# Patient Record
Sex: MALE | Race: ASIAN | NOT HISPANIC OR LATINO | URBAN - METROPOLITAN AREA
[De-identification: names, ages, dates, MRNs, and addresses within clinical notes are randomized per-mention and may not be internally consistent; named-entity substitution may affect disease eponyms.]

---

## 2020-02-09 ENCOUNTER — INPATIENT (INPATIENT)
Facility: HOSPITAL | Age: 60
LOS: 26 days | Discharge: HOME | DRG: 40 | End: 2020-03-07
Attending: GENERAL PRACTICE | Admitting: GENERAL PRACTICE
Payer: MEDICARE

## 2020-02-09 VITALS
SYSTOLIC BLOOD PRESSURE: 116 MMHG | OXYGEN SATURATION: 100 % | RESPIRATION RATE: 14 BRPM | DIASTOLIC BLOOD PRESSURE: 76 MMHG | HEART RATE: 67 BPM | TEMPERATURE: 98 F

## 2020-02-09 DIAGNOSIS — R47.01 APHASIA: ICD-10-CM

## 2020-02-09 LAB
ALBUMIN SERPL ELPH-MCNC: 3.9 G/DL — SIGNIFICANT CHANGE UP (ref 3.3–5)
ALP SERPL-CCNC: 132 U/L — HIGH (ref 40–120)
ALT FLD-CCNC: 33 U/L — SIGNIFICANT CHANGE UP (ref 10–45)
ANION GAP SERPL CALC-SCNC: 10 MMOL/L — SIGNIFICANT CHANGE UP (ref 5–17)
APTT BLD: 32.4 SEC — SIGNIFICANT CHANGE UP (ref 27.5–36.3)
AST SERPL-CCNC: 31 U/L — SIGNIFICANT CHANGE UP (ref 10–40)
BASE EXCESS BLDV CALC-SCNC: 3.4 MMOL/L — HIGH (ref -2–2)
BILIRUB SERPL-MCNC: 0.6 MG/DL — SIGNIFICANT CHANGE UP (ref 0.2–1.2)
BUN SERPL-MCNC: 9 MG/DL — SIGNIFICANT CHANGE UP (ref 7–23)
CA-I SERPL-SCNC: 1.22 MMOL/L — SIGNIFICANT CHANGE UP (ref 1.12–1.3)
CALCIUM SERPL-MCNC: 9.1 MG/DL — SIGNIFICANT CHANGE UP (ref 8.4–10.5)
CHLORIDE BLDV-SCNC: 108 MMOL/L — SIGNIFICANT CHANGE UP (ref 96–108)
CHLORIDE SERPL-SCNC: 106 MMOL/L — SIGNIFICANT CHANGE UP (ref 96–108)
CO2 BLDV-SCNC: 30 MMOL/L — SIGNIFICANT CHANGE UP (ref 22–30)
CO2 SERPL-SCNC: 24 MMOL/L — SIGNIFICANT CHANGE UP (ref 22–31)
CREAT SERPL-MCNC: 0.89 MG/DL — SIGNIFICANT CHANGE UP (ref 0.5–1.3)
GAS PNL BLDV: 138 MMOL/L — SIGNIFICANT CHANGE UP (ref 135–145)
GAS PNL BLDV: SIGNIFICANT CHANGE UP
GAS PNL BLDV: SIGNIFICANT CHANGE UP
GLUCOSE BLDV-MCNC: 127 MG/DL — HIGH (ref 70–99)
GLUCOSE SERPL-MCNC: 138 MG/DL — HIGH (ref 70–99)
HCO3 BLDV-SCNC: 29 MMOL/L — SIGNIFICANT CHANGE UP (ref 21–29)
HCT VFR BLD CALC: 40.7 % — SIGNIFICANT CHANGE UP (ref 39–50)
HCT VFR BLDA CALC: 42 % — SIGNIFICANT CHANGE UP (ref 39–50)
HGB BLD CALC-MCNC: 13.7 G/DL — SIGNIFICANT CHANGE UP (ref 13–17)
HGB BLD-MCNC: 13.1 G/DL — SIGNIFICANT CHANGE UP (ref 13–17)
INR BLD: 0.99 RATIO — SIGNIFICANT CHANGE UP (ref 0.88–1.16)
LACTATE BLDV-MCNC: 1 MMOL/L — SIGNIFICANT CHANGE UP (ref 0.7–2)
MCHC RBC-ENTMCNC: 32.2 GM/DL — SIGNIFICANT CHANGE UP (ref 32–36)
MCHC RBC-ENTMCNC: 33.4 PG — SIGNIFICANT CHANGE UP (ref 27–34)
MCV RBC AUTO: 103.8 FL — HIGH (ref 80–100)
NRBC # BLD: 0 /100 WBCS — SIGNIFICANT CHANGE UP (ref 0–0)
PCO2 BLDV: 48 MMHG — SIGNIFICANT CHANGE UP (ref 35–50)
PH BLDV: 7.4 — SIGNIFICANT CHANGE UP (ref 7.35–7.45)
PLATELET # BLD AUTO: 334 K/UL — SIGNIFICANT CHANGE UP (ref 150–400)
PO2 BLDV: 41 MMHG — SIGNIFICANT CHANGE UP (ref 25–45)
POTASSIUM BLDV-SCNC: 3.7 MMOL/L — SIGNIFICANT CHANGE UP (ref 3.5–5.3)
POTASSIUM SERPL-MCNC: 4.1 MMOL/L — SIGNIFICANT CHANGE UP (ref 3.5–5.3)
POTASSIUM SERPL-SCNC: 4.1 MMOL/L — SIGNIFICANT CHANGE UP (ref 3.5–5.3)
PROT SERPL-MCNC: 6.6 G/DL — SIGNIFICANT CHANGE UP (ref 6–8.3)
PROTHROM AB SERPL-ACNC: 11.3 SEC — SIGNIFICANT CHANGE UP (ref 10–12.9)
RBC # BLD: 3.92 M/UL — LOW (ref 4.2–5.8)
RBC # FLD: 14.2 % — SIGNIFICANT CHANGE UP (ref 10.3–14.5)
SAO2 % BLDV: 72 % — SIGNIFICANT CHANGE UP (ref 67–88)
SODIUM SERPL-SCNC: 140 MMOL/L — SIGNIFICANT CHANGE UP (ref 135–145)
TROPONIN T, HIGH SENSITIVITY RESULT: 10 NG/L — SIGNIFICANT CHANGE UP (ref 0–51)
WBC # BLD: 6.32 K/UL — SIGNIFICANT CHANGE UP (ref 3.8–10.5)
WBC # FLD AUTO: 6.32 K/UL — SIGNIFICANT CHANGE UP (ref 3.8–10.5)

## 2020-02-09 PROCEDURE — 70498 CT ANGIOGRAPHY NECK: CPT | Mod: 26

## 2020-02-09 PROCEDURE — 70496 CT ANGIOGRAPHY HEAD: CPT | Mod: 26

## 2020-02-09 PROCEDURE — 99285 EMERGENCY DEPT VISIT HI MDM: CPT | Mod: GC

## 2020-02-09 PROCEDURE — 93010 ELECTROCARDIOGRAM REPORT: CPT | Mod: GC

## 2020-02-09 RX ORDER — ENOXAPARIN SODIUM 100 MG/ML
40 INJECTION SUBCUTANEOUS DAILY
Refills: 0 | Status: DISCONTINUED | OUTPATIENT
Start: 2020-02-09 | End: 2020-02-26

## 2020-02-09 RX ORDER — ATORVASTATIN CALCIUM 80 MG/1
80 TABLET, FILM COATED ORAL AT BEDTIME
Refills: 0 | Status: DISCONTINUED | OUTPATIENT
Start: 2020-02-09 | End: 2020-03-07

## 2020-02-09 RX ORDER — ASPIRIN/CALCIUM CARB/MAGNESIUM 324 MG
81 TABLET ORAL DAILY
Refills: 0 | Status: DISCONTINUED | OUTPATIENT
Start: 2020-02-09 | End: 2020-02-10

## 2020-02-09 RX ORDER — ACYCLOVIR SODIUM 500 MG
650 VIAL (EA) INTRAVENOUS EVERY 8 HOURS
Refills: 0 | Status: DISCONTINUED | OUTPATIENT
Start: 2020-02-09 | End: 2020-02-11

## 2020-02-09 RX ORDER — ACYCLOVIR SODIUM 500 MG
650 VIAL (EA) INTRAVENOUS ONCE
Refills: 0 | Status: COMPLETED | OUTPATIENT
Start: 2020-02-09 | End: 2020-02-09

## 2020-02-09 RX ORDER — ACYCLOVIR SODIUM 500 MG
VIAL (EA) INTRAVENOUS
Refills: 0 | Status: DISCONTINUED | OUTPATIENT
Start: 2020-02-09 | End: 2020-02-11

## 2020-02-09 RX ORDER — ACYCLOVIR SODIUM 500 MG
VIAL (EA) INTRAVENOUS
Refills: 0 | Status: DISCONTINUED | OUTPATIENT
Start: 2020-02-09 | End: 2020-02-09

## 2020-02-09 RX ORDER — ACYCLOVIR SODIUM 500 MG
400 VIAL (EA) INTRAVENOUS ONCE
Refills: 0 | Status: DISCONTINUED | OUTPATIENT
Start: 2020-02-09 | End: 2020-02-09

## 2020-02-09 RX ORDER — CLOPIDOGREL BISULFATE 75 MG/1
75 TABLET, FILM COATED ORAL DAILY
Refills: 0 | Status: DISCONTINUED | OUTPATIENT
Start: 2020-02-09 | End: 2020-02-18

## 2020-02-09 RX ADMIN — Medication 263 MILLIGRAM(S): at 22:13

## 2020-02-09 RX ADMIN — Medication 263 MILLIGRAM(S): at 15:11

## 2020-02-09 NOTE — ED PROVIDER NOTE - ATTENDING CONTRIBUTION TO CARE
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 All history provided by brother. Patient can understand some English per brother but brother is translating and patient is able to follow commands and nod or shake his head. Accordingly, patient has been in [place] since Sept 2019 until yesterday. He developed difficulty speaking 3 weeks ago, was evaluated in [place] about 1 week ago, given a shot for 'power' 1 week ago. According to brother, patient was living alone, not eating or drinking. He is here for evaluation. No fever. No speech problem after CVA years ago. Patient noted to have rash on lips. Brother unable to provide history regarding this. Patient shakes his head when asked if he has pain.     VS noted  Gen. no acute distress, Non toxic   HEENT: EOMI, mmm, vesicular rash to right corner of mouth and lower lip  Lungs: CTAB/L no C/ W /R   CVS: RRR   Abd; Soft non tender, non distended   Ext: no edema  Skin: no rash  Neuro: awake, follows commands,  strength equal in both UE/ LE, ? pronator drift (uneven), able to stand but not walk 2/2 imbalance  a/p: aphasia - ? subacute CVA. No fever. Vesicular rash appears to be crusted over and present for some time but time frame unclear - concern for herpes. Plan for CT Head, CTA Head and Neck, labs, ekg, trop, treat for acyclovir and neurology consult.   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 All history provided by brother. Patient can understand some English per brother but brother is translating and patient is able to follow commands and nod or shake his head. Accordingly, patient has been in [place] since Sept 2019 until yesterday. He developed difficulty speaking 3 weeks ago, was evaluated in [place] about 1 week ago, given a shot for 'power' 1 week ago. According to brother, patient was living alone, not eating or drinking. He is here for evaluation. No fever. No speech problem after CVA years ago. Patient noted to have rash on lips. Brother unable to provide history regarding this. Patient shakes his head when asked if he has pain. No pmd per brother.     VS noted  Gen. no acute distress, Non toxic   HEENT: EOMI, mmm, vesicular rash to right corner of mouth and lower lip  Lungs: CTAB/L no C/ W /R   CVS: RRR   Abd; Soft non tender, non distended   Ext: no edema  Skin: no rash  Neuro: awake, follows commands,  strength equal in both UE/ LE, ? pronator drift (uneven), able to stand but not walk 2/2 imbalance  a/p: aphasia - ? subacute CVA. No fever. Vesicular rash appears to be crusted over and present for some time but time frame unclear - concern for herpes. Plan for CT Head, CTA Head and Neck, labs, ekg, trop, treat for acyclovir and neurology consult.   update: per patient's brother who spoke to his sister, rash on face started 3 days ago.   - Fay RENTERIA

## 2020-02-09 NOTE — ED ADULT NURSE NOTE - CHIEF COMPLAINT QUOTE
pt English speaking; brother translating; pt not speaking; brother states pt has been non verbal and weak for over 3 weeks; just returned from [place] yesterday

## 2020-02-09 NOTE — H&P ADULT - ASSESSMENT
59Y Lao speaking M with prior CVA 8y ago (no reported deficits) brought in by brother for concerns of generalized weakness and inability to speak for the past 3 weeks. On exam found to have no verbal output, appears withdrawn and weak on neurological evaluation  He follows commands intermittently and is easily agitated.  Exam limited due to patient aggression.   CTH demonstrates chronic L MCA infarct with severely stenotic vs occluded distal L M1.   There is also subacute/chronic R corona radiata hypodensity  ptn seen by Neurology, assessment and recommendations are appreciated  its not clear whether his Sx are 2/2 CVA induced aphasia which is worse 2/2 herpetic infection on his lip, now crusted and has resolved, OR this is psychiatric in nature. since ptn has no attempt to speak its hard to determine this being expressive aphasia. its possible ptn is depressed 2/2 poor functioning and worsening aphasia over several months.  PLAN:   - MRI Brain w/ and w/o   - MRA Head w/ and MRA neck w/o to better evaluate intracranial vasculature  - start ASA 81mg QD if no contraindications  - start Plavix 75mg QD x3 months for symptomatic intracranial atherosclerosis as per NEURO  - TTE with bubble study  - pending MRI results, may be candidate for ILR   - send HgA1C, LDL  - start Atorvastatin 80mg QD and titrate according to LDL  - obtain collateral info from family regarding onset of symptoms and patient's baseline  - treatment of vesicular rash w acyclovir, ID called, no sign of any other location of herpetic infection   - psych consult  -DVT ppx w sc Lovenox  -social service consult  -PT eval

## 2020-02-09 NOTE — H&P ADULT - HISTORY OF PRESENT ILLNESS
58 yo M with h/o CVA 8 years ago presenting with aphasia. Pt is accompanied by brother who interprets. Brother endorses that pt went to visit family in [place] 9/2019 and about 3 weeks ago, family stated that he was starting to get weak and stopped being able to speak. He was also endorsing difficulty walking from weakness. He returned from [place] last night and came to the ED for evaluation. Pt also developed lesions on his mouth 3 days ago. He denies any other medical history or medication use. 60 yo M with h/o CVA 8 years ago presenting with aphasia. Pt is accompanied by brother who interpreted in the ED. Brother endorsed that pt went to visit family in [place] 9/2019 and about 3 weeks ago, family stated that he was starting to get weak and stopped being able to speak. He was also endorsing difficulty walking from weakness. He returned from [place] last night and came to the ED for evaluation. Pt also developed lesions on his mouth 3 days ago. He denies any other medical history or medication use.

## 2020-02-09 NOTE — H&P ADULT - NSHPPHYSICALEXAM_GEN_ALL_CORE
T(F): 97.5 (02-09-20 @ 19:43), Max: 98.6 (02-09-20 @ 12:30)  HR: 55 (02-09-20 @ 19:43) (55 - 70)  BP: 145/84 (02-09-20 @ 19:43) (116/76 - 182/97)  RR: 16 (02-09-20 @ 19:43) (14 - 16)  SpO2: 97% (02-09-20 @ 19:43) (95% - 100%)    GENERAL: NAD, well-developed  HEAD:  Atraumatic, Normocephalic  EYES: EOMI, PERRLA, conjunctiva and sclera clear  NECK: Supple, No JVD  CHEST/LUNG: Clear to auscultation bilaterally; No wheeze  HEART: Regular rate and rhythm; No murmurs, rubs, or gallops  ABDOMEN: Soft, Nontender, Nondistended; Bowel sounds present  EXTREMITIES:  2+ Peripheral Pulses, No clubbing, cyanosis, or edema  PSYCH: AAOx3  NEUROLOGY: non-focal  SKIN: No rashes or lesions

## 2020-02-09 NOTE — ED ADULT NURSE NOTE - OBJECTIVE STATEMENT
59 year old male reports ambulatory to ED c/o changes in baseline status. Pt is non verbal and brother is at bedside for communication and translation. Brother reports that in Sept 2019 pt went to [place] and returned yesterday. Brother reports that prior to leaving for [place] pt had some generalized weakness and has since lost his ability to speak and is presenting with increased body weakness. Per pt brother while in [place] pt received a "power injection". Family reports that pt was in [place] living in a motel by himself but was being checked on periodically by other family members that noticed his change about 3 weeks ago. Pt also presents with multiple small lesions on his right lower lip and in the corner of his right lip brother reports that sister in [place] noticed the sores 3 days ago. Pt is able to follow commands by his brother when he is speaking in Croatian. Brother reports that pt does understand English but he is not currently responding to our commands

## 2020-02-09 NOTE — ED PROVIDER NOTE - OBJECTIVE STATEMENT
60 yo M with h/o CVA 8 years ago presenting with aphasia. Pt is accompanied by brother who interprets. Brother endorses that pt went to visit family in [place] 9/2019 and about 3 weeks ago, family stated that he was starting to get weak and stopped being able to speak. He was also endorsing difficulty walking from weakness. He returned from [place] last night and came to the ED for evaluation. Pt also developed lesions on his mouth 3 days ago. He denies any other medical history or medication use.

## 2020-02-09 NOTE — CONSULT NOTE ADULT - SUBJECTIVE AND OBJECTIVE BOX
HPI:  Patient is a 59Y Greenlandic speaking M with prior CVA 8y ago (no reported deficits) brought in by brother for concerns of generalized weakness and inability to speak. He was recently visiting [place] (since September) and returned last night. History obtained via chart review as patient was not able to communicate with me and there was no family at bedside. Greenlandic  #[id number] was used. While in [place], patient was living on his own and not eating or drinking, He saw a doctor in [place] 1 week ago and was given a "power shot."  Patient also has vesicular lesions around his mouth of unclear duration.       MEDICATIONS  (STANDING):  acyclovir IVPB 650 milliGRAM(s) IV Intermittent once  acyclovir IVPB 650 milliGRAM(s) IV Intermittent every 8 hours  acyclovir IVPB        MEDICATIONS  (PRN):    PAST MEDICAL & SURGICAL HISTORY:  CVA (cerebral vascular accident)  No significant past surgical history    FAMILY HISTORY:  No pertinent family history in first degree relatives    Allergies    No Known Allergies    Intolerances        SHx - No smoking, No ETOH, No drug abuse    Review of Systems:  A 10 system ROS was performed and is negative except for those mentioned in HPI      Vital Signs Last 24 Hrs  T(C): 36.8 (09 Feb 2020 12:55), Max: 37 (09 Feb 2020 12:30)  T(F): 98.3 (09 Feb 2020 12:55), Max: 98.6 (09 Feb 2020 12:30)  HR: 70 (09 Feb 2020 12:30) (67 - 70)  BP: 182/97 (09 Feb 2020 12:30) (116/76 - 182/97)  BP(mean): --  RR: 16 (09 Feb 2020 12:30) (14 - 16)  SpO2: 98% (09 Feb 2020 12:30) (98% - 100%)    Neurological (>12):  MS: Awake, alert. Follows commands intermittently when asked in Greenlandic. Unclear if there is psychomotor slowing or language barrier, even with interpretation services. Easily frustrated and slapped me once during examination.     Language: No verbal output. When asked in Greenlandic what his name is, he points to his mouth and shakes his hand no. There is no attempt to enunciate words or mimic my movements.     CNs: PERRLA (R = 2mm, L = 2mm). no BTT on the L, ?L CN6 palsy (would not keep eyes open for me) No facial asymmetry b/l, full eye closure strength b/l.     Motor: Normal muscle bulk & tone. No noticeable tremor or seizure. No pronator drift.  Difficult to assess to due cooperation however moves all extremities spontaneously antigravity, Subtle RLE drift noted (patient minimally cooperated with this), no focal weakness.      Sensation: withdraws to noxious stimuli throughout    Reflexes:              Biceps(C5)       BR(C6)     Triceps(C7)               Patellar(L4)    Achilles(S1)    Plantar Resp  R	2	          2	             2		        3		    2		Down   L	2	          2	             2		        3		    2		Mute     Coordination: unable to cooperate    Gait: deferred    Neg Rich's, Ken signs     02-09    140  |  106  |  9   ----------------------------<  138<H>  4.1   |  24  |  0.89    Ca    9.1      09 Feb 2020 12:48    TPro  6.6  /  Alb  3.9  /  TBili  0.6  /  DBili  x   /  AST  31  /  ALT  33  /  AlkPhos  132<H>  02-09 02-09    140  |  106  |  9   ----------------------------<  138<H>  4.1   |  24  |  0.89    Ca    9.1      09 Feb 2020 12:48    TPro  6.6  /  Alb  3.9  /  TBili  0.6  /  DBili  x   /  AST  31  /  ALT  33  /  AlkPhos  132<H>  02-09                          13.1   6.32  )-----------( 334      ( 09 Feb 2020 12:48 )             40.7       Radiology HPI:  Patient is a 59Y Kiswahili speaking M with prior CVA 8y ago (no reported deficits) brought in by brother for concerns of generalized weakness and inability to speak. He was recently visiting [place] (since September) and returned last night. History obtained via chart review as patient was not able to communicate with me and there was no family at bedside. Kiswahili  #[id number] was used. While in [place], patient was living on his own and not eating or drinking, He saw a doctor in [place] 1 week ago and was given a "power shot."  Patient also has vesicular lesions around his mouth of unclear duration.       MEDICATIONS  (STANDING):  acyclovir IVPB 650 milliGRAM(s) IV Intermittent once  acyclovir IVPB 650 milliGRAM(s) IV Intermittent every 8 hours  acyclovir IVPB        MEDICATIONS  (PRN):    PAST MEDICAL & SURGICAL HISTORY:  CVA (cerebral vascular accident)  No significant past surgical history    FAMILY HISTORY:  No pertinent family history in first degree relatives    Allergies    No Known Allergies    Intolerances        SHx - No smoking, No ETOH, No drug abuse    Review of Systems:  A 10 system ROS was performed and is negative except for those mentioned in HPI      Vital Signs Last 24 Hrs  T(C): 36.8 (09 Feb 2020 12:55), Max: 37 (09 Feb 2020 12:30)  T(F): 98.3 (09 Feb 2020 12:55), Max: 98.6 (09 Feb 2020 12:30)  HR: 70 (09 Feb 2020 12:30) (67 - 70)  BP: 182/97 (09 Feb 2020 12:30) (116/76 - 182/97)  BP(mean): --  RR: 16 (09 Feb 2020 12:30) (14 - 16)  SpO2: 98% (09 Feb 2020 12:30) (98% - 100%)    Neurological (>12):  MS: Awake, alert. Follows commands intermittently when asked in Kiswahili. Unclear if there is psychomotor slowing or language barrier, even with interpretation services. Easily frustrated and slapped me once during examination.     Language: No verbal output. When asked in Kiswahili what his name is, he points to his mouth and shakes his hand no. There is no attempt to enunciate words or mimic my movements.     CNs: PERRLA (R = 2mm, L = 2mm). no BTT on the L, ?L CN6 palsy (would not keep eyes open for me) No facial asymmetry b/l, full eye closure strength b/l.     Motor: Normal muscle bulk & tone. No noticeable tremor or seizure. No pronator drift.  Difficult to assess to due cooperation however moves all extremities spontaneously antigravity, Subtle RLE drift noted (patient minimally cooperated with this), no focal weakness.      Sensation: withdraws to noxious stimuli throughout    Reflexes:              Biceps(C5)       BR(C6)     Triceps(C7)               Patellar(L4)    Achilles(S1)    Plantar Resp  R	2	          2	             2		        3		    2		Down   L	2	          2	             2		        3		    2		Mute     Coordination: unable to cooperate    Gait: deferred    Neg Hannahg's, Tannerliz signs     02-09    140  |  106  |  9   ----------------------------<  138<H>  4.1   |  24  |  0.89    Ca    9.1      09 Feb 2020 12:48    TPro  6.6  /  Alb  3.9  /  TBili  0.6  /  DBili  x   /  AST  31  /  ALT  33  /  AlkPhos  132<H>  02-09 02-09    140  |  106  |  9   ----------------------------<  138<H>  4.1   |  24  |  0.89    Ca    9.1      09 Feb 2020 12:48    TPro  6.6  /  Alb  3.9  /  TBili  0.6  /  DBili  x   /  AST  31  /  ALT  33  /  AlkPhos  132<H>  02-09                          13.1   6.32  )-----------( 334      ( 09 Feb 2020 12:48 )             40.7       Radiology    IMPRESSION:     CT brain: Multiple areas of prior infarct as described above. No acute hemorrhage or evidence of mass effect. Age-indeterminate lacunar infarcts as described. If the patient has a new and persistent neurologic deficit, consider short follow-up head CT or brain MRI follow-up.    CT angiography neck: No hemodynamically significant stenosis by NASCET criteria. No vascular dissection.    CT angiography brain: The left middle cerebral artery is severely stenosed versus occluded in this patient with a chronic left MCA territory infarct. Marked decrease branching of the distal left MCA branches are noted in the left sylvian fissure compared to the contralateral side.    Mild to moderate lobulated stenoses involve the M1 segment of the right middle cerebral artery.    < end of copied text >

## 2020-02-09 NOTE — ED PROVIDER NOTE - PHYSICAL EXAMINATION
Constitutional: Thin man seen lying in bed in NAD  Eyes: PERRL, sclera clear  ENMT: ulcerations of the R side of the mouth with one small lesion on the L lower lip, MMM  Respiratory: CTAB, no rales, rhonchi or wheezes  Cardiovascular: RRR, no m/g/r  Gastrointestinal: +BS, soft, NT/ND  Neurological: b/l extremity strength grossly intact, aphasic, able to stand but unsteady  Extremities: No LE edema

## 2020-02-09 NOTE — ED ADULT TRIAGE NOTE - CHIEF COMPLAINT QUOTE
pt Khmer speaking; brother translating; pt not speaking; brother states pt has been non verbal and weak for over 3 weeks; just returned from [place] yesterday

## 2020-02-09 NOTE — ED PROVIDER NOTE - CLINICAL SUMMARY MEDICAL DECISION MAKING FREE TEXT BOX
58 yo h/o CVA p/w aphasia and weakness x3 weeks. Stroke workup, treat herpes, labs. 58 yo h/o CVA p/w aphasia and weakness x3 weeks. Stroke workup, treat herpes, labs. CT showing old CVA, neuro consulted, admit for further workup and treatment.

## 2020-02-09 NOTE — ED ADULT NURSE NOTE - NSFALLRSKASSESASSIST_ED_ALL_ED
--------------------------------------------------------------------------------------------------------------  [facility]    Monday - Thursday 8 a.m. - 8 p.m.  Friday   9 a.m. - 5 p.m.  Saturday  9 a.m. - 1 p.m.    Thank you for choosing [facility]. We hope you had a pleasant experience and we look forward to serving your future needs.    If you have any questions about your visit, please call [phone].    If you have any questions about your bill, please call [phone] and ask for an .    If you need a copy of your medical record, please call [phone].    If you receive a survey in the mail about today's services, we hope that you will take a few minutes to let us know about your experience.  --------------------------------------------------------------------------------------------------------------    UNLESS OTHERWISE INSTRUCTED BY YOUR URGENT CARE PROVIDER TODAY, all follow-up for your medical issues should be managed by your primary care provider. The Urgent Care does not manage chronic medical issues or refill medications. You are responsible for scheduling and keeping any necessary follow-up visits with your primary care provider after this visit today.   --------------------------------------------------------------------------------------------------------------    IF YOU WERE PRESCRIBED AN ANTIBIOTIC TODAY: We recommend taking an over-the-counter probiotic (Such as Florajen 3 for adults, or [id number] for children -- AVAILABLE IN THE [facility] and other local pharmacies too) once a day for the entire duration of your antibiotics, and continuing it for 2 weeks after the antibiotics are finished. This will help reduce your chance of developing antibiotic-related diarrhea and / or yeast infections.  --------------------------------------------------------------------------------------------------------------      When Your Child  Has “Swimmer’s Ear”   If your child spends a lot of time in the water and is having ear pain, he or she may have developed otitis externa. This is also known as “swimmer’s ear.” It is a skin infection that happens in the ear canal, between the opening of the ear and the eardrum. When the ear canal becomes too moist, bacteria can grow. This causes pain, swelling, and redness in the ear canal.  What causes swimmer’s ear?  The most common causes of swimmer’s ear in children are:  · Swimming or lying down in a bathtub or hot tub.  · Roughly cleaning the ear canal. This causes tiny cuts or scratches that easily get infected.  · Ear canals that are naturally narrow.  · Excess earwax that traps fluid in the ear canal.  What are the symptoms of swimmer’s ear?     The most common symptoms of swimmer’s ear are:  · Ear pain, especially when pulling on the earlobe or when chewing  · Redness or swelling in the ear canal or near the ear  · Itching in the ear  · Drainage from the ear  · Feeling like water is in the ear  · Fever  · Problems hearing  How is swimmer’s ear diagnosed?  The health care provider will examine your child. He or she will also ask questions to help rule out other causes of ear pain. The health care provider will look for:  · Redness and swelling in the ear canal  · Drainage from the ear canal  How is swimmer’s ear treated?  To treat your child’s ear, the health care provider may recommend:  · Medications, such as antibiotic eardrops or a topical ear anesthetic (to relieve pain). Oral (taken by mouth) antibiotics is not recommended.  · Over-the-counter pain relievers such as acetaminophen and ibuprofen. Do not give ibuprofen to infants less than 6 months of age or to children who are dehydrated or constantly vomiting. Don’t give your child aspirin to relieve a fever. Using aspirin to treat a fever in children could cause a serious condition called Reye’s syndrome.  How is swimmer’s ear prevented?  Ask your  child's health care provider about using the following to help prevent swimmer’s ear:  · After your child has been in the water, have your child tilt his or her head to each side to help any water drain out. You can also dry his or her ear canal using a blow dryer. Using a LOW AIR  and COOL setting, hold the dryer at least 12 inches from your child’s head. Wave the dryer slowly back and forth -- don’t hold it still. You may also gently pull the earlobe down and slightly backward to allow the air to reach the ear canal.  · Use a tissue to gently draw water out of the ear. Your child’s health care provider can show you how.  · Use over-the-counter eardrops if the healthcare provider suggests. These help dry out the inside of your child’s ear. Smaller children may need to lie down on a couch or bed for a short time to keep the drops inside the ear canal.  · Gently clean your child’s ear canal. Do not use cotton swabs.  Call your child’s health care provider if your child has any of the following:  · Increased pain redness, or swelling of the outer ear  · Ear pain, redness, or swelling that does not go away with treatment  · Fever:  ¨ A rectal temperature of 100.4°F (38.0°C) or higher in an infant younger than 3 months  ¨ A repeated temperature of 104°F (40°C) or higher in a child of any age  ¨ A fever that lasts more than 24-hours in a child younger than 2 years, or for 3 days in a child 2 years or older  ¨ A seizure caused by the fever. Call 911 or your local emergency number.   © 9237-7902 [employer]. [address] [place] [address], [place] [address]. All rights reserved. This information is not intended as a substitute for professional medical care. Always follow your healthcare professional's instructions.         yes

## 2020-02-10 LAB
24R-OH-CALCIDIOL SERPL-MCNC: 11.6 NG/ML — LOW (ref 30–80)
CHOLEST SERPL-MCNC: 199 MG/DL — SIGNIFICANT CHANGE UP (ref 10–199)
FOLATE SERPL-MCNC: 2.1 NG/ML — LOW
HBA1C BLD-MCNC: 5.5 % — SIGNIFICANT CHANGE UP (ref 4–5.6)
HBV CORE AB SER-ACNC: SIGNIFICANT CHANGE UP
HBV SURFACE AB SER-ACNC: SIGNIFICANT CHANGE UP
HBV SURFACE AG SER-ACNC: SIGNIFICANT CHANGE UP
HCV AB S/CO SERPL IA: 0.1 S/CO — SIGNIFICANT CHANGE UP (ref 0–0.99)
HCV AB SERPL-IMP: SIGNIFICANT CHANGE UP
HDLC SERPL-MCNC: 67 MG/DL — SIGNIFICANT CHANGE UP
HIV 1+2 AB+HIV1 P24 AG SERPL QL IA: SIGNIFICANT CHANGE UP
LIPID PNL WITH DIRECT LDL SERPL: 99 MG/DL — SIGNIFICANT CHANGE UP
T3 SERPL-MCNC: 72 NG/DL — LOW (ref 80–200)
T4 AB SER-ACNC: 5.4 UG/DL — SIGNIFICANT CHANGE UP (ref 4.6–12)
TOTAL CHOLESTEROL/HDL RATIO MEASUREMENT: 3 RATIO — LOW (ref 3.4–9.6)
TRIGL SERPL-MCNC: 164 MG/DL — HIGH (ref 10–149)
TSH SERPL-MCNC: 2.3 UIU/ML — SIGNIFICANT CHANGE UP (ref 0.27–4.2)
TSH SERPL-MCNC: 3.04 UIU/ML — SIGNIFICANT CHANGE UP (ref 0.27–4.2)
VIT B12 SERPL-MCNC: 604 PG/ML — SIGNIFICANT CHANGE UP (ref 232–1245)

## 2020-02-10 PROCEDURE — 93306 TTE W/DOPPLER COMPLETE: CPT | Mod: 26

## 2020-02-10 PROCEDURE — 93010 ELECTROCARDIOGRAM REPORT: CPT

## 2020-02-10 RX ORDER — FOLIC ACID 0.8 MG
1 TABLET ORAL ONCE
Refills: 0 | Status: COMPLETED | OUTPATIENT
Start: 2020-02-10 | End: 2020-02-10

## 2020-02-10 RX ORDER — PREGABALIN 225 MG/1
1000 CAPSULE ORAL ONCE
Refills: 0 | Status: COMPLETED | OUTPATIENT
Start: 2020-02-10 | End: 2020-02-10

## 2020-02-10 RX ORDER — MULTIVIT-MIN/FERROUS GLUCONATE 9 MG/15 ML
1 LIQUID (ML) ORAL DAILY
Refills: 0 | Status: DISCONTINUED | OUTPATIENT
Start: 2020-02-10 | End: 2020-02-19

## 2020-02-10 RX ORDER — ASPIRIN/CALCIUM CARB/MAGNESIUM 324 MG
81 TABLET ORAL DAILY
Refills: 0 | Status: DISCONTINUED | OUTPATIENT
Start: 2020-02-10 | End: 2020-02-11

## 2020-02-10 RX ORDER — PREGABALIN 225 MG/1
1000 CAPSULE ORAL DAILY
Refills: 0 | Status: DISCONTINUED | OUTPATIENT
Start: 2020-02-10 | End: 2020-03-07

## 2020-02-10 RX ADMIN — ENOXAPARIN SODIUM 40 MILLIGRAM(S): 100 INJECTION SUBCUTANEOUS at 12:10

## 2020-02-10 RX ADMIN — ATORVASTATIN CALCIUM 80 MILLIGRAM(S): 80 TABLET, FILM COATED ORAL at 21:59

## 2020-02-10 RX ADMIN — PREGABALIN 1000 MICROGRAM(S): 225 CAPSULE ORAL at 17:15

## 2020-02-10 RX ADMIN — Medication 263 MILLIGRAM(S): at 21:59

## 2020-02-10 RX ADMIN — Medication 1 MILLIGRAM(S): at 17:16

## 2020-02-10 RX ADMIN — Medication 81 MILLIGRAM(S): at 12:10

## 2020-02-10 RX ADMIN — Medication 263 MILLIGRAM(S): at 05:29

## 2020-02-10 RX ADMIN — CLOPIDOGREL BISULFATE 75 MILLIGRAM(S): 75 TABLET, FILM COATED ORAL at 12:09

## 2020-02-10 RX ADMIN — Medication 263 MILLIGRAM(S): at 14:32

## 2020-02-10 NOTE — PHYSICAL THERAPY INITIAL EVALUATION ADULT - IMPAIRMENTS CONTRIBUTING IMPAIRED BED MOBILITY, REHAB EVAL
decreased flexibility/impaired motor control/impaired sensory feedback/decreased strength/impaired balance/impaired coordination

## 2020-02-10 NOTE — CONSULT NOTE ADULT - ASSESSMENT
Sinus bradycardia  suspect related to his neurological event , cva  check TSH  monitor on tele   obtain echo     CVA  plan for echo   MRI  fu with neurology     lip lesion   ? zoster  on acyclovir  plan as per primary team

## 2020-02-10 NOTE — SWALLOW BEDSIDE ASSESSMENT ADULT - COMMENTS
Per neuro, pt found to have decreased BTT on the L and no verbal output. He follows commands intermittently and is easily agitated. Exam limited due to patient aggression. CTH demonstrates chronic L MCA infarct with severely stenotic vs occluded distal L M1. There is also subacute/chronic R corona radiata hypodensity. Expressive aphasia possibly secondary to chronic L MCA ischemic stroke 2/2 to severely stenotic vs occlusion of L M1, with possible recrudescence in setting of infection. He has L MCA territory encephalomalacia and gliosis, which if this is his dominant hemisphere can explain his expressive aphasia but there is no attempt on patients part to speak. ?L CN 6 palsy, however no PCA territory involvement seen on CTH. Per H&P: its not clear whether his Sx are 2/2 CVA induced aphasia which is worse 2/2 herpetic infection on his lip, now crusted and has resolved, OR this is psychiatric in nature. since ptn has no attempt to speak its hard to determine this being expressive aphasia. Possible ptn is depressed 2/2 poor functioning and worsening aphasia over several months.   2/10: provider contact note for EKG with marked sinus bradycardia with HR of 48 and prolonged QT: 554; no acute changes compared to previous one. Pt transferred to [facility] for HR monitoring. Cardio consulted; suspected bradycardia due to neurologic event. Per neuro, pt found to have decreased BTT on the L and no verbal output. He follows commands intermittently and is easily agitated. Exam limited due to patient aggression. CTH demonstrates chronic L MCA infarct with severely stenotic vs occluded distal L M1. There is also subacute/chronic R corona radiata hypodensity. Expressive aphasia possibly secondary to chronic L MCA ischemic stroke 2/2 to severely stenotic vs occlusion of L M1, with possible recrudescence in setting of infection. He has L MCA territory encephalomalacia and gliosis, which if this is his dominant hemisphere can explain his expressive aphasia but there is no attempt on patients part to speak. ?L CN 6 palsy, however no PCA territory involvement seen on CTH. Per H&P: its not clear whether his Sx are 2/2 CVA induced aphasia which is worse 2/2 herpetic infection on his lip, now crusted and has resolved, OR this is psychiatric in nature. since ptn has no attempt to speak its hard to determine this being expressive aphasia. Possible ptn is depressed 2/2 poor functioning and worsening aphasia over several months.   2/10: provider contact note for EKG with marked sinus bradycardia with HR of 48 and prolonged QT: 554; no acute changes compared to previous one. Pt transferred to [facility] for HR monitoring. Cardio consulted; suspected bradycardia due to neurologic event.  2/9: CT brain: Multiple areas of prior infarct as described above. No acute hemorrhage or evidence of mass effect. Age-indeterminate lacunar infarcts as described. CT angiography neck: No hemodynamically significant stenosis by NASCET criteria. No vascular dissection. CT angiography brain: The left middle cerebral artery is severely stenosed versus occluded in this patient with a chronic left MCA territory infarct. Marked decrease branching of the distal left MCA branches are noted in the left sylvian fissure compared to the contralateral side.  Mild to moderate lobulated stenoses involve the M1 segment of the right middle cerebral artery. Per neuro, pt found to have decreased BTT on the L and no verbal output. He follows commands intermittently and is easily agitated. Exam limited due to patient aggression. CTH demonstrates chronic L MCA infarct with severely stenotic vs occluded distal L M1. There is also subacute/chronic R corona radiata hypodensity. Expressive aphasia possibly secondary to chronic L MCA ischemic stroke 2/2 to severely stenotic vs occlusion of L M1, with possible recrudescence in setting of infection. He has L MCA territory encephalomalacia and gliosis, which if this is his dominant hemisphere can explain his expressive aphasia but there is no attempt on patients part to speak. ?L CN 6 palsy, however no PCA territory involvement seen on CTH. Per H&P: its not clear whether his Sx are 2/2 CVA induced aphasia which is worse 2/2 herpetic infection on his lip, now crusted and has resolved, OR this is psychiatric in nature. since ptn has no attempt to speak its hard to determine this being expressive aphasia. Possible pt is depressed 2/2 poor functioning and worsening aphasia over several months.   2/10: provider contact note for EKG with marked sinus bradycardia with HR of 48 and prolonged QT: 554; no acute changes compared to previous one. Pt transferred to [facility] for HR monitoring. Cardio consulted; suspected bradycardia due to neurologic event.  2/9: CT brain: Multiple areas of prior infarct as described above. No acute hemorrhage or evidence of mass effect. Age-indeterminate lacunar infarcts as described. CT angiography neck: No hemodynamically significant stenosis by NASCET criteria. No vascular dissection. CT angiography brain: The left middle cerebral artery is severely stenosed versus occluded in this patient with a chronic left MCA territory infarct. Marked decrease branching of the distal left MCA branches are noted in the left sylvian fissure compared to the contralateral side.  Mild to moderate lobulated stenoses involve the M1 segment of the right middle cerebral artery. Pt unable to utilize straw to draw thin liquid Per neuro, pt found to have decreased BTT on the L and no verbal output. He follows commands intermittently and is easily agitated. Exam limited due to patient aggression. CTH demonstrates chronic L MCA infarct with severely stenotic vs occluded distal L M1. There is also subacute/chronic R corona radiata hypodensity. Expressive aphasia possibly secondary to chronic L MCA ischemic stroke 2/2 to severely stenotic vs occlusion of L M1, with possible recrudescence in setting of infection. He has L MCA territory encephalomalacia and gliosis, which if this is his dominant hemisphere can explain his expressive aphasia but there is no attempt on patients part to speak. ?L CN 6 palsy, however no PCA territory involvement seen on CTH. Per H&P: its not clear whether his Sx are 2/2 CVA induced aphasia which is worse 2/2 herpetic infection on his lip, now crusted and has resolved, OR this is psychiatric in nature. since ptn has no attempt to speak its hard to determine this being expressive aphasia. Possible pt is depressed 2/2 poor functioning and worsening aphasia over several months.   2/10: provider contact note for EKG with marked sinus bradycardia with HR of 48 and prolonged QT: 554; no acute changes compared to previous one. Pt transferred to [facility] for HR monitoring. Cardio consulted; suspected bradycardia due to neurologic event.  2/9: CT angiography neck: No hemodynamically significant stenosis by NASCET criteria. No vascular dissection. CT angiography brain: The left middle cerebral artery is severely stenosed versus occluded in this patient with a chronic left MCA territory infarct. Marked decrease branching of the distal left MCA branches are noted in the left sylvian fissure compared to the contralateral side. Mild to moderate lobulated stenoses involve the M1 segment of the right middle cerebral artery.  SEE ADDENDUM FOR CT HEAD..

## 2020-02-10 NOTE — CONSULT NOTE ADULT - ASSESSMENT
60 yo M with h/o CVA 8 years ago presenting with aphasia. Pt is accompanied by brother who interpreted in the ED. Brother endorsed that pt went to visit family in [place] 9/2019 and about 3 weeks ago, family stated that he was starting to get weak and stopped being able to speak. He was also endorsing difficulty walking from weakness. He returned from [place] last night and came to the ED for evaluation. Pt also developed lesions on his mouth 3 days ago. Reviewed notes in the chart he is being followed by neurology, they write in their note that patient was found to have a chronic L MCA stroke he has expressive aphasia. Flow sheets reviewed and no fevers recorded here, his white count is normal range   He was also noted to have crusted dried up lesions of his lips likely herpes simplex.  HIV test is nonreactive     Plan   ·	The patient was started on IV Acyclovir for HSV of lips. The lesions are already crusted and dried up. Tomorrow can consider changing to po Valtrex 1 g po bid  ·	continue supportive care as per medicine and neurology

## 2020-02-10 NOTE — PHYSICAL THERAPY INITIAL EVALUATION ADULT - IMPAIRMENTS CONTRIBUTING TO GAIT DEVIATIONS, PT EVAL
impaired sensory feedback/impaired coordination/impaired balance/impaired motor control/decreased strength/decreased flexibility

## 2020-02-10 NOTE — PROGRESS NOTE ADULT - SUBJECTIVE AND OBJECTIVE BOX
Patient is a 59y old  Male who presents with a chief complaint of mouth lesions, possible stroke (10 Feb 2020 07:57)      SUBJECTIVE / OVERNIGHT EVENTS:    MEDICATIONS  (STANDING):  acyclovir IVPB 650 milliGRAM(s) IV Intermittent every 8 hours  acyclovir IVPB      aspirin  chewable 81 milliGRAM(s) Oral daily  atorvastatin 80 milliGRAM(s) Oral at bedtime  clopidogrel Tablet 75 milliGRAM(s) Oral daily  cyanocobalamin 1000 MICROGram(s) Oral daily  cyanocobalamin Injectable 1000 MICROGram(s) SubCutaneous once  enoxaparin Injectable 40 milliGRAM(s) SubCutaneous daily  folic acid Injectable 1 milliGRAM(s) IV Push once  multivitamin/minerals 1 Tablet(s) Oral daily  Nephro-deysi 1 Tablet(s) Oral daily    MEDICATIONS  (PRN):      Vital Signs Last 24 Hrs  T(F): 97.8 (02-10-20 @ 12:14), Max: 98 (02-09-20 @ 21:10)  HR: 57 (02-10-20 @ 12:14) (45 - 60)  BP: 156/91 (02-10-20 @ 12:14) (119/77 - 156/91)  RR: 18 (02-10-20 @ 12:14) (16 - 18)  SpO2: 96% (02-10-20 @ 12:14) (95% - 98%)  Telemetry:   CAPILLARY BLOOD GLUCOSE        I&O's Summary    10 Feb 2020 07:01  -  10 Feb 2020 14:17  --------------------------------------------------------  IN: 420 mL / OUT: 0 mL / NET: 420 mL        PHYSICAL EXAM:  GENERAL: NAD, well-developed  HEAD:  Atraumatic, Normocephalic  EYES: EOMI, PERRLA, conjunctiva and sclera clear  NECK: Supple, No JVD  CHEST/LUNG: Clear to auscultation bilaterally; No wheeze  HEART: Regular rate and rhythm; No murmurs, rubs, or gallops  ABDOMEN: Soft, Nontender, Nondistended; Bowel sounds present  EXTREMITIES:  2+ Peripheral Pulses, No clubbing, cyanosis, or edema  PSYCH: AAOx3  NEUROLOGY: non-focal  SKIN: No rashes or lesions    LABS:                        13.1   6.32  )-----------( 334      ( 09 Feb 2020 12:48 )             40.7     02-09    140  |  106  |  9   ----------------------------<  138<H>  4.1   |  24  |  0.89    Ca    9.1      09 Feb 2020 12:48    TPro  6.6  /  Alb  3.9  /  TBili  0.6  /  DBili  x   /  AST  31  /  ALT  33  /  AlkPhos  132<H>  02-09    PT/INR - ( 09 Feb 2020 12:48 )   PT: 11.3 sec;   INR: 0.99 ratio         PTT - ( 09 Feb 2020 12:48 )  PTT:32.4 sec          RADIOLOGY & ADDITIONAL TESTS:    Imaging Personally Reviewed:    Consultant(s) Notes Reviewed:      Care Discussed with Consultants/Other Providers: Patient is a 59y old  Male who presents with a chief complaint of mouth lesions, possible stroke (10 Feb 2020 07:57)      SUBJECTIVE / OVERNIGHT EVENTS: pain free, comfortable, ATE EVERYTHING ON FOOD TRAYS. communicated via notes w nurse, follows direction, aphasic. looks withdrawn, has multiple vitamin deficiencies prob 2/2 needs care at home    MEDICATIONS  (STANDING):  acyclovir IVPB 650 milliGRAM(s) IV Intermittent every 8 hours  acyclovir IVPB      aspirin  chewable 81 milliGRAM(s) Oral daily  atorvastatin 80 milliGRAM(s) Oral at bedtime  clopidogrel Tablet 75 milliGRAM(s) Oral daily  cyanocobalamin 1000 MICROGram(s) Oral daily  cyanocobalamin Injectable 1000 MICROGram(s) SubCutaneous once  enoxaparin Injectable 40 milliGRAM(s) SubCutaneous daily  folic acid Injectable 1 milliGRAM(s) IV Push once  multivitamin/minerals 1 Tablet(s) Oral daily  Nephro-deysi 1 Tablet(s) Oral daily    MEDICATIONS  (PRN):      Vital Signs Last 24 Hrs  T(F): 97.8 (02-10-20 @ 12:14), Max: 98 (02-09-20 @ 21:10)  HR: 57 (02-10-20 @ 12:14) (45 - 60)  BP: 156/91 (02-10-20 @ 12:14) (119/77 - 156/91)  RR: 18 (02-10-20 @ 12:14) (16 - 18)  SpO2: 96% (02-10-20 @ 12:14) (95% - 98%)  Telemetry:   CAPILLARY BLOOD GLUCOSE        I&O's Summary    10 Feb 2020 07:01  -  10 Feb 2020 14:17  --------------------------------------------------------  IN: 420 mL / OUT: 0 mL / NET: 420 mL        PHYSICAL EXAM:  GENERAL: NAD, well-developed  HEAD:  Atraumatic, Normocephalic  EYES: EOMI, PERRLA, conjunctiva and sclera clear  NECK: Supple, No JVD  CHEST/LUNG: Clear to auscultation bilaterally; No wheeze  HEART: Regular rate and rhythm; No murmurs, rubs, or gallops  ABDOMEN: Soft, Nontender, Nondistended; Bowel sounds present  EXTREMITIES:  2+ Peripheral Pulses, No clubbing, cyanosis, or edema  PSYCH: AAOx3  NEUROLOGY: non-focal  SKIN: No rashes or lesions    LABS:                        13.1   6.32  )-----------( 334      ( 09 Feb 2020 12:48 )             40.7     02-09    140  |  106  |  9   ----------------------------<  138<H>  4.1   |  24  |  0.89    Ca    9.1      09 Feb 2020 12:48    TPro  6.6  /  Alb  3.9  /  TBili  0.6  /  DBili  x   /  AST  31  /  ALT  33  /  AlkPhos  132<H>  02-09    PT/INR - ( 09 Feb 2020 12:48 )   PT: 11.3 sec;   INR: 0.99 ratio         PTT - ( 09 Feb 2020 12:48 )  PTT:32.4 sec          RADIOLOGY & ADDITIONAL TESTS:    Imaging Personally Reviewed:    Consultant(s) Notes Reviewed:      Care Discussed with Consultants/Other Providers:

## 2020-02-10 NOTE — PROVIDER CONTACT NOTE (OTHER) - SITUATION
Pt. is nonverbal and Croatian speaking. Pt. is admitted for Dysphagia. VSS except HR 44. no s/s of chest pain distress or discomfort.

## 2020-02-10 NOTE — CONSULT NOTE ADULT - SUBJECTIVE AND OBJECTIVE BOX
CHIEF COMPLAINT:Patient is a 59y old  Male who presents with a chief complaint of mouth lesions, possible stroke (09 Feb 2020 20:23)      HISTORY OF PRESENT ILLNESS:   59Y Occitan speaking M with prior CVA 8y ago (no reported deficits) brought in by brother for concerns of generalized weakness and inability to speak. He was recently visiting [place]   CTH demonstrates chronic L MCA infarct with severely stenotic vs occluded distal L M1. There is also subacute/chronic R corona radiata hypodensity  cardiology is called for eval bradycardia  pt is aphasic   Due to altered mental status, subjective information were not able to be obtained from the patient. History was obtained, to the extent possible, from review of the chart and collateral sources of information.     PAST MEDICAL & SURGICAL HISTORY:  CVA (cerebral vascular accident)  No significant past surgical history          MEDICATIONS:  aspirin enteric coated 81 milliGRAM(s) Oral daily  clopidogrel Tablet 75 milliGRAM(s) Oral daily  enoxaparin Injectable 40 milliGRAM(s) SubCutaneous daily    acyclovir IVPB 650 milliGRAM(s) IV Intermittent every 8 hours  acyclovir IVPB              atorvastatin 80 milliGRAM(s) Oral at bedtime        FAMILY HISTORY:  No pertinent family history in first degree relatives      Non-contributory    SOCIAL HISTORY:    No tobacco, drugs or etoh    Allergies    No Known Allergies    Intolerances    	    REVIEW OF SYSTEMS:  as above  The rest of the 14 points ROS reviewed and except above they are unremarkable.        PHYSICAL EXAM:  T(C): 36.6 (02-10-20 @ 06:45), Max: 37 (02-09-20 @ 12:30)  HR: 49 (02-10-20 @ 06:45) (45 - 70)  BP: 129/81 (02-10-20 @ 06:45) (116/76 - 182/97)  RR: 18 (02-10-20 @ 06:45) (14 - 18)  SpO2: 96% (02-10-20 @ 06:45) (95% - 100%)  Wt(kg): --  I&O's Summary    JVP: Normal  Neck: supple  Lung: clear   CV: S1 S2 , Murmur:  Abd: soft  Ext: No edema  neuro: Awake   Psych: flat affect  Skin: lip lesion     LABS/DATA:    TELEMETRY: 	  sinus, rhythm , sinus bradycardia   ECG:  	   	  CARDIAC MARKERS:                        10 <<== 02-09-20 @ 12:48                              13.1   6.32  )-----------( 334      ( 09 Feb 2020 12:48 )             40.7     02-09    140  |  106  |  9   ----------------------------<  138<H>  4.1   |  24  |  0.89    Ca    9.1      09 Feb 2020 12:48    TPro  6.6  /  Alb  3.9  /  TBili  0.6  /  DBili  x   /  AST  31  /  ALT  33  /  AlkPhos  132<H>  02-09    proBNP:   Lipid Profile:   HgA1c:   TSH:     < from: CT Head No Cont (02.09.20 @ 15:11) >    EXAM:  CT ANGIO NECK (W)AW IC                          EXAM:  CT ANGIO BRAIN (W)AW IC                          EXAM:  CT BRAIN                            PROCEDURE DATE:  02/09/2020            INTERPRETATION:  CLINICAL INFORMATION: Speech changes, unable to speak for 3 weeks. Generalized weakness. Hemiplegia. Prior CVA 8 years ago.     TECHNIQUE: A noncontrast head CT and a CT angiogram study of the neck and head were performed. Noncontrast axial CT images were acquired through the head.   Contrast enhanced axial CT images were acquired from the aortic arch to the vertex of the calvarium, during the angiographic phase. Two- and three-dimensional maximum intensity projection MIP reformats were generated from the angiographic images.    90 ccof Omnipaque-350 mg/ml were administered intravenously, without immediate complication.    COMPARISON: None.    FINDINGS:     CT BRAIN:    Areas of chronic infarction with hypodensity and volume loss are noted in the left MCA distribution involving the left frontal parietal temporal, left subinsular, and left basal ganglia regions with associated ex vacuo dilatation of the left lateral ventricle. Associated wallerian degeneration and volume loss involves the ipsilateral cerebral peduncle of the left midbrain.    An area of chronic infarction with hypodensity and volume loss is present in the right posterior frontal-parietal region.    Age-indeterminate lacunar infarcts involve the right corona radiata.    No acute intracranial hemorrhage, mass effect, or midline shift. No abnormal extra-axial fluid collections. The basal cisterns are patent without evidence of central herniation. No hydrocephalus. No areas of abnormal enhancement are identified.    Cerebral volume loss with proportional prominence of the sulci and ventricles.     The calvarium is intact. The soft tissues of the scalp are unremarkable.  Polyp/mucus retention cyst in the right maxillary sinus and polypoid mucosal thickening of the bilateral maxillary sinuses. Mucosal thickening of the ethmoid sinuses. The frontal and sphenoid sinuses are clear. Mild opacification of the mastoid air cells. The middle ear cavities are clear. A chronic right medial orbital wall fracture is noted.      CT ANGIOGRAPHY NECK:    Three-vessel aortic arch. The origins of the great vessels demonstrate atherosclerotic calcifications without evidence of significant stenosis. The common carotid arteries are normal in caliber without significant stenosis.     The carotid bifurcations are unremarkable. The internal carotid arteries are normal in caliber without significant stenosis. 0% stenosis noted via NASCET criteria. The right internal carotid artery is dominant, likely secondary to a hypoplastic left A1 segment and an occluded left MCA intracranially.    Co-dominant vertebral arteries. The vertebral arteries are normal in caliber without significant stenosis.     The visualized neck and upper chest are unremarkable. Visualized osseous structures are unremarkable.      CT ANGIOGRAPHY BRAIN:    Anterior circulation:     The left middle cerebral artery is severely stenosed versus occluded in this patient with a chronic left MCA territory infarct. Marked decrease branching of the distal left MCA branches are noted in the left sylvian fissure compared to the contralateral side.    Mild to moderate lobulated stenoses involve the M1 segment of the right middle cerebral artery.    Calcifications of the carotid siphons bilaterally with mild carotid stenosis.     The left A1 segmentis mildly hypoplastic. The anterior arteries are otherwise unremarkable without significant stenosis.    Posterior circulation:     The distal vertebral arteries, basilar artery, and bilateral posterior cerebral arteries are patent without evidence of significant stenosis, major vessel occlusion, or aneurysm.    IMPRESSION:     CT brain: Multiple areas of prior infarct as described above. No acute hemorrhage or evidence of mass effect. Age-indeterminate lacunar infarcts as described. If the patient has a new and persistent neurologic deficit, consider short follow-up head CT or brain MRI follow-up.    CT angiography neck: No hemodynamically significant stenosis by NASCET criteria. No vascular dissection.    CT angiography brain: The left middle cerebral artery is severely stenosed versus occluded in this patient with a chronic left MCA territory infarct. Marked decrease branching of the distal left MCA branches are noted in the left sylvian fissure compared to the contralateral side.    Mild to moderate lobulated stenoses involve the M1 segment of the right middle cerebral artery.                MI VILLA M.D., RADIOLOGY RESIDENT  This document has been electronically signed.  JEANETTE PAREDES M.D., ATTENDING RADIOLOGIST  This document has been electronically signed. Feb 9 2020  4:27PM              < end of copied text >

## 2020-02-10 NOTE — PROGRESS NOTE ADULT - ASSESSMENT
59Y Slovak speaking M with prior CVA 8y ago (no reported deficits) brought in by brother for concerns of generalized weakness and inability to speak for the past 3 weeks. On exam found to have no verbal output, appears withdrawn and weak on neurological evaluation  He follows commands intermittently and is easily agitated.  Exam limited due to patient aggression.   CTH demonstrates chronic L MCA infarct with severely stenotic vs occluded distal L M1.   There is also subacute/chronic R corona radiata hypodensity  ptn seen by Neurology, assessment and recommendations are appreciated  its not clear whether his Sx are 2/2 CVA induced aphasia which is worse 2/2 herpetic infection on his lip, now crusted and has resolved, OR this is psychiatric in nature. since ptn has no attempt to speak its hard to determine this being expressive aphasia. its possible ptn is depressed 2/2 poor functioning and worsening aphasia over several months.  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 Cont LIPITOR, LDL is below 70  - pending: TTE with bubble study  - pending MRI results, may be candidate for ILR   - obtain collateral info from family regarding onset of symptoms and patient's baseline  - treatment of vesicular rash w acyclovir, ID called, no sign of any other location of herpetic infection   - psych consult  -DVT ppx w sc Lovenox  -social service consult  -PT eval

## 2020-02-10 NOTE — CHART NOTE - NSCHARTNOTEFT_GEN_A_CORE
[facility]ICINE [facility]     EVENT SUMMARY   Notified by RN for bradycardia seen on AM vitals of 48. Pt seen and examined at the bedside. Pt is Setswana speaking; unable to assess ROS as pt minimally communicative. Pt admitted for aphasia; CTH negative acute process and pending MRI/MRA head and neck.     MEDICATIONS  (STANDING):  acyclovir IVPB 650 milliGRAM(s) IV Intermittent every 8 hours  acyclovir IVPB      aspirin enteric coated 81 milliGRAM(s) Oral daily  atorvastatin 80 milliGRAM(s) Oral at bedtime  clopidogrel Tablet 75 milliGRAM(s) Oral daily  enoxaparin Injectable 40 milliGRAM(s) SubCutaneous daily    MEDICATIONS  (PRN):    Vital Signs Last 24 Hrs  T(C): 36.6 (10 Feb 2020 05:02), Max: 37 (09 Feb 2020 12:30)  T(F): 97.9 (10 Feb 2020 05:02), Max: 98.6 (09 Feb 2020 12:30)  HR: 60 (09 Feb 2020 21:10) (55 - 70)  BP: 138/83 (10 Feb 2020 05:02) (116/76 - 182/97)  BP(mean): --  RR: 18 (10 Feb 2020 05:02) (14 - 18)  SpO2: 96% (10 Feb 2020 05:02) (95% - 100%)    PHYSICAL EXAM  GENERAL: NAD, resting comfortably   CV: S1, S2, bradycardic; 48 on palpation   RESPIRATORY: CTA b/l  NEURO: unable to assess given pt un cooperative     < from: CT Head No Cont (02.09.20 @ 15:11) >    FINDINGS:     CT BRAIN:    Areas of chronic infarction with hypodensity and volume loss are noted in the left MCA distribution involving the left frontal parietal temporal, left subinsular, and left basal ganglia regions with associated ex vacuo dilatation of the left lateral ventricle. Associated wallerian degeneration and volume loss involves the ipsilateral cerebral peduncle of the left midbrain.    An area of chronic infarction with hypodensity and volume loss is present in the right posterior frontal-parietal region.    Age-indeterminate lacunar infarcts involve the right corona radiata.    No acute intracranial hemorrhage, mass effect, or midline shift. No abnormal extra-axial fluid collections. The basal cisterns are patent without evidence of central herniation. No hydrocephalus. No areas of abnormal enhancement are identified.    Cerebral volume loss with proportional prominence of the sulci and ventricles.     The calvarium is intact. The soft tissues of the scalp are unremarkable.  Polyp/mucus retention cyst in the right maxillary sinus and polypoid mucosal thickening of the bilateral maxillary sinuses. Mucosal thickening of the ethmoid sinuses. The frontal and sphenoid sinuses are clear. Mild opacification of the mastoid air cells. The middle ear cavities are clear. A chronic right medial orbital wall fracture is noted.      CT ANGIOGRAPHY NECK:    Three-vessel aortic arch. The origins of the great vessels demonstrate atherosclerotic calcifications without evidence of significant stenosis. The common carotid arteries are normal in caliber without significant stenosis.     The carotid bifurcations are unremarkable. The internal carotid arteries are normal in caliber without significant stenosis. 0% stenosis noted via NASCET criteria. The right internal carotid artery is dominant, likely secondary to a hypoplastic left A1 segment and an occluded left MCA intracranially.    Co-dominant vertebral arteries. The vertebral arteries are normal in caliber without significant stenosis.     The visualized neck and upper chest are unremarkable. Visualized osseous structures are unremarkable.      CT ANGIOGRAPHY BRAIN:    Anterior circulation:     The left middle cerebral artery is severely stenosed versus occluded in this patient with a chronic left MCA territory infarct. Marked decrease branching of the distal left MCA branches are noted in the left sylvian fissure compared to the contralateral side.    Mild to moderate lobulated stenoses involve the M1 segment of the right middle cerebral artery.    Calcifications of the carotid siphons bilaterally with mild carotid stenosis.     The left A1 segmentis mildly hypoplastic. The anterior arteries are otherwise unremarkable without significant stenosis.    Posterior circulation:     The distal vertebral arteries, basilar artery, and bilateral posterior cerebral arteries are patent without evidence of significant stenosis, major vessel occlusion, or aneurysm.    IMPRESSION:     CT brain: Multiple areas of prior infarct as described above. No acute hemorrhage or evidence of mass effect. Age-indeterminate lacunar infarcts as described. If the patient has a new and persistent neurologic deficit, consider short follow-up head CT or brain MRI follow-up.    CT angiography neck: No hemodynamically significant stenosis by NASCET criteria. No vascular dissection.    CT angiography brain: The left middle cerebral artery is severely stenosed versus occluded in this patient with a chronic left MCA territory infarct. Marked decrease branching of the distal left MCA branches are noted in the left sylvian fissure compared to the contralateral side.    Mild to moderate lobulated stenoses involve the M1 segment of the right middle cerebral artery.    < end of copied text >    A&P  59Y Setswana speaking M with prior CVA 8y ago (no reported deficits) brought in by brother for concerns of generalized weakness and inability to speak for the past 3 weeks. On exam found to have decreased BTT on the L, and no verbal output. He follows commands intermittently and is easily agitated.  Exam limited due to patient aggression. CTH demonstrates chronic L MCA infarct with severely stenotic vs occluded distal L M1. There is also subacute/chronic R corona radiata hypodensity    Sinus bradycardia  - EKG with marked sinus bradycardia with HR of 48 and prolonged QT: 554; no acute changes compared to the previous one  - vitals q 4   - R2 pads at the bedside  - transfer pt to tele for HR monitoring  D/w Dr. Simmons; agrees with above plan; recommended EP consult in AM.  Will continue to monitor. Will endorse to the primary team.    Karen MCFARLAND  #[id number] MEDICINE [facility]     EVENT SUMMARY   Notified by RN for bradycardia seen on AM vitals of 48. Pt seen and examined at the bedside. Pt is Sinhala speaking; unable to assess ROS as pt minimally communicative. Pt admitted for aphasia; CTH negative acute process and pending MRI/MRA head and neck.     MEDICATIONS  (STANDING):  acyclovir IVPB 650 milliGRAM(s) IV Intermittent every 8 hours  acyclovir IVPB      aspirin enteric coated 81 milliGRAM(s) Oral daily  atorvastatin 80 milliGRAM(s) Oral at bedtime  clopidogrel Tablet 75 milliGRAM(s) Oral daily  enoxaparin Injectable 40 milliGRAM(s) SubCutaneous daily    MEDICATIONS  (PRN):    Vital Signs Last 24 Hrs  T(C): 36.6 (10 Feb 2020 05:02), Max: 37 (09 Feb 2020 12:30)  T(F): 97.9 (10 Feb 2020 05:02), Max: 98.6 (09 Feb 2020 12:30)  HR: 60 (09 Feb 2020 21:10) (55 - 70)  BP: 138/83 (10 Feb 2020 05:02) (116/76 - 182/97)  BP(mean): --  RR: 18 (10 Feb 2020 05:02) (14 - 18)  SpO2: 96% (10 Feb 2020 05:02) (95% - 100%)    PHYSICAL EXAM  GENERAL: NAD, resting comfortably   CV: S1, S2, bradycardic; 48 on palpation   RESPIRATORY: CTA b/l  NEURO: unable to assess given pt un cooperative     < from: CT Head No Cont (02.09.20 @ 15:11) >    FINDINGS:     CT BRAIN:    Areas of chronic infarction with hypodensity and volume loss are noted in the left MCA distribution involving the left frontal parietal temporal, left subinsular, and left basal ganglia regions with associated ex vacuo dilatation of the left lateral ventricle. Associated wallerian degeneration and volume loss involves the ipsilateral cerebral peduncle of the left midbrain.    An area of chronic infarction with hypodensity and volume loss is present in the right posterior frontal-parietal region.    Age-indeterminate lacunar infarcts involve the right corona radiata.    No acute intracranial hemorrhage, mass effect, or midline shift. No abnormal extra-axial fluid collections. The basal cisterns are patent without evidence of central herniation. No hydrocephalus. No areas of abnormal enhancement are identified.    Cerebral volume loss with proportional prominence of the sulci and ventricles.     The calvarium is intact. The soft tissues of the scalp are unremarkable.  Polyp/mucus retention cyst in the right maxillary sinus and polypoid mucosal thickening of the bilateral maxillary sinuses. Mucosal thickening of the ethmoid sinuses. The frontal and sphenoid sinuses are clear. Mild opacification of the mastoid air cells. The middle ear cavities are clear. A chronic right medial orbital wall fracture is noted.      CT ANGIOGRAPHY NECK:    Three-vessel aortic arch. The origins of the great vessels demonstrate atherosclerotic calcifications without evidence of significant stenosis. The common carotid arteries are normal in caliber without significant stenosis.     The carotid bifurcations are unremarkable. The internal carotid arteries are normal in caliber without significant stenosis. 0% stenosis noted via NASCET criteria. The right internal carotid artery is dominant, likely secondary to a hypoplastic left A1 segment and an occluded left MCA intracranially.    Co-dominant vertebral arteries. The vertebral arteries are normal in caliber without significant stenosis.     The visualized neck and upper chest are unremarkable. Visualized osseous structures are unremarkable.      CT ANGIOGRAPHY BRAIN:    Anterior circulation:     The left middle cerebral artery is severely stenosed versus occluded in this patient with a chronic left MCA territory infarct. Marked decrease branching of the distal left MCA branches are noted in the left sylvian fissure compared to the contralateral side.    Mild to moderate lobulated stenoses involve the M1 segment of the right middle cerebral artery.    Calcifications of the carotid siphons bilaterally with mild carotid stenosis.     The left A1 segmentis mildly hypoplastic. The anterior arteries are otherwise unremarkable without significant stenosis.    Posterior circulation:     The distal vertebral arteries, basilar artery, and bilateral posterior cerebral arteries are patent without evidence of significant stenosis, major vessel occlusion, or aneurysm.    IMPRESSION:     CT brain: Multiple areas of prior infarct as described above. No acute hemorrhage or evidence of mass effect. Age-indeterminate lacunar infarcts as described. If the patient has a new and persistent neurologic deficit, consider short follow-up head CT or brain MRI follow-up.    CT angiography neck: No hemodynamically significant stenosis by NASCET criteria. No vascular dissection.    CT angiography brain: The left middle cerebral artery is severely stenosed versus occluded in this patient with a chronic left MCA territory infarct. Marked decrease branching of the distal left MCA branches are noted in the left sylvian fissure compared to the contralateral side.    Mild to moderate lobulated stenoses involve the M1 segment of the right middle cerebral artery.    < end of copied text >    A&P  59Y Sinhala speaking M with prior CVA 8y ago (no reported deficits) brought in by brother for concerns of generalized weakness and inability to speak for the past 3 weeks. On exam found to have decreased BTT on the L, and no verbal output. He follows commands intermittently and is easily agitated.  Exam limited due to patient aggression. CTH demonstrates chronic L MCA infarct with severely stenotic vs occluded distal L M1. There is also subacute/chronic R corona radiata hypodensity    Sinus bradycardia  - EKG with marked sinus bradycardia with HR of 48 and prolonged QT: 554; no acute changes compared to the previous one  - vitals q 4   - R2 pads at the bedside  - transfer pt to tele for HR monitoring  D/w Dr. Simmons; agrees with above plan; recommended EP consult in AM.  Will continue to monitor. Will endorse to the primary team.    Karen MCFARLAND  #[id number]    ADDENDUM @ 6:47   EP consult called; spoke with fellow on call; recommended cardiology consult. Will continue to monitor.    Karen MCFARLAND  #[id number]

## 2020-02-10 NOTE — PHYSICAL THERAPY INITIAL EVALUATION ADULT - TRANSFER SAFETY CONCERNS NOTED: SIT/STAND, REHAB EVAL
decreased safety awareness/losing balance/decreased sequencing ability/decreased balance during turns/decreased weight-shifting ability/decreased proprioception

## 2020-02-10 NOTE — SWALLOW BEDSIDE ASSESSMENT ADULT - SLP GENERAL OBSERVATIONS
Pt encountered awake and alert, reclined in bed on room air. Bulgarian speaking ( Freeman #[id number]). Pt Aphonic, no attempt at verbal communication despite cues. Therefore, unable to assess intellgibility or expressive language skills. Pt initially declined writing, gesturing "no" with head nod and hand wave. Then, wrote "No" on paper when asked if he was in pain. When prompted to answer additional questions, ?able if patient was writing in Bulgarian. Followed 1-step commands to complete oral motor exam. Will need comprehensive evaluation to further assess speech-language skills. D/W NP April. Pt also noted with baseline cough. Unable to volitionally swallow.

## 2020-02-10 NOTE — PHYSICAL THERAPY INITIAL EVALUATION ADULT - PERTINENT HX OF CURRENT PROBLEM, REHAB EVAL
60 yo M with h/o CVA 8 years ago p/w aphasia. Pt's brother who interpreted in the ED. Per brother, pt went to visit family in [place] 9/2019 and about 3 wks ago, family noted he was starting to get weak with difficulty walking & stopped being able to speak. Pt returned from [place] last night and came to the ED for evaluation. Pt also developed lesions on his mouth 3 days ago; dx'd with herpatic infection.

## 2020-02-10 NOTE — CONSULT NOTE ADULT - SUBJECTIVE AND OBJECTIVE BOX
HPI:   Patient is a 59y male with 1 yo M with h/o CVA 8 years ago presenting with aphasia. Pt is accompanied by brother who interpreted in the ED. Brother endorsed that pt went to visit family in [place] 9/2019 and about 3 weeks ago, family stated that he was starting to get weak and stopped being able to speak. He was also endorsing difficulty walking from weakness. He returned from [place] last night and came to the ED for evaluation. Pt also developed lesions on his mouth 3 days ago. He denies any other medical history or medication use. HPI obtain from the chart     REVIEW OF SYSTEMS:  All other review of systems negative (Comprehensive ROS)    PAST MEDICAL & SURGICAL HISTORY:  CVA (cerebral vascular accident)  No significant past surgical history      Allergies    No Known Allergies    Intolerances            FAMILY HISTORY:  No pertinent family history in first degree relatives      SOCIAL HISTORY:  information not available     T(F): 97.8 (02-10-20 @ 12:14), Max: 98 (02-09-20 @ 21:10)  HR: 61 (02-10-20 @ 15:26)  BP: 144/86 (02-10-20 @ 15:26)  RR: 18 (02-10-20 @ 12:14)  SpO2: 95% (02-10-20 @ 15:26)  Wt(kg): --    PHYSICAL EXAM:  General: alert, no acute distress  Eyes:  anicteric, no conjunctival injection, no discharge  Oropharynx: herpetic crusted lesions on his lips   Neck: supple, without adenopathy  Lungs: clear to auscultation  Heart: regular rate and rhythm; no murmur, rubs or gallops  Abdomen: soft, nondistended, nontender, without mass or organomegaly  Skin: no lesions  Extremities: no clubbing, cyanosis, or edema  Neurologic: alert, oriented, moves all extremities    LAB RESULTS:                        13.1   6.32  )-----------( 334      ( 09 Feb 2020 12:48 )             40.7     02-09    140  |  106  |  9   ----------------------------<  138<H>  4.1   |  24  |  0.89    Ca    9.1      09 Feb 2020 12:48    TPro  6.6  /  Alb  3.9  /  TBili  0.6  /  DBili  x   /  AST  31  /  ALT  33  /  AlkPhos  132<H>  02-09    LIVER FUNCTIONS - ( 09 Feb 2020 12:48 )  Alb: 3.9 g/dL / Pro: 6.6 g/dL / ALK PHOS: 132 U/L / ALT: 33 U/L / AST: 31 U/L / GGT: x               MICROBIOLOGY:  RECENT CULTURES:        RADIOLOGY REVIEWED:      Antimicrobials Day #    acyclovir IVPB 650 milliGRAM(s) IV Intermittent every 8 hours  acyclovir IVPB        Other Medications:  aspirin  chewable 81 milliGRAM(s) Oral daily  atorvastatin 80 milliGRAM(s) Oral at bedtime  clopidogrel Tablet 75 milliGRAM(s) Oral daily  cyanocobalamin 1000 MICROGram(s) Oral daily  cyanocobalamin Injectable 1000 MICROGram(s) SubCutaneous once  enoxaparin Injectable 40 milliGRAM(s) SubCutaneous daily  folic acid Injectable 1 milliGRAM(s) IV Push once  multivitamin/minerals 1 Tablet(s) Oral daily  Nephro-deysi 1 Tablet(s) Oral daily

## 2020-02-10 NOTE — SWALLOW BEDSIDE ASSESSMENT ADULT - SWALLOW EVAL: DIAGNOSIS
SLP evaluation ordered secondary to pt failing dysphagia screen. Admitted s/p history of CVA, r/o new CVA induced aphasia which is ?ably worse 2/2 herpetic infection on his lip or if this is psychiatric in nature. Today, pt presents with 1) oral and suspected pharyngeal dysphagia characterized by anterior spillage of thin liquids, disorganized rotary chew with puree bolus, delayed oral transit time, suspected delayed pharyngeal swallow and intermittent s/s suggestive of aspiration/penetration. 2) Aphonia 3) ?able expressive aphasia. Will need comprehensive speech-language and cognitive evaluation to further assess.

## 2020-02-10 NOTE — SWALLOW BEDSIDE ASSESSMENT ADULT - SLP PERTINENT HISTORY OF CURRENT PROBLEM
59Y Estonian speaking M with prior CVA 8y ago (no reported deficits) brought in by brother for concerns of generalized weakness and inability to speak. He was recently visiting [place] (since September) and returned last night. History obtained via chart review as patient was not able to communicate and there was no family at bedside. While in [place], patient was living on his own and not eating or drinking, He saw a doctor in [place] 1 week ago and was given a "power shot."  Patient also has vesicular lesions around his mouth of unclear duration.

## 2020-02-10 NOTE — SWALLOW BEDSIDE ASSESSMENT ADULT - ASR SWALLOW RECOMMEND DIAG
MD, PLEASE ENTER ORDER FOR MODIFIED BARIUM SWALLOW STUDY (ENTER UNDER RADIOLOGY EXAMS THE FOLLOWING WAY: GO TO THE BROWSER AND TYPE IN XRAY, THEN HIT THE SPACEBAR, AND TYPE IN THE LETTER M.)  WILL SCHEDULE EXAM UPON RECEIPT IN RADIOLOGY.  D/W NP April/VFLARRY/DAREK

## 2020-02-10 NOTE — PHYSICAL THERAPY INITIAL EVALUATION ADULT - ADDITIONAL COMMENTS
CTH demonstrates chronic L MCA infarct with severely stenotic vs occluded distal L M1. There is also subacute/chronic R corona radiata hypodensity  ptn seen by Neurology. CTH demonstrates chronic L MCA infarct with severely stenotic vs occluded distal L M1. There is also subacute/chronic R corona radiata hypodensity. pt followed by Neurology.  Per chart, pt lives in an apt with + elevator.

## 2020-02-11 LAB
ANION GAP SERPL CALC-SCNC: 12 MMOL/L — SIGNIFICANT CHANGE UP (ref 5–17)
BUN SERPL-MCNC: 8 MG/DL — SIGNIFICANT CHANGE UP (ref 7–23)
CALCIUM SERPL-MCNC: 9.2 MG/DL — SIGNIFICANT CHANGE UP (ref 8.4–10.5)
CHLORIDE SERPL-SCNC: 103 MMOL/L — SIGNIFICANT CHANGE UP (ref 96–108)
CO2 SERPL-SCNC: 23 MMOL/L — SIGNIFICANT CHANGE UP (ref 22–31)
CREAT SERPL-MCNC: 0.75 MG/DL — SIGNIFICANT CHANGE UP (ref 0.5–1.3)
GLUCOSE SERPL-MCNC: 103 MG/DL — HIGH (ref 70–99)
HCT VFR BLD CALC: 41.7 % — SIGNIFICANT CHANGE UP (ref 39–50)
HGB BLD-MCNC: 14.1 G/DL — SIGNIFICANT CHANGE UP (ref 13–17)
MCHC RBC-ENTMCNC: 33.8 GM/DL — SIGNIFICANT CHANGE UP (ref 32–36)
MCHC RBC-ENTMCNC: 34.2 PG — HIGH (ref 27–34)
MCV RBC AUTO: 101.2 FL — HIGH (ref 80–100)
NRBC # BLD: 0 /100 WBCS — SIGNIFICANT CHANGE UP (ref 0–0)
PLATELET # BLD AUTO: 337 K/UL — SIGNIFICANT CHANGE UP (ref 150–400)
POTASSIUM SERPL-MCNC: 3.9 MMOL/L — SIGNIFICANT CHANGE UP (ref 3.5–5.3)
POTASSIUM SERPL-SCNC: 3.9 MMOL/L — SIGNIFICANT CHANGE UP (ref 3.5–5.3)
RAPID RVP RESULT: SIGNIFICANT CHANGE UP
RBC # BLD: 4.12 M/UL — LOW (ref 4.2–5.8)
RBC # FLD: 13.5 % — SIGNIFICANT CHANGE UP (ref 10.3–14.5)
SODIUM SERPL-SCNC: 138 MMOL/L — SIGNIFICANT CHANGE UP (ref 135–145)
WBC # BLD: 7.54 K/UL — SIGNIFICANT CHANGE UP (ref 3.8–10.5)
WBC # FLD AUTO: 7.54 K/UL — SIGNIFICANT CHANGE UP (ref 3.8–10.5)

## 2020-02-11 PROCEDURE — 70544 MR ANGIOGRAPHY HEAD W/O DYE: CPT | Mod: 26,59

## 2020-02-11 PROCEDURE — 74230 X-RAY XM SWLNG FUNCJ C+: CPT | Mod: 26

## 2020-02-11 PROCEDURE — 99222 1ST HOSP IP/OBS MODERATE 55: CPT | Mod: GC

## 2020-02-11 PROCEDURE — 70553 MRI BRAIN STEM W/O & W/DYE: CPT | Mod: 26

## 2020-02-11 PROCEDURE — 70547 MR ANGIOGRAPHY NECK W/O DYE: CPT | Mod: 26

## 2020-02-11 RX ORDER — ERGOCALCIFEROL 1.25 MG/1
50000 CAPSULE ORAL
Refills: 0 | Status: DISCONTINUED | OUTPATIENT
Start: 2020-02-12 | End: 2020-03-04

## 2020-02-11 RX ORDER — ASPIRIN/CALCIUM CARB/MAGNESIUM 324 MG
300 TABLET ORAL DAILY
Refills: 0 | Status: DISCONTINUED | OUTPATIENT
Start: 2020-02-11 | End: 2020-02-14

## 2020-02-11 RX ORDER — SODIUM CHLORIDE 9 MG/ML
1000 INJECTION, SOLUTION INTRAVENOUS
Refills: 0 | Status: DISCONTINUED | OUTPATIENT
Start: 2020-02-11 | End: 2020-02-17

## 2020-02-11 RX ADMIN — Medication 263 MILLIGRAM(S): at 05:39

## 2020-02-11 RX ADMIN — ENOXAPARIN SODIUM 40 MILLIGRAM(S): 100 INJECTION SUBCUTANEOUS at 19:00

## 2020-02-11 RX ADMIN — Medication 300 MILLIGRAM(S): at 21:45

## 2020-02-11 RX ADMIN — SODIUM CHLORIDE 70 MILLILITER(S): 9 INJECTION, SOLUTION INTRAVENOUS at 19:00

## 2020-02-11 NOTE — PROGRESS NOTE ADULT - SUBJECTIVE AND OBJECTIVE BOX
Subjective: Patient seen and examined. No new events except as noted.     SUBJECTIVE/ROS:  ROS limited       MEDICATIONS:  MEDICATIONS  (STANDING):  acyclovir IVPB 650 milliGRAM(s) IV Intermittent every 8 hours  acyclovir IVPB      aspirin  chewable 81 milliGRAM(s) Oral daily  atorvastatin 80 milliGRAM(s) Oral at bedtime  clopidogrel Tablet 75 milliGRAM(s) Oral daily  cyanocobalamin 1000 MICROGram(s) Oral daily  enoxaparin Injectable 40 milliGRAM(s) SubCutaneous daily  multivitamin/minerals 1 Tablet(s) Oral daily  Nephro-deysi 1 Tablet(s) Oral daily      PHYSICAL EXAM:  T(C): 36.3 (02-11-20 @ 04:27), Max: 36.8 (02-10-20 @ 20:57)  HR: 47 (02-11-20 @ 04:27) (47 - 71)  BP: 150/87 (02-11-20 @ 04:27) (144/86 - 156/92)  RR: 18 (02-11-20 @ 04:27) (18 - 18)  SpO2: 97% (02-11-20 @ 04:27) (95% - 98%)  Wt(kg): --  I&O's Summary    10 Feb 2020 07:01  -  11 Feb 2020 07:00  --------------------------------------------------------  IN: 420 mL / OUT: 300 mL / NET: 120 mL            JVP: Normal  Neck: supple  Lung: clear   CV: S1 S2 , Murmur:  Abd: soft  Ext: No edema  neuro: Awake  Psych: flat affect  Skin: normal``    LABS/DATA:    CARDIAC MARKERS:                                14.1   7.54  )-----------( 337      ( 11 Feb 2020 06:10 )             41.7     02-11    138  |  103  |  8   ----------------------------<  103<H>  3.9   |  23  |  0.75    Ca    9.2      11 Feb 2020 06:10    TPro  6.6  /  Alb  3.9  /  TBili  0.6  /  DBili  x   /  AST  31  /  ALT  33  /  AlkPhos  132<H>  02-09    proBNP:   Lipid Profile:   HgA1c:   TSH: Thyroid Stimulating Hormone, Serum: 2.30 uIU/mL (02-10 @ 14:45)  Thyroid Stimulating Hormone, Serum: 3.04 uIU/mL (02-10 @ 09:21)      TELE:  EKG:    < from: Transthoracic Echocardiogram (02.10.20 @ 12:38) >  Conclusions:  Normal left ventricular systolic function. No segmental  wall motion abnormalities.  Agitated saline injection and color flow Doppler  demonstrates no evidence of a patent foramen ovale.  ------------------------------------------------------------------------  Confirmed on  2/10/2020 - 17:24:02 by Anthony Townsend MD, FASE  ------------------------------------------------------------------------    < end of copied text >

## 2020-02-11 NOTE — SPEECH LANGUAGE PATHOLOGY EVALUATION - SLP ALPHABET BOARD USE
Pt utilized alphabet board to communicate yes/no, identify letters, and spell last name. Unable to spell first name.

## 2020-02-11 NOTE — SWALLOW VFSS/MBS ASSESSMENT ADULT - RECOMMENDED CONSISTENCY
NPO, with non-oral nutrition/hydration/medications. Consider repeat MBS prior to assessing for alternate means of nutrition/hydration.

## 2020-02-11 NOTE — PROGRESS NOTE ADULT - ASSESSMENT
Sinus bradycardia  suspect related to his neurological event , cva  normal TSH  monitor on tele   unremarkable echo     CVA  MRI  fu with neurology

## 2020-02-11 NOTE — OCCUPATIONAL THERAPY INITIAL EVALUATION ADULT - NS ASR FOLLOW COMMAND OT EVAL
able to follow single-step instructions/75% of the time able to follow single-step instructions/75% of the time/Pt presents with aphasia and communicates with gestures.

## 2020-02-11 NOTE — SWALLOW VFSS/MBS ASSESSMENT ADULT - ORAL PHASE
Delayed oral transit time/Residue in oral cavity/Incomplete tongue to palate contact/Reduced anterior - posterior transport/Uncontrolled bolus / spillover in osvaldo-pharynx/Uncontrolled bolus / spillover in hypopharynx Residue in oral cavity/Delayed oral transit time/Reduced anterior - posterior transport/Incomplete tongue to palate contact/Uncontrolled bolus / spillover in osvaldo-pharynx/Uncontrolled bolus / spillover in hypopharynx

## 2020-02-11 NOTE — SPEECH LANGUAGE PATHOLOGY EVALUATION - SLP ORIENTATION
Pt awake and alert, responding appropriately to name when called and able to write last name. Pt answered yes/no questions to assess orientation to place, month, and year.

## 2020-02-11 NOTE — SWALLOW VFSS/MBS ASSESSMENT ADULT - SLP PERTINENT HISTORY OF CURRENT PROBLEM
59Y Icelandic speaking M with prior CVA 8y ago (no reported deficits) brought in by brother for concerns of generalized weakness and inability to speak. He was recently visiting [place] (since September) and returned last night. History obtained via chart review as patient was not able to communicate and there was no family at bedside. While in [place], patient was living on his own and not eating or drinking, He saw a doctor in [place] 1 week ago and was given a "power shot."  Patient also has vesicular lesions around his mouth of unclear duration.

## 2020-02-11 NOTE — PROGRESS NOTE ADULT - SUBJECTIVE AND OBJECTIVE BOX
Patient is a 59y old  Male who presents with a chief complaint of mouth lesions, possible stroke (11 Feb 2020 17:53)      SUBJECTIVE / OVERNIGHT EVENTS: ptn remains aphasic, follows commands, unable to swallow during bedside eval, placed on NPO and awaiting MBS. Acute right post frontal CVA on MRI    MEDICATIONS  (STANDING):  acyclovir IVPB 650 milliGRAM(s) IV Intermittent every 8 hours  acyclovir IVPB      aspirin  chewable 81 milliGRAM(s) Oral daily  atorvastatin 80 milliGRAM(s) Oral at bedtime  clopidogrel Tablet 75 milliGRAM(s) Oral daily  cyanocobalamin 1000 MICROGram(s) Oral daily  enoxaparin Injectable 40 milliGRAM(s) SubCutaneous daily  multivitamin/minerals 1 Tablet(s) Oral daily  Nephro-deysi 1 Tablet(s) Oral daily  sodium chloride 0.45%. 1000 milliLiter(s) (70 mL/Hr) IV Continuous <Continuous>    MEDICATIONS  (PRN):      Vital Signs Last 24 Hrs  T(F): 97.8 (02-11-20 @ 12:00), Max: 98.3 (02-10-20 @ 20:57)  HR: 62 (02-11-20 @ 12:00) (47 - 71)  BP: 163/94 (02-11-20 @ 12:00) (147/88 - 163/94)  RR: 18 (02-11-20 @ 12:00) (18 - 18)  SpO2: 96% (02-11-20 @ 12:00) (96% - 98%)  Telemetry:   CAPILLARY BLOOD GLUCOSE        I&O's Summary    10 Feb 2020 07:01  -  11 Feb 2020 07:00  --------------------------------------------------------  IN: 420 mL / OUT: 300 mL / NET: 120 mL    11 Feb 2020 07:01  -  11 Feb 2020 18:25  --------------------------------------------------------  IN: 0 mL / OUT: 600 mL / NET: -600 mL        PHYSICAL EXAM:  GENERAL: NAD, well-developed  HEAD:  Atraumatic, Normocephalic  EYES: EOMI, PERRLA, conjunctiva and sclera clear  NECK: Supple, No JVD  CHEST/LUNG: Clear to auscultation bilaterally; No wheeze  HEART: Regular rate and rhythm; No murmurs, rubs, or gallops  ABDOMEN: Soft, Nontender, Nondistended; Bowel sounds present  EXTREMITIES:  2+ Peripheral Pulses, No clubbing, cyanosis, or edema  PSYCH: AAOx3  NEUROLOGY: non-focal  SKIN: No rashes or lesions    LABS:                        14.1   7.54  )-----------( 337      ( 11 Feb 2020 06:10 )             41.7     02-11    138  |  103  |  8   ----------------------------<  103<H>  3.9   |  23  |  0.75    Ca    9.2      11 Feb 2020 06:10                RADIOLOGY & ADDITIONAL TESTS:    Imaging Personally Reviewed:    Consultant(s) Notes Reviewed:      Care Discussed with Consultants/Other Providers:

## 2020-02-11 NOTE — SPEECH LANGUAGE PATHOLOGY EVALUATION - SLP DIAGNOSIS
Pt admitted with chronic L MCA ischemic stroke 2/2 to severely stenotic vs occlusion of L M1, with possible recrudescence in setting of infection, MRI still pending. SLP completed bedside swallow evaluation 2/10, requested speech-language evaluation to further assess. Pt presenting with expressive aphasia and ?able aphonia. Pt successfully utilizing letter board to identify letters and communicate yes/no responses. Pt unable to spell name using letter board (pointed to "S" and then unable to find additional letters). Pt attempted to write at the word level with limited accuracy (able to write last name successfully, first name was not intelligible). Patient able to use gestures to communicate basic needs ("yes/no, hello, wait). Pt presented with an aphonic cry when  came to do a blessing and when he saw his family members. Therefore, recommend ENT. Per family, at baseline pt is able to communicate in English and Wolof.

## 2020-02-11 NOTE — CHART NOTE - NSCHARTNOTEFT_GEN_A_CORE
Discussed with Neurology Dr oliver   high suspicion for embolic cva  recommend LUIS   please call EP for ILR if LUIS neg

## 2020-02-11 NOTE — SWALLOW VFSS/MBS ASSESSMENT ADULT - DIAGNOSTIC IMPRESSIONS
NPO, with non-oral nutrition/hydration/medications. Consider repeat MBS prior to assessing for alternate means of nutrition/hydration. Patient presents with oropharyngeal and esophageal dysphagia characterized by decreased ap transport with residue in oral cavity, inconsistent delay in swallow reflex, impaired pharyngeal clearance with post swallow residue, impaired airway protection with puree textures, resulting in deep penetration without clearance. Pt also with note retention in proximal esophagus. Pt with baseline congested cough and intermittent coughing noted t/o exam; however, not consistent with deep penetration to the cords. A chin tuck and head turn were not successful in maintaining airway protection. Etiology of esophageal dysphagia is unclear - suggest GI consult for assessment. Patient presents with oropharyngeal and esophageal dysphagia characterized by decreased bolus formation, decreased ap transport with residue in oral cavity, delay in swallow reflex, impaired pharyngeal clearance with post swallow residue, impaired airway protection with puree textures, resulting in silent deep penetration to the vocal cords without clearance. Pt also with retention in proximal esophagus. Pt with baseline congested cough and intermittent delayed coughing noted t/o exam; however, not consistent with deep penetration to the cords. A chin tuck and head turn and combined chin tuck-head turn to right were not successful in maintaining airway protection. Etiology of esophageal dysphagia is unclear - suggest GI consult for assessment.    Disorders: reduced oral competence, reduced lingual strength/ROM/Rate of motion, reduced BOT to posterior pharyngeal wall contact with a narrow column of contrast between structures, delay in trigger of the swallow reflex with low trigger points, reduced anterior hyoid excursion, reduced laryngeal elevation, incomplete laryngeal vestibule closure, reduced supraglottic sensation, reduced subglottic sensation.

## 2020-02-11 NOTE — CONSULT NOTE ADULT - ASSESSMENT
PAM JIMENEZ is a 60yo Male admitted for recrudescence of chronic L MCA infarct, now with gait instability, ADL impairments and functional impairments.     Precautions: Aspiration, fall    Rehab:  ***INCOMPLETE*** PAM JIMENEZ is a 58yo Male admitted for recrudescence of chronic L MCA infarct, now with gait instability, ADL impairments and functional impairments.     Precautions: Aspiration, fall    Rehab:  - Continue bedside therapy as well as OOB throughout the day with mobilization with staff to maintain cardiopulmonary function and prevention of secondary complications related to debility.   - Patient likely a candidate for rehabilitation service, level of acuity to be determined pending establishment of diagnosis (MRI pending).  - Will continue to follow for ongoing rehab needs and recommendations

## 2020-02-11 NOTE — CONSULT NOTE ADULT - SUBJECTIVE AND OBJECTIVE BOX
Patient is a 59y old  Male who presents with a chief complaint of mouth lesions, possible stroke (11 Feb 2020 09:09)    HPI:  PAM JIMENEZ is a 58yo Japanese-speaking Male with h/o CVA 8 years ago (no residual deficits) presenting with aphasia. Pt is accompanied by brother who interpreted in the ED. Brother endorsed that pt went to visit family in [place] 9/2019 and about 3 weeks ago, family stated that he was starting to get weak and stopped being able to speak. He was also endorsing difficulty walking from weakness. He returned from [place] last night and came to the ED for evaluation. Pt also developed lesions on his mouth 3 days ago. He denies any other medical history or medication use. (09 Feb 2020 20:23)    Hospital Course:  Seen by neuro, expressive aphasia possibly secondary to L MCA ischemic stroke 2/2 to severely stenotic vs occlusion of L M1, with possible recrudescence in setting of infection. Neuro rec aspirin, plavix, statin, and MRI.   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    SLP bedside swallow eval found dysphagia, aphonia, expressive aphasia, pending SLP speech eval. Recommend NPO and MBS.    REVIEW OF SYSTEMS  Constitutional - Denies fevers, chills  HEENT - Denies changes in vision or hearing  Respiratory - Denies cough, dyspnea  Cardiovascular - Denies chest pain, palpitations  Gastrointestinal - Denies n/v, constipation, bowel incontinence  Genitourinary - Denies dysuria, urinary incontinence  Neurological - Denies weakness, numbness, headaches  Skin - Denies rashes  Musculoskeletal - Denies arthralgia, myalgias, back pain  Psychiatric - Denies depressed mood, anxiety    PAST MEDICAL & SURGICAL HISTORY  CVA (cerebral vascular accident)  No significant past surgical history      SOCIAL HISTORY  Lives _____  PTA Independent in ambulation and ADLs ____    CURRENT FUNCTIONAL STATUS  Bed mobility - Bernie  Transfers - Bernie  Gait - Bernie, 25' x2 RW  ADL's -    FAMILY HISTORY:  No pertinent family history in first degree relatives    ALLERGIES  No Known Allergies    MEDICATIONS   acyclovir IVPB 650 milliGRAM(s) IV Intermittent every 8 hours  acyclovir IVPB      aspirin  chewable 81 milliGRAM(s) Oral daily  atorvastatin 80 milliGRAM(s) Oral at bedtime  clopidogrel Tablet 75 milliGRAM(s) Oral daily  cyanocobalamin 1000 MICROGram(s) Oral daily  enoxaparin Injectable 40 milliGRAM(s) SubCutaneous daily  multivitamin/minerals 1 Tablet(s) Oral daily  Nephro-deysi 1 Tablet(s) Oral daily    ----------------------------------------------------------------------------------------  PHYSICAL EXAM  VITALS  T(C): 36.3 (02-11 @ 04:27)  HR: 47 (02-11 @ 04:27)  BP: 150/87 (02-11 @ 04:27)  RR: 18 (02-11 @ 04:27)  SpO2: 97% (02-11 @ 04:27)    Constitutional - NAD, Comfortable  HEENT - NCAT  Chest - No respiratory distress. No use of accessory muscles of respiration.  Cardiovascular - Warm, well perfused  Abdomen - nondistended  Extremities - no C/C/E  Neurologic Exam -                    Cognitive - Awake, Alert, AAO to self, place, date, year, situation     Communication - Fluent, No dysarthria     Cranial Nerves - CN 2-12 intact     Motor -                     LEFT    UE - ShAB 5/5, EF 5/5, EE 5/5, WE 5/5,  5/5                    RIGHT UE - ShAB 5/5, EF 5/5, EE 5/5, WE 5/5,  5/5                    LEFT    LE - HF 5/5, KE 5/5, DF 5/5, PF 5/5                    RIGHT LE - HF 5/5, KE 5/5, DF 5/5, PF 5/5        Sensory - Intact to LT     Reflexes - DTR Intact, No primitive reflexive     Coordination - FTN intact  Psychiatric - Mood stable, Affect WNL    RECENT LABS/IMAGING                        14.1   7.54  )-----------( 337      ( 11 Feb 2020 06:10 )             41.7     02-11    138  |  103  |  8   ----------------------------<  103<H>  3.9   |  23  |  0.75    Ca    9.2      11 Feb 2020 06:10    TPro  6.6  /  Alb  3.9  /  TBili  0.6  /  DBili  x   /  AST  31  /  ALT  33  /  AlkPhos  132<H>  02-09    < from: CT Angio Neck w/ IV Cont (02.09.20 @ 15:13) >  IMPRESSION:   CT brain: Multiple areas of prior infarct as described above. No acute hemorrhage or evidence of mass effect. Age-indeterminate lacunar infarcts as described. If the patient has a new and persistent neurologic deficit, consider short follow-up head CT or brain MRI follow-up.  CT angiography neck: No hemodynamically significant stenosis by NASCET criteria. No vascular dissection.  CT angiography brain: The left middle cerebral artery is severely stenosed versus occluded in this patient with a chronic left MCA territory infarct. Marked decrease branching of the distal left MCA branches are noted in the left sylvian fissure compared to the contralateral side.  Mild to moderate lobulated stenoses involve the M1 segment of the right middle cerebral artery. Patient is a 59y old  Male who presents with a chief complaint of mouth lesions, possible stroke (11 Feb 2020 09:09)    : #[id number]    HPI:  PAM JIMENEZ is a 58yo Romansh-speaking Male with h/o CVA 8 years ago (no residual deficits) presenting with aphasia. Pt is accompanied by brother who interpreted in the ED. Brother endorsed that pt went to visit family in [place] 9/2019 and about 3 weeks ago, family stated that he was starting to get weak and stopped being able to speak. He was also endorsing difficulty walking from weakness. He returned from [place] last night and came to the ED for evaluation. Pt also developed lesions on his mouth 3 days ago. He denies any other medical history or medication use. (09 Feb 2020 20:23)    Hospital Course:  Seen by neuro, expressive aphasia possibly secondary to L MCA ischemic stroke 2/2 to severely stenotic vs occlusion of L M1, with possible recrudescence in setting of infection. Neuro rec aspirin, plavix, statin, and MRI. 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    SLP bedside swallow eval found dysphagia, aphonia, expressive aphasia, pending SLP speech eval. Recommend NPO and MBS.    REVIEW OF SYSTEMS  +aphonic    PAST MEDICAL & SURGICAL HISTORY  CVA (cerebral vascular accident)  No significant past surgical history      SOCIAL HISTORY  Per  note, lives in elevator apt, unknown if stairs present. Emergency contact is brother Morgan Jimenez.  PTA Independent in ambulation and ADLs    CURRENT FUNCTIONAL STATUS  Bed mobility - Bernie  Transfers - Bernie  Gait - Bernie, 25' x2 RW  ADL's -    FAMILY HISTORY:  No pertinent family history in first degree relatives    ALLERGIES  No Known Allergies    MEDICATIONS   acyclovir IVPB 650 milliGRAM(s) IV Intermittent every 8 hours  acyclovir IVPB      aspirin  chewable 81 milliGRAM(s) Oral daily  atorvastatin 80 milliGRAM(s) Oral at bedtime  clopidogrel Tablet 75 milliGRAM(s) Oral daily  cyanocobalamin 1000 MICROGram(s) Oral daily  enoxaparin Injectable 40 milliGRAM(s) SubCutaneous daily  multivitamin/minerals 1 Tablet(s) Oral daily  Nephro-deysi 1 Tablet(s) Oral daily    ----------------------------------------------------------------------------------------  PHYSICAL EXAM  VITALS  T(C): 36.3 (02-11 @ 04:27)  HR: 47 (02-11 @ 04:27)  BP: 150/87 (02-11 @ 04:27)  RR: 18 (02-11 @ 04:27)  SpO2: 97% (02-11 @ 04:27)    Constitutional - NAD, Comfortable  HEENT - NCAT. Crusted lesions at the lip  Chest - No respiratory distress. No use of accessory muscles of respiration.  Cardiovascular - Warm, well perfused  Abdomen - nondistended  Extremities - no C/C/E  Neurologic Exam -                    Cognitive - Awake, Alert, AAO to self, place, date, year, situation     Communication - Fluent, No dysarthria     Cranial Nerves - L facial droop     Motor - 5/5 throughout      Sensory - Intact to LT     Reflexes - DTR Intact, No primitive reflexive     Coordination - FTN impaired on left  Psychiatric - Mood stable, Affect WNL    RECENT LABS/IMAGING                        14.1   7.54  )-----------( 337      ( 11 Feb 2020 06:10 )             41.7     02-11    138  |  103  |  8   ----------------------------<  103<H>  3.9   |  23  |  0.75    Ca    9.2      11 Feb 2020 06:10    TPro  6.6  /  Alb  3.9  /  TBili  0.6  /  DBili  x   /  AST  31  /  ALT  33  /  AlkPhos  132<H>  02-09    < from: CT Angio Neck w/ IV Cont (02.09.20 @ 15:13) >  IMPRESSION:   CT brain: Multiple areas of prior infarct as described above. No acute hemorrhage or evidence of mass effect. Age-indeterminate lacunar infarcts as described. If the patient has a new and persistent neurologic deficit, consider short follow-up head CT or brain MRI follow-up.  CT angiography neck: No hemodynamically significant stenosis by NASCET criteria. No vascular dissection.  CT angiography brain: The left middle cerebral artery is severely stenosed versus occluded in this patient with a chronic left MCA territory infarct. Marked decrease branching of the distal left MCA branches are noted in the left sylvian fissure compared to the contralateral side.  Mild to moderate lobulated stenoses involve the M1 segment of the right middle cerebral artery.

## 2020-02-11 NOTE — SWALLOW VFSS/MBS ASSESSMENT ADULT - SLP GENERAL OBSERVATIONS
Pt encountered awake and alert on stretcher from MRI. Transport and accompanied RN assisted in repositioning to KAMALJIT chair. Pt on HR monitoring and RN present t/o evaluation. Faroese  services used t/o evaluation. Pt encountered awake and alert on stretcher from MRI. Transport and accompanied RN assisted in repositioning to KAMALJIT chair. Pt on HR monitoring and RN present t/o evaluation. Azeri  services used t/o evaluation (#[id number]).

## 2020-02-11 NOTE — PROGRESS NOTE ADULT - SUBJECTIVE AND OBJECTIVE BOX
SUBJECTIVE/INTERVAL EVENTS: PT aphasic and cannot contribute to current history.    PT was found to have new R sided embolic appearing infarcts on MRI brain.  No new deficits reported by nursing staff.  PT continues to have expressive aphasia, but follows simple commands and able to show thumbs up, nod when he wants something.  Failed dysphagia screen, team plan for NGT.     MEDICATIONS  (STANDING):  acyclovir IVPB 650 milliGRAM(s) IV Intermittent every 8 hours  acyclovir IVPB      aspirin  chewable 81 milliGRAM(s) Oral daily  atorvastatin 80 milliGRAM(s) Oral at bedtime  clopidogrel Tablet 75 milliGRAM(s) Oral daily  cyanocobalamin 1000 MICROGram(s) Oral daily  enoxaparin Injectable 40 milliGRAM(s) SubCutaneous daily  multivitamin/minerals 1 Tablet(s) Oral daily  Nephro-deysi 1 Tablet(s) Oral daily  sodium chloride 0.45%. 1000 milliLiter(s) (70 mL/Hr) IV Continuous <Continuous>    OBJECTIVE:  VITAL SIGNS:   Vital Signs Last 24 Hrs  T(C): 36.6 (11 Feb 2020 12:00), Max: 36.8 (10 Feb 2020 20:57)  T(F): 97.8 (11 Feb 2020 12:00), Max: 98.3 (10 Feb 2020 20:57)  HR: 62 (11 Feb 2020 12:00) (47 - 71)  BP: 163/94 (11 Feb 2020 12:00) (147/88 - 163/94)  BP(mean): --  RR: 18 (11 Feb 2020 12:00) (18 - 18)  SpO2: 96% (11 Feb 2020 12:00) (96% - 98%)    General: Overweight male, sitting up in bed in NAD but mildly agitated.  Skin: R. perioral crusted lesion  Cardiovascular (>2): RRR no murmurs.   Neurological:  MS/Language: Awake, alert. Follows simple one-step and midline commands.  Mild to moderate agitation and psychomotor slowing.  Continues to be easily frustrated, waves arms violently motioning to get out of room. No verbal output.   CNs: PERRLA (R = 2mm, L = 2mm). no BTT on the L, spontaneously tracks around room but possible L. CN6 palsy.  No facial asymmetry b/l, full eye closure strength b/l.   Motor: Normal muscle bulk & tone. No noticeable tremor or seizure. No pronator drift.  Difficult to assess to due cooperation however moves all extremities spontaneously antigravity, Subtle RLE drift noted, otherwise no focal weakness.  PT would not cooperate with full exam, thus limiting assessment.   Sensation: withdraws to noxious stimuli throughout  Reflexes: 2+ in B/L biceps and patellar, downgoing toes.  Pt refused to allow more testing  Coordination: Refused to participate    LABS AND STUDIES:  •	CBC                       14.1   7.54  )-----------( 337      ( 11 Feb 2020 06:10 )             41.7     •	Chem 02-11    138  |  103  |  8   ----------------------------<  103<H>  3.9   |  23  |  0.75    Ca    9.2      11 Feb 2020 06:10    Lipid Panel 02-10 Chol 199 LDL 99 HDL 67 Trig 164<H>  A1c 02-10 [id number] 5.5    MR Head w/wo IV Cont (02.11.20 @ 15:07)  IMPRESSION:  BRAIN MRI:  Acute right posterior frontal infarct in the region of the right precentral gyrus. Subacute infarct in the right frontoparietal and posterior temporal region. Minimal petechial hemorrhagic transformation and gyral enhancement is noted in both regions.  Multifocal chronic bilateral MCA territory infarcts. Chronic hemorrhagic products in the regionof the left basal ganglia.    BRAIN MRA:  Persistent mild narrowing of the cavernous and supraclinoid left ICA. Persistent lack of flow related signal within the left M1 segment, with poor visualization of the more distal left MCA branches.  Redemonstration of multifocal mild to moderate stenoses of the right M1 segment. Normal flow-related enhancement of the more distal right MCA.    NECK MRA:  No flow-limiting stenosis or evidence for arterial dissection.

## 2020-02-11 NOTE — OCCUPATIONAL THERAPY INITIAL EVALUATION ADULT - DIAGNOSIS, OT EVAL
Pt presents with decrease cognition, coordination, balance, strength, and endurance affecting pt's ability to perform ADL's and all functional activities.

## 2020-02-11 NOTE — SWALLOW VFSS/MBS ASSESSMENT ADULT - DELAYED PHARYNGEAL TRANSIT TIME
Mild/Deep laryngeal penetration during the swallow due to severity of premature spillage to hypopharynx Mild/Deep laryngeal penetration to the vocal cords during the swallow due to severity of premature spillage which is not completely cleared

## 2020-02-11 NOTE — OCCUPATIONAL THERAPY INITIAL EVALUATION ADULT - ADDITIONAL COMMENTS
CTH: Areas of chronic infarction with hypodensity and volume loss are noted in the left MCA distribution involving the left frontal parietal temporal, left subinsular, and left basal ganglia regions with associated ex vacuo dilatation of the left lateral ventricle. Associated wallerian degeneration and volume loss involves the ipsilateral cerebral peduncle of the left midbrain. Age-indeterminate lacunar infarcts as described. CT angiography brain: The left middle cerebral artery is severely stenosed versus occluded in this patient with a chronic left MCA territory infarct. Marked decrease branching of the distal left MCA branches are noted in the left sylvian fissure compared to the contralateral side.

## 2020-02-11 NOTE — OCCUPATIONAL THERAPY INITIAL EVALUATION ADULT - PLANNED THERAPY INTERVENTIONS, OT EVAL
fine motor coordination training/transfer training/ROM/strengthening/balance training/cognitive, visual perceptual/ADL retraining

## 2020-02-11 NOTE — SPEECH LANGUAGE PATHOLOGY EVALUATION - COMMENTS
Per neuro, pt found to have decreased BTT on the L and no verbal output. He follows commands intermittently and is easily agitated. Exam limited due to patient aggression. CTH demonstrates chronic L MCA infarct with severely stenotic vs occluded distal L M1. There is also subacute/chronic R corona radiata hypodensity. Expressive aphasia possibly secondary to chronic L MCA ischemic stroke 2/2 to severely stenotic vs occlusion of L M1, with possible recrudescence in setting of infection. He has L MCA territory encephalomalacia and gliosis, which if this is his dominant hemisphere can explain his expressive aphasia but there is no attempt on patients part to speak. ?L CN 6 palsy, however no PCA territory involvement seen on CTH. Per H&P: its not clear whether his Sx are 2/2 CVA induced aphasia which is worse 2/2 herpetic infection on his lip, now crusted and has resolved, OR this is psychiatric in nature. since ptn has no attempt to speak its hard to determine this being expressive aphasia. Possible pt is depressed 2/2 poor functioning and worsening aphasia over several months.   2/10: provider contact note for EKG with marked sinus bradycardia with HR of 48 and prolonged QT: 554; no acute changes compared to previous one. Pt transferred to [facility] for HR monitoring. Cardio consulted; suspected bradycardia due to neurologic event.  2/9: CT angiography neck: No hemodynamically significant stenosis by NASCET criteria. No vascular dissection. CT angiography brain: The left middle cerebral artery is severely stenosed versus occluded in this patient with a chronic left MCA territory infarct. Marked decrease branching of the distal left MCA branches are noted in the left sylvian fissure compared to the contralateral side. Mild to moderate lobulated stenoses involve the M1 segment of the right middle cerebral artery.  SEE ADDENDUM FOR CT HEAD.. Patient would benefit from speech therapy services at the next level of care. Function ID: 4/[id number] Needs further assessment to assess patient ability to complete naming tasks using letter board. However, patient unable to utilize letter board to spell his first name. Pt requested to end eval upon family arrival. Needs further assessment as pt requested to end eval upon arrival of his family. Formal assessment of cognitive-linguistic function deferred at this time given severity of expressive language deficits. Pt unable to legibly write his first name. Unable to assess as pt non-verbal with no attempt to phonate during exam. Pt presented with aphonic cry upon arrival of  and his family.

## 2020-02-11 NOTE — PROGRESS NOTE ADULT - ASSESSMENT
Assessment:  59Y Persian speaking M with prior CVA w/o residual deficits p/w 3 weeks of generalized weakness and expressive aphasia.  On exam, PT is agitated, has decreased L blinks to threat and no verbal output.  PT MRI demonstrated new R. posterior frontal and subacute R. fronto-parietal/post temporal infarcts, embolic appearing in addition to L. MCA infarcts.  Minor petechial hemorrhage, c/w natural history of strokes.        Impression: Expressive aphasia could be due to R. temporal infarcts if this is his dominant hemisphere vs. apraxia of speech.  PT's strokes appear embolic in origin, but no known embolic source thus mechanism could be ESUS (Embolic stroke of undetermined source) vs. symptomatic large artery atherosclerosis.     Recommend:   - normotension given subacute nature of symptoms   - For now, will continue DAPT with ASA 81mg + Plavix 75 QD for 90 days followed [facility].  If embolic source determined, will discuss further if need for switching to AC.    - Continue atorvastatin 80 daily with titration of LDL <90  - LUIS and Implantable Loop recorder for evaluation of underlying embolic source    Dallas Campos, DO  PGY-2 Neurology Service

## 2020-02-11 NOTE — OCCUPATIONAL THERAPY INITIAL EVALUATION ADULT - FINE MOTOR COORDINATION TRAINING, OT EVAL
GOAL: Pt will perform L/R finger opposition with accuracy, in order to improve UB dressing, by week 4.

## 2020-02-11 NOTE — OCCUPATIONAL THERAPY INITIAL EVALUATION ADULT - ORIENTATION, REHAB EVAL
time/place time/place/Pt presents with aphasia and communicated to yes/no questions with gestures and with .

## 2020-02-11 NOTE — SPEECH LANGUAGE PATHOLOGY EVALUATION - SLP PRAGMATICS
Pt maintains good eye contact throughout evaluation with SLP and . Appeared to pay attention to instructions and explanations during the exam. Pt was cooperative and throughout the exam until the arrival of his family.

## 2020-02-11 NOTE — SWALLOW VFSS/MBS ASSESSMENT ADULT - NS SWALLOW VFSS REC ASPIR MON
pneumonia/cough/gurgly voice/throat clearing/fever/change of breathing pattern/upper respiratory infection

## 2020-02-11 NOTE — SPEECH LANGUAGE PATHOLOGY EVALUATION - SLP BEHAVIORAL OBSERVATIONS
within functional limits/At times, patient emotional as demonstrated by aphonic cry when seeing  and his family, though this is appropriate given degree of impairment and difficulty communicating

## 2020-02-11 NOTE — OCCUPATIONAL THERAPY INITIAL EVALUATION ADULT - IMPAIRED TRANSFERS: SIT/STAND, REHAB EVAL
decreased strength/impaired balance/impaired motor control/cognition/impaired coordination/impaired postural control

## 2020-02-11 NOTE — CONSULT NOTE ADULT - ATTENDING COMMENTS
VASCULAR NEUROLOGY ATTENDING  The patient is seen and examined the history and imaging are reviewed. I agree with the resident note unless otherwise noted. Patient with LMCA territory stroke etiology presumed symptomatic intracranial athero. Agree with ASA Plavix for 90 days per [facility] then ASA alone. Statin. Aggressive vascular risk factor modification. PT/OT/SS stroke labs.
Seen and examined with resident. Agree with note.   WIll f/u imaging- assuming stroke diagnosis confirmed will need acute rehab

## 2020-02-11 NOTE — SWALLOW VFSS/MBS ASSESSMENT ADULT - COMMENTS
Per neuro, pt found to have decreased BTT on the L and no verbal output. He follows commands intermittently and is easily agitated. Exam limited due to patient aggression. CTH demonstrates chronic L MCA infarct with severely stenotic vs occluded distal L M1. There is also subacute/chronic R corona radiata hypodensity. Expressive aphasia possibly secondary to chronic L MCA ischemic stroke 2/2 to severely stenotic vs occlusion of L M1, with possible recrudescence in setting of infection. He has L MCA territory encephalomalacia and gliosis, which if this is his dominant hemisphere can explain his expressive aphasia but there is no attempt on patients part to speak. ?L CN 6 palsy, however no PCA territory involvement seen on CTH. Per H&P: its not clear whether his Sx are 2/2 CVA induced aphasia which is worse 2/2 herpetic infection on his lip, now crusted and has resolved, OR this is psychiatric in nature. since ptn has no attempt to speak its hard to determine this being expressive aphasia. Possible pt is depressed 2/2 poor functioning and worsening aphasia over several months.   2/10: provider contact note for EKG with marked sinus bradycardia with HR of 48 and prolonged QT: 554; no acute changes compared to previous one. Pt transferred to [facility] for HR monitoring. Cardio consulted; suspected bradycardia due to neurologic event.  2/9: CT angiography neck: No hemodynamically significant stenosis by NASCET criteria. No vascular dissection. CT angiography brain: The left middle cerebral artery is severely stenosed versus occluded in this patient with a chronic left MCA territory infarct. Marked decrease branching of the distal left MCA branches are noted in the left sylvian fissure compared to the contralateral side. Mild to moderate lobulated stenoses involve the M1 segment of the right middle cerebral artery.  SEE ADDENDUM FOR IMAGING..

## 2020-02-11 NOTE — SWALLOW VFSS/MBS ASSESSMENT ADULT - THE ABOVE FINDINGS WERE DISCUSSED WITH
Nursing Nursing/Patient/RN, NP Beronica Berrios Patient/Nursing/RN, NP Beronica Berrios, results given using [employer]  (Jose Francisco Robles #[id number])

## 2020-02-11 NOTE — CHART NOTE - NSCHARTNOTEFT_GEN_A_CORE
Attempted to place NGT for feeds/hydration/medication as Speech and Swallow recommends NPO.    Attempted to place NGT.  Patient refused NGT.      Family contacted and made aware.  Informed to come to hospital to encourage patient to allow NGT placement if possible.    Consider ENT consult.      Consider GI consult for possible PEG.    Patient placed on 1/2 NS @ 75cc/ hr      Daniel Horton PA-C  Medicine  [id number]

## 2020-02-11 NOTE — OCCUPATIONAL THERAPY INITIAL EVALUATION ADULT - PERTINENT HX OF CURRENT PROBLEM, REHAB EVAL
60 yo M with h/o CVA 8 years ago presenting with aphasia. Pt is accompanied by brother who interpreted in the ED. Brother endorsed that pt went to visit family in [place] 9/2019 and about 3 weeks ago, family stated that he was starting to get weak and stopped being able to speak. He was also endorsing difficulty walking from weakness. He returned from [place] last night and came to the ED for evaluation. Pt also developed lesions on his mouth 3 days ago.

## 2020-02-11 NOTE — PROGRESS NOTE ADULT - ASSESSMENT
59Y Lithuanian speaking M with prior CVA 8y ago (no reported deficits) brought in by brother for concerns of generalized weakness and inability to speak for the past 3 weeks. On exam found to have no verbal output, appears withdrawn and weak on neurological evaluation  He follows commands intermittently and is easily agitated.  Exam limited due to patient aggression on admission  CTH demonstrates chronic L MCA infarct with severely stenotic vs occluded distal L M1. this is confirmed on MRI but also an acute right post frontal infarct is seen. 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

## 2020-02-11 NOTE — SPEECH LANGUAGE PATHOLOGY EVALUATION - SLP PERTINENT HISTORY OF CURRENT PROBLEM
59Y Khmer speaking M with prior CVA 8y ago (no reported deficits) brought in by brother for concerns of generalized weakness and inability to speak. He was recently visiting [place] (since September) and returned last night. History obtained via chart review as patient was not able to communicate and there was no family at bedside. While in [place], patient was living on his own and not eating or drinking, He saw a doctor in [place] 1 week ago and was given a "power shot."  Patient also has vesicular lesions around his mouth of unclear duration.

## 2020-02-11 NOTE — SPEECH LANGUAGE PATHOLOGY EVALUATION - SLP VERBAL EXPRESSION
Pt with no attempt to verbalize during this exam, did not attempt to approximate oral postures. Pt waved his name "no" at each attempt./unable to assess

## 2020-02-11 NOTE — SPEECH LANGUAGE PATHOLOGY EVALUATION - SLP GESTURES
head nod/head shake/Pt gestured he would like to eat, hello, no, yes, and wait throughout eval/spontaneously gestures wants/needs

## 2020-02-11 NOTE — OCCUPATIONAL THERAPY INITIAL EVALUATION ADULT - IMPAIRMENTS CONTRIBUTING IMPAIRED BED MOBILITY, REHAB EVAL
impaired postural control/cognition/impaired balance/decreased strength/impaired coordination/impaired motor control

## 2020-02-11 NOTE — SWALLOW VFSS/MBS ASSESSMENT ADULT - ORAL PHASE COMMENTS
Oral transit time for initial bolus noted ot be 5.3s. Patient with max premature spillage to valleculae with mod passive spillage to pyriform. Min-mod oral residue post initial swallow. Patient Oral transit time for initial bolus noted ot be 5.3s. Patient with max premature spillage to valleculae with mod passive spillage to pyriform. Min-mod lingual-palatal residue post initial swallow. Patient Oral transit time for initial bolus noted to be 7.1s. Pt with moderate premature spillage to valleculae with trace passive spillage over posterior portion of epiglottis. Pt with mod lingual-palatal residue post swallow. Pt with min spillage of oral residue over base of tongue to pyriform prior to secondary swallow. Post secondary swallow, pt continued to present with min lingual-palatal residue.

## 2020-02-11 NOTE — SPEECH LANGUAGE PATHOLOGY EVALUATION - SLP GENERAL OBSERVATIONS
Pt encountered awake and alert, reclined in bed on room air. +Baseline cough. +anterior loss of saliva on L, which pt cleared independently. Swedish video  utilized (Davina Avila).

## 2020-02-11 NOTE — SWALLOW VFSS/MBS ASSESSMENT ADULT - UNSUCCESSFUL STRATEGIES TRIALED DURING PROCEDURE
nonproductive volitional cough following clinician cue chin tuck/and combined chin tuck/head turn to right/head turn to the left

## 2020-02-12 PROCEDURE — 99222 1ST HOSP IP/OBS MODERATE 55: CPT

## 2020-02-12 RX ADMIN — ENOXAPARIN SODIUM 40 MILLIGRAM(S): 100 INJECTION SUBCUTANEOUS at 15:54

## 2020-02-12 RX ADMIN — Medication 300 MILLIGRAM(S): at 17:58

## 2020-02-12 NOTE — PROGRESS NOTE ADULT - SUBJECTIVE AND OBJECTIVE BOX
Subjective: Patient seen and examined. No new events except as noted.     SUBJECTIVE/ROS:  aphasic       MEDICATIONS:  MEDICATIONS  (STANDING):  aspirin Suppository 300 milliGRAM(s) Rectal daily  atorvastatin 80 milliGRAM(s) Oral at bedtime  clopidogrel Tablet 75 milliGRAM(s) Oral daily  cyanocobalamin 1000 MICROGram(s) Oral daily  enoxaparin Injectable 40 milliGRAM(s) SubCutaneous daily  ergocalciferol 12127 Unit(s) Oral every week  multivitamin/minerals 1 Tablet(s) Oral daily  Nephro-deysi 1 Tablet(s) Oral daily  sodium chloride 0.45%. 1000 milliLiter(s) (70 mL/Hr) IV Continuous <Continuous>      PHYSICAL EXAM:  T(C): 36.9 (02-12-20 @ 12:00), Max: 37.1 (02-12-20 @ 04:13)  HR: 53 (02-12-20 @ 12:00) (50 - 57)  BP: 138/77 (02-12-20 @ 12:00) (138/77 - 162/91)  RR: 18 (02-12-20 @ 12:00) (18 - 18)  SpO2: 97% (02-12-20 @ 12:00) (95% - 99%)  Wt(kg): --  I&O's Summary    11 Feb 2020 07:01  -  12 Feb 2020 07:00  --------------------------------------------------------  IN: 840 mL / OUT: 900 mL / NET: -60 mL    12 Feb 2020 07:01  -  12 Feb 2020 15:47  --------------------------------------------------------  IN: 600 mL / OUT: 1150 mL / NET: -550 mL            JVP: Normal  Neck: supple  Lung: clear   CV: S1 S2 , Murmur:  Abd: soft  Ext: No edema  neuro: Awake / alert  Psych: flat affect  Skin: normal``    LABS/DATA:    CARDIAC MARKERS:                                14.1   7.54  )-----------( 337      ( 11 Feb 2020 06:10 )             41.7     02-11    138  |  103  |  8   ----------------------------<  103<H>  3.9   |  23  |  0.75    Ca    9.2      11 Feb 2020 06:10      proBNP:   Lipid Profile:   HgA1c:   TSH:     TELE:  EKG:

## 2020-02-12 NOTE — PROGRESS NOTE ADULT - ASSESSMENT
59Y Armenian speaking M with prior CVA 8y ago (no reported deficits) brought in by brother for concerns of generalized weakness and inability to speak for the past 3 weeks. On exam found to have no verbal output, appears withdrawn and weak on neurological evaluation  He follows commands intermittently and is easily agitated.  Exam limited due to patient aggression on admission  CTH demonstrates chronic L MCA infarct with severely stenotic vs occluded distal L M1. this is confirmed on MRI but also an acute right post frontal infarct is seen. 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 will need a PEG, left a message w ptn's family for a call back  Con't ASA 81mg QD, Plavix 75mg, and LIPITOR via NGT, LDL is below 70  - TTE with bubble study: benign  - psych consult  -DVT ppx w sc Lovenox  -social service consult  -PT eval, will prob need acute rehab

## 2020-02-12 NOTE — CONSULT NOTE ADULT - SUBJECTIVE AND OBJECTIVE BOX
CHIEF COMPLAINT:    HISTORY OF PRESENT ILLNESS:      Allergies    No Known Allergies    Intolerances    	    MEDICATIONS:  clopidogrel Tablet 75 milliGRAM(s) Oral daily  enoxaparin Injectable 40 milliGRAM(s) SubCutaneous daily        aspirin Suppository 300 milliGRAM(s) Rectal daily      atorvastatin 80 milliGRAM(s) Oral at bedtime    cyanocobalamin 1000 MICROGram(s) Oral daily  ergocalciferol 24332 Unit(s) Oral every week  multivitamin/minerals 1 Tablet(s) Oral daily  Nephro-deysi 1 Tablet(s) Oral daily  sodium chloride 0.45%. 1000 milliLiter(s) IV Continuous <Continuous>      PAST MEDICAL & SURGICAL HISTORY:  CVA (cerebral vascular accident)  No significant past surgical history      FAMILY HISTORY:  No pertinent family history in first degree relatives      SOCIAL HISTORY:    [ ] Non-smoker  [ ] Smoker  [ ] Alcohol      REVIEW OF SYSTEMS:  See HPI. Otherwise, 10 point ROS done and otherwise negative.    PHYSICAL EXAM:  T(C): 37.1 (02-12-20 @ 04:13), Max: 37.1 (02-12-20 @ 04:13)  HR: 57 (02-12-20 @ 09:34) (50 - 57)  BP: 155/85 (02-12-20 @ 09:34) (144/81 - 162/91)  RR: 18 (02-12-20 @ 04:13) (18 - 18)  SpO2: 96% (02-12-20 @ 09:34) (95% - 99%)  Wt(kg): --  I&O's Summary    11 Feb 2020 07:01  -  12 Feb 2020 07:00  --------------------------------------------------------  IN: 840 mL / OUT: 900 mL / NET: -60 mL        Appearance: Normal	  HEENT:   Normal oral mucosa, PERRL, EOMI	  Lymphatic: No lymphadenopathy  Cardiovascular: Normal S1 S2, No JVD, No murmurs, No edema  Respiratory: Lungs clear to auscultation	  Psychiatry: A & O x 3, Mood & affect appropriate  Gastrointestinal:  Soft, Non-tender, + BS	  Skin: No rashes, No ecchymoses, No cyanosis	  Neurologic: Non-focal  Extremities: Normal range of motion, No clubbing, cyanosis or edema  Vascular: Peripheral pulses palpable 2+ bilaterally        LABS:	 	    CBC Full  -  ( 11 Feb 2020 06:10 )  WBC Count : 7.54 K/uL  Hemoglobin : 14.1 g/dL  Hematocrit : 41.7 %  Platelet Count - Automated : 337 K/uL  Mean Cell Volume : 101.2 fl  Mean Cell Hemoglobin : 34.2 pg  Mean Cell Hemoglobin Concentration : 33.8 gm/dL  Auto Neutrophil # : x  Auto Lymphocyte # : x  Auto Monocyte # : x  Auto Eosinophil # : x  Auto Basophil # : x  Auto Neutrophil % : x  Auto Lymphocyte % : x  Auto Monocyte % : x  Auto Eosinophil % : x  Auto Basophil % : x    02-11    138  |  103  |  8   ----------------------------<  103<H>  3.9   |  23  |  0.75    Ca    9.2      11 Feb 2020 06:10        proBNP:   Lipid Profile:   HgA1c:   TSH:       CARDIAC MARKERS:                TELEMETRY: 	    ECG:  	  RADIOLOGY:  OTHER: 	    PREVIOUS DIAGNOSTIC TESTING:    [ ] Echocardiogram:  [ ]  Catheterization:  [ ] Stress Test:  	  	  ASSESSMENT/PLAN: CHIEF COMPLAINT: mouth lesions, possible stroke    HISTORY OF PRESENT ILLNESS:    58 yo M with h/o CVA 8 years ago presenting with aphasia. Pt is accompanied by brother who interpreted in the ED. Brother endorsed that pt went to visit family in [place] 9/2019 and about 3 weeks ago, family stated that he was starting to get weak and stopped being able to speak. He was also endorsing difficulty walking from weakness. He returned from [place] last night and came to the ED for evaluation. Pt also developed lesions on his mouth 3 days ago. He denies any other medical history or medication use    Allergies  No Known Allergies    MEDICATIONS:  clopidogrel Tablet 75 milliGRAM(s) Oral daily  enoxaparin Injectable 40 milliGRAM(s) SubCutaneous daily  aspirin Suppository 300 milliGRAM(s) Rectal daily  atorvastatin 80 milliGRAM(s) Oral at bedtime  cyanocobalamin 1000 MICROGram(s) Oral daily  ergocalciferol 94719 Unit(s) Oral every week  multivitamin/minerals 1 Tablet(s) Oral daily  Nephro-deysi 1 Tablet(s) Oral daily  sodium chloride 0.45%. 1000 milliLiter(s) IV Continuous <Continuous>      PAST MEDICAL & SURGICAL HISTORY:  CVA (cerebral vascular accident)  No significant past surgical history      FAMILY HISTORY:  No pertinent family history in first degree relatives      SOCIAL HISTORY:    [ ] Non-smoker  [ ] Smoker  [ ] Alcohol      REVIEW OF SYSTEMS:  See HPI.  Patient  non-verbal. Unable to obtain ROS    PHYSICAL EXAM:  T(C): 37.1 (02-12-20 @ 04:13), Max: 37.1 (02-12-20 @ 04:13)  HR: 57 (02-12-20 @ 09:34) (50 - 57)  BP: 155/85 (02-12-20 @ 09:34) (144/81 - 162/91)  RR: 18 (02-12-20 @ 04:13) (18 - 18)  SpO2: 96% (02-12-20 @ 09:34) (95% - 99%)  Wt(kg): --    I&O's Summary    11 Feb 2020 07:01  -  12 Feb 2020 07:00  --------------------------------------------------------  IN: 840 mL / OUT: 900 mL / NET: -60 mL        Appearance:  opens eyes spontaneously 	  HEENT: atraumatic, normocephalic  Cardiovascular: Normal S1 S2,  No edema  Respiratory: Lungs clear to auscultation bilaterally, no wheezing	  Psychiatry: non verbal   Gastrointestinal:  Soft, + BS   : voids spontaneously 	  Skin: No rashes, No ecchymoses, No cyanosis or lesions	  Neurologic: withdraws from pain   Psych: flat affect  Extremities: 2+ peripheral pulses, No clubbing, cyanosis or edema  Vascular: Peripheral pulses palpable 2+ bilaterally      LABS:	 	    CBC Full  -  ( 11 Feb 2020 06:10 )  WBC Count : 7.54 K/uL  Hemoglobin : 14.1 g/dL  Hematocrit : 41.7 %  Platelet Count - Automated : 337 K/uL  Mean Cell Volume : 101.2 fl  Mean Cell Hemoglobin : 34.2 pg  Mean Cell Hemoglobin Concentration : 33.8 gm/dL  Auto Neutrophil # : x  Auto Lymphocyte # : x  Auto Monocyte # : x  Auto Eosinophil # : x  Auto Basophil # : x  Auto Neutrophil % : x  Auto Lymphocyte % : x  Auto Monocyte % : x  Auto Eosinophil % : x  Auto Basophil % : x    02-11    138  |  103  |  8   ----------------------------<  103<H>  3.9   |  23  |  0.75    Ca    9.2      11 Feb 2020 06:10        proBNP:   Lipid Profile:   HgA1c:   TSH:       CARDIAC MARKERS:                TELEMETRY: Sinus Clem       ECG:    Ventricular Rate 52 BPM  Atrial Rate 52 BPM  P-R Interval 128 ms  QRS Duration 102 ms  Q-T Interval 504 ms  QTC Calculation(Bezet) 468 ms  P Axis 63 degrees  R Axis 57 degrees  T Axis 118 degrees  Diagnosis Line SINUS BRADYCARDIA  INCOMPLETE RIGHT BUNDLE BRANCH BLOCK  ST & T WAVE ABNORMALITY, CONSIDER ANTEROLATERAL ISCHEMIA  Confirmed by JOE TELLEZ MD ([id number]) on 2/10/2020 2:59:59 PM    	  RADIOLOGY:    cine esophagogram:   CLINICAL INFORMATION: Dysphagia.  TECHNIQUE: A cine esophagogram was performed under fluoroscopy in conjunction with speech pathology.  FLUORO TIME: 103 seconds.   FINDINGS AND IMPRESSION:  There was deep laryngeal penetration without clearance noted on swallows for puree foods. Chin tuck and head turn were not successful maintaining her projection.  For further information and recommendations, please refer to the speech pathologist final report which is available for review in the electronic medical record.  	  Echocardiogram:  Study Date: 2/10/2020  [facility]  Study quality: Technically difficult  Referring Physician: Topher Clark MD  Blood Pressure: 154/90 mmHg  Height: 168 cm  Weight: 63 kg  BSA: 1.7 m2  Heart Rhythm: Normal sinus  ------------------------------------------------------------------------  PROCEDURE: Transthoracic echocardiogram with 2-D, M-Mode  and complete spectral and color flow Doppler. Patient was  injected with 10 cc's of aerosolized saline. Patient was  injected with 10 cc's of aerosolized saline.  INDICATION: Cerebral infarction, unspecified (I63.9)  ------------------------------------------------------------------------  Dimensions:    Normal Values:  LA:     3.7    2.0 - 4.0 cm  Ao:     3.5    2.0 - 3.8 cm  SEPTUM: 0.9    0.6 - 1.2 cm  PWT:    1.0    0.6 - 1.1 cm  LVIDd:  5.4    3.0 - 5.6 cm  LVIDs:  3.6    1.8 - 4.0 cm  Derived variables:  LVMI: 113 g/m2  RWT: 0.37  EF (Visual Estimate): 65 %  Doppler Peak Velocity (m/sec): AoV=1.1  ------------------------------------------------------------------------  Observations:  Mitral Valve: Mitral annular calcification, otherwise  normal mitral valve.  Aortic Valve/Aorta: Calcified aortic valve with normal  opening.  Normal aortic root.  Left Atrium: Normal left atrium.  Left Ventricle: Normal left ventricular internal dimensions  and wall thicknesses.  Normal left ventricular systolic function. No segmental  wall motion abnormalities.  Impaired LV-relaxation with normal filling pressure.  Right Heart: Normal right atrium. Normal right ventricular  size and function. Normal tricuspid valve. Normal pulmonic  valve.  Pericardium/Pleura: Normal pericardium with no pericardial  effusion.  Hemodynamic: Estimated right atrial pressue is normal.  No evidence of pulmonary hypertension.  Agitated saline injection and color flow Doppler  demonstrates no evidence of a patent foramen ovale.  ------------------------------------------------------------------------  Conclusions:  Normal left ventricular systolic function. No segmental  wall motion abnormalities.  Agitated saline injection and color flow Doppler  demonstrates no evidence of a patent foramen ovale.  ------------------------------------------------------------------------  Confirmed on  2/10/2020 - 17:24:02 by Anthony Townsend MD, F[facility]          BRAIN MRI:    Acute right posterior frontal infarct in the region of the right precentral gyrus. Subacute infarct in the right frontoparietal and posterior temporal region. Minimal petechial hemorrhagic transformation and gyral enhancement is noted in both regions.    Multifocal chronic bilateral MCA territory infarcts. Chronic hemorrhagic products in the region of the left basal ganglia.    BRAIN MRA:    Persistent mild narrowing of the cavernous and supraclinoid left ICA. Persistent lack of flow related signal within the left M1 segment, with poor visualization of the more distal left MCA branches.    Redemonstration of multifocal mild to moderate stenoses of the right M1 segment. Normal flow-related enhancement of the more distal right MCA.    NECK MRA:    No flow-limiting stenosis or evidence for arterial dissection.    Dr. Viveros discussed these findings with nurse practitioner Bakari on 2/11/2020 4:16 PM with read back. CHIEF COMPLAINT:   mouth lesions, possible stroke    HISTORY OF PRESENT ILLNESS:    58 yo M with h/o CVA 8 years ago presenting with aphasia. Pt is accompanied by brother who interpreted in the ED. Brother endorsed that pt went to visit family in [place] 9/2019 and about 3 weeks ago, family stated that he was starting to get weak and stopped being able to speak. He was also endorsing difficulty walking from weakness. He returned from [place] last night and came to the ED for evaluation. Pt also developed lesions on his mouth 3 days ago. He denies any other medical history or medication use    Allergies  No Known Allergies    MEDICATIONS:  clopidogrel Tablet 75 milliGRAM(s) Oral daily  enoxaparin Injectable 40 milliGRAM(s) SubCutaneous daily  aspirin Suppository 300 milliGRAM(s) Rectal daily  atorvastatin 80 milliGRAM(s) Oral at bedtime  cyanocobalamin 1000 MICROGram(s) Oral daily  ergocalciferol 15625 Unit(s) Oral every week  multivitamin/minerals 1 Tablet(s) Oral daily  Nephro-deysi 1 Tablet(s) Oral daily  sodium chloride 0.45%. 1000 milliLiter(s) IV Continuous <Continuous>      PAST MEDICAL & SURGICAL HISTORY:  CVA (cerebral vascular accident)  No significant past surgical history      FAMILY HISTORY:  No pertinent family history in first degree relatives      SOCIAL HISTORY:    [ ] Non-smoker  [ ] Smoker  [ ] Alcohol      REVIEW OF SYSTEMS:  See HPI.  Patient  non-verbal. Unable to obtain ROS    PHYSICAL EXAM:  T(C): 37.1 (02-12-20 @ 04:13), Max: 37.1 (02-12-20 @ 04:13)  HR: 57 (02-12-20 @ 09:34) (50 - 57)  BP: 155/85 (02-12-20 @ 09:34) (144/81 - 162/91)  RR: 18 (02-12-20 @ 04:13) (18 - 18)  SpO2: 96% (02-12-20 @ 09:34) (95% - 99%)  Wt(kg): --    I&O's Summary    11 Feb 2020 07:01  -  12 Feb 2020 07:00  --------------------------------------------------------  IN: 840 mL / OUT: 900 mL / NET: -60 mL        Appearance:  opens eyes spontaneously 	  HEENT: atraumatic, normocephalic  Cardiovascular: Normal S1 S2,  No edema  Respiratory: Lungs clear to auscultation bilaterally, no wheezing	  Psychiatry: non verbal   Gastrointestinal:  Soft, + BS   : voids spontaneously 	  Skin: No rashes, No ecchymoses, No cyanosis or lesions	  Neurologic: withdraws from pain   Psych: flat affect  Extremities: 2+ peripheral pulses, No clubbing, cyanosis or edema  Vascular: Peripheral pulses palpable 2+ bilaterally      LABS:	 	    CBC Full  -  ( 11 Feb 2020 06:10 )  WBC Count : 7.54 K/uL  Hemoglobin : 14.1 g/dL  Hematocrit : 41.7 %  Platelet Count - Automated : 337 K/uL  Mean Cell Volume : 101.2 fl  Mean Cell Hemoglobin : 34.2 pg  Mean Cell Hemoglobin Concentration : 33.8 gm/dL  Auto Neutrophil # : x  Auto Lymphocyte # : x  Auto Monocyte # : x  Auto Eosinophil # : x  Auto Basophil # : x  Auto Neutrophil % : x  Auto Lymphocyte % : x  Auto Monocyte % : x  Auto Eosinophil % : x  Auto Basophil % : x    02-11    138  |  103  |  8   ----------------------------<  103<H>  3.9   |  23  |  0.75    Ca    9.2      11 Feb 2020 06:10      TELEMETRY: Sinus Clem       ECG:    Sinus bradycardia with QRS 102ms, NJ 128ms  	  RADIOLOGY:    Echocardiogram:  Study Date: 2/10/2020    PROCEDURE: Transthoracic echocardiogram with 2-D, M-Mode  and complete spectral and color flow Doppler. Patient was  injected with 10 cc's of aerosolized saline. Patient was  injected with 10 cc's of aerosolized saline.  INDICATION: Cerebral infarction, unspecified (I63.9)  ------------------------------------------------------------------------  Dimensions:    Normal Values:  LA:     3.7    2.0 - 4.0 cm  Ao:     3.5    2.0 - 3.8 cm  SEPTUM: 0.9    0.6 - 1.2 cm  PWT:    1.0    0.6 - 1.1 cm  LVIDd:  5.4    3.0 - 5.6 cm  LVIDs:  3.6    1.8 - 4.0 cm  Derived variables:  LVMI: 113 g/m2  RWT: 0.37  EF (Visual Estimate): 65 %  Doppler Peak Velocity (m/sec): AoV=1.1  ------------------------------------------------------------------------  Observations:  Mitral Valve: Mitral annular calcification, otherwise  normal mitral valve.  Aortic Valve/Aorta: Calcified aortic valve with normal  opening.  Normal aortic root.  Left Atrium: Normal left atrium.  Left Ventricle: Normal left ventricular internal dimensions  and wall thicknesses.  Normal left ventricular systolic function. No segmental  wall motion abnormalities.  Impaired LV-relaxation with normal filling pressure.  Right Heart: Normal right atrium. Normal right ventricular  size and function. Normal tricuspid valve. Normal pulmonic  valve.  Pericardium/Pleura: Normal pericardium with no pericardial  effusion.  Hemodynamic: Estimated right atrial pressure is normal.  No evidence of pulmonary hypertension.  Agitated saline injection and color flow Doppler  demonstrates no evidence of a patent foramen ovale.  ------------------------------------------------------------------------  Conclusions:  Normal left ventricular systolic function. No segmental  wall motion abnormalities.  Agitated saline injection and color flow Doppler  demonstrates no evidence of a patent foramen ovale.  ------------------------------------------------------------------------  Confirmed on  2/10/2020 - 17:24:02 by Anthony Townsend MD,  [facility]      MR Head w/wo IV Cont (02.11.20 @ 15:07)  IMPRESSION:  BRAIN MRI:  Acute right posterior frontal infarct in the region of the right precentral gyrus. Subacute infarct in the right frontoparietal and posterior temporal region. Minimal petechial hemorrhagic transformation and gyral enhancement is noted in both regions.  Multifocal chronic bilateral MCA territory infarcts. Chronic hemorrhagic products in the regionof the left basal ganglia    BRAIN MRA:  Persistent mild narrowing of the cavernous and supraclinoid left ICA. Persistent lack of flow related signal within the left M1 segment, with poor visualization of the more distal left MCA branches.  Redemonstration of multifocal mild to moderate stenoses of the right M1 segment. Normal flow-related enhancement of the more distal right MCA. CHIEF COMPLAINT:   mouth lesions, possible stroke    HISTORY OF PRESENT ILLNESS:    58 yo M with h/o CVA 8 years ago presenting with aphasia. Pt is accompanied by brother who interpreted in the ED. Brother endorsed that pt went to visit family in [place] 9/2019 and about 3 weeks ago, family stated that he was starting to get weak and stopped being able to speak. He was also endorsing difficulty walking from weakness. He returned from [place] last night and came to the ED for evaluation. Pt also developed lesions on his mouth 3 days ago. He denies any other medical history or medication use    Allergies  No Known Allergies    MEDICATIONS:  clopidogrel Tablet 75 milliGRAM(s) Oral daily  enoxaparin Injectable 40 milliGRAM(s) SubCutaneous daily  aspirin Suppository 300 milliGRAM(s) Rectal daily  atorvastatin 80 milliGRAM(s) Oral at bedtime  cyanocobalamin 1000 MICROGram(s) Oral daily  ergocalciferol 42294 Unit(s) Oral every week  multivitamin/minerals 1 Tablet(s) Oral daily  Nephro-deysi 1 Tablet(s) Oral daily  sodium chloride 0.45%. 1000 milliLiter(s) IV Continuous <Continuous>      PAST MEDICAL & SURGICAL HISTORY:  CVA (cerebral vascular accident)  No significant past surgical history      FAMILY HISTORY:  No pertinent family history in first degree relatives      REVIEW OF SYSTEMS:  See HPI.  Patient  non-verbal. Unable to obtain ROS    PHYSICAL EXAM:  T(C): 37.1 (02-12-20 @ 04:13), Max: 37.1 (02-12-20 @ 04:13)  HR: 57 (02-12-20 @ 09:34) (50 - 57)  BP: 155/85 (02-12-20 @ 09:34) (144/81 - 162/91)  RR: 18 (02-12-20 @ 04:13) (18 - 18)  SpO2: 96% (02-12-20 @ 09:34) (95% - 99%)  Wt(kg): --    I&O's Summary    11 Feb 2020 07:01  -  12 Feb 2020 07:00  --------------------------------------------------------  IN: 840 mL / OUT: 900 mL / NET: -60 mL        Appearance:  opens eyes spontaneously 	  HEENT: atraumatic, normocephalic  Cardiovascular: Normal S1 S2,  No edema  Respiratory: Lungs clear to auscultation bilaterally, no wheezing	  Psychiatry: non verbal   Gastrointestinal:  Soft, + BS   : voids spontaneously 	  Skin: No rashes, No ecchymoses, No cyanosis or lesions	  Neurologic: withdraws from pain   Psych: flat affect  Extremities: 2+ peripheral pulses, No clubbing, cyanosis or edema  Vascular: Peripheral pulses palpable 2+ bilaterally      LABS:	 	    CBC Full  -  ( 11 Feb 2020 06:10 )  WBC Count : 7.54 K/uL  Hemoglobin : 14.1 g/dL  Hematocrit : 41.7 %  Platelet Count - Automated : 337 K/uL  Mean Cell Volume : 101.2 fl  Mean Cell Hemoglobin : 34.2 pg  Mean Cell Hemoglobin Concentration : 33.8 gm/dL  Auto Neutrophil # : x  Auto Lymphocyte # : x  Auto Monocyte # : x  Auto Eosinophil # : x  Auto Basophil # : x  Auto Neutrophil % : x  Auto Lymphocyte % : x  Auto Monocyte % : x  Auto Eosinophil % : x  Auto Basophil % : x    02-11    138  |  103  |  8   ----------------------------<  103<H>  3.9   |  23  |  0.75    Ca    9.2      11 Feb 2020 06:10      TELEMETRY: Sinus Clem       ECG:    Sinus bradycardia with QRS 102ms, ND 128ms  	  RADIOLOGY:    Echocardiogram:  Study Date: 2/10/2020    PROCEDURE: Transthoracic echocardiogram with 2-D, M-Mode  and complete spectral and color flow Doppler. Patient was  injected with 10 cc's of aerosolized saline. Patient was  injected with 10 cc's of aerosolized saline.  INDICATION: Cerebral infarction, unspecified (I63.9)  ------------------------------------------------------------------------  Dimensions:    Normal Values:  LA:     3.7    2.0 - 4.0 cm  Ao:     3.5    2.0 - 3.8 cm  SEPTUM: 0.9    0.6 - 1.2 cm  PWT:    1.0    0.6 - 1.1 cm  LVIDd:  5.4    3.0 - 5.6 cm  LVIDs:  3.6    1.8 - 4.0 cm  Derived variables:  LVMI: 113 g/m2  RWT: 0.37  EF (Visual Estimate): 65 %  Doppler Peak Velocity (m/sec): AoV=1.1  ------------------------------------------------------------------------  Observations:  Mitral Valve: Mitral annular calcification, otherwise  normal mitral valve.  Aortic Valve/Aorta: Calcified aortic valve with normal  opening.  Normal aortic root.  Left Atrium: Normal left atrium.  Left Ventricle: Normal left ventricular internal dimensions  and wall thicknesses.  Normal left ventricular systolic function. No segmental  wall motion abnormalities.  Impaired LV-relaxation with normal filling pressure.  Right Heart: Normal right atrium. Normal right ventricular  size and function. Normal tricuspid valve. Normal pulmonic  valve.  Pericardium/Pleura: Normal pericardium with no pericardial  effusion.  Hemodynamic: Estimated right atrial pressure is normal.  No evidence of pulmonary hypertension.  Agitated saline injection and color flow Doppler  demonstrates no evidence of a patent foramen ovale.  ------------------------------------------------------------------------  Conclusions:  Normal left ventricular systolic function. No segmental  wall motion abnormalities.  Agitated saline injection and color flow Doppler  demonstrates no evidence of a patent foramen ovale.  ------------------------------------------------------------------------  Confirmed on  2/10/2020 - 17:24:02 by Anthony Townsend MD,  [facility]      MR Head w/wo IV Cont (02.11.20 @ 15:07)  IMPRESSION:  BRAIN MRI:  Acute right posterior frontal infarct in the region of the right precentral gyrus. Subacute infarct in the right frontoparietal and posterior temporal region. Minimal petechial hemorrhagic transformation and gyral enhancement is noted in both regions.  Multifocal chronic bilateral MCA territory infarcts. Chronic hemorrhagic products in the regionof the left basal ganglia    BRAIN MRA:  Persistent mild narrowing of the cavernous and supraclinoid left ICA. Persistent lack of flow related signal within the left M1 segment, with poor visualization of the more distal left MCA branches.  Redemonstration of multifocal mild to moderate stenoses of the right M1 segment. Normal flow-related enhancement of the more distal right MCA.

## 2020-02-12 NOTE — CONSULT NOTE ADULT - ASSESSMENT
58 yo M with h/o CVA 8 years ago presenting with aphasia. Pt is accompanied by brother who interpreted in the ED. Brother endorsed that pt went to visit family in [place] 9/2019 and about 3 weeks ago, family stated that he was starting to get weak and stopped being able to speak. He was also endorsing difficulty walking from weakness. He returned from [place] last night and came to the ED for evaluation. Pt also developed lesions on his mouth 3 days ago. He denies any other medical history or medication use    # CVA   - ILR tomorrow  - NPO after midnight 60 yo M with h/o CVA 8 years ago presenting with aphasia. Pt is accompanied by brother who interpreted in the ED. Brother endorsed that pt went to visit family in [place] 9/2019 and about 3 weeks ago, family stated that he was starting to get weak and stopped being able to speak. He was also endorsing difficulty walking from weakness. He returned from [facility] last night and came to the ED for evaluation. Pt also developed lesions on his mouth 3 days ago. He denies any other medical history or medication use.     # CVA   - Brain MRI noted  - Plan ILR in am   - Procedure discussed with siblings, all are  agreeable to ILR implant   - No implantable device per family   - Continue with telemetry monitoring.     # [phone] 58 yo M with h/o CVA 8 years ago presenting with aphasia. Pt is accompanied by brother who interpreted in the ED. Brother endorsed that pt went to visit family in [place] 9/2019 and about 3 weeks ago, family stated that he was starting to get weak and stopped being able to speak. He was also endorsing difficulty walking from weakness. He returned from [place] last night and came to the ED for evaluation. Pt also developed lesions on his mouth 3 days ago. He denies any other medical history or medication use.     # CVA   - Brain MRI noted with multiple chronic bilateral MCA infarct and new acute right posterior frontal infarct, subacute right frontoparietal infarct  - Plan for ILR in am   - Procedure discussed with three siblings, all are agreeable to ILR implant   - No implantable device per family   - Continue with telemetry monitoring.   - On ASA/Plavix/Atorvastatin    # [phone]

## 2020-02-12 NOTE — PROGRESS NOTE ADULT - SUBJECTIVE AND OBJECTIVE BOX
Patient is a 59y old  Male who presents with a chief complaint of mouth lesions, possible stroke (12 Feb 2020 15:46)      SUBJECTIVE / OVERNIGHT EVENTS: failed MBS, awaiting LUIS, if neg will need ILR, tried contacting family re PEG, left a message for a call back    MEDICATIONS  (STANDING):  aspirin Suppository 300 milliGRAM(s) Rectal daily  atorvastatin 80 milliGRAM(s) Oral at bedtime  clopidogrel Tablet 75 milliGRAM(s) Oral daily  cyanocobalamin 1000 MICROGram(s) Oral daily  enoxaparin Injectable 40 milliGRAM(s) SubCutaneous daily  ergocalciferol 49258 Unit(s) Oral every week  multivitamin/minerals 1 Tablet(s) Oral daily  Nephro-deysi 1 Tablet(s) Oral daily  sodium chloride 0.45%. 1000 milliLiter(s) (70 mL/Hr) IV Continuous <Continuous>    MEDICATIONS  (PRN):      Vital Signs Last 24 Hrs  T(F): 98.5 (02-12-20 @ 12:00), Max: 98.7 (02-12-20 @ 04:13)  HR: 53 (02-12-20 @ 12:00) (50 - 57)  BP: 138/77 (02-12-20 @ 12:00) (138/77 - 162/91)  RR: 18 (02-12-20 @ 12:00) (18 - 18)  SpO2: 97% (02-12-20 @ 12:00) (95% - 99%)  Telemetry:   CAPILLARY BLOOD GLUCOSE        I&O's Summary    11 Feb 2020 07:01  -  12 Feb 2020 07:00  --------------------------------------------------------  IN: 840 mL / OUT: 900 mL / NET: -60 mL    12 Feb 2020 07:01  -  12 Feb 2020 21:02  --------------------------------------------------------  IN: 600 mL / OUT: 1350 mL / NET: -750 mL        PHYSICAL EXAM:  GENERAL: NAD, well-developed  HEAD:  Atraumatic, Normocephalic  EYES: EOMI, PERRLA, conjunctiva and sclera clear  NECK: Supple, No JVD  CHEST/LUNG: Clear to auscultation bilaterally; No wheeze  HEART: Regular rate and rhythm; No murmurs, rubs, or gallops  ABDOMEN: Soft, Nontender, Nondistended; Bowel sounds present  EXTREMITIES:  2+ Peripheral Pulses, No clubbing, cyanosis, or edema  PSYCH: AAOx3  NEUROLOGY: non-focal  SKIN: No rashes or lesions    LABS:                        14.1   7.54  )-----------( 337      ( 11 Feb 2020 06:10 )             41.7     02-11    138  |  103  |  8   ----------------------------<  103<H>  3.9   |  23  |  0.75    Ca    9.2      11 Feb 2020 06:10                RADIOLOGY & ADDITIONAL TESTS:    Imaging Personally Reviewed:    Consultant(s) Notes Reviewed:      Care Discussed with Consultants/Other Providers:

## 2020-02-12 NOTE — PROGRESS NOTE ADULT - ASSESSMENT
60 yo M with h/o CVA 8 years ago presenting with aphasia. Pt is accompanied by brother who interpreted in the ED. Brother endorsed that pt went to visit family in [place] 9/2019 and about 3 weeks ago, family stated that he was starting to get weak and stopped being able to speak. He was also endorsing difficulty walking from weakness. He returned from [place] last night and came to the ED for evaluation.   HSV lesions are dry, agree with stopping antivirals  MRI confirms multifocal infarcts  LUIS being considered  No signs of active infection, HIV negative  Plan   ·	monitor off antimicrobials  ·	low threshold for 2 sets of blood cultures given multi-infarct state  ·	? LUIS to r/o cardiac lesion

## 2020-02-12 NOTE — CHART NOTE - NSCHARTNOTEFT_GEN_A_CORE
Had a long time discussion with patient's family at bedside re: LUIS / ILR / NGT / poss. PEG.  They will consider and defer to make a solid decision at this time.    UPDATE CONTACT INFORMATION   - HCP, brother, Morgan Lemus - [phone]   - HCP's son, who speaks better English per Mr. Flako Mora, [phone]    Renee Valero PA-C Had a long time discussion with patient's family at bedside re: LUIS / ILR / NGT / poss. PEG.  They will consider and defer to make a solid decision at this time.    UPDATE CONTACT INFORMATION   - HCP, brother, Morgan Lemus - [phone]   - HCP's son (Not patient's son), Dallas can be reached at [phone].     Please contact, Dallas (HCP's son), if any complicated issue should be discussed to minimize language barrier as per HCPMorgan.    Renee Valero PA-C Had a long time discussion with patient's family at bedside re: LUIS / ILR / NGT / poss. PEG.  They will consider and defer to make a solid decision at this time.    UPDATE CONTACT INFORMATION   - HCP, brother, Morgan Lemus - [phone]   - HCP's son (Not patient's son), Dallas can be reached at [phone].     Please contact, Dallas (HCP's son), to minimize language barrier if any complicated issue should be discussed - as per HCP, Morgan Arriola.    Renee Valero PA-C

## 2020-02-12 NOTE — CHART NOTE - NSCHARTNOTEFT_GEN_A_CORE
Pt is NPO, requires NGT for medication administration  Informed by day staff that he has refused insertion  Attempted to obtain pt's permission via  # [id number], pt is adamant and continues to refuse insertion  Will endorse to day team, and re-attempt insertion of NGT when pt's brother is with him at bedside

## 2020-02-12 NOTE — PROGRESS NOTE ADULT - SUBJECTIVE AND OBJECTIVE BOX
CC: f/u for oral lesions and stroke    Patient reports: he appears withdrawn, not very cooperative, lips lesions dry/crusted    REVIEW OF SYSTEMS:  All other review of systems negative (Comprehensive ROS)    Antimicrobials Day #  :off ACV    Other Medications Reviewed    T(F): 98.5 (02-12-20 @ 12:00), Max: 98.7 (02-12-20 @ 04:13)  HR: 53 (02-12-20 @ 12:00)  BP: 138/77 (02-12-20 @ 12:00)  RR: 18 (02-12-20 @ 12:00)  SpO2: 97% (02-12-20 @ 12:00)  Wt(kg): --    PHYSICAL EXAM:  General: alert, no acute distress  Eyes:  anicteric, no conjunctival injection, no discharge  Oropharynx: dried lip blisters	  Neck: supple, without adenopathy  Lungs: clear to auscultation  Heart: regular rate and rhythm; no murmur, rubs or gallops  Abdomen: soft, nondistended, nontender, without mass or organomegaly  Skin: no lesions  Extremities: no clubbing, cyanosis, or edema  Neurologic: limited neuro exam, patient is not cooperative    LAB RESULTS:                        14.1   7.54  )-----------( 337      ( 11 Feb 2020 06:10 )             41.7     02-11    138  |  103  |  8   ----------------------------<  103<H>  3.9   |  23  |  0.75    Ca    9.2      11 Feb 2020 06:10          MICROBIOLOGY:  RECENT CULTURES:      RADIOLOGY REVIEWED:    < from: MR Head w/wo IV Cont (02.11.20 @ 15:07) >  IMPRESSION:    BRAIN MRI:    Acute right posterior frontal infarct in the region of the right precentral gyrus. Subacute infarctin the right frontoparietal and posterior temporal region. Minimal petechial hemorrhagic transformation and gyral enhancement is noted in both regions.    Multifocal chronic bilateral MCA territory infarcts. Chronic hemorrhagic products in the regionof the left basal ganglia.    BRAIN MRA:    Persistent mild narrowing of the cavernous and supraclinoid left ICA. Persistent lack of flow related signal within the left M1 segment, with poor visualization of the more distal left MCA branches.    Redemonstration of multifocal mild to moderate stenoses of the right M1 segment. Normal flow-related enhancement of the more distal right MCA.    NECK MRA:    No flow-limiting stenosis or evidence for arterial dissection.    Dr. Viveros discussed these findings with nurse practitioner Bakari on 2/11/2020 4:16 PM with read back.    < end of copied text >

## 2020-02-12 NOTE — OCCUPATIONAL THERAPY INITIAL EVALUATION ADULT - ASR WT BEARING STATUS EVAL
He denies any other medical history or medication use. CT Angio Neck (2/9) CT brain: Multiple areas of prior infarct as described above. No acute hemorrhage or evidence of mass effect. Age-indeterminate lacunar infarcts as described. CT angiography neck (2/9): (-) CT angiography brain: (2/9) The left middle cerebral artery is severely stenosed versus occluded in this patient with a chronic left MCA territory infarct. Marked decrease branching of the distal left MCA branches are noted in the left sylvian fissure compared to the contralateral side. Mild to moderate lobulated stenoses involve the M1 segment of the right middle cerebral artery.
no weight-bearing restrictions

## 2020-02-12 NOTE — PROGRESS NOTE ADULT - ASSESSMENT
Sinus bradycardia  suspect related to his neurological event , cva  normal TSH      CVA  embolic as per neurology   recommend LUIS, ILR if LUIS neg

## 2020-02-12 NOTE — OCCUPATIONAL THERAPY INITIAL EVALUATION ADULT - PERTINENT HX OF CURRENT PROBLEM, REHAB EVAL
58 yo M with h/o CVA 8 years ago presenting with aphasia. Pt is accompanied by brother who interpreted in the ED. Brother endorsed that pt went to visit family in [place] 9/2019 and about 3 weeks ago, family stated that he was starting to get weak and stopped being able to speak. He was also endorsing difficulty walking from weakness. He returned from [place] last night and came to the ED for evaluation. Pt also developed lesions on his mouth 3 days ago.

## 2020-02-13 ENCOUNTER — TRANSCRIPTION ENCOUNTER (OUTPATIENT)
Age: 60
End: 2020-02-13

## 2020-02-13 PROBLEM — I63.9 CEREBRAL INFARCTION, UNSPECIFIED: Chronic | Status: ACTIVE | Noted: 2020-02-09

## 2020-02-13 PROCEDURE — 33285 INSJ SUBQ CAR RHYTHM MNTR: CPT

## 2020-02-13 PROCEDURE — 93320 DOPPLER ECHO COMPLETE: CPT | Mod: 26

## 2020-02-13 PROCEDURE — 99232 SBSQ HOSP IP/OBS MODERATE 35: CPT

## 2020-02-13 PROCEDURE — 93325 DOPPLER ECHO COLOR FLOW MAPG: CPT | Mod: 26

## 2020-02-13 PROCEDURE — 93312 ECHO TRANSESOPHAGEAL: CPT | Mod: 26

## 2020-02-13 RX ORDER — CEFAZOLIN SODIUM 1 G
1000 VIAL (EA) INJECTION ONCE
Refills: 0 | Status: COMPLETED | OUTPATIENT
Start: 2020-02-13 | End: 2020-02-13

## 2020-02-13 RX ADMIN — SODIUM CHLORIDE 70 MILLILITER(S): 9 INJECTION, SOLUTION INTRAVENOUS at 05:31

## 2020-02-13 RX ADMIN — Medication 100 MILLIGRAM(S): at 18:02

## 2020-02-13 NOTE — DISCHARGE NOTE NURSING/CASE MANAGEMENT/SOCIAL WORK - NSDCPEPTSTRK_GEN_ALL_CORE
Prescribed medications/Need for follow up after discharge/Call 911 for stroke/Stroke support groups for patients, families, and friends/Risk factors for stroke/Stroke education booklet/Stroke warning signs and symptoms/Signs and symptoms of stroke

## 2020-02-13 NOTE — PROGRESS NOTE ADULT - SUBJECTIVE AND OBJECTIVE BOX
Subjective: Patient seen and examined. No new events except as noted.     SUBJECTIVE/ROS:  ROS limited     MEDICATIONS:  MEDICATIONS  (STANDING):  aspirin Suppository 300 milliGRAM(s) Rectal daily  atorvastatin 80 milliGRAM(s) Oral at bedtime  clopidogrel Tablet 75 milliGRAM(s) Oral daily  cyanocobalamin 1000 MICROGram(s) Oral daily  enoxaparin Injectable 40 milliGRAM(s) SubCutaneous daily  ergocalciferol 09368 Unit(s) Oral every week  multivitamin/minerals 1 Tablet(s) Oral daily  Nephro-deysi 1 Tablet(s) Oral daily  sodium chloride 0.45%. 1000 milliLiter(s) (70 mL/Hr) IV Continuous <Continuous>      PHYSICAL EXAM:  T(C): 37.1 (02-13-20 @ 04:31), Max: 37.1 (02-12-20 @ 23:50)  HR: 50 (02-13-20 @ 04:31) (50 - 57)  BP: 134/79 (02-13-20 @ 04:31) (109/70 - 155/85)  RR: 18 (02-13-20 @ 04:31) (18 - 18)  SpO2: 97% (02-13-20 @ 04:31) (96% - 97%)  Wt(kg): --  I&O's Summary    12 Feb 2020 07:01  -  13 Feb 2020 07:00  --------------------------------------------------------  IN: 1440 mL / OUT: 1600 mL / NET: -160 mL            JVP: Normal  Neck: supple  Lung: clear   CV: S1 S2 , Murmur:  Abd: soft  Ext: No edema  neuro: Awake / alert  Psych: flat affect  Skin: normal``    LABS/DATA:    CARDIAC MARKERS:                  proBNP:   Lipid Profile:   HgA1c:   TSH:     TELE:  EKG:

## 2020-02-13 NOTE — PROGRESS NOTE ADULT - SUBJECTIVE AND OBJECTIVE BOX
remians aphasic, but alert and nods trying to respond, still uncooperative w NGT placement, tried to contact family re PEG placement 2/2 failed MBS      MEDICATIONS:  clopidogrel Tablet 75 milliGRAM(s) Oral daily  enoxaparin Injectable 40 milliGRAM(s) SubCutaneous daily  aspirin Suppository 300 milliGRAM(s) Rectal daily  atorvastatin 80 milliGRAM(s) Oral at bedtime  cyanocobalamin 1000 MICROGram(s) Oral daily  ergocalciferol 97710 Unit(s) Oral every week  multivitamin/minerals 1 Tablet(s) Oral daily  Nephro-deysi 1 Tablet(s) Oral daily  sodium chloride 0.45%. 1000 milliLiter(s) IV Continuous <Continuous>      PHYSICAL EXAM:  T(C): 37.1 (02-13-20 @ 04:31), Max: 37.1 (02-12-20 @ 23:50)  HR: 50 (02-13-20 @ 04:31) (50 - 57)  BP: 134/79 (02-13-20 @ 04:31) (109/70 - 155/85)  RR: 18 (02-13-20 @ 04:31) (18 - 18)  SpO2: 97% (02-13-20 @ 04:31) (96% - 97%)  Wt(kg): --  I&O's Summary    12 Feb 2020 07:01  -  13 Feb 2020 07:00  --------------------------------------------------------  IN: 1440 mL / OUT: 1600 mL / NET: -160 mL        Appearance: Normal, in NAD  HEENT: Normocephalic, normal oral mucosa  Cardiovascular: Normal S1 S2, No JVD, No murmurs, No edema  Respiratory: Lungs clear to auscultation	  Psychiatry: awake, alert, expressive aphasia, non verbal  Gastrointestinal:  Soft, Non-tender, + BS	  Skin: No rashes, No ecchymoses, No cyanosis	  Extremities: Normal range of motion, No clubbing, cyanosis or edema  Vascular: Peripheral pulses palpable 2+ bilaterally      TELEMETRY: Sinus Bradycardy/NSR mostly 50-60's, briefly 44 at sleep  	    Study Date: 2/10/2020    Transthoracic echocardiogram with 2-D, M-Mode  and complete spectral andcolor flow Doppler.    Dimensions:    Normal Values:  LA:     3.7    2.0 - 4.0 cm  Ao:     3.5    2.0 - 3.8 cm  SEPTUM: 0.9    0.6 - 1.2 cm  PWT:    1.0    0.6 - 1.1 cm  LVIDd:  5.4    3.0 - 5.6 cm  LVIDs:  3.6    1.8 - 4.0 cm  Derived variables:  LVMI: 113 g/m2  RWT: 0.37  EF (Visual Estimate): 65 %  Doppler Peak Velocity (m/sec): AoV=1.1  ------------------------------------------------------------------------  Conclusions:  Normal left ventricular systolic function. No segmental  wall motion abnormalities.  Agitated saline injection and color flow Doppler  demonstrates no evidence of a patent foramen ovale.  ------------------------------------------------------------------------  Confirmed on  2/10/2020 - 17:24:02 by Anthony Townsend MD, FASE  ------------------------------------------------------------------------

## 2020-02-13 NOTE — PROGRESS NOTE ADULT - ASSESSMENT
59Y English speaking M with prior CVA 8y ago (no reported deficits) brought in by brother for concerns of generalized weakness and inability to speak for the past 3 weeks. On exam found to have no verbal output, appeared withdrawn and weak on neurological evaluation  He follows commands intermittently and is easily agitated.  Exam limited due to patient aggression on admission  CTH demonstrates chronic L MCA infarct with severely stenotic vs occluded distal L M1. this is confirmed on MRI but also an acute right post frontal infarct is seen. 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 will need a PEG, left a message w ptn's family for a call back. meanwhile refusing NGT  Con't ASA 81mg QD, Plavix 75mg, and LIPITOR via NGT when NGT placed, LDL is below 70  - TTE with bubble study: benign  -DVT ppx w sc Lovenox  -social service consult  -PT eval, will prob need acute rehab

## 2020-02-13 NOTE — DISCHARGE NOTE NURSING/CASE MANAGEMENT/SOCIAL WORK - PATIENT PORTAL LINK FT
You can access the FollowMyHealth Patient Portal offered by [facility] by registering at the following website: [email or web address]. By joining [employer]’s FollowMyHealth portal, you will also be able to view your health information using other applications (apps) compatible with our system.

## 2020-02-13 NOTE — PROGRESS NOTE ADULT - ASSESSMENT
Sinus bradycardia  suspect related to his neurological event , cva  normal TSH      CVA  embolic as per neurology   recommend LUIS, ILR if LUIS neg     discussed with nephew , family want to think about it

## 2020-02-13 NOTE — PROGRESS NOTE ADULT - ASSESSMENT
59Y Italian speaking M with prior CVA w/o residual deficits p/w 3 weeks of generalized weakness and expressive aphasia.  On exam, PT is agitated, has decreased L blinks to threat and no verbal output.  PT MRI demonstrated new R. posterior frontal and subacute R. fronto-parietal/post temporal infarcts, embolic appearing in addition to L. MCA infarcts.  Minor petechial hemorrhage, c/w natural history of strokes.        Impression: Expressive aphasia could be due to R. temporal infarcts if this is his dominant hemisphere vs. apraxia of speech.  PT's strokes appear embolic in origin, but no known embolic source thus mechanism could be ESUS (Embolic stroke of undetermined source) vs. symptomatic large artery atherosclerosis.     Recommend:   - normotension given subacute nature of symptoms   - For now, will continue DAPT with ASA 81mg + Plavix 75 QD for 90 days followed [facility].  If embolic source determined, will discuss further if need for switching to AC.    - Continue atorvastatin 80 daily with titration of LDL <90  - LUIS and Implantable Loop recorder for evaluation of underlying embolic source 59Y Belarusian speaking M with prior CVA w/o residual deficits p/w 3 weeks of generalized weakness and expressive aphasia.  On exam, PT is agitated, has decreased L blinks to threat and no verbal output.  PT MRI demonstrated new R. posterior frontal and subacute R. fronto-parietal/post temporal infarcts, embolic appearing in addition to L. MCA infarcts.  Minor petechial hemorrhage, c/w natural history of strokes.        Impression: Expressive aphasia could be due to R. temporal infarcts if this is his dominant hemisphere vs. apraxia of speech.  PT's strokes appear embolic in origin, but no known embolic source thus mechanism could be ESUS (Embolic stroke of undetermined source) vs. symptomatic large artery atherosclerosis.     Recommend:   - normotension given subacute nature of symptoms   - For now, will continue DAPT with ASA 81mg + Plavix 75 QD for 90 days followed [facility].  If embolic source determined, will discuss further if need for switching to AC.    - Continue atorvastatin 80 daily with titration of LDL <90  - Today, LUIS and Implantable Loop recorder for evaluation of underlying embolic source  - Cont DVT ppx w/ SubQ Lovenox  - PT eval 59Y Greenlandic speaking M with prior CVA w/o residual deficits p/w 3 weeks of generalized weakness and expressive aphasia.  On exam, PT is agitated, has decreased L blinks to threat and no verbal output.  PT MRI demonstrated new R. posterior frontal and subacute R. fronto-parietal/post temporal infarcts, embolic appearing in addition to L. MCA infarcts.  Minor petechial hemorrhage, c/w natural history of strokes.        Impression: Expressive aphasia could be due to R. temporal infarcts if this is his dominant hemisphere vs. apraxia of speech.  PT's strokes appear embolic in origin, but no known embolic source thus mechanism could be ESUS (Embolic stroke of undetermined source) vs. symptomatic large artery atherosclerosis.     Recommend:   - normotension given subacute nature of symptoms   - For now, will continue DAPT with ASA 81mg + Plavix 75 QD for 90 days [facility].  If embolic source determined, will discuss further if need for switching to AC.    - Continue atorvastatin 80 daily with titration of LDL <90  - LUIS and Implantable Loop recorder for evaluation of underlying embolic source planned for 2/13  - Cont DVT ppx w/ SubQ Lovenox  - PT eval

## 2020-02-13 NOTE — PROGRESS NOTE ADULT - SUBJECTIVE AND OBJECTIVE BOX
SUBJECTIVE:     INTERVAL HISTORY:    PAST MEDICAL & SURGICAL HISTORY:  CVA (cerebral vascular accident)  No significant past surgical history    FAMILY HISTORY:  No pertinent family history in first degree relatives    SOCIAL HISTORY:   T/E/D:   Occupation:   Lives with:     MEDICATIONS (HOME):  Home Medications:    MEDICATIONS  (STANDING):  aspirin Suppository 300 milliGRAM(s) Rectal daily  atorvastatin 80 milliGRAM(s) Oral at bedtime  clopidogrel Tablet 75 milliGRAM(s) Oral daily  cyanocobalamin 1000 MICROGram(s) Oral daily  enoxaparin Injectable 40 milliGRAM(s) SubCutaneous daily  ergocalciferol 74355 Unit(s) Oral every week  multivitamin/minerals 1 Tablet(s) Oral daily  Nephro-deysi 1 Tablet(s) Oral daily  sodium chloride 0.45%. 1000 milliLiter(s) (70 mL/Hr) IV Continuous <Continuous>    MEDICATIONS  (PRN):    ALLERGIES/INTOLERANCES:  Allergies  No Known Allergies    Intolerances    VITALS & EXAMINATION:  Vital Signs Last 24 Hrs  T(C): 37.1 (13 Feb 2020 04:31), Max: 37.1 (12 Feb 2020 23:50)  T(F): 98.7 (13 Feb 2020 04:31), Max: 98.7 (12 Feb 2020 23:50)  HR: 50 (13 Feb 2020 04:31) (50 - 57)  BP: 134/79 (13 Feb 2020 04:31) (109/70 - 138/77)  BP(mean): --  RR: 18 (13 Feb 2020 04:31) (18 - 18)  SpO2: 97% (13 Feb 2020 04:31) (97% - 97%)    General:  Constitutional: Obese Male, appears stated age, in no apparent distress including pain  Head: Normocephalic & atraumatic.  ENT: Patent ear canals, intact TM, mucus membranes moist & pink, neck supple, no lymphadenopathy.   Respiratory: Patent airway. All lung fields are clear to auscultation bilaterally.  Extremities: No cyanosis, clubbing, or edema.  Skin: No rashes, bruising, or discoloration.    Cardiovascular (>2): RRR no murmurs. Carotid pulsations symmetric, no bruits. Normal capillary beds refill, 1-2 seconds or less.     Neurological (>12):  MS: Awake, alert, oriented to person, place, situation, time. Normal affect. Follows all commands.    Language: Speech is clear, fluent with good repetition & comprehension (able to name objects___)    CNs: PERRLA (R = 3mm, L = 3mm). VFF. EOMI no nystagmus, no diplopia. V1-3 intact to LT/pinprick, well developed masseter muscles b/l. No facial asymmetry b/l, full eye closure strength b/l. Hearing grossly normal (rubbing fingers) b/l. Symmetric palate elevation in midline. Gag reflex deferred. Head turning & shoulder shrug intact b/l. Tongue midline, normal movements, no atrophy.    Fundoscopic: pale w/ sharp discs margins No vascular changes.      Motor: Normal muscle bulk & tone. No noticeable tremor or seizure. No pronator drift.              Deltoid	Biceps	Triceps	Wrist	Finger ABd	   R	5	5	5	5	5		5 	  L	5	5	5	5	5		5    	H-Flex	H-Ext	H-ABd	H-ADd	K-Flex	K-Ext	D-Flex	P-Flex  R	5	5	5	5	5	5	5	5 	   L	5	5	5	5	5	5	5	5	     Sensation: Intact to LT/PP/Temp/Vibration/Position b/l throughout.     Cortical: Extinction on DSS (neglect): none    Reflexes:              Biceps(C5)       BR(C6)     Triceps(C7)               Patellar(L4)    Achilles(S1)    Plantar Resp  R	2	          2	             2		        2		    2		Down   L	2	          2	             2		        2		    2		Down     Coordination: intact rapid-alt movements. No dysmetria to FTN/HTS    Gait: Normal Romberg. No postural instability. Normal stance and tandem gait.     LABORATORY:  CBC   Chem       LFTs   Coagulopathy   Lipid Panel 02-10 Chol 199 LDL 99 HDL 67 Trig 164<H>  A1c 02-10 [id number] 5.5    Cardiac enzymes     U/A   CSF  Immunological  Other    STUDIES & IMAGING:  Studies (EKG, EEG, EMG, etc):     Radiology (XR, CT, MR, U/S, TTE/LUIS): SUBJECTIVE: Pt is aphasic and cannot contribute to current history    Attempted to use , but patient pushed phone away.   The patient was able to follow some commands, but was unable to reliably get a history.       PAST MEDICAL & SURGICAL HISTORY:  CVA (cerebral vascular accident)  No significant past surgical history    FAMILY HISTORY:  No pertinent family history in first degree relatives    SOCIAL HISTORY:   T/E/D:   Occupation:   Lives with:     MEDICATIONS (HOME):  Home Medications:    MEDICATIONS  (STANDING):  aspirin Suppository 300 milliGRAM(s) Rectal daily  atorvastatin 80 milliGRAM(s) Oral at bedtime  clopidogrel Tablet 75 milliGRAM(s) Oral daily  cyanocobalamin 1000 MICROGram(s) Oral daily  enoxaparin Injectable 40 milliGRAM(s) SubCutaneous daily  ergocalciferol 04057 Unit(s) Oral every week  multivitamin/minerals 1 Tablet(s) Oral daily  Nephro-deysi 1 Tablet(s) Oral daily  sodium chloride 0.45%. 1000 milliLiter(s) (70 mL/Hr) IV Continuous <Continuous>    MEDICATIONS  (PRN):    ALLERGIES/INTOLERANCES:  Allergies  No Known Allergies    Intolerances    VITALS & EXAMINATION:  Vital Signs Last 24 Hrs  T(C): 37.1 (13 Feb 2020 04:31), Max: 37.1 (12 Feb 2020 23:50)  T(F): 98.7 (13 Feb 2020 04:31), Max: 98.7 (12 Feb 2020 23:50)  HR: 50 (13 Feb 2020 04:31) (50 - 57)  BP: 134/79 (13 Feb 2020 04:31) (109/70 - 138/77)  BP(mean): --  RR: 18 (13 Feb 2020 04:31) (18 - 18)  SpO2: 97% (13 Feb 2020 04:31) (97% - 97%)    General:  Constitutional: Male, appears stated age, in no apparent distress including pain  Head: Normocephalic & atraumatic.   ENT: Mucous membranes dry. Some dried blood in corner of mouth.   Respiratory: Patent airway. All lung fields are clear to auscultation bilaterally.  Extremities: No cyanosis, clubbing, or edema. Full strength in all extremities.   Skin: No rashes, bruising, or discoloration.    Cardiovascular (>2): RRR no murmurs.     Neurological (>12):   MS: Awake, alert. Unable to assess orientation. Follows most commands (did not close eyes).   Language: no verbal output  CNs: PERRLA (R = 2mm, L = 2mm). VFF. EOMI, spontaneously tracks around the room. no nystagmus, no diplopia. No response to painful stimulation, does not withdraw but unable to assess sensation. well developed masseter muscles b/l. Left CN VII palsy - asymmetric smile, asymmetric eyebrow raise. Unable to assess hearing. Symmetric palate elevation in midline. Gag reflex deferred. Head turning & shoulder shrug intact b/l. Tongue midline, normal movements, no atrophy.  Motor: Normal muscle bulk & tone. No noticeable tremor or seizure. No pronator drift. No focal deficits. 5/5 strength in all extremities.   Sensation: Difficult to assess, did not withdraw to painful stimulation in toes.   Reflexes: Patient deferred  Coordination: intact rapid-alt movements. No dysmetria to FTN/HTS  Gait: Unable to assess.     Imaging:    MR Head w/wo IV Cont (02.11.20 @ 15:07)  IMPRESSION:  BRAIN MRI:  Acute right posterior frontal infarct in the region of the right precentral gyrus. Subacute infarct in the right frontoparietal and posterior temporal region. Minimal petechial hemorrhagic transformation and gyral enhancement is noted in both regions.  Multifocal chronic bilateral MCA territory infarcts. Chronic hemorrhagic products in the regionof the left basal ganglia.    BRAIN MRA:  Persistent mild narrowing of the cavernous and supraclinoid left ICA. Persistent lack of flow related signal within the left M1 segment, with poor visualization of the more distal left MCA branches.  Redemonstration of multifocal mild to moderate stenoses of the right M1 segment. Normal flow-related enhancement of the more distal right MCA.    NECK MRA:  No flow-limiting stenosis or evidence for arterial dissection. SUBJECTIVE: Patient is aphasic and cannot contribute to current history    Attempted to use  (Lithuanian  ID [id number]), but patient pushed phone away.   The patient was able to follow some commands, but was unable to reliably get a history.       PAST MEDICAL & SURGICAL HISTORY:  CVA (cerebral vascular accident)  No significant past surgical history    FAMILY HISTORY:  No pertinent family history in first degree relatives    SOCIAL HISTORY:   T/E/D:   Occupation:   Lives with:     MEDICATIONS (HOME):  Home Medications:    MEDICATIONS  (STANDING):  aspirin Suppository 300 milliGRAM(s) Rectal daily  atorvastatin 80 milliGRAM(s) Oral at bedtime  clopidogrel Tablet 75 milliGRAM(s) Oral daily  cyanocobalamin 1000 MICROGram(s) Oral daily  enoxaparin Injectable 40 milliGRAM(s) SubCutaneous daily  ergocalciferol 59986 Unit(s) Oral every week  multivitamin/minerals 1 Tablet(s) Oral daily  Nephro-deysi 1 Tablet(s) Oral daily  sodium chloride 0.45%. 1000 milliLiter(s) (70 mL/Hr) IV Continuous <Continuous>    MEDICATIONS  (PRN):    ALLERGIES/INTOLERANCES:  Allergies  No Known Allergies    Intolerances    VITALS & EXAMINATION:  Vital Signs Last 24 Hrs  T(C): 37.1 (13 Feb 2020 04:31), Max: 37.1 (12 Feb 2020 23:50)  T(F): 98.7 (13 Feb 2020 04:31), Max: 98.7 (12 Feb 2020 23:50)  HR: 50 (13 Feb 2020 04:31) (50 - 57)  BP: 134/79 (13 Feb 2020 04:31) (109/70 - 138/77)  BP(mean): --  RR: 18 (13 Feb 2020 04:31) (18 - 18)  SpO2: 97% (13 Feb 2020 04:31) (97% - 97%)    General: Male, appears stated age, in no apparent distress including pain    Neurological (>12):   MS: Awake, alert. Unable to assess orientation. Follows most commands (did not close eyes).   Language: no verbal output  CNs: EOMI, spontaneously tracks around the room. no nystagmus. Left CN VII palsy - asymmetric smile. Unable to assess hearing. Symmetric palate elevation in midline. Gag reflex deferred. Head turning intact b/l. Tongue midline, normal movements, no atrophy.  Motor: Normal muscle bulk & tone. No noticeable tremor or seizure. No pronator drift. No focal deficits. 5/5 strength in all extremities.   Sensation: Exam limited  Reflexes: Patient deferred  Coordination: intact rapid-alt movements. No dysmetria to FTN  Gait: Unable to assess.     Imaging:    MR Head w/wo IV Cont (02.11.20 @ 15:07)  IMPRESSION:  BRAIN MRI:  Acute right posterior frontal infarct in the region of the right precentral gyrus. Subacute infarct in the right frontoparietal and posterior temporal region. Minimal petechial hemorrhagic transformation and gyral enhancement is noted in both regions.  Multifocal chronic bilateral MCA territory infarcts. Chronic hemorrhagic products in the regionof the left basal ganglia.    BRAIN MRA:  Persistent mild narrowing of the cavernous and supraclinoid left ICA. Persistent lack of flow related signal within the left M1 segment, with poor visualization of the more distal left MCA branches.  Redemonstration of multifocal mild to moderate stenoses of the right M1 segment. Normal flow-related enhancement of the more distal right MCA.    NECK MRA:  No flow-limiting stenosis or evidence for arterial dissection.

## 2020-02-13 NOTE — PROGRESS NOTE ADULT - ASSESSMENT
58 yo M with h/o CVA 8 years ago presenting with aphasia. Pt is accompanied by brother who interpreted in the ED. Brother endorsed that pt went to visit family in [place] 9/2019 and about 3 weeks ago, family stated that he was starting to get weak and stopped being able to speak. He was also endorsing difficulty walking from weakness. He returned from [place] last night and came to the ED for evaluation. Pt also developed lesions on his mouth 3 days ago. He denies any other medical history or medication use.     # CVA   - Brain MRI noted with multiple chronic bilateral MCA infarct and new acute right posterior frontal infarct, subacute right frontoparietal infarct  - ILR Procedure d/w three siblings, all are agreeable to ILR implant   - No implantable device per family   - For ILR today pending LUIS today, f/u appt in 7-10 days  - Continue with telemetry monitoring.   - On ASA/Plavix/Atorvastatin    #[id number] 60 yo M with h/o CVA 8 years ago presenting with aphasia. Pt is accompanied by brother who interpreted in the ED. Brother endorsed that pt went to visit family in [place] 9/2019 and about 3 weeks ago, family stated that he was starting to get weak and stopped being able to speak. He was also endorsing difficulty walking from weakness. He returned from [place] last night and came to the ED for evaluation. Pt also developed lesions on his mouth 3 days ago. He denies any other medical history or medication use.     # CVA   - Brain MRI noted with multiple chronic bilateral MCA infarct and new acute right posterior frontal infarct, subacute right frontoparietal infarct  - ILR Procedure d/w three siblings, all are agreeable to ILR implant   - No implantable device per family   - For ILR today pending LUIS today, f/u appt in 7-10 days  - Continue with telemetry monitoring.   - On ASA/Plavix/Atorvastatin    #[id number]    Addendum:  s/p [employer] ILR implant today  ILR instructions d/w patient's brother Morgan  Follow up with [facility] on 2/21/20 @ 8:45am  Will sign off please reconsult as needed    #[id number]

## 2020-02-14 LAB
ALBUMIN SERPL ELPH-MCNC: 3.8 G/DL — SIGNIFICANT CHANGE UP (ref 3.3–5)
ALP SERPL-CCNC: 126 U/L — HIGH (ref 40–120)
ALT FLD-CCNC: 28 U/L — SIGNIFICANT CHANGE UP (ref 10–45)
ANION GAP SERPL CALC-SCNC: 17 MMOL/L — SIGNIFICANT CHANGE UP (ref 5–17)
AST SERPL-CCNC: 20 U/L — SIGNIFICANT CHANGE UP (ref 10–40)
BILIRUB SERPL-MCNC: 1.3 MG/DL — HIGH (ref 0.2–1.2)
BUN SERPL-MCNC: 10 MG/DL — SIGNIFICANT CHANGE UP (ref 7–23)
CALCIUM SERPL-MCNC: 9.1 MG/DL — SIGNIFICANT CHANGE UP (ref 8.4–10.5)
CHLORIDE SERPL-SCNC: 100 MMOL/L — SIGNIFICANT CHANGE UP (ref 96–108)
CO2 SERPL-SCNC: 20 MMOL/L — LOW (ref 22–31)
CREAT SERPL-MCNC: 0.7 MG/DL — SIGNIFICANT CHANGE UP (ref 0.5–1.3)
GLUCOSE SERPL-MCNC: 79 MG/DL — SIGNIFICANT CHANGE UP (ref 70–99)
HCT VFR BLD CALC: 41.9 % — SIGNIFICANT CHANGE UP (ref 39–50)
HGB BLD-MCNC: 14.1 G/DL — SIGNIFICANT CHANGE UP (ref 13–17)
MCHC RBC-ENTMCNC: 33.7 GM/DL — SIGNIFICANT CHANGE UP (ref 32–36)
MCHC RBC-ENTMCNC: 33.7 PG — SIGNIFICANT CHANGE UP (ref 27–34)
MCV RBC AUTO: 100.2 FL — HIGH (ref 80–100)
NRBC # BLD: 0 /100 WBCS — SIGNIFICANT CHANGE UP (ref 0–0)
PLATELET # BLD AUTO: 351 K/UL — SIGNIFICANT CHANGE UP (ref 150–400)
POTASSIUM SERPL-MCNC: 3.7 MMOL/L — SIGNIFICANT CHANGE UP (ref 3.5–5.3)
POTASSIUM SERPL-SCNC: 3.7 MMOL/L — SIGNIFICANT CHANGE UP (ref 3.5–5.3)
PROT SERPL-MCNC: 6.6 G/DL — SIGNIFICANT CHANGE UP (ref 6–8.3)
RBC # BLD: 4.18 M/UL — LOW (ref 4.2–5.8)
RBC # FLD: 13.3 % — SIGNIFICANT CHANGE UP (ref 10.3–14.5)
SODIUM SERPL-SCNC: 137 MMOL/L — SIGNIFICANT CHANGE UP (ref 135–145)
WBC # BLD: 11.87 K/UL — HIGH (ref 3.8–10.5)
WBC # FLD AUTO: 11.87 K/UL — HIGH (ref 3.8–10.5)

## 2020-02-14 PROCEDURE — 99231 SBSQ HOSP IP/OBS SF/LOW 25: CPT | Mod: 25

## 2020-02-14 PROCEDURE — 71045 X-RAY EXAM CHEST 1 VIEW: CPT | Mod: 26

## 2020-02-14 RX ORDER — SODIUM CHLORIDE 9 MG/ML
1000 INJECTION, SOLUTION INTRAVENOUS
Refills: 0 | Status: DISCONTINUED | OUTPATIENT
Start: 2020-02-14 | End: 2020-03-07

## 2020-02-14 RX ORDER — INSULIN LISPRO 100/ML
VIAL (ML) SUBCUTANEOUS EVERY 6 HOURS
Refills: 0 | Status: DISCONTINUED | OUTPATIENT
Start: 2020-02-14 | End: 2020-02-22

## 2020-02-14 RX ORDER — DEXTROSE 50 % IN WATER 50 %
25 SYRINGE (ML) INTRAVENOUS ONCE
Refills: 0 | Status: DISCONTINUED | OUTPATIENT
Start: 2020-02-14 | End: 2020-03-07

## 2020-02-14 RX ORDER — ASPIRIN/CALCIUM CARB/MAGNESIUM 324 MG
325 TABLET ORAL DAILY
Refills: 0 | Status: DISCONTINUED | OUTPATIENT
Start: 2020-02-14 | End: 2020-02-24

## 2020-02-14 RX ORDER — DEXTROSE 50 % IN WATER 50 %
15 SYRINGE (ML) INTRAVENOUS ONCE
Refills: 0 | Status: DISCONTINUED | OUTPATIENT
Start: 2020-02-14 | End: 2020-03-07

## 2020-02-14 RX ORDER — DEXTROSE 50 % IN WATER 50 %
12.5 SYRINGE (ML) INTRAVENOUS ONCE
Refills: 0 | Status: DISCONTINUED | OUTPATIENT
Start: 2020-02-14 | End: 2020-03-07

## 2020-02-14 RX ORDER — GLUCAGON INJECTION, SOLUTION 0.5 MG/.1ML
1 INJECTION, SOLUTION SUBCUTANEOUS ONCE
Refills: 0 | Status: DISCONTINUED | OUTPATIENT
Start: 2020-02-14 | End: 2020-03-07

## 2020-02-14 RX ADMIN — ENOXAPARIN SODIUM 40 MILLIGRAM(S): 100 INJECTION SUBCUTANEOUS at 18:15

## 2020-02-14 RX ADMIN — SODIUM CHLORIDE 70 MILLILITER(S): 9 INJECTION, SOLUTION INTRAVENOUS at 07:30

## 2020-02-14 NOTE — DIETITIAN INITIAL EVALUATION ADULT. - OTHER INFO
limited information-pt does not speak    Reason for admission : · Reason for Admission	mouth lesions, possible stroke    Last BM : 2/10 fecal incontinence    NKFA     edema: none    skin: no pressure injury

## 2020-02-14 NOTE — PROGRESS NOTE ADULT - ASSESSMENT
59Y Romanian speaking M with prior CVA w/o residual deficits p/w 3 weeks of generalized weakness and expressive aphasia.  On exam, PT is agitated, has decreased L blinks to threat and no verbal output.  PT MRI demonstrated new R. posterior frontal and subacute R. fronto-parietal/post temporal infarcts, embolic appearing in addition to L. MCA infarcts.  Minor petechial hemorrhage, c/w natural history of strokes.        Impression: Expressive aphasia could be due to R. temporal infarcts if this is his dominant hemisphere vs. apraxia of speech.  PT's strokes appear embolic in origin, but no known embolic source thus mechanism could be ESUS (Embolic stroke of undetermined source) vs. symptomatic large artery atherosclerosis.     Recommend:   - normotension given subacute nature of symptoms   - For now, will continue DAPT with ASA 81mg + Plavix 75 QD for 90 days [facility].  If embolic source determined, will discuss further if need for switching to AC.    - Continue atorvastatin 80 daily with titration of LDL <90  - S/p ILR, continue to monitor.   - Cont DVT ppx w/ SubQ Lovenox  - PT eval 59Y Albanian speaking M with prior CVA w/o residual deficits p/w 3 weeks of generalized weakness and expressive aphasia. MRI demonstrated new R. posterior frontal and subacute R. fronto-parietal/post temporal infarcts, embolic appearing in addition to L. MCA infarcts.  Minor petechial hemorrhage, c/w natural history of strokes.        Impression: Expressive aphasia could be due to R. temporal infarcts if this is his dominant hemisphere vs. apraxia of speech.  Strokes appear embolic in origin, but no known embolic source thus mechanism could be ESUS (Embolic stroke of undetermined source) vs. symptomatic large artery atherosclerosis.     Recommend:   - normotension given subacute nature of symptoms   - For now, will continue DAPT with ASA 81mg + Plavix 75 QD for 90 days [facility]     - Continue atorvastatin 80 mg daily  - S/p ILR, continue to monitor.   - S/p LUIS EF 65%, no LA thrombus or PFO.  - Cont DVT ppx w/ SubQ Lovenox  - PT: acute rehab

## 2020-02-14 NOTE — CHART NOTE - NSCHARTNOTEFT_GEN_A_CORE
PAM JIMENEZ    Had discussion with Patient and family, Nephew Dallas (unable/refused to use  phone, Nephew Dallas translated to patient) both agreeable for NGT placement for nutrition and medication. Patient nodded in agreement for placement of NGT and was cooperative. NGT placement verified by xray. Jevity 1.2 started as per nurtion rec.  Discussed the above with Dr Simmons and in agreement.        Traci FELDMANP-c PAM JIMENEZ  MRN: [id number]    SUBJECTIVE: Patient with Had discussion with Patient and family, Nephew Dallas (unable/refused to use  phone, Nephew Dallas translated to patient) both agreeable for NGT placement for nutrition and medication. Patient nodded in agreement for placement of NGT and was cooperative. NGT placement verified by xray. Jevity 1.2 started as per nurtion rec.  Discussed the above with Dr Simmons and in agreement.      History of Present Illness:   PAM JIMENEZ is a [id number] Male with a past history of Aphasia  No pertinent family history in first degree relatives  CVA (cerebral vascular accident)  Aphasia  No significant past surgical history  DIFF WALKING DEC PO INTAKE  RAD CDS   presenting with Patient is a 59y old  Male who presents with a chief complaint of mouth lesions, possible stroke (14 Feb 2020 19:17)          Medical History   HEALTH ISSUES - PROBLEM Dx:                OBJECTIVE:    Vitals:   Vital Signs Last 24 Hrs  T(C): 36.6 (02-14-20 @ 14:23), Max: 36.7 (02-14-20 @ 04:02)  T(F): 97.8 (02-14-20 @ 14:23), Max: 98 (02-14-20 @ 04:02)  HR: 62 (02-14-20 @ 14:23) (58 - 62)  BP: 131/78 (02-14-20 @ 14:23) (131/78 - 138/86)  BP(mean): --  RR: 18 (02-14-20 @ 14:23) (18 - 18)  SpO2: 98% (02-14-20 @ 14:23) (98% - 98%)             I&O's Summary    13 Feb 2020 07:01  -  14 Feb 2020 07:00  --------------------------------------------------------  IN: 840 mL / OUT: 1025 mL / NET: -185 mL          Physical Examination:  GENERAL: No acute distress noted during examination. Patient is well-groomed and developed.  NERVOUS SYSTEM:  Alert & oriented X3 with appropriate concentration. Motor strength is 5/5 in both bilateral upper and lower extremities. No focal or lateralizing neurologic deficits noted.   HEAD:  Atraumatic and normocephalic.  EYES: EOMI/PERRLA. Conjunctiva and sclera clear  ENMT: No tonsillar erythema, exudates, lesions, or enlargement. Moist mucous membranes with good dentition.   NECK: Supple with no JVD.   CHEST: Clear to ascultation bilaterally. No rales, rhonchi, wheezing, or rubs heard. Symmetrical/bilateral chest wall rise.   HEART: Regular rate and rhythm with no murmurs, rubs, or gallops.  ABDOMEN: Soft, nontender, and nondistended. Bowel sounds present.   EXTREMITIES:  2+ Peripheral Pulses without clubbing, cyanosis, or edema.  LYMPH: No lymphadenopathy noted.  SKIN: No rashes or lesions seen.                                              Labs:  02-14    137  |  100  |  10  ----------------------------<  79  3.7   |  20<L>  |  0.70    Ca    9.1      14 Feb 2020 06:23    TPro  6.6  /  Alb  3.8  /  TBili  1.3<H>  /  DBili  x   /  AST  20  /  ALT  28  /  AlkPhos  126<H>  02-14      LIVER FUNCTIONS - ( 14 Feb 2020 06:23 )  Alb: 3.8 g/dL / Pro: 6.6 g/dL / ALK PHOS: 126 U/L / ALT: 28 U/L / AST: 20 U/L / GGT: x                                     April Castellano AGACNP-c PAM JIMENEZ  MRN: [id number]    HPI:  58 yo Welsh speaking M with h/o CVA 8 years ago presenting with progressive weakness, dysphagia, and aphasia.       T(C): 36.6 (02-14-20 @ 14:23), Max: 36.7 (02-14-20 @ 04:02)  HR: 62 (02-14-20 @ 14:23) (58 - 62)  BP: 131/78 (02-14-20 @ 14:23) (131/78 - 138/86)  RR: 18 (02-14-20 @ 14:23) (18 - 18)  SpO2: 98% (02-14-20 @ 14:23) (98% - 98%)                        14.1   11.87 )-----------( 351      ( 14 Feb 2020 06:23 )             41.9      02-14    137  |  100  |  10  ----------------------------<  79  3.7   |  20<L>  |  0.70    Ca    9.1      14 Feb 2020 06:23    TPro  6.6  /  Alb  3.8  /  TBili  1.3<H>  /  DBili  x   /  AST  20  /  ALT  28  /  AlkPhos  126<H>  02-14        Physical Examination:  GENERAL: No acute distress noted during examination. Patient is well-groomed and developed.  NERVOUS SYSTEM:  Alert, unable to assess orientation 2/2 aphasia, with appropriate concentration.   ENMT: Lesion lower lip.  No tonsillar erythema, exudates, or enlargement. Moist mucous membranes with fair dentition.   CHEST: Clear to ascultation bilaterally. No rales, rhonchi, wheezing, or rubs heard. Symmetrical/bilateral chest wall rise.   HEART: Regular rate and rhythm with no murmurs, rubs, or gallops.  ABDOMEN: Soft, nontender, and nondistended, positive bowel sounds, no CVAT.        Impression:  Acute and chronic CVA  Dysphasia  Aphasia    Plan: Patient currently NPO 2/2 failed MBS. Patient in need of PEG, awaiting patient/family decision. Nutritional support needed, discussed NGT placement with patient/family, both agreeable. Patient refused  phone, nephew Dallas translated.    -NGT placed  -Chest xray - NGT in place  -TF started, Jevity 1.2 as per nurtution recs    Plan of care discussed with medical attending, Dr Simmons. Patient endorsed to night APC.                      Traci GUTIERRES-donna

## 2020-02-14 NOTE — PROGRESS NOTE ADULT - ASSESSMENT
59Y Thai speaking M with prior CVA 8y ago (no reported deficits) brought in by brother for concerns of generalized weakness and inability to speak for the past 3 weeks. On exam found to have no verbal output, appeared withdrawn and weak on neurological evaluation  He follows commands intermittently and is easily agitated.  Exam limited due to patient aggression on admission  CTH demonstrates chronic L MCA infarct with severely stenotic vs occluded distal L M1. this is confirmed on MRI but also an acute right post frontal infarct is seen. 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 will need a PEG, will start NGT nutrition/feeds while awaiting decision on PEG left a message w ptn's family for a call back.   Con't ASA 81mg QD, Plavix 75mg, and LIPITOR via NGT, LDL is below 70  - TTE with bubble study: benign  -DVT ppx w sc Lovenox  -social service consult  -PT eval, will prob need acute rehab

## 2020-02-14 NOTE — PROGRESS NOTE ADULT - SUBJECTIVE AND OBJECTIVE BOX
Patient is a 59y old  Male who presents with a chief complaint of mouth lesions, possible stroke (14 Feb 2020 10:44)      SUBJECTIVE / OVERNIGHT EVENTS: awaiting to speak w family re PEG placement, left several messages, left message w staff for family to call me if they are visiting the ptn. meantime finally agreed to NGT, placement confirmed on CXR    MEDICATIONS  (STANDING):  aspirin 325 milliGRAM(s) Enteral Tube daily  atorvastatin 80 milliGRAM(s) Oral at bedtime  clopidogrel Tablet 75 milliGRAM(s) Oral daily  cyanocobalamin 1000 MICROGram(s) Oral daily  enoxaparin Injectable 40 milliGRAM(s) SubCutaneous daily  ergocalciferol 15610 Unit(s) Oral every week  multivitamin/minerals 1 Tablet(s) Oral daily  Nephro-deysi 1 Tablet(s) Oral daily  sodium chloride 0.45%. 1000 milliLiter(s) (70 mL/Hr) IV Continuous <Continuous>    MEDICATIONS  (PRN):      Vital Signs Last 24 Hrs  T(F): 97.8 (02-14-20 @ 14:23), Max: 98 (02-14-20 @ 04:02)  HR: 62 (02-14-20 @ 14:23) (58 - 62)  BP: 131/78 (02-14-20 @ 14:23) (131/78 - 138/86)  RR: 18 (02-14-20 @ 14:23) (18 - 18)  SpO2: 98% (02-14-20 @ 14:23) (98% - 98%)  Telemetry:   CAPILLARY BLOOD GLUCOSE        I&O's Summary    13 Feb 2020 07:01  -  14 Feb 2020 07:00  --------------------------------------------------------  IN: 840 mL / OUT: 1025 mL / NET: -185 mL        PHYSICAL EXAM:  GENERAL: NAD, well-developed  HEAD:  Atraumatic, Normocephalic  EYES: EOMI, PERRLA, conjunctiva and sclera clear  NECK: Supple, No JVD  CHEST/LUNG: Clear to auscultation bilaterally; No wheeze  HEART: Regular rate and rhythm; No murmurs, rubs, or gallops  ABDOMEN: Soft, Nontender, Nondistended; Bowel sounds present  EXTREMITIES:  2+ Peripheral Pulses, No clubbing, cyanosis, or edema  PSYCH: AAOx3  NEUROLOGY: non-focal  SKIN: No rashes or lesions    LABS:                        14.1   11.87 )-----------( 351      ( 14 Feb 2020 06:23 )             41.9     02-14    137  |  100  |  10  ----------------------------<  79  3.7   |  20<L>  |  0.70    Ca    9.1      14 Feb 2020 06:23    TPro  6.6  /  Alb  3.8  /  TBili  1.3<H>  /  DBili  x   /  AST  20  /  ALT  28  /  AlkPhos  126<H>  02-14              RADIOLOGY & ADDITIONAL TESTS:    Imaging Personally Reviewed:    Consultant(s) Notes Reviewed:      Care Discussed with Consultants/Other Providers:

## 2020-02-14 NOTE — PROGRESS NOTE ADULT - ASSESSMENT
58 yo M with h/o CVA 8 years ago presenting with aphasia, he was found to have CVA and being managed by neurology service    HSV lesions are dried up and crusted  He completed course of antivirals   MRI confirms multifocal infarcts  flow sheets reviewed he is afebrile   No signs of active infection, HIV negative, RVP not detected   Continue care as per neurology

## 2020-02-14 NOTE — DIETITIAN INITIAL EVALUATION ADULT. - PERTINENT MEDS FT
aspirin Suppository 300  atorvastatin 80  clopidogrel Tablet 75  cyanocobalamin 1000  enoxaparin Injectable 40  ergocalciferol 77046  multivitamin/minerals 1  Nephro-deysi 1  sodium chloride 0.45%. 1000

## 2020-02-14 NOTE — DIETITIAN INITIAL EVALUATION ADULT. - PERTINENT LABORATORY DATA
Na 137, K+ 3.7, BUN 10, Cr 0.70, BG 79, AST 20, ALT 28, Mg --, Ca 9.1,     Hemoglobin A1C, Whole Blood: 5.5 % (02-10 @ 09:08)

## 2020-02-14 NOTE — PROGRESS NOTE ADULT - SUBJECTIVE AND OBJECTIVE BOX
Subjective: Patient seen and examined. No new events except as noted.     SUBJECTIVE/ROS:        MEDICATIONS:  MEDICATIONS  (STANDING):  aspirin Suppository 300 milliGRAM(s) Rectal daily  atorvastatin 80 milliGRAM(s) Oral at bedtime  clopidogrel Tablet 75 milliGRAM(s) Oral daily  cyanocobalamin 1000 MICROGram(s) Oral daily  enoxaparin Injectable 40 milliGRAM(s) SubCutaneous daily  ergocalciferol 43226 Unit(s) Oral every week  multivitamin/minerals 1 Tablet(s) Oral daily  Nephro-deysi 1 Tablet(s) Oral daily  sodium chloride 0.45%. 1000 milliLiter(s) (70 mL/Hr) IV Continuous <Continuous>      PHYSICAL EXAM:  T(C): 36.7 (02-14-20 @ 04:02), Max: 36.9 (02-13-20 @ 13:02)  HR: 58 (02-14-20 @ 04:02) (55 - 59)  BP: 138/86 (02-14-20 @ 04:02) (121/70 - 138/86)  RR: 18 (02-14-20 @ 04:02) (16 - 18)  SpO2: 98% (02-14-20 @ 04:02) (96% - 98%)  Wt(kg): --  I&O's Summary    13 Feb 2020 07:01  -  14 Feb 2020 07:00  --------------------------------------------------------  IN: 840 mL / OUT: 1025 mL / NET: -185 mL            JVP: Normal  Neck: supple  Lung: clear   CV: S1 S2 , Murmur:  Abd: soft  Ext: No edema  neuro: Awake   Psych: flat affect  Skin: normal``    LABS/DATA:    CARDIAC MARKERS:                                14.1   11.87 )-----------( 351      ( 14 Feb 2020 06:23 )             41.9     02-14    137  |  100  |  10  ----------------------------<  79  3.7   |  20<L>  |  0.70    Ca    9.1      14 Feb 2020 06:23    TPro  6.6  /  Alb  3.8  /  TBili  1.3<H>  /  DBili  x   /  AST  20  /  ALT  28  /  AlkPhos  126<H>  02-14    proBNP:   Lipid Profile:   HgA1c:   TSH:     TELE:  EKG:  < from: Transesophageal Echocardiogram w/o TTE (02.13.20 @ 15:15) >  Conclusions:  Normal left ventricular systolic function. No segmental  wall motion abnormalities.  Agitated saline contrast injection demonstrates no evidence  of a patent foramen ovale.  ------------------------------------------------------------------------  Confirmed on  2/13/2020 - 15:53:00 by Anthony Townsend MD, FASE  ------------------------------------------------------------------------    < end of copied text >

## 2020-02-14 NOTE — DIETITIAN INITIAL EVALUATION ADULT. - ADD RECOMMEND
monitor for PEG placement, recommend Vit D3 supplement, monitor for malnutrition monitor for PEG placement, continue Vit D3 supplement, monitor for malnutrition. if nutrition support is initiated recommend Jevity 1.2 at a goal rate of 75ml/hr x 18hr. NP prefers pt to be on 18 hr feeds on and  hr off at this time. This provides 1620kcal/74grams protein. Provides 26kcal/kg and 1.1grams protein/kg. based on dosing weight of 62kg.

## 2020-02-14 NOTE — PROGRESS NOTE ADULT - SUBJECTIVE AND OBJECTIVE BOX
CC: f/u for oral lesions    Patient is not verbal     REVIEW OF SYSTEMS:  All other review of systems negative (Comprehensive ROS)  Other Medications Reviewed    Vital Signs Last 24 Hrs  T(C): 36.7 (14 Feb 2020 04:02), Max: 36.9 (13 Feb 2020 13:02)  T(F): 98 (14 Feb 2020 04:02), Max: 98.4 (13 Feb 2020 13:02)  HR: 58 (14 Feb 2020 04:02) (55 - 59)  BP: 138/86 (14 Feb 2020 04:02) (121/70 - 138/86)  BP(mean): --  RR: 18 (14 Feb 2020 04:02) (16 - 18)  SpO2: 98% (14 Feb 2020 04:02) (96% - 98%)    PHYSICAL EXAM:  General: nad  Eyes:  anicteric, no conjunctival injection, no discharge  Oropharynx: dried lip blisters	  Lungs: clear to auscultation  Heart: regular rate and rhythm; no murmur, rubs or gallops  Abdomen: soft, nondistended, nontender,  Skin: no lesions  Extremities: no clubbing, cyanosis, or edema  Neurologic: no verbal output    LAB RESULTS:                        14.1   11.87 )-----------( 351      ( 14 Feb 2020 06:23 )             41.9                           14.1   7.54  )-----------( 337      ( 11 Feb 2020 06:10 )             41.7     02-11    138  |  103  |  8   ----------------------------<  103<H>  3.9   |  23  |  0.75    Ca    9.2      11 Feb 2020 06:10          MICROBIOLOGY:  RECENT CULTURES:      RADIOLOGY REVIEWED:    < from: MR Head w/wo IV Cont (02.11.20 @ 15:07) >  IMPRESSION:    BRAIN MRI:    Acute right posterior frontal infarct in the region of the right precentral gyrus. Subacute infarctin the right frontoparietal and posterior temporal region. Minimal petechial hemorrhagic transformation and gyral enhancement is noted in both regions.    Multifocal chronic bilateral MCA territory infarcts. Chronic hemorrhagic products in the regionof the left basal ganglia.    BRAIN MRA:    Persistent mild narrowing of the cavernous and supraclinoid left ICA. Persistent lack of flow related signal within the left M1 segment, with poor visualization of the more distal left MCA branches.    Redemonstration of multifocal mild to moderate stenoses of the right M1 segment. Normal flow-related enhancement of the more distal right MCA.    NECK MRA:    No flow-limiting stenosis or evidence for arterial dissection.    Dr. Viveros discussed these findings with nurse practitioner Bakari on 2/11/2020 4:16 PM with read back.    < end of copied text >

## 2020-02-15 LAB
ANION GAP SERPL CALC-SCNC: 12 MMOL/L — SIGNIFICANT CHANGE UP (ref 5–17)
ANION GAP SERPL CALC-SCNC: 14 MMOL/L — SIGNIFICANT CHANGE UP (ref 5–17)
BUN SERPL-MCNC: 8 MG/DL — SIGNIFICANT CHANGE UP (ref 7–23)
BUN SERPL-MCNC: 9 MG/DL — SIGNIFICANT CHANGE UP (ref 7–23)
CALCIUM SERPL-MCNC: 9.5 MG/DL — SIGNIFICANT CHANGE UP (ref 8.4–10.5)
CALCIUM SERPL-MCNC: 9.8 MG/DL — SIGNIFICANT CHANGE UP (ref 8.4–10.5)
CHLORIDE SERPL-SCNC: 103 MMOL/L — SIGNIFICANT CHANGE UP (ref 96–108)
CHLORIDE SERPL-SCNC: 103 MMOL/L — SIGNIFICANT CHANGE UP (ref 96–108)
CO2 SERPL-SCNC: 21 MMOL/L — LOW (ref 22–31)
CO2 SERPL-SCNC: 24 MMOL/L — SIGNIFICANT CHANGE UP (ref 22–31)
CREAT SERPL-MCNC: 0.63 MG/DL — SIGNIFICANT CHANGE UP (ref 0.5–1.3)
CREAT SERPL-MCNC: 0.68 MG/DL — SIGNIFICANT CHANGE UP (ref 0.5–1.3)
GLUCOSE BLDC GLUCOMTR-MCNC: 105 MG/DL — HIGH (ref 70–99)
GLUCOSE BLDC GLUCOMTR-MCNC: 111 MG/DL — HIGH (ref 70–99)
GLUCOSE BLDC GLUCOMTR-MCNC: 116 MG/DL — HIGH (ref 70–99)
GLUCOSE BLDC GLUCOMTR-MCNC: 130 MG/DL — HIGH (ref 70–99)
GLUCOSE BLDC GLUCOMTR-MCNC: 136 MG/DL — HIGH (ref 70–99)
GLUCOSE SERPL-MCNC: 102 MG/DL — HIGH (ref 70–99)
GLUCOSE SERPL-MCNC: 123 MG/DL — HIGH (ref 70–99)
HCT VFR BLD CALC: 41.7 % — SIGNIFICANT CHANGE UP (ref 39–50)
HGB BLD-MCNC: 13.6 G/DL — SIGNIFICANT CHANGE UP (ref 13–17)
MAGNESIUM SERPL-MCNC: 2 MG/DL — SIGNIFICANT CHANGE UP (ref 1.6–2.6)
MCHC RBC-ENTMCNC: 32.6 GM/DL — SIGNIFICANT CHANGE UP (ref 32–36)
MCHC RBC-ENTMCNC: 32.9 PG — SIGNIFICANT CHANGE UP (ref 27–34)
MCV RBC AUTO: 100.7 FL — HIGH (ref 80–100)
NRBC # BLD: 0 /100 WBCS — SIGNIFICANT CHANGE UP (ref 0–0)
PHOSPHATE SERPL-MCNC: 3.2 MG/DL — SIGNIFICANT CHANGE UP (ref 2.5–4.5)
PLATELET # BLD AUTO: 383 K/UL — SIGNIFICANT CHANGE UP (ref 150–400)
POTASSIUM SERPL-MCNC: 3.8 MMOL/L — SIGNIFICANT CHANGE UP (ref 3.5–5.3)
POTASSIUM SERPL-MCNC: 4 MMOL/L — SIGNIFICANT CHANGE UP (ref 3.5–5.3)
POTASSIUM SERPL-SCNC: 3.8 MMOL/L — SIGNIFICANT CHANGE UP (ref 3.5–5.3)
POTASSIUM SERPL-SCNC: 4 MMOL/L — SIGNIFICANT CHANGE UP (ref 3.5–5.3)
RBC # BLD: 4.14 M/UL — LOW (ref 4.2–5.8)
RBC # FLD: 13.3 % — SIGNIFICANT CHANGE UP (ref 10.3–14.5)
SODIUM SERPL-SCNC: 138 MMOL/L — SIGNIFICANT CHANGE UP (ref 135–145)
SODIUM SERPL-SCNC: 139 MMOL/L — SIGNIFICANT CHANGE UP (ref 135–145)
WBC # BLD: 6.32 K/UL — SIGNIFICANT CHANGE UP (ref 3.8–10.5)
WBC # FLD AUTO: 6.32 K/UL — SIGNIFICANT CHANGE UP (ref 3.8–10.5)

## 2020-02-15 PROCEDURE — 99232 SBSQ HOSP IP/OBS MODERATE 35: CPT

## 2020-02-15 PROCEDURE — 93010 ELECTROCARDIOGRAM REPORT: CPT

## 2020-02-15 RX ADMIN — CLOPIDOGREL BISULFATE 75 MILLIGRAM(S): 75 TABLET, FILM COATED ORAL at 13:46

## 2020-02-15 RX ADMIN — SODIUM CHLORIDE 70 MILLILITER(S): 9 INJECTION, SOLUTION INTRAVENOUS at 00:15

## 2020-02-15 RX ADMIN — Medication 325 MILLIGRAM(S): at 13:45

## 2020-02-15 RX ADMIN — Medication 1 TABLET(S): at 13:49

## 2020-02-15 RX ADMIN — PREGABALIN 1000 MICROGRAM(S): 225 CAPSULE ORAL at 13:46

## 2020-02-15 RX ADMIN — ATORVASTATIN CALCIUM 80 MILLIGRAM(S): 80 TABLET, FILM COATED ORAL at 21:06

## 2020-02-15 RX ADMIN — Medication 1 TABLET(S): at 13:47

## 2020-02-15 RX ADMIN — ENOXAPARIN SODIUM 40 MILLIGRAM(S): 100 INJECTION SUBCUTANEOUS at 13:44

## 2020-02-15 RX ADMIN — SODIUM CHLORIDE 70 MILLILITER(S): 9 INJECTION, SOLUTION INTRAVENOUS at 21:02

## 2020-02-15 NOTE — PROGRESS NOTE ADULT - SUBJECTIVE AND OBJECTIVE BOX
Patient seen and examined at bedside.    Overnight Events:  no events, patient is having ongoing bradycardia since his admission HR ranging from 45-75  Patient is unable to participate with interview    Review Of Systems: unable to obtain    Current Meds:  aspirin 325 milliGRAM(s) Enteral Tube daily  atorvastatin 80 milliGRAM(s) Oral at bedtime  clopidogrel Tablet 75 milliGRAM(s) Oral daily  cyanocobalamin 1000 MICROGram(s) Oral daily  dextrose 40% Gel 15 Gram(s) Oral once PRN  dextrose 5%. 1000 milliLiter(s) IV Continuous <Continuous>  dextrose 50% Injectable 12.5 Gram(s) IV Push once  dextrose 50% Injectable 25 Gram(s) IV Push once  dextrose 50% Injectable 25 Gram(s) IV Push once  enoxaparin Injectable 40 milliGRAM(s) SubCutaneous daily  ergocalciferol 16008 Unit(s) Oral every week  glucagon  Injectable 1 milliGRAM(s) IntraMuscular once PRN  insulin lispro (HumaLOG) corrective regimen sliding scale   SubCutaneous every 6 hours  multivitamin/minerals 1 Tablet(s) Oral daily  Nephro-deysi 1 Tablet(s) Oral daily  sodium chloride 0.45%. 1000 milliLiter(s) IV Continuous <Continuous>      Vitals:  T(F): 97.9 (02-15), Max: 98.3 (02-14)  HR: 56 (02-15) (47 - 62)  BP: 121/72 (02-15) (109/63 - 143/87)  RR: 18 (02-15)  SpO2: 96% (02-15)  I&O's Summary    14 Feb 2020 07:01  -  15 Feb 2020 07:00  --------------------------------------------------------  IN: 1140 mL / OUT: 650 mL / NET: 490 mL    15 Feb 2020 07:01  -  15 Feb 2020 13:19  --------------------------------------------------------  IN: 0 mL / OUT: 925 mL / NET: -925 mL        Physical Exam:  Appearance: No acute distress  Eyes:  EOMI, pink conjunctiva  HENT: Normal oral mucosa  Cardiovascular: RRR, S1, S2,  Respiratory: Clear to auscultation bilaterally  Gastrointestinal: soft, non-tender, non-distended with normal bowel sounds  Musculoskeletal: No clubbing; no joint deformity   Neurologic: apahasia +  Lymphatic: No lymphadenopathy  Psychiatry: unable to assess further, sitting calmly  Skin: No rashes                          13.6   6.32  )-----------( 383      ( 15 Feb 2020 06:51 )             41.7     02-15    139  |  103  |  8   ----------------------------<  123<H>  4.0   |  24  |  0.63    Ca    9.8      15 Feb 2020 06:51  Phos  3.2     02-14  Mg     2.0     02-14    TPro  6.6  /  Alb  3.8  /  TBili  1.3<H>  /  DBili  x   /  AST  20  /  ALT  28  /  AlkPhos  126<H>  02-14    CARDIAC MARKERS ( 09 Feb 2020 12:48 )  10 ng/L / x     / x     / x     / x     / x        New ECG(s):     HR 46, sinus rhythm with blocked APC leading to atrial bigeminy    Echo:      Cath:    Imaging:    Interpretation of Telemetry:    Assessment and Plan: Patient seen and examined at bedside.    Overnight Events:  no events, patient is having ongoing bradycardia since his admission HR ranging from 45-75  Patient is unable to participate with interview    Review Of Systems: unable to obtain    Current Meds:  aspirin 325 milliGRAM(s) Enteral Tube daily  atorvastatin 80 milliGRAM(s) Oral at bedtime  clopidogrel Tablet 75 milliGRAM(s) Oral daily  cyanocobalamin 1000 MICROGram(s) Oral daily  dextrose 40% Gel 15 Gram(s) Oral once PRN  dextrose 5%. 1000 milliLiter(s) IV Continuous <Continuous>  dextrose 50% Injectable 12.5 Gram(s) IV Push once  dextrose 50% Injectable 25 Gram(s) IV Push once  dextrose 50% Injectable 25 Gram(s) IV Push once  enoxaparin Injectable 40 milliGRAM(s) SubCutaneous daily  ergocalciferol 89154 Unit(s) Oral every week  glucagon  Injectable 1 milliGRAM(s) IntraMuscular once PRN  insulin lispro (HumaLOG) corrective regimen sliding scale   SubCutaneous every 6 hours  multivitamin/minerals 1 Tablet(s) Oral daily  Nephro-deysi 1 Tablet(s) Oral daily  sodium chloride 0.45%. 1000 milliLiter(s) IV Continuous <Continuous>      Vitals:  T(F): 97.9 (02-15), Max: 98.3 (02-14)  HR: 56 (02-15) (47 - 62)  BP: 121/72 (02-15) (109/63 - 143/87)  RR: 18 (02-15)  SpO2: 96% (02-15)  I&O's Summary    14 Feb 2020 07:01  -  15 Feb 2020 07:00  --------------------------------------------------------  IN: 1140 mL / OUT: 650 mL / NET: 490 mL    15 Feb 2020 07:01  -  15 Feb 2020 13:19  --------------------------------------------------------  IN: 0 mL / OUT: 925 mL / NET: -925 mL        Physical Exam:  Appearance: No acute distress  Eyes:  EOMI, pink conjunctiva  HENT: Normal oral mucosa  Cardiovascular: RRR, S1, S2,  Respiratory: Clear to auscultation bilaterally  Gastrointestinal: soft, non-tender, non-distended with normal bowel sounds  Musculoskeletal: No clubbing; no joint deformity   Neurologic: apahasia +  Lymphatic: No lymphadenopathy  Psychiatry: unable to assess further, sitting calmly  Skin: No rashes                          13.6   6.32  )-----------( 383      ( 15 Feb 2020 06:51 )             41.7     02-15    139  |  103  |  8   ----------------------------<  123<H>  4.0   |  24  |  0.63    Ca    9.8      15 Feb 2020 06:51  Phos  3.2     02-14  Mg     2.0     02-14    TPro  6.6  /  Alb  3.8  /  TBili  1.3<H>  /  DBili  x   /  AST  20  /  ALT  28  /  AlkPhos  126<H>  02-14    CARDIAC MARKERS ( 09 Feb 2020 12:48 )  10 ng/L / x     / x     / x     / x     / x        New ECG(s):     HR 46, sinus rhythm with blocked APC leading to atrial bigeminy    Echo:  Transthoracic echocardiogram with 2-D, M-Mode  and complete spectral andcolor flow Doppler.    Dimensions:    Normal Values:  LA:     3.7    2.0 - 4.0 cm  Ao:     3.5    2.0 - 3.8 cm  SEPTUM: 0.9    0.6 - 1.2 cm  PWT:    1.0    0.6 - 1.1 cm  LVIDd:  5.4    3.0 - 5.6 cm  LVIDs:  3.6    1.8 - 4.0 cm  Derived variables:  LVMI: 113 g/m2  RWT: 0.37  EF (Visual Estimate): 65 %  Doppler Peak Velocity (m/sec): AoV=1.1  ------------------------------------------------------------------------  Conclusions:  Normal left ventricular systolic function. No segmental  wall motion abnormalities.  Agitated saline injection and color flow Doppler  demonstrates no evidence of a patent foramen ovale.    Interpretation of Telemetry:     sinus rhythm with blocked apcs: atrial bigemeny    Assessment and Plan:    Mr Arriola is a 58 yo M with h/o CVA 8 years ago presenting with aphasia. He was found to have acute on chronic strokes.  I was called to evaluate for patient's bradycardia and a question of 2:1 heart block.    I evaluated telemetry, patient is having blocked APCs after each sinus beat.  This is atrial bigeminy  Patient's blood pressure is stable and he does not appear to be in any acute distress over this.  In reviewing telemetry and old notes it appears this HR has been persistent through his hospitalization, not acute.  No further medications or treatments needed at this time.  Case discussed with EP Attending, Dr Lawrence.    Further cardiac care per his primary cardiologist.

## 2020-02-15 NOTE — PROVIDER CONTACT NOTE (OTHER) - ASSESSMENT
Pt. unable to communicate verbally but can answer yes or no questions. Pt. denies CP, SOB, lightheadedness, dizziness, numbness, tingling. Pt. asymptomatic, VSS.

## 2020-02-15 NOTE — PROGRESS NOTE ADULT - SUBJECTIVE AND OBJECTIVE BOX
Subjective: Patient seen and examined. No new events except as noted.     SUBJECTIVE/ROS:  Due to altered mental status, subjective information were not able to be obtained from the patient. History was obtained, to the extent possible, from review of the chart and collateral sources of information.       MEDICATIONS:  MEDICATIONS  (STANDING):  aspirin 325 milliGRAM(s) Enteral Tube daily  atorvastatin 80 milliGRAM(s) Oral at bedtime  clopidogrel Tablet 75 milliGRAM(s) Oral daily  cyanocobalamin 1000 MICROGram(s) Oral daily  dextrose 5%. 1000 milliLiter(s) (50 mL/Hr) IV Continuous <Continuous>  dextrose 50% Injectable 12.5 Gram(s) IV Push once  dextrose 50% Injectable 25 Gram(s) IV Push once  dextrose 50% Injectable 25 Gram(s) IV Push once  enoxaparin Injectable 40 milliGRAM(s) SubCutaneous daily  ergocalciferol 43361 Unit(s) Oral every week  insulin lispro (HumaLOG) corrective regimen sliding scale   SubCutaneous every 6 hours  multivitamin/minerals 1 Tablet(s) Oral daily  Nephro-deysi 1 Tablet(s) Oral daily  sodium chloride 0.45%. 1000 milliLiter(s) (70 mL/Hr) IV Continuous <Continuous>      PHYSICAL EXAM:  T(C): 36.6 (02-15-20 @ 12:08), Max: 36.8 (02-14-20 @ 20:41)  HR: 56 (02-15-20 @ 12:08) (47 - 62)  BP: 121/72 (02-15-20 @ 12:08) (109/63 - 143/87)  RR: 18 (02-15-20 @ 12:08) (18 - 18)  SpO2: 96% (02-15-20 @ 12:08) (96% - 98%)  Wt(kg): --  I&O's Summary    14 Feb 2020 07:01  -  15 Feb 2020 07:00  --------------------------------------------------------  IN: 1140 mL / OUT: 650 mL / NET: 490 mL    15 Feb 2020 07:01  -  15 Feb 2020 14:13  --------------------------------------------------------  IN: 0 mL / OUT: 925 mL / NET: -925 mL            JVP: Normal  Neck: supple  Lung: clear   CV: S1 S2 , Murmur:  Abd: soft  Ext: No edema  neuro: Awake / alert  Psych: flat affect  Skin: normal``    LABS/DATA:    CARDIAC MARKERS:                                13.6   6.32  )-----------( 383      ( 15 Feb 2020 06:51 )             41.7     02-15    139  |  103  |  8   ----------------------------<  123<H>  4.0   |  24  |  0.63    Ca    9.8      15 Feb 2020 06:51  Phos  3.2     02-14  Mg     2.0     02-14    TPro  6.6  /  Alb  3.8  /  TBili  1.3<H>  /  DBili  x   /  AST  20  /  ALT  28  /  AlkPhos  126<H>  02-14    proBNP:   Lipid Profile:   HgA1c:   TSH:     TELE:  EKG:

## 2020-02-15 NOTE — PROGRESS NOTE ADULT - SUBJECTIVE AND OBJECTIVE BOX
Patient is a 59y old  Male who presents with a chief complaint of mouth lesions, possible stroke (15 Feb 2020 14:13)      SUBJECTIVE / OVERNIGHT EVENTS: tolerating NGT feeds, spoke w son Drew, consented to to PEG, wants his father to be full code, all questions addressed. on tle atrial bigeminies w sinus bryan, card and EPS following    MEDICATIONS  (STANDING):  aspirin 325 milliGRAM(s) Enteral Tube daily  atorvastatin 80 milliGRAM(s) Oral at bedtime  clopidogrel Tablet 75 milliGRAM(s) Oral daily  cyanocobalamin 1000 MICROGram(s) Oral daily  dextrose 5%. 1000 milliLiter(s) (50 mL/Hr) IV Continuous <Continuous>  dextrose 50% Injectable 12.5 Gram(s) IV Push once  dextrose 50% Injectable 25 Gram(s) IV Push once  dextrose 50% Injectable 25 Gram(s) IV Push once  enoxaparin Injectable 40 milliGRAM(s) SubCutaneous daily  ergocalciferol 14212 Unit(s) Oral every week  insulin lispro (HumaLOG) corrective regimen sliding scale   SubCutaneous every 6 hours  multivitamin/minerals 1 Tablet(s) Oral daily  Nephro-deysi 1 Tablet(s) Oral daily  sodium chloride 0.45%. 1000 milliLiter(s) (70 mL/Hr) IV Continuous <Continuous>    MEDICATIONS  (PRN):  dextrose 40% Gel 15 Gram(s) Oral once PRN Blood Glucose LESS THAN 70 milliGRAM(s)/deciliter  glucagon  Injectable 1 milliGRAM(s) IntraMuscular once PRN Glucose LESS THAN 70 milligrams/deciliter      Vital Signs Last 24 Hrs  T(F): 97.9 (02-15-20 @ 12:08), Max: 98.3 (02-14-20 @ 20:41)  HR: 56 (02-15-20 @ 12:08) (47 - 57)  BP: 121/72 (02-15-20 @ 12:08) (109/63 - 143/87)  RR: 18 (02-15-20 @ 12:08) (18 - 18)  SpO2: 96% (02-15-20 @ 12:08) (96% - 98%)  Telemetry:   CAPILLARY BLOOD GLUCOSE      POCT Blood Glucose.: 111 mg/dL (15 Feb 2020 17:25)  POCT Blood Glucose.: 136 mg/dL (15 Feb 2020 14:43)  POCT Blood Glucose.: 116 mg/dL (15 Feb 2020 08:52)  POCT Blood Glucose.: 105 mg/dL (15 Feb 2020 06:07)  POCT Blood Glucose.: 130 mg/dL (15 Feb 2020 00:12)    I&O's Summary    14 Feb 2020 07:01  -  15 Feb 2020 07:00  --------------------------------------------------------  IN: 1140 mL / OUT: 650 mL / NET: 490 mL    15 Feb 2020 07:01  -  15 Feb 2020 18:27  --------------------------------------------------------  IN: 0 mL / OUT: 1425 mL / NET: -1425 mL        PHYSICAL EXAM:  GENERAL: NAD, well-developed  HEAD:  Atraumatic, Normocephalic  EYES: EOMI, PERRLA, conjunctiva and sclera clear  NECK: Supple, No JVD  CHEST/LUNG: Clear to auscultation bilaterally; No wheeze  HEART: Regular rate and rhythm; No murmurs, rubs, or gallops  ABDOMEN: Soft, Nontender, Nondistended; Bowel sounds present  EXTREMITIES:  2+ Peripheral Pulses, No clubbing, cyanosis, or edema  PSYCH: AAOx3  NEUROLOGY: non-focal  SKIN: No rashes or lesions    LABS:                        13.6   6.32  )-----------( 383      ( 15 Feb 2020 06:51 )             41.7     02-15    139  |  103  |  8   ----------------------------<  123<H>  4.0   |  24  |  0.63    Ca    9.8      15 Feb 2020 06:51  Phos  3.2     02-14  Mg     2.0     02-14    TPro  6.6  /  Alb  3.8  /  TBili  1.3<H>  /  DBili  x   /  AST  20  /  ALT  28  /  AlkPhos  126<H>  02-14              RADIOLOGY & ADDITIONAL TESTS:    Imaging Personally Reviewed:    Consultant(s) Notes Reviewed:      Care Discussed with Consultants/Other Providers:

## 2020-02-15 NOTE — PROGRESS NOTE ADULT - ASSESSMENT
CVA  LUIS unremarkable  s/p ILR   further care as per neurology     Bradycardia   possible atrial bigeminy however after review of EKG it appears to be sinus bradycardia  likely neurologically induced   fu with EP for further recommendation

## 2020-02-15 NOTE — PROGRESS NOTE ADULT - ASSESSMENT
59Y Ukrainian speaking M with prior CVA 8y ago (no reported deficits) brought in by brother for concerns of generalized weakness and inability to speak for the past 3 weeks. On exam found to have no verbal output, appeared withdrawn and weak on neurological evaluation  He follows commands intermittently and is easily agitated.  Exam limited due to patient aggression on admission  CTH demonstrates chronic L MCA infarct with severely stenotic vs occluded distal L M1. this is confirmed on MRI but also an acute right post frontal infarct is seen. 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

## 2020-02-15 NOTE — PROGRESS NOTE ADULT - SUBJECTIVE AND OBJECTIVE BOX
PAM [id number]  59yMale  T(C): 36.6 (02-15-20 @ 20:57), Max: 36.8 (02-15-20 @ 00:08)  HR: 49 (02-15-20 @ 20:57) (47 - 56)  BP: 116/73 (02-15-20 @ 20:57) (109/63 - 128/78)  RR: 18 (02-15-20 @ 20:57) (18 - 18)  SpO2: 97% (02-15-20 @ 20:57) (96% - 98%)  Wt(kg): --  02-14 @ 07:01  -  02-15 @ 07:00  --------------------------------------------------------  IN: 1140 mL / OUT: 650 mL / NET: 490 mL    02-15 @ 07:01  -  02-15 @ 21:50  --------------------------------------------------------  IN: 1080 mL / OUT: 1425 mL / NET: -345 mL      normal cephalic atraumatic  s1s2   clear to ascultation bilaterally  soft, non tender, non distended no guarding or rebound  no clubbing cyanosis or edema

## 2020-02-16 LAB
ANION GAP SERPL CALC-SCNC: 14 MMOL/L — SIGNIFICANT CHANGE UP (ref 5–17)
BUN SERPL-MCNC: 7 MG/DL — SIGNIFICANT CHANGE UP (ref 7–23)
CALCIUM SERPL-MCNC: 9.7 MG/DL — SIGNIFICANT CHANGE UP (ref 8.4–10.5)
CHLORIDE SERPL-SCNC: 103 MMOL/L — SIGNIFICANT CHANGE UP (ref 96–108)
CO2 SERPL-SCNC: 23 MMOL/L — SIGNIFICANT CHANGE UP (ref 22–31)
CREAT SERPL-MCNC: 0.67 MG/DL — SIGNIFICANT CHANGE UP (ref 0.5–1.3)
GLUCOSE BLDC GLUCOMTR-MCNC: 105 MG/DL — HIGH (ref 70–99)
GLUCOSE BLDC GLUCOMTR-MCNC: 115 MG/DL — HIGH (ref 70–99)
GLUCOSE BLDC GLUCOMTR-MCNC: 175 MG/DL — HIGH (ref 70–99)
GLUCOSE BLDC GLUCOMTR-MCNC: 209 MG/DL — HIGH (ref 70–99)
GLUCOSE BLDC GLUCOMTR-MCNC: 60 MG/DL — LOW (ref 70–99)
GLUCOSE BLDC GLUCOMTR-MCNC: 69 MG/DL — LOW (ref 70–99)
GLUCOSE BLDC GLUCOMTR-MCNC: 95 MG/DL — SIGNIFICANT CHANGE UP (ref 70–99)
GLUCOSE SERPL-MCNC: 113 MG/DL — HIGH (ref 70–99)
MAGNESIUM SERPL-MCNC: 2.1 MG/DL — SIGNIFICANT CHANGE UP (ref 1.6–2.6)
PHOSPHATE SERPL-MCNC: 3.1 MG/DL — SIGNIFICANT CHANGE UP (ref 2.5–4.5)
POTASSIUM SERPL-MCNC: 3.7 MMOL/L — SIGNIFICANT CHANGE UP (ref 3.5–5.3)
POTASSIUM SERPL-SCNC: 3.7 MMOL/L — SIGNIFICANT CHANGE UP (ref 3.5–5.3)
SODIUM SERPL-SCNC: 140 MMOL/L — SIGNIFICANT CHANGE UP (ref 135–145)

## 2020-02-16 RX ADMIN — Medication 2: at 14:33

## 2020-02-16 RX ADMIN — CLOPIDOGREL BISULFATE 75 MILLIGRAM(S): 75 TABLET, FILM COATED ORAL at 13:55

## 2020-02-16 RX ADMIN — PREGABALIN 1000 MICROGRAM(S): 225 CAPSULE ORAL at 13:56

## 2020-02-16 RX ADMIN — SODIUM CHLORIDE 70 MILLILITER(S): 9 INJECTION, SOLUTION INTRAVENOUS at 22:23

## 2020-02-16 RX ADMIN — Medication 1 TABLET(S): at 13:56

## 2020-02-16 RX ADMIN — Medication 325 MILLIGRAM(S): at 13:54

## 2020-02-16 RX ADMIN — ATORVASTATIN CALCIUM 80 MILLIGRAM(S): 80 TABLET, FILM COATED ORAL at 22:23

## 2020-02-16 RX ADMIN — Medication 1 TABLET(S): at 13:57

## 2020-02-16 RX ADMIN — ENOXAPARIN SODIUM 40 MILLIGRAM(S): 100 INJECTION SUBCUTANEOUS at 13:56

## 2020-02-16 NOTE — CHART NOTE - NSCHARTNOTEFT_GEN_A_CORE
PAM JIMENEZ    Notified by RN patient with critical lab result blood sugar 60 amd asymptomatic. Patient seen and examined at bedside. Patient alert, aphasic at baseline but able to follow commands. Hypoglycemia protocol followed        Traci Castellano [facility]

## 2020-02-16 NOTE — PROGRESS NOTE ADULT - SUBJECTIVE AND OBJECTIVE BOX
Patient is a 59y old  Male who presents with a chief complaint of mouth lesions, possible stroke (16 Feb 2020 09:42)      SUBJECTIVE / OVERNIGHT EVENTS:no new c/o, tolerating NGT feeds, noted to be bryan in 40s, maintaining BP, not clear if symptomatic, ptn is nonverbal    MEDICATIONS  (STANDING):  aspirin 325 milliGRAM(s) Enteral Tube daily  atorvastatin 80 milliGRAM(s) Oral at bedtime  clopidogrel Tablet 75 milliGRAM(s) Oral daily  cyanocobalamin 1000 MICROGram(s) Oral daily  dextrose 5%. 1000 milliLiter(s) (50 mL/Hr) IV Continuous <Continuous>  dextrose 50% Injectable 12.5 Gram(s) IV Push once  dextrose 50% Injectable 25 Gram(s) IV Push once  dextrose 50% Injectable 25 Gram(s) IV Push once  enoxaparin Injectable 40 milliGRAM(s) SubCutaneous daily  ergocalciferol 47666 Unit(s) Oral every week  insulin lispro (HumaLOG) corrective regimen sliding scale   SubCutaneous every 6 hours  multivitamin/minerals 1 Tablet(s) Oral daily  Nephro-deysi 1 Tablet(s) Oral daily  sodium chloride 0.45%. 1000 milliLiter(s) (70 mL/Hr) IV Continuous <Continuous>    MEDICATIONS  (PRN):  dextrose 40% Gel 15 Gram(s) Oral once PRN Blood Glucose LESS THAN 70 milliGRAM(s)/deciliter  glucagon  Injectable 1 milliGRAM(s) IntraMuscular once PRN Glucose LESS THAN 70 milligrams/deciliter      Vital Signs Last 24 Hrs  T(F): 97.8 (02-16-20 @ 21:13), Max: 97.9 (02-16-20 @ 11:56)  HR: 50 (02-16-20 @ 21:13) (48 - 60)  BP: 111/78 (02-16-20 @ 21:13) (111/78 - 148/85)  RR: 18 (02-16-20 @ 21:13) (18 - 18)  SpO2: 97% (02-16-20 @ 21:13) (96% - 97%)  Telemetry:   CAPILLARY BLOOD GLUCOSE      POCT Blood Glucose.: 105 mg/dL (16 Feb 2020 19:03)  POCT Blood Glucose.: 69 mg/dL (16 Feb 2020 17:18)  POCT Blood Glucose.: 60 mg/dL (16 Feb 2020 17:17)  POCT Blood Glucose.: 209 mg/dL (16 Feb 2020 14:05)  POCT Blood Glucose.: 175 mg/dL (16 Feb 2020 12:45)  POCT Blood Glucose.: 95 mg/dL (16 Feb 2020 06:19)  POCT Blood Glucose.: 115 mg/dL (16 Feb 2020 00:28)    I&O's Summary    15 Feb 2020 07:01  -  16 Feb 2020 07:00  --------------------------------------------------------  IN: 2460 mL / OUT: 1725 mL / NET: 735 mL    16 Feb 2020 07:01  -  16 Feb 2020 22:52  --------------------------------------------------------  IN: 1344 mL / OUT: 1150 mL / NET: 194 mL        PHYSICAL EXAM:  GENERAL: NAD, well-developed  HEAD:  Atraumatic, Normocephalic  EYES: EOMI, PERRLA, conjunctiva and sclera clear  NECK: Supple, No JVD  CHEST/LUNG: Clear to auscultation bilaterally; No wheeze  HEART: Regular rate and rhythm; No murmurs, rubs, or gallops  ABDOMEN: Soft, Nontender, Nondistended; Bowel sounds present  EXTREMITIES:  2+ Peripheral Pulses, No clubbing, cyanosis, or edema  PSYCH: AAOx3  NEUROLOGY: non-focal  SKIN: No rashes or lesions    LABS:                        13.6   6.32  )-----------( 383      ( 15 Feb 2020 06:51 )             41.7     02-16    140  |  103  |  7   ----------------------------<  113<H>  3.7   |  23  |  0.67    Ca    9.7      16 Feb 2020 06:27  Phos  3.1     02-16  Mg     2.1     02-16                RADIOLOGY & ADDITIONAL TESTS:    Imaging Personally Reviewed:    Consultant(s) Notes Reviewed:      Care Discussed with Consultants/Other Providers:

## 2020-02-16 NOTE — PROGRESS NOTE ADULT - ASSESSMENT
CVA  LUIS unremarkable  s/p ILR   further care as per neurology     profound sinus bradycardia   ? CNS related   fu with EP to consider PPM placement

## 2020-02-16 NOTE — PROGRESS NOTE ADULT - ASSESSMENT
59Y Khmer speaking M with prior CVA 8y ago (no reported deficits) brought in by brother for concerns of generalized weakness and inability to speak for the past 3 weeks. On exam found to have no verbal output, appeared withdrawn and weak on neurological evaluation  He follows commands intermittently and is easily agitated.  Exam limited due to patient aggression on admission  CTH demonstrates chronic L MCA infarct with severely stenotic vs occluded distal L M1. this is confirmed on MRI but also an acute right post frontal infarct is seen. neuro aware, Cardiology aware, benign LUIS and s/p Medtronic ILR placement 2/13, will need outptn F/U in 7-10 days. however 2/2 ongoing bradycardia EPS reconsulted. 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

## 2020-02-16 NOTE — PROGRESS NOTE ADULT - SUBJECTIVE AND OBJECTIVE BOX
PAM JIMENEZ:[id number],   59yMale followed for:  No Known Allergies    PAST MEDICAL & SURGICAL HISTORY:  CVA (cerebral vascular accident)  No significant past surgical history    FAMILY HISTORY:  No pertinent family history in first degree relatives    MEDICATIONS  (STANDING):  aspirin 325 milliGRAM(s) Enteral Tube daily  atorvastatin 80 milliGRAM(s) Oral at bedtime  clopidogrel Tablet 75 milliGRAM(s) Oral daily  cyanocobalamin 1000 MICROGram(s) Oral daily  dextrose 5%. 1000 milliLiter(s) (50 mL/Hr) IV Continuous <Continuous>  dextrose 50% Injectable 12.5 Gram(s) IV Push once  dextrose 50% Injectable 25 Gram(s) IV Push once  dextrose 50% Injectable 25 Gram(s) IV Push once  enoxaparin Injectable 40 milliGRAM(s) SubCutaneous daily  ergocalciferol 18209 Unit(s) Oral every week  insulin lispro (HumaLOG) corrective regimen sliding scale   SubCutaneous every 6 hours  multivitamin/minerals 1 Tablet(s) Oral daily  Nephro-deysi 1 Tablet(s) Oral daily  sodium chloride 0.45%. 1000 milliLiter(s) (70 mL/Hr) IV Continuous <Continuous>    MEDICATIONS  (PRN):  dextrose 40% Gel 15 Gram(s) Oral once PRN Blood Glucose LESS THAN 70 milliGRAM(s)/deciliter  glucagon  Injectable 1 milliGRAM(s) IntraMuscular once PRN Glucose LESS THAN 70 milligrams/deciliter      Vital Signs Last 24 Hrs  T(C): 36.4 (16 Feb 2020 03:50), Max: 36.6 (15 Feb 2020 12:08)  T(F): 97.6 (16 Feb 2020 03:50), Max: 97.9 (15 Feb 2020 12:08)  HR: 60 (16 Feb 2020 03:50) (49 - 60)  BP: 148/85 (16 Feb 2020 03:50) (116/73 - 148/85)  BP(mean): --  RR: 18 (16 Feb 2020 03:50) (18 - 18)  SpO2: 96% (16 Feb 2020 03:50) (96% - 97%)  nc/at  s1s2  cta  soft, nt, nd no guarding or rebound  no c/c/e    CBC Full  -  ( 15 Feb 2020 06:51 )  WBC Count : 6.32 K/uL  RBC Count : 4.14 M/uL  Hemoglobin : 13.6 g/dL  Hematocrit : 41.7 %  Platelet Count - Automated : 383 K/uL  Mean Cell Volume : 100.7 fl  Mean Cell Hemoglobin : 32.9 pg  Mean Cell Hemoglobin Concentration : 32.6 gm/dL  Auto Neutrophil # : x  Auto Lymphocyte # : x  Auto Monocyte # : x  Auto Eosinophil # : x  Auto Basophil # : x  Auto Neutrophil % : x  Auto Lymphocyte % : x  Auto Monocyte % : x  Auto Eosinophil % : x  Auto Basophil % : x    02-16    140  |  103  |  7   ----------------------------<  113<H>  3.7   |  23  |  0.67    Ca    9.7      16 Feb 2020 06:27  Phos  3.1     02-16  Mg     2.1     02-16

## 2020-02-16 NOTE — PROGRESS NOTE ADULT - SUBJECTIVE AND OBJECTIVE BOX
Subjective: Patient seen and examined. No new events except as noted.     SUBJECTIVE/ROS:        MEDICATIONS:  MEDICATIONS  (STANDING):  aspirin 325 milliGRAM(s) Enteral Tube daily  atorvastatin 80 milliGRAM(s) Oral at bedtime  clopidogrel Tablet 75 milliGRAM(s) Oral daily  cyanocobalamin 1000 MICROGram(s) Oral daily  dextrose 5%. 1000 milliLiter(s) (50 mL/Hr) IV Continuous <Continuous>  dextrose 50% Injectable 12.5 Gram(s) IV Push once  dextrose 50% Injectable 25 Gram(s) IV Push once  dextrose 50% Injectable 25 Gram(s) IV Push once  enoxaparin Injectable 40 milliGRAM(s) SubCutaneous daily  ergocalciferol 17910 Unit(s) Oral every week  insulin lispro (HumaLOG) corrective regimen sliding scale   SubCutaneous every 6 hours  multivitamin/minerals 1 Tablet(s) Oral daily  Nephro-deysi 1 Tablet(s) Oral daily  sodium chloride 0.45%. 1000 milliLiter(s) (70 mL/Hr) IV Continuous <Continuous>      PHYSICAL EXAM:  T(C): 36.4 (02-16-20 @ 03:50), Max: 36.6 (02-15-20 @ 12:08)  HR: 60 (02-16-20 @ 03:50) (49 - 60)  BP: 148/85 (02-16-20 @ 03:50) (116/73 - 148/85)  RR: 18 (02-16-20 @ 03:50) (18 - 18)  SpO2: 96% (02-16-20 @ 03:50) (96% - 97%)  Wt(kg): --  I&O's Summary    15 Feb 2020 07:01  -  16 Feb 2020 07:00  --------------------------------------------------------  IN: 1810 mL / OUT: 1725 mL / NET: 85 mL            JVP: Normal  Neck: supple  Lung: clear   CV: S1 S2 , Murmur:  Abd: soft  Ext: No edema  neuro: Awake   Psych: flat affect  Skin: normal``    LABS/DATA:    CARDIAC MARKERS:                                13.6   6.32  )-----------( 383      ( 15 Feb 2020 06:51 )             41.7     02-16    140  |  103  |  7   ----------------------------<  113<H>  3.7   |  23  |  0.67    Ca    9.7      16 Feb 2020 06:27  Phos  3.1     02-16  Mg     2.1     02-16      proBNP:   Lipid Profile:   HgA1c:   TSH:     TELE:  EKG:

## 2020-02-17 LAB
GLUCOSE BLDC GLUCOMTR-MCNC: 127 MG/DL — HIGH (ref 70–99)
GLUCOSE BLDC GLUCOMTR-MCNC: 137 MG/DL — HIGH (ref 70–99)
GLUCOSE BLDC GLUCOMTR-MCNC: 163 MG/DL — HIGH (ref 70–99)
GLUCOSE BLDC GLUCOMTR-MCNC: 92 MG/DL — SIGNIFICANT CHANGE UP (ref 70–99)
GLUCOSE BLDC GLUCOMTR-MCNC: 99 MG/DL — SIGNIFICANT CHANGE UP (ref 70–99)

## 2020-02-17 PROCEDURE — 99232 SBSQ HOSP IP/OBS MODERATE 35: CPT

## 2020-02-17 RX ORDER — SODIUM CHLORIDE 9 MG/ML
1000 INJECTION, SOLUTION INTRAVENOUS
Refills: 0 | Status: DISCONTINUED | OUTPATIENT
Start: 2020-02-18 | End: 2020-02-18

## 2020-02-17 RX ADMIN — CLOPIDOGREL BISULFATE 75 MILLIGRAM(S): 75 TABLET, FILM COATED ORAL at 14:41

## 2020-02-17 RX ADMIN — Medication 1: at 06:24

## 2020-02-17 RX ADMIN — Medication 1 TABLET(S): at 14:42

## 2020-02-17 RX ADMIN — ATORVASTATIN CALCIUM 80 MILLIGRAM(S): 80 TABLET, FILM COATED ORAL at 21:12

## 2020-02-17 RX ADMIN — Medication 325 MILLIGRAM(S): at 14:41

## 2020-02-17 RX ADMIN — ENOXAPARIN SODIUM 40 MILLIGRAM(S): 100 INJECTION SUBCUTANEOUS at 14:41

## 2020-02-17 RX ADMIN — PREGABALIN 1000 MICROGRAM(S): 225 CAPSULE ORAL at 14:42

## 2020-02-17 RX ADMIN — Medication 1 TABLET(S): at 14:41

## 2020-02-17 NOTE — PROGRESS NOTE ADULT - ASSESSMENT
59Y Hebrew speaking M with prior CVA 8y ago (no reported deficits) brought in by brother for concerns of generalized weakness and inability to speak for the past 3 weeks. On exam found to have no verbal output, appeared withdrawn and weak on neurological evaluation  He follows commands intermittently and is easily agitated.  Exam limited due to patient aggression on admission  CTH demonstrates chronic L MCA infarct with severely stenotic vs occluded distal L M1. this is confirmed on MRI but also an acute right post frontal infarct is seen. neuro aware, Cardiology aware, benign LUIS and s/p Medtr[employer] ILR placement 2/13, will need outptn F/U in 7-10 days. however 2/2 ongoing bradycardia EPS reconsulted. recommended to be on prn atropine or isoproternol/dobutamine infusion during the Procedure. PPM not recommended.   on cbc initial work up revealed megaloblastic rbc indices and  a Folate deficiency and a mild vB12 deficiency,  both supplemented parenterally and started daily po  GOC d/w crystal, ptn's son, he wants his father to be full code  Con't  mg QD, Plavix 75mg, and LIPITOR via NGT, LDL is below 70  - TTE with bubble study: benign  -DVT ppx w sc Lovenox  -social service consult  -PT eval, will prob need acute rehab

## 2020-02-17 NOTE — PROGRESS NOTE ADULT - SUBJECTIVE AND OBJECTIVE BOX
PAM JIMENEZ:[id number],   59yMale followed for:  No Known Allergies    PAST MEDICAL & SURGICAL HISTORY:  CVA (cerebral vascular accident)  No significant past surgical history    FAMILY HISTORY:  No pertinent family history in first degree relatives    MEDICATIONS  (STANDING):  aspirin 325 milliGRAM(s) Enteral Tube daily  atorvastatin 80 milliGRAM(s) Oral at bedtime  clopidogrel Tablet 75 milliGRAM(s) Oral daily  cyanocobalamin 1000 MICROGram(s) Oral daily  dextrose 5%. 1000 milliLiter(s) (50 mL/Hr) IV Continuous <Continuous>  dextrose 50% Injectable 12.5 Gram(s) IV Push once  dextrose 50% Injectable 25 Gram(s) IV Push once  dextrose 50% Injectable 25 Gram(s) IV Push once  enoxaparin Injectable 40 milliGRAM(s) SubCutaneous daily  ergocalciferol 62551 Unit(s) Oral every week  insulin lispro (HumaLOG) corrective regimen sliding scale   SubCutaneous every 6 hours  multivitamin/minerals 1 Tablet(s) Oral daily  Nephro-deysi 1 Tablet(s) Oral daily  sodium chloride 0.45%. 1000 milliLiter(s) (70 mL/Hr) IV Continuous <Continuous>    MEDICATIONS  (PRN):  dextrose 40% Gel 15 Gram(s) Oral once PRN Blood Glucose LESS THAN 70 milliGRAM(s)/deciliter  glucagon  Injectable 1 milliGRAM(s) IntraMuscular once PRN Glucose LESS THAN 70 milligrams/deciliter      Vital Signs Last 24 Hrs  T(C): 36.4 (17 Feb 2020 04:29), Max: 37.3 (17 Feb 2020 00:04)  T(F): 97.6 (17 Feb 2020 04:29), Max: 99.1 (17 Feb 2020 00:04)  HR: 54 (17 Feb 2020 04:29) (48 - 54)  BP: 136/82 (17 Feb 2020 04:29) (111/78 - 136/82)  BP(mean): --  RR: 18 (17 Feb 2020 04:29) (18 - 18)  SpO2: 99% (17 Feb 2020 04:29) (96% - 99%)  nc/at  s1s2  cta  soft, nt, nd no guarding or rebound  no c/c/e      02-16    140  |  103  |  7   ----------------------------<  113<H>  3.7   |  23  |  0.67    Ca    9.7      16 Feb 2020 06:27  Phos  3.1     02-16  Mg     2.1     02-16

## 2020-02-17 NOTE — PROGRESS NOTE ADULT - SUBJECTIVE AND OBJECTIVE BOX
Patient is a 59y old  Male who presents with a chief complaint of mouth lesions, possible stroke (17 Feb 2020 11:03)      SUBJECTIVE / OVERNIGHT EVENTS:    MEDICATIONS  (STANDING):  aspirin 325 milliGRAM(s) Enteral Tube daily  atorvastatin 80 milliGRAM(s) Oral at bedtime  clopidogrel Tablet 75 milliGRAM(s) Oral daily  cyanocobalamin 1000 MICROGram(s) Oral daily  dextrose 5%. 1000 milliLiter(s) (50 mL/Hr) IV Continuous <Continuous>  dextrose 50% Injectable 12.5 Gram(s) IV Push once  dextrose 50% Injectable 25 Gram(s) IV Push once  dextrose 50% Injectable 25 Gram(s) IV Push once  enoxaparin Injectable 40 milliGRAM(s) SubCutaneous daily  ergocalciferol 18902 Unit(s) Oral every week  insulin lispro (HumaLOG) corrective regimen sliding scale   SubCutaneous every 6 hours  multivitamin/minerals 1 Tablet(s) Oral daily  Nephro-deysi 1 Tablet(s) Oral daily    MEDICATIONS  (PRN):  dextrose 40% Gel 15 Gram(s) Oral once PRN Blood Glucose LESS THAN 70 milliGRAM(s)/deciliter  glucagon  Injectable 1 milliGRAM(s) IntraMuscular once PRN Glucose LESS THAN 70 milligrams/deciliter      Vital Signs Last 24 Hrs  T(F): 97.1 (02-17-20 @ 20:58), Max: 99.1 (02-17-20 @ 00:04)  HR: 50 (02-17-20 @ 20:58) (48 - 56)  BP: 116/77 (02-17-20 @ 20:58) (111/75 - 136/82)  RR: 18 (02-17-20 @ 20:58) (18 - 18)  SpO2: 97% (02-17-20 @ 20:58) (97% - 99%)  Telemetry:   CAPILLARY BLOOD GLUCOSE      POCT Blood Glucose.: 92 mg/dL (17 Feb 2020 17:33)  POCT Blood Glucose.: 137 mg/dL (17 Feb 2020 13:11)  POCT Blood Glucose.: 163 mg/dL (17 Feb 2020 06:17)  POCT Blood Glucose.: 99 mg/dL (16 Feb 2020 23:52)    I&O's Summary    16 Feb 2020 07:01  -  17 Feb 2020 07:00  --------------------------------------------------------  IN: 3009 mL / OUT: 1470 mL / NET: 1539 mL    17 Feb 2020 07:01  -  17 Feb 2020 22:27  --------------------------------------------------------  IN: 525 mL / OUT: 850 mL / NET: -325 mL        PHYSICAL EXAM:  GENERAL: NAD, well-developed  HEAD:  Atraumatic, Normocephalic  EYES: EOMI, PERRLA, conjunctiva and sclera clear  NECK: Supple, No JVD  CHEST/LUNG: Clear to auscultation bilaterally; No wheeze  HEART: Regular rate and rhythm; No murmurs, rubs, or gallops  ABDOMEN: Soft, Nontender, Nondistended; Bowel sounds present  EXTREMITIES:  2+ Peripheral Pulses, No clubbing, cyanosis, or edema  PSYCH: AAOx3  NEUROLOGY: non-focal  SKIN: No rashes or lesions    LABS:    02-16    140  |  103  |  7   ----------------------------<  113<H>  3.7   |  23  |  0.67    Ca    9.7      16 Feb 2020 06:27  Phos  3.1     02-16  Mg     2.1     02-16                RADIOLOGY & ADDITIONAL TESTS:    Imaging Personally Reviewed:    Consultant(s) Notes Reviewed:      Care Discussed with Consultants/Other Providers: Patient is a 59y old  Male who presents with a chief complaint of mouth lesions, possible stroke (17 Feb 2020 11:03)      SUBJECTIVE / OVERNIGHT EVENTS: no new developments, tolerating NGT feeds, awaiting PEG feeds    MEDICATIONS  (STANDING):  aspirin 325 milliGRAM(s) Enteral Tube daily  atorvastatin 80 milliGRAM(s) Oral at bedtime  clopidogrel Tablet 75 milliGRAM(s) Oral daily  cyanocobalamin 1000 MICROGram(s) Oral daily  dextrose 5%. 1000 milliLiter(s) (50 mL/Hr) IV Continuous <Continuous>  dextrose 50% Injectable 12.5 Gram(s) IV Push once  dextrose 50% Injectable 25 Gram(s) IV Push once  dextrose 50% Injectable 25 Gram(s) IV Push once  enoxaparin Injectable 40 milliGRAM(s) SubCutaneous daily  ergocalciferol 53629 Unit(s) Oral every week  insulin lispro (HumaLOG) corrective regimen sliding scale   SubCutaneous every 6 hours  multivitamin/minerals 1 Tablet(s) Oral daily  Nephro-deysi 1 Tablet(s) Oral daily    MEDICATIONS  (PRN):  dextrose 40% Gel 15 Gram(s) Oral once PRN Blood Glucose LESS THAN 70 milliGRAM(s)/deciliter  glucagon  Injectable 1 milliGRAM(s) IntraMuscular once PRN Glucose LESS THAN 70 milligrams/deciliter      Vital Signs Last 24 Hrs  T(F): 97.1 (02-17-20 @ 20:58), Max: 99.1 (02-17-20 @ 00:04)  HR: 50 (02-17-20 @ 20:58) (48 - 56)  BP: 116/77 (02-17-20 @ 20:58) (111/75 - 136/82)  RR: 18 (02-17-20 @ 20:58) (18 - 18)  SpO2: 97% (02-17-20 @ 20:58) (97% - 99%)  Telemetry:   CAPILLARY BLOOD GLUCOSE      POCT Blood Glucose.: 92 mg/dL (17 Feb 2020 17:33)  POCT Blood Glucose.: 137 mg/dL (17 Feb 2020 13:11)  POCT Blood Glucose.: 163 mg/dL (17 Feb 2020 06:17)  POCT Blood Glucose.: 99 mg/dL (16 Feb 2020 23:52)    I&O's Summary    16 Feb 2020 07:01 - 17 Feb 2020 07:00  --------------------------------------------------------  IN: 3009 mL / OUT: 1470 mL / NET: 1539 mL    17 Feb 2020 07:01  -  17 Feb 2020 22:27  --------------------------------------------------------  IN: 525 mL / OUT: 850 mL / NET: -325 mL        PHYSICAL EXAM:  GENERAL: NAD, well-developed  HEAD:  Atraumatic, Normocephalic  EYES: EOMI, PERRLA, conjunctiva and sclera clear  NECK: Supple, No JVD  CHEST/LUNG: Clear to auscultation bilaterally; No wheeze  HEART: Regular rate and rhythm; No murmurs, rubs, or gallops  ABDOMEN: Soft, Nontender, Nondistended; Bowel sounds present  EXTREMITIES:  2+ Peripheral Pulses, No clubbing, cyanosis, or edema  PSYCH: AAOx3  NEUROLOGY: non-focal  SKIN: No rashes or lesions    LABS:    02-16    140  |  103  |  7   ----------------------------<  113<H>  3.7   |  23  |  0.67    Ca    9.7      16 Feb 2020 06:27  Phos  3.1     02-16  Mg     2.1     02-16                RADIOLOGY & ADDITIONAL TESTS:    Imaging Personally Reviewed:    Consultant(s) Notes Reviewed:      Care Discussed with Consultants/Other Providers:

## 2020-02-17 NOTE — PROGRESS NOTE ADULT - ASSESSMENT
CVA  LUIS unremarkable  s/p ILR   on dapt    profound sinus bradycardia   ? CNS related   fu with EP to consider PPM placement

## 2020-02-17 NOTE — PROGRESS NOTE ADULT - SUBJECTIVE AND OBJECTIVE BOX
Subjective: Patient seen and examined. No new events except as noted.     SUBJECTIVE/ROS:    MEDICATIONS:  MEDICATIONS  (STANDING):  aspirin 325 milliGRAM(s) Enteral Tube daily  atorvastatin 80 milliGRAM(s) Oral at bedtime  clopidogrel Tablet 75 milliGRAM(s) Oral daily  cyanocobalamin 1000 MICROGram(s) Oral daily  dextrose 5%. 1000 milliLiter(s) (50 mL/Hr) IV Continuous <Continuous>  dextrose 50% Injectable 12.5 Gram(s) IV Push once  dextrose 50% Injectable 25 Gram(s) IV Push once  dextrose 50% Injectable 25 Gram(s) IV Push once  enoxaparin Injectable 40 milliGRAM(s) SubCutaneous daily  ergocalciferol 55174 Unit(s) Oral every week  insulin lispro (HumaLOG) corrective regimen sliding scale   SubCutaneous every 6 hours  multivitamin/minerals 1 Tablet(s) Oral daily  Nephro-deysi 1 Tablet(s) Oral daily  sodium chloride 0.45%. 1000 milliLiter(s) (70 mL/Hr) IV Continuous <Continuous>      PHYSICAL EXAM:  T(C): 36.4 (02-17-20 @ 04:29), Max: 37.3 (02-17-20 @ 00:04)  HR: 54 (02-17-20 @ 04:29) (48 - 54)  BP: 136/82 (02-17-20 @ 04:29) (111/78 - 136/82)  RR: 18 (02-17-20 @ 04:29) (18 - 18)  SpO2: 99% (02-17-20 @ 04:29) (96% - 99%)  Wt(kg): --  I&O's Summary    16 Feb 2020 07:01  -  17 Feb 2020 07:00  --------------------------------------------------------  IN: 3009 mL / OUT: 1470 mL / NET: 1539 mL            JVP: Normal  Neck: supple  Lung: clear   CV: S1 S2 , Murmur:  Abd: soft  Ext: No edema  neuro: Awake / alert  Psych: flat affect  Skin: normal``    LABS/DATA:    CARDIAC MARKERS:            02-16    140  |  103  |  7   ----------------------------<  113<H>  3.7   |  23  |  0.67    Ca    9.7      16 Feb 2020 06:27  Phos  3.1     02-16  Mg     2.1     02-16      proBNP:   Lipid Profile:   HgA1c:   TSH:     TELE:  EKG:

## 2020-02-17 NOTE — PROGRESS NOTE ADULT - SUBJECTIVE AND OBJECTIVE BOX
24H hour events: stable, asymptomatic (and normal BP) sinus bradycardia    MEDICATIONS:  clopidogrel Tablet 75 milliGRAM(s) Oral daily  enoxaparin Injectable 40 milliGRAM(s) SubCutaneous daily  aspirin 325 milliGRAM(s) Enteral Tube daily  atorvastatin 80 milliGRAM(s) Oral at bedtime  glucagon  Injectable 1 milliGRAM(s) IntraMuscular once PRN  insulin lispro (HumaLOG) corrective regimen sliding scale   SubCutaneous every 6 hours  cyanocobalamin 1000 MICROGram(s) Oral daily  dextrose 5%. 1000 milliLiter(s) IV Continuous <Continuous>  ergocalciferol 15456 Unit(s) Oral every week  multivitamin/minerals 1 Tablet(s) Oral daily  Nephro-deysi 1 Tablet(s) Oral daily    PHYSICAL EXAM:  T(C): 36.4 (02-17-20 @ 04:29), Max: 37.3 (02-17-20 @ 00:04)  HR: 54 (02-17-20 @ 04:29) (48 - 54)  BP: 136/82 (02-17-20 @ 04:29) (111/78 - 136/82)  RR: 18 (02-17-20 @ 04:29) (18 - 18)  SpO2: 99% (02-17-20 @ 04:29) (96% - 99%)  Wt(kg): --  I&O's Summary    16 Feb 2020 07:01  -  17 Feb 2020 07:00  --------------------------------------------------------  IN: 3009 mL / OUT: 1470 mL / NET: 1539 mL    LABS:	 	    02-16    140  |  103  |  7   ----------------------------<  113<H>  3.7   |  23  |  0.67    Ca    9.7      16 Feb 2020 06:27  Phos  3.1     02-16  Mg     2.1     02-16    TELEMETRY: sinus rhythm 40-50s (lowest 39 while sleeping)      ASSESSMENT/PLAN: 	  patient with sinus bradycardia post CVA  No indication for Pacemaker  for PEG can give atropine or isoproternol/dobutamine infusion to maintain rate under anesthesia  No indication for temp wire

## 2020-02-18 PROBLEM — Z00.00 ENCOUNTER FOR PREVENTIVE HEALTH EXAMINATION: Status: ACTIVE | Noted: 2020-02-18

## 2020-02-18 LAB
ANION GAP SERPL CALC-SCNC: 12 MMOL/L — SIGNIFICANT CHANGE UP (ref 5–17)
APTT BLD: 34.8 SEC — SIGNIFICANT CHANGE UP (ref 27.5–36.3)
BLD GP AB SCN SERPL QL: NEGATIVE — SIGNIFICANT CHANGE UP
BUN SERPL-MCNC: 8 MG/DL — SIGNIFICANT CHANGE UP (ref 7–23)
CALCIUM SERPL-MCNC: 9.6 MG/DL — SIGNIFICANT CHANGE UP (ref 8.4–10.5)
CHLORIDE SERPL-SCNC: 102 MMOL/L — SIGNIFICANT CHANGE UP (ref 96–108)
CO2 SERPL-SCNC: 25 MMOL/L — SIGNIFICANT CHANGE UP (ref 22–31)
CREAT SERPL-MCNC: 0.73 MG/DL — SIGNIFICANT CHANGE UP (ref 0.5–1.3)
GLUCOSE BLDC GLUCOMTR-MCNC: 103 MG/DL — HIGH (ref 70–99)
GLUCOSE BLDC GLUCOMTR-MCNC: 110 MG/DL — HIGH (ref 70–99)
GLUCOSE BLDC GLUCOMTR-MCNC: 110 MG/DL — HIGH (ref 70–99)
GLUCOSE BLDC GLUCOMTR-MCNC: 111 MG/DL — HIGH (ref 70–99)
GLUCOSE BLDC GLUCOMTR-MCNC: 123 MG/DL — HIGH (ref 70–99)
GLUCOSE SERPL-MCNC: 112 MG/DL — HIGH (ref 70–99)
HCT VFR BLD CALC: 42.7 % — SIGNIFICANT CHANGE UP (ref 39–50)
HGB BLD-MCNC: 14.6 G/DL — SIGNIFICANT CHANGE UP (ref 13–17)
INR BLD: 1 RATIO — SIGNIFICANT CHANGE UP (ref 0.88–1.16)
MCHC RBC-ENTMCNC: 34.2 GM/DL — SIGNIFICANT CHANGE UP (ref 32–36)
MCHC RBC-ENTMCNC: 34.6 PG — HIGH (ref 27–34)
MCV RBC AUTO: 101.2 FL — HIGH (ref 80–100)
NRBC # BLD: 0 /100 WBCS — SIGNIFICANT CHANGE UP (ref 0–0)
PLATELET # BLD AUTO: 356 K/UL — SIGNIFICANT CHANGE UP (ref 150–400)
POTASSIUM SERPL-MCNC: 4 MMOL/L — SIGNIFICANT CHANGE UP (ref 3.5–5.3)
POTASSIUM SERPL-SCNC: 4 MMOL/L — SIGNIFICANT CHANGE UP (ref 3.5–5.3)
PROTHROM AB SERPL-ACNC: 11.4 SEC — SIGNIFICANT CHANGE UP (ref 10–12.9)
RBC # BLD: 4.22 M/UL — SIGNIFICANT CHANGE UP (ref 4.2–5.8)
RBC # FLD: 13.4 % — SIGNIFICANT CHANGE UP (ref 10.3–14.5)
RH IG SCN BLD-IMP: POSITIVE — SIGNIFICANT CHANGE UP
SODIUM SERPL-SCNC: 139 MMOL/L — SIGNIFICANT CHANGE UP (ref 135–145)
WBC # BLD: 6.78 K/UL — SIGNIFICANT CHANGE UP (ref 3.8–10.5)
WBC # FLD AUTO: 6.78 K/UL — SIGNIFICANT CHANGE UP (ref 3.8–10.5)

## 2020-02-18 RX ORDER — ASPIRIN/CALCIUM CARB/MAGNESIUM 324 MG
300 TABLET ORAL ONCE
Refills: 0 | Status: COMPLETED | OUTPATIENT
Start: 2020-02-18 | End: 2020-02-18

## 2020-02-18 RX ADMIN — SODIUM CHLORIDE 40 MILLILITER(S): 9 INJECTION, SOLUTION INTRAVENOUS at 00:02

## 2020-02-18 RX ADMIN — Medication 300 MILLIGRAM(S): at 16:38

## 2020-02-18 NOTE — CHART NOTE - NSCHARTNOTEFT_GEN_A_CORE
As per Dr. Peterson, patient low risk for temporary cessation of ASA/Plavix although would prefer stopping plavix and keeping ASA if possible. No neurovascular contraindications to procedure.

## 2020-02-18 NOTE — PROGRESS NOTE ADULT - SUBJECTIVE AND OBJECTIVE BOX
Subjective: Patient seen and examined. No new events except as noted.     SUBJECTIVE/ROS:        MEDICATIONS:  MEDICATIONS  (STANDING):  aspirin 325 milliGRAM(s) Enteral Tube daily  atorvastatin 80 milliGRAM(s) Oral at bedtime  clopidogrel Tablet 75 milliGRAM(s) Oral daily  cyanocobalamin 1000 MICROGram(s) Oral daily  dextrose 5%. 1000 milliLiter(s) (50 mL/Hr) IV Continuous <Continuous>  dextrose 5%. 1000 milliLiter(s) (40 mL/Hr) IV Continuous <Continuous>  dextrose 50% Injectable 12.5 Gram(s) IV Push once  dextrose 50% Injectable 25 Gram(s) IV Push once  dextrose 50% Injectable 25 Gram(s) IV Push once  enoxaparin Injectable 40 milliGRAM(s) SubCutaneous daily  ergocalciferol 15773 Unit(s) Oral every week  insulin lispro (HumaLOG) corrective regimen sliding scale   SubCutaneous every 6 hours  multivitamin/minerals 1 Tablet(s) Oral daily  Nephro-deysi 1 Tablet(s) Oral daily      PHYSICAL EXAM:  T(C): 36.4 (02-18-20 @ 04:45), Max: 36.4 (02-18-20 @ 00:16)  HR: 51 (02-18-20 @ 04:45) (50 - 56)  BP: 107/74 (02-18-20 @ 04:45) (107/74 - 129/80)  RR: 18 (02-18-20 @ 04:45) (18 - 18)  SpO2: 95% (02-18-20 @ 04:45) (95% - 97%)  Wt(kg): --  I&O's Summary    17 Feb 2020 07:01  -  18 Feb 2020 07:00  --------------------------------------------------------  IN: 1145 mL / OUT: 1075 mL / NET: 70 mL            JVP: Normal  Neck: supple  Lung: clear   CV: S1 S2 , Murmur:  Abd: soft  Ext: No edema  neuro: Awake   Psych: flat affect  Skin: normal``    LABS/DATA:    CARDIAC MARKERS:                                14.6   6.78  )-----------( 356      ( 18 Feb 2020 06:49 )             42.7     02-18    139  |  102  |  8   ----------------------------<  112<H>  4.0   |  25  |  0.73    Ca    9.6      18 Feb 2020 06:43      proBNP:   Lipid Profile:   HgA1c:   TSH:     TELE:  EKG:

## 2020-02-18 NOTE — PROGRESS NOTE ADULT - ASSESSMENT
59Y Albanian speaking M with prior CVA 8y ago (no reported deficits) brought in by brother for concerns of generalized weakness and inability to speak for the past 3 weeks. On exam found to have no verbal output, appeared withdrawn and weak on neurological evaluation  He follows commands intermittently and is easily agitated.  Exam limited due to patient aggression on admission  CTH demonstrates chronic L MCA infarct with severely stenotic vs occluded distal L M1. this is confirmed on MRI but also an acute right post frontal infarct is seen. neuro aware, Cardiology aware, benign LUIS and s/p Medtronic ILR placement 2/13, will need outptn F/U in 7-10 days. however 2/2 ongoing bradycardia EPS reconsulted. recommended to be on prn atropine or isoproternol/dobutamine infusion during the Procedure. PPM not recommended.   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

## 2020-02-18 NOTE — PROGRESS NOTE ADULT - SUBJECTIVE AND OBJECTIVE BOX
PAM JIMENEZ:[id number],   59yMale followed for:  No Known Allergies    PAST MEDICAL & SURGICAL HISTORY:  CVA (cerebral vascular accident)  No significant past surgical history    FAMILY HISTORY:  No pertinent family history in first degree relatives    MEDICATIONS  (STANDING):  aspirin 325 milliGRAM(s) Enteral Tube daily  atorvastatin 80 milliGRAM(s) Oral at bedtime  clopidogrel Tablet 75 milliGRAM(s) Oral daily  cyanocobalamin 1000 MICROGram(s) Oral daily  dextrose 5%. 1000 milliLiter(s) (50 mL/Hr) IV Continuous <Continuous>  dextrose 5%. 1000 milliLiter(s) (40 mL/Hr) IV Continuous <Continuous>  dextrose 50% Injectable 12.5 Gram(s) IV Push once  dextrose 50% Injectable 25 Gram(s) IV Push once  dextrose 50% Injectable 25 Gram(s) IV Push once  enoxaparin Injectable 40 milliGRAM(s) SubCutaneous daily  ergocalciferol 78115 Unit(s) Oral every week  insulin lispro (HumaLOG) corrective regimen sliding scale   SubCutaneous every 6 hours  multivitamin/minerals 1 Tablet(s) Oral daily  Nephro-deysi 1 Tablet(s) Oral daily    MEDICATIONS  (PRN):  dextrose 40% Gel 15 Gram(s) Oral once PRN Blood Glucose LESS THAN 70 milliGRAM(s)/deciliter  glucagon  Injectable 1 milliGRAM(s) IntraMuscular once PRN Glucose LESS THAN 70 milligrams/deciliter      Vital Signs Last 24 Hrs  T(C): 36.4 (18 Feb 2020 04:45), Max: 36.4 (18 Feb 2020 00:16)  T(F): 97.5 (18 Feb 2020 04:45), Max: 97.5 (18 Feb 2020 00:16)  HR: 51 (18 Feb 2020 04:45) (50 - 56)  BP: 107/74 (18 Feb 2020 04:45) (107/74 - 129/80)  BP(mean): --  RR: 18 (18 Feb 2020 04:45) (18 - 18)  SpO2: 95% (18 Feb 2020 04:45) (95% - 97%)  nc/at  s1s2  cta  soft, nt, nd no guarding or rebound  no c/c/e    CBC Full  -  ( 18 Feb 2020 06:49 )  WBC Count : 6.78 K/uL  RBC Count : 4.22 M/uL  Hemoglobin : 14.6 g/dL  Hematocrit : 42.7 %  Platelet Count - Automated : 356 K/uL  Mean Cell Volume : 101.2 fl  Mean Cell Hemoglobin : 34.6 pg  Mean Cell Hemoglobin Concentration : 34.2 gm/dL  Auto Neutrophil # : x  Auto Lymphocyte # : x  Auto Monocyte # : x  Auto Eosinophil # : x  Auto Basophil # : x  Auto Neutrophil % : x  Auto Lymphocyte % : x  Auto Monocyte % : x  Auto Eosinophil % : x  Auto Basophil % : x    02-18    139  |  102  |  8   ----------------------------<  112<H>  4.0   |  25  |  0.73    Ca    9.6      18 Feb 2020 06:43      PT/INR - ( 18 Feb 2020 06:49 )   PT: 11.4 sec;   INR: 1.00 ratio         PTT - ( 18 Feb 2020 06:49 )  PTT:34.8 sec

## 2020-02-18 NOTE — PROGRESS NOTE ADULT - SUBJECTIVE AND OBJECTIVE BOX
Patient is a 59y old  Male who presents with a chief complaint of mouth lesions, possible stroke (18 Feb 2020 08:59)      SUBJECTIVE / OVERNIGHT EVENTS: no new developments, plavix hold 2/2 PEG placement prep. cleared by neurology, cont  mg    MEDICATIONS  (STANDING):  aspirin 325 milliGRAM(s) Enteral Tube daily  atorvastatin 80 milliGRAM(s) Oral at bedtime  cyanocobalamin 1000 MICROGram(s) Oral daily  dextrose 5%. 1000 milliLiter(s) (50 mL/Hr) IV Continuous <Continuous>  dextrose 50% Injectable 12.5 Gram(s) IV Push once  dextrose 50% Injectable 25 Gram(s) IV Push once  dextrose 50% Injectable 25 Gram(s) IV Push once  enoxaparin Injectable 40 milliGRAM(s) SubCutaneous daily  ergocalciferol 79559 Unit(s) Oral every week  insulin lispro (HumaLOG) corrective regimen sliding scale   SubCutaneous every 6 hours  multivitamin/minerals 1 Tablet(s) Oral daily  Nephro-deysi 1 Tablet(s) Oral daily    MEDICATIONS  (PRN):  dextrose 40% Gel 15 Gram(s) Oral once PRN Blood Glucose LESS THAN 70 milliGRAM(s)/deciliter  glucagon  Injectable 1 milliGRAM(s) IntraMuscular once PRN Glucose LESS THAN 70 milligrams/deciliter      Vital Signs Last 24 Hrs  T(F): 97.9 (02-18-20 @ 20:36), Max: 97.9 (02-18-20 @ 12:00)  HR: 47 (02-18-20 @ 20:36) (47 - 51)  BP: 136/54 (02-18-20 @ 20:36) (107/74 - 136/54)  RR: 18 (02-18-20 @ 20:36) (18 - 18)  SpO2: 98% (02-18-20 @ 20:36) (95% - 98%)  Telemetry:   CAPILLARY BLOOD GLUCOSE      POCT Blood Glucose.: 123 mg/dL (18 Feb 2020 22:02)  POCT Blood Glucose.: 110 mg/dL (18 Feb 2020 17:31)  POCT Blood Glucose.: 110 mg/dL (18 Feb 2020 12:46)  POCT Blood Glucose.: 111 mg/dL (18 Feb 2020 08:41)  POCT Blood Glucose.: 103 mg/dL (18 Feb 2020 06:12)  POCT Blood Glucose.: 127 mg/dL (17 Feb 2020 23:49)    I&O's Summary    17 Feb 2020 07:01  -  18 Feb 2020 07:00  --------------------------------------------------------  IN: 1145 mL / OUT: 1075 mL / NET: 70 mL    18 Feb 2020 07:01  -  18 Feb 2020 22:46  --------------------------------------------------------  IN: 480 mL / OUT: 800 mL / NET: -320 mL        PHYSICAL EXAM:  GENERAL: NAD, well-developed  HEAD:  Atraumatic, Normocephalic  EYES: EOMI, PERRLA, conjunctiva and sclera clear  NECK: Supple, No JVD  CHEST/LUNG: Clear to auscultation bilaterally; No wheeze  HEART: Regular rate and rhythm; No murmurs, rubs, or gallops  ABDOMEN: Soft, Nontender, Nondistended; Bowel sounds present  EXTREMITIES:  2+ Peripheral Pulses, No clubbing, cyanosis, or edema  PSYCH: AAOx3  NEUROLOGY: non-focal  SKIN: No rashes or lesions    LABS:                        14.6   6.78  )-----------( 356      ( 18 Feb 2020 06:49 )             42.7     02-18    139  |  102  |  8   ----------------------------<  112<H>  4.0   |  25  |  0.73    Ca    9.6      18 Feb 2020 06:43      PT/INR - ( 18 Feb 2020 06:49 )   PT: 11.4 sec;   INR: 1.00 ratio         PTT - ( 18 Feb 2020 06:49 )  PTT:34.8 sec          RADIOLOGY & ADDITIONAL TESTS:    Imaging Personally Reviewed:    Consultant(s) Notes Reviewed:      Care Discussed with Consultants/Other Providers:

## 2020-02-18 NOTE — PROGRESS NOTE ADULT - ASSESSMENT
CVA  LUIS unremarkable  s/p ILR   on dapt    sinus bradycardia   likely CNS related   appreciate EP input  atropine PRN for bradycardia if needed for PEG placement

## 2020-02-19 LAB
ANION GAP SERPL CALC-SCNC: 12 MMOL/L — SIGNIFICANT CHANGE UP (ref 5–17)
BUN SERPL-MCNC: 10 MG/DL — SIGNIFICANT CHANGE UP (ref 7–23)
CALCIUM SERPL-MCNC: 9.6 MG/DL — SIGNIFICANT CHANGE UP (ref 8.4–10.5)
CHLORIDE SERPL-SCNC: 104 MMOL/L — SIGNIFICANT CHANGE UP (ref 96–108)
CO2 SERPL-SCNC: 22 MMOL/L — SIGNIFICANT CHANGE UP (ref 22–31)
CREAT SERPL-MCNC: 0.69 MG/DL — SIGNIFICANT CHANGE UP (ref 0.5–1.3)
GLUCOSE BLDC GLUCOMTR-MCNC: 122 MG/DL — HIGH (ref 70–99)
GLUCOSE BLDC GLUCOMTR-MCNC: 136 MG/DL — HIGH (ref 70–99)
GLUCOSE BLDC GLUCOMTR-MCNC: 142 MG/DL — HIGH (ref 70–99)
GLUCOSE BLDC GLUCOMTR-MCNC: 144 MG/DL — HIGH (ref 70–99)
GLUCOSE BLDC GLUCOMTR-MCNC: 88 MG/DL — SIGNIFICANT CHANGE UP (ref 70–99)
GLUCOSE BLDC GLUCOMTR-MCNC: 88 MG/DL — SIGNIFICANT CHANGE UP (ref 70–99)
GLUCOSE SERPL-MCNC: 142 MG/DL — HIGH (ref 70–99)
HCT VFR BLD CALC: 44.9 % — SIGNIFICANT CHANGE UP (ref 39–50)
HGB BLD-MCNC: 14.6 G/DL — SIGNIFICANT CHANGE UP (ref 13–17)
MCHC RBC-ENTMCNC: 32.5 GM/DL — SIGNIFICANT CHANGE UP (ref 32–36)
MCHC RBC-ENTMCNC: 34.3 PG — HIGH (ref 27–34)
MCV RBC AUTO: 105.4 FL — HIGH (ref 80–100)
NRBC # BLD: 0 /100 WBCS — SIGNIFICANT CHANGE UP (ref 0–0)
PLATELET # BLD AUTO: 362 K/UL — SIGNIFICANT CHANGE UP (ref 150–400)
POTASSIUM SERPL-MCNC: 4.6 MMOL/L — SIGNIFICANT CHANGE UP (ref 3.5–5.3)
POTASSIUM SERPL-SCNC: 4.6 MMOL/L — SIGNIFICANT CHANGE UP (ref 3.5–5.3)
RBC # BLD: 4.26 M/UL — SIGNIFICANT CHANGE UP (ref 4.2–5.8)
RBC # FLD: 13.4 % — SIGNIFICANT CHANGE UP (ref 10.3–14.5)
SODIUM SERPL-SCNC: 138 MMOL/L — SIGNIFICANT CHANGE UP (ref 135–145)
WBC # BLD: 8.21 K/UL — SIGNIFICANT CHANGE UP (ref 3.8–10.5)
WBC # FLD AUTO: 8.21 K/UL — SIGNIFICANT CHANGE UP (ref 3.8–10.5)

## 2020-02-19 PROCEDURE — 99232 SBSQ HOSP IP/OBS MODERATE 35: CPT | Mod: GC

## 2020-02-19 RX ORDER — MULTIVIT-MIN/FERROUS GLUCONATE 9 MG/15 ML
15 LIQUID (ML) ORAL DAILY
Refills: 0 | Status: DISCONTINUED | OUTPATIENT
Start: 2020-02-19 | End: 2020-03-07

## 2020-02-19 RX ADMIN — PREGABALIN 1000 MICROGRAM(S): 225 CAPSULE ORAL at 00:17

## 2020-02-19 RX ADMIN — ENOXAPARIN SODIUM 40 MILLIGRAM(S): 100 INJECTION SUBCUTANEOUS at 13:25

## 2020-02-19 RX ADMIN — Medication 1 TABLET(S): at 00:17

## 2020-02-19 RX ADMIN — ATORVASTATIN CALCIUM 80 MILLIGRAM(S): 80 TABLET, FILM COATED ORAL at 00:17

## 2020-02-19 RX ADMIN — ATORVASTATIN CALCIUM 80 MILLIGRAM(S): 80 TABLET, FILM COATED ORAL at 21:40

## 2020-02-19 RX ADMIN — Medication 325 MILLIGRAM(S): at 13:25

## 2020-02-19 NOTE — PROGRESS NOTE ADULT - SUBJECTIVE AND OBJECTIVE BOX
Subjective: Patient seen and examined. No new events except as noted.     SUBJECTIVE/ROS:        MEDICATIONS:  MEDICATIONS  (STANDING):  aspirin 325 milliGRAM(s) Enteral Tube daily  atorvastatin 80 milliGRAM(s) Oral at bedtime  cyanocobalamin 1000 MICROGram(s) Oral daily  dextrose 5%. 1000 milliLiter(s) (50 mL/Hr) IV Continuous <Continuous>  dextrose 50% Injectable 12.5 Gram(s) IV Push once  dextrose 50% Injectable 25 Gram(s) IV Push once  dextrose 50% Injectable 25 Gram(s) IV Push once  enoxaparin Injectable 40 milliGRAM(s) SubCutaneous daily  ergocalciferol 60478 Unit(s) Oral every week  insulin lispro (HumaLOG) corrective regimen sliding scale   SubCutaneous every 6 hours  multivitamin/minerals 1 Tablet(s) Oral daily  Nephro-deysi 1 Tablet(s) Oral daily      PHYSICAL EXAM:  T(C): 36.9 (02-19-20 @ 05:32), Max: 36.9 (02-19-20 @ 05:32)  HR: 44 (02-19-20 @ 05:32) (44 - 51)  BP: 121/72 (02-19-20 @ 05:32) (100/61 - 136/54)  RR: 18 (02-19-20 @ 05:32) (18 - 18)  SpO2: 98% (02-19-20 @ 05:32) (95% - 99%)  Wt(kg): --  I&O's Summary    18 Feb 2020 07:01  -  19 Feb 2020 07:00  --------------------------------------------------------  IN: 1305 mL / OUT: 1050 mL / NET: 255 mL            JVP: Normal  Neck: supple  Lung: clear   CV: S1 S2 , Murmur:  Abd: soft  Ext: No edema  neuro: Awake   Psych: flat affect  Skin: normal``    LABS/DATA:    CARDIAC MARKERS:                                14.6   8.21  )-----------( 362      ( 19 Feb 2020 07:08 )             44.9     02-19    138  |  104  |  10  ----------------------------<  142<H>  4.6   |  22  |  0.69    Ca    9.6      19 Feb 2020 07:08      proBNP:   Lipid Profile:   HgA1c:   TSH:     TELE:  EKG:

## 2020-02-19 NOTE — PROGRESS NOTE ADULT - SUBJECTIVE AND OBJECTIVE BOX
Patient is a 59y old  Male who presents with a chief complaint of mouth lesions, possible stroke (19 Feb 2020 13:35)      SUBJECTIVE / OVERNIGHT EVENTS: awaiting PEG on 2/21    MEDICATIONS  (STANDING):  aspirin 325 milliGRAM(s) Enteral Tube daily  atorvastatin 80 milliGRAM(s) Oral at bedtime  cyanocobalamin 1000 MICROGram(s) Oral daily  dextrose 5%. 1000 milliLiter(s) (50 mL/Hr) IV Continuous <Continuous>  dextrose 50% Injectable 12.5 Gram(s) IV Push once  dextrose 50% Injectable 25 Gram(s) IV Push once  dextrose 50% Injectable 25 Gram(s) IV Push once  enoxaparin Injectable 40 milliGRAM(s) SubCutaneous daily  ergocalciferol 18111 Unit(s) Oral every week  insulin lispro (HumaLOG) corrective regimen sliding scale   SubCutaneous every 6 hours  multivitamin/minerals/iron Oral Solution (CENTRUM) 15 milliLiter(s) Enteral Tube daily    MEDICATIONS  (PRN):  dextrose 40% Gel 15 Gram(s) Oral once PRN Blood Glucose LESS THAN 70 milliGRAM(s)/deciliter  glucagon  Injectable 1 milliGRAM(s) IntraMuscular once PRN Glucose LESS THAN 70 milligrams/deciliter      Vital Signs Last 24 Hrs  T(F): 97.5 (02-19-20 @ 21:30), Max: 98.4 (02-19-20 @ 05:32)  HR: 49 (02-19-20 @ 21:30) (44 - 51)  BP: 114/73 (02-19-20 @ 21:30) (100/61 - 121/72)  RR: 18 (02-19-20 @ 21:30) (18 - 18)  SpO2: 97% (02-19-20 @ 21:30) (97% - 99%)  Telemetry:   CAPILLARY BLOOD GLUCOSE      POCT Blood Glucose.: 88 mg/dL (19 Feb 2020 15:55)  POCT Blood Glucose.: 136 mg/dL (19 Feb 2020 13:04)  POCT Blood Glucose.: 142 mg/dL (19 Feb 2020 08:53)  POCT Blood Glucose.: 144 mg/dL (19 Feb 2020 06:31)  POCT Blood Glucose.: 122 mg/dL (19 Feb 2020 00:25)  POCT Blood Glucose.: 123 mg/dL (18 Feb 2020 22:02)    I&O's Summary    18 Feb 2020 07:01  -  19 Feb 2020 07:00  --------------------------------------------------------  IN: 1305 mL / OUT: 1050 mL / NET: 255 mL    19 Feb 2020 07:01  -  19 Feb 2020 21:57  --------------------------------------------------------  IN: 0 mL / OUT: 1150 mL / NET: -1150 mL        PHYSICAL EXAM:  GENERAL: NAD, well-developed  HEAD:  Atraumatic, Normocephalic  EYES: EOMI, PERRLA, conjunctiva and sclera clear  NECK: Supple, No JVD  CHEST/LUNG: Clear to auscultation bilaterally; No wheeze  HEART: Regular rate and rhythm; No murmurs, rubs, or gallops  ABDOMEN: Soft, Nontender, Nondistended; Bowel sounds present  EXTREMITIES:  2+ Peripheral Pulses, No clubbing, cyanosis, or edema  PSYCH: AAOx3  NEUROLOGY: non-focal  SKIN: No rashes or lesions    LABS:                        14.6   8.21  )-----------( 362      ( 19 Feb 2020 07:08 )             44.9     02-19    138  |  104  |  10  ----------------------------<  142<H>  4.6   |  22  |  0.69    Ca    9.6      19 Feb 2020 07:08      PT/INR - ( 18 Feb 2020 06:49 )   PT: 11.4 sec;   INR: 1.00 ratio         PTT - ( 18 Feb 2020 06:49 )  PTT:34.8 sec          RADIOLOGY & ADDITIONAL TESTS:    Imaging Personally Reviewed:    Consultant(s) Notes Reviewed:      Care Discussed with Consultants/Other Providers:

## 2020-02-19 NOTE — PROGRESS NOTE ADULT - SUBJECTIVE AND OBJECTIVE BOX
Patient is a 59y old  Male who presents with a chief complaint of mouth lesions, possible stroke (19 Feb 2020 09:28)      JOHN JIMENEZ is a 58yo Estonian-speaking Male with h/o CVA 8 years ago (no residual deficits) presenting with aphasia. Pt is accompanied by brother who interpreted in the ED. Brother endorsed that pt went to visit family in [place] 9/2019 and about 3 weeks ago, family stated that he was starting to get weak and stopped being able to speak. He was also endorsing difficulty walking from weakness. He returned from [place] last night and came to the ED for evaluation. Pt also developed lesions on his mouth 3 days ago. He denies any other medical history or medication use.    Seen by neuro, expressive aphasia possibly secondary to L MCA ischemic stroke 2/2 to severely stenotic vs occlusion of L M1, with possible recrudescence in setting of infection. Neuro rec aspirin, plavix, statin, and MRI. 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    SLP bedside swallow eval found dysphagia, aphonia, expressive aphasia, pending SLP speech eval. Recommend NPO and MBS.    INTERIM EVENTS & TODAY'S SUBJECTIVE   MRI found acute R posterior frontal infarct along with multiple scattered infarcts, neurology deemed embolic. On NG tube feeds, PEG scheduled for Friday. S/p ILR.     FUNCTIONAL STATUS  Bed Mobility- Bernie  Transfers - Bernie  Ambulation - refused today    SOCIAL HISTORY  Per  note, lives in elevator apt, unknown if stairs present. Emergency contact is brother Morgan Jimenez.  PTA Independent in ambulation and ADLs    REVIEW OF SYMPTOMS    [X] Constiutional WNL            [X] Cardio WNL               [X] Resp WNL  [X] GI WNL                            [X]  WNL                    [X] Heme WNL  [X] Endo WNL                       [X] Skin WNL                   [X] MSK WNL  [X] Neuro WNL                     [X] Cognitive WNL            [X] Psych WNL    MEDICATIONS  aspirin 325 milliGRAM(s) Enteral Tube daily  atorvastatin 80 milliGRAM(s) Oral at bedtime  cyanocobalamin 1000 MICROGram(s) Oral daily  dextrose 40% Gel 15 Gram(s) Oral once PRN  dextrose 5%. 1000 milliLiter(s) IV Continuous <Continuous>  dextrose 50% Injectable 12.5 Gram(s) IV Push once  dextrose 50% Injectable 25 Gram(s) IV Push once  dextrose 50% Injectable 25 Gram(s) IV Push once  enoxaparin Injectable 40 milliGRAM(s) SubCutaneous daily  ergocalciferol 39418 Unit(s) Oral every week  glucagon  Injectable 1 milliGRAM(s) IntraMuscular once PRN  insulin lispro (HumaLOG) corrective regimen sliding scale   SubCutaneous every 6 hours  multivitamin/minerals/iron Oral Solution (CENTRUM) 15 milliLiter(s) Enteral Tube daily      PHYSICAL EXAM  VS  T(C): 36.9 (02-19 @ 05:32)  HR: 44 (02-19 @ 05:32)  BP: 121/72 (02-19 @ 05:32)  RR: 18 (02-19 @ 05:32)  SpO2: 98% (02-19 @ 05:32)    Constitutional - NAD, Comfortable  HEENT - NCAT. Crusted lesions at the lip  Chest - No respiratory distress. No use of accessory muscles of respiration.  Cardiovascular - Warm, well perfused  Abdomen - nondistended  Extremities - no C/C/E  Neurologic Exam -                    Cognitive - Awake, Alert, AAO to self, place, date, year, situation     Communication - Fluent, No dysarthria     Cranial Nerves - L facial droop     Motor - 5/5 throughout      Sensory - Intact to LT     Reflexes - DTR Intact, No primitive reflexive     Coordination - FTN impaired on left  Psychiatric - Mood stable, Affect WNL    RECENT LABS/IMAGING                        14.6   8.21  )-----------( 362      ( 19 Feb 2020 07:08 )             44.9     02-19    138  |  104  |  10  ----------------------------<  142<H>  4.6   |  22  |  0.69    Ca    9.6      19 Feb 2020 07:08    < from: MR Head w/wo IV Cont (02.11.20 @ 15:07) >  IMPRESSION:  BRAIN MRI: Acute right posterior frontal infarct in the region of the right precentral gyrus. Subacute infarct in the right frontoparietal and posterior temporal region. Minimal petechial hemorrhagic transformation and gyral enhancement is noted in both regions.  Multifocal chronic bilateral MCA territory infarcts. Chronic hemorrhagic products in the region of the left basal ganglia.  BRAIN MRA: Persistent mild narrowing of the cavernous and supraclinoid left ICA. Persistent lack of flow related signal within the left M1 segment, with poor visualization of the more distal left MCA branches. Redemonstration of multifocal mild to moderate stenoses of the right M1 segment. Normal flow-related enhancement of the more distal right MCA.  NECK MRA: No flow-limiting stenosis or evidence for arterial dissection. Patient is a 59y old  Male who presents with a chief complaint of mouth lesions, possible stroke (19 Feb 2020 09:28)      JOHN JIMENEZ is a 60yo Yi-speaking Male with h/o CVA 8 years ago (no residual deficits) presenting with aphasia. Pt is accompanied by brother who interpreted in the ED. Brother endorsed that pt went to visit family in [place] 9/2019 and about 3 weeks ago, family stated that he was starting to get weak and stopped being able to speak. He was also endorsing difficulty walking from weakness. He returned from [place] last night and came to the ED for evaluation. Pt also developed lesions on his mouth 3 days ago. He denies any other medical history or medication use.    Seen by neuro, expressive aphasia possibly secondary to L MCA ischemic stroke 2/2 to severely stenotic vs occlusion of L M1, with possible recrudescence in setting of infection. Neuro rec aspirin, plavix, statin, and MRI. 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    SLP bedside swallow eval found dysphagia, aphonia, expressive aphasia, pending SLP speech eval. Recommend NPO and MBS.    INTERIM EVENTS & TODAY'S SUBJECTIVE   MRI found acute R posterior frontal infarct along with multiple scattered infarcts, neurology deemed embolic. On NG tube feeds, PEG scheduled for Friday. S/p ILR.     FUNCTIONAL STATUS  Bed Mobility- Bernie  Transfers - Bernie  Ambulation - refused today    SOCIAL HISTORY  Per  note, lives in elevator apt, unknown if stairs present. Emergency contact is brother Morgan Jimenez.  PTA Independent in ambulation and ADLs    REVIEW OF SYMPTOMS  Limited by aphasia    MEDICATIONS  aspirin 325 milliGRAM(s) Enteral Tube daily  atorvastatin 80 milliGRAM(s) Oral at bedtime  cyanocobalamin 1000 MICROGram(s) Oral daily  dextrose 40% Gel 15 Gram(s) Oral once PRN  dextrose 5%. 1000 milliLiter(s) IV Continuous <Continuous>  dextrose 50% Injectable 12.5 Gram(s) IV Push once  dextrose 50% Injectable 25 Gram(s) IV Push once  dextrose 50% Injectable 25 Gram(s) IV Push once  enoxaparin Injectable 40 milliGRAM(s) SubCutaneous daily  ergocalciferol 59494 Unit(s) Oral every week  glucagon  Injectable 1 milliGRAM(s) IntraMuscular once PRN  insulin lispro (HumaLOG) corrective regimen sliding scale   SubCutaneous every 6 hours  multivitamin/minerals/iron Oral Solution (CENTRUM) 15 milliLiter(s) Enteral Tube daily      PHYSICAL EXAM  VS  T(C): 36.9 (02-19 @ 05:32)  HR: 44 (02-19 @ 05:32)  BP: 121/72 (02-19 @ 05:32)  RR: 18 (02-19 @ 05:32)  SpO2: 98% (02-19 @ 05:32)    Constitutional - NAD, Comfortable  HEENT - NCAT. Crusted lesions at the lip  Chest - No respiratory distress. No use of accessory muscles of respiration.  Cardiovascular - Warm, well perfused  Abdomen - nondistended  Extremities - no C/C/E  Neurologic Exam -                    Cognitive - Awake, Alert     Communication - aphasic, mute     Cranial Nerves - L facial droop     Motor - 5/5 throughout      Sensory - Intact to LT     Reflexes - DTR Intact, No primitive reflexive     Coordination - FTN impaired on left  Psychiatric - Mood stable, Affect WNL    RECENT LABS/IMAGING                        14.6   8.21  )-----------( 362      ( 19 Feb 2020 07:08 )             44.9     02-19    138  |  104  |  10  ----------------------------<  142<H>  4.6   |  22  |  0.69    Ca    9.6      19 Feb 2020 07:08    < from: MR Head w/wo IV Cont (02.11.20 @ 15:07) >  IMPRESSION:  BRAIN MRI: Acute right posterior frontal infarct in the region of the right precentral gyrus. Subacute infarct in the right frontoparietal and posterior temporal region. Minimal petechial hemorrhagic transformation and gyral enhancement is noted in both regions.  Multifocal chronic bilateral MCA territory infarcts. Chronic hemorrhagic products in the region of the left basal ganglia.  BRAIN MRA: Persistent mild narrowing of the cavernous and supraclinoid left ICA. Persistent lack of flow related signal within the left M1 segment, with poor visualization of the more distal left MCA branches. Redemonstration of multifocal mild to moderate stenoses of the right M1 segment. Normal flow-related enhancement of the more distal right MCA.  NECK MRA: No flow-limiting stenosis or evidence for arterial dissection.

## 2020-02-19 NOTE — PROGRESS NOTE ADULT - ASSESSMENT
59Y Greek speaking M with prior CVA 8y ago (no reported deficits) brought in by brother for concerns of generalized weakness and inability to speak for the past 3 weeks. On exam found to have no verbal output, appeared withdrawn and weak on neurological evaluation  He follows commands intermittently and is easily agitated.  Exam limited due to patient aggression on admission  CTH demonstrates chronic L MCA infarct with severely stenotic vs occluded distal L M1. this is confirmed on MRI but also an acute right post frontal infarct is seen. neuro aware, Cardiology aware, benign LUIS and s/p Medtronic ILR placement 2/13, will need outptn F/U in 7-10 days. however 2/2 ongoing bradycardia EPS reconsulted. recommended to be on prn atropine or isoproternol/dobutamine infusion during the Procedure. PPM not recommended.   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

## 2020-02-19 NOTE — PROGRESS NOTE ADULT - SUBJECTIVE AND OBJECTIVE BOX
PAM JIMENEZ:[id number],   59yMale followed for:  No Known Allergies    PAST MEDICAL & SURGICAL HISTORY:  CVA (cerebral vascular accident)  No significant past surgical history    FAMILY HISTORY:  No pertinent family history in first degree relatives    MEDICATIONS  (STANDING):  aspirin 325 milliGRAM(s) Enteral Tube daily  atorvastatin 80 milliGRAM(s) Oral at bedtime  cyanocobalamin 1000 MICROGram(s) Oral daily  dextrose 5%. 1000 milliLiter(s) (50 mL/Hr) IV Continuous <Continuous>  dextrose 50% Injectable 12.5 Gram(s) IV Push once  dextrose 50% Injectable 25 Gram(s) IV Push once  dextrose 50% Injectable 25 Gram(s) IV Push once  enoxaparin Injectable 40 milliGRAM(s) SubCutaneous daily  ergocalciferol 50553 Unit(s) Oral every week  insulin lispro (HumaLOG) corrective regimen sliding scale   SubCutaneous every 6 hours  multivitamin/minerals 1 Tablet(s) Oral daily  Nephro-deysi 1 Tablet(s) Oral daily    MEDICATIONS  (PRN):  dextrose 40% Gel 15 Gram(s) Oral once PRN Blood Glucose LESS THAN 70 milliGRAM(s)/deciliter  glucagon  Injectable 1 milliGRAM(s) IntraMuscular once PRN Glucose LESS THAN 70 milligrams/deciliter      Vital Signs Last 24 Hrs  T(C): 36.9 (19 Feb 2020 05:32), Max: 36.9 (19 Feb 2020 05:32)  T(F): 98.4 (19 Feb 2020 05:32), Max: 98.4 (19 Feb 2020 05:32)  HR: 44 (19 Feb 2020 05:32) (44 - 51)  BP: 121/72 (19 Feb 2020 05:32) (100/61 - 136/54)  BP(mean): --  RR: 18 (19 Feb 2020 05:32) (18 - 18)  SpO2: 98% (19 Feb 2020 05:32) (95% - 99%)  nc/at  s1s2  cta  soft, nt, nd no guarding or rebound  no c/c/e    CBC Full  -  ( 19 Feb 2020 07:08 )  WBC Count : 8.21 K/uL  RBC Count : 4.26 M/uL  Hemoglobin : 14.6 g/dL  Hematocrit : 44.9 %  Platelet Count - Automated : 362 K/uL  Mean Cell Volume : 105.4 fl  Mean Cell Hemoglobin : 34.3 pg  Mean Cell Hemoglobin Concentration : 32.5 gm/dL  Auto Neutrophil # : x  Auto Lymphocyte # : x  Auto Monocyte # : x  Auto Eosinophil # : x  Auto Basophil # : x  Auto Neutrophil % : x  Auto Lymphocyte % : x  Auto Monocyte % : x  Auto Eosinophil % : x  Auto Basophil % : x    02-19    138  |  104  |  10  ----------------------------<  142<H>  4.6   |  22  |  0.69    Ca    9.6      19 Feb 2020 07:08      PT/INR - ( 18 Feb 2020 06:49 )   PT: 11.4 sec;   INR: 1.00 ratio         PTT - ( 18 Feb 2020 06:49 )  PTT:34.8 sec

## 2020-02-19 NOTE — PROGRESS NOTE ADULT - ASSESSMENT
PAM JIMENEZ is a 58yo Male admitted for acute right posterior frontal infarct, now with gait instability, ADL impairments and functional impairments.     Precautions: Aspiration, fall    Rehab: PAM JIMENEZ is a 58yo Male admitted for acute right posterior frontal infarct, now with gait instability, ADL impairments and functional impairments.     Precautions: Aspiration, fall    Rehab:  - Continue bedside therapy as well as OOB throughout the day with mobilization with staff to maintain cardiopulmonary function and prevention of secondary complications related to debility.   - When medically stable, recommend ACUTE inpatient rehabilitation consisting of 3 hours of therapy/day & 24 hour RN/daily PM&R physician for comorbid medical management. Patient will be able to tolerate 3 hours a day.

## 2020-02-20 LAB
GLUCOSE BLDC GLUCOMTR-MCNC: 102 MG/DL — HIGH (ref 70–99)
GLUCOSE BLDC GLUCOMTR-MCNC: 124 MG/DL — HIGH (ref 70–99)
GLUCOSE BLDC GLUCOMTR-MCNC: 130 MG/DL — HIGH (ref 70–99)
GLUCOSE BLDC GLUCOMTR-MCNC: 131 MG/DL — HIGH (ref 70–99)
GLUCOSE BLDC GLUCOMTR-MCNC: 159 MG/DL — HIGH (ref 70–99)

## 2020-02-20 RX ADMIN — Medication 325 MILLIGRAM(S): at 12:30

## 2020-02-20 RX ADMIN — ENOXAPARIN SODIUM 40 MILLIGRAM(S): 100 INJECTION SUBCUTANEOUS at 12:31

## 2020-02-20 RX ADMIN — ATORVASTATIN CALCIUM 80 MILLIGRAM(S): 80 TABLET, FILM COATED ORAL at 22:10

## 2020-02-20 RX ADMIN — Medication 15 MILLILITER(S): at 12:31

## 2020-02-20 NOTE — PROGRESS NOTE ADULT - SUBJECTIVE AND OBJECTIVE BOX
PAM JIMENEZ:[id number],   59yMale followed for:  No Known Allergies    PAST MEDICAL & SURGICAL HISTORY:  CVA (cerebral vascular accident)  No significant past surgical history    FAMILY HISTORY:  No pertinent family history in first degree relatives    MEDICATIONS  (STANDING):  aspirin 325 milliGRAM(s) Enteral Tube daily  atorvastatin 80 milliGRAM(s) Oral at bedtime  cyanocobalamin 1000 MICROGram(s) Oral daily  dextrose 5%. 1000 milliLiter(s) (50 mL/Hr) IV Continuous <Continuous>  dextrose 50% Injectable 12.5 Gram(s) IV Push once  dextrose 50% Injectable 25 Gram(s) IV Push once  dextrose 50% Injectable 25 Gram(s) IV Push once  enoxaparin Injectable 40 milliGRAM(s) SubCutaneous daily  ergocalciferol 68563 Unit(s) Oral every week  insulin lispro (HumaLOG) corrective regimen sliding scale   SubCutaneous every 6 hours  multivitamin/minerals/iron Oral Solution (CENTRUM) 15 milliLiter(s) Enteral Tube daily    MEDICATIONS  (PRN):  dextrose 40% Gel 15 Gram(s) Oral once PRN Blood Glucose LESS THAN 70 milliGRAM(s)/deciliter  glucagon  Injectable 1 milliGRAM(s) IntraMuscular once PRN Glucose LESS THAN 70 milligrams/deciliter      Vital Signs Last 24 Hrs  T(C): 36.4 (20 Feb 2020 04:53), Max: 36.4 (19 Feb 2020 21:30)  T(F): 97.6 (20 Feb 2020 04:53), Max: 97.6 (20 Feb 2020 04:53)  HR: 50 (20 Feb 2020 04:53) (47 - 51)  BP: 102/69 (20 Feb 2020 04:53) (102/69 - 116/72)  BP(mean): --  RR: 18 (20 Feb 2020 04:53) (18 - 18)  SpO2: 98% (20 Feb 2020 04:53) (97% - 98%)  nc/at  s1s2  cta  soft, nt, nd no guarding or rebound  no c/c/e    CBC Full  -  ( 19 Feb 2020 07:08 )  WBC Count : 8.21 K/uL  RBC Count : 4.26 M/uL  Hemoglobin : 14.6 g/dL  Hematocrit : 44.9 %  Platelet Count - Automated : 362 K/uL  Mean Cell Volume : 105.4 fl  Mean Cell Hemoglobin : 34.3 pg  Mean Cell Hemoglobin Concentration : 32.5 gm/dL  Auto Neutrophil # : x  Auto Lymphocyte # : x  Auto Monocyte # : x  Auto Eosinophil # : x  Auto Basophil # : x  Auto Neutrophil % : x  Auto Lymphocyte % : x  Auto Monocyte % : x  Auto Eosinophil % : x  Auto Basophil % : x    02-19    138  |  104  |  10  ----------------------------<  142<H>  4.6   |  22  |  0.69    Ca    9.6      19 Feb 2020 07:08

## 2020-02-20 NOTE — CHART NOTE - NSCHARTNOTEFT_GEN_A_CORE
Nutrition Follow Up Note  Patient seen for: follow up    · Reason for Admission	mouth lesions, possible stroke        Source: nurse, chart    Diet : ENTERAL, NPO/ENTERAL  Danactive x 2 daily    Patient reports: pt is unable to speak         Enteral /Parenteral Nutrition:  via NGT, PEG planned for tomorrow.   Jevity 1.2 at goal rate of 75ml/hr x 18hr on and 6 hrs off.  This provides 1620kcal/74grams protein. Provides 26kcal/kg and 1.1grams protein/kg. based on dosing weight of 62kg.    Daily Weight in k.5 (-), Weight in k.2 (-), Weight in k.5 (-18), Weight in k.3 (-17), Weight in k.9 (-16), Weight in k.7 (-15), Weight in k.9 (-)  % Weight Change: weight stable since 2/15    Pertinent Medications: MEDICATIONS  (STANDING):  aspirin 325 milliGRAM(s) Enteral Tube daily  atorvastatin 80 milliGRAM(s) Oral at bedtime  cyanocobalamin 1000 MICROGram(s) Oral daily  dextrose 5%. 1000 milliLiter(s) (50 mL/Hr) IV Continuous <Continuous>  dextrose 50% Injectable 12.5 Gram(s) IV Push once  dextrose 50% Injectable 25 Gram(s) IV Push once  dextrose 50% Injectable 25 Gram(s) IV Push once  enoxaparin Injectable 40 milliGRAM(s) SubCutaneous daily  ergocalciferol 43740 Unit(s) Oral every week  insulin lispro (HumaLOG) corrective regimen sliding scale   SubCutaneous every 6 hours  multivitamin/minerals/iron Oral Solution (CENTRUM) 15 milliLiter(s) Enteral Tube daily    MEDICATIONS  (PRN):  dextrose 40% Gel 15 Gram(s) Oral once PRN Blood Glucose LESS THAN 70 milliGRAM(s)/deciliter  glucagon  Injectable 1 milliGRAM(s) IntraMuscular once PRN Glucose LESS THAN 70 milligrams/deciliter    Pertinent Labs:   Finger Sticks:  POCT Blood Glucose.: 159 mg/dL ( @ 06:52)  POCT Blood Glucose.: 88 mg/dL ( @ 21:56)  POCT Blood Glucose.: 88 mg/dL ( @ 15:55)  POCT Blood Glucose.: 136 mg/dL ( @ 13:04)      Skin per nursing documentation: no pressure injury  Edema: none    Estimated Needs:   [ x] no change since previous assessment  [ ] recalculated:     Previous Nutrition Diagnosis: difficulty swallowing  Nutrition Diagnosis is: being addressed with tube feeds      Recommend  continue Vit D supplementation  continue Jevity 1.2 at goal rate of 75ml/hr x 18hr. This provides 1620kcal/74grams protein. Provides 26kcal/kg and 1.1grams protein/kg. based on dosing weight of 62kg. today tube feeds have been running since 9pm last night and will be turned off at 3pm today.   continue multivitamin with minerals  continue Danactive x 2 daily      Monitoring and Evaluation:     Continue to monitor Nutritional intake, Tolerance to diet prescription, weights, labs, skin integrity    RD remains available upon request and will follow up per protocol  Margaret Michaud MA, MAYANK, CDN #[phone]

## 2020-02-20 NOTE — PROGRESS NOTE ADULT - ASSESSMENT
59Y Urdu speaking M with prior CVA 8y ago (no reported deficits) brought in by brother for concerns of generalized weakness and inability to speak for the past 3 weeks. On exam found to have no verbal output, appeared withdrawn and weak on neurological evaluation  He follows commands intermittently and is easily agitated.  Exam limited due to patient aggression on admission  CTH demonstrates chronic L MCA infarct with severely stenotic vs occluded distal L M1. this is confirmed on MRI but also an acute right post frontal infarct is seen. neuro aware, Cardiology aware, benign LUIS and s/p [employer] ILR placement 2/13, will need outptn F/U in 7-10 days. however 2/2 ongoing bradycardia EPS reconsulted. recommended to be on prn atropine or isoproternol/dobutamine infusion during the Procedure. PPM not recommended.   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

## 2020-02-20 NOTE — PROGRESS NOTE ADULT - ASSESSMENT
CVA  LUIS unremarkable  s/p ILR   on dapt    sinus bradycardia   likely CNS related   fu with EP   fu with GI for PEG

## 2020-02-20 NOTE — PROGRESS NOTE ADULT - SUBJECTIVE AND OBJECTIVE BOX
Patient is a 59y old  Male who presents with a chief complaint of mouth lesions, possible stroke (20 Feb 2020 08:08)      SUBJECTIVE / OVERNIGHT EVENTS: no new developments, awaiting PEG placement [place], stop feeding at MN    MEDICATIONS  (STANDING):  aspirin 325 milliGRAM(s) Enteral Tube daily  atorvastatin 80 milliGRAM(s) Oral at bedtime  cyanocobalamin 1000 MICROGram(s) Oral daily  dextrose 5%. 1000 milliLiter(s) (50 mL/Hr) IV Continuous <Continuous>  dextrose 50% Injectable 12.5 Gram(s) IV Push once  dextrose 50% Injectable 25 Gram(s) IV Push once  dextrose 50% Injectable 25 Gram(s) IV Push once  enoxaparin Injectable 40 milliGRAM(s) SubCutaneous daily  ergocalciferol 81616 Unit(s) Oral every week  insulin lispro (HumaLOG) corrective regimen sliding scale   SubCutaneous every 6 hours  multivitamin/minerals/iron Oral Solution (CENTRUM) 15 milliLiter(s) Enteral Tube daily    MEDICATIONS  (PRN):  dextrose 40% Gel 15 Gram(s) Oral once PRN Blood Glucose LESS THAN 70 milliGRAM(s)/deciliter  glucagon  Injectable 1 milliGRAM(s) IntraMuscular once PRN Glucose LESS THAN 70 milligrams/deciliter      Vital Signs Last 24 Hrs  T(F): 97.9 (02-20-20 @ 12:15), Max: 98.3 (02-20-20 @ 10:04)  HR: 58 (02-20-20 @ 13:16) (47 - 58)  BP: 103/65 (02-20-20 @ 13:16) (102/69 - 114/73)  RR: 18 (02-20-20 @ 12:15) (16 - 18)  SpO2: 96% (02-20-20 @ 13:16) (95% - 98%)  Telemetry:   CAPILLARY BLOOD GLUCOSE      POCT Blood Glucose.: 131 mg/dL (20 Feb 2020 12:27)  POCT Blood Glucose.: 130 mg/dL (20 Feb 2020 12:08)  POCT Blood Glucose.: 124 mg/dL (20 Feb 2020 09:22)  POCT Blood Glucose.: 159 mg/dL (20 Feb 2020 06:52)  POCT Blood Glucose.: 88 mg/dL (19 Feb 2020 21:56)  POCT Blood Glucose.: 88 mg/dL (19 Feb 2020 15:55)    I&O's Summary    19 Feb 2020 07:01  -  20 Feb 2020 07:00  --------------------------------------------------------  IN: 1000 mL / OUT: 1150 mL / NET: -150 mL    20 Feb 2020 07:01  -  20 Feb 2020 15:54  --------------------------------------------------------  IN: 0 mL / OUT: 300 mL / NET: -300 mL        PHYSICAL EXAM:  GENERAL: NAD, well-developed  HEAD:  Atraumatic, Normocephalic  EYES: EOMI, PERRLA, conjunctiva and sclera clear  NECK: Supple, No JVD  CHEST/LUNG: Clear to auscultation bilaterally; No wheeze  HEART: Regular rate and rhythm; No murmurs, rubs, or gallops  ABDOMEN: Soft, Nontender, Nondistended; Bowel sounds present  EXTREMITIES:  2+ Peripheral Pulses, No clubbing, cyanosis, or edema  PSYCH: AAOx3  NEUROLOGY: non-focal  SKIN: No rashes or lesions    LABS:                        14.6   8.21  )-----------( 362      ( 19 Feb 2020 07:08 )             44.9     02-19    138  |  104  |  10  ----------------------------<  142<H>  4.6   |  22  |  0.69    Ca    9.6      19 Feb 2020 07:08                RADIOLOGY & ADDITIONAL TESTS:    Imaging Personally Reviewed:    Consultant(s) Notes Reviewed:      Care Discussed with Consultants/Other Providers:

## 2020-02-20 NOTE — PROGRESS NOTE ADULT - SUBJECTIVE AND OBJECTIVE BOX
Subjective: Patient seen and examined. No new events except as noted.     SUBJECTIVE/ROS:  ROS limited       MEDICATIONS:  MEDICATIONS  (STANDING):  aspirin 325 milliGRAM(s) Enteral Tube daily  atorvastatin 80 milliGRAM(s) Oral at bedtime  cyanocobalamin 1000 MICROGram(s) Oral daily  dextrose 5%. 1000 milliLiter(s) (50 mL/Hr) IV Continuous <Continuous>  dextrose 50% Injectable 12.5 Gram(s) IV Push once  dextrose 50% Injectable 25 Gram(s) IV Push once  dextrose 50% Injectable 25 Gram(s) IV Push once  enoxaparin Injectable 40 milliGRAM(s) SubCutaneous daily  ergocalciferol 32123 Unit(s) Oral every week  insulin lispro (HumaLOG) corrective regimen sliding scale   SubCutaneous every 6 hours  multivitamin/minerals/iron Oral Solution (CENTRUM) 15 milliLiter(s) Enteral Tube daily      PHYSICAL EXAM:  T(C): 36.4 (02-20-20 @ 04:53), Max: 36.4 (02-19-20 @ 21:30)  HR: 50 (02-20-20 @ 04:53) (47 - 51)  BP: 102/69 (02-20-20 @ 04:53) (102/69 - 116/72)  RR: 18 (02-20-20 @ 04:53) (18 - 18)  SpO2: 98% (02-20-20 @ 04:53) (97% - 98%)  Wt(kg): --  I&O's Summary    19 Feb 2020 07:01  -  20 Feb 2020 07:00  --------------------------------------------------------  IN: 100 mL / OUT: 1150 mL / NET: -1050 mL            JVP: Normal  Neck: supple  Lung: clear   CV: S1 S2 , Murmur:  Abd: soft  Ext: No edema  neuro: Awake   Psych: flat affect  Skin: normal``    LABS/DATA:    CARDIAC MARKERS:                                14.6   8.21  )-----------( 362      ( 19 Feb 2020 07:08 )             44.9     02-19    138  |  104  |  10  ----------------------------<  142<H>  4.6   |  22  |  0.69    Ca    9.6      19 Feb 2020 07:08      proBNP:   Lipid Profile:   HgA1c:   TSH:     TELE:  EKG:

## 2020-02-21 ENCOUNTER — APPOINTMENT (OUTPATIENT)
Dept: ELECTROPHYSIOLOGY | Facility: CLINIC | Age: 60
End: 2020-02-21

## 2020-02-21 LAB
ANION GAP SERPL CALC-SCNC: 12 MMOL/L — SIGNIFICANT CHANGE UP (ref 5–17)
BLD GP AB SCN SERPL QL: NEGATIVE — SIGNIFICANT CHANGE UP
BUN SERPL-MCNC: 15 MG/DL — SIGNIFICANT CHANGE UP (ref 7–23)
CALCIUM SERPL-MCNC: 9.6 MG/DL — SIGNIFICANT CHANGE UP (ref 8.4–10.5)
CHLORIDE SERPL-SCNC: 100 MMOL/L — SIGNIFICANT CHANGE UP (ref 96–108)
CO2 SERPL-SCNC: 26 MMOL/L — SIGNIFICANT CHANGE UP (ref 22–31)
CREAT SERPL-MCNC: 0.82 MG/DL — SIGNIFICANT CHANGE UP (ref 0.5–1.3)
GLUCOSE BLDC GLUCOMTR-MCNC: 100 MG/DL — HIGH (ref 70–99)
GLUCOSE BLDC GLUCOMTR-MCNC: 104 MG/DL — HIGH (ref 70–99)
GLUCOSE BLDC GLUCOMTR-MCNC: 111 MG/DL — HIGH (ref 70–99)
GLUCOSE BLDC GLUCOMTR-MCNC: 114 MG/DL — HIGH (ref 70–99)
GLUCOSE BLDC GLUCOMTR-MCNC: 96 MG/DL — SIGNIFICANT CHANGE UP (ref 70–99)
GLUCOSE SERPL-MCNC: 106 MG/DL — HIGH (ref 70–99)
HCT VFR BLD CALC: 43.9 % — SIGNIFICANT CHANGE UP (ref 39–50)
HGB BLD-MCNC: 14.7 G/DL — SIGNIFICANT CHANGE UP (ref 13–17)
INR BLD: 1 RATIO — SIGNIFICANT CHANGE UP (ref 0.88–1.16)
MCHC RBC-ENTMCNC: 33.5 GM/DL — SIGNIFICANT CHANGE UP (ref 32–36)
MCHC RBC-ENTMCNC: 33.9 PG — SIGNIFICANT CHANGE UP (ref 27–34)
MCV RBC AUTO: 101.4 FL — HIGH (ref 80–100)
NRBC # BLD: 0 /100 WBCS — SIGNIFICANT CHANGE UP (ref 0–0)
PLATELET # BLD AUTO: 361 K/UL — SIGNIFICANT CHANGE UP (ref 150–400)
POTASSIUM SERPL-MCNC: 4.1 MMOL/L — SIGNIFICANT CHANGE UP (ref 3.5–5.3)
POTASSIUM SERPL-SCNC: 4.1 MMOL/L — SIGNIFICANT CHANGE UP (ref 3.5–5.3)
PROTHROM AB SERPL-ACNC: 11.5 SEC — SIGNIFICANT CHANGE UP (ref 10–12.9)
RBC # BLD: 4.33 M/UL — SIGNIFICANT CHANGE UP (ref 4.2–5.8)
RBC # FLD: 13.3 % — SIGNIFICANT CHANGE UP (ref 10.3–14.5)
RH IG SCN BLD-IMP: POSITIVE — SIGNIFICANT CHANGE UP
SODIUM SERPL-SCNC: 138 MMOL/L — SIGNIFICANT CHANGE UP (ref 135–145)
WBC # BLD: 8.58 K/UL — SIGNIFICANT CHANGE UP (ref 3.8–10.5)
WBC # FLD AUTO: 8.58 K/UL — SIGNIFICANT CHANGE UP (ref 3.8–10.5)

## 2020-02-21 RX ORDER — PIPERACILLIN AND TAZOBACTAM 4; .5 G/20ML; G/20ML
3.38 INJECTION, POWDER, LYOPHILIZED, FOR SOLUTION INTRAVENOUS ONCE
Refills: 0 | Status: COMPLETED | OUTPATIENT
Start: 2020-02-21 | End: 2020-02-21

## 2020-02-21 RX ORDER — PIPERACILLIN AND TAZOBACTAM 4; .5 G/20ML; G/20ML
3.38 INJECTION, POWDER, LYOPHILIZED, FOR SOLUTION INTRAVENOUS EVERY 8 HOURS
Refills: 0 | Status: COMPLETED | OUTPATIENT
Start: 2020-02-21 | End: 2020-02-26

## 2020-02-21 RX ADMIN — PIPERACILLIN AND TAZOBACTAM 200 GRAM(S): 4; .5 INJECTION, POWDER, LYOPHILIZED, FOR SOLUTION INTRAVENOUS at 22:03

## 2020-02-21 NOTE — PROGRESS NOTE ADULT - SUBJECTIVE AND OBJECTIVE BOX
Subjective: Patient seen and examined. No new events except as noted.     SUBJECTIVE/ROS:  NAD      MEDICATIONS:  MEDICATIONS  (STANDING):  aspirin 325 milliGRAM(s) Enteral Tube daily  atorvastatin 80 milliGRAM(s) Oral at bedtime  cyanocobalamin 1000 MICROGram(s) Oral daily  dextrose 5%. 1000 milliLiter(s) (50 mL/Hr) IV Continuous <Continuous>  dextrose 50% Injectable 12.5 Gram(s) IV Push once  dextrose 50% Injectable 25 Gram(s) IV Push once  dextrose 50% Injectable 25 Gram(s) IV Push once  enoxaparin Injectable 40 milliGRAM(s) SubCutaneous daily  ergocalciferol 65493 Unit(s) Oral every week  insulin lispro (HumaLOG) corrective regimen sliding scale   SubCutaneous every 6 hours  multivitamin/minerals/iron Oral Solution (CENTRUM) 15 milliLiter(s) Enteral Tube daily      PHYSICAL EXAM:  T(C): 36.5 (02-21-20 @ 05:04), Max: 36.9 (02-20-20 @ 17:19)  HR: 52 (02-21-20 @ 05:04) (50 - 58)  BP: 95/57 (02-21-20 @ 05:04) (95/57 - 107/69)  RR: 18 (02-21-20 @ 05:04) (16 - 18)  SpO2: 97% (02-21-20 @ 05:04) (96% - 97%)  Wt(kg): --  I&O's Summary    20 Feb 2020 07:01  -  21 Feb 2020 07:00  --------------------------------------------------------  IN: 1000 mL / OUT: 850 mL / NET: 150 mL            JVP: Normal  Neck: supple  Lung: clear   CV: S1 S2 , Murmur:  Abd: soft  Ext: No edema  neuro: Awake / alert  Psych: flat affect  Skin: normal``    LABS/DATA:    CARDIAC MARKERS:                                14.7   8.58  )-----------( 361      ( 21 Feb 2020 07:16 )             43.9     02-21    138  |  100  |  15  ----------------------------<  106<H>  4.1   |  26  |  0.82    Ca    9.6      21 Feb 2020 07:16      proBNP:   Lipid Profile:   HgA1c:   TSH:     TELE:  EKG:

## 2020-02-21 NOTE — PROGRESS NOTE ADULT - ASSESSMENT
CVA  LUIS unremarkable  s/p ILR   on dapt    sinus bradycardia   likely CNS related   fu with EP   fu with GI for PEG today

## 2020-02-21 NOTE — PROGRESS NOTE ADULT - SUBJECTIVE AND OBJECTIVE BOX
PAM JIMENEZ:[id number],   59yMale followed for:  No Known Allergies    PAST MEDICAL & SURGICAL HISTORY:  CVA (cerebral vascular accident)  No significant past surgical history    FAMILY HISTORY:  No pertinent family history in first degree relatives    MEDICATIONS  (STANDING):  aspirin 325 milliGRAM(s) Enteral Tube daily  atorvastatin 80 milliGRAM(s) Oral at bedtime  cyanocobalamin 1000 MICROGram(s) Oral daily  dextrose 5%. 1000 milliLiter(s) (50 mL/Hr) IV Continuous <Continuous>  dextrose 50% Injectable 12.5 Gram(s) IV Push once  dextrose 50% Injectable 25 Gram(s) IV Push once  dextrose 50% Injectable 25 Gram(s) IV Push once  enoxaparin Injectable 40 milliGRAM(s) SubCutaneous daily  ergocalciferol 19196 Unit(s) Oral every week  insulin lispro (HumaLOG) corrective regimen sliding scale   SubCutaneous every 6 hours  multivitamin/minerals/iron Oral Solution (CENTRUM) 15 milliLiter(s) Enteral Tube daily    MEDICATIONS  (PRN):  dextrose 40% Gel 15 Gram(s) Oral once PRN Blood Glucose LESS THAN 70 milliGRAM(s)/deciliter  glucagon  Injectable 1 milliGRAM(s) IntraMuscular once PRN Glucose LESS THAN 70 milligrams/deciliter      Vital Signs Last 24 Hrs  T(C): 36.5 (21 Feb 2020 05:04), Max: 36.9 (20 Feb 2020 17:19)  T(F): 97.7 (21 Feb 2020 05:04), Max: 98.4 (20 Feb 2020 17:19)  HR: 52 (21 Feb 2020 05:04) (50 - 58)  BP: 95/57 (21 Feb 2020 05:04) (95/57 - 107/72)  BP(mean): --  RR: 18 (21 Feb 2020 05:04) (16 - 18)  SpO2: 97% (21 Feb 2020 05:04) (95% - 97%)  nc/at  s1s2  cta  soft, nt, nd no guarding or rebound  no c/c/e    CBC Full  -  ( 21 Feb 2020 07:16 )  WBC Count : 8.58 K/uL  RBC Count : 4.33 M/uL  Hemoglobin : 14.7 g/dL  Hematocrit : 43.9 %  Platelet Count - Automated : 361 K/uL  Mean Cell Volume : 101.4 fl  Mean Cell Hemoglobin : 33.9 pg  Mean Cell Hemoglobin Concentration : 33.5 gm/dL  Auto Neutrophil # : x  Auto Lymphocyte # : x  Auto Monocyte # : x  Auto Eosinophil # : x  Auto Basophil # : x  Auto Neutrophil % : x  Auto Lymphocyte % : x  Auto Monocyte % : x  Auto Eosinophil % : x  Auto Basophil % : x          PT/INR - ( 21 Feb 2020 07:16 )   PT: 11.5 sec;   INR: 1.00 ratio

## 2020-02-21 NOTE — CHART NOTE - NSCHARTNOTEFT_GEN_A_CORE
Medicine PA Note    Name: PAM JIMENEZ  MRN: [id number]  Age: 59y Gender: Male    CC: Pulled out PEG    Called by RN to evaluate patient after he pulled out his PEG . Patient was seen and examined by me at the bedside. Patient was in no acute distress and non-toxic in appearance. Patient reports the presence of. Patient denies the presence of chest pain, palpitations, cough, dyspnea, diaphoresis, dizziness/lightheadedness, N/V, HA, and/or visual changes.     Vital Signs Last 24 Hrs  T(C): 36.7 (21 Feb 2020 19:06), Max: 36.7 (21 Feb 2020 12:34)  T(F): 98.1 (21 Feb 2020 19:06), Max: 98.1 (21 Feb 2020 19:06)  HR: 59 (21 Feb 2020 19:06) (50 - 59)  BP: 101/61 (21 Feb 2020 19:06) (95/57 - 107/69)  BP(mean): --  RR: 18 (21 Feb 2020 19:06) (17 - 18)  SpO2: 97% (21 Feb 2020 19:06) (96% - 97%)                        14.7   8.58  )-----------( 361      ( 21 Feb 2020 07:16 )             43.9     02-21    138  |  100  |  15  ----------------------------<  106<H>  4.1   |  26  |  0.82    Ca    9.6      21 Feb 2020 07:16        Radiology:    PHYSICAL EXAM:  GENERAL: A well-developed obese/thin/  fe/male in no acute distress. Non-toxic appearing. Speaking in full sentences.  HEAD:  Atraumatic and normocephalic  EYES: EOMI, PERRLA, conjunctiva and sclera clear  CHEST/LUNG: Clear to auscultation bilaterally. No wheezing, rhonchi, or rales. Chest expansion symmetrical. No accessory muscle use.  HEART: Regular rate and rhythm. No murmurs, rubs, or gallops. No palpable thrill.  ABDOMEN: Soft, nontender, and nondistended. Bowel sounds present in all 4 quadrants.  EXTREMITIES:  2+ peripheral pulses. No clubbing, cyanosis, or edema.  PSYCH: AAOx3. Appropriate/Flat affect.  NEUROLOGY: No focal deficits    ASSESSMENT/PLAN:   HPI:  60 yo M with h/o CVA 8 years ago presenting with aphasia. Pt is accompanied by brother who interpreted in the ED. Brother endorsed that pt went to visit family in [place] 9/2019 and about 3 weeks ago, family stated that he was starting to get weak and stopped being able to speak. He was also endorsing difficulty walking from weakness. He returned from [place] last night and came to the ED for evaluation. Pt also developed lesions on his mouth 3 days ago. He denies any other medical history or medication use. (09 Feb 2020 20:23)      #P1  - Empiric Zosyn  - Enhanced to prevent manipulation of site        Will continue to monitor closely and endorse to primary team in AM. Dr. Simmons, Dr. Squires, and Dr. Clark made aware.    Chloe Huizar PA-C  Department of Medicine   [facility] Medicine PA Note    Name: PAM JIMENEZ  MRN: [id number]  Age: 59y Gender: Male    CC: Pulled out PEG    Called by RN to evaluate patient after he pulled out his PEG . Patient was seen and examined by me at the bedside. Patient was in no acute distress and non-toxic in appearance. Patient reports the presence of. Patient denies the presence of chest pain, palpitations, cough, dyspnea, diaphoresis, dizziness/lightheadedness, N/V, HA, and/or visual changes.     Vital Signs Last 24 Hrs  T(C): 36.7 (21 Feb 2020 19:06), Max: 36.7 (21 Feb 2020 12:34)  T(F): 98.1 (21 Feb 2020 19:06), Max: 98.1 (21 Feb 2020 19:06)  HR: 59 (21 Feb 2020 19:06) (50 - 59)  BP: 101/61 (21 Feb 2020 19:06) (95/57 - 107/69)  BP(mean): --  RR: 18 (21 Feb 2020 19:06) (17 - 18)  SpO2: 97% (21 Feb 2020 19:06) (96% - 97%)                        14.7   8.58  )-----------( 361      ( 21 Feb 2020 07:16 )             43.9     02-21    138  |  100  |  15  ----------------------------<  106<H>  4.1   |  26  |  0.82    Ca    9.6      21 Feb 2020 07:16        Radiology:    PHYSICAL EXAM:  GENERAL: A well-developed obese/thin/  fe/male in no acute distress. Non-toxic appearing. Speaking in full sentences.  HEAD:  Atraumatic and normocephalic  EYES: EOMI, PERRLA, conjunctiva and sclera clear  CHEST/LUNG: Clear to auscultation bilaterally. No wheezing, rhonchi, or rales. Chest expansion symmetrical. No accessory muscle use.  HEART: Regular rate and rhythm. No murmurs, rubs, or gallops. No palpable thrill.  ABDOMEN: Soft, nontender, and nondistended. Bowel sounds present in all 4 quadrants.  EXTREMITIES:  2+ peripheral pulses. No clubbing, cyanosis, or edema.  PSYCH: AAOx3. Appropriate/Flat affect.  NEUROLOGY: No focal deficits    ASSESSMENT/PLAN:   HPI:  58 yo M with h/o CVA 8 years ago presenting with aphasia. Pt is accompanied by brother who interpreted in the ED. Brother endorsed that pt went to visit family in [place] 9/2019 and about 3 weeks ago, family stated that he was starting to get weak and stopped being able to speak. He was also endorsing difficulty walking from weakness. He returned from [place] last night and came to the ED for evaluation. Pt also developed lesions on his mouth 3 days ago. He denies any other medical history or medication use. (09 Feb 2020 20:23)      #P1  - Empiric Zosyn  - Enhanced to prevent manipulation of site      Will continue to monitor closely and endorse to primary team in AM. Dr. Simmons, Dr. Squires, and Dr. Clark made aware. Various attempts made to reach emergency contact on file but was unsuccessful. A voicemail was left with call back number [phone].    Chloe Huizar PA-C  Department of Medicine   [facility] Medicine PA Note    Name: PAM JIMENEZ  MRN: [id number]  Age: 59y Gender: Male    CC: Pulled out PEG    Called by RN to evaluate patient after he pulled out his PEG . Patient was seen and examined by me at the bedside. Patient was in no acute distress and non-toxic in appearance. Patient reports the presence of. Patient denies the presence of chest pain, palpitations, cough, dyspnea, diaphoresis, dizziness/lightheadedness, N/V, HA, and/or visual changes.     Vital Signs Last 24 Hrs  T(C): 36.7 (21 Feb 2020 19:06), Max: 36.7 (21 Feb 2020 12:34)  T(F): 98.1 (21 Feb 2020 19:06), Max: 98.1 (21 Feb 2020 19:06)  HR: 59 (21 Feb 2020 19:06) (50 - 59)  BP: 101/61 (21 Feb 2020 19:06) (95/57 - 107/69)  BP(mean): --  RR: 18 (21 Feb 2020 19:06) (17 - 18)  SpO2: 97% (21 Feb 2020 19:06) (96% - 97%)                        14.7   8.58  )-----------( 361      ( 21 Feb 2020 07:16 )             43.9     02-21    138  |  100  |  15  ----------------------------<  106<H>  4.1   |  26  |  0.82    Ca    9.6      21 Feb 2020 07:16        Radiology:    PHYSICAL EXAM:  GENERAL: A well-developed obese/thin/  fe/male in no acute distress. Non-toxic appearing. Speaking in full sentences.  HEAD:  Atraumatic and normocephalic  EYES: EOMI, PERRLA, conjunctiva and sclera clear  CHEST/LUNG: Clear to auscultation bilaterally. No wheezing, rhonchi, or rales. Chest expansion symmetrical. No accessory muscle use.  HEART: Regular rate and rhythm. No murmurs, rubs, or gallops. No palpable thrill.  ABDOMEN: Soft, nontender, and nondistended. Bowel sounds present in all 4 quadrants.  EXTREMITIES:  2+ peripheral pulses. No clubbing, cyanosis, or edema.  PSYCH: AAOx3. Appropriate/Flat affect.  NEUROLOGY: No focal deficits    ASSESSMENT/PLAN:   HPI:  58 yo M with h/o CVA 8 years ago presenting with aphasia. Pt is accompanied by brother who interpreted in the ED. Brother endorsed that pt went to visit family in [place] 9/2019 and about 3 weeks ago, family stated that he was starting to get weak and stopped being able to speak. He was also endorsing difficulty walking from weakness. He returned from [place] last night and came to the ED for evaluation. Pt also developed lesions on his mouth 3 days ago. He denies any other medical history or medication use. (09 Feb 2020 20:23)      #P1  - Empiric Zosyn q8H ordered  - Nurse to round on patient frequently to prevent manipulation of site      Will continue to monitor closely. Dr. Simmons, Dr. Squires, and Dr. Clark made aware. Various attempts made to reach emergency contact on file but was unsuccessful. A voicemail was left with call back number o [phone].    Chloe Huizar PA-C  Department of Medicine   [facility] Medicine PA Note    Name: PAM JIMENEZ  MRN: [id number]  Age: 59y Gender: Male    CC: Pulled out PEG    Called by RN to evaluate patient after he pulled out his PEG . Patient was seen and examined by me at the bedside. Patient was in no acute distress and non-toxic in appearance. Unable to assess ROS 2/2 patients mental status.     PHYSICAL EXAM:  GENERAL: A well-developed thin male in no acute distress. Non-toxic appearing.   HEAD: Atraumatic and normocephalic.  CHEST/LUNG: Clear to auscultation bilaterally. No wheezing, rhonchi, or rales. Chest expansion symmetrical. No accessory muscle use.  HEART: Regular rate and rhythm. No murmurs, rubs, or gallops. No palpable thrill.  ABDOMEN: Soft, nontender, and nondistended. Bowel sounds present in all 4 quadrants. (+) non-bleeding PEG site. Dressing C/D/I.  EXTREMITIES: 2+ peripheral pulses. No clubbing, cyanosis, or edema.  PSYCH: AAOx1. Flat affect.  NEUROLOGY: No focal deficits.    ASSESSMENT/PLAN:   60 yo M with h/o CVA 8 years ago presenting with aphasia. Pt is accompanied by brother who interpreted in the ED. Brother endorsed that pt went to visit family in [place] 9/2019 and about 3 weeks ago, family stated that he was starting to get weak and stopped being able to speak. He was also endorsing difficulty walking from weakness. He returned from [place] last night and came to the ED for evaluation. Pt also developed lesions on his mouth 3 days ago. He denies any other medical history or medication use. PEG was placed on 2/21. Patient now presents after pulling PEG.    #Dislodged PEG  - Dressing applied to site - Keep clean and dry  - Empiric Zosyn q8H x 5 days ordered  - Nurse to round on patient frequently to prevent manipulation of site    Will continue to monitor closely. Dr. Simmons, Dr. Squires, and Dr. Clark made aware. Various attempts made to reach emergency contact on file but was unsuccessful. A voicemail was left with call back number of [phone]. Signed out day team.    Chloe Huizar PA-C  Department of Medicine   [facility]

## 2020-02-21 NOTE — PROGRESS NOTE ADULT - SUBJECTIVE AND OBJECTIVE BOX
Pre-Endoscopy Evaluation      Referring Physician: jennifer ortega                                Procedure:  upper gastrointestinal endoscopy/peg placement    Indication for Procedure: dysphagia    PAST MEDICAL & SURGICAL HISTORY:  CVA (cerebral vascular accident)  ILR  No significant past surgical history      PMH of Gastroparesis [ ]  Gastric Surgery [ ]  Gastric Outlet Obstruction [ ]    Allergies    No Known Allergies    Intolerances    Latex allergy: [ ] yes [X] no    Medications:MEDICATIONS  (STANDING):  aspirin 325 milliGRAM(s) Enteral Tube daily  atorvastatin 80 milliGRAM(s) Oral at bedtime  cyanocobalamin 1000 MICROGram(s) Oral daily  dextrose 5%. 1000 milliLiter(s) (50 mL/Hr) IV Continuous <Continuous>  dextrose 50% Injectable 12.5 Gram(s) IV Push once  dextrose 50% Injectable 25 Gram(s) IV Push once  dextrose 50% Injectable 25 Gram(s) IV Push once  enoxaparin Injectable 40 milliGRAM(s) SubCutaneous daily  ergocalciferol 61870 Unit(s) Oral every week  insulin lispro (HumaLOG) corrective regimen sliding scale   SubCutaneous every 6 hours  multivitamin/minerals/iron Oral Solution (CENTRUM) 15 milliLiter(s) Enteral Tube daily    MEDICATIONS  (PRN):  dextrose 40% Gel 15 Gram(s) Oral once PRN Blood Glucose LESS THAN 70 milliGRAM(s)/deciliter  glucagon  Injectable 1 milliGRAM(s) IntraMuscular once PRN Glucose LESS THAN 70 milligrams/deciliter      Smoking: [ ] yes  [x] no    AICD/PPM: [ ] yes   [X] no    Pertinent lab data:                        14.7   8.58  )-----------( 361      ( 2020 07:16 )             43.9     -    138  |  100  |  15  ----------------------------<  106<H>  4.1   |  26  |  0.82    Ca    9.6      2020 07:16      PT/INR - ( 2020 07:16 )   PT: 11.5 sec;   INR: 1.00 ratio      < from: Transesophageal Echocardiogram w/o TTE (20 @ 15:15) >  LVIDs:         1.8 - 4.0 cm  EF (Visual Estimate): 65 %  ------------------------------------------------------------------------  Observations:  Mitral Valve: Normal mitral valve.  Aortic Valve/Aorta: Normal aortic valve.  Normal aortic root, aortic arch and descending thoracic  aorta.  No aortic atherosclerosis seen.  Left Atrium: Normal left atrium. No thrombus seen in the LA  appendage.  Left Ventricle: Normal left ventricular internal dimensions  and wall thicknesses.  Normal left ventricular systolic function. No segmental  wall motion abnormalities.  Impaired LV-relaxation with normal filling pressure.  Right Heart: Normal right atrium. Normal right ventricular  size and function. Normal tricuspid valve. Minimal  tricuspid regurgitation. Normal pulmonic valve.  Pericardium/Pleura: Normal pericardium with no pericardial  effusion.  Hemodynamic: No evidence of pulmonary hypertension.  Agitated saline contrast injection demonstrates no evidence  of a patent foramen ovale.  ------------------------------------------------------------------------  Conclusions:  Normal left ventricular systolic function. No segmental  wall motion abnormalities.  Agitated saline contrast injection demonstrates no evidence  of a patent foramen ovale.    < end of copied text >      Physical Examination:  Daily     Daily Weight in k (2020 05:04)  Vital Signs Last 24 Hrs  T(C): 36.5 (2020 05:04), Max: 36.9 (2020 17:19)  T(F): 97.7 (2020 05:04), Max: 98.4 (2020 17:19)  HR: 52 (2020 05:04) (50 - 58)  BP: 95/57 (2020 05:04) (95/57 - 107/69)  BP(mean): --  RR: 18 (2020 05:04) (16 - 18)  SpO2: 97% (2020 05:04) (96% - 97%)      Drug Dosing Weight  Weight (kg): 62 (2020 21:10)      Constitutional: NAD     Neck:  No JVD    Respiratory: CTAB/L    Cardiovascular: S1 and S2    Gastrointestinal: BS+, soft, NT/ND    Extremities: No peripheral edema    Neurological:     : No Marsh    Skin: No rashes    Comments:      The patient is a suitable candidate for the planned procedure unless box checked [ ]  No, explain:

## 2020-02-21 NOTE — PROGRESS NOTE ADULT - SUBJECTIVE AND OBJECTIVE BOX
Patient is a 59y old  Male who presents with a chief complaint of mouth lesions, possible stroke (21 Feb 2020 12:20)      SUBJECTIVE / OVERNIGHT EVENTS: s/p PEG placement today, procedure went well, start feeds when cleared by GI, hold Plavix until 2/24 as per GI    MEDICATIONS  (STANDING):  aspirin 325 milliGRAM(s) Enteral Tube daily  atorvastatin 80 milliGRAM(s) Oral at bedtime  cyanocobalamin 1000 MICROGram(s) Oral daily  dextrose 5%. 1000 milliLiter(s) (50 mL/Hr) IV Continuous <Continuous>  dextrose 50% Injectable 12.5 Gram(s) IV Push once  dextrose 50% Injectable 25 Gram(s) IV Push once  dextrose 50% Injectable 25 Gram(s) IV Push once  enoxaparin Injectable 40 milliGRAM(s) SubCutaneous daily  ergocalciferol 49505 Unit(s) Oral every week  insulin lispro (HumaLOG) corrective regimen sliding scale   SubCutaneous every 6 hours  multivitamin/minerals/iron Oral Solution (CENTRUM) 15 milliLiter(s) Enteral Tube daily    MEDICATIONS  (PRN):  dextrose 40% Gel 15 Gram(s) Oral once PRN Blood Glucose LESS THAN 70 milliGRAM(s)/deciliter  glucagon  Injectable 1 milliGRAM(s) IntraMuscular once PRN Glucose LESS THAN 70 milligrams/deciliter      Vital Signs Last 24 Hrs  T(F): 98.1 (02-21-20 @ 19:06), Max: 98.1 (02-21-20 @ 19:06)  HR: 59 (02-21-20 @ 19:06) (50 - 59)  BP: 101/61 (02-21-20 @ 19:06) (95/57 - 107/69)  RR: 18 (02-21-20 @ 19:06) (17 - 18)  SpO2: 97% (02-21-20 @ 19:06) (96% - 97%)  Telemetry:   CAPILLARY BLOOD GLUCOSE      POCT Blood Glucose.: 111 mg/dL (21 Feb 2020 12:47)  POCT Blood Glucose.: 104 mg/dL (21 Feb 2020 06:33)  POCT Blood Glucose.: 96 mg/dL (21 Feb 2020 03:46)    I&O's Summary    20 Feb 2020 07:01  -  21 Feb 2020 07:00  --------------------------------------------------------  IN: 1000 mL / OUT: 850 mL / NET: 150 mL    21 Feb 2020 07:01  -  21 Feb 2020 21:03  --------------------------------------------------------  IN: 0 mL / OUT: 300 mL / NET: -300 mL        PHYSICAL EXAM:  GENERAL: NAD, well-developed  HEAD:  Atraumatic, Normocephalic  EYES: EOMI, PERRLA, conjunctiva and sclera clear  NECK: Supple, No JVD  CHEST/LUNG: Clear to auscultation bilaterally; No wheeze  HEART: Regular rate and rhythm; No murmurs, rubs, or gallops  ABDOMEN: Soft, Nontender, Nondistended; Bowel sounds present  EXTREMITIES:  2+ Peripheral Pulses, No clubbing, cyanosis, or edema  PSYCH: AAOx3  NEUROLOGY: non-focal  SKIN: No rashes or lesions    LABS:                        14.7   8.58  )-----------( 361      ( 21 Feb 2020 07:16 )             43.9     02-21    138  |  100  |  15  ----------------------------<  106<H>  4.1   |  26  |  0.82    Ca    9.6      21 Feb 2020 07:16      PT/INR - ( 21 Feb 2020 07:16 )   PT: 11.5 sec;   INR: 1.00 ratio                   RADIOLOGY & ADDITIONAL TESTS:    Imaging Personally Reviewed:    Consultant(s) Notes Reviewed:      Care Discussed with Consultants/Other Providers:

## 2020-02-21 NOTE — PROGRESS NOTE ADULT - ASSESSMENT
59Y Lao speaking M with prior CVA 8y ago (no reported deficits) brought in by brother for concerns of generalized weakness and inability to speak for the past 3 weeks. On exam found to have no verbal output, appeared withdrawn and weak on neurological evaluation  He follows commands intermittently and is easily agitated.  Exam limited due to patient aggression on admission  CTH demonstrates chronic L MCA infarct with severely stenotic vs occluded distal L M1. this is confirmed on MRI but also an acute right post frontal infarct is seen. neuro aware, Cardiology aware, benign LUIS and s/p [employer] ILR placement 2/13, will need outptn F/U in 7-10 days. however 2/2 ongoing bradycardia EPS reconsulted. recommended to be on prn atropine or isoproternol/dobutamine infusion during the Procedure. PPM not recommended.   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 LUIS benign  -DVT ppx w sc Lovenox  -social service consult  -Dispo to acute rehab

## 2020-02-22 LAB
ANION GAP SERPL CALC-SCNC: 14 MMOL/L — SIGNIFICANT CHANGE UP (ref 5–17)
BUN SERPL-MCNC: 12 MG/DL — SIGNIFICANT CHANGE UP (ref 7–23)
CALCIUM SERPL-MCNC: 9.3 MG/DL — SIGNIFICANT CHANGE UP (ref 8.4–10.5)
CHLORIDE SERPL-SCNC: 103 MMOL/L — SIGNIFICANT CHANGE UP (ref 96–108)
CO2 SERPL-SCNC: 22 MMOL/L — SIGNIFICANT CHANGE UP (ref 22–31)
CREAT SERPL-MCNC: 0.86 MG/DL — SIGNIFICANT CHANGE UP (ref 0.5–1.3)
GLUCOSE BLDC GLUCOMTR-MCNC: 105 MG/DL — HIGH (ref 70–99)
GLUCOSE BLDC GLUCOMTR-MCNC: 111 MG/DL — HIGH (ref 70–99)
GLUCOSE SERPL-MCNC: 116 MG/DL — HIGH (ref 70–99)
HCT VFR BLD CALC: 42.6 % — SIGNIFICANT CHANGE UP (ref 39–50)
HGB BLD-MCNC: 14 G/DL — SIGNIFICANT CHANGE UP (ref 13–17)
MCHC RBC-ENTMCNC: 32.9 GM/DL — SIGNIFICANT CHANGE UP (ref 32–36)
MCHC RBC-ENTMCNC: 33.3 PG — SIGNIFICANT CHANGE UP (ref 27–34)
MCV RBC AUTO: 101.2 FL — HIGH (ref 80–100)
NRBC # BLD: 0 /100 WBCS — SIGNIFICANT CHANGE UP (ref 0–0)
PLATELET # BLD AUTO: 321 K/UL — SIGNIFICANT CHANGE UP (ref 150–400)
POTASSIUM SERPL-MCNC: 4.2 MMOL/L — SIGNIFICANT CHANGE UP (ref 3.5–5.3)
POTASSIUM SERPL-SCNC: 4.2 MMOL/L — SIGNIFICANT CHANGE UP (ref 3.5–5.3)
RBC # BLD: 4.21 M/UL — SIGNIFICANT CHANGE UP (ref 4.2–5.8)
RBC # FLD: 13.2 % — SIGNIFICANT CHANGE UP (ref 10.3–14.5)
SODIUM SERPL-SCNC: 139 MMOL/L — SIGNIFICANT CHANGE UP (ref 135–145)
WBC # BLD: 12.06 K/UL — HIGH (ref 3.8–10.5)
WBC # FLD AUTO: 12.06 K/UL — HIGH (ref 3.8–10.5)

## 2020-02-22 RX ORDER — SODIUM CHLORIDE 9 MG/ML
1000 INJECTION INTRAMUSCULAR; INTRAVENOUS; SUBCUTANEOUS
Refills: 0 | Status: DISCONTINUED | OUTPATIENT
Start: 2020-02-22 | End: 2020-02-23

## 2020-02-22 RX ADMIN — PIPERACILLIN AND TAZOBACTAM 25 GRAM(S): 4; .5 INJECTION, POWDER, LYOPHILIZED, FOR SOLUTION INTRAVENOUS at 18:28

## 2020-02-22 RX ADMIN — ENOXAPARIN SODIUM 40 MILLIGRAM(S): 100 INJECTION SUBCUTANEOUS at 18:28

## 2020-02-22 RX ADMIN — SODIUM CHLORIDE 60 MILLILITER(S): 9 INJECTION INTRAMUSCULAR; INTRAVENOUS; SUBCUTANEOUS at 18:27

## 2020-02-22 RX ADMIN — PIPERACILLIN AND TAZOBACTAM 25 GRAM(S): 4; .5 INJECTION, POWDER, LYOPHILIZED, FOR SOLUTION INTRAVENOUS at 05:13

## 2020-02-22 NOTE — PROGRESS NOTE ADULT - SUBJECTIVE AND OBJECTIVE BOX
Patient is a 59y old  Male who presents with a chief complaint of mouth lesions, possible stroke (22 Feb 2020 10:42)      SUBJECTIVE / OVERNIGHT EVENTS: no new c/o, remains NPO, pulled his PEG out, GI to discuss options, cannot have another PEG as per GI    MEDICATIONS  (STANDING):  aspirin 325 milliGRAM(s) Enteral Tube daily  atorvastatin 80 milliGRAM(s) Oral at bedtime  cyanocobalamin 1000 MICROGram(s) Oral daily  dextrose 5%. 1000 milliLiter(s) (50 mL/Hr) IV Continuous <Continuous>  dextrose 50% Injectable 12.5 Gram(s) IV Push once  dextrose 50% Injectable 25 Gram(s) IV Push once  dextrose 50% Injectable 25 Gram(s) IV Push once  enoxaparin Injectable 40 milliGRAM(s) SubCutaneous daily  ergocalciferol 33362 Unit(s) Oral every week  multivitamin/minerals/iron Oral Solution (CENTRUM) 15 milliLiter(s) Enteral Tube daily  piperacillin/tazobactam IVPB.. 3.375 Gram(s) IV Intermittent every 8 hours  sodium chloride 0.9%. 1000 milliLiter(s) (60 mL/Hr) IV Continuous <Continuous>    MEDICATIONS  (PRN):  dextrose 40% Gel 15 Gram(s) Oral once PRN Blood Glucose LESS THAN 70 milliGRAM(s)/deciliter  glucagon  Injectable 1 milliGRAM(s) IntraMuscular once PRN Glucose LESS THAN 70 milligrams/deciliter      Vital Signs Last 24 Hrs  T(F): 98.3 (02-22-20 @ 12:27), Max: 98.3 (02-21-20 @ 21:33)  HR: 54 (02-22-20 @ 12:27) (53 - 65)  BP: 110/67 (02-22-20 @ 12:27) (107/70 - 131/79)  RR: 187 (02-22-20 @ 12:27) (18 - 187)  SpO2: 96% (02-22-20 @ 12:27) (94% - 97%)  Telemetry:   CAPILLARY BLOOD GLUCOSE      POCT Blood Glucose.: 105 mg/dL (22 Feb 2020 13:30)  POCT Blood Glucose.: 111 mg/dL (22 Feb 2020 06:13)  POCT Blood Glucose.: 114 mg/dL (21 Feb 2020 23:58)  POCT Blood Glucose.: 100 mg/dL (21 Feb 2020 21:21)    I&O's Summary    21 Feb 2020 07:01  -  22 Feb 2020 07:00  --------------------------------------------------------  IN: 560 mL / OUT: 750 mL / NET: -190 mL    22 Feb 2020 07:01  -  22 Feb 2020 19:52  --------------------------------------------------------  IN: 420 mL / OUT: 780 mL / NET: -360 mL        PHYSICAL EXAM:  GENERAL: NAD, well-developed  HEAD:  Atraumatic, Normocephalic  EYES: EOMI, PERRLA, conjunctiva and sclera clear  NECK: Supple, No JVD  CHEST/LUNG: Clear to auscultation bilaterally; No wheeze  HEART: Regular rate and rhythm; No murmurs, rubs, or gallops  ABDOMEN: Soft, Nontender, Nondistended; Bowel sounds present  EXTREMITIES:  2+ Peripheral Pulses, No clubbing, cyanosis, or edema  PSYCH: AAOx3  NEUROLOGY: non-focal  SKIN: No rashes or lesions    LABS:                        14.0   12.06 )-----------( 321      ( 22 Feb 2020 07:17 )             42.6     02-22    139  |  103  |  12  ----------------------------<  116<H>  4.2   |  22  |  0.86    Ca    9.3      22 Feb 2020 07:17      PT/INR - ( 21 Feb 2020 07:16 )   PT: 11.5 sec;   INR: 1.00 ratio                   RADIOLOGY & ADDITIONAL TESTS:    Imaging Personally Reviewed:    Consultant(s) Notes Reviewed:      Care Discussed with Consultants/Other Providers:

## 2020-02-22 NOTE — PROGRESS NOTE ADULT - ASSESSMENT
pt pulled peg last night.  continue abx. abdomen soft.  options include ngt/ egd with clip hole , replace peg.  pt stable, conservative mangment recommended, npo  pt is not a candidte for peg at this point.  options include pleasure feeding and palliatve (recommended) versus surgical gastroatomy (not permanet, but stomach can be stitched to anterior wall to prevent leak.  Will call family to discuss

## 2020-02-22 NOTE — PROGRESS NOTE ADULT - ASSESSMENT
CVA  LUIS unremarkable  s/p ILR   on dapt    sinus bradycardia   likely CNS related   fu with EP   pt pulled out PEG  fu with GI

## 2020-02-22 NOTE — PROGRESS NOTE ADULT - SUBJECTIVE AND OBJECTIVE BOX
PAM JIMENEZ:[id number],   59yMale followed for:  No Known Allergies    PAST MEDICAL & SURGICAL HISTORY:  CVA (cerebral vascular accident)  No significant past surgical history    FAMILY HISTORY:  No pertinent family history in first degree relatives    MEDICATIONS  (STANDING):  aspirin 325 milliGRAM(s) Enteral Tube daily  atorvastatin 80 milliGRAM(s) Oral at bedtime  cyanocobalamin 1000 MICROGram(s) Oral daily  dextrose 5%. 1000 milliLiter(s) (50 mL/Hr) IV Continuous <Continuous>  dextrose 50% Injectable 12.5 Gram(s) IV Push once  dextrose 50% Injectable 25 Gram(s) IV Push once  dextrose 50% Injectable 25 Gram(s) IV Push once  enoxaparin Injectable 40 milliGRAM(s) SubCutaneous daily  ergocalciferol 41571 Unit(s) Oral every week  insulin lispro (HumaLOG) corrective regimen sliding scale   SubCutaneous every 6 hours  multivitamin/minerals/iron Oral Solution (CENTRUM) 15 milliLiter(s) Enteral Tube daily  piperacillin/tazobactam IVPB.. 3.375 Gram(s) IV Intermittent every 8 hours    MEDICATIONS  (PRN):  dextrose 40% Gel 15 Gram(s) Oral once PRN Blood Glucose LESS THAN 70 milliGRAM(s)/deciliter  glucagon  Injectable 1 milliGRAM(s) IntraMuscular once PRN Glucose LESS THAN 70 milligrams/deciliter      Vital Signs Last 24 Hrs  T(C): 36.6 (22 Feb 2020 04:46), Max: 36.8 (21 Feb 2020 21:33)  T(F): 97.9 (22 Feb 2020 04:46), Max: 98.3 (21 Feb 2020 21:33)  HR: 53 (22 Feb 2020 04:46) (53 - 65)  BP: 107/70 (22 Feb 2020 04:46) (97/62 - 131/79)  BP(mean): --  RR: 18 (22 Feb 2020 04:46) (18 - 18)  SpO2: 94% (22 Feb 2020 04:46) (94% - 97%)  nc/at  s1s2  cta  soft, nt, nd no guarding or rebound  no c/c/e    CBC Full  -  ( 22 Feb 2020 07:17 )  WBC Count : 12.06 K/uL  RBC Count : 4.21 M/uL  Hemoglobin : 14.0 g/dL  Hematocrit : 42.6 %  Platelet Count - Automated : 321 K/uL  Mean Cell Volume : 101.2 fl  Mean Cell Hemoglobin : 33.3 pg  Mean Cell Hemoglobin Concentration : 32.9 gm/dL  Auto Neutrophil # : x  Auto Lymphocyte # : x  Auto Monocyte # : x  Auto Eosinophil # : x  Auto Basophil # : x  Auto Neutrophil % : x  Auto Lymphocyte % : x  Auto Monocyte % : x  Auto Eosinophil % : x  Auto Basophil % : x    02-22    139  |  103  |  12  ----------------------------<  116<H>  4.2   |  22  |  0.86    Ca    9.3      22 Feb 2020 07:17      PT/INR - ( 21 Feb 2020 07:16 )   PT: 11.5 sec;   INR: 1.00 ratio

## 2020-02-22 NOTE — PROGRESS NOTE ADULT - ASSESSMENT
59Y Arabic speaking M with prior CVA 8y ago (no reported deficits) brought in by brother for concerns of generalized weakness and inability to speak for the past 3 weeks. On exam found to have no verbal output, appeared withdrawn and weak on neurological evaluation  He follows commands intermittently and is easily agitated.  Exam limited due to patient aggression on admission  CTH demonstrates chronic L MCA infarct with severely stenotic vs occluded distal L M1. this is confirmed on MRI but also an acute right post frontal infarct is seen. neuro aware, Cardiology aware, benign LUIS and s/p Medtronic ILR placement 2/13, will need outptn F/U in 7-10 days. however 2/2 ongoing bradycardia EPS reconsulted. recommended to be on prn atropine or isoproternol/dobutamine infusion during the Procedure. PPM not recommended.   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 LUIS benign  -DVT ppx w sc Lovenox  -social service consult  -Dispo to acute rehab

## 2020-02-23 LAB
ANION GAP SERPL CALC-SCNC: 11 MMOL/L — SIGNIFICANT CHANGE UP (ref 5–17)
BUN SERPL-MCNC: 11 MG/DL — SIGNIFICANT CHANGE UP (ref 7–23)
CALCIUM SERPL-MCNC: 9.2 MG/DL — SIGNIFICANT CHANGE UP (ref 8.4–10.5)
CHLORIDE SERPL-SCNC: 104 MMOL/L — SIGNIFICANT CHANGE UP (ref 96–108)
CO2 SERPL-SCNC: 23 MMOL/L — SIGNIFICANT CHANGE UP (ref 22–31)
CREAT SERPL-MCNC: 0.82 MG/DL — SIGNIFICANT CHANGE UP (ref 0.5–1.3)
GLUCOSE SERPL-MCNC: 106 MG/DL — HIGH (ref 70–99)
HCT VFR BLD CALC: 42.9 % — SIGNIFICANT CHANGE UP (ref 39–50)
HGB BLD-MCNC: 13.9 G/DL — SIGNIFICANT CHANGE UP (ref 13–17)
MCHC RBC-ENTMCNC: 32.4 GM/DL — SIGNIFICANT CHANGE UP (ref 32–36)
MCHC RBC-ENTMCNC: 32.9 PG — SIGNIFICANT CHANGE UP (ref 27–34)
MCV RBC AUTO: 101.4 FL — HIGH (ref 80–100)
NRBC # BLD: 0 /100 WBCS — SIGNIFICANT CHANGE UP (ref 0–0)
PLATELET # BLD AUTO: 313 K/UL — SIGNIFICANT CHANGE UP (ref 150–400)
POTASSIUM SERPL-MCNC: 4 MMOL/L — SIGNIFICANT CHANGE UP (ref 3.5–5.3)
POTASSIUM SERPL-SCNC: 4 MMOL/L — SIGNIFICANT CHANGE UP (ref 3.5–5.3)
RBC # BLD: 4.23 M/UL — SIGNIFICANT CHANGE UP (ref 4.2–5.8)
RBC # FLD: 13 % — SIGNIFICANT CHANGE UP (ref 10.3–14.5)
SODIUM SERPL-SCNC: 138 MMOL/L — SIGNIFICANT CHANGE UP (ref 135–145)
WBC # BLD: 6.68 K/UL — SIGNIFICANT CHANGE UP (ref 3.8–10.5)
WBC # FLD AUTO: 6.68 K/UL — SIGNIFICANT CHANGE UP (ref 3.8–10.5)

## 2020-02-23 RX ORDER — SODIUM CHLORIDE 9 MG/ML
1000 INJECTION INTRAMUSCULAR; INTRAVENOUS; SUBCUTANEOUS
Refills: 0 | Status: DISCONTINUED | OUTPATIENT
Start: 2020-02-23 | End: 2020-02-25

## 2020-02-23 RX ADMIN — PIPERACILLIN AND TAZOBACTAM 25 GRAM(S): 4; .5 INJECTION, POWDER, LYOPHILIZED, FOR SOLUTION INTRAVENOUS at 12:09

## 2020-02-23 RX ADMIN — PIPERACILLIN AND TAZOBACTAM 25 GRAM(S): 4; .5 INJECTION, POWDER, LYOPHILIZED, FOR SOLUTION INTRAVENOUS at 03:04

## 2020-02-23 RX ADMIN — ENOXAPARIN SODIUM 40 MILLIGRAM(S): 100 INJECTION SUBCUTANEOUS at 17:36

## 2020-02-23 RX ADMIN — PIPERACILLIN AND TAZOBACTAM 25 GRAM(S): 4; .5 INJECTION, POWDER, LYOPHILIZED, FOR SOLUTION INTRAVENOUS at 18:58

## 2020-02-23 RX ADMIN — SODIUM CHLORIDE 60 MILLILITER(S): 9 INJECTION INTRAMUSCULAR; INTRAVENOUS; SUBCUTANEOUS at 12:10

## 2020-02-23 RX ADMIN — SODIUM CHLORIDE 60 MILLILITER(S): 9 INJECTION INTRAMUSCULAR; INTRAVENOUS; SUBCUTANEOUS at 18:58

## 2020-02-23 NOTE — PROGRESS NOTE ADULT - SUBJECTIVE AND OBJECTIVE BOX
Subjective: Patient seen and examined. No new events except as noted.     SUBJECTIVE/ROS:        MEDICATIONS:  MEDICATIONS  (STANDING):  aspirin 325 milliGRAM(s) Enteral Tube daily  atorvastatin 80 milliGRAM(s) Oral at bedtime  cyanocobalamin 1000 MICROGram(s) Oral daily  dextrose 5%. 1000 milliLiter(s) (50 mL/Hr) IV Continuous <Continuous>  dextrose 50% Injectable 12.5 Gram(s) IV Push once  dextrose 50% Injectable 25 Gram(s) IV Push once  dextrose 50% Injectable 25 Gram(s) IV Push once  enoxaparin Injectable 40 milliGRAM(s) SubCutaneous daily  ergocalciferol 37796 Unit(s) Oral every week  multivitamin/minerals/iron Oral Solution (CENTRUM) 15 milliLiter(s) Enteral Tube daily  piperacillin/tazobactam IVPB.. 3.375 Gram(s) IV Intermittent every 8 hours  sodium chloride 0.9%. 1000 milliLiter(s) (60 mL/Hr) IV Continuous <Continuous>      PHYSICAL EXAM:  T(C): 36.6 (02-23-20 @ 05:06), Max: 36.8 (02-22-20 @ 12:27)  HR: 46 (02-23-20 @ 05:06) (46 - 54)  BP: 111/73 (02-23-20 @ 05:06) (103/63 - 116/73)  RR: 18 (02-23-20 @ 05:06) (18 - 187)  SpO2: 97% (02-23-20 @ 05:06) (96% - 97%)  Wt(kg): --  I&O's Summary    22 Feb 2020 07:01  -  23 Feb 2020 07:00  --------------------------------------------------------  IN: 520 mL / OUT: 1130 mL / NET: -610 mL            JVP: Normal  Neck: supple  Lung: clear   CV: S1 S2 , Murmur:  Abd: soft  Ext: No edema  neuro: Awake   Psych: flat affect  Skin: normal``    LABS/DATA:    CARDIAC MARKERS:                                13.9   6.68  )-----------( 313      ( 23 Feb 2020 06:49 )             42.9     02-23    138  |  104  |  11  ----------------------------<  106<H>  4.0   |  23  |  0.82    Ca    9.2      23 Feb 2020 06:49      proBNP:   Lipid Profile:   HgA1c:   TSH:     TELE:  EKG:

## 2020-02-23 NOTE — PROGRESS NOTE ADULT - ASSESSMENT
59Y Tajik speaking M with prior CVA 8y ago (no reported deficits) brought in by brother for concerns of generalized weakness and inability to speak for the past 3 weeks. On exam found to have no verbal output, appeared withdrawn and weak on neurological evaluation  He follows commands intermittently and is easily agitated.  Exam limited due to patient aggression on admission  CTH demonstrates chronic L MCA infarct with severely stenotic vs occluded distal L M1. this is confirmed on MRI but also an acute right post frontal infarct is seen. neuro aware, Cardiology aware, benign LUIS and s/p Medtronic ILR placement 2/13, will need outptn F/U in 7-10 days. however 2/2 ongoing bradycardia EPS reconsulted. recommended to be on prn atropine or isoproternol/dobutamine infusion during the Procedure. PPM not recommended.   was on NGT feeds prior to PEG, plavix was held  prior to PEG( cleared by Neuro, cont ASA), s/p PEG placement 2/21, procedure went well, in the turner post procedure ptn pulled PEG out, remains NPO, GI to discuss options w family  the best option would be surgical gastrostomy, GI to make arrangemtns w surgery re scheduling and obtaining consent.   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 LUIS benign  -DVT ppx w sc Lovenox  -social service consult  -Dispo to acute rehab

## 2020-02-23 NOTE — PROGRESS NOTE ADULT - ASSESSMENT
left another message for patient today.  option includes surgical gastrostomy vs palliatve care.  later preferred.

## 2020-02-23 NOTE — PROGRESS NOTE ADULT - SUBJECTIVE AND OBJECTIVE BOX
Patient is a 59y old  Male who presents with a chief complaint of mouth lesions, possible stroke (23 Feb 2020 09:36)      SUBJECTIVE / OVERNIGHT EVENTS: pulled PEG out on 2/21, will need surgical gastrostomy placed w post op 1:1 observation. GI to obtain consent and consult surgery    MEDICATIONS  (STANDING):  aspirin 325 milliGRAM(s) Enteral Tube daily  atorvastatin 80 milliGRAM(s) Oral at bedtime  cyanocobalamin 1000 MICROGram(s) Oral daily  dextrose 5%. 1000 milliLiter(s) (50 mL/Hr) IV Continuous <Continuous>  dextrose 50% Injectable 12.5 Gram(s) IV Push once  dextrose 50% Injectable 25 Gram(s) IV Push once  dextrose 50% Injectable 25 Gram(s) IV Push once  enoxaparin Injectable 40 milliGRAM(s) SubCutaneous daily  ergocalciferol 49062 Unit(s) Oral every week  multivitamin/minerals/iron Oral Solution (CENTRUM) 15 milliLiter(s) Enteral Tube daily  piperacillin/tazobactam IVPB.. 3.375 Gram(s) IV Intermittent every 8 hours  sodium chloride 0.9%. 1000 milliLiter(s) (60 mL/Hr) IV Continuous <Continuous>    MEDICATIONS  (PRN):  dextrose 40% Gel 15 Gram(s) Oral once PRN Blood Glucose LESS THAN 70 milliGRAM(s)/deciliter  glucagon  Injectable 1 milliGRAM(s) IntraMuscular once PRN Glucose LESS THAN 70 milligrams/deciliter      Vital Signs Last 24 Hrs  T(F): 97.5 (02-23-20 @ 12:00), Max: 98.2 (02-22-20 @ 20:58)  HR: 56 (02-23-20 @ 12:00) (46 - 56)  BP: 131/80 (02-23-20 @ 12:00) (103/63 - 131/80)  RR: 18 (02-23-20 @ 12:00) (18 - 18)  SpO2: 97% (02-23-20 @ 12:00) (96% - 97%)  Telemetry:   CAPILLARY BLOOD GLUCOSE        I&O's Summary    22 Feb 2020 07:01  -  23 Feb 2020 07:00  --------------------------------------------------------  IN: 520 mL / OUT: 1130 mL / NET: -610 mL    23 Feb 2020 07:01  -  23 Feb 2020 20:52  --------------------------------------------------------  IN: 820 mL / OUT: 700 mL / NET: 120 mL        PHYSICAL EXAM:  GENERAL: NAD, well-developed  HEAD:  Atraumatic, Normocephalic  EYES: EOMI, PERRLA, conjunctiva and sclera clear  NECK: Supple, No JVD  CHEST/LUNG: Clear to auscultation bilaterally; No wheeze  HEART: Regular rate and rhythm; No murmurs, rubs, or gallops  ABDOMEN: Soft, Nontender, Nondistended; Bowel sounds present  EXTREMITIES:  2+ Peripheral Pulses, No clubbing, cyanosis, or edema  PSYCH: AAOx3  NEUROLOGY: non-focal  SKIN: No rashes or lesions    LABS:                        13.9   6.68  )-----------( 313      ( 23 Feb 2020 06:49 )             42.9     02-23    138  |  104  |  11  ----------------------------<  106<H>  4.0   |  23  |  0.82    Ca    9.2      23 Feb 2020 06:49                RADIOLOGY & ADDITIONAL TESTS:    Imaging Personally Reviewed:    Consultant(s) Notes Reviewed:      Care Discussed with Consultants/Other Providers:

## 2020-02-23 NOTE — PROGRESS NOTE ADULT - SUBJECTIVE AND OBJECTIVE BOX
PAM JIMENEZ:[id number],   59yMale followed for:  No Known Allergies    PAST MEDICAL & SURGICAL HISTORY:  CVA (cerebral vascular accident)  No significant past surgical history    FAMILY HISTORY:  No pertinent family history in first degree relatives    MEDICATIONS  (STANDING):  aspirin 325 milliGRAM(s) Enteral Tube daily  atorvastatin 80 milliGRAM(s) Oral at bedtime  cyanocobalamin 1000 MICROGram(s) Oral daily  dextrose 5%. 1000 milliLiter(s) (50 mL/Hr) IV Continuous <Continuous>  dextrose 50% Injectable 12.5 Gram(s) IV Push once  dextrose 50% Injectable 25 Gram(s) IV Push once  dextrose 50% Injectable 25 Gram(s) IV Push once  enoxaparin Injectable 40 milliGRAM(s) SubCutaneous daily  ergocalciferol 67820 Unit(s) Oral every week  multivitamin/minerals/iron Oral Solution (CENTRUM) 15 milliLiter(s) Enteral Tube daily  piperacillin/tazobactam IVPB.. 3.375 Gram(s) IV Intermittent every 8 hours  sodium chloride 0.9%. 1000 milliLiter(s) (60 mL/Hr) IV Continuous <Continuous>    MEDICATIONS  (PRN):  dextrose 40% Gel 15 Gram(s) Oral once PRN Blood Glucose LESS THAN 70 milliGRAM(s)/deciliter  glucagon  Injectable 1 milliGRAM(s) IntraMuscular once PRN Glucose LESS THAN 70 milligrams/deciliter      Vital Signs Last 24 Hrs  T(C): 36.6 (23 Feb 2020 05:06), Max: 36.8 (22 Feb 2020 12:27)  T(F): 97.9 (23 Feb 2020 05:06), Max: 98.3 (22 Feb 2020 12:27)  HR: 46 (23 Feb 2020 05:06) (46 - 54)  BP: 111/73 (23 Feb 2020 05:06) (103/63 - 116/73)  BP(mean): --  RR: 18 (23 Feb 2020 05:06) (18 - 187)  SpO2: 97% (23 Feb 2020 05:06) (96% - 97%)  nc/at  s1s2  cta  soft, nt, nd no guarding or rebound  no c/c/e    CBC Full  -  ( 23 Feb 2020 06:49 )  WBC Count : 6.68 K/uL  RBC Count : 4.23 M/uL  Hemoglobin : 13.9 g/dL  Hematocrit : 42.9 %  Platelet Count - Automated : 313 K/uL  Mean Cell Volume : 101.4 fl  Mean Cell Hemoglobin : 32.9 pg  Mean Cell Hemoglobin Concentration : 32.4 gm/dL  Auto Neutrophil # : x  Auto Lymphocyte # : x  Auto Monocyte # : x  Auto Eosinophil # : x  Auto Basophil # : x  Auto Neutrophil % : x  Auto Lymphocyte % : x  Auto Monocyte % : x  Auto Eosinophil % : x  Auto Basophil % : x    02-23    138  |  104  |  11  ----------------------------<  106<H>  4.0   |  23  |  0.82    Ca    9.2      23 Feb 2020 06:49

## 2020-02-23 NOTE — PROGRESS NOTE ADULT - ASSESSMENT
CVA  LUIS unremarkable  s/p ILR   on dapt    sinus bradycardia   likely CNS related   fu with EP   atropine PRN for HR in 30s     pt pulled out PEG  fu with GI

## 2020-02-24 DIAGNOSIS — E46 UNSPECIFIED PROTEIN-CALORIE MALNUTRITION: ICD-10-CM

## 2020-02-24 LAB
GLUCOSE BLDC GLUCOMTR-MCNC: 87 MG/DL — SIGNIFICANT CHANGE UP (ref 70–99)
GLUCOSE BLDC GLUCOMTR-MCNC: 91 MG/DL — SIGNIFICANT CHANGE UP (ref 70–99)

## 2020-02-24 RX ORDER — ASPIRIN/CALCIUM CARB/MAGNESIUM 324 MG
300 TABLET ORAL ONCE
Refills: 0 | Status: COMPLETED | OUTPATIENT
Start: 2020-02-24 | End: 2020-02-24

## 2020-02-24 RX ADMIN — PIPERACILLIN AND TAZOBACTAM 25 GRAM(S): 4; .5 INJECTION, POWDER, LYOPHILIZED, FOR SOLUTION INTRAVENOUS at 21:53

## 2020-02-24 RX ADMIN — Medication 300 MILLIGRAM(S): at 21:53

## 2020-02-24 RX ADMIN — ENOXAPARIN SODIUM 40 MILLIGRAM(S): 100 INJECTION SUBCUTANEOUS at 17:38

## 2020-02-24 RX ADMIN — PIPERACILLIN AND TAZOBACTAM 25 GRAM(S): 4; .5 INJECTION, POWDER, LYOPHILIZED, FOR SOLUTION INTRAVENOUS at 11:51

## 2020-02-24 RX ADMIN — PIPERACILLIN AND TAZOBACTAM 25 GRAM(S): 4; .5 INJECTION, POWDER, LYOPHILIZED, FOR SOLUTION INTRAVENOUS at 03:05

## 2020-02-24 NOTE — CONSULT NOTE ADULT - SUBJECTIVE AND OBJECTIVE BOX
General Surgery Consult  Consulting surgical team: Sudheer  Consulting attending: Dr. Fletcher    HPI:  60 yo M with h/o CVA 8 years ago presenting with aphasia. Pt is accompanied by brother who interpreted in the ED. Brother endorsed that pt went to visit family in [place] 9/2019 and about 3 weeks ago, family stated that he was starting to get weak and stopped being able to speak. He was also endorsing difficulty walking from weakness. He returned from [place] last night and came to the ED for evaluation. Pt also developed lesions on his mouth 3 days ago. He denies any other medical history or medication use. (09 Feb 2020 20:23)    Patient pulled out PEG 2/21  [phone] called multiple times, has not picked up the phone.  Per Dr. Em (primary team physician), says family wants everything done for the patient.    aphasic    PAST MEDICAL HISTORY:  CVA (cerebral vascular accident)      PAST SURGICAL HISTORY:  No significant past surgical history      MEDICATIONS:  aspirin 325 milliGRAM(s) Enteral Tube daily  atorvastatin 80 milliGRAM(s) Oral at bedtime  cyanocobalamin 1000 MICROGram(s) Oral daily  dextrose 40% Gel 15 Gram(s) Oral once PRN  dextrose 5%. 1000 milliLiter(s) IV Continuous <Continuous>  dextrose 50% Injectable 12.5 Gram(s) IV Push once  dextrose 50% Injectable 25 Gram(s) IV Push once  dextrose 50% Injectable 25 Gram(s) IV Push once  enoxaparin Injectable 40 milliGRAM(s) SubCutaneous daily  ergocalciferol 39981 Unit(s) Oral every week  glucagon  Injectable 1 milliGRAM(s) IntraMuscular once PRN  multivitamin/minerals/iron Oral Solution (CENTRUM) 15 milliLiter(s) Enteral Tube daily  piperacillin/tazobactam IVPB.. 3.375 Gram(s) IV Intermittent every 8 hours  sodium chloride 0.9%. 1000 milliLiter(s) IV Continuous <Continuous>      ALLERGIES:  No Known Allergies      VITALS & I/Os:  Vital Signs Last 24 Hrs  T(C): 36.8 (24 Feb 2020 12:35), Max: 36.8 (24 Feb 2020 12:35)  T(F): 98.3 (24 Feb 2020 12:35), Max: 98.3 (24 Feb 2020 12:35)  HR: 55 (24 Feb 2020 12:35) (55 - 66)  BP: 105/65 (24 Feb 2020 12:35) (105/65 - 122/77)  BP(mean): --  RR: 18 (24 Feb 2020 12:35) (18 - 18)  SpO2: 100% (24 Feb 2020 12:35) (97% - 100%)    I&O's Summary    23 Feb 2020 07:01  -  24 Feb 2020 07:00  --------------------------------------------------------  IN: 1740 mL / OUT: 1075 mL / NET: 665 mL        PHYSICAL EXAM:  General: No acute distress  Respiratory: Nonlabored  Cardiovascular: RRR  Abdominal: Soft, nondistended, nontender  dressing c/d/i  Extremities: Warm    LABS:                        13.9   6.68  )-----------( 313      ( 23 Feb 2020 06:49 )             42.9     02-23    138  |  104  |  11  ----------------------------<  106<H>  4.0   |  23  |  0.82    Ca    9.2      23 Feb 2020 06:49      Lactate:                  IMAGING:

## 2020-02-24 NOTE — CONSULT NOTE ADULT - ASSESSMENT
59M hx of CVA, here for stroke, new aphasia, PEG 2/21 c/b patient pulling it out same day, family wants everything done, preop for open gastrostomy 2/27.    Recommendations:  -current plan for OR open g tube placement 2/27  -will continue to attempt to call HCP for consent for surgery    will need the following prior to OR:  -need S&S documenation that patient needs long-term nutrition  -need neurology to document little chance of improvement of swallowing  -hold plavix until OR thursday if okay with primary team, neurology   -recommendations have been discussed with NP Beronica    Discussed with Dr. Justine [facility]  [id number]

## 2020-02-24 NOTE — PROGRESS NOTE ADULT - ASSESSMENT
ASSESSMENT: This is a 58yo Khmer speaking Man with h/o prior CVA w/o residual deficits p/w 3 weeks of generalized weakness and expressive aphasia. MRI demonstrated new Acute right posterior frontal infarct in the region of the right precentral gyrus. Subacute infarction the right frontoparietal and posterior temporal region. Minimal petechial hemorrhagic transformation and gyral enhancement in both regions. Multifocal chronic bilateral MCA territory infarcts. Chronic hemorrhagic products in the region of the left basal ganglia. Persistent mild narrowing of the cavernous and supraclinoid left ICA. Persistent lack of flow related signal within the left M1 segment, with poor visualization of the more distal left MCA branches. Re-demonstration of multifocal mild to moderate stenoses of the right M1, with Normal flow-related enhancement of the more distal right MCA. No flow-limiting stenosis or evidence for arterial dissection.  LUIS (2/13/20)  demonstrates normal LV function, no wall abnormality, no evidence of PFO.      Impression: Expressive aphasia may be related to Right temporal infarcts, if this is his dominant hemisphere vs. apraxia of speech.  Strokes appear embolic in origin, likely cardioembolic, exact mechanism remains unknown/ ESUS (Embolic stroke of undetermined source) vs. symptomatic large artery atherosclerosis. Possible PAF, s/p ILR. Recommend patient to continue DAPT with ASA 81mg + Plavix 75 QD for 90 days followed [facility]. Gradual return to normotension, avoid hypotension. While patient remains NPO recommend that ASA continue as Suppository , unless otherwise contraindicated by GI or primary medical team.      Recommend consideration of  malignancy screening  to round out  workup, rule out potential for occult malignancy as contributing factor for recurrent stroke. Patient's smoking history is unknown, to be clarified by family, and uncertain if patient has undergone proactive malignancy screening: colonoscopy, prostate exam. etc.         Patient recently underwent PEG insertion due to failed MBS but that evening pulled out PEG. Awaiting reinsertion; possible procedure today, consent is pending from family. It is anticipated that patient's dysphasia will be present for 6 months, and perhaps longer.  Once mechanism of feeding and medication administration is established (PEG) no neurological objection to proceed with discharge to rehab per determination  of primary team once work up is concluded. ASSESSMENT: This is a 58yo Armenian speaking Man with h/o prior CVA w/o residual deficits p/w 3 weeks of generalized weakness and expressive aphasia. MRI demonstrated new Acute right posterior frontal infarct in the region of the right precentral gyrus. Subacute infarction the right frontoparietal and posterior temporal region. Minimal petechial hemorrhagic transformation and gyral enhancement in both regions. Multifocal chronic bilateral MCA territory infarcts. Chronic hemorrhagic products in the region of the left basal ganglia. Persistent mild narrowing of the cavernous and supraclinoid left ICA. Persistent lack of flow related signal within the left M1 segment, with poor visualization of the more distal left MCA branches. Re-demonstration of multifocal mild to moderate stenoses of the right M1, with Normal flow-related enhancement of the more distal right MCA. No flow-limiting stenosis or evidence for arterial dissection.  LUIS (2/13/20)  demonstrates normal LV function, no wall abnormality, no evidence of PFO.      Impression: Expressive aphasia may be related to Right temporal infarcts, if this is his dominant hemisphere vs. apraxia of speech.  Strokes appear embolic in origin, likely cardioembolic, exact mechanism remains unknown/ ESUS (Embolic stroke of undetermined source) vs. symptomatic large artery atherosclerosis. Possible PAF, s/p ILR. Recommend patient to continue DAPT with ASA 81mg + Plavix 75 QD for 90 days followed by ASA monotherapy per [facility] protocol once cleared to resume by GI.     Gradual return to normotension, avoid hypotension.     While patient remains NPO in anticipation of open G tube placement,  recommend that ASA continue to be administered as Suppository , unless otherwise contraindicated by GI or primary medical team, there is no neurological objection to holding plavix.      Recommend consideration of  malignancy screening (CT c/a/p)  to round out  workup, rule out potential for occult malignancy as contributing factor for recurrent stroke. Patient's smoking history is unknown, to be clarified by family, and uncertain if patient has undergone proactive malignancy screening: colonoscopy, prostate exam. etc.         Patient recently underwent PEG insertion due to failed MBS but that evening pulled out PEG. Awaiting Open G tube insertion; possible procedure today, consent is pending from family. It is anticipated that patient's dysphagia will be present for at least 6 months, perhaps longer.  Once mechanism of feeding and medication administration is established (open G tube) no neurological objection to proceed with discharge to rehab per determination  of primary team once work up is concluded. Plan discussed with medicine NP, who will discuss recommendations above with Dr. Simmons.

## 2020-02-24 NOTE — PROGRESS NOTE ADULT - SUBJECTIVE AND OBJECTIVE BOX
Patient is a 59y old  Male who presents with a chief complaint of mouth lesions, possible stroke (24 Feb 2020 13:50)      SUBJECTIVE / OVERNIGHT EVENTS: still awaiting a return call from HCP re surgical gastostomy scheduled for 2/27, also neuro recommending CT C/A/P as a malignancy work up which will also require consent for contrast. oncology consult called. Ptn is npo, start ASA as a rectal suppository. ptn will need artificial nutrition via gastrostomy for at least 6 months    MEDICATIONS  (STANDING):  aspirin Suppository 300 milliGRAM(s) Rectal once  atorvastatin 80 milliGRAM(s) Oral at bedtime  cyanocobalamin 1000 MICROGram(s) Oral daily  dextrose 5%. 1000 milliLiter(s) (50 mL/Hr) IV Continuous <Continuous>  dextrose 50% Injectable 12.5 Gram(s) IV Push once  dextrose 50% Injectable 25 Gram(s) IV Push once  dextrose 50% Injectable 25 Gram(s) IV Push once  enoxaparin Injectable 40 milliGRAM(s) SubCutaneous daily  ergocalciferol 40219 Unit(s) Oral every week  multivitamin/minerals/iron Oral Solution (CENTRUM) 15 milliLiter(s) Enteral Tube daily  piperacillin/tazobactam IVPB.. 3.375 Gram(s) IV Intermittent every 8 hours  sodium chloride 0.9%. 1000 milliLiter(s) (60 mL/Hr) IV Continuous <Continuous>    MEDICATIONS  (PRN):  dextrose 40% Gel 15 Gram(s) Oral once PRN Blood Glucose LESS THAN 70 milliGRAM(s)/deciliter  glucagon  Injectable 1 milliGRAM(s) IntraMuscular once PRN Glucose LESS THAN 70 milligrams/deciliter      Vital Signs Last 24 Hrs  T(F): 98.3 (02-24-20 @ 12:35), Max: 98.3 (02-24-20 @ 12:35)  HR: 55 (02-24-20 @ 12:35) (55 - 66)  BP: 105/65 (02-24-20 @ 12:35) (105/65 - 122/77)  RR: 18 (02-24-20 @ 12:35) (18 - 18)  SpO2: 100% (02-24-20 @ 12:35) (97% - 100%)  Telemetry:   CAPILLARY BLOOD GLUCOSE      POCT Blood Glucose.: 87 mg/dL (24 Feb 2020 13:19)  POCT Blood Glucose.: 91 mg/dL (24 Feb 2020 08:59)    I&O's Summary    23 Feb 2020 07:01  -  24 Feb 2020 07:00  --------------------------------------------------------  IN: 1740 mL / OUT: 1075 mL / NET: 665 mL        PHYSICAL EXAM:  GENERAL: NAD, well-developed  HEAD:  Atraumatic, Normocephalic  EYES: EOMI, PERRLA, conjunctiva and sclera clear  NECK: Supple, No JVD  CHEST/LUNG: Clear to auscultation bilaterally; No wheeze  HEART: Regular rate and rhythm; No murmurs, rubs, or gallops  ABDOMEN: Soft, Nontender, Nondistended; Bowel sounds present  EXTREMITIES:  2+ Peripheral Pulses, No clubbing, cyanosis, or edema  PSYCH: AAOx3  NEUROLOGY: non-focal  SKIN: No rashes or lesions    LABS:                        13.9   6.68  )-----------( 313      ( 23 Feb 2020 06:49 )             42.9     02-23    138  |  104  |  11  ----------------------------<  106<H>  4.0   |  23  |  0.82    Ca    9.2      23 Feb 2020 06:49                RADIOLOGY & ADDITIONAL TESTS:    Imaging Personally Reviewed:    Consultant(s) Notes Reviewed:      Care Discussed with Consultants/Other Providers:

## 2020-02-24 NOTE — PROGRESS NOTE ADULT - ASSESSMENT
CVA  LUIS unremarkable  s/p ILR   on dapt    sinus bradycardia   likely CNS related   fu with EP     pt pulled out PEG  fu with GI regarding plan

## 2020-02-24 NOTE — PROGRESS NOTE ADULT - ASSESSMENT
59Y Turkish speaking M with prior CVA 8y ago (no reported deficits) brought in by brother for concerns of generalized weakness and inability to speak for the past 3 weeks. On exam found to have no verbal output, appeared withdrawn and weak on neurological evaluation  He follows commands intermittently and is easily agitated.  Exam limited due to patient aggression on admission  CTH demonstrates chronic L MCA infarct with severely stenotic vs occluded distal L M1.   this is confirmed on MRI but also an acute right post frontal infarct is seen. neuro aware, Cardiology aware, benign LUIS and s/p [employer] ILR placement 2/13, will need outptn F/U in 7-10 days. however 2/2 ongoing bradycardia EPS reconsulted. recommended to be on prn atropine or isoproternol/dobutamine infusion during the Procedure. PPM not recommended.   was on NGT feeds prior to PEG, plavix was held  prior to PEG( cleared by Neuro, cont ASA), s/p PEG placement 2/21, procedure went well, in the turner post procedure ptn pulled PEG out, remains NPO, GI to discuss options w family  the best option would be surgical gastrostomy, ptn on the schedule w Dr. Fletcher on thu 2/27 but its been challening to get a call back from family for consent  will need close supervision post op  d/w neurology Plavix on hold 2/2 ptn unable to swallow, awaiting gastrostomy placement. meanwhile start rectal ASA suppository.  Neuro recommends malignancy work up 2/2 unexplained multiple embolic strokes. Oncology consult w Dr. Sanchez called. ptn probably will need CT C/A/P w contrast and again will need consent from family.   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 LUIS benign  -DVT ppx w sc Lovenox  -social service consult  -Dispo to acute rehab

## 2020-02-24 NOTE — PROGRESS NOTE ADULT - SUBJECTIVE AND OBJECTIVE BOX
THE PATIENT WAS SEEN AND EXAMINED BY ME WITH THE HOUSESTAFF AND STROKE TEAM DURING MORNING ROUNDS.   HPI:  60 yo M with h/o CVA 8 years ago presenting with aphasia. Pt is accompanied by brother who interpreted in the ED. Brother endorsed that pt went to visit family in [place] 9/2019 and about 3 weeks ago, family stated that he was starting to get weak and stopped being able to speak. He was also endorsing difficulty walking from weakness. He returned from [place] last night and came to the ED for evaluation. Pt also developed lesions on his mouth 3 days ago. He denies any other medical history or medication use. (09 Feb 2020 20:23)      SUBJECTIVE: No events overnight.  No new neurologic complaints.      aspirin 325 milliGRAM(s) Enteral Tube daily  atorvastatin 80 milliGRAM(s) Oral at bedtime  cyanocobalamin 1000 MICROGram(s) Oral daily  dextrose 40% Gel 15 Gram(s) Oral once PRN  dextrose 5%. 1000 milliLiter(s) IV Continuous <Continuous>  dextrose 50% Injectable 12.5 Gram(s) IV Push once  dextrose 50% Injectable 25 Gram(s) IV Push once  dextrose 50% Injectable 25 Gram(s) IV Push once  enoxaparin Injectable 40 milliGRAM(s) SubCutaneous daily  ergocalciferol 42428 Unit(s) Oral every week  glucagon  Injectable 1 milliGRAM(s) IntraMuscular once PRN  multivitamin/minerals/iron Oral Solution (CENTRUM) 15 milliLiter(s) Enteral Tube daily  piperacillin/tazobactam IVPB.. 3.375 Gram(s) IV Intermittent every 8 hours  sodium chloride 0.9%. 1000 milliLiter(s) IV Continuous <Continuous>      PHYSICAL EXAM:   Vital Signs Last 24 Hrs  T(C): 36.8 (24 Feb 2020 12:35), Max: 36.8 (24 Feb 2020 12:35)  T(F): 98.3 (24 Feb 2020 12:35), Max: 98.3 (24 Feb 2020 12:35)  HR: 55 (24 Feb 2020 12:35) (55 - 66)  BP: 105/65 (24 Feb 2020 12:35) (105/65 - 122/77)  BP(mean): --  RR: 18 (24 Feb 2020 12:35) (18 - 18)  SpO2: 100% (24 Feb 2020 12:35) (97% - 100%)    General: Awake and alert, attends examiner. Globally aphasic, will mimic successfully but does not follow simple commands. Resting in bed.  No acute distress.  HEENT: Normocephalic, atraumatic.  EOM intact.  Abdomen: Dressing to left upper quadrant, intact, abdomen soft, non tender, not distended.  Extremities: No edema    NEUROLOGICAL EXAM:  Mental status: Awake, alert. Globally aphasic, does not follow commands, but will mimic: stick out tongue, smile, raise arms, etc. No verbal output. When asked a question will point to mouth and shakes head "no". Unable to assess comprehension or memory.    Cranial Nerves: Right sided facial droop with mild tongue deviation to the left. No appreciable nystagmus. Able to turn head side to side, fluid motion.  Exam is limited as patient  not following commands.   Motor exam: Normal tone, no drift, 5/5 RUE, 5/5 RLE, 5/5 LUE, 5/5 LLE.  Sensation: Appears intact to light touch   Coordination/ Gait: FTN intact, Gait not assessed on bedrest.     LABS:                        13.9   6.68  )-----------( 313      ( 23 Feb 2020 06:49 )             42.9    02-23    138  |  104  |  11  ----------------------------<  106<H>  4.0   |  23  |  0.82    Ca    9.2      23 Feb 2020 06:49      Hemoglobin A1C, Whole Blood: 5.5 % (02-10 @ 09:08)      IMAGING:     Transesophageal Echocardiogram w/o TTE (02.13.20 @ 15:15) >  Conclusions:  Normal left ventricular systolic function. No segmental  wall motion abnormalities.  Agitated saline contrast injection demonstrates no evidence  of a patent foramen ovale.      MR Head w/wo IV Cont (02.11.20 @ 15:07)     IMPRESSION:    BRAIN MRI:  Acute right posterior frontal infarct in the region of the right precentral gyrus. Subacute infarction the right frontoparietal and posterior temporal region. Minimal petechial hemorrhagic transformation and gyral enhancement is noted in both regions.  Multifocal chronic bilateral MCA territory infarcts. Chronic hemorrhagic products in the region of the left basal ganglia.    BRAIN MRA:  Persistent mild narrowing of the cavernous and supraclinoid left ICA. Persistent lack of flow related signal within the left M1 segment, with poor visualization of the more distal left MCA branches.    Re-demonstration of multifocal mild to moderate stenoses of the right M1 segment. Normal flow-related enhancement of the more distal right MCA.    NECK MRA:  No flow-limiting stenosis or evidence for arterial dissection.       CT Angio Neck w/ IV Cont (02.09.20 @ 15:13)     IMPRESSION:     CT brain: Multiple areas of prior infarct as described above. No acute hemorrhage or evidence of mass effect. Age-indeterminate lacunar infarcts as described. If the patient has a new and persistent neurologic deficit, consider short follow-up head CT or brain MRI follow-up.    CT angiography neck: No hemodynamically significant stenosis by NASCET criteria. No vascular dissection.    CT angiography brain: The left middle cerebral artery is severely stenosed versus occluded in this patient with a chronic left MCA territory infarct. Marked decrease branching of the distal left MCA branches are noted in the left sylvian fissure compared to the contralateral side.    Mild to moderate lobulated stenoses involve the M1 segment of the right middle cerebral artery.

## 2020-02-24 NOTE — PROGRESS NOTE ADULT - SUBJECTIVE AND OBJECTIVE BOX
PAM JIMENEZ:[id number],   59yMale followed for:  No Known Allergies    PAST MEDICAL & SURGICAL HISTORY:  CVA (cerebral vascular accident)  No significant past surgical history    FAMILY HISTORY:  No pertinent family history in first degree relatives    MEDICATIONS  (STANDING):  aspirin 325 milliGRAM(s) Enteral Tube daily  atorvastatin 80 milliGRAM(s) Oral at bedtime  cyanocobalamin 1000 MICROGram(s) Oral daily  dextrose 5%. 1000 milliLiter(s) (50 mL/Hr) IV Continuous <Continuous>  dextrose 50% Injectable 12.5 Gram(s) IV Push once  dextrose 50% Injectable 25 Gram(s) IV Push once  dextrose 50% Injectable 25 Gram(s) IV Push once  enoxaparin Injectable 40 milliGRAM(s) SubCutaneous daily  ergocalciferol 97786 Unit(s) Oral every week  multivitamin/minerals/iron Oral Solution (CENTRUM) 15 milliLiter(s) Enteral Tube daily  piperacillin/tazobactam IVPB.. 3.375 Gram(s) IV Intermittent every 8 hours  sodium chloride 0.9%. 1000 milliLiter(s) (60 mL/Hr) IV Continuous <Continuous>    MEDICATIONS  (PRN):  dextrose 40% Gel 15 Gram(s) Oral once PRN Blood Glucose LESS THAN 70 milliGRAM(s)/deciliter  glucagon  Injectable 1 milliGRAM(s) IntraMuscular once PRN Glucose LESS THAN 70 milligrams/deciliter      Vital Signs Last 24 Hrs  T(C): 36.3 (24 Feb 2020 04:32), Max: 36.6 (23 Feb 2020 22:31)  T(F): 97.4 (24 Feb 2020 04:32), Max: 97.9 (23 Feb 2020 22:31)  HR: 55 (24 Feb 2020 04:32) (55 - 66)  BP: 109/67 (24 Feb 2020 04:32) (109/67 - 131/80)  BP(mean): --  RR: 18 (24 Feb 2020 04:32) (18 - 18)  SpO2: 97% (24 Feb 2020 04:32) (97% - 97%)  nc/at  s1s2  cta  soft, nt, nd no guarding or rebound  no c/c/e    CBC Full  -  ( 23 Feb 2020 06:49 )  WBC Count : 6.68 K/uL  RBC Count : 4.23 M/uL  Hemoglobin : 13.9 g/dL  Hematocrit : 42.9 %  Platelet Count - Automated : 313 K/uL  Mean Cell Volume : 101.4 fl  Mean Cell Hemoglobin : 32.9 pg  Mean Cell Hemoglobin Concentration : 32.4 gm/dL  Auto Neutrophil # : x  Auto Lymphocyte # : x  Auto Monocyte # : x  Auto Eosinophil # : x  Auto Basophil # : x  Auto Neutrophil % : x  Auto Lymphocyte % : x  Auto Monocyte % : x  Auto Eosinophil % : x  Auto Basophil % : x    02-23    138  |  104  |  11  ----------------------------<  106<H>  4.0   |  23  |  0.82    Ca    9.2      23 Feb 2020 06:49

## 2020-02-24 NOTE — CHART NOTE - NSCHARTNOTEFT_GEN_A_CORE
pt seen and examined- full consult to follow  failed s+s   had peg placed last week which pt subsequently pulled out  pt awake, follows commands, denies abd pain  abd nontender, flat  a/p dysphagia s/p recent ?stroke  -asked to eval for surgical gastrostomy tube which has the potential for less risk of peritonitis if pulled out prematurely  -I would consider surgery if:      -family understands risks (bleeding, infection, peritonitis)      -there is a clearly documented plan in place to prevent pt from pulling out a surgical G-tube      -speech and swallow documents need for permanent enteral access      -neurology documents little chance of functional swallowing improvement in near future and ok to hold plavix until surgery (tentatively scheduled for Thurs)

## 2020-02-24 NOTE — PROGRESS NOTE ADULT - ASSESSMENT
left messages for family will call agian.  will not repeg patient secondary to risk of peritonitis with dislodge. spoke to surgery about surgical g.

## 2020-02-24 NOTE — PROGRESS NOTE ADULT - SUBJECTIVE AND OBJECTIVE BOX
Subjective: Patient seen and examined. No new events except as noted.     SUBJECTIVE/ROS:        MEDICATIONS:  MEDICATIONS  (STANDING):  aspirin 325 milliGRAM(s) Enteral Tube daily  atorvastatin 80 milliGRAM(s) Oral at bedtime  cyanocobalamin 1000 MICROGram(s) Oral daily  dextrose 5%. 1000 milliLiter(s) (50 mL/Hr) IV Continuous <Continuous>  dextrose 50% Injectable 12.5 Gram(s) IV Push once  dextrose 50% Injectable 25 Gram(s) IV Push once  dextrose 50% Injectable 25 Gram(s) IV Push once  enoxaparin Injectable 40 milliGRAM(s) SubCutaneous daily  ergocalciferol 84759 Unit(s) Oral every week  multivitamin/minerals/iron Oral Solution (CENTRUM) 15 milliLiter(s) Enteral Tube daily  piperacillin/tazobactam IVPB.. 3.375 Gram(s) IV Intermittent every 8 hours  sodium chloride 0.9%. 1000 milliLiter(s) (60 mL/Hr) IV Continuous <Continuous>      PHYSICAL EXAM:  T(C): 36.3 (02-24-20 @ 04:32), Max: 36.6 (02-23-20 @ 22:31)  HR: 55 (02-24-20 @ 04:32) (55 - 66)  BP: 109/67 (02-24-20 @ 04:32) (109/67 - 131/80)  RR: 18 (02-24-20 @ 04:32) (18 - 18)  SpO2: 97% (02-24-20 @ 04:32) (97% - 97%)  Wt(kg): --  I&O's Summary    23 Feb 2020 07:01  -  24 Feb 2020 07:00  --------------------------------------------------------  IN: 1740 mL / OUT: 1075 mL / NET: 665 mL            JVP: Normal  Neck: supple  Lung: clear   CV: S1 S2 , Murmur:  Abd: soft  Ext: No edema  neuro: Awake  Psych: flat affect  Skin: normal``    LABS/DATA:    CARDIAC MARKERS:                                13.9   6.68  )-----------( 313      ( 23 Feb 2020 06:49 )             42.9     02-23    138  |  104  |  11  ----------------------------<  106<H>  4.0   |  23  |  0.82    Ca    9.2      23 Feb 2020 06:49      proBNP:   Lipid Profile:   HgA1c:   TSH:     TELE:  EKG:

## 2020-02-25 LAB
ANION GAP SERPL CALC-SCNC: 12 MMOL/L — SIGNIFICANT CHANGE UP (ref 5–17)
BUN SERPL-MCNC: 17 MG/DL — SIGNIFICANT CHANGE UP (ref 7–23)
CALCIUM SERPL-MCNC: 9.1 MG/DL — SIGNIFICANT CHANGE UP (ref 8.4–10.5)
CHLORIDE SERPL-SCNC: 104 MMOL/L — SIGNIFICANT CHANGE UP (ref 96–108)
CO2 SERPL-SCNC: 21 MMOL/L — LOW (ref 22–31)
CREAT SERPL-MCNC: 0.74 MG/DL — SIGNIFICANT CHANGE UP (ref 0.5–1.3)
GLUCOSE BLDC GLUCOMTR-MCNC: 95 MG/DL — SIGNIFICANT CHANGE UP (ref 70–99)
GLUCOSE SERPL-MCNC: 87 MG/DL — SIGNIFICANT CHANGE UP (ref 70–99)
HCT VFR BLD CALC: 39.8 % — SIGNIFICANT CHANGE UP (ref 39–50)
HGB BLD-MCNC: 12.9 G/DL — LOW (ref 13–17)
MCHC RBC-ENTMCNC: 32.4 GM/DL — SIGNIFICANT CHANGE UP (ref 32–36)
MCHC RBC-ENTMCNC: 32.7 PG — SIGNIFICANT CHANGE UP (ref 27–34)
MCV RBC AUTO: 100.8 FL — HIGH (ref 80–100)
NRBC # BLD: 0 /100 WBCS — SIGNIFICANT CHANGE UP (ref 0–0)
PLATELET # BLD AUTO: 298 K/UL — SIGNIFICANT CHANGE UP (ref 150–400)
POTASSIUM SERPL-MCNC: 3.9 MMOL/L — SIGNIFICANT CHANGE UP (ref 3.5–5.3)
POTASSIUM SERPL-SCNC: 3.9 MMOL/L — SIGNIFICANT CHANGE UP (ref 3.5–5.3)
RBC # BLD: 3.95 M/UL — LOW (ref 4.2–5.8)
RBC # FLD: 12.8 % — SIGNIFICANT CHANGE UP (ref 10.3–14.5)
SODIUM SERPL-SCNC: 137 MMOL/L — SIGNIFICANT CHANGE UP (ref 135–145)
WBC # BLD: 7.44 K/UL — SIGNIFICANT CHANGE UP (ref 3.8–10.5)
WBC # FLD AUTO: 7.44 K/UL — SIGNIFICANT CHANGE UP (ref 3.8–10.5)

## 2020-02-25 PROCEDURE — 74177 CT ABD & PELVIS W/CONTRAST: CPT | Mod: 26

## 2020-02-25 PROCEDURE — 71260 CT THORAX DX C+: CPT | Mod: 26

## 2020-02-25 RX ORDER — ASPIRIN/CALCIUM CARB/MAGNESIUM 324 MG
300 TABLET ORAL DAILY
Refills: 0 | Status: DISCONTINUED | OUTPATIENT
Start: 2020-02-25 | End: 2020-03-04

## 2020-02-25 RX ORDER — SODIUM CHLORIDE 9 MG/ML
1000 INJECTION, SOLUTION INTRAVENOUS
Refills: 0 | Status: DISCONTINUED | OUTPATIENT
Start: 2020-02-25 | End: 2020-03-06

## 2020-02-25 RX ADMIN — PIPERACILLIN AND TAZOBACTAM 25 GRAM(S): 4; .5 INJECTION, POWDER, LYOPHILIZED, FOR SOLUTION INTRAVENOUS at 20:14

## 2020-02-25 RX ADMIN — PIPERACILLIN AND TAZOBACTAM 25 GRAM(S): 4; .5 INJECTION, POWDER, LYOPHILIZED, FOR SOLUTION INTRAVENOUS at 12:00

## 2020-02-25 RX ADMIN — ENOXAPARIN SODIUM 40 MILLIGRAM(S): 100 INJECTION SUBCUTANEOUS at 17:06

## 2020-02-25 RX ADMIN — Medication 300 MILLIGRAM(S): at 22:08

## 2020-02-25 RX ADMIN — PIPERACILLIN AND TAZOBACTAM 25 GRAM(S): 4; .5 INJECTION, POWDER, LYOPHILIZED, FOR SOLUTION INTRAVENOUS at 05:18

## 2020-02-25 NOTE — PROGRESS NOTE ADULT - ASSESSMENT
59M hx of CVA, here for stroke, new aphasia, PEG 2/21 c/b patient pulling it out same day, family wants everything done, preop for open gastrostomy 2/27.    -current plan for OR open g tube placement 2/27  - HCP for consent   -need S&S documenation that patient needs long-term nutrition  -need neurology to document little chance of improvement of swallowing  -hold plavix until OR thursday if okay with primary team, neurology     [facility]  [id number]

## 2020-02-25 NOTE — PROGRESS NOTE ADULT - ASSESSMENT
peg site with dressing in place, abd exam benign  planning for surgical G-tube placement, no signs of peritonitis  malignancy w/u planned as well

## 2020-02-25 NOTE — PROGRESS NOTE ADULT - SUBJECTIVE AND OBJECTIVE BOX
SURGERY DAILY PROGRESS NOTE:     SUBJECTIVE/24hr Events:     Patient seen and examined on am rounds. No acute events overnight. This am no complaints.     OBJECTIVE:    MEDICATIONS  (STANDING):  atorvastatin 80 milliGRAM(s) Oral at bedtime  cyanocobalamin 1000 MICROGram(s) Oral daily  dextrose 5%. 1000 milliLiter(s) (50 mL/Hr) IV Continuous <Continuous>  dextrose 50% Injectable 12.5 Gram(s) IV Push once  dextrose 50% Injectable 25 Gram(s) IV Push once  dextrose 50% Injectable 25 Gram(s) IV Push once  enoxaparin Injectable 40 milliGRAM(s) SubCutaneous daily  ergocalciferol 95231 Unit(s) Oral every week  multivitamin/minerals/iron Oral Solution (CENTRUM) 15 milliLiter(s) Enteral Tube daily  piperacillin/tazobactam IVPB.. 3.375 Gram(s) IV Intermittent every 8 hours    MEDICATIONS  (PRN):  dextrose 40% Gel 15 Gram(s) Oral once PRN Blood Glucose LESS THAN 70 milliGRAM(s)/deciliter  glucagon  Injectable 1 milliGRAM(s) IntraMuscular once PRN Glucose LESS THAN 70 milligrams/deciliter      Vital Signs Last 24 Hrs  T(C): 36.6 (2020 04:42), Max: 36.8 (2020 12:35)  T(F): 97.8 (2020 04:42), Max: 98.3 (2020 12:35)  HR: 50 (2020 04:42) (47 - 55)  BP: 103/68 (2020 04:42) (103/68 - 122/74)  BP(mean): --  RR: 18 (2020 04:42) (18 - 19)  SpO2: 97% (2020 04:42) (97% - 100%)      I&O's Detail    2020 07:01  -  2020 07:00  --------------------------------------------------------  IN:    sodium chloride 0.9%: 1440 mL    Solution: 300 mL  Total IN: 1740 mL    OUT:    Voided: 1075 mL  Total OUT: 1075 mL    Total NET: 665 mL            Daily     Daily Weight in k (2020 04:42)          LABS:                        13.9   6.68  )-----------( 313      ( 2020 06:49 )             42.9     02-    138  |  104  |  11  ----------------------------<  106<H>  4.0   |  23  |  0.82    Ca    9.2      2020 06:49            PHYSICAL EXAM:  General: No acute distress  Respiratory: Nonlabored  Cardiovascular: RRR  Abdominal: Soft, nondistended, nontender  dressing c/d/i  Extremities: Warm

## 2020-02-25 NOTE — CHART NOTE - NSCHARTNOTEFT_GEN_A_CORE
Upon Nutritional Assessment by the Registered Dietitian your patient was determined to meet criteria / has evidence of the following diagnosis/diagnoses:          [ ]  Mild Protein Calorie Malnutrition        [ ]  Moderate Protein Calorie Malnutrition        [x ] Severe Protein Calorie Malnutrition        [ ] Unspecified Protein Calorie Malnutrition        [ ] Underweight / BMI <19        [ ] Morbid Obesity / BMI > 40      Findings as based on:  [ ] Comprehensive nutrition assessment   [x ] Nutrition Focused Physical Exam:  Patient consented to nutrition focused physical exam. Findings as follows: mild muscle wasting from temples moderate muscle wasting from clavicles, , moderate  muscle wasting from the interosseous muscles, mild muscle wasting from calf noted. severe fat loss from ribs noted.   [ x] Other: Follow Up nutrition assessment: <50% of nutrition needs >/= 5 days      Nutrition Plan/Recommendations:    continue Vit D supplementation  when medically feasible reinitiate Jevity 1.2 at goal rate of 75ml/hr x 18hr. This provides 1620kcal/74grams protein. Provides 26kcal/kg and 1.1grams protein/kg. based on dosing weight of 62kg.  continue multivitamin with minerals  continue Danactive x 2 daily when tube feeds are reinitiated  placed sticker for malnutrition      PROVIDER Section:     By signing this assessment you are acknowledging and agree with the diagnosis/diagnoses assigned by the Registered Dietitian    Comments:

## 2020-02-25 NOTE — CONSULT NOTE ADULT - REASON FOR ADMISSION
mouth lesions, possible stroke

## 2020-02-25 NOTE — PROGRESS NOTE ADULT - ASSESSMENT
ASSESSMENT: This is a 58yo Armenian speaking Man with h/o prior CVA w/o residual deficits who presented with 3 weeks of generalized weakness and expressive aphasia. MRI demonstrated new acute right posterior frontal infarct in the region of the right precentral gyrus. Subacute infarction the right frontoparietal and posterior temporal region. with  minimal petechial hemorrhagic transformation and gyral enhancement in both regions. Multifocal chronic bilateral MCA territory infarcts. Chronic hemorrhagic products in the region of the left basal ganglia. Persistent mild narrowing of the cavernous and supraclinoid left ICA. Persistent lack of flow related signal within the left M1 segment, with poor visualization of the more distal left MCA branches. Re-demonstration of multifocal mild to moderate stenoses of the right M1, with Normal flow-related enhancement of the more distal right MCA. No flow-limiting stenosis or evidence for arterial dissection. LUIS (2/13/20)  demonstrated normal LV function, no wall abnormality or evidence of PFO.      Impression: Expressive aphasia may be related to Right temporal infarcts, if this is his dominant hemisphere vs. apraxia of speech.  Strokes appear embolic in origin, likely cardioembolic, although exact mechanism remains unknown/ ESUS (Embolic stroke of undetermined source) vs. symptomatic large artery atherosclerosis. Other etiologies include Possible PAF, s/p ILR. No current evidence of afib reported on tele.    Recommend patient to continue DAPT with ASA 81mg + Plavix 75 QD for 90 days followed [facility] once cleared to resume by GI.     Gradual return to normotension, avoid hypotension.     While patient remains NPO in anticipation of open G tube placement on 2/27/20,  recommend that ASA continue to be administered as Suppository , unless otherwise contraindicated by GI or primary medical team, there is no neurological objection to holding plavix.      Recommend consideration of  malignancy screening (CT c/a/p)  to round out  workup, rule out potential for occult malignancy as contributing factor for recurrent stroke. Patient's smoking history is unknown, to be clarified by family, and uncertain if patient has undergone proactive age related malignancy screening: colonoscopy, prostate exam. etc. Discussed with Dr. Simmons last evening, agrees with plan.         Patient recently underwent PEG insertion due to failed MBS but that evening pulled out PEG. Awaiting Open G tube insertion planned for 2/27/20 consent is pending from family. It is anticipated that patient's dysphagia will be present for at least 6 months, and perhaps longer.  Once mechanism of feeding and medication administration is established (open G tube) no neurological objection to proceed with discharge to rehab per determination  of primary team once work up is concluded. Plan discussed with medicine NP and primary nurse. Continue to follow.

## 2020-02-25 NOTE — CONSULT NOTE ADULT - ASSESSMENT
58 yo male with multiple recurrent CVA's.  On dual antiplatelet therapy.  Unclear whether age appropriate cancer screening has been performed.    Recurrent CVA  -Agree to proceed with CT cap to r/o malignancy  -colonoscopy as outpatient if pt not up to date with colonoscopy for colon ca screen  -PSA    Macrocytosis w/o anemia  -check B12 and folate levels.    Wes De Jesus MD  Hematology/Oncology  Cell:  [phone]  Office Phone: [phone]  Office Fax:  [phone] [address] [id number] 60 yo male with multiple recurrent CVA's.  On dual antiplatelet therapy.  Unclear whether age appropriate cancer screening has been performed.    Recurrent CVA  -Agree to proceed with CT cap to r/o malignancy;  discussed with emergency contact on chart Dallas Arriola (nephew) who also has discussed with his father (patient's brother, next of kin available).  Went over risks/benefits of CT with contrast.  They agree to proceed.    -Dallas Arriola reporting that pt has had significant smoking and ETOH use in his past.   -colonoscopy as outpatient if pt not up to date with colonoscopy for colon ca screen  -PSA    Macrocytosis w/o anemia  -check B12 and folate levels.    Wes De Jesus MD  Hematology/Oncology  Cell:  [phone]  Office Phone: [phone]  Office Fax:  [phone] [address] [id number]

## 2020-02-25 NOTE — CHART NOTE - NSCHARTNOTEFT_GEN_A_CORE
Nutrition Follow Up Note  Patient seen for: follow up    Source: nurse, NP, chart    Diet : NPO AFTER MIDNIGHT    Patient reports: pt is Aphasic           Daily Weight in k (), Weight in k (), Weight in k.5 (), Weight in k.4 (), Weight in k (), Weight in k.5 (), Weight in k.2 ()  % Weight Change: weight has been stable as per flow sheet since previous assessment on     Pertinent Medications: MEDICATIONS  (STANDING):  aspirin Suppository 300 milliGRAM(s) Rectal daily  atorvastatin 80 milliGRAM(s) Oral at bedtime  cyanocobalamin 1000 MICROGram(s) Oral daily  dextrose 5% + sodium chloride 0.45%. 1000 milliLiter(s) (70 mL/Hr) IV Continuous <Continuous>  dextrose 5%. 1000 milliLiter(s) (50 mL/Hr) IV Continuous <Continuous>  dextrose 50% Injectable 12.5 Gram(s) IV Push once  dextrose 50% Injectable 25 Gram(s) IV Push once  dextrose 50% Injectable 25 Gram(s) IV Push once  enoxaparin Injectable 40 milliGRAM(s) SubCutaneous daily  ergocalciferol 29376 Unit(s) Oral every week  multivitamin/minerals/iron Oral Solution (CENTRUM) 15 milliLiter(s) Enteral Tube daily  piperacillin/tazobactam IVPB.. 3.375 Gram(s) IV Intermittent every 8 hours    MEDICATIONS  (PRN):  dextrose 40% Gel 15 Gram(s) Oral once PRN Blood Glucose LESS THAN 70 milliGRAM(s)/deciliter  glucagon  Injectable 1 milliGRAM(s) IntraMuscular once PRN Glucose LESS THAN 70 milligrams/deciliter    Pertinent Labs:  @ 06:04: Na 137, BUN 17, Cr 0.74, BG 87, K+ 3.9    Finger Sticks:  POCT Blood Glucose.: 95 mg/dL ( @ 09:01)      Skin per nursing documentation: no pressure injury  Edema: none    Estimated Needs:   [x ] no change since previous assessment  [ ] recalculated:     Previous Nutrition Diagnosis: difficulty swallowing  Nutrition Diagnosis is: continues     New Nutrition Diagnosis:   Related to:    As evidenced by:      Interventions:     Recommend  1)    Monitoring and Evaluation:     Continue to monitor Nutritional intake, Tolerance to diet prescription, weights, labs, skin integrity    RD remains available upon request and will follow up per protocol Nutrition Follow Up Note  Patient seen for: follow up    Reason for Admission	mouth lesions, possible stroke    Source: nurse, NP, chart    Diet : NPO AFTER MIDNIGHT  plan for PEG to be replaced on . pt receiving fluids at this time as per nurse.     Patient reports: pt is Aphasic           Daily Weight in k (), Weight in k (), Weight in k.5 (), Weight in k.4 (), Weight in k (), Weight in k.5 (), Weight in k.2 ()  % Weight Change: weight has been stable as per flow sheet since previous assessment on     Pertinent Medications: MEDICATIONS  (STANDING):  aspirin Suppository 300 milliGRAM(s) Rectal daily  atorvastatin 80 milliGRAM(s) Oral at bedtime  cyanocobalamin 1000 MICROGram(s) Oral daily  dextrose 5% + sodium chloride 0.45%. 1000 milliLiter(s) (70 mL/Hr) IV Continuous <Continuous>  dextrose 5%. 1000 milliLiter(s) (50 mL/Hr) IV Continuous <Continuous>  dextrose 50% Injectable 12.5 Gram(s) IV Push once  dextrose 50% Injectable 25 Gram(s) IV Push once  dextrose 50% Injectable 25 Gram(s) IV Push once  enoxaparin Injectable 40 milliGRAM(s) SubCutaneous daily  ergocalciferol 26904 Unit(s) Oral every week  multivitamin/minerals/iron Oral Solution (CENTRUM) 15 milliLiter(s) Enteral Tube daily  piperacillin/tazobactam IVPB.. 3.375 Gram(s) IV Intermittent every 8 hours    MEDICATIONS  (PRN):  dextrose 40% Gel 15 Gram(s) Oral once PRN Blood Glucose LESS THAN 70 milliGRAM(s)/deciliter  glucagon  Injectable 1 milliGRAM(s) IntraMuscular once PRN Glucose LESS THAN 70 milligrams/deciliter    Pertinent Labs:  @ 06:04: Na 137, BUN 17, Cr 0.74, BG 87, K+ 3.9    Finger Sticks:  POCT Blood Glucose.: 95 mg/dL ( @ 09:01)      Skin per nursing documentation: no pressure injury  Edema: none    Estimated Needs:   [x ] no change since previous assessment  [ ] recalculated:     Previous Nutrition Diagnosis: difficulty swallowing  Nutrition Diagnosis is: continues     New Nutrition Diagnosis: severe malnutrition  Related to: inadequate protein -calorie nutrition   As evidenced by: <50% of nutrition needs >/= 5 days, Patient consented to nutrition focused physical exam. Findings as follows: mild muscle wasting from temples moderate muscle wasting from clavicles, , moderate  muscle wasting from the interosseous muscles, mild muscle wasting from calf noted. severe fat loss from ribs noted.           Recommend  continue Vit D supplementation  when medically feasible reinitiate Jevity 1.2 at goal rate of 75ml/hr x 18hr. This provides 1620kcal/74grams protein. Provides 26kcal/kg and 1.1grams protein/kg. based on dosing weight of 62kg.  continue multivitamin with minerals  continue Danactive x 2 daily  placed sticker for malnutrition      Monitoring and Evaluation:     Continue to monitor Nutritional intake, Tolerance to diet prescription, weights, labs, skin integrity    RD remains available upon request and will follow up per protocol  Margaret Michaud MA, RD, CDN #[id number] Nutrition Follow Up Note  Patient seen for: follow up    Reason for Admission	mouth lesions, possible stroke    Source: nurse, NP, chart    Diet : NPO AFTER MIDNIGHT  plan for PEG to be replaced on . pt receiving fluids at this time as per nurse.     Patient reports: pt is Aphasic           Daily Weight in k (), Weight in k (), Weight in k.5 (), Weight in k.4 (), Weight in k (), Weight in k.5 (), Weight in k.2 ()  % Weight Change: weight has been stable as per flow sheet since previous assessment on     Pertinent Medications: MEDICATIONS  (STANDING):  aspirin Suppository 300 milliGRAM(s) Rectal daily  atorvastatin 80 milliGRAM(s) Oral at bedtime  cyanocobalamin 1000 MICROGram(s) Oral daily  dextrose 5% + sodium chloride 0.45%. 1000 milliLiter(s) (70 mL/Hr) IV Continuous <Continuous>  dextrose 5%. 1000 milliLiter(s) (50 mL/Hr) IV Continuous <Continuous>  dextrose 50% Injectable 12.5 Gram(s) IV Push once  dextrose 50% Injectable 25 Gram(s) IV Push once  dextrose 50% Injectable 25 Gram(s) IV Push once  enoxaparin Injectable 40 milliGRAM(s) SubCutaneous daily  ergocalciferol 62169 Unit(s) Oral every week  multivitamin/minerals/iron Oral Solution (CENTRUM) 15 milliLiter(s) Enteral Tube daily  piperacillin/tazobactam IVPB.. 3.375 Gram(s) IV Intermittent every 8 hours    MEDICATIONS  (PRN):  dextrose 40% Gel 15 Gram(s) Oral once PRN Blood Glucose LESS THAN 70 milliGRAM(s)/deciliter  glucagon  Injectable 1 milliGRAM(s) IntraMuscular once PRN Glucose LESS THAN 70 milligrams/deciliter    Pertinent Labs:  @ 06:04: Na 137, BUN 17, Cr 0.74, BG 87, K+ 3.9    Finger Sticks:  POCT Blood Glucose.: 95 mg/dL ( @ 09:01)      Skin per nursing documentation: no pressure injury  Edema: none    Estimated Needs:   [x ] no change since previous assessment  [ ] recalculated:     Previous Nutrition Diagnosis: difficulty swallowing  Nutrition Diagnosis is: continues     New Nutrition Diagnosis: severe malnutrition  Related to: inadequate protein -calorie nutrition   As evidenced by: <50% of nutrition needs >/= 5 days, Patient consented to nutrition focused physical exam. Findings as follows: mild muscle wasting from temples moderate muscle wasting from clavicles, , moderate  muscle wasting from the interosseous muscles, mild muscle wasting from calf noted. severe fat loss from ribs noted.           Recommend  continue Vit D supplementation  when medically feasible reinitiate Jevity 1.2 at goal rate of 75ml/hr x 18hr. This provides 1620kcal/74grams protein. Provides 26kcal/kg and 1.1grams protein/kg. based on dosing weight of 62kg.  continue multivitamin with minerals  continue Danactive x 2 daily when tube feeds are reinitiated  placed sticker for malnutrition      Monitoring and Evaluation:     Continue to monitor Nutritional intake, Tolerance to diet prescription, weights, labs, skin integrity    RD remains available upon request and will follow up per protocol  Margaret Michaud MA, RD, CDN #[phone]

## 2020-02-25 NOTE — PROGRESS NOTE ADULT - ASSESSMENT
CVA  LUSI unremarkable  s/p ILR   on dapt    sinus bradycardia   likely CNS related   fu with EP   atropine PRN for HR in 30s     Dysphagia  plan for surgical feeding tube  no CV objection to surgery

## 2020-02-25 NOTE — CONSULT NOTE ADULT - SUBJECTIVE AND OBJECTIVE BOX
History of Present Illness: 	  58 yo M with h/o CVA 8 years ago presenting with aphasia admitted with generalized weakness.  Found to have aphasia due to two subacute infarcts, noted also to have acute right posterior frontal infarct.  Pt went to visit family in [place] 9/2019 and about 3 weeks ago, family stated that he was starting to get weak and stopped being able to speak. He was also endorsing difficulty walking from weakness.     Had PEG placement on 2/21 which was dislodged the same day..      LUIS was negative for PFO on 2/13.    Given recurrent CVA's we are consulted to assess for potential underlying malignancy to explain hypercoaguablity.      PAST MEDICAL & SURGICAL HISTORY:  CVA (cerebral vascular accident)  No significant past surgical history    FAMILY HISTORY:  No pertinent family history in first degree relatives      SOCIAL HISTORY:  information not available     aspirin Suppository 300 milliGRAM(s) Rectal daily  atorvastatin 80 milliGRAM(s) Oral at bedtime  cyanocobalamin 1000 MICROGram(s) Oral daily  dextrose 40% Gel 15 Gram(s) Oral once PRN  dextrose 5% + sodium chloride 0.45%. 1000 milliLiter(s) IV Continuous <Continuous>  dextrose 5%. 1000 milliLiter(s) IV Continuous <Continuous>  dextrose 50% Injectable 12.5 Gram(s) IV Push once  dextrose 50% Injectable 25 Gram(s) IV Push once  dextrose 50% Injectable 25 Gram(s) IV Push once  enoxaparin Injectable 40 milliGRAM(s) SubCutaneous daily  ergocalciferol 61377 Unit(s) Oral every week  glucagon  Injectable 1 milliGRAM(s) IntraMuscular once PRN  multivitamin/minerals/iron Oral Solution (CENTRUM) 15 milliLiter(s) Enteral Tube daily  piperacillin/tazobactam IVPB.. 3.375 Gram(s) IV Intermittent every 8 hours      No Known Allergies      ROS otherwise unobtainable as pt is aphasic    T(C): 36.6 (02-25-20 @ 12:46), Max: 36.6 (02-25-20 @ 04:42)  HR: 67 (02-25-20 @ 12:46) (47 - 67)  BP: 103/68 (02-25-20 @ 04:42) (103/68 - 122/74)  RR: 18 (02-25-20 @ 12:46) (18 - 19)  SpO2: 98% (02-25-20 @ 12:46) (97% - 99%)  PHYSICAL EXAM  Gen:  sitting in chair, nad  H:  anicteric, left eye ptosis, L facial droop  CV:  RRR, S1, S2  Lungs:  CTA b/l  Ab soft/nt/nd  Ext:  no edema                            12.9   7.44  )-----------( 298      ( 25 Feb 2020 06:04 )             39.8                         13.9   6.68  )-----------( 313      ( 23 Feb 2020 06:49 )             42.9                         14.0   12.06 )-----------( 321      ( 22 Feb 2020 07:17 )             42.6   02-25    137  |  104  |  17  ----------------------------<  87  3.9   |  21<L>  |  0.74    Ca    9.1      25 Feb 2020 06:04

## 2020-02-25 NOTE — PROGRESS NOTE ADULT - SUBJECTIVE AND OBJECTIVE BOX
Subjective: Patient seen and examined. No new events except as noted.     SUBJECTIVE/ROS:        MEDICATIONS:  MEDICATIONS  (STANDING):  atorvastatin 80 milliGRAM(s) Oral at bedtime  cyanocobalamin 1000 MICROGram(s) Oral daily  dextrose 5%. 1000 milliLiter(s) (50 mL/Hr) IV Continuous <Continuous>  dextrose 50% Injectable 12.5 Gram(s) IV Push once  dextrose 50% Injectable 25 Gram(s) IV Push once  dextrose 50% Injectable 25 Gram(s) IV Push once  enoxaparin Injectable 40 milliGRAM(s) SubCutaneous daily  ergocalciferol 71439 Unit(s) Oral every week  multivitamin/minerals/iron Oral Solution (CENTRUM) 15 milliLiter(s) Enteral Tube daily  piperacillin/tazobactam IVPB.. 3.375 Gram(s) IV Intermittent every 8 hours      PHYSICAL EXAM:  T(C): 36.6 (02-25-20 @ 04:42), Max: 36.8 (02-24-20 @ 12:35)  HR: 50 (02-25-20 @ 04:42) (47 - 55)  BP: 103/68 (02-25-20 @ 04:42) (103/68 - 122/74)  RR: 18 (02-25-20 @ 04:42) (18 - 19)  SpO2: 97% (02-25-20 @ 04:42) (97% - 100%)  Wt(kg): --  I&O's Summary    24 Feb 2020 07:01  -  25 Feb 2020 07:00  --------------------------------------------------------  IN: 0 mL / OUT: 300 mL / NET: -300 mL            JVP: Normal  Neck: supple  Lung: clear   CV: S1 S2 , Murmur:  Abd: soft  Ext: No edema  neuro: Awake   Psych: flat affect  Skin: normal``    LABS/DATA:    CARDIAC MARKERS:                                12.9   7.44  )-----------( 298      ( 25 Feb 2020 06:04 )             39.8     02-25    137  |  104  |  17  ----------------------------<  87  3.9   |  21<L>  |  0.74    Ca    9.1      25 Feb 2020 06:04      proBNP:   Lipid Profile:   HgA1c:   TSH:     TELE:  EKG:

## 2020-02-25 NOTE — PROGRESS NOTE ADULT - SUBJECTIVE AND OBJECTIVE BOX
INTERVAL HPI/OVERNIGHT EVENTS:    MEDICATIONS  (STANDING):  atorvastatin 80 milliGRAM(s) Oral at bedtime  cyanocobalamin 1000 MICROGram(s) Oral daily  dextrose 5%. 1000 milliLiter(s) (50 mL/Hr) IV Continuous <Continuous>  dextrose 50% Injectable 12.5 Gram(s) IV Push once  dextrose 50% Injectable 25 Gram(s) IV Push once  dextrose 50% Injectable 25 Gram(s) IV Push once  enoxaparin Injectable 40 milliGRAM(s) SubCutaneous daily  ergocalciferol 24791 Unit(s) Oral every week  multivitamin/minerals/iron Oral Solution (CENTRUM) 15 milliLiter(s) Enteral Tube daily  piperacillin/tazobactam IVPB.. 3.375 Gram(s) IV Intermittent every 8 hours    MEDICATIONS  (PRN):  dextrose 40% Gel 15 Gram(s) Oral once PRN Blood Glucose LESS THAN 70 milliGRAM(s)/deciliter  glucagon  Injectable 1 milliGRAM(s) IntraMuscular once PRN Glucose LESS THAN 70 milligrams/deciliter      Allergies    No Known Allergies    Intolerances            PHYSICAL EXAM:   Vital Signs:  Vital Signs Last 24 Hrs  T(C): 36.6 (2020 04:42), Max: 36.8 (2020 12:35)  T(F): 97.8 (2020 04:42), Max: 98.3 (2020 12:35)  HR: 50 (2020 04:42) (47 - 55)  BP: 103/68 (2020 04:42) (103/68 - 122/74)  BP(mean): --  RR: 18 (2020 04:42) (18 - 19)  SpO2: 97% (2020 04:42) (97% - 100%)  Daily     Daily Weight in k (2020 04:42)    GENERAL:  no distress  HEENT:  NC/AT,  anicteric  CHEST:   no increased effort, breath sounds clear  HEART:  Regular rhythm  ABDOMEN:  Soft, non-tender, non-distended, normoactive bowel sounds,  no masses ,no hepato-splenomegaly, no signs of chronic liver disease  EXTEREMITIES:  no cyanosis      LABS:                        12.9   7.44  )-----------( 298      ( 2020 06:04 )             39.8     02-    137  |  104  |  17  ----------------------------<  87  3.9   |  21<L>  |  0.74    Ca    9.1      2020 06:04            RADIOLOGY & ADDITIONAL TESTS:

## 2020-02-25 NOTE — PROGRESS NOTE ADULT - ASSESSMENT
59Y Tajik speaking M with prior CVA 8y ago (no reported deficits) brought in by brother for concerns of generalized weakness and inability to speak for the past 3 weeks. On exam found to have no verbal output, appeared withdrawn and weak on neurological evaluation  He follows commands intermittently and is easily agitated.  Exam limited due to patient aggression on admission  CTH demonstrates chronic L MCA infarct with severely stenotic vs occluded distal L M1.   this is confirmed on MRI but also an acute right post frontal infarct is seen. neuro aware, Cardiology aware, benign LUIS and s/p [employer] ILR placement 2/13, will need outptn F/U in 7-10 days. however 2/2 ongoing bradycardia EPS reconsulted. recommended to be on prn atropine or isoproternol/dobutamine infusion during the Procedure. PPM not recommended.   was on NGT feeds prior to PEG, plavix was held  prior to PEG( cleared by Neuro, cont ASA), s/p PEG placement 2/21, procedure went well, in the turner post procedure ptn pulled PEG out, remains NPO, awaiting open gastrostomy on 2/27, consent obtained by john from family   ptn on the schedule w Dr. Fletcher on thu 2/27   will need close supervision post op  d/w neurology Plavix on hold 2/2 ptn unable to swallow, awaiting gastrostomy placement. meanwhile start rectal ASA suppository.  Neuro recommends malignancy work up 2/2 unexplained multiple embolic strokes. Oncology consult w Dr. Sanchez appreciated. will need CT C/A/P w oral and IV contrast. 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 LUIS benign  -DVT ppx w sc Lovenox  -social service consult  -Dispo to acute rehab

## 2020-02-25 NOTE — PROGRESS NOTE ADULT - SUBJECTIVE AND OBJECTIVE BOX
Patient is a 59y old  Male who presents with a chief complaint of mouth lesions, possible stroke (25 Feb 2020 13:48)      SUBJECTIVE / OVERNIGHT EVENTS: ptn 's brother and son gave consent for open gastrostomy and ct w IV and oral contrast needed for malignancy work up    MEDICATIONS  (STANDING):  aspirin Suppository 300 milliGRAM(s) Rectal daily  atorvastatin 80 milliGRAM(s) Oral at bedtime  cyanocobalamin 1000 MICROGram(s) Oral daily  dextrose 5% + sodium chloride 0.45%. 1000 milliLiter(s) (70 mL/Hr) IV Continuous <Continuous>  dextrose 5%. 1000 milliLiter(s) (50 mL/Hr) IV Continuous <Continuous>  dextrose 50% Injectable 12.5 Gram(s) IV Push once  dextrose 50% Injectable 25 Gram(s) IV Push once  dextrose 50% Injectable 25 Gram(s) IV Push once  enoxaparin Injectable 40 milliGRAM(s) SubCutaneous daily  ergocalciferol 21569 Unit(s) Oral every week  multivitamin/minerals/iron Oral Solution (CENTRUM) 15 milliLiter(s) Enteral Tube daily  piperacillin/tazobactam IVPB.. 3.375 Gram(s) IV Intermittent every 8 hours    MEDICATIONS  (PRN):  dextrose 40% Gel 15 Gram(s) Oral once PRN Blood Glucose LESS THAN 70 milliGRAM(s)/deciliter  glucagon  Injectable 1 milliGRAM(s) IntraMuscular once PRN Glucose LESS THAN 70 milligrams/deciliter      Vital Signs Last 24 Hrs  T(F): 97.9 (02-25-20 @ 12:46), Max: 97.9 (02-25-20 @ 12:46)  HR: 67 (02-25-20 @ 12:46) (47 - 67)  BP: 90/68 (02-25-20 @ 12:46) (90/68 - 122/74)  RR: 18 (02-25-20 @ 12:46) (18 - 19)  SpO2: 98% (02-25-20 @ 12:46) (97% - 99%)  Telemetry:   CAPILLARY BLOOD GLUCOSE      POCT Blood Glucose.: 95 mg/dL (25 Feb 2020 09:01)    I&O's Summary    24 Feb 2020 07:01  -  25 Feb 2020 07:00  --------------------------------------------------------  IN: 0 mL / OUT: 300 mL / NET: -300 mL    25 Feb 2020 07:01  -  25 Feb 2020 19:35  --------------------------------------------------------  IN: 0 mL / OUT: 200 mL / NET: -200 mL        PHYSICAL EXAM:  GENERAL: NAD, well-developed  HEAD:  Atraumatic, Normocephalic  EYES: EOMI, PERRLA, conjunctiva and sclera clear  NECK: Supple, No JVD  CHEST/LUNG: Clear to auscultation bilaterally; No wheeze  HEART: Regular rate and rhythm; No murmurs, rubs, or gallops  ABDOMEN: Soft, Nontender, Nondistended; Bowel sounds present  EXTREMITIES:  2+ Peripheral Pulses, No clubbing, cyanosis, or edema  PSYCH: AAOx3  NEUROLOGY: non-focal  SKIN: No rashes or lesions    LABS:                        12.9   7.44  )-----------( 298      ( 25 Feb 2020 06:04 )             39.8     02-25    137  |  104  |  17  ----------------------------<  87  3.9   |  21<L>  |  0.74    Ca    9.1      25 Feb 2020 06:04                RADIOLOGY & ADDITIONAL TESTS:    Imaging Personally Reviewed:    Consultant(s) Notes Reviewed:      Care Discussed with Consultants/Other Providers:

## 2020-02-25 NOTE — PROGRESS NOTE ADULT - SUBJECTIVE AND OBJECTIVE BOX
THE PATIENT WAS SEEN AND EXAMINED BY ME WITH THE HOUSESTAFF AND STROKE TEAM DURING MORNING ROUNDS.   HPI:  60 yo M with h/o CVA 8 years ago presenting with aphasia. Pt is accompanied by brother who interpreted in the ED. Brother endorsed that pt went to visit family in [place] 9/2019 and about 3 weeks ago, family stated that he was starting to get weak and stopped being able to speak. He was also endorsing difficulty walking from weakness. He returned from [place] last night and came to the ED for evaluation. Pt also developed lesions on his mouth 3 days ago. He denies any other medical history or medication use. (09 Feb 2020 20:23)      SUBJECTIVE: No events overnight.  No new neurologic complaints.      aspirin Suppository 300 milliGRAM(s) Rectal daily  atorvastatin 80 milliGRAM(s) Oral at bedtime  cyanocobalamin 1000 MICROGram(s) Oral daily  dextrose 40% Gel 15 Gram(s) Oral once PRN  dextrose 5% + sodium chloride 0.45%. 1000 milliLiter(s) IV Continuous <Continuous>  dextrose 5%. 1000 milliLiter(s) IV Continuous <Continuous>  dextrose 50% Injectable 12.5 Gram(s) IV Push once  dextrose 50% Injectable 25 Gram(s) IV Push once  dextrose 50% Injectable 25 Gram(s) IV Push once  enoxaparin Injectable 40 milliGRAM(s) SubCutaneous daily  ergocalciferol 80781 Unit(s) Oral every week  glucagon  Injectable 1 milliGRAM(s) IntraMuscular once PRN  multivitamin/minerals/iron Oral Solution (CENTRUM) 15 milliLiter(s) Enteral Tube daily  piperacillin/tazobactam IVPB.. 3.375 Gram(s) IV Intermittent every 8 hours      PHYSICAL EXAM:   Vital Signs Last 24 Hrs  T(C): 36.6 (25 Feb 2020 04:42), Max: 36.8 (24 Feb 2020 12:35)  T(F): 97.8 (25 Feb 2020 04:42), Max: 98.3 (24 Feb 2020 12:35)  HR: 50 (25 Feb 2020 04:42) (47 - 55)  BP: 103/68 (25 Feb 2020 04:42) (103/68 - 122/74)  BP(mean): --  RR: 18 (25 Feb 2020 04:42) (18 - 19)  SpO2: 97% (25 Feb 2020 04:42) (97% - 100%)      General: Awake and alert, calm appears comfortable Attends examiner. Globally aphasic, will mimic successfully but does not follow simple commands. Resting in bed.  No acute distress.  HEENT: Normocephalic, atraumatic.  EOM intact.  Abdomen: Dressing to left upper quadrant, intact, abdomen soft, non tender, not distended.  Extremities: No edema    NEUROLOGICAL EXAM:  Mental status: Awake, alert. Globally aphasic, does not follow commands, but will mimic: stick out tongue, smile, raise arms, etc. No verbal output. When asked a question will point to mouth and shakes head "no". Unable to assess comprehension or memory.    Cranial Nerves: Right sided facial droop with tongue deviation noted on this exam. No appreciable nystagmus. Able to turn head side to side, fluid motion.  Exam is limited as patient  not following commands.   Motor exam: Normal tone, no drift, 5/5 RUE, 5/5 RLE, 5/5 LUE, 5/5 LLE.  Sensation: Appears intact to light touch   Coordination/ Gait: FTN intact, Gait not assessed on bedrest.       LABS:                        12.9   7.44  )-----------( 298      ( 25 Feb 2020 06:04 )             39.8    02-25    137  |  104  |  17  ----------------------------<  87  3.9   |  21<L>  |  0.74    Ca    9.1      25 Feb 2020 06:04      Hemoglobin A1C, Whole Blood: 5.5 % (02-10 @ 09:08)      IMAGING:      Transesophageal Echocardiogram w/o TTE (02.13.20 @ 15:15) >  Conclusions:  Normal left ventricular systolic function. No segmental  wall motion abnormalities.  Agitated saline contrast injection demonstrates no evidence  of a patent foramen ovale.      MR Head w/wo IV Cont (02.11.20 @ 15:07)     IMPRESSION:    BRAIN MRI:  Acute right posterior frontal infarct in the region of the right precentral gyrus. Subacute infarction the right frontoparietal and posterior temporal region. Minimal petechial hemorrhagic transformation and gyral enhancement is noted in both regions.  Multifocal chronic bilateral MCA territory infarcts. Chronic hemorrhagic products in the region of the left basal ganglia.    BRAIN MRA:  Persistent mild narrowing of the cavernous and supraclinoid left ICA. Persistent lack of flow related signal within the left M1 segment, with poor visualization of the more distal left MCA branches.    Re-demonstration of multifocal mild to moderate stenoses of the right M1 segment. Normal flow-related enhancement of the more distal right MCA.    NECK MRA:  No flow-limiting stenosis or evidence for arterial dissection.       CT Angio Neck w/ IV Cont (02.09.20 @ 15:13)     IMPRESSION:     CT brain: Multiple areas of prior infarct as described above. No acute hemorrhage or evidence of mass effect. Age-indeterminate lacunar infarcts as described. If the patient has a new and persistent neurologic deficit, consider short follow-up head CT or brain MRI follow-up.    CT angiography neck: No hemodynamically significant stenosis by NASCET criteria. No vascular dissection.    CT angiography brain: The left middle cerebral artery is severely stenosed versus occluded in this patient with a chronic left MCA territory infarct. Marked decrease branching of the distal left MCA branches are noted in the left sylvian fissure compared to the contralateral side.    Mild to moderate lobulated stenoses involve the M1 segment of the right middle cerebral artery.

## 2020-02-26 LAB
FOLATE SERPL-MCNC: 3.9 NG/ML — LOW
PSA FLD-MCNC: 0.94 NG/ML — SIGNIFICANT CHANGE UP (ref 0–4)
VIT B12 SERPL-MCNC: 652 PG/ML — SIGNIFICANT CHANGE UP (ref 232–1245)

## 2020-02-26 RX ADMIN — PIPERACILLIN AND TAZOBACTAM 25 GRAM(S): 4; .5 INJECTION, POWDER, LYOPHILIZED, FOR SOLUTION INTRAVENOUS at 05:27

## 2020-02-26 RX ADMIN — PIPERACILLIN AND TAZOBACTAM 25 GRAM(S): 4; .5 INJECTION, POWDER, LYOPHILIZED, FOR SOLUTION INTRAVENOUS at 21:13

## 2020-02-26 RX ADMIN — Medication 10 MILLIGRAM(S): at 17:35

## 2020-02-26 RX ADMIN — SODIUM CHLORIDE 70 MILLILITER(S): 9 INJECTION, SOLUTION INTRAVENOUS at 13:09

## 2020-02-26 RX ADMIN — PIPERACILLIN AND TAZOBACTAM 25 GRAM(S): 4; .5 INJECTION, POWDER, LYOPHILIZED, FOR SOLUTION INTRAVENOUS at 13:09

## 2020-02-26 NOTE — PROGRESS NOTE ADULT - SUBJECTIVE AND OBJECTIVE BOX
Pt seen    MEDICATIONS  (STANDING):  aspirin Suppository 300 milliGRAM(s) Rectal daily  atorvastatin 80 milliGRAM(s) Oral at bedtime  cyanocobalamin 1000 MICROGram(s) Oral daily  dextrose 5% + sodium chloride 0.45%. 1000 milliLiter(s) (70 mL/Hr) IV Continuous <Continuous>  dextrose 5%. 1000 milliLiter(s) (50 mL/Hr) IV Continuous <Continuous>  dextrose 50% Injectable 12.5 Gram(s) IV Push once  dextrose 50% Injectable 25 Gram(s) IV Push once  dextrose 50% Injectable 25 Gram(s) IV Push once  enoxaparin Injectable 40 milliGRAM(s) SubCutaneous daily  ergocalciferol 26602 Unit(s) Oral every week  multivitamin/minerals/iron Oral Solution (CENTRUM) 15 milliLiter(s) Enteral Tube daily  piperacillin/tazobactam IVPB.. 3.375 Gram(s) IV Intermittent every 8 hours    MEDICATIONS  (PRN):  bisacodyl Suppository 10 milliGRAM(s) Rectal daily PRN Constipation  dextrose 40% Gel 15 Gram(s) Oral once PRN Blood Glucose LESS THAN 70 milliGRAM(s)/deciliter  glucagon  Injectable 1 milliGRAM(s) IntraMuscular once PRN Glucose LESS THAN 70 milligrams/deciliter      ROS  UTO 2/2 participation    Vital Signs Last 24 Hrs  T(C): 36.4 (26 Feb 2020 14:33), Max: 36.7 (25 Feb 2020 21:17)  T(F): 97.6 (26 Feb 2020 14:33), Max: 98.1 (25 Feb 2020 21:17)  HR: 73 (26 Feb 2020 14:33) (48 - 73)  BP: 115/75 (26 Feb 2020 14:33) (97/64 - 115/75)  BP(mean): --  RR: 19 (26 Feb 2020 14:33) (17 - 19)  SpO2: 99% (26 Feb 2020 14:33) (97% - 99%)    PE  NAD  Awake  Anicteric  RRR  CTAB limited effort  Abd soft, NT, ND  No edema  No rash grossly  FROM                          12.9   7.44  )-----------( 298      ( 25 Feb 2020 06:04 )             39.8       02-25    137  |  104  |  17  ----------------------------<  87  3.9   |  21<L>  |  0.74    Ca    9.1      25 Feb 2020 06:04

## 2020-02-26 NOTE — PROGRESS NOTE ADULT - ASSESSMENT
ASSESSMENT: This is a 60yo Maltese speaking Man with h/o prior CVA w/o residual deficits who presented with 3 weeks of generalized weakness and expressive aphasia. MRI: new acute right posterior frontal infarct, right precentral gyrus. Subacute infarction the right frontoparietal and posterior temporal region, w/ minimal petechial hemorrhagic transformation. Multifocal chronic bilateral MCA territory infarcts. Chronic hemorrhagic products in the region of the left basal ganglia. Persistent mild narrowing of the cavernous and supraclinoid left ICA. Persistent lack of flow related signal within the left M1 segment, with poor visualization of the more distal left MCA branches. Re-demonstration of multifocal mild to moderate stenoses of the right M1, with Normal flow-related enhancement of the more distal right MCA. No flow-limiting stenosis or evidence for arterial dissection. LUIS (2/13/20): normal LV function, no wall abnormality or evidence of PFO.      Impression: Expressive aphasia may be related to Right temporal infarcts, if this is his dominant hemisphere vs. apraxia of speech.  Strokes appear embolic in origin, possibly cardioembolic, although exact mechanism remains unknown/ ESUS (Embolic stroke of undetermined source) vs. symptomatic large artery atherosclerosis. Possible PAF, patient is s/p ILR. No current evidence of afib reported on tele.   Results of recent CT C/A/P as part of occult malignancy workup, completed  on 2/25/20 demonstrtated: Ill-defined low-attenuation along the cortex of the left kidney, of uncertain etiology. Infectious and neoplastic etiologies are considered. Possible etiology of strokes may be hypercoagulable state secondary to potential malignancy, Heme ONC following.    Patient recently underwent PEG insertion due to failed MBS but that evening pulled out PEG. Awaiting Open G tube insertion planned for 2/27/20. It is anticipated that patient's dysphagia will be present for at least 6 months, and perhaps longer.    While patient remains NPO in anticipation of open G tube placement on 2/27/20,  recommend that ASA continue to be administered as suppository, unless otherwise contraindicated by GI or primary medical team. There is no neurological objection to holding plavix. Plan of care has been previously discussed with Dr. Simmons.     Recommend Gradual return to normotension, avoid hypotension.   Recommend patient to eventually resume DAPT with ASA 81mg + Plavix 75 QD for 90 days followed [facility] once cleared to resume by GI and per primary medical team..      Once mechanism of feeding and medication administration is established (open G tube) no neurological objection to proceed with discharge to rehab per determination  of primary team once work up is concluded. Plan discussed with medicine NP and primary nurse. Continue to follow.

## 2020-02-26 NOTE — PROGRESS NOTE ADULT - SUBJECTIVE AND OBJECTIVE BOX
PAM JIMENEZ:[id number],   59yMale followed for:  No Known Allergies    PAST MEDICAL & SURGICAL HISTORY:  CVA (cerebral vascular accident)  No significant past surgical history    FAMILY HISTORY:  No pertinent family history in first degree relatives    MEDICATIONS  (STANDING):  aspirin Suppository 300 milliGRAM(s) Rectal daily  atorvastatin 80 milliGRAM(s) Oral at bedtime  cyanocobalamin 1000 MICROGram(s) Oral daily  dextrose 5% + sodium chloride 0.45%. 1000 milliLiter(s) (70 mL/Hr) IV Continuous <Continuous>  dextrose 5%. 1000 milliLiter(s) (50 mL/Hr) IV Continuous <Continuous>  dextrose 50% Injectable 12.5 Gram(s) IV Push once  dextrose 50% Injectable 25 Gram(s) IV Push once  dextrose 50% Injectable 25 Gram(s) IV Push once  enoxaparin Injectable 40 milliGRAM(s) SubCutaneous daily  ergocalciferol 60917 Unit(s) Oral every week  multivitamin/minerals/iron Oral Solution (CENTRUM) 15 milliLiter(s) Enteral Tube daily  piperacillin/tazobactam IVPB.. 3.375 Gram(s) IV Intermittent every 8 hours    MEDICATIONS  (PRN):  dextrose 40% Gel 15 Gram(s) Oral once PRN Blood Glucose LESS THAN 70 milliGRAM(s)/deciliter  glucagon  Injectable 1 milliGRAM(s) IntraMuscular once PRN Glucose LESS THAN 70 milligrams/deciliter      Vital Signs Last 24 Hrs  T(C): 36.7 (26 Feb 2020 04:47), Max: 36.7 (25 Feb 2020 21:17)  T(F): 98 (26 Feb 2020 04:47), Max: 98.1 (25 Feb 2020 21:17)  HR: 59 (26 Feb 2020 04:47) (48 - 67)  BP: 102/63 (25 Feb 2020 21:17) (90/68 - 102/63)  BP(mean): --  RR: 17 (26 Feb 2020 04:47) (17 - 18)  SpO2: 98% (26 Feb 2020 04:47) (97% - 99%)  nc/at  s1s2  cta  soft, nt, nd no guarding or rebound  no c/c/e    CBC Full  -  ( 25 Feb 2020 06:04 )  WBC Count : 7.44 K/uL  RBC Count : 3.95 M/uL  Hemoglobin : 12.9 g/dL  Hematocrit : 39.8 %  Platelet Count - Automated : 298 K/uL  Mean Cell Volume : 100.8 fl  Mean Cell Hemoglobin : 32.7 pg  Mean Cell Hemoglobin Concentration : 32.4 gm/dL  Auto Neutrophil # : x  Auto Lymphocyte # : x  Auto Monocyte # : x  Auto Eosinophil # : x  Auto Basophil # : x  Auto Neutrophil % : x  Auto Lymphocyte % : x  Auto Monocyte % : x  Auto Eosinophil % : x  Auto Basophil % : x    02-25    137  |  104  |  17  ----------------------------<  87  3.9   |  21<L>  |  0.74    Ca    9.1      25 Feb 2020 06:04

## 2020-02-26 NOTE — PROGRESS NOTE ADULT - ASSESSMENT
58 yo male with multiple recurrent CVA's.  On dual antiplatelet therapy.  Unclear whether age appropriate cancer screening has been performed.    Recurrent CVA  -CT c/a/p showed ill defined L renal lesion. Check u/s to eval lesion  -[place] Arriola reporting that pt has had significant smoking and ETOH use in his past.   -colonoscopy as outpatient if pt not up to date with colonoscopy for colon ca screen  -PSA neg    Macrocytosis w/o anemia  -B12 adequate  -folate decreased, start folic acid daily    CVA -- per neuro    Will follow, [phone]

## 2020-02-26 NOTE — PROGRESS NOTE ADULT - ASSESSMENT
continue current rx.  pt for surgical gastrostomy.  CT scan reviwed with dr. kraus.  can not have repeat endosocpic gastorstoy secondary to risk of pulling tube and peritonitis.  surgical gastrostomy can have stomach stitched to anterior abdominal wall to "minmize" risk of peritonitis if pulled.  ct with significant colon in fornt of stomach

## 2020-02-26 NOTE — PROGRESS NOTE ADULT - ASSESSMENT
59M hx of CVA, here for stroke, new aphasia, PEG 2/21 c/b patient pulling it out same day, family wants everything done, preop for open gastrostomy 2/27.    -current plan for OR open g tube placement 2/27  - HCP for consent   -need S&S documenation that patient needs long-term nutrition  -need neurology to document little chance of improvement of swallowing  -hold plavix until OR thursday if okay with primary team, neurology     [facility]  [id number] ASSESSMENT  59M hx of CVA, here for stroke, new aphasia, PEG 2/21 c/b patient pulling it out same day, family wants everything done, preop for open gastrostomy 2/27.    PLAN  -current plan for OR open g tube placement 2/27  - Will need to clarify HCP for consent   - Neurology documented little chance of recovery of swallowing in the next 6 months, in need of long-term nutrition  - Will need to be NPO at Midnight  - Will need preop labs including CBC BMP Type and Screen and Coags.  - hold plavix until OR thursday if okay with primary team, neurology   - to be discussed with . Please follow-up with Dr. Fletcher in the office in 10-14 days after surgery. Please call [phone] to make an appointment.    [facility]  [id number] ASSESSMENT  59M hx of CVA, here for stroke, new aphasia, PEG 2/21 c/b patient pulling it out same day, family wants everything done, preop for open gastrostomy 2/27.    PLAN  -current plan for OR open g tube placement 2/27  - Will need to clarify HCP for consent   - Neurology documented little chance of recovery of swallowing in the next 6 months, in need of long-term nutrition  - Will need to be NPO at Midnight  - Will need preop labs including CBC BMP Type and Screen and Coags.  - hold plavix until OR thursday if okay with primary team, neurology   - to be discussed with Dr. Fletcher .    [facility]  [id number]

## 2020-02-26 NOTE — PROGRESS NOTE ADULT - SUBJECTIVE AND OBJECTIVE BOX
Patient seen and examined. No overnight events. Patient feels well, tolerating diet, oob/ambi, pain controlled, denies f/c/n/v/d/sob, +flatus/BM      SUBJECTIVE    OVERNIGHT EVENTS:    10-point review of systems completed and negative except as noted above.      OBJECTIVE    MEDICATIONS  aspirin Suppository 300 milliGRAM(s) Rectal daily  atorvastatin 80 milliGRAM(s) Oral at bedtime  cyanocobalamin 1000 MICROGram(s) Oral daily  dextrose 40% Gel 15 Gram(s) Oral once PRN  dextrose 5% + sodium chloride 0.45%. 1000 milliLiter(s) IV Continuous <Continuous>  dextrose 5%. 1000 milliLiter(s) IV Continuous <Continuous>  dextrose 50% Injectable 12.5 Gram(s) IV Push once  dextrose 50% Injectable 25 Gram(s) IV Push once  dextrose 50% Injectable 25 Gram(s) IV Push once  enoxaparin Injectable 40 milliGRAM(s) SubCutaneous daily  ergocalciferol 83583 Unit(s) Oral every week  glucagon  Injectable 1 milliGRAM(s) IntraMuscular once PRN  multivitamin/minerals/iron Oral Solution (CENTRUM) 15 milliLiter(s) Enteral Tube daily  piperacillin/tazobactam IVPB.. 3.375 Gram(s) IV Intermittent every 8 hours      PHYSICAL EXAM  T(C): 36.7 (02-25-20 @ 21:17), Max: 36.7 (02-25-20 @ 21:17)  HR: 55 (02-25-20 @ 21:17) (47 - 67)  BP: 102/63 (02-25-20 @ 21:17) (90/68 - 111/71)  RR: 18 (02-25-20 @ 21:17) (18 - 19)  SpO2: 99% (02-25-20 @ 21:17) (97% - 99%)    02-24-20 @ 07:01  -  02-25-20 @ 07:00  --------------------------------------------------------  IN: 0 mL / OUT: 300 mL / NET: -300 mL    02-25-20 @ 07:01  -  02-26-20 @ 00:20  --------------------------------------------------------  IN: 0 mL / OUT: 200 mL / NET: -200 mL        General: Appears well, NAD  Neuro: AAOx3  CHEST: Clear to auscultation bilaterally  CV: Regular rate and rhythm  Abdomen: soft, nontender, nondistended, no rebound or guarding, dressing c/d/i  Extremities: Grossly symmetric    LABS                        12.9   7.44  )-----------( 298      ( 25 Feb 2020 06:04 )             39.8     02-25    137  |  104  |  17  ----------------------------<  87  3.9   |  21<L>  |  0.74    Ca    9.1      25 Feb 2020 06:04            RADIOLOGY & ADDITIONAL STUDIES Patient seen and examined. No overnight events. Patient feels well,, pain controlled, denies f/c/n/v/d/sob      SUBJECTIVE    OVERNIGHT EVENTS:    10-point review of systems completed and negative except as noted above.      OBJECTIVE    MEDICATIONS  aspirin Suppository 300 milliGRAM(s) Rectal daily  atorvastatin 80 milliGRAM(s) Oral at bedtime  cyanocobalamin 1000 MICROGram(s) Oral daily  dextrose 40% Gel 15 Gram(s) Oral once PRN  dextrose 5% + sodium chloride 0.45%. 1000 milliLiter(s) IV Continuous <Continuous>  dextrose 5%. 1000 milliLiter(s) IV Continuous <Continuous>  dextrose 50% Injectable 12.5 Gram(s) IV Push once  dextrose 50% Injectable 25 Gram(s) IV Push once  dextrose 50% Injectable 25 Gram(s) IV Push once  enoxaparin Injectable 40 milliGRAM(s) SubCutaneous daily  ergocalciferol 13226 Unit(s) Oral every week  glucagon  Injectable 1 milliGRAM(s) IntraMuscular once PRN  multivitamin/minerals/iron Oral Solution (CENTRUM) 15 milliLiter(s) Enteral Tube daily  piperacillin/tazobactam IVPB.. 3.375 Gram(s) IV Intermittent every 8 hours      PHYSICAL EXAM  T(C): 36.7 (02-25-20 @ 21:17), Max: 36.7 (02-25-20 @ 21:17)  HR: 55 (02-25-20 @ 21:17) (47 - 67)  BP: 102/63 (02-25-20 @ 21:17) (90/68 - 111/71)  RR: 18 (02-25-20 @ 21:17) (18 - 19)  SpO2: 99% (02-25-20 @ 21:17) (97% - 99%)    02-24-20 @ 07:01  -  02-25-20 @ 07:00  --------------------------------------------------------  IN: 0 mL / OUT: 300 mL / NET: -300 mL    02-25-20 @ 07:01  -  02-26-20 @ 00:20  --------------------------------------------------------  IN: 0 mL / OUT: 200 mL / NET: -200 mL        General: Appears well, NAD  Neuro: AAOx3  CHEST: Clear to auscultation bilaterally  CV: Regular rate and rhythm  Abdomen: soft, nontender, nondistended, no rebound or guarding, dressing c/d/i  Extremities: Grossly symmetric    LABS                        12.9   7.44  )-----------( 298      ( 25 Feb 2020 06:04 )             39.8     02-25    137  |  104  |  17  ----------------------------<  87  3.9   |  21<L>  |  0.74    Ca    9.1      25 Feb 2020 06:04            RADIOLOGY & ADDITIONAL STUDIES

## 2020-02-26 NOTE — PROGRESS NOTE ADULT - ASSESSMENT
CVA  LUIS unremarkable  s/p ILR   on dapt    sinus bradycardia   likely CNS related   fu with EP   atropine PRN for HR in 30s     Dysphagia  plan for surgical feeding tube tomorrow   no CV objection to surgery

## 2020-02-26 NOTE — PROGRESS NOTE ADULT - SUBJECTIVE AND OBJECTIVE BOX
THE PATIENT WAS SEEN AND EXAMINED BY ME WITH THE HOUSESTAFF AND STROKE TEAM DURING MORNING ROUNDS.   HPI:  60 yo M with h/o CVA 8 years ago presenting with aphasia. Pt is accompanied by brother who interpreted in the ED. Brother endorsed that pt went to visit family in [place] 9/2019 and about 3 weeks ago, family stated that he was starting to get weak and stopped being able to speak. He was also endorsing difficulty walking from weakness. He returned from [place] last night and came to the ED for evaluation. Pt also developed lesions on his mouth 3 days ago. He denies any other medical history or medication use. (09 Feb 2020 20:23)      SUBJECTIVE: No events overnight.  No new neurologic complaints.      aspirin Suppository 300 milliGRAM(s) Rectal daily  atorvastatin 80 milliGRAM(s) Oral at bedtime  bisacodyl Suppository 10 milliGRAM(s) Rectal once  bisacodyl Suppository 10 milliGRAM(s) Rectal daily PRN  cyanocobalamin 1000 MICROGram(s) Oral daily  dextrose 40% Gel 15 Gram(s) Oral once PRN  dextrose 5% + sodium chloride 0.45%. 1000 milliLiter(s) IV Continuous <Continuous>  dextrose 5%. 1000 milliLiter(s) IV Continuous <Continuous>  dextrose 50% Injectable 12.5 Gram(s) IV Push once  dextrose 50% Injectable 25 Gram(s) IV Push once  dextrose 50% Injectable 25 Gram(s) IV Push once  enoxaparin Injectable 40 milliGRAM(s) SubCutaneous daily  ergocalciferol 47258 Unit(s) Oral every week  glucagon  Injectable 1 milliGRAM(s) IntraMuscular once PRN  multivitamin/minerals/iron Oral Solution (CENTRUM) 15 milliLiter(s) Enteral Tube daily  piperacillin/tazobactam IVPB.. 3.375 Gram(s) IV Intermittent every 8 hours      PHYSICAL EXAM:   Vital Signs Last 24 Hrs  T(C): 36.7 (26 Feb 2020 04:47), Max: 36.7 (25 Feb 2020 21:17)  T(F): 98 (26 Feb 2020 04:47), Max: 98.1 (25 Feb 2020 21:17)  HR: 59 (26 Feb 2020 04:47) (48 - 67)  BP: 102/63 (25 Feb 2020 21:17) (90/68 - 102/63)  BP(mean): --  RR: 17 (26 Feb 2020 04:47) (17 - 18)  SpO2: 98% (26 Feb 2020 04:47) (97% - 99%)      General: Awake and alert, slightly agitated at times but calms quickly. Attempted to write using paper and pen, but writing is indiscernible.  Patient Pointing to feeding pump. Patient is npo in anticipation of surgical procedure, IV hydration in progress per primary medical team. Patient is scheduled for Open G tube insertion tomorrow.  Attends examiner. Globally aphasic, will mimic successfully but does not follow simple commands. Resting in bed.  No acute distress.  HEENT: Normocephalic, atraumatic.  EOM intact.  Abdomen: Dressing to left upper quadrant, intact, abdomen soft, non tender, not distended.  Extremities: No edema    NEUROLOGICAL EXAM:  Mental status: Awake, alert. Globally aphasic, does not follow commands, but will mimic: stick out tongue, smile, raise arms, etc. No verbal output. When asked a question will point to mouth and shakes head "no". Unable to assess comprehension or memory.    Cranial Nerves: Right sided facial droop with no tongue deviation noted on this exam. No appreciable nystagmus. Able to turn head side to side, fluid motion, will not hold position to assess strenght.  Exam is limited as patient  not following commands.   Motor exam: Normal tone, no drift, 5/5 RUE, 4/5 RLE, 5/5 LUE, 5/5 LLE.  Sensation: Appears intact to light touch   Coordination/ Gait: FTN intact, Gait not assessed on bedrest.       LABS:                        12.9   7.44  )-----------( 298      ( 25 Feb 2020 06:04 )             39.8    02-25    137  |  104  |  17  ----------------------------<  87  3.9   |  21<L>  |  0.74    Ca    9.1      25 Feb 2020 06:04      Hemoglobin A1C, Whole Blood: 5.5 % (02-10 @ 09:08)      IMAGING:     CT Chest w/ IV Cont (02.25.20 @ 23:27)                      CT ABDOMEN AND PELVIS IC                         IMPRESSION:   Ill-defined low-attenuation along the cortex of the left kidney, of uncertain etiology. Infectious and neoplastic etiologies are considered.  Moderate fecal material in the rectum with mild associated wall thickening and infiltration of the adjacent fat, suggestive of stercoral colitis.     Transesophageal Echocardiogram w/o TTE (02.13.20 @ 15:15) >  Conclusions:  Normal left ventricular systolic function. No segmental  wall motion abnormalities.  Agitated saline contrast injection demonstrates no evidence  of a patent foramen ovale.    MR Head w/wo IV Cont (02.11.20 @ 15:07)   IMPRESSION:  BRAIN MRI:  Acute right posterior frontal infarct in the region of the right precentral gyrus. Subacute infarction the right frontoparietal and posterior temporal region. Minimal petechial hemorrhagic transformation and gyral enhancement is noted in both regions.  Multifocal chronic bilateral MCA territory infarcts. Chronic hemorrhagic products in the region of the left basal ganglia.    BRAIN MRA:  Persistent mild narrowing of the cavernous and supraclinoid left ICA. Persistent lack of flow related signal within the left M1 segment, with poor visualization of the more distal left MCA branches.    Re-demonstration of multifocal mild to moderate stenoses of the right M1 segment. Normal flow-related enhancement of the more distal right MCA.    NECK MRA:  No flow-limiting stenosis or evidence for arterial dissection.       CT Angio Neck w/ IV Cont (02.09.20 @ 15:13)   IMPRESSION:     CT brain: Multiple areas of prior infarct as described above. No acute hemorrhage or evidence of mass effect. Age-indeterminate lacunar infarcts as described. If the patient has a new and persistent neurologic deficit, consider short follow-up head CT or brain MRI follow-up.    CT angiography neck: No hemodynamically significant stenosis by NASCET criteria. No vascular dissection.    CT angiography brain: The left middle cerebral artery is severely stenosed versus occluded in this patient with a chronic left MCA territory infarct. Marked decrease branching of the distal left MCA branches are noted in the left sylvian fissure compared to the contralateral side.    Mild to moderate lobulated stenoses involve the M1 segment of the right middle cerebral artery.

## 2020-02-26 NOTE — PROGRESS NOTE ADULT - SUBJECTIVE AND OBJECTIVE BOX
Subjective: Patient seen and examined. No new events except as noted.     SUBJECTIVE/ROS:      MEDICATIONS:  MEDICATIONS  (STANDING):  aspirin Suppository 300 milliGRAM(s) Rectal daily  atorvastatin 80 milliGRAM(s) Oral at bedtime  cyanocobalamin 1000 MICROGram(s) Oral daily  dextrose 5% + sodium chloride 0.45%. 1000 milliLiter(s) (70 mL/Hr) IV Continuous <Continuous>  dextrose 5%. 1000 milliLiter(s) (50 mL/Hr) IV Continuous <Continuous>  dextrose 50% Injectable 12.5 Gram(s) IV Push once  dextrose 50% Injectable 25 Gram(s) IV Push once  dextrose 50% Injectable 25 Gram(s) IV Push once  enoxaparin Injectable 40 milliGRAM(s) SubCutaneous daily  ergocalciferol 33739 Unit(s) Oral every week  multivitamin/minerals/iron Oral Solution (CENTRUM) 15 milliLiter(s) Enteral Tube daily  piperacillin/tazobactam IVPB.. 3.375 Gram(s) IV Intermittent every 8 hours      PHYSICAL EXAM:  T(C): 36.7 (02-26-20 @ 04:47), Max: 36.7 (02-25-20 @ 21:17)  HR: 59 (02-26-20 @ 04:47) (48 - 67)  BP: 102/63 (02-25-20 @ 21:17) (90/68 - 102/63)  RR: 17 (02-26-20 @ 04:47) (17 - 18)  SpO2: 98% (02-26-20 @ 04:47) (97% - 99%)  Wt(kg): --  I&O's Summary    25 Feb 2020 07:01  -  26 Feb 2020 07:00  --------------------------------------------------------  IN: 0 mL / OUT: 200 mL / NET: -200 mL            JVP: Normal  Neck: supple  Lung: clear   CV: S1 S2 , Murmur:  Abd: soft  Ext: No edema  neuro: Awake / alert  Psych: flat affect  Skin: normal``    LABS/DATA:    CARDIAC MARKERS:                                12.9   7.44  )-----------( 298      ( 25 Feb 2020 06:04 )             39.8     02-25    137  |  104  |  17  ----------------------------<  87  3.9   |  21<L>  |  0.74    Ca    9.1      25 Feb 2020 06:04      proBNP:   Lipid Profile:   HgA1c:   TSH:     TELE:  EKG:

## 2020-02-26 NOTE — PROGRESS NOTE ADULT - ASSESSMENT
59Y Frisian speaking M with prior CVA 8y ago (no reported deficits) brought in by brother for concerns of generalized weakness and inability to speak for the past 3 weeks. On exam found to have no verbal output, appeared withdrawn and weak on neurological evaluation  He follows commands intermittently and is easily agitated.  Exam limited due to patient aggression on admission  CTH demonstrates chronic L MCA infarct with severely stenotic vs occluded distal L M1.   this is confirmed on MRI but also an acute right post frontal infarct is seen. neuro aware, Cardiology aware, benign LUIS and s/p [employer] ILR placement 2/13, will need outptn F/U in 7-10 days. however 2/2 ongoing bradycardia EPS reconsulted. recommended to be on prn atropine or isoproternol/dobutamine infusion during the Procedure. PPM not recommended.   was on NGT feeds prior to PEG, plavix was held  prior to PEG( cleared by Neuro, cont ASA), s/p PEG placement 2/21, procedure went well, in the turner post procedure ptn pulled PEG out, remains NPO, awaiting open gastrostomy on 2/27, consent obtained by john from family   ptn on the schedule w Dr. Fletcher on thu 2/27   will need close supervision post op  d/w neurology Plavix on hold 2/2 ptn unable to swallow, awaiting gastrostomy placement. meanwhile start rectal ASA suppository.  Neuro recommends malignancy work up 2/2 unexplained multiple embolic strokes. Oncology consult w Dr. Sanchez appreciated. s/p CT C/A/P w  IV contrast only. 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 LUIS benign  -DVT ppx w sc Lovenox  -social service consult  -Dispo to acute rehab

## 2020-02-26 NOTE — PROGRESS NOTE ADULT - SUBJECTIVE AND OBJECTIVE BOX
Anesthesia Preop for PEG in OR on 02/27/2020    Patient seen, examined, chart reviewed. Full note on DOS.  NPO after midnight.

## 2020-02-26 NOTE — PROGRESS NOTE ADULT - SUBJECTIVE AND OBJECTIVE BOX
Patient is a 59y old  Male who presents with a chief complaint of mouth lesions, possible stroke (26 Feb 2020 17:57)      SUBJECTIVE / OVERNIGHT EVENTS: NPO after MN, open gastrostomy surgery in am. Hold am lovenox    MEDICATIONS  (STANDING):  aspirin Suppository 300 milliGRAM(s) Rectal daily  atorvastatin 80 milliGRAM(s) Oral at bedtime  cyanocobalamin 1000 MICROGram(s) Oral daily  dextrose 5% + sodium chloride 0.45%. 1000 milliLiter(s) (70 mL/Hr) IV Continuous <Continuous>  dextrose 5%. 1000 milliLiter(s) (50 mL/Hr) IV Continuous <Continuous>  dextrose 50% Injectable 12.5 Gram(s) IV Push once  dextrose 50% Injectable 25 Gram(s) IV Push once  dextrose 50% Injectable 25 Gram(s) IV Push once  ergocalciferol 88566 Unit(s) Oral every week  multivitamin/minerals/iron Oral Solution (CENTRUM) 15 milliLiter(s) Enteral Tube daily  piperacillin/tazobactam IVPB.. 3.375 Gram(s) IV Intermittent every 8 hours    MEDICATIONS  (PRN):  bisacodyl Suppository 10 milliGRAM(s) Rectal daily PRN Constipation  dextrose 40% Gel 15 Gram(s) Oral once PRN Blood Glucose LESS THAN 70 milliGRAM(s)/deciliter  glucagon  Injectable 1 milliGRAM(s) IntraMuscular once PRN Glucose LESS THAN 70 milligrams/deciliter      Vital Signs Last 24 Hrs  T(F): 97.4 (02-26-20 @ 20:56), Max: 98.1 (02-25-20 @ 21:17)  HR: 50 (02-26-20 @ 20:56) (48 - 73)  BP: 121/78 (02-26-20 @ 20:56) (97/64 - 121/78)  RR: 19 (02-26-20 @ 20:56) (17 - 19)  SpO2: 99% (02-26-20 @ 20:56) (97% - 99%)  Telemetry:   CAPILLARY BLOOD GLUCOSE        I&O's Summary    25 Feb 2020 07:01  -  26 Feb 2020 07:00  --------------------------------------------------------  IN: 0 mL / OUT: 200 mL / NET: -200 mL    26 Feb 2020 07:01  -  26 Feb 2020 20:58  --------------------------------------------------------  IN: 480 mL / OUT: 0 mL / NET: 480 mL        PHYSICAL EXAM:  GENERAL: NAD, well-developed  HEAD:  Atraumatic, Normocephalic  EYES: EOMI, PERRLA, conjunctiva and sclera clear  NECK: Supple, No JVD  CHEST/LUNG: Clear to auscultation bilaterally; No wheeze  HEART: Regular rate and rhythm; No murmurs, rubs, or gallops  ABDOMEN: Soft, Nontender, Nondistended; Bowel sounds present  EXTREMITIES:  2+ Peripheral Pulses, No clubbing, cyanosis, or edema  PSYCH: AAOx3  NEUROLOGY: non-focal  SKIN: No rashes or lesions    LABS:                        12.9   7.44  )-----------( 298      ( 25 Feb 2020 06:04 )             39.8     02-25    137  |  104  |  17  ----------------------------<  87  3.9   |  21<L>  |  0.74    Ca    9.1      25 Feb 2020 06:04                RADIOLOGY & ADDITIONAL TESTS:    Imaging Personally Reviewed:    Consultant(s) Notes Reviewed:      Care Discussed with Consultants/Other Providers:

## 2020-02-27 ENCOUNTER — APPOINTMENT (OUTPATIENT)
Dept: SURGERY | Facility: HOSPITAL | Age: 60
End: 2020-02-27
Payer: MEDICARE

## 2020-02-27 LAB
ANION GAP SERPL CALC-SCNC: 12 MMOL/L — SIGNIFICANT CHANGE UP (ref 5–17)
APTT BLD: 31 SEC — SIGNIFICANT CHANGE UP (ref 27.5–36.3)
BLD GP AB SCN SERPL QL: NEGATIVE — SIGNIFICANT CHANGE UP
BUN SERPL-MCNC: 11 MG/DL — SIGNIFICANT CHANGE UP (ref 7–23)
CALCIUM SERPL-MCNC: 8.9 MG/DL — SIGNIFICANT CHANGE UP (ref 8.4–10.5)
CHLORIDE SERPL-SCNC: 107 MMOL/L — SIGNIFICANT CHANGE UP (ref 96–108)
CO2 SERPL-SCNC: 22 MMOL/L — SIGNIFICANT CHANGE UP (ref 22–31)
CREAT SERPL-MCNC: 0.64 MG/DL — SIGNIFICANT CHANGE UP (ref 0.5–1.3)
GLUCOSE BLDC GLUCOMTR-MCNC: 114 MG/DL — HIGH (ref 70–99)
GLUCOSE BLDC GLUCOMTR-MCNC: 125 MG/DL — HIGH (ref 70–99)
GLUCOSE BLDC GLUCOMTR-MCNC: 173 MG/DL — HIGH (ref 70–99)
GLUCOSE SERPL-MCNC: 120 MG/DL — HIGH (ref 70–99)
HCT VFR BLD CALC: 36.1 % — LOW (ref 39–50)
HGB BLD-MCNC: 12.1 G/DL — LOW (ref 13–17)
INR BLD: 0.99 RATIO — SIGNIFICANT CHANGE UP (ref 0.88–1.16)
MCHC RBC-ENTMCNC: 33.5 GM/DL — SIGNIFICANT CHANGE UP (ref 32–36)
MCHC RBC-ENTMCNC: 33.6 PG — SIGNIFICANT CHANGE UP (ref 27–34)
MCV RBC AUTO: 100.3 FL — HIGH (ref 80–100)
NRBC # BLD: 0 /100 WBCS — SIGNIFICANT CHANGE UP (ref 0–0)
PLATELET # BLD AUTO: 284 K/UL — SIGNIFICANT CHANGE UP (ref 150–400)
POTASSIUM SERPL-MCNC: 3.5 MMOL/L — SIGNIFICANT CHANGE UP (ref 3.5–5.3)
POTASSIUM SERPL-SCNC: 3.5 MMOL/L — SIGNIFICANT CHANGE UP (ref 3.5–5.3)
PROTHROM AB SERPL-ACNC: 11.4 SEC — SIGNIFICANT CHANGE UP (ref 10–12.9)
RBC # BLD: 3.6 M/UL — LOW (ref 4.2–5.8)
RBC # FLD: 12.9 % — SIGNIFICANT CHANGE UP (ref 10.3–14.5)
RH IG SCN BLD-IMP: POSITIVE — SIGNIFICANT CHANGE UP
SODIUM SERPL-SCNC: 141 MMOL/L — SIGNIFICANT CHANGE UP (ref 135–145)
WBC # BLD: 7.41 K/UL — SIGNIFICANT CHANGE UP (ref 3.8–10.5)
WBC # FLD AUTO: 7.41 K/UL — SIGNIFICANT CHANGE UP (ref 3.8–10.5)

## 2020-02-27 PROCEDURE — 43653 LAPAROSCOPY GASTROSTOMY: CPT | Mod: 82

## 2020-02-27 PROCEDURE — 43653 LAPAROSCOPY GASTROSTOMY: CPT

## 2020-02-27 PROCEDURE — 76770 US EXAM ABDO BACK WALL COMP: CPT | Mod: 26

## 2020-02-27 PROCEDURE — 76775 US EXAM ABDO BACK WALL LIM: CPT | Mod: 26

## 2020-02-27 RX ORDER — ACETAMINOPHEN 500 MG
1000 TABLET ORAL ONCE
Refills: 0 | Status: COMPLETED | OUTPATIENT
Start: 2020-02-28 | End: 2020-02-28

## 2020-02-27 RX ORDER — ONDANSETRON 8 MG/1
4 TABLET, FILM COATED ORAL EVERY 6 HOURS
Refills: 0 | Status: DISCONTINUED | OUTPATIENT
Start: 2020-02-27 | End: 2020-03-07

## 2020-02-27 RX ORDER — MORPHINE SULFATE 50 MG/1
2 CAPSULE, EXTENDED RELEASE ORAL EVERY 4 HOURS
Refills: 0 | Status: DISCONTINUED | OUTPATIENT
Start: 2020-02-27 | End: 2020-02-29

## 2020-02-27 RX ORDER — ACETAMINOPHEN 500 MG
1000 TABLET ORAL ONCE
Refills: 0 | Status: COMPLETED | OUTPATIENT
Start: 2020-02-27 | End: 2020-02-27

## 2020-02-27 RX ORDER — ENOXAPARIN SODIUM 100 MG/ML
40 INJECTION SUBCUTANEOUS DAILY
Refills: 0 | Status: DISCONTINUED | OUTPATIENT
Start: 2020-02-28 | End: 2020-03-07

## 2020-02-27 RX ORDER — FAMOTIDINE 10 MG/ML
20 INJECTION INTRAVENOUS EVERY 12 HOURS
Refills: 0 | Status: DISCONTINUED | OUTPATIENT
Start: 2020-02-27 | End: 2020-03-07

## 2020-02-27 RX ADMIN — Medication 1000 MILLIGRAM(S): at 17:19

## 2020-02-27 RX ADMIN — Medication 400 MILLIGRAM(S): at 16:13

## 2020-02-27 NOTE — CHART NOTE - NSCHARTNOTEFT_GEN_A_CORE
Pt s/p Peg  which  was surgically placed today  , will  place the Pt on constant observation , since Pt is immediate post  op and  had recently pulled out a peg .   c/w Mittens  and abdominal Binder   leslie berman NP-C

## 2020-02-27 NOTE — BRIEF OPERATIVE NOTE - OPERATION/FINDINGS
[place] gastrostomy tube placed laparoscopically.  There was no hole in the stomach identified from the previous PEG tube that was dislodged last week on POD 0 of it being placed.  In the OR, the greater curvature was mobilized and two purple endo REJI loads were fired to tubularize part of the stomach.  This was then delivered through the skin in the LUQ and brooked to the skin.  A feeding tube was put through the gastrostomy and the bumper was set to 9cm and sutured to the skin.  The balloon was originally filled with 10cc, but then out of the OR it was deflated.

## 2020-02-27 NOTE — PROGRESS NOTE ADULT - SUBJECTIVE AND OBJECTIVE BOX
SURGERY DAILY PROGRESS NOTE:       SUBJECTIVE/ROS: Patient examined at bedside. no acute events overnight   Denies nausea, vomiting, chest pain, shortness of breath         MEDICATIONS  (STANDING):  aspirin Suppository 300 milliGRAM(s) Rectal daily  atorvastatin 80 milliGRAM(s) Oral at bedtime  cyanocobalamin 1000 MICROGram(s) Oral daily  dextrose 5% + sodium chloride 0.45%. 1000 milliLiter(s) (70 mL/Hr) IV Continuous <Continuous>  dextrose 5%. 1000 milliLiter(s) (50 mL/Hr) IV Continuous <Continuous>  dextrose 50% Injectable 12.5 Gram(s) IV Push once  dextrose 50% Injectable 25 Gram(s) IV Push once  dextrose 50% Injectable 25 Gram(s) IV Push once  ergocalciferol 65594 Unit(s) Oral every week  multivitamin/minerals/iron Oral Solution (CENTRUM) 15 milliLiter(s) Enteral Tube daily    MEDICATIONS  (PRN):  bisacodyl Suppository 10 milliGRAM(s) Rectal daily PRN Constipation  dextrose 40% Gel 15 Gram(s) Oral once PRN Blood Glucose LESS THAN 70 milliGRAM(s)/deciliter  glucagon  Injectable 1 milliGRAM(s) IntraMuscular once PRN Glucose LESS THAN 70 milligrams/deciliter      OBJECTIVE:    Vital Signs Last 24 Hrs  T(C): 36.3 (26 Feb 2020 20:56), Max: 36.4 (26 Feb 2020 14:33)  T(F): 97.4 (26 Feb 2020 20:56), Max: 97.6 (26 Feb 2020 14:33)  HR: 50 (26 Feb 2020 20:56) (50 - 73)  BP: 121/78 (26 Feb 2020 20:56) (115/75 - 121/78)  BP(mean): --  RR: 19 (26 Feb 2020 20:56) (19 - 19)  SpO2: 99% (26 Feb 2020 20:56) (99% - 99%)        I&O's Detail    25 Feb 2020 07:01  -  26 Feb 2020 07:00  --------------------------------------------------------  IN:  Total IN: 0 mL    OUT:    Voided: 200 mL  Total OUT: 200 mL    Total NET: -200 mL      26 Feb 2020 07:01  -  27 Feb 2020 04:51  --------------------------------------------------------  IN:    dextrose 5% + sodium chloride 0.45%.: 630 mL    Oral Fluid: 480 mL    Solution: 100 mL  Total IN: 1210 mL    OUT:  Total OUT: 0 mL    Total NET: 1210 mL          Daily     Daily     LABS:                        12.9   7.44  )-----------( 298      ( 25 Feb 2020 06:04 )             39.8     02-25    137  |  104  |  17  ----------------------------<  87  3.9   |  21<L>  |  0.74    Ca    9.1      25 Feb 2020 06:04                        PHYSICAL EXAM:  General: Appears well, NAD  Neuro: AAOx3  CHEST: Clear to auscultation bilaterally  CV: Regular rate and rhythm  Abdomen: soft, nontender, nondistended, no rebound or guarding, dressing c/d/i  Extremities: Grossly symmetric SURGERY DAILY PROGRESS NOTE:       SUBJECTIVE/ROS: Patient examined at bedside. no acute events overnight. Denies nausea, vomiting, or abdominal pain.         MEDICATIONS  (STANDING):  aspirin Suppository 300 milliGRAM(s) Rectal daily  atorvastatin 80 milliGRAM(s) Oral at bedtime  cyanocobalamin 1000 MICROGram(s) Oral daily  dextrose 5% + sodium chloride 0.45%. 1000 milliLiter(s) (70 mL/Hr) IV Continuous <Continuous>  dextrose 5%. 1000 milliLiter(s) (50 mL/Hr) IV Continuous <Continuous>  dextrose 50% Injectable 12.5 Gram(s) IV Push once  dextrose 50% Injectable 25 Gram(s) IV Push once  dextrose 50% Injectable 25 Gram(s) IV Push once  ergocalciferol 52840 Unit(s) Oral every week  multivitamin/minerals/iron Oral Solution (CENTRUM) 15 milliLiter(s) Enteral Tube daily    MEDICATIONS  (PRN):  bisacodyl Suppository 10 milliGRAM(s) Rectal daily PRN Constipation  dextrose 40% Gel 15 Gram(s) Oral once PRN Blood Glucose LESS THAN 70 milliGRAM(s)/deciliter  glucagon  Injectable 1 milliGRAM(s) IntraMuscular once PRN Glucose LESS THAN 70 milligrams/deciliter      OBJECTIVE:    Vital Signs Last 24 Hrs  T(C): 36.3 (26 Feb 2020 20:56), Max: 36.4 (26 Feb 2020 14:33)  T(F): 97.4 (26 Feb 2020 20:56), Max: 97.6 (26 Feb 2020 14:33)  HR: 50 (26 Feb 2020 20:56) (50 - 73)  BP: 121/78 (26 Feb 2020 20:56) (115/75 - 121/78)  BP(mean): --  RR: 19 (26 Feb 2020 20:56) (19 - 19)  SpO2: 99% (26 Feb 2020 20:56) (99% - 99%)        I&O's Detail    25 Feb 2020 07:01  -  26 Feb 2020 07:00  --------------------------------------------------------  IN:  Total IN: 0 mL    OUT:    Voided: 200 mL  Total OUT: 200 mL    Total NET: -200 mL      26 Feb 2020 07:01  -  27 Feb 2020 04:51  --------------------------------------------------------  IN:    dextrose 5% + sodium chloride 0.45%.: 630 mL    Oral Fluid: 480 mL    Solution: 100 mL  Total IN: 1210 mL    OUT:  Total OUT: 0 mL    Total NET: 1210 mL          Daily     Daily     LABS:                        12.9   7.44  )-----------( 298      ( 25 Feb 2020 06:04 )             39.8     02-25    137  |  104  |  17  ----------------------------<  87  3.9   |  21<L>  |  0.74    Ca    9.1      25 Feb 2020 06:04        PHYSICAL EXAM:  General: Appears well, NAD  Neuro: AAOx3  CHEST: Clear to auscultation bilaterally  CV: Regular rate and rhythm  Abdomen: soft, nontender, nondistended, no rebound or guarding, dressing c/d/i   Extremities: Grossly symmetric

## 2020-02-27 NOTE — PROGRESS NOTE ADULT - ASSESSMENT
59M hx of CVA, here for stroke, new aphasia, PEG 2/21 c/b patient pulling it out same day, family wants everything done, preop for open gastrostomy 2/27.    PLAN  - OR today for open gastrostomy   - Will need to clarify HCP for consent   - Neurology documented little chance of recovery of swallowing in the next 6 months, in need of long-term nutrition  - hold plavix until OR thursday if okay with primary team, neurology   - to be discussed with Dr. Please follow-up with Dr. Fletcher in the office in 10-14 days after surgery. Please call [phone] to make an appointment.    [facility]  [id number] 59M hx of CVA, here for stroke, new aphasia, PEG 2/21 c/b patient pulling it out same day, family wants everything done, preop for open gastrostomy 2/27.    PLAN  - OR today for open gastrostomy   - Spoke with brother Morgan who is to come to the hospital today at 8am for consent  - Neurology documented little chance of recovery of swallowing in the next 6 months, in need of long-term nutrition  - hold plavix until OR thursday if okay with primary team, neurology   - Preop labs drawn    [facility]  [id number]

## 2020-02-27 NOTE — PROGRESS NOTE ADULT - SUBJECTIVE AND OBJECTIVE BOX
PAM JIMENEZ:[id number],   59yMale followed for:  No Known Allergies    PAST MEDICAL & SURGICAL HISTORY:  CVA (cerebral vascular accident)  No significant past surgical history    FAMILY HISTORY:  No pertinent family history in first degree relatives    MEDICATIONS  (STANDING):  aspirin Suppository 300 milliGRAM(s) Rectal daily  atorvastatin 80 milliGRAM(s) Oral at bedtime  cyanocobalamin 1000 MICROGram(s) Oral daily  dextrose 5% + sodium chloride 0.45%. 1000 milliLiter(s) (70 mL/Hr) IV Continuous <Continuous>  dextrose 5%. 1000 milliLiter(s) (50 mL/Hr) IV Continuous <Continuous>  dextrose 50% Injectable 12.5 Gram(s) IV Push once  dextrose 50% Injectable 25 Gram(s) IV Push once  dextrose 50% Injectable 25 Gram(s) IV Push once  ergocalciferol 98775 Unit(s) Oral every week  multivitamin/minerals/iron Oral Solution (CENTRUM) 15 milliLiter(s) Enteral Tube daily    MEDICATIONS  (PRN):  bisacodyl Suppository 10 milliGRAM(s) Rectal daily PRN Constipation  dextrose 40% Gel 15 Gram(s) Oral once PRN Blood Glucose LESS THAN 70 milliGRAM(s)/deciliter  glucagon  Injectable 1 milliGRAM(s) IntraMuscular once PRN Glucose LESS THAN 70 milligrams/deciliter      Vital Signs Last 24 Hrs  T(C): 36.3 (27 Feb 2020 05:34), Max: 36.4 (26 Feb 2020 14:33)  T(F): 97.3 (27 Feb 2020 05:34), Max: 97.6 (26 Feb 2020 14:33)  HR: 48 (27 Feb 2020 05:34) (48 - 73)  BP: 110/73 (27 Feb 2020 05:34) (110/73 - 121/78)  BP(mean): --  RR: 18 (27 Feb 2020 05:34) (18 - 19)  SpO2: 98% (27 Feb 2020 05:34) (98% - 99%)  nc/at  s1s2  cta  soft, nt, nd no guarding or rebound  no c/c/e    CBC Full  -  ( 27 Feb 2020 06:55 )  WBC Count : 7.41 K/uL  RBC Count : 3.60 M/uL  Hemoglobin : 12.1 g/dL  Hematocrit : 36.1 %  Platelet Count - Automated : 284 K/uL  Mean Cell Volume : 100.3 fl  Mean Cell Hemoglobin : 33.6 pg  Mean Cell Hemoglobin Concentration : 33.5 gm/dL  Auto Neutrophil # : x  Auto Lymphocyte # : x  Auto Monocyte # : x  Auto Eosinophil # : x  Auto Basophil # : x  Auto Neutrophil % : x  Auto Lymphocyte % : x  Auto Monocyte % : x  Auto Eosinophil % : x  Auto Basophil % : x    02-27    141  |  107  |  11  ----------------------------<  120<H>  3.5   |  22  |  0.64    Ca    8.9      27 Feb 2020 06:55      PT/INR - ( 27 Feb 2020 06:55 )   PT: 11.4 sec;   INR: 0.99 ratio         PTT - ( 27 Feb 2020 06:55 )  PTT:31.0 sec

## 2020-02-27 NOTE — PROGRESS NOTE ADULT - SUBJECTIVE AND OBJECTIVE BOX
Subjective: Patient seen and examined. No new events except as noted.     SUBJECTIVE/ROS:  NAD      MEDICATIONS:  MEDICATIONS  (STANDING):  aspirin Suppository 300 milliGRAM(s) Rectal daily  atorvastatin 80 milliGRAM(s) Oral at bedtime  cyanocobalamin 1000 MICROGram(s) Oral daily  dextrose 5% + sodium chloride 0.45%. 1000 milliLiter(s) (70 mL/Hr) IV Continuous <Continuous>  dextrose 5%. 1000 milliLiter(s) (50 mL/Hr) IV Continuous <Continuous>  dextrose 50% Injectable 12.5 Gram(s) IV Push once  dextrose 50% Injectable 25 Gram(s) IV Push once  dextrose 50% Injectable 25 Gram(s) IV Push once  ergocalciferol 52619 Unit(s) Oral every week  multivitamin/minerals/iron Oral Solution (CENTRUM) 15 milliLiter(s) Enteral Tube daily      PHYSICAL EXAM:  T(C): 36.3 (02-27-20 @ 05:34), Max: 36.4 (02-26-20 @ 14:33)  HR: 48 (02-27-20 @ 05:34) (48 - 73)  BP: 110/73 (02-27-20 @ 05:34) (110/73 - 121/78)  RR: 18 (02-27-20 @ 05:34) (18 - 19)  SpO2: 98% (02-27-20 @ 05:34) (98% - 99%)  Wt(kg): --  I&O's Summary    26 Feb 2020 07:01  -  27 Feb 2020 07:00  --------------------------------------------------------  IN: 1350 mL / OUT: 0 mL / NET: 1350 mL            JVP: Normal  Neck: supple  Lung: clear   CV: S1 S2 , Murmur:  Abd: soft  Ext: No edema  neuro: Awake / alert  Psych: flat affect  Skin: normal``    LABS/DATA:    CARDIAC MARKERS:                                12.1   7.41  )-----------( 284      ( 27 Feb 2020 06:55 )             36.1     02-27    141  |  107  |  11  ----------------------------<  120<H>  3.5   |  22  |  0.64    Ca    8.9      27 Feb 2020 06:55      proBNP:   Lipid Profile:   HgA1c:   TSH:     TELE:  EKG:

## 2020-02-27 NOTE — PROGRESS NOTE ADULT - ASSESSMENT
CVA  LUIS unremarkable  s/p ILR   on dapt    sinus bradycardia   likely CNS related   fu with EP   atropine PRN for HR in 30s     Dysphagia  plan for surgical feeding tube  no CV objection to surgery

## 2020-02-28 LAB
ANION GAP SERPL CALC-SCNC: 12 MMOL/L — SIGNIFICANT CHANGE UP (ref 5–17)
BASOPHILS # BLD AUTO: 0.02 K/UL — SIGNIFICANT CHANGE UP (ref 0–0.2)
BASOPHILS NFR BLD AUTO: 0.2 % — SIGNIFICANT CHANGE UP (ref 0–2)
BUN SERPL-MCNC: 6 MG/DL — LOW (ref 7–23)
CALCIUM SERPL-MCNC: 8.9 MG/DL — SIGNIFICANT CHANGE UP (ref 8.4–10.5)
CHLORIDE SERPL-SCNC: 105 MMOL/L — SIGNIFICANT CHANGE UP (ref 96–108)
CO2 SERPL-SCNC: 22 MMOL/L — SIGNIFICANT CHANGE UP (ref 22–31)
CREAT SERPL-MCNC: 0.64 MG/DL — SIGNIFICANT CHANGE UP (ref 0.5–1.3)
EOSINOPHIL # BLD AUTO: 0.07 K/UL — SIGNIFICANT CHANGE UP (ref 0–0.5)
EOSINOPHIL NFR BLD AUTO: 0.6 % — SIGNIFICANT CHANGE UP (ref 0–6)
GLUCOSE BLDC GLUCOMTR-MCNC: 111 MG/DL — HIGH (ref 70–99)
GLUCOSE BLDC GLUCOMTR-MCNC: 119 MG/DL — HIGH (ref 70–99)
GLUCOSE BLDC GLUCOMTR-MCNC: 127 MG/DL — HIGH (ref 70–99)
GLUCOSE BLDC GLUCOMTR-MCNC: 135 MG/DL — HIGH (ref 70–99)
GLUCOSE SERPL-MCNC: 124 MG/DL — HIGH (ref 70–99)
HCT VFR BLD CALC: 33.8 % — LOW (ref 39–50)
HGB BLD-MCNC: 11.1 G/DL — LOW (ref 13–17)
IMM GRANULOCYTES NFR BLD AUTO: 0.4 % — SIGNIFICANT CHANGE UP (ref 0–1.5)
LYMPHOCYTES # BLD AUTO: 1.45 K/UL — SIGNIFICANT CHANGE UP (ref 1–3.3)
LYMPHOCYTES # BLD AUTO: 12.2 % — LOW (ref 13–44)
MCHC RBC-ENTMCNC: 32.8 GM/DL — SIGNIFICANT CHANGE UP (ref 32–36)
MCHC RBC-ENTMCNC: 33.1 PG — SIGNIFICANT CHANGE UP (ref 27–34)
MCV RBC AUTO: 100.9 FL — HIGH (ref 80–100)
MONOCYTES # BLD AUTO: 0.52 K/UL — SIGNIFICANT CHANGE UP (ref 0–0.9)
MONOCYTES NFR BLD AUTO: 4.4 % — SIGNIFICANT CHANGE UP (ref 2–14)
NEUTROPHILS # BLD AUTO: 9.77 K/UL — HIGH (ref 1.8–7.4)
NEUTROPHILS NFR BLD AUTO: 82.2 % — HIGH (ref 43–77)
NRBC # BLD: 0 /100 WBCS — SIGNIFICANT CHANGE UP (ref 0–0)
PLATELET # BLD AUTO: 307 K/UL — SIGNIFICANT CHANGE UP (ref 150–400)
POTASSIUM SERPL-MCNC: 3.1 MMOL/L — LOW (ref 3.5–5.3)
POTASSIUM SERPL-SCNC: 3.1 MMOL/L — LOW (ref 3.5–5.3)
RBC # BLD: 3.35 M/UL — LOW (ref 4.2–5.8)
RBC # FLD: 13 % — SIGNIFICANT CHANGE UP (ref 10.3–14.5)
SODIUM SERPL-SCNC: 139 MMOL/L — SIGNIFICANT CHANGE UP (ref 135–145)
WBC # BLD: 11.88 K/UL — HIGH (ref 3.8–10.5)
WBC # FLD AUTO: 11.88 K/UL — HIGH (ref 3.8–10.5)

## 2020-02-28 PROCEDURE — 74183 MRI ABD W/O CNTR FLWD CNTR: CPT | Mod: 26

## 2020-02-28 RX ORDER — POTASSIUM CHLORIDE 20 MEQ
10 PACKET (EA) ORAL
Refills: 0 | Status: COMPLETED | OUTPATIENT
Start: 2020-02-28 | End: 2020-02-28

## 2020-02-28 RX ADMIN — MORPHINE SULFATE 2 MILLIGRAM(S): 50 CAPSULE, EXTENDED RELEASE ORAL at 22:14

## 2020-02-28 RX ADMIN — Medication 300 MILLIGRAM(S): at 01:14

## 2020-02-28 RX ADMIN — Medication 400 MILLIGRAM(S): at 12:15

## 2020-02-28 RX ADMIN — ENOXAPARIN SODIUM 40 MILLIGRAM(S): 100 INJECTION SUBCUTANEOUS at 12:15

## 2020-02-28 RX ADMIN — Medication 1000 MILLIGRAM(S): at 23:46

## 2020-02-28 RX ADMIN — Medication 100 MILLIEQUIVALENT(S): at 14:25

## 2020-02-28 RX ADMIN — MORPHINE SULFATE 2 MILLIGRAM(S): 50 CAPSULE, EXTENDED RELEASE ORAL at 22:30

## 2020-02-28 RX ADMIN — Medication 1000 MILLIGRAM(S): at 13:36

## 2020-02-28 RX ADMIN — Medication 400 MILLIGRAM(S): at 05:23

## 2020-02-28 RX ADMIN — Medication 100 MILLIEQUIVALENT(S): at 12:14

## 2020-02-28 RX ADMIN — SODIUM CHLORIDE 70 MILLILITER(S): 9 INJECTION, SOLUTION INTRAVENOUS at 17:20

## 2020-02-28 RX ADMIN — Medication 400 MILLIGRAM(S): at 23:16

## 2020-02-28 RX ADMIN — Medication 100 MILLIEQUIVALENT(S): at 10:17

## 2020-02-28 RX ADMIN — SODIUM CHLORIDE 70 MILLILITER(S): 9 INJECTION, SOLUTION INTRAVENOUS at 05:24

## 2020-02-28 NOTE — PROGRESS NOTE ADULT - SUBJECTIVE AND OBJECTIVE BOX
Pt seen. no overnight events     MEDICATIONS  (STANDING):  aspirin Suppository 300 milliGRAM(s) Rectal daily  atorvastatin 80 milliGRAM(s) Oral at bedtime  cyanocobalamin 1000 MICROGram(s) Oral daily  dextrose 5% + sodium chloride 0.45%. 1000 milliLiter(s) (70 mL/Hr) IV Continuous <Continuous>  dextrose 5%. 1000 milliLiter(s) (50 mL/Hr) IV Continuous <Continuous>  dextrose 50% Injectable 12.5 Gram(s) IV Push once  dextrose 50% Injectable 25 Gram(s) IV Push once  dextrose 50% Injectable 25 Gram(s) IV Push once  enoxaparin Injectable 40 milliGRAM(s) SubCutaneous daily  ergocalciferol 30748 Unit(s) Oral every week  multivitamin/minerals/iron Oral Solution (CENTRUM) 15 milliLiter(s) Enteral Tube daily  piperacillin/tazobactam IVPB.. 3.375 Gram(s) IV Intermittent every 8 hours    MEDICATIONS  (PRN):  bisacodyl Suppository 10 milliGRAM(s) Rectal daily PRN Constipation  dextrose 40% Gel 15 Gram(s) Oral once PRN Blood Glucose LESS THAN 70 milliGRAM(s)/deciliter  glucagon  Injectable 1 milliGRAM(s) IntraMuscular once PRN Glucose LESS THAN 70 milligrams/deciliter      ROS  UTO 2/2 participation    Vital Signs Last 24 Hrs  T(C): 36.3 (28 Feb 2020 11:27), Max: 36.4 (28 Feb 2020 01:28)  T(F): 97.4 (28 Feb 2020 11:27), Max: 97.6 (28 Feb 2020 01:28)  HR: 52 (28 Feb 2020 11:27) (51 - 56)  BP: 124/78 (28 Feb 2020 11:27) (100/64 - 137/86)  BP(mean): --  RR: 16 (28 Feb 2020 11:27) (16 - 18)  SpO2: 98% (28 Feb 2020 11:27) (98% - 99%)    PE  NAD  Awake  Anicteric  RRR  CTAB limited effort  Abd soft, NT, ND  No edema  No rash grossly  FROM                          11.1   11.88 )-----------( 307      ( 28 Feb 2020 05:26 )             33.8                           12.9   7.44  )-----------( 298      ( 25 Feb 2020 06:04 )             39.8       02-25    137  |  104  |  17  ----------------------------<  87  3.9   |  21<L>  |  0.74    Ca    9.1      25 Feb 2020 06:04

## 2020-02-28 NOTE — PROGRESS NOTE ADULT - SUBJECTIVE AND OBJECTIVE BOX
Subjective: Patient seen and examined. No new events except as noted.     SUBJECTIVE/ROS:        MEDICATIONS:  MEDICATIONS  (STANDING):  acetaminophen  IVPB .. 1000 milliGRAM(s) IV Intermittent once  acetaminophen  IVPB .. 1000 milliGRAM(s) IV Intermittent once  aspirin Suppository 300 milliGRAM(s) Rectal daily  atorvastatin 80 milliGRAM(s) Oral at bedtime  cyanocobalamin 1000 MICROGram(s) Oral daily  dextrose 5% + sodium chloride 0.45%. 1000 milliLiter(s) (70 mL/Hr) IV Continuous <Continuous>  dextrose 5%. 1000 milliLiter(s) (50 mL/Hr) IV Continuous <Continuous>  dextrose 50% Injectable 12.5 Gram(s) IV Push once  dextrose 50% Injectable 25 Gram(s) IV Push once  dextrose 50% Injectable 25 Gram(s) IV Push once  enoxaparin Injectable 40 milliGRAM(s) SubCutaneous daily  ergocalciferol 88032 Unit(s) Oral every week  multivitamin/minerals/iron Oral Solution (CENTRUM) 15 milliLiter(s) Enteral Tube daily  potassium chloride  10 mEq/100 mL IVPB 10 milliEquivalent(s) IV Intermittent every 1 hour      PHYSICAL EXAM:  T(C): 36.4 (02-28-20 @ 07:53), Max: 36.4 (02-28-20 @ 01:28)  HR: 51 (02-28-20 @ 07:53) (51 - 63)  BP: 136/81 (02-28-20 @ 07:53) (100/64 - 137/86)  RR: 16 (02-28-20 @ 07:53) (14 - 18)  SpO2: 99% (02-28-20 @ 07:53) (98% - 100%)  Wt(kg): --  I&O's Summary    27 Feb 2020 07:01  -  28 Feb 2020 07:00  --------------------------------------------------------  IN: 840 mL / OUT: 750 mL / NET: 90 mL    28 Feb 2020 07:01 - 28 Feb 2020 10:44  --------------------------------------------------------  IN: 0 mL / OUT: 0 mL / NET: 0 mL            JVP: Normal  Neck: supple  Lung: clear   CV: S1 S2 , Murmur:  Abd: soft  Ext: No edema  neuro: Awake / alert  Psych: flat affect  Skin: normal``    LABS/DATA:    CARDIAC MARKERS:                                11.1   11.88 )-----------( 307      ( 28 Feb 2020 05:26 )             33.8     02-28    139  |  105  |  6<L>  ----------------------------<  124<H>  3.1<L>   |  22  |  0.64    Ca    8.9      28 Feb 2020 05:26      proBNP:   Lipid Profile:   HgA1c:   TSH:     TELE:  EKG:

## 2020-02-28 NOTE — PROGRESS NOTE ADULT - SUBJECTIVE AND OBJECTIVE BOX
SURGERY DAILY PROGRESS NOTE:     SUBJECTIVE/24hr Events:     Patient seen and examined on am rounds. Yesterday open g-tube placed. Pt placed on 1:1, mitts and capped. C/t be bradycardic to 50's, asymptomatic.  No acute events overnight. VSS.      OBJECTIVE:    MEDICATIONS  (STANDING):  acetaminophen  IVPB .. 1000 milliGRAM(s) IV Intermittent once  acetaminophen  IVPB .. 1000 milliGRAM(s) IV Intermittent once  aspirin Suppository 300 milliGRAM(s) Rectal daily  atorvastatin 80 milliGRAM(s) Oral at bedtime  cyanocobalamin 1000 MICROGram(s) Oral daily  dextrose 5% + sodium chloride 0.45%. 1000 milliLiter(s) (70 mL/Hr) IV Continuous <Continuous>  dextrose 5%. 1000 milliLiter(s) (50 mL/Hr) IV Continuous <Continuous>  dextrose 50% Injectable 12.5 Gram(s) IV Push once  dextrose 50% Injectable 25 Gram(s) IV Push once  dextrose 50% Injectable 25 Gram(s) IV Push once  enoxaparin Injectable 40 milliGRAM(s) SubCutaneous daily  ergocalciferol 23672 Unit(s) Oral every week  multivitamin/minerals/iron Oral Solution (CENTRUM) 15 milliLiter(s) Enteral Tube daily    MEDICATIONS  (PRN):  bisacodyl Suppository 10 milliGRAM(s) Rectal daily PRN Constipation  dextrose 40% Gel 15 Gram(s) Oral once PRN Blood Glucose LESS THAN 70 milliGRAM(s)/deciliter  famotidine Injectable 20 milliGRAM(s) IV Push every 12 hours PRN Dyspepsia  glucagon  Injectable 1 milliGRAM(s) IntraMuscular once PRN Glucose LESS THAN 70 milligrams/deciliter  morphine  - Injectable 2 milliGRAM(s) IV Push every 4 hours PRN Severe Pain (7 - 10)  ondansetron Injectable 4 milliGRAM(s) IV Push every 6 hours PRN Nausea      Vital Signs Last 24 Hrs  T(C): 36.4 (2020 04:23), Max: 36.4 (2020 01:28)  T(F): 97.6 (2020 04:23), Max: 97.6 (2020 01:28)  HR: 51 (2020 04:23) (48 - 63)  BP: 120/75 (2020 04:23) (100/64 - 137/86)  BP(mean): 92 (2020 13:00) (85 - 93)  RR: 18 (2020 04:23) (14 - 18)  SpO2: 98% (2020 04:23) (98% - 100%)      I&O's Detail    2020 07:01  -  2020 07:00  --------------------------------------------------------  IN:    dextrose 5% + sodium chloride 0.45%.: 770 mL    Oral Fluid: 480 mL    Solution: 100 mL  Total IN: 1350 mL    OUT:  Total OUT: 0 mL    Total NET: 1350 mL      2020 07:01  -  2020 05:26  --------------------------------------------------------  IN:  Total IN: 0 mL    OUT:    Voided: 750 mL  Total OUT: 750 mL    Total NET: -750 mL            Daily     Daily Weight in k (2020 05:34)          LABS:                        12.1   7.41  )-----------( 284      ( 2020 06:55 )             36.1     02-27    141  |  107  |  11  ----------------------------<  120<H>  3.5   |  22  |  0.64    Ca    8.9      2020 06:55      PT/INR - ( 2020 06:55 )   PT: 11.4 sec;   INR: 0.99 ratio         PTT - ( 2020 06:55 )  PTT:31.0 sec              PHYSICAL EXAM:  Constitutional: well developed, well nourished, NAD  ENMT: normal facies, symmetric  Respiratory: Normal respiratory effort   Abdomen: soft, mildly tender, ND. G-tube in place, capped, w/ binder. C/d/i. SURGERY DAILY PROGRESS NOTE:     SUBJECTIVE/24hr Events:     Patient seen and examined on am rounds. Yesterday open g-tube placed. Pt placed on 1:1, mitts and capped. No acute complaints this morning. Dressing changed at the bedside and tube in place and in good position. Sutures in place and undisrupted. C/t be bradycardic to 50's, asymptomatic.  No acute events overnight. VSS.      OBJECTIVE:    MEDICATIONS  (STANDING):  acetaminophen  IVPB .. 1000 milliGRAM(s) IV Intermittent once  acetaminophen  IVPB .. 1000 milliGRAM(s) IV Intermittent once  aspirin Suppository 300 milliGRAM(s) Rectal daily  atorvastatin 80 milliGRAM(s) Oral at bedtime  cyanocobalamin 1000 MICROGram(s) Oral daily  dextrose 5% + sodium chloride 0.45%. 1000 milliLiter(s) (70 mL/Hr) IV Continuous <Continuous>  dextrose 5%. 1000 milliLiter(s) (50 mL/Hr) IV Continuous <Continuous>  dextrose 50% Injectable 12.5 Gram(s) IV Push once  dextrose 50% Injectable 25 Gram(s) IV Push once  dextrose 50% Injectable 25 Gram(s) IV Push once  enoxaparin Injectable 40 milliGRAM(s) SubCutaneous daily  ergocalciferol 53871 Unit(s) Oral every week  multivitamin/minerals/iron Oral Solution (CENTRUM) 15 milliLiter(s) Enteral Tube daily    MEDICATIONS  (PRN):  bisacodyl Suppository 10 milliGRAM(s) Rectal daily PRN Constipation  dextrose 40% Gel 15 Gram(s) Oral once PRN Blood Glucose LESS THAN 70 milliGRAM(s)/deciliter  famotidine Injectable 20 milliGRAM(s) IV Push every 12 hours PRN Dyspepsia  glucagon  Injectable 1 milliGRAM(s) IntraMuscular once PRN Glucose LESS THAN 70 milligrams/deciliter  morphine  - Injectable 2 milliGRAM(s) IV Push every 4 hours PRN Severe Pain (7 - 10)  ondansetron Injectable 4 milliGRAM(s) IV Push every 6 hours PRN Nausea      Vital Signs Last 24 Hrs  T(C): 36.4 (2020 04:23), Max: 36.4 (2020 01:28)  T(F): 97.6 (2020 04:23), Max: 97.6 (2020 01:28)  HR: 51 (2020 04:23) (48 - 63)  BP: 120/75 (2020 04:23) (100/64 - 137/86)  BP(mean): 92 (2020 13:00) (85 - 93)  RR: 18 (2020 04:23) (14 - 18)  SpO2: 98% (2020 04:23) (98% - 100%)      I&O's Detail    2020 07:01  -  2020 07:00  --------------------------------------------------------  IN:    dextrose 5% + sodium chloride 0.45%.: 770 mL    Oral Fluid: 480 mL    Solution: 100 mL  Total IN: 1350 mL    OUT:  Total OUT: 0 mL    Total NET: 1350 mL      2020 07:01  -  2020 05:26  --------------------------------------------------------  IN:  Total IN: 0 mL    OUT:    Voided: 750 mL  Total OUT: 750 mL    Total NET: -750 mL            Daily     Daily Weight in k (2020 05:34)          LABS:                        12.1   7.41  )-----------( 284      ( 2020 06:55 )             36.1     -    141  |  107  |  11  ----------------------------<  120<H>  3.5   |  22  |  0.64    Ca    8.9      2020 06:55      PT/INR - ( 2020 06:55 )   PT: 11.4 sec;   INR: 0.99 ratio         PTT - ( 2020 06:55 )  PTT:31.0 sec              PHYSICAL EXAM:  Constitutional: well developed, well nourished, NAD  ENMT: normal facies, symmetric  Respiratory: Normal respiratory effort   Abdomen: soft, mildly tender, ND. G-tube in place, capped, w/ abdominal binder, dressing with scant amount of sanguinous staining, changed at the bedside

## 2020-02-28 NOTE — PROGRESS NOTE ADULT - SUBJECTIVE AND OBJECTIVE BOX
INTERVAL HPI/OVERNIGHT EVENTS:    MEDICATIONS  (STANDING):  acetaminophen  IVPB .. 1000 milliGRAM(s) IV Intermittent once  acetaminophen  IVPB .. 1000 milliGRAM(s) IV Intermittent once  aspirin Suppository 300 milliGRAM(s) Rectal daily  atorvastatin 80 milliGRAM(s) Oral at bedtime  cyanocobalamin 1000 MICROGram(s) Oral daily  dextrose 5% + sodium chloride 0.45%. 1000 milliLiter(s) (70 mL/Hr) IV Continuous <Continuous>  dextrose 5%. 1000 milliLiter(s) (50 mL/Hr) IV Continuous <Continuous>  dextrose 50% Injectable 12.5 Gram(s) IV Push once  dextrose 50% Injectable 25 Gram(s) IV Push once  dextrose 50% Injectable 25 Gram(s) IV Push once  enoxaparin Injectable 40 milliGRAM(s) SubCutaneous daily  ergocalciferol 27352 Unit(s) Oral every week  multivitamin/minerals/iron Oral Solution (CENTRUM) 15 milliLiter(s) Enteral Tube daily    MEDICATIONS  (PRN):  bisacodyl Suppository 10 milliGRAM(s) Rectal daily PRN Constipation  dextrose 40% Gel 15 Gram(s) Oral once PRN Blood Glucose LESS THAN 70 milliGRAM(s)/deciliter  famotidine Injectable 20 milliGRAM(s) IV Push every 12 hours PRN Dyspepsia  glucagon  Injectable 1 milliGRAM(s) IntraMuscular once PRN Glucose LESS THAN 70 milligrams/deciliter  morphine  - Injectable 2 milliGRAM(s) IV Push every 4 hours PRN Severe Pain (7 - 10)  ondansetron Injectable 4 milliGRAM(s) IV Push every 6 hours PRN Nausea      Allergies    No Known Allergies    Intolerances            PHYSICAL EXAM:   Vital Signs:  Vital Signs Last 24 Hrs  T(C): 36.4 (28 Feb 2020 07:53), Max: 36.4 (28 Feb 2020 01:28)  T(F): 97.5 (28 Feb 2020 07:53), Max: 97.6 (28 Feb 2020 01:28)  HR: 51 (28 Feb 2020 07:53) (51 - 63)  BP: 136/81 (28 Feb 2020 07:53) (100/64 - 137/86)  BP(mean): 92 (27 Feb 2020 13:00) (85 - 93)  RR: 16 (28 Feb 2020 07:53) (14 - 18)  SpO2: 99% (28 Feb 2020 07:53) (98% - 100%)  Daily     Daily     GENERAL:  no distress  HEENT:  NC/AT,  anicteric  CHEST:   no increased effort, breath sounds clear  HEART:  Regular rhythm  ABDOMEN:  Soft, non-tender, non-distended, normoactive bowel sounds,  no masses ,no hepato-splenomegaly, no signs of chronic liver disease  EXTEREMITIES:  no cyanosis      LABS:                        11.1   11.88 )-----------( 307      ( 28 Feb 2020 05:26 )             33.8     02-28    139  |  105  |  6<L>  ----------------------------<  124<H>  3.1<L>   |  22  |  0.64    Ca    8.9      28 Feb 2020 05:26      PT/INR - ( 27 Feb 2020 06:55 )   PT: 11.4 sec;   INR: 0.99 ratio         PTT - ( 27 Feb 2020 06:55 )  PTT:31.0 sec      RADIOLOGY & ADDITIONAL TESTS:

## 2020-02-28 NOTE — PROGRESS NOTE ADULT - ASSESSMENT
60 yo male with multiple recurrent CVA's.  On dual antiplatelet therapy.  Unclear whether age appropriate cancer screening has been performed.    Recurrent CVA  -CT c/a/p showed ill defined L renal lesion. Sonogram negative   -significant smoking and ETOH use in his past.   -colonoscopy as outpatient if pt not up to date with colonoscopy for colon ca screen  -PSA neg    Macrocytosis w/o anemia  -B12 adequate  -folate decreased, now on folic acid daily    CVA -- per neuro

## 2020-02-28 NOTE — PROGRESS NOTE ADULT - SUBJECTIVE AND OBJECTIVE BOX
SUBJECTIVE: No events overnight.      MEDICATIONS  (STANDING):  acetaminophen  IVPB .. 1000 milliGRAM(s) IV Intermittent once  aspirin Suppository 300 milliGRAM(s) Rectal daily  atorvastatin 80 milliGRAM(s) Oral at bedtime  cyanocobalamin 1000 MICROGram(s) Oral daily  dextrose 5% + sodium chloride 0.45%. 1000 milliLiter(s) (70 mL/Hr) IV Continuous <Continuous>  dextrose 5%. 1000 milliLiter(s) (50 mL/Hr) IV Continuous <Continuous>  dextrose 50% Injectable 12.5 Gram(s) IV Push once  dextrose 50% Injectable 25 Gram(s) IV Push once  dextrose 50% Injectable 25 Gram(s) IV Push once  enoxaparin Injectable 40 milliGRAM(s) SubCutaneous daily  ergocalciferol 50236 Unit(s) Oral every week  multivitamin/minerals/iron Oral Solution (CENTRUM) 15 milliLiter(s) Enteral Tube daily    MEDICATIONS  (PRN):  bisacodyl Suppository 10 milliGRAM(s) Rectal daily PRN Constipation  dextrose 40% Gel 15 Gram(s) Oral once PRN Blood Glucose LESS THAN 70 milliGRAM(s)/deciliter  famotidine Injectable 20 milliGRAM(s) IV Push every 12 hours PRN Dyspepsia  glucagon  Injectable 1 milliGRAM(s) IntraMuscular once PRN Glucose LESS THAN 70 milligrams/deciliter  morphine  - Injectable 2 milliGRAM(s) IV Push every 4 hours PRN Severe Pain (7 - 10)  ondansetron Injectable 4 milliGRAM(s) IV Push every 6 hours PRN Nausea      Vital Signs (24 Hrs):  T(C): 36.3 (02-28-20 @ 11:27), Max: 36.4 (02-28-20 @ 01:28)  HR: 52 (02-28-20 @ 11:27) (51 - 56)  BP: 124/78 (02-28-20 @ 11:27) (100/64 - 137/86)  RR: 16 (02-28-20 @ 11:27) (16 - 18)  SpO2: 98% (02-28-20 @ 11:27) (98% - 99%)  Wt(kg): --  Daily     Daily     I&O's Summary    27 Feb 2020 07:01  -  28 Feb 2020 07:00  --------------------------------------------------------  IN: 840 mL / OUT: 750 mL / NET: 90 mL    28 Feb 2020 07:01  -  28 Feb 2020 16:07  --------------------------------------------------------  IN: 400 mL / OUT: 350 mL / NET: 50 mL          General: Awake and alert, slightly agitated at times but calms quickly. Attempted to write using paper and pen, but writing is indiscernible.  Patient Pointing to feeding pump. Patient is npo in anticipation of surgical procedure, IV hydration in progress per primary medical team. Patient is scheduled for Open G tube insertion tomorrow.  Attends examiner. Globally aphasic, will mimic successfully but does not follow simple commands. Resting in bed.  No acute distress.  HEENT: Normocephalic, atraumatic.  EOM intact.  Abdomen: Dressing to left upper quadrant, intact, abdomen soft, non tender, not distended.  Extremities: No edema    NEUROLOGICAL EXAM:  Mental status: Awake, alert. Globally aphasic, does not follow commands, but will mimic: stick out tongue, smile, raise arms, etc. No verbal output. When asked a question will point to mouth and shakes head "no". Unable to assess comprehension or memory.    Cranial Nerves: Right sided facial droop with no tongue deviation noted on this exam. No appreciable nystagmus. Able to turn head side to side, fluid motion, will not hold position to assess strenght.  Exam is limited as patient  not following commands.   Motor exam: Normal tone, no drift, 5/5 RUE, 4/5 RLE, 5/5 LUE, 5/5 LLE.  Sensation: Appears intact to light touch   Coordination/ Gait: FTN intact, Gait not assessed on bedrest.       LABS:                        12.9   7.44  )-----------( 298      ( 25 Feb 2020 06:04 )             39.8    02-25    137  |  104  |  17  ----------------------------<  87  3.9   |  21<L>  |  0.74    Ca    9.1      25 Feb 2020 06:04      Hemoglobin A1C, Whole Blood: 5.5 % (02-10 @ 09:08)      IMAGING:     CT Chest w/ IV Cont (02.25.20 @ 23:27)                      CT ABDOMEN AND PELVIS IC                         IMPRESSION:   Ill-defined low-attenuation along the cortex of the left kidney, of uncertain etiology. Infectious and neoplastic etiologies are considered.  Moderate fecal material in the rectum with mild associated wall thickening and infiltration of the adjacent fat, suggestive of stercoral colitis.     Transesophageal Echocardiogram w/o TTE (02.13.20 @ 15:15) >  Conclusions:  Normal left ventricular systolic function. No segmental  wall motion abnormalities.  Agitated saline contrast injection demonstrates no evidence  of a patent foramen ovale.    MR Head w/wo IV Cont (02.11.20 @ 15:07)   IMPRESSION:  BRAIN MRI:  Acute right posterior frontal infarct in the region of the right precentral gyrus. Subacute infarction the right frontoparietal and posterior temporal region. Minimal petechial hemorrhagic transformation and gyral enhancement is noted in both regions.  Multifocal chronic bilateral MCA territory infarcts. Chronic hemorrhagic products in the region of the left basal ganglia.    BRAIN MRA:  Persistent mild narrowing of the cavernous and supraclinoid left ICA. Persistent lack of flow related signal within the left M1 segment, with poor visualization of the more distal left MCA branches.    Re-demonstration of multifocal mild to moderate stenoses of the right M1 segment. Normal flow-related enhancement of the more distal right MCA.    NECK MRA:  No flow-limiting stenosis or evidence for arterial dissection.       CT Angio Neck w/ IV Cont (02.09.20 @ 15:13)   IMPRESSION:     CT brain: Multiple areas of prior infarct as described above. No acute hemorrhage or evidence of mass effect. Age-indeterminate lacunar infarcts as described. If the patient has a new and persistent neurologic deficit, consider short follow-up head CT or brain MRI follow-up.    CT angiography neck: No hemodynamically significant stenosis by NASCET criteria. No vascular dissection.    CT angiography brain: The left middle cerebral artery is severely stenosed versus occluded in this patient with a chronic left MCA territory infarct. Marked decrease branching of the distal left MCA branches are noted in the left sylvian fissure compared to the contralateral side.    Mild to moderate lobulated stenoses involve the M1 segment of the right middle cerebral artery.

## 2020-02-28 NOTE — PROGRESS NOTE ADULT - ASSESSMENT
59Y Persian speaking M with prior CVA 8y ago (no reported deficits) brought in by brother for concerns of generalized weakness and inability to speak for the past 3 weeks. On exam found to have no verbal output, appeared withdrawn and weak on neurological evaluation  He follows commands intermittently and is easily agitated.  Exam limited due to patient aggression on admission  CTH demonstrates chronic L MCA infarct with severely stenotic vs occluded distal L M1.   this is confirmed on MRI but also an acute right post frontal infarct is seen. neuro aware, Cardiology aware, benign LUIS and s/p [employer] ILR placement 2/13, will need outptn F/U in 7-10 days. however 2/2 ongoing bradycardia EPS reconsulted. recommended to be on prn atropine or isoproternol/dobutamine infusion during the Procedure. PPM not recommended.   was on NGT feeds prior to PEG, plavix was held  prior to PEG( cleared by Neuro, cont ASA), s/p PEG placement 2/21, procedure went well, in the turner post procedure ptn pulled PEG out, remained NPO while awaiting open gastrostomy , today POD 1 , doing well, cleared to start feeds tomorrow , resume Plavix tomorrow  once able to use gastrostomy switch all meds via gastrostomy  Neuro recommended malignancy work up 2/2 unexplained multiple embolic strokes. Oncology consult w Dr. Sanchez appreciated.   s/p CT C/A/P w  IV contrast only. no significant findings exc questionable renal mass, but sono neg. 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 LUIS benign  -DVT ppx w sc Lovenox  -social service consult  -Dispo to acute rehab

## 2020-02-28 NOTE — CHART NOTE - NSCHARTNOTEFT_GEN_A_CORE
Nutrition Follow Up Note  Patient seen for malnutrition follow-up and consult for TF initiation.     Chart reviewed, events noted. 60 y/o male admitted for stroke, new aphasia, PEG  c/b patient pulling it out same day. Pt is now s/p PEG () with intentions for feeds to be started tomorrow.   PMH: CVA     Source: EMR, RN, Pt. Pt is aphasic and able to communicate with communication board. [place]  ID [id number]    Diet : Pt currently NPO after procedure. Pt was receiving fluids since he pulled out the PEG tube on . Pt NPO since . Noted pt was previously tolerating Jevity 1.2 @ 75 mL/hr x 18 hours/d.     Patient gestures he is feeling no pain. When asked if he was hungry, pt performed eating gestures with his hands. Explained to pt the responsibility of the RD to ensure pt receives adequate nutrition via g-tube, pt provided the "okay" sign with his hands. Additionally asked if he feels he has lost weight, however pt moved his hands in an up and down motion. When attempted to clarify if motion insinuated his weight fluctuates, pt continued up and down movement.     PO intake : poor    Daily Weight in k (-), Weight in k (-), Weight in k (-), Weight in k.5 (-), Weight in k.4 ()- pt with possible wt loss, also potentially due to change in bed scale weights when transferred units.   % Weight Change    Pertinent Medications: MEDICATIONS  (STANDING):  acetaminophen  IVPB .. 1000 milliGRAM(s) IV Intermittent once  acetaminophen  IVPB .. 1000 milliGRAM(s) IV Intermittent once  aspirin Suppository 300 milliGRAM(s) Rectal daily  atorvastatin 80 milliGRAM(s) Oral at bedtime  cyanocobalamin 1000 MICROGram(s) Oral daily  dextrose 5% + sodium chloride 0.45%. 1000 milliLiter(s) (70 mL/Hr) IV Continuous <Continuous>  dextrose 5%. 1000 milliLiter(s) (50 mL/Hr) IV Continuous <Continuous>  dextrose 50% Injectable 12.5 Gram(s) IV Push once  dextrose 50% Injectable 25 Gram(s) IV Push once  dextrose 50% Injectable 25 Gram(s) IV Push once  enoxaparin Injectable 40 milliGRAM(s) SubCutaneous daily  ergocalciferol 27232 Unit(s) Oral every week  multivitamin/minerals/iron Oral Solution (CENTRUM) 15 milliLiter(s) Enteral Tube daily  potassium chloride  10 mEq/100 mL IVPB 10 milliEquivalent(s) IV Intermittent every 1 hour    MEDICATIONS  (PRN):  bisacodyl Suppository 10 milliGRAM(s) Rectal daily PRN Constipation  dextrose 40% Gel 15 Gram(s) Oral once PRN Blood Glucose LESS THAN 70 milliGRAM(s)/deciliter  famotidine Injectable 20 milliGRAM(s) IV Push every 12 hours PRN Dyspepsia  glucagon  Injectable 1 milliGRAM(s) IntraMuscular once PRN Glucose LESS THAN 70 milligrams/deciliter  morphine  - Injectable 2 milliGRAM(s) IV Push every 4 hours PRN Severe Pain (7 - 10)  ondansetron Injectable 4 milliGRAM(s) IV Push every 6 hours PRN Nausea    Pertinent Labs:  @ 05:26: Na 139, BUN 6<L>, Cr 0.64, <H>, K+ 3.1<L>, Phos --, Mg --, Alk Phos --, ALT/SGPT --, AST/SGOT --, HbA1c --    Finger Sticks:  POCT Blood Glucose.: 127 mg/dL ( @ 05:22)  POCT Blood Glucose.: 135 mg/dL ( @ 01:23)  POCT Blood Glucose.: 173 mg/dL ( @ 18:21)  POCT Blood Glucose.: 125 mg/dL ( @ 13:46)      Skin per nursing documentation: no pressure injuries  Edema: none    Estimated Needs:   [ ] no change since previous assessment  [x] recalculated: based on dosing wt 62kg.  Estimated Energy Needs (25-30 kcal/kg): 7998-9523 kcal/d  Estimated Protein Needs (1.0-1.2 g/kg): 62- 75 g/d  Estimated Fluid Needs (25-30 kcal/kg): 5414-5457 mL/d    Previous Nutrition Diagnosis: severe malnutrition  Nutrition Diagnosis is: ongoing, to be addressed by EN. Currently treated with micronutrient supplementation     New Nutrition Diagnosis: none    Recommend  1) Jevity 1.2 starting at 20mL/hr, increasing 10mL q4 hours until goal rate of 80mL/hr x 18 hours     - Regimen at goal provides: 1440 mL formula, 1162 mL water, 1728 kcal, 80g protein. (28kcal/kg, 1.3g protein/kg based on dosing wt 62kg)  2) Consider folic acid supplementation, pt with serum folate below normal limits. Please continue micronutrient supplementation.   3) Monitor tolerance to TF regimen, RD remains available.     Monitoring and Evaluation:   Continue to monitor Nutritional intake, Tolerance to diet prescription, weights, labs, skin integrity    Amparo Noel RD, CDN  Pager [phone]  RD remains available upon request and will follow up per protocol

## 2020-02-28 NOTE — PROGRESS NOTE ADULT - SUBJECTIVE AND OBJECTIVE BOX
Patient is a 59y old  Male who presents with a chief complaint of mouth lesions, possible stroke (28 Feb 2020 16:07)      SUBJECTIVE / OVERNIGHT EVENTS: ptn w no new c/o, POD1 post open gastrostomy placement. cleared by surgery to start plavix and feeds tomorrow. will also resume DVT ppx w sc Lovenox    MEDICATIONS  (STANDING):  acetaminophen  IVPB .. 1000 milliGRAM(s) IV Intermittent once  aspirin Suppository 300 milliGRAM(s) Rectal daily  atorvastatin 80 milliGRAM(s) Oral at bedtime  cyanocobalamin 1000 MICROGram(s) Oral daily  dextrose 5% + sodium chloride 0.45%. 1000 milliLiter(s) (70 mL/Hr) IV Continuous <Continuous>  dextrose 5%. 1000 milliLiter(s) (50 mL/Hr) IV Continuous <Continuous>  dextrose 50% Injectable 12.5 Gram(s) IV Push once  dextrose 50% Injectable 25 Gram(s) IV Push once  dextrose 50% Injectable 25 Gram(s) IV Push once  enoxaparin Injectable 40 milliGRAM(s) SubCutaneous daily  ergocalciferol 22536 Unit(s) Oral every week  multivitamin/minerals/iron Oral Solution (CENTRUM) 15 milliLiter(s) Enteral Tube daily    MEDICATIONS  (PRN):  bisacodyl Suppository 10 milliGRAM(s) Rectal daily PRN Constipation  dextrose 40% Gel 15 Gram(s) Oral once PRN Blood Glucose LESS THAN 70 milliGRAM(s)/deciliter  famotidine Injectable 20 milliGRAM(s) IV Push every 12 hours PRN Dyspepsia  glucagon  Injectable 1 milliGRAM(s) IntraMuscular once PRN Glucose LESS THAN 70 milligrams/deciliter  morphine  - Injectable 2 milliGRAM(s) IV Push every 4 hours PRN Severe Pain (7 - 10)  ondansetron Injectable 4 milliGRAM(s) IV Push every 6 hours PRN Nausea      Vital Signs Last 24 Hrs  T(F): 97.4 (02-28-20 @ 11:27), Max: 97.6 (02-28-20 @ 01:28)  HR: 52 (02-28-20 @ 11:27) (51 - 56)  BP: 124/78 (02-28-20 @ 11:27) (100/64 - 137/86)  RR: 16 (02-28-20 @ 11:27) (16 - 18)  SpO2: 98% (02-28-20 @ 11:27) (98% - 99%)  Telemetry:   CAPILLARY BLOOD GLUCOSE      POCT Blood Glucose.: 119 mg/dL (28 Feb 2020 18:39)  POCT Blood Glucose.: 111 mg/dL (28 Feb 2020 12:02)  POCT Blood Glucose.: 127 mg/dL (28 Feb 2020 05:22)  POCT Blood Glucose.: 135 mg/dL (28 Feb 2020 01:23)    I&O's Summary    27 Feb 2020 07:01  -  28 Feb 2020 07:00  --------------------------------------------------------  IN: 840 mL / OUT: 750 mL / NET: 90 mL    28 Feb 2020 07:01  -  28 Feb 2020 18:47  --------------------------------------------------------  IN: 400 mL / OUT: 550 mL / NET: -150 mL        PHYSICAL EXAM:  GENERAL: NAD, well-developed  HEAD:  Atraumatic, Normocephalic  EYES: EOMI, PERRLA, conjunctiva and sclera clear  NECK: Supple, No JVD  CHEST/LUNG: Clear to auscultation bilaterally; No wheeze  HEART: Regular rate and rhythm; No murmurs, rubs, or gallops  ABDOMEN: Soft, Nontender, Nondistended; Bowel sounds present  EXTREMITIES:  2+ Peripheral Pulses, No clubbing, cyanosis, or edema  PSYCH: AAOx3  NEUROLOGY: non-focal  SKIN: No rashes or lesions    LABS:                        11.1   11.88 )-----------( 307      ( 28 Feb 2020 05:26 )             33.8     02-28    139  |  105  |  6<L>  ----------------------------<  124<H>  3.1<L>   |  22  |  0.64    Ca    8.9      28 Feb 2020 05:26      PT/INR - ( 27 Feb 2020 06:55 )   PT: 11.4 sec;   INR: 0.99 ratio         PTT - ( 27 Feb 2020 06:55 )  PTT:31.0 sec          RADIOLOGY & ADDITIONAL TESTS:    Imaging Personally Reviewed:    Consultant(s) Notes Reviewed:      Care Discussed with Consultants/Other Providers:

## 2020-02-28 NOTE — PROGRESS NOTE ADULT - ASSESSMENT
ASSESSMENT: This is a 60yo Sinhala speaking Man with h/o prior CVA w/o residual deficits who presented with 3 weeks of generalized weakness and expressive aphasia. MRI: new acute right posterior frontal infarct, right precentral gyrus. Subacute infarction the right frontoparietal and posterior temporal region, w/ minimal petechial hemorrhagic transformation. Multifocal chronic bilateral MCA territory infarcts. Chronic hemorrhagic products in the region of the left basal ganglia. Persistent mild narrowing of the cavernous and supraclinoid left ICA. Persistent lack of flow related signal within the left M1 segment, with poor visualization of the more distal left MCA branches. Re-demonstration of multifocal mild to moderate stenoses of the right M1, with Normal flow-related enhancement of the more distal right MCA. No flow-limiting stenosis or evidence for arterial dissection. LUIS (2/13/20): normal LV function, no wall abnormality or evidence of PFO.      Impression: Expressive aphasia may be related to Right temporal infarcts, if this is his dominant hemisphere vs. apraxia of speech.  Strokes appear embolic in origin, possibly cardioembolic, although exact mechanism remains unknown/ ESUS (Embolic stroke of undetermined source) vs. symptomatic large artery atherosclerosis. Possible PAF, patient is s/p ILR. No current evidence of afib reported on tele.   Results of recent CT C/A/P as part of occult malignancy workup, completed  on 2/25/20 demonstrtated: Ill-defined low-attenuation along the cortex of the left kidney, of uncertain etiology. Infectious and neoplastic etiologies are considered. Possible etiology of strokes may be hypercoagulable state secondary to potential malignancy, Heme ONC following.    Recs:  Continue current treatment as per primary team ASSESSMENT: This is a 60yo Arabic speaking Man with h/o prior CVA w/o residual deficits who presented with 3 weeks of generalized weakness and expressive aphasia. MRI: new acute right posterior frontal infarct, right precentral gyrus. Subacute infarction the right frontoparietal and posterior temporal region, w/ minimal petechial hemorrhagic transformation. Multifocal chronic bilateral MCA territory infarcts. Chronic hemorrhagic products in the region of the left basal ganglia. Persistent mild narrowing of the cavernous and supraclinoid left ICA. Persistent lack of flow related signal within the left M1 segment, with poor visualization of the more distal left MCA branches. Re-demonstration of multifocal mild to moderate stenoses of the right M1, with Normal flow-related enhancement of the more distal right MCA. No flow-limiting stenosis or evidence for arterial dissection. LUIS (2/13/20): normal LV function, no wall abnormality or evidence of PFO.      Impression: Expressive aphasia may be related to Right temporal infarcts, if this is his dominant hemisphere vs. apraxia of speech.  Strokes appear embolic in origin, possibly cardioembolic, although exact mechanism remains unknown/ ESUS (Embolic stroke of undetermined source) vs. symptomatic large artery atherosclerosis. Possible PAF, patient is s/p ILR. No current evidence of afib reported on tele.   Results of recent CT C/A/P as part of occult malignancy workup, completed  on 2/25/20 demonstrtated: Ill-defined low-attenuation along the cortex of the left kidney, of uncertain etiology. Infectious and neoplastic etiologies are considered. Possible etiology of strokes may be hypercoagulable state secondary to potential malignancy, Heme ONC following.    Recs:  Continue current treatment plan

## 2020-02-28 NOTE — PROGRESS NOTE ADULT - ASSESSMENT
59M hx of CVA, here for stroke, new aphasia, PEG 2/21 c/b patient pulling it out same day, family wants everything done. Now s/p open gastrostomy 2/27.    - Neurology documented little chance of recovery of swallowing in the next 6 months, in need of long-term nutrition  - f/u am labs   - g-tube capped, f/u plan to start feeds.     [facility]  [id number] 59M hx of CVA, here for stroke, new aphasia, PEG 2/21 c/b patient pulling it out same day, family wants everything done. Now s/p open gastrostomy 2/27.    - Recovering appropriately  - Please keep G tube capped  - Please keep abdominal binding in place  - Hold off on feeding via G tube until 3/1/20  - Agree with 1:1 precautions and mittens   - f/u am labs   - Discussed with Dr. Please follow-up with Dr. Fletcher in the office in 10-14 days after surgery. Please call [phone] to make an appointment.    [facility]  [id number] 59M hx of CVA, here for stroke, new aphasia, PEG 2/21 c/b patient pulling it out same day, family wants everything done. Now s/p open gastrostomy 2/27.    - Recovering appropriately  - Please keep G tube capped  - Please keep abdominal binding in place  - Hold off on feeding via G tube until 3/1/20  - Agree with 1:1 precautions and mittens   - f/u am labs   - Discussed with Dr. Please follow-up with Dr. Fletcher in the office in 10-14 days after surgery. Please call [phone] to make an appointment.    [facility]  [id number]      Addendum  Patient was seen/examined by MIS/bariatric fellow. Agree with resident note.  Keep G tube capped until tube feeds started by general surgery team. Keep abdominal binder in place.  Please call general surgery team with any concerns about the gastrostomy tube.  Bolster should keep tube at 9cm at level of the skin at all times.  Jeanine Angeles MD 59M hx of CVA, here for stroke, new aphasia, PEG 2/21 c/b patient pulling it out same day, family wants everything done. Now s/p open gastrostomy 2/27.    - Recovering appropriately  - Please keep G tube capped  - Please keep abdominal binding in place  - Hold off on feeding via G tube until tomorrow  - can resume plavix tomorrow  - Agree with 1:1 precautions and mittens   - f/u am labs   - Discussed with Dr. Please follow-up with Dr. Fletcher in the office in 10-14 days after surgery. Please call [phone] to make an appointment.    [facility]  [id number]      Addendum  Patient was seen/examined by MIS/bariatric fellow. Agree with resident note.  Keep G tube capped until tube feeds started by general surgery team. Keep abdominal binder in place.  Please call general surgery team with any concerns about the gastrostomy tube.  Bolster should keep tube at 9cm at level of the skin at all times.  Jeanine Angeles MD

## 2020-02-28 NOTE — PROGRESS NOTE ADULT - ASSESSMENT
CVA  LUIS unremarkable  s/p ILR   on dapt    sinus bradycardia   likely CNS related   fu with EP   stable     Dysphagia  s/p feeding tube  fu with surgery     will sign off  please call back with any questions

## 2020-02-29 LAB
ANION GAP SERPL CALC-SCNC: 10 MMOL/L — SIGNIFICANT CHANGE UP (ref 5–17)
BUN SERPL-MCNC: 6 MG/DL — LOW (ref 7–23)
CALCIUM SERPL-MCNC: 8.9 MG/DL — SIGNIFICANT CHANGE UP (ref 8.4–10.5)
CHLORIDE SERPL-SCNC: 110 MMOL/L — HIGH (ref 96–108)
CO2 SERPL-SCNC: 21 MMOL/L — LOW (ref 22–31)
CREAT SERPL-MCNC: 0.61 MG/DL — SIGNIFICANT CHANGE UP (ref 0.5–1.3)
GLUCOSE BLDC GLUCOMTR-MCNC: 101 MG/DL — HIGH (ref 70–99)
GLUCOSE BLDC GLUCOMTR-MCNC: 112 MG/DL — HIGH (ref 70–99)
GLUCOSE BLDC GLUCOMTR-MCNC: 114 MG/DL — HIGH (ref 70–99)
GLUCOSE BLDC GLUCOMTR-MCNC: 122 MG/DL — HIGH (ref 70–99)
GLUCOSE BLDC GLUCOMTR-MCNC: 130 MG/DL — HIGH (ref 70–99)
GLUCOSE SERPL-MCNC: 121 MG/DL — HIGH (ref 70–99)
HCT VFR BLD CALC: 32.5 % — LOW (ref 39–50)
HGB BLD-MCNC: 10.5 G/DL — LOW (ref 13–17)
MAGNESIUM SERPL-MCNC: 2 MG/DL — SIGNIFICANT CHANGE UP (ref 1.6–2.6)
MCHC RBC-ENTMCNC: 32.3 GM/DL — SIGNIFICANT CHANGE UP (ref 32–36)
MCHC RBC-ENTMCNC: 32.7 PG — SIGNIFICANT CHANGE UP (ref 27–34)
MCV RBC AUTO: 101.2 FL — HIGH (ref 80–100)
NRBC # BLD: 0 /100 WBCS — SIGNIFICANT CHANGE UP (ref 0–0)
PLATELET # BLD AUTO: 291 K/UL — SIGNIFICANT CHANGE UP (ref 150–400)
POTASSIUM SERPL-MCNC: 3.7 MMOL/L — SIGNIFICANT CHANGE UP (ref 3.5–5.3)
POTASSIUM SERPL-SCNC: 3.7 MMOL/L — SIGNIFICANT CHANGE UP (ref 3.5–5.3)
RBC # BLD: 3.21 M/UL — LOW (ref 4.2–5.8)
RBC # FLD: 13.2 % — SIGNIFICANT CHANGE UP (ref 10.3–14.5)
SODIUM SERPL-SCNC: 141 MMOL/L — SIGNIFICANT CHANGE UP (ref 135–145)
WBC # BLD: 8.59 K/UL — SIGNIFICANT CHANGE UP (ref 3.8–10.5)
WBC # FLD AUTO: 8.59 K/UL — SIGNIFICANT CHANGE UP (ref 3.8–10.5)

## 2020-02-29 RX ORDER — CLOPIDOGREL BISULFATE 75 MG/1
75 TABLET, FILM COATED ORAL DAILY
Refills: 0 | Status: DISCONTINUED | OUTPATIENT
Start: 2020-02-29 | End: 2020-03-07

## 2020-02-29 RX ADMIN — CLOPIDOGREL BISULFATE 75 MILLIGRAM(S): 75 TABLET, FILM COATED ORAL at 14:05

## 2020-02-29 RX ADMIN — Medication 300 MILLIGRAM(S): at 11:19

## 2020-02-29 RX ADMIN — PREGABALIN 1000 MICROGRAM(S): 225 CAPSULE ORAL at 14:05

## 2020-02-29 RX ADMIN — MORPHINE SULFATE 2 MILLIGRAM(S): 50 CAPSULE, EXTENDED RELEASE ORAL at 05:08

## 2020-02-29 RX ADMIN — ENOXAPARIN SODIUM 40 MILLIGRAM(S): 100 INJECTION SUBCUTANEOUS at 11:19

## 2020-02-29 RX ADMIN — MORPHINE SULFATE 2 MILLIGRAM(S): 50 CAPSULE, EXTENDED RELEASE ORAL at 05:38

## 2020-02-29 RX ADMIN — Medication 15 MILLILITER(S): at 14:05

## 2020-02-29 RX ADMIN — ATORVASTATIN CALCIUM 80 MILLIGRAM(S): 80 TABLET, FILM COATED ORAL at 21:34

## 2020-02-29 NOTE — PROGRESS NOTE ADULT - ASSESSMENT
59Y French speaking M with prior CVA 8y ago (no reported deficits) brought in by brother for concerns of generalized weakness and inability to speak for the past 3 weeks. On exam found to have no verbal output, appeared withdrawn and weak on neurological evaluation  He follows commands intermittently and is easily agitated.  Exam limited due to patient aggression on admission  CTH demonstrates chronic L MCA infarct with severely stenotic vs occluded distal L M1.   this is confirmed on MRI but also an acute right post frontal infarct is seen. neuro aware, Cardiology aware, benign LUIS and s/p [employer] ILR placement 2/13, will need outptn F/U in 7-10 days. however 2/2 ongoing bradycardia EPS reconsulted. recommended to be on prn atropine or isoproternol/dobutamine infusion during the Procedure. PPM not recommended.   was on NGT feeds prior to PEG, plavix was held  prior to PEG( cleared by Neuro, cont ASA), s/p PEG placement 2/21, procedure went well, in the turner post procedure ptn pulled PEG out, remained NPO while awaiting open gastrostomy , today POD 2 , doing well, cleared to start feeds today , resume Plavix today  cleared by surgery to use gastrostomy, switch all meds via gastrostomy, start feeds  Neuro recommended malignancy work up 2/2 unexplained multiple embolic strokes. Oncology consult w Dr. Sanchez appreciated.   s/p CT C/A/P w  IV contrast only. no significant findings exc questionable renal mass, but sono neg and MRI confirms it being a subcapsular hematoma.  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 LUIS benign  -DVT ppx w sc Lovenox  -social service consult  -Dispo to acute rehab

## 2020-02-29 NOTE — PROGRESS NOTE ADULT - ASSESSMENT
59M hx of CVA, here for stroke, new aphasia, PEG 2/21 c/b patient pulling it out same day, family wants everything done. Now s/p open gastrostomy 2/27.    - Recovering appropriately  - Plan to start g-tube feeds today   - Ok to restart home plavix   - Please keep abdominal binding in place  - Agree with 1:1 precautions and mittens   - f/u am labs   - care per primary team     [facility]  [id number] 59M hx of CVA, here for stroke, new aphasia, PEG 2/21 c/b patient pulling it out same day, family wants everything done. Now s/p open gastrostomy 2/27.    - Recovering appropriately  - Plan to start g-tube feeds today: start at 10cc/hr and increase by 10cc/hr q6hrs to goal   - Ok to restart home plavix   - Please keep abdominal binding in place  - Agree with 1:1 precautions and mittens   - f/u am labs   - care per primary team     [facility]  [id number]

## 2020-02-29 NOTE — PROGRESS NOTE ADULT - SUBJECTIVE AND OBJECTIVE BOX
Patient is a 59y old  Male who presents with a chief complaint of mouth lesions, possible stroke (29 Feb 2020 10:58)      SUBJECTIVE / OVERNIGHT EVENTS: smiling, comfortable, awaiting gastrostomy Feeds    MEDICATIONS  (STANDING):  aspirin Suppository 300 milliGRAM(s) Rectal daily  atorvastatin 80 milliGRAM(s) Oral at bedtime  clopidogrel Tablet 75 milliGRAM(s) Oral daily  cyanocobalamin 1000 MICROGram(s) Oral daily  dextrose 5% + sodium chloride 0.45%. 1000 milliLiter(s) (70 mL/Hr) IV Continuous <Continuous>  dextrose 5%. 1000 milliLiter(s) (50 mL/Hr) IV Continuous <Continuous>  dextrose 50% Injectable 12.5 Gram(s) IV Push once  dextrose 50% Injectable 25 Gram(s) IV Push once  dextrose 50% Injectable 25 Gram(s) IV Push once  enoxaparin Injectable 40 milliGRAM(s) SubCutaneous daily  ergocalciferol 39886 Unit(s) Oral every week  multivitamin/minerals/iron Oral Solution (CENTRUM) 15 milliLiter(s) Enteral Tube daily    MEDICATIONS  (PRN):  bisacodyl Suppository 10 milliGRAM(s) Rectal daily PRN Constipation  dextrose 40% Gel 15 Gram(s) Oral once PRN Blood Glucose LESS THAN 70 milliGRAM(s)/deciliter  famotidine Injectable 20 milliGRAM(s) IV Push every 12 hours PRN Dyspepsia  glucagon  Injectable 1 milliGRAM(s) IntraMuscular once PRN Glucose LESS THAN 70 milligrams/deciliter  morphine  - Injectable 2 milliGRAM(s) IV Push every 4 hours PRN Severe Pain (7 - 10)  ondansetron Injectable 4 milliGRAM(s) IV Push every 6 hours PRN Nausea      Vital Signs Last 24 Hrs  T(F): 97.3 (02-29-20 @ 14:37), Max: 98.3 (02-29-20 @ 05:03)  HR: 52 (02-29-20 @ 14:37) (52 - 71)  BP: 119/74 (02-29-20 @ 14:37) (119/74 - 133/86)  RR: 18 (02-29-20 @ 14:37) (16 - 18)  SpO2: 97% (02-29-20 @ 14:37) (97% - 100%)  Telemetry:   CAPILLARY BLOOD GLUCOSE      POCT Blood Glucose.: 122 mg/dL (29 Feb 2020 11:48)  POCT Blood Glucose.: 112 mg/dL (29 Feb 2020 06:05)  POCT Blood Glucose.: 130 mg/dL (29 Feb 2020 00:13)  POCT Blood Glucose.: 119 mg/dL (28 Feb 2020 18:39)    I&O's Summary    28 Feb 2020 07:01  -  29 Feb 2020 07:00  --------------------------------------------------------  IN: 1900 mL / OUT: 1050 mL / NET: 850 mL        PHYSICAL EXAM:  GENERAL: NAD, well-developed  HEAD:  Atraumatic, Normocephalic  EYES: EOMI, PERRLA, conjunctiva and sclera clear  NECK: Supple, No JVD  CHEST/LUNG: Clear to auscultation bilaterally; No wheeze  HEART: Regular rate and rhythm; No murmurs, rubs, or gallops  ABDOMEN: Soft, Nontender, Nondistended; Bowel sounds present  EXTREMITIES:  2+ Peripheral Pulses, No clubbing, cyanosis, or edema  PSYCH: AAOx3  NEUROLOGY: non-focal  SKIN: No rashes or lesions    LABS:                        10.5   8.59  )-----------( 291      ( 29 Feb 2020 05:55 )             32.5     02-29    141  |  110<H>  |  6<L>  ----------------------------<  121<H>  3.7   |  21<L>  |  0.61    Ca    8.9      29 Feb 2020 05:55  Mg     2.0     02-29                RADIOLOGY & ADDITIONAL TESTS:    Imaging Personally Reviewed:    Consultant(s) Notes Reviewed:      Care Discussed with Consultants/Other Providers:

## 2020-02-29 NOTE — PROGRESS NOTE ADULT - SUBJECTIVE AND OBJECTIVE BOX
INTERVAL HPI/OVERNIGHT EVENTS:    MEDICATIONS  (STANDING):  aspirin Suppository 300 milliGRAM(s) Rectal daily  atorvastatin 80 milliGRAM(s) Oral at bedtime  cyanocobalamin 1000 MICROGram(s) Oral daily  dextrose 5% + sodium chloride 0.45%. 1000 milliLiter(s) (70 mL/Hr) IV Continuous <Continuous>  dextrose 5%. 1000 milliLiter(s) (50 mL/Hr) IV Continuous <Continuous>  dextrose 50% Injectable 12.5 Gram(s) IV Push once  dextrose 50% Injectable 25 Gram(s) IV Push once  dextrose 50% Injectable 25 Gram(s) IV Push once  enoxaparin Injectable 40 milliGRAM(s) SubCutaneous daily  ergocalciferol 18481 Unit(s) Oral every week  multivitamin/minerals/iron Oral Solution (CENTRUM) 15 milliLiter(s) Enteral Tube daily    MEDICATIONS  (PRN):  bisacodyl Suppository 10 milliGRAM(s) Rectal daily PRN Constipation  dextrose 40% Gel 15 Gram(s) Oral once PRN Blood Glucose LESS THAN 70 milliGRAM(s)/deciliter  famotidine Injectable 20 milliGRAM(s) IV Push every 12 hours PRN Dyspepsia  glucagon  Injectable 1 milliGRAM(s) IntraMuscular once PRN Glucose LESS THAN 70 milligrams/deciliter  morphine  - Injectable 2 milliGRAM(s) IV Push every 4 hours PRN Severe Pain (7 - 10)  ondansetron Injectable 4 milliGRAM(s) IV Push every 6 hours PRN Nausea      Allergies    No Known Allergies    Intolerances            PHYSICAL EXAM:   Vital Signs:  Vital Signs Last 24 Hrs  T(C): 36.8 (29 Feb 2020 05:03), Max: 36.8 (29 Feb 2020 05:03)  T(F): 98.3 (29 Feb 2020 05:03), Max: 98.3 (29 Feb 2020 05:03)  HR: 59 (29 Feb 2020 08:05) (52 - 71)  BP: 125/76 (29 Feb 2020 08:05) (124/78 - 133/86)  BP(mean): --  RR: 17 (29 Feb 2020 05:03) (16 - 17)  SpO2: 99% (29 Feb 2020 05:03) (98% - 99%)  Daily     Daily     GENERAL:  no distress  HEENT:  NC/AT,  anicteric  CHEST:   no increased effort, breath sounds clear  HEART:  Regular rhythm  ABDOMEN:  Soft, non-tender, non-distended, normoactive bowel sounds,  no masses ,no hepato-splenomegaly, no signs of chronic liver disease  EXTEREMITIES:  no cyanosis      LABS:                        10.5   8.59  )-----------( 291      ( 29 Feb 2020 05:55 )             32.5     02-29    141  |  110<H>  |  6<L>  ----------------------------<  121<H>  3.7   |  21<L>  |  0.61    Ca    8.9      29 Feb 2020 05:55  Mg     2.0     02-29            RADIOLOGY & ADDITIONAL TESTS:

## 2020-02-29 NOTE — PROGRESS NOTE ADULT - SUBJECTIVE AND OBJECTIVE BOX
pt seen, resting      MEDICATIONS  (STANDING):  aspirin Suppository 300 milliGRAM(s) Rectal daily  atorvastatin 80 milliGRAM(s) Oral at bedtime  clopidogrel Tablet 75 milliGRAM(s) Oral daily  cyanocobalamin 1000 MICROGram(s) Oral daily  dextrose 5% + sodium chloride 0.45%. 1000 milliLiter(s) (70 mL/Hr) IV Continuous <Continuous>  dextrose 5%. 1000 milliLiter(s) (50 mL/Hr) IV Continuous <Continuous>  dextrose 50% Injectable 12.5 Gram(s) IV Push once  dextrose 50% Injectable 25 Gram(s) IV Push once  dextrose 50% Injectable 25 Gram(s) IV Push once  enoxaparin Injectable 40 milliGRAM(s) SubCutaneous daily  ergocalciferol 35960 Unit(s) Oral every week  multivitamin/minerals/iron Oral Solution (CENTRUM) 15 milliLiter(s) Enteral Tube daily    MEDICATIONS  (PRN):  bisacodyl Suppository 10 milliGRAM(s) Rectal daily PRN Constipation  dextrose 40% Gel 15 Gram(s) Oral once PRN Blood Glucose LESS THAN 70 milliGRAM(s)/deciliter  famotidine Injectable 20 milliGRAM(s) IV Push every 12 hours PRN Dyspepsia  glucagon  Injectable 1 milliGRAM(s) IntraMuscular once PRN Glucose LESS THAN 70 milligrams/deciliter  morphine  - Injectable 2 milliGRAM(s) IV Push every 4 hours PRN Severe Pain (7 - 10)  ondansetron Injectable 4 milliGRAM(s) IV Push every 6 hours PRN Nausea      ROS  No fever, sweats, chills  No epistaxis, HA, sore throat  No CP, SOB, cough, sputum  No n/v/d, abd pain, melena, hematochezia  No edema  No rash  No anxiety  No back pain, joint pain  No bleeding, bruising  No dysuria, hematuria    Vital Signs Last 24 Hrs  T(C): 36.3 (29 Feb 2020 14:37), Max: 36.8 (29 Feb 2020 05:03)  T(F): 97.3 (29 Feb 2020 14:37), Max: 98.3 (29 Feb 2020 05:03)  HR: 52 (29 Feb 2020 14:37) (52 - 71)  BP: 119/74 (29 Feb 2020 14:37) (119/74 - 133/86)  BP(mean): --  RR: 18 (29 Feb 2020 14:37) (16 - 18)  SpO2: 97% (29 Feb 2020 14:37) (97% - 100%)    PE  NAD  Awake, alert  Anicteric, MMM  RRR  CTAB  Abd soft, NT, ND  No c/c/e  No rash grossly  FROM                          10.5   8.59  )-----------( 291      ( 29 Feb 2020 05:55 )             32.5       02-29    141  |  110<H>  |  6<L>  ----------------------------<  121<H>  3.7   |  21<L>  |  0.61    Ca    8.9      29 Feb 2020 05:55  Mg     2.0     02-29

## 2020-02-29 NOTE — PROGRESS NOTE ADULT - SUBJECTIVE AND OBJECTIVE BOX
SURGERY DAILY PROGRESS NOTE:     SUBJECTIVE/24hr Events:     Patient seen and examined on am rounds. Yesterday balloon deflated on g-tube. No acute events overnight. This am no complaints, limited by pt ams. VSS -persistently bryan.       OBJECTIVE:    MEDICATIONS  (STANDING):  aspirin Suppository 300 milliGRAM(s) Rectal daily  atorvastatin 80 milliGRAM(s) Oral at bedtime  cyanocobalamin 1000 MICROGram(s) Oral daily  dextrose 5% + sodium chloride 0.45%. 1000 milliLiter(s) (70 mL/Hr) IV Continuous <Continuous>  dextrose 5%. 1000 milliLiter(s) (50 mL/Hr) IV Continuous <Continuous>  dextrose 50% Injectable 12.5 Gram(s) IV Push once  dextrose 50% Injectable 25 Gram(s) IV Push once  dextrose 50% Injectable 25 Gram(s) IV Push once  enoxaparin Injectable 40 milliGRAM(s) SubCutaneous daily  ergocalciferol 66593 Unit(s) Oral every week  multivitamin/minerals/iron Oral Solution (CENTRUM) 15 milliLiter(s) Enteral Tube daily    MEDICATIONS  (PRN):  bisacodyl Suppository 10 milliGRAM(s) Rectal daily PRN Constipation  dextrose 40% Gel 15 Gram(s) Oral once PRN Blood Glucose LESS THAN 70 milliGRAM(s)/deciliter  famotidine Injectable 20 milliGRAM(s) IV Push every 12 hours PRN Dyspepsia  glucagon  Injectable 1 milliGRAM(s) IntraMuscular once PRN Glucose LESS THAN 70 milligrams/deciliter  morphine  - Injectable 2 milliGRAM(s) IV Push every 4 hours PRN Severe Pain (7 - 10)  ondansetron Injectable 4 milliGRAM(s) IV Push every 6 hours PRN Nausea      Vital Signs Last 24 Hrs  T(C): 36.8 (29 Feb 2020 05:03), Max: 36.8 (29 Feb 2020 05:03)  T(F): 98.3 (29 Feb 2020 05:03), Max: 98.3 (29 Feb 2020 05:03)  HR: 53 (29 Feb 2020 05:03) (51 - 71)  BP: 128/81 (29 Feb 2020 05:03) (124/78 - 136/81)  BP(mean): --  RR: 17 (29 Feb 2020 05:03) (16 - 17)  SpO2: 99% (29 Feb 2020 05:03) (98% - 99%)      I&O's Detail    27 Feb 2020 07:01  -  28 Feb 2020 07:00  --------------------------------------------------------  IN:    dextrose 5% + sodium chloride 0.45%.: 840 mL  Total IN: 840 mL    OUT:    Voided: 750 mL  Total OUT: 750 mL    Total NET: 90 mL      28 Feb 2020 07:01  -  29 Feb 2020 06:38  --------------------------------------------------------  IN:    dextrose 5% + sodium chloride 0.45%.: 560 mL    Solution: 500 mL  Total IN: 1060 mL    OUT:    Voided: 950 mL  Total OUT: 950 mL    Total NET: 110 mL            LABS:                        10.5   8.59  )-----------( 291      ( 29 Feb 2020 05:55 )             32.5     02-29    141  |  110<H>  |  6<L>  ----------------------------<  121<H>  3.7   |  21<L>  |  0.61    Ca    8.9      29 Feb 2020 05:55  Mg     2.0     02-29      PT/INR - ( 27 Feb 2020 06:55 )   PT: 11.4 sec;   INR: 0.99 ratio         PTT - ( 27 Feb 2020 06:55 )  PTT:31.0 sec              PHYSICAL EXAM:  Constitutional: well developed, well nourished, NAD  ENMT: normal facies, symmetric  Respiratory: Normal respiratory effort   Abdomen: soft, NTND. No rebound or guarding. G tube in place with no surrounding swelling, erythema or discharge.

## 2020-02-29 NOTE — PROGRESS NOTE ADULT - ASSESSMENT
· Assessment		  58 yo male with multiple recurrent CVA's.  On dual antiplatelet therapy.  Unclear whether age appropriate cancer screening has been performed.    Recurrent CVA  -CT c/a/p showed ill defined L renal lesion. Sonogram negative   -significant smoking and ETOH use in his past.   -colonoscopy as outpatient if pt not up to date with colonoscopy for colon ca screen  -PSA neg    Macrocytosis w/o anemia  -B12 adequate  -folate decreased, now on folic acid daily    CVA -- per neuro      Ernesto Luu MD  [facility]  Cell: [phone]

## 2020-03-01 ENCOUNTER — OUTPATIENT (OUTPATIENT)
Dept: OUTPATIENT SERVICES | Facility: HOSPITAL | Age: 60
LOS: 1 days | End: 2020-03-01

## 2020-03-01 LAB
ANION GAP SERPL CALC-SCNC: 11 MMOL/L — SIGNIFICANT CHANGE UP (ref 5–17)
BUN SERPL-MCNC: 5 MG/DL — LOW (ref 7–23)
CALCIUM SERPL-MCNC: 9 MG/DL — SIGNIFICANT CHANGE UP (ref 8.4–10.5)
CHLORIDE SERPL-SCNC: 110 MMOL/L — HIGH (ref 96–108)
CO2 SERPL-SCNC: 23 MMOL/L — SIGNIFICANT CHANGE UP (ref 22–31)
CREAT SERPL-MCNC: 0.6 MG/DL — SIGNIFICANT CHANGE UP (ref 0.5–1.3)
GLUCOSE BLDC GLUCOMTR-MCNC: 106 MG/DL — HIGH (ref 70–99)
GLUCOSE BLDC GLUCOMTR-MCNC: 129 MG/DL — HIGH (ref 70–99)
GLUCOSE BLDC GLUCOMTR-MCNC: 135 MG/DL — HIGH (ref 70–99)
GLUCOSE SERPL-MCNC: 169 MG/DL — HIGH (ref 70–99)
HCT VFR BLD CALC: 33.6 % — LOW (ref 39–50)
HGB BLD-MCNC: 11.1 G/DL — LOW (ref 13–17)
MCHC RBC-ENTMCNC: 32.8 PG — SIGNIFICANT CHANGE UP (ref 27–34)
MCHC RBC-ENTMCNC: 33 GM/DL — SIGNIFICANT CHANGE UP (ref 32–36)
MCV RBC AUTO: 99.4 FL — SIGNIFICANT CHANGE UP (ref 80–100)
NRBC # BLD: 0 /100 WBCS — SIGNIFICANT CHANGE UP (ref 0–0)
PLATELET # BLD AUTO: 314 K/UL — SIGNIFICANT CHANGE UP (ref 150–400)
POTASSIUM SERPL-MCNC: 3.5 MMOL/L — SIGNIFICANT CHANGE UP (ref 3.5–5.3)
POTASSIUM SERPL-SCNC: 3.5 MMOL/L — SIGNIFICANT CHANGE UP (ref 3.5–5.3)
RBC # BLD: 3.38 M/UL — LOW (ref 4.2–5.8)
RBC # FLD: 13.4 % — SIGNIFICANT CHANGE UP (ref 10.3–14.5)
SODIUM SERPL-SCNC: 144 MMOL/L — SIGNIFICANT CHANGE UP (ref 135–145)
WBC # BLD: 8.8 K/UL — SIGNIFICANT CHANGE UP (ref 3.8–10.5)
WBC # FLD AUTO: 8.8 K/UL — SIGNIFICANT CHANGE UP (ref 3.8–10.5)

## 2020-03-01 PROCEDURE — 93010 ELECTROCARDIOGRAM REPORT: CPT

## 2020-03-01 RX ADMIN — Medication 15 MILLILITER(S): at 12:54

## 2020-03-01 RX ADMIN — CLOPIDOGREL BISULFATE 75 MILLIGRAM(S): 75 TABLET, FILM COATED ORAL at 12:54

## 2020-03-01 RX ADMIN — PREGABALIN 1000 MICROGRAM(S): 225 CAPSULE ORAL at 12:55

## 2020-03-01 RX ADMIN — ENOXAPARIN SODIUM 40 MILLIGRAM(S): 100 INJECTION SUBCUTANEOUS at 12:55

## 2020-03-01 RX ADMIN — Medication 300 MILLIGRAM(S): at 12:55

## 2020-03-01 NOTE — PROGRESS NOTE ADULT - SUBJECTIVE AND OBJECTIVE BOX
SURGERY DAILY PROGRESS NOTE:     SUBJECTIVE/24hr Events:     Patient seen and examined on am rounds. Yesterday resumed plavix and started feeds via g-tube. Feeds well tolerated, no acute events overnight. This am pt with feeds at 30cc/hr, increasing by 10cc q6hrs. Pt with no complaints, limited by ams.      OBJECTIVE:    MEDICATIONS  (STANDING):  aspirin Suppository 300 milliGRAM(s) Rectal daily  atorvastatin 80 milliGRAM(s) Oral at bedtime  clopidogrel Tablet 75 milliGRAM(s) Oral daily  cyanocobalamin 1000 MICROGram(s) Oral daily  dextrose 5% + sodium chloride 0.45%. 1000 milliLiter(s) (70 mL/Hr) IV Continuous <Continuous>  dextrose 5%. 1000 milliLiter(s) (50 mL/Hr) IV Continuous <Continuous>  dextrose 50% Injectable 12.5 Gram(s) IV Push once  dextrose 50% Injectable 25 Gram(s) IV Push once  dextrose 50% Injectable 25 Gram(s) IV Push once  enoxaparin Injectable 40 milliGRAM(s) SubCutaneous daily  ergocalciferol 13455 Unit(s) Oral every week  multivitamin/minerals/iron Oral Solution (CENTRUM) 15 milliLiter(s) Enteral Tube daily    MEDICATIONS  (PRN):  bisacodyl Suppository 10 milliGRAM(s) Rectal daily PRN Constipation  dextrose 40% Gel 15 Gram(s) Oral once PRN Blood Glucose LESS THAN 70 milliGRAM(s)/deciliter  famotidine Injectable 20 milliGRAM(s) IV Push every 12 hours PRN Dyspepsia  glucagon  Injectable 1 milliGRAM(s) IntraMuscular once PRN Glucose LESS THAN 70 milligrams/deciliter  morphine  - Injectable 2 milliGRAM(s) IV Push every 4 hours PRN Severe Pain (7 - 10)  ondansetron Injectable 4 milliGRAM(s) IV Push every 6 hours PRN Nausea      Vital Signs Last 24 Hrs  T(C): 36.6 (29 Feb 2020 19:45), Max: 36.8 (29 Feb 2020 05:03)  T(F): 97.8 (29 Feb 2020 19:45), Max: 98.3 (29 Feb 2020 05:03)  HR: 46 (29 Feb 2020 19:45) (43 - 59)  BP: 122/72 (29 Feb 2020 19:45) (119/74 - 129/75)  BP(mean): --  RR: 18 (29 Feb 2020 19:45) (17 - 18)  SpO2: 109% (29 Feb 2020 19:45) (97% - 109%)      I&O's Detail    28 Feb 2020 07:01  -  29 Feb 2020 07:00  --------------------------------------------------------  IN:    dextrose 5% + sodium chloride 0.45%.: 1400 mL    Solution: 500 mL  Total IN: 1900 mL    OUT:    Voided: 1050 mL  Total OUT: 1050 mL    Total NET: 850 mL      29 Feb 2020 07:01  -  01 Mar 2020 03:38  --------------------------------------------------------  IN:  Total IN: 0 mL    OUT:  Total OUT: 0 mL    Total NET: 0 mL            Daily     Daily           LABS:                        10.5   8.59  )-----------( 291      ( 29 Feb 2020 05:55 )             32.5     02-29    141  |  110<H>  |  6<L>  ----------------------------<  121<H>  3.7   |  21<L>  |  0.61    Ca    8.9      29 Feb 2020 05:55  Mg     2.0     02-29          PHYSICAL EXAM:  Constitutional: well developed, well nourished, NAD  ENMT: normal facies, symmetric  Respiratory: Normal respiratory effort   Abdomen: soft, NTND. No rebound or guarding. G tube in place with no surrounding swelling, erythema or discharge.

## 2020-03-01 NOTE — PROGRESS NOTE ADULT - ASSESSMENT
59Y French speaking M with prior CVA 8y ago (no reported deficits) brought in by brother for concerns of generalized weakness and inability to speak for the past 3 weeks. On exam found to have no verbal output, appeared withdrawn and weak on neurological evaluation  He follows commands intermittently and is easily agitated.  Exam limited due to patient aggression on admission  CTH demonstrates chronic L MCA infarct with severely stenotic vs occluded distal L M1.   this is confirmed on MRI but also an acute right post frontal infarct is seen. neuro aware, Cardiology aware, benign LUIS and s/p Medtronic ILR placement 2/13, will need outptn F/U in 7-10 days. however 2/2 ongoing bradycardia EPS reconsulted. recommended to be on prn atropine or isoproternol/dobutamine infusion during the Procedure. PPM not recommended.   was on NGT feeds prior to PEG, plavix was held  prior to PEG( cleared by Neuro, cont ASA), s/p PEG placement 2/21, procedure went well, in the turner post procedure ptn pulled PEG out, remained NPO while awaiting open gastrostomy , today POD 3   tolerating feeds and all meds including PLAVIX via gastrostomy  Neuro recommended malignancy work up 2/2 unexplained multiple embolic strokes. Oncology consult w Dr. Sanchez appreciated.   s/p CT C/A/P w  IV contrast only. no significant findings exc questionable renal mass, but sono neg and MRI confirms it being a subcapsular hematoma.  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 LUIS benign  -DVT ppx w sc Lovenox  -social service consult  -Dispo to acute rehab

## 2020-03-01 NOTE — PROGRESS NOTE ADULT - ASSESSMENT
59M hx of CVA, here for stroke, new aphasia, PEG 2/21 c/b patient pulling it out same day, family wants everything done. Now s/p open gastrostomy 2/27.    - Recovering appropriately  - C/t  increase feeds by 10cc/hr q6hrs to goal, as tolerated   - Please keep abdominal binding in place   - f/u am labs   - care per primary team     [facility]  [id number]

## 2020-03-01 NOTE — CHART NOTE - NSCHARTNOTEFT_GEN_A_CORE
Called by RN to report Pt had a run of PAT with HR up to 130s for 3.3 seconds on tele. Seen and examined at the bedside. Patient is unable to communicate. No acute distress at this time. EKG showed Sinus bradycardia with HR 43bpm. Sinus bradycardia on tele now. will continue to monitor. RN reported peg tube is leaking. Feeding on hold for now surgery paged. Awaiting for return call.    Vital Signs Last 24 Hrs  T(C): 36.4 (01 Mar 2020 20:37), Max: 36.8 (01 Mar 2020 11:23)  T(F): 97.6 (01 Mar 2020 20:37), Max: 98.2 (01 Mar 2020 11:23)  HR: 49 (01 Mar 2020 21:06) (48 - 54)  BP: 120/66 (01 Mar 2020 21:06) (104/66 - 149/80)  BP(mean): --  RR: 18 (01 Mar 2020 21:06) (16 - 18)  SpO2: 97% (01 Mar 2020 21:06) (97% - 99%)                          11.1   8.80  )-----------( 314      ( 01 Mar 2020 07:28 )             33.6       03-01    144  |  110<H>  |  5<L>  ----------------------------<  169<H>  3.5   |  23  |  0.60    Ca    9.0      01 Mar 2020 07:28  Mg     2.0     02-29

## 2020-03-01 NOTE — PROGRESS NOTE ADULT - SUBJECTIVE AND OBJECTIVE BOX
Patient is a 59y old  Male who presents with a chief complaint of mouth lesions, possible stroke (01 Mar 2020 13:21)      SUBJECTIVE / OVERNIGHT EVENTS: tolerating feeds via gastrostomy, no diarrhea, no vomiting, no abd distention, no abd pain    MEDICATIONS  (STANDING):  aspirin Suppository 300 milliGRAM(s) Rectal daily  atorvastatin 80 milliGRAM(s) Oral at bedtime  clopidogrel Tablet 75 milliGRAM(s) Oral daily  cyanocobalamin 1000 MICROGram(s) Oral daily  dextrose 5% + sodium chloride 0.45%. 1000 milliLiter(s) (70 mL/Hr) IV Continuous <Continuous>  dextrose 5%. 1000 milliLiter(s) (50 mL/Hr) IV Continuous <Continuous>  dextrose 50% Injectable 12.5 Gram(s) IV Push once  dextrose 50% Injectable 25 Gram(s) IV Push once  dextrose 50% Injectable 25 Gram(s) IV Push once  enoxaparin Injectable 40 milliGRAM(s) SubCutaneous daily  ergocalciferol 49902 Unit(s) Oral every week  multivitamin/minerals/iron Oral Solution (CENTRUM) 15 milliLiter(s) Enteral Tube daily    MEDICATIONS  (PRN):  bisacodyl Suppository 10 milliGRAM(s) Rectal daily PRN Constipation  dextrose 40% Gel 15 Gram(s) Oral once PRN Blood Glucose LESS THAN 70 milliGRAM(s)/deciliter  famotidine Injectable 20 milliGRAM(s) IV Push every 12 hours PRN Dyspepsia  glucagon  Injectable 1 milliGRAM(s) IntraMuscular once PRN Glucose LESS THAN 70 milligrams/deciliter  morphine  - Injectable 2 milliGRAM(s) IV Push every 4 hours PRN Severe Pain (7 - 10)  ondansetron Injectable 4 milliGRAM(s) IV Push every 6 hours PRN Nausea      Vital Signs Last 24 Hrs  T(F): 98.2 (03-01-20 @ 11:23), Max: 98.2 (03-01-20 @ 11:23)  HR: 48 (03-01-20 @ 11:23) (43 - 54)  BP: 104/66 (03-01-20 @ 11:23) (104/66 - 149/80)  RR: 18 (03-01-20 @ 11:23) (18 - 18)  SpO2: 99% (03-01-20 @ 11:23) (97% - 109%)  Telemetry:   CAPILLARY BLOOD GLUCOSE      POCT Blood Glucose.: 106 mg/dL (01 Mar 2020 13:05)  POCT Blood Glucose.: 135 mg/dL (01 Mar 2020 06:24)  POCT Blood Glucose.: 114 mg/dL (29 Feb 2020 23:51)  POCT Blood Glucose.: 101 mg/dL (29 Feb 2020 18:00)    I&O's Summary    29 Feb 2020 07:01  -  01 Mar 2020 07:00  --------------------------------------------------------  IN: 1120 mL / OUT: 0 mL / NET: 1120 mL        PHYSICAL EXAM:  GENERAL: NAD, well-developed  HEAD:  Atraumatic, Normocephalic  EYES: EOMI, PERRLA, conjunctiva and sclera clear  NECK: Supple, No JVD  CHEST/LUNG: Clear to auscultation bilaterally; No wheeze  HEART: Regular rate and rhythm; No murmurs, rubs, or gallops  ABDOMEN: Soft, Nontender, Nondistended; Bowel sounds present  EXTREMITIES:  2+ Peripheral Pulses, No clubbing, cyanosis, or edema  PSYCH: AAOx3  NEUROLOGY: non-focal  SKIN: No rashes or lesions    LABS:                        11.1   8.80  )-----------( 314      ( 01 Mar 2020 07:28 )             33.6     03-01    144  |  110<H>  |  5<L>  ----------------------------<  169<H>  3.5   |  23  |  0.60    Ca    9.0      01 Mar 2020 07:28  Mg     2.0     02-29                RADIOLOGY & ADDITIONAL TESTS:    Imaging Personally Reviewed:    Consultant(s) Notes Reviewed:      Care Discussed with Consultants/Other Providers:

## 2020-03-01 NOTE — PROGRESS NOTE ADULT - SUBJECTIVE AND OBJECTIVE BOX
Patient seen, sleeping      MEDICATIONS  (STANDING):  aspirin Suppository 300 milliGRAM(s) Rectal daily  atorvastatin 80 milliGRAM(s) Oral at bedtime  clopidogrel Tablet 75 milliGRAM(s) Oral daily  cyanocobalamin 1000 MICROGram(s) Oral daily  dextrose 5% + sodium chloride 0.45%. 1000 milliLiter(s) (70 mL/Hr) IV Continuous <Continuous>  dextrose 5%. 1000 milliLiter(s) (50 mL/Hr) IV Continuous <Continuous>  dextrose 50% Injectable 12.5 Gram(s) IV Push once  dextrose 50% Injectable 25 Gram(s) IV Push once  dextrose 50% Injectable 25 Gram(s) IV Push once  enoxaparin Injectable 40 milliGRAM(s) SubCutaneous daily  ergocalciferol 61070 Unit(s) Oral every week  multivitamin/minerals/iron Oral Solution (CENTRUM) 15 milliLiter(s) Enteral Tube daily    MEDICATIONS  (PRN):  bisacodyl Suppository 10 milliGRAM(s) Rectal daily PRN Constipation  dextrose 40% Gel 15 Gram(s) Oral once PRN Blood Glucose LESS THAN 70 milliGRAM(s)/deciliter  famotidine Injectable 20 milliGRAM(s) IV Push every 12 hours PRN Dyspepsia  glucagon  Injectable 1 milliGRAM(s) IntraMuscular once PRN Glucose LESS THAN 70 milligrams/deciliter  morphine  - Injectable 2 milliGRAM(s) IV Push every 4 hours PRN Severe Pain (7 - 10)  ondansetron Injectable 4 milliGRAM(s) IV Push every 6 hours PRN Nausea      ROS  No fever, sweats, chills  No epistaxis, HA, sore throat  No CP, SOB, cough, sputum  No n/v/d, abd pain, melena, hematochezia  No edema  No rash  No anxiety  No back pain, joint pain  No bleeding, bruising  No dysuria, hematuria    Vital Signs Last 24 Hrs  T(C): 36.8 (01 Mar 2020 11:23), Max: 36.8 (01 Mar 2020 11:23)  T(F): 98.2 (01 Mar 2020 11:23), Max: 98.2 (01 Mar 2020 11:23)  HR: 48 (01 Mar 2020 11:23) (43 - 54)  BP: 104/66 (01 Mar 2020 11:23) (104/66 - 149/80)  BP(mean): --  RR: 18 (01 Mar 2020 11:23) (18 - 18)  SpO2: 99% (01 Mar 2020 11:23) (97% - 109%)    PE  NAD  Awake, alert  Anicteric, MMM  RRR  CTAB  Abd soft, NT, ND  No c/c/e  No rash grossly  FROM                          11.1   8.80  )-----------( 314      ( 01 Mar 2020 07:28 )             33.6       03-01    144  |  110<H>  |  5<L>  ----------------------------<  169<H>  3.5   |  23  |  0.60    Ca    9.0      01 Mar 2020 07:28  Mg     2.0     02-29

## 2020-03-01 NOTE — PROGRESS NOTE ADULT - ASSESSMENT
· Assessment		  58 yo male with multiple recurrent CVA's.  On dual antiplatelet therapy.  Unclear whether age appropriate cancer screening has been performed.    Recurrent CVA  -CT c/a/p showed ill defined L renal lesion. Sonogram negative   -significant smoking and ETOH use in his past.   -colonoscopy as outpatient if pt not up to date with colonoscopy for colon ca screen  -PSA neg    Macrocytosis w/o anemia  -B12 adequate  -folate decreased, now on folic acid daily    CVA -- per neuro    Decreased PO intake  -s/p gastrostomy 2/27, mgmt per GI, surgery      Ernesto Luu MD  [facility]  Cell: [phone]

## 2020-03-01 NOTE — PROGRESS NOTE ADULT - SUBJECTIVE AND OBJECTIVE BOX
INTERVAL HPI/OVERNIGHT EVENTS:    MEDICATIONS  (STANDING):  aspirin Suppository 300 milliGRAM(s) Rectal daily  atorvastatin 80 milliGRAM(s) Oral at bedtime  clopidogrel Tablet 75 milliGRAM(s) Oral daily  cyanocobalamin 1000 MICROGram(s) Oral daily  dextrose 5% + sodium chloride 0.45%. 1000 milliLiter(s) (70 mL/Hr) IV Continuous <Continuous>  dextrose 5%. 1000 milliLiter(s) (50 mL/Hr) IV Continuous <Continuous>  dextrose 50% Injectable 12.5 Gram(s) IV Push once  dextrose 50% Injectable 25 Gram(s) IV Push once  dextrose 50% Injectable 25 Gram(s) IV Push once  enoxaparin Injectable 40 milliGRAM(s) SubCutaneous daily  ergocalciferol 57650 Unit(s) Oral every week  multivitamin/minerals/iron Oral Solution (CENTRUM) 15 milliLiter(s) Enteral Tube daily    MEDICATIONS  (PRN):  bisacodyl Suppository 10 milliGRAM(s) Rectal daily PRN Constipation  dextrose 40% Gel 15 Gram(s) Oral once PRN Blood Glucose LESS THAN 70 milliGRAM(s)/deciliter  famotidine Injectable 20 milliGRAM(s) IV Push every 12 hours PRN Dyspepsia  glucagon  Injectable 1 milliGRAM(s) IntraMuscular once PRN Glucose LESS THAN 70 milligrams/deciliter  morphine  - Injectable 2 milliGRAM(s) IV Push every 4 hours PRN Severe Pain (7 - 10)  ondansetron Injectable 4 milliGRAM(s) IV Push every 6 hours PRN Nausea      Allergies    No Known Allergies    Intolerances            PHYSICAL EXAM:   Vital Signs:  Vital Signs Last 24 Hrs  T(C): 36.8 (01 Mar 2020 11:23), Max: 36.8 (01 Mar 2020 11:23)  T(F): 98.2 (01 Mar 2020 11:23), Max: 98.2 (01 Mar 2020 11:23)  HR: 48 (01 Mar 2020 11:23) (43 - 54)  BP: 104/66 (01 Mar 2020 11:23) (104/66 - 149/80)  BP(mean): --  RR: 18 (01 Mar 2020 11:23) (18 - 18)  SpO2: 99% (01 Mar 2020 11:23) (97% - 109%)  Daily     Daily     GENERAL:  no distress  HEENT:  NC/AT,  anicteric  CHEST:   no increased effort, breath sounds clear  HEART:  Regular rhythm  ABDOMEN:  Soft, non-tender, non-distended, normoactive bowel sounds,  no masses ,no hepato-splenomegaly, no signs of chronic liver disease  EXTEREMITIES:  no cyanosis      LABS:                        11.1   8.80  )-----------( 314      ( 01 Mar 2020 07:28 )             33.6     03-01    144  |  110<H>  |  5<L>  ----------------------------<  169<H>  3.5   |  23  |  0.60    Ca    9.0      01 Mar 2020 07:28  Mg     2.0     02-29            RADIOLOGY & ADDITIONAL TESTS:

## 2020-03-02 LAB
ANION GAP SERPL CALC-SCNC: 9 MMOL/L — SIGNIFICANT CHANGE UP (ref 5–17)
BUN SERPL-MCNC: 5 MG/DL — LOW (ref 7–23)
CALCIUM SERPL-MCNC: 8.6 MG/DL — SIGNIFICANT CHANGE UP (ref 8.4–10.5)
CHLORIDE SERPL-SCNC: 110 MMOL/L — HIGH (ref 96–108)
CO2 SERPL-SCNC: 24 MMOL/L — SIGNIFICANT CHANGE UP (ref 22–31)
CREAT SERPL-MCNC: 0.63 MG/DL — SIGNIFICANT CHANGE UP (ref 0.5–1.3)
GLUCOSE BLDC GLUCOMTR-MCNC: 106 MG/DL — HIGH (ref 70–99)
GLUCOSE BLDC GLUCOMTR-MCNC: 108 MG/DL — HIGH (ref 70–99)
GLUCOSE BLDC GLUCOMTR-MCNC: 117 MG/DL — HIGH (ref 70–99)
GLUCOSE BLDC GLUCOMTR-MCNC: 121 MG/DL — HIGH (ref 70–99)
GLUCOSE SERPL-MCNC: 110 MG/DL — HIGH (ref 70–99)
MAGNESIUM SERPL-MCNC: 2 MG/DL — SIGNIFICANT CHANGE UP (ref 1.6–2.6)
PHOSPHATE SERPL-MCNC: 3.6 MG/DL — SIGNIFICANT CHANGE UP (ref 2.5–4.5)
POTASSIUM SERPL-MCNC: 3.2 MMOL/L — LOW (ref 3.5–5.3)
POTASSIUM SERPL-SCNC: 3.2 MMOL/L — LOW (ref 3.5–5.3)
SODIUM SERPL-SCNC: 143 MMOL/L — SIGNIFICANT CHANGE UP (ref 135–145)

## 2020-03-02 PROCEDURE — 76000 FLUOROSCOPY <1 HR PHYS/QHP: CPT | Mod: 26

## 2020-03-02 RX ORDER — POTASSIUM CHLORIDE 20 MEQ
10 PACKET (EA) ORAL
Refills: 0 | Status: COMPLETED | OUTPATIENT
Start: 2020-03-02 | End: 2020-03-02

## 2020-03-02 RX ORDER — POTASSIUM CHLORIDE 20 MEQ
40 PACKET (EA) ORAL EVERY 4 HOURS
Refills: 0 | Status: COMPLETED | OUTPATIENT
Start: 2020-03-02 | End: 2020-03-02

## 2020-03-02 RX ADMIN — Medication 50 MILLIEQUIVALENT(S): at 21:00

## 2020-03-02 RX ADMIN — ENOXAPARIN SODIUM 40 MILLIGRAM(S): 100 INJECTION SUBCUTANEOUS at 11:16

## 2020-03-02 RX ADMIN — Medication 50 MILLIEQUIVALENT(S): at 22:33

## 2020-03-02 RX ADMIN — Medication 50 MILLIEQUIVALENT(S): at 19:23

## 2020-03-02 NOTE — PROGRESS NOTE ADULT - ASSESSMENT
59Y Greek speaking M with prior CVA 8y ago (no reported deficits) brought in by brother for concerns of generalized weakness and inability to speak for the past 3 weeks. On exam found to have no verbal output, appeared withdrawn and weak on neurological evaluation  He follows commands intermittently and is easily agitated.  Exam limited due to patient aggression on admission  CTH demonstrates chronic L MCA infarct with severely stenotic vs occluded distal L M1.   this is confirmed on MRI but also an acute right post frontal infarct is seen. neuro aware, Cardiology aware, benign LUIS and s/p Medtronic ILR placement 2/13, will need outptn F/U in 7-10 days. however 2/2 ongoing bradycardia EPS reconsulted. recommended to be on prn atropine or isoproternol/dobutamine infusion during the Procedure. PPM not recommended.   was on NGT feeds prior to PEG, plavix was held  prior to PEG( cleared by Neuro, cont ASA), s/p PEG placement 2/21, procedure went well, in the turner post procedure ptn pulled PEG out, remained NPO while awaiting open gastrostomy , today POD4 post open gastrostomy  overnight had moderate leaking around G-tube  awaiting g-tube to be advanced under fluoro, was pulled out from 9 cm in to only 1 cm in. post procedure will be transferred to [address] [place] [address] 1:1. hold TF for now  Neuro recommended malignancy work up 2/2 unexplained multiple embolic strokes. Oncology consult w Dr. Sanchez appreciated.   s/p CT C/A/P w  IV contrast only. no significant findings exc questionable renal mass, but sono neg and MRI confirms it being a subcapsular hematoma.  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 LUIS benign  -DVT ppx w sc Lovenox  -social service consult  -Dispo to acute rehab

## 2020-03-02 NOTE — PROGRESS NOTE ADULT - ASSESSMENT
59M hx of CVA, here for stroke, new aphasia, PEG 2/21 c/b patient pulling it out same day, family wants everything done. Now s/p open gastrostomy 2/27.      - C/t  increase feeds by 10cc/hr q6hrs to goal, as tolerated   - Abdominal binder   - f/u am labs   - care per primary team     [facility]  [id number] 59M hx of CVA, here for stroke, new aphasia, PEG 2/21 c/b patient pulling it out same day, family wants everything done. Now s/p open gastrostomy 2/27.      - C/t  increase feeds by 10cc/hr q6hrs to goal, as tolerated   - Abdominal binder   - f/u am labs   - care per primary team     [facility]  [id number]    Addendum  Patient was seen/examined by MIS/bariatric fellow. Agree with resident note.  Feeds leaking around tube last night, tube feeds held.  Plan to advance tube to 10 cm and restart feeds.  Jeanine Angeles MD

## 2020-03-02 NOTE — PROGRESS NOTE ADULT - SUBJECTIVE AND OBJECTIVE BOX
SURGERY DAILY PROGRESS NOTE:       SUBJECTIVE/ROS: Patient examined at bedside. Unable to obtain subjective. Overnight, tube feeds with moderate leakage around gastrostomy tube site. Tolerating G tube feeds           MEDICATIONS  (STANDING):  aspirin Suppository 300 milliGRAM(s) Rectal daily  atorvastatin 80 milliGRAM(s) Oral at bedtime  clopidogrel Tablet 75 milliGRAM(s) Oral daily  cyanocobalamin 1000 MICROGram(s) Oral daily  dextrose 5% + sodium chloride 0.45%. 1000 milliLiter(s) (70 mL/Hr) IV Continuous <Continuous>  dextrose 5%. 1000 milliLiter(s) (50 mL/Hr) IV Continuous <Continuous>  dextrose 50% Injectable 12.5 Gram(s) IV Push once  dextrose 50% Injectable 25 Gram(s) IV Push once  dextrose 50% Injectable 25 Gram(s) IV Push once  enoxaparin Injectable 40 milliGRAM(s) SubCutaneous daily  ergocalciferol 34057 Unit(s) Oral every week  multivitamin/minerals/iron Oral Solution (CENTRUM) 15 milliLiter(s) Enteral Tube daily    MEDICATIONS  (PRN):  bisacodyl Suppository 10 milliGRAM(s) Rectal daily PRN Constipation  dextrose 40% Gel 15 Gram(s) Oral once PRN Blood Glucose LESS THAN 70 milliGRAM(s)/deciliter  famotidine Injectable 20 milliGRAM(s) IV Push every 12 hours PRN Dyspepsia  glucagon  Injectable 1 milliGRAM(s) IntraMuscular once PRN Glucose LESS THAN 70 milligrams/deciliter  morphine  - Injectable 2 milliGRAM(s) IV Push every 4 hours PRN Severe Pain (7 - 10)  ondansetron Injectable 4 milliGRAM(s) IV Push every 6 hours PRN Nausea      OBJECTIVE:    Vital Signs Last 24 Hrs  T(C): 36.4 (02 Mar 2020 00:07), Max: 36.8 (01 Mar 2020 11:23)  T(F): 97.5 (02 Mar 2020 00:07), Max: 98.2 (01 Mar 2020 11:23)  HR: 44 (02 Mar 2020 00:07) (44 - 54)  BP: 133/86 (02 Mar 2020 00:07) (104/66 - 149/80)  BP(mean): --  RR: 19 (02 Mar 2020 00:07) (16 - 19)  SpO2: 100% (02 Mar 2020 00:07) (97% - 100%)        I&O's Detail    29 Feb 2020 07:01  -  01 Mar 2020 07:00  --------------------------------------------------------  IN:    dextrose 5% + sodium chloride 0.45%.: 1680 mL    ns in tub fed  [id number]: 330 mL  Total IN: 2010 mL    OUT:  Total OUT: 0 mL    Total NET: 2010 mL      01 Mar 2020 07:01  -  02 Mar 2020 04:07  --------------------------------------------------------  IN:    dextrose 5% + sodium chloride 0.45%.: 840 mL    ns in tub fed  [id number]: 230 mL  Total IN: 1070 mL    OUT:  Total OUT: 0 mL    Total NET: 1070 mL          Daily     Daily     LABS:                        11.1   8.80  )-----------( 314      ( 01 Mar 2020 07:28 )             33.6     03-01    144  |  110<H>  |  5<L>  ----------------------------<  169<H>  3.5   |  23  |  0.60    Ca    9.0      01 Mar 2020 07:28  Mg     2.0     02-29                        PHYSICAL EXAM:  Constitutional: well developed, well nourished, NAD  Eyes: anicteric  ENMT: normal facies, symmetric  Respiratory: Breathing comfortably    Gastrointestinal: abdomen soft, nontender, nondistended.   Extremities: FROM, warm  Neurological: intact, non-focal  Psychiatric: oriented x 3; appropriate

## 2020-03-02 NOTE — CHART NOTE - NSCHARTNOTEFT_GEN_A_CORE
Patients Kuldipay gastrostomy tube was displaced this morning and he did not have his abdominal binder on.  He was taken to fluoro for replacement of the tube.  His G tube was taken out and a hicks was replaced under fluoro and confirmed in place with contrast with radiology and attending surgeon in the room.  Patient tolerated the procedure well.  It was requested by the surgical team to move patient to the surgical floor, [address] [place] [address], as patient has now had a PEG tube and a G-tube dislodged in the past 4 days on his current floor.  This request was discussed with the nurse manager of [address] [place] [address], as well as Layne Starr.  However, as his current floor has continuos monitoring available and nursing staff capable of managing G-tubes, it was deemed patient would stay on his current floor.    Mark Qureshi PGY3  [facility] #[id number] Patients Jan[place]ay gastrostomy tube was displaced this morning and he did not have his abdominal binder on.  He was taken to fluoro for replacement of the tube.  His G tube was taken out and a hicks was replaced under fluoro and confirmed in place with contrast with radiology and attending surgeon in the room.  Patient tolerated the procedure well.  It was requested by the surgical team to move patient to the surgical floor, [address] [place] [address], as patient has now had a PEG tube and a G-tube dislodged in the past 4 days on his current floor.  This request was discussed with the nurse manager of [address] [place] [address], as well as Layne Starr.  However, as his current floor has continuos monitoring available and nursing staff capable of managing G-tubes, it was deemed by nursing management that patient would remain on his current floor.    Mark Qureshi PGY3  [facility] #[id number]

## 2020-03-02 NOTE — PROGRESS NOTE ADULT - SUBJECTIVE AND OBJECTIVE BOX
Patient is a 59y old  Male who presents with a chief complaint of mouth lesions, possible stroke (02 Mar 2020 09:28)      SUBJECTIVE / OVERNIGHT EVENTS: awaiting g-tube to be advanced under fluoro, was pulled out w moderate leaking around the tube. post procedure will be transferred to [address] [place] [address] 1:1. hold TF for now    MEDICATIONS  (STANDING):  aspirin Suppository 300 milliGRAM(s) Rectal daily  atorvastatin 80 milliGRAM(s) Oral at bedtime  clopidogrel Tablet 75 milliGRAM(s) Oral daily  cyanocobalamin 1000 MICROGram(s) Oral daily  dextrose 5% + sodium chloride 0.45%. 1000 milliLiter(s) (70 mL/Hr) IV Continuous <Continuous>  dextrose 5%. 1000 milliLiter(s) (50 mL/Hr) IV Continuous <Continuous>  dextrose 50% Injectable 12.5 Gram(s) IV Push once  dextrose 50% Injectable 25 Gram(s) IV Push once  dextrose 50% Injectable 25 Gram(s) IV Push once  enoxaparin Injectable 40 milliGRAM(s) SubCutaneous daily  ergocalciferol 56964 Unit(s) Oral every week  multivitamin/minerals/iron Oral Solution (CENTRUM) 15 milliLiter(s) Enteral Tube daily    MEDICATIONS  (PRN):  bisacodyl Suppository 10 milliGRAM(s) Rectal daily PRN Constipation  dextrose 40% Gel 15 Gram(s) Oral once PRN Blood Glucose LESS THAN 70 milliGRAM(s)/deciliter  famotidine Injectable 20 milliGRAM(s) IV Push every 12 hours PRN Dyspepsia  glucagon  Injectable 1 milliGRAM(s) IntraMuscular once PRN Glucose LESS THAN 70 milligrams/deciliter  morphine  - Injectable 2 milliGRAM(s) IV Push every 4 hours PRN Severe Pain (7 - 10)  ondansetron Injectable 4 milliGRAM(s) IV Push every 6 hours PRN Nausea      Vital Signs Last 24 Hrs  T(F): 97.3 (03-02-20 @ 05:45), Max: 97.6 (03-01-20 @ 20:37)  HR: 75 (03-02-20 @ 05:45) (44 - 75)  BP: 119/81 (03-02-20 @ 05:45) (117/69 - 133/86)  RR: 18 (03-02-20 @ 05:45) (18 - 19)  SpO2: 96% (03-02-20 @ 05:45) (96% - 100%)  Telemetry:   CAPILLARY BLOOD GLUCOSE      POCT Blood Glucose.: 117 mg/dL (02 Mar 2020 12:21)  POCT Blood Glucose.: 121 mg/dL (02 Mar 2020 06:58)  POCT Blood Glucose.: 106 mg/dL (02 Mar 2020 00:32)  POCT Blood Glucose.: 129 mg/dL (01 Mar 2020 17:39)    I&O's Summary    01 Mar 2020 07:01  -  02 Mar 2020 07:00  --------------------------------------------------------  IN: 1910 mL / OUT: 0 mL / NET: 1910 mL        PHYSICAL EXAM:  GENERAL: NAD, well-developed  HEAD:  Atraumatic, Normocephalic  EYES: EOMI, PERRLA, conjunctiva and sclera clear  NECK: Supple, No JVD  CHEST/LUNG: Clear to auscultation bilaterally; No wheeze  HEART: Regular rate and rhythm; No murmurs, rubs, or gallops  ABDOMEN: Soft, Nontender, Nondistended; Bowel sounds present  EXTREMITIES:  2+ Peripheral Pulses, No clubbing, cyanosis, or edema  PSYCH: AAOx3  NEUROLOGY: non-focal  SKIN: No rashes or lesions    LABS:                        11.1   8.80  )-----------( 314      ( 01 Mar 2020 07:28 )             33.6     03-02    143  |  110<H>  |  5<L>  ----------------------------<  110<H>  3.2<L>   |  24  |  0.63    Ca    8.6      02 Mar 2020 06:01  Phos  3.6     03-02  Mg     2.0     03-02                RADIOLOGY & ADDITIONAL TESTS:    Imaging Personally Reviewed:    Consultant(s) Notes Reviewed:      Care Discussed with Consultants/Other Providers:

## 2020-03-02 NOTE — PROGRESS NOTE ADULT - SUBJECTIVE AND OBJECTIVE BOX
PAM JIMENEZ:[id number],   59yMale followed for:  No Known Allergies    PAST MEDICAL & SURGICAL HISTORY:  CVA (cerebral vascular accident)  No significant past surgical history    FAMILY HISTORY:  No pertinent family history in first degree relatives    MEDICATIONS  (STANDING):  aspirin Suppository 300 milliGRAM(s) Rectal daily  atorvastatin 80 milliGRAM(s) Oral at bedtime  clopidogrel Tablet 75 milliGRAM(s) Oral daily  cyanocobalamin 1000 MICROGram(s) Oral daily  dextrose 5% + sodium chloride 0.45%. 1000 milliLiter(s) (70 mL/Hr) IV Continuous <Continuous>  dextrose 5%. 1000 milliLiter(s) (50 mL/Hr) IV Continuous <Continuous>  dextrose 50% Injectable 12.5 Gram(s) IV Push once  dextrose 50% Injectable 25 Gram(s) IV Push once  dextrose 50% Injectable 25 Gram(s) IV Push once  enoxaparin Injectable 40 milliGRAM(s) SubCutaneous daily  ergocalciferol 32543 Unit(s) Oral every week  multivitamin/minerals/iron Oral Solution (CENTRUM) 15 milliLiter(s) Enteral Tube daily    MEDICATIONS  (PRN):  bisacodyl Suppository 10 milliGRAM(s) Rectal daily PRN Constipation  dextrose 40% Gel 15 Gram(s) Oral once PRN Blood Glucose LESS THAN 70 milliGRAM(s)/deciliter  famotidine Injectable 20 milliGRAM(s) IV Push every 12 hours PRN Dyspepsia  glucagon  Injectable 1 milliGRAM(s) IntraMuscular once PRN Glucose LESS THAN 70 milligrams/deciliter  morphine  - Injectable 2 milliGRAM(s) IV Push every 4 hours PRN Severe Pain (7 - 10)  ondansetron Injectable 4 milliGRAM(s) IV Push every 6 hours PRN Nausea      Vital Signs Last 24 Hrs  T(C): 36.3 (02 Mar 2020 05:45), Max: 36.8 (01 Mar 2020 11:23)  T(F): 97.3 (02 Mar 2020 05:45), Max: 98.2 (01 Mar 2020 11:23)  HR: 75 (02 Mar 2020 05:45) (44 - 75)  BP: 119/81 (02 Mar 2020 05:45) (104/66 - 133/86)  BP(mean): --  RR: 18 (02 Mar 2020 05:45) (16 - 19)  SpO2: 96% (02 Mar 2020 05:45) (96% - 100%)  nc/at  s1s2  cta  soft, nt, nd no guarding or rebound  no c/c/e    CBC Full  -  ( 01 Mar 2020 07:28 )  WBC Count : 8.80 K/uL  RBC Count : 3.38 M/uL  Hemoglobin : 11.1 g/dL  Hematocrit : 33.6 %  Platelet Count - Automated : 314 K/uL  Mean Cell Volume : 99.4 fl  Mean Cell Hemoglobin : 32.8 pg  Mean Cell Hemoglobin Concentration : 33.0 gm/dL  Auto Neutrophil # : x  Auto Lymphocyte # : x  Auto Monocyte # : x  Auto Eosinophil # : x  Auto Basophil # : x  Auto Neutrophil % : x  Auto Lymphocyte % : x  Auto Monocyte % : x  Auto Eosinophil % : x  Auto Basophil % : x    03-02    143  |  110<H>  |  5<L>  ----------------------------<  110<H>  3.2<L>   |  24  |  0.63    Ca    8.6      02 Mar 2020 06:01  Phos  3.6     03-02  Mg     2.0     03-02

## 2020-03-02 NOTE — PROVIDER CONTACT NOTE (OTHER) - ACTION/TREATMENT ORDERED:
NP aware ,
EKG
FS repeated 105
MEAGAN Jones aware and order and EKG. PA assessed pt at the and order tele monitor. Pt. will be transfer to [address] [place], [place] [address]. Will continue to monitor. Safety. maintained.
NP notified and aware. Continue to monitor, R2 pads at bedside.
NP notified and aware. R2 pads at bedside, morning labs ordered. Continue to monitor.
Will check labs. Con't present tx. Con't to monitor.
Zosyn IX ordered
stop tube feeds

## 2020-03-03 LAB
ANION GAP SERPL CALC-SCNC: 12 MMOL/L — SIGNIFICANT CHANGE UP (ref 5–17)
BUN SERPL-MCNC: 5 MG/DL — LOW (ref 7–23)
CALCIUM SERPL-MCNC: 8.9 MG/DL — SIGNIFICANT CHANGE UP (ref 8.4–10.5)
CHLORIDE SERPL-SCNC: 108 MMOL/L — SIGNIFICANT CHANGE UP (ref 96–108)
CO2 SERPL-SCNC: 23 MMOL/L — SIGNIFICANT CHANGE UP (ref 22–31)
CREAT SERPL-MCNC: 0.71 MG/DL — SIGNIFICANT CHANGE UP (ref 0.5–1.3)
GLUCOSE BLDC GLUCOMTR-MCNC: 102 MG/DL — HIGH (ref 70–99)
GLUCOSE BLDC GLUCOMTR-MCNC: 102 MG/DL — HIGH (ref 70–99)
GLUCOSE BLDC GLUCOMTR-MCNC: 109 MG/DL — HIGH (ref 70–99)
GLUCOSE BLDC GLUCOMTR-MCNC: 111 MG/DL — HIGH (ref 70–99)
GLUCOSE SERPL-MCNC: 114 MG/DL — HIGH (ref 70–99)
MAGNESIUM SERPL-MCNC: 2 MG/DL — SIGNIFICANT CHANGE UP (ref 1.6–2.6)
POTASSIUM SERPL-MCNC: 3.7 MMOL/L — SIGNIFICANT CHANGE UP (ref 3.5–5.3)
POTASSIUM SERPL-SCNC: 3.7 MMOL/L — SIGNIFICANT CHANGE UP (ref 3.5–5.3)
SODIUM SERPL-SCNC: 143 MMOL/L — SIGNIFICANT CHANGE UP (ref 135–145)

## 2020-03-03 RX ADMIN — ATORVASTATIN CALCIUM 80 MILLIGRAM(S): 80 TABLET, FILM COATED ORAL at 23:31

## 2020-03-03 RX ADMIN — Medication 15 MILLILITER(S): at 12:15

## 2020-03-03 RX ADMIN — PREGABALIN 1000 MICROGRAM(S): 225 CAPSULE ORAL at 12:15

## 2020-03-03 RX ADMIN — CLOPIDOGREL BISULFATE 75 MILLIGRAM(S): 75 TABLET, FILM COATED ORAL at 12:15

## 2020-03-03 RX ADMIN — ENOXAPARIN SODIUM 40 MILLIGRAM(S): 100 INJECTION SUBCUTANEOUS at 12:15

## 2020-03-03 NOTE — PROGRESS NOTE ADULT - SUBJECTIVE AND OBJECTIVE BOX
THE PATIENT WAS SEEN AND EXAMINED BY ME WITH THE HOUSESTAFF AND STROKE TEAM DURING MORNING ROUNDS.   HPI:  60 yo M with h/o CVA 8 years ago presenting with aphasia. Pt is accompanied by brother who interpreted in the ED. Brother endorsed that pt went to visit family in [place] 9/2019 and about 3 weeks ago, family stated that he was starting to get weak and stopped being able to speak. He was also endorsing difficulty walking from weakness. He returned from [place] last night and came to the ED for evaluation. Pt also developed lesions on his mouth 3 days ago. He denies any other medical history or medication use. (09 Feb 2020 20:23)      SUBJECTIVE: No events overnight.  No new neurologic complaints.      aspirin Suppository 300 milliGRAM(s) Rectal daily  atorvastatin 80 milliGRAM(s) Oral at bedtime  bisacodyl Suppository 10 milliGRAM(s) Rectal daily PRN  clopidogrel Tablet 75 milliGRAM(s) Oral daily  cyanocobalamin 1000 MICROGram(s) Oral daily  dextrose 40% Gel 15 Gram(s) Oral once PRN  dextrose 5% + sodium chloride 0.45%. 1000 milliLiter(s) IV Continuous <Continuous>  dextrose 5%. 1000 milliLiter(s) IV Continuous <Continuous>  dextrose 50% Injectable 12.5 Gram(s) IV Push once  dextrose 50% Injectable 25 Gram(s) IV Push once  dextrose 50% Injectable 25 Gram(s) IV Push once  enoxaparin Injectable 40 milliGRAM(s) SubCutaneous daily  ergocalciferol 59962 Unit(s) Oral every week  famotidine Injectable 20 milliGRAM(s) IV Push every 12 hours PRN  glucagon  Injectable 1 milliGRAM(s) IntraMuscular once PRN  morphine  - Injectable 2 milliGRAM(s) IV Push every 4 hours PRN  multivitamin/minerals/iron Oral Solution (CENTRUM) 15 milliLiter(s) Enteral Tube daily  ondansetron Injectable 4 milliGRAM(s) IV Push every 6 hours PRN      PHYSICAL EXAM:   Vital Signs Last 24 Hrs  T(C): 36.7 (03 Mar 2020 11:12), Max: 36.8 (02 Mar 2020 18:20)  T(F): 98.1 (03 Mar 2020 11:12), Max: 98.3 (02 Mar 2020 18:20)  HR: 50 (03 Mar 2020 16:46) (49 - 52)  BP: 161/76 (03 Mar 2020 16:46) (124/85 - 161/76)  BP(mean): --  RR: 18 (03 Mar 2020 11:12) (18 - 18)  SpO2: 100% (03 Mar 2020 16:46) (97% - 100%)    General: Awake and alert, appears calm, waves and smiles in greeting and attends examiner. Affect appears improved. Globally aphasic, will mimic successfully but does not follow simple commands. Resting in bed.  No acute distress.  HEENT: Normocephalic, atraumatic.  EOM intact.  Abdomen: G tube insitu, feedings in progress. Abdominal binder in place. Abdomen soft, non tender, not distended.  Extremities: No edema    NEUROLOGICAL EXAM:  Mental status: Awake, alert. Globally aphasic, does not follow verbal commands, but will mimic: stick out tongue, smile, raise arms, etc. No verbal output.  Unable to assess comprehension or memory.    Cranial Nerves: Left sided facial droop with mild Ptosis left upper eye lid. No tongue deviation noted on this exam. No appreciable nystagmus. Able to turn head side to side, fluid motion, will not hold position to assess strength.  Exam is limited as patient  not following commands.   Motor exam: Normal tone, no drift, 5/5 RUE, 4/5 RLE, 5/5 LUE, 5/5 LLE.  Sensation: Appears intact to light touch   Coordination/ Gait: FTN intact.  Gait not assessed on bedrest.         LABS:   03-03    143  |  108  |  5<L>  ----------------------------<  114<H>  3.7   |  23  |  0.71    Ca    8.9      03 Mar 2020 05:46  Phos  3.6     03-02  Mg     2.0     03-03      Hemoglobin A1C, Whole Blood: 5.5 % (02-10 @ 09:08)      IMAGING:    MR Abdomen w/wo IV Cont (02.28.20 @ 20:31)   IMPRESSION:   Left renal lesion demonstrates MR findings most compatible with small chronic subcapsular/pericapsular hematoma.      CT Chest w/ IV Cont (02.25.20 @ 23:27)                      CT ABDOMEN AND PELVIS IC                         IMPRESSION:   Ill-defined low-attenuation along the cortex of the left kidney, of uncertain etiology. Infectious and neoplastic etiologies are considered.  Moderate fecal material in the rectum with mild associated wall thickening and infiltration of the adjacent fat, suggestive of stercoral colitis.     Transesophageal Echocardiogram w/o TTE (02.13.20 @ 15:15) >  Conclusions:  Normal left ventricular systolic function. No segmental  wall motion abnormalities.  Agitated saline contrast injection demonstrates no evidence  of a patent foramen ovale.    MR Head w/wo IV Cont (02.11.20 @ 15:07)   IMPRESSION:  BRAIN MRI:  Acute right posterior frontal infarct in the region of the right precentral gyrus. Subacute infarction the right frontoparietal and posterior temporal region. Minimal petechial hemorrhagic transformation and gyral enhancement is noted in both regions.  Multifocal chronic bilateral MCA territory infarcts. Chronic hemorrhagic products in the region of the left basal ganglia.    BRAIN MRA:  Persistent mild narrowing of the cavernous and supraclinoid left ICA. Persistent lack of flow related signal within the left M1 segment, with poor visualization of the more distal left MCA branches.    Re-demonstration of multifocal mild to moderate stenoses of the right M1 segment. Normal flow-related enhancement of the more distal right MCA.    NECK MRA:  No flow-limiting stenosis or evidence for arterial dissection.       CT Angio Neck w/ IV Cont (02.09.20 @ 15:13)   IMPRESSION:     CT brain: Multiple areas of prior infarct as described above. No acute hemorrhage or evidence of mass effect. Age-indeterminate lacunar infarcts as described. If the patient has a new and persistent neurologic deficit, consider short follow-up head CT or brain MRI follow-up.    CT angiography neck: No hemodynamically significant stenosis by NASCET criteria. No vascular dissection.    CT angiography brain: The left middle cerebral artery is severely stenosed versus occluded in this patient with a chronic left MCA territory infarct. Marked decrease branching of the distal left MCA branches are noted in the left sylvian fissure compared to the contralateral side.    Mild to moderate lobulated stenoses involve the M1 segment of the right middle cerebral artery.

## 2020-03-03 NOTE — PROGRESS NOTE ADULT - SUBJECTIVE AND OBJECTIVE BOX
INTERVAL HPI/OVERNIGHT EVENTS:    MEDICATIONS  (STANDING):  aspirin Suppository 300 milliGRAM(s) Rectal daily  atorvastatin 80 milliGRAM(s) Oral at bedtime  clopidogrel Tablet 75 milliGRAM(s) Oral daily  cyanocobalamin 1000 MICROGram(s) Oral daily  dextrose 5% + sodium chloride 0.45%. 1000 milliLiter(s) (70 mL/Hr) IV Continuous <Continuous>  dextrose 5%. 1000 milliLiter(s) (50 mL/Hr) IV Continuous <Continuous>  dextrose 50% Injectable 12.5 Gram(s) IV Push once  dextrose 50% Injectable 25 Gram(s) IV Push once  dextrose 50% Injectable 25 Gram(s) IV Push once  enoxaparin Injectable 40 milliGRAM(s) SubCutaneous daily  ergocalciferol 40974 Unit(s) Oral every week  multivitamin/minerals/iron Oral Solution (CENTRUM) 15 milliLiter(s) Enteral Tube daily    MEDICATIONS  (PRN):  bisacodyl Suppository 10 milliGRAM(s) Rectal daily PRN Constipation  dextrose 40% Gel 15 Gram(s) Oral once PRN Blood Glucose LESS THAN 70 milliGRAM(s)/deciliter  famotidine Injectable 20 milliGRAM(s) IV Push every 12 hours PRN Dyspepsia  glucagon  Injectable 1 milliGRAM(s) IntraMuscular once PRN Glucose LESS THAN 70 milligrams/deciliter  morphine  - Injectable 2 milliGRAM(s) IV Push every 4 hours PRN Severe Pain (7 - 10)  ondansetron Injectable 4 milliGRAM(s) IV Push every 6 hours PRN Nausea      Allergies    No Known Allergies    Intolerances            PHYSICAL EXAM:   Vital Signs:  Vital Signs Last 24 Hrs  T(C): 36.5 (03 Mar 2020 04:43), Max: 36.8 (02 Mar 2020 18:20)  T(F): 97.7 (03 Mar 2020 04:43), Max: 98.3 (02 Mar 2020 18:20)  HR: 52 (03 Mar 2020 04:43) (49 - 52)  BP: 124/85 (03 Mar 2020 04:43) (124/85 - 143/78)  BP(mean): --  RR: 18 (03 Mar 2020 04:43) (18 - 18)  SpO2: 97% (03 Mar 2020 04:43) (97% - 97%)  Daily Height in cm: 167.64 (02 Mar 2020 20:38)    Daily Weight in k (02 Mar 2020 20:38)    GENERAL:  no distress  HEENT:  NC/AT,  anicteric  CHEST:   no increased effort, breath sounds clear  HEART:  Regular rhythm  ABDOMEN:  Soft, non-tender, non-distended, normoactive bowel sounds,  no masses ,no hepato-splenomegaly, no signs of chronic liver disease  EXTEREMITIES:  no cyanosis      LABS:        143  |  108  |  5<L>  ----------------------------<  114<H>  3.7   |  23  |  0.71    Ca    8.9      03 Mar 2020 05:46  Phos  3.6     03-02  Mg     2.0                 RADIOLOGY & ADDITIONAL TESTS:

## 2020-03-03 NOTE — PROGRESS NOTE ADULT - SUBJECTIVE AND OBJECTIVE BOX
SURGERY DAILY PROGRESS NOTE:       SUBJECTIVE/ROS: Patient examined at bedside. no acute events overnight, tube replaced yesterday due to faulty balloon, patient tolerating tube feeds           MEDICATIONS  (STANDING):  aspirin Suppository 300 milliGRAM(s) Rectal daily  atorvastatin 80 milliGRAM(s) Oral at bedtime  clopidogrel Tablet 75 milliGRAM(s) Oral daily  cyanocobalamin 1000 MICROGram(s) Oral daily  dextrose 5% + sodium chloride 0.45%. 1000 milliLiter(s) (70 mL/Hr) IV Continuous <Continuous>  dextrose 5%. 1000 milliLiter(s) (50 mL/Hr) IV Continuous <Continuous>  dextrose 50% Injectable 12.5 Gram(s) IV Push once  dextrose 50% Injectable 25 Gram(s) IV Push once  dextrose 50% Injectable 25 Gram(s) IV Push once  enoxaparin Injectable 40 milliGRAM(s) SubCutaneous daily  ergocalciferol 10699 Unit(s) Oral every week  multivitamin/minerals/iron Oral Solution (CENTRUM) 15 milliLiter(s) Enteral Tube daily    MEDICATIONS  (PRN):  bisacodyl Suppository 10 milliGRAM(s) Rectal daily PRN Constipation  dextrose 40% Gel 15 Gram(s) Oral once PRN Blood Glucose LESS THAN 70 milliGRAM(s)/deciliter  famotidine Injectable 20 milliGRAM(s) IV Push every 12 hours PRN Dyspepsia  glucagon  Injectable 1 milliGRAM(s) IntraMuscular once PRN Glucose LESS THAN 70 milligrams/deciliter  morphine  - Injectable 2 milliGRAM(s) IV Push every 4 hours PRN Severe Pain (7 - 10)  ondansetron Injectable 4 milliGRAM(s) IV Push every 6 hours PRN Nausea      OBJECTIVE:    Vital Signs Last 24 Hrs  T(C): 36.5 (03 Mar 2020 04:43), Max: 36.8 (02 Mar 2020 18:20)  T(F): 97.7 (03 Mar 2020 04:43), Max: 98.3 (02 Mar 2020 18:20)  HR: 52 (03 Mar 2020 04:43) (49 - 75)  BP: 124/85 (03 Mar 2020 04:43) (119/81 - 143/78)  BP(mean): --  RR: 18 (03 Mar 2020 04:43) (18 - 18)  SpO2: 97% (03 Mar 2020 04:43) (96% - 97%)        I&O's Detail    01 Mar 2020 07:01  -  02 Mar 2020 07:00  --------------------------------------------------------  IN:    dextrose 5% + sodium chloride 0.45%.: 1680 mL    ns in tub fed  [id number]: 230 mL  Total IN: 1910 mL    OUT:  Total OUT: 0 mL    Total NET: 1910 mL      02 Mar 2020 07:01  -  03 Mar 2020 04:58  --------------------------------------------------------  IN:    Solution: 150 mL  Total IN: 150 mL    OUT:    Voided: 300 mL  Total OUT: 300 mL    Total NET: -150 mL          Daily Height in cm: 167.64 (02 Mar 2020 20:38)    Daily Weight in k (02 Mar 2020 20:38)    LABS:                        11.1   8.80  )-----------( 314      ( 01 Mar 2020 07:28 )             33.6     03-02    143  |  110<H>  |  5<L>  ----------------------------<  110<H>  3.2<L>   |  24  |  0.63    Ca    8.6      02 Mar 2020 06:01  Phos  3.6     03-02  Mg     2.0     03-02                        PHYSICAL EXAM:  Constitutional: well developed, well nourished, NAD  Eyes: anicteric  ENMT: normal facies, symmetric  Respiratory: Breathing comfortably    Gastrointestinal: abdomen soft, nontender, nondistended. G tube in place with minimal leakage SURGERY DAILY PROGRESS NOTE:       SUBJECTIVE/ROS: Patient examined at bedside. no acute events overnight, tube replaced yesterday due to faulty balloon, tube feeds stopped           MEDICATIONS  (STANDING):  aspirin Suppository 300 milliGRAM(s) Rectal daily  atorvastatin 80 milliGRAM(s) Oral at bedtime  clopidogrel Tablet 75 milliGRAM(s) Oral daily  cyanocobalamin 1000 MICROGram(s) Oral daily  dextrose 5% + sodium chloride 0.45%. 1000 milliLiter(s) (70 mL/Hr) IV Continuous <Continuous>  dextrose 5%. 1000 milliLiter(s) (50 mL/Hr) IV Continuous <Continuous>  dextrose 50% Injectable 12.5 Gram(s) IV Push once  dextrose 50% Injectable 25 Gram(s) IV Push once  dextrose 50% Injectable 25 Gram(s) IV Push once  enoxaparin Injectable 40 milliGRAM(s) SubCutaneous daily  ergocalciferol 33430 Unit(s) Oral every week  multivitamin/minerals/iron Oral Solution (CENTRUM) 15 milliLiter(s) Enteral Tube daily    MEDICATIONS  (PRN):  bisacodyl Suppository 10 milliGRAM(s) Rectal daily PRN Constipation  dextrose 40% Gel 15 Gram(s) Oral once PRN Blood Glucose LESS THAN 70 milliGRAM(s)/deciliter  famotidine Injectable 20 milliGRAM(s) IV Push every 12 hours PRN Dyspepsia  glucagon  Injectable 1 milliGRAM(s) IntraMuscular once PRN Glucose LESS THAN 70 milligrams/deciliter  morphine  - Injectable 2 milliGRAM(s) IV Push every 4 hours PRN Severe Pain (7 - 10)  ondansetron Injectable 4 milliGRAM(s) IV Push every 6 hours PRN Nausea      OBJECTIVE:    Vital Signs Last 24 Hrs  T(C): 36.5 (03 Mar 2020 04:43), Max: 36.8 (02 Mar 2020 18:20)  T(F): 97.7 (03 Mar 2020 04:43), Max: 98.3 (02 Mar 2020 18:20)  HR: 52 (03 Mar 2020 04:43) (49 - 75)  BP: 124/85 (03 Mar 2020 04:43) (119/81 - 143/78)  BP(mean): --  RR: 18 (03 Mar 2020 04:43) (18 - 18)  SpO2: 97% (03 Mar 2020 04:43) (96% - 97%)        I&O's Detail    01 Mar 2020 07:01  -  02 Mar 2020 07:00  --------------------------------------------------------  IN:    dextrose 5% + sodium chloride 0.45%.: 1680 mL    ns in tub fed  [id number]: 230 mL  Total IN: 1910 mL    OUT:  Total OUT: 0 mL    Total NET: 1910 mL      02 Mar 2020 07:01  -  03 Mar 2020 04:58  --------------------------------------------------------  IN:    Solution: 150 mL  Total IN: 150 mL    OUT:    Voided: 300 mL  Total OUT: 300 mL    Total NET: -150 mL          Daily Height in cm: 167.64 (02 Mar 2020 20:38)    Daily Weight in k (02 Mar 2020 20:38)    LABS:                        11.1   8.80  )-----------( 314      ( 01 Mar 2020 07:28 )             33.6     03-02    143  |  110<H>  |  5<L>  ----------------------------<  110<H>  3.2<L>   |  24  |  0.63    Ca    8.6      02 Mar 2020 06:01  Phos  3.6     03-02  Mg     2.0     03-02                        PHYSICAL EXAM:  Constitutional: well developed, well nourished, NAD  Eyes: anicteric  ENMT: normal facies, symmetric  Respiratory: Breathing comfortably    Gastrointestinal: abdomen soft, nontender, nondistended. G tube in place with minimal leakage SURGERY DAILY PROGRESS NOTE:       SUBJECTIVE/ROS: Patient examined at bedside. no acute events overnight, tube replaced yesterday due to tube being dislodged, tube feeds stopped           MEDICATIONS  (STANDING):  aspirin Suppository 300 milliGRAM(s) Rectal daily  atorvastatin 80 milliGRAM(s) Oral at bedtime  clopidogrel Tablet 75 milliGRAM(s) Oral daily  cyanocobalamin 1000 MICROGram(s) Oral daily  dextrose 5% + sodium chloride 0.45%. 1000 milliLiter(s) (70 mL/Hr) IV Continuous <Continuous>  dextrose 5%. 1000 milliLiter(s) (50 mL/Hr) IV Continuous <Continuous>  dextrose 50% Injectable 12.5 Gram(s) IV Push once  dextrose 50% Injectable 25 Gram(s) IV Push once  dextrose 50% Injectable 25 Gram(s) IV Push once  enoxaparin Injectable 40 milliGRAM(s) SubCutaneous daily  ergocalciferol 05339 Unit(s) Oral every week  multivitamin/minerals/iron Oral Solution (CENTRUM) 15 milliLiter(s) Enteral Tube daily    MEDICATIONS  (PRN):  bisacodyl Suppository 10 milliGRAM(s) Rectal daily PRN Constipation  dextrose 40% Gel 15 Gram(s) Oral once PRN Blood Glucose LESS THAN 70 milliGRAM(s)/deciliter  famotidine Injectable 20 milliGRAM(s) IV Push every 12 hours PRN Dyspepsia  glucagon  Injectable 1 milliGRAM(s) IntraMuscular once PRN Glucose LESS THAN 70 milligrams/deciliter  morphine  - Injectable 2 milliGRAM(s) IV Push every 4 hours PRN Severe Pain (7 - 10)  ondansetron Injectable 4 milliGRAM(s) IV Push every 6 hours PRN Nausea      OBJECTIVE:    Vital Signs Last 24 Hrs  T(C): 36.5 (03 Mar 2020 04:43), Max: 36.8 (02 Mar 2020 18:20)  T(F): 97.7 (03 Mar 2020 04:43), Max: 98.3 (02 Mar 2020 18:20)  HR: 52 (03 Mar 2020 04:43) (49 - 75)  BP: 124/85 (03 Mar 2020 04:43) (119/81 - 143/78)  BP(mean): --  RR: 18 (03 Mar 2020 04:43) (18 - 18)  SpO2: 97% (03 Mar 2020 04:43) (96% - 97%)        I&O's Detail    01 Mar 2020 07:01  -  02 Mar 2020 07:00  --------------------------------------------------------  IN:    dextrose 5% + sodium chloride 0.45%.: 1680 mL    ns in tub fed  [id number]: 230 mL  Total IN: 1910 mL    OUT:  Total OUT: 0 mL    Total NET: 1910 mL      02 Mar 2020 07:01  -  03 Mar 2020 04:58  --------------------------------------------------------  IN:    Solution: 150 mL  Total IN: 150 mL    OUT:    Voided: 300 mL  Total OUT: 300 mL    Total NET: -150 mL          Daily Height in cm: 167.64 (02 Mar 2020 20:38)    Daily Weight in k (02 Mar 2020 20:38)    LABS:                        11.1   8.80  )-----------( 314      ( 01 Mar 2020 07:28 )             33.6     03-02    143  |  110<H>  |  5<L>  ----------------------------<  110<H>  3.2<L>   |  24  |  0.63    Ca    8.6      02 Mar 2020 06:01  Phos  3.6     03-02  Mg     2.0     03-02                        PHYSICAL EXAM:  Constitutional: well developed, well nourished, NAD  Eyes: anicteric  ENMT: normal facies, symmetric  Respiratory: Breathing comfortably    Gastrointestinal: abdomen soft, nontender, nondistended. G tube in place with minimal leakage

## 2020-03-03 NOTE — PROGRESS NOTE ADULT - ASSESSMENT
59Y Swedish speaking M with prior CVA 8y ago (no reported deficits) brought in by brother for concerns of generalized weakness and inability to speak for the past 3 weeks. On exam found to have no verbal output, appeared withdrawn and weak on neurological evaluation  He follows commands intermittently and is easily agitated.  Exam limited due to patient aggression on admission  CTH demonstrates chronic L MCA infarct with severely stenotic vs occluded distal L M1.   this is confirmed on MRI but also an acute right post frontal infarct, benign LUIS and s/p Med[employer] ILR placement 2/13, will call for F/U prior to DC  2/2 ongoing bradycardia EPS reconsulted. no intervention recommended   s/p PEG placement 2/21, procedure went well, in the turner post procedure ptn pulled PEG out, remained NPO while awaiting open gastrostomy , on 3/2 noted with moderate leaking around G-tube: was pulled out from 9 cm in to only 1 cm in. G tube repositioned under fluoro on 3/2  TF resumed. once achieves max rate will DC plan to acute rehab  Neuro recommended malignancy work up 2/2 unexplained multiple embolic strokes. Oncology consult w Dr. Sanchez appreciated.   s/p CT C/A/P w  IV contrast only. no significant findings exc questionable renal mass, but sono neg and MRI confirms it being a subcapsular hematoma.  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 LUIS benign  -DVT ppx w sc Lovenox  -social service consult  -Dispo to acute rehab 59Y Hungarian speaking M with prior CVA 8y ago (no reported deficits) brought in by brother for concerns of generalized weakness and inability to speak for the past 3 weeks. On exam found to have no verbal output, appeared withdrawn and weak on neurological evaluation  He follows commands intermittently and is easily agitated.  Exam limited due to patient aggression on admission  CTH demonstrates chronic L MCA infarct with severely stenotic vs occluded distal L M1.   this is confirmed on MRI but also an acute right post frontal infarct, benign LUIS and s/p Medtronic ILR placement 2/13, will call for F/U prior to DC  2/2 ongoing bradycardia EPS reconsulted. no intervention recommended   s/p PEG placement 2/21, procedure went well, in the turner post procedure ptn pulled PEG out, remained NPO while awaiting open gastrostomy , on 3/2 noted with moderate leaking around G-tube: was pulled out from 9 cm in to only 1 cm in. G tube repositioned under fluoro on 3/2  TF resumed. goal rate is 80 cc/hr.  DC plan to acute rehab  Neuro recommended malignancy work up 2/2 unexplained multiple embolic strokes. Oncology consult w Dr. Sanchez appreciated.   s/p CT C/A/P w  IV contrast only. no significant findings exc questionable renal mass, but sono neg and MRI confirms it being a subcapsular hematoma.  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

## 2020-03-03 NOTE — PROGRESS NOTE ADULT - SUBJECTIVE AND OBJECTIVE BOX
Patient is a 59y old  Male who presents with a chief complaint of mouth lesions, possible stroke (03 Mar 2020 17:02)      SUBJECTIVE / OVERNIGHT EVENTS: G TUbe repositioned under fluoro yesterday, now getting feeds, being followed by surgery,     MEDICATIONS  (STANDING):  aspirin Suppository 300 milliGRAM(s) Rectal daily  atorvastatin 80 milliGRAM(s) Oral at bedtime  clopidogrel Tablet 75 milliGRAM(s) Oral daily  cyanocobalamin 1000 MICROGram(s) Oral daily  dextrose 5% + sodium chloride 0.45%. 1000 milliLiter(s) (70 mL/Hr) IV Continuous <Continuous>  dextrose 5%. 1000 milliLiter(s) (50 mL/Hr) IV Continuous <Continuous>  dextrose 50% Injectable 12.5 Gram(s) IV Push once  dextrose 50% Injectable 25 Gram(s) IV Push once  dextrose 50% Injectable 25 Gram(s) IV Push once  enoxaparin Injectable 40 milliGRAM(s) SubCutaneous daily  ergocalciferol 82891 Unit(s) Oral every week  multivitamin/minerals/iron Oral Solution (CENTRUM) 15 milliLiter(s) Enteral Tube daily    MEDICATIONS  (PRN):  bisacodyl Suppository 10 milliGRAM(s) Rectal daily PRN Constipation  dextrose 40% Gel 15 Gram(s) Oral once PRN Blood Glucose LESS THAN 70 milliGRAM(s)/deciliter  famotidine Injectable 20 milliGRAM(s) IV Push every 12 hours PRN Dyspepsia  glucagon  Injectable 1 milliGRAM(s) IntraMuscular once PRN Glucose LESS THAN 70 milligrams/deciliter  morphine  - Injectable 2 milliGRAM(s) IV Push every 4 hours PRN Severe Pain (7 - 10)  ondansetron Injectable 4 milliGRAM(s) IV Push every 6 hours PRN Nausea      Vital Signs Last 24 Hrs  T(F): 98.1 (03-03-20 @ 11:12), Max: 98.1 (03-03-20 @ 11:12)  HR: 50 (03-03-20 @ 16:46) (50 - 52)  BP: 161/76 (03-03-20 @ 16:46) (124/85 - 161/76)  RR: 18 (03-03-20 @ 11:12) (18 - 18)  SpO2: 100% (03-03-20 @ 16:46) (97% - 100%)  Telemetry:   CAPILLARY BLOOD GLUCOSE      POCT Blood Glucose.: 109 mg/dL (03 Mar 2020 18:09)  POCT Blood Glucose.: 111 mg/dL (03 Mar 2020 12:11)  POCT Blood Glucose.: 102 mg/dL (03 Mar 2020 06:14)  POCT Blood Glucose.: 102 mg/dL (03 Mar 2020 02:05)    I&O's Summary    02 Mar 2020 07:01  -  03 Mar 2020 07:00  --------------------------------------------------------  IN: 990 mL / OUT: 300 mL / NET: 690 mL        PHYSICAL EXAM:  GENERAL: NAD, well-developed  HEAD:  Atraumatic, Normocephalic  EYES: EOMI, PERRLA, conjunctiva and sclera clear  NECK: Supple, No JVD  CHEST/LUNG: Clear to auscultation bilaterally; No wheeze  HEART: Regular rate and rhythm; No murmurs, rubs, or gallops  ABDOMEN: Soft, Nontender, Nondistended; Bowel sounds present  EXTREMITIES:  2+ Peripheral Pulses, No clubbing, cyanosis, or edema  PSYCH: AAOx3  NEUROLOGY: non-focal  SKIN: No rashes or lesions    LABS:    03-03    143  |  108  |  5<L>  ----------------------------<  114<H>  3.7   |  23  |  0.71    Ca    8.9      03 Mar 2020 05:46  Phos  3.6     03-02  Mg     2.0     03-03                RADIOLOGY & ADDITIONAL TESTS:    Imaging Personally Reviewed:    Consultant(s) Notes Reviewed:      Care Discussed with Consultants/Other Providers:

## 2020-03-03 NOTE — PROGRESS NOTE ADULT - ASSESSMENT
doing well, tolerating feeds, plan per meidcal and surgical team G-tube again dislodged by pt, replaced by IR, to keep binder in place and mittens in place

## 2020-03-03 NOTE — PROGRESS NOTE ADULT - ASSESSMENT
59M hx of CVA, here for stroke, new aphasia, PEG 2/21 c/b patient pulling it out same day, family wants everything done. Now s/p open gastrostomy 2/27.      - C/t  increase feeds by 10cc/hr q6hrs to goal, as tolerated   - Abdominal binder   - f/u am labs   - care per primary team 59M hx of CVA, here for stroke, new aphasia, PEG 2/21 c/b patient pulling it out same day, family wants everything done. Now s/p open gastrostomy 2/27.      - C/t  increase feeds by 10cc/hr q6hrs to goal, as tolerated   - Abdominal binder   - f/u am labs   - care per primary team     Addendum  Agree with resident note.  Tube feeds - increase to goal 10cc/6h as tolerated.  Please call the general surgery team with any feeding tube related issues.  Jeanine Angeles MD

## 2020-03-03 NOTE — PROGRESS NOTE ADULT - ASSESSMENT
ASSESSMENT: This is a 58yo German speaking Man with h/o prior CVA w/o residual deficits who presented with 3 weeks of generalized weakness and expressive aphasia. MRI: new acute right posterior frontal infarct, right precentral gyrus. Subacute infarction the right frontoparietal and posterior temporal region, w/ minimal petechial hemorrhagic transformation. Multifocal chronic bilateral MCA territory infarcts. Chronic hemorrhagic products in the region of the left basal ganglia. Persistent mild narrowing of the cavernous and supraclinoid left ICA. Persistent lack of flow related signal within the left M1 segment, with poor visualization of the more distal left MCA branches. Re-demonstration of multifocal mild to moderate stenoses of the right M1, with Normal flow-related enhancement of the more distal right MCA. No flow-limiting stenosis or evidence for arterial dissection. LUIS (2/13/20): normal LV function, no wall abnormality or evidence of PFO.      Impression: Expressive aphasia may be related to Right temporal infarcts, if this is his dominant hemisphere vs. apraxia of speech.  Strokes appear embolic in origin, possibly cardioembolic, exact mechanism remains unknown/ ESUS (Embolic stroke of undetermined source) vs. symptomatic large artery atherosclerosis. Possible PAF, patient is s/p ILR. No current evidence of afib reported on tele. Malignancy work up: CT c/a/p showed ill defined L renal lesion. Sonogram negative. Subsequent MRI (2/28/20) Left renal lesion demonstrates MR findings most compatible with small chronic subcapsular/pericapsular hematoma. PSA negative, Heme onc following, Recs appreciated  as Patient has significant smoking and ETOH use in his past recommend colonoscopy as outpatient not up to date with colon ca screen.    Patient is now s/p insertion of G tube reinsertion and is on feedings. From Neurological perspective work up is now complete, no neurological objection for discharge to rehab pending determination of primary team. Recommendations are to continue current treatment plan. Continue to follow.

## 2020-03-04 LAB
ANION GAP SERPL CALC-SCNC: 10 MMOL/L — SIGNIFICANT CHANGE UP (ref 5–17)
BUN SERPL-MCNC: <4 MG/DL — LOW (ref 7–23)
CALCIUM SERPL-MCNC: 9 MG/DL — SIGNIFICANT CHANGE UP (ref 8.4–10.5)
CHLORIDE SERPL-SCNC: 108 MMOL/L — SIGNIFICANT CHANGE UP (ref 96–108)
CO2 SERPL-SCNC: 23 MMOL/L — SIGNIFICANT CHANGE UP (ref 22–31)
CREAT SERPL-MCNC: 0.68 MG/DL — SIGNIFICANT CHANGE UP (ref 0.5–1.3)
GLUCOSE BLDC GLUCOMTR-MCNC: 111 MG/DL — HIGH (ref 70–99)
GLUCOSE BLDC GLUCOMTR-MCNC: 126 MG/DL — HIGH (ref 70–99)
GLUCOSE BLDC GLUCOMTR-MCNC: 133 MG/DL — HIGH (ref 70–99)
GLUCOSE BLDC GLUCOMTR-MCNC: 134 MG/DL — HIGH (ref 70–99)
GLUCOSE BLDC GLUCOMTR-MCNC: 99 MG/DL — SIGNIFICANT CHANGE UP (ref 70–99)
GLUCOSE SERPL-MCNC: 124 MG/DL — HIGH (ref 70–99)
HCT VFR BLD CALC: 34.9 % — LOW (ref 39–50)
HGB BLD-MCNC: 11.4 G/DL — LOW (ref 13–17)
MCHC RBC-ENTMCNC: 32.4 PG — SIGNIFICANT CHANGE UP (ref 27–34)
MCHC RBC-ENTMCNC: 32.7 GM/DL — SIGNIFICANT CHANGE UP (ref 32–36)
MCV RBC AUTO: 99.1 FL — SIGNIFICANT CHANGE UP (ref 80–100)
NRBC # BLD: 0 /100 WBCS — SIGNIFICANT CHANGE UP (ref 0–0)
PLATELET # BLD AUTO: 333 K/UL — SIGNIFICANT CHANGE UP (ref 150–400)
POTASSIUM SERPL-MCNC: 3.5 MMOL/L — SIGNIFICANT CHANGE UP (ref 3.5–5.3)
POTASSIUM SERPL-SCNC: 3.5 MMOL/L — SIGNIFICANT CHANGE UP (ref 3.5–5.3)
RBC # BLD: 3.52 M/UL — LOW (ref 4.2–5.8)
RBC # FLD: 13.2 % — SIGNIFICANT CHANGE UP (ref 10.3–14.5)
SODIUM SERPL-SCNC: 141 MMOL/L — SIGNIFICANT CHANGE UP (ref 135–145)
WBC # BLD: 7.57 K/UL — SIGNIFICANT CHANGE UP (ref 3.8–10.5)
WBC # FLD AUTO: 7.57 K/UL — SIGNIFICANT CHANGE UP (ref 3.8–10.5)

## 2020-03-04 RX ORDER — ERGOCALCIFEROL 1.25 MG/1
2000 CAPSULE ORAL DAILY
Refills: 0 | Status: DISCONTINUED | OUTPATIENT
Start: 2020-03-04 | End: 2020-03-07

## 2020-03-04 RX ORDER — ASPIRIN/CALCIUM CARB/MAGNESIUM 324 MG
81 TABLET ORAL DAILY
Refills: 0 | Status: DISCONTINUED | OUTPATIENT
Start: 2020-03-05 | End: 2020-03-07

## 2020-03-04 RX ADMIN — CLOPIDOGREL BISULFATE 75 MILLIGRAM(S): 75 TABLET, FILM COATED ORAL at 11:31

## 2020-03-04 RX ADMIN — ENOXAPARIN SODIUM 40 MILLIGRAM(S): 100 INJECTION SUBCUTANEOUS at 11:31

## 2020-03-04 RX ADMIN — Medication 300 MILLIGRAM(S): at 12:59

## 2020-03-04 RX ADMIN — ERGOCALCIFEROL 2000 UNIT(S): 1.25 CAPSULE ORAL at 17:03

## 2020-03-04 RX ADMIN — Medication 15 MILLILITER(S): at 11:32

## 2020-03-04 RX ADMIN — PREGABALIN 1000 MICROGRAM(S): 225 CAPSULE ORAL at 11:31

## 2020-03-04 RX ADMIN — ATORVASTATIN CALCIUM 80 MILLIGRAM(S): 80 TABLET, FILM COATED ORAL at 21:49

## 2020-03-04 RX ADMIN — SODIUM CHLORIDE 70 MILLILITER(S): 9 INJECTION, SOLUTION INTRAVENOUS at 08:13

## 2020-03-04 NOTE — PROGRESS NOTE ADULT - ASSESSMENT
59Y Urdu speaking M with prior CVA 8y ago (no reported deficits) brought in by brother for concerns of generalized weakness and inability to speak for the past 3 weeks. On exam found to have no verbal output, appeared withdrawn and weak on neurological evaluation  He follows commands intermittently and is easily agitated.  Exam limited due to patient aggression on admission  CTH demonstrates chronic L MCA infarct with severely stenotic vs occluded distal L M1.   this is confirmed on MRI but also an acute right post frontal infarct, benign LUIS and s/p Medtronic ILR placement 2/13, will call for F/U prior to DC  2/2 ongoing bradycardia EPS reconsulted. no intervention recommended   s/p PEG placement 2/21, procedure went well, in the turner post procedure ptn pulled PEG out, remained NPO while awaiting open gastrostomy , on 3/2 noted with moderate leaking around G-tube: was pulled out from 9 cm in to only 1 cm in. G tube repositioned under fluoro on 3/2  TF resumed. goal rate is 80 cc/hr. cont trickle feeds and advance 10 cc/hr q6H  DC plan to acute rehab  Neuro recommended malignancy work up 2/2 unexplained multiple embolic strokes. Oncology consult w Dr. Sanchez appreciated.   s/p CT C/A/P w  IV contrast only. no significant findings exc questionable renal mass, but sono neg and MRI confirms it being a subcapsular hematoma.  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

## 2020-03-04 NOTE — PROGRESS NOTE ADULT - SUBJECTIVE AND OBJECTIVE BOX
SURGERY DAILY PROGRESS NOTE:       SUBJECTIVE/ROS: Patient examined at bedside. No acute events overnight, patient tolerating trickle feeds           MEDICATIONS  (STANDING):  aspirin Suppository 300 milliGRAM(s) Rectal daily  atorvastatin 80 milliGRAM(s) Oral at bedtime  clopidogrel Tablet 75 milliGRAM(s) Oral daily  cyanocobalamin 1000 MICROGram(s) Oral daily  dextrose 5% + sodium chloride 0.45%. 1000 milliLiter(s) (70 mL/Hr) IV Continuous <Continuous>  dextrose 5%. 1000 milliLiter(s) (50 mL/Hr) IV Continuous <Continuous>  dextrose 50% Injectable 12.5 Gram(s) IV Push once  dextrose 50% Injectable 25 Gram(s) IV Push once  dextrose 50% Injectable 25 Gram(s) IV Push once  enoxaparin Injectable 40 milliGRAM(s) SubCutaneous daily  ergocalciferol 97565 Unit(s) Oral every week  multivitamin/minerals/iron Oral Solution (CENTRUM) 15 milliLiter(s) Enteral Tube daily    MEDICATIONS  (PRN):  bisacodyl Suppository 10 milliGRAM(s) Rectal daily PRN Constipation  dextrose 40% Gel 15 Gram(s) Oral once PRN Blood Glucose LESS THAN 70 milliGRAM(s)/deciliter  famotidine Injectable 20 milliGRAM(s) IV Push every 12 hours PRN Dyspepsia  glucagon  Injectable 1 milliGRAM(s) IntraMuscular once PRN Glucose LESS THAN 70 milligrams/deciliter  morphine  - Injectable 2 milliGRAM(s) IV Push every 4 hours PRN Severe Pain (7 - 10)  ondansetron Injectable 4 milliGRAM(s) IV Push every 6 hours PRN Nausea      OBJECTIVE:    Vital Signs Last 24 Hrs  T(C): 36.7 (04 Mar 2020 03:42), Max: 36.7 (03 Mar 2020 11:12)  T(F): 98.1 (04 Mar 2020 03:42), Max: 98.1 (03 Mar 2020 11:12)  HR: 49 (04 Mar 2020 03:42) (49 - 52)  BP: 127/81 (04 Mar 2020 03:42) (124/85 - 161/76)  BP(mean): --  RR: 18 (04 Mar 2020 03:42) (18 - 18)  SpO2: 98% (04 Mar 2020 03:42) (97% - 100%)        I&O's Detail    02 Mar 2020 07:01  -  03 Mar 2020 07:00  --------------------------------------------------------  IN:    dextrose 5% + sodium chloride 0.45%.: 840 mL    Solution: 150 mL  Total IN: 990 mL    OUT:    Voided: 300 mL  Total OUT: 300 mL    Total NET: 690 mL      03 Mar 2020 07:01  -  04 Mar 2020 04:31  --------------------------------------------------------  IN:  Total IN: 0 mL    OUT:    Voided: 200 mL  Total OUT: 200 mL    Total NET: -200 mL          Daily     Daily Weight in k (03 Mar 2020 11:12)    LABS:        143  |  108  |  5<L>  ----------------------------<  114<H>  3.7   |  23  |  0.71    Ca    8.9      03 Mar 2020 05:46  Phos  3.6     -02  Mg     2.0                             PHYSICAL EXAM:  Constitutional: well developed, well nourished, NAD  Eyes: anicteric  ENMT: normal facies, symmetric  Respiratory: Breathing comfortably    Gastrointestinal: abdomen soft, nontender, nondistended. G tube in place with minimal leakage

## 2020-03-04 NOTE — PROGRESS NOTE ADULT - ATTENDING COMMENTS
I have seen and examined the patient.  I agree with the surgical resident's note.  G-Tube is ok - tolerating feeds    Drew Peña contact information [phone]
I have seen and examined the patient. I agree with the above surgery resident's note.
Patient seen and examined this AM with  phone. Agree with note as above.
VASCULAR NEUROLOGY ATTENDING  The patient is seen and examined the history and imaging are reviewed. I agree with the resident note unless otherwise noted. Agree with OR for open G-tube placement. Patient unlikely to regain swallow to sustain nutrition in near future. Would continue ASA TN for now.
I have seen and examined the patient. I agree with the above surgery resident's note.
I have seen and examined the patient. I agree with the above surgery resident's note.  g-tube pulled out (was at 9cm now at 1cm)  for g tube advancement under flouro  hold TF's
Surgery Attending    Pt seen and examined; agree with above assessment and plan
I have seen and examined the patient. I agree with the above surgery resident's note.  keep GT plugged today  start tfs tomorrow  cont restraints and 1:1
I have seen and examined the patient. I agree with the above surgery resident's note.  resume tf's

## 2020-03-04 NOTE — PROGRESS NOTE ADULT - SUBJECTIVE AND OBJECTIVE BOX
Pt seen    MEDICATIONS  (STANDING):  aspirin Suppository 300 milliGRAM(s) Rectal daily  atorvastatin 80 milliGRAM(s) Oral at bedtime  clopidogrel Tablet 75 milliGRAM(s) Oral daily  cyanocobalamin 1000 MICROGram(s) Oral daily  dextrose 5% + sodium chloride 0.45%. 1000 milliLiter(s) (70 mL/Hr) IV Continuous <Continuous>  dextrose 5%. 1000 milliLiter(s) (50 mL/Hr) IV Continuous <Continuous>  dextrose 50% Injectable 12.5 Gram(s) IV Push once  dextrose 50% Injectable 25 Gram(s) IV Push once  dextrose 50% Injectable 25 Gram(s) IV Push once  enoxaparin Injectable 40 milliGRAM(s) SubCutaneous daily  ergocalciferol 79362 Unit(s) Oral every week  multivitamin/minerals/iron Oral Solution (CENTRUM) 15 milliLiter(s) Enteral Tube daily    MEDICATIONS  (PRN):  bisacodyl Suppository 10 milliGRAM(s) Rectal daily PRN Constipation  dextrose 40% Gel 15 Gram(s) Oral once PRN Blood Glucose LESS THAN 70 milliGRAM(s)/deciliter  famotidine Injectable 20 milliGRAM(s) IV Push every 12 hours PRN Dyspepsia  glucagon  Injectable 1 milliGRAM(s) IntraMuscular once PRN Glucose LESS THAN 70 milligrams/deciliter  morphine  - Injectable 2 milliGRAM(s) IV Push every 4 hours PRN Severe Pain (7 - 10)  ondansetron Injectable 4 milliGRAM(s) IV Push every 6 hours PRN Nausea      ROS  UTO 2/2 participation    Vital Signs Last 24 Hrs  T(C): 36.7 (04 Mar 2020 11:11), Max: 36.7 (03 Mar 2020 22:01)  T(F): 98 (04 Mar 2020 11:11), Max: 98.1 (04 Mar 2020 03:42)  HR: 49 (04 Mar 2020 11:11) (49 - 50)  BP: 113/72 (04 Mar 2020 11:11) (113/72 - 161/76)  BP(mean): --  RR: 18 (04 Mar 2020 11:11) (18 - 18)  SpO2: 97% (04 Mar 2020 11:11) (97% - 100%)    PE  NAD  Awake, alert  Anicteric  RRR  CTAB ant chest, limited effort  Abd soft, NT, ND  No edema  moving all ext spontaneously                          11.4   7.57  )-----------( 333      ( 04 Mar 2020 05:33 )             34.9       03-04    141  |  108  |  <4<L>  ----------------------------<  124<H>  3.5   |  23  |  0.68    Ca    9.0      04 Mar 2020 05:32  Mg     2.0     03-03

## 2020-03-04 NOTE — PROGRESS NOTE ADULT - SUBJECTIVE AND OBJECTIVE BOX
PAM JIMENEZ:[id number],   59yMale followed for:  No Known Allergies    PAST MEDICAL & SURGICAL HISTORY:  CVA (cerebral vascular accident)  No significant past surgical history    FAMILY HISTORY:  No pertinent family history in first degree relatives    MEDICATIONS  (STANDING):  aspirin Suppository 300 milliGRAM(s) Rectal daily  atorvastatin 80 milliGRAM(s) Oral at bedtime  clopidogrel Tablet 75 milliGRAM(s) Oral daily  cyanocobalamin 1000 MICROGram(s) Oral daily  dextrose 5% + sodium chloride 0.45%. 1000 milliLiter(s) (70 mL/Hr) IV Continuous <Continuous>  dextrose 5%. 1000 milliLiter(s) (50 mL/Hr) IV Continuous <Continuous>  dextrose 50% Injectable 12.5 Gram(s) IV Push once  dextrose 50% Injectable 25 Gram(s) IV Push once  dextrose 50% Injectable 25 Gram(s) IV Push once  enoxaparin Injectable 40 milliGRAM(s) SubCutaneous daily  ergocalciferol 74908 Unit(s) Oral every week  multivitamin/minerals/iron Oral Solution (CENTRUM) 15 milliLiter(s) Enteral Tube daily    MEDICATIONS  (PRN):  bisacodyl Suppository 10 milliGRAM(s) Rectal daily PRN Constipation  dextrose 40% Gel 15 Gram(s) Oral once PRN Blood Glucose LESS THAN 70 milliGRAM(s)/deciliter  famotidine Injectable 20 milliGRAM(s) IV Push every 12 hours PRN Dyspepsia  glucagon  Injectable 1 milliGRAM(s) IntraMuscular once PRN Glucose LESS THAN 70 milligrams/deciliter  morphine  - Injectable 2 milliGRAM(s) IV Push every 4 hours PRN Severe Pain (7 - 10)  ondansetron Injectable 4 milliGRAM(s) IV Push every 6 hours PRN Nausea      Vital Signs Last 24 Hrs  T(C): 36.7 (04 Mar 2020 03:42), Max: 36.7 (03 Mar 2020 11:12)  T(F): 98.1 (04 Mar 2020 03:42), Max: 98.1 (03 Mar 2020 11:12)  HR: 49 (04 Mar 2020 03:42) (49 - 51)  BP: 127/81 (04 Mar 2020 03:42) (127/81 - 161/76)  BP(mean): --  RR: 18 (04 Mar 2020 03:42) (18 - 18)  SpO2: 98% (04 Mar 2020 03:42) (98% - 100%)  nc/at  s1s2  cta  soft, nt, nd no guarding or rebound  no c/c/e    CBC Full  -  ( 04 Mar 2020 05:33 )  WBC Count : 7.57 K/uL  RBC Count : 3.52 M/uL  Hemoglobin : 11.4 g/dL  Hematocrit : 34.9 %  Platelet Count - Automated : 333 K/uL  Mean Cell Volume : 99.1 fl  Mean Cell Hemoglobin : 32.4 pg  Mean Cell Hemoglobin Concentration : 32.7 gm/dL  Auto Neutrophil # : x  Auto Lymphocyte # : x  Auto Monocyte # : x  Auto Eosinophil # : x  Auto Basophil # : x  Auto Neutrophil % : x  Auto Lymphocyte % : x  Auto Monocyte % : x  Auto Eosinophil % : x  Auto Basophil % : x    03-04    141  |  108  |  <4<L>  ----------------------------<  x   3.5   |  23  |  x     Ca    8.9      03 Mar 2020 05:46  Mg     2.0     03-03

## 2020-03-04 NOTE — PROGRESS NOTE ADULT - ASSESSMENT
ASSESSMENT: This is a 60yo Welsh speaking Man with h/o prior CVA w/o residual deficits who presented with 3 weeks of generalized weakness and expressive aphasia. MRI: new acute right posterior frontal infarct, right precentral gyrus. Subacute infarction the right frontoparietal and posterior temporal region, w/ minimal petechial hemorrhagic transformation. Multifocal chronic bilateral MCA territory infarcts. Chronic hemorrhagic products in the region of the left basal ganglia. Persistent mild narrowing of the cavernous and supraclinoid left ICA. Persistent lack of flow related signal within the left M1 segment, with poor visualization of the more distal left MCA branches. Re-demonstration of multifocal mild to moderate stenoses of the right M1, with Normal flow-related enhancement of the more distal right MCA. No flow-limiting stenosis or evidence for arterial dissection. LUIS (2/13/20): normal LV function, no wall abnormality or evidence of PFO.      Impression: Expressive aphasia may be related to Right temporal infarcts, if this is his dominant hemisphere vs. apraxia of speech.  Strokes appear embolic in origin, possibly cardioembolic, exact mechanism remains unknown/ ESUS (Embolic stroke of undetermined source) vs. symptomatic large artery atherosclerosis. Possible PAF, patient is s/p ILR. No current evidence of afib reported on tele.   Malignancy work up: CT c/a/p showed ill defined L renal lesion. Sonogram negative. Subsequent MRI (2/28/20) Left renal lesion demonstrates MR findings most compatible with small chronic subcapsular/pericapsular hematoma. PSA negative, Heme onc following, Recs appreciated  as Patient has significant smoking and ETOH use in his past recommend colonoscopy as outpatient not up to date with colon ca screen.    Patient is now s/p insertion of G tube reinsertion and is on feedings, slowly progressing to goal, management per primary team. It is anticipated that patient's dysphagia will continue for at least six months, perhaps longer.    Recommend Gradual return to normotension, avoid hypotension.   Recommend patient to  resume DAPT with ASA 81mg + Plavix 75 QD for 90 days followed by ASA monotherapy per [facility] protocol once cleared to resume by GI and per primary medical team..     From Neurological perspective, his exam remains relatively unchanged, very mild dysmetria noted to LUE on FTN portion of exam this morning, no other significant findings:, no changes in strength and no drift appreciable and patient was awakened from sleep, may be contributory. Discussed with medicine NP, John, will continue to monitor. Stroke work up now complete, no neurological objection for discharge to rehab pending determination of primary team. Recommendations are to continue current treatment plan. Continue to follow.

## 2020-03-04 NOTE — PROGRESS NOTE ADULT - ASSESSMENT
59M hx of CVA, here for stroke, new aphasia, PEG 2/21 c/b patient pulling it out same day, family wants everything done. Now s/p open gastrostomy 2/27.      - C/t  increase feeds by 10cc/hr q6hrs to goal, as tolerated   - Abdominal binder   - f/u am labs   - care per primary team 59M hx of CVA, here for stroke, new aphasia, PEG 2/21 c/b patient pulling it out same day, family wants everything done. Now s/p open gastrostomy 2/27.      - C/t  increase feeds by 10cc/hr q6hrs to goal, as tolerated   - Abdominal binder   - f/u am labs   - care per primary team   - please page green surgery with any additional questions or concerns    Green Team Surgery   [id number]

## 2020-03-04 NOTE — PROGRESS NOTE ADULT - ASSESSMENT
· Assessment		  58 yo male with multiple recurrent CVA's.  On dual antiplatelet therapy.  Unclear whether age appropriate cancer screening has been performed.    Recurrent CVA  -CT c/a/p showed ill defined L renal lesion. Sonogram negative   -significant smoking and ETOH use in his past.   -colonoscopy as outpatient if pt not up to date with colonoscopy for colon ca screen  -PSA neg    Macrocytosis w/o anemia  -B12 adequate  -folate decreased, now on folic acid daily  -MCV improving  -hgb improving as well    CVA -- per neuro    Decreased PO intake  -s/p gastrostomy 2/27, mgmt per GI, surgery    Will follow, [phone]

## 2020-03-04 NOTE — PROGRESS NOTE ADULT - SUBJECTIVE AND OBJECTIVE BOX
THE PATIENT WAS SEEN AND EXAMINED BY ME WITH THE HOUSESTAFF AND STROKE TEAM DURING MORNING ROUNDS.   HPI:  60 yo M with h/o CVA 8 years ago presenting with aphasia. Pt is accompanied by brother who interpreted in the ED. Brother endorsed that pt went to visit family in [place] 9/2019 and about 3 weeks ago, family stated that he was starting to get weak and stopped being able to speak. He was also endorsing difficulty walking from weakness. He returned from [place] last night and came to the ED for evaluation. Pt also developed lesions on his mouth 3 days ago. He denies any other medical history or medication use. (09 Feb 2020 20:23)      SUBJECTIVE: No events overnight.  No new neurologic complaints.      aspirin Suppository 300 milliGRAM(s) Rectal daily  atorvastatin 80 milliGRAM(s) Oral at bedtime  bisacodyl Suppository 10 milliGRAM(s) Rectal daily PRN  clopidogrel Tablet 75 milliGRAM(s) Oral daily  cyanocobalamin 1000 MICROGram(s) Oral daily  dextrose 40% Gel 15 Gram(s) Oral once PRN  dextrose 5% + sodium chloride 0.45%. 1000 milliLiter(s) IV Continuous <Continuous>  dextrose 5%. 1000 milliLiter(s) IV Continuous <Continuous>  dextrose 50% Injectable 12.5 Gram(s) IV Push once  dextrose 50% Injectable 25 Gram(s) IV Push once  dextrose 50% Injectable 25 Gram(s) IV Push once  enoxaparin Injectable 40 milliGRAM(s) SubCutaneous daily  ergocalciferol 75002 Unit(s) Oral every week  famotidine Injectable 20 milliGRAM(s) IV Push every 12 hours PRN  glucagon  Injectable 1 milliGRAM(s) IntraMuscular once PRN  morphine  - Injectable 2 milliGRAM(s) IV Push every 4 hours PRN  multivitamin/minerals/iron Oral Solution (CENTRUM) 15 milliLiter(s) Enteral Tube daily  ondansetron Injectable 4 milliGRAM(s) IV Push every 6 hours PRN      PHYSICAL EXAM:   Vital Signs Last 24 Hrs  T(C): 36.7 (04 Mar 2020 11:11), Max: 36.7 (03 Mar 2020 22:01)  T(F): 98 (04 Mar 2020 11:11), Max: 98.1 (04 Mar 2020 03:42)  HR: 49 (04 Mar 2020 11:11) (49 - 50)  BP: 113/72 (04 Mar 2020 11:11) (113/72 - 161/76)  BP(mean): --  RR: 18 (04 Mar 2020 11:11) (18 - 18)  SpO2: 97% (04 Mar 2020 11:11) (97% - 100%)    General: Awakened from sleep, arouses easily.  Waves in greeting and attends examiner. Globally aphasic, will mimic successfully but does not follow simple commands. Resting in bed.  No acute distress.  HEENT: Normocephalic, atraumatic.  EOM intact.  Abdomen: G tube insitu, feedings in progress. Abdominal binder in place. Abdomen soft, non tender, not distended.  Extremities: No edema. Removed mitten restraints for exam.     NEUROLOGICAL EXAM:  Mental status: Awake, alert. Globally aphasic, does not follow verbal commands, but will mimic: stick out tongue, smile, raise arms, etc. No verbal output.  Unable to assess comprehension or memory.    Cranial Nerves: Left sided facial droop with mild Ptosis left upper eye lid. No tongue deviation noted on this exam. No appreciable nystagmus. Able to turn head side to side, fluid motion, will not hold position to assess strength.  Exam is limited as patient  not following commands.   Motor exam: Normal tone, no drift, 5/5 RUE, 4/5 RLE, 5/5 LUE, 5/5 LLE.  Sensation: Appears intact to light touch   Coordination/ Gait: FTN Slight dysmetria to LUE on this exam, despite repeated demonstration, no drift or change in strength. FTN is intact on RUE.  Gait not assessed, on bedrest.       LABS:                        11.4   7.57  )-----------( 333      ( 04 Mar 2020 05:33 )             34.9    03-04    141  |  108  |  <4<L>  ----------------------------<  124<H>  3.5   |  23  |  0.68    Ca    9.0      04 Mar 2020 05:32  Mg     2.0     03-03      Hemoglobin A1C, Whole Blood: 5.5 % (02-10 @ 09:08)      IMAGING:    MR Abdomen w/wo IV Cont (02.28.20 @ 20:31)   IMPRESSION:   Left renal lesion demonstrates MR findings most compatible with small chronic subcapsular/pericapsular hematoma.      CT Chest w/ IV Cont (02.25.20 @ 23:27)                      CT ABDOMEN AND PELVIS IC                         IMPRESSION:   Ill-defined low-attenuation along the cortex of the left kidney, of uncertain etiology. Infectious and neoplastic etiologies are considered.  Moderate fecal material in the rectum with mild associated wall thickening and infiltration of the adjacent fat, suggestive of stercoral colitis.     Transesophageal Echocardiogram w/o TTE (02.13.20 @ 15:15) >  Conclusions:  Normal left ventricular systolic function. No segmental  wall motion abnormalities.  Agitated saline contrast injection demonstrates no evidence  of a patent foramen ovale.    MR Head w/wo IV Cont (02.11.20 @ 15:07)   IMPRESSION:  BRAIN MRI:  Acute right posterior frontal infarct in the region of the right precentral gyrus. Subacute infarction the right frontoparietal and posterior temporal region. Minimal petechial hemorrhagic transformation and gyral enhancement is noted in both regions.  Multifocal chronic bilateral MCA territory infarcts. Chronic hemorrhagic products in the region of the left basal ganglia.    BRAIN MRA:  Persistent mild narrowing of the cavernous and supraclinoid left ICA. Persistent lack of flow related signal within the left M1 segment, with poor visualization of the more distal left MCA branches.    Re-demonstration of multifocal mild to moderate stenoses of the right M1 segment. Normal flow-related enhancement of the more distal right MCA.    NECK MRA:  No flow-limiting stenosis or evidence for arterial dissection.       CT Angio Neck w/ IV Cont (02.09.20 @ 15:13)   IMPRESSION:     CT brain: Multiple areas of prior infarct as described above. No acute hemorrhage or evidence of mass effect. Age-indeterminate lacunar infarcts as described. If the patient has a new and persistent neurologic deficit, consider short follow-up head CT or brain MRI follow-up.    CT angiography neck: No hemodynamically significant stenosis by NASCET criteria. No vascular dissection.    CT angiography brain: The left middle cerebral artery is severely stenosed versus occluded in this patient with a chronic left MCA territory infarct. Marked decrease branching of the distal left MCA branches are noted in the left sylvian fissure compared to the contralateral side.    Mild to moderate lobulated stenoses involve the M1 segment of the right middle cerebral artery.

## 2020-03-04 NOTE — PROGRESS NOTE ADULT - SUBJECTIVE AND OBJECTIVE BOX
Patient is a 59y old  Male who presents with a chief complaint of mouth lesions, possible stroke (04 Mar 2020 11:39)      SUBJECTIVE / OVERNIGHT EVENTS: advancing tube feeds slowly.     MEDICATIONS  (STANDING):  aspirin Suppository 300 milliGRAM(s) Rectal daily  atorvastatin 80 milliGRAM(s) Oral at bedtime  clopidogrel Tablet 75 milliGRAM(s) Oral daily  cyanocobalamin 1000 MICROGram(s) Oral daily  dextrose 5% + sodium chloride 0.45%. 1000 milliLiter(s) (70 mL/Hr) IV Continuous <Continuous>  dextrose 5%. 1000 milliLiter(s) (50 mL/Hr) IV Continuous <Continuous>  dextrose 50% Injectable 12.5 Gram(s) IV Push once  dextrose 50% Injectable 25 Gram(s) IV Push once  dextrose 50% Injectable 25 Gram(s) IV Push once  enoxaparin Injectable 40 milliGRAM(s) SubCutaneous daily  ergocalciferol Drops 2000 Unit(s) Enteral Tube daily  multivitamin/minerals/iron Oral Solution (CENTRUM) 15 milliLiter(s) Enteral Tube daily    MEDICATIONS  (PRN):  bisacodyl Suppository 10 milliGRAM(s) Rectal daily PRN Constipation  dextrose 40% Gel 15 Gram(s) Oral once PRN Blood Glucose LESS THAN 70 milliGRAM(s)/deciliter  famotidine Injectable 20 milliGRAM(s) IV Push every 12 hours PRN Dyspepsia  glucagon  Injectable 1 milliGRAM(s) IntraMuscular once PRN Glucose LESS THAN 70 milligrams/deciliter  morphine  - Injectable 2 milliGRAM(s) IV Push every 4 hours PRN Severe Pain (7 - 10)  ondansetron Injectable 4 milliGRAM(s) IV Push every 6 hours PRN Nausea      Vital Signs Last 24 Hrs  T(F): 98 (03-04-20 @ 11:11), Max: 98.1 (03-04-20 @ 03:42)  HR: 49 (03-04-20 @ 11:11) (49 - 50)  BP: 113/72 (03-04-20 @ 11:11) (113/72 - 135/79)  RR: 18 (03-04-20 @ 11:11) (18 - 18)  SpO2: 97% (03-04-20 @ 11:11) (97% - 99%)  Telemetry:   CAPILLARY BLOOD GLUCOSE      POCT Blood Glucose.: 126 mg/dL (04 Mar 2020 13:15)  POCT Blood Glucose.: 111 mg/dL (04 Mar 2020 07:52)  POCT Blood Glucose.: 99 mg/dL (04 Mar 2020 01:00)  POCT Blood Glucose.: 109 mg/dL (03 Mar 2020 18:09)    I&O's Summary    03 Mar 2020 07:01  -  04 Mar 2020 07:00  --------------------------------------------------------  IN: 1060 mL / OUT: 200 mL / NET: 860 mL    04 Mar 2020 07:01  -  04 Mar 2020 17:04  --------------------------------------------------------  IN: 0 mL / OUT: 400 mL / NET: -400 mL        PHYSICAL EXAM:  GENERAL: NAD, well-developed  HEAD:  Atraumatic, Normocephalic  EYES: EOMI, PERRLA, conjunctiva and sclera clear  NECK: Supple, No JVD  CHEST/LUNG: Clear to auscultation bilaterally; No wheeze  HEART: Regular rate and rhythm; No murmurs, rubs, or gallops  ABDOMEN: Soft, Nontender, Nondistended; Bowel sounds present  EXTREMITIES:  2+ Peripheral Pulses, No clubbing, cyanosis, or edema  PSYCH: AAOx3  NEUROLOGY: non-focal  SKIN: No rashes or lesions    LABS:                        11.4   7.57  )-----------( 333      ( 04 Mar 2020 05:33 )             34.9     03-04    141  |  108  |  <4<L>  ----------------------------<  124<H>  3.5   |  23  |  0.68    Ca    9.0      04 Mar 2020 05:32  Mg     2.0     03-03                RADIOLOGY & ADDITIONAL TESTS:    Imaging Personally Reviewed:    Consultant(s) Notes Reviewed:      Care Discussed with Consultants/Other Providers:

## 2020-03-05 LAB
GLUCOSE BLDC GLUCOMTR-MCNC: 103 MG/DL — HIGH (ref 70–99)
GLUCOSE BLDC GLUCOMTR-MCNC: 107 MG/DL — HIGH (ref 70–99)
GLUCOSE BLDC GLUCOMTR-MCNC: 116 MG/DL — HIGH (ref 70–99)
GLUCOSE BLDC GLUCOMTR-MCNC: 141 MG/DL — HIGH (ref 70–99)

## 2020-03-05 RX ADMIN — Medication 15 MILLILITER(S): at 11:21

## 2020-03-05 RX ADMIN — ERGOCALCIFEROL 2000 UNIT(S): 1.25 CAPSULE ORAL at 12:43

## 2020-03-05 RX ADMIN — ATORVASTATIN CALCIUM 80 MILLIGRAM(S): 80 TABLET, FILM COATED ORAL at 23:31

## 2020-03-05 RX ADMIN — Medication 81 MILLIGRAM(S): at 11:21

## 2020-03-05 RX ADMIN — Medication 10 MILLIGRAM(S): at 11:20

## 2020-03-05 RX ADMIN — ENOXAPARIN SODIUM 40 MILLIGRAM(S): 100 INJECTION SUBCUTANEOUS at 11:21

## 2020-03-05 RX ADMIN — PREGABALIN 1000 MICROGRAM(S): 225 CAPSULE ORAL at 11:21

## 2020-03-05 RX ADMIN — CLOPIDOGREL BISULFATE 75 MILLIGRAM(S): 75 TABLET, FILM COATED ORAL at 11:20

## 2020-03-05 NOTE — CHART NOTE - NSCHARTNOTEFT_GEN_A_CORE
Nutrition Follow Up Note    Patient seen for malnutrition follow up.    Source: EMR    Chart reviewed, events noted. Pt s/p PEG placement on . On 3/1- noted PEG tube with moderate leaking, was dislodged, PEG replaced on 3/2.   TF resumed on 3/3.    Diet: NPO with Enteral Nutrition     Patient with no reported N+V, no documented BM since  - ?accuracy, pt on bowel regimen.    Enteral/Parenteral Nutrition: after PEG tube replacement on 3/2, pt started on trickle feeds with advancement of 10mL/hr.  Pt now tolerating TF at goal of 80mL/hr x 25.  Current order is for Jevity 1.2 x 24hrs (1920mL) with goal rate of 80mL/hr.  At goal, regimen provides 2304kcal, 106.6gm protein, and 1549mL free water.  Provides 37kcal/kg and 1.7gm protein/kg based on dosing wt of 62kg.     Daily Weight in k (), Weight in k () Weight in k (), Weight in k (), Weight in k (), Weight in k.5 (), Weight in k.4 ()  weight trending up, lower weight on  potentially due to changes in bed scale when switching units - will continue to monitor trends    Pertinent Medications: MEDICATIONS  (STANDING):  aspirin  chewable 81 milliGRAM(s) Oral daily  atorvastatin 80 milliGRAM(s) Oral at bedtime  clopidogrel Tablet 75 milliGRAM(s) Oral daily  cyanocobalamin 1000 MICROGram(s) Oral daily  dextrose 5% + sodium chloride 0.45%. 1000 milliLiter(s) (70 mL/Hr) IV Continuous <Continuous>  dextrose 5%. 1000 milliLiter(s) (50 mL/Hr) IV Continuous <Continuous>  dextrose 50% Injectable 12.5 Gram(s) IV Push once  dextrose 50% Injectable 25 Gram(s) IV Push once  dextrose 50% Injectable 25 Gram(s) IV Push once  enoxaparin Injectable 40 milliGRAM(s) SubCutaneous daily  ergocalciferol Drops 2000 Unit(s) Enteral Tube daily  multivitamin/minerals/iron Oral Solution (CENTRUM) 15 milliLiter(s) Enteral Tube daily    MEDICATIONS  (PRN):  bisacodyl Suppository 10 milliGRAM(s) Rectal daily PRN Constipation  dextrose 40% Gel 15 Gram(s) Oral once PRN Blood Glucose LESS THAN 70 milliGRAM(s)/deciliter  famotidine Injectable 20 milliGRAM(s) IV Push every 12 hours PRN Dyspepsia  glucagon  Injectable 1 milliGRAM(s) IntraMuscular once PRN Glucose LESS THAN 70 milligrams/deciliter  morphine  - Injectable 2 milliGRAM(s) IV Push every 4 hours PRN Severe Pain (7 - 10)  ondansetron Injectable 4 milliGRAM(s) IV Push every 6 hours PRN Nausea    Pertinent Labs:   Finger Sticks:  POCT Blood Glucose.: 103 mg/dL ( @ 06:02)  POCT Blood Glucose.: 116 mg/dL ( @ 00:18)  POCT Blood Glucose.: 134 mg/dL ( @ 21:50)  POCT Blood Glucose.: 133 mg/dL ( @ 17:12)  POCT Blood Glucose.: 126 mg/dL ( @ 13:15)    Skin per nursing documentation: no pressure injuries  Edema per nursing documentation: none noted    Estimated Needs: based on dosing wt of 62kg  [x] no change since previous assessment  Estimated Energy Needs (25-30 kcal/kg): 4652-0130 kcal/day  Estimated Protein Needs (1.0-1.2 g/kg): 62-75 g/day  Estimated Fluid Needs (25-30 kcal/kg): 8471-2965 mL/day     Previous Nutrition Diagnosis: severe malnutrition  Nutrition Diagnosis is ongoing due to interruptions in feed infusion, addressed with initiation of enteral feeds    New Nutrition Diagnosis: none    Recommend  1. Modify regimen to Jevity 1.2 x 24hrs with goal rate of 60mL/hr (1440mL). At goal provides 1728kcal, 80gm protein, 1162mL free water. Provides 28kcal/kg, 1.3g protein/kg based on dosing wt 62kg. Defer free water flushes to team.  2. Continue with micronutrient supplementation as ordered  3. RD remains available to modify enteral feeding regimen as needed    Monitoring and Evaluation:     Continue to monitor nutritional intake, tolerance to diet prescription, weights, labs, skin integrity    RD remains available upon request and will follow up per protocol    Jenn Guillen RD, Pager # [phone] Nutrition Follow Up Note    Patient seen for malnutrition follow up.    Source: EMR    Chart reviewed, events noted. Pt s/p PEG placement on . On 3/1- noted PEG tube with moderate leaking, was dislodged, PEG replaced on 3/2.   TF resumed on 3/3.    Diet: NPO with Enteral Nutrition     Patient with no reported N+V, no documented BM since  - ?accuracy, pt on bowel regimen.  Pt sound asleep at time of visit. Per RN pt is tolerating feeds, no reported leakage present around tube.     Enteral/Parenteral Nutrition: after PEG tube replacement on 3/2, pt started on trickle feeds with advancement of 10mL/hr.  Pt now tolerating TF at goal of 50mL/hr x 25.  Current order is for Jevity 1.2 x 24hrs (1920mL) with goal rate of 80mL/hr.  At goal, regimen provides 2304kcal, 106.6gm protein, and 1549mL free water.  Provides 37kcal/kg and 1.7gm protein/kg based on dosing wt of 62kg.     Daily Weight in k (), Weight in k () Weight in k (), Weight in k (), Weight in k (), Weight in k.5 (), Weight in k.4 ()  weight trending up, lower weight on  potentially due to changes in bed scale when switching units - will continue to monitor trends    Pertinent Medications: MEDICATIONS  (STANDING):  aspirin  chewable 81 milliGRAM(s) Oral daily  atorvastatin 80 milliGRAM(s) Oral at bedtime  clopidogrel Tablet 75 milliGRAM(s) Oral daily  cyanocobalamin 1000 MICROGram(s) Oral daily  dextrose 5% + sodium chloride 0.45%. 1000 milliLiter(s) (70 mL/Hr) IV Continuous <Continuous>  dextrose 5%. 1000 milliLiter(s) (50 mL/Hr) IV Continuous <Continuous>  dextrose 50% Injectable 12.5 Gram(s) IV Push once  dextrose 50% Injectable 25 Gram(s) IV Push once  dextrose 50% Injectable 25 Gram(s) IV Push once  enoxaparin Injectable 40 milliGRAM(s) SubCutaneous daily  ergocalciferol Drops 2000 Unit(s) Enteral Tube daily  multivitamin/minerals/iron Oral Solution (CENTRUM) 15 milliLiter(s) Enteral Tube daily    MEDICATIONS  (PRN):  bisacodyl Suppository 10 milliGRAM(s) Rectal daily PRN Constipation  dextrose 40% Gel 15 Gram(s) Oral once PRN Blood Glucose LESS THAN 70 milliGRAM(s)/deciliter  famotidine Injectable 20 milliGRAM(s) IV Push every 12 hours PRN Dyspepsia  glucagon  Injectable 1 milliGRAM(s) IntraMuscular once PRN Glucose LESS THAN 70 milligrams/deciliter  morphine  - Injectable 2 milliGRAM(s) IV Push every 4 hours PRN Severe Pain (7 - 10)  ondansetron Injectable 4 milliGRAM(s) IV Push every 6 hours PRN Nausea    Pertinent Labs:   Finger Sticks:  POCT Blood Glucose.: 103 mg/dL ( @ 06:02)  POCT Blood Glucose.: 116 mg/dL ( @ 00:18)  POCT Blood Glucose.: 134 mg/dL ( @ 21:50)  POCT Blood Glucose.: 133 mg/dL ( @ 17:12)  POCT Blood Glucose.: 126 mg/dL ( @ 13:15)    Skin per nursing documentation: no pressure injuries  Edema per nursing documentation: none noted    Estimated Needs: based on dosing wt of 62kg  [x] no change since previous assessment  Estimated Energy Needs (25-30 kcal/kg): 6514-3595 kcal/day  Estimated Protein Needs (1.0-1.2 g/kg): 62-75 g/day  Estimated Fluid Needs (25-30 kcal/kg): 9022-1066 mL/day     Previous Nutrition Diagnosis: severe malnutrition  Nutrition Diagnosis is ongoing due to interruptions in feed infusion, addressed with initiation of enteral feeds    New Nutrition Diagnosis: none    Recommend  1. Modify regimen to Jevity 1.2 x 24hrs with goal rate of 60mL/hr (1440mL). At goal provides 1728kcal, 80gm protein, 1162mL free water. Provides 28kcal/kg, 1.3g protein/kg based on dosing wt 62kg. Defer free water flushes to team.  2. Continue with micronutrient supplementation as ordered, continue bowel regimen in setting of constipation  3. RD remains available to modify enteral feeding regimen as needed    Monitoring and Evaluation:     Continue to monitor nutritional intake, tolerance to diet prescription, weights, labs, skin integrity    RD remains available upon request and will follow up per protocol    Jenn Guillen RD, Pager # [phone]

## 2020-03-05 NOTE — PROGRESS NOTE ADULT - ASSESSMENT
59Y Japanese speaking M with prior CVA 8y ago (no reported deficits) brought in by brother for concerns of generalized weakness and inability to speak for the past 3 weeks. On exam found to have no verbal output, appeared withdrawn and weak on neurological evaluation  He follows commands intermittently and is easily agitated.  Exam limited due to patient aggression on admission  CTH demonstrates chronic L MCA infarct with severely stenotic vs occluded distal L M1.   this is confirmed on MRI but also an acute right post frontal infarct, benign LUIS and s/p Medtronic ILR placement 2/13, will call for F/U prior to DC  2/2 ongoing bradycardia EPS reconsulted. no intervention recommended   s/p PEG placement 2/21, procedure went well, in the turner post procedure ptn pulled PEG out, remained NPO while awaiting open gastrostomy , on 3/2 noted with moderate leaking around G-tube: was pulled out from 9 cm in to only 1 cm in. G tube repositioned under fluoro on 3/2  on TF . goal rate is 80 cc/hr. cont trickle feeds and advance 10 cc/hr q6H  DC plan to acute rehab  Neuro recommended malignancy work up 2/2 unexplained multiple embolic strokes. Oncology consult w Dr. Sanchez appreciated.   s/p CT C/A/P w  IV contrast only. no significant findings exc questionable renal mass, but sono neg and MRI confirms it being a subcapsular hematoma.  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

## 2020-03-05 NOTE — PROGRESS NOTE ADULT - SUBJECTIVE AND OBJECTIVE BOX
PAM JIMENEZ:[id number],   59yMale followed for:  No Known Allergies    PAST MEDICAL & SURGICAL HISTORY:  CVA (cerebral vascular accident)  No significant past surgical history    FAMILY HISTORY:  No pertinent family history in first degree relatives    MEDICATIONS  (STANDING):  aspirin  chewable 81 milliGRAM(s) Oral daily  atorvastatin 80 milliGRAM(s) Oral at bedtime  clopidogrel Tablet 75 milliGRAM(s) Oral daily  cyanocobalamin 1000 MICROGram(s) Oral daily  dextrose 5% + sodium chloride 0.45%. 1000 milliLiter(s) (70 mL/Hr) IV Continuous <Continuous>  dextrose 5%. 1000 milliLiter(s) (50 mL/Hr) IV Continuous <Continuous>  dextrose 50% Injectable 12.5 Gram(s) IV Push once  dextrose 50% Injectable 25 Gram(s) IV Push once  dextrose 50% Injectable 25 Gram(s) IV Push once  enoxaparin Injectable 40 milliGRAM(s) SubCutaneous daily  ergocalciferol Drops 2000 Unit(s) Enteral Tube daily  multivitamin/minerals/iron Oral Solution (CENTRUM) 15 milliLiter(s) Enteral Tube daily    MEDICATIONS  (PRN):  bisacodyl Suppository 10 milliGRAM(s) Rectal daily PRN Constipation  dextrose 40% Gel 15 Gram(s) Oral once PRN Blood Glucose LESS THAN 70 milliGRAM(s)/deciliter  famotidine Injectable 20 milliGRAM(s) IV Push every 12 hours PRN Dyspepsia  glucagon  Injectable 1 milliGRAM(s) IntraMuscular once PRN Glucose LESS THAN 70 milligrams/deciliter  morphine  - Injectable 2 milliGRAM(s) IV Push every 4 hours PRN Severe Pain (7 - 10)  ondansetron Injectable 4 milliGRAM(s) IV Push every 6 hours PRN Nausea      Vital Signs Last 24 Hrs  T(C): 36.8 (05 Mar 2020 05:18), Max: 36.8 (05 Mar 2020 05:18)  T(F): 98.2 (05 Mar 2020 05:18), Max: 98.2 (05 Mar 2020 05:18)  HR: 47 (05 Mar 2020 05:18) (45 - 49)  BP: 105/641 (05 Mar 2020 05:18) (105/641 - 118/73)  BP(mean): --  RR: 18 (05 Mar 2020 05:18) (18 - 18)  SpO2: 97% (05 Mar 2020 05:18) (96% - 97%)  nc/at  s1s2  cta  soft, nt, nd no guarding or rebound  no c/c/e    CBC Full  -  ( 04 Mar 2020 05:33 )  WBC Count : 7.57 K/uL  RBC Count : 3.52 M/uL  Hemoglobin : 11.4 g/dL  Hematocrit : 34.9 %  Platelet Count - Automated : 333 K/uL  Mean Cell Volume : 99.1 fl  Mean Cell Hemoglobin : 32.4 pg  Mean Cell Hemoglobin Concentration : 32.7 gm/dL  Auto Neutrophil # : x  Auto Lymphocyte # : x  Auto Monocyte # : x  Auto Eosinophil # : x  Auto Basophil # : x  Auto Neutrophil % : x  Auto Lymphocyte % : x  Auto Monocyte % : x  Auto Eosinophil % : x  Auto Basophil % : x    03-04    141  |  108  |  <4<L>  ----------------------------<  124<H>  3.5   |  23  |  0.68    Ca    9.0      04 Mar 2020 05:32

## 2020-03-05 NOTE — PROGRESS NOTE ADULT - ASSESSMENT
ASSESSMENT: This is a 58yo Khmer speaking Man with h/o prior CVA w/o residual deficits who presented with 3 weeks of generalized weakness and expressive aphasia. MRI: new acute right posterior frontal infarct, right precentral gyrus. Subacute infarction the right frontoparietal and posterior temporal region, w/ minimal petechial hemorrhagic transformation. Multifocal chronic bilateral MCA territory infarcts. Chronic hemorrhagic products in the region of the left basal ganglia. Persistent mild narrowing of the cavernous and supraclinoid left ICA. Persistent lack of flow related signal within the left M1 segment, with poor visualization of the more distal left MCA branches. Re-demonstration of multifocal mild to moderate stenoses of the right M1, with Normal flow-related enhancement of the more distal right MCA. No flow-limiting stenosis or evidence for arterial dissection. LUIS (2/13/20): normal LV function, no wall abnormality or evidence of PFO.      Impression: Expressive aphasia may be related to Right temporal infarcts, if this is his dominant hemisphere vs. apraxia of speech.  Strokes appear embolic in origin, possibly cardioembolic, exact mechanism remains unknown/ ESUS (Embolic stroke of undetermined source) vs. symptomatic large artery atherosclerosis. Possible PAF, patient is s/p ILR. No current evidence of afib reported on tele.   Malignancy work up: CT c/a/p showed ill defined L renal lesion. Sonogram negative. Subsequent MRI (2/28/20) Left renal lesion demonstrates MR findings most compatible with small chronic subcapsular/pericapsular hematoma. PSA negative, Heme onc following, Recs appreciated  as Patient has significant smoking and ETOH use in his past recommend colonoscopy as outpatient not up to date with colon ca screen.    Patient is now s/p insertion of G tube reinsertion and is on feedings, slowly progressing to goal, reported to be tolerating feeding well; management per primary team. It is anticipated that the patient will require the G tube for a while as it is expected that the  patient's dysphagia will continue for at least six months, perhaps longer.    Recommend Gradual return to normotension, avoid hypotension.   Recommend patient to  resume DAPT with ASA 81mg + Plavix 75 QD for 90 days followed [facility] once cleared to resume by GI and per primary medical team..     From Neurological perspective, his exam remains relatively unchanged. There is no discernible dysmetria on FTN noted to  LUE on this exam.  Stroke work up is now complete, no neurological objection for discharge to rehab pending determination of primary team. Recommendations are to continue current treatment plan.  Patient will require follow up with Dr. Peterson following rehab stay. Plan and exam findings from today discussed with medicine NP, John,  Will sign off, please reconsult as needed.

## 2020-03-05 NOTE — PROGRESS NOTE ADULT - SUBJECTIVE AND OBJECTIVE BOX
THE PATIENT WAS SEEN AND EXAMINED BY ME WITH THE HOUSESTAFF AND STROKE TEAM DURING MORNING ROUNDS.   HPI:  60 yo M with h/o CVA 8 years ago presenting with aphasia. Pt is accompanied by brother who interpreted in the ED. Brother endorsed that pt went to visit family in [place] 9/2019 and about 3 weeks ago, family stated that he was starting to get weak and stopped being able to speak. He was also endorsing difficulty walking from weakness. He returned from [place] last night and came to the ED for evaluation. Pt also developed lesions on his mouth 3 days ago. He denies any other medical history or medication use. (09 Feb 2020 20:23)      SUBJECTIVE: No events overnight.  No new neurologic complaints.      aspirin  chewable 81 milliGRAM(s) Oral daily  atorvastatin 80 milliGRAM(s) Oral at bedtime  bisacodyl Suppository 10 milliGRAM(s) Rectal daily PRN  clopidogrel Tablet 75 milliGRAM(s) Oral daily  cyanocobalamin 1000 MICROGram(s) Oral daily  dextrose 40% Gel 15 Gram(s) Oral once PRN  dextrose 5% + sodium chloride 0.45%. 1000 milliLiter(s) IV Continuous <Continuous>  dextrose 5%. 1000 milliLiter(s) IV Continuous <Continuous>  dextrose 50% Injectable 12.5 Gram(s) IV Push once  dextrose 50% Injectable 25 Gram(s) IV Push once  dextrose 50% Injectable 25 Gram(s) IV Push once  enoxaparin Injectable 40 milliGRAM(s) SubCutaneous daily  ergocalciferol Drops 2000 Unit(s) Enteral Tube daily  famotidine Injectable 20 milliGRAM(s) IV Push every 12 hours PRN  glucagon  Injectable 1 milliGRAM(s) IntraMuscular once PRN  morphine  - Injectable 2 milliGRAM(s) IV Push every 4 hours PRN  multivitamin/minerals/iron Oral Solution (CENTRUM) 15 milliLiter(s) Enteral Tube daily  ondansetron Injectable 4 milliGRAM(s) IV Push every 6 hours PRN      PHYSICAL EXAM:   Vital Signs Last 24 Hrs  T(C): 36.7 (05 Mar 2020 11:33), Max: 36.8 (05 Mar 2020 05:18)  T(F): 98 (05 Mar 2020 11:33), Max: 98.2 (05 Mar 2020 05:18)  HR: 51 (05 Mar 2020 11:33) (45 - 51)  BP: 104/69 (05 Mar 2020 11:33) (104/69 - 118/73)  BP(mean): --  RR: 18 (05 Mar 2020 11:33) (18 - 18)  SpO2: 99% (05 Mar 2020 11:33) (96% - 99%)    General: Awake and alert, appears calm, waves and smiles in greeting and attends examiner. Affect improved. Globally aphasic, will mimic successfully but does not follow simple commands. Resting in bed.  No acute distress.  HEENT: Normocephalic, atraumatic.  EOM intact.  Abdomen: G tube insitu, feedings in progress. Abdominal binder in place, dressing intact. Abdomen soft, non tender, not distended.  Extremities: No edema. Mitten restraints were removed for exam.    NEUROLOGICAL EXAM:  Mental status: Awake, alert. Globally aphasic, does not follow verbal commands, but will mimic: stick out tongue, smile, raise arms, etc. No verbal output.  Unable to assess comprehension or memory.    Cranial Nerves: Left sided facial droop with mild Ptosis left upper eye lid. No tongue deviation noted on this exam. No appreciable nystagmus. Able to turn head side to side, fluid motion, will not hold position to assess strength.  Exam is limited as patient  not following commands.   Motor exam: Normal tone, no drift, 5/5 RUE, 4/5 RLE, 5/5 LUE, 5/5 LLE.  Sensation: Appears intact to light touch   Coordination/ Gait: FTN intact.  Gait not assessed on bedrest.         LABS:                        11.4   7.57  )-----------( 333      ( 04 Mar 2020 05:33 )             34.9    03-04    141  |  108  |  <4<L>  ----------------------------<  124<H>  3.5   |  23  |  0.68    Ca    9.0      04 Mar 2020 05:32      Hemoglobin A1C, Whole Blood: 5.5 % (02-10 @ 09:08)      IMAGING:      MR Abdomen w/wo IV Cont (02.28.20 @ 20:31)   IMPRESSION:   Left renal lesion demonstrates MR findings most compatible with small chronic subcapsular/pericapsular hematoma.      CT Chest w/ IV Cont (02.25.20 @ 23:27)                      CT ABDOMEN AND PELVIS IC                         IMPRESSION:   Ill-defined low-attenuation along the cortex of the left kidney, of uncertain etiology. Infectious and neoplastic etiologies are considered.  Moderate fecal material in the rectum with mild associated wall thickening and infiltration of the adjacent fat, suggestive of stercoral colitis.     Transesophageal Echocardiogram w/o TTE (02.13.20 @ 15:15) >  Conclusions:  Normal left ventricular systolic function. No segmental  wall motion abnormalities.  Agitated saline contrast injection demonstrates no evidence  of a patent foramen ovale.    MR Head w/wo IV Cont (02.11.20 @ 15:07)   IMPRESSION:  BRAIN MRI:  Acute right posterior frontal infarct in the region of the right precentral gyrus. Subacute infarction the right frontoparietal and posterior temporal region. Minimal petechial hemorrhagic transformation and gyral enhancement is noted in both regions.  Multifocal chronic bilateral MCA territory infarcts. Chronic hemorrhagic products in the region of the left basal ganglia.    BRAIN MRA:  Persistent mild narrowing of the cavernous and supraclinoid left ICA. Persistent lack of flow related signal within the left M1 segment, with poor visualization of the more distal left MCA branches.    Re-demonstration of multifocal mild to moderate stenoses of the right M1 segment. Normal flow-related enhancement of the more distal right MCA.    NECK MRA:  No flow-limiting stenosis or evidence for arterial dissection.       CT Angio Neck w/ IV Cont (02.09.20 @ 15:13)   IMPRESSION:     CT brain: Multiple areas of prior infarct as described above. No acute hemorrhage or evidence of mass effect. Age-indeterminate lacunar infarcts as described. If the patient has a new and persistent neurologic deficit, consider short follow-up head CT or brain MRI follow-up.    CT angiography neck: No hemodynamically significant stenosis by NASCET criteria. No vascular dissection.    CT angiography brain: The left middle cerebral artery is severely stenosed versus occluded in this patient with a chronic left MCA territory infarct. Marked decrease branching of the distal left MCA branches are noted in the left sylvian fissure compared to the contralateral side.    Mild to moderate lobulated stenoses involve the M1 segment of the right middle cerebral artery.

## 2020-03-06 ENCOUNTER — TRANSCRIPTION ENCOUNTER (OUTPATIENT)
Age: 60
End: 2020-03-06

## 2020-03-06 LAB
ANION GAP SERPL CALC-SCNC: 9 MMOL/L — SIGNIFICANT CHANGE UP (ref 5–17)
BUN SERPL-MCNC: 8 MG/DL — SIGNIFICANT CHANGE UP (ref 7–23)
CALCIUM SERPL-MCNC: 8.4 MG/DL — SIGNIFICANT CHANGE UP (ref 8.4–10.5)
CHLORIDE SERPL-SCNC: 108 MMOL/L — SIGNIFICANT CHANGE UP (ref 96–108)
CO2 SERPL-SCNC: 25 MMOL/L — SIGNIFICANT CHANGE UP (ref 22–31)
CREAT SERPL-MCNC: 0.7 MG/DL — SIGNIFICANT CHANGE UP (ref 0.5–1.3)
GLUCOSE BLDC GLUCOMTR-MCNC: 122 MG/DL — HIGH (ref 70–99)
GLUCOSE BLDC GLUCOMTR-MCNC: 129 MG/DL — HIGH (ref 70–99)
GLUCOSE BLDC GLUCOMTR-MCNC: 150 MG/DL — HIGH (ref 70–99)
GLUCOSE BLDC GLUCOMTR-MCNC: 155 MG/DL — HIGH (ref 70–99)
GLUCOSE SERPL-MCNC: 153 MG/DL — HIGH (ref 70–99)
MAGNESIUM SERPL-MCNC: 2.2 MG/DL — SIGNIFICANT CHANGE UP (ref 1.6–2.6)
PHOSPHATE SERPL-MCNC: 4 MG/DL — SIGNIFICANT CHANGE UP (ref 2.5–4.5)
POTASSIUM SERPL-MCNC: 3.7 MMOL/L — SIGNIFICANT CHANGE UP (ref 3.5–5.3)
POTASSIUM SERPL-SCNC: 3.7 MMOL/L — SIGNIFICANT CHANGE UP (ref 3.5–5.3)
SODIUM SERPL-SCNC: 142 MMOL/L — SIGNIFICANT CHANGE UP (ref 135–145)

## 2020-03-06 PROCEDURE — 99233 SBSQ HOSP IP/OBS HIGH 50: CPT

## 2020-03-06 RX ORDER — ATORVASTATIN CALCIUM 80 MG/1
1 TABLET, FILM COATED ORAL
Qty: 0 | Refills: 0 | DISCHARGE
Start: 2020-03-06

## 2020-03-06 RX ORDER — ERGOCALCIFEROL 1.25 MG/1
2000 CAPSULE ORAL DAILY
Refills: 0 | Status: DISCONTINUED | OUTPATIENT
Start: 2020-03-07 | End: 2020-03-07

## 2020-03-06 RX ORDER — CLOPIDOGREL BISULFATE 75 MG/1
1 TABLET, FILM COATED ORAL
Qty: 0 | Refills: 0 | DISCHARGE
Start: 2020-03-06

## 2020-03-06 RX ORDER — MULTIVIT-MIN/FERROUS GLUCONATE 9 MG/15 ML
15 LIQUID (ML) ORAL
Qty: 0 | Refills: 0 | DISCHARGE
Start: 2020-03-06

## 2020-03-06 RX ORDER — ERGOCALCIFEROL 1.25 MG/1
2000 CAPSULE ORAL
Qty: 0 | Refills: 0 | DISCHARGE
Start: 2020-03-06

## 2020-03-06 RX ORDER — MULTIVIT-MIN/FERROUS GLUCONATE 9 MG/15 ML
15 LIQUID (ML) ORAL DAILY
Refills: 0 | Status: DISCONTINUED | OUTPATIENT
Start: 2020-03-07 | End: 2020-03-07

## 2020-03-06 RX ORDER — PREGABALIN 225 MG/1
1 CAPSULE ORAL
Qty: 0 | Refills: 0 | DISCHARGE
Start: 2020-03-06

## 2020-03-06 RX ORDER — MULTIVIT-MIN/FERROUS GLUCONATE 9 MG/15 ML
1 LIQUID (ML) ORAL DAILY
Refills: 0 | Status: DISCONTINUED | OUTPATIENT
Start: 2020-03-07 | End: 2020-03-07

## 2020-03-06 RX ORDER — ASPIRIN/CALCIUM CARB/MAGNESIUM 324 MG
1 TABLET ORAL
Qty: 0 | Refills: 0 | DISCHARGE
Start: 2020-03-06

## 2020-03-06 RX ADMIN — CLOPIDOGREL BISULFATE 75 MILLIGRAM(S): 75 TABLET, FILM COATED ORAL at 11:31

## 2020-03-06 RX ADMIN — ENOXAPARIN SODIUM 40 MILLIGRAM(S): 100 INJECTION SUBCUTANEOUS at 11:31

## 2020-03-06 RX ADMIN — ERGOCALCIFEROL 2000 UNIT(S): 1.25 CAPSULE ORAL at 15:13

## 2020-03-06 RX ADMIN — Medication 81 MILLIGRAM(S): at 11:31

## 2020-03-06 RX ADMIN — PREGABALIN 1000 MICROGRAM(S): 225 CAPSULE ORAL at 11:31

## 2020-03-06 RX ADMIN — Medication 15 MILLILITER(S): at 11:31

## 2020-03-06 RX ADMIN — ATORVASTATIN CALCIUM 80 MILLIGRAM(S): 80 TABLET, FILM COATED ORAL at 21:55

## 2020-03-06 NOTE — PROGRESS NOTE ADULT - ASSESSMENT
59Y Azeri speaking M with prior CVA 8y ago (no reported deficits) brought in by brother for concerns of generalized weakness and inability to speak for the past 3 weeks. On exam found to have no verbal output, appeared withdrawn and weak on neurological evaluation  He follows commands intermittently and is easily agitated.  Exam limited due to patient aggression on admission  CTH demonstrates chronic L MCA infarct with severely stenotic vs occluded distal L M1.   this is confirmed on MRI but also an acute right post frontal infarct, benign LUIS and s/p Medtronic ILR placement 2/13, will call for F/U prior to DC  2/2 ongoing bradycardia EPS reconsulted. no intervention recommended   s/p PEG placement 2/21, procedure went well, in the turner post procedure ptn pulled PEG out, remained NPO while awaiting open gastrostomy , on 3/2 noted with moderate leaking around G-tube: was pulled out from 9 cm in to only 1 cm in. G tube repositioned under fluoro on 3/2  on TF  80 cc/hr.   DC plan to acute rehab in am  Neuro recommended malignancy work up 2/2 unexplained multiple embolic strokes. Oncology consult w Dr. Sanchez appreciated.   s/p CT C/A/P w  IV contrast only. no significant findings exc questionable renal mass, but sono neg and MRI confirms it being a subcapsular hematoma.  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

## 2020-03-06 NOTE — PROGRESS NOTE ADULT - SUBJECTIVE AND OBJECTIVE BOX
INTERVAL HPI/OVERNIGHT EVENTS:    MEDICATIONS  (STANDING):  aspirin  chewable 81 milliGRAM(s) Oral daily  atorvastatin 80 milliGRAM(s) Oral at bedtime  clopidogrel Tablet 75 milliGRAM(s) Oral daily  cyanocobalamin 1000 MICROGram(s) Oral daily  dextrose 5% + sodium chloride 0.45%. 1000 milliLiter(s) (70 mL/Hr) IV Continuous <Continuous>  dextrose 5%. 1000 milliLiter(s) (50 mL/Hr) IV Continuous <Continuous>  dextrose 50% Injectable 12.5 Gram(s) IV Push once  dextrose 50% Injectable 25 Gram(s) IV Push once  dextrose 50% Injectable 25 Gram(s) IV Push once  enoxaparin Injectable 40 milliGRAM(s) SubCutaneous daily  ergocalciferol Drops 2000 Unit(s) Enteral Tube daily  multivitamin/minerals/iron Oral Solution (CENTRUM) 15 milliLiter(s) Enteral Tube daily    MEDICATIONS  (PRN):  bisacodyl Suppository 10 milliGRAM(s) Rectal daily PRN Constipation  dextrose 40% Gel 15 Gram(s) Oral once PRN Blood Glucose LESS THAN 70 milliGRAM(s)/deciliter  famotidine Injectable 20 milliGRAM(s) IV Push every 12 hours PRN Dyspepsia  glucagon  Injectable 1 milliGRAM(s) IntraMuscular once PRN Glucose LESS THAN 70 milligrams/deciliter  ondansetron Injectable 4 milliGRAM(s) IV Push every 6 hours PRN Nausea      Allergies    No Known Allergies    Intolerances            PHYSICAL EXAM:   Vital Signs:  Vital Signs Last 24 Hrs  T(C): 36.7 (06 Mar 2020 04:50), Max: 36.7 (05 Mar 2020 11:33)  T(F): 98.1 (06 Mar 2020 04:50), Max: 98.1 (05 Mar 2020 20:17)  HR: 55 (06 Mar 2020 04:50) (48 - 55)  BP: 123/73 (06 Mar 2020 04:50) (104/69 - 123/73)  BP(mean): --  RR: 18 (06 Mar 2020 04:50) (18 - 18)  SpO2: 99% (06 Mar 2020 04:50) (97% - 99%)  Daily     Daily     GENERAL:  no distress  HEENT:  NC/AT,  anicteric  CHEST:   no increased effort, breath sounds clear  HEART:  Regular rhythm  ABDOMEN:  Soft, non-tender, non-distended, normoactive bowel sounds,  no masses ,no hepato-splenomegaly, no signs of chronic liver disease  EXTEREMITIES:  no cyanosis      LABS:    03-06    142  |  108  |  8   ----------------------------<  153<H>  3.7   |  25  |  0.70    Ca    8.4      06 Mar 2020 06:07  Phos  4.0     03-06  Mg     2.2     03-06            RADIOLOGY & ADDITIONAL TESTS:

## 2020-03-06 NOTE — DISCHARGE NOTE PROVIDER - INSTRUCTIONS
Continue tube feeds with Jevity 1.2 at 60cc/hr x 24 hours Jevity 1.2 x 24hrs with goal rate of 60mL/hr (1440mL). At goal provides 1728kcal, 80gm protein, 1162mL free water. Provides 28kcal/kg, 1.3g protein/kg based on dosing wt 62kg. Defer free water flushes to team.

## 2020-03-06 NOTE — PROGRESS NOTE ADULT - SUBJECTIVE AND OBJECTIVE BOX
patient comfortable     REVIEW OF SYSTEMS  unable to obtain    FUNCTIONAL PROGRESS  3/5 PT    Bed Mobility  Bed Mobility Training Sit-to-Supine: supervsion  Bed Mobility Training Supine-to-Sit: contact guard  Bed Mobility Training Limitations: cognitive, decreased safety awareness;  decreased ability to use arms for pushing/pulling;  decreased ability to use legs for bridging/pushing;  impaired ability to control trunk for mobility    Sit-Stand Transfer Training  Transfer Training Sit-to-Stand Transfer: contact guard;  1 person assist;  full weight-bearing   rolling walker  Transfer Training Stand-to-Sit Transfer: contact guard;  1 person assist;  full weight-bearing   rolling walker  Sit-to-Stand Transfer Training Transfer Safety Analysis: decreased balance;  decreased cognition;  decreased weight-shifting ability;  cognitive, decreased safety awareness;  rolling walker    Gait Training  Gait Training: contact guard;  1 person assist;  full weight-bearing   rolling walker;  50 feet  Gait Analysis: 3-point gait   Narrow Base of Support;  decreased herminia;  decreased weight-shifting ability;  cognitive, decreased safety awareness;  impaired balance;  decreased strength;  50 feet;  rolling walker  Gait Number of Times:: x 2  Type of Rest Type of Rest: sitting  Duration of Rest Duration of Rest: 4 minutes     3/6 OT    Bed Mobility  Bed Mobility Training Sit-to-Supine: contact guard;  1 person assist;  verbal cues;  bed rails  Bed Mobility Training Supine-to-Sit: 1 person assist;  nonverbal cues (demo/gestures);  verbal cues;  bed rails;  minimum assist (75% patient effort)  Bed Mobility Training Limitations: decreased ability to use arms for pushing/pulling;  decreased ability to use legs for bridging/pushing;  impaired ability to control trunk for mobility;  decreased ROM;  decreased strength;  impaired balance;  impaired motor control;  impaired postural control;  cognitive, decreased safety awareness    Sit-Stand Transfer Training  Transfer Training Sit-to-Stand Transfer: contact guard;  1 person assist;  nonverbal cues (demo/gestures);  verbal cues;  nonmechanical sit to stand device   Transfer Training Stand-to-Sit Transfer: contact guard;  1 person assist;  nonverbal cues (demo/gestures);  verbal cues;  nonmechanical sit to stand device   Sit-to-Stand Transfer Training Transfer Safety Analysis: decreased balance;  decreased cognition;  decreased ROM;  decreased strength;  impaired balance;  impaired motor control;  impaired postural control;  cognitive, decreased safety awareness    Lower Body Dressing Training  Lower Body Dressing Training Assistance: minimum assist (75% patient effort);  1 person assist;  nonverbal cues (demo/gestures);  verbal cues;  decreased ROM;  decreased strength;  impaired balance;  impaired coordination;  impaired postural control;  cognitive, decreased safety awareness      VITALS  T(C): 36.8 (03-06-20 @ 13:59), Max: 36.8 (03-06-20 @ 13:59)  HR: 50 (03-06-20 @ 13:59) (48 - 55)  BP: 120/73 (03-06-20 @ 13:59) (110/64 - 123/73)  RR: 18 (03-06-20 @ 13:59) (18 - 18)  SpO2: 99% (03-06-20 @ 13:59) (97% - 99%)  Wt(kg): --    MEDICATIONS   aspirin  chewable 81 milliGRAM(s) daily  atorvastatin 80 milliGRAM(s) at bedtime  bisacodyl Suppository 10 milliGRAM(s) daily PRN  clopidogrel Tablet 75 milliGRAM(s) daily  cyanocobalamin 1000 MICROGram(s) daily  dextrose 40% Gel 15 Gram(s) once PRN  dextrose 5%. 1000 milliLiter(s) <Continuous>  dextrose 50% Injectable 12.5 Gram(s) once  dextrose 50% Injectable 25 Gram(s) once  dextrose 50% Injectable 25 Gram(s) once  enoxaparin Injectable 40 milliGRAM(s) daily  ergocalciferol Drops 2000 Unit(s) daily  famotidine Injectable 20 milliGRAM(s) every 12 hours PRN  glucagon  Injectable 1 milliGRAM(s) once PRN  multivitamin/minerals/iron Oral Solution (CENTRUM) 15 milliLiter(s) daily  ondansetron Injectable 4 milliGRAM(s) every 6 hours PRN      RECENT LABS - Reviewed    03-06    142  |  108  |  8   ----------------------------<  153<H>  3.7   |  25  |  0.70    Ca    8.4      06 Mar 2020 06:07  Phos  4.0     03-06  Mg     2.2     03-06    Physical Exam  Constitutional - NAD, Comfortable  HEENT - NCAT.   Chest - No respiratory distress. No use of accessory muscles of respiration.  Cardiovascular - Warm, well perfused  Abdomen - nondistended +PEG  Extremities - no C/C/E  Neurologic Exam -                    Cognitive - Awake, Alert     Communication - aphasic, mute     Cranial Nerves - L facial droop     Motor - 5/5 throughout      Sensory - Intact to LT     Reflexes - DTR Intact, No primitive reflexive     Coordination - FTN impaired on left  Psychiatric - Mood stable, Affect WNL    PAM JIMENEZ is a 58yo Male admitted for acute right posterior frontal infarct, now with gait instability, ADL impairments and functional impairments.     Precautions: Aspiration, fall    Rehab:  - Continue bedside therapy as well as OOB throughout the day with mobilization with staff to maintain cardiopulmonary function and prevention of secondary complications related to debility.   - When medically stable, recommend ACUTE inpatient rehabilitation consisting of 3 hours of therapy/day & 24 hour RN/daily PM&R physician for comorbid medical management. Patient will be able to tolerate 3 hours a day.

## 2020-03-06 NOTE — DISCHARGE NOTE PROVIDER - NSDCCPCAREPLAN_GEN_ALL_CORE_FT
PRINCIPAL DISCHARGE DIAGNOSIS  Diagnosis: Aphasia  Assessment and Plan of Treatment: There are two types of strokes. One is known as hemorrhagic stroke where there is bleeding on the brain. The other is an ischemic stroke which is a clot in the vessels of the brain which decreases the oxygenation to the brain causing brain death. It can cause major deficits. Early warning signs of stroke include slurred speech or trouble speaking, one sided weakness, paralysis or numbness of the face, arm, or leg,  facial droop, instability, sudden or temporary vision loss, mental confusion, headache, and/or inability to understand. Detection of a stroke and timing is very important as there is a short amount of time allotted for certain interventions to minimize deficits. Follow up with your neurologist within 1-2 weeks of discharge. Continue to take all your medications as prescribed by your doctor. If you experience any of the above mentioned symptoms call 911 and/or return to the emergency room.

## 2020-03-06 NOTE — DISCHARGE NOTE PROVIDER - PROVIDER TOKENS
PROVIDER:[TOKEN:[[id number]],FOLLOWUP:[1 week]] PROVIDER:[TOKEN:[[id number]],FOLLOWUP:[1 week]],PROVIDER:[TOKEN:[[id number]]]

## 2020-03-06 NOTE — DISCHARGE NOTE PROVIDER - NSDCMRMEDTOKEN_GEN_ALL_CORE_FT
aspirin 81 mg oral tablet, chewable: 1 tab(s) orally once a day  atorvastatin 80 mg oral tablet: 1 tab(s) orally once a day (at bedtime)  bisacodyl 10 mg rectal suppository: 1 suppository(ies) rectal once a day, As needed, Constipation  clopidogrel 75 mg oral tablet: 1 tab(s) orally once a day  cyanocobalamin 1000 mcg oral tablet: 1 tab(s) orally once a day  ergocalciferol 8000 intl units/mL oral solution: 2000 unit(s) enteral once a day  Multiple Vitamins with Minerals oral liquid: 15 milliliter(s) orally once a day

## 2020-03-06 NOTE — DISCHARGE NOTE PROVIDER - CARE PROVIDERS DIRECT ADDRESSES
,DirectAddress_Unknown ,DirectAddress_Unknown,[email or web address]

## 2020-03-06 NOTE — PROGRESS NOTE ADULT - SUBJECTIVE AND OBJECTIVE BOX
Patient is a 59y old  Male who presents with a chief complaint of mouth lesions, possible stroke (06 Mar 2020 07:22)      SUBJECTIVE / OVERNIGHT EVENTS: tolerating TF and achieved 80 cc/hr, plan to dc [place] after observing 24 hrs on max rate    MEDICATIONS  (STANDING):  aspirin  chewable 81 milliGRAM(s) Oral daily  atorvastatin 80 milliGRAM(s) Oral at bedtime  clopidogrel Tablet 75 milliGRAM(s) Oral daily  cyanocobalamin 1000 MICROGram(s) Oral daily  dextrose 5%. 1000 milliLiter(s) (50 mL/Hr) IV Continuous <Continuous>  dextrose 50% Injectable 12.5 Gram(s) IV Push once  dextrose 50% Injectable 25 Gram(s) IV Push once  dextrose 50% Injectable 25 Gram(s) IV Push once  enoxaparin Injectable 40 milliGRAM(s) SubCutaneous daily  ergocalciferol Drops 2000 Unit(s) Enteral Tube daily  multivitamin/minerals/iron Oral Solution (CENTRUM) 15 milliLiter(s) Enteral Tube daily    MEDICATIONS  (PRN):  bisacodyl Suppository 10 milliGRAM(s) Rectal daily PRN Constipation  dextrose 40% Gel 15 Gram(s) Oral once PRN Blood Glucose LESS THAN 70 milliGRAM(s)/deciliter  famotidine Injectable 20 milliGRAM(s) IV Push every 12 hours PRN Dyspepsia  glucagon  Injectable 1 milliGRAM(s) IntraMuscular once PRN Glucose LESS THAN 70 milligrams/deciliter  ondansetron Injectable 4 milliGRAM(s) IV Push every 6 hours PRN Nausea      Vital Signs Last 24 Hrs  T(F): 98 (03-06-20 @ 08:03), Max: 98.1 (03-05-20 @ 20:17)  HR: 54 (03-06-20 @ 08:03) (48 - 55)  BP: 110/65 (03-06-20 @ 08:03) (110/64 - 123/73)  RR: 18 (03-06-20 @ 08:03) (18 - 18)  SpO2: 99% (03-06-20 @ 08:03) (97% - 99%)  Telemetry:   CAPILLARY BLOOD GLUCOSE      POCT Blood Glucose.: 150 mg/dL (06 Mar 2020 05:54)  POCT Blood Glucose.: 155 mg/dL (06 Mar 2020 00:13)  POCT Blood Glucose.: 107 mg/dL (05 Mar 2020 17:47)    I&O's Summary    05 Mar 2020 07:01  -  06 Mar 2020 07:00  --------------------------------------------------------  IN: 2765 mL / OUT: 0 mL / NET: 2765 mL    06 Mar 2020 07:01  -  06 Mar 2020 13:33  --------------------------------------------------------  IN: 0 mL / OUT: 0 mL / NET: 0 mL        PHYSICAL EXAM:  GENERAL: NAD, well-developed  HEAD:  Atraumatic, Normocephalic  EYES: EOMI, PERRLA, conjunctiva and sclera clear  NECK: Supple, No JVD  CHEST/LUNG: Clear to auscultation bilaterally; No wheeze  HEART: Regular rate and rhythm; No murmurs, rubs, or gallops  ABDOMEN: Soft, Nontender, Nondistended; Bowel sounds present  EXTREMITIES:  2+ Peripheral Pulses, No clubbing, cyanosis, or edema  PSYCH: AAOx3  NEUROLOGY: non-focal  SKIN: No rashes or lesions    LABS:    03-06    142  |  108  |  8   ----------------------------<  153<H>  3.7   |  25  |  0.70    Ca    8.4      06 Mar 2020 06:07  Phos  4.0     03-06  Mg     2.2     03-06                RADIOLOGY & ADDITIONAL TESTS:    Imaging Personally Reviewed:    Consultant(s) Notes Reviewed:      Care Discussed with Consultants/Other Providers:

## 2020-03-06 NOTE — DISCHARGE NOTE PROVIDER - NSDCCAREPROVSEEN_GEN_ALL_CORE_FT
Topher Clark  [facility] Medicine, Advance PracticeTeam  [facility] Cardiac Electrophysiology, Team

## 2020-03-06 NOTE — DISCHARGE NOTE PROVIDER - CARE PROVIDER_API CALL
Craig Peterson (DO)  Neurology; Vascular Neurology  [address] [facility] [address]  [place] [address]  Phone: [phone]  Fax: [phone]  Follow Up Time: 1 week Craig Peterson (DO)  Neurology; Vascular Neurology  [address]  [place] [address]  Phone: [phone]  Fax: [phone]  Follow Up Time: 1 week    John Squires (DO)  Gastroenterology; Internal Medicine  [address] [place], [place] [address]  [place] [address]  Phone: [phone]  Fax: [phone]  Follow Up Time:

## 2020-03-06 NOTE — DISCHARGE NOTE PROVIDER - HOSPITAL COURSE
58 Y/O Syriac speaking M with prior CVA 8y ago (no reported deficits) brought in by brother for concerns of generalized weakness and inability to speak for the past 3 weeks. On exam found to have no verbal output, appeared withdrawn and weak on neurological evaluation    He follows commands intermittently and is easily agitated.  Exam limited due to patient aggression on admission    CTH demonstrates chronic L MCA infarct with severely stenotic vs occluded distal L M1.     this is confirmed on MRI but also an acute right post frontal infarct, benign LUIS and s/p Medtronic ILR placement 2/13, will call for F/U prior to DC  2/2 ongoing bradycardia EPS reconsulted. no intervention recommended     s/p PEG placement 2/21, procedure went well, in the turner post procedure ptn pulled PEG out, remained NPO while awaiting open gastrostomy , on 3/2 noted with moderate leaking around G-tube: was pulled out from 9 cm in to only 1 cm in. G tube repositioned under fluoro on 3/2    on TF  80 cc/hr.     DC plan to acute rehab in am    Neuro recommended malignancy work up 2/2 unexplained multiple embolic strokes. Oncology consult w Dr. Sanchez appreciated.     s/p CT C/A/P w  IV contrast only. no significant findings exc questionable renal mass, but sono neg and MRI confirms it being a subcapsular hematoma.  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 On exam found to have no verbal output, appeared withdrawn and weak on neurological evaluation    He follows commands intermittently and is easily agitated.  Exam limited due to patient aggression on admission    CTH demonstrates chronic L MCA infarct with severely stenotic vs occluded distal L M1.     this is confirmed on MRI but also an acute right post frontal infarct, benign LUIS and s/p Medtronic ILR placement 2/13, will call for F/U prior to DC  2/2 ongoing bradycardia EPS reconsulted. no intervention recommended     s/p PEG placement 2/21, procedure went well, in the turner post procedure ptn pulled PEG out, remained NPO while awaiting open gastrostomy , on 3/2 noted with moderate leaking around G-tube: was pulled out from 9 cm in to only 1 cm in. G tube repositioned under fluoro on 3/2    on TF  60 cc/hr.     DC plan to acute rehab in am    Neuro recommended malignancy work up 2/2 unexplained multiple embolic strokes. Oncology consult w Dr. Sanchez appreciated.     s/p CT C/A/P w  IV contrast only. no significant findings exc questionable renal mass, but sono neg and MRI confirms it being a subcapsular hematoma.  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     On exam found to have no verbal output, appeared withdrawn and weak on neurological evaluation.    He follows commands intermittently and is easily agitated.  Exam limited due to patient aggression on admission.    CTH demonstrates chronic L MCA infarct with severely stenotic vs occluded distal L M1.     this is confirmed on MRI but also an acute right post frontal infarct, benign LUIS and s/p Medtronic ILR placement 2/13, will call for F/U prior to DC      2/2 ongoing bradycardia EPS reconsulted. no intervention recommended.     s/p PEG placement 2/21, procedure went well, in the turner post procedure ptn pulled PEG out, remained NPO while awaiting open gastrostomy ,     on 3/2 noted with moderate leaking around G-tube: was pulled out from 9 cm in to only 1 cm in. G tube repositioned under fluoro on 3/2.    on TF  60 cc/hr.     Neuro recommended malignancy work up 2/2 unexplained multiple embolic strokes. Oncology consult w Dr. Sanchez appreciated.     s/p CT C/A/P w  IV contrast only. no significant findings exc questionable renal mass, but sono neg and MRI confirms it being a subcapsular hematoma.  will need outptn colonoscopy.    on cbc initial work up revealed megaloblastic rbc indices and a Folate deficiency and a mild vB12 deficiency, both supplemented parenterally and started daily po            DCP with medication reconciliation discussed with Dr. Simmons.     Patient cleared for discharge to acute rehabilitation.

## 2020-03-07 ENCOUNTER — INPATIENT (INPATIENT)
Facility: HOSPITAL | Age: 60
LOS: 9 days | Discharge: NURSING, REHAB OR LONG-TERM CARE | DRG: 871 | End: 2020-03-17
Attending: INTERNAL MEDICINE | Admitting: GENERAL PRACTICE
Payer: MEDICARE

## 2020-03-07 ENCOUNTER — INPATIENT (INPATIENT)
Facility: HOSPITAL | Age: 60
LOS: 0 days | Discharge: ANOTHER HOSPITAL | End: 2020-03-07
Attending: STUDENT IN AN ORGANIZED HEALTH CARE EDUCATION/TRAINING PROGRAM | Admitting: STUDENT IN AN ORGANIZED HEALTH CARE EDUCATION/TRAINING PROGRAM

## 2020-03-07 ENCOUNTER — TRANSCRIPTION ENCOUNTER (OUTPATIENT)
Age: 60
End: 2020-03-07

## 2020-03-07 VITALS
HEART RATE: 84 BPM | DIASTOLIC BLOOD PRESSURE: 74 MMHG | OXYGEN SATURATION: 95 % | TEMPERATURE: 101 F | SYSTOLIC BLOOD PRESSURE: 118 MMHG | RESPIRATION RATE: 16 BRPM

## 2020-03-07 VITALS
TEMPERATURE: 99 F | OXYGEN SATURATION: 95 % | RESPIRATION RATE: 18 BRPM | HEART RATE: 92 BPM | DIASTOLIC BLOOD PRESSURE: 94 MMHG | SYSTOLIC BLOOD PRESSURE: 169 MMHG

## 2020-03-07 VITALS
OXYGEN SATURATION: 97 % | RESPIRATION RATE: 18 BRPM | TEMPERATURE: 99 F | DIASTOLIC BLOOD PRESSURE: 80 MMHG | HEIGHT: 66 IN | HEART RATE: 81 BPM | SYSTOLIC BLOOD PRESSURE: 122 MMHG | WEIGHT: 164.91 LBS

## 2020-03-07 DIAGNOSIS — I63.9 CEREBRAL INFARCTION, UNSPECIFIED: ICD-10-CM

## 2020-03-07 LAB
ALBUMIN SERPL ELPH-MCNC: 3.9 G/DL — SIGNIFICANT CHANGE UP (ref 3.3–5)
ALP SERPL-CCNC: 77 U/L — SIGNIFICANT CHANGE UP (ref 40–120)
ALT FLD-CCNC: 16 U/L — SIGNIFICANT CHANGE UP (ref 10–45)
ANION GAP SERPL CALC-SCNC: 12 MMOL/L — SIGNIFICANT CHANGE UP (ref 5–17)
ANION GAP SERPL CALC-SCNC: 13 MMOL/L — SIGNIFICANT CHANGE UP (ref 5–17)
APPEARANCE UR: ABNORMAL
APTT BLD: 37.2 SEC — HIGH (ref 27.5–36.3)
AST SERPL-CCNC: 8 U/L — LOW (ref 10–40)
BACTERIA # UR AUTO: NEGATIVE — SIGNIFICANT CHANGE UP
BASE EXCESS BLDV CALC-SCNC: 4.3 MMOL/L — HIGH (ref -2–2)
BASOPHILS # BLD AUTO: 0.04 K/UL — SIGNIFICANT CHANGE UP (ref 0–0.2)
BASOPHILS NFR BLD AUTO: 0.3 % — SIGNIFICANT CHANGE UP (ref 0–2)
BILIRUB SERPL-MCNC: 0.9 MG/DL — SIGNIFICANT CHANGE UP (ref 0.2–1.2)
BILIRUB UR-MCNC: NEGATIVE — SIGNIFICANT CHANGE UP
BLD GP AB SCN SERPL QL: NEGATIVE — SIGNIFICANT CHANGE UP
BUN SERPL-MCNC: 8 MG/DL — SIGNIFICANT CHANGE UP (ref 7–23)
BUN SERPL-MCNC: 8 MG/DL — SIGNIFICANT CHANGE UP (ref 7–23)
CA-I SERPL-SCNC: 1.24 MMOL/L — SIGNIFICANT CHANGE UP (ref 1.12–1.3)
CALCIUM SERPL-MCNC: 9.6 MG/DL — SIGNIFICANT CHANGE UP (ref 8.4–10.5)
CALCIUM SERPL-MCNC: 9.7 MG/DL — SIGNIFICANT CHANGE UP (ref 8.4–10.5)
CHLORIDE BLDV-SCNC: 103 MMOL/L — SIGNIFICANT CHANGE UP (ref 96–108)
CHLORIDE SERPL-SCNC: 101 MMOL/L — SIGNIFICANT CHANGE UP (ref 96–108)
CHLORIDE SERPL-SCNC: 103 MMOL/L — SIGNIFICANT CHANGE UP (ref 96–108)
CO2 BLDV-SCNC: 31 MMOL/L — HIGH (ref 22–30)
CO2 SERPL-SCNC: 26 MMOL/L — SIGNIFICANT CHANGE UP (ref 22–31)
CO2 SERPL-SCNC: 27 MMOL/L — SIGNIFICANT CHANGE UP (ref 22–31)
COLOR SPEC: YELLOW — SIGNIFICANT CHANGE UP
CREAT SERPL-MCNC: 0.74 MG/DL — SIGNIFICANT CHANGE UP (ref 0.5–1.3)
CREAT SERPL-MCNC: 0.78 MG/DL — SIGNIFICANT CHANGE UP (ref 0.5–1.3)
DIFF PNL FLD: NEGATIVE — SIGNIFICANT CHANGE UP
EOSINOPHIL # BLD AUTO: 0.04 K/UL — SIGNIFICANT CHANGE UP (ref 0–0.5)
EOSINOPHIL NFR BLD AUTO: 0.3 % — SIGNIFICANT CHANGE UP (ref 0–6)
EPI CELLS # UR: 0 /HPF — SIGNIFICANT CHANGE UP
GAS PNL BLDV: 140 MMOL/L — SIGNIFICANT CHANGE UP (ref 135–145)
GAS PNL BLDV: SIGNIFICANT CHANGE UP
GAS PNL BLDV: SIGNIFICANT CHANGE UP
GLUCOSE BLDC GLUCOMTR-MCNC: 117 MG/DL — HIGH (ref 70–99)
GLUCOSE BLDC GLUCOMTR-MCNC: 119 MG/DL — HIGH (ref 70–99)
GLUCOSE BLDC GLUCOMTR-MCNC: 147 MG/DL — HIGH (ref 70–99)
GLUCOSE BLDV-MCNC: 97 MG/DL — SIGNIFICANT CHANGE UP (ref 70–99)
GLUCOSE SERPL-MCNC: 104 MG/DL — HIGH (ref 70–99)
GLUCOSE SERPL-MCNC: 129 MG/DL — HIGH (ref 70–99)
GLUCOSE UR QL: NEGATIVE — SIGNIFICANT CHANGE UP
HCO3 BLDV-SCNC: 29 MMOL/L — SIGNIFICANT CHANGE UP (ref 21–29)
HCT VFR BLD CALC: 34.5 % — LOW (ref 39–50)
HCT VFR BLDA CALC: 38 % — LOW (ref 39–50)
HGB BLD CALC-MCNC: 12.3 G/DL — LOW (ref 13–17)
HGB BLD-MCNC: 11.2 G/DL — LOW (ref 13–17)
HYALINE CASTS # UR AUTO: 1 /LPF — SIGNIFICANT CHANGE UP (ref 0–2)
IMM GRANULOCYTES NFR BLD AUTO: 0.5 % — SIGNIFICANT CHANGE UP (ref 0–1.5)
INR BLD: 1.1 RATIO — SIGNIFICANT CHANGE UP (ref 0.88–1.16)
KETONES UR-MCNC: NEGATIVE — SIGNIFICANT CHANGE UP
LACTATE BLDV-MCNC: 1.7 MMOL/L — SIGNIFICANT CHANGE UP (ref 0.7–2)
LEUKOCYTE ESTERASE UR-ACNC: NEGATIVE — SIGNIFICANT CHANGE UP
LYMPHOCYTES # BLD AUTO: 1.43 K/UL — SIGNIFICANT CHANGE UP (ref 1–3.3)
LYMPHOCYTES # BLD AUTO: 9.2 % — LOW (ref 13–44)
MAGNESIUM SERPL-MCNC: 2.2 MG/DL — SIGNIFICANT CHANGE UP (ref 1.6–2.6)
MCHC RBC-ENTMCNC: 32.5 GM/DL — SIGNIFICANT CHANGE UP (ref 32–36)
MCHC RBC-ENTMCNC: 32.7 PG — SIGNIFICANT CHANGE UP (ref 27–34)
MCV RBC AUTO: 100.6 FL — HIGH (ref 80–100)
MONOCYTES # BLD AUTO: 0.42 K/UL — SIGNIFICANT CHANGE UP (ref 0–0.9)
MONOCYTES NFR BLD AUTO: 2.7 % — SIGNIFICANT CHANGE UP (ref 2–14)
NEUTROPHILS # BLD AUTO: 13.5 K/UL — HIGH (ref 1.8–7.4)
NEUTROPHILS NFR BLD AUTO: 87 % — HIGH (ref 43–77)
NITRITE UR-MCNC: NEGATIVE — SIGNIFICANT CHANGE UP
NRBC # BLD: 0 /100 WBCS — SIGNIFICANT CHANGE UP (ref 0–0)
PCO2 BLDV: 47 MMHG — SIGNIFICANT CHANGE UP (ref 35–50)
PH BLDV: 7.41 — SIGNIFICANT CHANGE UP (ref 7.35–7.45)
PH UR: 8.5 — HIGH (ref 5–8)
PHOSPHATE SERPL-MCNC: 3.7 MG/DL — SIGNIFICANT CHANGE UP (ref 2.5–4.5)
PLATELET # BLD AUTO: 353 K/UL — SIGNIFICANT CHANGE UP (ref 150–400)
PO2 BLDV: 25 MMHG — SIGNIFICANT CHANGE UP (ref 25–45)
POTASSIUM BLDV-SCNC: 3.7 MMOL/L — SIGNIFICANT CHANGE UP (ref 3.5–5.3)
POTASSIUM SERPL-MCNC: 3.7 MMOL/L — SIGNIFICANT CHANGE UP (ref 3.5–5.3)
POTASSIUM SERPL-MCNC: 4.1 MMOL/L — SIGNIFICANT CHANGE UP (ref 3.5–5.3)
POTASSIUM SERPL-SCNC: 3.7 MMOL/L — SIGNIFICANT CHANGE UP (ref 3.5–5.3)
POTASSIUM SERPL-SCNC: 4.1 MMOL/L — SIGNIFICANT CHANGE UP (ref 3.5–5.3)
PROT SERPL-MCNC: 6.8 G/DL — SIGNIFICANT CHANGE UP (ref 6–8.3)
PROT UR-MCNC: SIGNIFICANT CHANGE UP
PROTHROM AB SERPL-ACNC: 12.6 SEC — SIGNIFICANT CHANGE UP (ref 10–12.9)
RAPID RVP RESULT: SIGNIFICANT CHANGE UP
RBC # BLD: 3.43 M/UL — LOW (ref 4.2–5.8)
RBC # FLD: 13.5 % — SIGNIFICANT CHANGE UP (ref 10.3–14.5)
RBC CASTS # UR COMP ASSIST: 2 /HPF — SIGNIFICANT CHANGE UP (ref 0–4)
RH IG SCN BLD-IMP: POSITIVE — SIGNIFICANT CHANGE UP
SAO2 % BLDV: 36 % — LOW (ref 67–88)
SODIUM SERPL-SCNC: 140 MMOL/L — SIGNIFICANT CHANGE UP (ref 135–145)
SODIUM SERPL-SCNC: 142 MMOL/L — SIGNIFICANT CHANGE UP (ref 135–145)
SP GR SPEC: 1.01 — SIGNIFICANT CHANGE UP (ref 1.01–1.02)
UROBILINOGEN FLD QL: NEGATIVE — SIGNIFICANT CHANGE UP
WBC # BLD: 15.51 K/UL — HIGH (ref 3.8–10.5)
WBC # FLD AUTO: 15.51 K/UL — HIGH (ref 3.8–10.5)
WBC UR QL: 1 /HPF — SIGNIFICANT CHANGE UP (ref 0–5)

## 2020-03-07 PROCEDURE — 83036 HEMOGLOBIN GLYCOSYLATED A1C: CPT

## 2020-03-07 PROCEDURE — 82607 VITAMIN B-12: CPT

## 2020-03-07 PROCEDURE — 71045 X-RAY EXAM CHEST 1 VIEW: CPT

## 2020-03-07 PROCEDURE — 71045 X-RAY EXAM CHEST 1 VIEW: CPT | Mod: 26

## 2020-03-07 PROCEDURE — 99285 EMERGENCY DEPT VISIT HI MDM: CPT | Mod: 25

## 2020-03-07 PROCEDURE — 86900 BLOOD TYPING SEROLOGIC ABO: CPT

## 2020-03-07 PROCEDURE — A9585: CPT

## 2020-03-07 PROCEDURE — 93320 DOPPLER ECHO COMPLETE: CPT

## 2020-03-07 PROCEDURE — 84100 ASSAY OF PHOSPHORUS: CPT

## 2020-03-07 PROCEDURE — 84443 ASSAY THYROID STIM HORMONE: CPT

## 2020-03-07 PROCEDURE — 87581 M.PNEUMON DNA AMP PROBE: CPT

## 2020-03-07 PROCEDURE — 93312 ECHO TRANSESOPHAGEAL: CPT

## 2020-03-07 PROCEDURE — 82947 ASSAY GLUCOSE BLOOD QUANT: CPT

## 2020-03-07 PROCEDURE — 85014 HEMATOCRIT: CPT

## 2020-03-07 PROCEDURE — 84295 ASSAY OF SERUM SODIUM: CPT

## 2020-03-07 PROCEDURE — 71260 CT THORAX DX C+: CPT

## 2020-03-07 PROCEDURE — 86901 BLOOD TYPING SEROLOGIC RH(D): CPT

## 2020-03-07 PROCEDURE — G0103: CPT

## 2020-03-07 PROCEDURE — 70496 CT ANGIOGRAPHY HEAD: CPT

## 2020-03-07 PROCEDURE — C1889: CPT

## 2020-03-07 PROCEDURE — 70553 MRI BRAIN STEM W/O & W/DYE: CPT

## 2020-03-07 PROCEDURE — 86850 RBC ANTIBODY SCREEN: CPT

## 2020-03-07 PROCEDURE — 84132 ASSAY OF SERUM POTASSIUM: CPT

## 2020-03-07 PROCEDURE — 82306 VITAMIN D 25 HYDROXY: CPT

## 2020-03-07 PROCEDURE — 87633 RESP VIRUS 12-25 TARGETS: CPT

## 2020-03-07 PROCEDURE — 97112 NEUROMUSCULAR REEDUCATION: CPT

## 2020-03-07 PROCEDURE — 86803 HEPATITIS C AB TEST: CPT

## 2020-03-07 PROCEDURE — 82435 ASSAY OF BLOOD CHLORIDE: CPT

## 2020-03-07 PROCEDURE — 97110 THERAPEUTIC EXERCISES: CPT

## 2020-03-07 PROCEDURE — 87798 DETECT AGENT NOS DNA AMP: CPT

## 2020-03-07 PROCEDURE — 97530 THERAPEUTIC ACTIVITIES: CPT

## 2020-03-07 PROCEDURE — 96374 THER/PROPH/DIAG INJ IV PUSH: CPT | Mod: XU

## 2020-03-07 PROCEDURE — 82962 GLUCOSE BLOOD TEST: CPT

## 2020-03-07 PROCEDURE — 80061 LIPID PANEL: CPT

## 2020-03-07 PROCEDURE — 70544 MR ANGIOGRAPHY HEAD W/O DYE: CPT

## 2020-03-07 PROCEDURE — 82330 ASSAY OF CALCIUM: CPT

## 2020-03-07 PROCEDURE — C1764: CPT

## 2020-03-07 PROCEDURE — 74177 CT ABD & PELVIS W/CONTRAST: CPT | Mod: 26

## 2020-03-07 PROCEDURE — 82803 BLOOD GASES ANY COMBINATION: CPT

## 2020-03-07 PROCEDURE — 82746 ASSAY OF FOLIC ACID SERUM: CPT

## 2020-03-07 PROCEDURE — 74177 CT ABD & PELVIS W/CONTRAST: CPT

## 2020-03-07 PROCEDURE — 97162 PT EVAL MOD COMPLEX 30 MIN: CPT

## 2020-03-07 PROCEDURE — 83605 ASSAY OF LACTIC ACID: CPT

## 2020-03-07 PROCEDURE — 70547 MR ANGIOGRAPHY NECK W/O DYE: CPT

## 2020-03-07 PROCEDURE — 85027 COMPLETE CBC AUTOMATED: CPT

## 2020-03-07 PROCEDURE — 80048 BASIC METABOLIC PNL TOTAL CA: CPT

## 2020-03-07 PROCEDURE — 87389 HIV-1 AG W/HIV-1&-2 AB AG IA: CPT

## 2020-03-07 PROCEDURE — 86704 HEP B CORE ANTIBODY TOTAL: CPT

## 2020-03-07 PROCEDURE — 74183 MRI ABD W/O CNTR FLWD CNTR: CPT

## 2020-03-07 PROCEDURE — 99238 HOSP IP/OBS DSCHRG MGMT 30/<: CPT

## 2020-03-07 PROCEDURE — L8699: CPT

## 2020-03-07 PROCEDURE — 87340 HEPATITIS B SURFACE AG IA: CPT

## 2020-03-07 PROCEDURE — 80053 COMPREHEN METABOLIC PANEL: CPT

## 2020-03-07 PROCEDURE — 84484 ASSAY OF TROPONIN QUANT: CPT

## 2020-03-07 PROCEDURE — 92611 MOTION FLUOROSCOPY/SWALLOW: CPT

## 2020-03-07 PROCEDURE — 97166 OT EVAL MOD COMPLEX 45 MIN: CPT

## 2020-03-07 PROCEDURE — 76770 US EXAM ABDO BACK WALL COMP: CPT

## 2020-03-07 PROCEDURE — 76000 FLUOROSCOPY <1 HR PHYS/QHP: CPT

## 2020-03-07 PROCEDURE — 99285 EMERGENCY DEPT VISIT HI MDM: CPT | Mod: GC

## 2020-03-07 PROCEDURE — 97116 GAIT TRAINING THERAPY: CPT

## 2020-03-07 PROCEDURE — 85610 PROTHROMBIN TIME: CPT

## 2020-03-07 PROCEDURE — 93306 TTE W/DOPPLER COMPLETE: CPT

## 2020-03-07 PROCEDURE — 85730 THROMBOPLASTIN TIME PARTIAL: CPT

## 2020-03-07 PROCEDURE — 93005 ELECTROCARDIOGRAM TRACING: CPT

## 2020-03-07 PROCEDURE — 76775 US EXAM ABDO BACK WALL LIM: CPT

## 2020-03-07 PROCEDURE — 87486 CHLMYD PNEUM DNA AMP PROBE: CPT

## 2020-03-07 PROCEDURE — 93325 DOPPLER ECHO COLOR FLOW MAPG: CPT

## 2020-03-07 PROCEDURE — 84480 ASSAY TRIIODOTHYRONINE (T3): CPT

## 2020-03-07 PROCEDURE — 71260 CT THORAX DX C+: CPT | Mod: 26

## 2020-03-07 PROCEDURE — 92523 SPEECH SOUND LANG COMPREHEN: CPT

## 2020-03-07 PROCEDURE — 70450 CT HEAD/BRAIN W/O DYE: CPT

## 2020-03-07 PROCEDURE — 86706 HEP B SURFACE ANTIBODY: CPT

## 2020-03-07 PROCEDURE — 97129 THER IVNTJ 1ST 15 MIN: CPT

## 2020-03-07 PROCEDURE — 74230 X-RAY XM SWLNG FUNCJ C+: CPT

## 2020-03-07 PROCEDURE — 92610 EVALUATE SWALLOWING FUNCTION: CPT

## 2020-03-07 PROCEDURE — 97535 SELF CARE MNGMENT TRAINING: CPT

## 2020-03-07 PROCEDURE — 49465 FLUORO EXAM OF G/COLON TUBE: CPT

## 2020-03-07 PROCEDURE — 83735 ASSAY OF MAGNESIUM: CPT

## 2020-03-07 PROCEDURE — 33285 INSJ SUBQ CAR RHYTHM MNTR: CPT

## 2020-03-07 PROCEDURE — 70498 CT ANGIOGRAPHY NECK: CPT

## 2020-03-07 PROCEDURE — 84436 ASSAY OF TOTAL THYROXINE: CPT

## 2020-03-07 RX ORDER — ENOXAPARIN SODIUM 100 MG/ML
40 INJECTION SUBCUTANEOUS DAILY
Refills: 0 | Status: DISCONTINUED | OUTPATIENT
Start: 2020-03-08 | End: 2020-03-07

## 2020-03-07 RX ORDER — PIPERACILLIN AND TAZOBACTAM 4; .5 G/20ML; G/20ML
3.38 INJECTION, POWDER, LYOPHILIZED, FOR SOLUTION INTRAVENOUS ONCE
Refills: 0 | Status: COMPLETED | OUTPATIENT
Start: 2020-03-07 | End: 2020-03-07

## 2020-03-07 RX ORDER — ACETAMINOPHEN 500 MG
2 TABLET ORAL
Qty: 0 | Refills: 0 | DISCHARGE
Start: 2020-03-07

## 2020-03-07 RX ORDER — SODIUM CHLORIDE 9 MG/ML
1000 INJECTION INTRAMUSCULAR; INTRAVENOUS; SUBCUTANEOUS ONCE
Refills: 0 | Status: COMPLETED | OUTPATIENT
Start: 2020-03-07 | End: 2020-03-07

## 2020-03-07 RX ORDER — CLOPIDOGREL BISULFATE 75 MG/1
75 TABLET, FILM COATED ORAL DAILY
Refills: 0 | Status: DISCONTINUED | OUTPATIENT
Start: 2020-03-08 | End: 2020-03-07

## 2020-03-07 RX ORDER — FAMOTIDINE 10 MG/ML
20 INJECTION INTRAVENOUS
Refills: 0 | Status: DISCONTINUED | OUTPATIENT
Start: 2020-03-07 | End: 2020-03-07

## 2020-03-07 RX ORDER — SODIUM CHLORIDE 9 MG/ML
1000 INJECTION INTRAMUSCULAR; INTRAVENOUS; SUBCUTANEOUS
Refills: 0 | Status: DISCONTINUED | OUTPATIENT
Start: 2020-03-07 | End: 2020-03-08

## 2020-03-07 RX ORDER — ACETAMINOPHEN 500 MG
650 TABLET ORAL EVERY 6 HOURS
Refills: 0 | Status: DISCONTINUED | OUTPATIENT
Start: 2020-03-07 | End: 2020-03-07

## 2020-03-07 RX ORDER — ASPIRIN/CALCIUM CARB/MAGNESIUM 324 MG
81 TABLET ORAL DAILY
Refills: 0 | Status: DISCONTINUED | OUTPATIENT
Start: 2020-03-08 | End: 2020-03-07

## 2020-03-07 RX ORDER — ACETAMINOPHEN 500 MG
650 TABLET ORAL ONCE
Refills: 0 | Status: COMPLETED | OUTPATIENT
Start: 2020-03-07 | End: 2020-03-07

## 2020-03-07 RX ORDER — FAMOTIDINE 10 MG/ML
1 INJECTION INTRAVENOUS
Qty: 0 | Refills: 0 | DISCHARGE
Start: 2020-03-07

## 2020-03-07 RX ORDER — ONDANSETRON 8 MG/1
4 TABLET, FILM COATED ORAL EVERY 8 HOURS
Refills: 0 | Status: DISCONTINUED | OUTPATIENT
Start: 2020-03-07 | End: 2020-03-07

## 2020-03-07 RX ORDER — ATORVASTATIN CALCIUM 80 MG/1
80 TABLET, FILM COATED ORAL AT BEDTIME
Refills: 0 | Status: DISCONTINUED | OUTPATIENT
Start: 2020-03-07 | End: 2020-03-07

## 2020-03-07 RX ORDER — PREGABALIN 225 MG/1
1000 CAPSULE ORAL DAILY
Refills: 0 | Status: DISCONTINUED | OUTPATIENT
Start: 2020-03-08 | End: 2020-03-07

## 2020-03-07 RX ORDER — ENOXAPARIN SODIUM 100 MG/ML
0 INJECTION SUBCUTANEOUS
Qty: 0 | Refills: 0 | DISCHARGE
Start: 2020-03-07

## 2020-03-07 RX ORDER — ONDANSETRON 8 MG/1
1 TABLET, FILM COATED ORAL
Qty: 0 | Refills: 0 | DISCHARGE
Start: 2020-03-07

## 2020-03-07 RX ADMIN — PREGABALIN 1000 MICROGRAM(S): 225 CAPSULE ORAL at 12:33

## 2020-03-07 RX ADMIN — SODIUM CHLORIDE 1000 MILLILITER(S): 9 INJECTION INTRAMUSCULAR; INTRAVENOUS; SUBCUTANEOUS at 22:41

## 2020-03-07 RX ADMIN — Medication 15 MILLILITER(S): at 12:33

## 2020-03-07 RX ADMIN — ENOXAPARIN SODIUM 40 MILLIGRAM(S): 100 INJECTION SUBCUTANEOUS at 12:33

## 2020-03-07 RX ADMIN — Medication 81 MILLIGRAM(S): at 12:33

## 2020-03-07 RX ADMIN — SODIUM CHLORIDE 1000 MILLILITER(S): 9 INJECTION INTRAMUSCULAR; INTRAVENOUS; SUBCUTANEOUS at 20:30

## 2020-03-07 RX ADMIN — Medication 650 MILLIGRAM(S): at 22:09

## 2020-03-07 RX ADMIN — ERGOCALCIFEROL 2000 UNIT(S): 1.25 CAPSULE ORAL at 12:33

## 2020-03-07 RX ADMIN — Medication 650 MILLIGRAM(S): at 21:00

## 2020-03-07 RX ADMIN — PIPERACILLIN AND TAZOBACTAM 200 GRAM(S): 4; .5 INJECTION, POWDER, LYOPHILIZED, FOR SOLUTION INTRAVENOUS at 20:30

## 2020-03-07 RX ADMIN — SODIUM CHLORIDE 125 MILLILITER(S): 9 INJECTION INTRAMUSCULAR; INTRAVENOUS; SUBCUTANEOUS at 22:13

## 2020-03-07 RX ADMIN — PIPERACILLIN AND TAZOBACTAM 3.38 GRAM(S): 4; .5 INJECTION, POWDER, LYOPHILIZED, FOR SOLUTION INTRAVENOUS at 21:34

## 2020-03-07 RX ADMIN — CLOPIDOGREL BISULFATE 75 MILLIGRAM(S): 75 TABLET, FILM COATED ORAL at 12:33

## 2020-03-07 NOTE — PROGRESS NOTE ADULT - ASSESSMENT
59Y Lithuanian speaking M with prior CVA 8y ago (no reported deficits) brought in by brother for concerns of generalized weakness and inability to speak for the past 3 weeks. On exam found to have no verbal output, appeared withdrawn and weak on neurological evaluation  He follows commands intermittently and is easily agitated.  Exam limited due to patient aggression on admission  CTH demonstrates chronic L MCA infarct with severely stenotic vs occluded distal L M1.   this is confirmed on MRI but also an acute right post frontal infarct, benign LUIS and s/p Medtronic ILR placement 2/13, will call for F/U prior to DC  2/2 ongoing bradycardia EPS reconsulted. no intervention recommended   s/p PEG placement 2/21, procedure went well, in the turner post procedure ptn pulled PEG out, remained NPO while awaiting open gastrostomy , on 3/2 noted with moderate leaking around G-tube: was pulled out from 9 cm in to only 1 cm in. G tube repositioned under fluoro on 3/2  on TF  80 cc/hr.   DC plan to acute rehab today  Neuro recommended malignancy work up 2/2 unexplained multiple embolic strokes. Oncology consult w Dr. Sanchez appreciated.   s/p CT C/A/P w  IV contrast only. no significant findings exc questionable renal mass, but sono neg and MRI confirms it being a subcapsular hematoma.  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

## 2020-03-07 NOTE — PROVIDER CONTACT NOTE (OTHER) - SITUATION
pt received from [facility] with a productive cough and rectal temperature of 101.1. pt with G tube noted to be loose without sutures with some serosanguinous drainage

## 2020-03-07 NOTE — H&P ADULT - NSHPREVIEWOFSYSTEMS_GEN_ALL_CORE
REVIEW OF SYSTEMS  Constitutional - Denies fevers, chills  HEENT - Denies changes in vision or hearing  Respiratory - Denies cough, dyspnea  Cardiovascular - Denies chest pain, palpitations  Gastrointestinal - Denies n/v, constipation, bowel incontinence  Genitourinary - Denies dysuria, urinary incontinence  Neurological - Denies weakness, numbness, headaches  Skin - Denies rashes  Musculoskeletal - Denies arthralgia, myalgias, back pain  Psychiatric - Denies depressed mood, anxiety REVIEW OF SYSTEMS  Unable to elicit due to aphasia.

## 2020-03-07 NOTE — H&P ADULT - ASSESSMENT
ASSESSMENT/PLAN  PAM JIMENEZ is a 58yo Male with prior CVA 8 years ago with no deficits noted initially admitted with egneralized weakness and difficulty speaking. CTH with chronic L MCA infarct with MRI showing acute right post frontal infarct. S/p endoscopic PEG placement due to dysphagia with hospital course complicated by PEG dislodgement. Now with decreased functional mobility, gait instability and ADL impairments, dysphagia, aphasia, hemiplegia    COMORBIDITES/ACTIVE MEDICAL ISSUES     Gait Instability, ADL impairments and Functional impairments: start Comprehensive Rehab Program of PT/OT     #CVA   -ASA 81mg and Plavix 75mg for 90 days followed by ASA as monotherapy   -Statin     #Left renal lesion   -CT abdomen and pelvis done for malignancy workup due to multiple CVAs in the past with CT showing ill defined left renal lesion   -Kidney sonogram negative   -Left renal lesion demonstrated MR findings (2/28) most compatible with small chronic subcapsular/pericapsular hematoma.  -PSA negative   -Heme was consulted and recommended outpatient colonoscopy as he is not up to date on screening.     #Dysphagia   -As per neuro, not likely to recover swallowing function for at least 6 months   -S/p PEG placement done endoscopically by GI on 2/2 and was pulled out by pt overnight   -Endoscopic PEG placement was contraindicated afterwards due to risk of pulling PEG out again and causing peritonitis   -S/p PEG placement done via laparoscopically by surgery on 2/27 to minimize risk of peritonitis  -Noted to be leaking on 3/2 and was found to be dislodge, PEG was then replace under flouro   -Currently on TF with goal rate of 80cc/hr   -Abdominal binders to be worn     #Megaloblastic anemia   -On admission MCV noted to be >100   -Found to have folate deficiency and mild B12 deficiency   -Continue multivitamins and B12   -Improving   -Trend H/H       Pain Mgmt - Tylenol PRN  GI/Bowel Mgmt - Dulcolax PRN   /Bladder Mgmt - Voiding independently, Bladder scan q8hr, PVR      Precautions / PROPHYLAXIS:   - Falls,  Seizure   - Ortho: Weight bearing status: WBAT   - Lungs: Aspiration, Incentive Spirometer   - Pressure injury/Skin: Turn Q2hrs while in bed, OOB to Chair, PT/OT    - DVT: Lovenox, SCDs, TEDs     MEDICAL PROGNOSIS: GOOD            REHAB POTENTIAL: GOOD             ESTIMATED DISPOSITION: HOME WITH HOME CARE            ELOS: 10-14 Days   EXPECTED THERAPY:     P.T. 1hr/day       O.T. 1hr/day      S.L.P. 1hr/day     P&O Unnecessary     EXP FREQUENCY: 5 days per 7 day period     PRESCREEN COMPARISION:   I have reviewed the prescreen information and I have found no relevant changes between the preadmission screening and my post admission evaluation     RATIONALE FOR INPATIENT ADMISSION - Patient demonstrates the following: (check all that apply)  [X] Medically appropriate for rehabilitation admission  [X] Has attainable rehab goals with an appropriate initial discharge plan  [X] Has rehabilitation potential (expected to make a significant improvement within a reasonable period of time)   [X] Requires close medical management by a rehab physician, rehab nursing care, Hospitalist and comprehensive interdisciplinary team (including PT, OT, & or SLP, Prosthetics and Orthotics) ASSESSMENT/PLAN  PAM JIMENEZ is a 60yo Male with prior CVA 8 years ago with no deficits noted initially admitted with egneralized weakness and difficulty speaking. CTH with chronic L MCA infarct with MRI showing acute right post frontal infarct. S/p endoscopic PEG placement due to dysphagia with hospital course complicated by PEG dislodgement. Now with decreased functional mobility, gait instability and ADL impairments, dysphagia, aphasia, hemiplegia. Rehab course complicated by fever and PEG dislodgement.     #CVA   -ASA 81mg and Plavix 75mg for 90 days followed by ASA as monotherapy   -Statin     #Left renal lesion   -CT abdomen and pelvis done for malignancy workup due to multiple CVAs in the past with CT showing ill defined left renal lesion   -Kidney sonogram negative   -Left renal lesion demonstrated MR findings (2/28) most compatible with small chronic subcapsular/pericapsular hematoma.  -PSA negative   -Heme was consulted and recommended outpatient colonoscopy as he is not up to date on screening.     #Fever   -101 F rectal temp on 3/7   -Fever to be worked up while in acute care, dx includes possible peritonitis due to multiple PEG dislodgements.     #Dysphagia   -As per neuro, not likely to recover swallowing function for at least 6 months   -S/p PEG placement done endoscopically by GI on 2/2 and was pulled out by pt overnight   -Endoscopic PEG placement was contraindicated afterwards due to risk of pulling PEG out again and causing peritonitis   -S/p PEG placement done via laparoscopically by surgery on 2/27 to minimize risk of peritonitis  -Noted to be leaking on 3/2 and was found to be dislodge, PEG was then replace under flouro   -PEG tube dislodged in rehab with minimal resistance, Marsh was placed to secure incisional hole, patient to be transferred back to [facility] for replacement of PEG tube due to no IR services available here. Spoke to Ed attending Dr. Walker at [facility] and discussed with Rehab attending on call Dr. Horn and hospitalist Dr. Nation.       #Megaloblastic anemia   -On admission MCV noted to be >100   -Found to have folate deficiency and mild B12 deficiency   -Continue multivitamins and B12   -Improving   -Trend H/H       Pain Mgmt - Tylenol PRN  GI/Bowel Mgmt - Dulcolax PRN   /Bladder Mgmt - Voiding independently, Bladder scan q8hr, PVR      Precautions / PROPHYLAXIS:   - Falls,  Seizure   - Ortho: Weight bearing status: WBAT   - Lungs: Aspiration, Incentive Spirometer   - Pressure injury/Skin: Turn Q2hrs while in bed, OOB to Chair, PT/OT    - DVT: Lovenox, SCDs, TEDs       PATIENT TO BE DISCHARGED BACK TO ACUTE CARE. SPOKE TO AND AGREED WITH REHAB ATTENDING ON CALL DR. HORN.

## 2020-03-07 NOTE — ED PROVIDER NOTE - OBJECTIVE STATEMENT
60 Y/O Spanish speaking Male with pmh CVA 8y ago (no reported deficits) brought in by EMS from acute rehab unit on today when patient was found to have rectal temp 101F, also with PEG tube that came out without resistance upon examination. Transferred for possible IR procedure to replace PEG tube. Rehab attending Dr. Horn and Hospitalist Dr. Nation aware and agrees with plan. 60 Y/O Wolof speaking Male with pmh CVA 8y ago (no reported deficits) brought in by EMS from acute rehab unit on today when patient was found to have rectal temp 101F, also with PEG tube that came out without resistance upon examination. Transferred for possible IR procedure to replace PEG tube. Rehab attending Dr. Horn and Hospitalist Dr. Nation aware and agrees with plan. Patient recent admission for generalized weakness and inability to speak for the past 3 weeks. Patient had CTH with chronic L MCA infarct with MRI showing acute right post frontal infarct. S/p endoscopic PEG placement due to dysphagia with hospital course complicated by PEG dislodgement. Now with decreased functional mobility, gait instability and ADL impairments, dysphagia, aphasia, hemiplegia. Rehab course complicated by fever and PEG dislodgement.

## 2020-03-07 NOTE — PROGRESS NOTE ADULT - NSHPATTENDINGPLANDISCUSS_GEN_ALL_CORE
ptn's family, neuro, card, NP

## 2020-03-07 NOTE — DISCHARGE NOTE PROVIDER - NSDCMRMEDTOKEN_GEN_ALL_CORE_FT
acetaminophen 325 mg oral tablet: 2 tab(s) orally every 6 hours, As needed, Temp greater or equal to 38C (100.4F), Mild Pain (1 - 3)  aspirin 81 mg oral tablet, chewable: 1 tab(s) orally once a day  atorvastatin 80 mg oral tablet: 1 tab(s) orally once a day (at bedtime)  bisacodyl 10 mg rectal suppository: 1 suppository(ies) rectal once a day, As needed, Constipation  clopidogrel 75 mg oral tablet: 1 tab(s) orally once a day  cyanocobalamin 1000 mcg oral tablet: 1 tab(s) orally once a day  enoxaparin:   ergocalciferol 8000 intl units/mL oral solution: 2000 unit(s) enteral once a day  famotidine 20 mg oral tablet: 1 tab(s) orally 2 times a day, As needed, Dyspepsia  Multiple Vitamins with Minerals oral liquid: 15 milliliter(s) orally once a day  ondansetron 4 mg oral tablet: 1 tab(s) orally every 8 hours, As needed, Nausea and/or Vomiting

## 2020-03-07 NOTE — H&P ADULT - NSHPPHYSICALEXAM_GEN_ALL_CORE
PHYSICAL EXAM  VITALS  T(C): 36.6 (03-07-20 @ 04:52), Max: 37 (03-06-20 @ 17:09)  HR: 74 (03-07-20 @ 04:52) (46 - 74)  BP: 137/76 (03-07-20 @ 04:52) (120/73 - 137/76)  RR: 18 (03-07-20 @ 04:52) (18 - 18)  SpO2: 99% (03-07-20 @ 04:52) (97% - 99%)    Gen - NAD, Comfortable  HEENT - NCAT, EOMI, MMM  Neck - Supple, No limited ROM  Pulm - CTAB, No wheeze, No rhonchi, No crackles  Cardiovascular - RRR, S1S2, No murmurs  Abdomen - Soft, NT/ND, +BS  Extremities - No C/C/E, No calf tenderness  Neuro-     Cognitive - AAOx3     Communication - Fluent, No dysarthria     Attention: Intact      Judgement: Good evidence of judgement     Memory: Recall 3 objects immediate and 3 min later         Cranial Nerves - CN 2-12 intact     Motor -                     LEFT    UE - ShAB 5/5, EF 5/5, EE 5/5, WE 5/5,  5/5                    RIGHT UE - ShAB 5/5, EF 5/5, EE 5/5, WE 5/5,  5/5                    LEFT    LE - HF 5/5, KE 5/5, DF 5/5, PF 5/5                    RIGHT LE - HF 5/5, KE 5/5, DF 5/5, PF 5/5        Sensory - Intact to LT     Reflexes - DTR Intact, No primitive reflexive     Coordination - FTN intact     Tone - normal  Psychiatric - Mood stable, Affect WNL  Skin:  all skin intact PHYSICAL EXAM  VITALS  T(C): 36.6 (03-07-20 @ 04:52), Max: 37 (03-06-20 @ 17:09)  HR: 74 (03-07-20 @ 04:52) (46 - 74)  BP: 137/76 (03-07-20 @ 04:52) (120/73 - 137/76)  RR: 18 (03-07-20 @ 04:52) (18 - 18)  SpO2: 99% (03-07-20 @ 04:52) (97% - 99%)    Gen - NAD   HEENT - NCAT   Neck - Supple   Pulm - CTAB, wet cough noted   Cardiovascular - RRR   Abdomen - Soft, +Peg site with PEG tube dislodged, s/p hicks placement   Extremities - No C/C/E   Neuro-  Limited exam due to aphasia, not following commands or able to make words   Spontaneously moving b/l UE

## 2020-03-07 NOTE — DISCHARGE NOTE PROVIDER - NSDCCPCAREPLAN_GEN_ALL_CORE_FT
PRINCIPAL DISCHARGE DIAGNOSIS  Diagnosis: Cerebrovascular accident (CVA)  Assessment and Plan of Treatment:       SECONDARY DISCHARGE DIAGNOSES  Diagnosis: Fever  Assessment and Plan of Treatment:     Diagnosis: Dysphagia  Assessment and Plan of Treatment:

## 2020-03-07 NOTE — PROGRESS NOTE ADULT - SUBJECTIVE AND OBJECTIVE BOX
PAM JIMENEZ:[id number],   59yMale followed for:  No Known Allergies    PAST MEDICAL & SURGICAL HISTORY:  CVA (cerebral vascular accident)  No significant past surgical history    FAMILY HISTORY:  No pertinent family history in first degree relatives    MEDICATIONS  (STANDING):  aspirin  chewable 81 milliGRAM(s) Oral daily  atorvastatin 80 milliGRAM(s) Oral at bedtime  clopidogrel Tablet 75 milliGRAM(s) Oral daily  cyanocobalamin 1000 MICROGram(s) Oral daily  dextrose 5%. 1000 milliLiter(s) (50 mL/Hr) IV Continuous <Continuous>  dextrose 50% Injectable 12.5 Gram(s) IV Push once  dextrose 50% Injectable 25 Gram(s) IV Push once  dextrose 50% Injectable 25 Gram(s) IV Push once  enoxaparin Injectable 40 milliGRAM(s) SubCutaneous daily  ergocalciferol Drops 2000 Unit(s) Enteral Tube daily  multivitamin/minerals/iron Oral Solution (CENTRUM) 15 milliLiter(s) Enteral Tube daily    MEDICATIONS  (PRN):  bisacodyl Suppository 10 milliGRAM(s) Rectal daily PRN Constipation  dextrose 40% Gel 15 Gram(s) Oral once PRN Blood Glucose LESS THAN 70 milliGRAM(s)/deciliter  famotidine Injectable 20 milliGRAM(s) IV Push every 12 hours PRN Dyspepsia  glucagon  Injectable 1 milliGRAM(s) IntraMuscular once PRN Glucose LESS THAN 70 milligrams/deciliter  ondansetron Injectable 4 milliGRAM(s) IV Push every 6 hours PRN Nausea      Vital Signs Last 24 Hrs  T(C): 37.3 (07 Mar 2020 11:54), Max: 37.3 (07 Mar 2020 11:54)  T(F): 99.2 (07 Mar 2020 11:54), Max: 99.2 (07 Mar 2020 11:54)  HR: 92 (07 Mar 2020 11:54) (46 - 92)  BP: 169/94 (07 Mar 2020 11:54) (120/73 - 169/94)  BP(mean): --  RR: 18 (07 Mar 2020 11:54) (18 - 18)  SpO2: 95% (07 Mar 2020 11:54) (95% - 99%)  nc/at  s1s2  cta  soft, nt, nd no guarding or rebound  no c/c/e      03-07    142  |  103  |  8   ----------------------------<  129<H>  4.1   |  26  |  0.74    Ca    9.7      07 Mar 2020 07:10  Phos  3.7     03-07  Mg     2.2     03-07

## 2020-03-07 NOTE — ED PROVIDER NOTE - PHYSICAL EXAMINATION
Physical Exam:    I have reviewed the triage vital signs.    Gen: NAD, verbally responsive, aphasic, non-toxic appearing, able to ambulate with assistance  Head and Neck: NCAT, Neck supple without meningismus   HEENT: EOMI, PEERLA, normal conjunctiva, tongue midline, oral mucosa moist  Lung: CTAB, no respiratory distress, no wheezes/rhonchi/rales B/L,   CV: RRR, no murmurs, rubs or gallops  Abd: soft, NT, ND, no guarding, no rigidity, no rebound tenderness, no CVA tenderness, no masses. Peg tube in place, mild erythema around site.   MSK: no gross deformities, ROM normal in UE/LE, no back tenderness  Neuro: Limited exam due to aphasia, not following commands or able to make words. Spontaneously moving b/l UE  Skin: Warm, well perfused, no rash, no leg swelling

## 2020-03-07 NOTE — DISCHARGE NOTE PROVIDER - HOSPITAL COURSE
60 Y/O Lao speaking Male with pmh CVA 8y ago (no reported deficits) brought in by brother for concerns of generalized weakness and inability to speak for the past 3 weeks. On exam found to have no verbal output, appeared withdrawn and weak on neurological evaluation    He followed commands intermittently and was easily agitated.  CTH demonstrated chronic L MCA infarct with severely stenotic vs occluded distal L M1. Confirmed on MRI but also found to have an acute right post frontal infarct, benign LUIS and s/p [employer] ILR placement 2/13. Patient also with sinus bradycardia, with no interventions recommended from cardiology.         Patient with severe dysphagia unable to intake food, as per neuro little chance of recovering swallowing in the next 6 months and thus in need of long term nutrition.  Patient s/p PEG placement on 2/2 by GI with endoscopic placement. Procedure went well, however, in the turner post procedure ptn pulled PEG out. As per GI patient at risk of 2nd endoscopic placement contraindicated due to risk of pulling out tube and peritonitis . Patient thus had laproscopic placement of G tube done by surgery on 2/27 to minimize risk of peritonitis if pulled out. On 3/2, noted with moderate leaking around G-tube: was pulled out from 9 cm in to only 1 cm in. G tube repositioned under fluoro on 3/2. Currently on TF with goal of 80 cc/hr.         Neuro recommended malignancy work up 2/2 unexplained multiple embolic strokes. Oncology consult w Dr. Sanchez. Patient had CT C/A/P w  IV contrast only. no significant findings exc questionable renal mass, but sono neg and MRI confirmed it being a subcapsular hematoma.  Patient will need outptn colonoscopy after discharge. 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 Patient to be admitted to acute rehab today.         While in the rehab unit on 3/7, patient with rectal temp 101F, also with PEG tube that came out without resistance upon examination. Spoke to ED attending at [facility] Dr. Walker, who stated that patient needs to be transferred back to [facility] due to no IR servies available at [place]. Rehab attending Dr. Horn and Hospitalist Dr. Nation aware and agrees with plan.

## 2020-03-07 NOTE — H&P ADULT - NSHPLABSRESULTS_GEN_ALL_CORE
RECENT LABS/IMAGING    03-07    142  |  103  |  8   ----------------------------<  129<H>  4.1   |  26  |  0.74    Ca    9.7      07 Mar 2020 07:10  Phos  3.7     03-07  Mg     2.2     03-07    < from: CT Abdomen and Pelvis w/ IV Cont (02.25.20 @ 23:27) >      IMPRESSION:     Ill-defined low-attenuation along the cortex of the left kidney, of uncertain etiology. Infectious and neoplastic etiologies are considered.    Moderate fecal material in the rectum with mild associated wall thickening and infiltration of the adjacent fat, suggestive of stercoral colitis.    < end of copied text >    < from: CT Head No Cont (02.09.20 @ 15:11) >    MPRESSION:     CT brain: Multiple areas of prior infarct as described above. No acute hemorrhage or evidence of mass effect. Age-indeterminate lacunar infarcts as described. If the patient has a new and persistent neurologic deficit, consider short follow-up head CT or brain MRI follow-up.    CT angiography neck: No hemodynamically significant stenosis by NASCET criteria. No vascular dissection.    CT angiography brain: The left middle cerebral artery is severely stenosed versus occluded in this patient with a chronic left MCA territory infarct. Marked decrease branching of the distal left MCA branches are noted in the left sylvian fissure compared to the contralateral side.    Mild to moderate lobulated stenoses involve the M1 segment of the right middle cerebral artery.    < end of copied text >      < from: MR Head w/wo IV Cont (02.11.20 @ 15:07) >    IMPRESSION:    BRAIN MRI:    Acute right posterior frontal infarct in the region of the right precentral gyrus. Subacute infarctin the right frontoparietal and posterior temporal region. Minimal petechial hemorrhagic transformation and gyral enhancement is noted in both regions.    Multifocal chronic bilateral MCA territory infarcts. Chronic hemorrhagic products in the regionof the left basal ganglia.    BRAIN MRA:    Persistent mild narrowing of the cavernous and supraclinoid left ICA. Persistent lack of flow related signal within the left M1 segment, with poor visualization of the more distal left MCA branches.    Redemonstration of multifocal mild to moderate stenoses of the right M1 segment. Normal flow-related enhancement of the more distal right MCA.    NECK MRA:    No flow-limiting stenosis or evidence for arterial dissection.    < end of copied text >      < from: US Kidney and Bladder (02.27.20 @ 15:16) >    IMPRESSION:     No discrete left renal lesion is identified. No discrete left perinephric abnormality is identified.   Recommend dedicated renal MRI examination with and without intravenous contrast for further evaluation.    < end of copied text >

## 2020-03-07 NOTE — H&P ADULT - HISTORY OF PRESENT ILLNESS
60 Y/O Persian speaking Male with pmh CVA 8y ago (no reported deficits) brought in by brother for concerns of generalized weakness and inability to speak for the past 3 weeks. On exam found to have no verbal output, appeared withdrawn and weak on neurological evaluation  He followed commands intermittently and was easily agitated.  CTH demonstrated chronic L MCA infarct with severely stenotic vs occluded distal L M1. Confirmed on MRI but also found to have  an acute right post frontal infarct, benign LUIS and s/p Medtronic ILR placement 2/13. Patient with severe dysphagia unable to intake food, as per neuro little chance of recovering swallowing in the next 6 months and thus in need of long term nutrition.     s/p PEG placement 2/21, procedure went well, in the turner post procedure ptn pulled PEG out, remained NPO while awaiting open gastrostomy. Patient had laproscopic placement of G tube done by surgery on 2/27on 3/2 noted with moderate leaking around G-tube: was pulled out from 9 cm in to only 1 cm in. G tube repositioned under fluoro on 3/2  on TF  60 cc/hr.   DC plan to acute rehab in am  Neuro recommended malignancy work up 2/2 unexplained multiple embolic strokes. Oncology consult w Dr. Sanchez appreciated.   s/p CT C/A/P w  IV contrast only. no significant findings exc questionable renal mass, but sono neg and MRI confirms it being a subcapsular hematoma.  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 On exam found to have no verbal output, appeared withdrawn and weak on neurological evaluation  He followed commands intermittently and was easily agitated.  CTH demonstrated chronic L MCA infarct with severely stenotic vs occluded distal L M1. Confirmed on MRI but also found to have  an acute right post frontal infarct, benign LUIS and s/p Medtronic ILR placement 2/13. Patient also with sinus bradycardia, with no interventions recommended from cardiology.     Patient with severe dysphagia unable to intake food, as per neuro little chance of recovering swallowing in the next 6 months and thus in need of long term nutrition.  Patient s/p PEG placement on 2/2 by GI with endoscopic placement. Procedure went well, however, in the turner post procedure ptn pulled PEG out. As per GI patient at risk of 2nd endoscopic placement contraindicated due to risk of pulling out tube and peritonitis . Patient thus had laproscopic placement of G tube done by surgery on 2/27 to minimize risk of peritonitis if pulled out. On 3/2, noted with moderate leaking around G-tube: was pulled out from 9 cm in to only 1 cm in. G tube repositioned under fluoro on 3/2. Currently on TF with goal of 80 cc/hr.     Neuro recommended malignancy work up 2/2 unexplained multiple embolic strokes. Oncology consult w Dr. Sanchez. Patient had CT C/A/P w  IV contrast only. no significant findings exc questionable renal mass, but sono neg and MRI confirmed it being a subcapsular hematoma.  Patient will need outptn colonoscopy after discharge. 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 Patient to be admitted to acute rehab today. 58 Y/O Icelandic speaking Male with pmh CVA 8y ago (no reported deficits) brought in by brother for concerns of generalized weakness and inability to speak for the past 3 weeks. On exam found to have no verbal output, appeared withdrawn and weak on neurological evaluation  He followed commands intermittently and was easily agitated.  CTH demonstrated chronic L MCA infarct with severely stenotic vs occluded distal L M1. Confirmed on MRI but also found to have an acute right post frontal infarct, benign LUIS and s/p Medtronic ILR placement 2/13. Patient also with sinus bradycardia, with no interventions recommended from cardiology.     Patient with severe dysphagia unable to intake food, as per neuro little chance of recovering swallowing in the next 6 months and thus in need of long term nutrition.  Patient s/p PEG placement on 2/2 by GI with endoscopic placement. Procedure went well, however, in the turner post procedure ptn pulled PEG out. As per GI patient at risk of 2nd endoscopic placement contraindicated due to risk of pulling out tube and peritonitis . Patient thus had laproscopic placement of G tube done by surgery on 2/27 to minimize risk of peritonitis if pulled out. On 3/2, noted with moderate leaking around G-tube: was pulled out from 9 cm in to only 1 cm in. G tube repositioned under fluoro on 3/2. Currently on TF with goal of 80 cc/hr.     Neuro recommended malignancy work up 2/2 unexplained multiple embolic strokes. Oncology consult w Dr. Sanchez. Patient had CT C/A/P w  IV contrast only. no significant findings exc questionable renal mass, but sono neg and MRI confirmed it being a subcapsular hematoma.  Patient will need outptn colonoscopy after discharge. 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 Patient to be admitted to acute rehab today. 60 Y/O Armenian speaking Male with pmh CVA 8y ago (no reported deficits) brought in by brother for concerns of generalized weakness and inability to speak for the past 3 weeks. On exam found to have no verbal output, appeared withdrawn and weak on neurological evaluation  He followed commands intermittently and was easily agitated.  CTH demonstrated chronic L MCA infarct with severely stenotic vs occluded distal L M1. Confirmed on MRI but also found to have an acute right post frontal infarct, benign LUIS and s/p Medtronic ILR placement 2/13. Patient also with sinus bradycardia, with no interventions recommended from cardiology.     Patient with severe dysphagia unable to intake food, as per neuro little chance of recovering swallowing in the next 6 months and thus in need of long term nutrition.  Patient s/p PEG placement on 2/2 by GI with endoscopic placement. Procedure went well, however, in the turner post procedure ptn pulled PEG out. As per GI patient at risk of 2nd endoscopic placement contraindicated due to risk of pulling out tube and peritonitis . Patient thus had laproscopic placement of G tube done by surgery on 2/27 to minimize risk of peritonitis if pulled out. On 3/2, noted with moderate leaking around G-tube: was pulled out from 9 cm in to only 1 cm in. G tube repositioned under fluoro on 3/2. Currently on TF with goal of 80 cc/hr.     Neuro recommended malignancy work up 2/2 unexplained multiple embolic strokes. Oncology consult w Dr. Sanchez. Patient had CT C/A/P w  IV contrast only. no significant findings exc questionable renal mass, but sono neg and MRI confirmed it being a subcapsular hematoma.  Patient will need outptn colonoscopy after discharge. 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 Patient to be admitted to acute rehab today.     While in the rehab unit on 3/7, patient with rectal temp 101F, also with PEG tube that came out without resistance upon examination. Spoke to ED attending at [facility] Dr. Walker, who stated that patient needs to be transferred back to [facility]. Rehab attending Dr. Horn and Hospitalist Dr. Nation aware and agrees with plan.

## 2020-03-07 NOTE — H&P ADULT - NSHPSOCIALHISTORY_GEN_ALL_CORE
SOCIAL HISTORY  Smoking - Hx of smoking   EtOH - Hx of drinking   Drugs - Denied    FUNCTIONAL HISTORY  Lives with spouse in an apartment with elevator.   Prior Level of Function: Independent in ADLs and ambulation    CURRENT FUNCTIONAL STATUS  - Bed Mobility: Supervision (from PT notes 3/5)  - Transfers: Contact guard  (From PT notes 3/5)   - Gait: Contact guard 50 feet RW (From PT notes 3/5)   - ADLs: LBD and grooming-  Min A x 1 (from OT notes 3/6)

## 2020-03-07 NOTE — ED PROVIDER NOTE - CLINICAL SUMMARY MEDICAL DECISION MAKING FREE TEXT BOX
Dr. Domínguez Note: rehab patient with fever, displaced PEG tube that was re-inserted PTA, check for intra-abdominal infection, pneumonia, viral, uti.  R/o sepsis.  Fluids, tylenol, screening tests, cxr, rvp, ct abdomen, surgical consult for PEG tube check.

## 2020-03-07 NOTE — PROGRESS NOTE ADULT - REASON FOR ADMISSION
Anesthesia Preop for PEG in OR on 02/27/2020
mouth lesions, possible stroke

## 2020-03-07 NOTE — PROGRESS NOTE ADULT - MINUTES
125

## 2020-03-07 NOTE — ED ADULT NURSE REASSESSMENT NOTE - NS ED NURSE REASSESS COMMENT FT1
Patient straight cath inserted for urine using sterile technique. Second RN present to confirm sterility. Explained procedure as it was being done - Patient tolerated procedure well. Sterile specimens collected and sent to lab as ordered. Drained approximately 300ml of urine. Comfort and safety provided.

## 2020-03-07 NOTE — PROVIDER CONTACT NOTE (OTHER) - ASSESSMENT
Temp (rectal): 101.1 BP: 118/74 HR: 84 O2: 95 RR: 16. G tube pulled out by pt on initial admission skin assessment. Pt aphasic and confused. no s/s of pain or distress noted

## 2020-03-07 NOTE — ED PROVIDER NOTE - ATTENDING CONTRIBUTION TO CARE
Pt with previous stroke, aphasic, PEG tube placed for dysphagia, presents with PEG tube displacement s/p replacement today sent for fever.  Pt has congestion and cough.  Hx limited due to aphasia.  Exam: nontender abdomen, clear lungs, slight tachycardia.

## 2020-03-07 NOTE — CHART NOTE - NSCHARTNOTEFT_GEN_A_CORE
GENERAL SURGERY CHART NOTE  PAM JIMENEZ | MRN-[id number]    Notified by ED that patient presented as transfer with fevers and dislodged G-tube.    Patient had Janeway gastrostomy placement on 2/27/20 with replacement by IR 3/2/20.     Patient was seen and examined at bedside in ED. Patient in NAD at time of exam.  A hicks catheter was in the gastrostomy site draining gastric contents.  The hicks catheter was removed and a 16Fr gastrostomy tube was replaced. The balloon was inflated to 5cc and the bumper was secured at 6-8cm at the skin. The gastrostomy tube was then replaced to gravity drainage bag and drained gastric contents.     A follow up CT scan is pending.     59M s/p janeway gastrostomy tube (2/27) with replacement on 3/2 by IR, discharged to rehab on 3/6/20 now presenting to [facility] ED w/ fevers and dislodged gastrostomy tube.     - gastrostomy tube replaced by surgery team in ED. 16Fr gastrostomy tube placed and balloon inflated to 5cc. Gastric contents draining to gravity at completion  - follow up ct scan ordered, will f/u to assess for additional abdominal / surgical pathology  - please repage surgery for new or ongoing concerns for the patient.     Patient seen and examined with senior resident on call EDILBERTO Blank MD.     Please contact [facility] (P: [phone]) with any questions.    CARLO Devries MD, PGY-II  Surgery, Green Team  [facility] GENERAL SURGERY CHART NOTE  PAM JIMENEZ | MRN-[id number]    Notified by ED that patient presented as transfer with fevers and dislodged G-tube.    Patient had Janeway gastrostomy placement on 2/27/20 with replacement by IR 3/2/20.     Patient was seen and examined at bedside in ED. Patient in NAD at time of exam.  A hicks catheter was in the gastrostomy site draining gastric contents.  The hicks catheter was removed and a 16Fr gastrostomy tube was replaced. The balloon was inflated to 5cc and the bumper was secured at 6-8cm at the skin. The gastrostomy tube was then replaced to gravity drainage bag and drained gastric contents.     A follow up CT scan is pending.     59M s/p janeway gastrostomy tube (2/27) with replacement on 3/2 by IR, discharged to rehab on 3/6/20 now presenting to [facility] ED w/ fevers and dislodged gastrostomy tube.     - gastrostomy tube replaced by surgery team in ED. 16Fr gastrostomy tube placed and balloon inflated to 5cc. Gastric contents draining to gravity at completion  - ct a/p reviewed. asymmetric enlargement of the l. rectus abdominus muscle likely secondary to creation of janeway gastrostomy.   - please repage surgery for new or ongoing concerns for the patient.     Patient seen and examined with senior resident on call EDILBERTO Blank MD.     Please contact [facility] ([phone]: [phone]) with any questions.    CARLO Devries MD, PGY-II  Surgery, Green Team  [facility]

## 2020-03-07 NOTE — ED ADULT NURSE REASSESSMENT NOTE - NS ED NURSE REASSESS COMMENT FT1
Patient resting in bed, receiving fluids at this time. IV patent, no redness, drainage or swelling at site.

## 2020-03-07 NOTE — DISCHARGE NOTE PROVIDER - CARE PROVIDER_API CALL
Topher Clark (DO)  Internal Medicine  [address] [place] [id number]  Phone: [phone]  Fax: [phone]  Follow Up Time:

## 2020-03-07 NOTE — PROGRESS NOTE ADULT - SUBJECTIVE AND OBJECTIVE BOX
Patient is a 59y old  Male who presents with a chief complaint of mouth lesions, possible stroke (07 Mar 2020 12:06)      SUBJECTIVE / OVERNIGHT EVENTS: ptn is stable, tolerating TF, awaiting DC to acute rehab today    MEDICATIONS  (STANDING):  aspirin  chewable 81 milliGRAM(s) Oral daily  atorvastatin 80 milliGRAM(s) Oral at bedtime  clopidogrel Tablet 75 milliGRAM(s) Oral daily  cyanocobalamin 1000 MICROGram(s) Oral daily  dextrose 5%. 1000 milliLiter(s) (50 mL/Hr) IV Continuous <Continuous>  dextrose 50% Injectable 12.5 Gram(s) IV Push once  dextrose 50% Injectable 25 Gram(s) IV Push once  dextrose 50% Injectable 25 Gram(s) IV Push once  enoxaparin Injectable 40 milliGRAM(s) SubCutaneous daily  ergocalciferol Drops 2000 Unit(s) Enteral Tube daily  multivitamin/minerals/iron Oral Solution (CENTRUM) 15 milliLiter(s) Enteral Tube daily    MEDICATIONS  (PRN):  bisacodyl Suppository 10 milliGRAM(s) Rectal daily PRN Constipation  dextrose 40% Gel 15 Gram(s) Oral once PRN Blood Glucose LESS THAN 70 milliGRAM(s)/deciliter  famotidine Injectable 20 milliGRAM(s) IV Push every 12 hours PRN Dyspepsia  glucagon  Injectable 1 milliGRAM(s) IntraMuscular once PRN Glucose LESS THAN 70 milligrams/deciliter  ondansetron Injectable 4 milliGRAM(s) IV Push every 6 hours PRN Nausea      Vital Signs Last 24 Hrs  T(F): 99.2 (03-07-20 @ 11:54), Max: 99.2 (03-07-20 @ 11:54)  HR: 92 (03-07-20 @ 11:54) (46 - 92)  BP: 169/94 (03-07-20 @ 11:54) (120/73 - 169/94)  RR: 18 (03-07-20 @ 11:54) (18 - 18)  SpO2: 95% (03-07-20 @ 11:54) (95% - 99%)  Telemetry:   CAPILLARY BLOOD GLUCOSE      POCT Blood Glucose.: 147 mg/dL (07 Mar 2020 12:12)  POCT Blood Glucose.: 117 mg/dL (07 Mar 2020 06:25)  POCT Blood Glucose.: 119 mg/dL (07 Mar 2020 00:07)  POCT Blood Glucose.: 122 mg/dL (06 Mar 2020 18:49)  POCT Blood Glucose.: 129 mg/dL (06 Mar 2020 13:47)    I&O's Summary    06 Mar 2020 07:01  -  07 Mar 2020 07:00  --------------------------------------------------------  IN: 920 mL / OUT: 250 mL / NET: 670 mL    07 Mar 2020 06:01  -  07 Mar 2020 13:13  --------------------------------------------------------  IN: 0 mL / OUT: 225 mL / NET: -225 mL        PHYSICAL EXAM:  GENERAL: NAD, well-developed  HEAD:  Atraumatic, Normocephalic  EYES: EOMI, PERRLA, conjunctiva and sclera clear  NECK: Supple, No JVD  CHEST/LUNG: Clear to auscultation bilaterally; No wheeze  HEART: Regular rate and rhythm; No murmurs, rubs, or gallops  ABDOMEN: Soft, Nontender, Nondistended; Bowel sounds present  EXTREMITIES:  2+ Peripheral Pulses, No clubbing, cyanosis, or edema  PSYCH: AAOx3  NEUROLOGY: non-focal  SKIN: No rashes or lesions    LABS:    03-07    142  |  103  |  8   ----------------------------<  129<H>  4.1   |  26  |  0.74    Ca    9.7      07 Mar 2020 07:10  Phos  3.7     03-07  Mg     2.2     03-07                RADIOLOGY & ADDITIONAL TESTS:    Imaging Personally Reviewed:    Consultant(s) Notes Reviewed:      Care Discussed with Consultants/Other Providers:

## 2020-03-08 DIAGNOSIS — I63.513 CEREBRAL INFARCTION DUE TO UNSPECIFIED OCCLUSION OR STENOSIS OF BILATERAL MIDDLE CEREBRAL ARTERIES: ICD-10-CM

## 2020-03-08 DIAGNOSIS — K94.23 GASTROSTOMY MALFUNCTION: ICD-10-CM

## 2020-03-08 DIAGNOSIS — Z93.1 GASTROSTOMY STATUS: Chronic | ICD-10-CM

## 2020-03-08 DIAGNOSIS — J69.0 PNEUMONITIS DUE TO INHALATION OF FOOD AND VOMIT: ICD-10-CM

## 2020-03-08 DIAGNOSIS — A41.9 SEPSIS, UNSPECIFIED ORGANISM: ICD-10-CM

## 2020-03-08 DIAGNOSIS — S30.1XXA CONTUSION OF ABDOMINAL WALL, INITIAL ENCOUNTER: ICD-10-CM

## 2020-03-08 DIAGNOSIS — R13.10 DYSPHAGIA, UNSPECIFIED: ICD-10-CM

## 2020-03-08 LAB
ALBUMIN SERPL ELPH-MCNC: 3.6 G/DL — SIGNIFICANT CHANGE UP (ref 3.3–5)
ALP SERPL-CCNC: 75 U/L — SIGNIFICANT CHANGE UP (ref 40–120)
ALT FLD-CCNC: 15 U/L — SIGNIFICANT CHANGE UP (ref 10–45)
ANION GAP SERPL CALC-SCNC: 10 MMOL/L — SIGNIFICANT CHANGE UP (ref 5–17)
AST SERPL-CCNC: 14 U/L — SIGNIFICANT CHANGE UP (ref 10–40)
BILIRUB SERPL-MCNC: 1 MG/DL — SIGNIFICANT CHANGE UP (ref 0.2–1.2)
BUN SERPL-MCNC: 8 MG/DL — SIGNIFICANT CHANGE UP (ref 7–23)
CALCIUM SERPL-MCNC: 8.9 MG/DL — SIGNIFICANT CHANGE UP (ref 8.4–10.5)
CHLORIDE SERPL-SCNC: 109 MMOL/L — HIGH (ref 96–108)
CO2 SERPL-SCNC: 25 MMOL/L — SIGNIFICANT CHANGE UP (ref 22–31)
CREAT SERPL-MCNC: 0.71 MG/DL — SIGNIFICANT CHANGE UP (ref 0.5–1.3)
CULTURE RESULTS: NO GROWTH — SIGNIFICANT CHANGE UP
GAS PNL BLDV: SIGNIFICANT CHANGE UP
GLUCOSE SERPL-MCNC: 103 MG/DL — HIGH (ref 70–99)
HCT VFR BLD CALC: 34.8 % — LOW (ref 39–50)
HGB BLD-MCNC: 11.4 G/DL — LOW (ref 13–17)
MAGNESIUM SERPL-MCNC: 2.3 MG/DL — SIGNIFICANT CHANGE UP (ref 1.6–2.6)
MCHC RBC-ENTMCNC: 32.8 GM/DL — SIGNIFICANT CHANGE UP (ref 32–36)
MCHC RBC-ENTMCNC: 33.2 PG — SIGNIFICANT CHANGE UP (ref 27–34)
MCV RBC AUTO: 101.5 FL — HIGH (ref 80–100)
NRBC # BLD: 0 /100 WBCS — SIGNIFICANT CHANGE UP (ref 0–0)
PHOSPHATE SERPL-MCNC: 3 MG/DL — SIGNIFICANT CHANGE UP (ref 2.5–4.5)
PLATELET # BLD AUTO: 315 K/UL — SIGNIFICANT CHANGE UP (ref 150–400)
POTASSIUM SERPL-MCNC: 4.1 MMOL/L — SIGNIFICANT CHANGE UP (ref 3.5–5.3)
POTASSIUM SERPL-SCNC: 4.1 MMOL/L — SIGNIFICANT CHANGE UP (ref 3.5–5.3)
PROT SERPL-MCNC: 6.5 G/DL — SIGNIFICANT CHANGE UP (ref 6–8.3)
RBC # BLD: 3.43 M/UL — LOW (ref 4.2–5.8)
RBC # FLD: 13.5 % — SIGNIFICANT CHANGE UP (ref 10.3–14.5)
SODIUM SERPL-SCNC: 144 MMOL/L — SIGNIFICANT CHANGE UP (ref 135–145)
SPECIMEN SOURCE: SIGNIFICANT CHANGE UP
WBC # BLD: 9.3 K/UL — SIGNIFICANT CHANGE UP (ref 3.8–10.5)
WBC # FLD AUTO: 9.3 K/UL — SIGNIFICANT CHANGE UP (ref 3.8–10.5)

## 2020-03-08 PROCEDURE — 74018 RADEX ABDOMEN 1 VIEW: CPT | Mod: 26

## 2020-03-08 PROCEDURE — 99223 1ST HOSP IP/OBS HIGH 75: CPT

## 2020-03-08 RX ORDER — PIPERACILLIN AND TAZOBACTAM 4; .5 G/20ML; G/20ML
3.38 INJECTION, POWDER, LYOPHILIZED, FOR SOLUTION INTRAVENOUS EVERY 8 HOURS
Refills: 0 | Status: DISCONTINUED | OUTPATIENT
Start: 2020-03-08 | End: 2020-03-12

## 2020-03-08 RX ORDER — CLOPIDOGREL BISULFATE 75 MG/1
75 TABLET, FILM COATED ORAL DAILY
Refills: 0 | Status: DISCONTINUED | OUTPATIENT
Start: 2020-03-08 | End: 2020-03-17

## 2020-03-08 RX ORDER — ACETYLCYSTEINE 200 MG/ML
5 VIAL (ML) MISCELLANEOUS
Refills: 0 | Status: COMPLETED | OUTPATIENT
Start: 2020-03-08 | End: 2020-03-09

## 2020-03-08 RX ORDER — ATORVASTATIN CALCIUM 80 MG/1
80 TABLET, FILM COATED ORAL AT BEDTIME
Refills: 0 | Status: DISCONTINUED | OUTPATIENT
Start: 2020-03-08 | End: 2020-03-17

## 2020-03-08 RX ORDER — PIPERACILLIN AND TAZOBACTAM 4; .5 G/20ML; G/20ML
3.38 INJECTION, POWDER, LYOPHILIZED, FOR SOLUTION INTRAVENOUS ONCE
Refills: 0 | Status: DISCONTINUED | OUTPATIENT
Start: 2020-03-08 | End: 2020-03-08

## 2020-03-08 RX ORDER — SODIUM CHLORIDE 9 MG/ML
1000 INJECTION, SOLUTION INTRAVENOUS
Refills: 0 | Status: DISCONTINUED | OUTPATIENT
Start: 2020-03-08 | End: 2020-03-17

## 2020-03-08 RX ORDER — ASPIRIN/CALCIUM CARB/MAGNESIUM 324 MG
81 TABLET ORAL DAILY
Refills: 0 | Status: DISCONTINUED | OUTPATIENT
Start: 2020-03-08 | End: 2020-03-17

## 2020-03-08 RX ADMIN — PIPERACILLIN AND TAZOBACTAM 25 GRAM(S): 4; .5 INJECTION, POWDER, LYOPHILIZED, FOR SOLUTION INTRAVENOUS at 14:01

## 2020-03-08 RX ADMIN — CLOPIDOGREL BISULFATE 75 MILLIGRAM(S): 75 TABLET, FILM COATED ORAL at 14:00

## 2020-03-08 RX ADMIN — SODIUM CHLORIDE 100 MILLILITER(S): 9 INJECTION, SOLUTION INTRAVENOUS at 09:10

## 2020-03-08 RX ADMIN — Medication 5 MILLILITER(S): at 19:19

## 2020-03-08 RX ADMIN — PIPERACILLIN AND TAZOBACTAM 25 GRAM(S): 4; .5 INJECTION, POWDER, LYOPHILIZED, FOR SOLUTION INTRAVENOUS at 22:28

## 2020-03-08 RX ADMIN — ATORVASTATIN CALCIUM 80 MILLIGRAM(S): 80 TABLET, FILM COATED ORAL at 22:28

## 2020-03-08 RX ADMIN — Medication 81 MILLIGRAM(S): at 14:01

## 2020-03-08 NOTE — PHYSICAL THERAPY INITIAL EVALUATION ADULT - ADDITIONAL COMMENTS
As per last PT note 3/5, pt was independent with bed mobility with verbal cues, transferred with contact guard, ambulated with rolling walker 50 feet x2 with contact guard and chair follow. As per PT eval from 2/10, pt lives in an apartment with an elevator. Prior to recent hospitalization with independent with all functional mobility.  As per last PT note on 3/5, pt transferred with contact guard, and ambulated 50 feet with rolling walker and contact guard, verbal cues, and chair follow.

## 2020-03-08 NOTE — H&P ADULT - HISTORY OF PRESENT ILLNESS
59M, Bengali only speaking, c hx remote CVA without deficits, recent prolonged hospitalization (2/9-3/6/20) for R frontal/temporal CVA (Jan '20) c/b nonverbal/broca's aphasia, LLE weakness, dysphagia, s/p PEG (initially placed 2/2, replaced by surg 2/27, replaced by IR 3/2, now replaced again by surg 3/8), occluded L MCA, presents from [place] rehab with fever 101 and G tube dislodged.    History obtained from chart. Pt able to understand Macedonian and follows simple commands. Pt also nods or shakes head to simple questions in Macedonian. Per chart, pt found to be coughing and with fever to 101. During exam, G tube was pulled without resistance. Pt sent in for above reasons. Pt denies pain anywhere. ROS limited 2/2 aphasia.    VS: Tm 100, P 81, /67, R 18, 95% RA  In the ED, received zosyn, NS 1l, tylenol 59M, Kiswahili only speaking, c hx remote CVA without deficits, recent prolonged hospitalization (2/9-3/6/20) for R frontal/temporal CVA (Jan '20) c/b nonverbal/broca's aphasia, LLE weakness, dysphagia, s/p PEG (initially placed 2/2, replaced by surg 2/27, replaced by IR 3/2, now replaced again by surg 3/8), occluded L MCA, presents from [place] [facility] with fever 101 and G tube dislodged.    History obtained from chart. Attempted to call pt's family member Dallas Arriola, and left voicemail. Pt able to understand Maltese and follows simple commands. Pt also nods or shakes head to simple questions in Maltese. Per chart, pt found to be coughing and with fever to 101. During exam, G tube was pulled without resistance. Pt sent in for above reasons. Pt denies pain anywhere. ROS limited 2/2 aphasia.    VS: Tm 100, P 81, /67, R 18, 95% RA  In the ED, received zosyn, NS 1l, tylenol 59M, Mongolian only speaking, c hx remote CVA without deficits, recent prolonged hospitalization (2/9-3/6/20) for R frontal/temporal CVA (Jan '20) c/b nonverbal/broca's aphasia, LE weakness, dysphagia, s/p PEG (initially placed 2/2, replaced by surg 2/27, replaced by IR 3/2, now replaced again by surg 3/8), occluded L MCA, presents from [place] rehab with fever 101 and G tube dislodged.    History obtained from chart. Attempted to call pt's family member Dallas Arriola, and left voicemail. Pt able to understand Spanish and follows simple commands. Pt also nods or shakes head to simple questions in Spanish. Per chart, pt found to be coughing and with fever to 101. During exam, G tube was pulled without resistance. Pt sent in for above reasons. Pt denies pain anywhere. ROS limited 2/2 aphasia.    VS: Tm 100, P 81, /67, R 18, 95% RA  In the ED, received zosyn, NS 1l, tylenol

## 2020-03-08 NOTE — H&P ADULT - ASSESSMENT
59M, Setswana only speaking, c hx remote CVA without deficits, recent prolonged hospitalization (2/9-3/6/20) for R frontal/temporal CVA (Jan '20) c/b nonverbal/broca's aphasia, LLE weakness, dysphagia, s/p PEG (initially placed 2/2, replaced by surg 2/27, replaced by IR 3/2, now replaced again by surg 3/8), occluded L MCA, presents from [place] re[facility] with fever 101 and G tube dislodged. 59M, Sinhala only speaking, c hx remote CVA without deficits, recent prolonged hospitalization (2/9-3/6/20) for R frontal/temporal CVA (Jan '20) c/b nonverbal/broca's aphasia, LLE weakness, dysphagia, s/p PEG (initially placed 2/2, replaced by surg 2/27, replaced by IR 3/2, now replaced again by surg 3/8), occluded L MCA, pw sepsis likely 2/2 aspiration PNA 59M, Wolof only speaking, c hx remote CVA without deficits, recent prolonged hospitalization (2/9-3/6/20) for R frontal/temporal CVA (Jan '20) c/b nonverbal/broca's aphasia, LE weakness, dysphagia, s/p PEG (initially placed 2/2, replaced by surg 2/27, replaced by IR 3/2, now replaced again by surg 3/8), occluded L MCA, pw sepsis likely 2/2 aspiration PNA

## 2020-03-08 NOTE — H&P ADULT - PROBLEM SELECTOR PLAN 5
- NPO, and also holding TF for now  - restart once fevers resolve - NPO, and also holding TF for now  - restart once fevers resolve and hemodynamically stable - NPO, low rate TF  - increase TF rate once fevers resolve and hemodynamically stable

## 2020-03-08 NOTE — PHYSICAL THERAPY INITIAL EVALUATION ADULT - PERTINENT HX OF CURRENT PROBLEM, REHAB EVAL
Pt admitted 3/8 from [facility] with fever and G tube dislodged. As per H&P, p/w sepsis likely 2/2 aspiration pna. PMH recent prolonged hospitalization (2/9-3/6/20) for R frontal/temporal CVA (Jan '20) c/b nonverbal/broca's aphasia, LLE weakness, dysphagia, s/p PEG (initially placed 2/2, replaced by surg 2/27, replaced by IR 3/2, now replaced again by surg 3/8), occluded L MCA.

## 2020-03-08 NOTE — OCCUPATIONAL THERAPY INITIAL EVALUATION ADULT - ADL RETRAINING, OT EVAL
Goal: Pt will perform UB/LB dressing independently within 4 weeks. Goal: Pt will perform toilet transfers and complete hygiene independently within 4 weeks

## 2020-03-08 NOTE — H&P ADULT - NSHPSOCIALHISTORY_GEN_ALL_CORE
Social History:    Marital Status: (  ) , (  ) Single, (  ) , (  ) , (  )   # of Children:   Lives with: (  ) alone, (  ) children, (  ) spouse, (  ) parents, (  ) siblings, (  ) friends, (  ) other:   Occupation:     Substance Use/Illicit Drugs: (  ) never used vs other:   Tobacco Usage: (  ) never smoked, (  ) former smoker, (  ) current smoker and Total Pack-Years:   Last Alcohol Usage/Frequency/Amount/Withdrawal/Hx of Abuse:    Foreign travel:   Animal exposure:

## 2020-03-08 NOTE — OCCUPATIONAL THERAPY INITIAL EVALUATION ADULT - PERTINENT HX OF CURRENT PROBLEM, REHAB EVAL
60yo Yoruba only speaking M PMH remote CVA without deficits, recent prolonged hospitalization (2/9-3/6/20) for R frontal/temporal CVA (Jan '20) c/b nonverbal/broca's aphasia, LLE weakness, dysphagia, s/p PEG (initially placed 2/2, replaced by surg 2/27, replaced by IR 3/2, now replaced again by surg 3/8), occluded L MCA, presents from [place] rehab with fever 101 and G tube dislodged.

## 2020-03-08 NOTE — H&P ADULT - NSHPPHYSICALEXAM_GEN_ALL_CORE
PHYSICAL EXAM:   GENERAL: Alert. No acute distress. +thin. Not cachectic. Not obese.  HEAD:  Atraumatic. Normocephalic.  EYES: EOMI. PERRLA. Normal conjunctiva/sclera.  ENT: Neck supple. No JVD. Moist oral mucosa. Not edentulous. No thrush.  LYMPH: Normal supraclavicular/cervical lymph nodes.   CARDIAC: Not tachy, Not bryan. Regular rhythm. Not irregularly irregular. S1. S2. No murmur. No rub. No distant heart sounds.  LUNG/CHEST: No wheezes. No rales. +coarse rhonchi b/l.  ABDOMEN: Soft. No tenderness. No distension. No fluid wave. Normal bowel sounds. ?feed draining from G tube site.  BACK: No midline/vertebral tenderness. No flank tenderness.  VASCULAR: +2 b/l radial or ulnar pulses. Palpable DP pulses.  EXTREMITIES:  No clubbing. No cyanosis. No edema. Moving all 4.  NEUROLOGY: Alert. UE 5/5 strength. LLE 3/5 strength. RLE 5/5 strength. Nonverbal. Sensation intact.  PSYCH: Normal behavior. Flat affect.  SKIN: No jaundice. No erythema. No rash/lesion.  Vascular Access:     ICU Vital Signs Last 24 Hrs  T(C): 36.5 (08 Mar 2020 03:35), Max: 38.4 (07 Mar 2020 15:03)  T(F): 97.7 (08 Mar 2020 03:35), Max: 101.1 (07 Mar 2020 15:03)  HR: 62 (08 Mar 2020 03:35) (62 - 92)  BP: 104/67 (08 Mar 2020 03:35) (104/67 - 169/94)  BP(mean): --  ABP: --  ABP(mean): --  RR: 16 (08 Mar 2020 03:35) (16 - 18)  SpO2: 98% (08 Mar 2020 03:35) (95% - 98%)      I&O's Summary PHYSICAL EXAM:   GENERAL: Alert. No acute distress. +thin. Not cachectic. Not obese.  HEAD:  Atraumatic. Normocephalic.  EYES: EOMI. PERRLA. Normal conjunctiva/sclera.  ENT: Neck supple. No JVD. Moist oral mucosa. Not edentulous. No thrush.  LYMPH: Normal supraclavicular/cervical lymph nodes.   CARDIAC: Not tachy, Not bryan. Regular rhythm. Not irregularly irregular. S1. S2. No murmur. No rub. No distant heart sounds.  LUNG/CHEST: No wheezes. No rales. +coarse rhonchi b/l.  ABDOMEN: Soft. No tenderness. No distension. No fluid wave. Normal bowel sounds. ?feed draining from G tube site.  BACK: No midline/vertebral tenderness. No flank tenderness.  VASCULAR: +2 b/l radial or ulnar pulses. Palpable DP pulses.  EXTREMITIES:  No clubbing. No cyanosis. No edema. Moving all 4.  NEUROLOGY: Alert. UE 5/5 strength. LLE 4/5 strength. RLE 4/5 strength. Nonverbal. Sensation intact.  PSYCH: Normal behavior. Flat affect.  SKIN: No jaundice. No erythema. No rash/lesion.  Vascular Access:     ICU Vital Signs Last 24 Hrs  T(C): 36.5 (08 Mar 2020 03:35), Max: 38.4 (07 Mar 2020 15:03)  T(F): 97.7 (08 Mar 2020 03:35), Max: 101.1 (07 Mar 2020 15:03)  HR: 62 (08 Mar 2020 03:35) (62 - 92)  BP: 104/67 (08 Mar 2020 03:35) (104/67 - 169/94)  BP(mean): --  ABP: --  ABP(mean): --  RR: 16 (08 Mar 2020 03:35) (16 - 18)  SpO2: 98% (08 Mar 2020 03:35) (95% - 98%)      I&O's Summary

## 2020-03-08 NOTE — H&P ADULT - NSHPLABSRESULTS_GEN_ALL_CORE
Personally reviewed labs.   Personally reviewed imaging.                        11.2   15.51 )-----------( 353      ( 07 Mar 2020 20:13 )             34.5       03-07    140  |  101  |  8   ----------------------------<  104<H>  3.7   |  27  |  0.78    Ca    9.6      07 Mar 2020 20:13  Phos  3.7     03-07  Mg     2.2     03-07    TPro  6.8  /  Alb  3.9  /  TBili  0.9  /  DBili  x   /  AST  8<L>  /  ALT  16  /  AlkPhos  77  03-07            LIVER FUNCTIONS - ( 07 Mar 2020 20:13 )  Alb: 3.9 g/dL / Pro: 6.8 g/dL / ALK PHOS: 77 U/L / ALT: 16 U/L / AST: 8 U/L / GGT: x             PT/INR - ( 07 Mar 2020 20:13 )   PT: 12.6 sec;   INR: 1.10 ratio         PTT - ( 07 Mar 2020 20:13 )  PTT:37.2 sec    Urinalysis Basic - ( 07 Mar 2020 20:54 )    Color: Yellow / Appearance: Slightly Turbid / S.014 / pH: x  Gluc: x / Ketone: Negative  / Bili: Negative / Urobili: Negative   Blood: x / Protein: Trace / Nitrite: Negative   Leuk Esterase: Negative / RBC: 2 /hpf / WBC 1 /HPF   Sq Epi: x / Non Sq Epi: 0 /hpf / Bacteria: Negative

## 2020-03-08 NOTE — OCCUPATIONAL THERAPY INITIAL EVALUATION ADULT - DIAGNOSIS, OT EVAL
Pt currently presents with decreased command follow, endurance, balance and strength limiting independence with ADLs and functional mobility.

## 2020-03-08 NOTE — ED ADULT NURSE REASSESSMENT NOTE - NS ED NURSE REASSESS COMMENT FT1
Report received from MARBIN CASEY, resp nonlabored, skin warm/dry, resting comfortably in bed. Pt denies headache, dizziness, chest pain, palpitations, SOB, abd pain, n/v/d, urinary symptoms, fevers, chills, weakness at this time. Pt awaiting CT results and admission. Safety maintained.

## 2020-03-08 NOTE — OCCUPATIONAL THERAPY INITIAL EVALUATION ADULT - NS ASR FOLLOW COMMAND OT EVAL
aphasia/oral apraxia/increased time for processing/able to follow single-step instructions/50% of the time

## 2020-03-08 NOTE — H&P ADULT - PROBLEM SELECTOR PLAN 3
- replaced by surgery  - unclear clinical significance of pneumoperitoneum, but no peritonitis on CT  - monitor clinically

## 2020-03-08 NOTE — PHYSICAL THERAPY INITIAL EVALUATION ADULT - MANUAL MUSCLE TESTING RESULTS, REHAB EVAL
L knee extension 5/5, R knee extension 4/5. NATANAEL hip flexion (SLR) 5/5. L ankle DF 5/5, R ankle DF 4/5

## 2020-03-08 NOTE — H&P ADULT - PROBLEM SELECTOR PLAN 4
- nonverbal, gait instability, dysphagia  - PT eval, needs to return to [facility]  - OT eval - nonverbal, gait instability, dysphagia  - asa, plavix, lipitor  - PT eval, needs to return to [facility]  - OT eval - nonverbal, gait instability, dysphagia  - asa, plavix, lipitor  - PT eval, needs to return to [facility]  - OT eval  - needs eventual GOC discussion

## 2020-03-08 NOTE — OCCUPATIONAL THERAPY INITIAL EVALUATION ADULT - ADDITIONAL COMMENTS
History obtained from chart. Attempted to call pt's family member Dallas Arriola, and left voicemail. Pt able to understand Sinhala and follows simple commands. Pt also nods or shakes head to simple questions in Sinhala. Per chart, pt found to be coughing and with fever to 101. During exam, G tube was pulled without resistance. Pt sent in for above reasons. Pt denies pain anywhere. ROS limited 2/2 aphasia. Pt able to understand Serbian and follows simple commands. Pt also nods or shakes head to simple questions in Serbian. Per chart, pt found to be coughing and with fever to 101. During exam, G tube was pulled without resistance. Pt sent in for above reasons. Pt denies pain anywhere. ROS limited 2/2 aphasia.

## 2020-03-08 NOTE — PHYSICAL THERAPY INITIAL EVALUATION ADULT - GAIT DEVIATIONS NOTED, PT EVAL
decreased stride length/decreased strength and coordination RLE/decreased step length/decreased herminia

## 2020-03-09 PROCEDURE — 99232 SBSQ HOSP IP/OBS MODERATE 35: CPT | Mod: GC,24

## 2020-03-09 PROCEDURE — 76000 FLUOROSCOPY <1 HR PHYS/QHP: CPT | Mod: 26

## 2020-03-09 RX ORDER — CHLORHEXIDINE GLUCONATE 213 G/1000ML
1 SOLUTION TOPICAL DAILY
Refills: 0 | Status: DISCONTINUED | OUTPATIENT
Start: 2020-03-09 | End: 2020-03-17

## 2020-03-09 RX ADMIN — PIPERACILLIN AND TAZOBACTAM 25 GRAM(S): 4; .5 INJECTION, POWDER, LYOPHILIZED, FOR SOLUTION INTRAVENOUS at 21:49

## 2020-03-09 RX ADMIN — Medication 5 MILLILITER(S): at 19:58

## 2020-03-09 RX ADMIN — Medication 81 MILLIGRAM(S): at 15:49

## 2020-03-09 RX ADMIN — PIPERACILLIN AND TAZOBACTAM 25 GRAM(S): 4; .5 INJECTION, POWDER, LYOPHILIZED, FOR SOLUTION INTRAVENOUS at 15:48

## 2020-03-09 RX ADMIN — Medication 5 MILLILITER(S): at 05:53

## 2020-03-09 RX ADMIN — CHLORHEXIDINE GLUCONATE 1 APPLICATION(S): 213 SOLUTION TOPICAL at 15:49

## 2020-03-09 RX ADMIN — CLOPIDOGREL BISULFATE 75 MILLIGRAM(S): 75 TABLET, FILM COATED ORAL at 15:49

## 2020-03-09 RX ADMIN — ATORVASTATIN CALCIUM 80 MILLIGRAM(S): 80 TABLET, FILM COATED ORAL at 21:49

## 2020-03-09 RX ADMIN — PIPERACILLIN AND TAZOBACTAM 25 GRAM(S): 4; .5 INJECTION, POWDER, LYOPHILIZED, FOR SOLUTION INTRAVENOUS at 05:52

## 2020-03-09 NOTE — PROGRESS NOTE ADULT - PROBLEM SELECTOR PLAN 4
- nonverbal, gait instability, dysphagia  - asa, plavix, lipitor  - PT eval, needs to return to acute rehab  - OT eval  - GOC d/w HCP: full code, "everything" to be done

## 2020-03-09 NOTE — DIETITIAN INITIAL EVALUATION ADULT. - REASON INDICATOR FOR ASSESSMENT
Pt seen for TF initiation   Information obtained from: RN, previous RD notes, electronic medical record  Pt non-verbal as per flow sheet, no family at bedside - limited information obtained Pt seen for new tube feeds in house  Information obtained from: RN, previous RD notes, electronic medical record  Pt non-verbal as per flow sheet, no family at bedside - limited information obtained

## 2020-03-09 NOTE — PROGRESS NOTE ADULT - SUBJECTIVE AND OBJECTIVE BOX
PAM JIMENEZ:[id number],   59yMale followed for:  No Known Allergies    PAST MEDICAL & SURGICAL HISTORY:  CVA (cerebral vascular accident)  S/P percutaneous endoscopic gastrostomy (PEG) tube placement    FAMILY HISTORY:  No pertinent family history in first degree relatives    MEDICATIONS  (STANDING):  acetylcysteine 10%  Inhalation 5 milliLiter(s) Inhalation two times a day  aspirin  chewable 81 milliGRAM(s) Oral daily  atorvastatin 80 milliGRAM(s) Oral at bedtime  chlorhexidine 2% Cloths 1 Application(s) Topical daily  clopidogrel Tablet 75 milliGRAM(s) Oral daily  lactated ringers. 1000 milliLiter(s) (100 mL/Hr) IV Continuous <Continuous>  piperacillin/tazobactam IVPB.. 3.375 Gram(s) IV Intermittent every 8 hours    MEDICATIONS  (PRN):      Vital Signs Last 24 Hrs  T(C): 37 (09 Mar 2020 04:08), Max: 37 (08 Mar 2020 23:14)  T(F): 98.6 (09 Mar 2020 04:08), Max: 98.6 (08 Mar 2020 23:14)  HR: 54 (09 Mar 2020 04:08) (50 - 57)  BP: 124/77 (09 Mar 2020 04:08) (113/72 - 134/74)  BP(mean): --  RR: 18 (09 Mar 2020 04:08) (18 - 20)  SpO2: 98% (09 Mar 2020 04:08) (94% - 98%)  nc/at  s1s2  cta  soft, nt, nd no guarding or rebound  no c/c/e    CBC Full  -  ( 08 Mar 2020 07:32 )  WBC Count : 9.30 K/uL  RBC Count : 3.43 M/uL  Hemoglobin : 11.4 g/dL  Hematocrit : 34.8 %  Platelet Count - Automated : 315 K/uL  Mean Cell Volume : 101.5 fl  Mean Cell Hemoglobin : 33.2 pg  Mean Cell Hemoglobin Concentration : 32.8 gm/dL  Auto Neutrophil # : x  Auto Lymphocyte # : x  Auto Monocyte # : x  Auto Eosinophil # : x  Auto Basophil # : x  Auto Neutrophil % : x  Auto Lymphocyte % : x  Auto Monocyte % : x  Auto Eosinophil % : x  Auto Basophil % : x    03-08    144  |  109<H>  |  8   ----------------------------<  103<H>  4.1   |  25  |  0.71    Ca    8.9      08 Mar 2020 07:31  Phos  3.0     03-08  Mg     2.3     03-08    TPro  6.5  /  Alb  3.6  /  TBili  1.0  /  DBili  x   /  AST  14  /  ALT  15  /  AlkPhos  75  03-08    PT/INR - ( 07 Mar 2020 20:13 )   PT: 12.6 sec;   INR: 1.10 ratio         PTT - ( 07 Mar 2020 20:13 )  PTT:37.2 sec

## 2020-03-09 NOTE — DIETITIAN INITIAL EVALUATION ADULT. - OTHER INFO
Pt is a 58 y/o M, Sinhala only speaking, with hx of remote CVA without deficits, recent prolonged hospitalization (2/9-3/6/20) for R frontal/temporal CVA (Jan '20) c/b nonverbal/broca's aphasia, LE weakness, dysphagia, s/p PEG 2/2, replaced by surg with Janeway gastrostomy 2/27, replaced by IR 3/2, replaced again with 16Fr gastrostomy by surg in ED 3/8, [facility] and G tube dislodged. Gastrostomy tube replaced (3/9) by surgery team with hicks under fluoroscopy, can restart tube feeds via Hicks in g-tube site per team.    Limited subjective information PTA obtained at this time; no nursing transfer note. As per documentation: pt with NKFA; pt taking multiple vitamins with minerals oral liquid and ergocalciferol & famotidine, ondansetron, bisacodyl as needed. Per previous RD note, NP prefers pt to be on 18 hr feeds on and hr off at this time.     RN denies pt with nausea, vomiting, diarrhea, or constipation, last BM noted (3/8) fecal incontinence.     Weight history (pounds): Most recent (3/9) 137, previous RD note (3/5) dosing 137 - will continue to monitor Pt is a 58 y/o M, Maltese only speaking, with hx of remote CVA without deficits, recent prolonged hospitalization (2/9-3/6/20) for R frontal/temporal CVA (Jan '20) c/b nonverbal/broca's aphasia, LE weakness, dysphagia, s/p PEG 2/2, replaced by surg with Janeway gastrostomy 2/27, replaced by IR 3/2, replaced again with 16Fr gastrostomy by surg in ED 3/8, [facility] and G tube dislod[facility]. Gastrostomy tube replaced (3/9) by surgery team with hicks under fluoroscopy, can restart tube feeds via Hicks in g-tube site per team.    Limited subjective information PTA obtained at this time; no nursing transfer note. As per documentation: pt with NKFA; pt taking multiple vitamins with minerals oral liquid and ergocalciferol & famotidine, ondansetron, bisacodyl as needed. Per previous RD note, NP prefers pt to be on 18 hr feeds on and hr off at this time.     RN denies pt with nausea, vomiting, diarrhea, or constipation, last BM noted (3/8) fecal incontinence.     Weight history (pounds): Most recent (3/9) 137, previous RD note (3/5) dosing 137 - will continue to monitor. Pt is a 58 y/o M, Albanian only speaking, with hx of remote CVA without deficits, recent prolonged hospitalization (2/9-3/6/20) for R frontal/temporal CVA (Jan '20) c/b nonverbal/broca's aphasia, LE weakness, dysphagia, s/p PEG 2/2, replaced by surg with Janeway gastrostomy 2/27, replaced by IR 3/2, replaced again with 16Fr gastrostomy by surg in ED 3/8, [facility] and [facility] tube dislodged. Gastrostomy tube replaced (3/9) by surgery team with hicks under fluoroscopy, can restart tube feeds via Hicks in g-tube site per team.    Limited subjective information PTA obtained at this time; no nursing transfer note. As per documentation: pt with NKFA; pt taking multiple vitamins with minerals oral liquid and ergocalciferol & famotidine, ondansetron, bisacodyl as needed.     RN denies pt with nausea, vomiting, diarrhea, or constipation, last BM noted (3/8) fecal incontinence.     Weight history (pounds): Most recent (3/9) 137, previous RD note (3/5) dosing 137 - will continue to monitor. Noted as per chart notes from previous admission, pt did have a period of suboptimal enteral nutrition provision and was not able to meet needs and met criteria for severe malnutrition. Questionable wt on admission, would continue to monitor.

## 2020-03-09 NOTE — DIETITIAN INITIAL EVALUATION ADULT. - PHYSICAL APPEARANCE
other (specify) No edema or pressure injury noted per flow sheet Nutrition Focused Physical Exam performed with pt consent and RD present; moderate triceps fat loss & mild temporal, mild-moderate shoulder, severe calf region muscle loss   No edema or pressure injury noted per flow sheet Nutrition Focused Physical Exam performed with pt consent and RD present; moderate triceps fat loss & mild temporal, mild-moderate shoulder, severe calf region muscle loss (calf muscle loss likely partially due to inactivity)  No edema or pressure injury noted per flow sheet

## 2020-03-09 NOTE — CHART NOTE - NSCHARTNOTEFT_GEN_A_CORE
Upon Nutritional Assessment by the Registered Dietitian your patient was determined to meet criteria / has evidence of the following diagnosis/diagnoses:          [ ]  Mild Protein Calorie Malnutrition        [x]  Moderate Protein Calorie Malnutrition        [ ] Severe Protein Calorie Malnutrition        [ ] Unspecified Protein Calorie Malnutrition        [ ] Underweight / BMI <19        [ ] Morbid Obesity / BMI > 40      Findings as based on:  [x] Comprehensive nutrition assessment   [x] Nutrition Focused Physical Exam  [ ] Other:       Nutrition Plan/Recommendations:      Recommend initiate tube feeds of Jevity 1.2 through continuous regimen via PEG starting at 20 mL/hr and advance by 10 mL every 4 hours or as tolerated to goal rate 80 mL x 18 hours to provide 1440 total mL, 1728 calories, 80 grams protein, and 1162 mL water (28kcal/kg, 1.3g protein/kg based on dosing wt 62kg); defer additional free water to team. Will continue to monitor and adjust as needed.    PROVIDER Section:     By signing this assessment you are acknowledging and agree with the diagnosis/diagnoses assigned by the Registered Dietitian    Comments:

## 2020-03-09 NOTE — DIETITIAN INITIAL EVALUATION ADULT. - ENERGY NEEDS
(Current)  Height: 66 inches, Weight: 136 pounds  BMI: 22.2 kg/m2 IBW: 142 pounds (+/-10%), %IBW: 96%

## 2020-03-09 NOTE — DIETITIAN INITIAL EVALUATION ADULT. - SIGNS/SYMPTOMS
Visual signs of fat and muscle loss, pt NPO x1 day - will resume EN today nutrition focused physical exam findings

## 2020-03-09 NOTE — PROGRESS NOTE ADULT - ASSESSMENT
59M, Occitan only speaking, c hx remote CVA without deficits, recent prolonged hospitalization (2/9-3/6/20) for R frontal/temporal CVA (Jan '20) c/b nonverbal/broca's aphasia, LE weakness, dysphagia, s/p PEG (initially placed 2/2, replaced by surg 2/27, replaced by IR 3/2, now replaced again by surg 3/8), occluded L MCA, pw sepsis likely 2/2 aspiration PNA

## 2020-03-09 NOTE — DIETITIAN INITIAL EVALUATION ADULT. - ENTERAL
Recommend initiate tube feeds of Jevity 1.2 through continuous regimen via PEG starting at 20 mL/hr and advance by 10 mL every 4 hours or as tolerated to goal rate 80 mL x 18 hours to provide 1440 total mL, 1728 calories, 80 grams protein, and 1162 mL water (28kcal/kg, 1.3g protein/kg based on dosing wt 62kg); defer additional free water to team. Will continue to monitor and adjust as needed.

## 2020-03-09 NOTE — DIETITIAN INITIAL EVALUATION ADULT. - ADD RECOMMEND
1. Will continue to monitor EN provision/tolerance, weights, skin, labs 2. Recommend vitamin D3 per pt previous regimen

## 2020-03-09 NOTE — CONSULT NOTE ADULT - SUBJECTIVE AND OBJECTIVE BOX
SURGERY CONSULT NOTE    Patient is a 59y old Male who presents with a chief complaint of fever, dislodged G tube (09 Mar 2020 08:32)    HPI:  59M, Hebrew only speaking, c hx remote CVA without deficits, recent prolonged hospitalization (-3/6/20) for R frontal/temporal CVA () c/b nonverbal/broca's aphasia, LE weakness, dysphagia, s/p PEG /, replaced by surg with Janeway gastrostomy , replaced by IR 3/2, replaced again with 16Fr gastrostomy by surg in ED 3/8, [facility] and [facility] tube dis[facility].     History obtained from chart. Attempted to call pt's family member Dallas Arriola, and left voicemail. Pt able to understand Amharic and follows simple commands. Pt also nods or shakes head to simple questions in Amharic. Per chart, pt found to be coughing and with fever to 101. During exam, G tube was pulled without resistance. Pt sent in for above reasons. Pt denies pain anywhere. ROS limited / aphasia.    VS: Tm 100, P 81, /67, R 18, 95% RA  In the ED, received zosyn, NS 1l, tylenol (08 Mar 2020 06:12)      10-points review of system performed with pertinent negative and postive findings documented in the HPI     PAST MEDICAL & SURGICAL HISTORY:  CVA (cerebral vascular accident)  S/P percutaneous endoscopic gastrostomy (PEG) tube placement, Janeway gastrostomy tube placement    FAMILY HISTORY:  No pertinent family history in first degree relatives  : Family history not pertinent as reviewed with the patient and family    SOCIAL HISTORY: No pertinent social history    MEDICATIONS  (STANDING):  acetylcysteine 10%  Inhalation 5 milliLiter(s) Inhalation two times a day  aspirin  chewable 81 milliGRAM(s) Oral daily  atorvastatin 80 milliGRAM(s) Oral at bedtime  chlorhexidine 2% Cloths 1 Application(s) Topical daily  clopidogrel Tablet 75 milliGRAM(s) Oral daily  lactated ringers. 1000 milliLiter(s) (100 mL/Hr) IV Continuous <Continuous>  piperacillin/tazobactam IVPB.. 3.375 Gram(s) IV Intermittent every 8 hours    MEDICATIONS  (PRN):    Allergies    No Known Allergies    Intolerances        Vital Signs Last 24 Hrs  T(C): 37.1 (09 Mar 2020 10:43), Max: 37.1 (09 Mar 2020 10:43)  T(F): 98.7 (09 Mar 2020 10:43), Max: 98.7 (09 Mar 2020 10:43)  HR: 55 (09 Mar 2020 10:43) (53 - 57)  BP: 96/63 (09 Mar 2020 10:43) (96/63 - 134/74)  BP(mean): --  RR: 18 (09 Mar 2020 10:43) (18 - 20)  SpO2: 95% (09 Mar 2020 10:43) (94% - 98%)  Daily     Daily     Physical Exam:  General: alert, awake, NAD  Resp: nonlabored breathing, no wheezes or rales  Abd: soft, nontender, nondistended; minimal drainage from G-tube site  Ext: warm and well perfused; no edema; hands in mittens bilaterally  Neuro: alert, nonverbal                          11.4   9.30  )-----------( 315      ( 08 Mar 2020 07:32 )             34.8     03-08    144  |  109<H>  |  8   ----------------------------<  103<H>  4.1   |  25  |  0.71    Ca    8.9      08 Mar 2020 07:31  Phos  3.0     03-08  Mg     2.3     03-08    TPro  6.5  /  Alb  3.6  /  TBili  1.0  /  DBili  x   /  AST  14  /  ALT  15  /  AlkPhos  75  03-08    PT/INR - ( 07 Mar 2020 20:13 )   PT: 12.6 sec;   INR: 1.10 ratio         PTT - ( 07 Mar 2020 20:13 )  PTT:37.2 sec  Urinalysis Basic - ( 07 Mar 2020 20:54 )    Color: Yellow / Appearance: Slightly Turbid / S.014 / pH: x  Gluc: x / Ketone: Negative  / Bili: Negative / Urobili: Negative   Blood: x / Protein: Trace / Nitrite: Negative   Leuk Esterase: Negative / RBC: 2 /hpf / WBC 1 /HPF   Sq Epi: x / Non Sq Epi: 0 /hpf / Bacteria: Negative    IMAGING STUDIES:

## 2020-03-09 NOTE — CONSULT NOTE ADULT - SUBJECTIVE AND OBJECTIVE BOX
HPI:   Patient is a 59y male with history of cva some years ago, admitted to [facility] a month ago with aphasia and weakness, found to have a new cva. He required peg placement but pulled it out so ultimately placed surgically. He went to [facility] rehab on 3/7 and returned here same day due to fever and gtube out. He had g tube replaced with a hicks and is doing ok with that now. No further fever but the ct shows LLL infiltrate. Patient cannot give any information.     REVIEW OF SYSTEMS:  All other review of systems cannot get (Comprehensive ROS)    PAST MEDICAL & SURGICAL HISTORY:  CVA (cerebral vascular accident)  S/P percutaneous endoscopic gastrostomy (PEG) tube placement      Allergies    No Known Allergies    Intolerances        Antimicrobials Day #  :  piperacillin/tazobactam IVPB.. 3.375 Gram(s) IV Intermittent every 8 hours    Other Medications:  acetylcysteine 10%  Inhalation 5 milliLiter(s) Inhalation two times a day  aspirin  chewable 81 milliGRAM(s) Oral daily  atorvastatin 80 milliGRAM(s) Oral at bedtime  chlorhexidine 2% Cloths 1 Application(s) Topical daily  clopidogrel Tablet 75 milliGRAM(s) Oral daily  lactated ringers. 1000 milliLiter(s) IV Continuous <Continuous>      FAMILY HISTORY:  No pertinent family history in first degree relatives      SOCIAL HISTORY:  Smoking: [ ]Yes [ x]No  ETOH: [ ]Yes [x ]No  Drug Use: [ ]Yes x[ ]No   [ ] Single[ ] cannot get    T(F): 97.9 (20 @ 14:32), Max: 98.7 (20 @ 10:43)  HR: 59 (20 @ 14:32)  BP: 95/62 (20 @ 14:32)  RR: 18 (20 @ 14:32)  SpO2: 92% (20 @ 14:32)  Wt(kg): --    PHYSICAL EXAM:  General: alert, no acute distress  Eyes:  anicteric, no conjunctival injection, no discharge  Oropharynx: no lesions or injection 	  Neck: supple, without adenopathy  Lungs: poor effort  to auscultation  Heart: regular rate and rhythm; no murmur, rubs or gallops  Abdomen: soft, nondistended, nontender, without mass or organomegaly. abdo wall ecchymosis, no tenderness or drainage noted at peg site  Skin: no lesions  Extremities: no clubbing, cyanosis, or edema  Neurologic: alert, moves arms, does not speak but follows some commands, left facial droop  back no ulcers  scrotum no swelling  LAB RESULTS:                        11.4   9.30  )-----------( 315      ( 08 Mar 2020 07:32 )             34.8     03-08    144  |  109<H>  |  8   ----------------------------<  103<H>  4.1   |  25  |  0.71    Ca    8.9      08 Mar 2020 07:31  Phos  3.0     03-08  Mg     2.3     03-08    TPro  6.5  /  Alb  3.6  /  TBili  1.0  /  DBili  x   /  AST  14  /  ALT  15  /  AlkPhos  75  03-08    LIVER FUNCTIONS - ( 08 Mar 2020 07:31 )  Alb: 3.6 g/dL / Pro: 6.5 g/dL / ALK PHOS: 75 U/L / ALT: 15 U/L / AST: 14 U/L / GGT: x           Urinalysis Basic - ( 07 Mar 2020 20:54 )    Color: Yellow / Appearance: Slightly Turbid / S.014 / pH: x  Gluc: x / Ketone: Negative  / Bili: Negative / Urobili: Negative   Blood: x / Protein: Trace / Nitrite: Negative   Leuk Esterase: Negative / RBC: 2 /hpf / WBC 1 /HPF   Sq Epi: x / Non Sq Epi: 0 /hpf / Bacteria: Negative        MICROBIOLOGY:  RECENT CULTURES:   @ 00:48 .Urine Catheterized     No growth      - @ 00:42 .Blood Blood-Venous     No growth to date.            RADIOLOGY REVIEWED:  < from: CT Abdomen and Pelvis w/ IV Cont (20 @ 23:40) >    IMPRESSION:     1. Patchy airspace opacities in the left lower lobe which could be due to atelectasis versus pneumonia. Secretions and debris in the lower trachea and bilateral mainstem bronchi, right greater than left.  2. Gastrostomy tube with retention balloon inflated in an outpouching of the body of the stomach.   3. Pneumoperitoneum likely related to recent gastrostomy tube revision.  4. Asymmetric enlargement of the left rectus abdominis muscle concerning for an intramuscular hematoma.     These findings were discussed with Dr. RAMIREZ     < end of copied text >    < from: MR Head w/wo IV Cont (20 @ 15:07) >    BRAIN MRI:    Acute right posterior frontal infarct in the region of the right precentral gyrus. Subacute infarctin the right frontoparietal and posterior temporal region. Minimal petechial hemorrhagic transformation and gyral enhancement is noted in both regions.    Multifocal chronic bilateral MCA territory infarcts. Chronic hemorrhagic products in the regionof the left basal ganglia.    BRAIN MRA:    Persistent mild narrowing of the cavernous and supraclinoid left ICA. Persistent lack of flow related signal within the left M1 segment, with poor visualization of the more distal left MCA branches.    Redemonstration of multifocal mild to moderate stenoses of the right M1 segment. Normal flow-related enhancement of the more distal right MCA.    NECK MRA:    No flow-limiting stenosis or evidence for arterial dissection.    < end of copied text >        Impression:  Patient with new cva, g tube for feeds, pulled out a couple of times now replaced surgically and a hicks, went to rehab and came back same day for fever and peg out. Peg back in, Has no local infection around tube and not acting like abdo infection and ct doesnt show one but agree there does appear to be some component of LLL pneumonia        Recommendations:  g tube management per surgery  zosyn for pneumonia.   follow up cultures

## 2020-03-09 NOTE — CONSULT NOTE ADULT - ASSESSMENT
Assessment:  59M s/p janeway gastrostomy tube (2/27) with replacement on 3/2 by IR, dislodged in rehab and replaced in ED on 3/7, presents w/ fevers and dislodged gastrostomy tube on 3/8, found to pneumoperitoneum on CT. Surgery is consulted for dislodged gastrostomy tube. G tube replaced with hicks in G-tube site under fluoroscopy. Patient tolerated the procedure well, hemodynamically stable at this time    Plan:   - Gastrostomy tube replaced by surgery team with hicks under fluoroscopy  - NPO  - Can restart tube feeds via Hicks in g-tube site  - Do not inflate balloon in Hicks  - Serial abdominal exam  - Continue with mittens and abdominal binder  - Will review fluoroscopy results with radiology  - Plan to be discuss with attending Dr. Fletcher    [id number]

## 2020-03-09 NOTE — DIETITIAN INITIAL EVALUATION ADULT. - PROBLEM SELECTOR PLAN 4
- nonverbal, gait instability, dysphagia  - asa, plavix, lipitor  - PT eval, needs to return to [facility]  - OT eval  - needs eventual GOC discussion

## 2020-03-09 NOTE — PROGRESS NOTE ADULT - SUBJECTIVE AND OBJECTIVE BOX
Patient is a 59y old  Male who presents with a chief complaint of fever, dislodged G tube (09 Mar 2020 15:57)      SUBJECTIVE / OVERNIGHT EVENTS: g-tube replaced under FLuoro by [facility], balloon not inflated 2/2 high risk of pulling it out again despite wearing mittens, cont mittens indefinitely on Zosyn for aspiration PNA    MEDICATIONS  (STANDING):  aspirin  chewable 81 milliGRAM(s) Oral daily  atorvastatin 80 milliGRAM(s) Oral at bedtime  chlorhexidine 2% Cloths 1 Application(s) Topical daily  clopidogrel Tablet 75 milliGRAM(s) Oral daily  lactated ringers. 1000 milliLiter(s) (100 mL/Hr) IV Continuous <Continuous>  piperacillin/tazobactam IVPB.. 3.375 Gram(s) IV Intermittent every 8 hours    MEDICATIONS  (PRN):      Vital Signs Last 24 Hrs  T(F): 97.9 (03-09-20 @ 14:32), Max: 98.7 (03-09-20 @ 10:43)  HR: 59 (03-09-20 @ 14:32) (54 - 59)  BP: 95/62 (03-09-20 @ 14:32) (95/62 - 134/74)  RR: 18 (03-09-20 @ 14:32) (18 - 18)  SpO2: 92% (03-09-20 @ 14:32) (92% - 98%)  Telemetry:   CAPILLARY BLOOD GLUCOSE        I&O's Summary    08 Mar 2020 07:01  -  09 Mar 2020 07:00  --------------------------------------------------------  IN: 1200 mL / OUT: 900 mL / NET: 300 mL    09 Mar 2020 07:01  -  09 Mar 2020 22:12  --------------------------------------------------------  IN: 0 mL / OUT: 500 mL / NET: -500 mL        PHYSICAL EXAM:  GENERAL: NAD, well-developed  HEAD:  Atraumatic, Normocephalic  EYES: EOMI, PERRLA, conjunctiva and sclera clear  NECK: Supple, No JVD  CHEST/LUNG: Clear to auscultation bilaterally; No wheeze  HEART: Regular rate and rhythm; No murmurs, rubs, or gallops  ABDOMEN: Soft, Nontender, Nondistended; Bowel sounds present  EXTREMITIES:  2+ Peripheral Pulses, No clubbing, cyanosis, or edema  PSYCH: AAOx3  NEUROLOGY: non-focal  SKIN: No rashes or lesions    LABS:                        11.4   9.30  )-----------( 315      ( 08 Mar 2020 07:32 )             34.8     03-08    144  |  109<H>  |  8   ----------------------------<  103<H>  4.1   |  25  |  0.71    Ca    8.9      08 Mar 2020 07:31  Phos  3.0     03-08  Mg     2.3     03-08    TPro  6.5  /  Alb  3.6  /  TBili  1.0  /  DBili  x   /  AST  14  /  ALT  15  /  AlkPhos  75  03-08              RADIOLOGY & ADDITIONAL TESTS:    Imaging Personally Reviewed:    Consultant(s) Notes Reviewed:      Care Discussed with Consultants/Other Providers:

## 2020-03-09 NOTE — PROGRESS NOTE ADULT - PROBLEM SELECTOR PLAN 3
- replaced by surgery under fluoro  -  pneumoperitoneum 2/2 recent G-Tube placement  - start feeds when cleared by surgery

## 2020-03-10 LAB
ANION GAP SERPL CALC-SCNC: 12 MMOL/L — SIGNIFICANT CHANGE UP (ref 5–17)
BUN SERPL-MCNC: 7 MG/DL — SIGNIFICANT CHANGE UP (ref 7–23)
CALCIUM SERPL-MCNC: 9.3 MG/DL — SIGNIFICANT CHANGE UP (ref 8.4–10.5)
CHLORIDE SERPL-SCNC: 104 MMOL/L — SIGNIFICANT CHANGE UP (ref 96–108)
CO2 SERPL-SCNC: 25 MMOL/L — SIGNIFICANT CHANGE UP (ref 22–31)
CREAT SERPL-MCNC: 0.79 MG/DL — SIGNIFICANT CHANGE UP (ref 0.5–1.3)
GLUCOSE SERPL-MCNC: 159 MG/DL — HIGH (ref 70–99)
HCT VFR BLD CALC: 33.8 % — LOW (ref 39–50)
HGB BLD-MCNC: 11.1 G/DL — LOW (ref 13–17)
MAGNESIUM SERPL-MCNC: 2.3 MG/DL — SIGNIFICANT CHANGE UP (ref 1.6–2.6)
MCHC RBC-ENTMCNC: 32.8 GM/DL — SIGNIFICANT CHANGE UP (ref 32–36)
MCHC RBC-ENTMCNC: 32.9 PG — SIGNIFICANT CHANGE UP (ref 27–34)
MCV RBC AUTO: 100.3 FL — HIGH (ref 80–100)
NRBC # BLD: 0 /100 WBCS — SIGNIFICANT CHANGE UP (ref 0–0)
PLATELET # BLD AUTO: 321 K/UL — SIGNIFICANT CHANGE UP (ref 150–400)
POTASSIUM SERPL-MCNC: 3.9 MMOL/L — SIGNIFICANT CHANGE UP (ref 3.5–5.3)
POTASSIUM SERPL-SCNC: 3.9 MMOL/L — SIGNIFICANT CHANGE UP (ref 3.5–5.3)
RBC # BLD: 3.37 M/UL — LOW (ref 4.2–5.8)
RBC # FLD: 13.3 % — SIGNIFICANT CHANGE UP (ref 10.3–14.5)
SODIUM SERPL-SCNC: 141 MMOL/L — SIGNIFICANT CHANGE UP (ref 135–145)
WBC # BLD: 5.84 K/UL — SIGNIFICANT CHANGE UP (ref 3.8–10.5)
WBC # FLD AUTO: 5.84 K/UL — SIGNIFICANT CHANGE UP (ref 3.8–10.5)

## 2020-03-10 RX ADMIN — PIPERACILLIN AND TAZOBACTAM 25 GRAM(S): 4; .5 INJECTION, POWDER, LYOPHILIZED, FOR SOLUTION INTRAVENOUS at 16:09

## 2020-03-10 RX ADMIN — Medication 81 MILLIGRAM(S): at 16:08

## 2020-03-10 RX ADMIN — CHLORHEXIDINE GLUCONATE 1 APPLICATION(S): 213 SOLUTION TOPICAL at 16:09

## 2020-03-10 RX ADMIN — CLOPIDOGREL BISULFATE 75 MILLIGRAM(S): 75 TABLET, FILM COATED ORAL at 16:08

## 2020-03-10 RX ADMIN — PIPERACILLIN AND TAZOBACTAM 25 GRAM(S): 4; .5 INJECTION, POWDER, LYOPHILIZED, FOR SOLUTION INTRAVENOUS at 22:00

## 2020-03-10 RX ADMIN — PIPERACILLIN AND TAZOBACTAM 25 GRAM(S): 4; .5 INJECTION, POWDER, LYOPHILIZED, FOR SOLUTION INTRAVENOUS at 05:34

## 2020-03-10 NOTE — PROGRESS NOTE ADULT - SUBJECTIVE AND OBJECTIVE BOX
INTERVAL HPI/OVERNIGHT EVENTS:    MEDICATIONS  (STANDING):  aspirin  chewable 81 milliGRAM(s) Oral daily  atorvastatin 80 milliGRAM(s) Oral at bedtime  chlorhexidine 2% Cloths 1 Application(s) Topical daily  clopidogrel Tablet 75 milliGRAM(s) Oral daily  lactated ringers. 1000 milliLiter(s) (100 mL/Hr) IV Continuous <Continuous>  piperacillin/tazobactam IVPB.. 3.375 Gram(s) IV Intermittent every 8 hours    MEDICATIONS  (PRN):      Allergies    No Known Allergies    Intolerances            PHYSICAL EXAM:   Vital Signs:  Vital Signs Last 24 Hrs  T(C): 36.7 (10 Mar 2020 08:44), Max: 37.1 (09 Mar 2020 10:43)  T(F): 98 (10 Mar 2020 08:44), Max: 98.7 (09 Mar 2020 10:43)  HR: 51 (10 Mar 2020 08:44) (51 - 59)  BP: 93/57 (10 Mar 2020 08:44) (93/57 - 105/63)  BP(mean): --  RR: 18 (10 Mar 2020 08:44) (17 - 18)  SpO2: 95% (10 Mar 2020 08:44) (92% - 95%)  Daily     Daily Weight in k (09 Mar 2020 13:39)    GENERAL:  no distress  HEENT:  NC/AT,  anicteric  CHEST:   no increased effort, breath sounds clear  HEART:  Regular rhythm  ABDOMEN:  Soft, non-tender, non-distended, normoactive bowel sounds,  binding in place over hicks inserted for peg replacement  EXTEREMITIES:  no cyanosis      LABS:                        11.1   5.84  )-----------( 321      ( 10 Mar 2020 07:10 )             33.8     03-10    141  |  104  |  7   ----------------------------<  159<H>  3.9   |  25  |  0.79    Ca    9.3      10 Mar 2020 07:10  Mg     2.3     03-10            RADIOLOGY & ADDITIONAL TESTS:

## 2020-03-10 NOTE — PROGRESS NOTE ADULT - ASSESSMENT
59M, Persian only speaking, c hx remote CVA without deficits, recent prolonged hospitalization (2/9-3/6/20) for R frontal/temporal CVA (Jan '20) c/b nonverbal/broca's aphasia, LE weakness, dysphagia, s/p PEG (initially placed 2/2, replaced by surg 2/27, replaced by IR 3/2, now replaced again by surg 3/8), occluded L MCA, pw sepsis likely 2/2 aspiration PNA

## 2020-03-10 NOTE — PROGRESS NOTE ADULT - ASSESSMENT
Assessment:  59M s/p janeway gastrostomy tube (2/27) with replacement on 3/2 by IR, dislodged in rehab and replaced in ED on 3/7, presents w/ fevers and dislodged gastrostomy tube on 3/8, found to pneumoperitoneum on CT. Surgery is consulted for dislodged gastrostomy tube. G tube replaced with hicks in G-tube site under fluoroscopy. Patient tolerated the procedure well, hemodynamically stable at this time    Plan:   - g-tube removed and g-tube study performed under flouro- no evidence of leak  - keep NPO  - g- tube feeds as tolerated  - Do not inflate balloon in Hicks  - Serial abdominal exams  - Continue with mittens and abdominal binder  - will sign off at this time. Please page [phone] with any questions/concerns.    Green surgery [phone]

## 2020-03-10 NOTE — PROGRESS NOTE ADULT - SUBJECTIVE AND OBJECTIVE BOX
SURGERY DAILY PROGRESS NOTE:       SUBJECTIVE/ROS:     Patient seen and examined. He appears comfortable, tolerating G- tube feeds  minimal leakage/drainage noted around incision site     MEDICATIONS  (STANDING):  aspirin  chewable 81 milliGRAM(s) Oral daily  atorvastatin 80 milliGRAM(s) Oral at bedtime  chlorhexidine 2% Cloths 1 Application(s) Topical daily  clopidogrel Tablet 75 milliGRAM(s) Oral daily  lactated ringers. 1000 milliLiter(s) (100 mL/Hr) IV Continuous <Continuous>  piperacillin/tazobactam IVPB.. 3.375 Gram(s) IV Intermittent every 8 hours    MEDICATIONS  (PRN):      OBJECTIVE:    Vital Signs Last 24 Hrs  T(C): 36.7 (10 Mar 2020 08:44), Max: 36.8 (09 Mar 2020 22:55)  T(F): 98 (10 Mar 2020 08:44), Max: 98.3 (09 Mar 2020 22:55)  HR: 51 (10 Mar 2020 08:44) (51 - 58)  BP: 93/57 (10 Mar 2020 08:44) (93/57 - 105/63)  BP(mean): --  RR: 18 (10 Mar 2020 08:44) (17 - 18)  SpO2: 95% (10 Mar 2020 08:44) (93% - 95%)        I&O's Detail    09 Mar 2020 07:01  -  10 Mar 2020 07:00  --------------------------------------------------------  IN:    Enteral Tube Flush: 200 mL    Jevity: 714 mL    Solution: 200 mL  Total IN: 1114 mL    OUT:    Voided: 500 mL  Total OUT: 500 mL    Total NET: 614 mL          Daily     Daily     LABS:                        11.1   5.84  )-----------( 321      ( 10 Mar 2020 07:10 )             33.8     03-10    141  |  104  |  7   ----------------------------<  159<H>  3.9   |  25  |  0.79    Ca    9.3      10 Mar 2020 07:10  Mg     2.3     03-10                    PHYSICAL EXAM:    General: alert, awake, NAD  Resp: nonlabored breathing, no wheezes or rales  Abd: soft, nontender, nondistended; minimal drainage from G-tube site, abdominal binder in place  Ext: warm and well perfused; no edema; hands in mittens bilaterally  Neuro: alert, nonverbal

## 2020-03-10 NOTE — PROGRESS NOTE ADULT - SUBJECTIVE AND OBJECTIVE BOX
CC: f/u for  pneumonia  Patient reports  nothing aphasic    REVIEW OF SYSTEMS:  All other review of systems cannot get (Comprehensive ROS)    Antimicrobials Day #  :3/7  piperacillin/tazobactam IVPB.. 3.375 Gram(s) IV Intermittent every 8 hours    Other Medications Reviewed    T(F): 97.9 (03-10-20 @ 16:39), Max: 98.3 (03-09-20 @ 22:55)  HR: 53 (03-10-20 @ 16:39)  BP: 100/67 (03-10-20 @ 16:39)  RR: 18 (03-10-20 @ 16:39)  SpO2: 96% (03-10-20 @ 16:39)  Wt(kg): --    PHYSICAL EXAM:  General: alert, no acute distress  Eyes:  anicteric, no conjunctival injection, no discharge  Oropharynx: no lesions or injection 	  Neck: supple, without adenopathy  Lungs: clear to auscultation  Heart: regular rate and rhythm; no murmur, rubs or gallops  Abdomen: soft, nondistended, nontender, without mass or organomegaly, peg in place  Skin: no lesions  Extremities: no clubbing, cyanosis, or edema  Neurologic: alert, aphasic moves all extremities    LAB RESULTS:                        11.1   5.84  )-----------( 321      ( 10 Mar 2020 07:10 )             33.8     03-10    141  |  104  |  7   ----------------------------<  159<H>  3.9   |  25  |  0.79    Ca    9.3      10 Mar 2020 07:10  Mg     2.3     03-10          MICROBIOLOGY:  RECENT CULTURES:  03-08 @ 00:48 .Urine Catheterized     No growth      03-08 @ 00:42 .Blood Blood-Venous     No growth to date.          RADIOLOGY REVIEWED:  < from: Xray Abdomen 1 View PORTABLE -Urgent (03.08.20 @ 11:30) >  EXAM:  XR ABDOMEN PORTABLE URGENT 1V                            PROCEDURE DATE:  03/08/2020            INTERPRETATION:    DATE OF EXAM: 3/8/2020    COMPARISON: 3/7/20 CT scan the abdomen and pelvis    CLINICAL INDICATION: GT placement; dysphagic.    TECHNIQUE: 2 KUBs.    FINDINGS:    Surgical sutures in stomach region.  Gastrostomy tube tip overlies stomach in LUQ.  Residual oral contrast throughout the colon.  Nonobstructive bowel gas pattern.  Probable pneumoperitoneum likely related to recent gastrostomy tube revision.    IMPRESSION:   1. Gastrostomy tube as above.  2. Oral contrast in colon.  3. No obstruction.    < end of copied text >    < from: CT Abdomen and Pelvis w/ IV Cont (03.07.20 @ 23:40) >  EXAM:  CT ABDOMEN AND PELVIS IC                          EXAM:  CT CHEST IC                            PROCEDURE DATE:  03/07/2020            INTERPRETATION:  CLINICAL INFORMATION: Cough, congestion, PEG tube evaluation. Recent percutaneous G-tube adjustment on 3/2/2020.    COMPARISON: CT chest/abdomen/pelvis 2/25/2020.    PROCEDURE:   CT of the Chest, Abdomen and Pelvis was performed with intravenous contrast.   Intravenous contrast: 90 ml Omnipaque 350. 10 ml discarded.  Oral contrast: None.  Sagittal and coronal reformats were performed.    FINDINGS:    CHEST:     LUNGS AND LARGE AIRWAYS: Secretions and debris in the lower trachea and bilateral mainstem bronchi, right greater than left. Patchy airspace opacities in the left lower lobe which could be due to atelectasis versus pneumonia. Subsegmental, dependent atelectasis. Centrilobular emphysema. 3 mm nodule in the right middle lobe.  PLEURA: No pleural effusion.  VESSELS: Atherosclerotic changes of the aorta.  HEART: Heart size is normal. No pericardial effusion.  MEDIASTINUM AND SHANICE: No lymphadenopathy.  CHEST WALL AND LOWER NECK: Left-sided chest wall implantable loop recorder .    ABDOMEN AND PELVIS:    LIVER: Within normal limits.  BILE DUCTS: Normal caliber.  GALLBLADDER: Within normal limits.  SPLEEN: Within normal limits.  PANCREAS: Within normal limits.  ADRENALS: Within normal limits.  KIDNEYS/URETERS: Right renal subcentimeter hypodense lesion which is too small for accurate characterization. Nonspecific left perinephric fat stranding.    BLADDER: Within normal limits.  REPRODUCTIVE ORGANS: Prostate within normal limits for size. Prostatic calcifications.    BOWEL: New surgical sutures along the greater curvature of the stomach. Gastrostomy tube with retentionballoon inflated in an outpouching of the body of the stomach. Residual oral contrast is noted within the bowel. No bowel obstruction or inflammation. Appendix is unremarkable.  PERITONEUM: No ascites. Pneumoperitoneum likely related to recent gastrostomy tube revision.  VESSELS: Within normal limits.  RETROPERITONEUM/LYMPH NODES: No lymphadenopathy.    ABDOMINAL WALL: Asymmetric enlargement of the left rectus abdominis muscle.  BONES: Degenerative changes of the spine.    IMPRESSION:     1. Patchy airspace opacities in the left lower lobe which could be due to atelectasis versus pneumonia. Secretions and debris in the lower trachea and bilateral mainstem bronchi, right greater than left.  2. Gastrostomy tube with retention balloon inflated in an outpouching of the body of the stomach.   3. Pneumoperitoneum likely related to recent gastrostomy tube revision.  4. Asymmetric enlargement of the left rectus abdominis muscle concerning for an intramuscular hematoma.     These findings were discussed with Dr. RAMIREZ at 3/8/2020 1:46 AM by Dr. Oakley with read back.      < end of copied text >            Assessment:  Patient with cva, aphasic, dysphagia, peg that he pulled out now with hicks in place, came in with dislodged tube and fever within hours of d/c to rehab, now had tube put back in and has bibasilar pneumonia no further fever.   Plan:  on zosyn  can change to augmentin for discharge to finish 4 more days of antibiotics

## 2020-03-10 NOTE — PROGRESS NOTE ADULT - SUBJECTIVE AND OBJECTIVE BOX
Patient is a 59y old  Male who presents with a chief complaint of fever, dislodged G tube (10 Mar 2020 15:04)      SUBJECTIVE / OVERNIGHT EVENTS: on Abx for asp PNA and on tube feeds    MEDICATIONS  (STANDING):  aspirin  chewable 81 milliGRAM(s) Oral daily  atorvastatin 80 milliGRAM(s) Oral at bedtime  chlorhexidine 2% Cloths 1 Application(s) Topical daily  clopidogrel Tablet 75 milliGRAM(s) Oral daily  lactated ringers. 1000 milliLiter(s) (100 mL/Hr) IV Continuous <Continuous>  piperacillin/tazobactam IVPB.. 3.375 Gram(s) IV Intermittent every 8 hours    MEDICATIONS  (PRN):      Vital Signs Last 24 Hrs  T(F): 97.9 (03-10-20 @ 16:39), Max: 98.3 (03-09-20 @ 22:55)  HR: 53 (03-10-20 @ 16:39) (51 - 58)  BP: 100/67 (03-10-20 @ 16:39) (93/57 - 105/63)  RR: 18 (03-10-20 @ 16:39) (17 - 18)  SpO2: 96% (03-10-20 @ 16:39) (93% - 96%)  Telemetry:   CAPILLARY BLOOD GLUCOSE        I&O's Summary    09 Mar 2020 07:01  -  10 Mar 2020 07:00  --------------------------------------------------------  IN: 1114 mL / OUT: 500 mL / NET: 614 mL        PHYSICAL EXAM:  GENERAL: NAD, well-developed  HEAD:  Atraumatic, Normocephalic  EYES: EOMI, PERRLA, conjunctiva and sclera clear  NECK: Supple, No JVD  CHEST/LUNG: Clear to auscultation bilaterally; No wheeze  HEART: Regular rate and rhythm; No murmurs, rubs, or gallops  ABDOMEN: Soft, Nontender, Nondistended; Bowel sounds present  EXTREMITIES:  2+ Peripheral Pulses, No clubbing, cyanosis, or edema  PSYCH: AAOx3  NEUROLOGY: non-focal  SKIN: No rashes or lesions    LABS:                        11.1   5.84  )-----------( 321      ( 10 Mar 2020 07:10 )             33.8     03-10    141  |  104  |  7   ----------------------------<  159<H>  3.9   |  25  |  0.79    Ca    9.3      10 Mar 2020 07:10  Mg     2.3     03-10                RADIOLOGY & ADDITIONAL TESTS:    Imaging Personally Reviewed:    Consultant(s) Notes Reviewed:      Care Discussed with Consultants/Other Providers:

## 2020-03-10 NOTE — PROGRESS NOTE ADULT - PROBLEM SELECTOR PLAN 1
- cont zosyn , when ready for DC can switch to Augmentin [phone] bid for total course of 7 days, ID following  - chest PT  - elevate HOB

## 2020-03-10 NOTE — PROGRESS NOTE ADULT - ATTENDING COMMENTS
I have seen and examined the patient. I agree with the above surgery resident's note.  pt seen and examined  denieas abd pain  abd soft, nt, nd  g-tube removed and g-tube study performed under flouro- no evidence of leak  hicks placed into gastrostomy  ok to resume tube feeds- do NOT inflate balloon as pt at risk of pulling tube again  pt should have hand mitts and abd binder kept on indefinitely

## 2020-03-10 NOTE — PROGRESS NOTE ADULT - ASSESSMENT
dislodged surgical peg ( 3rd overall peg dislodgement),   tolerating feeds, no peritoneal signs  plan per surgery

## 2020-03-10 NOTE — PROGRESS NOTE ADULT - PROBLEM SELECTOR PLAN 3
- replaced by surgery under fluoro  -  pneumoperitoneum 2/2 recent G-Tube placement  - tolerating feeds w minimal leak

## 2020-03-11 ENCOUNTER — TRANSCRIPTION ENCOUNTER (OUTPATIENT)
Age: 60
End: 2020-03-11

## 2020-03-11 RX ADMIN — ATORVASTATIN CALCIUM 80 MILLIGRAM(S): 80 TABLET, FILM COATED ORAL at 00:35

## 2020-03-11 RX ADMIN — CHLORHEXIDINE GLUCONATE 1 APPLICATION(S): 213 SOLUTION TOPICAL at 12:00

## 2020-03-11 RX ADMIN — CLOPIDOGREL BISULFATE 75 MILLIGRAM(S): 75 TABLET, FILM COATED ORAL at 12:00

## 2020-03-11 RX ADMIN — Medication 81 MILLIGRAM(S): at 12:00

## 2020-03-11 RX ADMIN — PIPERACILLIN AND TAZOBACTAM 25 GRAM(S): 4; .5 INJECTION, POWDER, LYOPHILIZED, FOR SOLUTION INTRAVENOUS at 05:25

## 2020-03-11 RX ADMIN — PIPERACILLIN AND TAZOBACTAM 25 GRAM(S): 4; .5 INJECTION, POWDER, LYOPHILIZED, FOR SOLUTION INTRAVENOUS at 22:31

## 2020-03-11 RX ADMIN — PIPERACILLIN AND TAZOBACTAM 25 GRAM(S): 4; .5 INJECTION, POWDER, LYOPHILIZED, FOR SOLUTION INTRAVENOUS at 14:05

## 2020-03-11 RX ADMIN — ATORVASTATIN CALCIUM 80 MILLIGRAM(S): 80 TABLET, FILM COATED ORAL at 22:30

## 2020-03-11 NOTE — DISCHARGE NOTE PROVIDER - HOSPITAL COURSE
HPI:    59M, Romanian only speaking, c hx remote CVA without deficits, recent prolonged hospitalization (2/9-3/6/20) for R frontal/temporal CVA (Jan '20) c/b nonverbal/broca's aphasia, LE weakness, dysphagia, s/p PEG (initially placed 2/2, replaced by surg 2/27, replaced by IR 3/2, now replaced again by surg 3/8), occluded L MCA, presents from [facility] with fever 101 and G tube dislodged.        History obtained from chart. Attempted to call pt's family member Dallas Arriola, and left voicemail. Pt able to understand Maltese and follows simple commands. Pt also nods or shakes head to simple questions in Maltese. Per chart, pt found to be coughing and with fever to 101. During exam, G tube was pulled without resistance. Pt sent in for above reasons. Pt denies pain anywhere. ROS limited 2/2 aphasia.        Patient was admitted for further management.  General surgery was consulted.  PEG was replaced.  Feeds were titrated to goal.  Patient was placed on mittens as well as placed in abdominal binder for protection of PEG.        Discharge/dispo discussed w/ attending.     P HPI:    59M, Thai only speaking, c hx remote CVA without deficits, recent prolonged hospitalization (2/9-3/6/20) for R frontal/temporal CVA (Jan '20) c/b nonverbal/broca's aphasia, LE weakness, dysphagia, s/p PEG (initially placed 2/2, replaced by surg 2/27, replaced by IR 3/2, now replaced again by surg 3/8), occluded L MCA, presents from [place] [facility] with fever 101 and G tube dislodged.        History obtained from chart. Attempted to call pt's family member Dallas Arriola, and left voicemail. Pt able to understand French and follows simple commands. Pt also nods or shakes head to simple questions in French. Per chart, pt found to be coughing and with fever to 101. During exam, G tube was pulled without resistance. Pt sent in for above reasons. Pt denies pain anywhere. ROS limited 2/2 aphasia.        Patient was admitted for further management.  General surgery was consulted.  PEG was replaced.  Feeds were titrated to goal.  Patient was placed on mittens as well as placed in abdominal binder for protection of PEG.        Discharge/dispo discussed w/ attending.     Patient cleared for discharge back to rehab.

## 2020-03-11 NOTE — DISCHARGE NOTE PROVIDER - CARE PROVIDER_API CALL
Craig Peterson (DO)  Neurology; Vascular Neurology  [address] [facility] [place] [address]  [place] [address]  Phone: [phone]  Fax: [phone]  Follow Up Time:     John Squires (DO)  Gastroenterology; Internal Medicine  [address] [place] [address] [id number]  [place] [address]  Phone: [phone]  Fax: [phone]  Follow Up Time:

## 2020-03-11 NOTE — PROGRESS NOTE ADULT - SUBJECTIVE AND OBJECTIVE BOX
CC: f/u for  pneumonia  Patient reports nothing not verbal but motions he is ok    REVIEW OF SYSTEMS:  All other review of systems cannot get (Comprehensive ROS)    Antimicrobials Day #  :5/7  piperacillin/tazobactam IVPB.. 3.375 Gram(s) IV Intermittent every 8 hours    Other Medications Reviewed    T(F): 98 (03-11-20 @ 15:21), Max: 98 (03-10-20 @ 21:43)  HR: 54 (03-11-20 @ 15:21)  BP: 115/71 (03-11-20 @ 15:21)  RR: 18 (03-11-20 @ 15:21)  SpO2: 95% (03-11-20 @ 15:21)  Wt(kg): --    PHYSICAL EXAM:  General: alert, no acute distress  Eyes:  anicteric, no conjunctival injection, no discharge  Oropharynx: no lesions or injection 	  Neck: supple, without adenopathy  Lungs: clear to auscultation  Heart: regular rate and rhythm; no murmur, rubs or gallops  Abdomen: soft, nondistended, nontender, without mass or organomegaly, peg  Skin: no lesions  Extremities: no clubbing, cyanosis, or edema  Neurologic: alert, aphasic, moves all extremities    LAB RESULTS:                        11.1   5.84  )-----------( 321      ( 10 Mar 2020 07:10 )             33.8     03-10    141  |  104  |  7   ----------------------------<  159<H>  3.9   |  25  |  0.79    Ca    9.3      10 Mar 2020 07:10  Mg     2.3     03-10          MICROBIOLOGY:  RECENT CULTURES:  03-08 @ 00:48 .Urine Catheterized     No growth      03-08 @ 00:42 .Blood Blood-Venous     No growth to date.          RADIOLOGY REVIEWED:      < from: CT Abdomen and Pelvis w/ IV Cont (03.07.20 @ 23:40) >    EXAM:  CT ABDOMEN AND PELVIS IC                          EXAM:  CT CHEST IC                            PROCEDURE DATE:  03/07/2020            INTERPRETATION:  CLINICAL INFORMATION: Cough, congestion, PEG tube evaluation. Recent percutaneous G-tube adjustment on 3/2/2020.    COMPARISON: CT chest/abdomen/pelvis 2/25/2020.    PROCEDURE:   CT of the Chest, Abdomen and Pelvis was performed with intravenous contrast.   Intravenous contrast: 90 ml Omnipaque 350. 10 ml discarded.  Oral contrast: None.  Sagittal and coronal reformats were performed.    FINDINGS:    CHEST:     LUNGS AND LARGE AIRWAYS: Secretions and debris in the lower trachea and bilateral mainstem bronchi, right greater than left. Patchy airspace opacities in the left lower lobe which could be due to atelectasis versus pneumonia. Subsegmental, dependent atelectasis. Centrilobular emphysema. 3 mm nodule in the right middle lobe.  PLEURA: No pleural effusion.  VESSELS: Atherosclerotic changes of the aorta.  HEART: Heart size is normal. No pericardial effusion.  MEDIASTINUM AND SHANICE: No lymphadenopathy.  CHEST WALL AND LOWER NECK: Left-sided chest wall implantable loop recorder .    ABDOMEN AND PELVIS:    LIVER: Within normal limits.  BILE DUCTS: Normal caliber.  GALLBLADDER: Within normal limits.  SPLEEN: Within normal limits.  PANCREAS: Within normal limits.  ADRENALS: Within normal limits.  KIDNEYS/URETERS: Right renal subcentimeter hypodense lesion which is too small for accurate characterization. Nonspecific left perinephric fat stranding.    BLADDER: Within normal limits.  REPRODUCTIVE ORGANS: Prostate within normal limits for size. Prostatic calcifications.    BOWEL: New surgical sutures along the greater curvature of the stomach. Gastrostomy tube with retentionballoon inflated in an outpouching of the body of the stomach. Residual oral contrast is noted within the bowel. No bowel obstruction or inflammation. Appendix is unremarkable.  PERITONEUM: No ascites. Pneumoperitoneum likely related to recent gastrostomy tube revision.  VESSELS: Within normal limits.  RETROPERITONEUM/LYMPH NODES: No lymphadenopathy.    ABDOMINAL WALL: Asymmetric enlargement of the left rectus abdominis muscle.  BONES: Degenerative changes of the spine.    IMPRESSION:     1. Patchy airspace opacities in the left lower lobe which could be due to atelectasis versus pneumonia. Secretions and debris in the lower trachea and bilateral mainstem bronchi, right greater than left.  2. Gastrostomy tube with retention balloon inflated in an outpouching of the body of the stomach.   3. Pneumoperitoneum likely related to recent gastrostomy tube revision.  4. Asymmetric enlargement of the left rectus abdominis muscle concerning for an intramuscular hematoma.     These findings were discussed with Dr. RAMIREZ at 3/8/2020 1:46 AM by Dr. Oakley with read back.        < end of copied text >  Assessment:  Patient with stroke, dysphagia, dislodged peg now replaced with hicks because he pulled out, has aspiration pneumonia stable on zosyn  Plan:  2 more days of antibiotics, on zosyn but , can change to augmentin via peg tomorrow to finish course

## 2020-03-11 NOTE — PROGRESS NOTE ADULT - PROBLEM SELECTOR PLAN 3
- replaced by surgery under fluoro  -  pneumoperitoneum 2/2 recent G-Tube placement  -  tolerating feeds w minimal leak  -  remains w mittens 2/2 protection from self harm 2/2 multiple successful attempts in removing G-tube, mittens cannot be removed

## 2020-03-11 NOTE — DISCHARGE NOTE PROVIDER - NSDCMRMEDTOKEN_GEN_ALL_CORE_FT
acetaminophen 325 mg oral tablet: 2 tab(s) orally every 6 hours, As needed, Temp greater or equal to 38C (100.4F), Mild Pain (1 - 3)  aspirin 81 mg oral tablet, chewable: 1 tab(s) orally once a day  atorvastatin 80 mg oral tablet: 1 tab(s) orally once a day (at bedtime)  bisacodyl 10 mg rectal suppository: 1 suppository(ies) rectal once a day, As needed, Constipation  clopidogrel 75 mg oral tablet: 1 tab(s) orally once a day  cyanocobalamin 1000 mcg oral tablet: 1 tab(s) orally once a day  enoxaparin:   ergocalciferol 8000 intl units/mL oral solution: 2000 unit(s) enteral once a day  famotidine 20 mg oral tablet: 1 tab(s) orally 2 times a day, As needed, Dyspepsia  Multiple Vitamins with Minerals oral liquid: 15 milliliter(s) orally once a day  ondansetron 4 mg oral tablet: 1 tab(s) orally every 8 hours, As needed, Nausea and/or Vomiting aspirin 81 mg oral tablet, chewable: 1 tab(s) orally once a day  atorvastatin 80 mg oral tablet: 1 tab(s) orally once a day (at bedtime)  clopidogrel 75 mg oral tablet: 1 tab(s) orally once a day  cyanocobalamin 1000 mcg oral tablet: 1 tab(s) orally once a day  ergocalciferol 8000 intl units/mL oral solution: 2000 unit(s) enteral once a day  Multiple Vitamins with Minerals oral liquid: 15 milliliter(s) orally once a day  polyethylene glycol 3350 oral powder for reconstitution: 17 gram(s) orally once a day, As needed, Constipation  senna 8.8 mg/5 mL oral syrup: 5 milliliter(s) orally once a day (at bedtime)  valproic acid 250 mg/5 mL oral liquid: 125 milligram(s) orally once a day  valproic acid 250 mg/5 mL oral liquid: 125 milligram(s) orally every 6 hours, As Needed for agitation

## 2020-03-11 NOTE — DISCHARGE NOTE PROVIDER - NSDCCPCAREPLAN_GEN_ALL_CORE_FT
PRINCIPAL DISCHARGE DIAGNOSIS  Diagnosis: PEG tube malfunction  Assessment and Plan of Treatment: Peg tube was replaced with hicks in gastrostomy  Hicks is not to be inflated  Continue PRINCIPAL DISCHARGE DIAGNOSIS  Diagnosis: PEG tube malfunction  Assessment and Plan of Treatment: Peg tube was replaced with hicks in gastrostomy  Hicks is not to be inflated  Continue feeds      SECONDARY DISCHARGE DIAGNOSES  Diagnosis: Aspiration pneumonia of both lower lobes, unspecified aspiration pneumonia type  Assessment and Plan of Treatment: You recevied antibiotics for your infection.    Please keep head of bed elevated during feeds.    Diagnosis: Cerebrovascular accident (CVA) due to bilateral stenosis of middle cerebral arteries  Assessment and Plan of Treatment: Please follow up with neurology in 2 weeks

## 2020-03-11 NOTE — CONSULT NOTE ADULT - SUBJECTIVE AND OBJECTIVE BOX
CC: Evaluation of Rehabilitation Needs  HPI: 60 Y/O Albanian speaking Male with pmh CVA 8y ago (no reported deficits) brought in by brother for concerns of generalized weakness and inability to speak for the past 3 weeks. On exam found to have no verbal output, appeared withdrawn and weak on neurological evaluation  He followed commands intermittently and was easily agitated.  CTH demonstrated chronic L MCA infarct with severely stenotic vs occluded distal L M1. Confirmed on MRI but also found to have an acute right post frontal infarct, benign LUIS and s/p Medtronic ILR placement 2/13. Patient also with sinus bradycardia, with no interventions recommended from cardiology.   Patient with severe dysphagia unable to intake food, as per neuro little chance of recovering swallowing in the next 6 months and thus in need of long term nutrition.  Patient s/p PEG placement on 2/2 by GI with endoscopic placement. Procedure went well, however, in the turner post procedure ptn pulled PEG out. As per GI patient at risk of 2nd endoscopic placement contraindicated due to risk of pulling out tube and peritonitis . Patient thus had laproscopic placement of G tube done by surgery on 2/27 to minimize risk of peritonitis if pulled out. On 3/2, noted with moderate leaking around G-tube: was pulled out from 9 cm in to only 1 cm in. G tube repositioned under fluoro on 3/2. Currently on TF with goal of 80 cc/hr.   Neuro recommended malignancy work up 2/2 unexplained multiple embolic strokes. Oncology consult w Dr. Sanchez. Patient had CT C/A/P w  IV contrast only. no significant findings exc questionable renal mass, but sono neg and MRI confirmed it being a subcapsular hematoma.  Patient will need outptn colonoscopy after discharge. 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 Patient to be admitted to acute rehab today.  While in the rehab unit on 3/7, patient with rectal temp 101F, also with PEG tube that came out without resistance upon examination.  (07 Mar 2020) Pt was admitted for G-Tube replacement on 3/8. Pt also treated for aspiration PNA with Zosyn with ID following. PM&R was consulted to evaluate post-acute disposition.      REVIEW OF SYSTEMS  Limited due to aphasia    PAST MEDICAL & SURGICAL HISTORY  Gastrostomy malfunction  CVA (cerebral vascular accident)  PEG tube malfunction  Hematoma of rectus sheath, initial encounter  Dysphagia, unspecified type  Cerebrovascular accident (CVA) due to bilateral stenosis of middle cerebral arteries  Sepsis without acute organ dysfunction, due to unspecified organism  Aspiration pneumonia of both lower lobes, unspecified aspiration pneumonia type  S/P percutaneous endoscopic gastrostomy (PEG) tube placement    SOCIAL HISTORY  Smoking - Hx of smoking  EtOH - Hx of alcohol   Drugs - Denied    FUNCTIONAL HISTORY  lives in an apartment with an elevator. Prior to recent hospitalization with independent with all functional mobility.  As per last PT note on 3/5, pt transferred with contact guard, and ambulated 50 feet with rolling walker and contact guard, verbal cues, and chair follow.  Independent in ambulation, ADL's, transfers prior to hospitalization    CURRENT FUNCTIONAL STATUS  Bed mobility -  min assist   Transfers - contact guard  Gait - mod assist 20ft with RW  ADL's - LBD mod assist    FAMILY HISTORY   Reviewed and non-contributory    ALLERGIES  No Known Allergies    VITALS  T(C): 36.7 (03-11-20 @ 15:21)  T(F): 98 (03-11-20 @ 15:21), Max: 98 (03-10-20 @ 21:43)  HR: 54 (03-11-20 @ 15:21) (54 - 59)  BP: 115/71 (03-11-20 @ 15:21) (95/55 - 115/71)  RR:  (18 - 18)  SpO2:  (94% - 96%)  Wt(kg): --    PHYSICAL EXAM  Constitutional - NAD, Comfortable  HEENT - NCAT  Chest - fair chest expansion, no respiratory distress  Cardio - warm and well perfused  Abdomen -  Soft, NTND  Extremities - No C/C/E, No calf tenderness   Neurologic Exam -                    Cognitive - Awake, Alert, limited due to aphasia     Communication - +aphasia, not following command or able to make words     Cranial Nerves - CN 2-12 intact     Motor -                     LEFT    UE - ShAB 5/5, EF 5/5, EE 5/5, WE 5/5,  WNL                    RIGHT UE - ShAB 5/5, EF 5/5, EE 5/5, WE 5/5,  WNL                    LEFT    LE - HF 4/5, KE 4/5, DF 4/5, PF 4/5                    RIGHT LE - HF 4/5, KE 4/5, DF 4/5, PF 4/5        Sensory - Intact to light touch     Reflexes - DTR Intact, No primitive reflexive, Blank's neg b/l  Psychiatric - Affect WNL    RECENT LABS/IMAGING                        11.1   5.84  )-----------( 321      ( 10 Mar 2020 07:10 )             33.8     03-10    141  |  104  |  7   ----------------------------<  159<H>  3.9   |  25  |  0.79    Ca    9.3      10 Mar 2020 07:10  Mg     2.3     03-10            MEDICATIONS   MEDICATIONS  (STANDING):  aspirin  chewable 81 milliGRAM(s) Oral daily  atorvastatin 80 milliGRAM(s) Oral at bedtime  chlorhexidine 2% Cloths 1 Application(s) Topical daily  clopidogrel Tablet 75 milliGRAM(s) Oral daily  lactated ringers. 1000 milliLiter(s) (100 mL/Hr) IV Continuous <Continuous>  piperacillin/tazobactam IVPB.. 3.375 Gram(s) IV Intermittent every 8 hours    MEDICATIONS  (PRN):      ASSESSMENT/PLAN  58 y/o M with acute right posterior frontal infarct and PEG replacement now with dysphagia, aphasia,  functional, gait, and ADL impairments.  *Disposition incomplete pending attending rounds*    Disposition - Pt has significant functional impairments versus baseline.  Needs acute inpatient rehabilitation for intensive therapies (PT, OT, SLP) to restore his function towards independence, while being closely monitored for his ongoing medical issues, which can quickly destabilize if not closely monitored and managed.  Expected to tolerate and benefit from 3 hrs/day, >=5 days/wk of multidisciplinary therapies. Recommend return to acute inpatient rehabilitation once deemed medically and surgically stable as per the primary treating team(s).    PT - ROM, Bed Mob, Transfers, Amb with AD   OT - ADLs, ROM  SLP - Dysphagia eval and treat  Precautions - Falls, Cardiac, Aspiration   DVT Prophylaxis - Plavix as per primary team  Skin - Turn Q2hrs  Diet - NPO, tube feeds  Thank you for allowing us to participate in your patient's care. CC: Evaluation of Rehabilitation Needs  HPI: 58 Y/O Romansh speaking Male with pmh CVA 8y ago (no reported deficits) brought in by brother for concerns of generalized weakness and inability to speak for the past 3 weeks. On exam found to have no verbal output, appeared withdrawn and weak on neurological evaluation  He followed commands intermittently and was easily agitated.  CTH demonstrated chronic L MCA infarct with severely stenotic vs occluded distal L M1. Confirmed on MRI but also found to have an acute right post frontal infarct, benign LUIS and s/p Medtronic ILR placement 2/13. Patient also with sinus bradycardia, with no interventions recommended from cardiology.   Patient with severe dysphagia unable to intake food, as per neuro little chance of recovering swallowing in the next 6 months and thus in need of long term nutrition.  Patient s/p PEG placement on 2/2 by GI with endoscopic placement. Procedure went well, however, in the turner post procedure ptn pulled PEG out. As per GI patient at risk of 2nd endoscopic placement contraindicated due to risk of pulling out tube and peritonitis . Patient thus had laproscopic placement of G tube done by surgery on 2/27 to minimize risk of peritonitis if pulled out. On 3/2, noted with moderate leaking around G-tube: was pulled out from 9 cm in to only 1 cm in. G tube repositioned under fluoro on 3/2. Currently on TF with goal of 80 cc/hr.   Neuro recommended malignancy work up 2/2 unexplained multiple embolic strokes. Oncology consult w Dr. Sanchez. Patient had CT C/A/P w  IV contrast only. no significant findings exc questionable renal mass, but sono neg and MRI confirmed it being a subcapsular hematoma.  Patient will need outptn colonoscopy after discharge. 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 Patient to be admitted to acute rehab today.  While in the rehab unit on 3/7, patient with rectal temp 101F, also with PEG tube that came out without resistance upon examination.  (07 Mar 2020) Pt was admitted for G-Tube replacement on 3/8. Pt also treated for aspiration PNA with Zosyn with ID following. PM&R was consulted to evaluate post-acute disposition.      REVIEW OF SYSTEMS  Limited due to aphasia    PAST MEDICAL & SURGICAL HISTORY  Gastrostomy malfunction  CVA (cerebral vascular accident)  PEG tube malfunction  Hematoma of rectus sheath, initial encounter  Dysphagia, unspecified type  Cerebrovascular accident (CVA) due to bilateral stenosis of middle cerebral arteries  Sepsis without acute organ dysfunction, due to unspecified organism  Aspiration pneumonia of both lower lobes, unspecified aspiration pneumonia type  S/P percutaneous endoscopic gastrostomy (PEG) tube placement    SOCIAL HISTORY  Smoking - Hx of smoking  EtOH - Hx of alcohol   Drugs - Denied    FUNCTIONAL HISTORY  lives in an apartment with an elevator. Prior to recent hospitalization with independent with all functional mobility.  As per last PT note on 3/5, pt transferred with contact guard, and ambulated 50 feet with rolling walker and contact guard, verbal cues, and chair follow.  Independent in ambulation, ADL's, transfers prior to hospitalization    CURRENT FUNCTIONAL STATUS  Bed mobility -  min assist   Transfers - contact guard  Gait - mod assist 20ft with RW  ADL's - LBD mod assist    FAMILY HISTORY   Reviewed and non-contributory    ALLERGIES  No Known Allergies    VITALS  T(C): 36.7 (03-11-20 @ 15:21)  T(F): 98 (03-11-20 @ 15:21), Max: 98 (03-10-20 @ 21:43)  HR: 54 (03-11-20 @ 15:21) (54 - 59)  BP: 115/71 (03-11-20 @ 15:21) (95/55 - 115/71)  RR:  (18 - 18)  SpO2:  (94% - 96%)  Wt(kg): --    PHYSICAL EXAM  Constitutional - NAD, Comfortable  HEENT - NCAT  Chest - fair chest expansion, no respiratory distress  Cardio - warm and well perfused  Abdomen -  Soft, NTND, +PEG c/d/i  Extremities - No C/C/E, No calf tenderness, +b/l mitten restraints    Neurologic Exam -                    Cognitive - Awake, Alert, limited due to aphasia, pt does nod to one-step commands     Communication - + expressive aphasia, comprehension intact, repetition impaired     Cranial Nerves - left labial weakness, tongue midline, shoulder shrug impaired on left, tongue movement intact      Motor -                     LEFT    UE - ShAB 5/5, EF 5/5, EE 5/5                    RIGHT UE - ShAB 5/5, EF 5/5, EE 5/5                    LEFT    LE - HF 4/5, KE 4/5, DF 5/5, PF 5/5                    RIGHT LE - HF 5/5, KE 5/5, DF 5/5, PF 5/5        Reflexes - 3+ patellar, biceps, triceps reflexes on the right, 2+ reflexes on the left  Psychiatric - Affect WNL    RECENT LABS/IMAGING                        11.1   5.84  )-----------( 321      ( 10 Mar 2020 07:10 )             33.8     03-10    141  |  104  |  7   ----------------------------<  159<H>  3.9   |  25  |  0.79    Ca    9.3      10 Mar 2020 07:10  Mg     2.3     03-10            MEDICATIONS   MEDICATIONS  (STANDING):  aspirin  chewable 81 milliGRAM(s) Oral daily  atorvastatin 80 milliGRAM(s) Oral at bedtime  chlorhexidine 2% Cloths 1 Application(s) Topical daily  clopidogrel Tablet 75 milliGRAM(s) Oral daily  lactated ringers. 1000 milliLiter(s) (100 mL/Hr) IV Continuous <Continuous>  piperacillin/tazobactam IVPB.. 3.375 Gram(s) IV Intermittent every 8 hours    MEDICATIONS  (PRN):      ASSESSMENT/PLAN  58 y/o M with acute right posterior frontal infarct and PEG replacement now with dysphagia, aphasia,  functional, gait, and ADL impairments.    Disposition - Pt has significant functional impairments versus baseline.  Needs acute inpatient rehabilitation for intensive therapies (PT, OT, SLP) to restore his function towards independence, while being closely monitored for his ongoing medical issues, which can quickly destabilize if not closely monitored and managed.  Expected to tolerate and benefit from 3 hrs/day, >=5 days/wk of multidisciplinary therapies. Recommend return to acute inpatient rehabilitation once deemed medically and surgically stable as per the primary treating team(s).    PT - ROM, Bed Mob, Transfers, Amb with AD   OT - ADLs, ROM  SLP - Dysphagia eval and treat  Precautions - Falls, Cardiac, Aspiration   DVT Prophylaxis - Plavix as per primary team  Skin - Turn Q2hrs  Diet - NPO, tube feeds  Thank you for allowing us to participate in your patient's care.

## 2020-03-11 NOTE — PROGRESS NOTE ADULT - ASSESSMENT
59M, Belarusian only speaking, c hx remote CVA without deficits, recent prolonged hospitalization (2/9-3/6/20) for R frontal/temporal CVA (Jan '20) c/b nonverbal/broca's aphasia, LE weakness, dysphagia, s/p PEG (initially placed 2/2, replaced by surg 2/27, replaced by IR 3/2, now replaced again by surg 3/8), occluded L MCA, pw sepsis likely 2/2 aspiration PNA

## 2020-03-11 NOTE — CONSULT NOTE ADULT - ATTENDING COMMENTS
Seen and examined with resident. Agree with note.   58 y/o M with acute right posterior frontal infarct and PEG replacement now with dysphagia, aphasia,  functional, gait, and ADL impairments.  Patient will need acute rehabilitation when stable.
I have seen and examined the patient. I agree with the above surgery resident's note.  pt seen and examined  denieas abd pain  abd soft, nt, nd  g-tube removed and g-tube study performed under flouro- no evidence of leak  hicks placed into gastrostomy  ok to resume tube feeds- do NOT inflate balloon as pt at risk of pulling tube again  pt should have hand mitts and abd binder kept on indefinitely

## 2020-03-11 NOTE — PROGRESS NOTE ADULT - SUBJECTIVE AND OBJECTIVE BOX
INTERVAL HPI/OVERNIGHT EVENTS:    MEDICATIONS  (STANDING):  aspirin  chewable 81 milliGRAM(s) Oral daily  atorvastatin 80 milliGRAM(s) Oral at bedtime  chlorhexidine 2% Cloths 1 Application(s) Topical daily  clopidogrel Tablet 75 milliGRAM(s) Oral daily  lactated ringers. 1000 milliLiter(s) (100 mL/Hr) IV Continuous <Continuous>  piperacillin/tazobactam IVPB.. 3.375 Gram(s) IV Intermittent every 8 hours    MEDICATIONS  (PRN):      Allergies    No Known Allergies    Intolerances            PHYSICAL EXAM:   Vital Signs:  Vital Signs Last 24 Hrs  T(C): 36.7 (11 Mar 2020 07:57), Max: 36.7 (10 Mar 2020 21:43)  T(F): 98 (11 Mar 2020 07:57), Max: 98 (10 Mar 2020 21:43)  HR: 59 (11 Mar 2020 07:57) (53 - 59)  BP: 101/70 (11 Mar 2020 07:57) (95/55 - 110/66)  BP(mean): --  RR: 18 (11 Mar 2020 07:57) (18 - 18)  SpO2: 95% (11 Mar 2020 07:57) (94% - 96%)  Daily     Daily     GENERAL:  no distress  HEENT:  NC/AT,  anicteric  CHEST:   no increased effort, breath sounds clear  HEART:  Regular rhythm  ABDOMEN:  Soft, non-tender, non-distended, normoactive bowel sounds,  binder in place  EXTEREMITIES:  no cyanosis      LABS:                        11.1   5.84  )-----------( 321      ( 10 Mar 2020 07:10 )             33.8     03-10    141  |  104  |  7   ----------------------------<  159<H>  3.9   |  25  |  0.79    Ca    9.3      10 Mar 2020 07:10  Mg     2.3     03-10            RADIOLOGY & ADDITIONAL TESTS:

## 2020-03-11 NOTE — PROGRESS NOTE ADULT - SUBJECTIVE AND OBJECTIVE BOX
Patient is a 59y old  Male who presents with a chief complaint of fever, dislodged G tube (11 Mar 2020 09:01)      SUBJECTIVE / OVERNIGHT EVENTS: no new c/o, remains on mittens for self-protection    MEDICATIONS  (STANDING):  aspirin  chewable 81 milliGRAM(s) Oral daily  atorvastatin 80 milliGRAM(s) Oral at bedtime  chlorhexidine 2% Cloths 1 Application(s) Topical daily  clopidogrel Tablet 75 milliGRAM(s) Oral daily  lactated ringers. 1000 milliLiter(s) (100 mL/Hr) IV Continuous <Continuous>  piperacillin/tazobactam IVPB.. 3.375 Gram(s) IV Intermittent every 8 hours    MEDICATIONS  (PRN):      Vital Signs Last 24 Hrs  T(F): 98 (03-11-20 @ 07:57), Max: 98 (03-10-20 @ 21:43)  HR: 59 (03-11-20 @ 07:57) (53 - 59)  BP: 101/70 (03-11-20 @ 07:57) (95/55 - 110/66)  RR: 18 (03-11-20 @ 07:57) (18 - 18)  SpO2: 95% (03-11-20 @ 07:57) (94% - 96%)  Telemetry:   CAPILLARY BLOOD GLUCOSE        I&O's Summary    10 Mar 2020 07:01  -  11 Mar 2020 07:00  --------------------------------------------------------  IN: 0 mL / OUT: 0 mL / NET: 0 mL    11 Mar 2020 07:01  -  11 Mar 2020 12:17  --------------------------------------------------------  IN: 0 mL / OUT: 0 mL / NET: 0 mL        PHYSICAL EXAM:  GENERAL: NAD, well-developed  HEAD:  Atraumatic, Normocephalic  EYES: EOMI, PERRLA, conjunctiva and sclera clear  NECK: Supple, No JVD  CHEST/LUNG: Clear to auscultation bilaterally; No wheeze  HEART: Regular rate and rhythm; No murmurs, rubs, or gallops  ABDOMEN: Soft, Nontender, Nondistended; Bowel sounds present  EXTREMITIES:  2+ Peripheral Pulses, No clubbing, cyanosis, or edema  PSYCH: AAOx3  NEUROLOGY: non-focal  SKIN: No rashes or lesions    LABS:                        11.1   5.84  )-----------( 321      ( 10 Mar 2020 07:10 )             33.8     03-10    141  |  104  |  7   ----------------------------<  159<H>  3.9   |  25  |  0.79    Ca    9.3      10 Mar 2020 07:10  Mg     2.3     03-10                RADIOLOGY & ADDITIONAL TESTS:    Imaging Personally Reviewed:    Consultant(s) Notes Reviewed:      Care Discussed with Consultants/Other Providers:

## 2020-03-11 NOTE — DISCHARGE NOTE PROVIDER - CARE PROVIDERS DIRECT ADDRESSES
,DirectAddress_Unknown,[email or web address]

## 2020-03-11 NOTE — DISCHARGE NOTE PROVIDER - INSTRUCTIONS
Jevity 1.2 x 24hrs with goal rate of 60mL/hr (1440mL). At goal provides 1728kcal, 80gm protein, 1162mL free water. Provides 28kcal/kg, 1.3g protein/kg based on dosing wt 62kg. Defer free water flushes to team.

## 2020-03-12 DIAGNOSIS — Z71.89 OTHER SPECIFIED COUNSELING: ICD-10-CM

## 2020-03-12 PROCEDURE — 99222 1ST HOSP IP/OBS MODERATE 55: CPT

## 2020-03-12 RX ORDER — SENNA PLUS 8.6 MG/1
5 TABLET ORAL AT BEDTIME
Refills: 0 | Status: DISCONTINUED | OUTPATIENT
Start: 2020-03-12 | End: 2020-03-17

## 2020-03-12 RX ORDER — POLYETHYLENE GLYCOL 3350 17 G/17G
17 POWDER, FOR SOLUTION ORAL DAILY
Refills: 0 | Status: DISCONTINUED | OUTPATIENT
Start: 2020-03-12 | End: 2020-03-17

## 2020-03-12 RX ADMIN — CLOPIDOGREL BISULFATE 75 MILLIGRAM(S): 75 TABLET, FILM COATED ORAL at 12:55

## 2020-03-12 RX ADMIN — Medication 81 MILLIGRAM(S): at 12:56

## 2020-03-12 RX ADMIN — PIPERACILLIN AND TAZOBACTAM 25 GRAM(S): 4; .5 INJECTION, POWDER, LYOPHILIZED, FOR SOLUTION INTRAVENOUS at 05:41

## 2020-03-12 RX ADMIN — PIPERACILLIN AND TAZOBACTAM 25 GRAM(S): 4; .5 INJECTION, POWDER, LYOPHILIZED, FOR SOLUTION INTRAVENOUS at 14:23

## 2020-03-12 NOTE — PROGRESS NOTE ADULT - ASSESSMENT
59M, Vietnamese only speaking, c hx remote CVA without deficits, recent prolonged hospitalization (2/9-3/6/20) for R frontal/temporal CVA (Jan '20) c/b nonverbal/broca's aphasia, LE weakness, dysphagia, s/p PEG (initially placed 2/2, replaced by surg 2/27, replaced by IR 3/2, now replaced again by surg 3/8), occluded L MCA, pw sepsis likely 2/2 aspiration PNA

## 2020-03-12 NOTE — PROGRESS NOTE ADULT - SUBJECTIVE AND OBJECTIVE BOX
CC: f/u for  pneumonia  Patient reports  nothing not verbal  REVIEW OF SYSTEMS:  All other review of systems negative (Comprehensive ROS)    Antimicrobials Day #  :6/7  amoxicillin  875 milliGRAM(s)/clavulanate 1 Tablet(s) Oral two times a day    Other Medications Reviewed    T(F): 98.6 (03-12-20 @ 08:23), Max: 98.6 (03-12-20 @ 08:23)  HR: 51 (03-12-20 @ 08:23)  BP: 110/72 (03-12-20 @ 08:23)  RR: 18 (03-12-20 @ 08:23)  SpO2: 97% (03-12-20 @ 08:23)  Wt(kg): --    PHYSICAL EXAM:  General: alert, no acute distress  Eyes:  anicteric, no conjunctival injection, no discharge  Oropharynx: no lesions or injection 	  Neck: supple, without adenopathy  Lungs: clear to auscultation  Heart: regular rate and rhythm; no murmur, rubs or gallops  Abdomen: soft, nondistended, nontender, without mass or organomegaly, peg   Skin: no lesions  Extremities: no clubbing, cyanosis, or edema  Neurologic: alert,aphasic, follows commands moves all extremities        MICROBIOLOGY:  RECENT CULTURES:  03-08 @ 00:48 .Urine Catheterized     No growth      03-08 @ 00:42 .Blood Blood-Venous     No growth to date.          RADIOLOGY REVIEWED:    < from: CT Abdomen and Pelvis w/ IV Cont (03.07.20 @ 23:40) >  EXAM:  CT ABDOMEN AND PELVIS IC                          EXAM:  CT CHEST IC                            PROCEDURE DATE:  03/07/2020            INTERPRETATION:  CLINICAL INFORMATION: Cough, congestion, PEG tube evaluation. Recent percutaneous G-tube adjustment on 3/2/2020.    COMPARISON: CT chest/abdomen/pelvis 2/25/2020.    PROCEDURE:   CT of the Chest, Abdomen and Pelvis was performed with intravenous contrast.   Intravenous contrast: 90 ml Omnipaque 350. 10 ml discarded.  Oral contrast: None.  Sagittal and coronal reformats were performed.    FINDINGS:    CHEST:     LUNGS AND LARGE AIRWAYS: Secretions and debris in the lower trachea and bilateral mainstem bronchi, right greater than left. Patchy airspace opacities in the left lower lobe which could be due to atelectasis versus pneumonia. Subsegmental, dependent atelectasis. Centrilobular emphysema. 3 mm nodule in the right middle lobe.  PLEURA: No pleural effusion.  VESSELS: Atherosclerotic changes of the aorta.  HEART: Heart size is normal. No pericardial effusion.  MEDIASTINUM AND SHANICE: No lymphadenopathy.  CHEST WALL AND LOWER NECK: Left-sided chest wall implantable loop recorder .    ABDOMEN AND PELVIS:    LIVER: Within normal limits.  BILE DUCTS: Normal caliber.  GALLBLADDER: Within normal limits.  SPLEEN: Within normal limits.  PANCREAS: Within normal limits.  ADRENALS: Within normal limits.  KIDNEYS/URETERS: Right renal subcentimeter hypodense lesion which is too small for accurate characterization. Nonspecific left perinephric fat stranding.    BLADDER: Within normal limits.  REPRODUCTIVE ORGANS: Prostate within normal limits for size. Prostatic calcifications.    BOWEL: New surgical sutures along the greater curvature of the stomach. Gastrostomy tube with retentionballoon inflated in an outpouching of the body of the stomach. Residual oral contrast is noted within the bowel. No bowel obstruction or inflammation. Appendix is unremarkable.  PERITONEUM: No ascites. Pneumoperitoneum likely related to recent gastrostomy tube revision.  VESSELS: Within normal limits.  RETROPERITONEUM/LYMPH NODES: No lymphadenopathy.    ABDOMINAL WALL: Asymmetric enlargement of the left rectus abdominis muscle.  BONES: Degenerative changes of the spine.    IMPRESSION:     1. Patchy airspace opacities in the left lower lobe which could be due to atelectasis versus pneumonia. Secretions and debris in the lower trachea and bilateral mainstem bronchi, right greater than left.  2. Gastrostomy tube with retention balloon inflated in an outpouching of the body of the stomach.   3. Pneumoperitoneum likely related to recent gastrostomy tube revision.  4. Asymmetric enlargement of the left rectus abdominis muscle concerning for an intramuscular hematoma.     These findings were discussed with Dr. RAMIREZ at 3/8/2020 1:46 AM by Dr. Oakley with read back.      < end of copied text >    Assessment:  Patient with cva, dysphagia, peg that he has pulled out now with balloon deflated peg, finishing course of antibiotics for aspiration pneumonia  Plan:  1 more day of antibiotics with augmentin

## 2020-03-12 NOTE — CHART NOTE - NSCHARTNOTEFT_GEN_A_CORE
Nutrition Follow Up Note  Patient seen for: Malnutrition follow up     Source:     Diet :     Patient reports:    PO intake :    Source for PO intake:    Enteral /Parenteral Nutrition:     Weights:   % Weight Change    Pertinent Medications:  Pertinent Labs:    Skin per nursing documentation:   Edema:    Estimated Needs:   [ ] no change since previous assessment  [ ] recalculated:     Previous Nutrition Diagnosis:   Nutrition Diagnosis is:    New Nutrition Diagnosis:  Related to:    As evidenced by:      Interventions:     Recommend  1)    Monitoring and Evaluation:     Continue to monitor Nutritional intake, Tolerance to diet prescription, weights, labs, skin integrity    RD remains available upon request and will follow up per protocol Nutrition Follow Up Note  Patient seen for: Malnutrition follow up     Interim events noted, charts reviewed. Pt nonverbal as per flow sheet, no family at bedside.  Pt is a 60 y/o M, German only speaking, with hx of remote CVA without deficits, recent prolonged hospitalization (2/9-3/6/20) for R frontal/temporal CVA (Jan '20) c/b nonverbal/broca's aphasia, LE weakness, dysphagia, s/p PEG (2/2), replaced by surg with Janeway gastrostomy (2/27), replaced by IR (3/2), replaced again with 16Fr gastrostomy by surg in ED (3/8), [facility] and G tube dislodged. Gastrostomy tube replaced (3/9) by surgery team with hicks under fluoroscopy. Pt now occluded L MCA, pw sepsis likely 2/2 aspiration PNA.     Source: RN, previous RD notes, electronic medical record    Diet:     Patient reports:    PO intake :    Source for PO intake:    Enteral /Parenteral Nutrition:     Weights:   % Weight Change    Pertinent Medications:  Pertinent Labs:    Skin per nursing documentation:   Edema:    Estimated Needs:   [ ] no change since previous assessment  [ ] recalculated:     Previous Nutrition Diagnosis:   Nutrition Diagnosis is:    New Nutrition Diagnosis:  Related to:    As evidenced by:      Interventions:     Recommend  1)    Monitoring and Evaluation:     Continue to monitor Nutritional intake, Tolerance to diet prescription, weights, labs, skin integrity    RD remains available upon request and will follow up per protocol Nutrition Follow Up Note  Patient seen for: Malnutrition follow up     Interim events noted, charts reviewed. Pt nonverbal as per flow sheet, no family at bedside.  Pt is a 60 y/o M, Hungarian only speaking, with hx of remote CVA without deficits, recent prolonged hospitalization (2/9-3/6/20) for R frontal/temporal CVA (Jan '20) c/b nonverbal/broca's aphasia, LE weakness, dysphagia, s/p PEG (2/2), replaced by surg with Janeway gastrostomy (2/27), replaced by IR (3/2), replaced again with 16Fr gastrostomy by surg in ED (3/8), presents from [facility] with fever 101 and G tube dislodged. Gastrostomy tube replaced (3/9) by surgery team with hicks under fluoroscopy. Pt now occluded L MCA, pw sepsis likely 2/2 aspiration PNA.     Source: RN, previous RD notes, electronic medical record    Diet: NPO with TF via PEG    GI: No acute GI distress noted per RN. No recent BM noted per flow sheets.     PO intake: N/A    Source for PO intake: N/A     Enteral /Parenteral Nutrition: Jevity 1.2 @ 80 ml/hr x 18 hours. Observed pt TF running @ 60 ml/hr x 24 hours.     Weights (pounds): Most recent (3/11) 136.6, previous RD note (3/9) 137   % Weight Change    Pertinent Medications: Lactated ringers, Lipitor   Pertinent Labs: (3/11) Glucose 159    Skin: No pressure injuries noted per flow sheets   Edema: No edema noted per flow sheets     Estimated Needs:   [X] no change since previous assessment  [ ] recalculated:     Previous Nutrition Diagnosis: Malnutrition, moderate   Nutrition Diagnosis is: Ongoing, being addressed with TF     Recommend  1) Recommend Jevity 1.2 through continuous regimen via PEG starting at 20 mL/hr and advance by 10 mL every 4 hours or as tolerated to goal rate 80 mL x 18 hours to provide 1440 total mL, 1728 calories, 80 grams protein, and 1162 mL water (28kcal/kg, 1.3g protein/kg based on dosing wt 62kg); defer additional free water to team. Will continue to monitor and adjust as needed.  2) Recommend Vitamin D3 per pt previous regimen    Monitoring and Evaluation:     Continue to monitor EN provision/tolerance, weights, skin integrity, labs, GI status   RD remains available upon request and will follow up per protocol  Yi Harmon, Dietetic Intern Pager# [phone] Nutrition Follow Up Note  Patient seen for: Malnutrition follow up     Interim events noted, charts reviewed. Pt nonverbal as per flow sheet, no family at bedside.  Pt is a 60 y/o M, Greek only speaking, with hx of remote CVA without deficits, recent prolonged hospitalization (2/9-3/6/20) for R frontal/temporal CVA (Jan '20) c/b nonverbal/broca's aphasia, LE weakness, dysphagia, s/p PEG (2/2), replaced by surg with Janeway gastrostomy (2/27), replaced by IR (3/2), replaced again with 16Fr gastrostomy by surg in ED (3/8), presents from [facility] with fever 101 and G tube dislodged. Gastrostomy tube replaced (3/9) by surgery team with hicks under fluoroscopy. Pt now occluded L MCA, pw sepsis likely 2/2 aspiration PNA.     Source: RN, previous RD notes, electronic medical record    Diet: NPO with TF via PEG    GI: No acute GI distress noted per RN. No recent BM noted per flow sheets, pt could benefit from bowel regimen if not contraindicated.     PO intake: N/A    Source for PO intake: N/A     Enteral /Parenteral Nutrition: Jevity 1.2 @ 80 ml/hr x 18 hours. Observed pt TF running @ 60 ml/hr x 24 hours.     Weights (pounds): Most recent (3/11) 136.6, previous RD note (3/9) 137   % Weight Change    Pertinent Medications: Lactated ringers, Lipitor   Pertinent Labs: (3/11) Glucose 159    Skin: No pressure injuries noted per flow sheets   Edema: No edema noted per flow sheets     Estimated Needs:   [X] no change since previous assessment  [ ] recalculated:     Previous Nutrition Diagnosis: Malnutrition, moderate   Nutrition Diagnosis is: Ongoing, being addressed with TF     Recommend  1) Recommend Jevity 1.2 through continuous regimen via PEG starting at 20 mL/hr and advance by 10 mL every 4 hours or as tolerated to goal rate 80 mL x 18 hours to provide 1440 total mL, 1728 calories, 80 grams protein, and 1162 mL water (28kcal/kg, 1.3g protein/kg based on dosing wt 62kg); defer additional free water to team. Will continue to monitor and adjust as needed.  2) Recommend Vitamin D3 per pt previous regimen    Monitoring and Evaluation:     Continue to monitor EN provision/tolerance, weights, skin integrity, labs, GI status   RD remains available upon request and will follow up per protocol  Yi Harmon, Dietetic Intern Pager# [phone] Nutrition Follow Up Note  Patient seen for: Malnutrition follow up     Interim events noted, charts reviewed. Pt nonverbal as per flow sheet, no family at bedside.  Pt is a 60 y/o M, Lithuanian only speaking, with hx of remote CVA without deficits, recent prolonged hospitalization (2/9-3/6/20) for R frontal/temporal CVA (Jan '20) c/b nonverbal/broca's aphasia, LE weakness, dysphagia, s/p PEG (2/2), replaced by surg with Janeway gastrostomy (2/27), replaced by IR (3/2), replaced again with 16Fr gastrostomy by surg in ED (3/8), presents from [facility] with fever 101 and G tube dislodged. Gastrostomy tube replaced (3/9) by surgery team with hicks under fluoroscopy. Pt now occluded L MCA, pw sepsis likely 2/2 aspiration PNA.     Source: RN, previous RD notes, electronic medical record    Diet: NPO with TF via PEG    GI: No acute GI distress noted per RN. No recent BM noted per flow sheets, pt could benefit from bowel regimen if not contraindicated.     PO intake: N/A    Source for PO intake: N/A     Enteral /Parenteral Nutrition: Jevity 1.2 @ 60 ml/hr x 24 hours. Observed pt TF running @ 60 ml/hr x 24 hours.     Weights (pounds): Most recent (3/11) 136.6, previous RD note (3/9) 137   % Weight Change    Pertinent Medications: Lactated ringers, Lipitor   Pertinent Labs: (3/11) Glucose 159    Skin: No pressure injuries noted per flow sheets   Edema: No edema noted per flow sheets     Estimated Needs:   [X] no change since previous assessment  [ ] recalculated:     Previous Nutrition Diagnosis: Malnutrition, moderate   Nutrition Diagnosis is: Ongoing, being addressed with TF     Recommend  1) Consider changing TB to Jevity 1.2 to goal rate of 80 mL x 18 hours to provide 1440 total mL, 1728 calories, 80 grams protein, and 1162 mL water (28kcal/kg, 1.3g protein/kg based on dosing wt 62kg); defer additional free water to team. Will continue to monitor and adjust as needed.  2) Recommend Vitamin D3 per pt previous regimen  3) Consider adding bowel regimen, pt noted with no BM since admit (3/8)    Monitoring and Evaluation:     Continue to monitor EN provision/tolerance, weights, skin integrity, labs, GI status   RD remains available upon request and will follow up per protocol  Yi Harmon, Dietetic Intern Pager# [phone] Nutrition Follow Up Note  Patient seen for: Malnutrition follow up     Interim events noted, charts reviewed. Pt nonverbal as per flow sheet, no family at bedside.  Pt is a 58 y/o M, Divehi only speaking, with hx of remote CVA without deficits, recent prolonged hospitalization (2/9-3/6/20) for R frontal/temporal CVA (Jan '20) c/b nonverbal/broca's aphasia, LE weakness, dysphagia, s/p PEG (2/2), replaced by surg with Janeway gastrostomy (2/27), replaced by IR (3/2), replaced again with 16Fr gastrostomy by surg in ED (3/8), presents from [facility] with fever 101 and G tube dislodged. Gastrostomy tube replaced (3/9) by surgery team with hicks under fluoroscopy. Pt now occluded L MCA, pw sepsis likely 2/2 aspiration PNA.     Source: RN, previous RD notes, electronic medical record    Diet: NPO with TF via PEG    GI: No acute GI distress noted per RN. No recent BM noted per flow sheets, pt could benefit from bowel regimen if not contraindicated.     PO intake: N/A    Source for PO intake: N/A     Enteral /Parenteral Nutrition: Jevity 1.2 @ 60 ml/hr x 24 hours. Observed pt TF running @ 60 ml/hr x 24 hours.     Weights (pounds): Most recent (3/11) 136.6, previous RD note (3/9) 137   % Weight Change    Pertinent Medications: Lactated ringers, Lipitor   Pertinent Labs: (3/11) Glucose 159    Skin: No pressure injuries noted per flow sheets   Edema: No edema noted per flow sheets     Estimated Needs:   [X] no change since previous assessment  [ ] recalculated:     Previous Nutrition Diagnosis: Malnutrition, moderate   Nutrition Diagnosis is: Ongoing, being addressed with TF, nutrition care plan achieved     Recommend  1) Continue Jevity 1.2 to goal rate of 60 mL x 24 hours to provide 1440 total mL, 1728 calories, 80 grams protein, and 1162 mL water (28kcal/kg, 1.3g protein/kg based on dosing wt 62kg); defer additional free water to team. Will continue to monitor and adjust as needed.  2) Recommend Vitamin D3 per pt previous regimen  3) Consider adding bowel regimen, pt noted with no BM since admit (3/8)    Monitoring and Evaluation:     Continue to monitor EN provision/tolerance, weights, skin integrity, labs, GI status   RD remains available upon request and will follow up per protocol  Yi Harmon, Dietetic Intern Pager# [phone]

## 2020-03-12 NOTE — PROGRESS NOTE ADULT - PROBLEM SELECTOR PLAN 1
- day 6/7 ABx: Augmentin 875 mg bid, tomorrow completing the course. ID following  - chest PT  - elevate HOB

## 2020-03-12 NOTE — PROVIDER CONTACT NOTE (OTHER) - ASSESSMENT
Pt non-verbal, aphasic, on Lvl 1 restraint, maintained, found to have feeding fluid on bedsheet and gown. Upon inspection, noted that PEG tube was dislodged, suspected Pt movement as cause as Abdominal binder was still secure, tube still taped to Pt, tip dislodged from abdomen. Mittens still in place.

## 2020-03-12 NOTE — PROGRESS NOTE ADULT - SUBJECTIVE AND OBJECTIVE BOX
PAM JIMENEZ:[id number],   59yMale followed for:  No Known Allergies    PAST MEDICAL & SURGICAL HISTORY:  CVA (cerebral vascular accident)  S/P percutaneous endoscopic gastrostomy (PEG) tube placement    FAMILY HISTORY:  No pertinent family history in first degree relatives    MEDICATIONS  (STANDING):  aspirin  chewable 81 milliGRAM(s) Oral daily  atorvastatin 80 milliGRAM(s) Oral at bedtime  chlorhexidine 2% Cloths 1 Application(s) Topical daily  clopidogrel Tablet 75 milliGRAM(s) Oral daily  lactated ringers. 1000 milliLiter(s) (100 mL/Hr) IV Continuous <Continuous>  piperacillin/tazobactam IVPB.. 3.375 Gram(s) IV Intermittent every 8 hours    MEDICATIONS  (PRN):      Vital Signs Last 24 Hrs  T(C): 37 (12 Mar 2020 08:23), Max: 37 (12 Mar 2020 08:23)  T(F): 98.6 (12 Mar 2020 08:23), Max: 98.6 (12 Mar 2020 08:23)  HR: 51 (12 Mar 2020 08:23) (50 - 54)  BP: 110/72 (12 Mar 2020 08:23) (92/55 - 115/71)  BP(mean): --  RR: 18 (12 Mar 2020 08:23) (18 - 18)  SpO2: 97% (12 Mar 2020 08:23) (95% - 97%)  nc/at  s1s2  cta  soft, nt, nd no guarding or rebound  no c/c/e

## 2020-03-12 NOTE — PROGRESS NOTE ADULT - SUBJECTIVE AND OBJECTIVE BOX
Patient is a 59y old  Male who presents with a chief complaint of fever, dislodged G tube (12 Mar 2020 15:32)      SUBJECTIVE / OVERNIGHT EVENTS:    MEDICATIONS  (STANDING):  amoxicillin  875 milliGRAM(s)/clavulanate 1 Tablet(s) Oral two times a day  aspirin  chewable 81 milliGRAM(s) Oral daily  atorvastatin 80 milliGRAM(s) Oral at bedtime  chlorhexidine 2% Cloths 1 Application(s) Topical daily  clopidogrel Tablet 75 milliGRAM(s) Oral daily  lactated ringers. 1000 milliLiter(s) (100 mL/Hr) IV Continuous <Continuous>  senna Syrup 5 milliLiter(s) Oral at bedtime    MEDICATIONS  (PRN):  polyethylene glycol 3350 17 Gram(s) Oral daily PRN Constipation      Vital Signs Last 24 Hrs  T(F): 98 (03-12-20 @ 15:57), Max: 98.6 (03-12-20 @ 08:23)  HR: 52 (03-12-20 @ 15:57) (50 - 52)  BP: 106/69 (03-12-20 @ 15:57) (92/55 - 110/72)  RR: 18 (03-12-20 @ 15:57) (18 - 18)  SpO2: 97% (03-12-20 @ 15:57) (95% - 97%)  Telemetry:   CAPILLARY BLOOD GLUCOSE        I&O's Summary    11 Mar 2020 07:01  -  12 Mar 2020 07:00  --------------------------------------------------------  IN: 0 mL / OUT: 400 mL / NET: -400 mL        PHYSICAL EXAM:  GENERAL: NAD, well-developed  HEAD:  Atraumatic, Normocephalic  EYES: EOMI, PERRLA, conjunctiva and sclera clear  NECK: Supple, No JVD  CHEST/LUNG: Clear to auscultation bilaterally; No wheeze  HEART: Regular rate and rhythm; No murmurs, rubs, or gallops  ABDOMEN: Soft, Nontender, Nondistended; Bowel sounds present  EXTREMITIES:  2+ Peripheral Pulses, No clubbing, cyanosis, or edema  PSYCH: AAOx3  NEUROLOGY: non-focal  SKIN: No rashes or lesions    LABS:                    RADIOLOGY & ADDITIONAL TESTS:    Imaging Personally Reviewed:    Consultant(s) Notes Reviewed:      Care Discussed with Consultants/Other Providers:

## 2020-03-13 DIAGNOSIS — Z87.891 PERSONAL HISTORY OF NICOTINE DEPENDENCE: ICD-10-CM

## 2020-03-13 DIAGNOSIS — R47.01 APHASIA: ICD-10-CM

## 2020-03-13 DIAGNOSIS — Z51.89 ENCOUNTER FOR OTHER SPECIFIED AFTERCARE: ICD-10-CM

## 2020-03-13 DIAGNOSIS — R50.9 FEVER, UNSPECIFIED: ICD-10-CM

## 2020-03-13 DIAGNOSIS — R05 COUGH: ICD-10-CM

## 2020-03-13 DIAGNOSIS — R13.10 DYSPHAGIA, UNSPECIFIED: ICD-10-CM

## 2020-03-13 DIAGNOSIS — D53.1 OTHER MEGALOBLASTIC ANEMIAS, NOT ELSEWHERE CLASSIFIED: ICD-10-CM

## 2020-03-13 DIAGNOSIS — E53.8 DEFICIENCY OF OTHER SPECIFIED B GROUP VITAMINS: ICD-10-CM

## 2020-03-13 DIAGNOSIS — R26.9 UNSPECIFIED ABNORMALITIES OF GAIT AND MOBILITY: ICD-10-CM

## 2020-03-13 DIAGNOSIS — Z43.1 ENCOUNTER FOR ATTENTION TO GASTROSTOMY: ICD-10-CM

## 2020-03-13 DIAGNOSIS — N28.9 DISORDER OF KIDNEY AND URETER, UNSPECIFIED: ICD-10-CM

## 2020-03-13 DIAGNOSIS — K94.29 OTHER COMPLICATIONS OF GASTROSTOMY: ICD-10-CM

## 2020-03-13 LAB
CULTURE RESULTS: SIGNIFICANT CHANGE UP
CULTURE RESULTS: SIGNIFICANT CHANGE UP
SPECIMEN SOURCE: SIGNIFICANT CHANGE UP
SPECIMEN SOURCE: SIGNIFICANT CHANGE UP

## 2020-03-13 PROCEDURE — 71045 X-RAY EXAM CHEST 1 VIEW: CPT | Mod: 26

## 2020-03-13 PROCEDURE — 49465 FLUORO EXAM OF G/COLON TUBE: CPT

## 2020-03-13 RX ORDER — DIVALPROEX SODIUM 500 MG/1
125 TABLET, DELAYED RELEASE ORAL EVERY 6 HOURS
Refills: 0 | Status: DISCONTINUED | OUTPATIENT
Start: 2020-03-13 | End: 2020-03-13

## 2020-03-13 RX ORDER — VALPROIC ACID (AS SODIUM SALT) 250 MG/5ML
125 SOLUTION, ORAL ORAL DAILY
Refills: 0 | Status: DISCONTINUED | OUTPATIENT
Start: 2020-03-13 | End: 2020-03-17

## 2020-03-13 RX ORDER — DIVALPROEX SODIUM 500 MG/1
125 TABLET, DELAYED RELEASE ORAL DAILY
Refills: 0 | Status: DISCONTINUED | OUTPATIENT
Start: 2020-03-13 | End: 2020-03-13

## 2020-03-13 RX ORDER — VALPROIC ACID (AS SODIUM SALT) 250 MG/5ML
125 SOLUTION, ORAL ORAL EVERY 6 HOURS
Refills: 0 | Status: DISCONTINUED | OUTPATIENT
Start: 2020-03-13 | End: 2020-03-17

## 2020-03-13 RX ADMIN — CHLORHEXIDINE GLUCONATE 1 APPLICATION(S): 213 SOLUTION TOPICAL at 14:03

## 2020-03-13 RX ADMIN — SENNA PLUS 5 MILLILITER(S): 8.6 TABLET ORAL at 22:34

## 2020-03-13 RX ADMIN — ATORVASTATIN CALCIUM 80 MILLIGRAM(S): 80 TABLET, FILM COATED ORAL at 22:34

## 2020-03-13 NOTE — CHART NOTE - NSCHARTNOTEFT_GEN_A_CORE
MEDICINE NP- EPISODIC NOTE    Notified by RN patient pulled out PEG tube while being re-positioned and binder came undone, mittens in place. Seen patient, alert, NAD, not agitated/combative. G tube site CDI, dressing applied. Spoke with surgery and Dr. Simmons.    Elvia Ureña, [facility]  [id number]

## 2020-03-13 NOTE — PROGRESS NOTE ADULT - ASSESSMENT
59M, Arabic only speaking, c hx remote CVA without deficits, recent prolonged hospitalization (2/9-3/6/20) for R frontal/temporal CVA (Jan '20) c/b nonverbal/broca's aphasia, LE weakness, dysphagia, s/p PEG (initially placed 2/2, replaced by surg 2/27, replaced by IR 3/2, now replaced again by surg 3/8), occluded L MCA, pw sepsis likely 2/2 aspiration PNA

## 2020-03-13 NOTE — PROGRESS NOTE ADULT - SUBJECTIVE AND OBJECTIVE BOX
SURGERY DAILY PROGRESS NOTE:     SUBJECTIVE/ROS:   Patient seen and examined on AM rounds. G tube dislodged and repalced overnight. This AM, he appears comfortable, tolerating G- tube feeds, minimal leakage/drainage noted around incision site     Vital Signs Last 24 Hrs  T(C): 36.5 (13 Mar 2020 05:09), Max: 37 (12 Mar 2020 08:23)  T(F): 97.7 (13 Mar 2020 05:09), Max: 98.6 (12 Mar 2020 08:23)  HR: 51 (13 Mar 2020 05:09) (50 - 52)  BP: 120/75 (13 Mar 2020 05:09) (106/69 - 120/75)  BP(mean): --  RR: 18 (13 Mar 2020 05:09) (18 - 18)  SpO2: 95% (13 Mar 2020 05:09) (95% - 97%)    General: alert, awake, NAD  Resp: nonlabored breathing, no wheezes or rales  Abd: soft, nontender, nondistended; minimal drainage from G-tube site, abdominal binder in place  Ext: warm and well perfused; no edema; hands in mittens bilaterally  Neuro: alert, nonverbal    I&O's Detail    11 Mar 2020 07:01  -  12 Mar 2020 07:00  --------------------------------------------------------  IN:  Total IN: 0 mL    OUT:    Voided: 400 mL  Total OUT: 400 mL    Total NET: -400 mL          Daily     Daily     MEDICATIONS  (STANDING):  aspirin  chewable 81 milliGRAM(s) Oral daily  atorvastatin 80 milliGRAM(s) Oral at bedtime  chlorhexidine 2% Cloths 1 Application(s) Topical daily  clopidogrel Tablet 75 milliGRAM(s) Oral daily  lactated ringers. 1000 milliLiter(s) (100 mL/Hr) IV Continuous <Continuous>  senna Syrup 5 milliLiter(s) Oral at bedtime    MEDICATIONS  (PRN):  polyethylene glycol 3350 17 Gram(s) Oral daily PRN Constipation      LABS:

## 2020-03-13 NOTE — PROGRESS NOTE ADULT - PROBLEM SELECTOR PLAN 1
- day 7/7 ABx: Augmentin 875 mg bid, today completing the course. ID following  - chest PT  - elevate HOB

## 2020-03-13 NOTE — PROGRESS NOTE ADULT - SUBJECTIVE AND OBJECTIVE BOX
Patient is a 59y old  Male who presents with a chief complaint of fever, dislodged G tube (13 Mar 2020 12:41)      SUBJECTIVE / OVERNIGHT EVENTS: overnight ptn pulled the hicks from his g-tube, reinsertedm seen by surgery, placed TF on hold, would need to find out for how long and why. ptn needs to have better sedation to control his agitation . will discuss w surgery and neuro. the nurse reports ptn pulls his mittens off during the day and pulls lines and tubes when he is agitated. ptn cannot be DCed to acute rehab with mittens, discussed w case management to have an eval for 24 hr home care w home PT. also look into [facility].     MEDICATIONS  (STANDING):  aspirin  chewable 81 milliGRAM(s) Oral daily  atorvastatin 80 milliGRAM(s) Oral at bedtime  chlorhexidine 2% Cloths 1 Application(s) Topical daily  clopidogrel Tablet 75 milliGRAM(s) Oral daily  lactated ringers. 1000 milliLiter(s) (100 mL/Hr) IV Continuous <Continuous>  senna Syrup 5 milliLiter(s) Oral at bedtime    MEDICATIONS  (PRN):  polyethylene glycol 3350 17 Gram(s) Oral daily PRN Constipation      Vital Signs Last 24 Hrs  T(F): 98 (03-13-20 @ 10:00), Max: 98.2 (03-13-20 @ 00:26)  HR: 52 (03-13-20 @ 10:00) (50 - 52)  BP: 112/66 (03-13-20 @ 10:00) (106/69 - 120/75)  RR: 18 (03-13-20 @ 10:00) (18 - 18)  SpO2: 96% (03-13-20 @ 10:00) (95% - 97%)  Telemetry:   CAPILLARY BLOOD GLUCOSE        I&O's Summary    12 Mar 2020 07:01  -  13 Mar 2020 07:00  --------------------------------------------------------  IN: 0 mL / OUT: 500 mL / NET: -500 mL    13 Mar 2020 07:01  -  13 Mar 2020 14:34  --------------------------------------------------------  IN: 0 mL / OUT: 100 mL / NET: -100 mL        PHYSICAL EXAM:  GENERAL: NAD, well-developed  HEAD:  Atraumatic, Normocephalic  EYES: EOMI, PERRLA, conjunctiva and sclera clear  NECK: Supple, No JVD  CHEST/LUNG: Clear to auscultation bilaterally; No wheeze  HEART: Regular rate and rhythm; No murmurs, rubs, or gallops  ABDOMEN: Soft, Nontender, Nondistended; Bowel sounds present  EXTREMITIES:  2+ Peripheral Pulses, No clubbing, cyanosis, or edema  PSYCH: AAOx3  NEUROLOGY: non-focal  SKIN: No rashes or lesions    LABS:                    RADIOLOGY & ADDITIONAL TESTS:    Imaging Personally Reviewed:    Consultant(s) Notes Reviewed:      Care Discussed with Consultants/Other Providers:

## 2020-03-13 NOTE — PROGRESS NOTE ADULT - SUBJECTIVE AND OBJECTIVE BOX
CC: f/u for  pneumonia  Patient reports  nonverbal  REVIEW OF SYSTEMS:  All other review of systems cannot get(Comprehensive ROS)    Antimicrobials Day #  :    Other Medications Reviewed    T(F): 98 (03-13-20 @ 10:00), Max: 98.2 (03-13-20 @ 00:26)  HR: 52 (03-13-20 @ 10:00)  BP: 112/66 (03-13-20 @ 10:00)  RR: 18 (03-13-20 @ 10:00)  SpO2: 96% (03-13-20 @ 10:00)  Wt(kg): --    PHYSICAL EXAM:  General: alert, no acute distress  Eyes:  anicteric, no conjunctival injection, no discharge  Oropharynx: no lesions or injection 	  Neck: supple, without adenopathy  Lungs: clear to auscultation  Heart: regular rate and rhythm; no murmur, rubs or gallops  Abdomen: soft, nondistended, nontender, without mass or organomegaly  Skin: no lesions  Extremities: no clubbing, cyanosis, or edema  Neurologic: alert, folllows all commands except to talk  moves all extremities    LAB RESULTS:              MICROBIOLOGY:  RECENT CULTURES:      RADIOLOGY REVIEWED:  < from: Xray Feeding Tube Check SI (03.13.20 @ 03:28) >  EXAM:  FEEDING TUBE CHECK SI-SC                            PROCEDURE DATE:  03/13/2020            INTERPRETATION:  Feeding tube check    HISTORY: Removed G-tube replaced with Marsh catheter    Frontal view of the abdomen shows a normal gas pattern.Contrast material is seen in the majority of the colon.    Contrast material is seen in a catheter leading to the stomach. Stomach lumen is nondistended. No definite extravasation is identified.    IMPRESSION:  Tube to stomach.    Thank you for this referral.    < end of copied text >      Assessment:  Patient with cva, aphasic, dysphagia, peg that has been pulled out and dislodged multiple times now back in place, aspiration pneumonia finished treatment  Plan: monitor off further antibiotics  aspiration precautions

## 2020-03-13 NOTE — CHART NOTE - NSCHARTNOTEFT_GEN_A_CORE
Abdomen XR from this AM reviewed. Hicks catheter in G tube site in good position. Suture was placed in the afternoon to secure hicks catheter in place. Patient tolerated the procedure well. OK to start tube feed.    Hicks catheter was placed in G tube site and used as G tube. DO NOT inflate balloon in Hicks    Page [phone] if you have any question.

## 2020-03-13 NOTE — PROGRESS NOTE ADULT - PROBLEM SELECTOR PLAN 4
- nonverbal, gait instability, dysphagia  - asa, plavix, lipitor  - PT eval, needs to return to acute rehab  - OT eval  - C d/w HCP: full code, "everything" to be done  - overnight 3/13 ptn pulled the hicks from his g-tube, reinserted and seen by surgery, placed TF on hold, would need to find out for how long and why. ptn needs to have better sedation to control his agitation . will discuss w surgery and neuro. the nurse reports ptn pulls his mittens off during the day and pulls lines and tubes when he is agitated. ptn cannot be DCed to acute rehab with mittens, discussed w case management to have an eval for 24 hr home care w home PT. also look into [facility].

## 2020-03-13 NOTE — PROGRESS NOTE ADULT - ASSESSMENT
Assessment:  59M s/p janeway gastrostomy tube (2/27) with replacement on 3/2 by IR, dislodged in rehab and replaced in ED on 3/7, presents w/ fevers and dislodged gastrostomy tube on 3/8, found to pneumoperitoneum on CT. Surgery is consulted for dislodged gastrostomy tube. G tube replaced with hicks in G-tube site under fluoroscopy. Patient tolerated the procedure well, hemodynamically stable at this time    Plan:   - f/u XR abdomen  - keep NPO  - hold TF for now  - Do not inflate balloon in Hicks  - Serial abdominal exams  - Continue with mittens and abdominal binder    [facility] [id number]

## 2020-03-13 NOTE — PROGRESS NOTE ADULT - SUBJECTIVE AND OBJECTIVE BOX
INTERVAL HPI/OVERNIGHT EVENTS:    MEDICATIONS  (STANDING):  aspirin  chewable 81 milliGRAM(s) Oral daily  atorvastatin 80 milliGRAM(s) Oral at bedtime  chlorhexidine 2% Cloths 1 Application(s) Topical daily  clopidogrel Tablet 75 milliGRAM(s) Oral daily  lactated ringers. 1000 milliLiter(s) (100 mL/Hr) IV Continuous <Continuous>  senna Syrup 5 milliLiter(s) Oral at bedtime    MEDICATIONS  (PRN):  polyethylene glycol 3350 17 Gram(s) Oral daily PRN Constipation      Allergies    No Known Allergies    Intolerances            PHYSICAL EXAM:   Vital Signs:  Vital Signs Last 24 Hrs  T(C): 36.5 (13 Mar 2020 05:09), Max: 36.8 (13 Mar 2020 00:26)  T(F): 97.7 (13 Mar 2020 05:09), Max: 98.2 (13 Mar 2020 00:26)  HR: 51 (13 Mar 2020 05:09) (50 - 52)  BP: 120/75 (13 Mar 2020 05:09) (106/69 - 120/75)  BP(mean): --  RR: 18 (13 Mar 2020 05:09) (18 - 18)  SpO2: 95% (13 Mar 2020 05:09) (95% - 97%)  Daily     Daily     GENERAL:  no distress  HEENT:  NC/AT,  anicteric  CHEST:   no increased effort, breath sounds clear  HEART:  Regular rhythm  ABDOMEN:  Soft, non-tender, non-distended, normoactive bowel sounds,  no masses hicks replaced  EXTEREMITIES:  no cyanosis      LABS:                RADIOLOGY & ADDITIONAL TESTS:

## 2020-03-13 NOTE — CHART NOTE - NSCHARTNOTEFT_GEN_A_CORE
@1AM was notified that hicks that was placed through g tube site was dislodged    seen and examined at bedside  G tube site appears clean, no drainage, tract pink mucosa    16Fr hicks catheter was inserted through g tube tract, balloon filled with 10cc of saline  patient tolerated procedure well    Plan:  -f/u urgent XR abdomen with water soluble contrast through hicks catheter  -do not start feeds on patient    [facility]  [id number]

## 2020-03-13 NOTE — PROGRESS NOTE ADULT - ASSESSMENT
pt again pulled out g-tube, replaced by surgery, benign exam  difficult situation as pt again dislodged tube despite all efforts to prevent

## 2020-03-13 NOTE — PROVIDER CONTACT NOTE (OTHER) - ACTION/TREATMENT ORDERED:
PA notified
PA says he does not need IV at this moment. Will receive his meds via gastrostomy tube
No further intervention ordered @ this time, PA investigating possible interventions. Will continue to monitor.

## 2020-03-14 LAB
ALBUMIN SERPL ELPH-MCNC: 4.1 G/DL — SIGNIFICANT CHANGE UP (ref 3.3–5)
ALP SERPL-CCNC: 80 U/L — SIGNIFICANT CHANGE UP (ref 40–120)
ALT FLD-CCNC: 34 U/L — SIGNIFICANT CHANGE UP (ref 10–45)
ANION GAP SERPL CALC-SCNC: 8 MMOL/L — SIGNIFICANT CHANGE UP (ref 5–17)
AST SERPL-CCNC: 34 U/L — SIGNIFICANT CHANGE UP (ref 10–40)
BASOPHILS # BLD AUTO: 0.07 K/UL — SIGNIFICANT CHANGE UP (ref 0–0.2)
BASOPHILS NFR BLD AUTO: 1.2 % — SIGNIFICANT CHANGE UP (ref 0–2)
BILIRUB SERPL-MCNC: 0.6 MG/DL — SIGNIFICANT CHANGE UP (ref 0.2–1.2)
BUN SERPL-MCNC: 15 MG/DL — SIGNIFICANT CHANGE UP (ref 7–23)
CALCIUM SERPL-MCNC: 10 MG/DL — SIGNIFICANT CHANGE UP (ref 8.4–10.5)
CHLORIDE SERPL-SCNC: 102 MMOL/L — SIGNIFICANT CHANGE UP (ref 96–108)
CO2 SERPL-SCNC: 30 MMOL/L — SIGNIFICANT CHANGE UP (ref 22–31)
CREAT SERPL-MCNC: 0.88 MG/DL — SIGNIFICANT CHANGE UP (ref 0.5–1.3)
EOSINOPHIL # BLD AUTO: 0.22 K/UL — SIGNIFICANT CHANGE UP (ref 0–0.5)
EOSINOPHIL NFR BLD AUTO: 3.7 % — SIGNIFICANT CHANGE UP (ref 0–6)
GLUCOSE SERPL-MCNC: 113 MG/DL — HIGH (ref 70–99)
HCT VFR BLD CALC: 40.1 % — SIGNIFICANT CHANGE UP (ref 39–50)
HGB BLD-MCNC: 12.4 G/DL — LOW (ref 13–17)
IMM GRANULOCYTES NFR BLD AUTO: 0.2 % — SIGNIFICANT CHANGE UP (ref 0–1.5)
LYMPHOCYTES # BLD AUTO: 1.62 K/UL — SIGNIFICANT CHANGE UP (ref 1–3.3)
LYMPHOCYTES # BLD AUTO: 27.5 % — SIGNIFICANT CHANGE UP (ref 13–44)
MCHC RBC-ENTMCNC: 30.9 GM/DL — LOW (ref 32–36)
MCHC RBC-ENTMCNC: 31.6 PG — SIGNIFICANT CHANGE UP (ref 27–34)
MCV RBC AUTO: 102.3 FL — HIGH (ref 80–100)
MONOCYTES # BLD AUTO: 0.29 K/UL — SIGNIFICANT CHANGE UP (ref 0–0.9)
MONOCYTES NFR BLD AUTO: 4.9 % — SIGNIFICANT CHANGE UP (ref 2–14)
NEUTROPHILS # BLD AUTO: 3.68 K/UL — SIGNIFICANT CHANGE UP (ref 1.8–7.4)
NEUTROPHILS NFR BLD AUTO: 62.5 % — SIGNIFICANT CHANGE UP (ref 43–77)
NRBC # BLD: 0 /100 WBCS — SIGNIFICANT CHANGE UP (ref 0–0)
PLATELET # BLD AUTO: 434 K/UL — HIGH (ref 150–400)
POTASSIUM SERPL-MCNC: 3.7 MMOL/L — SIGNIFICANT CHANGE UP (ref 3.5–5.3)
POTASSIUM SERPL-SCNC: 3.7 MMOL/L — SIGNIFICANT CHANGE UP (ref 3.5–5.3)
PROT SERPL-MCNC: 7.2 G/DL — SIGNIFICANT CHANGE UP (ref 6–8.3)
RBC # BLD: 3.92 M/UL — LOW (ref 4.2–5.8)
RBC # FLD: 13.2 % — SIGNIFICANT CHANGE UP (ref 10.3–14.5)
SODIUM SERPL-SCNC: 140 MMOL/L — SIGNIFICANT CHANGE UP (ref 135–145)
WBC # BLD: 5.89 K/UL — SIGNIFICANT CHANGE UP (ref 3.8–10.5)
WBC # FLD AUTO: 5.89 K/UL — SIGNIFICANT CHANGE UP (ref 3.8–10.5)

## 2020-03-14 RX ADMIN — ATORVASTATIN CALCIUM 80 MILLIGRAM(S): 80 TABLET, FILM COATED ORAL at 21:40

## 2020-03-14 RX ADMIN — SENNA PLUS 5 MILLILITER(S): 8.6 TABLET ORAL at 21:41

## 2020-03-14 RX ADMIN — CLOPIDOGREL BISULFATE 75 MILLIGRAM(S): 75 TABLET, FILM COATED ORAL at 11:59

## 2020-03-14 RX ADMIN — CHLORHEXIDINE GLUCONATE 1 APPLICATION(S): 213 SOLUTION TOPICAL at 11:59

## 2020-03-14 RX ADMIN — Medication 81 MILLIGRAM(S): at 11:59

## 2020-03-14 RX ADMIN — Medication 125 MILLIGRAM(S): at 11:59

## 2020-03-14 NOTE — PROGRESS NOTE ADULT - SUBJECTIVE AND OBJECTIVE BOX
Patient is a 59y old  Male who presents with a chief complaint of fever, dislodged G tube (14 Mar 2020 11:37)      SUBJECTIVE / OVERNIGHT EVENTS: tolerating GTube feeds, takes mittens off,     MEDICATIONS  (STANDING):  aspirin  chewable 81 milliGRAM(s) Oral daily  atorvastatin 80 milliGRAM(s) Oral at bedtime  chlorhexidine 2% Cloths 1 Application(s) Topical daily  clopidogrel Tablet 75 milliGRAM(s) Oral daily  lactated ringers. 1000 milliLiter(s) (100 mL/Hr) IV Continuous <Continuous>  senna Syrup 5 milliLiter(s) Oral at bedtime  valproic  acid Syrup 125 milliGRAM(s) Oral daily    MEDICATIONS  (PRN):  polyethylene glycol 3350 17 Gram(s) Oral daily PRN Constipation  valproic  acid Syrup 125 milliGRAM(s) Oral every 6 hours PRN agitation      Vital Signs Last 24 Hrs  T(F): 98.2 (03-14-20 @ 16:18), Max: 98.5 (03-14-20 @ 08:51)  HR: 53 (03-14-20 @ 16:18) (51 - 53)  BP: 124/72 (03-14-20 @ 16:18) (109/75 - 124/72)  RR: 18 (03-14-20 @ 16:18) (18 - 18)  SpO2: 97% (03-14-20 @ 16:18) (93% - 99%)  Telemetry:   CAPILLARY BLOOD GLUCOSE        I&O's Summary    13 Mar 2020 07:01  -  14 Mar 2020 07:00  --------------------------------------------------------  IN: 0 mL / OUT: 250 mL / NET: -250 mL    14 Mar 2020 07:01  -  14 Mar 2020 22:06  --------------------------------------------------------  IN: 420 mL / OUT: 0 mL / NET: 420 mL        PHYSICAL EXAM:  GENERAL: NAD, well-developed  HEAD:  Atraumatic, Normocephalic  EYES: EOMI, PERRLA, conjunctiva and sclera clear  NECK: Supple, No JVD  CHEST/LUNG: Clear to auscultation bilaterally; No wheeze  HEART: Regular rate and rhythm; No murmurs, rubs, or gallops  ABDOMEN: Soft, Nontender, Nondistended; Bowel sounds present  EXTREMITIES:  2+ Peripheral Pulses, No clubbing, cyanosis, or edema  PSYCH: AAOx3  NEUROLOGY: non-focal  SKIN: No rashes or lesions    LABS:                        12.4   5.89  )-----------( 434      ( 14 Mar 2020 07:09 )             40.1     03-14    140  |  102  |  15  ----------------------------<  113<H>  3.7   |  30  |  0.88    Ca    10.0      14 Mar 2020 07:09    TPro  7.2  /  Alb  4.1  /  TBili  0.6  /  DBili  x   /  AST  34  /  ALT  34  /  AlkPhos  80  03-14              RADIOLOGY & ADDITIONAL TESTS:    Imaging Personally Reviewed:    Consultant(s) Notes Reviewed:      Care Discussed with Consultants/Other Providers:

## 2020-03-14 NOTE — PROGRESS NOTE ADULT - ASSESSMENT
Assessment:  59M s/p janeway gastrostomy tube (2/27) with replacement on 3/2 by IR, dislodged in rehab and replaced in ED on 3/7, presents w/ fevers and dislodged gastrostomy tube on 3/8, found to pneumoperitoneum on CT. Surgery is consulted for dislodged gastrostomy tube. G tube replaced with hicks in G-tube site under fluoroscopy. Patient tolerated the procedure well, hemodynamically stable at this time    Plan:   - Okay to continue tube feeds  - Do not inflate balloon in Hicks  - Continue with mittens and abdominal binder  - Care per primary team  - Please call surgery with any further questions    [facility] [phone]

## 2020-03-14 NOTE — PROGRESS NOTE ADULT - SUBJECTIVE AND OBJECTIVE BOX
INTERVAL HPI/OVERNIGHT EVENTS:    MEDICATIONS  (STANDING):  aspirin  chewable 81 milliGRAM(s) Oral daily  atorvastatin 80 milliGRAM(s) Oral at bedtime  chlorhexidine 2% Cloths 1 Application(s) Topical daily  clopidogrel Tablet 75 milliGRAM(s) Oral daily  lactated ringers. 1000 milliLiter(s) (100 mL/Hr) IV Continuous <Continuous>  senna Syrup 5 milliLiter(s) Oral at bedtime  valproic  acid Syrup 125 milliGRAM(s) Oral daily    MEDICATIONS  (PRN):  polyethylene glycol 3350 17 Gram(s) Oral daily PRN Constipation  valproic  acid Syrup 125 milliGRAM(s) Oral every 6 hours PRN agitation      Allergies    No Known Allergies    Intolerances            PHYSICAL EXAM:   Vital Signs:  Vital Signs Last 24 Hrs  T(C): 36.9 (14 Mar 2020 08:51), Max: 36.9 (14 Mar 2020 08:51)  T(F): 98.5 (14 Mar 2020 08:51), Max: 98.5 (14 Mar 2020 08:51)  HR: 53 (14 Mar 2020 08:51) (51 - 61)  BP: 122/71 (14 Mar 2020 08:51) (103/70 - 122/71)  BP(mean): --  RR: 18 (14 Mar 2020 08:51) (18 - 18)  SpO2: 99% (14 Mar 2020 08:51) (93% - 99%)  Daily     Daily     GENERAL:  no distress  HEENT:  NC/AT,  anicteric  CHEST:   no increased effort, breath sounds clear  HEART:  Regular rhythm  ABDOMEN:  Soft, non-tender, non-distended, normoactive bowel sounds,  no masses ,no hepato-splenomegaly, no signs of chronic liver disease  EXTEREMITIES:  no cyanosis      LABS:                        12.4   5.89  )-----------( 434      ( 14 Mar 2020 07:09 )             40.1     03-14    140  |  102  |  15  ----------------------------<  113<H>  3.7   |  30  |  0.88    Ca    10.0      14 Mar 2020 07:09    TPro  7.2  /  Alb  4.1  /  TBili  0.6  /  DBili  x   /  AST  34  /  ALT  34  /  AlkPhos  80  03-14          RADIOLOGY & ADDITIONAL TESTS:

## 2020-03-14 NOTE — PROGRESS NOTE ADULT - SUBJECTIVE AND OBJECTIVE BOX
SURGERY DAILY PROGRESS NOTE:     SUBJECTIVE/ROS:   Patient seen and examined on AM rounds. G tube dislodged replaced 3/12 in the evening. This AM, he appears comfortable, tolerating G- tube feeds, minimal leakage/drainage noted around incision site     OBJECTIVE:    General: alert, awake, NAD  Resp: nonlabored breathing, no wheezes or rales  Abd: soft, nontender, nondistended; minimal drainage from G-tube site, abdominal binder in place  Ext: warm and well perfused; no edema; hands in mittens bilaterally  Neuro: alert, nonverbal    Vitals   Vital Signs Last 24 Hrs  T(C): 36.4 (14 Mar 2020 04:59), Max: 36.8 (13 Mar 2020 16:30)  T(F): 97.6 (14 Mar 2020 04:59), Max: 98.3 (13 Mar 2020 16:30)  HR: 51 (14 Mar 2020 04:59) (51 - 61)  BP: 112/71 (14 Mar 2020 04:59) (103/70 - 112/71)  BP(mean): --  RR: 18 (14 Mar 2020 04:59) (18 - 18)  SpO2: 93% (14 Mar 2020 04:59) (93% - 97%)    LABS:                        12.4   5.89  )-----------( 434      ( 14 Mar 2020 07:09 )             40.1     03-14    140  |  102  |  15  ----------------------------<  113<H>  3.7   |  30  |  0.88    Ca    10.0      14 Mar 2020 07:09    TPro  7.2  /  Alb  4.1  /  TBili  0.6  /  DBili  x   /  AST  34  /  ALT  34  /  AlkPhos  80  03-14          I&O's Detail    13 Mar 2020 07:01  -  14 Mar 2020 07:00  --------------------------------------------------------  IN:  Total IN: 0 mL    OUT:    Voided: 250 mL  Total OUT: 250 mL    Total NET: -250 mL

## 2020-03-14 NOTE — PROGRESS NOTE ADULT - ASSESSMENT
59M, Spanish only speaking, c hx remote CVA without deficits, recent prolonged hospitalization (2/9-3/6/20) for R frontal/temporal CVA (Jan '20) c/b nonverbal/broca's aphasia, LE weakness, dysphagia, s/p PEG (initially placed 2/2, replaced by surg 2/27, replaced by IR 3/2, now replaced again by surg 3/8), occluded L MCA, pw sepsis likely 2/2 aspiration PNA

## 2020-03-15 RX ADMIN — ATORVASTATIN CALCIUM 80 MILLIGRAM(S): 80 TABLET, FILM COATED ORAL at 21:17

## 2020-03-15 RX ADMIN — Medication 125 MILLIGRAM(S): at 12:08

## 2020-03-15 RX ADMIN — CLOPIDOGREL BISULFATE 75 MILLIGRAM(S): 75 TABLET, FILM COATED ORAL at 12:09

## 2020-03-15 RX ADMIN — CHLORHEXIDINE GLUCONATE 1 APPLICATION(S): 213 SOLUTION TOPICAL at 12:14

## 2020-03-15 RX ADMIN — Medication 81 MILLIGRAM(S): at 12:08

## 2020-03-15 RX ADMIN — SENNA PLUS 5 MILLILITER(S): 8.6 TABLET ORAL at 21:17

## 2020-03-15 NOTE — PROGRESS NOTE ADULT - SUBJECTIVE AND OBJECTIVE BOX
CC: f/u for  aspiration pneumonia  Patient reports  nothing aphasic  REVIEW OF SYSTEMS:  All other review of systems cannot getnegative (Comprehensive ROS)    Antimicrobials Day #  :    Other Medications Reviewed    T(F): 98.1 (03-15-20 @ 16:35), Max: 98.1 (03-15-20 @ 16:35)  HR: 53 (03-15-20 @ 16:35)  BP: 116/68 (03-15-20 @ 16:35)  RR: 18 (03-15-20 @ 16:35)  SpO2: 97% (03-15-20 @ 16:35)  Wt(kg): --    PHYSICAL EXAM:  General: alert, no acute distress  Eyes:  anicteric, no conjunctival injection, no discharge  Oropharynx: no lesions or injection 	  Neck: supple, without adenopathy  Lungs: clear to auscultation  Heart: regular rate and rhythm; no murmur, rubs or gallops  Abdomen: soft, nondistended, nontender, without mass or organomegaly  Skin: no lesions  Extremities: no clubbing, cyanosis, or edema  Neurologic: alert,, moves all extremities    LAB RESULTS:                        12.4   5.89  )-----------( 434      ( 14 Mar 2020 07:09 )             40.1     03-14    140  |  102  |  15  ----------------------------<  113<H>  3.7   |  30  |  0.88    Ca    10.0      14 Mar 2020 07:09    TPro  7.2  /  Alb  4.1  /  TBili  0.6  /  DBili  x   /  AST  34  /  ALT  34  /  AlkPhos  80  03-14    LIVER FUNCTIONS - ( 14 Mar 2020 07:09 )  Alb: 4.1 g/dL / Pro: 7.2 g/dL / ALK PHOS: 80 U/L / ALT: 34 U/L / AST: 34 U/L / GGT: x             MICROBIOLOGY:  RECENT CULTURES:      RADIOLOGY REVIEWED:    < from: Xray Chest 1 View- PORTABLE-Routine (03.13.20 @ 09:36) >  IMPRESSION:   No pneumoperitoneum    Clear lungs    < end of copied text >            Assessment:  s/p cva, dysphagia, dislodged g tube, replaced multiple times, treated for aspiration pneumonia. no infection at present.   Plan:  monitor off antibiotics  call if input needed

## 2020-03-15 NOTE — PROGRESS NOTE ADULT - ASSESSMENT
59M, Serbian only speaking, c hx remote CVA without deficits, recent prolonged hospitalization (2/9-3/6/20) for R frontal/temporal CVA (Jan '20) c/b nonverbal/broca's aphasia, LE weakness, dysphagia, s/p PEG (initially placed 2/2, replaced by surg 2/27, replaced by IR 3/2, now replaced again by surg 3/8), occluded L MCA, pw sepsis likely 2/2 aspiration PNA

## 2020-03-15 NOTE — PROGRESS NOTE ADULT - SUBJECTIVE AND OBJECTIVE BOX
INTERVAL HPI/OVERNIGHT EVENTS:    MEDICATIONS  (STANDING):  aspirin  chewable 81 milliGRAM(s) Oral daily  atorvastatin 80 milliGRAM(s) Oral at bedtime  chlorhexidine 2% Cloths 1 Application(s) Topical daily  clopidogrel Tablet 75 milliGRAM(s) Oral daily  lactated ringers. 1000 milliLiter(s) (100 mL/Hr) IV Continuous <Continuous>  senna Syrup 5 milliLiter(s) Oral at bedtime  valproic  acid Syrup 125 milliGRAM(s) Oral daily    MEDICATIONS  (PRN):  polyethylene glycol 3350 17 Gram(s) Oral daily PRN Constipation  valproic  acid Syrup 125 milliGRAM(s) Oral every 6 hours PRN agitation      Allergies    No Known Allergies    Intolerances            PHYSICAL EXAM:   Vital Signs:  Vital Signs Last 24 Hrs  T(C): 36.4 (14 Mar 2020 23:52), Max: 36.8 (14 Mar 2020 16:18)  T(F): 97.5 (14 Mar 2020 23:52), Max: 98.2 (14 Mar 2020 16:18)  HR: 55 (14 Mar 2020 23:52) (53 - 55)  BP: 96/67 (14 Mar 2020 23:52) (96/67 - 124/72)  BP(mean): --  RR: 18 (14 Mar 2020 23:52) (18 - 18)  SpO2: 97% (14 Mar 2020 23:52) (97% - 97%)  Daily     Daily     GENERAL:  no distress  HEENT:  NC/AT,  anicteric  CHEST:   no increased effort, breath sounds clear  HEART:  Regular rhythm  ABDOMEN:  Soft, non-tender, non-distended, normoactive bowel sounds,  no masses ,no hepato-splenomegaly, no signs of chronic liver disease  EXTEREMITIES:  no cyanosis      LABS:                        12.4   5.89  )-----------( 434      ( 14 Mar 2020 07:09 )             40.1     03-14    140  |  102  |  15  ----------------------------<  113<H>  3.7   |  30  |  0.88    Ca    10.0      14 Mar 2020 07:09    TPro  7.2  /  Alb  4.1  /  TBili  0.6  /  DBili  x   /  AST  34  /  ALT  34  /  AlkPhos  80  03-14          RADIOLOGY & ADDITIONAL TESTS:

## 2020-03-15 NOTE — PROGRESS NOTE ADULT - SUBJECTIVE AND OBJECTIVE BOX
Patient is a 59y old  Male who presents with a chief complaint of fever, dislodged G tube (15 Mar 2020 13:55)      SUBJECTIVE / OVERNIGHT EVENTS: no new developments    MEDICATIONS  (STANDING):  aspirin  chewable 81 milliGRAM(s) Oral daily  atorvastatin 80 milliGRAM(s) Oral at bedtime  chlorhexidine 2% Cloths 1 Application(s) Topical daily  clopidogrel Tablet 75 milliGRAM(s) Oral daily  lactated ringers. 1000 milliLiter(s) (100 mL/Hr) IV Continuous <Continuous>  senna Syrup 5 milliLiter(s) Oral at bedtime  valproic  acid Syrup 125 milliGRAM(s) Oral daily    MEDICATIONS  (PRN):  polyethylene glycol 3350 17 Gram(s) Oral daily PRN Constipation  valproic  acid Syrup 125 milliGRAM(s) Oral every 6 hours PRN agitation      Vital Signs Last 24 Hrs  T(F): 97.9 (03-15-20 @ 09:35), Max: 98.2 (03-14-20 @ 16:18)  HR: 55 (03-15-20 @ 09:35) (53 - 55)  BP: 111/67 (03-15-20 @ 09:35) (96/67 - 124/72)  RR: 18 (03-15-20 @ 09:35) (18 - 18)  SpO2: 96% (03-15-20 @ 09:35) (96% - 97%)  Telemetry:   CAPILLARY BLOOD GLUCOSE        I&O's Summary    14 Mar 2020 07:01  -  15 Mar 2020 07:00  --------------------------------------------------------  IN: 1100 mL / OUT: 50 mL / NET: 1050 mL    15 Mar 2020 07:01  -  15 Mar 2020 15:00  --------------------------------------------------------  IN: 0 mL / OUT: 200 mL / NET: -200 mL        PHYSICAL EXAM:  GENERAL: NAD, well-developed  HEAD:  Atraumatic, Normocephalic  EYES: EOMI, PERRLA, conjunctiva and sclera clear  NECK: Supple, No JVD  CHEST/LUNG: Clear to auscultation bilaterally; No wheeze  HEART: Regular rate and rhythm; No murmurs, rubs, or gallops  ABDOMEN: Soft, Nontender, Nondistended; Bowel sounds present  EXTREMITIES:  2+ Peripheral Pulses, No clubbing, cyanosis, or edema  PSYCH: AAOx3  NEUROLOGY: non-focal  SKIN: No rashes or lesions    LABS:                        12.4   5.89  )-----------( 434      ( 14 Mar 2020 07:09 )             40.1     03-14    140  |  102  |  15  ----------------------------<  113<H>  3.7   |  30  |  0.88    Ca    10.0      14 Mar 2020 07:09    TPro  7.2  /  Alb  4.1  /  TBili  0.6  /  DBili  x   /  AST  34  /  ALT  34  /  AlkPhos  80  03-14              RADIOLOGY & ADDITIONAL TESTS:    Imaging Personally Reviewed:    Consultant(s) Notes Reviewed:      Care Discussed with Consultants/Other Providers:

## 2020-03-15 NOTE — PROGRESS NOTE ADULT - ASSESSMENT
tolerating feeds, again mittens not in place  spoke with staff about replacing mittens and high risk to remove peg tube

## 2020-03-16 RX ADMIN — Medication 81 MILLIGRAM(S): at 12:00

## 2020-03-16 RX ADMIN — POLYETHYLENE GLYCOL 3350 17 GRAM(S): 17 POWDER, FOR SOLUTION ORAL at 11:36

## 2020-03-16 RX ADMIN — Medication 125 MILLIGRAM(S): at 17:30

## 2020-03-16 RX ADMIN — Medication 125 MILLIGRAM(S): at 23:34

## 2020-03-16 RX ADMIN — Medication 125 MILLIGRAM(S): at 11:36

## 2020-03-16 RX ADMIN — CHLORHEXIDINE GLUCONATE 1 APPLICATION(S): 213 SOLUTION TOPICAL at 11:36

## 2020-03-16 RX ADMIN — SENNA PLUS 5 MILLILITER(S): 8.6 TABLET ORAL at 21:34

## 2020-03-16 RX ADMIN — ATORVASTATIN CALCIUM 80 MILLIGRAM(S): 80 TABLET, FILM COATED ORAL at 21:34

## 2020-03-16 RX ADMIN — CLOPIDOGREL BISULFATE 75 MILLIGRAM(S): 75 TABLET, FILM COATED ORAL at 12:00

## 2020-03-16 NOTE — PROGRESS NOTE ADULT - ASSESSMENT
59M, Bulgarian only speaking, c hx remote CVA without deficits, recent prolonged hospitalization (2/9-3/6/20) for R frontal/temporal CVA (Jan '20) c/b nonverbal/broca's aphasia, LE weakness, dysphagia, s/p PEG (initially placed 2/2, replaced by surg 2/27, replaced by IR 3/2, now replaced again by surg 3/8), occluded L MCA, pw sepsis likely 2/2 aspiration PNA

## 2020-03-16 NOTE — PROGRESS NOTE ADULT - PROBLEM SELECTOR PLAN 4
- nonverbal, gait instability, dysphagia  - asa, plavix, lipitor  - PT eval, needs to return to acute rehab  - OT eval  - [facility] d/w HCP: full code, "everything" to be done  - overnight 3/13 ptn pulled the hicks from his g-tube, reinserted and seen by surgery, placed TF on hold, would need to find out for how long and why. ptn needs to have better sedation to control his agitation . will discuss w surgery and neuro. the nurse reports ptn pulls his mittens off during the day and pulls lines and tubes when he is agitated. ptn cannot be DCed to acute rehab with mittens, has been stable without mittens

## 2020-03-16 NOTE — PROGRESS NOTE ADULT - SUBJECTIVE AND OBJECTIVE BOX
Patient is a 59y old  Male who presents with a chief complaint of fever, dislodged G tube (16 Mar 2020 08:48)      SUBJECTIVE / OVERNIGHT EVENTS: ptn is stable, day 2 w no mittens, awaiting transfer to acute rehab for con't post CVA management    MEDICATIONS  (STANDING):  aspirin  chewable 81 milliGRAM(s) Oral daily  atorvastatin 80 milliGRAM(s) Oral at bedtime  chlorhexidine 2% Cloths 1 Application(s) Topical daily  clopidogrel Tablet 75 milliGRAM(s) Oral daily  lactated ringers. 1000 milliLiter(s) (100 mL/Hr) IV Continuous <Continuous>  senna Syrup 5 milliLiter(s) Oral at bedtime  valproic  acid Syrup 125 milliGRAM(s) Oral daily    MEDICATIONS  (PRN):  polyethylene glycol 3350 17 Gram(s) Oral daily PRN Constipation  valproic  acid Syrup 125 milliGRAM(s) Oral every 6 hours PRN agitation      Vital Signs Last 24 Hrs  T(F): 97.6 (03-16-20 @ 08:49), Max: 98.1 (03-15-20 @ 16:35)  HR: 53 (03-16-20 @ 08:49) (50 - 53)  BP: 103/63 (03-16-20 @ 08:49) (103/63 - 116/68)  RR: 18 (03-16-20 @ 08:49) (18 - 18)  SpO2: 98% (03-16-20 @ 08:49) (94% - 98%)  Telemetry:   CAPILLARY BLOOD GLUCOSE        I&O's Summary    15 Mar 2020 07:01  -  16 Mar 2020 07:00  --------------------------------------------------------  IN: 1440 mL / OUT: 600 mL / NET: 840 mL        PHYSICAL EXAM:  GENERAL: NAD, well-developed  HEAD:  Atraumatic, Normocephalic  EYES: EOMI, PERRLA, conjunctiva and sclera clear  NECK: Supple, No JVD  CHEST/LUNG: Clear to auscultation bilaterally; No wheeze  HEART: Regular rate and rhythm; No murmurs, rubs, or gallops  ABDOMEN: Soft, Nontender, Nondistended; Bowel sounds present  EXTREMITIES:  2+ Peripheral Pulses, No clubbing, cyanosis, or edema  PSYCH: AAOx3  NEUROLOGY: non-focal  SKIN: No rashes or lesions    LABS:                    RADIOLOGY & ADDITIONAL TESTS:    Imaging Personally Reviewed:    Consultant(s) Notes Reviewed:      Care Discussed with Consultants/Other Providers:

## 2020-03-17 ENCOUNTER — INPATIENT (INPATIENT)
Facility: HOSPITAL | Age: 60
LOS: 14 days | Discharge: NURSING, REHAB OR LONG-TERM CARE | DRG: 949 | End: 2020-04-01
Attending: STUDENT IN AN ORGANIZED HEALTH CARE EDUCATION/TRAINING PROGRAM | Admitting: STUDENT IN AN ORGANIZED HEALTH CARE EDUCATION/TRAINING PROGRAM
Payer: MEDICARE

## 2020-03-17 ENCOUNTER — TRANSCRIPTION ENCOUNTER (OUTPATIENT)
Age: 60
End: 2020-03-17

## 2020-03-17 VITALS
WEIGHT: 133.38 LBS | RESPIRATION RATE: 15 BRPM | SYSTOLIC BLOOD PRESSURE: 117 MMHG | TEMPERATURE: 98 F | DIASTOLIC BLOOD PRESSURE: 74 MMHG | OXYGEN SATURATION: 98 % | HEIGHT: 65 IN | HEART RATE: 57 BPM

## 2020-03-17 VITALS
HEART RATE: 74 BPM | RESPIRATION RATE: 18 BRPM | DIASTOLIC BLOOD PRESSURE: 69 MMHG | SYSTOLIC BLOOD PRESSURE: 107 MMHG | TEMPERATURE: 98 F | OXYGEN SATURATION: 96 %

## 2020-03-17 DIAGNOSIS — Z93.1 GASTROSTOMY STATUS: Chronic | ICD-10-CM

## 2020-03-17 DIAGNOSIS — I63.9 CEREBRAL INFARCTION, UNSPECIFIED: ICD-10-CM

## 2020-03-17 PROCEDURE — 97110 THERAPEUTIC EXERCISES: CPT

## 2020-03-17 PROCEDURE — 74177 CT ABD & PELVIS W/CONTRAST: CPT

## 2020-03-17 PROCEDURE — 74018 RADEX ABDOMEN 1 VIEW: CPT

## 2020-03-17 PROCEDURE — 99223 1ST HOSP IP/OBS HIGH 75: CPT

## 2020-03-17 PROCEDURE — 97165 OT EVAL LOW COMPLEX 30 MIN: CPT

## 2020-03-17 PROCEDURE — 82330 ASSAY OF CALCIUM: CPT

## 2020-03-17 PROCEDURE — 94640 AIRWAY INHALATION TREATMENT: CPT

## 2020-03-17 PROCEDURE — 76000 FLUOROSCOPY <1 HR PHYS/QHP: CPT

## 2020-03-17 PROCEDURE — 97129 THER IVNTJ 1ST 15 MIN: CPT

## 2020-03-17 PROCEDURE — 97161 PT EVAL LOW COMPLEX 20 MIN: CPT

## 2020-03-17 PROCEDURE — 85027 COMPLETE CBC AUTOMATED: CPT

## 2020-03-17 PROCEDURE — 97116 GAIT TRAINING THERAPY: CPT

## 2020-03-17 PROCEDURE — 71260 CT THORAX DX C+: CPT

## 2020-03-17 PROCEDURE — 80048 BASIC METABOLIC PNL TOTAL CA: CPT

## 2020-03-17 PROCEDURE — 49465 FLUORO EXAM OF G/COLON TUBE: CPT

## 2020-03-17 PROCEDURE — 86900 BLOOD TYPING SEROLOGIC ABO: CPT

## 2020-03-17 PROCEDURE — 87086 URINE CULTURE/COLONY COUNT: CPT

## 2020-03-17 PROCEDURE — 84295 ASSAY OF SERUM SODIUM: CPT

## 2020-03-17 PROCEDURE — 86850 RBC ANTIBODY SCREEN: CPT

## 2020-03-17 PROCEDURE — 87798 DETECT AGENT NOS DNA AMP: CPT

## 2020-03-17 PROCEDURE — 99285 EMERGENCY DEPT VISIT HI MDM: CPT | Mod: 25

## 2020-03-17 PROCEDURE — 82565 ASSAY OF CREATININE: CPT

## 2020-03-17 PROCEDURE — 85730 THROMBOPLASTIN TIME PARTIAL: CPT

## 2020-03-17 PROCEDURE — 71045 X-RAY EXAM CHEST 1 VIEW: CPT

## 2020-03-17 PROCEDURE — 87486 CHLMYD PNEUM DNA AMP PROBE: CPT

## 2020-03-17 PROCEDURE — 87581 M.PNEUMON DNA AMP PROBE: CPT

## 2020-03-17 PROCEDURE — 96365 THER/PROPH/DIAG IV INF INIT: CPT | Mod: XU

## 2020-03-17 PROCEDURE — 80053 COMPREHEN METABOLIC PANEL: CPT

## 2020-03-17 PROCEDURE — 97530 THERAPEUTIC ACTIVITIES: CPT

## 2020-03-17 PROCEDURE — 82803 BLOOD GASES ANY COMBINATION: CPT

## 2020-03-17 PROCEDURE — 83735 ASSAY OF MAGNESIUM: CPT

## 2020-03-17 PROCEDURE — 96361 HYDRATE IV INFUSION ADD-ON: CPT

## 2020-03-17 PROCEDURE — 85014 HEMATOCRIT: CPT

## 2020-03-17 PROCEDURE — 82947 ASSAY GLUCOSE BLOOD QUANT: CPT

## 2020-03-17 PROCEDURE — 84132 ASSAY OF SERUM POTASSIUM: CPT

## 2020-03-17 PROCEDURE — 97535 SELF CARE MNGMENT TRAINING: CPT

## 2020-03-17 PROCEDURE — 97112 NEUROMUSCULAR REEDUCATION: CPT

## 2020-03-17 PROCEDURE — 82435 ASSAY OF BLOOD CHLORIDE: CPT

## 2020-03-17 PROCEDURE — 83605 ASSAY OF LACTIC ACID: CPT

## 2020-03-17 PROCEDURE — 87633 RESP VIRUS 12-25 TARGETS: CPT

## 2020-03-17 PROCEDURE — 86901 BLOOD TYPING SEROLOGIC RH(D): CPT

## 2020-03-17 PROCEDURE — 84100 ASSAY OF PHOSPHORUS: CPT

## 2020-03-17 PROCEDURE — 85610 PROTHROMBIN TIME: CPT

## 2020-03-17 PROCEDURE — 87040 BLOOD CULTURE FOR BACTERIA: CPT

## 2020-03-17 PROCEDURE — 51702 INSERT TEMP BLADDER CATH: CPT

## 2020-03-17 PROCEDURE — 81001 URINALYSIS AUTO W/SCOPE: CPT

## 2020-03-17 PROCEDURE — 99232 SBSQ HOSP IP/OBS MODERATE 35: CPT

## 2020-03-17 RX ORDER — NYSTATIN 500MM UNIT
1 POWDER (EA) MISCELLANEOUS THREE TIMES A DAY
Refills: 0 | Status: DISCONTINUED | OUTPATIENT
Start: 2020-03-17 | End: 2020-03-19

## 2020-03-17 RX ORDER — CLOPIDOGREL BISULFATE 75 MG/1
1 TABLET, FILM COATED ORAL
Qty: 0 | Refills: 0 | DISCHARGE
Start: 2020-03-17

## 2020-03-17 RX ORDER — ASPIRIN/CALCIUM CARB/MAGNESIUM 324 MG
1 TABLET ORAL
Qty: 0 | Refills: 0 | DISCHARGE
Start: 2020-03-17

## 2020-03-17 RX ORDER — ACETAMINOPHEN 500 MG
650 TABLET ORAL EVERY 6 HOURS
Refills: 0 | Status: DISCONTINUED | OUTPATIENT
Start: 2020-03-17 | End: 2020-04-01

## 2020-03-17 RX ORDER — POLYETHYLENE GLYCOL 3350 17 G/17G
17 POWDER, FOR SOLUTION ORAL DAILY
Refills: 0 | Status: DISCONTINUED | OUTPATIENT
Start: 2020-03-17 | End: 2020-04-01

## 2020-03-17 RX ORDER — POLYETHYLENE GLYCOL 3350 17 G/17G
17 POWDER, FOR SOLUTION ORAL
Qty: 0 | Refills: 0 | DISCHARGE
Start: 2020-03-17

## 2020-03-17 RX ORDER — ASPIRIN/CALCIUM CARB/MAGNESIUM 324 MG
81 TABLET ORAL DAILY
Refills: 0 | Status: DISCONTINUED | OUTPATIENT
Start: 2020-03-17 | End: 2020-03-25

## 2020-03-17 RX ORDER — VALPROIC ACID (AS SODIUM SALT) 250 MG/5ML
125 SOLUTION, ORAL ORAL EVERY 6 HOURS
Refills: 0 | Status: DISCONTINUED | OUTPATIENT
Start: 2020-03-17 | End: 2020-03-28

## 2020-03-17 RX ORDER — SODIUM CHLORIDE 9 MG/ML
2000 INJECTION INTRAMUSCULAR; INTRAVENOUS; SUBCUTANEOUS
Refills: 0 | Status: DISCONTINUED | OUTPATIENT
Start: 2020-03-17 | End: 2020-03-18

## 2020-03-17 RX ORDER — ATORVASTATIN CALCIUM 80 MG/1
1 TABLET, FILM COATED ORAL
Qty: 0 | Refills: 0 | DISCHARGE
Start: 2020-03-17

## 2020-03-17 RX ORDER — ATORVASTATIN CALCIUM 80 MG/1
80 TABLET, FILM COATED ORAL AT BEDTIME
Refills: 0 | Status: DISCONTINUED | OUTPATIENT
Start: 2020-03-17 | End: 2020-04-01

## 2020-03-17 RX ORDER — CLOPIDOGREL BISULFATE 75 MG/1
75 TABLET, FILM COATED ORAL DAILY
Refills: 0 | Status: DISCONTINUED | OUTPATIENT
Start: 2020-03-17 | End: 2020-03-25

## 2020-03-17 RX ORDER — VALPROIC ACID (AS SODIUM SALT) 250 MG/5ML
125 SOLUTION, ORAL ORAL
Qty: 0 | Refills: 0 | DISCHARGE
Start: 2020-03-17

## 2020-03-17 RX ORDER — VALPROIC ACID (AS SODIUM SALT) 250 MG/5ML
125 SOLUTION, ORAL ORAL DAILY
Refills: 0 | Status: DISCONTINUED | OUTPATIENT
Start: 2020-03-18 | End: 2020-03-28

## 2020-03-17 RX ORDER — SENNA PLUS 8.6 MG/1
5 TABLET ORAL
Qty: 0 | Refills: 0 | DISCHARGE
Start: 2020-03-17

## 2020-03-17 RX ORDER — SENNA PLUS 8.6 MG/1
5 TABLET ORAL AT BEDTIME
Refills: 0 | Status: DISCONTINUED | OUTPATIENT
Start: 2020-03-17 | End: 2020-04-01

## 2020-03-17 RX ADMIN — Medication 125 MILLIGRAM(S): at 12:32

## 2020-03-17 RX ADMIN — SODIUM CHLORIDE 125 MILLILITER(S): 9 INJECTION INTRAMUSCULAR; INTRAVENOUS; SUBCUTANEOUS at 20:22

## 2020-03-17 RX ADMIN — CLOPIDOGREL BISULFATE 75 MILLIGRAM(S): 75 TABLET, FILM COATED ORAL at 12:32

## 2020-03-17 RX ADMIN — Medication 125 MILLIGRAM(S): at 06:12

## 2020-03-17 RX ADMIN — CHLORHEXIDINE GLUCONATE 1 APPLICATION(S): 213 SOLUTION TOPICAL at 12:33

## 2020-03-17 RX ADMIN — Medication 81 MILLIGRAM(S): at 12:32

## 2020-03-17 NOTE — H&P ADULT - NSHPPHYSICALEXAM_GEN_ALL_CORE
limited by aphashia and comprehension  Constitutional - NAD, Comfortable  HEENT - NCAT, EOMI  Neck - Supple, No limited ROM  Chest -  CTAB b/l no wheezing  Cardio - +S1/S2 RRR,   Abdomen -  Soft, NT ND, +BS  Extremities - No peripheral edema  Neurologic Exam:                    Cognitive -             Orientation: Awake, Alert, able to follow most commands, unable to test orientation as pt is aphasic            Memory: unable to test     Speech - Non-fluent aphasia, comprehension fair, pt able to follow most commands      Cranial Nerves - left facial droop, tongue midline, no ptosis, shoulder shrug intactg     Motor -  limited by aphasia and language                     LEFT    UE - ShAB 5/5, EF 5/5, EE 5/5, WE 5/5,  WNL                    RIGHT UE - ShAB 5/5, EF 5/5, EE 5/5, WE 5/5,  WNL                    LEFT    LE - HF 5/5, KE 5/5, DF 5/5, PF 5/5                    RIGHT LE - HF 5/5, KE 5/5, DF 5/5, PF 5/5        Sensory - unreliable      Coordination - FTN fair b/l / HTS fair b/l     OculoVestibular - No saccades, No nystagmus,   Psychiatric - Mood stable, Flat Affect limited by aphashia and comprehension  Constitutional - NAD, Comfortable  HEENT - NCAT, EOMI  Neck - Supple, No limited ROM  Chest -  CTAB b/l no wheezing  Cardio - +S1/S2 RRR,   Abdomen -  Soft, NT ND, +BS  Extremities - No peripheral edema  Neurologic Exam:                    Cognitive -             Orientation: Awake, Alert, able to follow most commands, unable to test orientation as pt is aphasic            Memory: unable to test     Speech - Non-fluent aphasia, comprehension fair, pt able to follow most commands      Cranial Nerves - left facial droop, tongue midline, no ptosis, shoulder shrug intactg     Motor -  limited by aphasia and language                     LEFT    UE - ShAB 5/5, EF 5/5, EE 5/5, WE 5/5,  WNL                    RIGHT UE - ShAB 5/5, EF 5/5, EE 5/5, WE 5/5,  WNL                    LEFT    LE - HF 5/5, KE 5/5, DF 5/5, PF 5/5                    RIGHT LE - HF 5/5, KE 5/5, DF 5/5, PF 5/5        Sensory - unreliable      Coordination - FTN fair b/l / HTS fair b/l     OculoVestibular - No saccades, No nystagmus,   Psychiatric - Mood stable, Flat Affect, calm, no agitation

## 2020-03-17 NOTE — H&P ADULT - NSHPSOCIALHISTORY_GEN_ALL_CORE
Smoking - Hx of smoking   EtOH - Hx of drinking   Drugs - Denied    FUNCTIONAL HISTORY  Lives with spouse in an apartment with elevator.   Prior Level of Function: Independent in ADLs and ambulation    PT 3/16  Sit to stand Transfer: Bernie LEE  Gait: 20ft x2 modA RW, chair follow    OT 3/17  Sit to stand Transfer: CG/Bernie  LBD: Bernie, verbal cues;  nonverbal cuesdecreased strength;  impaired balance;  impaired motor control;  impaired coordination;  cognitive, decreased safety awareness Smoking - extensive daily smoking hx  EtOH - extensive daily etoh hx  Drugs - Denied    FUNCTIONAL HISTORY  Home: pt was living in a motel in [facility] and family brought him to [facility] when stroke occured  Prior Level of Function: Independent in ADLs and ambulation    PT 3/16  Sit to stand Transfer: Bernie LEE  Gait: 20ft x2 modA RW, chair follow    OT 3/17  Sit to stand Transfer: CG/Bernie  LBD: Bernie, verbal cues;  nonverbal cues decreased strength;  impaired balance;  impaired motor control;  impaired coordination;  cognitive, decreased safety awareness

## 2020-03-17 NOTE — H&P ADULT - NSHPLABSRESULTS_GEN_ALL_CORE
IMAGING  < from: Xray Chest 1 View- PORTABLE-Routine (03.13.20 @ 09:36) >  No pneumoperitoneum Clear lungs    < from: Xray Feeding Tube Check SI (03.13.20 @ 03:28) >  Tube to stomach.    < from: CT Abdomen and Pelvis w/ IV Cont (02.25.20 @ 23:27) >  Ill-defined low-attenuation along the cortex of the left kidney, of uncertain etiology. Infectious and neoplastic etiologies are considered.  Moderate fecal material in the rectum with mild associated wall thickening and infiltration of the adjacent fat, suggestive of stercoral colitis.    < from: CT Head No Cont / CT Angio brain / neck (02.09.20 @ 15:11) >  CT brain: Multiple areas of prior infarct as described above. No acute hemorrhage or evidence of mass effect. Age-indeterminate lacunar infarcts as described. If the patient has a new and persistent neurologic deficit, consider short follow-up head CT or brain MRI follow-up.  CT angiography neck: No hemodynamically significant stenosis by NASCET criteria. No vascular dissection.  CT angiography brain: The left middle cerebral artery is severely stenosed versus occluded in this patient with a chronic left MCA territory infarct. Marked decrease branching of the distal left MCA branches are noted in the left sylvian fissure compared to the contralateral side. Mild to moderate lobulated stenoses involve the M1 segment of the right middle cerebral artery.    < from: MR Head w/wo IV Cont (02.11.20 @ 15:07) >  BRAIN MRI:  Acute right posterior frontal infarct in the region of the right precentral gyrus. Subacute infarctin the right frontoparietal and posterior temporal region. Minimal petechial hemorrhagic transformation and gyral enhancement is noted in both regions. Multifocal chronic bilateral MCA territory infarcts. Chronic hemorrhagic products in the regionof the left basal ganglia.  BRAIN MRA:  Persistent mild narrowing of the cavernous and supraclinoid left ICA. Persistent lack of flow related signal within the left M1 segment, with poor visualization of the more distal left MCA branches.Redemonstration of multifocal mild to moderate stenoses of the right M1 segment. Normal flow-related enhancement of the more distal right MCA.  NECK MRA:  No flow-limiting stenosis or evidence for arterial dissection.    < from: US Kidney and Bladder (02.27.20 @ 15:16) >  No discrete left renal lesion is identified. No discrete left perinephric abnormality is identified.   	Recommend dedicated renal MRI examination with and without intravenous contrast for further evaluation.

## 2020-03-17 NOTE — DISCHARGE NOTE NURSING/CASE MANAGEMENT/SOCIAL WORK - PATIENT PORTAL LINK FT
You can access the FollowMyHealth Patient Portal offered by [facility] by registering at the following website: [email or web address]. By joining [facility]’s FollowMyHealth portal, you will also be able to view your health information using other applications (apps) compatible with our system.

## 2020-03-17 NOTE — PROGRESS NOTE ADULT - SUBJECTIVE AND OBJECTIVE BOX
INTERVAL HPI/OVERNIGHT EVENTS:    MEDICATIONS  (STANDING):  aspirin  chewable 81 milliGRAM(s) Oral daily  atorvastatin 80 milliGRAM(s) Oral at bedtime  chlorhexidine 2% Cloths 1 Application(s) Topical daily  clopidogrel Tablet 75 milliGRAM(s) Oral daily  lactated ringers. 1000 milliLiter(s) (100 mL/Hr) IV Continuous <Continuous>  senna Syrup 5 milliLiter(s) Oral at bedtime  valproic  acid Syrup 125 milliGRAM(s) Oral daily    MEDICATIONS  (PRN):  polyethylene glycol 3350 17 Gram(s) Oral daily PRN Constipation  valproic  acid Syrup 125 milliGRAM(s) Oral every 6 hours PRN agitation      Allergies    No Known Allergies    Intolerances            PHYSICAL EXAM:   Vital Signs:  Vital Signs Last 24 Hrs  T(C): 36.3 (17 Mar 2020 08:35), Max: 36.7 (16 Mar 2020 16:37)  T(F): 97.4 (17 Mar 2020 08:35), Max: 98 (16 Mar 2020 16:37)  HR: 54 (17 Mar 2020 08:35) (53 - 55)  BP: 114/77 (17 Mar 2020 08:35) (109/70 - 118/79)  BP(mean): --  RR: 18 (17 Mar 2020 08:35) (16 - 18)  SpO2: 100% (17 Mar 2020 08:35) (97% - 100%)  Daily     Daily     GENERAL:  no distress  HEENT:  NC/AT,  anicteric  CHEST:   no increased effort, breath sounds clear  HEART:  Regular rhythm  ABDOMEN:  Soft, non-tender, non-distended, normoactive bowel sounds,  no masses ,no hepato-splenomegaly, no signs of chronic liver disease  EXTEREMITIES:  no cyanosis      LABS:                RADIOLOGY & ADDITIONAL TESTS:

## 2020-03-17 NOTE — H&P ADULT - NSHPREVIEWOFSYSTEMS_GEN_ALL_CORE
REVIEW OF SYSTEMS  Constitutional: No fever, No Chills, No fatigue  HEENT: No eye pain, No visual disturbances, No difficulty hearing  Pulm: No cough,  No shortness of breath  Cardio: No chest pain, No palpitations  GI:  No abdominal pain, No nausea, No vomiting, No diarrhea, No constipation  : No dysuria, No frequency, No hematuria  Neuro: No headaches, No memory loss, No loss of strength, No numbness, No tremors  Skin: No itching, No rashes, No lesions   Endo: No temperature intolerance  MSK: No joint pain, No joint swelling, No muscle pain, No Neck or back pain  Psych:  No depression, No anxiety REVIEW OF SYSTEMS  Constitutional: No fever, No fatigue  HEENT: No eye pain, + visual disturbances (wears glasses), No difficulty hearing  Pulm: No cough,  No shortness of breath  Cardio: No chest pain, No palpitations  GI:  No abdominal pain, No nausea, No vomiting, No diarrhea, No constipation  : No dysuria, No frequency, No hematuria  Neuro: No headaches, No memory loss, +weakness,   Skin: No itching, No rashes,   MSK: No joint pain, No joint swelling, No muscle pain,   Psych:  No depression, No anxiety

## 2020-03-17 NOTE — CHART NOTE - NSCHARTNOTEFT_GEN_A_CORE
PM&R Resident Event Note    Notified by RN that pt's PEG is actually hicks catheter. Patient examined at bedside, tube resembles hicks, however it is sutured in. Reviewed chart, earlier today GI evaluated, and RN documentation all state that tube is functioning well. Per RN supervisor, [facility] policy does not allow for feeds via PEG if tube is a hicks.    - Start IVF  - Hold feeds/meds via PEG until further clarification with daytime staff

## 2020-03-17 NOTE — H&P ADULT - HISTORY OF PRESENT ILLNESS
58 yo Belarusian speaking Male with PMH of CVA 8yrs ago (no reported deficits) was initially brought into [facility] on 2/9/20 by brother for concerns of generalized weakness and inability to speak for the 3 weeks. Pt was found to have no verbal output, appeared withdrawn, intermittent command following and easily agitated.  CTH demonstrated chronic L MCA infarct with severely stenotic vs occluded distal L M1. Confirmed on MRI but also found to have an acute right post frontal infarct, benign LUIS and s/p Medtronic ILR placement 2/13. Patient also had sinus bradycardia, with no interventions recommended from cardiology.     Patient also with severe dysphagia and was s/p PEG placement on 2/2 by GI with endoscopic placement however post procedure pt pulled PEG out. As per GI patient at risk of 2nd endoscopic placement contraindicated due to risk of pulling out tube and peritonitis. Patient thus had laproscopic placement of G tube done by surgery on 2/27 to minimize risk of peritonitis if pulled out. On 3/2, noted with moderate leaking around G-tube: was pulled out from 9 cm in to only 1 cm in. G tube repositioned under fluoro on 3/2.      Neuro recommended malignancy work up 2/2 unexplained multiple embolic strokes. Oncology consult w Dr. Sanchez. Patient had CT C/A/P w  IV contrast only. no significant findings exc questionable renal mass, but sono neg and MRI confirmed it being a subcapsular hematoma.  Patient will need outptn colonoscopy after discharge. 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     Patient was discharged to acute rehab on 3/7, however on admission pt had a temp of 101 and pt PEG was found to be dislodged. Pt was transferred back to [facility] and PEG was replaced by surgery on 3/8. Patient tolerating feeds. Pt was found to have sepsis 2/2 aspiration pneumonia of b/l lower lobes and ID was consulted and pt was started on zosyn. Antibitoic course completed. RVP, blood cultutes were negative. Anticoagulation was held due to hematoma at rectum likley 2/2 g tube replacement. On 3/13 pt pulled out PEG and 16Fr hicks catheter was inserted through g tube tract and feeds were restarted after confirmation of placement. 58 yo Kazakh speaking Male with PMH of CVA 8yrs ago (no reported deficits) was initially brought into [facility] on 2/9/20 by brother for concerns of generalized weakness and inability to speak for the 3 weeks. Pt was found to have no verbal output, appeared withdrawn, intermittent command following and easily agitated.  CTH demonstrated chronic L MCA infarct with severely stenotic vs occluded distal L M1. Confirmed on MRI but also found to have an acute right post frontal infarct, benign LUIS and s/p Medtronic ILR placement 2/13. Patient also had sinus bradycardia, with no interventions recommended from cardiology.     Patient also with severe dysphagia and was s/p PEG placement on 2/2 by GI with endoscopic placement however post procedure pt pulled PEG out. As per GI patient at risk of 2nd endoscopic placement contraindicated due to risk of pulling out tube and peritonitis. Patient thus had laproscopic placement of G tube done by surgery on 2/27 to minimize risk of peritonitis if pulled out. On 3/2, noted with moderate leaking around G-tube: was pulled out from 9 cm in to only 1 cm in. G tube repositioned under fluoro on 3/2.      Neuro recommended malignancy work up 2/2 unexplained multiple embolic strokes. Oncology consult w Dr. Sanchez. Patient had CT C/A/P w  IV contrast only. no significant findings exc questionable renal mass, but sono neg and MRI confirmed it being a subcapsular hematoma.  Patient will need outptn colonoscopy after discharge. 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     Patient was discharged to acute rehab on 3/7, however on admission pt had a temp of 101 and pt PEG was found to be dislodged. Pt was transferred back to [facility] and PEG was replaced by surgery on 3/8. Patient tolerating feeds. Pt was found to have sepsis 2/2 aspiration pneumonia of b/l lower lobes and ID was consulted and pt was started on zosyn. Antibitoic course completed. RVP, blood cultutes were negative. Anticoagulation was held due to hematoma at rectum likley 2/2 g tube replacement. On 3/13 pt pulled out PEG and 16Fr hicks catheter was inserted through g tube tract and feeds were restarted after confirmation of placement.    Spoke with pt's nephew Dallas Arriola. As per Dallas, pt was living in [place] in a motel. Motel owner contacted family in [place] when he began acting strangely. family in [place] contacted pt's brother here in [place] and family brought him here and took him to [facility]. As per nephew, pt has no home here and no dispo plan at this time.   Patient is . He has 2 sons here but estranged from one son and other son is a college student. 60 yo Chinese speaking Male with PMH of CVA 8yrs ago (no reported deficits) was initially brought into [facility] on 2/9/20 by brother for concerns of generalized weakness and inability to speak for the 3 weeks. Pt was found to have no verbal output, appeared withdrawn, intermittent command following and easily agitated.  CTH demonstrated chronic L MCA infarct with severely stenotic vs occluded distal L M1. Confirmed on MRI but also found to have an acute right post frontal infarct, benign LUIS and s/p Medtronic ILR placement 2/13. Patient also had sinus bradycardia, with no interventions recommended from cardiology.     Patient also with severe dysphagia and was s/p PEG placement on 2/2 by GI with endoscopic placement however post procedure pt pulled PEG out. As per GI patient at risk of 2nd endoscopic placement contraindicated due to risk of pulling out tube and peritonitis. Patient thus had laproscopic placement of G tube done by surgery on 2/27 to minimize risk of peritonitis if pulled out. On 3/2, noted with moderate leaking around G-tube: was pulled out from 9 cm in to only 1 cm in. G tube repositioned under fluoro on 3/2.      Neuro recommended malignancy work up 2/2 unexplained multiple embolic strokes. Oncology consult w Dr. Sanchez. Patient had CT C/A/P w  IV contrast only. no significant findings exc questionable renal mass, but sono neg and MRI confirmed it being a subcapsular hematoma.  Patient will need outptn colonoscopy after discharge. 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     Patient was discharged to acute rehab on 3/7, however on admission pt had a temp of 101 and pt PEG was found to be dislodged. Pt was transferred back to [facility] and PEG was replaced by surgery on 3/8. Patient tolerating feeds. Pt was found to have sepsis 2/2 aspiration pneumonia of b/l lower lobes and ID was consulted and pt was started on zosyn. Antibitoic course completed. RVP, blood cultutes were negative. Anticoagulation was held due to hematoma at rectum likley 2/2 g tube replacement. On 3/13 pt pulled out PEG and 16Fr hicks catheter was inserted through g tube tract and feeds were restarted after confirmation of placement.    Spoke with pt's nephew Dallas Arriola. As per Dallas, pt was living in [place] in a motel. Motel owner contacted family in [place] when he began acting strangely. family in [place] contacted pt's brother here in [place] and family brought him here and took him to [facility]. As per nephew, pt has no home here and no dispo plan at this time.   Patient is . He has 2 sons here but estranged from one son and other son is a college student.     Interpretor ID # [id number]

## 2020-03-17 NOTE — PROGRESS NOTE ADULT - REASON FOR ADMISSION
fever, dislodged G tube

## 2020-03-17 NOTE — H&P ADULT - ATTENDING COMMENTS
Pt. seen with fellow.  Agree with documentation above as per fellow with amendments made as appropriate. Patient medically stable.     Unable to vocalize due to aphasia.  Patient follows commands.  Turkmen phone  used but pt. was following most simple commands in English.  Good comprehension.     No agitation--cont. Depakote,    Sleep log  -Enhanced supervision due to h/o multiple PEG removals    Dysphagia-- changed to bolus TFs with 2 crispin HN

## 2020-03-17 NOTE — PROGRESS NOTE ADULT - PROBLEM SELECTOR PLAN 4
- nonverbal, gait instability, dysphagia  - asa, plavix, lipitor  - PT eval, needs to return to acute rehab  - OT eval  - [facility] d/w HCP: full code, "everything" to be done  - overnight 3/13 ptn pulled the hicks from his g-tube, reinserted and seen by surgery, placed TF on hold, would need to find out for how long and why. ptn needs to have better sedation to control his agitation . will discuss w surgery and neuro. the nurse reports ptn pulls his mittens off during the day and pulls lines and tubes when he is agitated. ptn cannot be DCed to acute rehab with mittens, has been stable without mittens

## 2020-03-17 NOTE — PROGRESS NOTE ADULT - SUBJECTIVE AND OBJECTIVE BOX
Patient is a 59y old  Male who presents with a chief complaint of fever, dislodged G tube (17 Mar 2020 09:12)      SUBJECTIVE / OVERNIGHT EVENTS: no new developments, awaiting transfer to acute rehab, no outbursts, doing well w no mittens    MEDICATIONS  (STANDING):  aspirin  chewable 81 milliGRAM(s) Oral daily  atorvastatin 80 milliGRAM(s) Oral at bedtime  chlorhexidine 2% Cloths 1 Application(s) Topical daily  clopidogrel Tablet 75 milliGRAM(s) Oral daily  lactated ringers. 1000 milliLiter(s) (100 mL/Hr) IV Continuous <Continuous>  senna Syrup 5 milliLiter(s) Oral at bedtime  valproic  acid Syrup 125 milliGRAM(s) Oral daily    MEDICATIONS  (PRN):  polyethylene glycol 3350 17 Gram(s) Oral daily PRN Constipation  valproic  acid Syrup 125 milliGRAM(s) Oral every 6 hours PRN agitation      Vital Signs Last 24 Hrs  T(F): 97.8 (03-17-20 @ 19:37), Max: 98.1 (03-17-20 @ 17:11)  HR: 55 (03-17-20 @ 19:37) (53 - 74)  BP: 123/78 (03-17-20 @ 19:37) (107/69 - 123/78)  RR: 15 (03-17-20 @ 19:37) (15 - 18)  SpO2: 96% (03-17-20 @ 19:37) (96% - 100%)  Telemetry:   CAPILLARY BLOOD GLUCOSE        I&O's Summary    16 Mar 2020 07:01  -  17 Mar 2020 07:00  --------------------------------------------------------  IN: 720 mL / OUT: 450 mL / NET: 270 mL    17 Mar 2020 07:01  -  17 Mar 2020 20:18  --------------------------------------------------------  IN: 0 mL / OUT: 150 mL / NET: -150 mL        PHYSICAL EXAM:  GENERAL: NAD, well-developed  HEAD:  Atraumatic, Normocephalic  EYES: EOMI, PERRLA, conjunctiva and sclera clear  NECK: Supple, No JVD  CHEST/LUNG: Clear to auscultation bilaterally; No wheeze  HEART: Regular rate and rhythm; No murmurs, rubs, or gallops  ABDOMEN: Soft, Nontender, Nondistended; Bowel sounds present  EXTREMITIES:  2+ Peripheral Pulses, No clubbing, cyanosis, or edema  PSYCH: AAOx3  NEUROLOGY: non-focal  SKIN: No rashes or lesions

## 2020-03-17 NOTE — H&P ADULT - ASSESSMENT
58 yo Male with prior CVA 8 years ago with no deficits noted initially admitted with generalized weakness and difficulty speaking. CTH with chronic L MCA infarct with MRI showing acute right post frontal infarct. Pt with dysphagia s/p PEG placement. Hospital course complicated by with multiple PEG dislodgement and replacement. S/p completion of antibiotic treatment for aspiration pneumonia.     CVA   - start Comprehensive Rehab Program of PT/OT/ SLP for Gait Instability, ADL, and Functional impairments  -ASA 81mg and Plavix 75mg for 90 days followed by ASA as monotherapy   -Lipitor 80mg qhs    Dysphagia   -s/p PEG placement on 2/2 by GI with endoscopic placement, pt pulled out PEG, s/p laproscopic re-placement of G tube by surgery on 2/27, pt pulled out PEG, s/p G tube repositioned under fluoro on 3/2, PEG dislodged on 3/7 and PEG was replaced by surgery on 3/8, pt pulled out PEG on 3/13 and was replaced  -abdominal binder to secure PEG  -continue with continuos feeds to transition to bolus feeds as tolerated    Agitation  -Depakote 125 mg q6 hours PRN via PEG  -Depakote 125mg daily    Left renal lesion   -CT abdomen/pelvis done for malignancy workup due to multiple CVAs in the past showed ill defined left renal lesion   -Left renal lesion demonstrated MR findings (2/28) most compatible with small chronic subcapsular/pericapsular hematoma.  -Kidney sonogram negative; PSA negative   -outpatient colonoscopy as per heme/onc    Recent sepsis 2/2 aspiration pneumonia: resolved  -completed course of zosyn  -3/8 blood cx, UC, and RVP negative      Hematoma of rectus sheath  - likely 2/2 g tube replacement  - no anticoagulation    Pain Mgmt: monitor pain  Bowel: Monitor continence. Senna, Miralax PRN  Bladder: PVR x1 urinary retention screen   Sleep: monitor  Skin: Pressure injury prevention, turn Q2hrs while in bed, OOB to Chair  Diet: NPO with tube feeds  Weight bearing status: WBAT   Precautions: aspiration, fall  DVT: No AC, SCDs    ---------------------------------------------------------------------------------  Consults: Internal Medicine    Post Discharge Follow up:  ---------------------------------------------------------------------------------  MEDICAL PROGNOSIS: GOOD              REHAB POTENTIAL: GOOD               ESTIMATED DISPOSITION: HOME WITH HOME CARE              ELOS: 10-14 Days   EXPECTED THERAPY:          P.T. 1hr/day            O.T. 1hr/day           S.L.P. 1hr/day          P&O Unnecessary       EXP FREQUENCY: 5 days per 7 day period     PRESCREEN COMPARISION:   I have reviewed the prescreen information and I have found no relevant changes between the preadmission screening and my post admission evaluation     RATIONALE FOR INPATIENT ADMISSION - Patient demonstrates the following: (check all that apply)  [X] Medically appropriate for rehabilitation admission  [X] Has attainable rehab goals with an appropriate initial discharge plan  [X] Has rehabilitation potential (expected to make a significant improvement within a reasonable period of time)   [X] Requires close medical management by a rehab physician, rehab nursing care, Hospitalist and comprehensive interdisciplinary team (including PT, OT, & or SLP, Prosthetics and Orthotics) 60 yo Male with prior CVA 8 years ago with no deficits noted initially admitted with generalized weakness and difficulty speaking. CTH with chronic L MCA infarct with MRI showing acute right post frontal infarct. Pt with dysphagia s/p PEG placement. Hospital course complicated by with multiple PEG dislodgement and replacement. S/p completion of antibiotic treatment for aspiration pneumonia.     CVA   - start Comprehensive Rehab Program of PT/OT/ SLP for Gait Instability, ADL, and Functional impairments  -ASA 81mg and Plavix 75mg for 90 days followed by ASA as monotherapy   -Lipitor 80mg qhs    Dysphagia   -s/p PEG placement on 2/2 by GI with endoscopic placement, pt pulled out PEG, s/p laproscopic re-placement of G tube by surgery on 2/27, pt pulled out PEG, s/p G tube repositioned under fluoro on 3/2, PEG dislodged on 3/7 and PEG was replaced by surgery on 3/8, pt pulled out PEG on 3/13 and was replaced  -abdominal binder to secure PEG  -continue with continuos feeds to transition to bolus feeds as tolerated    Agitation  -Depakote 125 mg q6 hours PRN via PEG  -Depakote 125mg daily    Left renal lesion   -CT abdomen/pelvis done for malignancy workup due to multiple CVAs in the past showed ill defined left renal lesion   -Left renal lesion demonstrated MR findings (2/28) most compatible with small chronic subcapsular/pericapsular hematoma.  -Kidney sonogram negative; PSA negative   -outpatient colonoscopy as per heme/onc    Recent sepsis 2/2 aspiration pneumonia: resolved  -completed course of zosyn  -3/8 blood cx, UC, and RVP negative      Hematoma of rectus sheath  - likely 2/2 g tube replacement  - no anticoagulation    Pain Mgmt: Tylenol PRN  Bowel: Monitor continence. Senna, Miralax PRN  Bladder: PVR x1 urinary retention screen   Sleep: monitor  Skin: Pressure injury prevention, turn Q2hrs while in bed, OOB to Chair  Diet: NPO with tube feeds  Weight bearing status: WBAT   Precautions: aspiration, fall  DVT: No AC, SCDs    ---------------------------------------------------------------------------------  Consults: Internal Medicine    Post Discharge Follow up:  ---------------------------------------------------------------------------------  MEDICAL PROGNOSIS: GOOD              REHAB POTENTIAL: GOOD               ESTIMATED DISPOSITION: HOME WITH HOME CARE              ELOS: 10-14 Days   EXPECTED THERAPY:          P.T. 1hr/day            O.T. 1hr/day           S.L.P. 1hr/day          P&O Unnecessary       EXP FREQUENCY: 5 days per 7 day period     PRESCREEN COMPARISION:   I have reviewed the prescreen information and I have found no relevant changes between the preadmission screening and my post admission evaluation     RATIONALE FOR INPATIENT ADMISSION - Patient demonstrates the following: (check all that apply)  [X] Medically appropriate for rehabilitation admission  [X] Has attainable rehab goals with an appropriate initial discharge plan  [X] Has rehabilitation potential (expected to make a significant improvement within a reasonable period of time)   [X] Requires close medical management by a rehab physician, rehab nursing care, Hospitalist and comprehensive interdisciplinary team (including PT, OT, & or SLP, Prosthetics and Orthotics) 58 yo Male with prior CVA 8 years ago with no deficits noted initially admitted with generalized weakness and difficulty speaking. CTH with chronic L MCA infarct with MRI showing acute right post frontal infarct. Pt with dysphagia s/p PEG placement. Hospital course complicated by with multiple PEG dislodgement and replacement. S/p completion of antibiotic treatment for aspiration pneumonia.     CVA   - start Comprehensive Rehab Program of PT/OT/ SLP for Gait Instability, ADL, and Functional impairments  -ASA 81mg and Plavix 75mg for 90 days followed by ASA as monotherapy   -Lipitor 80mg qhs    Dysphagia   -s/p PEG placement on 2/2 by GI with endoscopic placement, pt pulled out PEG, s/p laproscopic re-placement of G tube by surgery on 2/27, pt pulled out PEG, s/p G tube repositioned under fluoro on 3/2, PEG dislodged on 3/7 and PEG was replaced by surgery on 3/8, pt pulled out PEG on 3/13 and was replaced  -abdominal binder to secure PEG  -continue with continuos feeds to transition to bolus feeds as tolerated    Agitation  -Depakote 125 mg q6 hours PRN via PEG  -Depakote 125mg daily    Left renal lesion   -CT abdomen/pelvis done for malignancy workup due to multiple CVAs in the past showed ill defined left renal lesion   -Left renal lesion demonstrated MR findings (2/28) most compatible with small chronic subcapsular/pericapsular hematoma.  -Kidney sonogram negative; PSA negative   -outpatient colonoscopy as per heme/onc    Recent sepsis 2/2 aspiration pneumonia: resolved  -completed course of zosyn  -3/8 blood cx, UC, and RVP negative      Hematoma of rectus sheath  - likely 2/2 g tube replacement  - no anticoagulation    Pain Mgmt: Tylenol PRN  Bowel: Monitor continence. Senna, Miralax PRN  Bladder: PVR x1 urinary retention screen   Sleep: monitor  Skin: Pressure injury prevention, turn Q2hrs while in bed, OOB to Chair  Diet: NPO with tube feeds  Weight bearing status: WBAT   Precautions: aspiration, fall  DVT: No AC, SCDs    ---------------------------------------------------------------------------------  Consults: Internal Medicine    Post Discharge Follow up:  Neurology: Craig Peterson (DO)  [address] [facility] [address]  [place] [address]  Phone: [phone]  Fax: [phone]  Follow Up Time: 2 weeks    Gastroenterology; Internal Medicine: John Squires (DO)  [address] [place] [address]  Phone: [phone]  Fax: [phone]  ---------------------------------------------------------------------------------  MEDICAL PROGNOSIS: GOOD              REHAB POTENTIAL: GOOD               ESTIMATED DISPOSITION: HOME WITH HOME CARE              ELOS: 10-14 Days   EXPECTED THERAPY:          P.T. 1hr/day            O.T. 1hr/day           S.L.P. 1hr/day          P&O Unnecessary       EXP FREQUENCY: 5 days per 7 day period     PRESCREEN COMPARISION:   I have reviewed the prescreen information and I have found no relevant changes between the preadmission screening and my post admission evaluation     RATIONALE FOR INPATIENT ADMISSION - Patient demonstrates the following: (check all that apply)  [X] Medically appropriate for rehabilitation admission  [X] Has attainable rehab goals with an appropriate initial discharge plan  [X] Has rehabilitation potential (expected to make a significant improvement within a reasonable period of time)   [X] Requires close medical management by a rehab physician, rehab nursing care, Hospitalist and comprehensive interdisciplinary team (including PT, OT, & or SLP, Prosthetics and Orthotics) 60 yo Male with prior CVA 8 years ago with no deficits noted initially admitted with generalized weakness and difficulty speaking. CTH with chronic L MCA infarct with MRI showing acute right post frontal infarct. Pt with dysphagia s/p PEG placement. Hospital course complicated by with multiple PEG dislodgement and replacement. S/p completion of antibiotic treatment for aspiration pneumonia.     CVA   - start Comprehensive Rehab Program of PT/OT/ SLP for Gait Instability, ADL, and Functional impairments  -ASA 81mg and Plavix 75mg for 90 days followed by ASA as monotherapy   -Lipitor 80mg qhs    Dysphagia   -s/p PEG placement on 2/2 by GI with endoscopic placement, pt pulled out PEG, s/p laproscopic re-placement of G tube by surgery on 2/27, pt pulled out PEG, s/p G tube repositioned under fluoro on 3/2, PEG dislodged on 3/7 and PEG was replaced by surgery on 3/8, pt pulled out PEG on 3/13 and was replaced  -abdominal binder to secure PEG  -continue with continuos feeds to transition to bolus feeds as tolerated    Agitation  -Depakote 125 mg q6 hours PRN via PEG  -Depakote 125mg daily    Oral Thrush  -Nystatin swab TID  -oral care    Left renal lesion   -CT abdomen/pelvis done for malignancy workup due to multiple CVAs in the past showed ill defined left renal lesion   -Left renal lesion demonstrated MR findings (2/28) most compatible with small chronic subcapsular/pericapsular hematoma.  -Kidney sonogram negative; PSA negative   -outpatient colonoscopy as per heme/onc    Recent sepsis 2/2 aspiration pneumonia: resolved  -completed course of zosyn  -3/8 blood cx, UC, and RVP negative      Hematoma of rectus sheath  - likely 2/2 g tube replacement  - no anticoagulation    Pain Mgmt: Tylenol PRN  Bowel: Monitor continence. Senna, Miralax PRN  Bladder: PVR x1 urinary retention screen   Sleep: monitor  Skin: Pressure injury prevention, turn Q2hrs while in bed, OOB to Chair  Diet: NPO with tube feeds  Weight bearing status: WBAT   Precautions: aspiration, fall  DVT: No AC, SCDs    ---------------------------------------------------------------------------------  Consults: Internal Medicine    Post Discharge Follow up:  Neurology: Craig Peterson (DO)  [address] [facility] [address]  [place] [address]  Phone: [phone]  Fax: [phone]  Follow Up Time: 2 weeks    Gastroenterology; Internal Medicine: John Squires (DO)  [address] [place] [address]  Phone: [phone]  Fax: [phone]  ---------------------------------------------------------------------------------  MEDICAL PROGNOSIS: GOOD              REHAB POTENTIAL: GOOD               ESTIMATED DISPOSITION: HOME WITH HOME CARE              ELOS: 10-14 Days   EXPECTED THERAPY:          P.T. 1hr/day            O.T. 1hr/day           S.L.P. 1hr/day          P&O Unnecessary       EXP FREQUENCY: 5 days per 7 day period     PRESCREEN COMPARISION:   I have reviewed the prescreen information and I have found no relevant changes between the preadmission screening and my post admission evaluation     RATIONALE FOR INPATIENT ADMISSION - Patient demonstrates the following: (check all that apply)  [X] Medically appropriate for rehabilitation admission  [X] Has attainable rehab goals with an appropriate initial discharge plan  [X] Has rehabilitation potential (expected to make a significant improvement within a reasonable period of time)   [X] Requires close medical management by a rehab physician, rehab nursing care, Hospitalist and comprehensive interdisciplinary team (including PT, OT, & or SLP, Prosthetics and Orthotics) 58 yo Male with prior CVA 8 years ago with no deficits noted initially admitted with generalized weakness and difficulty speaking. CTH with chronic L MCA infarct with MRI showing acute right post frontal infarct. Pt with dysphagia s/p PEG placement, non-fluent aphasia, apraxia of speech, gait and ADL impairment. Hospital course complicated by with multiple PEG dislodgement and replacement. S/p completion of antibiotic treatment for aspiration pneumonia.     CVA   - start Comprehensive Rehab Program of PT/OT/ SLP for Gait Instability, ADL, and Functional impairments  -ASA 81mg and Plavix 75mg for 90 days followed by ASA as monotherapy   -Lipitor 80mg qhs    Dysphagia   -s/p PEG placement on 2/2 by GI with endoscopic placement, pt pulled out PEG, s/p laproscopic re-placement of G tube by surgery on 2/27, pt pulled out PEG, s/p G tube repositioned under fluoro on 3/2, PEG dislodged on 3/7 and PEG was replaced by surgery on 3/8, pt pulled out PEG on 3/13 and was replaced  -abdominal binder to secure PEG  -transition to bolus feeds as tolerated    Agitation  -Depakote 125 mg q6 hours PRN via PEG  -Depakote 125mg daily    Oral Thrush  -Nystatin swab TID  -oral care    Left renal lesion   -CT abdomen/pelvis done for malignancy workup due to multiple CVAs in the past showed ill defined left renal lesion   -Left renal lesion demonstrated MR findings (2/28) most compatible with small chronic subcapsular/pericapsular hematoma.  -Kidney sonogram negative; PSA negative   -outpatient colonoscopy as per heme/onc    Recent sepsis 2/2 aspiration pneumonia: resolved  -completed course of zosyn  -3/8 blood cx, UC, and RVP negative      Hematoma of rectus sheath  - likely 2/2 g tube replacement  - no anticoagulation    Pain Mgmt: Tylenol PRN  Bowel: Monitor continence. Senna, Miralax PRN  Bladder: PVR x1 urinary retention screen   Sleep: monitor  Skin: Pressure injury prevention, turn Q2hrs while in bed, OOB to Chair  Diet: NPO with tube feeds  Weight bearing status: WBAT   Precautions: aspiration, fall  DVT: No AC, SCDs    ---------------------------------------------------------------------------------  Consults: Internal Medicine    Post Discharge Follow up:  Neurology: Craig Peterson (DO)  [address] [facility] [address]  [place] [address]  Phone: [phone]  Fax: [phone]  Follow Up Time: 2 weeks    Gastroenterology; Internal Medicine: John Squires (DO)  [address] [place] [address]  Phone: [phone]  Fax: [phone]  ---------------------------------------------------------------------------------  MEDICAL PROGNOSIS: GOOD              REHAB POTENTIAL: GOOD               ESTIMATED DISPOSITION: HOME WITH HOME CARE              ELOS: 10-14 Days   EXPECTED THERAPY:          P.T. 1hr/day            O.T. 1hr/day           S.L.P. 1hr/day          P&O Unnecessary       EXP FREQUENCY: 5 days per 7 day period     PRESCREEN COMPARISION:   I have reviewed the prescreen information and I have found no relevant changes between the preadmission screening and my post admission evaluation     RATIONALE FOR INPATIENT ADMISSION - Patient demonstrates the following: (check all that apply)  [X] Medically appropriate for rehabilitation admission  [X] Has attainable rehab goals with an appropriate initial discharge plan  [X] Has rehabilitation potential (expected to make a significant improvement within a reasonable period of time)   [X] Requires close medical management by a rehab physician, rehab nursing care, Hospitalist and comprehensive interdisciplinary team (including PT, OT, & or SLP, Prosthetics and Orthotics)

## 2020-03-17 NOTE — DISCHARGE NOTE NURSING/CASE MANAGEMENT/SOCIAL WORK - NSDCPEPTSTRK_GEN_ALL_CORE
Stroke warning signs and symptoms/Signs and symptoms of stroke/Need for follow up after discharge/Prescribed medications/Risk factors for stroke/Stroke education booklet/Call 911 for stroke/Stroke support groups for patients, families, and friends

## 2020-03-17 NOTE — PROGRESS NOTE ADULT - ASSESSMENT
59M, Danish only speaking, c hx remote CVA without deficits, recent prolonged hospitalization (2/9-3/6/20) for R frontal/temporal CVA (Jan '20) c/b nonverbal/broca's aphasia, LE weakness, dysphagia, s/p PEG (initially placed 2/2, replaced by surg 2/27, replaced by IR 3/2, now replaced again by surg 3/8), occluded L MCA, pw sepsis likely 2/2 aspiration PNA

## 2020-03-18 LAB
ALBUMIN SERPL ELPH-MCNC: 3 G/DL — LOW (ref 3.3–5)
ALP SERPL-CCNC: 72 U/L — SIGNIFICANT CHANGE UP (ref 40–120)
ALT FLD-CCNC: 28 U/L — SIGNIFICANT CHANGE UP (ref 10–45)
ANION GAP SERPL CALC-SCNC: 6 MMOL/L — SIGNIFICANT CHANGE UP (ref 5–17)
AST SERPL-CCNC: 17 U/L — SIGNIFICANT CHANGE UP (ref 10–40)
BASOPHILS # BLD AUTO: 0.05 K/UL — SIGNIFICANT CHANGE UP (ref 0–0.2)
BASOPHILS NFR BLD AUTO: 0.7 % — SIGNIFICANT CHANGE UP (ref 0–2)
BILIRUB SERPL-MCNC: 0.6 MG/DL — SIGNIFICANT CHANGE UP (ref 0.2–1.2)
BUN SERPL-MCNC: 13 MG/DL — SIGNIFICANT CHANGE UP (ref 7–23)
CALCIUM SERPL-MCNC: 8.6 MG/DL — SIGNIFICANT CHANGE UP (ref 8.4–10.5)
CHLORIDE SERPL-SCNC: 110 MMOL/L — HIGH (ref 96–108)
CO2 SERPL-SCNC: 27 MMOL/L — SIGNIFICANT CHANGE UP (ref 22–31)
CREAT SERPL-MCNC: 0.82 MG/DL — SIGNIFICANT CHANGE UP (ref 0.5–1.3)
EOSINOPHIL # BLD AUTO: 0.24 K/UL — SIGNIFICANT CHANGE UP (ref 0–0.5)
EOSINOPHIL NFR BLD AUTO: 3.5 % — SIGNIFICANT CHANGE UP (ref 0–6)
GLUCOSE SERPL-MCNC: 93 MG/DL — SIGNIFICANT CHANGE UP (ref 70–99)
HCT VFR BLD CALC: 34.3 % — LOW (ref 39–50)
HGB BLD-MCNC: 11.2 G/DL — LOW (ref 13–17)
IMM GRANULOCYTES NFR BLD AUTO: 0.4 % — SIGNIFICANT CHANGE UP (ref 0–1.5)
LYMPHOCYTES # BLD AUTO: 1.71 K/UL — SIGNIFICANT CHANGE UP (ref 1–3.3)
LYMPHOCYTES # BLD AUTO: 25.1 % — SIGNIFICANT CHANGE UP (ref 13–44)
MCHC RBC-ENTMCNC: 32.7 GM/DL — SIGNIFICANT CHANGE UP (ref 32–36)
MCHC RBC-ENTMCNC: 32.8 PG — SIGNIFICANT CHANGE UP (ref 27–34)
MCV RBC AUTO: 100.6 FL — HIGH (ref 80–100)
MONOCYTES # BLD AUTO: 0.34 K/UL — SIGNIFICANT CHANGE UP (ref 0–0.9)
MONOCYTES NFR BLD AUTO: 5 % — SIGNIFICANT CHANGE UP (ref 2–14)
NEUTROPHILS # BLD AUTO: 4.43 K/UL — SIGNIFICANT CHANGE UP (ref 1.8–7.4)
NEUTROPHILS NFR BLD AUTO: 65.3 % — SIGNIFICANT CHANGE UP (ref 43–77)
NRBC # BLD: 0 /100 WBCS — SIGNIFICANT CHANGE UP (ref 0–0)
PLATELET # BLD AUTO: 336 K/UL — SIGNIFICANT CHANGE UP (ref 150–400)
POTASSIUM SERPL-MCNC: 4.1 MMOL/L — SIGNIFICANT CHANGE UP (ref 3.5–5.3)
POTASSIUM SERPL-SCNC: 4.1 MMOL/L — SIGNIFICANT CHANGE UP (ref 3.5–5.3)
PROT SERPL-MCNC: 6.4 G/DL — SIGNIFICANT CHANGE UP (ref 6–8.3)
RBC # BLD: 3.41 M/UL — LOW (ref 4.2–5.8)
RBC # FLD: 13 % — SIGNIFICANT CHANGE UP (ref 10.3–14.5)
SODIUM SERPL-SCNC: 143 MMOL/L — SIGNIFICANT CHANGE UP (ref 135–145)
WBC # BLD: 6.8 K/UL — SIGNIFICANT CHANGE UP (ref 3.8–10.5)
WBC # FLD AUTO: 6.8 K/UL — SIGNIFICANT CHANGE UP (ref 3.8–10.5)

## 2020-03-18 PROCEDURE — 99232 SBSQ HOSP IP/OBS MODERATE 35: CPT

## 2020-03-18 RX ADMIN — ATORVASTATIN CALCIUM 80 MILLIGRAM(S): 80 TABLET, FILM COATED ORAL at 21:49

## 2020-03-18 RX ADMIN — Medication 1 UNIT(S): at 21:57

## 2020-03-18 RX ADMIN — SENNA PLUS 5 MILLILITER(S): 8.6 TABLET ORAL at 21:49

## 2020-03-18 RX ADMIN — CLOPIDOGREL BISULFATE 75 MILLIGRAM(S): 75 TABLET, FILM COATED ORAL at 12:42

## 2020-03-18 RX ADMIN — Medication 81 MILLIGRAM(S): at 12:42

## 2020-03-18 RX ADMIN — Medication 1 UNIT(S): at 14:59

## 2020-03-18 RX ADMIN — Medication 125 MILLIGRAM(S): at 12:42

## 2020-03-18 NOTE — PROGRESS NOTE ADULT - ATTENDING COMMENTS
Pt. seen with fellow.  Agree with documentation above as per fellow with amendments made as appropriate. Patient medically stable. Making progress towards rehab goals.

## 2020-03-18 NOTE — DIETITIAN INITIAL EVALUATION ADULT. - OTHER INFO
60 yo Male with prior CVA 8 years ago with no deficits noted initially admitted with generalized weakness and difficulty speaking. CTH with chronic L MCA infarct with MRI showing acute right post frontal infarct. Pt with dysphagia s/p PEG placement, non-fluent aphasia, apraxia of speech, gait and ADL impairment. Hospital course complicated by with multiple PEG dislodgement and replacement. S/p completion of antibiotic treatment for aspiration pneumonia. Patient noted receiving EN of [employer] 240cc q 6hrs (4x/day). (+) Bm (3/17) Skin intact, surgical incision noted . No edema. patient noted aphasic. Current EN is meeting assessed nutritional needs suggest 300cc free water provide with 4 x day .

## 2020-03-18 NOTE — PROGRESS NOTE ADULT - ASSESSMENT
60 yo Male with prior CVA 8 years ago with no deficits noted initially admitted with generalized weakness and difficulty speaking. CTH with chronic L MCA infarct with MRI showing acute right post frontal infarct. Pt with dysphagia s/p PEG placement, non-fluent aphasia, apraxia of speech, gait and ADL impairment. Hospital course complicated by with multiple PEG dislodgement and replacement. S/p completion of antibiotic treatment for aspiration pneumonia.     CVA   -Comprehensive Rehab Program of PT/OT/ SLP for Gait Instability, ADL, and Functional impairments  -ASA 81mg and Plavix 75mg for 90 days followed by ASA as monotherapy   -Lipitor 80mg qhs    Dysphagia   -s/p PEG placement, pt pulled out PEG multiple times, currently with 16Fr hicks g-tube in place  -abdominal binder to secure PEG  -continue with TwoCal HN bolus feeds, dilute TFs with 200cc H2O      Agitation  -Depakote 125 mg q6 hours PRN via PEG  -Depakote 125mg daily    Oral Thrush  -Nystatin swab TID  -oral care    Left renal lesion   -CT abdomen/pelvis done for malignancy workup due to multiple CVAs in the past showed ill defined left renal lesion   -Left renal lesion demonstrated MR findings (2/28) most compatible with small chronic subcapsular/pericapsular hematoma.  -Kidney sonogram negative; PSA negative   -outpatient colonoscopy as per heme/onc    Recent sepsis 2/2 aspiration pneumonia: resolved  -completed course of zosyn  -3/8 blood cx, UC, and RVP negative    Hematoma of rectus sheath  - likely 2/2 g tube replacement  - no anticoagulation    Pain Mgmt: Tylenol PRN  Bowel: Monitor continence. Senna, Miralax PRN  Bladder: low PVR, incontinent   Sleep: monitor  Skin: Pressure injury prevention, turn Q2hrs while in bed, OOB to Chair  Diet: NPO with bolus tube feeds  Weight bearing status: WBAT   Precautions: aspiration, fall  DVT: No AC, SCDs    ---------------------------------------------------------------------------------  Consults: Internal Medicine    Post Discharge Follow up:  Neurology: Craig Peterson (DO)  [address] [facility] [address]  [place] [address]  Phone: [phone]  Fax: [phone]  Follow Up Time: 2 weeks    Gastroenterology; Internal Medicine: John Squires (DO)  [address] [place] [address] [address]  [place] [address]  Phone: [phone]  Fax: [phone] 60 yo Male with prior CVA 8 years ago with no deficits noted initially admitted with generalized weakness and difficulty speaking. CTH with chronic L MCA infarct with MRI showing acute right post frontal infarct. Pt with dysphagia s/p PEG placement, non-fluent aphasia, apraxia of speech, gait and ADL impairment. Hospital course complicated by with multiple PEG dislodgement and replacement. S/p completion of antibiotic treatment for aspiration pneumonia.     CVA   -Comprehensive Rehab Program of PT/OT/ SLP for Gait Instability, ADL, and Functional impairments  -ASA 81mg and Plavix 75mg for 90 days followed by ASA as monotherapy   -Lipitor 80mg qhs    Dysphagia   -s/p PEG placement, pt pulled out PEG multiple times, currently with 16Fr hicks g-tube in place  -abdominal binder to secure PEG  --at bedside with nurse, observed partial bolus TF of diluted 2 crispin HN--pt. tolerated w/o issue  -continue with TwoCal HN bolus feeds, dilute TFs with 200cc H2O  --order written to resume use of hicks G-tube    Agitation  -Depakote 125 mg q6 hours PRN via PEG  -Depakote 125mg daily    Oral Thrush  -Nystatin swab TID  -oral care    Left renal lesion   -CT abdomen/pelvis done for malignancy workup due to multiple CVAs in the past showed ill defined left renal lesion   -Left renal lesion demonstrated MR findings (2/28) most compatible with small chronic subcapsular/pericapsular hematoma.  -Kidney sonogram negative; PSA negative   -outpatient colonoscopy as per heme/onc    Recent sepsis 2/2 aspiration pneumonia: resolved  -completed course of zosyn  -3/8 blood cx, UC, and RVP negative    Hematoma of rectus sheath  - likely 2/2 g tube replacement  - no anticoagulation    Pain Mgmt: Tylenol PRN  Bowel: Monitor continence. Senna, Miralax PRN  Bladder: low PVR, incontinent   Sleep: monitor  Skin: Pressure injury prevention, turn Q2hrs while in bed, OOB to Chair  Diet: NPO with bolus tube feeds    Precautions: aspiration, fall  DVT: No AC, SCDs    ---------------------------------------------------------------------------------  Consults: Internal Medicine    Post Discharge Follow up:  Neurology: Craig Peterson (DO)  [address] [facility] [address]  [place] [address]  Phone: [phone]  Fax: [phone]  Follow Up Time: 2 weeks    Gastroenterology; Internal Medicine: John Squires (DO)  [address] [place] [address]  Phone: [phone]  Fax: [phone]

## 2020-03-18 NOTE — PROGRESS NOTE ADULT - SUBJECTIVE AND OBJECTIVE BOX
HPI:  58 yo Khmer speaking Male with PMH of CVA 8yrs ago (no reported deficits) was initially brought into [facility] on 2/9/20 by brother for concerns of generalized weakness and inability to speak for the 3 weeks. Pt was found to have no verbal output, appeared withdrawn, intermittent command following and easily agitated.  CTH demonstrated chronic L MCA infarct with severely stenotic vs occluded distal L M1. Confirmed on MRI but also found to have an acute right post frontal infarct, benign LUIS and s/p Medtronic ILR placement 2/13. Patient also had sinus bradycardia, with no interventions recommended from cardiology.   Patient also with severe dysphagia and was s/p PEG placement on 2/2 by GI with endoscopic placement however post procedure pt pulled PEG out. As per GI patient at risk of 2nd endoscopic placement contraindicated due to risk of pulling out tube and peritonitis. Patient thus had laproscopic placement of G tube done by surgery on 2/27 to minimize risk of peritonitis if pulled out. On 3/2, noted with moderate leaking around G-tube: was pulled out from 9 cm in to only 1 cm in. G tube repositioned under fluoro on 3/2.    Neuro recommended malignancy work up 2/2 unexplained multiple embolic strokes. Oncology consult w Dr. Sanchez. Patient had CT C/A/P w  IV contrast only. no significant findings exc questionable renal mass, but sono neg and MRI confirmed it being a subcapsular hematoma.  Patient will need outptn colonoscopy after discharge. 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   Patient was discharged to acute rehab on 3/7, however on admission pt had a temp of 101 and pt PEG was found to be dislodged. Pt was transferred back to [facility] and PEG was replaced by surgery on 3/8. Patient tolerating feeds. Pt was found to have sepsis 2/2 aspiration pneumonia of b/l lower lobes and ID was consulted and pt was started on zosyn. Antibitoic course completed. RVP, blood cultutes were negative. Anticoagulation was held due to hematoma at rectum likley 2/2 g tube replacement. On 3/13 pt pulled out PEG and 16Fr hicks catheter was inserted through g tube tract and feeds were restarted after confirmation of placement.  Spoke with pt's nephew Dallas Arriola. As per Dallas, pt was living in [place] in a motel. Motel owner contacted family in [place] when he began acting strangely. family in [place] contacted pt's brother here in [place] and family brought him here and took him to [facility]. As per nephew, pt has no home here and no dispo plan at this time.   Patient is . He has 2 sons here but estranged from one son and other son is a college student.     Interpretor ID # [id number] (17 Mar 2020 15:04)      REVIEW OF SYSTEMS  Constitutional - No fever, No fatigue  HEENT - No eye pain, No visual disturbances, No difficulty hearing,  Respiratory - No cough, No wheezing, No shortness of breath  Cardiovascular - No chest pain, No palpitations  Gastrointestinal - No abdominal pain, No nausea, No vomiting, No diarrhea, No constipation  Genitourinary - No dysuria, No frequency, No incontinence  Neurological - No headaches, No memory loss, + loss of strength, No numbness,   Skin - No itching, No rashes,   Musculoskeletal - No joint pain, No joint swelling, No muscle pain  Psychiatric - No depression, No anxiety      VITALS  T(C): 36.6 (03-18-20 @ 09:07), Max: 36.7 (03-17-20 @ 17:11)  HR: 61 (03-18-20 @ 09:07) (55 - 74)  BP: 106/74 (03-18-20 @ 09:07) (106/74 - 123/78)  RR: 15 (03-18-20 @ 09:07) (15 - 18)  SpO2: 96% (03-18-20 @ 09:07) (96% - 98%)  Wt(kg): --    PHYSICAL EXAM  Constitutional - NAD, Comfortable  HEENT - NCAT, EOMI  Neck - Supple, No limited ROM  Chest -  CTAB b/l no wheezing  Cardio - +S1/S2 RRR,   Abdomen -  Soft, NT ND, +BS  Extremities - No peripheral edema  Neurologic Exam:                 	   Cognitive -   	          Orientation: Awake, Alert, able to follow most commands, unable to test orientation as pt is aphasic  	   Speech - Non-fluent aphasia, comprehension fair, pt able to follow most commands   	   Cranial Nerves - left facial droop, tongue midline, no ptosis, shoulder shrug intact  	   Motor -  limited by aphasia and language   	                  LEFT    UE - ShAB 5/5, EF 5/5, EE 5/5, WE 5/5,  WNL  	                  RIGHT UE - ShAB 5/5, EF 5/5, EE 5/5, WE 5/5,  WNL  	                  LEFT    LE - HF 5/5, KE 5/5, DF 5/5, PF 5/5  	                  RIGHT LE - HF 5/5, KE 5/5, DF 5/5, PF 5/5     Psychiatric - Mood stable, Flat Affect, calm, no agitation      RECENT LABS/IMAGING                        11.2   6.80  )-----------( 336      ( 18 Mar 2020 06:25 )             34.3     03-18    143  |  110<H>  |  13  ----------------------------<  93  4.1   |  27  |  0.82    Ca    8.6      18 Mar 2020 06:25    TPro  6.4  /  Alb  3.0<L>  /  TBili  0.6  /  DBili  x   /  AST  17  /  ALT  28  /  AlkPhos  72  03-18      MEDICATIONS  (STANDING):  aspirin  chewable 81 milliGRAM(s) Oral daily  atorvastatin 80 milliGRAM(s) Oral at bedtime  clopidogrel Tablet 75 milliGRAM(s) Oral daily  nystatin    Suspension 1 Unit(s) Oral three times a day  senna Syrup 5 milliLiter(s) Oral at bedtime  valproic  acid Syrup 125 milliGRAM(s) Oral daily    MEDICATIONS  (PRN):  acetaminophen   Tablet .. 650 milliGRAM(s) Oral every 6 hours PRN Temp greater or equal to 38C (100.4F), Mild Pain (1 - 3)  polyethylene glycol 3350 17 Gram(s) Oral daily PRN Constipation  valproic  acid Syrup 125 milliGRAM(s) Oral every 6 hours PRN Agitation HPI:  60 yo Azeri speaking Male with PMH of CVA 8yrs ago (no reported deficits) was initially brought into [facility] on 2/9/20 by brother for concerns of generalized weakness and inability to speak for the 3 weeks. Pt was found to have no verbal output, appeared withdrawn, intermittent command following and easily agitated.  CTH demonstrated chronic L MCA infarct with severely stenotic vs occluded distal L M1. Confirmed on MRI but also found to have an acute right post frontal infarct, benign LUIS and s/p Medtronic ILR placement 2/13. Patient also had sinus bradycardia, with no interventions recommended from cardiology.   Patient also with severe dysphagia and was s/p PEG placement on 2/2 by GI with endoscopic placement however post procedure pt pulled PEG out. As per GI patient at risk of 2nd endoscopic placement contraindicated due to risk of pulling out tube and peritonitis. Patient thus had laproscopic placement of G tube done by surgery on 2/27 to minimize risk of peritonitis if pulled out. On 3/2, noted with moderate leaking around G-tube: was pulled out from 9 cm in to only 1 cm in. G tube repositioned under fluoro on 3/2.    Neuro recommended malignancy work up 2/2 unexplained multiple embolic strokes. Oncology consult w Dr. Sanchez. Patient had CT C/A/P w  IV contrast only. no significant findings exc questionable renal mass, but sono neg and MRI confirmed it being a subcapsular hematoma.  Patient will need outptn colonoscopy after discharge. 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   Patient was discharged to acute rehab on 3/7, however on admission pt had a temp of 101 and pt PEG was found to be dislodged. Pt was transferred back to [facility] and PEG was replaced by surgery on 3/8. Patient tolerating feeds. Pt was found to have sepsis 2/2 aspiration pneumonia of b/l lower lobes and ID was consulted and pt was started on zosyn. Antibitoic course completed. RVP, blood cultutes were negative. Anticoagulation was held due to hematoma at rectum likley 2/2 g tube replacement. On 3/13 pt pulled out PEG and 16Fr hicks catheter was inserted through g tube tract and feeds were restarted after confirmation of placement.  Spoke with pt's nephew Dallas Arriola. As per Dallas, pt was living in [place] in a motel. Motel owner contacted family in [place] when he began acting strangely. family in [place] contacted pt's brother here in [place] and family brought him here and took him to [facility]. As per nephew, pt has no home here and no dispo plan at this time.   Patient is . He has 2 sons here but estranged from one son and other son is a college student.         Subj:  Pt's hicks G. tube was not used yesterday as there was question about safety.  MARIE placed on IVFs overnight.  Spoke to pt. this AM using telephone Azeri .   Pt. able to answer questions using y/n gestures.  Denies pain. Slept last night.  No agitation events.  Pt. denies n/v.  indicates that he tolerated tube feeds in [facility] without issues.        REVIEW OF SYSTEMS  Constitutional - No fever, No fatigue  HEENT - No eye pain, No visual disturbances, No difficulty hearing,  Respiratory - No cough, No wheezing, No shortness of breath  Cardiovascular - No chest pain, No palpitations  Gastrointestinal - No abdominal pain, No nausea, No vomiting, No diarrhea, No constipation  Genitourinary - No dysuria, No frequency, No incontinence  Neurological - No headaches, No memory loss, + loss of strength, No numbness,   Skin - No itching, No rashes,   Musculoskeletal - No joint pain, No joint swelling, No muscle pain  Psychiatric - No depression, No anxiety      VITALS  T(C): 36.6 (03-18-20 @ 09:07), Max: 36.7 (03-17-20 @ 17:11)  HR: 61 (03-18-20 @ 09:07) (55 - 74)  BP: 106/74 (03-18-20 @ 09:07) (106/74 - 123/78)  RR: 15 (03-18-20 @ 09:07) (15 - 18)  SpO2: 96% (03-18-20 @ 09:07) (96% - 98%)  Wt(kg): --    PHYSICAL EXAM  Constitutional - NAD, Comfortable  HEENT - NCAT, EOMI  Neck - Supple, No limited ROM  Chest -  CTAB b/l no wheezing  Cardio - +S1/S2 RRR,   Abdomen -  Soft, NT ND, +BS  Extremities - No peripheral edema  Neurologic Exam:                 	   Cognitive -   	          Orientation: Awake, Alert, able to follow most commands, unable to test orientation as pt is aphasic  	   Speech - Non-fluent aphasia, comprehension fair, pt able to follow most commands   	   Cranial Nerves - left facial droop, tongue midline, no ptosis, shoulder shrug intact  	   Motor -  limited by aphasia and language   	                  LEFT    UE - ShAB 5/5, EF 5/5, EE 5/5, WE 5/5,  WNL  	                  RIGHT UE - ShAB 5/5, EF 5/5, EE 5/5, WE 5/5,  WNL  	                  LEFT    LE - HF 5/5, KE 5/5, DF 5/5, PF 5/5  	                  RIGHT LE - HF 5/5, KE 5/5, DF 5/5, PF 5/5     Psychiatric - Mood stable, Flat Affect, calm, no agitation      RECENT LABS/IMAGING                        11.2   6.80  )-----------( 336      ( 18 Mar 2020 06:25 )             34.3     03-18    143  |  110<H>  |  13  ----------------------------<  93  4.1   |  27  |  0.82    Ca    8.6      18 Mar 2020 06:25    TPro  6.4  /  Alb  3.0<L>  /  TBili  0.6  /  DBili  x   /  AST  17  /  ALT  28  /  AlkPhos  72  03-18      MEDICATIONS  (STANDING):  aspirin  chewable 81 milliGRAM(s) Oral daily  atorvastatin 80 milliGRAM(s) Oral at bedtime  clopidogrel Tablet 75 milliGRAM(s) Oral daily  nystatin    Suspension 1 Unit(s) Oral three times a day  senna Syrup 5 milliLiter(s) Oral at bedtime  valproic  acid Syrup 125 milliGRAM(s) Oral daily    MEDICATIONS  (PRN):  acetaminophen   Tablet .. 650 milliGRAM(s) Oral every 6 hours PRN Temp greater or equal to 38C (100.4F), Mild Pain (1 - 3)  polyethylene glycol 3350 17 Gram(s) Oral daily PRN Constipation  valproic  acid Syrup 125 milliGRAM(s) Oral every 6 hours PRN Agitation

## 2020-03-18 NOTE — CHART NOTE - NSCHARTNOTEFT_GEN_A_CORE
Upon Nutritional Assessment by the Registered Dietitian your patient was determined to meet criteria / has evidence of the following diagnosis/diagnoses:                [X] Severe Protein Calorie Malnutrition    Findings as based on:  [X] Comprehensive Nutrition Assessment   [X] Nutrition Focused Physical Exam - Loss of Bodt Fat & Muscle wasting observed ( Temporal, Orbital, Clavicle, Scapular, Wasting Observed  [X] Other: Inadequate infusing of EN due to interruptions in feeding regimen     Nutrition Plan/Recommendations:    1) Continue Two Gabriel bolus regimen  2) Advance diet zto Po as per SLP    PROVIDER Section:   By signing this assessment you are acknowledging and agree with the diagnosis/diagnoses assigned by the Registered Dietitian    Marilia Porras RDN

## 2020-03-19 LAB
ANION GAP SERPL CALC-SCNC: 9 MMOL/L — SIGNIFICANT CHANGE UP (ref 5–17)
BASOPHILS # BLD AUTO: 0.05 K/UL — SIGNIFICANT CHANGE UP (ref 0–0.2)
BASOPHILS NFR BLD AUTO: 0.7 % — SIGNIFICANT CHANGE UP (ref 0–2)
BUN SERPL-MCNC: 12 MG/DL — SIGNIFICANT CHANGE UP (ref 7–23)
CALCIUM SERPL-MCNC: 9.1 MG/DL — SIGNIFICANT CHANGE UP (ref 8.4–10.5)
CHLORIDE SERPL-SCNC: 109 MMOL/L — HIGH (ref 96–108)
CO2 SERPL-SCNC: 26 MMOL/L — SIGNIFICANT CHANGE UP (ref 22–31)
CREAT SERPL-MCNC: 0.78 MG/DL — SIGNIFICANT CHANGE UP (ref 0.5–1.3)
EOSINOPHIL # BLD AUTO: 0.17 K/UL — SIGNIFICANT CHANGE UP (ref 0–0.5)
EOSINOPHIL NFR BLD AUTO: 2.2 % — SIGNIFICANT CHANGE UP (ref 0–6)
GLUCOSE SERPL-MCNC: 98 MG/DL — SIGNIFICANT CHANGE UP (ref 70–99)
HCT VFR BLD CALC: 37.6 % — LOW (ref 39–50)
HGB BLD-MCNC: 12.2 G/DL — LOW (ref 13–17)
IMM GRANULOCYTES NFR BLD AUTO: 0.3 % — SIGNIFICANT CHANGE UP (ref 0–1.5)
LYMPHOCYTES # BLD AUTO: 1.66 K/UL — SIGNIFICANT CHANGE UP (ref 1–3.3)
LYMPHOCYTES # BLD AUTO: 21.6 % — SIGNIFICANT CHANGE UP (ref 13–44)
MCHC RBC-ENTMCNC: 32.4 GM/DL — SIGNIFICANT CHANGE UP (ref 32–36)
MCHC RBC-ENTMCNC: 32.4 PG — SIGNIFICANT CHANGE UP (ref 27–34)
MCV RBC AUTO: 99.7 FL — SIGNIFICANT CHANGE UP (ref 80–100)
MONOCYTES # BLD AUTO: 0.29 K/UL — SIGNIFICANT CHANGE UP (ref 0–0.9)
MONOCYTES NFR BLD AUTO: 3.8 % — SIGNIFICANT CHANGE UP (ref 2–14)
NEUTROPHILS # BLD AUTO: 5.5 K/UL — SIGNIFICANT CHANGE UP (ref 1.8–7.4)
NEUTROPHILS NFR BLD AUTO: 71.4 % — SIGNIFICANT CHANGE UP (ref 43–77)
NRBC # BLD: 0 /100 WBCS — SIGNIFICANT CHANGE UP (ref 0–0)
PLATELET # BLD AUTO: 369 K/UL — SIGNIFICANT CHANGE UP (ref 150–400)
POTASSIUM SERPL-MCNC: 4.4 MMOL/L — SIGNIFICANT CHANGE UP (ref 3.5–5.3)
POTASSIUM SERPL-SCNC: 4.4 MMOL/L — SIGNIFICANT CHANGE UP (ref 3.5–5.3)
RBC # BLD: 3.77 M/UL — LOW (ref 4.2–5.8)
RBC # FLD: 13 % — SIGNIFICANT CHANGE UP (ref 10.3–14.5)
SODIUM SERPL-SCNC: 144 MMOL/L — SIGNIFICANT CHANGE UP (ref 135–145)
WBC # BLD: 7.69 K/UL — SIGNIFICANT CHANGE UP (ref 3.8–10.5)
WBC # FLD AUTO: 7.69 K/UL — SIGNIFICANT CHANGE UP (ref 3.8–10.5)

## 2020-03-19 PROCEDURE — 99232 SBSQ HOSP IP/OBS MODERATE 35: CPT

## 2020-03-19 PROCEDURE — 74230 X-RAY XM SWLNG FUNCJ C+: CPT | Mod: 26

## 2020-03-19 RX ORDER — NYSTATIN 500MM UNIT
1 POWDER (EA) MISCELLANEOUS THREE TIMES A DAY
Refills: 0 | Status: DISCONTINUED | OUTPATIENT
Start: 2020-03-19 | End: 2020-04-01

## 2020-03-19 RX ADMIN — Medication 81 MILLIGRAM(S): at 13:36

## 2020-03-19 RX ADMIN — SENNA PLUS 5 MILLILITER(S): 8.6 TABLET ORAL at 21:21

## 2020-03-19 RX ADMIN — ATORVASTATIN CALCIUM 80 MILLIGRAM(S): 80 TABLET, FILM COATED ORAL at 21:20

## 2020-03-19 RX ADMIN — Medication 1 MILLILITER(S): at 21:20

## 2020-03-19 RX ADMIN — Medication 1 MILLILITER(S): at 13:37

## 2020-03-19 RX ADMIN — CLOPIDOGREL BISULFATE 75 MILLIGRAM(S): 75 TABLET, FILM COATED ORAL at 13:36

## 2020-03-19 RX ADMIN — Medication 125 MILLIGRAM(S): at 13:36

## 2020-03-19 NOTE — PROGRESS NOTE ADULT - ASSESSMENT
60 yo Male with prior CVA 8 years ago with no deficits noted initially admitted with generalized weakness and difficulty speaking. CTH with chronic L MCA infarct with MRI showing acute right post frontal infarct. Pt with dysphagia s/p PEG placement, non-fluent aphasia, apraxia of speech, gait and ADL impairment. Hospital course complicated by with multiple PEG dislodgement and replacement. S/p completion of antibiotic treatment for aspiration pneumonia.     CVA   -Comprehensive Rehab Program of PT/OT/ SLP for Gait Instability, ADL, and Functional impairments  -ASA 81mg and Plavix 75mg for 90 days followed by ASA as monotherapy   -Lipitor 80mg qhs    Dysphagia   -s/p PEG placement, pt pulled out PEG multiple times, currently with 16Fr hicks g-tube in place  -abdominal binder to secure PEG  -s/p MBS on 3/19, diet upgraded to puree and thin liquids. Will supplement with bolus g-tube feeds if pt eats less than 50% of tray (TwoCal HN feeds, dilute TFs with 200cc H2O)    Agitation - stable  -Depakote 125 mg q6 hours PRN via PEG  -Depakote 125mg daily    Oral Thrush  -Nystatin swab TID  -oral care    Left renal lesion   -CT abdomen/pelvis done for malignancy workup due to multiple CVAs in the past showed ill defined left renal lesion   -Left renal lesion demonstrated MR findings (2/28) most compatible with small chronic subcapsular/pericapsular hematoma.  -Kidney sonogram negative; PSA negative   -outpatient colonoscopy as per heme/onc    Recent sepsis 2/2 aspiration pneumonia: resolved  -completed course of zosyn  -3/8 blood cx, UC, and RVP negative    Hematoma of rectus sheath  - likely 2/2 g tube replacement  - no anticoagulation    Pain Mgmt: Tylenol PRN  Bowel: Monitor continence. Senna, Miralax PRN  Bladder: low PVR, incontinent   Sleep: monitor  Skin: Pressure injury prevention, turn Q2hrs while in bed, OOB to Chair  Diet: NPO with bolus tube feeds    Precautions: aspiration, fall  DVT: No AC, SCDs    ---------------------------------------------------------------------------------  Consults: Internal Medicine    Post Discharge Follow up:  Neurology: Craig Peterson (DO)  [address] [facility] [address]  [place] [address]  Phone: [phone]  Fax: [phone]  Follow Up Time: 2 weeks    Gastroenterology; Internal Medicine: John Squires (DO)  [address] [place] [address]  Phone: [phone]  Fax: [phone] 60 yo Male with prior CVA 8 years ago with no deficits noted initially admitted with generalized weakness and difficulty speaking. CTH with chronic L MCA infarct with MRI showing acute right post frontal infarct. Pt with dysphagia s/p PEG placement, non-fluent aphasia, apraxia of speech, gait and ADL impairment. Hospital course complicated by with multiple PEG dislodgement and replacement. S/p completion of antibiotic treatment for aspiration pneumonia.     CVA   -Comprehensive Rehab Program of PT/OT/ SLP for Gait Instability, ADL, and Functional impairments  -ASA 81mg and Plavix 75mg for 90 days followed by ASA as monotherapy   -Lipitor 80mg qhs    Dysphagia   -s/p PEG placement, pt pulled out PEG multiple times, currently with 16Fr hicks g-tube in place  -abdominal binder to secure PEG  -s/p MBS on 3/19, diet upgraded to puree and thin liquids. Will supplement with bolus g-tube feeds if pt eats less than 50% of tray (TwoCal HN feeds, dilute TFs with 200cc H2O)    Agitation - stable  -Depakote 125 mg q6 hours PRN via PEG  -Depakote 125mg daily    Oral Thrush  -Nystatin swab TID  -oral care    Left renal lesion   -CT abdomen/pelvis done for malignancy workup due to multiple CVAs in the past showed ill defined left renal lesion   -Left renal lesion demonstrated MR findings (2/28) most compatible with small chronic subcapsular/pericapsular hematoma.  -Kidney sonogram negative; PSA negative   -outpatient colonoscopy as per heme/onc    Recent sepsis 2/2 aspiration pneumonia: resolved  -completed course of zosyn  -3/8 blood cx, UC, and RVP negative    Hematoma of rectus sheath  - likely 2/2 g tube replacement  - no anticoagulation    Pain Mgmt: Tylenol PRN  Bowel: Monitor continence. Senna, Miralax PRN  Bladder: low PVR, incontinent   Sleep: monitor  Skin: Pressure injury prevention, turn Q2hrs while in bed, OOB to Chair  Diet: puree and thin with supplement of bolus feeds if pt eats less than 50% of feeds    Precautions: aspiration, fall  DVT: No AC, SCDs    ---------------------------------------------------------------------------------  Consults: Internal Medicine    Post Discharge Follow up:  Neurology: Craig Peterson (DO)  [facility] [facility] [address]  [place] [address]  Phone: [phone]  Fax: [phone]  Follow Up Time: 2 weeks    Gastroenterology; Internal Medicine: John Squires (DO)  [address] [place] [address]  Phone: [phone]  Fax: [phone]

## 2020-03-19 NOTE — PROGRESS NOTE ADULT - ATTENDING COMMENTS
Pt. seen with fellow.  Agree with documentation above as per fellow. Patient medically stable. Making progress towards rehab goals.     MBS today-- passed, diet upgraded to puree/thins.

## 2020-03-19 NOTE — PROGRESS NOTE ADULT - SUBJECTIVE AND OBJECTIVE BOX
HPI:  58 yo Greek speaking Male with PMH of CVA 8yrs ago (no reported deficits) was initially brought into [facility] on 2/9/20 by brother for concerns of generalized weakness and inability to speak for the 3 weeks. Pt was found to have no verbal output, appeared withdrawn, intermittent command following and easily agitated.  CTH demonstrated chronic L MCA infarct with severely stenotic vs occluded distal L M1. Confirmed on MRI but also found to have an acute right post frontal infarct, benign LUIS and s/p Medtronic ILR placement 2/13. Patient also had sinus bradycardia, with no interventions recommended from cardiology.   Patient also with severe dysphagia and was s/p PEG placement on 2/2 by GI with endoscopic placement however post procedure pt pulled PEG out. As per GI patient at risk of 2nd endoscopic placement contraindicated due to risk of pulling out tube and peritonitis. Patient thus had laproscopic placement of G tube done by surgery on 2/27 to minimize risk of peritonitis if pulled out. On 3/2, noted with moderate leaking around G-tube: was pulled out from 9 cm in to only 1 cm in. G tube repositioned under fluoro on 3/2.    Neuro recommended malignancy work up 2/2 unexplained multiple embolic strokes. Oncology consult w Dr. Sanchez. Patient had CT C/A/P w  IV contrast only. no significant findings exc questionable renal mass, but sono neg and MRI confirmed it being a subcapsular hematoma.  Patient will need outptn colonoscopy after discharge. 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   Patient was discharged to acute rehab on 3/7, however on admission pt had a temp of 101 and pt PEG was found to be dislodged. Pt was transferred back to [facility] and PEG was replaced by surgery on 3/8. Patient tolerating feeds. Pt was found to have sepsis 2/2 aspiration pneumonia of b/l lower lobes and ID was consulted and pt was started on zosyn. Antibitoic course completed. RVP, blood cultutes were negative. Anticoagulation was held due to hematoma at rectum likley 2/2 g tube replacement. On 3/13 pt pulled out PEG and 16Fr hicks catheter was inserted through g tube tract and feeds were restarted after confirmation of placement.  Spoke with pt's nephew Dallas Arriola. As per Dallas, pt was living in [place] in a motel. Motel owner contacted family in [place] when he began acting strangely. family in [place] contacted pt's brother here in [place] and family brought him here and took him to [facility]. As per nephew, pt has no home here and no dispo plan at this time.   Patient is . He has 2 sons here but estranged from one son and other son is a college student.     SUBJECTIVE & INTERVAL HISTORY: Pt seen and examined this morning. No events overnight. Pt with no acute events. Pt nods head for responses. slept well. Denies pain. G-tube site examined and in place.  s/p MBS study completed, diet upgraded to puree and thin liquids      REVIEW OF SYSTEMS  Limited by pt aphasia  Constitutional - No fever, No fatigue  HEENT - No eye pain, No visual disturbances (wears glasses), No difficulty hearing,  Respiratory - No cough, No wheezing, No shortness of breath  Cardiovascular - No chest pain, No palpitations  Gastrointestinal - No abdominal pain, No nausea, No vomiting, No diarrhea, No constipation  Genitourinary - No dysuria, No frequency, No incontinence  Neurological - No headaches, No memory loss, + loss of strength, No numbness,   Skin - No itching, No rashes,   Musculoskeletal - No joint pain, No joint swelling, No muscle pain  Psychiatric - No depression, No anxiety      VITALS  Vital Signs (24 Hrs):  T(C): 36.2 (03-19-20 @ 08:00), Max: 36.6 (03-18-20 @ 20:14)  HR: 63 (03-19-20 @ 08:00) (56 - 63)  BP: 94/66 (03-19-20 @ 08:00) (94/66 - 109/70)  RR: 14 (03-19-20 @ 08:00) (14 - 15)  SpO2: 100% (03-19-20 @ 08:00) (97% - 100%)  Wt(kg): --      PHYSICAL EXAM  Constitutional - NAD, Comfortable  HEENT - NCAT, EOMI, +glasses  Neck - Supple, No limited ROM  Chest -  CTAB b/l no wheezing  Cardio - +S1/S2 RRR,   Abdomen -  Soft, NT ND, +BS  Extremities - No peripheral edema  Neurologic Exam:                 	   Cognitive -   	          Orientation: Awake, Alert, able to follow most commands, unable to test orientation as pt is aphasic  	   Speech - Non-fluent aphasia, comprehension fair, pt able to follow most commands, non-verbal but able to nod to questions  	   Cranial Nerves - left facial droop, tongue midline, no ptosis, shoulder shrug intact  	   Motor -  limited by aphasia and language   	                  LEFT    UE - ShAB 5/5, EF 5/5, EE 5/5, WE 5/5,  WNL  	                  RIGHT UE - ShAB 5/5, EF 5/5, EE 5/5, WE 5/5,  WNL  	                  LEFT    LE - HF 5/5, KE 5/5, DF 5/5, PF 5/5  	                  RIGHT LE - HF 5/5, KE 5/5, DF 5/5, PF 5/5     Psychiatric - Mood stable, Flat Affect, calm, no agitation    IDT Meeting 3/19  OT  grooming Bernie  UBD Bernie  LBD modA  transfers modA  PT  transfers min/modA  gait 40ft RW close stand by  impaireed coordination, ataxix  ST  aphonic  comprehension modA    RECENT LABS/IMAGING                        12.2   7.69  )-----------( 369      ( 19 Mar 2020 07:10 )             37.6     03-19    144  |  109<H>  |  12  ----------------------------<  98  4.4   |  26  |  0.78    Ca    9.1      19 Mar 2020 07:10    TPro  6.4  /  Alb  3.0<L>  /  TBili  0.6  /  DBili  x   /  AST  17  /  ALT  28  /  AlkPhos  72  03-18    MEDICATIONS  (STANDING):  aspirin  chewable 81 milliGRAM(s) Oral daily  atorvastatin 80 milliGRAM(s) Oral at bedtime  clopidogrel Tablet 75 milliGRAM(s) Oral daily  nystatin    Suspension 1 milliLiter(s) Oral three times a day  senna Syrup 5 milliLiter(s) Oral at bedtime  valproic  acid Syrup 125 milliGRAM(s) Oral daily    MEDICATIONS  (PRN):  acetaminophen   Tablet .. 650 milliGRAM(s) Oral every 6 hours PRN Temp greater or equal to 38C (100.4F), Mild Pain (1 - 3)  polyethylene glycol 3350 17 Gram(s) Oral daily PRN Constipation  valproic  acid Syrup 125 milliGRAM(s) Oral every 6 hours PRN Agitation

## 2020-03-20 PROCEDURE — 99232 SBSQ HOSP IP/OBS MODERATE 35: CPT

## 2020-03-20 RX ADMIN — Medication 1 MILLILITER(S): at 14:50

## 2020-03-20 RX ADMIN — CLOPIDOGREL BISULFATE 75 MILLIGRAM(S): 75 TABLET, FILM COATED ORAL at 12:55

## 2020-03-20 RX ADMIN — Medication 81 MILLIGRAM(S): at 12:55

## 2020-03-20 RX ADMIN — Medication 125 MILLIGRAM(S): at 12:54

## 2020-03-20 RX ADMIN — SENNA PLUS 5 MILLILITER(S): 8.6 TABLET ORAL at 21:58

## 2020-03-20 RX ADMIN — Medication 1 MILLILITER(S): at 21:53

## 2020-03-20 RX ADMIN — Medication 1 MILLILITER(S): at 05:07

## 2020-03-20 RX ADMIN — ATORVASTATIN CALCIUM 80 MILLIGRAM(S): 80 TABLET, FILM COATED ORAL at 21:53

## 2020-03-20 NOTE — PROGRESS NOTE ADULT - ASSESSMENT
58 yo Male with prior CVA 8 years ago with no deficits noted initially admitted with generalized weakness and difficulty speaking. CTH with chronic L MCA infarct with MRI showing acute right post frontal infarct. Pt with dysphagia s/p PEG placement, non-fluent aphasia, apraxia of speech, gait and ADL impairment. Hospital course complicated by with multiple PEG dislodgement and replacement. S/p completion of antibiotic treatment for aspiration pneumonia.     CVA   -Comprehensive Rehab Program of PT/OT/ SLP for Gait Instability, ADL, and Functional impairments  -ASA 81mg and Plavix 75mg for 90 days followed by ASA as monotherapy   -Lipitor 80mg qhs    Dysphagia   -s/p PEG placement, pt pulled out PEG multiple times, currently with 16Fr hicks g-tube in place  -abdominal binder to secure PEG  -s/p MBS on 3/19, diet upgraded to puree and thin liquids. Will supplement with bolus g-tube feeds if pt eats less than 50% of tray (TwoCal HN feeds, dilute TFs with 200cc H2O)    Agitation - stable  -Depakote 125 mg q6 hours PRN via PEG  -Depakote 125mg daily    Oral Thrush  -Nystatin swab TID  -oral care    Left renal lesion   -CT abdomen/pelvis done for malignancy workup due to multiple CVAs in the past showed ill defined left renal lesion   -Left renal lesion demonstrated MR findings (2/28) most compatible with small chronic subcapsular/pericapsular hematoma.  -Kidney sonogram negative; PSA negative   -outpatient colonoscopy as per heme/onc    Recent sepsis 2/2 aspiration pneumonia: resolved  -completed course of zosyn  -3/8 blood cx, UC, and RVP negative    Hematoma of rectus sheath  - likely 2/2 g tube replacement  - no anticoagulation    Pain Mgmt: Tylenol PRN  Bowel: Monitor continence. Senna, Miralax PRN  Bladder: low PVR, incontinent   Sleep: monitor  Skin: Pressure injury prevention, turn Q2hrs while in bed, OOB to Chair  Diet: puree and thin with supplement of bolus feeds if pt eats less than 50% of feeds    Precautions: aspiration, fall  DVT: No AC, SCDs    ---------------------------------------------------------------------------------  Consults: Internal Medicine    Post Discharge Follow up:  Neurology: Craig Peterson (DO)  [facility] [address]  [place] [address]  Phone: [phone]  Fax: [phone]  Follow Up Time: 2 weeks    Gastroenterology; Internal Medicine: John Squires (DO)  [address] [place] [address]  Phone: [phone]  Fax: [phone]

## 2020-03-20 NOTE — PROGRESS NOTE ADULT - SUBJECTIVE AND OBJECTIVE BOX
HPI:  58 yo Turkmen speaking Male with PMH of CVA 8yrs ago (no reported deficits) was initially brought into [facility] on 2/9/20 by brother for concerns of generalized weakness and inability to speak for the 3 weeks. Pt was found to have no verbal output, appeared withdrawn, intermittent command following and easily agitated.  CTH demonstrated chronic L MCA infarct with severely stenotic vs occluded distal L M1. Confirmed on MRI but also found to have an acute right post frontal infarct, benign LUIS and s/p Medtronic ILR placement 2/13. Patient also had sinus bradycardia, with no interventions recommended from cardiology.   Patient also with severe dysphagia and was s/p PEG placement on 2/2 by GI with endoscopic placement however post procedure pt pulled PEG out. As per GI patient at risk of 2nd endoscopic placement contraindicated due to risk of pulling out tube and peritonitis. Patient thus had laproscopic placement of G tube done by surgery on 2/27 to minimize risk of peritonitis if pulled out. On 3/2, noted with moderate leaking around G-tube: was pulled out from 9 cm in to only 1 cm in. G tube repositioned under fluoro on 3/2.    Neuro recommended malignancy work up 2/2 unexplained multiple embolic strokes. Oncology consult w Dr. Sanchez. Patient had CT C/A/P w  IV contrast only. no significant findings exc questionable renal mass, but sono neg and MRI confirmed it being a subcapsular hematoma.  Patient will need outptn colonoscopy after discharge. 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   Patient was discharged to acute rehab on 3/7, however on admission pt had a temp of 101 and pt PEG was found to be dislodged. Pt was transferred back to [facility] and PEG was replaced by surgery on 3/8. Patient tolerating feeds. Pt was found to have sepsis 2/2 aspiration pneumonia of b/l lower lobes and ID was consulted and pt was started on zosyn. Antibitoic course completed. RVP, blood cultutes were negative. Anticoagulation was held due to hematoma at rectum likley 2/2 g tube replacement. On 3/13 pt pulled out PEG and 16Fr marsh catheter was inserted through g tube tract and feeds were restarted after confirmation of placement.  Spoke with pt's nephew Dallas Arriola. As per Dallas, pt was living in [place] in a motel. Motel owner contacted family in [place] when he began acting strangely. family in [place] contacted pt's brother here in [place] and family brought him here and took him to [facility]. As per nephew, pt has no home here and no dispo plan at this time.   Patient is . He has 2 sons here but estranged from one son and other son is a college student.     SUBJECTIVE & INTERVAL HISTORY: Pt seen and examined this morning. No events overnight. No acute complaints. Pt appears to be more cheerful and happy today. Pt diet was upgraded to puree with thins yesterday. Tolerating diet. Denies any head or pain. No abdominal pain. Marsh g-tube in place. + thrush on tongue     REVIEW OF SYSTEMS  Limited by pt aphasia  Constitutional - No fever, No fatigue  HEENT - No eye pain, No visual disturbances (wears glasses), No difficulty hearing,  Respiratory - No cough, No wheezing, No shortness of breath  Cardiovascular - No chest pain, No palpitations  Gastrointestinal - No abdominal pain, No nausea, No vomiting, No diarrhea, No constipation +G-tube marsh  Genitourinary - No dysuria, No frequency, No incontinence  Neurological - No headaches, No memory loss, + loss of strength, No numbness,   Skin - No itching, No rashes,   Musculoskeletal - No joint pain, No joint swelling, No muscle pain  Psychiatric - No depression, No anxiety      VITALS  Vital Signs (24 Hrs):  T(C): 36.4 (03-20-20 @ 09:14), Max: 36.4 (03-20-20 @ 09:14)  HR: 66 (03-20-20 @ 09:14) (54 - 66)  BP: 125/89 (03-20-20 @ 09:14) (115/73 - 125/89)  RR: 15 (03-20-20 @ 09:14) (14 - 15)  SpO2: 100% (03-20-20 @ 09:14) (98% - 100%)  Wt(kg): --        PHYSICAL EXAM  Constitutional - NAD, Comfortable  HEENT - NCAT, EOMI, +glasses, +thrush on tongue  Neck - Supple, No limited ROM  Chest -  CTAB b/l no wheezing  Cardio - +S1/S2 RRR,   Abdomen -  Soft, NT ND, +BS  Extremities - No peripheral edema  Neurologic Exam:                 	   Cognitive -   	          Orientation: Awake, Alert, able to follow most commands, unable to test orientation as pt is aphasic  	   Speech - Non-fluent aphasia, comprehension fair, pt able to follow most commands, non-verbal but able to nod to questions  	   Cranial Nerves - left facial droop, tongue midline, no ptosis, shoulder shrug intact  	   Motor -  limited by aphasia and language   	                  LEFT    UE - ShAB 5/5, EF 5/5, EE 5/5, WE 5/5,  WNL  	                  RIGHT UE - ShAB 5/5, EF 5/5, EE 5/5, WE 5/5,  WNL  	                  LEFT    LE - HF 5/5, KE 5/5, DF 5/5, PF 5/5  	                  RIGHT LE - HF 5/5, KE 5/5, DF 5/5, PF 5/5     Psychiatric - Mood stable, Flat Affect, calm, no agitation    IDT Meeting 3/19  OT  grooming Bernie  UBD Bernie  LBD modA  transfers modA  PT  transfers min/modA  gait 40ft RW close stand by  impaireed coordination, ataxix  ST  aphonic  comprehension modA    RECENT LABS/IMAGING                        12.2   7.69  )-----------( 369      ( 19 Mar 2020 07:10 )             37.6     03-19    144  |  109<H>  |  12  ----------------------------<  98  4.4   |  26  |  0.78    Ca    9.1      19 Mar 2020 07:10    TPro  6.4  /  Alb  3.0<L>  /  TBili  0.6  /  DBili  x   /  AST  17  /  ALT  28  /  AlkPhos  72  03-18    MEDICATIONS  (STANDING):  aspirin  chewable 81 milliGRAM(s) Oral daily  atorvastatin 80 milliGRAM(s) Oral at bedtime  clopidogrel Tablet 75 milliGRAM(s) Oral daily  nystatin    Suspension 1 milliLiter(s) Oral three times a day  senna Syrup 5 milliLiter(s) Oral at bedtime  valproic  acid Syrup 125 milliGRAM(s) Oral daily    MEDICATIONS  (PRN):  acetaminophen   Tablet .. 650 milliGRAM(s) Oral every 6 hours PRN Temp greater or equal to 38C (100.4F), Mild Pain (1 - 3)  polyethylene glycol 3350 17 Gram(s) Oral daily PRN Constipation  valproic  acid Syrup 125 milliGRAM(s) Oral every 6 hours PRN Agitation

## 2020-03-20 NOTE — PROGRESS NOTE ADULT - ATTENDING COMMENTS
Pt. seen with fellow.  Agree with documentation above as per fellow. Patient medically stable. Making progress towards rehab goals.     tolerating puree with thin liquid diet-- eating 75% of meals.

## 2020-03-21 PROCEDURE — 99232 SBSQ HOSP IP/OBS MODERATE 35: CPT

## 2020-03-21 RX ADMIN — CLOPIDOGREL BISULFATE 75 MILLIGRAM(S): 75 TABLET, FILM COATED ORAL at 11:48

## 2020-03-21 RX ADMIN — Medication 81 MILLIGRAM(S): at 11:47

## 2020-03-21 RX ADMIN — Medication 1 MILLILITER(S): at 05:21

## 2020-03-21 RX ADMIN — Medication 125 MILLIGRAM(S): at 11:48

## 2020-03-21 RX ADMIN — ATORVASTATIN CALCIUM 80 MILLIGRAM(S): 80 TABLET, FILM COATED ORAL at 22:15

## 2020-03-21 RX ADMIN — Medication 1 MILLILITER(S): at 22:15

## 2020-03-21 RX ADMIN — Medication 1 MILLILITER(S): at 11:48

## 2020-03-21 NOTE — PROGRESS NOTE ADULT - SUBJECTIVE AND OBJECTIVE BOX
no new complaints, eating breakfast     REVIEW OF SYSTEMS  Constitutional - No fever,  No fatigue  HEENT - No vertigo, No neck pain  Neurological - No headaches, No memory loss, No loss of strength, No numbness, No tremors  Skin - No rashes, No lesions   Musculoskeletal - No joint pain, No joint swelling, No muscle pain  Psychiatric - No depression, No anxiety    FUNCTIONAL PROGRESS  PT  transfers min/modA  gait 40ft RW close stand by  impaireed coordination, ataxix      VITALS  T(C): 36.6 (03-21-20 @ 07:24), Max: 36.6 (03-21-20 @ 07:24)  HR: 67 (03-21-20 @ 07:24) (61 - 67)  BP: 136/92 (03-21-20 @ 07:24) (136/92 - 138/80)  RR: 15 (03-21-20 @ 07:24) (15 - 15)  SpO2: 100% (03-21-20 @ 07:24) (98% - 100%)  Wt(kg): --    MEDICATIONS   acetaminophen   Tablet .. 650 milliGRAM(s) every 6 hours PRN  aspirin  chewable 81 milliGRAM(s) daily  atorvastatin 80 milliGRAM(s) at bedtime  clopidogrel Tablet 75 milliGRAM(s) daily  nystatin    Suspension 1 milliLiter(s) three times a day  polyethylene glycol 3350 17 Gram(s) daily PRN  senna Syrup 5 milliLiter(s) at bedtime  valproic  acid Syrup 125 milliGRAM(s) every 6 hours PRN  valproic  acid Syrup 125 milliGRAM(s) daily      RECENT LABS - Reviewed        PHYSICAL EXAM  Constitutional - NAD, Comfortable  HEENT - NCAT, EOMI, +glasses, +thrush on tongue  Neck - Supple, No limited ROM  Chest -  CTAB b/l no wheezing  Cardio - +S1/S2 RRR,   Abdomen -  Soft, NT ND, +BS  Extremities - No peripheral edema  Neurologic Exam:                 	   Cognitive -   	          Orientation: Awake, Alert, able to follow most commands, unable to test orientation as pt is aphasic  	   Speech - Non-fluent aphasia, comprehension fair, pt able to follow most commands, non-verbal but able to nod to questions  	   Cranial Nerves - left facial droop, tongue midline, no ptosis, shoulder shrug intact  	   Motor -  limited by aphasia and language   	                  LEFT    UE - ShAB 5/5, EF 5/5, EE 5/5, WE 5/5,  WNL  	                  RIGHT UE - ShAB 5/5, EF 5/5, EE 5/5, WE 5/5,  WNL  	                  LEFT    LE - HF 5/5, KE 5/5, DF 5/5, PF 5/5  	                  RIGHT LE - HF 5/5, KE 5/5, DF 5/5, PF 5/5     Psychiatric - Mood stable, Flat Affect, calm, no agitation            Assessment and Plan:   · Assessment	  58 yo Male with prior CVA 8 years ago with no deficits noted initially admitted with generalized weakness and difficulty speaking. CTH with chronic L MCA infarct with MRI showing acute right post frontal infarct. Pt with dysphagia s/p PEG placement, non-fluent aphasia, apraxia of speech, gait and ADL impairment. Hospital course complicated by with multiple PEG dislodgement and replacement. S/p completion of antibiotic treatment for aspiration pneumonia.     CVA   -Comprehensive Rehab Program of PT/OT/ SLP for Gait Instability, ADL, and Functional impairments  -ASA 81mg and Plavix 75mg for 90 days followed by ASA as monotherapy   -Lipitor 80mg qhs    Dysphagia   -s/p PEG placement, pt pulled out PEG multiple times, currently with 16Fr hicks g-tube in place  -abdominal binder to secure PEG  -s/p MBS on 3/19, diet upgraded to puree and thin liquids. Will supplement with bolus g-tube feeds if pt eats less than 50% of tray (TwoCal HN feeds, dilute TFs with 200cc H2O)    Agitation - stable  -Depakote 125 mg q6 hours PRN via PEG  -Depakote 125mg daily    Oral Thrush  -Nystatin swab TID  -oral care    Left renal lesion   -CT abdomen/pelvis done for malignancy workup due to multiple CVAs in the past showed ill defined left renal lesion   -Left renal lesion demonstrated MR findings (2/28) most compatible with small chronic subcapsular/pericapsular hematoma.  -Kidney sonogram negative; PSA negative   -outpatient colonoscopy as per heme/onc    Recent sepsis 2/2 aspiration pneumonia: resolved  -completed course of zosyn  -3/8 blood cx, UC, and RVP negative    Hematoma of rectus sheath  - likely 2/2 g tube replacement  - no anticoagulation    Pain Mgmt: Tylenol PRN  Bowel: Monitor continence. Senna, Miralax PRN  Bladder: low PVR, incontinent   Sleep: monitor  Skin: Pressure injury prevention, turn Q2hrs while in bed, OOB to Chair  Diet: puree and thin with supplement of bolus feeds if pt eats less than 50% of feeds    Precautions: aspiration, fall  DVT: No AC, SCDs

## 2020-03-22 PROCEDURE — 99232 SBSQ HOSP IP/OBS MODERATE 35: CPT

## 2020-03-22 RX ADMIN — ATORVASTATIN CALCIUM 80 MILLIGRAM(S): 80 TABLET, FILM COATED ORAL at 22:12

## 2020-03-22 RX ADMIN — Medication 1 MILLILITER(S): at 22:12

## 2020-03-22 RX ADMIN — SENNA PLUS 5 MILLILITER(S): 8.6 TABLET ORAL at 22:12

## 2020-03-22 RX ADMIN — CLOPIDOGREL BISULFATE 75 MILLIGRAM(S): 75 TABLET, FILM COATED ORAL at 12:00

## 2020-03-22 RX ADMIN — Medication 81 MILLIGRAM(S): at 12:00

## 2020-03-22 RX ADMIN — Medication 125 MILLIGRAM(S): at 12:00

## 2020-03-22 RX ADMIN — Medication 1 MILLILITER(S): at 05:51

## 2020-03-22 NOTE — PROGRESS NOTE ADULT - SUBJECTIVE AND OBJECTIVE BOX
no new complaints, seen after breakfast in dining room    REVIEW OF SYSTEMS  Constitutional - No fever,  No fatigue  HEENT - No vertigo, No neck pain  Neurological - No headaches, No memory loss, No loss of strength, No numbness, No tremors  Skin - No rashes, No lesions   Musculoskeletal - No joint pain, No joint swelling, No muscle pain  Psychiatric - No depression, No anxiety    FUNCTIONAL PROGRESS      VITALS  T(C): 36.2 (03-22-20 @ 08:07), Max: 36.3 (03-21-20 @ 20:26)  HR: 68 (03-22-20 @ 08:07) (61 - 68)  BP: 101/69 (03-22-20 @ 08:07) (101/69 - 119/79)  RR: 12 (03-22-20 @ 08:07) (12 - 15)  SpO2: 95% (03-22-20 @ 08:07) (95% - 98%)  Wt(kg): --    MEDICATIONS   acetaminophen   Tablet .. 650 milliGRAM(s) every 6 hours PRN  aspirin  chewable 81 milliGRAM(s) daily  atorvastatin 80 milliGRAM(s) at bedtime  clopidogrel Tablet 75 milliGRAM(s) daily  nystatin    Suspension 1 milliLiter(s) three times a day  polyethylene glycol 3350 17 Gram(s) daily PRN  senna Syrup 5 milliLiter(s) at bedtime  valproic  acid Syrup 125 milliGRAM(s) every 6 hours PRN  valproic  acid Syrup 125 milliGRAM(s) daily      RECENT LABS - Reviewed          PHYSICAL EXAM  Constitutional - NAD, Comfortable  HEENT - NCAT, EOMI  Neck - Supple, No limited ROM  Chest -  CTAB b/l no wheezing  Cardio - +S1/S2 RRR,   Abdomen -  Soft, NT ND, +BS  Extremities - No peripheral edema  Neurologic Exam:                 	   Cognitive -   	          Orientation: Awake, Alert, able to follow most commands, unable to test orientation as pt is aphasic  	   Speech - Non-fluent aphasia, comprehension fair, pt able to follow most commands, non-verbal but able to nod to questions  	   Cranial Nerves - left facial droop, tongue midline, no ptosis, shoulder shrug intact  	   Motor -  limited by aphasia and language   	                  LEFT    UE - ShAB 5/5, EF 5/5, EE 5/5, WE 5/5,  WNL  	                  RIGHT UE - ShAB 5/5, EF 5/5, EE 5/5, WE 5/5,  WNL  	                  LEFT    LE - HF 5/5, KE 5/5, DF 5/5, PF 5/5  	                  RIGHT LE - HF 5/5, KE 5/5, DF 5/5, PF 5/5     Psychiatric - Mood stable, Flat Affect, calm, no agitation            Assessment and Plan:   · Assessment	  58 yo Male with prior CVA 8 years ago with no deficits noted initially admitted with generalized weakness and difficulty speaking. CTH with chronic L MCA infarct with MRI showing acute right post frontal infarct. Pt with dysphagia s/p PEG placement, non-fluent aphasia, apraxia of speech, gait and ADL impairment. Hospital course complicated by with multiple PEG dislodgement and replacement. S/p completion of antibiotic treatment for aspiration pneumonia.     CVA   -Comprehensive Rehab Program of PT/OT/ SLP for Gait Instability, ADL, and Functional impairments  -ASA 81mg and Plavix 75mg for 90 days followed by ASA as monotherapy   -Lipitor 80mg qhs    Dysphagia   -s/p PEG placement, pt pulled out PEG multiple times, currently with 16Fr hicks g-tube in place  -abdominal binder to secure PEG  -s/p MBS on 3/19, diet upgraded to puree and thin liquids. Will supplement with bolus g-tube feeds if pt eats less than 50% of tray (TwoCal HN feeds, dilute TFs with 200cc H2O)    Agitation - stable  -Depakote 125 mg q6 hours PRN via PEG  -Depakote 125mg daily    Oral Thrush  -Nystatin swab TID  -oral care    Left renal lesion   -CT abdomen/pelvis done for malignancy workup due to multiple CVAs in the past showed ill defined left renal lesion   -Left renal lesion demonstrated MR findings (2/28) most compatible with small chronic subcapsular/pericapsular hematoma.  -Kidney sonogram negative; PSA negative   -outpatient colonoscopy as per heme/onc    Recent sepsis 2/2 aspiration pneumonia: resolved  -completed course of zosyn  -3/8 blood cx, UC, and RVP negative    Hematoma of rectus sheath  - likely 2/2 g tube replacement  - no anticoagulation    Pain Mgmt: Tylenol PRN  Bowel: Monitor continence. Senna, Miralax PRN  Bladder: low PVR, incontinent   Sleep: monitor  Skin: Pressure injury prevention, turn Q2hrs while in bed, OOB to Chair  Diet: puree and thin with supplement of bolus feeds if pt eats less than 50% of feeds    Precautions: aspiration, fall  DVT: No AC, SCDs

## 2020-03-23 LAB
ANION GAP SERPL CALC-SCNC: 9 MMOL/L — SIGNIFICANT CHANGE UP (ref 5–17)
BASOPHILS # BLD AUTO: 0.09 K/UL — SIGNIFICANT CHANGE UP (ref 0–0.2)
BASOPHILS NFR BLD AUTO: 0.9 % — SIGNIFICANT CHANGE UP (ref 0–2)
BUN SERPL-MCNC: 19 MG/DL — SIGNIFICANT CHANGE UP (ref 7–23)
CALCIUM SERPL-MCNC: 9.4 MG/DL — SIGNIFICANT CHANGE UP (ref 8.4–10.5)
CHLORIDE SERPL-SCNC: 104 MMOL/L — SIGNIFICANT CHANGE UP (ref 96–108)
CO2 SERPL-SCNC: 27 MMOL/L — SIGNIFICANT CHANGE UP (ref 22–31)
CREAT SERPL-MCNC: 0.84 MG/DL — SIGNIFICANT CHANGE UP (ref 0.5–1.3)
EOSINOPHIL # BLD AUTO: 0.3 K/UL — SIGNIFICANT CHANGE UP (ref 0–0.5)
EOSINOPHIL NFR BLD AUTO: 2.9 % — SIGNIFICANT CHANGE UP (ref 0–6)
GLUCOSE SERPL-MCNC: 88 MG/DL — SIGNIFICANT CHANGE UP (ref 70–99)
HCT VFR BLD CALC: 39 % — SIGNIFICANT CHANGE UP (ref 39–50)
HGB BLD-MCNC: 12.7 G/DL — LOW (ref 13–17)
IMM GRANULOCYTES NFR BLD AUTO: 0.4 % — SIGNIFICANT CHANGE UP (ref 0–1.5)
LYMPHOCYTES # BLD AUTO: 1.47 K/UL — SIGNIFICANT CHANGE UP (ref 1–3.3)
LYMPHOCYTES # BLD AUTO: 14.4 % — SIGNIFICANT CHANGE UP (ref 13–44)
MCHC RBC-ENTMCNC: 32.3 PG — SIGNIFICANT CHANGE UP (ref 27–34)
MCHC RBC-ENTMCNC: 32.6 GM/DL — SIGNIFICANT CHANGE UP (ref 32–36)
MCV RBC AUTO: 99.2 FL — SIGNIFICANT CHANGE UP (ref 80–100)
MONOCYTES # BLD AUTO: 0.44 K/UL — SIGNIFICANT CHANGE UP (ref 0–0.9)
MONOCYTES NFR BLD AUTO: 4.3 % — SIGNIFICANT CHANGE UP (ref 2–14)
NEUTROPHILS # BLD AUTO: 7.84 K/UL — HIGH (ref 1.8–7.4)
NEUTROPHILS NFR BLD AUTO: 77.1 % — HIGH (ref 43–77)
NRBC # BLD: 0 /100 WBCS — SIGNIFICANT CHANGE UP (ref 0–0)
PLATELET # BLD AUTO: 324 K/UL — SIGNIFICANT CHANGE UP (ref 150–400)
POTASSIUM SERPL-MCNC: 4.9 MMOL/L — SIGNIFICANT CHANGE UP (ref 3.5–5.3)
POTASSIUM SERPL-SCNC: 4.9 MMOL/L — SIGNIFICANT CHANGE UP (ref 3.5–5.3)
RBC # BLD: 3.93 M/UL — LOW (ref 4.2–5.8)
RBC # FLD: 13.1 % — SIGNIFICANT CHANGE UP (ref 10.3–14.5)
SODIUM SERPL-SCNC: 140 MMOL/L — SIGNIFICANT CHANGE UP (ref 135–145)
WBC # BLD: 10.18 K/UL — SIGNIFICANT CHANGE UP (ref 3.8–10.5)
WBC # FLD AUTO: 10.18 K/UL — SIGNIFICANT CHANGE UP (ref 3.8–10.5)

## 2020-03-23 PROCEDURE — 99232 SBSQ HOSP IP/OBS MODERATE 35: CPT | Mod: GC

## 2020-03-23 PROCEDURE — 99232 SBSQ HOSP IP/OBS MODERATE 35: CPT

## 2020-03-23 RX ADMIN — Medication 125 MILLIGRAM(S): at 11:19

## 2020-03-23 RX ADMIN — Medication 1 MILLILITER(S): at 14:43

## 2020-03-23 RX ADMIN — Medication 1 MILLILITER(S): at 05:05

## 2020-03-23 RX ADMIN — SENNA PLUS 5 MILLILITER(S): 8.6 TABLET ORAL at 21:29

## 2020-03-23 RX ADMIN — CLOPIDOGREL BISULFATE 75 MILLIGRAM(S): 75 TABLET, FILM COATED ORAL at 11:19

## 2020-03-23 RX ADMIN — ATORVASTATIN CALCIUM 80 MILLIGRAM(S): 80 TABLET, FILM COATED ORAL at 21:29

## 2020-03-23 RX ADMIN — Medication 81 MILLIGRAM(S): at 11:19

## 2020-03-23 RX ADMIN — Medication 1 MILLILITER(S): at 21:29

## 2020-03-23 NOTE — CHART NOTE - NSCHARTNOTEFT_GEN_A_CORE
NUTRITION FOLLOW UP    SOURCE: Patient [)   Family [ ]     other [X ] medical record; pt. aphasic    DIET: DYSPHAGIA 1 W/ THIN LIQUIDS    PATIENT REPORT[ ] nausea  [ ] vomiting [ ] diarrhea [ ] constipation  [ ]chewing problems [X ] swallowing issues  [ ] other: no GI distress    PO INTAKE:  < 50% [ ]   50-75%  [X ]   %  []  other :    SOURCE: for PO intake [] Patient [ ] family [X ] chart [ ] staff [ ] other    ENTERAL/PARENTERAL NUTRITION: n/a    CURRENT WEIGHT: Weight (kg): 61.9 3/21  (stable)    PERTINENT LABS:  Date: 23 Mar 2020 06:25  Hemoglobin 12.7   Hematocrit 39.0     Date: 03-23  Sodium 140  Potassium 4.9  Glucose Serum 88  BUN 19      Creatinine    ACCUCHECK      SKIN   intact, no edema noted, last BM was 3/21    ESTIMATED NEEDS:   [X] no change since previous assessment  [ ] recalculated:     PREVIOUS NUTRITION DIAGNOSIS: addressed    NUTRITION DIAGNOSIS is   [X] ongoing    NEW NUTRITION DIAGNOSIS: [X] not applicable    MONITORING AND EVALUATION:   Current diet order is appropriate and is well tolerated, but will monitor for any changes that may be needed    [X] PO intake [X] Tolerance to diet prescription [X] weights [X] follow up per protocol    NUTRITION RECOMMENDATIONS: reinforce increased  po intake.    RD remains available PERLA Harden RD, CDE NUTRITION FOLLOW UP    SOURCE: Patient [)   Family [ ]     other [X ] medical record; pt. aphasic    DIET: DYSPHAGIA 1 W/ THIN LIQUIDS    PATIENT REPORT[ ] nausea  [ ] vomiting [ ] diarrhea [ ] constipation  [ ]chewing problems [X ] swallowing issues  [ ] other: no GI distress    PO INTAKE:  < 50% [ ]   50-75%  [X ]   %  []  other :    SOURCE: for PO intake [] Patient [ ] family [X ] chart [ ] staff [ ] other    ENTERAL/PARENTERAL NUTRITION: n/a    CURRENT WEIGHT: Weight (kg): 61.9 3/21  (stable)    PERTINENT LABS:  Date: 23 Mar 2020 06:25  Hemoglobin 12.7   Hematocrit 39.0     Date: 03-23  Sodium 140  Potassium 4.9  Glucose Serum 88  BUN 19      Creatinine    ACCUCHECK      SKIN   intact, no edema noted, last BM was 3/21, severe malnutrition noted    ESTIMATED NEEDS:   [X] no change since previous assessment  [ ] recalculated:     PREVIOUS NUTRITION DIAGNOSIS: addressed    NUTRITION DIAGNOSIS is   [X] ongoing    NEW NUTRITION DIAGNOSIS: [X] not applicable    MONITORING AND EVALUATION:   Current diet order is appropriate and is well tolerated, but will monitor for any changes that may be needed    [X] PO intake [X] Tolerance to diet prescription [X] weights [X] follow up per protocol    NUTRITION RECOMMENDATIONS: reinforce increased  po intake.    RD remains available PERLA Harden RD, CDE

## 2020-03-23 NOTE — PROGRESS NOTE ADULT - ASSESSMENT
58 yo Male with prior CVA 8 years ago with no deficits noted initially admitted with generalized weakness and difficulty speaking. CTH with chronic L MCA infarct with MRI showing acute right post frontal infarct. Pt with dysphagia s/p PEG placement, non-fluent aphasia, apraxia of speech, gait and ADL impairment. Hospital course complicated by with multiple PEG dislodgement and replacement. S/p completion of antibiotic treatment for aspiration pneumonia.     CVA   -Comprehensive Rehab Program of PT/OT/ SLP for Gait Instability, ADL, and Functional impairments  -ASA 81mg and Plavix 75mg for 90 days followed by ASA as monotherapy   -Lipitor 80mg qhs    Dysphagia - improving   -s/p PEG placement, pt pulled out PEG multiple times, 16Fr hicks g-tube dislodged on 3/23, no need to replace at this time as pt is eating all of his meals, no supplemental feeding was given through G-tube since diet was upgraded  -pressure dressing to g-tube site   -s/p MBS on 3/19, diet upgraded to puree and thin liquids    Agitation - stable  -Depakote 125 mg q6 hours PRN via PEG  -Depakote 125mg daily    Oral Thrush  -Nystatin swab TID  -oral care    Left renal lesion   -CT abdomen/pelvis done for malignancy workup due to multiple CVAs in the past showed ill defined left renal lesion   -Left renal lesion demonstrated MR findings (2/28) most compatible with small chronic subcapsular/pericapsular hematoma.  -Kidney sonogram negative; PSA negative   -outpatient colonoscopy as per heme/onc    Recent sepsis 2/2 aspiration pneumonia: resolved  -completed course of zosyn  -3/8 blood cx, UC, and RVP negative    Hematoma of rectus sheath  - likely 2/2 g tube replacement  - no anticoagulation    Pain Mgmt: Tylenol PRN  Bowel: Monitor continence. Senna, Miralax PRN  Bladder: low PVR, incontinent   Sleep: monitor  Skin: Pressure injury prevention, turn Q2hrs while in bed, OOB to Chair  Diet: puree and thin with supplement of bolus feeds if pt eats less than 50% of feeds    Precautions: aspiration, fall  DVT: No AC, SCDs    ---------------------------------------------------------------------------------  Consults: Internal Medicine    Post Discharge Follow up:  Neurology: Craig Peterson (DO)  [facility]– [address] [place] [address]  Phone: [phone]  Fax: [phone]  Follow Up Time: 2 weeks    Gastroenterology; Internal Medicine: John Squires (DO)  [address] [place] [address]  Phone: [phone]  Fax: [phone]

## 2020-03-23 NOTE — PROGRESS NOTE ADULT - SUBJECTIVE AND OBJECTIVE BOX
HPI  Pt is a 58yo M admitted to acute rehab for CVA  Pt was seen and exmained at bedside. Pt has no acute complaints. Hemodynamically stable.     Vital Signs Last 24 Hrs  T(C): 36.8 (23 Mar 2020 08:40), Max: 36.8 (23 Mar 2020 08:40)  T(F): 98.2 (23 Mar 2020 08:40), Max: 98.2 (23 Mar 2020 08:40)  HR: 74 (23 Mar 2020 08:40) (60 - 74)  BP: 101/75 (23 Mar 2020 08:40) (101/75 - 102/67)  BP(mean): --  RR: 14 (23 Mar 2020 08:40) (14 - 14)  SpO2: 100% (23 Mar 2020 08:40) (97% - 100%)    I&O's Summary    22 Mar 2020 07:01  -  23 Mar 2020 07:00  --------------------------------------------------------  IN: 720 mL / OUT: 1 mL / NET: 719 mL        CAPILLARY BLOOD GLUCOSE          PHYSICAL EXAM:    Constitutional: NAD,   HEENT: PERR, EOMI,    Neck: Soft and supple   Respiratory: Breath sounds are clear bilaterally,   Cardiovascular: S1 and S2,    Gastrointestinal: Bowel Sounds present, soft, nontender,   Extremities: No peripheral edema  Vascular: 2+ peripheral pulses  Neurological: A/O x 3,  left facial droop   Musculoskeletal: 5/5 strength b/l upper and lower extremities  Skin: No rashes    MEDICATIONS:  MEDICATIONS  (STANDING):  aspirin  chewable 81 milliGRAM(s) Oral daily  atorvastatin 80 milliGRAM(s) Oral at bedtime  clopidogrel Tablet 75 milliGRAM(s) Oral daily  nystatin    Suspension 1 milliLiter(s) Oral three times a day  senna Syrup 5 milliLiter(s) Oral at bedtime  valproic  acid Syrup 125 milliGRAM(s) Oral daily      LABS: All Labs Reviewed:                        12.7   10.18 )-----------( 324      ( 23 Mar 2020 06:25 )             39.0     03-23    140  |  104  |  19  ----------------------------<  88  4.9   |  27  |  0.84    Ca    9.4      23 Mar 2020 06:25            Blood Culture:     RADIOLOGY/EKG:    DVT PPX:    ADVANCED DIRECTIVE:    DISPOSITION:

## 2020-03-23 NOTE — PROGRESS NOTE ADULT - ATTENDING COMMENTS
Pt. seen with resident & fellow.  Agree with documentation above as per fellow with amendments made as appropriate. Patient medically stable. Making progress towards rehab goals.     Pt's hicks G-tube found to be out.  Pt. tolerating PO puree diet--eating % of meals.

## 2020-03-23 NOTE — PROGRESS NOTE ADULT - ASSESSMENT
Pt was seen and exmained at bedside. Pt has no acute complaints. Hemodynamically stable.     *CVA    Cont acute rehab   PT/OT/SLP   cont ASA, plavix  cont statin   Seizure ppx valproic     *Dysphagia  s/p Peg   puree with thin     *thrush   nystatin swab     *Left renal lesion   likely small chronic subcapsular/pericapsular hematoma   out pt follow up     *DVT ppx   due to hematoma of rectus sheeth, no chemical dvt ppx   TEDs

## 2020-03-23 NOTE — PROGRESS NOTE ADULT - SUBJECTIVE AND OBJECTIVE BOX
HPI:  58 yo [place] speaking Male with PMH of CVA 8yrs ago (no reported deficits) was initially brought into [facility] on 2/9/20 by brother for concerns of generalized weakness and inability to speak for the 3 weeks. Pt was found to have no verbal output, appeared withdrawn, intermittent command following and easily agitated.  CTH demonstrated chronic L MCA infarct with severely stenotic vs occluded distal L M1. Confirmed on MRI but also found to have an acute right post frontal infarct, benign LUIS and s/p Medtronic ILR placement 2/13. Patient also had sinus bradycardia, with no interventions recommended from cardiology.   Patient also with severe dysphagia and was s/p PEG placement on 2/2 by GI with endoscopic placement however post procedure pt pulled PEG out. As per GI patient at risk of 2nd endoscopic placement contraindicated due to risk of pulling out tube and peritonitis. Patient thus had laproscopic placement of G tube done by surgery on 2/27 to minimize risk of peritonitis if pulled out. On 3/2, noted with moderate leaking around G-tube: was pulled out from 9 cm in to only 1 cm in. G tube repositioned under fluoro on 3/2.    Neuro recommended malignancy work up 2/2 unexplained multiple embolic strokes. Oncology consult w Dr. Sanchez. Patient had CT C/A/P w  IV contrast only. no significant findings exc questionable renal mass, but sono neg and MRI confirmed it being a subcapsular hematoma.  Patient will need outptn colonoscopy after discharge. 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   Patient was discharged to acute rehab on 3/7, however on admission pt had a temp of 101 and pt PEG was found to be dislodged. Pt was transferred back to [facility] and PEG was replaced by surgery on 3/8. Patient tolerating feeds. Pt was found to have sepsis 2/2 aspiration pneumonia of b/l lower lobes and ID was consulted and pt was started on zosyn. Antibitoic course completed. RVP, blood cultutes were negative. Anticoagulation was held due to hematoma at rectum likley 2/2 g tube replacement. On 3/13 pt pulled out PEG and 16Fr hicks catheter was inserted through g tube tract and feeds were restarted after confirmation of placement.  Spoke with pt's nephew Dallas Arriola. As per Dallas, pt was living in [place] in a motel. Motel owner contacted family in [place] when he began acting strangely. family in [place] contacted pt's brother here in [place] and family brought him here and took him to [facility]. As per nephew, pt has no home here and no dispo plan at this time.   Patient is . He has 2 sons here but estranged from one son and other son is a college student.     SUBJECTIVE & INTERVAL HISTORY: Pt seen and examined this morning. No events over the weekend or overnight. No acute complaints. Noted that pt hicks g-tube was out. Pt eating all of his meals since diet was upgraded.  Denies chest pain or SOB    REVIEW OF SYSTEMS  Limited by pt aphasia  Constitutional - No fever, No fatigue  HEENT - No eye pain, No visual disturbances (wears glasses), No difficulty hearing,  Respiratory - No cough, No wheezing, No shortness of breath  Cardiovascular - No chest pain, No palpitations  Gastrointestinal - No abdominal pain, No nausea, No vomiting, No diarrhea, No constipation   Genitourinary - No dysuria, No frequency, No incontinence  Neurological - No headaches, No memory loss, + loss of strength, No numbness,   Skin - No itching, No rashes,   Musculoskeletal - No joint pain, No joint swelling, No muscle pain  Psychiatric - No depression, No anxiety      VITALS  Vital Signs (24 Hrs):  T(C): 36.8 (03-23-20 @ 08:40), Max: 36.8 (03-23-20 @ 08:40)  HR: 74 (03-23-20 @ 08:40) (60 - 74)  BP: 101/75 (03-23-20 @ 08:40) (101/75 - 102/67)  RR: 14 (03-23-20 @ 08:40) (14 - 14)  SpO2: 100% (03-23-20 @ 08:40) (97% - 100%)  Wt(kg): --          PHYSICAL EXAM  Constitutional - NAD, Comfortable  HEENT - NCAT, EOMI, +glasses, +thrush on tongue -improving  Neck - Supple, No limited ROM  Chest -  CTAB b/l no wheezing  Cardio - +S1/S2 RRR,   Abdomen -  Soft, NT ND, +BS (hicks g-tube out)  Extremities - No peripheral edema  Neurologic Exam:                 	   Cognitive -   	          Orientation: Awake, Alert, able to follow most commands, unable to test orientation as pt is aphasic  	   Speech - Non-fluent aphasia, comprehension fair, pt able to follow most commands, non-verbal but able to nod to questions  	   Cranial Nerves - left facial droop, tongue midline, no ptosis, shoulder shrug intact  	   Motor -  limited by aphasia and language   	                  LEFT    UE - ShAB 5/5, EF 5/5, EE 5/5, WE 5/5,  WNL  	                  RIGHT UE - ShAB 5/5, EF 5/5, EE 5/5, WE 5/5,  WNL  	                  LEFT    LE - HF 5/5, KE 5/5, DF 5/5, PF 5/5  	                  RIGHT LE - HF 5/5, KE 5/5, DF 5/5, PF 5/5     Psychiatric - Mood stable, Flat Affect, calm, no agitation    IDT Meeting 3/19  OT  grooming Bernie  UBD Bernie  LBD modA  transfers modA  PT  transfers min/modA  gait 40ft RW close stand by  impaireed coordination, ataxix  ST  aphonic  comprehension modA    RECENT LABS/IMAGING                        12.7   10.18 )-----------( 324      ( 23 Mar 2020 06:25 )             39.0     03-23    140  |  104  |  19  ----------------------------<  88  4.9   |  27  |  0.84    Ca    9.4      23 Mar 2020 06:25      MEDICATIONS  (STANDING):  aspirin  chewable 81 milliGRAM(s) Oral daily  atorvastatin 80 milliGRAM(s) Oral at bedtime  clopidogrel Tablet 75 milliGRAM(s) Oral daily  nystatin    Suspension 1 milliLiter(s) Oral three times a day  senna Syrup 5 milliLiter(s) Oral at bedtime  valproic  acid Syrup 125 milliGRAM(s) Oral daily    MEDICATIONS  (PRN):  acetaminophen   Tablet .. 650 milliGRAM(s) Oral every 6 hours PRN Temp greater or equal to 38C (100.4F), Mild Pain (1 - 3)  polyethylene glycol 3350 17 Gram(s) Oral daily PRN Constipation  valproic  acid Syrup 125 milliGRAM(s) Oral every 6 hours PRN Agitation HPI:  58 yo Croatian speaking Male with PMH of CVA 8yrs ago (no reported deficits) was initially brought into [facility] on 2/9/20 by brother for concerns of generalized weakness and inability to speak for the 3 weeks. Pt was found to have no verbal output, appeared withdrawn, intermittent command following and easily agitated.  CTH demonstrated chronic L MCA infarct with severely stenotic vs occluded distal L M1. Confirmed on MRI but also found to have an acute right post frontal infarct, benign LUIS and s/p Medtronic ILR placement 2/13. Patient also had sinus bradycardia, with no interventions recommended from cardiology.   Patient also with severe dysphagia and was s/p PEG placement on 2/2 by GI with endoscopic placement however post procedure pt pulled PEG out. As per GI patient at risk of 2nd endoscopic placement contraindicated due to risk of pulling out tube and peritonitis. Patient thus had laproscopic placement of G tube done by surgery on 2/27 to minimize risk of peritonitis if pulled out. On 3/2, noted with moderate leaking around G-tube: was pulled out from 9 cm in to only 1 cm in. G tube repositioned under fluoro on 3/2.    Neuro recommended malignancy work up 2/2 unexplained multiple embolic strokes. Oncology consult w Dr. Sanchez. Patient had CT C/A/P w  IV contrast only. no significant findings exc questionable renal mass, but sono neg and MRI confirmed it being a subcapsular hematoma.  Patient will need outptn colonoscopy after discharge. 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   Patient was discharged to acute rehab on 3/7, however on admission pt had a temp of 101 and pt PEG was found to be dislodged. Pt was transferred back to [facility] and PEG was replaced by surgery on 3/8. Patient tolerating feeds. Pt was found to have sepsis 2/2 aspiration pneumonia of b/l lower lobes and ID was consulted and pt was started on zosyn. Antibitoic course completed. RVP, blood cultutes were negative. Anticoagulation was held due to hematoma at rectum likley 2/2 g tube replacement. On 3/13 pt pulled out PEG and 16Fr hicks catheter was inserted through g tube tract and feeds were restarted after confirmation of placement.  Spoke with pt's nephew Dallas Arriola. As per Dallas, pt was living in [place] in a motel. Motel owner contacted family in [place] when he began acting strangely. family in [place] contacted pt's brother here in [place] and family brought him here and took him to [facility]. As per nephew, pt has no home here and no dispo plan at this time.   Patient is . He has 2 sons here but estranged from one son and other son is a college student.     SUBJECTIVE & INTERVAL HISTORY: Pt seen and examined this morning. No events over the weekend or overnight. No acute complaints. Noted that pt hicks g-tube was out. Pt eating all of his meals since diet was upgraded.  Denies chest pain or SOB    REVIEW OF SYSTEMS  Limited by pt aphasia  Constitutional - No fever, No fatigue  HEENT - No eye pain, No visual disturbances (wears glasses), No difficulty hearing,  Respiratory - No cough, No wheezing, No shortness of breath  Cardiovascular - No chest pain, No palpitations  Gastrointestinal - No abdominal pain, No nausea, No vomiting, No diarrhea, No constipation   Genitourinary - No dysuria, No frequency, No incontinence  Neurological - No headaches, No memory loss, + loss of strength, No numbness,   Skin - No itching, No rashes,   Musculoskeletal - No joint pain, No joint swelling, No muscle pain  Psychiatric - No depression, No anxiety      VITALS  Vital Signs (24 Hrs):  T(C): 36.8 (03-23-20 @ 08:40), Max: 36.8 (03-23-20 @ 08:40)  HR: 74 (03-23-20 @ 08:40) (60 - 74)  BP: 101/75 (03-23-20 @ 08:40) (101/75 - 102/67)  RR: 14 (03-23-20 @ 08:40) (14 - 14)  SpO2: 100% (03-23-20 @ 08:40) (97% - 100%)  Wt(kg): --          PHYSICAL EXAM  Constitutional - NAD, Comfortable  HEENT - NCAT, EOMI, +glasses, +thrush on tongue -improving  Neck - Supple, No limited ROM  Chest -  CTAB b/l no wheezing  Cardio - +S1/S2 RRR,   Abdomen -  Soft, NT ND, +BS (hicks g-tube out)--serous drainage, no pus, small area of red granulation tissue-- covered site with gauze  Extremities - No peripheral edema  Neurologic Exam:                 	   Cognitive -   	          Orientation: Awake, Alert, able to follow most commands, unable to test orientation as pt is aphasic  	   Speech - Non-fluent aphasia, comprehension fair, pt able to follow most commands, non-verbal but able to nod to questions  	   Cranial Nerves - left facial droop, tongue midline, no ptosis, shoulder shrug intact  	   Motor -  limited by aphasia and language   	                  LEFT    UE - ShAB 5/5, EF 5/5, EE 5/5, WE 5/5,  WNL  	                  RIGHT UE - ShAB 5/5, EF 5/5, EE 5/5, WE 5/5,  WNL  	                  LEFT    LE - HF 5/5, KE 5/5, DF 5/5, PF 5/5  	                  RIGHT LE - HF 5/5, KE 5/5, DF 5/5, PF 5/5     Psychiatric - Mood stable, Flat Affect, calm, no agitation    IDT Meeting 3/19  OT  grooming Bernie  UBD Bernie  LBD modA  transfers modA  PT  transfers min/modA  gait 40ft RW close stand by  impaireed coordination, ataxix  ST  aphonic  comprehension modA    RECENT LABS/IMAGING                        12.7   10.18 )-----------( 324      ( 23 Mar 2020 06:25 )             39.0     03-23    140  |  104  |  19  ----------------------------<  88  4.9   |  27  |  0.84    Ca    9.4      23 Mar 2020 06:25      MEDICATIONS  (STANDING):  aspirin  chewable 81 milliGRAM(s) Oral daily  atorvastatin 80 milliGRAM(s) Oral at bedtime  clopidogrel Tablet 75 milliGRAM(s) Oral daily  nystatin    Suspension 1 milliLiter(s) Oral three times a day  senna Syrup 5 milliLiter(s) Oral at bedtime  valproic  acid Syrup 125 milliGRAM(s) Oral daily    MEDICATIONS  (PRN):  acetaminophen   Tablet .. 650 milliGRAM(s) Oral every 6 hours PRN Temp greater or equal to 38C (100.4F), Mild Pain (1 - 3)  polyethylene glycol 3350 17 Gram(s) Oral daily PRN Constipation  valproic  acid Syrup 125 milliGRAM(s) Oral every 6 hours PRN Agitation

## 2020-03-24 DIAGNOSIS — Z93.1 GASTROSTOMY STATUS: ICD-10-CM

## 2020-03-24 PROCEDURE — 99232 SBSQ HOSP IP/OBS MODERATE 35: CPT

## 2020-03-24 PROCEDURE — 99232 SBSQ HOSP IP/OBS MODERATE 35: CPT | Mod: GC

## 2020-03-24 RX ORDER — BACITRACIN ZINC 500 UNIT/G
1 OINTMENT IN PACKET (EA) TOPICAL
Refills: 0 | Status: COMPLETED | OUTPATIENT
Start: 2020-03-24 | End: 2020-03-31

## 2020-03-24 RX ORDER — LACTOBACILLUS ACIDOPHILUS 100MM CELL
1 CAPSULE ORAL DAILY
Refills: 0 | Status: COMPLETED | OUTPATIENT
Start: 2020-03-24 | End: 2020-03-31

## 2020-03-24 RX ADMIN — Medication 50 MILLIGRAM(S): at 17:30

## 2020-03-24 RX ADMIN — Medication 1 MILLILITER(S): at 21:51

## 2020-03-24 RX ADMIN — Medication 81 MILLIGRAM(S): at 12:09

## 2020-03-24 RX ADMIN — Medication 1 MILLILITER(S): at 14:11

## 2020-03-24 RX ADMIN — Medication 1 MILLILITER(S): at 05:21

## 2020-03-24 RX ADMIN — Medication 125 MILLIGRAM(S): at 12:10

## 2020-03-24 RX ADMIN — Medication 1 TABLET(S): at 17:30

## 2020-03-24 RX ADMIN — Medication 1 APPLICATION(S): at 17:29

## 2020-03-24 RX ADMIN — SENNA PLUS 5 MILLILITER(S): 8.6 TABLET ORAL at 21:51

## 2020-03-24 RX ADMIN — ATORVASTATIN CALCIUM 80 MILLIGRAM(S): 80 TABLET, FILM COATED ORAL at 21:50

## 2020-03-24 RX ADMIN — CLOPIDOGREL BISULFATE 75 MILLIGRAM(S): 75 TABLET, FILM COATED ORAL at 12:09

## 2020-03-24 NOTE — PROGRESS NOTE ADULT - ATTENDING COMMENTS
Pt. seen with resident & fellow.  Agree with documentation above as per fellow with amendments made as appropriate. Patient medically stable. Making progress towards rehab goals.     Pt's Gtube removal site with more induration and purulent drainage.  GI consulted.  Started on doxycycline,  bacitracin and bacid.  Pt. eating well-- 100% of meals.

## 2020-03-24 NOTE — PROGRESS NOTE ADULT - SUBJECTIVE AND OBJECTIVE BOX
HPI  Pt is a 58yo M admitted to acute rehab for CVA  Pt was seen and examined in chair. Pt has no acute complaints. Hemodynamically stable.     Vital Signs Last 24 Hrs  T(C): 36.4 (24 Mar 2020 07:52), Max: 36.4 (23 Mar 2020 19:47)  T(F): 97.6 (24 Mar 2020 07:52), Max: 97.6 (24 Mar 2020 07:52)  HR: 56 (24 Mar 2020 07:52) (56 - 60)  BP: 101/69 (24 Mar 2020 07:52) (101/69 - 110/75)  BP(mean): --  RR: 14 (24 Mar 2020 07:52) (14 - 14)  SpO2: 100% (24 Mar 2020 07:52) (99% - 100%)    I&O's Summary    23 Mar 2020 07:01  -  24 Mar 2020 07:00  --------------------------------------------------------  IN: 360 mL / OUT: 0 mL / NET: 360 mL    24 Mar 2020 07:01  -  24 Mar 2020 10:30  --------------------------------------------------------  IN: 240 mL / OUT: 0 mL / NET: 240 mL        CAPILLARY BLOOD GLUCOSE          PHYSICAL EXAM:    Constitutional: NAD,   HEENT: PERR, EOMI,    Neck: Soft and supple   Respiratory: Breath sounds are clear bilaterally,   Cardiovascular: S1 and S2,    Gastrointestinal: Bowel Sounds present, soft, nontender,   Extremities: No peripheral edema  Vascular: 2+ peripheral pulses  Neurological: A/O x 3,  left facial droop   Musculoskeletal: 5/5 strength b/l upper and lower extremities  Skin: No rashes    MEDICATIONS:  MEDICATIONS  (STANDING):  aspirin  chewable 81 milliGRAM(s) Oral daily  atorvastatin 80 milliGRAM(s) Oral at bedtime  clopidogrel Tablet 75 milliGRAM(s) Oral daily  nystatin    Suspension 1 milliLiter(s) Oral three times a day  senna Syrup 5 milliLiter(s) Oral at bedtime  valproic  acid Syrup 125 milliGRAM(s) Oral daily      LABS: All Labs Reviewed:                        12.7   10.18 )-----------( 324      ( 23 Mar 2020 06:25 )             39.0     03-23    140  |  104  |  19  ----------------------------<  88  4.9   |  27  |  0.84    Ca    9.4      23 Mar 2020 06:25            Blood Culture:     RADIOLOGY/EKG:    DVT PPX:    ADVANCED DIRECTIVE:    DISPOSITION:

## 2020-03-24 NOTE — CONSULT NOTE ADULT - ASSESSMENT
58 y/o male admitted to [facility] and noted to have drainage and erythema around stoma of old peg site. WBC normal. Afebrile

## 2020-03-24 NOTE — PROGRESS NOTE ADULT - ASSESSMENT
58 yo Male with prior CVA 8 years ago with no deficits noted initially admitted with generalized weakness and difficulty speaking. CTH with chronic L MCA infarct with MRI showing acute right post frontal infarct. Pt with dysphagia s/p PEG placement, non-fluent aphasia, apraxia of speech, gait and ADL impairment. Hospital course complicated by with multiple PEG dislodgement and replacement. S/p completion of antibiotic treatment for aspiration pneumonia.     CVA   -Comprehensive Rehab Program of PT/OT/ SLP for Gait Instability, ADL, and Functional impairments  -ASA 81mg and Plavix 75mg for 90 days followed by ASA as monotherapy   -Lipitor 80mg qhs    Dysphagia - improving   -s/p PEG placement, pt pulled out PEG multiple times, 16Fr hicks g-tube dislodged on 3/23, no need to replace at this time as pt is eating all of his meals, no supplemental feeding was given through G-tube since diet was upgraded  -pressure dressing to g-tube site   -s/p MBS on 3/19, diet upgraded to puree and thin liquids    Agitation - stable  -Depakote 125 mg q6 hours PRN via PEG  -Depakote 125mg daily    Oral Thrush  -Nystatin swab TID  -oral care    Left renal lesion   -CT abdomen/pelvis done for malignancy workup due to multiple CVAs in the past showed ill defined left renal lesion   -Left renal lesion demonstrated MR findings (2/28) most compatible with small chronic subcapsular/pericapsular hematoma.  -Kidney sonogram negative; PSA negative   -outpatient colonoscopy as per heme/onc    Recent sepsis 2/2 aspiration pneumonia: resolved  -completed course of zosyn  -3/8 blood cx, UC, and RVP negative    Hematoma of rectus sheath  - likely 2/2 g tube replacement  - no anticoagulation  - no signs of active bleeding    Pain Mgmt: Tylenol PRN  Bowel: Monitor continence. Senna, Miralax PRN  Bladder: low PVR, incontinent   Sleep: monitor  Skin: Pressure injury prevention, turn Q2hrs while in bed, OOB to Chair  Diet: puree and thin with supplement of bolus feeds if pt eats less than 50% of feeds    Precautions: aspiration, fall  DVT: No AC, SCDs    ---------------------------------------------------------------------------------  Consults: Internal Medicine    Post Discharge Follow up:  Neurology: Craig Peterson (DO)  [address] [facility] [address]  [place] [address]  Phone: [phone]  Fax: [phone]  Follow Up Time: 2 weeks    Gastroenterology; Internal Medicine: John Squires (DO)  [address] [place] [address]  Phone: [phone]  Fax: [phone] 60 yo Male with prior CVA 8 years ago with no deficits noted initially admitted with generalized weakness and difficulty speaking. CTH with chronic L MCA infarct with MRI showing acute right post frontal infarct. Pt with dysphagia s/p PEG placement, non-fluent aphasia, apraxia of speech, gait and ADL impairment. Hospital course complicated by with multiple PEG dislodgement and replacement. S/p completion of antibiotic treatment for aspiration pneumonia.     CVA   -Comprehensive Rehab Program of PT/OT/ SLP for Gait Instability, ADL, and Functional impairments  -ASA 81mg and Plavix 75mg for 90 days followed by ASA as monotherapy   -Lipitor 80mg qhs    Dysphagia - improving   -s/p PEG placement, pt pulled out PEG multiple times, 16Fr hicks g-tube dislodged on 3/23, no need to replace at this time as pt is eating all of his meals, no supplemental feeding was given through G-tube since diet was upgraded  -pressure dressing to g-tube site   -s/p MBS on 3/19, diet upgraded to puree and thin liquids  -GI to see patient g-tube site (mild slough, granulation tissue present)    Agitation - stable  -Depakote 125 mg q6 hours PRN via PEG  -Depakote 125mg daily    Oral Thrush  -Nystatin swab TID  -oral care    Left renal lesion   -CT abdomen/pelvis done for malignancy workup due to multiple CVAs in the past showed ill defined left renal lesion   -Left renal lesion demonstrated MR findings (2/28) most compatible with small chronic subcapsular/pericapsular hematoma.  -Kidney sonogram negative; PSA negative   -outpatient colonoscopy as per heme/onc    Recent sepsis 2/2 aspiration pneumonia: resolved  -completed course of zosyn  -3/8 blood cx, UC, and RVP negative    Hematoma of rectus sheath  - likely 2/2 g tube replacement  - no anticoagulation  - no signs of active bleeding    Pain Mgmt: Tylenol PRN  Bowel: Monitor continence. Senna, Miralax PRN  Bladder: low PVR, incontinent   Sleep: monitor  Skin: Pressure injury prevention, turn Q2hrs while in bed, OOB to Chair  Diet: puree and thin with supplement of bolus feeds if pt eats less than 50% of feeds    Precautions: aspiration, fall  DVT: No AC, SCDs    ---------------------------------------------------------------------------------  Consults: Internal Medicine    Post Discharge Follow up:  Neurology: Craig Peterson (DO)  [address] [place] [address]  Phone: [phone]  Fax: [phone]  Follow Up Time: 2 weeks    Gastroenterology; Internal Medicine: John Squires (DO)  [address] [place] [address]  Phone: [phone]  Fax: [phone]

## 2020-03-24 NOTE — PROGRESS NOTE ADULT - SUBJECTIVE AND OBJECTIVE BOX
HPI:  60 yo Azeri speaking Male with PMH of CVA 8yrs ago (no reported deficits) was initially brought into [facility] on 2/9/20 by brother for concerns of generalized weakness and inability to speak for the 3 weeks. Pt was found to have no verbal output, appeared withdrawn, intermittent command following and easily agitated.  CTH demonstrated chronic L MCA infarct with severely stenotic vs occluded distal L M1. Confirmed on MRI but also found to have an acute right post frontal infarct, benign LUIS and s/p Medtronic ILR placement 2/13. Patient also had sinus bradycardia, with no interventions recommended from cardiology.   Patient also with severe dysphagia and was s/p PEG placement on 2/2 by GI with endoscopic placement however post procedure pt pulled PEG out. As per GI patient at risk of 2nd endoscopic placement contraindicated due to risk of pulling out tube and peritonitis. Patient thus had laproscopic placement of G tube done by surgery on 2/27 to minimize risk of peritonitis if pulled out. On 3/2, noted with moderate leaking around G-tube: was pulled out from 9 cm in to only 1 cm in. G tube repositioned under fluoro on 3/2.    Neuro recommended malignancy work up 2/2 unexplained multiple embolic strokes. Oncology consult w Dr. Sanchez. Patient had CT C/A/P w  IV contrast only. no significant findings exc questionable renal mass, but sono neg and MRI confirmed it being a subcapsular hematoma.  Patient will need outptn colonoscopy after discharge. 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   Patient was discharged to acute rehab on 3/7, however on admission pt had a temp of 101 and pt PEG was found to be dislodged. Pt was transferred back to [facility] and PEG was replaced by surgery on 3/8. Patient tolerating feeds. Pt was found to have sepsis 2/2 aspiration pneumonia of b/l lower lobes and ID was consulted and pt was started on zosyn. Antibitoic course completed. RVP, blood cultutes were negative. Anticoagulation was held due to hematoma at rectum likley 2/2 g tube replacement. On 3/13 pt pulled out PEG and 16Fr hicks catheter was inserted through g tube tract and feeds were restarted after confirmation of placement.  Spoke with pt's nephew Dallas Arriola. As per Dallas, pt was living in [place] in a motel. Motel owner contacted family in [place] when he began acting strangely. family in [place] contacted pt's brother here in [place] and family brought him here and took him to [facility]. As per nephew, pt has no home here and no dispo plan at this time.   Patient is . He has 2 sons here but estranged from one son and other son is a college student.     SUBJECTIVE & INTERVAL HISTORY: Pt seen and examined this morning. Pt seen eating in dining room. No events overnight. No acute complaints.    REVIEW OF SYSTEMS  Limited by pt aphasia  Constitutional - No fever, No fatigue  HEENT - No eye pain, No visual disturbances (wears glasses), No difficulty hearing,  Respiratory - No cough, No wheezing, No shortness of breath  Cardiovascular - No chest pain, No palpitations  Gastrointestinal - No abdominal pain, No nausea, No vomiting, No diarrhea, No constipation   Genitourinary - No dysuria, No frequency, No incontinence  Neurological - No headaches, No memory loss, + loss of strength, No numbness,   Skin - No itching, No rashes,   Musculoskeletal - No joint pain, No joint swelling, No muscle pain  Psychiatric - No depression, No anxiety    VITALS  Vital Signs (24 Hrs):  T(C): 36.4 (03-24-20 @ 07:52), Max: 36.4 (03-23-20 @ 19:47)  HR: 56 (03-24-20 @ 07:52) (56 - 60)  BP: 101/69 (03-24-20 @ 07:52) (101/69 - 110/75)  RR: 14 (03-24-20 @ 07:52) (14 - 14)  SpO2: 100% (03-24-20 @ 07:52) (99% - 100%)  Wt(kg): --    PHYSICAL EXAM  Constitutional - NAD, Comfortable  HEENT - NCAT, EOMI, +glasses, +thrush on tongue -improving  Neck - Supple, No limited ROM  Chest -  CTAB b/l no wheezing  Cardio - +S1/S2 RRR,   Abdomen -  Soft, NT ND, +BS (hicks g-tube out)--serous drainage, no pus, small area of red granulation tissue-- covered site with gauze  Extremities - No peripheral edema  Neurologic Exam:                 	   Cognitive -   	          Orientation: Awake, Alert, able to follow most commands, unable to test orientation as pt is aphasic  	   Speech - Non-fluent aphasia, comprehension fair, pt able to follow most commands, non-verbal but able to nod to questions  	   Cranial Nerves - left facial droop, tongue midline, no ptosis, shoulder shrug intact  	   Motor -  limited by aphasia and language   	                  LEFT    UE - ShAB 5/5, EF 5/5, EE 5/5, WE 5/5,  WNL  	                  RIGHT UE - ShAB 5/5, EF 5/5, EE 5/5, WE 5/5,  WNL  	                  LEFT    LE - HF 5/5, KE 5/5, DF 5/5, PF 5/5  	                  RIGHT LE - HF 5/5, KE 5/5, DF 5/5, PF 5/5     Psychiatric - Mood stable, Flat Affect, calm, no agitation    IDT Meeting 3/19  OT  grooming Bernie  UBD Bernie  LBD modA  transfers modA  PT  transfers min/modA  gait 40ft RW close stand by  impaireed coordination, ataxix  ST  aphonic  comprehension modA    RECENT LABS/IMAGING                        12.7   10.18 )-----------( 324      ( 23 Mar 2020 06:25 )             39.0     03-23    140  |  104  |  19  ----------------------------<  88  4.9   |  27  |  0.84    Ca    9.4      23 Mar 2020 06:25      MEDICATIONS  (STANDING):  aspirin  chewable 81 milliGRAM(s) Oral daily  atorvastatin 80 milliGRAM(s) Oral at bedtime  clopidogrel Tablet 75 milliGRAM(s) Oral daily  nystatin    Suspension 1 milliLiter(s) Oral three times a day  senna Syrup 5 milliLiter(s) Oral at bedtime  valproic  acid Syrup 125 milliGRAM(s) Oral daily    MEDICATIONS  (PRN):  acetaminophen   Tablet .. 650 milliGRAM(s) Oral every 6 hours PRN Temp greater or equal to 38C (100.4F), Mild Pain (1 - 3)  polyethylene glycol 3350 17 Gram(s) Oral daily PRN Constipation  valproic  acid Syrup 125 milliGRAM(s) Oral every 6 hours PRN Agitation HPI:  60 yo French speaking Male with PMH of CVA 8yrs ago (no reported deficits) was initially brought into [facility] on 2/9/20 by brother for concerns of generalized weakness and inability to speak for the 3 weeks. Pt was found to have no verbal output, appeared withdrawn, intermittent command following and easily agitated.  CTH demonstrated chronic L MCA infarct with severely stenotic vs occluded distal L M1. Confirmed on MRI but also found to have an acute right post frontal infarct, benign LUIS and s/p Medtronic ILR placement 2/13. Patient also had sinus bradycardia, with no interventions recommended from cardiology.   Patient also with severe dysphagia and was s/p PEG placement on 2/2 by GI with endoscopic placement however post procedure pt pulled PEG out. As per GI patient at risk of 2nd endoscopic placement contraindicated due to risk of pulling out tube and peritonitis. Patient thus had laproscopic placement of G tube done by surgery on 2/27 to minimize risk of peritonitis if pulled out. On 3/2, noted with moderate leaking around G-tube: was pulled out from 9 cm in to only 1 cm in. G tube repositioned under fluoro on 3/2.    Neuro recommended malignancy work up 2/2 unexplained multiple embolic strokes. Oncology consult w Dr. Sanchez. Patient had CT C/A/P w  IV contrast only. no significant findings exc questionable renal mass, but sono neg and MRI confirmed it being a subcapsular hematoma.  Patient will need outptn colonoscopy after discharge. 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   Patient was discharged to acute rehab on 3/7, however on admission pt had a temp of 101 and pt PEG was found to be dislodged. Pt was transferred back to [facility] and PEG was replaced by surgery on 3/8. Patient tolerating feeds. Pt was found to have sepsis 2/2 aspiration pneumonia of b/l lower lobes and ID was consulted and pt was started on zosyn. Antibitoic course completed. RVP, blood cultutes were negative. Anticoagulation was held due to hematoma at rectum likley 2/2 g tube replacement. On 3/13 pt pulled out PEG and 16Fr hicks catheter was inserted through g tube tract and feeds were restarted after confirmation of placement.  Spoke with pt's nephew Dallas Arriola. As per Dallas, pt was living in [place] in a motel. Motel owner contacted family in [place] when he began acting strangely. family in [place] contacted pt's brother here in [place] and family brought him here and took him to [facility]. As per nephew, pt has no home here and no dispo plan at this time.   Patient is . He has 2 sons here but estranged from one son and other son is a college student.     SUBJECTIVE & INTERVAL HISTORY: Pt seen and examined this morning. Pt seen eating by himself in dining room. No events overnight. No acute complaints. Denies abdominal pain, nausea, vomiting. G-tube site examined    REVIEW OF SYSTEMS  Limited by pt aphasia  Constitutional - No fever, No fatigue  HEENT - No eye pain, No visual disturbances (wears glasses), No difficulty hearing,  Respiratory - No cough, No wheezing, No shortness of breath  Cardiovascular - No chest pain, No palpitations  Gastrointestinal - No abdominal pain, No nausea, No vomiting, No diarrhea, No constipation   Genitourinary - No dysuria,No incontinence  Neurological - No headaches, No memory loss, + loss of strength, No numbness,   Skin - No itching, No rashes,   Musculoskeletal - No joint pain, No joint swelling, No muscle pain  Psychiatric - No depression, No anxiety    VITALS  Vital Signs (24 Hrs):  T(C): 36.4 (03-24-20 @ 07:52), Max: 36.4 (03-23-20 @ 19:47)  HR: 56 (03-24-20 @ 07:52) (56 - 60)  BP: 101/69 (03-24-20 @ 07:52) (101/69 - 110/75)  RR: 14 (03-24-20 @ 07:52) (14 - 14)  SpO2: 100% (03-24-20 @ 07:52) (99% - 100%)  Wt(kg): --    PHYSICAL EXAM  Constitutional - NAD, Comfortable  HEENT - NCAT, EOMI, +glasses, +thrush on tongue -improving  Neck - Supple, No limited ROM  Chest -  CTAB b/l no wheezing  Cardio - +S1/S2 RRR,   Abdomen -  Soft, NT ND, +BS (hicks g-tube out)--serous drainage, no pus, mild slough, small area of red granulation tissue-covered site with gauze  Extremities - No peripheral edema  Neurologic Exam:                 	   Cognitive -   	          Orientation: Awake, Alert, able to follow most commands, unable to test orientation as pt is aphasic  	   Speech - Non-fluent aphasia, comprehension fair, pt able to follow most commands, non-verbal but able to nod to questions  	   Cranial Nerves - left facial droop, tongue midline, no ptosis, shoulder shrug intact  	   Motor -  limited by aphasia and language   	                  LEFT    UE - ShAB 5/5, EF 5/5, EE 5/5, WE 5/5,  WNL  	                  RIGHT UE - ShAB 5/5, EF 5/5, EE 5/5, WE 5/5,  WNL  	                  LEFT    LE - HF 5/5, KE 5/5, DF 5/5, PF 5/5  	                  RIGHT LE - HF 5/5, KE 5/5, DF 5/5, PF 5/5     Psychiatric - Mood stable, calm, no agitation    IDT Meeting 3/19  OT  grooming Bernie  UBD Bernie  LBD modA  transfers modA  PT  transfers min/modA  gait 40ft RW close stand by  impaireed coordination, ataxix  ST  aphonic  comprehension modA    RECENT LABS/IMAGING                        12.7   10.18 )-----------( 324      ( 23 Mar 2020 06:25 )             39.0     03-23    140  |  104  |  19  ----------------------------<  88  4.9   |  27  |  0.84    Ca    9.4      23 Mar 2020 06:25      MEDICATIONS  (STANDING):  aspirin  chewable 81 milliGRAM(s) Oral daily  atorvastatin 80 milliGRAM(s) Oral at bedtime  clopidogrel Tablet 75 milliGRAM(s) Oral daily  nystatin    Suspension 1 milliLiter(s) Oral three times a day  senna Syrup 5 milliLiter(s) Oral at bedtime  valproic  acid Syrup 125 milliGRAM(s) Oral daily    MEDICATIONS  (PRN):  acetaminophen   Tablet .. 650 milliGRAM(s) Oral every 6 hours PRN Temp greater or equal to 38C (100.4F), Mild Pain (1 - 3)  polyethylene glycol 3350 17 Gram(s) Oral daily PRN Constipation  valproic  acid Syrup 125 milliGRAM(s) Oral every 6 hours PRN Agitation HPI:  58 yo Thai speaking Male with PMH of CVA 8yrs ago (no reported deficits) was initially brought into [facility] on 2/9/20 by brother for concerns of generalized weakness and inability to speak for the 3 weeks. Pt was found to have no verbal output, appeared withdrawn, intermittent command following and easily agitated.  CTH demonstrated chronic L MCA infarct with severely stenotic vs occluded distal L M1. Confirmed on MRI but also found to have an acute right post frontal infarct, benign LUIS and s/p Medtronic ILR placement 2/13. Patient also had sinus bradycardia, with no interventions recommended from cardiology.   Patient also with severe dysphagia and was s/p PEG placement on 2/2 by GI with endoscopic placement however post procedure pt pulled PEG out. As per GI patient at risk of 2nd endoscopic placement contraindicated due to risk of pulling out tube and peritonitis. Patient thus had laproscopic placement of G tube done by surgery on 2/27 to minimize risk of peritonitis if pulled out. On 3/2, noted with moderate leaking around G-tube: was pulled out from 9 cm in to only 1 cm in. G tube repositioned under fluoro on 3/2.    Neuro recommended malignancy work up 2/2 unexplained multiple embolic strokes. Oncology consult w Dr. Sanchez. Patient had CT C/A/P w  IV contrast only. no significant findings exc questionable renal mass, but sono neg and MRI confirmed it being a subcapsular hematoma.  Patient will need outptn colonoscopy after discharge. 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   Patient was discharged to acute rehab on 3/7, however on admission pt had a temp of 101 and pt PEG was found to be dislodged. Pt was transferred back to [facility] and PEG was replaced by surgery on 3/8. Patient tolerating feeds. Pt was found to have sepsis 2/2 aspiration pneumonia of b/l lower lobes and ID was consulted and pt was started on zosyn. Antibitoic course completed. RVP, blood cultutes were negative. Anticoagulation was held due to hematoma at rectum likley 2/2 g tube replacement. On 3/13 pt pulled out PEG and 16Fr hicks catheter was inserted through g tube tract and feeds were restarted after confirmation of placement.  Spoke with pt's nephew Dallas Arriola. As per Dallas, pt was living in [place] in a motel. Motel owner contacted family in [place] when he began acting strangely. family in [place] contacted pt's brother here in [place] and family brought him here and took him to [facility]. As per nephew, pt has no home here and no dispo plan at this time.   Patient is . He has 2 sons here but estranged from one son and other son is a college student.     SUBJECTIVE & INTERVAL HISTORY: Pt seen and examined this morning. Pt seen eating by himself in dining room. No events overnight. No acute complaints. Denies abdominal pain, nausea, vomiting. G-tube site examined    REVIEW OF SYSTEMS  Limited by pt aphasia  Constitutional - No fever, No fatigue  HEENT - No eye pain, No visual disturbances (wears glasses), No difficulty hearing,  Respiratory - No cough, No wheezing, No shortness of breath  Cardiovascular - No chest pain, No palpitations  Gastrointestinal - No abdominal pain, No nausea, No vomiting, No diarrhea, No constipation   Genitourinary - No dysuria,No incontinence  Neurological - No headaches, No memory loss, + loss of strength, No numbness,   Skin - No itching, No rashes,   Musculoskeletal - No joint pain, No joint swelling, No muscle pain  Psychiatric - No depression, No anxiety    VITALS  Vital Signs (24 Hrs):  T(C): 36.4 (03-24-20 @ 07:52), Max: 36.4 (03-23-20 @ 19:47)  HR: 56 (03-24-20 @ 07:52) (56 - 60)  BP: 101/69 (03-24-20 @ 07:52) (101/69 - 110/75)  RR: 14 (03-24-20 @ 07:52) (14 - 14)  SpO2: 100% (03-24-20 @ 07:52) (99% - 100%)  Wt(kg): --    PHYSICAL EXAM  Constitutional - NAD, Comfortable  HEENT - NCAT, EOMI, +glasses, +thrush on tongue -improving  Neck - Supple, No limited ROM  Chest -  CTAB b/l no wheezing  Cardio - +S1/S2 RRR,   Abdomen -  Soft, NT ND, +BS (hicks g-tube out)--+red granulation tissue, more indurated today, purulent drainage  Extremities - No peripheral edema  Neurologic Exam:                 	   Cognitive -   	          Orientation: Awake, Alert, able to follow most commands, unable to test orientation as pt is aphasic  	   Speech - Non-fluent aphasia, comprehension fair, pt able to follow most commands, non-verbal but able to nod to questions  	   Cranial Nerves - left facial droop, tongue midline, no ptosis, shoulder shrug intact  	   Motor -  limited by aphasia and language   	                  LEFT    UE - ShAB 5/5, EF 5/5, EE 5/5, WE 5/5,  WNL  	                  RIGHT UE - ShAB 5/5, EF 5/5, EE 5/5, WE 5/5,  WNL  	                  LEFT    LE - HF 5/5, KE 5/5, DF 5/5, PF 5/5  	                  RIGHT LE - HF 5/5, KE 5/5, DF 5/5, PF 5/5     Psychiatric - Mood stable, calm, no agitation    IDT Meeting 3/19  OT  grooming Bernie  UBD Bernie  LBD modA  transfers modA  PT  transfers min/modA  gait 40ft RW close stand by  impaireed coordination, ataxix  ST  aphonic  comprehension modA    RECENT LABS/IMAGING                        12.7   10.18 )-----------( 324      ( 23 Mar 2020 06:25 )             39.0     03-23    140  |  104  |  19  ----------------------------<  88  4.9   |  27  |  0.84    Ca    9.4      23 Mar 2020 06:25      MEDICATIONS  (STANDING):  aspirin  chewable 81 milliGRAM(s) Oral daily  atorvastatin 80 milliGRAM(s) Oral at bedtime  clopidogrel Tablet 75 milliGRAM(s) Oral daily  nystatin    Suspension 1 milliLiter(s) Oral three times a day  senna Syrup 5 milliLiter(s) Oral at bedtime  valproic  acid Syrup 125 milliGRAM(s) Oral daily    MEDICATIONS  (PRN):  acetaminophen   Tablet .. 650 milliGRAM(s) Oral every 6 hours PRN Temp greater or equal to 38C (100.4F), Mild Pain (1 - 3)  polyethylene glycol 3350 17 Gram(s) Oral daily PRN Constipation  valproic  acid Syrup 125 milliGRAM(s) Oral every 6 hours PRN Agitation

## 2020-03-24 NOTE — CONSULT NOTE ADULT - PROBLEM SELECTOR RECOMMENDATION 9
Erythema, Induration, and Drainage noted at old peg site  Culture site  Bacitracin BID x 7 days to site  Start Doxy 50 PO BID x 7 days, may open capsule and mix with applesauce  Bacid 1 capsule daily x 7 days

## 2020-03-24 NOTE — CONSULT NOTE ADULT - SUBJECTIVE AND OBJECTIVE BOX
INTERVAL HPI/OVERNIGHT EVENTS:  HPI:  60 y/o male admitted to [facility] s/p recent L MCA. GI consulted to see patient due to erythema and drainage noted at previous peg site. Per records patient had peg placed and self removed. Marsh catheter was placed to maintain track but became dislodged yesterday. On exam     MEDICATIONS  (STANDING):  aspirin  chewable 81 milliGRAM(s) Oral daily  atorvastatin 80 milliGRAM(s) Oral at bedtime  BACItracin   Ointment 1 Application(s) Topical two times a day  clopidogrel Tablet 75 milliGRAM(s) Oral daily  doxycycline hyclate Capsule 50 milliGRAM(s) Oral every 12 hours  lactobacillus acidophilus 1 Tablet(s) Oral daily  nystatin    Suspension 1 milliLiter(s) Oral three times a day  senna Syrup 5 milliLiter(s) Oral at bedtime  valproic  acid Syrup 125 milliGRAM(s) Oral daily    MEDICATIONS  (PRN):  acetaminophen   Tablet .. 650 milliGRAM(s) Oral every 6 hours PRN Temp greater or equal to 38C (100.4F), Mild Pain (1 - 3)  polyethylene glycol 3350 17 Gram(s) Oral daily PRN Constipation  valproic  acid Syrup 125 milliGRAM(s) Oral every 6 hours PRN Agitation      Allergies    No Known Allergies    Intolerances      PAST MEDICAL & SURGICAL HISTORY:  CVA (cerebral vascular accident)  S/P percutaneous endoscopic gastrostomy (PEG) tube placement    PHYSICAL EXAM:   Vital Signs:  Vital Signs Last 24 Hrs  T(C): 36.4 (24 Mar 2020 07:52), Max: 36.4 (23 Mar 2020 19:47)  T(F): 97.6 (24 Mar 2020 07:52), Max: 97.6 (24 Mar 2020 07:52)  HR: 56 (24 Mar 2020 07:52) (56 - 60)  BP: 101/69 (24 Mar 2020 07:52) (101/69 - 110/75)  BP(mean): --  RR: 14 (24 Mar 2020 07:52) (14 - 14)  SpO2: 100% (24 Mar 2020 07:52) (99% - 100%)  Daily     Daily Weight in k.4 (23 Mar 2020 23:55)I&O's Summary    23 Mar 2020 07:01  -  24 Mar 2020 07:00  --------------------------------------------------------  IN: 360 mL / OUT: 0 mL / NET: 360 mL    24 Mar 2020 07:01  -  24 Mar 2020 12:57  --------------------------------------------------------  IN: 240 mL / OUT: 0 mL / NET: 240 mL      GENERAL:  Appears stated age,  HEENT:  NC/AT,  conjunctivae clear and pink,   CHEST:  Full & symmetric excursion, no increased effort, breath sounds clear  HEART:  Regular rhythm, S1, S2,   ABDOMEN:  Soft, non-tender, non-distended, normoactive bowel sounds, erythema and induration around stoma of previous peg site, purulent drainage noted     LABS:                        12.7   10.18 )-----------( 324      ( 23 Mar 2020 06:25 )             39.0     03-23    140  |  104  |  19  ----------------------------<  88  4.9   |  27  |  0.84    Ca    9.4      23 Mar 2020 06:25

## 2020-03-24 NOTE — PROGRESS NOTE ADULT - ASSESSMENT
Pt was seen and exmained at bedside. Pt has no acute complaints. Hemodynamically stable.     *CVA    Cont acute rehab   PT/OT/SLP   cont ASA, plavix  cont statin   Seizure ppx valproic     *Dysphagia  puree with thin     *thrush   nystatin swab     *Left renal lesion   likely small chronic subcapsular/pericapsular hematoma   out pt follow up     *DVT ppx   due to hematoma of rectus sheeth, no chemical dvt ppx   TEDs

## 2020-03-25 ENCOUNTER — TRANSCRIPTION ENCOUNTER (OUTPATIENT)
Age: 60
End: 2020-03-25

## 2020-03-25 LAB
ABO RH CONFIRMATION: SIGNIFICANT CHANGE UP
ANION GAP SERPL CALC-SCNC: 11 MMOL/L — SIGNIFICANT CHANGE UP (ref 5–17)
APPEARANCE UR: CLEAR — SIGNIFICANT CHANGE UP
BASOPHILS # BLD AUTO: 0.04 K/UL — SIGNIFICANT CHANGE UP (ref 0–0.2)
BASOPHILS # BLD AUTO: 0.06 K/UL — SIGNIFICANT CHANGE UP (ref 0–0.2)
BASOPHILS NFR BLD AUTO: 0.3 % — SIGNIFICANT CHANGE UP (ref 0–2)
BASOPHILS NFR BLD AUTO: 0.4 % — SIGNIFICANT CHANGE UP (ref 0–2)
BILIRUB UR-MCNC: NEGATIVE — SIGNIFICANT CHANGE UP
BUN SERPL-MCNC: 18 MG/DL — SIGNIFICANT CHANGE UP (ref 7–23)
CALCIUM SERPL-MCNC: 8.9 MG/DL — SIGNIFICANT CHANGE UP (ref 8.4–10.5)
CHLORIDE SERPL-SCNC: 102 MMOL/L — SIGNIFICANT CHANGE UP (ref 96–108)
CO2 SERPL-SCNC: 23 MMOL/L — SIGNIFICANT CHANGE UP (ref 22–31)
COLOR SPEC: YELLOW — SIGNIFICANT CHANGE UP
CREAT SERPL-MCNC: 0.98 MG/DL — SIGNIFICANT CHANGE UP (ref 0.5–1.3)
DIFF PNL FLD: NEGATIVE — SIGNIFICANT CHANGE UP
EOSINOPHIL # BLD AUTO: 0.04 K/UL — SIGNIFICANT CHANGE UP (ref 0–0.5)
EOSINOPHIL # BLD AUTO: 0.06 K/UL — SIGNIFICANT CHANGE UP (ref 0–0.5)
EOSINOPHIL NFR BLD AUTO: 0.3 % — SIGNIFICANT CHANGE UP (ref 0–6)
EOSINOPHIL NFR BLD AUTO: 0.4 % — SIGNIFICANT CHANGE UP (ref 0–6)
GLUCOSE BLDC GLUCOMTR-MCNC: 163 MG/DL — HIGH (ref 70–99)
GLUCOSE SERPL-MCNC: 194 MG/DL — HIGH (ref 70–99)
GLUCOSE UR QL: NEGATIVE — SIGNIFICANT CHANGE UP
HCT VFR BLD CALC: 25.7 % — LOW (ref 39–50)
HCT VFR BLD CALC: 27.4 % — LOW (ref 39–50)
HCT VFR BLD CALC: 29.3 % — LOW (ref 39–50)
HGB BLD-MCNC: 8.5 G/DL — LOW (ref 13–17)
HGB BLD-MCNC: 8.9 G/DL — LOW (ref 13–17)
HGB BLD-MCNC: 9.6 G/DL — LOW (ref 13–17)
IMM GRANULOCYTES NFR BLD AUTO: 0.5 % — SIGNIFICANT CHANGE UP (ref 0–1.5)
IMM GRANULOCYTES NFR BLD AUTO: 0.5 % — SIGNIFICANT CHANGE UP (ref 0–1.5)
KETONES UR-MCNC: NEGATIVE — SIGNIFICANT CHANGE UP
LEUKOCYTE ESTERASE UR-ACNC: NEGATIVE — SIGNIFICANT CHANGE UP
LYMPHOCYTES # BLD AUTO: 1.23 K/UL — SIGNIFICANT CHANGE UP (ref 1–3.3)
LYMPHOCYTES # BLD AUTO: 1.38 K/UL — SIGNIFICANT CHANGE UP (ref 1–3.3)
LYMPHOCYTES # BLD AUTO: 10.6 % — LOW (ref 13–44)
LYMPHOCYTES # BLD AUTO: 7.2 % — LOW (ref 13–44)
MAGNESIUM SERPL-MCNC: 1.9 MG/DL — SIGNIFICANT CHANGE UP (ref 1.6–2.6)
MCHC RBC-ENTMCNC: 32 PG — SIGNIFICANT CHANGE UP (ref 27–34)
MCHC RBC-ENTMCNC: 32.5 GM/DL — SIGNIFICANT CHANGE UP (ref 32–36)
MCHC RBC-ENTMCNC: 32.8 GM/DL — SIGNIFICANT CHANGE UP (ref 32–36)
MCHC RBC-ENTMCNC: 32.8 PG — SIGNIFICANT CHANGE UP (ref 27–34)
MCHC RBC-ENTMCNC: 32.8 PG — SIGNIFICANT CHANGE UP (ref 27–34)
MCHC RBC-ENTMCNC: 33.1 GM/DL — SIGNIFICANT CHANGE UP (ref 32–36)
MCV RBC AUTO: 100 FL — SIGNIFICANT CHANGE UP (ref 80–100)
MCV RBC AUTO: 98.6 FL — SIGNIFICANT CHANGE UP (ref 80–100)
MCV RBC AUTO: 99.2 FL — SIGNIFICANT CHANGE UP (ref 80–100)
MONOCYTES # BLD AUTO: 0.32 K/UL — SIGNIFICANT CHANGE UP (ref 0–0.9)
MONOCYTES # BLD AUTO: 0.42 K/UL — SIGNIFICANT CHANGE UP (ref 0–0.9)
MONOCYTES NFR BLD AUTO: 2.5 % — SIGNIFICANT CHANGE UP (ref 2–14)
MONOCYTES NFR BLD AUTO: 2.5 % — SIGNIFICANT CHANGE UP (ref 2–14)
NEUTROPHILS # BLD AUTO: 11.22 K/UL — HIGH (ref 1.8–7.4)
NEUTROPHILS # BLD AUTO: 15.26 K/UL — HIGH (ref 1.8–7.4)
NEUTROPHILS NFR BLD AUTO: 85.8 % — HIGH (ref 43–77)
NEUTROPHILS NFR BLD AUTO: 89 % — HIGH (ref 43–77)
NITRITE UR-MCNC: NEGATIVE — SIGNIFICANT CHANGE UP
NRBC # BLD: 0 /100 WBCS — SIGNIFICANT CHANGE UP (ref 0–0)
PH UR: 6.5 — SIGNIFICANT CHANGE UP (ref 5–8)
PLATELET # BLD AUTO: 280 K/UL — SIGNIFICANT CHANGE UP (ref 150–400)
PLATELET # BLD AUTO: 304 K/UL — SIGNIFICANT CHANGE UP (ref 150–400)
PLATELET # BLD AUTO: 336 K/UL — SIGNIFICANT CHANGE UP (ref 150–400)
POTASSIUM SERPL-MCNC: 5 MMOL/L — SIGNIFICANT CHANGE UP (ref 3.5–5.3)
POTASSIUM SERPL-SCNC: 5 MMOL/L — SIGNIFICANT CHANGE UP (ref 3.5–5.3)
PROT UR-MCNC: NEGATIVE — SIGNIFICANT CHANGE UP
RBC # BLD: 2.59 M/UL — LOW (ref 4.2–5.8)
RBC # BLD: 2.78 M/UL — LOW (ref 4.2–5.8)
RBC # BLD: 2.93 M/UL — LOW (ref 4.2–5.8)
RBC # FLD: 13 % — SIGNIFICANT CHANGE UP (ref 10.3–14.5)
RBC # FLD: 13.2 % — SIGNIFICANT CHANGE UP (ref 10.3–14.5)
RBC # FLD: 13.2 % — SIGNIFICANT CHANGE UP (ref 10.3–14.5)
SODIUM SERPL-SCNC: 136 MMOL/L — SIGNIFICANT CHANGE UP (ref 135–145)
SP GR SPEC: 1.01 — SIGNIFICANT CHANGE UP (ref 1.01–1.02)
TROPONIN I SERPL-MCNC: 0.04 NG/ML — SIGNIFICANT CHANGE UP (ref 0.02–0.06)
UROBILINOGEN FLD QL: NEGATIVE — SIGNIFICANT CHANGE UP
WBC # BLD: 13.06 K/UL — HIGH (ref 3.8–10.5)
WBC # BLD: 17.12 K/UL — HIGH (ref 3.8–10.5)
WBC # BLD: 18.5 K/UL — HIGH (ref 3.8–10.5)
WBC # FLD AUTO: 13.06 K/UL — HIGH (ref 3.8–10.5)
WBC # FLD AUTO: 17.12 K/UL — HIGH (ref 3.8–10.5)
WBC # FLD AUTO: 18.5 K/UL — HIGH (ref 3.8–10.5)

## 2020-03-25 PROCEDURE — 99233 SBSQ HOSP IP/OBS HIGH 50: CPT

## 2020-03-25 PROCEDURE — 74177 CT ABD & PELVIS W/CONTRAST: CPT | Mod: 26

## 2020-03-25 PROCEDURE — 93010 ELECTROCARDIOGRAM REPORT: CPT

## 2020-03-25 PROCEDURE — 71045 X-RAY EXAM CHEST 1 VIEW: CPT | Mod: 26

## 2020-03-25 RX ORDER — SODIUM CHLORIDE 9 MG/ML
500 INJECTION INTRAMUSCULAR; INTRAVENOUS; SUBCUTANEOUS ONCE
Refills: 0 | Status: COMPLETED | OUTPATIENT
Start: 2020-03-25 | End: 2020-03-25

## 2020-03-25 RX ORDER — PANTOPRAZOLE SODIUM 20 MG/1
40 TABLET, DELAYED RELEASE ORAL
Refills: 0 | Status: DISCONTINUED | OUTPATIENT
Start: 2020-03-25 | End: 2020-03-30

## 2020-03-25 RX ORDER — SODIUM CHLORIDE 9 MG/ML
1000 INJECTION INTRAMUSCULAR; INTRAVENOUS; SUBCUTANEOUS
Refills: 0 | Status: DISCONTINUED | OUTPATIENT
Start: 2020-03-25 | End: 2020-03-25

## 2020-03-25 RX ORDER — SODIUM CHLORIDE 9 MG/ML
1000 INJECTION INTRAMUSCULAR; INTRAVENOUS; SUBCUTANEOUS
Refills: 0 | Status: DISCONTINUED | OUTPATIENT
Start: 2020-03-25 | End: 2020-03-30

## 2020-03-25 RX ORDER — CEFTRIAXONE 500 MG/1
1000 INJECTION, POWDER, FOR SOLUTION INTRAMUSCULAR; INTRAVENOUS EVERY 24 HOURS
Refills: 0 | Status: COMPLETED | OUTPATIENT
Start: 2020-03-25 | End: 2020-03-31

## 2020-03-25 RX ORDER — PANTOPRAZOLE SODIUM 20 MG/1
40 TABLET, DELAYED RELEASE ORAL
Refills: 0 | Status: DISCONTINUED | OUTPATIENT
Start: 2020-03-25 | End: 2020-03-25

## 2020-03-25 RX ADMIN — Medication 1 TABLET(S): at 11:27

## 2020-03-25 RX ADMIN — Medication 1 APPLICATION(S): at 17:35

## 2020-03-25 RX ADMIN — Medication 1 APPLICATION(S): at 06:25

## 2020-03-25 RX ADMIN — PANTOPRAZOLE SODIUM 40 MILLIGRAM(S): 20 TABLET, DELAYED RELEASE ORAL at 17:36

## 2020-03-25 RX ADMIN — Medication 1 MILLILITER(S): at 06:24

## 2020-03-25 RX ADMIN — SODIUM CHLORIDE 70 MILLILITER(S): 9 INJECTION INTRAMUSCULAR; INTRAVENOUS; SUBCUTANEOUS at 21:25

## 2020-03-25 RX ADMIN — SODIUM CHLORIDE 500 MILLILITER(S): 9 INJECTION INTRAMUSCULAR; INTRAVENOUS; SUBCUTANEOUS at 03:48

## 2020-03-25 RX ADMIN — SODIUM CHLORIDE 500 MILLILITER(S): 9 INJECTION INTRAMUSCULAR; INTRAVENOUS; SUBCUTANEOUS at 04:20

## 2020-03-25 RX ADMIN — Medication 125 MILLIGRAM(S): at 11:27

## 2020-03-25 RX ADMIN — Medication 50 MILLIGRAM(S): at 06:24

## 2020-03-25 RX ADMIN — Medication 1 MILLILITER(S): at 11:28

## 2020-03-25 RX ADMIN — CEFTRIAXONE 100 MILLIGRAM(S): 500 INJECTION, POWDER, FOR SOLUTION INTRAMUSCULAR; INTRAVENOUS at 15:01

## 2020-03-25 RX ADMIN — SODIUM CHLORIDE 70 MILLILITER(S): 9 INJECTION INTRAMUSCULAR; INTRAVENOUS; SUBCUTANEOUS at 09:25

## 2020-03-25 NOTE — PROGRESS NOTE ADULT - SUBJECTIVE AND OBJECTIVE BOX
CC: Follow up after rapid response early this AM  Pt noted to have drop in H/h and CT scan performed preliminary reports of bleeding     Pt in no distress, denies pain    MEDICATIONS  (STANDING):  aspirin  chewable 81 milliGRAM(s) Oral daily  atorvastatin 80 milliGRAM(s) Oral at bedtime  BACItracin   Ointment 1 Application(s) Topical two times a day  clopidogrel Tablet 75 milliGRAM(s) Oral daily  doxycycline hyclate Capsule 50 milliGRAM(s) Oral every 12 hours  lactobacillus acidophilus 1 Tablet(s) Oral daily  nystatin    Suspension 1 milliLiter(s) Oral three times a day  pantoprazole   Suspension 40 milliGRAM(s) Oral two times a day  senna Syrup 5 milliLiter(s) Oral at bedtime  sodium chloride 0.9%. 1000 milliLiter(s) (70 mL/Hr) IV Continuous <Continuous>  valproic  acid Syrup 125 milliGRAM(s) Oral daily    MEDICATIONS  (PRN):  acetaminophen   Tablet .. 650 milliGRAM(s) Oral every 6 hours PRN Temp greater or equal to 38C (100.4F), Mild Pain (1 - 3)  polyethylene glycol 3350 17 Gram(s) Oral daily PRN Constipation  valproic  acid Syrup 125 milliGRAM(s) Oral every 6 hours PRN Agitation    Vital Signs Last 24 Hrs  T(C): 36.7 (25 Mar 2020 07:24), Max: 36.7 (25 Mar 2020 07:24)  T(F): 98.1 (25 Mar 2020 07:24), Max: 98.1 (25 Mar 2020 07:24)  HR: 63 (25 Mar 2020 07:24) (63 - 76)  BP: 94/63 (25 Mar 2020 07:24) (91/60 - 110/76)  BP(mean): --  RR: 14 (25 Mar 2020 07:24) (14 - 16)  SpO2: 100% (25 Mar 2020 07:24) (98% - 100%)    PHYSICAL EXAM:    Constitutional: NAD, resting,  arousable  HEENT: PERR, EOMI,    Neck: Soft and supple   Respiratory: Breath sounds are clear bilaterally,   Cardiovascular: S1 and S2,    Gastrointestinal: Bowel Sounds present, soft, nontender, dressing c/d/i  Extremities: No peripheral edema    (03-25 @ 06:57)                      8.9  17.12 )-----------( 304                 27.4    Neutrophils = 15.26 (89.0%)  Lymphocytes = 1.23 (7.2%)  Eosinophils = 0.06 (0.4%)  Basophils = 0.06 (0.4%)  Monocytes = 0.42 (2.5%)  Bands = --%    03-25    136  |  102  |  18  ----------------------------<  194<H>  5.0   |  23  |  0.98    Ca    8.9      25 Mar 2020 03:17  Mg     1.9     03-25

## 2020-03-25 NOTE — PROGRESS NOTE ADULT - SUBJECTIVE AND OBJECTIVE BOX
HPI:  60 yo Romanian speaking Male with PMH of CVA 8yrs ago (no reported deficits) was initially brought into [facility] on 2/9/20 by brother for concerns of generalized weakness and inability to speak for the 3 weeks. Pt was found to have no verbal output, appeared withdrawn, intermittent command following and easily agitated.  CTH demonstrated chronic L MCA infarct with severely stenotic vs occluded distal L M1. Confirmed on MRI but also found to have an acute right post frontal infarct, benign LUIS and s/p Medtronic ILR placement 2/13. Patient also had sinus bradycardia, with no interventions recommended from cardiology.   Patient also with severe dysphagia and was s/p PEG placement on 2/2 by GI with endoscopic placement however post procedure pt pulled PEG out. As per GI patient at risk of 2nd endoscopic placement contraindicated due to risk of pulling out tube and peritonitis. Patient thus had laproscopic placement of G tube done by surgery on 2/27 to minimize risk of peritonitis if pulled out. On 3/2, noted with moderate leaking around G-tube: was pulled out from 9 cm in to only 1 cm in. G tube repositioned under fluoro on 3/2.    Neuro recommended malignancy work up 2/2 unexplained multiple embolic strokes. Oncology consult w Dr. Sanchez. Patient had CT C/A/P w  IV contrast only. no significant findings exc questionable renal mass, but sono neg and MRI confirmed it being a subcapsular hematoma.  Patient will need outptn colonoscopy after discharge. 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   Patient was discharged to acute rehab on 3/7, however on admission pt had a temp of 101 and pt PEG was found to be dislodged. Pt was transferred back to [facility] and PEG was replaced by surgery on 3/8. Patient tolerating feeds. Pt was found to have sepsis 2/2 aspiration pneumonia of b/l lower lobes and ID was consulted and pt was started on zosyn. Antibitoic course completed. RVP, blood cultutes were negative. Anticoagulation was held due to hematoma at rectum likley 2/2 g tube replacement. On 3/13 pt pulled out PEG and 16Fr hicks catheter was inserted through g tube tract and feeds were restarted after confirmation of placement.  Spoke with pt's nephew Dallas Arriola. As per Dallas, pt was living in [place] in a motel. Motel owner contacted family in [place] when he began acting strangely. family in [place] contacted pt's brother here in [place] and family brought him here and took him to [facility]. As per nephew, pt has no home here and no dispo plan at this time.   Patient is . He has 2 sons here but estranged from one son and other son is a college student.     SUBJECTIVE & INTERVAL HISTORY: Pt seen at bedside. Pt had rapid response last night. Pt was being toileted around 3AM when he had a syncopal episode. Pt was hypotensive with SBP in the 90s. No fevers. Pt was bolused fluids. Pt with WBC of 17 this morning H/H 8.9/27 (prior H/H 12/39). CT abdomen was ordered this morning  Pt with no complaints at bedside. Denies any abdominal pain, no nausea or vomiting. Pt ate breakfast this morning    CT abdomen: Large amount of intra-abdominal hemorrhage as discussed with more dense hemorrhage along the greater curvature and posterior to the stomach distending the lateral recesses of the lesser sac.    Dr. Foote was notified Pt made NPO.       REVIEW OF SYSTEMS  Limited by pt aphasia  Constitutional - No fever, No fatigue  HEENT - No eye pain, No visual disturbances (wears glasses), No difficulty hearing,  Respiratory - No cough, No wheezing, No shortness of breath  Cardiovascular - No chest pain, No palpitations  Gastrointestinal - No abdominal pain, No nausea, No vomiting, No diarrhea, No constipation   Genitourinary - No dysuria, No incontinence  Neurological - No headaches, No memory loss, + loss of strength, No numbness,   Skin - No itching, No rashes,   Musculoskeletal - No joint pain, No joint swelling, No muscle pain  Psychiatric - No depression, No anxiety    VITALS  Vital Signs (24 Hrs):  T(C): 36.7 (03-25-20 @ 07:24), Max: 36.7 (03-25-20 @ 07:24)  HR: 63 (03-25-20 @ 07:24) (63 - 76)  BP: 94/63 (03-25-20 @ 07:24) (91/60 - 110/76)  RR: 14 (03-25-20 @ 07:24) (14 - 16)  SpO2: 100% (03-25-20 @ 07:24) (98% - 100%)  Wt(kg): --      PHYSICAL EXAM  Constitutional - NAD, Comfortable  HEENT - NCAT, EOMI, +glasses, +thrush on tongue -improving  Neck - Supple, No limited ROM  Chest -  CTAB b/l no wheezing  Cardio - +S1/S2 RRR,   Abdomen -  soft, non-distended, no tenderness, +BS, g-tube site with mild slough and granulation tissue, mild purulent drainage.   Extremities - No peripheral edema  Neurologic Exam:                 	   Cognitive -   	          Orientation: Awake, Alert, able to follow most commands, unable to test orientation as pt is aphasic  	   Speech - Non-fluent aphasia, comprehension fair, pt able to follow most commands, non-verbal but able to nod to questions  	   Cranial Nerves - left facial droop, tongue midline, no ptosis, shoulder shrug intact  	   Motor -  limited by aphasia and language   	                  LEFT    UE - ShAB 5/5, EF 5/5, EE 5/5, WE 5/5,  WNL  	                  RIGHT UE - ShAB 5/5, EF 5/5, EE 5/5, WE 5/5,  WNL  	                  LEFT    LE - HF 5/5, KE 5/5, DF 5/5, PF 5/5  	                  RIGHT LE - HF 5/5, KE 5/5, DF 5/5, PF 5/5     Psychiatric - Mood stable, calm, no agitation    IDT Meeting 3/19  OT  grooming Bernie  UBD Bernie  LBD modA  transfers modA  PT  transfers min/modA  gait 40ft RW close stand by  impaireed coordination, ataxix  ST  aphonic  comprehension modA    RECENT LABS/IMAGING                        8.9    17.12 )-----------( 304      ( 25 Mar 2020 06:57 )             27.4     03-25    136  |  102  |  18  ----------------------------<  194<H>  5.0   |  23  |  0.98    Ca    8.9      25 Mar 2020 03:17  Mg     1.9     03-25      MEDICATIONS  (STANDING):  aspirin  chewable 81 milliGRAM(s) Oral daily  atorvastatin 80 milliGRAM(s) Oral at bedtime  BACItracin   Ointment 1 Application(s) Topical two times a day  clopidogrel Tablet 75 milliGRAM(s) Oral daily  doxycycline hyclate Capsule 50 milliGRAM(s) Oral every 12 hours  lactobacillus acidophilus 1 Tablet(s) Oral daily  nystatin    Suspension 1 milliLiter(s) Oral three times a day  pantoprazole   Suspension 40 milliGRAM(s) Oral two times a day  senna Syrup 5 milliLiter(s) Oral at bedtime  sodium chloride 0.9%. 1000 milliLiter(s) (70 mL/Hr) IV Continuous <Continuous>  valproic  acid Syrup 125 milliGRAM(s) Oral daily    MEDICATIONS  (PRN):  acetaminophen   Tablet .. 650 milliGRAM(s) Oral every 6 hours PRN Temp greater or equal to 38C (100.4F), Mild Pain (1 - 3)  polyethylene glycol 3350 17 Gram(s) Oral daily PRN Constipation  valproic  acid Syrup 125 milliGRAM(s) Oral every 6 hours PRN Agitation HPI:  60 yo Swedish speaking Male with PMH of CVA 8yrs ago (no reported deficits) was initially brought into [facility] on 2/9/20 by brother for concerns of generalized weakness and inability to speak for the 3 weeks. Pt was found to have no verbal output, appeared withdrawn, intermittent command following and easily agitated.  CTH demonstrated chronic L MCA infarct with severely stenotic vs occluded distal L M1. Confirmed on MRI but also found to have an acute right post frontal infarct, benign LUIS and s/p Medtronic ILR placement 2/13. Patient also had sinus bradycardia, with no interventions recommended from cardiology.   Patient also with severe dysphagia and was s/p PEG placement on 2/2 by GI with endoscopic placement however post procedure pt pulled PEG out. As per GI patient at risk of 2nd endoscopic placement contraindicated due to risk of pulling out tube and peritonitis. Patient thus had laproscopic placement of G tube done by surgery on 2/27 to minimize risk of peritonitis if pulled out. On 3/2, noted with moderate leaking around G-tube: was pulled out from 9 cm in to only 1 cm in. G tube repositioned under fluoro on 3/2.    Neuro recommended malignancy work up 2/2 unexplained multiple embolic strokes. Oncology consult w Dr. Sanchez. Patient had CT C/A/P w  IV contrast only. no significant findings exc questionable renal mass, but sono neg and MRI confirmed it being a subcapsular hematoma.  Patient will need outptn colonoscopy after discharge. 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   Patient was discharged to acute rehab on 3/7, however on admission pt had a temp of 101 and pt PEG was found to be dislodged. Pt was transferred back to [facility] and PEG was replaced by surgery on 3/8. Patient tolerating feeds. Pt was found to have sepsis 2/2 aspiration pneumonia of b/l lower lobes and ID was consulted and pt was started on zosyn. Antibitoic course completed. RVP, blood cultutes were negative. Anticoagulation was held due to hematoma at rectum likley 2/2 g tube replacement. On 3/13 pt pulled out PEG and 16Fr hicks catheter was inserted through g tube tract and feeds were restarted after confirmation of placement.  Spoke with pt's nephew Dallas Arriola. As per Dallas, pt was living in [place] in a motel. Motel owner contacted family in [place] when he began acting strangely. family in [place] contacted pt's brother here in [place] and family brought him here and took him to [facility]. As per nephew, pt has no home here and no dispo plan at this time.   Patient is . He has 2 sons here but estranged from one son and other son is a college student.     SUBJECTIVE & INTERVAL HISTORY: Pt seen at bedside. Pt had rapid response last night. Pt was being toileted around 3AM when he had a syncopal episode. Pt was hypotensive with SBP in the 90s. No fevers. Pt was bolused fluids. Pt with WBC of 17 this morning H/H 8.9/27 (prior H/H 12/39). CT abdomen was ordered this morning  Pt with no complaints at bedside. Denies any abdominal pain, no nausea or vomiting. Pt ate breakfast this morning    CT abdomen: Large amount of intra-abdominal hemorrhage as discussed with more dense hemorrhage along the greater curvature and posterior to the stomach distending the lateral recesses of the lesser sac.    Dr. Foote was notified Pt made NPO.       REVIEW OF SYSTEMS  Limited by pt aphasia  Constitutional - No fever, No fatigue  HEENT - No eye pain, No visual disturbances (wears glasses), No difficulty hearing,  Respiratory - No cough, No wheezing, No shortness of breath  Cardiovascular - No chest pain, No palpitations  Gastrointestinal - No abdominal pain, No nausea, No vomiting, No diarrhea, No constipation   Genitourinary - No dysuria, No incontinence  Neurological - No headaches, No memory loss, + loss of strength, No numbness,   Skin - No itching, No rashes,   Musculoskeletal - No joint pain, No joint swelling, No muscle pain  Psychiatric - No depression, No anxiety    VITALS  Vital Signs (24 Hrs):  T(C): 36.7 (03-25-20 @ 07:24), Max: 36.7 (03-25-20 @ 07:24)  HR: 63 (03-25-20 @ 07:24) (63 - 76)  BP: 94/63 (03-25-20 @ 07:24) (91/60 - 110/76)  RR: 14 (03-25-20 @ 07:24) (14 - 16)  SpO2: 100% (03-25-20 @ 07:24) (98% - 100%)  Wt(kg): --      PHYSICAL EXAM  Constitutional - NAD, Comfortable  HEENT - NCAT, EOMI, +glasses, +thrush on tongue -improving  Neck - Supple, No limited ROM  Chest -  CTAB b/l no wheezing  Cardio - +S1/S2 RRR,   Abdomen -  soft, non-distended, no tenderness, +BS, g-tube site with mild slough and granulation tissue, mild purulent drainage--improved from yesterday.   Extremities - No peripheral edema  Neurologic Exam:                 	   Cognitive -   	          Orientation: Awake, Alert, able to follow most commands, unable to test orientation as pt is aphasic  	   Speech - Non-fluent aphasia, comprehension fair, pt able to follow most commands, non-verbal but able to nod to questions  	   Cranial Nerves - left facial droop, tongue midline, no ptosis, shoulder shrug intact  	   Motor -  limited by aphasia and language   	                  LEFT    UE - ShAB 5/5, EF 5/5, EE 5/5, WE 5/5,  WNL  	                  RIGHT UE - ShAB 5/5, EF 5/5, EE 5/5, WE 5/5,  WNL  	                  LEFT    LE - HF 5/5, KE 5/5, DF 5/5, PF 5/5  	                  RIGHT LE - HF 5/5, KE 5/5, DF 5/5, PF 5/5     Psychiatric - Mood stable, calm, no agitation    IDT Meeting 3/19  OT  grooming Bernie  UBD Bernie  LBD modA  transfers modA  PT  transfers min/modA  gait 40ft RW close stand by  impaireed coordination, ataxix  ST  aphonic  comprehension modA    RECENT LABS/IMAGING                        8.9    17.12 )-----------( 304      ( 25 Mar 2020 06:57 )             27.4     03-25    136  |  102  |  18  ----------------------------<  194<H>  5.0   |  23  |  0.98    Ca    8.9      25 Mar 2020 03:17  Mg     1.9     03-25    EXAM:  CT ABDOMEN AND PELVIS IC      PROCEDURE DATE:  03/25/2020        INTERPRETATION:  CLINICAL INFORMATION: Leukocytosis and anemia erythema and drainage surrounding recent percutaneous gastrostomy tube.    COMPARISON: CT scan abdomen pelvis 3/7/2020.    PROCEDURE:   CT of the Abdomen and Pelvis was performed with intravenous contrast.   Intravenous contrast: 90 ml Omnipaque 350. 10 ml discarded.  Oral contrast: None.  Sagittal and coronal reformats were performed.    FINDINGS:    LOWER CHEST: Within normal limits.    LIVER: 10 mm enhancing, vascular lesion peripherally right hepatic lobe is stable in appearance, may reflect hemangioma.  BILE DUCTS: Normal caliber.  GALLBLADDER: Within normal limits.  SPLEEN: Within normal limits.  PANCREAS: Within normal limits.  ADRENALS: Within normal limits.  KIDNEYS/URETERS:   Duplicated right renal collecting system.  No hydronephrosis noted.    BLADDER: Within normal limits.  REPRODUCTIVE ORGANS: Prostatic calcifications.    BOWEL:   PERITONEUM: There is a wire or catheter traversing the site of the previous gastrostomy tube to the stomach.     Surgical clips are noted at the greater curvature of the stomach.  Dense ascites is present within the right perihepatic and left subphrenic peritoneal spaces of the upper abdomen.  In the left upper quadrant, posterior to the stomach and along the greater curvature, there is a large amount of high attenuation fluid measuring greater than 60 Hounsfield units compatible with hemorrhage. This distends the lateral compartment of the lesser sac and extends approximately 7.5 cm in transverse dimension and 10.5 cm in AP dimension.  There is probable thickening/submucosal edema along the wall of the superior greater curvature of the stomach.    There is a moderate amount of dense fluid/hemorrhage within the pelvis.    No free intraperitoneal air is noted.    Moderate amount of fecal retention throughout the colon with distended rectum.  No bowel obstruction noted.    VESSELS: Stenosis at the origin of the celiac axis. Atherosclerotic calcification.  RETROPERITONEUM/LYMPH NODES: No lymphadenopathy.    ABDOMINAL WALL: Mild enlargement of the left rectus musculature surrounding the catheter and previous tract of gastrostomy tube, slightly lesspronounced on prior exam, and may reflect sequelae of intramuscular hematoma.  BONES: Multilevel degenerative changes.    IMPRESSION:     Large amount of intra-abdominal hemorrhage as discussed with more dense hemorrhage along the greater curvature and posterior to the stomach distending the lateral recesses of the lesser sac.        MEDICATIONS  (STANDING):  aspirin  chewable 81 milliGRAM(s) Oral daily  atorvastatin 80 milliGRAM(s) Oral at bedtime  BACItracin   Ointment 1 Application(s) Topical two times a day  clopidogrel Tablet 75 milliGRAM(s) Oral daily  doxycycline hyclate Capsule 50 milliGRAM(s) Oral every 12 hours  lactobacillus acidophilus 1 Tablet(s) Oral daily  nystatin    Suspension 1 milliLiter(s) Oral three times a day  pantoprazole   Suspension 40 milliGRAM(s) Oral two times a day  senna Syrup 5 milliLiter(s) Oral at bedtime  sodium chloride 0.9%. 1000 milliLiter(s) (70 mL/Hr) IV Continuous <Continuous>  valproic  acid Syrup 125 milliGRAM(s) Oral daily    MEDICATIONS  (PRN):  acetaminophen   Tablet .. 650 milliGRAM(s) Oral every 6 hours PRN Temp greater or equal to 38C (100.4F), Mild Pain (1 - 3)  polyethylene glycol 3350 17 Gram(s) Oral daily PRN Constipation  valproic  acid Syrup 125 milliGRAM(s) Oral every 6 hours PRN Agitation

## 2020-03-25 NOTE — CHART NOTE - NSCHARTNOTEFT_GEN_A_CORE
Patient with L MCA infarct; aphasia as/p PEG with multiple PEG dislodgement episodes; recently treated for pneumonia    Rapid response called at 0300 for syncope/unresponsive episode with staff after toileting.  Aide states patient expressed he wanted to go to the bathroom and while he was on the toilet he synopsized, although staff was able to get him to the bed.  Staff states he did not defecate prior to fainting but was "instructed to push". No seizure like activity or abnormal body movements.     In bed systolic was 91 systolic and HR 76. No bleeding through PEG tube site (staff recently changed dressing).    Neuro exam: Fatigued but Responsive to sternal rub, then follows some commands with verbal/ tactile instruction.  Moves all extremities with pain. Aphasia unchanged. Mild L facial droop, Exam limited by aphasia.      Impression: syncopal / ?vasovagal episode    Hospitalist at bedside   CBC, BMP, Mg, Troponin ordered  EKG with non specific T wave abnormalities and poor R wave progression (similar to prior EKG on 3/1)  500 cc fluid bolus   After fluid bolus BP up to 99/69 and HR 70. Patient with L MCA infarct; aphasia as/p PEG with multiple PEG dislodgement episodes; recently treated for pneumonia    Rapid response called at 0300 for syncope/unresponsive episode with staff after toileting.  Aide states patient expressed he wanted to go to the bathroom and while he was on the toilet he synopsized, although staff was able to get him to the bed.  Staff states he did not defecate prior to fainting but was "instructed to push". No seizure like activity or abnormal body movements.     In bed systolic was 91 systolic and HR 76. No bleeding through PEG tube site (staff recently changed dressing).    Neuro exam: Fatigued but Responsive to sternal rub, then follows some commands with verbal/ tactile instruction.  Moves all extremities with pain. Aphasia unchanged. Mild L facial droop, Exam limited by aphasia.      Impression: syncopal / ?vasovagal episode    Hospitalist at bedside   CBC, BMP, Mg, Troponin ordered  EKG with non specific T wave abnormalities and poor R wave progression (similar to prior EKG on 3/1)  500 cc fluid bolus       Addendum:  Plan discussed wit hospitalist   After fluid bolus BP up to 99/69 and HR 70. Ordered additional 500cc bolus over 60 min  Labs with leukocytosis and drop in H/H from 12.7 to 9.  Patient afebrile without evidence of bleeding or drainage from PEG site and vitals improved with fluids.   Repeat labs including Type and screen. Add on FOBT.   Defer protonix until repeat labs result.   Will discuss with primary team and GI Patient with L MCA infarct; aphasia as/p PEG with multiple PEG dislodgement episodes; recently treated for pneumonia    Rapid response called at 0300 for syncope/unresponsive episode with staff after toileting.  Aide states patient expressed he wanted to go to the bathroom and while he was on the toilet he synopsized, although staff was able to get him to the bed.  Staff states he did not defecate prior to fainting but was "instructed to push". No seizure like activity or abnormal body movements.     In bed systolic was 91 systolic and HR 76. No bleeding through PEG tube site (staff recently changed dressing).    Neuro exam: Fatigued but Responsive to sternal rub, then follows some commands with verbal/ tactile instruction.  Moves all extremities with pain stimuli at toes. Aphasia unchanged. Mild L facial droop, Exam limited by aphasia.  Poor respiratory effort  No abdominal guarding or rigidity       Impression: syncopal / ?vasovagal episode    Hospitalist at bedside   CBC, BMP, Mg, Troponin ordered  EKG with non specific T wave abnormalities and poor R wave progression (similar to prior EKG on 3/1)  500 cc fluid bolus       Addendum:  Plan discussed wit hospitalist   After fluid bolus BP up to 99/69 and HR 70. Ordered additional 500cc bolus over 60 min  Labs with leukocytosis and drop in H/H from 12.7 to 9.  Patient afebrile without evidence of bleeding or drainage from PEG site and vitals improved with fluids.   Repeat labs including Type and screen. Add on FOBT.   Defer protonix until repeat labs result.   Will discuss with primary team and GI Patient with L MCA infarct; aphasia as/p PEG with multiple PEG dislodgement episodes; recently treated for pneumonia    Rapid response called at 0300 for syncope/unresponsive episode with staff after toileting.  Aide states patient expressed he wanted to go to the bathroom and while he was on the toilet he synopsized, although staff was able to get him to the bed.  Staff states he did not defecate prior to fainting but was "instructed to push". No seizure like activity or abnormal body movements.     In bed systolic was 91 systolic and HR 76. No bleeding through PEG tube site (staff recently changed dressing).    Neuro exam: Fatigued but Responsive to sternal rub, then follows some commands with verbal/ tactile instruction.  Moves all extremities with pain stimuli at toes. Aphasia unchanged. Mild L facial droop, Exam limited by aphasia.  Poor respiratory effort  No abdominal guarding or rigidity       Impression: syncopal / ?vasovagal episode    Hospitalist at bedside   CBC, BMP, Mg, Troponin ordered  EKG with non specific T wave abnormalities and poor R wave progression (similar to prior EKG on 3/1)  500 cc fluid bolus       Addendum:  Plan discussed wit hospitalist   After fluid bolus BP up to 99/69 and HR 70. Ordered additional 500cc bolus over 60 min  Labs with leukocytosis and drop in H/H from 12.7 to 9.  Patient afebrile without evidence of bleeding or drainage from PEG site and vitals improved with fluids.   Repeat labs including Type and screen. Add on FOBT.   Defer protonix until repeat labs result.   Will discuss with primary team and GI      Addendum 0800 3/25:  Labs with persistent leukocytosis and left shift.  Ordered UA, CXR, and blood cultures  Hgb down trending (however s/p 1L bolus)   Type and screen sent  CT a/p with IV contrast to eval for intraabd pathology (high risk peritonitis given history)  Placed on mIVF at 70cc as BP borderline (but improved).   Discussed plan with BIU Fellow Patient with L MCA infarct; aphasia as/p PEG with multiple PEG dislodgement episodes; recently treated for pneumonia    Rapid response called at 0300 for syncope/unresponsive episode with staff after toileting.  Aide states patient expressed he wanted to go to the bathroom and while he was on the toilet he synopsized, although staff was able to get him to the bed.  Staff states he did not defecate prior to fainting but was "instructed to push". No seizure like activity or abnormal body movements.     In bed systolic was 91 systolic and HR 76. No bleeding through PEG tube site (staff recently changed dressing).    Neuro exam: Fatigued but Responsive to sternal rub, then follows some commands with verbal/ tactile instruction.  Moves all extremities with pain stimuli at toes. Aphasia unchanged. Mild L facial droop, Exam limited by aphasia.  Poor respiratory effort  No abdominal guarding or rigidity       Impression: syncopal / ?vasovagal episode    Hospitalist at bedside   CBC, BMP, Mg, Troponin ordered  EKG with non specific T wave abnormalities and poor R wave progression (similar to prior EKG on 3/1)  500 cc fluid bolus       Addendum:  Plan discussed wit hospitalist   After fluid bolus BP up to 99/69 and HR 70. Ordered additional 500cc bolus over 60 min  Labs with leukocytosis and drop in H/H from 12.7 to 9.  Patient afebrile without evidence of bleeding or drainage from PEG site and vitals improved with fluids.   Repeat labs including Type and screen. Add on FOBT.   Defer protonix until repeat labs result.   Will discuss with primary team and GI      Addendum 0800 3/25:  Labs with persistent leukocytosis and left shift.  Ordered UA, CXR, and blood cultures  Hgb down trending (however s/p 1L bolus)   Type and screen sent; started protonix BID   CT a/p with IV contrast to eval for intraabd pathology (high risk peritonitis given history)  Placed on mIVF at 70cc as BP borderline (but improved).   Discussed plan with BIU Fellow

## 2020-03-25 NOTE — PROGRESS NOTE ADULT - ASSESSMENT
58 y/o male admitted to BIU and noted to have drainage and erythema around stoma of old peg site. WBC elevated. H/H 8.5/25.7. Discussed with patients Dallas ball via phone, in regards to plan for upper endoscopy. Nephew in agreement and will be available via phone tomorrow.

## 2020-03-25 NOTE — PROVIDER CONTACT NOTE (OTHER) - ACTION/TREATMENT ORDERED:
Dr. Turner notified. Ordered IV fluids completed. For blood works this am as ordered.
RRT team responded. Fluid bolus given as ordered. Blood works drawn. Continue to monitor.
CT scan of abdomen completed, Chest xray completed. NS@70cc/hr for 1L

## 2020-03-25 NOTE — PROGRESS NOTE ADULT - SUBJECTIVE AND OBJECTIVE BOX
INTERVAL HPI/OVERNIGHT EVENTS:  HPI:  60 y/o male admitted to BIU s/p recent L MCA. Per records and staff, patient had syncopal episode last night and was noted to have drop in H/H. CT Abdomen shows large intra-abdominal hemorrhage, Patient seen and examined at bedside. No hematoma noted, Abdomen non-tender on exam. Per staff, no melena, BRBPR, or hematemesis.     MEDICATIONS  (STANDING):  atorvastatin 80 milliGRAM(s) Oral at bedtime  BACItracin   Ointment 1 Application(s) Topical two times a day  cefTRIAXone   IVPB 1000 milliGRAM(s) IV Intermittent every 24 hours  lactobacillus acidophilus 1 Tablet(s) Oral daily  nystatin    Suspension 1 milliLiter(s) Oral three times a day  pantoprazole   Suspension 40 milliGRAM(s) Oral two times a day  senna Syrup 5 milliLiter(s) Oral at bedtime  sodium chloride 0.9%. 1000 milliLiter(s) (70 mL/Hr) IV Continuous <Continuous>  valproic  acid Syrup 125 milliGRAM(s) Oral daily    MEDICATIONS  (PRN):  acetaminophen   Tablet .. 650 milliGRAM(s) Oral every 6 hours PRN Temp greater or equal to 38C (100.4F), Mild Pain (1 - 3)  polyethylene glycol 3350 17 Gram(s) Oral daily PRN Constipation  valproic  acid Syrup 125 milliGRAM(s) Oral every 6 hours PRN Agitation      Allergies    No Known Allergies    Intolerances        PAST MEDICAL & SURGICAL HISTORY:  CVA (cerebral vascular accident)  S/P percutaneous endoscopic gastrostomy (PEG) tube placement      PHYSICAL EXAM:   Vital Signs:  Vital Signs Last 24 Hrs  T(C): 36.7 (25 Mar 2020 07:24), Max: 36.7 (25 Mar 2020 07:24)  T(F): 98.1 (25 Mar 2020 07:24), Max: 98.1 (25 Mar 2020 07:24)  HR: 63 (25 Mar 2020 07:24) (63 - 76)  BP: 94/63 (25 Mar 2020 07:24) (91/60 - 110/76)  BP(mean): --  RR: 14 (25 Mar 2020 07:24) (14 - 16)  SpO2: 100% (25 Mar 2020 07:24) (98% - 100%)  Daily     Daily Weight in k.6 (24 Mar 2020 22:39)I&O's Summary    24 Mar 2020 07:  -  25 Mar 2020 07:00  --------------------------------------------------------  IN: 2100 mL / OUT: 0 mL / NET: 2100 mL    25 Mar 2020 07:01  -  25 Mar 2020 15:25  --------------------------------------------------------  IN: 300 mL / OUT: 0 mL / NET: 300 mL      GENERAL:  Appears stated age,  HEENT:  NC/AT,  conjunctivae clear and pink,   CHEST:  Full & symmetric excursion, no increased effort, breath sounds clear  HEART:  Regular rhythm, S1, S2,   ABDOMEN:  Soft, non-tender, non-distended, normoactive bowel sounds, erythema and induration around stoma of previous peg site, scant drainage noted       LABS:                        8.5    13.06 )-----------( 280      ( 25 Mar 2020 13:10 )             25.7     03-25    136  |  102  |  18  ----------------------------<  194<H>  5.0   |  23  |  0.98    Ca    8.9      25 Mar 2020 03:17  Mg     1.9               amylase   lipase  RADIOLOGY & ADDITIONAL TESTS:

## 2020-03-25 NOTE — PROGRESS NOTE ADULT - PROBLEM SELECTOR PLAN 1
NPO after midnight  Hold anticoagulation  Repeat H/H in AM  Continue IV fluids  Change Antibiotics to IV Rocephin while patient NPO  Upper endoscopy tomorrow to evaluate for bleed as well as ? wire seen on CT

## 2020-03-25 NOTE — PROVIDER CONTACT NOTE (OTHER) - ASSESSMENT
Responsive only to sternal rub, moved all his extremities after a deep painful stimuli at the toes.   PEG site, dressing dry and intact, no bleeding noted.

## 2020-03-25 NOTE — PROGRESS NOTE ADULT - ATTENDING COMMENTS
Patient seen and examined.  Agree with assessment and plan as above.    Patient found to have drop in Hgb/Hct and imaging that suggests bleeding in abdomen.  He is unable to provide any meaningful history,    VSS.  Abdomen soft, nontender.  No ecchymosis seen.    EGD to further evaluate and rule out UGI bleeding

## 2020-03-25 NOTE — PROGRESS NOTE ADULT - ASSESSMENT
60 yo Male with prior CVA 8 years ago with no deficits noted initially admitted with generalized weakness and difficulty speaking. CTH with chronic L MCA infarct with MRI showing acute right post frontal infarct. Pt with dysphagia s/p PEG placement, non-fluent aphasia, apraxia of speech, gait and ADL impairment. Hospital course complicated by with multiple PEG dislodgement and replacement. S/p completion of antibiotic treatment for aspiration pneumonia.     Acute blood loss anemia   -initial report of CT + blood collection  -GI following  -repeat H/h Stat 1pm  -transfuse PRN will order type and screen with next h/h  -patient currently hemodynamically stable; may need to hold aspirin and/or plavix if continues to bleed  -continue PPI    >CVA    -continue acute rehab PT/OT/SLP program  -cont ASA, plavix statin   -seizure ppx valproic   -puree diet thin liquids    >DVT ppx   - hematoma of rectus sheeth, no chemical dvt ppx   - TEDs

## 2020-03-25 NOTE — PROGRESS NOTE ADULT - ATTENDING COMMENTS
I have personally examined this pt, reviewed pertinent clinical information and performed the evaluation and management service provided at today's patient visit for inpatient medical follow up     I am available on the unit for any questions regarding this patients medical plan of care and can be reached by cell phone at [phone]

## 2020-03-25 NOTE — PROVIDER CONTACT NOTE (OTHER) - ASSESSMENT
responsive, opens eyes when name calling. VS 95/64 , T- 97.5 RR-14, O2 Sat  %, HR - 70.  PEG site remains clean dry, no bleeding noted.

## 2020-03-25 NOTE — PROGRESS NOTE ADULT - ASSESSMENT
60 yo Male with prior CVA 8 years ago with no deficits noted initially admitted with generalized weakness and difficulty speaking. CTH with chronic L MCA infarct with MRI showing acute right post frontal infarct. Pt with dysphagia s/p PEG placement, non-fluent aphasia, apraxia of speech, gait and ADL impairment. Hospital course complicated by with multiple PEG dislodgement and replacement. S/p completion of antibiotic treatment for aspiration pneumonia.     CVA   -Comprehensive Rehab Program of PT/OT/ SLP for Gait Instability, ADL, and Functional impairments  -ASA 81mg and Plavix 75mg for 90 days followed by ASA as monotherapy   -Lipitor 80mg qhs    Dysphagia - improving   -s/p PEG placement, pt pulled out PEG multiple times, 16Fr hicks g-tube dislodged on 3/23, no need to replace at this time as pt is eating all of his meals, no supplemental feeding was given through G-tube since diet was upgraded)  -s/p MBS on 3/19, diet upgraded to puree and thin liquids  -pressure dressing to g-tube site  - mild purulent discharge and granulation tissue; GI following - bacitracin to area BID, Doxycyline, and culture sent and pending     GI bleed  -s/p RRT on 3/25 morning, pt syncopized in bathroom during toileting. SBP in the 90s, pt was bolused fluids, labs sent, CXR was ordered, type and screen done  -CXR 3/25 negative  -Blood cx sent, UA/UC pending  -labs with WBC of 17, H/H 8.9/27 (prior H/H 12/39), no fevers, pt was bolused with fluids,   -CT abdomen with large amount of intra-abdominal hemorrhage as discussed with more dense hemorrhage along the greater curvature and posterior to the stomach distending the lateral recesses of the lesser sac.  There is also a wire or catheter traversing the site of the previous gastrostomy tube to the stomach. Surgical clips are noted at the greater curvature of the stomach.  -GI, Dr. Foote notified, pt made NPO, continue with protonix BID, continue with IV fluids,  -repeat CBC at 1PM    Agitation - stable  -Depakote 125 mg q6 hours PRN via PEG  -Depakote 125mg daily    Oral Thrush  -Nystatin swab TID  -oral care    Left renal lesion   -CT abdomen/pelvis done for malignancy workup due to multiple CVAs in the past showed ill defined left renal lesion   -Left renal lesion demonstrated MR findings (2/28) most compatible with small chronic subcapsular/pericapsular hematoma.  -Kidney sonogram negative; PSA negative   -outpatient colonoscopy as per heme/onc    Recent sepsis 2/2 aspiration pneumonia: resolved  -completed course of zosyn  -3/8 blood cx, UC, and RVP negative    Hematoma of rectus sheath - resolved  - likely 2/2 g-tube replacement  - no anticoagulation    Pain Mgmt: Tylenol PRN  Bowel: Monitor continence. Senna, Miralax PRN  Bladder: low PVR, incontinent   Sleep: monitor  Skin: Pressure injury prevention, turn Q2hrs while in bed, OOB to Chair  Diet: currently NPO (puree and thin)    Precautions: aspiration, fall  DVT: No AC, SCDs    ---------------------------------------------------------------------------------  Consults: Internal Medicine    Post Discharge Follow up:  Neurology: Craig Peterson (DO)  [address] [facility] [address]  [place] [address]  Phone: [phone]  Fax: [phone]  Follow Up Time: 2 weeks    Gastroenterology; Internal Medicine: John Squires (DO)  [address] [place] [address]  Phone: [phone]  Fax: [phone] 60 yo Male with prior CVA 8 years ago with no deficits noted initially admitted with generalized weakness and difficulty speaking. CTH with chronic L MCA infarct with MRI showing acute right post frontal infarct. Pt with dysphagia s/p PEG placement, non-fluent aphasia, apraxia of speech, gait and ADL impairment. Hospital course complicated by with multiple PEG dislodgement and replacement. S/p completion of antibiotic treatment for aspiration pneumonia.     CVA   -Comprehensive Rehab Program of PT/OT/ SLP for Gait Instability, ADL, and Functional impairments  -ASA 81mg and Plavix 75mg for 90 days followed by ASA as monotherapy - will HOLD for now in the setting of GI bleed  -Lipitor 80mg qhs    Dysphagia - improving   -s/p PEG placement, pt pulled out PEG multiple times, 16Fr hicks g-tube dislodged on 3/23, no need to replace at this time as pt is eating all of his meals, no supplemental feeding was given through G-tube since diet was upgraded)  -s/p MBS on 3/19, diet upgraded to puree and thin liquids  -pressure dressing to g-tube site  - mild purulent discharge and granulation tissue; GI following - bacitracin to area BID, Doxycyline, and culture sent and pending     GI bleed  -s/p RRT on 3/25 morning, pt syncopized in bathroom during toileting. SBP in the 90s, pt was bolused fluids, labs sent, CXR was ordered, type and screen done  -CXR 3/25 negative  -Blood cx sent, UA/UC pending  -labs with WBC of 17, H/H 8.9/27 (prior H/H 12/39), no fevers, pt was bolused with fluids,   -CT abdomen with large amount of intra-abdominal hemorrhage as discussed with more dense hemorrhage along the greater curvature and posterior to the stomach distending the lateral recesses of the lesser sac.  There is also a wire or catheter traversing the site of the previous gastrostomy tube to the stomach. Surgical clips are noted at the greater curvature of the stomach.  -GI, Dr. Foote notified, pt made NPO, continue with protonix BID, continue with IV fluids,  -repeat CBC at 1PM    Agitation - stable  -Depakote 125 mg q6 hours PRN via PEG  -Depakote 125mg daily    Oral Thrush  -Nystatin swab TID  -oral care    Left renal lesion   -CT abdomen/pelvis done for malignancy workup due to multiple CVAs in the past showed ill defined left renal lesion   -Left renal lesion demonstrated MR findings (2/28) most compatible with small chronic subcapsular/pericapsular hematoma.  -Kidney sonogram negative; PSA negative   -outpatient colonoscopy as per heme/onc    Recent sepsis 2/2 aspiration pneumonia: resolved  -completed course of zosyn  -3/8 blood cx, UC, and RVP negative    Hematoma of rectus sheath - resolved  - likely 2/2 g-tube replacement  - no anticoagulation    Pain Mgmt: Tylenol PRN  Bowel: Monitor continence. Senna, Miralax PRN  Bladder: low PVR, incontinent   Sleep: monitor  Skin: Pressure injury prevention, turn Q2hrs while in bed, OOB to Chair  Diet: currently NPO (puree and thin)    Precautions: aspiration, fall  DVT: No AC, SCDs    ---------------------------------------------------------------------------------  Consults: Internal Medicine    Post Discharge Follow up:  Neurology: Craig Peterson (DO)  [address] [facility] [address]  [place] [address]  Phone: [phone]  Fax: [phone]  Follow Up Time: 2 weeks    Gastroenterology; Internal Medicine: John Squires (DO)  [address] [place] [address]  Phone: [phone]  Fax: [phone]

## 2020-03-25 NOTE — PROGRESS NOTE ADULT - ATTENDING COMMENTS
Pt. seen this AM.  Agree with documentation above as per fellow with amendments made as appropriate.     Pt. comfortable, denies pain.  No abd. tenderness.  VS--slightly more hypotensive than normal, no tachycardia, no fever  Gtube stoma site appears clinically improved but still with purulent drainage.     CT Abdomen--Large intraabdominal hemorrhage    Hold all therapies    Spoke with GI-- Pt. will cont. to remain NPO.  Will go for endoscopy tomorrow AM with Surgery as standby.  Changed PO doxycline to IV Ceftriaxone.      Spoke with pt's nephew, Dallas Arriola, regarding pt's status and plan of care.  All questions answered.  [facility] gaytan GI team will call him for consent for procedure. Pt. seen this AM.  Agree with documentation above as per fellow with amendments made as appropriate.     Pt. comfortable, denies pain.  No abd. tenderness.  VS--slightly more hypotensive than normal, no tachycardia, no fever  Gtube stoma site appears clinically improved but still with purulent drainage.     CT Abdomen--Large intraabdominal hemorrhage    Hold all therapies    Repeat Labs 1pm:                        8.5    13.06 )-----------( 280      ( 25 Mar 2020 13:10 )             25.7   03-25          Spoke with GI-- Pt. will cont. to remain NPO.  Will go for endoscopy tomorrow AM with Surgery as standby.  Changed PO doxycline to IV Ceftriaxone.      Spoke with pt's nephew, Dallas Arriola, regarding pt's status and plan of care.  All questions answered.  [facility] gaytan GI team will call him for consent for procedure.

## 2020-03-26 LAB
ANION GAP SERPL CALC-SCNC: 7 MMOL/L — SIGNIFICANT CHANGE UP (ref 5–17)
BASOPHILS # BLD AUTO: 0.05 K/UL — SIGNIFICANT CHANGE UP (ref 0–0.2)
BASOPHILS NFR BLD AUTO: 0.7 % — SIGNIFICANT CHANGE UP (ref 0–2)
BUN SERPL-MCNC: 11 MG/DL — SIGNIFICANT CHANGE UP (ref 7–23)
CALCIUM SERPL-MCNC: 8.6 MG/DL — SIGNIFICANT CHANGE UP (ref 8.4–10.5)
CHLORIDE SERPL-SCNC: 108 MMOL/L — SIGNIFICANT CHANGE UP (ref 96–108)
CO2 SERPL-SCNC: 27 MMOL/L — SIGNIFICANT CHANGE UP (ref 22–31)
CREAT SERPL-MCNC: 0.85 MG/DL — SIGNIFICANT CHANGE UP (ref 0.5–1.3)
CULTURE RESULTS: SIGNIFICANT CHANGE UP
EOSINOPHIL # BLD AUTO: 0.23 K/UL — SIGNIFICANT CHANGE UP (ref 0–0.5)
EOSINOPHIL NFR BLD AUTO: 3.1 % — SIGNIFICANT CHANGE UP (ref 0–6)
GLUCOSE SERPL-MCNC: 101 MG/DL — HIGH (ref 70–99)
HCT VFR BLD CALC: 23.6 % — LOW (ref 39–50)
HGB BLD-MCNC: 7.5 G/DL — LOW (ref 13–17)
IMM GRANULOCYTES NFR BLD AUTO: 0.4 % — SIGNIFICANT CHANGE UP (ref 0–1.5)
LYMPHOCYTES # BLD AUTO: 1.04 K/UL — SIGNIFICANT CHANGE UP (ref 1–3.3)
LYMPHOCYTES # BLD AUTO: 14 % — SIGNIFICANT CHANGE UP (ref 13–44)
MCHC RBC-ENTMCNC: 31.8 GM/DL — LOW (ref 32–36)
MCHC RBC-ENTMCNC: 31.8 PG — SIGNIFICANT CHANGE UP (ref 27–34)
MCV RBC AUTO: 100 FL — SIGNIFICANT CHANGE UP (ref 80–100)
MONOCYTES # BLD AUTO: 0.38 K/UL — SIGNIFICANT CHANGE UP (ref 0–0.9)
MONOCYTES NFR BLD AUTO: 5.1 % — SIGNIFICANT CHANGE UP (ref 2–14)
NEUTROPHILS # BLD AUTO: 5.71 K/UL — SIGNIFICANT CHANGE UP (ref 1.8–7.4)
NEUTROPHILS NFR BLD AUTO: 76.7 % — SIGNIFICANT CHANGE UP (ref 43–77)
NRBC # BLD: 0 /100 WBCS — SIGNIFICANT CHANGE UP (ref 0–0)
PLATELET # BLD AUTO: 259 K/UL — SIGNIFICANT CHANGE UP (ref 150–400)
POTASSIUM SERPL-MCNC: 4 MMOL/L — SIGNIFICANT CHANGE UP (ref 3.5–5.3)
POTASSIUM SERPL-SCNC: 4 MMOL/L — SIGNIFICANT CHANGE UP (ref 3.5–5.3)
RBC # BLD: 2.36 M/UL — LOW (ref 4.2–5.8)
RBC # FLD: 13.3 % — SIGNIFICANT CHANGE UP (ref 10.3–14.5)
SODIUM SERPL-SCNC: 142 MMOL/L — SIGNIFICANT CHANGE UP (ref 135–145)
SPECIMEN SOURCE: SIGNIFICANT CHANGE UP
WBC # BLD: 7.44 K/UL — SIGNIFICANT CHANGE UP (ref 3.8–10.5)
WBC # FLD AUTO: 7.44 K/UL — SIGNIFICANT CHANGE UP (ref 3.8–10.5)

## 2020-03-26 PROCEDURE — 99233 SBSQ HOSP IP/OBS HIGH 50: CPT

## 2020-03-26 RX ORDER — METOCLOPRAMIDE HCL 10 MG
10 TABLET ORAL ONCE
Refills: 0 | Status: COMPLETED | OUTPATIENT
Start: 2020-03-26 | End: 2020-03-26

## 2020-03-26 RX ADMIN — CEFTRIAXONE 100 MILLIGRAM(S): 500 INJECTION, POWDER, FOR SOLUTION INTRAMUSCULAR; INTRAVENOUS at 14:30

## 2020-03-26 RX ADMIN — Medication 1 APPLICATION(S): at 17:02

## 2020-03-26 RX ADMIN — Medication 1 MILLILITER(S): at 06:41

## 2020-03-26 RX ADMIN — Medication 1 APPLICATION(S): at 06:40

## 2020-03-26 RX ADMIN — Medication 10 MILLIGRAM(S): at 11:04

## 2020-03-26 RX ADMIN — PANTOPRAZOLE SODIUM 40 MILLIGRAM(S): 20 TABLET, DELAYED RELEASE ORAL at 17:31

## 2020-03-26 RX ADMIN — PANTOPRAZOLE SODIUM 40 MILLIGRAM(S): 20 TABLET, DELAYED RELEASE ORAL at 06:42

## 2020-03-26 RX ADMIN — ATORVASTATIN CALCIUM 80 MILLIGRAM(S): 80 TABLET, FILM COATED ORAL at 21:25

## 2020-03-26 RX ADMIN — Medication 1 MILLILITER(S): at 21:24

## 2020-03-26 RX ADMIN — SENNA PLUS 5 MILLILITER(S): 8.6 TABLET ORAL at 21:24

## 2020-03-26 RX ADMIN — Medication 125 MILLIGRAM(S): at 14:31

## 2020-03-26 NOTE — PROGRESS NOTE ADULT - SUBJECTIVE AND OBJECTIVE BOX
CC: follow up anemia, GIB  No new issues overnight, denies abdominal pain, CP SOB and all other systems reviewed were negative  GI follow up noted/appreciated; Scheduled for endoscopy at 11AM    MEDICATIONS  (STANDING):  atorvastatin 80 milliGRAM(s) Oral at bedtime  BACItracin   Ointment 1 Application(s) Topical two times a day  cefTRIAXone   IVPB 1000 milliGRAM(s) IV Intermittent every 24 hours  lactobacillus acidophilus 1 Tablet(s) Oral daily  nystatin    Suspension 1 milliLiter(s) Oral three times a day  pantoprazole  Injectable 40 milliGRAM(s) IV Push two times a day  senna Syrup 5 milliLiter(s) Oral at bedtime  sodium chloride 0.9%. 1000 milliLiter(s) (70 mL/Hr) IV Continuous <Continuous>  valproic  acid Syrup 125 milliGRAM(s) Oral daily    MEDICATIONS  (PRN):  acetaminophen   Tablet .. 650 milliGRAM(s) Oral every 6 hours PRN Temp greater or equal to 38C (100.4F), Mild Pain (1 - 3)  polyethylene glycol 3350 17 Gram(s) Oral daily PRN Constipation  valproic  acid Syrup 125 milliGRAM(s) Oral every 6 hours PRN Agitation    Vital Signs Last 24 Hrs  T(C): 36.7 (26 Mar 2020 04:47), Max: 36.7 (25 Mar 2020 20:02)  T(F): 98 (26 Mar 2020 04:47), Max: 98.1 (25 Mar 2020 20:02)  HR: 61 (26 Mar 2020 04:47) (60 - 61)  BP: 95/61 (26 Mar 2020 04:47) (95/61 - 102/66)  BP(mean): --  RR: 14 (26 Mar 2020 04:47) (14 - 14)  SpO2: 100% (26 Mar 2020 04:47) (98% - 100%)    PHYSICAL EXAM:    Constitutional: NAD, resting,  arousable  HEENT: PERR, EOMI,    Neck: Soft and supple   Respiratory: Breath sounds are clear bilaterally,   Cardiovascular: S1 and S2,    Gastrointestinal: Bowel Sounds present, soft, nontender, dressing c/d/i  Extremities: No peripheral edema    ( @ 05:46)                      7.5  7.44 )-----------( 259                 23.6    Neutrophils = 5.71 (76.7%)  Lymphocytes = 1.04 (14.0%)  Eosinophils = 0.23 (3.1%)  Basophils = 0.05 (0.7%)  Monocytes = 0.38 (5.1%)  Bands = --%        142  |  108  |  11  ----------------------------<  101<H>  4.0   |  27  |  0.85    Ca    8.6      26 Mar 2020 05:46  Mg     1.9                 RVP:          Tox:         Urinalysis Basic - ( 25 Mar 2020 16:30 )    Color: Yellow / Appearance: Clear / S.010 / pH: x  Gluc: x / Ketone: Negative  / Bili: Negative / Urobili: Negative   Blood: x / Protein: Negative / Nitrite: Negative   Leuk Esterase: Negative / RBC: x / WBC x   Sq Epi: x / Non Sq Epi: x / Bacteria: x

## 2020-03-26 NOTE — PROGRESS NOTE ADULT - ATTENDING COMMENTS
I have personally examined this pt, reviewed pertinent clinical information and performed the evaluation and management service provided at today's patient visit for inpatient medical follow up     I am available on the unit for any questions regarding this patients medical plan of care and can be reached by cell phone at [phone]

## 2020-03-26 NOTE — PROGRESS NOTE ADULT - ASSESSMENT
60 yo Male with prior CVA 8 years ago with no deficits noted initially admitted with generalized weakness and difficulty speaking. CTH with chronic L MCA infarct with MRI showing acute right post frontal infarct. Pt with dysphagia s/p PEG placement, non-fluent aphasia, apraxia of speech, gait and ADL impairment. Hospital course complicated by with multiple PEG dislodgement and replacement. S/p completion of antibiotic treatment for aspiration pneumonia.     CVA   -Comprehensive Rehab Program of PT/OT/ SLP for Gait Instability, ADL, and Functional impairments  -ASA 81mg and Plavix 75mg for 90 days followed by ASA as monotherapy - will HOLD for now in the setting of GI bleed  -Lipitor 80mg qhs    Dysphagia - improving   -s/p PEG placement, pt pulled out PEG multiple times, 16Fr hicks g-tube dislodged on 3/23, no need to replace at this time as pt is eating all of his meals, no supplemental feeding was given through G-tube since diet was upgraded)  -s/p MBS on 3/19, diet upgraded to puree and thin liquids  -pressure dressing to g-tube site  - mild purulent discharge and granulation tissue; GI following - bacitracin to area BID, Doxycyline, and culture sent and pending     GI bleed  -s/p RRT on 3/25 morning, pt syncopized in bathroom during toileting. SBP in the 90s, pt was bolused fluids, labs sent, CXR was ordered, type and screen done  -CXR 3/25 negative  -Blood cx sent, UA/UC pending  -labs with WBC of 17, H/H 8.9/27 (prior H/H 12/39), no fevers, pt was bolused with fluids,   -CT abdomen with large amount of intra-abdominal hemorrhage as discussed with more dense hemorrhage along the greater curvature and posterior to the stomach distending the lateral recesses of the lesser sac.  There is also a wire or catheter traversing the site of the previous gastrostomy tube to the stomach. Surgical clips are noted at the greater curvature of the stomach.  -GI, Dr. Foote notified, pt made NPO, continue with protonix BID, continue with IV fluids,  -repeat CBC at 1PM    Agitation - stable  -Depakote 125 mg q6 hours PRN via PEG  -Depakote 125mg daily    Oral Thrush  -Nystatin swab TID  -oral care    Left renal lesion   -CT abdomen/pelvis done for malignancy workup due to multiple CVAs in the past showed ill defined left renal lesion   -Left renal lesion demonstrated MR findings (2/28) most compatible with small chronic subcapsular/pericapsular hematoma.  -Kidney sonogram negative; PSA negative   -outpatient colonoscopy as per heme/onc    Recent sepsis 2/2 aspiration pneumonia: resolved  -completed course of zosyn  -3/8 blood cx, UC, and RVP negative    Hematoma of rectus sheath - resolved  - likely 2/2 g-tube replacement  - no anticoagulation    Pain Mgmt: Tylenol PRN  Bowel: Monitor continence. Senna, Miralax PRN  Bladder: low PVR, incontinent   Sleep: monitor  Skin: Pressure injury prevention, turn Q2hrs while in bed, OOB to Chair  Diet: currently NPO (puree and thin)    Precautions: aspiration, fall  DVT: No AC, SCDs    ---------------------------------------------------------------------------------  Consults: Internal Medicine    Post Discharge Follow up:  Neurology: Craig Peterson (DO)  [address] [facility] [address]  [place] [address]  Phone: [phone]  Fax: [phone]  Follow Up Time: 2 weeks    Gastroenterology; Internal Medicine: John Squires (DO)  [address] [place] [address]  Phone: [phone]  Fax: [phone] 58 yo Male with prior CVA 8 years ago with no deficits noted initially admitted with generalized weakness and difficulty speaking. CTH with chronic L MCA infarct with MRI showing acute right post frontal infarct. Pt with dysphagia s/p PEG placement, non-fluent aphasia, apraxia of speech, gait and ADL impairment. Hospital course complicated by with multiple PEG dislodgement and replacement. S/p completion of antibiotic treatment for aspiration pneumonia.     CVA   -Comprehensive Rehab Program of PT/OT/ SLP for Gait Instability, ADL, and Functional impairments  -ASA 81mg and Plavix 75mg for 90 days followed by ASA as monotherapy - continue to HOLD for now in the setting of GI bleed  -Lipitor 80mg qhs    Dysphagia - improving   -s/p PEG placement, pt pulled out PEG multiple times, 16Fr hicks g-tube dislodged on 3/23, no need to replace  as pt is eating all of his meals, no supplemental feeding was given through G-tube since diet was upgraded)  -s/p MBS on 3/19, diet upgraded to puree and thin liquids  -pressure dressing to g-tube site  - mild purulent discharge and granulation tissue; GI following - bacitracin to area BID, Doxycyline, and culture sent, prelim no strep pyogenes, final path pending   -currently NPO for scope with GI    GI bleed  -s/p RRT on 3/25 morning, synopy in bathroom during toileting, pt was hypotensive and fluids given   -CXR 3/25 negative, UA negative, Blood cx sent  -labs on 3/26: leukocytosis resolved, H/H 7.5/23.6  no fevers,   -CT abdomen with large amount of intra-abdominal hemorrhage as discussed with more dense hemorrhage along the greater curvature and posterior to the stomach distending the lateral recesses of the lesser sac.  There is also a wire or catheter traversing the site of the previous gastrostomy tube to the stomach. Surgical clips are noted at the greater curvature of the stomach.  -Pt remains NPO, being transfused 1 unit pRBC,   -pt planned for scope with GI today     Agitation - stable  -Depakote 125 mg q6 hours PRN via PEG  -Depakote 125mg daily    Oral Thrush  -Nystatin swab TID  -oral care    Left renal lesion   -CT abdomen/pelvis done for malignancy workup due to multiple CVAs in the past showed ill defined left renal lesion   -Left renal lesion demonstrated MR findings (2/28) most compatible with small chronic subcapsular/pericapsular hematoma.  -Kidney sonogram negative; PSA negative   -outpatient colonoscopy as per heme/onc    Recent sepsis 2/2 aspiration pneumonia: resolved  -completed course of zosyn  -3/8 blood cx, UC, and RVP negative    Hematoma of rectus sheath - resolved  - likely 2/2 g-tube replacement  - no anticoagulation    Pain Mgmt: Tylenol PRN  Bowel: Monitor continence. Senna, Miralax PRN  Bladder: low PVR, incontinent   Sleep: monitor  Skin: Pressure injury prevention, turn Q2hrs while in bed, OOB to Chair  Diet: currently NPO (puree and thin)    Precautions: aspiration, fall  DVT: No AC, SCDs    ---------------------------------------------------------------------------------  Consults: Internal Medicine    Post Discharge Follow up:  Neurology: Craig Peterson (DO)  [address] [facility] [address]  [place] [address]  Phone: [phone]  Fax: [phone]  Follow Up Time: 2 weeks    Gastroenterology; Internal Medicine: John Squires (DO)  [address] [place] [address]  Phone: [phone]  Fax: [phone] 60 yo Male with prior CVA 8 years ago with no deficits noted initially admitted with generalized weakness and difficulty speaking. CTH with chronic L MCA infarct with MRI showing acute right post frontal infarct. Pt with dysphagia s/p PEG placement, non-fluent aphasia, apraxia of speech, gait and ADL impairment. Hospital course complicated by with multiple PEG dislodgement and replacement. S/p completion of antibiotic treatment for aspiration pneumonia.     CVA   -Comprehensive Rehab Program of PT/OT/ SLP for Gait Instability, ADL, and Functional impairments  -ASA 81mg and Plavix 75mg for 90 days followed by ASA as monotherapy - continue to HOLD for now in the setting of GI bleed  -Lipitor 80mg qhs    Dysphagia - improving   -s/p PEG placement, pt pulled out PEG multiple times, 16Fr hicks g-tube dislodged on 3/23, no need to replace  as pt is eating all of his meals, no supplemental feeding was given through G-tube since diet was upgraded)  -s/p MBS on 3/19, diet upgraded to puree and thin liquids  -pressure dressing to g-tube site  - mild purulent discharge and granulation tissue; GI following - bacitracin to area BID, Doxycyline, and culture sent, prelim no strep pyogenes, final path pending   -currently NPO for scope with GI    GI bleed  -s/p RRT on 3/25 morning, synopy in bathroom during toileting, pt was hypotensive and fluids given   -CXR 3/25 negative, UA negative, Blood cx sent  -labs on 3/26: leukocytosis resolved, H/H 7.5/23.6  no fevers,   -CT abdomen with large amount of intra-abdominal hemorrhage as discussed with more dense hemorrhage along the greater curvature and posterior to the stomach distending the lateral recesses of the lesser sac.  There is also a wire or catheter traversing the site of the previous gastrostomy tube to the stomach. Surgical clips are noted at the greater curvature of the stomach.  -Pt remains NPO, being transfused 1 unit pRBC,   -pt planned for scope with GI today     Agitation - stable  -Depakote 125 mg q6 hours PRN via PEG  -Depakote 125mg daily    Oral Thrush  -Nystatin swab TID  -oral care    Left renal lesion   -CT abdomen/pelvis done for malignancy workup due to multiple CVAs in the past showed ill defined left renal lesion   -Left renal lesion demonstrated MR findings (2/28) most compatible with small chronic subcapsular/pericapsular hematoma.  -Kidney sonogram negative; PSA negative   -outpatient colonoscopy as per heme/onc    Recent sepsis 2/2 aspiration pneumonia: resolved  -completed course of zosyn  -3/8 blood cx, UC, and RVP negative    Hematoma of rectus sheath - resolved  - likely 2/2 g-tube replacement  - no anticoagulation    Pain Mgmt: Tylenol PRN  Bowel: Monitor continence. Senna, Miralax PRN  Bladder: low PVR, incontinent   Sleep: monitor  Skin: Pressure injury prevention, turn Q2hrs while in bed, OOB to Chair  Diet: currently NPO (puree and thin)    Precautions: aspiration, fall  DVT: No AC, SCDs    Anticipated Discharge 3/31 [facility]    ---------------------------------------------------------------------------------  Consults: Internal Medicine    Post Discharge Follow up:  Neurology: Craig Peterson (DO)  [address] [facility] [address]  [place] [address]  Phone: [phone]  Fax: [phone]  Follow Up Time: 2 weeks    Gastroenterology; Internal Medicine: John Squires (DO)  [address] [place] [address]  Phone: [phone]  Fax: [phone] 58 yo Male with prior CVA 8 years ago with no deficits noted initially admitted with generalized weakness and difficulty speaking. CTH with chronic L MCA infarct with MRI showing acute right post frontal infarct. Pt with dysphagia s/p PEG placement, non-fluent aphasia, apraxia of speech, gait and ADL impairment. Hospital course complicated by with multiple PEG dislodgement and replacement. S/p completion of antibiotic treatment for aspiration pneumonia.     CVA   -Comprehensive Rehab Program of PT/OT/ SLP for Gait Instability, ADL, and Functional impairments  -ASA 81mg and Plavix 75mg for 90 days followed by ASA as monotherapy - continue to HOLD for now in the setting of GI bleed  -Lipitor 80mg qhs    Dysphagia - improving   -s/p PEG placement, pt pulled out PEG multiple times, 16Fr hicks g-tube dislodged on 3/23, no need to replace  as pt is eating all of his meals, no supplemental feeding was given through G-tube since diet was upgraded)  -s/p MBS on 3/19, diet upgraded to puree and thin liquids  -pressure dressing to g-tube site  - mild purulent discharge and granulation tissue; GI following - bacitracin to area BID, , and culture sent, prelim no strep pyogenes, final path pending   -currently NPO for scope with GI    GI bleed  -s/p RRT on 3/25 morning, synopy in bathroom during toileting, pt was hypotensive and fluids given   -CXR 3/25 negative, UA negative, Blood cx sent  -labs on 3/26: leukocytosis resolved, H/H 7.5/23.6  no fevers,   -CT abdomen with large amount of intra-abdominal hemorrhage as discussed with more dense hemorrhage along the greater curvature and posterior to the stomach distending the lateral recesses of the lesser sac.  There is also a wire or catheter traversing the site of the previous gastrostomy tube to the stomach. Surgical clips are noted at the greater curvature of the stomach.  -Pt remains NPO, being transfused 1 unit pRBC,   -continue Ceftriaxone 1000mg q24 hours  -continue protonix 40mg BID  -pt planned for scope with GI today     Agitation - stable  -Depakote 125 mg q6 hours PRN via PEG  -Depakote 125mg daily    Oral Thrush  -Nystatin swab TID  -oral care    Left renal lesion   -CT abdomen/pelvis done for malignancy workup due to multiple CVAs in the past showed ill defined left renal lesion   -Left renal lesion demonstrated MR findings (2/28) most compatible with small chronic subcapsular/pericapsular hematoma.  -Kidney sonogram negative; PSA negative   -outpatient colonoscopy as per heme/onc    Recent sepsis 2/2 aspiration pneumonia: resolved  -completed course of zosyn  -3/8 blood cx, UC, and RVP negative    Hematoma of rectus sheath - resolved  - likely 2/2 g-tube replacement  - no anticoagulation    Pain Mgmt: Tylenol PRN  Bowel: Monitor continence. Senna, Miralax PRN  Bladder: low PVR, incontinent   Sleep: monitor  Skin: Pressure injury prevention, turn Q2hrs while in bed, OOB to Chair  Diet: currently NPO (puree and thin)    Precautions: aspiration, fall  DVT: No AC, SCDs    Anticipated Discharge 3/31 [facility]    ---------------------------------------------------------------------------------  Consults: Internal Medicine    Post Discharge Follow up:  Neurology: Craig Peterson (DO)  [address] [facility] [address]  [place] [address]  Phone: [phone]  Fax: [phone]  Follow Up Time: 2 weeks    Gastroenterology; Internal Medicine: John Squires (DO)  [address] [place] [address]  Phone: [phone]  Fax: [phone]

## 2020-03-26 NOTE — PROGRESS NOTE ADULT - ASSESSMENT
58 yo Male with prior CVA 8 years ago with no deficits noted initially admitted with generalized weakness and difficulty speaking. CTH with chronic L MCA infarct with MRI showing acute right post frontal infarct. Pt with dysphagia s/p PEG placement, non-fluent aphasia, apraxia of speech, gait and ADL impairment. Hospital course complicated by with multiple PEG dislodgement and replacement. S/p completion of antibiotic treatment for aspiration pneumonia.     >Acute blood loss anemia   -further drop of H/h will order one unit of PRBC   -initial report of CT + blood collection  -patient currently hemodynamically stable; may need to consider holding aspirin and/or plavix pending results of endoscopy  -continue PPI    >CVA    -continue acute rehab PT/OT/SLP program  -cont ASA, plavix statin   -seizure ppx valproic   -puree diet thin liquids    >DVT ppx   - hematoma of rectus sheeth, no chemical dvt ppx   - TEDs

## 2020-03-26 NOTE — PROGRESS NOTE ADULT - SUBJECTIVE AND OBJECTIVE BOX
HPI:  60 yo Syriac speaking Male with PMH of CVA 8yrs ago (no reported deficits) was initially brought into [facility] on 2/9/20 by brother for concerns of generalized weakness and inability to speak for the 3 weeks. Pt was found to have no verbal output, appeared withdrawn, intermittent command following and easily agitated.  CTH demonstrated chronic L MCA infarct with severely stenotic vs occluded distal L M1. Confirmed on MRI but also found to have an acute right post frontal infarct, benign LUIS and s/p Medtronic ILR placement 2/13. Patient also had sinus bradycardia, with no interventions recommended from cardiology.   Patient also with severe dysphagia and was s/p PEG placement on 2/2 by GI with endoscopic placement however post procedure pt pulled PEG out. As per GI patient at risk of 2nd endoscopic placement contraindicated due to risk of pulling out tube and peritonitis. Patient thus had laproscopic placement of G tube done by surgery on 2/27 to minimize risk of peritonitis if pulled out. On 3/2, noted with moderate leaking around G-tube: was pulled out from 9 cm in to only 1 cm in. G tube repositioned under fluoro on 3/2.    Neuro recommended malignancy work up 2/2 unexplained multiple embolic strokes. Oncology consult w Dr. Sanchez. Patient had CT C/A/P w  IV contrast only. no significant findings exc questionable renal mass, but sono neg and MRI confirmed it being a subcapsular hematoma.  Patient will need outptn colonoscopy after discharge. 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   Patient was discharged to acute rehab on 3/7, however on admission pt had a temp of 101 and pt PEG was found to be dislodged. Pt was transferred back to [facility] and PEG was replaced by surgery on 3/8. Patient tolerating feeds. Pt was found to have sepsis 2/2 aspiration pneumonia of b/l lower lobes and ID was consulted and pt was started on zosyn. Antibitoic course completed. RVP, blood cultutes were negative. Anticoagulation was held due to hematoma at rectum likley 2/2 g tube replacement. On 3/13 pt pulled out PEG and 16Fr hicks catheter was inserted through g tube tract and feeds were restarted after confirmation of placement.  Spoke with pt's nephew Dallas Arriola. As per Dallas, pt was living in [place] in a motel. Motel owner contacted family in [place] when he began acting strangely. family in [place] contacted pt's brother here in [place] and family brought him here and took him to [facility]. As per nephew, pt has no home here and no dispo plan at this time.   Patient is . He has 2 sons here but estranged from one son and other son is a college student.     SUBJECTIVE & INTERVAL HISTORY: Pt seen at bedside. Pt stable overnight. Denies abdominal pain. Denies nausea/vomiting. Pt continued NPO and on IV fluids. H/H 7.5/23 this morning. Pt being transfused one unit pRBC. Pt planned for scope this morning with GI.     CT abdomen: Large amount of intra-abdominal hemorrhage as discussed with more dense hemorrhage along the greater curvature and posterior to the stomach distending the lateral recesses of the lesser sac.      REVIEW OF SYSTEMS  Limited by pt aphasia  Constitutional - No fever, No fatigue  HEENT - No eye pain, No visual disturbances (wears glasses), No difficulty hearing,  Respiratory - No cough, No wheezing, No shortness of breath  Cardiovascular - No chest pain, No palpitations  Gastrointestinal - No abdominal pain, No nausea, No vomiting, No diarrhea, No constipation   Genitourinary - No dysuria, No incontinence  Neurological - No headaches, No memory loss, + loss of strength, No numbness,   Skin - No itching, No rashes,   Musculoskeletal - No joint pain, No joint swelling, No muscle pain  Psychiatric - No depression, No anxiety    VITALS  Vital Signs (24 Hrs):  T(C): 36.7 (03-26-20 @ 07:47), Max: 36.7 (03-25-20 @ 20:02)  HR: 58 (03-26-20 @ 07:47) (58 - 61)  BP: 95/60 (03-26-20 @ 07:47) (95/60 - 102/66)  RR: 14 (03-26-20 @ 07:47) (14 - 14)  SpO2: 99% (03-26-20 @ 07:47) (98% - 100%)  Wt(kg): --        PHYSICAL EXAM  Constitutional - NAD, Comfortable  HEENT - NCAT, EOMI, +glasses, +thrush on tongue -improving  Neck - Supple, No limited ROM  Chest -  CTAB b/l no wheezing  Cardio - +S1/S2 RRR,   Abdomen -  soft, non-distended, no tenderness, +BS, g-tube site with mild slough and granulation tissue, mild purulent drainage--improved from yesterday.   Extremities - No peripheral edema  Neurologic Exam:                 	   Cognitive -   	          Orientation: Awake, Alert, able to follow most commands, unable to test orientation as pt is aphasic  	   Speech - Non-fluent aphasia, comprehension fair, pt able to follow most commands, non-verbal but able to nod to questions  	   Cranial Nerves - left facial droop, tongue midline, no ptosis, shoulder shrug intact  	   Motor -  limited by aphasia and language   	                  LEFT    UE - ShAB 5/5, EF 5/5, EE 5/5, WE 5/5,  WNL  	                  RIGHT UE - ShAB 5/5, EF 5/5, EE 5/5, WE 5/5,  WNL  	                  LEFT    LE - HF 5/5, KE 5/5, DF 5/5, PF 5/5  	                  RIGHT LE - HF 5/5, KE 5/5, DF 5/5, PF 5/5     Psychiatric - Mood stable, calm, no agitation    IDT Meeting 3/19  OT  grooming Bernie  UBD Bernie  LBD modA  transfers modA  PT  transfers min/modA  gait 40ft RW close stand by  impaireed coordination, ataxix  ST  aphonic  comprehension modA    RECENT LABS/IMAGING                        7.5    7.44  )-----------( 259      ( 26 Mar 2020 05:46 )             23.6     03-26    142  |  108  |  11  ----------------------------<  101<H>  4.0   |  27  |  0.85    Ca    8.6      26 Mar 2020 05:46  Mg     1.9     03-25      EXAM:  CT ABDOMEN AND PELVIS IC      PROCEDURE DATE:  03/25/2020        INTERPRETATION:  CLINICAL INFORMATION: Leukocytosis and anemia erythema and drainage surrounding recent percutaneous gastrostomy tube.    COMPARISON: CT scan abdomen pelvis 3/7/2020.    PROCEDURE:   CT of the Abdomen and Pelvis was performed with intravenous contrast.   Intravenous contrast: 90 ml Omnipaque 350. 10 ml discarded.  Oral contrast: None.  Sagittal and coronal reformats were performed.    FINDINGS:    LOWER CHEST: Within normal limits.    LIVER: 10 mm enhancing, vascular lesion peripherally right hepatic lobe is stable in appearance, may reflect hemangioma.  BILE DUCTS: Normal caliber.  GALLBLADDER: Within normal limits.  SPLEEN: Within normal limits.  PANCREAS: Within normal limits.  ADRENALS: Within normal limits.  KIDNEYS/URETERS:   Duplicated right renal collecting system.  No hydronephrosis noted.    BLADDER: Within normal limits.  REPRODUCTIVE ORGANS: Prostatic calcifications.    BOWEL:   PERITONEUM: There is a wire or catheter traversing the site of the previous gastrostomy tube to the stomach.     Surgical clips are noted at the greater curvature of the stomach.  Dense ascites is present within the right perihepatic and left subphrenic peritoneal spaces of the upper abdomen.  In the left upper quadrant, posterior to the stomach and along the greater curvature, there is a large amount of high attenuation fluid measuring greater than 60 Hounsfield units compatible with hemorrhage. This distends the lateral compartment of the lesser sac and extends approximately 7.5 cm in transverse dimension and 10.5 cm in AP dimension.  There is probable thickening/submucosal edema along the wall of the superior greater curvature of the stomach.    There is a moderate amount of dense fluid/hemorrhage within the pelvis.    No free intraperitoneal air is noted.    Moderate amount of fecal retention throughout the colon with distended rectum.  No bowel obstruction noted.    VESSELS: Stenosis at the origin of the celiac axis. Atherosclerotic calcification.  RETROPERITONEUM/LYMPH NODES: No lymphadenopathy.    ABDOMINAL WALL: Mild enlargement of the left rectus musculature surrounding the catheter and previous tract of gastrostomy tube, slightly lesspronounced on prior exam, and may reflect sequelae of intramuscular hematoma.  BONES: Multilevel degenerative changes.    IMPRESSION:     Large amount of intra-abdominal hemorrhage as discussed with more dense hemorrhage along the greater curvature and posterior to the stomach distending the lateral recesses of the lesser sac.        MEDICATIONS  (STANDING):  aspirin  chewable 81 milliGRAM(s) Oral daily  atorvastatin 80 milliGRAM(s) Oral at bedtime  BACItracin   Ointment 1 Application(s) Topical two times a day  clopidogrel Tablet 75 milliGRAM(s) Oral daily  doxycycline hyclate Capsule 50 milliGRAM(s) Oral every 12 hours  lactobacillus acidophilus 1 Tablet(s) Oral daily  nystatin    Suspension 1 milliLiter(s) Oral three times a day  pantoprazole   Suspension 40 milliGRAM(s) Oral two times a day  senna Syrup 5 milliLiter(s) Oral at bedtime  sodium chloride 0.9%. 1000 milliLiter(s) (70 mL/Hr) IV Continuous <Continuous>  valproic  acid Syrup 125 milliGRAM(s) Oral daily    MEDICATIONS  (PRN):  acetaminophen   Tablet .. 650 milliGRAM(s) Oral every 6 hours PRN Temp greater or equal to 38C (100.4F), Mild Pain (1 - 3)  polyethylene glycol 3350 17 Gram(s) Oral daily PRN Constipation  valproic  acid Syrup 125 milliGRAM(s) Oral every 6 hours PRN Agitation HPI:  58 yo Serbian speaking Male with PMH of CVA 8yrs ago (no reported deficits) was initially brought into [facility] on 2/9/20 by brother for concerns of generalized weakness and inability to speak for the 3 weeks. Pt was found to have no verbal output, appeared withdrawn, intermittent command following and easily agitated.  CTH demonstrated chronic L MCA infarct with severely stenotic vs occluded distal L M1. Confirmed on MRI but also found to have an acute right post frontal infarct, benign LUIS and s/p Medtronic ILR placement 2/13. Patient also had sinus bradycardia, with no interventions recommended from cardiology.   Patient also with severe dysphagia and was s/p PEG placement on 2/2 by GI with endoscopic placement however post procedure pt pulled PEG out. As per GI patient at risk of 2nd endoscopic placement contraindicated due to risk of pulling out tube and peritonitis. Patient thus had laproscopic placement of G tube done by surgery on 2/27 to minimize risk of peritonitis if pulled out. On 3/2, noted with moderate leaking around G-tube: was pulled out from 9 cm in to only 1 cm in. G tube repositioned under fluoro on 3/2.    Neuro recommended malignancy work up 2/2 unexplained multiple embolic strokes. Oncology consult w Dr. Sanchez. Patient had CT C/A/P w  IV contrast only. no significant findings exc questionable renal mass, but sono neg and MRI confirmed it being a subcapsular hematoma.  Patient will need outptn colonoscopy after discharge. 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   Patient was discharged to acute rehab on 3/7, however on admission pt had a temp of 101 and pt PEG was found to be dislodged. Pt was transferred back to [facility] and PEG was replaced by surgery on 3/8. Patient tolerating feeds. Pt was found to have sepsis 2/2 aspiration pneumonia of b/l lower lobes and ID was consulted and pt was started on zosyn. Antibitoic course completed. RVP, blood cultutes were negative. Anticoagulation was held due to hematoma at rectum likley 2/2 g tube replacement. On 3/13 pt pulled out PEG and 16Fr hicks catheter was inserted through g tube tract and feeds were restarted after confirmation of placement.  Spoke with pt's nephew Dallas Arriola. As per Dallas, pt was living in [place] in a motel. Motel owner contacted family in [place] when he began acting strangely. family in [place] contacted pt's brother here in [place] and family brought him here and took him to [facility]. As per nephew, pt has no home here and no dispo plan at this time.   Patient is . He has 2 sons here but estranged from one son and other son is a college student.     SUBJECTIVE & INTERVAL HISTORY: Pt seen at bedside. Pt stable overnight. Denies abdominal pain. Denies nausea/vomiting. Pt continued NPO and on IV fluids. H/H 7.5/23 this morning. Pt being transfused one unit pRBC. Pt planned for scope this morning with GI.     CT abdomen 3/25: Large amount of intra-abdominal hemorrhage as discussed with more dense hemorrhage along the greater curvature and posterior to the stomach distending the lateral recesses of the lesser sac.      REVIEW OF SYSTEMS  Limited by pt aphasia  Constitutional - No fever, No fatigue  HEENT - No eye pain, No visual disturbances (wears glasses), No difficulty hearing,  Respiratory - No cough, No wheezing, No shortness of breath  Cardiovascular - No chest pain, No palpitations  Gastrointestinal - No abdominal pain, No nausea, No vomiting, No diarrhea, No constipation   Genitourinary - No dysuria, No incontinence  Neurological - No headaches, No memory loss, + loss of strength, No numbness,   Skin - No itching, No rashes,   Musculoskeletal - No joint pain, No joint swelling, No muscle pain  Psychiatric - No depression, No anxiety    VITALS  Vital Signs (24 Hrs):  T(C): 36.7 (03-26-20 @ 07:47), Max: 36.7 (03-25-20 @ 20:02)  HR: 58 (03-26-20 @ 07:47) (58 - 61)  BP: 95/60 (03-26-20 @ 07:47) (95/60 - 102/66)  RR: 14 (03-26-20 @ 07:47) (14 - 14)  SpO2: 99% (03-26-20 @ 07:47) (98% - 100%)  Wt(kg): --        PHYSICAL EXAM  Constitutional - NAD, Comfortable  HEENT - NCAT, EOMI, +glasses, +thrush on tongue -improving  Neck - Supple, No limited ROM  Chest -  CTAB b/l no wheezing  Cardio - +S1/S2 RRR,   Abdomen -  soft, non-distended, no tenderness, +BS, g-tube site with mild slough and granulation tissue, mild purulent drainage-improved from yesterday.   Extremities - No peripheral edema  Neurologic Exam:                 	   Cognitive -   	          Orientation: Awake, Alert, able to follow most commands, unable to test orientation as pt is aphasic  	   Speech - Non-fluent aphasia, comprehension fair, pt able to follow most commands, non-verbal but able to nod to questions  	   Cranial Nerves - left facial droop, tongue midline, no ptosis, shoulder shrug intact  	   Motor -  limited by aphasia and language   	                  LEFT    UE - ShAB 5/5, EF 5/5, EE 5/5, WE 5/5,  WNL  	                  RIGHT UE - ShAB 5/5, EF 5/5, EE 5/5, WE 5/5,  WNL  	                  LEFT    LE - HF 5/5, KE 5/5, DF 5/5, PF 5/5  	                  RIGHT LE - HF 5/5, KE 5/5, DF 5/5, PF 5/5     Psychiatric - Mood stable, calm, no agitation    IDT Meeting 3/19  OT  grooming Bernie  UBD Bernie  LBD modA  transfers modA  PT  transfers min/modA  gait 40ft RW close stand by  impaireed coordination, ataxix  ST  aphonic  comprehension modA    RECENT LABS/IMAGING                        7.5    7.44  )-----------( 259      ( 26 Mar 2020 05:46 )             23.6     03-26    142  |  108  |  11  ----------------------------<  101<H>  4.0   |  27  |  0.85    Ca    8.6      26 Mar 2020 05:46  Mg     1.9     03-25    < from: CT Abdomen and Pelvis w/ IV Cont (03.25.20 @ 09:05) >  Large amount of intra-abdominal hemorrhage as discussed with more dense hemorrhage along the greater curvature and posterior to the stomach distending the lateral recesses of the lesser sac.      MEDICATIONS  (STANDING):  atorvastatin 80 milliGRAM(s) Oral at bedtime  BACItracin   Ointment 1 Application(s) Topical two times a day  cefTRIAXone   IVPB 1000 milliGRAM(s) IV Intermittent every 24 hours  lactobacillus acidophilus 1 Tablet(s) Oral daily  nystatin    Suspension 1 milliLiter(s) Oral three times a day  pantoprazole  Injectable 40 milliGRAM(s) IV Push two times a day  senna Syrup 5 milliLiter(s) Oral at bedtime  sodium chloride 0.9%. 1000 milliLiter(s) (70 mL/Hr) IV Continuous <Continuous>  valproic  acid Syrup 125 milliGRAM(s) Oral daily    MEDICATIONS  (PRN):  acetaminophen   Tablet .. 650 milliGRAM(s) Oral every 6 hours PRN Temp greater or equal to 38C (100.4F), Mild Pain (1 - 3)  polyethylene glycol 3350 17 Gram(s) Oral daily PRN Constipation  valproic  acid Syrup 125 milliGRAM(s) Oral every 6 hours PRN Agitation HPI:  60 yo Portuguese speaking Male with PMH of CVA 8yrs ago (no reported deficits) was initially brought into [facility] on 2/9/20 by brother for concerns of generalized weakness and inability to speak for the 3 weeks. Pt was found to have no verbal output, appeared withdrawn, intermittent command following and easily agitated.  CTH demonstrated chronic L MCA infarct with severely stenotic vs occluded distal L M1. Confirmed on MRI but also found to have an acute right post frontal infarct, benign LUIS and s/p Medtronic ILR placement 2/13. Patient also had sinus bradycardia, with no interventions recommended from cardiology.   Patient also with severe dysphagia and was s/p PEG placement on 2/2 by GI with endoscopic placement however post procedure pt pulled PEG out. As per GI patient at risk of 2nd endoscopic placement contraindicated due to risk of pulling out tube and peritonitis. Patient thus had laproscopic placement of G tube done by surgery on 2/27 to minimize risk of peritonitis if pulled out. On 3/2, noted with moderate leaking around G-tube: was pulled out from 9 cm in to only 1 cm in. G tube repositioned under fluoro on 3/2.    Neuro recommended malignancy work up 2/2 unexplained multiple embolic strokes. Oncology consult w Dr. Sanchez. Patient had CT C/A/P w  IV contrast only. no significant findings exc questionable renal mass, but sono neg and MRI confirmed it being a subcapsular hematoma.  Patient will need outptn colonoscopy after discharge. 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   Patient was discharged to acute rehab on 3/7, however on admission pt had a temp of 101 and pt PEG was found to be dislodged. Pt was transferred back to [facility] and PEG was replaced by surgery on 3/8. Patient tolerating feeds. Pt was found to have sepsis 2/2 aspiration pneumonia of b/l lower lobes and ID was consulted and pt was started on zosyn. Antibitoic course completed. RVP, blood cultutes were negative. Anticoagulation was held due to hematoma at rectum likley 2/2 g tube replacement. On 3/13 pt pulled out PEG and 16Fr hicks catheter was inserted through g tube tract and feeds were restarted after confirmation of placement.  Spoke with pt's nephew Dallas Arriola. As per Dallas, pt was living in [place] in a motel. Motel owner contacted family in [place] when he began acting strangely. family in [place] contacted pt's brother here in [place] and family brought him here and took him to [facility]. As per nephew, pt has no home here and no dispo plan at this time.   Patient is . He has 2 sons here but estranged from one son and other son is a college student.     SUBJECTIVE & INTERVAL HISTORY: Pt seen at bedside. Pt stable overnight. Denies abdominal pain. Denies nausea/vomiting. Pt continued NPO and on IV fluids. H/H 7.5/23 this morning. Pt being transfused one unit pRBC. Pt planned for scope this morning with GI.     CT abdomen 3/25: Large amount of intra-abdominal hemorrhage as discussed with more dense hemorrhage along the greater curvature and posterior to the stomach distending the lateral recesses of the lesser sac.      REVIEW OF SYSTEMS  Limited by pt aphasia  Constitutional - No fever, No fatigue  HEENT - No eye pain, No visual disturbances (wears glasses), No difficulty hearing,  Respiratory - No cough, No wheezing, No shortness of breath  Cardiovascular - No chest pain, No palpitations  Gastrointestinal - No abdominal pain, No nausea, No vomiting, No diarrhea, No constipation   Genitourinary - No dysuria, No incontinence  Neurological - No headaches, No memory loss, + loss of strength, No numbness,   Skin - No itching, No rashes,   Musculoskeletal - No joint pain, No joint swelling, No muscle pain  Psychiatric - No depression, No anxiety    VITALS  Vital Signs (24 Hrs):  T(C): 36.7 (03-26-20 @ 07:47), Max: 36.7 (03-25-20 @ 20:02)  HR: 58 (03-26-20 @ 07:47) (58 - 61)  BP: 95/60 (03-26-20 @ 07:47) (95/60 - 102/66)  RR: 14 (03-26-20 @ 07:47) (14 - 14)  SpO2: 99% (03-26-20 @ 07:47) (98% - 100%)  Wt(kg): --        PHYSICAL EXAM  Constitutional - NAD, Comfortable  HEENT - NCAT, EOMI, +glasses, +thrush on tongue -improving  Neck - Supple, No limited ROM  Chest -  CTAB b/l no wheezing  Cardio - +S1/S2 RRR,   Abdomen -  soft, non-distended, no tenderness, +BS, g-tube site with mild slough and granulation tissue, mild purulent drainage-improved from yesterday.   Extremities - No peripheral edema  Neurologic Exam:                 	   Cognitive -   	          Orientation: Awake, Alert, able to follow most commands, unable to test orientation as pt is aphasic  	   Speech - Non-fluent aphasia, comprehension fair, pt able to follow most commands, non-verbal but able to nod to questions  	   Cranial Nerves - left facial droop, tongue midline, no ptosis, shoulder shrug intact  	   Motor -  limited by aphasia and language   	                  LEFT    UE - ShAB 5/5, EF 5/5, EE 5/5, WE 5/5,  WNL  	                  RIGHT UE - ShAB 5/5, EF 5/5, EE 5/5, WE 5/5,  WNL  	                  LEFT    LE - HF 5/5, KE 5/5, DF 5/5, PF 5/5  	                  RIGHT LE - HF 5/5, KE 5/5, DF 5/5, PF 5/5     Psychiatric - Mood stable, calm, no agitation    IDT Meeting 3/24  SW: family --> SURAJ  OT  grooming modA  UBD Bernie  LBD modA  toilet Bernie  tub transfer min/modA  PT  amb 80feet RW Bernie, wheelchair follow  transfers Bernie  stairs n/a  SLP  diet puree thin  comprehension modA, decreased attention, apraxia    RECENT LABS/IMAGING                        7.5    7.44  )-----------( 259      ( 26 Mar 2020 05:46 )             23.6     03-26    142  |  108  |  11  ----------------------------<  101<H>  4.0   |  27  |  0.85    Ca    8.6      26 Mar 2020 05:46  Mg     1.9     03-25    < from: CT Abdomen and Pelvis w/ IV Cont (03.25.20 @ 09:05) >  Large amount of intra-abdominal hemorrhage as discussed with more dense hemorrhage along the greater curvature and posterior to the stomach distending the lateral recesses of the lesser sac.      MEDICATIONS  (STANDING):  atorvastatin 80 milliGRAM(s) Oral at bedtime  BACItracin   Ointment 1 Application(s) Topical two times a day  cefTRIAXone   IVPB 1000 milliGRAM(s) IV Intermittent every 24 hours  lactobacillus acidophilus 1 Tablet(s) Oral daily  nystatin    Suspension 1 milliLiter(s) Oral three times a day  pantoprazole  Injectable 40 milliGRAM(s) IV Push two times a day  senna Syrup 5 milliLiter(s) Oral at bedtime  sodium chloride 0.9%. 1000 milliLiter(s) (70 mL/Hr) IV Continuous <Continuous>  valproic  acid Syrup 125 milliGRAM(s) Oral daily    MEDICATIONS  (PRN):  acetaminophen   Tablet .. 650 milliGRAM(s) Oral every 6 hours PRN Temp greater or equal to 38C (100.4F), Mild Pain (1 - 3)  polyethylene glycol 3350 17 Gram(s) Oral daily PRN Constipation  valproic  acid Syrup 125 milliGRAM(s) Oral every 6 hours PRN Agitation

## 2020-03-27 LAB
ANION GAP SERPL CALC-SCNC: 10 MMOL/L — SIGNIFICANT CHANGE UP (ref 5–17)
BUN SERPL-MCNC: 12 MG/DL — SIGNIFICANT CHANGE UP (ref 7–23)
CALCIUM SERPL-MCNC: 8.9 MG/DL — SIGNIFICANT CHANGE UP (ref 8.4–10.5)
CHLORIDE SERPL-SCNC: 108 MMOL/L — SIGNIFICANT CHANGE UP (ref 96–108)
CO2 SERPL-SCNC: 25 MMOL/L — SIGNIFICANT CHANGE UP (ref 22–31)
CREAT SERPL-MCNC: 0.79 MG/DL — SIGNIFICANT CHANGE UP (ref 0.5–1.3)
GLUCOSE SERPL-MCNC: 97 MG/DL — SIGNIFICANT CHANGE UP (ref 70–99)
HCT VFR BLD CALC: 27.6 % — LOW (ref 39–50)
HGB BLD-MCNC: 9 G/DL — LOW (ref 13–17)
MCHC RBC-ENTMCNC: 32.4 PG — SIGNIFICANT CHANGE UP (ref 27–34)
MCHC RBC-ENTMCNC: 32.6 GM/DL — SIGNIFICANT CHANGE UP (ref 32–36)
MCV RBC AUTO: 99.3 FL — SIGNIFICANT CHANGE UP (ref 80–100)
NRBC # BLD: 0 /100 WBCS — SIGNIFICANT CHANGE UP (ref 0–0)
PLATELET # BLD AUTO: 258 K/UL — SIGNIFICANT CHANGE UP (ref 150–400)
POTASSIUM SERPL-MCNC: 3.9 MMOL/L — SIGNIFICANT CHANGE UP (ref 3.5–5.3)
POTASSIUM SERPL-SCNC: 3.9 MMOL/L — SIGNIFICANT CHANGE UP (ref 3.5–5.3)
RBC # BLD: 2.78 M/UL — LOW (ref 4.2–5.8)
RBC # FLD: 13.7 % — SIGNIFICANT CHANGE UP (ref 10.3–14.5)
SODIUM SERPL-SCNC: 143 MMOL/L — SIGNIFICANT CHANGE UP (ref 135–145)
WBC # BLD: 7.97 K/UL — SIGNIFICANT CHANGE UP (ref 3.8–10.5)
WBC # FLD AUTO: 7.97 K/UL — SIGNIFICANT CHANGE UP (ref 3.8–10.5)

## 2020-03-27 PROCEDURE — 99232 SBSQ HOSP IP/OBS MODERATE 35: CPT

## 2020-03-27 RX ORDER — MEMANTINE HYDROCHLORIDE 10 MG/1
5 TABLET ORAL
Refills: 0 | Status: DISCONTINUED | OUTPATIENT
Start: 2020-03-27 | End: 2020-04-01

## 2020-03-27 RX ADMIN — MEMANTINE HYDROCHLORIDE 5 MILLIGRAM(S): 10 TABLET ORAL at 17:34

## 2020-03-27 RX ADMIN — PANTOPRAZOLE SODIUM 40 MILLIGRAM(S): 20 TABLET, DELAYED RELEASE ORAL at 17:35

## 2020-03-27 RX ADMIN — Medication 1 MILLILITER(S): at 21:51

## 2020-03-27 RX ADMIN — Medication 1 MILLILITER(S): at 05:30

## 2020-03-27 RX ADMIN — Medication 1 MILLILITER(S): at 13:31

## 2020-03-27 RX ADMIN — SENNA PLUS 5 MILLILITER(S): 8.6 TABLET ORAL at 21:56

## 2020-03-27 RX ADMIN — Medication 125 MILLIGRAM(S): at 12:37

## 2020-03-27 RX ADMIN — Medication 1 TABLET(S): at 12:36

## 2020-03-27 RX ADMIN — PANTOPRAZOLE SODIUM 40 MILLIGRAM(S): 20 TABLET, DELAYED RELEASE ORAL at 05:30

## 2020-03-27 RX ADMIN — Medication 1 APPLICATION(S): at 17:33

## 2020-03-27 RX ADMIN — Medication 1 APPLICATION(S): at 05:29

## 2020-03-27 RX ADMIN — MEMANTINE HYDROCHLORIDE 5 MILLIGRAM(S): 10 TABLET ORAL at 12:35

## 2020-03-27 RX ADMIN — CEFTRIAXONE 100 MILLIGRAM(S): 500 INJECTION, POWDER, FOR SOLUTION INTRAMUSCULAR; INTRAVENOUS at 14:56

## 2020-03-27 RX ADMIN — ATORVASTATIN CALCIUM 80 MILLIGRAM(S): 80 TABLET, FILM COATED ORAL at 21:51

## 2020-03-27 NOTE — PROGRESS NOTE ADULT - ASSESSMENT
60 yo Male with prior CVA 8 years ago with no deficits noted initially admitted with generalized weakness and difficulty speaking. CTH with chronic L MCA infarct with MRI showing acute right post frontal infarct. Pt with dysphagia s/p PEG placement, non-fluent aphasia, apraxia of speech, gait and ADL impairment. Hospital course complicated by with multiple PEG dislodgement and replacement. S/p completion of antibiotic treatment for aspiration pneumonia.     >Acute blood loss anemia   -s/p transfusion PRBC yesterday; s/p endoscopy no active bleeding  -initial report of CT + blood collection  -patient currently hemodynamically stable; may need to consider holding aspirin and/or plavix pending results of endoscopy  -continue PPI    >CVA    -continue acute rehab PT/OT/SLP program  -cont ASA, plavix statin   -seizure ppx valproic   -puree diet thin liquids    >DVT ppx   - hematoma of rectus sheeth, no chemical dvt ppx   - TEDs

## 2020-03-27 NOTE — PROGRESS NOTE ADULT - ASSESSMENT
60 yo Male with prior CVA 8 years ago with no deficits noted initially admitted with generalized weakness and difficulty speaking. CTH with chronic L MCA infarct with MRI showing acute right post frontal infarct. Pt with dysphagia s/p PEG placement, non-fluent aphasia, apraxia of speech, gait and ADL impairment. Hospital course complicated by with multiple PEG dislodgement and replacement. S/p completion of antibiotic treatment for aspiration pneumonia.     CVA   -Comprehensive Rehab Program of PT/OT/ SLP for Gait Instability, ADL, and Functional impairments  -ASA 81mg and Plavix 75mg for 90 days followed by ASA as monotherapy - continue to HOLD for now in the setting of GI bleed  -Lipitor 80mg qhs    Dysphagia - improving   -h/o recent PEG placement, pt pulled out PEG multiple times, 16Fr hicks g-tube dislodged on 3/23, no need to replace as pt is eating all of his meals,   -s/p MBS on 3/19, diet upgraded to puree and thin liquids  -pressure dressing to g-tube site, mild purulent discharge and granulation tissue; GI following - bacitracin to area BID, culture normal skin cheryl      GI bleed  -s/p RRT on 3/25 morning, synopy in bathroom during toileting, pt was hypotensive and fluids given   -CXR 3/25 negative, UA negative, Blood cx sent  -CT abdomen with large amount of intra-abdominal hemorrhage as discussed with more dense hemorrhage along the greater curvature and posterior to the stomach distending the lateral recesses of the lesser sac.  -continue Ceftriaxone 1000mg q24 hours  -continue protonix 40mg BID  -s/p GI scope 3/26; as per GI no signs of active bleeding, healing ulcer  -Surgery consulted - abdomen benign at this time, continue to monitor  -H/H 9/27 - improved on 3/27 s/p 1 unit pRBC     Agitation - stable  -Depakote 125 mg q6 hours PRN via PEG  -Depakote 125mg daily    Oral Thrush  -Nystatin swab TID  -oral care    Left renal lesion   -CT abdomen/pelvis done for malignancy workup due to multiple CVAs in the past showed ill defined left renal lesion   -Left renal lesion demonstrated MR findings (2/28) most compatible with small chronic subcapsular/pericapsular hematoma.  -Kidney sonogram negative; PSA negative   -outpatient colonoscopy as per heme/onc    Recent sepsis 2/2 aspiration pneumonia: resolved  -completed course of zosyn  -3/8 blood cx, UC, and RVP negative    Hematoma of rectus sheath - resolved  - likely 2/2 g-tube replacement  - no anticoagulation    Pain Mgmt: Tylenol PRN  Bowel: Monitor continence. Senna, Miralax PRN  Bladder: low PVR, incontinent   Sleep: monitor  Skin: Pressure injury prevention, turn Q2hrs while in bed, OOB to Chair  Diet: puree and thin    Precautions: aspiration, fall  DVT: No AC, SCDs    Anticipated Discharge 3/31 SURAJ    ---------------------------------------------------------------------------------  Consults: Internal Medicine    Post Discharge Follow up:  Neurology: Craig Peterson (DO)  [facility] [facility] [address]  [place] [address]  Phone: [phone]  Fax: [phone]  Follow Up Time: 2 weeks    Gastroenterology; Internal Medicine: John Squires (DO)  [address] [place] [address]  Phone: [phone]  Fax: [phone] 58 yo Male with prior CVA 8 years ago with no deficits noted initially admitted with generalized weakness and difficulty speaking. CTH with chronic L MCA infarct with MRI showing acute right post frontal infarct. Pt with dysphagia s/p PEG placement, non-fluent aphasia, apraxia of speech, gait and ADL impairment. Hospital course complicated by with multiple PEG dislodgement and replacement. S/p completion of antibiotic treatment for aspiration pneumonia.     CVA   -Comprehensive Rehab Program of PT/OT/ SLP for Gait Instability, ADL, and Functional impairments  -ASA 81mg and Plavix 75mg for 90 days followed by ASA as monotherapy - continue to HOLD for now in the setting of abdominal  bleed  -Lipitor 80mg qhs    Dysphagia - improving   -h/o recent PEG placement, pt pulled out PEG multiple times, 16Fr hicks g-tube dislodged on 3/23, no need to replace as pt is eating all of his meals,   -s/p MBS on 3/19, diet upgraded to puree and thin liquids  -pressure dressing to g-tube site, mild purulent discharge and granulation tissue; GI following - bacitracin to area BID, culture normal skin cheryl      GI bleed  -s/p RRT on 3/25 morning, synopy in bathroom during toileting, pt was hypotensive and fluids given   -CXR 3/25 negative, UA negative, Blood cx sent  -CT abdomen with large amount of intra-abdominal hemorrhage as discussed with more dense hemorrhage along the greater curvature and posterior to the stomach distending the lateral recesses of the lesser sac.  -continue Ceftriaxone 1000mg q24 hours  -continue protonix 40mg BID  -s/p GI scope 3/26; as per GI no signs of active bleeding, healing ulcer  -Surgery consulted - abdomen benign at this time, continue to monitor  -H/H 9/27 - improved on 3/27 s/p 1 unit pRBC     Agitation - stable  -Depakote 125 mg q6 hours PRN via PEG  -Depakote 125mg daily    Oral Thrush  -Nystatin swab TID  -oral care    Left renal lesion   -CT abdomen/pelvis done for malignancy workup due to multiple CVAs in the past showed ill defined left renal lesion   -Left renal lesion demonstrated MR findings (2/28) most compatible with small chronic subcapsular/pericapsular hematoma.  -Kidney sonogram negative; PSA negative   -outpatient colonoscopy as per heme/onc    Recent sepsis 2/2 aspiration pneumonia: resolved  -completed course of zosyn  -3/8 blood cx, UC, and RVP negative    Hematoma of rectus sheath - monitor  - likely 2/2 g-tube replacement  - no anticoagulation    Pain Mgmt: Tylenol PRN  Bowel: Monitor continence. Senna, Miralax PRN  Bladder: low PVR, incontinent   Sleep: monitor  Skin: Pressure injury prevention, turn Q2hrs while in bed, OOB to Chair  Diet: puree and thin    Precautions: aspiration, fall  DVT: No AC, SCDs    Anticipated Discharge 3/31 SURAJ    ---------------------------------------------------------------------------------  Consults: Internal Medicine    Post Discharge Follow up:  Neurology: Craig Peterson (DO)  [facility] [facility] [address]  [place] [address]  Phone: [phone]  Fax: [phone]  Follow Up Time: 2 weeks    Gastroenterology; Internal Medicine: John Squires (DO)  [address] [place] [address]  Phone: [phone]  Fax: [phone]

## 2020-03-27 NOTE — PROGRESS NOTE ADULT - ATTENDING COMMENTS
I have personally examined this pt, reviewed pertinent clinical information and performed the evaluation and management service provided at today's patient visit for inpatient medical follow up     I am available on the unit for any questions regarding this patients medical plan of care and can be reached by cell phone at [phone]

## 2020-03-27 NOTE — PROGRESS NOTE ADULT - SUBJECTIVE AND OBJECTIVE BOX
CC: Follow up GI bleed, anemia blood loss  No new issues reported; denies abd pain (yes / no questioning)    ROS: Unable to accurately assess due to aphasia    MEDICATIONS  (STANDING):  atorvastatin 80 milliGRAM(s) Oral at bedtime  BACItracin   Ointment 1 Application(s) Topical two times a day  cefTRIAXone   IVPB 1000 milliGRAM(s) IV Intermittent every 24 hours  lactobacillus acidophilus 1 Tablet(s) Oral daily  memantine 5 milliGRAM(s) Oral two times a day  nystatin    Suspension 1 milliLiter(s) Oral three times a day  pantoprazole  Injectable 40 milliGRAM(s) IV Push two times a day  senna Syrup 5 milliLiter(s) Oral at bedtime  sodium chloride 0.9%. 1000 milliLiter(s) (70 mL/Hr) IV Continuous <Continuous>  valproic  acid Syrup 125 milliGRAM(s) Oral daily    MEDICATIONS  (PRN):  acetaminophen   Tablet .. 650 milliGRAM(s) Oral every 6 hours PRN Temp greater or equal to 38C (100.4F), Mild Pain (1 - 3)  polyethylene glycol 3350 17 Gram(s) Oral daily PRN Constipation  valproic  acid Syrup 125 milliGRAM(s) Oral every 6 hours PRN Agitation    Vital Signs Last 24 Hrs  T(C): 36.9 (27 Mar 2020 08:00), Max: 36.9 (27 Mar 2020 08:00)  T(F): 98.4 (27 Mar 2020 08:00), Max: 98.4 (27 Mar 2020 08:00)  HR: 64 (27 Mar 2020 08:00) (64 - 64)  BP: 108/73 (27 Mar 2020 08:00) (101/67 - 108/73)  BP(mean): --  RR: 14 (27 Mar 2020 08:00) (14 - 15)  SpO2: 98% (27 Mar 2020 08:00) (98% - 99%)    PHYSICAL EXAM:    Constitutional: NAD, resting,  arousable  HEENT: PERR, EOMI,    Neck: Soft and supple   Respiratory: Breath sounds are clear bilaterally,   Cardiovascular: S1 and S2,    Gastrointestinal: Bowel Sounds present, soft, nontender, dressing c/d/i  Extremities: No peripheral edema    ( @ 06:28)                      9.0  7.97 )-----------( 258                 27.6    Neutrophils = -- (--%)  Lymphocytes = -- (--%)  Eosinophils = -- (--%)  Basophils = -- (--%)  Monocytes = -- (--%)  Bands = --%    03-27    143  |  108  |  12  ----------------------------<  97  3.9   |  25  |  0.79    Ca    8.9      27 Mar 2020 06:28            RVP:          Tox:         Urinalysis Basic - ( 25 Mar 2020 16:30 )    Color: Yellow / Appearance: Clear / S.010 / pH: x  Gluc: x / Ketone: Negative  / Bili: Negative / Urobili: Negative   Blood: x / Protein: Negative / Nitrite: Negative   Leuk Esterase: Negative / RBC: x / WBC x   Sq Epi: x / Non Sq Epi: x / Bacteria: x

## 2020-03-27 NOTE — PROGRESS NOTE ADULT - ATTENDING COMMENTS
Pt. seen with resident & fellow.  Agree with documentation above as per fellow with amendments made as appropriate. Patient medically stable. Making progress towards rehab goals.    Seen with surgeon this AM.  Abdomen benign.  H&H improved after transfusion.  Will monitor CBC over the weekend.  If cont. to remain stable will resume ASA , followed by resumption of plavix.

## 2020-03-27 NOTE — PROGRESS NOTE ADULT - SUBJECTIVE AND OBJECTIVE BOX
HPI:  60 yo Italian speaking Male with PMH of CVA 8yrs ago (no reported deficits) was initially brought into [facility] on 20 by brother for concerns of generalized weakness and inability to speak for the 3 weeks. Pt was found to have no verbal output, appeared withdrawn, intermittent command following and easily agitated.  CTH demonstrated chronic L MCA infarct with severely stenotic vs occluded distal L M1. Confirmed on MRI but also found to have an acute right post frontal infarct, benign LUIS and s/p [employer] ILR placement . Patient also had sinus bradycardia, with no interventions recommended from cardiology.   Patient also with severe dysphagia and was s/p PEG placement on  by GI with endoscopic placement however post procedure pt pulled PEG out. As per GI patient at risk of 2nd endoscopic placement contraindicated due to risk of pulling out tube and peritonitis. Patient thus had laproscopic placement of G tube done by surgery on  to minimize risk of peritonitis if pulled out. On 3/2, noted with moderate leaking around G-tube: was pulled out from 9 cm in to only 1 cm in. G tube repositioned under fluoro on 3/2.    Neuro recommended malignancy work up  unexplained multiple embolic strokes. Oncology consult w Dr. Sanchez. Patient had CT C/A/P w  IV contrast only. no significant findings exc questionable renal mass, but sono neg and MRI confirmed it being a subcapsular hematoma.  Patient will need outptn colonoscopy after discharge. 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   Patient was discharged to acute rehab on 3/7, however on admission pt had a temp of 101 and pt PEG was found to be dislodged. Pt was transferred back to [facility] and PEG was replaced by surgery on 3/8. Patient tolerating feeds. Pt was found to have sepsis 2/2 aspiration pneumonia of b/l lower lobes and ID was consulted and pt was started on zosyn. Antibitoic course completed. RVP, blood cultutes were negative. Anticoagulation was held due to hematoma at rectum likley / g tube replacement. On 3/13 pt pulled out PEG and 16Fr hicks catheter was inserted through g tube tract and feeds were restarted after confirmation of placement.  Spoke with pt's nephew Dallas Arriola. As per Dallas, pt was living in [place] in a motel. Motel owner contacted family in [place] when he began acting strangely. family in [place] contacted pt's brother here in [place] and family brought him here and took him to [facility]. As per nephew, pt has no home here and no dispo plan at this time.   Patient is . He has 2 sons here but estranged from one son and other son is a college student.     SUBJECTIVE & INTERVAL HISTORY: Pt seen at bedside. Pt stable overnight. Denies abdominal pain. Denies nausea/vomiting. s/p I unit pRBC yesterday. H/H improved . Pt with no acute complaints. Tolerating diet     REVIEW OF SYSTEMS  Limited by pt aphasia  Constitutional - No fever, No fatigue  HEENT - No eye pain, No visual disturbances (wears glasses), No difficulty hearing,  Respiratory - No cough, No wheezing, No shortness of breath  Cardiovascular - No chest pain, No palpitations  Gastrointestinal - No abdominal pain, No nausea, No vomiting, No diarrhea, No constipation   Genitourinary - No dysuria, No incontinence  Neurological - No headaches, No memory loss, + loss of strength, No numbness,   Skin - No itching, No rashes,   Musculoskeletal - No joint pain, No joint swelling, No muscle pain  Psychiatric - No depression, No anxiety    VITALS  Vital Signs (24 Hrs):  T(C): 36.9 (20 @ 08:00), Max: 36.9 (20 @ 08:00)  HR: 64 (20 @ 08:00) (64 - 64)  BP: 108/73 (20 @ 08:00) (101/67 - 108/73)  RR: 14 (20 @ 08:00) (14 - 15)  SpO2: 98% (20 @ 08:00) (98% - 99%)  Wt(kg): --      PHYSICAL EXAM  Constitutional - NAD, Comfortable  HEENT - NCAT, EOMI, +glasses, +thrush on tongue -improving  Neck - Supple, No limited ROM  Chest -  CTAB b/l no wheezing  Cardio - +S1/S2 RRR,   Abdomen -  soft, non-distended, no tenderness, +BS, g-tube site with mild slough and granulation tissue, mild purulent drainage-improving  Extremities - No peripheral edema  Neurologic Exam:                 	   Cognitive -   	          Orientation: Awake, Alert, able to follow most commands, unable to test orientation as pt is aphasic  	   Speech - Non-fluent aphasia, comprehension fair, pt able to follow most commands, non-verbal but able to nod to questions  	   Cranial Nerves - left facial droop, tongue midline, no ptosis, shoulder shrug intact  	   Motor -  limited by aphasia and language   	                  LEFT    UE - ShAB 5/5, EF 5/5, EE 5/5, WE 5/5,  WNL  	                  RIGHT UE - ShAB 5/5, EF 5/5, EE 5/5, WE 5/5,  WNL  	                  LEFT    LE - HF 5/5, KE 5/5, DF 5/5, PF 5/5  	                  RIGHT LE - HF 5/5, KE 5/5, DF 5/5, PF 5/5     Psychiatric - Mood stable, calm, no agitation    IDT Meeting 3/24  SW: family --> SURAJ  OT  grooming modA  UBD Bernie  LBD modA  toilet Bernie  tub transfer min/modA  PT  amb 80feet RW Bernie, wheelchair follow  transfers Bernie  stairs n/a  SLP  diet puree thin  comprehension modA, decreased attention, apraxia    RECENT LABS/IMAGING                        9.0    7.97  )-----------( 258      ( 27 Mar 2020 06:28 )             27.6     03-    143  |  108  |  12  ----------------------------<  97  3.9   |  25  |  0.79    Ca    8.9      27 Mar 2020 06:28      Urinalysis Basic - ( 25 Mar 2020 16:30 )  Color: Yellow / Appearance: Clear / S.010 / pH: x  Gluc: x / Ketone: Negative  / Bili: Negative / Urobili: Negative   Blood: x / Protein: Negative / Nitrite: Negative   Leuk Esterase: Negative / RBC: x / WBC x   Sq Epi: x / Non Sq Epi: x / Bacteria: x      < from: CT Abdomen and Pelvis w/ IV Cont (20 @ 09:05) >  Large amount of intra-abdominal hemorrhage as discussed with more dense hemorrhage along the greater curvature and posterior to the stomach distending the lateral recesses of the lesser sac.      MEDICATIONS  (STANDING):  atorvastatin 80 milliGRAM(s) Oral at bedtime  BACItracin   Ointment 1 Application(s) Topical two times a day  cefTRIAXone   IVPB 1000 milliGRAM(s) IV Intermittent every 24 hours  lactobacillus acidophilus 1 Tablet(s) Oral daily  memantine 5 milliGRAM(s) Oral two times a day  nystatin    Suspension 1 milliLiter(s) Oral three times a day  pantoprazole  Injectable 40 milliGRAM(s) IV Push two times a day  senna Syrup 5 milliLiter(s) Oral at bedtime  sodium chloride 0.9%. 1000 milliLiter(s) (70 mL/Hr) IV Continuous <Continuous>  valproic  acid Syrup 125 milliGRAM(s) Oral daily    MEDICATIONS  (PRN):  acetaminophen   Tablet .. 650 milliGRAM(s) Oral every 6 hours PRN Temp greater or equal to 38C (100.4F), Mild Pain (1 - 3)  polyethylene glycol 3350 17 Gram(s) Oral daily PRN Constipation  valproic  acid Syrup 125 milliGRAM(s) Oral every 6 hours PRN Agitation

## 2020-03-28 LAB
HCT VFR BLD CALC: 33.4 % — LOW (ref 39–50)
HGB BLD-MCNC: 10.8 G/DL — LOW (ref 13–17)
MCHC RBC-ENTMCNC: 32.2 PG — SIGNIFICANT CHANGE UP (ref 27–34)
MCHC RBC-ENTMCNC: 32.3 GM/DL — SIGNIFICANT CHANGE UP (ref 32–36)
MCV RBC AUTO: 99.7 FL — SIGNIFICANT CHANGE UP (ref 80–100)
NRBC # BLD: 0 /100 WBCS — SIGNIFICANT CHANGE UP (ref 0–0)
PLATELET # BLD AUTO: 309 K/UL — SIGNIFICANT CHANGE UP (ref 150–400)
RBC # BLD: 3.35 M/UL — LOW (ref 4.2–5.8)
RBC # FLD: 13.3 % — SIGNIFICANT CHANGE UP (ref 10.3–14.5)
WBC # BLD: 7.35 K/UL — SIGNIFICANT CHANGE UP (ref 3.8–10.5)
WBC # FLD AUTO: 7.35 K/UL — SIGNIFICANT CHANGE UP (ref 3.8–10.5)

## 2020-03-28 PROCEDURE — 99232 SBSQ HOSP IP/OBS MODERATE 35: CPT

## 2020-03-28 RX ORDER — ASPIRIN/CALCIUM CARB/MAGNESIUM 324 MG
81 TABLET ORAL DAILY
Refills: 0 | Status: DISCONTINUED | OUTPATIENT
Start: 2020-03-28 | End: 2020-04-01

## 2020-03-28 RX ADMIN — Medication 81 MILLIGRAM(S): at 17:25

## 2020-03-28 RX ADMIN — CEFTRIAXONE 100 MILLIGRAM(S): 500 INJECTION, POWDER, FOR SOLUTION INTRAMUSCULAR; INTRAVENOUS at 13:15

## 2020-03-28 RX ADMIN — Medication 1 MILLILITER(S): at 13:16

## 2020-03-28 RX ADMIN — Medication 1 MILLILITER(S): at 05:58

## 2020-03-28 RX ADMIN — ATORVASTATIN CALCIUM 80 MILLIGRAM(S): 80 TABLET, FILM COATED ORAL at 22:29

## 2020-03-28 RX ADMIN — Medication 1 TABLET(S): at 12:16

## 2020-03-28 RX ADMIN — PANTOPRAZOLE SODIUM 40 MILLIGRAM(S): 20 TABLET, DELAYED RELEASE ORAL at 17:25

## 2020-03-28 RX ADMIN — Medication 1 MILLILITER(S): at 22:29

## 2020-03-28 RX ADMIN — Medication 1 APPLICATION(S): at 22:12

## 2020-03-28 RX ADMIN — Medication 125 MILLIGRAM(S): at 12:17

## 2020-03-28 RX ADMIN — MEMANTINE HYDROCHLORIDE 5 MILLIGRAM(S): 10 TABLET ORAL at 05:58

## 2020-03-28 RX ADMIN — MEMANTINE HYDROCHLORIDE 5 MILLIGRAM(S): 10 TABLET ORAL at 17:25

## 2020-03-28 RX ADMIN — SENNA PLUS 5 MILLILITER(S): 8.6 TABLET ORAL at 22:29

## 2020-03-28 RX ADMIN — PANTOPRAZOLE SODIUM 40 MILLIGRAM(S): 20 TABLET, DELAYED RELEASE ORAL at 05:58

## 2020-03-28 RX ADMIN — Medication 1 APPLICATION(S): at 05:58

## 2020-03-28 NOTE — PROGRESS NOTE ADULT - ATTENDING COMMENTS
I have personally examined this pt, reviewed pertinent clinical information and performed the evaluation and management service provided at today's patient visit for inpatient medical follow up     I am available on the unit for any questions regarding this patients medical plan of care and can be reached by cell phone at [phone]

## 2020-03-28 NOTE — CONSULT NOTE ADULT - ASSESSMENT
a/p    As I told the team yesterday the patient likely had bleeding from PEG tube entry into gastric mucosa and bleed intraabdominally.  He has benign abdomen and able to eat now and hct was stable after 1 unit PRBC. His hemodynamics are stable.    I suggest daily cbcs for 5 days and hold anticoagulation.    thank you    dr shahla castellanos  cell#[phone]

## 2020-03-28 NOTE — CONSULT NOTE ADULT - ATTENDING COMMENTS
(this patient was seen and evaluated yesterday however my consult note went into a differen "Roland" chart which I corrected.
Patient seen and examined with Allison Oropeza NP.  Agree with assessment and plan as above.    Middle aged male s/p CVA now in acute rehab.  He had a gastrostomy tube placed prior to admission here at [facility] which he self-removed.  Attempts to replace the tube were temporary as patient removed as well.  No abdominal pain reported.  He is taking in nutrition via PO route without difficulty.    VSS.  Abdomen soft, nontender.  G tube site does reveal some purulent discharge and inflammed tissue.    Culture wound  Bacitracin ointment locally  Oral antibiotics

## 2020-03-28 NOTE — PROGRESS NOTE ADULT - ASSESSMENT
58 yo Male with prior CVA 8 years ago with no deficits noted initially admitted with generalized weakness and difficulty speaking. CTH with chronic L MCA infarct with MRI showing acute right post frontal infarct. Pt with dysphagia s/p PEG placement, non-fluent aphasia, apraxia of speech, gait and ADL impairment. Hospital course complicated by with multiple PEG dislodgement and replacement. S/p completion of antibiotic treatment for aspiration pneumonia.     >Acute blood loss anemia   -s/p transfusion PRBC /  endoscopy no active bleeding  -patient currently hemodynamically stable and Hgb seems stable at present; follow up CBC  -continue PPI    >CVA    -continue acute rehab PT/OT/SLP program  -cont ASA, plavix statin   -seizure ppx valproic   -puree diet thin liquids    >DVT ppx   - hematoma of rectus sheeth, no chemical dvt ppx   - TEDs

## 2020-03-28 NOTE — PROGRESS NOTE ADULT - SUBJECTIVE AND OBJECTIVE BOX
Subjective: No new complaints or overnight issues,   Denies pain, eating well    REVIEW OF SYSTEMS  Pertinent in last 24 h: Neurological deficits    VITALS  T(C): 36.3 (03-28-20 @ 07:46), Max: 36.6 (03-27-20 @ 21:47)  HR: 56 (03-28-20 @ 07:46) (54 - 56)  BP: 145/59 (03-28-20 @ 07:46) (119/72 - 145/59)  RR: 15 (03-28-20 @ 07:46) (15 - 15)  SpO2: 99% (03-28-20 @ 07:46) (99% - 99%)  Wt(kg): --    Physical Exam:  Constitutional - NAD, Comfortable  HEENT - NCAT, EOMI, +glasses, +thrush on tongue -improving  Neck - Supple, No limited ROM  Chest -  CTAB b/l no wheezing  Cardio - +S1/S2 RRR,   Abdomen -  soft, non-distended, no tenderness, +BS, g-tube site with mild slough and granulation tissue, small amount of purulent drainage-slowly improving, no erythema  Extremities - No peripheral edema  Neurologic Exam:                 	   Cognitive -   	          Awake, Alert, able to follow most commands, unable to test orientation as pt is aphasic  	   Speech - Non-fluent aphasia, comprehension fair, pt able to follow most commands, non-verbal but able to nod to questions  	   Cranial Nerves - left facial droop, tongue midline, no ptosis, shoulder shrug intact  	   Motor -  limited by aphasia and language   	                  LEFT    UE - ShAB 5/5, EF 5/5, EE 5/5, WE 5/5,  WNL  	                  RIGHT UE - ShAB 5/5, EF 5/5, EE 5/5, WE 5/5,  WNL  	                  LEFT    LE - HF 5/5, KE 5/5, DF 5/5, PF 5/5  	                  RIGHT LE - HF 5/5, KE 5/5, DF 5/5, PF 5/5     Psychiatric - Mood stable, calm, no agitation    IDT Meeting 3/24  SW: family --> SURAJ  OT  grooming modA  UBD Bernie  LBD modA  toilet Bernie  tub transfer min/modA  PT  amb 80feet RW Bernie, wheelchair follow  transfers Bernie  stairs n/a  SLP  diet puree thin  comprehension modA, decreased attention, apraxia      -    RECENT LABS/IMAGING                        10.8   7.35  )-----------( 309      ( 28 Mar 2020 07:10 )             33.4     03-27    143  |  108  |  12  ----------------------------<  97  3.9   |  25  |  0.79    Ca    8.9      27 Mar 2020 06:28              MEDICATIONS   acetaminophen   Tablet .. 650 milliGRAM(s) every 6 hours PRN  atorvastatin 80 milliGRAM(s) at bedtime  BACItracin   Ointment 1 Application(s) two times a day  cefTRIAXone   IVPB 1000 milliGRAM(s) every 24 hours  lactobacillus acidophilus 1 Tablet(s) daily  memantine 5 milliGRAM(s) two times a day  nystatin    Suspension 1 milliLiter(s) three times a day  pantoprazole  Injectable 40 milliGRAM(s) two times a day  polyethylene glycol 3350 17 Gram(s) daily PRN  senna Syrup 5 milliLiter(s) at bedtime  sodium chloride 0.9%. 1000 milliLiter(s) <Continuous>  valproic  acid Syrup 125 milliGRAM(s) every 6 hours PRN  valproic  acid Syrup 125 milliGRAM(s) daily      --------------------------------------------------------------------

## 2020-03-28 NOTE — PROGRESS NOTE ADULT - SUBJECTIVE AND OBJECTIVE BOX
CC: follow up stroke, anemia  No melena reported, no issues overnight reported.   Unable to review systems due to aphasia    MEDICATIONS  (STANDING):  atorvastatin 80 milliGRAM(s) Oral at bedtime  BACItracin   Ointment 1 Application(s) Topical two times a day  cefTRIAXone   IVPB 1000 milliGRAM(s) IV Intermittent every 24 hours  lactobacillus acidophilus 1 Tablet(s) Oral daily  memantine 5 milliGRAM(s) Oral two times a day  nystatin    Suspension 1 milliLiter(s) Oral three times a day  pantoprazole  Injectable 40 milliGRAM(s) IV Push two times a day  senna Syrup 5 milliLiter(s) Oral at bedtime  sodium chloride 0.9%. 1000 milliLiter(s) (70 mL/Hr) IV Continuous <Continuous>  valproic  acid Syrup 125 milliGRAM(s) Oral daily    MEDICATIONS  (PRN):  acetaminophen   Tablet .. 650 milliGRAM(s) Oral every 6 hours PRN Temp greater or equal to 38C (100.4F), Mild Pain (1 - 3)  polyethylene glycol 3350 17 Gram(s) Oral daily PRN Constipation  valproic  acid Syrup 125 milliGRAM(s) Oral every 6 hours PRN Agitation    ICU Vital Signs Last 24 Hrs  T(C): 36.3 (28 Mar 2020 07:46), Max: 36.6 (27 Mar 2020 21:47)  T(F): 97.3 (28 Mar 2020 07:46), Max: 97.8 (27 Mar 2020 21:47)  HR: 56 (28 Mar 2020 07:46) (54 - 56)  BP: 145/59 (28 Mar 2020 07:46) (119/72 - 145/59)  BP(mean): --  ABP: --  ABP(mean): --  RR: 15 (28 Mar 2020 07:46) (15 - 15)  SpO2: 99% (28 Mar 2020 07:46) (99% - 99%)    PHYSICAL EXAM:    Constitutional: NAD, alert aphasic  HEENT: PERR, EOMI,    Neck: Soft and supple   Respiratory: Breath sounds are clear bilaterally,   Cardiovascular: S1 and S2,    Gastrointestinal: Bowel Sounds present, soft, nontender, dressing c/d/i  Extremities: No peripheral edema    (03-27 @ 06:28)                      9.0  7.97 )-----------( 258                 27.6    Neutrophils = -- (--%)  Lymphocytes = -- (--%)  Eosinophils = -- (--%)  Basophils = -- (--%)  Monocytes = -- (--%)  Bands = --%    03-27    143  |  108  |  12  ----------------------------<  97  3.9   |  25  |  0.79    Ca    8.9      27 Mar 2020 06:28            RVP:          Tox:

## 2020-03-28 NOTE — PROGRESS NOTE ADULT - ASSESSMENT
58 yo Male with prior CVA 8 years ago with no deficits noted initially admitted with generalized weakness and difficulty speaking. CTH with chronic L MCA infarct with MRI showing acute right post frontal infarct. Pt with dysphagia s/p PEG placement, non-fluent aphasia, apraxia of speech, gait and ADL impairment. Hospital course complicated by with multiple PEG dislodgement and replacement. S/p completion of antibiotic treatment for aspiration pneumonia.     Intraabdominal bleed--benign Abd. exam  Hb improving  ---s/p GI scope 3/26; as per GI no signs of active bleeding, healing ulcer  -Surgery consulted - abdomen benign at this time, continue to monitor  continue Ceftriaxone 1000mg q24 hours  -continue protonix 40mg BID      CVA   -Comprehensive Rehab Program of PT/OT/ SLP for Gait Instability, ADL, and Functional impairments  -Resume ASA 81mg as H&H improved.  Will cont. to hold Plavix 75mg for now  --Dual antiplatlet therapy for 90 days post stroke followed by ASA as monotherapy -   -Lipitor 80mg qhs    Dysphagia - improving   -h/o recent PEG placement, pt pulled out PEG multiple times, 16Fr hicks g-tube dislodged on 3/23, no need to replace as pt is eating all of his meals,   -s/p MBS on 3/19, diet upgraded to puree and thin liquids--tolerating well  -pressure dressing to g-tube site, mild purulent discharge and granulation tissue; GI following - bacitracin to area BID, culture normal skin cheryl    Aphasia  Started Namenda      Agitation - resolved  -d/c Depakote     Oral Thrush  -Nystatin swab TID  -oral care    Left renal lesion   -CT abdomen/pelvis done for malignancy workup due to multiple CVAs in the past showed ill defined left renal lesion   -Left renal lesion demonstrated MR findings (2/28) most compatible with small chronic subcapsular/pericapsular hematoma.  -Kidney sonogram negative; PSA negative   -outpatient colonoscopy as per heme/onc          Pain Mgmt: Tylenol PRN  Bowel: Monitor continence. Senna, Miralax PRN  Bladder:  incontinent   Sleep: monitor  Skin: Pressure injury prevention, turn Q2hrs while in bed, OOB to Chair  Diet: puree and thin    Precautions: aspiration, fall  DVT: No AC, SCDs    Anticipated Discharge 3/31 SURAJ    ---------------------------------------------------------------------------------  Consults: Internal Medicine    Post Discharge Follow up:  Neurology: Craig Peterson (DO)  [address] [facility] [address]  [place] [address]  Phone: [phone]  Fax: [phone]  Follow Up Time: 2 weeks    Gastroenterology; Internal Medicine: John Squires (DO)  [address] [place] [address]  Phone: [phone]  Fax: [phone]

## 2020-03-28 NOTE — CONSULT NOTE ADULT - SUBJECTIVE AND OBJECTIVE BOX
Hospitalist note:      History of Present Illness:  History of Present Illness: 	  58 yo Spanish speaking Male with PMH of CVA 8yrs ago (no reported deficits) was initially brought into [facility] on 2/9/20 by brother for concerns of generalized weakness and inability to speak for the 3 weeks. Pt was found to have no verbal output, appeared withdrawn, intermittent command following and easily agitated.  CTH demonstrated chronic L MCA infarct with severely stenotic vs occluded distal L M1. Confirmed on MRI but also found to have an acute right post frontal infarct, benign LUIS and s/p Medtronic ILR placement 2/13. Patient also had sinus bradycardia, with no interventions recommended from cardiology.     Patient also with severe dysphagia and was s/p PEG placement on 2/2 by GI with endoscopic placement however post procedure pt pulled PEG out. As per GI patient at risk of 2nd endoscopic placement contraindicated due to risk of pulling out tube and peritonitis. Patient thus had laproscopic placement of G tube done by surgery on 2/27 to minimize risk of peritonitis if pulled out. On 3/2, noted with moderate leaking around G-tube: was pulled out from 9 cm in to only 1 cm in. G tube repositioned under fluoro on 3/2.      Neuro recommended malignancy work up 2/2 unexplained multiple embolic strokes. Oncology consult w Dr. Sanchez. Patient had CT C/A/P w  IV contrast only. no significant findings exc questionable renal mass, but sono neg and MRI confirmed it being a subcapsular hematoma.  Patient will need outptn colonoscopy after discharge. 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     Patient was discharged to acute rehab on 3/7, however on admission pt had a temp of 101 and pt PEG was found to be dislodged. Pt was transferred back to [facility] and PEG was replaced by surgery on 3/8. Patient tolerating feeds. Pt was found to have sepsis 2/2 aspiration pneumonia of b/l lower lobes and ID was consulted and pt was started on zosyn. Antibitoic course completed. RVP, blood cultutes were negative. Anticoagulation was held due to hematoma at rectum likley 2/2 g tube replacement. On 3/13 pt pulled out PEG and 16Fr hicks catheter was inserted through g tube tract and feeds were restarted after confirmation of placement.    Spoke with pt's nephew Dallas Arriola. As per Dallas, pt was living in [place] in a motel. Motel owner contacted family in [place] when he began acting strangely. family in [place] contacted pt's brother here in [place] and family brought him here and took him to [facility]. As per nephew, pt has no home here and no dispo plan at this time.   Patient is . He has 2 sons here but estranged from one son and other son is a college student.           I saw patient yesterday and evaluated him with his medical team at that time.    he was hemodynamically stable    afebrile    abdomen-soft and non distended and non tender, luq peg tube site not bleeding, no hematoma        labs:  Complete Blood Count in AM (03.27.20 @ 06:28)    Nucleated RBC: 0 /100 WBCs    WBC Count: 7.97 K/uL    RBC Count: 2.78 M/uL    Hemoglobin: 9.0 g/dL    Hematocrit: 27.6 %    Mean Cell Volume: 99.3 fl    Mean Cell Hemoglobin: 32.4 pg    Mean Cell Hemoglobin Conc: 32.6 gm/dL    Red Cell Distrib Width: 13.7 %    Platelet Count - Automated: 258 K/uL    ct scan(I personally reviewed images:    < from: CT Abdomen and Pelvis w/ IV Cont (03.25.20 @ 09:05) >  ERITONEUM: There is a wire or catheter traversing the site of the previous gastrostomy tube to the stomach.     Surgical clips are noted at the greater curvature of the stomach.  Dense ascites is present within the right perihepatic and left subphrenic peritoneal spaces of the upper abdomen.  In the left upper quadrant, posterior to the stomach and along the greater curvature, there is a large amount of high attenuation fluid measuring greater than 60 Hounsfield units compatible with hemorrhage. This distends the lateral compartment of the lesser sac and extends approximately 7.5 cm in transverse dimension and 10.5 cm in AP dimension.  There is probable thickening/submucosal edema along the wall of the superior greater curvature of the stomach.    There is a moderate amount of dense fluid/hemorrhage within the pelvis.    No free intraperitoneal air is noted.    Moderate amount of fecal retention throughout the colon with distended rectum.  No bowel obstruction noted.    VESSELS: Stenosis at the origin of the celiac axis. Atherosclerotic calcification.  RETROPERITONEUM/LYMPH NODES: No lymphadenopathy.    ABDOMINAL WALL: Mild enlargement of the left rectus musculature surrounding the catheter and previous tract of gastrostomy tube, slightly lesspronounced on prior exam, and may reflect sequelae of intramuscular hematoma.  BONES: Multilevel degenerative changes.    IMPRESSION:     Large amount of intra-abdominal hemorrhage as discussed with more dense hemorrhage along the greater curvature and posterior to the stomach distending the lateral recesses of the lesser sac.      < end of copied text >

## 2020-03-29 LAB
HCT VFR BLD CALC: 29.4 % — LOW (ref 39–50)
HGB BLD-MCNC: 9.6 G/DL — LOW (ref 13–17)
MCHC RBC-ENTMCNC: 32.7 GM/DL — SIGNIFICANT CHANGE UP (ref 32–36)
MCHC RBC-ENTMCNC: 32.7 PG — SIGNIFICANT CHANGE UP (ref 27–34)
MCV RBC AUTO: 100 FL — SIGNIFICANT CHANGE UP (ref 80–100)
NRBC # BLD: 0 /100 WBCS — SIGNIFICANT CHANGE UP (ref 0–0)
PLATELET # BLD AUTO: 300 K/UL — SIGNIFICANT CHANGE UP (ref 150–400)
RBC # BLD: 2.94 M/UL — LOW (ref 4.2–5.8)
RBC # FLD: 13.1 % — SIGNIFICANT CHANGE UP (ref 10.3–14.5)
WBC # BLD: 8.27 K/UL — SIGNIFICANT CHANGE UP (ref 3.8–10.5)
WBC # FLD AUTO: 8.27 K/UL — SIGNIFICANT CHANGE UP (ref 3.8–10.5)

## 2020-03-29 PROCEDURE — 99232 SBSQ HOSP IP/OBS MODERATE 35: CPT

## 2020-03-29 RX ADMIN — Medication 1 TABLET(S): at 12:38

## 2020-03-29 RX ADMIN — ATORVASTATIN CALCIUM 80 MILLIGRAM(S): 80 TABLET, FILM COATED ORAL at 21:23

## 2020-03-29 RX ADMIN — Medication 1 APPLICATION(S): at 05:40

## 2020-03-29 RX ADMIN — PANTOPRAZOLE SODIUM 40 MILLIGRAM(S): 20 TABLET, DELAYED RELEASE ORAL at 05:40

## 2020-03-29 RX ADMIN — CEFTRIAXONE 100 MILLIGRAM(S): 500 INJECTION, POWDER, FOR SOLUTION INTRAMUSCULAR; INTRAVENOUS at 15:54

## 2020-03-29 RX ADMIN — Medication 1 MILLILITER(S): at 15:45

## 2020-03-29 RX ADMIN — Medication 1 MILLILITER(S): at 21:23

## 2020-03-29 RX ADMIN — MEMANTINE HYDROCHLORIDE 5 MILLIGRAM(S): 10 TABLET ORAL at 05:40

## 2020-03-29 RX ADMIN — Medication 1 APPLICATION(S): at 17:13

## 2020-03-29 RX ADMIN — PANTOPRAZOLE SODIUM 40 MILLIGRAM(S): 20 TABLET, DELAYED RELEASE ORAL at 17:13

## 2020-03-29 RX ADMIN — SENNA PLUS 5 MILLILITER(S): 8.6 TABLET ORAL at 21:23

## 2020-03-29 RX ADMIN — Medication 81 MILLIGRAM(S): at 12:38

## 2020-03-29 RX ADMIN — Medication 1 MILLILITER(S): at 05:39

## 2020-03-29 RX ADMIN — MEMANTINE HYDROCHLORIDE 5 MILLIGRAM(S): 10 TABLET ORAL at 17:13

## 2020-03-29 NOTE — PROGRESS NOTE ADULT - SUBJECTIVE AND OBJECTIVE BOX
Subjective: No new complaints or overnight issues      REVIEW OF SYSTEMS  Pertinent in last 24 h: Neurological deficits    VITALS  T(C): 36.9 (03-29-20 @ 07:30), Max: 36.9 (03-29-20 @ 07:30)  HR: 57 (03-29-20 @ 07:30) (54 - 57)  BP: 100/68 (03-29-20 @ 07:30) (100/68 - 122/77)  RR: 12 (03-29-20 @ 07:30) (12 - 16)  SpO2: 100% (03-29-20 @ 07:30) (100% - 100%)  Wt(kg): --    Physical Exam:  -Constitutional - NAD, Comfortable  HEENT - NCAT, EOMI, +glasses,  Neck - Supple, No limited ROM  Chest -  CTAB b/l no wheezing  Cardio - +S1/S2 RRR,   Abdomen -  soft, non-distended, no tenderness, +BS, g-tube site with mild slough and granulation tissue, small amount of purulent drainage-slowly improving, no erythema  Extremities - No peripheral edema  Neurologic Exam:                 	   Cognitive -   	          Awake, Alert, able to follow most commands, unable to test orientation as pt is aphasic  	   Speech - Non-fluent aphasia, comprehension fair, pt able to follow most commands, non-verbal but able to nod to questions  	   Cranial Nerves - left facial droop, tongue midline, no ptosis, shoulder shrug intact  	   Motor -  limited by aphasia and language   	                  LEFT    UE - ShAB 5/5, EF 5/5, EE 5/5, WE 5/5,  WNL  	                  RIGHT UE - ShAB 5/5, EF 5/5, EE 5/5, WE 5/5,  WNL  	                  LEFT    LE - HF 5/5, KE 5/5, DF 5/5, PF 5/5  	                  RIGHT LE - HF 5/5, KE 5/5, DF 5/5, PF 5/5     Psychiatric - Mood stable, calm, no agitation    IDT Meeting 3/24  SW: family --> SURAJ  OT  grooming modA  UBD Bernie  LBD modA  toilet Bernie  tub transfer min/modA  PT  amb 80feet RW Bernie, wheelchair follow  transfers Bernie  stairs n/a  SLP  diet puree thin  comprehension modA, decreased attention, apraxia      RECENT LABS/IMAGING                        9.6    8.27  )-----------( 300      ( 29 Mar 2020 05:10 )             29.4                   MEDICATIONS   acetaminophen   Tablet .. 650 milliGRAM(s) every 6 hours PRN  aspirin  chewable 81 milliGRAM(s) daily  atorvastatin 80 milliGRAM(s) at bedtime  BACItracin   Ointment 1 Application(s) two times a day  cefTRIAXone   IVPB 1000 milliGRAM(s) every 24 hours  lactobacillus acidophilus 1 Tablet(s) daily  memantine 5 milliGRAM(s) two times a day  nystatin    Suspension 1 milliLiter(s) three times a day  pantoprazole  Injectable 40 milliGRAM(s) two times a day  polyethylene glycol 3350 17 Gram(s) daily PRN  senna Syrup 5 milliLiter(s) at bedtime  sodium chloride 0.9%. 1000 milliLiter(s) <Continuous>      --------------------------------------------------------------------

## 2020-03-29 NOTE — PROGRESS NOTE ADULT - ASSESSMENT
60 yo Male with prior CVA 8 years ago with no deficits noted initially admitted with generalized weakness and difficulty speaking. CTH with chronic L MCA infarct with MRI showing acute right post frontal infarct. Pt with dysphagia s/p PEG placement, non-fluent aphasia, apraxia of speech, gait and ADL impairment. Hospital course complicated by with multiple PEG dislodgement and replacement. S/p completion of antibiotic treatment for aspiration pneumonia.     Intraabdominal bleed--benign Abd. exam  Monitor Hb-slight decrease today, f/u CBC tomorrow  ---s/p GI scope 3/26; as per GI no signs of active bleeding, healing ulcer  -Surgery consulted - abdomen benign at this time, continue to monitor  continue Ceftriaxone 1000mg q24 hours  -continue protonix 40mg BID      CVA   -Comprehensive Rehab Program of PT/OT/ SLP for Gait Instability, ADL, and Functional impairments  -Resumed ASA 81mg.  Will cont. to hold Plavix 75mg for now  follow CBC  --Dual antiplatlet therapy for 90 days post stroke followed by ASA as monotherapy -   -Lipitor 80mg qhs    Dysphagia - improving   -h/o recent PEG placement, pt pulled out PEG multiple times, 16Fr hicks g-tube dislodged on 3/23, no need to replace as pt is eating all of his meals,   -s/p MBS on 3/19, diet upgraded to puree and thin liquids--tolerating well  -pressure dressing to g-tube site, mild purulent discharge and granulation tissue; GI following - bacitracin to area BID, culture normal skin cheryl    Aphasia  cont. Namenda BID      Agitation - resolved  -d/c Depakote     Oral Thrush  -Nystatin swab TID  -oral care    Left renal lesion   -CT abdomen/pelvis done for malignancy workup due to multiple CVAs in the past showed ill defined left renal lesion   -Left renal lesion demonstrated MR findings (2/28) most compatible with small chronic subcapsular/pericapsular hematoma.  -Kidney sonogram negative; PSA negative   -outpatient colonoscopy as per heme/onc          Pain Mgmt: Tylenol PRN  Bowel: Monitor continence. Senna, Miralax PRN  Bladder:  incontinent   Sleep: monitor  Skin: Pressure injury prevention, turn Q2hrs while in bed, OOB to Chair  Diet: puree and thin    Precautions: aspiration, fall  DVT: No AC, SCDs    Anticipated Discharge 3/31 SURAJ    ---------------------------------------------------------------------------------  Consults: Internal Medicine    Post Discharge Follow up:  Neurology: Craig Peterson (DO)  [facility] [place] [place] [address]  Phone: [phone]  Fax: [phone]  Follow Up Time: 2 weeks    Gastroenterology; Internal Medicine: John Squires (DO)  [address] [place] [address]  Phone: [phone]  Fax: [phone]

## 2020-03-29 NOTE — PROGRESS NOTE ADULT - ATTENDING COMMENTS
I have personally examined this pt, reviewed pertinent clinical information and performed the evaluation and management service provided at today's patient visit for inpatient medical follow up     I am available on the unit for any questions regarding this patients medical plan of care and can be reached by cell phone at [phone]

## 2020-03-29 NOTE — PROGRESS NOTE ADULT - ASSESSMENT
58 yo Male with prior CVA 8 years ago with no deficits noted initially admitted with generalized weakness and difficulty speaking. CTH with chronic L MCA infarct with MRI showing acute right post frontal infarct. Pt with dysphagia s/p PEG placement, non-fluent aphasia, apraxia of speech, gait and ADL impairment. Hospital course complicated by with multiple PEG dislodgement and replacement. S/p completion of antibiotic treatment for aspiration pneumonia.     >Acute blood loss anemia   -s/p transfusion PRBC /  endoscopy no active bleeding  -patient currently hemodynamically stable   - Hgb  stable   -continue PPI    >CVA    -continue acute rehab PT/OT/SLP program  -cont ASA, plavix statin   -seizure ppx valproic   -puree diet thin liquids    >DVT ppx   - hematoma of rectus sheeth, no chemical dvt ppx   - TEDs

## 2020-03-29 NOTE — PROGRESS NOTE ADULT - SUBJECTIVE AND OBJECTIVE BOX
CC: Follow up stroke, anemia  Pt without new issues, aphasic unable to provide ROS    MEDICATIONS  (STANDING):  aspirin  chewable 81 milliGRAM(s) Oral daily  atorvastatin 80 milliGRAM(s) Oral at bedtime  BACItracin   Ointment 1 Application(s) Topical two times a day  cefTRIAXone   IVPB 1000 milliGRAM(s) IV Intermittent every 24 hours  lactobacillus acidophilus 1 Tablet(s) Oral daily  memantine 5 milliGRAM(s) Oral two times a day  nystatin    Suspension 1 milliLiter(s) Oral three times a day  pantoprazole  Injectable 40 milliGRAM(s) IV Push two times a day  senna Syrup 5 milliLiter(s) Oral at bedtime  sodium chloride 0.9%. 1000 milliLiter(s) (70 mL/Hr) IV Continuous <Continuous>    MEDICATIONS  (PRN):  acetaminophen   Tablet .. 650 milliGRAM(s) Oral every 6 hours PRN Temp greater or equal to 38C (100.4F), Mild Pain (1 - 3)  polyethylene glycol 3350 17 Gram(s) Oral daily PRN Constipation    Vital Signs Last 24 Hrs  T(C): 36.3 (28 Mar 2020 22:27), Max: 36.3 (28 Mar 2020 22:27)  T(F): 97.4 (28 Mar 2020 22:27), Max: 97.4 (28 Mar 2020 22:27)  HR: 54 (28 Mar 2020 22:27) (54 - 54)  BP: 122/77 (28 Mar 2020 22:27) (122/77 - 122/77)  BP(mean): --  RR: 16 (28 Mar 2020 22:27) (16 - 16)  SpO2: 100% (28 Mar 2020 22:27) (100% - 100%)    PHYSICAL EXAM:    Constitutional: NAD, alert aphasic  HEENT: PERR, EOMI,    Neck: Soft and supple   Respiratory: Breath sounds are clear bilaterally,   Cardiovascular: S1 and S2,    Gastrointestinal: Bowel Sounds present, soft, nontender, dressing c/d/i  Extremities: No peripheral edema    (03-29 @ 05:10)                      9.6  8.27 )-----------( 300                 29.4    Neutrophils = -- (--%)  Lymphocytes = -- (--%)  Eosinophils = -- (--%)  Basophils = -- (--%)  Monocytes = -- (--%)  Bands = --%                RVP:          Tox:

## 2020-03-30 ENCOUNTER — TRANSCRIPTION ENCOUNTER (OUTPATIENT)
Age: 60
End: 2020-03-30

## 2020-03-30 LAB
ANION GAP SERPL CALC-SCNC: 8 MMOL/L — SIGNIFICANT CHANGE UP (ref 5–17)
BASOPHILS # BLD AUTO: 0.05 K/UL — SIGNIFICANT CHANGE UP (ref 0–0.2)
BASOPHILS NFR BLD AUTO: 0.6 % — SIGNIFICANT CHANGE UP (ref 0–2)
BUN SERPL-MCNC: 12 MG/DL — SIGNIFICANT CHANGE UP (ref 7–23)
CALCIUM SERPL-MCNC: 9.4 MG/DL — SIGNIFICANT CHANGE UP (ref 8.4–10.5)
CHLORIDE SERPL-SCNC: 105 MMOL/L — SIGNIFICANT CHANGE UP (ref 96–108)
CO2 SERPL-SCNC: 26 MMOL/L — SIGNIFICANT CHANGE UP (ref 22–31)
CREAT SERPL-MCNC: 0.77 MG/DL — SIGNIFICANT CHANGE UP (ref 0.5–1.3)
CULTURE RESULTS: SIGNIFICANT CHANGE UP
CULTURE RESULTS: SIGNIFICANT CHANGE UP
EOSINOPHIL # BLD AUTO: 0.37 K/UL — SIGNIFICANT CHANGE UP (ref 0–0.5)
EOSINOPHIL NFR BLD AUTO: 4.7 % — SIGNIFICANT CHANGE UP (ref 0–6)
GLUCOSE SERPL-MCNC: 93 MG/DL — SIGNIFICANT CHANGE UP (ref 70–99)
HCT VFR BLD CALC: 34.3 % — LOW (ref 39–50)
HGB BLD-MCNC: 10.9 G/DL — LOW (ref 13–17)
IMM GRANULOCYTES NFR BLD AUTO: 0.5 % — SIGNIFICANT CHANGE UP (ref 0–1.5)
LYMPHOCYTES # BLD AUTO: 1.41 K/UL — SIGNIFICANT CHANGE UP (ref 1–3.3)
LYMPHOCYTES # BLD AUTO: 17.8 % — SIGNIFICANT CHANGE UP (ref 13–44)
MCHC RBC-ENTMCNC: 31.8 GM/DL — LOW (ref 32–36)
MCHC RBC-ENTMCNC: 32 PG — SIGNIFICANT CHANGE UP (ref 27–34)
MCV RBC AUTO: 100.6 FL — HIGH (ref 80–100)
MONOCYTES # BLD AUTO: 0.51 K/UL — SIGNIFICANT CHANGE UP (ref 0–0.9)
MONOCYTES NFR BLD AUTO: 6.4 % — SIGNIFICANT CHANGE UP (ref 2–14)
NEUTROPHILS # BLD AUTO: 5.55 K/UL — SIGNIFICANT CHANGE UP (ref 1.8–7.4)
NEUTROPHILS NFR BLD AUTO: 70 % — SIGNIFICANT CHANGE UP (ref 43–77)
NRBC # BLD: 0 /100 WBCS — SIGNIFICANT CHANGE UP (ref 0–0)
PLATELET # BLD AUTO: 245 K/UL — SIGNIFICANT CHANGE UP (ref 150–400)
POTASSIUM SERPL-MCNC: 4.2 MMOL/L — SIGNIFICANT CHANGE UP (ref 3.5–5.3)
POTASSIUM SERPL-SCNC: 4.2 MMOL/L — SIGNIFICANT CHANGE UP (ref 3.5–5.3)
RBC # BLD: 3.41 M/UL — LOW (ref 4.2–5.8)
RBC # FLD: 13.5 % — SIGNIFICANT CHANGE UP (ref 10.3–14.5)
SODIUM SERPL-SCNC: 139 MMOL/L — SIGNIFICANT CHANGE UP (ref 135–145)
SPECIMEN SOURCE: SIGNIFICANT CHANGE UP
SPECIMEN SOURCE: SIGNIFICANT CHANGE UP
WBC # BLD: 7.53 K/UL — SIGNIFICANT CHANGE UP (ref 3.8–10.5)
WBC # FLD AUTO: 7.53 K/UL — SIGNIFICANT CHANGE UP (ref 3.8–10.5)

## 2020-03-30 PROCEDURE — 99232 SBSQ HOSP IP/OBS MODERATE 35: CPT | Mod: GC

## 2020-03-30 PROCEDURE — 99232 SBSQ HOSP IP/OBS MODERATE 35: CPT

## 2020-03-30 RX ORDER — NYSTATIN 500MM UNIT
1 POWDER (EA) MISCELLANEOUS
Qty: 0 | Refills: 0 | DISCHARGE
Start: 2020-03-30

## 2020-03-30 RX ORDER — MEMANTINE HYDROCHLORIDE 10 MG/1
1 TABLET ORAL
Qty: 0 | Refills: 0 | DISCHARGE
Start: 2020-03-30

## 2020-03-30 RX ORDER — ACETAMINOPHEN 500 MG
2 TABLET ORAL
Refills: 0 | DISCHARGE
Start: 2020-03-30

## 2020-03-30 RX ORDER — POLYETHYLENE GLYCOL 3350 17 G/17G
17 POWDER, FOR SOLUTION ORAL
Refills: 0 | DISCHARGE
Start: 2020-03-30

## 2020-03-30 RX ORDER — ASPIRIN/CALCIUM CARB/MAGNESIUM 324 MG
1 TABLET ORAL
Qty: 0 | Refills: 0 | DISCHARGE
Start: 2020-03-30

## 2020-03-30 RX ORDER — PANTOPRAZOLE SODIUM 20 MG/1
40 TABLET, DELAYED RELEASE ORAL
Refills: 0 | Status: DISCONTINUED | OUTPATIENT
Start: 2020-03-30 | End: 2020-04-01

## 2020-03-30 RX ORDER — BACITRACIN ZINC 500 UNIT/G
1 OINTMENT IN PACKET (EA) TOPICAL
Qty: 0 | Refills: 0 | DISCHARGE
Start: 2020-03-30

## 2020-03-30 RX ORDER — ATORVASTATIN CALCIUM 80 MG/1
1 TABLET, FILM COATED ORAL
Qty: 0 | Refills: 0 | DISCHARGE
Start: 2020-03-30

## 2020-03-30 RX ORDER — PANTOPRAZOLE SODIUM 20 MG/1
40 TABLET, DELAYED RELEASE ORAL
Qty: 0 | Refills: 0 | DISCHARGE
Start: 2020-03-30

## 2020-03-30 RX ORDER — SENNA PLUS 8.6 MG/1
5 TABLET ORAL
Qty: 0 | Refills: 0 | DISCHARGE
Start: 2020-03-30

## 2020-03-30 RX ADMIN — MEMANTINE HYDROCHLORIDE 5 MILLIGRAM(S): 10 TABLET ORAL at 18:30

## 2020-03-30 RX ADMIN — CEFTRIAXONE 100 MILLIGRAM(S): 500 INJECTION, POWDER, FOR SOLUTION INTRAMUSCULAR; INTRAVENOUS at 15:33

## 2020-03-30 RX ADMIN — PANTOPRAZOLE SODIUM 40 MILLIGRAM(S): 20 TABLET, DELAYED RELEASE ORAL at 18:30

## 2020-03-30 RX ADMIN — Medication 1 APPLICATION(S): at 05:40

## 2020-03-30 RX ADMIN — Medication 1 APPLICATION(S): at 18:30

## 2020-03-30 RX ADMIN — PANTOPRAZOLE SODIUM 40 MILLIGRAM(S): 20 TABLET, DELAYED RELEASE ORAL at 05:40

## 2020-03-30 RX ADMIN — MEMANTINE HYDROCHLORIDE 5 MILLIGRAM(S): 10 TABLET ORAL at 05:40

## 2020-03-30 RX ADMIN — Medication 1 TABLET(S): at 12:31

## 2020-03-30 RX ADMIN — Medication 1 MILLILITER(S): at 21:38

## 2020-03-30 RX ADMIN — Medication 1 MILLILITER(S): at 05:41

## 2020-03-30 RX ADMIN — SENNA PLUS 5 MILLILITER(S): 8.6 TABLET ORAL at 21:38

## 2020-03-30 RX ADMIN — ATORVASTATIN CALCIUM 80 MILLIGRAM(S): 80 TABLET, FILM COATED ORAL at 21:38

## 2020-03-30 RX ADMIN — Medication 81 MILLIGRAM(S): at 12:31

## 2020-03-30 NOTE — DISCHARGE NOTE PROVIDER - NSDCFUADDAPPT_GEN_ALL_CORE_FT
Please follow up with neurology in 2 weeks.     Please follow up with GI in 1-2 weeks. Please follow up with neurology in 2 weeks.   Please follow up with PM&R in 4 weeks  Please follow up with GI in 1-2 weeks.

## 2020-03-30 NOTE — PROGRESS NOTE ADULT - SUBJECTIVE AND OBJECTIVE BOX
HPI:  60 yo [place]ish speaking Male with PMH of CVA 8yrs ago (no reported deficits) was initially brought into [facility] on 2/9/20 by brother for concerns of generalized weakness and inability to speak for the 3 weeks. Pt was found to have no verbal output, appeared withdrawn, intermittent command following and easily agitated.  CTH demonstrated chronic L MCA infarct with severely stenotic vs occluded distal L M1. Confirmed on MRI but also found to have an acute right post frontal infarct, benign LUIS and s/p Medtronic ILR placement 2/13. Patient also had sinus bradycardia, with no interventions recommended from cardiology.     Patient also with severe dysphagia and was s/p PEG placement on 2/2 by GI with endoscopic placement however post procedure pt pulled PEG out. As per GI patient at risk of 2nd endoscopic placement contraindicated due to risk of pulling out tube and peritonitis. Patient thus had laproscopic placement of G tube done by surgery on 2/27 to minimize risk of peritonitis if pulled out. On 3/2, noted with moderate leaking around G-tube: was pulled out from 9 cm in to only 1 cm in. G tube repositioned under fluoro on 3/2.      Neuro recommended malignancy work up 2/2 unexplained multiple embolic strokes. Oncology consult w Dr. Sanchez. Patient had CT C/A/P w  IV contrast only. no significant findings exc questionable renal mass, but sono neg and MRI confirmed it being a subcapsular hematoma.  Patient will need outptn colonoscopy after discharge. 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     Patient was discharged to acute rehab on 3/7, however on admission pt had a temp of 101 and pt PEG was found to be dislodged. Pt was transferred back to [facility] and PEG was replaced by surgery on 3/8. Patient tolerating feeds. Pt was found to have sepsis 2/2 aspiration pneumonia of b/l lower lobes and ID was consulted and pt was started on zosyn. Antibitoic course completed. RVP, blood cultutes were negative. Anticoagulation was held due to hematoma at rectum likley 2/2 g tube replacement. On 3/13 pt pulled out PEG and 16Fr hicks catheter was inserted through g tube tract and feeds were restarted after confirmation of placement.    Subjective  Pt aphasic.     PAST MEDICAL & SURGICAL HISTORY:  CVA (cerebral vascular accident)  S/P percutaneous endoscopic gastrostomy (PEG) tube placement      MedsMEDICATIONS  (STANDING):  aspirin  chewable 81 milliGRAM(s) Oral daily  atorvastatin 80 milliGRAM(s) Oral at bedtime  BACItracin   Ointment 1 Application(s) Topical two times a day  cefTRIAXone   IVPB 1000 milliGRAM(s) IV Intermittent every 24 hours  lactobacillus acidophilus 1 Tablet(s) Oral daily  memantine 5 milliGRAM(s) Oral two times a day  nystatin    Suspension 1 milliLiter(s) Oral three times a day  pantoprazole  Injectable 40 milliGRAM(s) IV Push two times a day  senna Syrup 5 milliLiter(s) Oral at bedtime  sodium chloride 0.9%. 1000 milliLiter(s) (70 mL/Hr) IV Continuous <Continuous>    MEDICATIONS  (PRN):  acetaminophen   Tablet .. 650 milliGRAM(s) Oral every 6 hours PRN Temp greater or equal to 38C (100.4F), Mild Pain (1 - 3)  polyethylene glycol 3350 17 Gram(s) Oral daily PRN Constipation      Vital Signs Last 24 Hrs  T(C): 36.4 (30 Mar 2020 08:09), Max: 36.6 (30 Mar 2020 07:25)  T(F): 97.6 (30 Mar 2020 08:09), Max: 97.8 (30 Mar 2020 07:25)  HR: 78 (30 Mar 2020 08:09) (54 - 78)  BP: 144/84 (30 Mar 2020 08:09) (100/65 - 144/84)  BP(mean): --  RR: 14 (30 Mar 2020 08:09) (13 - 14)  SpO2: 95% (30 Mar 2020 08:09) (95% - 100%)  I&O's Summary    29 Mar 2020 07:01  -  30 Mar 2020 07:00  --------------------------------------------------------  IN: 530 mL / OUT: 3 mL / NET: 527 mL        PHYSICAL EXAM:  GENERAL: NAD  NECK: Supple  NERVOUS SYSTEM:  awake. aphasic.   HEART: S1s2 NL , RRR  CHEST/LUNG: Clear to percussion bilaterally  ABDOMEN: Soft, Nontender, Nondistended; Bowel sounds present  EXTREMITIES:  No edema      LABS:(03-29 @ 05:10)                      9.6  8.27 )-----------( 300                 29.4    Neutrophils = -- (--%)  Lymphocytes = -- (--%)  Eosinophils = -- (--%)  Basophils = -- (--%)  Monocytes = -- (--%)  Bands = --%    03-30    139  |  105  |  12  ----------------------------<  93  4.2   |  26  |  0.77    Ca    9.4      30 Mar 2020 06:00    Imp/Rec    CVA  PT/OT per rehab  ASA/Statin    Acute blood loss anemia sec to  intraabdominal bleed  h/h and HD stable  s/p GI scope 3/26-no active bleeding(healing ulcer)-cont PPI    Previous PEG(not pulled) site infection  Rocephin

## 2020-03-30 NOTE — PROGRESS NOTE ADULT - ASSESSMENT
60 yo Male with prior CVA 8 years ago with no deficits noted initially admitted with generalized weakness and difficulty speaking. CTH with chronic L MCA infarct with MRI showing acute right post frontal infarct. Pt with dysphagia s/p PEG placement, non-fluent aphasia, apraxia of speech, gait and ADL impairment. Hospital course complicated by with multiple PEG dislodgement and replacement. S/p completion of antibiotic treatment for aspiration pneumonia.     CVA   -Comprehensive Rehab Program of PT/OT/ SLP for Gait Instability, ADL, and Functional impairments  -ASA 81mg and Plavix 75mg for 90 days followed by ASA as monotherapy - continue to HOLD for now in the setting of abdominal bleed  -Lipitor 80mg qhs    Dysphagia - improving   -h/o recent PEG placement, pt pulled out PEG multiple times, 16Fr hicks g-tube dislodged on 3/23, no need to replace as pt is eating all of his meals,   -s/p MBS on 3/19, diet was upgraded to puree and thin liquids  -continue pressure dressing to g-tube site, mild granulation tissue; GI following - bacitracin to area BID, culture showed normal skin cheryl    GI bleed  -s/p RRT on 3/25 morning, synopy in bathroom during toileting, pt was hypotensive and fluids given   -CXR 3/25 negative, UA negative, Blood cx no growth to date  -CT abdomen with large amount of intra-abdominal hemorrhage, with more dense hemorrhage along the greater curvature and posterior to the stomach distending the lateral recesses of the lesser sac.  -continue Ceftriaxone 1000mg q24 hours until 4/1/20  -continue protonix 40mg BID  -s/p 1 unit pRBC  -s/p GI scope 3/26; as per GI no signs of active bleeding, healing ulcer  -Surgery consulted - abdomen benign at this time, continue to monitor  -H/H 10.9/34 - improved, continue daily CBC until 4/2    Agitation - stable  -Depakote 125 mg q6 hours PRN via PEG  -Depakote 125mg daily    Oral Thrush  -Nystatin swab TID  -oral care    Left renal lesion   -CT abdomen/pelvis done for malignancy workup due to multiple CVAs in the past showed ill defined left renal lesion   -Left renal lesion demonstrated MR findings (2/28) most compatible with small chronic subcapsular/pericapsular hematoma.  -Kidney sonogram negative; PSA negative   -outpatient colonoscopy as per heme/onc    Recent sepsis 2/2 aspiration pneumonia: resolved  -completed course of zosyn  -3/8 blood cx, UC, and RVP negative    Hematoma of rectus sheath - monitor  - likely 2/2 g-tube replacement  - no anticoagulation    Pain Mgmt: Tylenol PRN  Bowel: Monitor continence. Senna, Miralax PRN  Bladder: low PVR, incontinent   Sleep: monitor  Skin: Pressure injury prevention, turn Q2hrs while in bed, OOB to Chair  Diet: puree and thin    Precautions: aspiration, fall  DVT: No AC, SCDs    Anticipated Discharge 3/31 SURAJ    ---------------------------------------------------------------------------------  Consults: Internal Medicine    Post Discharge Follow up:  Neurology: Craig Peterson (DO)  [facility] [place] [address]  Phone: [phone]  Fax: [phone]  Follow Up Time: 2 weeks    Gastroenterology; Internal Medicine: John Squires (DO)  [address] [place] [address]  Phone: [phone]  Fax: [phone]

## 2020-03-30 NOTE — CHART NOTE - NSCHARTNOTEFT_GEN_A_CORE
Assessment:   Patient seen for: Malnutrition follow up     Source: [x] medical review    Factors impacting intake: [x]chewing problems [x] swallowing issues:     Intake: %    Diet Prescription: Diet, Dysphagia 1 Pureed-Thin Liquids (03-26-20 @ 14:22)    Current Weight: 63.4 kg (139.7 Lbs)   % Weight Change: increased 2.3% (3.1 Lbs) since 03/21     Pt was upgrade PEG feedings to pureed diet w/ thin liquids. Pt tolerates diet well. No GI distress reported. Last BM: 03/30. Pt still w/ physical signs of Malnutrition, moving toward goal. Pt is consuming 75% or more of his nutritional needs, gradual weight increased. As per H&P, pt has hx of ETOH abuse, labs noted w/ folate serum deficiency 2.1 , low RBC levels 3.41, high levels of .6. Recommend to add thiamine, folic acid, MVI.     Pertinent Medications: MEDICATIONS  (STANDING):  aspirin  chewable 81 milliGRAM(s) Oral daily  atorvastatin 80 milliGRAM(s) Oral at bedtime  BACItracin   Ointment 1 Application(s) Topical two times a day  cefTRIAXone   IVPB 1000 milliGRAM(s) IV Intermittent every 24 hours  lactobacillus acidophilus 1 Tablet(s) Oral daily  memantine 5 milliGRAM(s) Oral two times a day  nystatin    Suspension 1 milliLiter(s) Oral three times a day  pantoprazole   Suspension 40 milliGRAM(s) Oral two times a day  senna Syrup 5 milliLiter(s) Oral at bedtime    MEDICATIONS  (PRN):  acetaminophen   Tablet .. 650 milliGRAM(s) Oral every 6 hours PRN Temp greater or equal to 38C (100.4F), Mild Pain (1 - 3)  polyethylene glycol 3350 17 Gram(s) Oral daily PRN Constipation    Pertinent Labs: 03-30 Na139 mmol/L Glu 93 mg/dL K+ 4.2 mmol/L Cr  0.77 mg/dL BUN 12 mg/dL    CAPILLARY BLOOD GLUCOSE    Skin: WDL except for s/p PEG removal     Edema: none    Estimated Needs:   [x] no change since previous assessment    Previous Nutrition Diagnosis:   [x] Malnutrition related to inadequate delivery of EN due to difficulty with infusion as evidence by loss of muscle wasting & loss of body fat.     Nutrition Diagnosis is [x] ongoing     Interventions:   Recommend:  [x] Continue with current diet:  Dysphagia 1 Pureed-Thin Liquids   [x] Nutrition Support: Honor pt's food preferences as feasible w/ diet consistency recommended.    [x] Other: Add Thiamine, Folic Acid, MVI     Monitoring and Evaluation:   Continue to monitor Nutritional intake, Tolerance to diet prescription, weights, labs, skin integrity.  other:  RD remains available upon request and will follow up per protocol.

## 2020-03-30 NOTE — DISCHARGE NOTE PROVIDER - HOSPITAL COURSE
HPI:    60 yo Sinhala speaking Male with PMH of CVA 8yrs ago (no reported deficits) was initially brought into [facility] on 2/9/20 by brother for concerns of generalized weakness and inability to speak for the 3 weeks. Pt was found to have no verbal output, appeared withdrawn, intermittent command following and easily agitated.  CTH demonstrated chronic L MCA infarct with severely stenotic vs occluded distal L M1. Confirmed on MRI but also found to have an acute right post frontal infarct, benign LUIS and s/p Medtronic ILR placement 2/13. Patient also had sinus bradycardia, with no interventions recommended from cardiology.         Patient also with severe dysphagia and was s/p PEG placement on 2/2 by GI with endoscopic placement however post procedure pt pulled PEG out. As per GI patient at risk of 2nd endoscopic placement contraindicated due to risk of pulling out tube and peritonitis. Patient thus had laproscopic placement of G tube done by surgery on 2/27 to minimize risk of peritonitis if pulled out. On 3/2, noted with moderate leaking around G-tube: was pulled out from 9 cm in to only 1 cm in. G tube repositioned under fluoro on 3/2.          Neuro recommended malignancy work up 2/2 unexplained multiple embolic strokes. Oncology consult w Dr. Sanchez. Patient had CT C/A/P w  IV contrast only. no significant findings exc questionable renal mass, but sono neg and MRI confirmed it being a subcapsular hematoma.  Patient will need outptn colonoscopy after discharge. 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         Patient was discharged to acute rehab on 3/7, however on admission pt had a temp of 101 and pt PEG was found to be dislodged. Pt was transferred back to [facility] and PEG was replaced by surgery on 3/8. Patient tolerating feeds. Pt was found to have sepsis 2/2 aspiration pneumonia of b/l lower lobes and ID was consulted and pt was started on zosyn. Antibitoic course completed. RVP, blood cultutes were negative. Anticoagulation was held due to hematoma at rectum likley 2/2 g tube replacement. On 3/13 pt pulled out PEG and 16Fr hicks catheter was inserted through g tube tract and feeds were restarted after confirmation of placement.        Spoke with pt's nephew Dallas Arriola. As per Dallas, pt was living in [place] in a motel. Motel owner contacted family in [place] when he began acting strangely. family in [place] contacted pt's brother here in [place] and family brought him here and took him to [facility]. Patient is . He has 2 sons here but estranged from one son and other son is a college student.         Patient was deemed medically optimized for discharge to IRF at [facility] on 3/17/20.         Patient participated in daily therapies and made good functional gains.  Rehab course notable for diet upgrade to puree with thins on 3/18. On the morning of 3/23, patient's hicks that had been maintained in the G-tube site was noted to be dislodged. As patient was tolerating 100% of PO intake, decision was made not to replace. GI was consulted for evaluation of the tube site. On 3/24 an RRT was called overnight for a syncopal episode that occurred while patient was attempting to have a bowel movement. Stat labs were drawn, concerning for ABLA with drop in hemoglobin to 7.5. CT abdomen showed large amount of intraabdominal hemorrhage. Patient was given 1u PRBC and underwent endoscopy with GI. Endoscopy findings were unremarkable. Surgery was consulted, who recommended conservative management, as patient clinically was improving. Repeat CBCs showed appropriate response in hemoglobin.         Patient tolerated course of inpatient PT/OT/SLP rehab with mild functional improvements. Discharge planning with family planned for SURAJ. Patient seen and examined on day of discharge.  Medications, medication side effects, and discharge instructions were reviewed with the patient, family and receiving SURAJ, who expressed understanding of all information.  Patient was medically and functionally optimized and cleared for discharge. HPI:    58 yo Nepali speaking Male with PMH of CVA 8yrs ago (no reported deficits) was initially brought into [facility] on 2/9/20 by brother for concerns of generalized weakness and inability to speak for the 3 weeks. Pt was found to have no verbal output, appeared withdrawn, intermittent command following and easily agitated.  CTH demonstrated chronic L MCA infarct with severely stenotic vs occluded distal L M1. Confirmed on MRI but also found to have an acute right post frontal infarct, benign LUIS and s/p Medtronic ILR placement 2/13. Patient also had sinus bradycardia, with no interventions recommended from cardiology.         Patient also with severe dysphagia and was s/p PEG placement on 2/2 by GI with endoscopic placement however post procedure pt pulled PEG out. As per GI patient at risk of 2nd endoscopic placement contraindicated due to risk of pulling out tube and peritonitis. Patient thus had laproscopic placement of G tube done by surgery on 2/27 to minimize risk of peritonitis if pulled out. On 3/2, noted with moderate leaking around G-tube: was pulled out from 9 cm in to only 1 cm in. G tube repositioned under fluoro on 3/2.          Neuro recommended malignancy work up 2/2 unexplained multiple embolic strokes. Oncology consult w Dr. Sanchez. Patient had CT C/A/P w  IV contrast only. no significant findings exc questionable renal mass, but sono neg and MRI confirmed it being a subcapsular hematoma.  Patient will need outptn colonoscopy after discharge. 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         Patient was discharged to acute rehab on 3/7, however on admission pt had a temp of 101 and pt PEG was found to be dislodged. Pt was transferred back to [facility] and PEG was replaced by surgery on 3/8. Patient tolerating feeds. Pt was found to have sepsis 2/2 aspiration pneumonia of b/l lower lobes and ID was consulted and pt was started on zosyn. Antibitoic course completed. RVP, blood cultutes were negative. Anticoagulation was held due to hematoma at rectum likley 2/2 g tube replacement. On 3/13 pt pulled out PEG and 16Fr hicks catheter was inserted through g tube tract and feeds were restarted after confirmation of placement.        Spoke with pt's nephew Dallas Arriola. As per Dallas, pt was living in [place] in a motel. Motel owner contacted family in [place] when he began acting strangely. family in [place] contacted pt's brother here in [place] and family brought him here and took him to [facility]. Patient is . He has 2 sons here but estranged from one son and other son is a college student.         Patient was deemed medically optimized for discharge to IRF at [facility] on 3/17/20.         Patient participated in daily therapies and made good functional gains.  Rehab course notable for diet upgrade to puree with thins on 3/18. On the morning of 3/23, patient's hicks that had been maintained in the G-tube site was noted to be dislodged. As patient was tolerating 100% of PO intake, decision was made not to replace. GI was consulted for evaluation of the tube site. On 3/24 an RRT was called overnight for a syncopal episode that occurred while patient was attempting to have a bowel movement. Stat labs were drawn, concerning for ABLA with drop in hemoglobin to 7.5. CT abdomen showed large amount of intraabdominal hemorrhage. Patient was given 1u PRBC and underwent endoscopy with GI. Endoscopy findings were unremarkable. Surgery was consulted, who recommended conservative management, as patient clinically was improving. Repeat CBCs showed appropriate response in hemoglobin. On 3/31, ASA and plavix were restarted.         Patient tolerated course of inpatient PT/OT/SLP rehab with mild functional improvements. Discharge planning with family planned for SURAJ. Patient seen and examined on day of discharge.  Medications, medication side effects, and discharge instructions were reviewed with the patient, family and receiving SURAJ, who expressed understanding of all information.  Patient was medically and functionally optimized and cleared for discharge. HPI:    58 yo Malay speaking Male with PMH of CVA 8yrs ago (no reported deficits) was initially brought into [facility] on 2/9/20 by brother for concerns of generalized weakness and inability to speak for the 3 weeks. Pt was found to have no verbal output, appeared withdrawn, intermittent command following and easily agitated.  CTH demonstrated chronic L MCA infarct with severely stenotic vs occluded distal L M1. Confirmed on MRI but also found to have an acute right post frontal infarct, benign LUIS and s/p Medtronic ILR placement 2/13. Patient also had sinus bradycardia, with no interventions recommended from cardiology.         Patient also with severe dysphagia and was s/p PEG placement on 2/2 by GI with endoscopic placement however post procedure pt pulled PEG out. As per GI patient at risk of 2nd endoscopic placement contraindicated due to risk of pulling out tube and peritonitis. Patient thus had laproscopic placement of G tube done by surgery on 2/27 to minimize risk of peritonitis if pulled out. On 3/2, noted with moderate leaking around G-tube: was pulled out from 9 cm in to only 1 cm in. G tube repositioned under fluoro on 3/2.          Neuro recommended malignancy work up 2/2 unexplained multiple embolic strokes. Oncology consult w Dr. Sanchez. Patient had CT C/A/P w  IV contrast only. no significant findings exc questionable renal mass, but sono neg and MRI confirmed it being a subcapsular hematoma.  Patient will need outptn colonoscopy after discharge. 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         Patient was discharged to acute rehab on 3/7, however on admission pt had a temp of 101 and pt PEG was found to be dislodged. Pt was transferred back to [facility] and PEG was replaced by surgery on 3/8. Patient tolerating feeds. Pt was found to have sepsis 2/2 aspiration pneumonia of b/l lower lobes and ID was consulted and pt was started on zosyn. Antibitoic course completed. RVP, blood cultutes were negative. Anticoagulation was held due to hematoma at rectum likley 2/2 g tube replacement. On 3/13 pt pulled out PEG and 16Fr hicks catheter was inserted through g tube tract and feeds were restarted after confirmation of placement.             Spoke with pt's nephew Dallas Arriola. As per Dallas, pt was living in [place] in a motel. Motel owner contacted family in [place] when he began acting strangely. family in [place] contacted pt's brother here in [place] and family brought him here and took him to [facility]. Patient is . He has 2 sons here but estranged from one son and other son is a college student.         Patient was deemed medically optimized for discharge to IRF at [facility] on 3/17/20.         Patient participated in daily therapies and made good functional gains.  Rehab course notable for diet upgrade to puree with thins on 3/18. On the morning of 3/23, patient's hicks that had been maintained in the G-tube site was noted to be dislodged. As patient was tolerating 100% of PO intake, decision was made not to replace. GI was consulted for evaluation of the tube site. On 3/24 an RRT was called overnight for a syncopal episode that occurred while patient was attempting to have a bowel movement. Stat labs were drawn, concerning for ABLA with drop in hemoglobin to 7.5. CT abdomen showed large amount of intraabdominal hemorrhage. Patient was given 1u PRBC and underwent endoscopy with GI. Endoscopy findings were unremarkable. Surgery was consulted, who recommended conservative management, as patient clinically was improving. Repeat CBCs showed appropriate response in hemoglobin. On 3/31, ASA and plavix were restarted.         Patient tolerated course of inpatient PT/OT/SLP rehab with mild functional improvements. Discharge planning with family planned for SURAJ. Patient seen and examined on day of discharge.  Medications, medication side effects, and discharge instructions were reviewed with the patient, family and receiving SURAJ, who expressed understanding of all information.  Patient was medically and functionally optimized and cleared for discharge.

## 2020-03-30 NOTE — PROGRESS NOTE ADULT - SUBJECTIVE AND OBJECTIVE BOX
HPI:  58 yo Faroese speaking Male with PMH of CVA 8yrs ago (no reported deficits) was initially brought into [facility] on 2/9/20 by brother for concerns of generalized weakness and inability to speak for the 3 weeks. Pt was found to have no verbal output, appeared withdrawn, intermittent command following and easily agitated.  CTH demonstrated chronic L MCA infarct with severely stenotic vs occluded distal L M1. Confirmed on MRI but also found to have an acute right post frontal infarct, benign LUIS and s/p Medtronic ILR placement 2/13. Patient also had sinus bradycardia, with no interventions recommended from cardiology.   Patient also with severe dysphagia and was s/p PEG placement on 2/2 by GI with endoscopic placement however post procedure pt pulled PEG out. As per GI patient at risk of 2nd endoscopic placement contraindicated due to risk of pulling out tube and peritonitis. Patient thus had laproscopic placement of G tube done by surgery on 2/27 to minimize risk of peritonitis if pulled out. On 3/2, noted with moderate leaking around G-tube: was pulled out from 9 cm in to only 1 cm in. G tube repositioned under fluoro on 3/2.    Neuro recommended malignancy work up 2/2 unexplained multiple embolic strokes. Oncology consult w Dr. Sanchez. Patient had CT C/A/P w  IV contrast only. no significant findings exc questionable renal mass, but sono neg and MRI confirmed it being a subcapsular hematoma.  Patient will need outptn colonoscopy after discharge. 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   Patient was discharged to acute rehab on 3/7, however on admission pt had a temp of 101 and pt PEG was found to be dislodged. Pt was transferred back to [facility] and PEG was replaced by surgery on 3/8. Patient tolerating feeds. Pt was found to have sepsis 2/2 aspiration pneumonia of b/l lower lobes and ID was consulted and pt was started on zosyn. Antibitoic course completed. RVP, blood cultutes were negative. Anticoagulation was held due to hematoma at rectum likley 2/2 g tube replacement. On 3/13 pt pulled out PEG and 16Fr hicks catheter was inserted through g tube tract and feeds were restarted after confirmation of placement.  Spoke with pt's nephew Dallas Arriola. As per Dallas, pt was living in [place] in a motel. Motel owner contacted family in [place] when he began acting strangely. family in [place] contacted pt's brother here in [place] and family brought him here and took him to [facility]. As per nephew, pt has no home here and no dispo plan at this time.   Patient is . He has 2 sons here but estranged from one son and other son is a college student.     SUBJECTIVE & INTERVAL HISTORY: Pt seen at bedside. No acute events over the weekend. No complaints. Denies abdominal pain. Pt tolerating his breakfast. H/H improved with labs today. BPs improved. Vitals stable    REVIEW OF SYSTEMS  Limited by pt aphasia  Constitutional - No fever, No fatigue  HEENT - No eye pain, No visual disturbances (wears glasses), No difficulty hearing,  Respiratory - No cough, No wheezing, No shortness of breath  Cardiovascular - No chest pain, No palpitations  Gastrointestinal - No abdominal pain, No diarrhea, No constipation   Genitourinary - No dysuria, No incontinence  Neurological - No headaches, No memory loss, + loss of strength, No numbness,   Skin - No itching, No rashes,   Musculoskeletal - No joint pain, No joint swelling, No muscle pain  Psychiatric - No depression, No anxiety    VITALS  Vital Signs (24 Hrs):  T(C): 36.4 (03-30-20 @ 08:09), Max: 36.6 (03-30-20 @ 07:25)  HR: 78 (03-30-20 @ 08:09) (54 - 78)  BP: 144/84 (03-30-20 @ 08:09) (100/65 - 144/84)  RR: 14 (03-30-20 @ 08:09) (13 - 14)  SpO2: 95% (03-30-20 @ 08:09) (95% - 100%)  Wt(kg): --      PHYSICAL EXAM  Constitutional - NAD, Comfortable  HEENT - NCAT, EOMI, +glasses,   Neck - Supple, No limited ROM  Chest -  CTAB b/l no wheezing  Cardio - +S1/S2 RRR,   Abdomen -  soft, non-distended, no tenderness, +BS, g-tube site healing well - improved compared to last week, no drainage   Extremities - No peripheral edema  Neurologic Exam:                 	   Cognitive -   	          Orientation: Awake, Alert, able to follow most commands, unable to test orientation as pt is aphasic  	   Speech - Non-fluent aphasia, comprehension fair, pt able to follow most commands, non-verbal but able to nod to questions  	   Cranial Nerves - left facial droop, tongue midline, no ptosis, shoulder shrug intact  	   Motor -  limited by aphasia and language   	                  LEFT    UE - ShAB 5/5, EF 5/5, EE 5/5, WE 5/5,  WNL  	                  RIGHT UE - ShAB 5/5, EF 5/5, EE 5/5, WE 5/5,  WNL  	                  LEFT    LE - HF 5/5, KE 5/5, DF 5/5, PF 5/5  	                  RIGHT LE - HF 5/5, KE 5/5, DF 5/5, PF 5/5     Psychiatric - Mood stable, calm, no agitation    IDT Meeting 3/24  SW: family --> SURAJ  OT  grooming modA  UBD Bernie  LBD modA  toilet Bernie  tub transfer min/modA  PT  amb 80feet RW Bernie, wheelchair follow  transfers Bernie  stairs n/a  SLP  diet puree thin  comprehension modA, decreased attention, apraxia    RECENT LABS/IMAGING                        10.9   7.53  )-----------( 245      ( 30 Mar 2020 06:00 )             34.3     03-30    139  |  105  |  12  ----------------------------<  93  4.2   |  26  |  0.77    Ca    9.4      30 Mar 2020 06:00    < from: CT Abdomen and Pelvis w/ IV Cont (03.25.20 @ 09:05) >  Large amount of intra-abdominal hemorrhage as discussed with more dense hemorrhage along the greater curvature and posterior to the stomach distending the lateral recesses of the lesser sac.      MEDICATIONS  (STANDING):  aspirin  chewable 81 milliGRAM(s) Oral daily  atorvastatin 80 milliGRAM(s) Oral at bedtime  BACItracin   Ointment 1 Application(s) Topical two times a day  cefTRIAXone   IVPB 1000 milliGRAM(s) IV Intermittent every 24 hours  lactobacillus acidophilus 1 Tablet(s) Oral daily  memantine 5 milliGRAM(s) Oral two times a day  nystatin    Suspension 1 milliLiter(s) Oral three times a day  pantoprazole  Injectable 40 milliGRAM(s) IV Push two times a day  senna Syrup 5 milliLiter(s) Oral at bedtime    MEDICATIONS  (PRN):  acetaminophen   Tablet .. 650 milliGRAM(s) Oral every 6 hours PRN Temp greater or equal to 38C (100.4F), Mild Pain (1 - 3)  polyethylene glycol 3350 17 Gram(s) Oral daily PRN Constipation

## 2020-03-30 NOTE — DISCHARGE NOTE PROVIDER - NSDCCPCAREPLAN_GEN_ALL_CORE_FT
PRINCIPAL DISCHARGE DIAGNOSIS  Diagnosis: CVA (cerebrovascular accident)  Assessment and Plan of Treatment: cont ASA. follow up with neuro

## 2020-03-30 NOTE — DISCHARGE NOTE PROVIDER - CARE PROVIDER_API CALL
Craig Peterson (DO)  Neurology; Vascular Neurology  [address] [facility] [address]  [place] [address]  Phone: [phone]  Fax: [phone]  Follow Up Time:     John Squires (DO)  Gastroenterology; Internal Medicine  [address] [place] [address] [address]  [place] [address]  Phone: [phone]  Fax: [phone]  Follow Up Time:     Jeanine Thomas (DO)  Brain Injury Medicine; PhysicalRehab Medicine  [address] [place] [address]  Phone: [phone]  Fax: [phone]  Follow Up Time:

## 2020-03-30 NOTE — DISCHARGE NOTE PROVIDER - NSDCMRMEDTOKEN_GEN_ALL_CORE_FT
acetaminophen 325 mg oral tablet: 2 tab(s) orally every 6 hours, As needed, Temp greater or equal to 38C (100.4F), Mild Pain (1 - 3)  aspirin 81 mg oral tablet, chewable: 1 tab(s) orally once a day  atorvastatin 80 mg oral tablet: 1 tab(s) orally once a day (at bedtime)  bacitracin 500 units/g topical ointment: 1 application topically 2 times a day  cyanocobalamin 1000 mcg oral tablet: 1 tab(s) orally once a day  ergocalciferol 8000 intl units/mL oral solution: 2000 unit(s) enteral once a day  memantine 5 mg oral tablet: 1 tab(s) orally 2 times a day  Multiple Vitamins with Minerals oral liquid: 15 milliliter(s) orally once a day  nystatin 100,000 units/mL oral suspension: 1 milliliter(s) orally 3 times a day  pantoprazole 40 mg oral granule, delayed release: 40 milligram(s) orally once a day (before breakfast)  polyethylene glycol 3350 oral powder for reconstitution: 17 gram(s) orally once a day, As needed, Constipation  senna 8.8 mg/5 mL oral syrup: 5 milliliter(s) orally once a day (at bedtime) acetaminophen 325 mg oral tablet: 2 tab(s) orally every 6 hours, As needed, Temp greater or equal to 38C (100.4F), Mild Pain (1 - 3)  aspirin 81 mg oral tablet, chewable: 1 tab(s) orally once a day  atorvastatin 80 mg oral tablet: 1 tab(s) orally once a day (at bedtime)  bacitracin 500 units/g topical ointment: 1 application topically 2 times a day  cyanocobalamin 1000 mcg oral tablet: 1 tab(s) orally once a day  ergocalciferol 8000 intl units/mL oral solution: 2000 unit(s) enteral once a day  memantine 5 mg oral tablet: 1 tab(s) orally 2 times a day  Multiple Vitamins with Minerals oral liquid: 15 milliliter(s) orally once a day  nystatin 100,000 units/mL oral suspension: 1 milliliter(s) orally 3 times a day  pantoprazole 40 mg oral granule, delayed release: 40 milligram(s) orally once a day (before breakfast)  Plavix 75 mg oral tablet: 1 tab(s) orally once a day  polyethylene glycol 3350 oral powder for reconstitution: 17 gram(s) orally once a day, As needed, Constipation  senna 8.8 mg/5 mL oral syrup: 5 milliliter(s) orally once a day (at bedtime)

## 2020-03-30 NOTE — DISCHARGE NOTE PROVIDER - PROVIDER TOKENS
PROVIDER:[TOKEN:[[id number]]],PROVIDER:[TOKEN:[[id number]]],PROVIDER:[TOKEN:[[id number]]]

## 2020-03-30 NOTE — PROGRESS NOTE ADULT - ATTENDING COMMENTS
Chart reviewed. Patient seen during therapy along with fellow Dr. Palomo.   Patient appears comfortable and ambulating with PT with RW.   Denies any pain. Dressing at PEG site- no drainage noted. On Abx   Labs stable  Continue rehab program.

## 2020-03-30 NOTE — DISCHARGE NOTE PROVIDER - CARE PROVIDERS DIRECT ADDRESSES
,[email or web address]_Unknown,[email or web address]

## 2020-03-31 LAB
HCT VFR BLD CALC: 36.7 % — LOW (ref 39–50)
HGB BLD-MCNC: 11.7 G/DL — LOW (ref 13–17)
MCHC RBC-ENTMCNC: 31.9 GM/DL — LOW (ref 32–36)
MCHC RBC-ENTMCNC: 31.9 PG — SIGNIFICANT CHANGE UP (ref 27–34)
MCV RBC AUTO: 100 FL — SIGNIFICANT CHANGE UP (ref 80–100)
NRBC # BLD: 0 /100 WBCS — SIGNIFICANT CHANGE UP (ref 0–0)
PLATELET # BLD AUTO: 412 K/UL — HIGH (ref 150–400)
RBC # BLD: 3.67 M/UL — LOW (ref 4.2–5.8)
RBC # FLD: 13.2 % — SIGNIFICANT CHANGE UP (ref 10.3–14.5)
WBC # BLD: 9.54 K/UL — SIGNIFICANT CHANGE UP (ref 3.8–10.5)
WBC # FLD AUTO: 9.54 K/UL — SIGNIFICANT CHANGE UP (ref 3.8–10.5)

## 2020-03-31 PROCEDURE — 99232 SBSQ HOSP IP/OBS MODERATE 35: CPT

## 2020-03-31 RX ORDER — CLOPIDOGREL BISULFATE 75 MG/1
1 TABLET, FILM COATED ORAL
Qty: 0 | Refills: 0 | DISCHARGE
Start: 2020-03-31

## 2020-03-31 RX ORDER — CLOPIDOGREL BISULFATE 75 MG/1
75 TABLET, FILM COATED ORAL DAILY
Refills: 0 | Status: DISCONTINUED | OUTPATIENT
Start: 2020-03-31 | End: 2020-04-01

## 2020-03-31 RX ADMIN — Medication 81 MILLIGRAM(S): at 12:02

## 2020-03-31 RX ADMIN — PANTOPRAZOLE SODIUM 40 MILLIGRAM(S): 20 TABLET, DELAYED RELEASE ORAL at 06:48

## 2020-03-31 RX ADMIN — PANTOPRAZOLE SODIUM 40 MILLIGRAM(S): 20 TABLET, DELAYED RELEASE ORAL at 18:58

## 2020-03-31 RX ADMIN — CEFTRIAXONE 100 MILLIGRAM(S): 500 INJECTION, POWDER, FOR SOLUTION INTRAMUSCULAR; INTRAVENOUS at 14:27

## 2020-03-31 RX ADMIN — CLOPIDOGREL BISULFATE 75 MILLIGRAM(S): 75 TABLET, FILM COATED ORAL at 18:58

## 2020-03-31 RX ADMIN — Medication 1 TABLET(S): at 12:01

## 2020-03-31 RX ADMIN — Medication 1 MILLILITER(S): at 06:48

## 2020-03-31 RX ADMIN — ATORVASTATIN CALCIUM 80 MILLIGRAM(S): 80 TABLET, FILM COATED ORAL at 21:40

## 2020-03-31 RX ADMIN — MEMANTINE HYDROCHLORIDE 5 MILLIGRAM(S): 10 TABLET ORAL at 06:48

## 2020-03-31 RX ADMIN — Medication 1 MILLILITER(S): at 21:40

## 2020-03-31 RX ADMIN — MEMANTINE HYDROCHLORIDE 5 MILLIGRAM(S): 10 TABLET ORAL at 18:58

## 2020-03-31 RX ADMIN — Medication 1 APPLICATION(S): at 06:49

## 2020-03-31 NOTE — PROGRESS NOTE ADULT - SUBJECTIVE AND OBJECTIVE BOX
CC: follow up stroke, anemia  No new issues reported    MEDICATIONS  (STANDING):  aspirin  chewable 81 milliGRAM(s) Oral daily  atorvastatin 80 milliGRAM(s) Oral at bedtime  cefTRIAXone   IVPB 1000 milliGRAM(s) IV Intermittent every 24 hours  lactobacillus acidophilus 1 Tablet(s) Oral daily  memantine 5 milliGRAM(s) Oral two times a day  nystatin    Suspension 1 milliLiter(s) Oral three times a day  pantoprazole   Suspension 40 milliGRAM(s) Oral two times a day  senna Syrup 5 milliLiter(s) Oral at bedtime    MEDICATIONS  (PRN):  acetaminophen   Tablet .. 650 milliGRAM(s) Oral every 6 hours PRN Temp greater or equal to 38C (100.4F), Mild Pain (1 - 3)  polyethylene glycol 3350 17 Gram(s) Oral daily PRN Constipation      PHYSICAL EXAM:    Constitutional: NAD, alert aphasic  HEENT: PERR, EOMI,    Neck: Soft and supple   Respiratory: Breath sounds are clear bilaterally,   Cardiovascular: S1 and S2,    Gastrointestinal: Bowel Sounds present, soft, nontender, dressing c/d/i  Extremities: No peripheral edema    (03-30 @ 06:00)                      10.9  7.53 )-----------( 245                 34.3    Neutrophils = 5.55 (70.0%)  Lymphocytes = 1.41 (17.8%)  Eosinophils = 0.37 (4.7%)  Basophils = 0.05 (0.6%)  Monocytes = 0.51 (6.4%)  Bands = --%    03-30    139  |  105  |  12  ----------------------------<  93  4.2   |  26  |  0.77    Ca    9.4      30 Mar 2020 06:00            RVP:          Tox:

## 2020-03-31 NOTE — PROGRESS NOTE ADULT - SUBJECTIVE AND OBJECTIVE BOX
HPI:  60 yo Swedish speaking Male with PMH of CVA 8yrs ago (no reported deficits) was initially brought into [facility] on 2/9/20 by brother for concerns of generalized weakness and inability to speak for the 3 weeks. Pt was found to have no verbal output, appeared withdrawn, intermittent command following and easily agitated.  CTH demonstrated chronic L MCA infarct with severely stenotic vs occluded distal L M1. Confirmed on MRI but also found to have an acute right post frontal infarct, benign LUIS and s/p Medtronic ILR placement 2/13. Patient also had sinus bradycardia, with no interventions recommended from cardiology.     Patient also with severe dysphagia and was s/p PEG placement on 2/2 by GI with endoscopic placement however post procedure pt pulled PEG out. As per GI patient at risk of 2nd endoscopic placement contraindicated due to risk of pulling out tube and peritonitis. Patient thus had laproscopic placement of G tube done by surgery on 2/27 to minimize risk of peritonitis if pulled out. On 3/2, noted with moderate leaking around G-tube: was pulled out from 9 cm in to only 1 cm in. G tube repositioned under fluoro on 3/2.      Neuro recommended malignancy work up 2/2 unexplained multiple embolic strokes. Oncology consult w Dr. Sanchez. Patient had CT C/A/P w  IV contrast only. no significant findings exc questionable renal mass, but sono neg and MRI confirmed it being a subcapsular hematoma.  Patient will need outptn colonoscopy after discharge. 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     Patient was discharged to acute rehab on 3/7, however on admission pt had a temp of 101 and pt PEG was found to be dislodged. Pt was transferred back to [facility] and PEG was replaced by surgery on 3/8. Patient tolerating feeds. Pt was found to have sepsis 2/2 aspiration pneumonia of b/l lower lobes and ID was consulted and pt was started on zosyn. Antibitoic course completed. RVP, blood cultutes were negative. Anticoagulation was held due to hematoma at rectum likley 2/2 g tube replacement. On 3/13 pt pulled out PEG and 16Fr hicks catheter was inserted through g tube tract and feeds were restarted after confirmation of placement.    Spoke with pt's nephew Dallas Arriloa. As per Dallas, pt was living in [place] in a motel. Motel owner contacted family in [place] when he began acting strangely. family in [place] contacted pt's brother here in [place] and family brought him here and took him to [facility]. As per nephew, pt has no home here and no dispo plan at this time.   Patient is . He has 2 sons here but estranged from one son and other son is a college student.     Interpretor ID # [id number] (17 Mar 2020 15:04)      PAST MEDICAL & SURGICAL HISTORY:  CVA (cerebral vascular accident)  S/P percutaneous endoscopic gastrostomy (PEG) tube placement      Subjective:  No new complaints.  No pain,  Eating well.      REVIEW OF SYMPTOMS  Limited by pt aphasia  Constitutional - No fever, No fatigue  HEENT - No eye pain, No visual disturbances (wears glasses), No difficulty hearing,  Respiratory - No cough, No wheezing, No shortness of breath  Cardiovascular - No chest pain, No palpitations  Gastrointestinal - No abdominal pain, No diarrhea, No constipation, Multiple G-tube removals  Genitourinary - No dysuria, No incontinence  Neurological - No headaches, No memory loss, + loss of strength, No numbness,   Skin - No itching, No rashes,   Musculoskeletal - No joint pain, No joint swelling, No muscle pain  Psychiatric - No depression, No anxiety      VITALS  Vital Signs Last 24 Hrs  T(C): 36.4 (31 Mar 2020 08:05), Max: 36.4 (30 Mar 2020 20:24)  T(F): 97.6 (31 Mar 2020 08:05), Max: 97.6 (31 Mar 2020 08:05)  HR: 57 (31 Mar 2020 08:05) (55 - 57)  BP: 99/60 (31 Mar 2020 08:05) (99/60 - 119/70)  BP(mean): --  RR: 14 (31 Mar 2020 08:05) (14 - 14)  SpO2: 98% (31 Mar 2020 08:05) (98% - 98%)      PHYSICAL EXAM  Constitutional - NAD, Comfortable  HEENT - NCAT, EOMI, +glasses,   Neck - Supple, No limited ROM  Chest -  CTAB b/l no wheezing  Cardio - +S1/S2 RRR,   Abdomen -  soft, non-distended, no tenderness, +BS, g-tube site healing well - improved compared to last week, no drainage   Extremities - No peripheral edema  Neurologic Exam:                 	   Cognitive -   	          Orientation: Awake, Alert, able to follow most commands, unable to test orientation as pt is aphasic  	   Speech - Non-fluent aphasia, comprehension fair, pt able to follow most commands, non-verbal but able to nod to questions  	   Cranial Nerves - left facial droop, tongue midline, no ptosis, shoulder shrug intact  	   Motor -  limited by aphasia and language   	                  LEFT    UE - ShAB 5/5, EF 5/5, EE 5/5, WE 5/5,  WNL  	                  RIGHT UE - ShAB 5/5, EF 5/5, EE 5/5, WE 5/5,  WNL  	                  LEFT    LE - HF 5/5, KE 5/5, DF 5/5, PF 5/5  	                  RIGHT LE - HF 5/5, KE 5/5, DF 5/5, PF 5/5     Psychiatric - Mood stable, calm, no agitation    IDT Meeting 3/24  SW: family --> SURAJ  OT  grooming modA  UBD Bernie  LBD modA  toilet Bernie  tub transfer min/modA  PT  amb 80feet RW Bernie, wheelchair follow  transfers Bernie  stairs n/a  SLP  diet puree thin  comprehension modA, decreased attention, apraxia    RECENT LABS                        10.9   7.53  )-----------( 245      ( 30 Mar 2020 06:00 )             34.3     03-30    139  |  105  |  12  ----------------------------<  93  4.2   |  26  |  0.77    Ca    9.4      30 Mar 2020 06:00              RADIOLOGY/OTHER RESULTS      MEDICATIONS  (STANDING):  aspirin  chewable 81 milliGRAM(s) Oral daily  atorvastatin 80 milliGRAM(s) Oral at bedtime  cefTRIAXone   IVPB 1000 milliGRAM(s) IV Intermittent every 24 hours  lactobacillus acidophilus 1 Tablet(s) Oral daily  memantine 5 milliGRAM(s) Oral two times a day  nystatin    Suspension 1 milliLiter(s) Oral three times a day  pantoprazole   Suspension 40 milliGRAM(s) Oral two times a day  senna Syrup 5 milliLiter(s) Oral at bedtime    MEDICATIONS  (PRN):  acetaminophen   Tablet .. 650 milliGRAM(s) Oral every 6 hours PRN Temp greater or equal to 38C (100.4F), Mild Pain (1 - 3)  polyethylene glycol 3350 17 Gram(s) Oral daily PRN Constipation

## 2020-03-31 NOTE — PROGRESS NOTE ADULT - ATTENDING COMMENTS
I have personally examined this pt, reviewed pertinent clinical information and performed the evaluation and management service provided at today's patient visit for inpatient medical follow up     I am available on the unit for any questions regarding this patients medical plan of care and can be reached by cell phone at [phone]

## 2020-03-31 NOTE — PROGRESS NOTE ADULT - ATTENDING COMMENTS
Pt. seen with resident & fellow.  Agree with documentation above as per fellow with amendments made as appropriate. Patient medically stable. Making progress towards rehab goals.     see attending note

## 2020-03-31 NOTE — PROGRESS NOTE ADULT - ASSESSMENT
60 yo Male with prior CVA 8 years ago with no deficits noted initially admitted with generalized weakness and difficulty speaking. CTH with chronic L MCA infarct with MRI showing acute right post frontal infarct. Pt with dysphagia s/p PEG placement, non-fluent aphasia, apraxia of speech, gait and ADL impairment. Hospital course complicated by with multiple PEG dislodgement and replacement. S/p completion of antibiotic treatment for aspiration pneumonia.     CVA   -Comprehensive Rehab Program of PT/OT/ SLP for Gait Instability, ADL, and Functional impairments  -Resumed ASA 81mg.  Will resume Plavix 75mg as pt's H&H improving -- monitor.   -Lipitor 80mg qhs    Aphasia  cont. Namenda BID    Dysphagia - improving   -h/o recent PEG placement, pt pulled out PEG multiple times, 16Fr hicks g-tube dislodged on 3/23, no need to replace as pt is eating all of his meals,   -s/p MBS on 3/19, diet was upgraded to puree and thin liquids  -continue pressure dressing to g-tube site, mild granulation tissue; GI following - bacitracin to area BID, culture showed normal skin cheryl    Intraabdominal bleed--benign Abd. exam  H&H improved.   ---s/p GI scope 3/26; as per GI no signs of active bleeding, healing ulcer  -Surgery consulted - abdomen benign at this time, continue to monitor  continue Ceftriaxone 1000mg q24 hours  -continue protonix 40mg BIDAgitation - stable  -Depakote 125 mg q6 hours PRN via PEG  -Depakote 125mg daily    Oral Thrush  -Nystatin swab TID  -oral care    Left renal lesion   -CT abdomen/pelvis done for malignancy workup due to multiple CVAs in the past showed ill defined left renal lesion   -Left renal lesion demonstrated MR findings (2/28) most compatible with small chronic subcapsular/pericapsular hematoma.  -Kidney sonogram negative; PSA negative   -outpatient colonoscopy as per heme/onc    Recent sepsis 2/2 aspiration pneumonia: resolved  -completed course of zosyn  -3/8 blood cx, UC, and RVP negative    Hematoma of rectus sheath - monitor  - likely 2/2 g-tube replacement  - no anticoagulation    Pain Mgmt: Tylenol PRN  Bowel: Monitor continence. Senna, Miralax PRN  Bladder: low PVR, incontinent   Sleep: monitor  Skin: Pressure injury prevention, turn Q2hrs while in bed, OOB to Chair  Diet: puree and thin    Precautions: aspiration, fall  DVT: No AC, SCDs    Anticipated Discharge 3/31 SURAJ    ---------------------------------------------------------------------------------  Consults: Internal Medicine    Post Discharge Follow up:  Neurology: Craig Peterson (DO)  [address] [place] [address]  Phone: [phone]  Fax: [phone]  Follow Up Time: 2 weeks    Gastroenterology; Internal Medicine: John Squires (DO)  [address] [place] [address]  Phone: [phone]  Fax: [phone]

## 2020-03-31 NOTE — PROGRESS NOTE ADULT - ASSESSMENT
58 yo Male with prior CVA 8 years ago with no deficits noted initially admitted with generalized weakness and difficulty speaking. CTH with chronic L MCA infarct with MRI showing acute right post frontal infarct. Pt with dysphagia s/p PEG placement, non-fluent aphasia, apraxia of speech, gait and ADL impairment. Hospital course complicated by with multiple PEG dislodgement and replacement. S/p completion of antibiotic treatment for aspiration pneumonia.     >Acute blood loss anemia   -s/p transfusion PRBC /  endoscopy no active bleeding  -patient remains hemodynamically stable   - Hgb  stable   -continue PPI    >CVA    -continue acute rehab PT/OT/SLP program  -cont ASA, plavix statin   -seizure ppx valproic   -puree diet thin liquids    >DVT ppx   - hematoma of rectus sheeth, no chemical dvt ppx   - TEDs

## 2020-03-31 NOTE — PROGRESS NOTE ADULT - ASSESSMENT
58 yo Male with prior CVA 8 years ago with no deficits noted initially admitted with generalized weakness and difficulty speaking. CTH with chronic L MCA infarct with MRI showing acute right post frontal infarct. Pt with dysphagia s/p PEG placement, non-fluent aphasia, apraxia of speech, gait and ADL impairment. Hospital course complicated by with multiple PEG dislodgement and replacement. S/p completion of antibiotic treatment for aspiration pneumonia.     CVA   -Comprehensive Rehab Program of PT/OT/ SLP for Gait Instability, ADL, and Functional impairments  -ASA 81mg and Plavix 75mg for 90 days followed by ASA as monotherapy - continue to HOLD for now in the setting of abdominal bleed  -Lipitor 80mg qhs    Dysphagia - improving   -h/o recent PEG placement, pt pulled out PEG multiple times, 16Fr hicks g-tube dislodged on 3/23, no need to replace as pt is eating all of his meals,   -s/p MBS on 3/19, diet was upgraded to puree and thin liquids  -continue pressure dressing to g-tube site, mild granulation tissue; GI following - bacitracin to area BID, culture showed normal skin cheryl    GI bleed  -s/p RRT on 3/25 morning, synopy in bathroom during toileting, pt was hypotensive and fluids given   -CXR 3/25 negative, UA negative, Blood cx no growth to date  -CT abdomen with large amount of intra-abdominal hemorrhage, with more dense hemorrhage along the greater curvature and posterior to the stomach distending the lateral recesses of the lesser sac.  -continue Ceftriaxone 1000mg q24 hours until 4/1/20  -continue protonix 40mg BID  -s/p 1 unit pRBC  -s/p GI scope 3/26; as per GI no signs of active bleeding, healing ulcer  -Surgery consulted - abdomen benign at this time, continue to monitor  -3/30  H/H 10.9/34 - improved, f/u CBC today    Agitation - stable  -Depakote 125 mg q6 hours PRN via PEG  -Depakote 125mg daily    Oral Thrush - improving  -Nystatin swab TID  -oral care    Left renal lesion   -CT abdomen/pelvis done for malignancy workup due to multiple CVAs in the past showed ill defined left renal lesion   -Left renal lesion demonstrated MR findings (2/28) most compatible with small chronic subcapsular/pericapsular hematoma.  -Kidney sonogram negative; PSA negative   -outpatient colonoscopy as per heme/onc    Recent sepsis 2/2 aspiration pneumonia: resolved  -completed course of zosyn  -3/8 blood cx, UC, and RVP negative    Hematoma of rectus sheath - resolved  - likely 2/2 g-tube replacement  - no anticoagulation    Pain Mgmt: Tylenol PRN  Bowel: Monitor continence. Senna, Miralax PRN  Bladder: low PVR, incontinent   Sleep: monitor  Skin: Pressure injury prevention, turn Q2hrs while in bed, OOB to Chair  Diet: puree and thin    Precautions: aspiration, fall  DVT: No AC, SCDs    Anticipated Discharge 3/31 SURAJ    ---------------------------------------------------------------------------------  Consults: Internal Medicine    Post Discharge Follow up:  Neurology: Craig Peterson (DO)  [facility] [address]  [place] [address]  Phone: [phone]  Fax: [phone]  Follow Up Time: 2 weeks    Gastroenterology; Internal Medicine: John Squires (DO)  [address] [place] [address]  Phone: [phone]  Fax: [phone] 60 yo Male with prior CVA 8 years ago with no deficits noted initially admitted with generalized weakness and difficulty speaking. CTH with chronic L MCA infarct with MRI showing acute right post frontal infarct. Pt with dysphagia s/p PEG placement, non-fluent aphasia, apraxia of speech, gait and ADL impairment. Hospital course complicated by with multiple PEG dislodgement and replacement. S/p completion of antibiotic treatment for aspiration pneumonia.     CVA   -Comprehensive Rehab Program of PT/OT/ SLP for Gait Instability, ADL, and Functional impairments  -on ASA 81mg --plavix 75mg for 90 days followed by ASA as monotherapy -was on HOLD for now in the setting of abdominal bleed--ASA was resumed on weekend, will restart plavix as pt.'s H&H stable.   -Lipitor 80mg qhs    Dysphagia - improving   -h/o recent PEG placement, pt pulled out PEG multiple times, 16Fr hicks g-tube dislodged on 3/23, no need to replace as pt is eating all of his meals,   -s/p MBS on 3/19, diet was upgraded to puree and thin liquids  -continue pressure dressing to g-tube site, mild granulation tissue; GI following - bacitracin to area BID, culture showed normal skin cheryl    GI bleed  -s/p RRT on 3/25 morning, synopy in bathroom during toileting, pt was hypotensive and fluids given   -CXR 3/25 negative, UA negative, Blood cx no growth to date  -CT abdomen with large amount of intra-abdominal hemorrhage, with more dense hemorrhage along the greater curvature and posterior to the stomach distending the lateral recesses of the lesser sac.  -continue Ceftriaxone 1000mg q24 hours until 4/1/20  -continue protonix 40mg BID  -s/p 1 unit pRBC  -s/p GI scope 3/26; as per GI no signs of active bleeding, healing ulcer  -Surgery consulted - abdomen benign at this time, continue to monitor  -3/30  H/H 10.9/34 - improved, f/u CBC today    Agitation - stable  -Depakote 125 mg q6 hours PRN via PEG  -Depakote 125mg daily    Oral Thrush - improving  -Nystatin swab TID  -oral care    Left renal lesion   -CT abdomen/pelvis done for malignancy workup due to multiple CVAs in the past showed ill defined left renal lesion   -Left renal lesion demonstrated MR findings (2/28) most compatible with small chronic subcapsular/pericapsular hematoma.  -Kidney sonogram negative; PSA negative   -outpatient colonoscopy as per heme/onc    Recent sepsis 2/2 aspiration pneumonia: resolved  -completed course of zosyn  -3/8 blood cx, UC, and RVP negative    Hematoma of rectus sheath - resolved  - likely 2/2 g-tube replacement  - no anticoagulation    Pain Mgmt: Tylenol PRN  Bowel: Monitor continence. Senna, Miralax PRN  Bladder: low PVR, incontinent   Sleep: monitor  Skin: Pressure injury prevention, turn Q2hrs while in bed, OOB to Chair  Diet: puree and thin    Precautions: aspiration, fall  DVT: No AC, SCDs    Anticipated Discharge 3/31 SURAJ    ---------------------------------------------------------------------------------  Consults: Internal Medicine    Post Discharge Follow up:  Neurology: Craig Peterson (DO)  [address] [facility] [address]  [place] [address]  Phone: [phone]  Fax: [phone]  Follow Up Time: 2 weeks    Gastroenterology; Internal Medicine: John Squires (DO)  [address] [place] [address]  Phone: [phone]  Fax: [phone]

## 2020-03-31 NOTE — PROGRESS NOTE ADULT - SUBJECTIVE AND OBJECTIVE BOX
HPI:  60 yo Bulgarian speaking Male with PMH of CVA 8yrs ago (no reported deficits) was initially brought into [facility] on 2/9/20 by brother for concerns of generalized weakness and inability to speak for the 3 weeks. Pt was found to have no verbal output, appeared withdrawn, intermittent command following and easily agitated.  CTH demonstrated chronic L MCA infarct with severely stenotic vs occluded distal L M1. Confirmed on MRI but also found to have an acute right post frontal infarct, benign LUIS and s/p [employer] ILR placement 2/13. Patient also had sinus bradycardia, with no interventions recommended from cardiology.   Patient also with severe dysphagia and was s/p PEG placement on 2/2 by GI with endoscopic placement however post procedure pt pulled PEG out. As per GI patient at risk of 2nd endoscopic placement contraindicated due to risk of pulling out tube and peritonitis. Patient thus had laproscopic placement of G tube done by surgery on 2/27 to minimize risk of peritonitis if pulled out. On 3/2, noted with moderate leaking around G-tube: was pulled out from 9 cm in to only 1 cm in. G tube repositioned under fluoro on 3/2.    Neuro recommended malignancy work up 2/2 unexplained multiple embolic strokes. Oncology consult w Dr. Sanchez. Patient had CT C/A/P w  IV contrast only. no significant findings exc questionable renal mass, but sono neg and MRI confirmed it being a subcapsular hematoma.  Patient will need outptn colonoscopy after discharge. 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   Patient was discharged to acute rehab on 3/7, however on admission pt had a temp of 101 and pt PEG was found to be dislodged. Pt was transferred back to [facility] and PEG was replaced by surgery on 3/8. Patient tolerating feeds. Pt was found to have sepsis 2/2 aspiration pneumonia of b/l lower lobes and ID was consulted and pt was started on zosyn. Antibitoic course completed. RVP, blood cultutes were negative. Anticoagulation was held due to hematoma at rectum likley 2/2 g tube replacement. On 3/13 pt pulled out PEG and 16Fr hicks catheter was inserted through g tube tract and feeds were restarted after confirmation of placement.  Spoke with pt's nephew Dallas Arriola. As per Dallas, pt was living in [place] in a motel. Motel owner contacted family in [place] when he began acting strangely. family in [place] contacted pt's brother here in [place] and family brought him here and took him to [facility]. As per nephew, pt has no home here and no dispo plan at this time.   Patient is . He has 2 sons here but estranged from one son and other son is a college student.     SUBJECTIVE & INTERVAL HISTORY: Pt seen at bedside. No acute complaints. No overnight events. Pt finished all his breakfast. No abdominal pain    REVIEW OF SYSTEMS  Limited by pt aphasia  Constitutional - No fever, No fatigue  HEENT - No eye pain, No visual disturbances (wears glasses), No difficulty hearing,  Respiratory - No cough, No shortness of breath  Cardiovascular - No chest pain, No palpitations  Gastrointestinal - No abdominal pain, No diarrhea, No constipation   Genitourinary - No dysuria, No incontinence  Neurological - No headaches, + loss of strength, No numbness,   Skin - No itching, No rashes,   Musculoskeletal - No joint pain, No joint swelling, No muscle pain  Psychiatric - No depression, No anxiety    VITALS  Vital Signs Last 24 Hrs  T(C): 36.4 (31 Mar 2020 08:05), Max: 36.4 (30 Mar 2020 20:24)  T(F): 97.6 (31 Mar 2020 08:05), Max: 97.6 (31 Mar 2020 08:05)  HR: 57 (31 Mar 2020 08:05) (55 - 57)  BP: 99/60 (31 Mar 2020 08:05) (99/60 - 119/70)  BP(mean): --  RR: 14 (31 Mar 2020 08:05) (14 - 14)  SpO2: 98% (31 Mar 2020 08:05) (98% - 98%)      PHYSICAL EXAM  Constitutional - NAD, Comfortable  HEENT - NCAT, EOMI, +glasses,   Chest -  CTAB b/l no wheezing  Cardio - +S1/S2 RRR,   Abdomen -  soft, non-distended, no tenderness, +BS, g-tube site healing well - improved compared to last week, no drainage   Extremities - No peripheral edema  Neurologic Exam:                 	   Cognitive -   	          Orientation: Awake, Alert, able to follow most commands, unable to test orientation as pt is aphasic  	   Speech - Non-fluent aphasia, comprehension fair, pt able to follow most commands, non-verbal but able to nod to questions  	   Cranial Nerves - left facial droop, tongue midline, no ptosis, shoulder shrug intact  	   Motor -  limited by aphasia and language   	                  LEFT    UE - ShAB 5/5, EF 5/5, EE 5/5, WE 5/5,  WNL  	                  RIGHT UE - ShAB 5/5, EF 5/5, EE 5/5, WE 5/5,  WNL  	                  LEFT    LE - HF 5/5, KE 5/5, DF 5/5, PF 5/5  	                  RIGHT LE - HF 5/5, KE 5/5, DF 5/5, PF 5/5     Psychiatric - Mood stable, calm, no agitation    IDT Meeting 3/24  SW: family --> SURAJ  OT  grooming modA  UBD Bernie  LBD modA  toilet Bernie  tub transfer min/modA  PT  amb 80feet RW Bernie, wheelchair follow  transfers Bernie  stairs n/a  SLP  diet puree thin  comprehension modA, decreased attention, apraxia    RECENT LABS/IMAGING                        10.9   7.53  )-----------( 245      ( 30 Mar 2020 06:00 )             34.3     03-30    139  |  105  |  12  ----------------------------<  93  4.2   |  26  |  0.77    Ca    9.4      30 Mar 2020 06:00    < from: CT Abdomen and Pelvis w/ IV Cont (03.25.20 @ 09:05) >  Large amount of intra-abdominal hemorrhage as discussed with more dense hemorrhage along the greater curvature and posterior to the stomach distending the lateral recesses of the lesser sac.      MEDICATIONS  (STANDING):  aspirin  chewable 81 milliGRAM(s) Oral daily  atorvastatin 80 milliGRAM(s) Oral at bedtime  BACItracin   Ointment 1 Application(s) Topical two times a day  cefTRIAXone   IVPB 1000 milliGRAM(s) IV Intermittent every 24 hours  lactobacillus acidophilus 1 Tablet(s) Oral daily  memantine 5 milliGRAM(s) Oral two times a day  nystatin    Suspension 1 milliLiter(s) Oral three times a day  pantoprazole  Injectable 40 milliGRAM(s) IV Push two times a day  senna Syrup 5 milliLiter(s) Oral at bedtime    MEDICATIONS  (PRN):  acetaminophen   Tablet .. 650 milliGRAM(s) Oral every 6 hours PRN Temp greater or equal to 38C (100.4F), Mild Pain (1 - 3)  polyethylene glycol 3350 17 Gram(s) Oral daily PRN Constipation

## 2020-04-01 ENCOUNTER — TRANSCRIPTION ENCOUNTER (OUTPATIENT)
Age: 60
End: 2020-04-01

## 2020-04-01 VITALS
DIASTOLIC BLOOD PRESSURE: 74 MMHG | OXYGEN SATURATION: 100 % | HEART RATE: 59 BPM | TEMPERATURE: 97 F | RESPIRATION RATE: 14 BRPM | SYSTOLIC BLOOD PRESSURE: 96 MMHG

## 2020-04-01 PROCEDURE — 99238 HOSP IP/OBS DSCHRG MGMT 30/<: CPT

## 2020-04-01 PROCEDURE — 99232 SBSQ HOSP IP/OBS MODERATE 35: CPT

## 2020-04-01 RX ADMIN — Medication 81 MILLIGRAM(S): at 12:22

## 2020-04-01 RX ADMIN — CLOPIDOGREL BISULFATE 75 MILLIGRAM(S): 75 TABLET, FILM COATED ORAL at 12:22

## 2020-04-01 RX ADMIN — Medication 1 MILLILITER(S): at 06:46

## 2020-04-01 RX ADMIN — MEMANTINE HYDROCHLORIDE 5 MILLIGRAM(S): 10 TABLET ORAL at 06:45

## 2020-04-01 RX ADMIN — PANTOPRAZOLE SODIUM 40 MILLIGRAM(S): 20 TABLET, DELAYED RELEASE ORAL at 06:45

## 2020-04-01 NOTE — PROGRESS NOTE ADULT - ASSESSMENT
60 yo Male with prior CVA 8 years ago with no deficits noted initially admitted with generalized weakness and difficulty speaking. CTH with chronic L MCA infarct with MRI showing acute right post frontal infarct. Pt with dysphagia s/p PEG placement, non-fluent aphasia, apraxia of speech, gait and ADL impairment. Hospital course complicated by with multiple PEG dislodgement and replacement. S/p completion of antibiotic treatment for aspiration pneumonia.     >Acute blood loss anemia   -Stable Hb   -s/p transfusion PRBC /  endoscopy no active bleeding  -patient remains hemodynamically stable   -continue PPI    >CVA    -continue acute rehab PT/OT/SLP program  -cont ASA, plavix statin   -seizure ppx valproic   -puree diet thin liquids    >DVT ppx   - hematoma of rectus sheeth, no chemical dvt ppx   - TEDs

## 2020-04-01 NOTE — DISCHARGE NOTE NURSING/CASE MANAGEMENT/SOCIAL WORK - PATIENT PORTAL LINK FT
You can access the FollowMyHealth Patient Portal offered by [facility] by registering at the following website: [email or web address]. By joining [facility]’s FollowMyHealth portal, you will also be able to view your health information using other applications (apps) compatible with our system.

## 2020-04-01 NOTE — PROGRESS NOTE ADULT - ASSESSMENT
60 yo Male with prior CVA 8 years ago with no deficits noted initially admitted with generalized weakness and difficulty speaking. CTH with chronic L MCA infarct with MRI showing acute right post frontal infarct. Pt with dysphagia s/p PEG placement, non-fluent aphasia, apraxia of speech, gait and ADL impairment. Hospital course complicated by with multiple PEG dislodgement and replacement. S/p completion of antibiotic treatment for aspiration pneumonia.     Patient medically stable for discharge to [facility].  Discharge medications and instructions reviewed with patient and pt's family.  All questions answered.     CVA   -Comprehensive Rehab Program of PT/OT/ SLP for Gait Instability, ADL, and Functional impairments  -on ASA 81mg  & plavix 75mg for 90 days post-stroke followed by ASA as monotherapy -  -Lipitor 80mg qhs    Dysphagia - improving   -h/o recent PEG placement, pt pulled out PEG multiple times, 16Fr hicks g-tube dislodged on 3/23, no need to replace as pt is eating all of his meals,   -s/p MBS on 3/19, diet was upgraded to puree and thin liquids  -continue pressure dressing to g-tube site, mild granulation tissue; GI following - bacitracin to area BID, culture showed normal skin cheryl    GI bleed  -s/p RRT on 3/25 morning, synopy in bathroom during toileting, pt was hypotensive and fluids given   -CXR 3/25 negative, UA negative, Blood cx no growth to date  -CT abdomen with large amount of intra-abdominal hemorrhage, with more dense hemorrhage along the greater curvature and posterior to the stomach distending the lateral recesses of the lesser sac.  -continue Ceftriaxone 1000mg q24 hours until 4/1/20  -continue protonix 40mg BID  -s/p 1 unit pRBC  -s/p GI scope 3/26; as per GI no signs of active bleeding, healing ulcer  -Surgery consulted - abdomen benign at this time, continue to monitor  - H/H improving--11.7/36.7         Left renal lesion   -CT abdomen/pelvis done for malignancy workup due to multiple CVAs in the past showed ill defined left renal lesion   -Left renal lesion demonstrated MR findings (2/28) most compatible with small chronic subcapsular/pericapsular hematoma.  -Kidney sonogram negative; PSA negative   -outpatient colonoscopy as per heme/onc    Recent sepsis 2/2 aspiration pneumonia: resolved  -completed course of zosyn  -3/8 blood cx, UC, and RVP negative    Hematoma of rectus sheath - resolved  - likely 2/2 g-tube replacement  - no anticoagulation    Pain Mgmt: Tylenol PRN  Bowel: Monitor continence. Senna, Miralax PRN  Bladder: low PVR, incontinent   Sleep: monitor  Skin: Pressure injury prevention, turn Q2hrs while in bed, OOB to Chair  Diet: puree and thin    Precautions: aspiration, fall  DVT: No AC, SCDs    Anticipated Discharge 3/31 SURAJ    ---------------------------------------------------------------------------------  Consults: Internal Medicine    Post Discharge Follow up:  Neurology: Craig Peterson (DO)  [address] [facility] [address]  [place] [address]  Phone: [phone]  Fax: [phone]  Follow Up Time: 2 weeks    Gastroenterology; Internal Medicine: John Squires (DO)  [address] [place] [address]  Phone: [phone]  Fax: [phone]

## 2020-04-01 NOTE — PROGRESS NOTE ADULT - REASON FOR ADMISSION
CVA
Follow up stroke, anemia
CC: Follow up anemia, stroke
CC: Follow up stroke, ABLA
GI Bleed
Stroke
CC: follow up GIB,
CVA

## 2020-04-01 NOTE — DISCHARGE NOTE NURSING/CASE MANAGEMENT/SOCIAL WORK - NSDCFUADDAPPT_GEN_ALL_CORE_FT
Please follow up with neurology in 2 weeks.   Please follow up with PM&R in 4 weeks  Please follow up with GI in 1-2 weeks.

## 2020-04-01 NOTE — PROGRESS NOTE ADULT - SUBJECTIVE AND OBJECTIVE BOX
HPI:  58 yo Romansh speaking Male with PMH of CVA 8yrs ago (no reported deficits) was initially brought into [facility] on 2/9/20 by brother for concerns of generalized weakness and inability to speak for the 3 weeks. Pt was found to have no verbal output, appeared withdrawn, intermittent command following and easily agitated.  CTH demonstrated chronic L MCA infarct with severely stenotic vs occluded distal L M1. Confirmed on MRI but also found to have an acute right post frontal infarct, benign LUIS and s/p Med[employer] ILR placement 2/13. Patient also had sinus bradycardia, with no interventions recommended from cardiology.     Patient also with severe dysphagia and was s/p PEG placement on 2/2 by GI with endoscopic placement however post procedure pt pulled PEG out. As per GI patient at risk of 2nd endoscopic placement contraindicated due to risk of pulling out tube and peritonitis. Patient thus had laproscopic placement of G tube done by surgery on 2/27 to minimize risk of peritonitis if pulled out. On 3/2, noted with moderate leaking around G-tube: was pulled out from 9 cm in to only 1 cm in. G tube repositioned under fluoro on 3/2.      Neuro recommended malignancy work up 2/2 unexplained multiple embolic strokes. Oncology consult w Dr. Sanchez. Patient had CT C/A/P w  IV contrast only. no significant findings exc questionable renal mass, but sono neg and MRI confirmed it being a subcapsular hematoma.  Patient will need outptn colonoscopy after discharge. 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     Patient was discharged to acute rehab on 3/7, however on admission pt had a temp of 101 and pt PEG was found to be dislodged. Pt was transferred back to [facility] and PEG was replaced by surgery on 3/8. Patient tolerating feeds. Pt was found to have sepsis 2/2 aspiration pneumonia of b/l lower lobes and ID was consulted and pt was started on zosyn. Antibitoic course completed. RVP, blood cultutes were negative. Anticoagulation was held due to hematoma at rectum likley 2/2 g tube replacement. On 3/13 pt pulled out PEG and 16Fr hicks catheter was inserted through g tube tract and feeds were restarted after confirmation of placement.    Spoke with pt's nephew Dallas Arriola. As per Dallas, pt was living in [place] in a motel. Motel owner contacted family in [place] when he began acting strangely. family in [place] contacted pt's brother here in [place] and family brought him here and took him to [facility]. As per nephew, pt has no home here and no dispo plan at this time.   Patient is . He has 2 sons here but estranged from one son and other son is a college student.     Interpretor ID # [id number] (17 Mar 2020 15:04)      PAST MEDICAL & SURGICAL HISTORY:  CVA (cerebral vascular accident)  S/P percutaneous endoscopic gastrostomy (PEG) tube placement      Subjective:  No new complaints    REVIEW OF SYMPTOMS  Limited by pt aphasia  Constitutional - No fever, No fatigue  HEENT - No eye pain, No visual disturbances (wears glasses), No difficulty hearing,  Respiratory - No cough, No shortness of breath  Cardiovascular - No chest pain, No palpitations  Gastrointestinal - No abdominal pain, No diarrhea, No constipation   Genitourinary - No dysuria, No incontinence  Neurological - No headaches, + loss of strength, No numbness,   Skin - No itching, No rashes,   Musculoskeletal - No joint pain, No joint swelling, No muscle pain  Psychiatric - No depression, No anxiety      VITALS  Vital Signs Last 24 Hrs  T(C): 36.3 (01 Apr 2020 08:00), Max: 36.4 (31 Mar 2020 19:56)  T(F): 97.3 (01 Apr 2020 08:00), Max: 97.5 (31 Mar 2020 19:56)  HR: 59 (01 Apr 2020 08:00) (59 - 68)  BP: 96/74 (01 Apr 2020 08:00) (96/74 - 124/81)  BP(mean): --  RR: 14 (01 Apr 2020 08:00) (14 - 15)  SpO2: 100% (01 Apr 2020 08:00) (98% - 100%)      PHYSICAL EXAM  Constitutional - NAD, Comfortable  HEENT - NCAT, EOMI, +glasses,   Chest -  CTAB b/l no wheezing  Cardio - +S1/S2 RRR,   Abdomen -  soft, non-distended, no tenderness, +BS, g-tube site healing well - improved compared to last week, no drainage   Extremities - No peripheral edema  Neurologic Exam:                 	   Cognitive -   	          Orientation: Awake, Alert, able to follow most commands, unable to test orientation as pt is aphasic  	   Speech - Non-fluent aphasia, comprehension fair, pt able to follow most commands, non-verbal but able to nod to questions  	   Cranial Nerves - left facial droop, tongue midline, no ptosis, shoulder shrug intact  	   Motor -  limited by aphasia and language   	                  LEFT    UE - ShAB 5/5, EF 5/5, EE 5/5, WE 5/5,  WNL  	                  RIGHT UE - ShAB 5/5, EF 5/5, EE 5/5, WE 5/5,  WNL  	                  LEFT    LE - HF 5/5, KE 5/5, DF 5/5, PF 5/5  	                  RIGHT LE - HF 5/5, KE 5/5, DF 5/5, PF 5/5     Psychiatric - Mood stable, calm, no agitation    IDT Meeting 3/24  SW: family --> SURAJ  OT  grooming modA  UBD Bernie  LBD modA  toilet Bernie  tub transfer min/modA  PT  amb 80feet RW Bernie, wheelchair follow  transfers Bernie  stairs n/a  SLP  diet puree thin  comprehension modA, decreased attention, apraxia    RECENT LABS                        11.7   9.54  )-----------( 412      ( 31 Mar 2020 12:38 )             36.7                   RADIOLOGY/OTHER RESULTS      MEDICATIONS  (STANDING):  aspirin  chewable 81 milliGRAM(s) Oral daily  atorvastatin 80 milliGRAM(s) Oral at bedtime  clopidogrel Tablet 75 milliGRAM(s) Oral daily  memantine 5 milliGRAM(s) Oral two times a day  nystatin    Suspension 1 milliLiter(s) Oral three times a day  pantoprazole   Suspension 40 milliGRAM(s) Oral two times a day  senna Syrup 5 milliLiter(s) Oral at bedtime    MEDICATIONS  (PRN):  acetaminophen   Tablet .. 650 milliGRAM(s) Oral every 6 hours PRN Temp greater or equal to 38C (100.4F), Mild Pain (1 - 3)  polyethylene glycol 3350 17 Gram(s) Oral daily PRN Constipation

## 2020-04-01 NOTE — PROGRESS NOTE ADULT - SUBJECTIVE AND OBJECTIVE BOX
Filipe Thomas M.D. Pager Number [phone]    Patient is a 59y old  Male who presents with a chief complaint of CVA (30 Mar 2020 14:15)      SUBJECTIVE / OVERNIGHT EVENTS:  Pt seen and examined at bedside. No acute events overnight.  Pt denies cp, palpitations, sob, abd pain, N/V, fever, chills.    ROS:  All other review of systems negative    Allergies    No Known Allergies    Intolerances        MEDICATIONS  (STANDING):  aspirin  chewable 81 milliGRAM(s) Oral daily  atorvastatin 80 milliGRAM(s) Oral at bedtime  clopidogrel Tablet 75 milliGRAM(s) Oral daily  memantine 5 milliGRAM(s) Oral two times a day  nystatin    Suspension 1 milliLiter(s) Oral three times a day  pantoprazole   Suspension 40 milliGRAM(s) Oral two times a day  senna Syrup 5 milliLiter(s) Oral at bedtime    MEDICATIONS  (PRN):  acetaminophen   Tablet .. 650 milliGRAM(s) Oral every 6 hours PRN Temp greater or equal to 38C (100.4F), Mild Pain (1 - 3)  polyethylene glycol 3350 17 Gram(s) Oral daily PRN Constipation      Vital Signs Last 24 Hrs  T(C): 36.3 (01 Apr 2020 08:00), Max: 36.4 (31 Mar 2020 19:56)  T(F): 97.3 (01 Apr 2020 08:00), Max: 97.5 (31 Mar 2020 19:56)  HR: 59 (01 Apr 2020 08:00) (59 - 68)  BP: 96/74 (01 Apr 2020 08:00) (96/74 - 124/81)  BP(mean): --  RR: 14 (01 Apr 2020 08:00) (14 - 15)  SpO2: 100% (01 Apr 2020 08:00) (98% - 100%)  CAPILLARY BLOOD GLUCOSE        I&O's Summary    31 Mar 2020 07:01  -  01 Apr 2020 07:00  --------------------------------------------------------  IN: 0 mL / OUT: 200 mL / NET: -200 mL        PHYSICAL EXAM:  GENERAL: NAD  HEAD:  Atraumatic, Normocephalic  EYES: EOMI, PERRLA, conjunctiva and sclera clear  NECK: Supple, No JVD  CHEST/LUNG: Clear to auscultation bilaterally; No wheeze  HEART: Regular rate and rhythm; No murmurs, rubs, or gallops  ABDOMEN: Soft, Nontender, Nondistended; Bowel sounds present. PEG site with PEG removed. Site with granulation tissue, no concern for infection  EXTREMITIES:  2+ Peripheral Pulses, No clubbing, cyanosis, or edema  NEUROLOGY: Alert, aphasic  PSYCH: calm  SKIN: No rashes or lesions    LABS:                        11.7   9.54  )-----------( 412      ( 31 Mar 2020 12:38 )             36.7                     RADIOLOGY & ADDITIONAL TESTS:  Results Reviewed:   Imaging Personally Reviewed:  Electrocardiogram Personally Reviewed:    COORDINATION OF CARE:  Care Discussed with Consultants/Other Providers [Y/N]:  Prior or Outpatient Records Reviewed [Y/N]:

## 2020-04-03 DIAGNOSIS — E43 UNSPECIFIED SEVERE PROTEIN-CALORIE MALNUTRITION: ICD-10-CM

## 2020-04-03 DIAGNOSIS — R55 SYNCOPE AND COLLAPSE: ICD-10-CM

## 2020-04-03 DIAGNOSIS — K92.2 GASTROINTESTINAL HEMORRHAGE, UNSPECIFIED: ICD-10-CM

## 2020-04-03 DIAGNOSIS — I69.391 DYSPHAGIA FOLLOWING CEREBRAL INFARCTION: ICD-10-CM
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
## 2020-04-03 DIAGNOSIS — Z51.89 ENCOUNTER FOR OTHER SPECIFIED AFTERCARE: ICD-10-CM

## 2020-04-03 DIAGNOSIS — R45.1 RESTLESSNESS AND AGITATION: ICD-10-CM

## 2020-04-03 DIAGNOSIS — D62 ACUTE POSTHEMORRHAGIC ANEMIA: ICD-10-CM

## 2020-04-03 DIAGNOSIS — I69.390 APRAXIA FOLLOWING CEREBRAL INFARCTION: ICD-10-CM

## 2020-04-03 DIAGNOSIS — I69.392 FACIAL WEAKNESS FOLLOWING CEREBRAL INFARCTION: ICD-10-CM

## 2020-04-03 DIAGNOSIS — I95.9 HYPOTENSION, UNSPECIFIED: ICD-10-CM

## 2020-04-03 DIAGNOSIS — K29.80 DUODENITIS WITHOUT BLEEDING: ICD-10-CM

## 2020-04-03 DIAGNOSIS — B37.0 CANDIDAL STOMATITIS: ICD-10-CM

## 2020-04-03 DIAGNOSIS — R26.9 UNSPECIFIED ABNORMALITIES OF GAIT AND MOBILITY: ICD-10-CM

## 2020-04-03 DIAGNOSIS — Z43.1 ENCOUNTER FOR ATTENTION TO GASTROSTOMY: ICD-10-CM

## 2020-04-03 DIAGNOSIS — N28.9 DISORDER OF KIDNEY AND URETER, UNSPECIFIED: ICD-10-CM

## 2020-04-03 DIAGNOSIS — R53.1 WEAKNESS: ICD-10-CM

## 2020-04-03 DIAGNOSIS — I69.320 APHASIA FOLLOWING CEREBRAL INFARCTION: ICD-10-CM

## 2020-04-03 DIAGNOSIS — I69.398 OTHER SEQUELAE OF CEREBRAL INFARCTION: ICD-10-CM

## 2020-04-03 DIAGNOSIS — R27.8 OTHER LACK OF COORDINATION: ICD-10-CM

## 2020-04-03 DIAGNOSIS — F17.210 NICOTINE DEPENDENCE, CIGARETTES, UNCOMPLICATED: ICD-10-CM

## 2020-04-03 DIAGNOSIS — S36.32XD: ICD-10-CM
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
## 2020-04-03 PROCEDURE — 97116 GAIT TRAINING THERAPY: CPT

## 2020-04-03 PROCEDURE — 97167 OT EVAL HIGH COMPLEX 60 MIN: CPT

## 2020-04-03 PROCEDURE — 74177 CT ABD & PELVIS W/CONTRAST: CPT

## 2020-04-03 PROCEDURE — 83735 ASSAY OF MAGNESIUM: CPT

## 2020-04-03 PROCEDURE — 97163 PT EVAL HIGH COMPLEX 45 MIN: CPT

## 2020-04-03 PROCEDURE — 86850 RBC ANTIBODY SCREEN: CPT

## 2020-04-03 PROCEDURE — 71045 X-RAY EXAM CHEST 1 VIEW: CPT

## 2020-04-03 PROCEDURE — 36415 COLL VENOUS BLD VENIPUNCTURE: CPT

## 2020-04-03 PROCEDURE — 84484 ASSAY OF TROPONIN QUANT: CPT

## 2020-04-03 PROCEDURE — 92507 TX SP LANG VOICE COMM INDIV: CPT

## 2020-04-03 PROCEDURE — 92523 SPEECH SOUND LANG COMPREHEN: CPT

## 2020-04-03 PROCEDURE — 36430 TRANSFUSION BLD/BLD COMPNT: CPT

## 2020-04-03 PROCEDURE — 97535 SELF CARE MNGMENT TRAINING: CPT

## 2020-04-03 PROCEDURE — 86923 COMPATIBILITY TEST ELECTRIC: CPT

## 2020-04-03 PROCEDURE — P9016: CPT

## 2020-04-03 PROCEDURE — 74230 X-RAY XM SWLNG FUNCJ C+: CPT

## 2020-04-03 PROCEDURE — 80053 COMPREHEN METABOLIC PANEL: CPT

## 2020-04-03 PROCEDURE — 87070 CULTURE OTHR SPECIMN AEROBIC: CPT

## 2020-04-03 PROCEDURE — 86900 BLOOD TYPING SEROLOGIC ABO: CPT

## 2020-04-03 PROCEDURE — 97110 THERAPEUTIC EXERCISES: CPT

## 2020-04-03 PROCEDURE — 93005 ELECTROCARDIOGRAM TRACING: CPT

## 2020-04-03 PROCEDURE — 87040 BLOOD CULTURE FOR BACTERIA: CPT

## 2020-04-03 PROCEDURE — 85027 COMPLETE CBC AUTOMATED: CPT

## 2020-04-03 PROCEDURE — 80048 BASIC METABOLIC PNL TOTAL CA: CPT

## 2020-04-03 PROCEDURE — 92526 ORAL FUNCTION THERAPY: CPT

## 2020-04-03 PROCEDURE — 82962 GLUCOSE BLOOD TEST: CPT

## 2020-04-03 PROCEDURE — 97530 THERAPEUTIC ACTIVITIES: CPT

## 2020-04-03 PROCEDURE — 86901 BLOOD TYPING SEROLOGIC RH(D): CPT

## 2020-04-03 PROCEDURE — 97112 NEUROMUSCULAR REEDUCATION: CPT

## 2020-04-03 PROCEDURE — 92610 EVALUATE SWALLOWING FUNCTION: CPT

## 2020-04-03 PROCEDURE — 81001 URINALYSIS AUTO W/SCOPE: CPT

## 2020-04-15 ENCOUNTER — APPOINTMENT (OUTPATIENT)
Dept: ELECTROPHYSIOLOGY | Facility: CLINIC | Age: 60
End: 2020-04-15
Payer: COMMERCIAL

## 2020-04-15 PROCEDURE — G2066: CPT

## 2020-04-15 PROCEDURE — 93298 REM INTERROG DEV EVAL SCRMS: CPT

## 2020-06-09 ENCOUNTER — APPOINTMENT (OUTPATIENT)
Dept: ELECTROPHYSIOLOGY | Facility: CLINIC | Age: 60
End: 2020-06-09

## 2020-07-05 ENCOUNTER — FORM ENCOUNTER (OUTPATIENT)
Age: 60
End: 2020-07-05

## 2020-07-16 ENCOUNTER — EMERGENCY (EMERGENCY)
Facility: HOSPITAL | Age: 60
LOS: 1 days | Discharge: HOME | End: 2020-07-16
Attending: STUDENT IN AN ORGANIZED HEALTH CARE EDUCATION/TRAINING PROGRAM
Payer: MEDICARE

## 2020-07-16 VITALS
SYSTOLIC BLOOD PRESSURE: 128 MMHG | HEART RATE: 55 BPM | RESPIRATION RATE: 16 BRPM | DIASTOLIC BLOOD PRESSURE: 82 MMHG | TEMPERATURE: 98 F | OXYGEN SATURATION: 97 %

## 2020-07-16 VITALS
OXYGEN SATURATION: 96 % | WEIGHT: 149.47 LBS | HEART RATE: 58 BPM | SYSTOLIC BLOOD PRESSURE: 112 MMHG | TEMPERATURE: 98 F | RESPIRATION RATE: 20 BRPM | DIASTOLIC BLOOD PRESSURE: 75 MMHG

## 2020-07-16 DIAGNOSIS — Z93.1 GASTROSTOMY STATUS: Chronic | ICD-10-CM

## 2020-07-16 LAB
ALBUMIN SERPL ELPH-MCNC: 3.4 G/DL — LOW (ref 3.5–5)
ALP SERPL-CCNC: 79 U/L — SIGNIFICANT CHANGE UP (ref 40–120)
ALT FLD-CCNC: 32 U/L DA — SIGNIFICANT CHANGE UP (ref 10–60)
ANION GAP SERPL CALC-SCNC: 6 MMOL/L — SIGNIFICANT CHANGE UP (ref 5–17)
AST SERPL-CCNC: 17 U/L — SIGNIFICANT CHANGE UP (ref 10–40)
BASOPHILS # BLD AUTO: 0.05 K/UL — SIGNIFICANT CHANGE UP (ref 0–0.2)
BASOPHILS NFR BLD AUTO: 0.7 % — SIGNIFICANT CHANGE UP (ref 0–2)
BILIRUB SERPL-MCNC: 0.4 MG/DL — SIGNIFICANT CHANGE UP (ref 0.2–1.2)
BUN SERPL-MCNC: 14 MG/DL — SIGNIFICANT CHANGE UP (ref 7–18)
CALCIUM SERPL-MCNC: 9.2 MG/DL — SIGNIFICANT CHANGE UP (ref 8.4–10.5)
CHLORIDE SERPL-SCNC: 107 MMOL/L — SIGNIFICANT CHANGE UP (ref 96–108)
CO2 SERPL-SCNC: 29 MMOL/L — SIGNIFICANT CHANGE UP (ref 22–31)
CREAT SERPL-MCNC: 0.88 MG/DL — SIGNIFICANT CHANGE UP (ref 0.5–1.3)
EOSINOPHIL # BLD AUTO: 0.36 K/UL — SIGNIFICANT CHANGE UP (ref 0–0.5)
EOSINOPHIL NFR BLD AUTO: 5 % — SIGNIFICANT CHANGE UP (ref 0–6)
GLUCOSE SERPL-MCNC: 100 MG/DL — HIGH (ref 70–99)
HCT VFR BLD CALC: 43.6 % — SIGNIFICANT CHANGE UP (ref 39–50)
HGB BLD-MCNC: 14.2 G/DL — SIGNIFICANT CHANGE UP (ref 13–17)
IMM GRANULOCYTES NFR BLD AUTO: 0.3 % — SIGNIFICANT CHANGE UP (ref 0–1.5)
LYMPHOCYTES # BLD AUTO: 1.85 K/UL — SIGNIFICANT CHANGE UP (ref 1–3.3)
LYMPHOCYTES # BLD AUTO: 25.7 % — SIGNIFICANT CHANGE UP (ref 13–44)
MAGNESIUM SERPL-MCNC: 2.3 MG/DL — SIGNIFICANT CHANGE UP (ref 1.6–2.6)
MCHC RBC-ENTMCNC: 27.9 PG — SIGNIFICANT CHANGE UP (ref 27–34)
MCHC RBC-ENTMCNC: 32.6 GM/DL — SIGNIFICANT CHANGE UP (ref 32–36)
MCV RBC AUTO: 85.7 FL — SIGNIFICANT CHANGE UP (ref 80–100)
MONOCYTES # BLD AUTO: 0.33 K/UL — SIGNIFICANT CHANGE UP (ref 0–0.9)
MONOCYTES NFR BLD AUTO: 4.6 % — SIGNIFICANT CHANGE UP (ref 2–14)
NEUTROPHILS # BLD AUTO: 4.58 K/UL — SIGNIFICANT CHANGE UP (ref 1.8–7.4)
NEUTROPHILS NFR BLD AUTO: 63.7 % — SIGNIFICANT CHANGE UP (ref 43–77)
NRBC # BLD: 0 /100 WBCS — SIGNIFICANT CHANGE UP (ref 0–0)
PLATELET # BLD AUTO: 276 K/UL — SIGNIFICANT CHANGE UP (ref 150–400)
POTASSIUM SERPL-MCNC: 3.9 MMOL/L — SIGNIFICANT CHANGE UP (ref 3.5–5.3)
POTASSIUM SERPL-SCNC: 3.9 MMOL/L — SIGNIFICANT CHANGE UP (ref 3.5–5.3)
PROT SERPL-MCNC: 7.1 G/DL — SIGNIFICANT CHANGE UP (ref 6–8.3)
RBC # BLD: 5.09 M/UL — SIGNIFICANT CHANGE UP (ref 4.2–5.8)
RBC # FLD: 14.7 % — HIGH (ref 10.3–14.5)
SODIUM SERPL-SCNC: 142 MMOL/L — SIGNIFICANT CHANGE UP (ref 135–145)
WBC # BLD: 7.19 K/UL — SIGNIFICANT CHANGE UP (ref 3.8–10.5)
WBC # FLD AUTO: 7.19 K/UL — SIGNIFICANT CHANGE UP (ref 3.8–10.5)

## 2020-07-16 PROCEDURE — 83735 ASSAY OF MAGNESIUM: CPT

## 2020-07-16 PROCEDURE — 74177 CT ABD & PELVIS W/CONTRAST: CPT | Mod: 26

## 2020-07-16 PROCEDURE — 99284 EMERGENCY DEPT VISIT MOD MDM: CPT

## 2020-07-16 PROCEDURE — 74177 CT ABD & PELVIS W/CONTRAST: CPT

## 2020-07-16 PROCEDURE — 85027 COMPLETE CBC AUTOMATED: CPT

## 2020-07-16 PROCEDURE — 99284 EMERGENCY DEPT VISIT MOD MDM: CPT | Mod: 25

## 2020-07-16 PROCEDURE — 80053 COMPREHEN METABOLIC PANEL: CPT

## 2020-07-16 PROCEDURE — 36415 COLL VENOUS BLD VENIPUNCTURE: CPT

## 2020-07-16 NOTE — ED PROVIDER NOTE - OBJECTIVE STATEMENT
60M, pmh of CVA, Alzheimer's GERD, HLD, non-verbal at baseline, sent to ED for stoma infection. history obtained form facility nurse. today stoma appeared more red than usual. some bleeding with cleaning. patient acting like his normal self. no fever, nausea, vomiting, abdominal pain, discharge from stoma, pain or selling of lower extremities.

## 2020-07-16 NOTE — ED ADULT NURSE NOTE - OBJECTIVE STATEMENT
Pt awake, MARC from [facility] for evaluation of reopening of abdominal stoma. Pt is non-verbal. Denies pain. No discharge, bleeding noted from stoma site.

## 2020-07-16 NOTE — ED PROVIDER NOTE - CLINICAL SUMMARY MEDICAL DECISION MAKING FREE TEXT BOX
60M sent for possible stoma infection. nonverbal at baseline but shakes head no to abdominal pain, nausea, vomiting. nursing home reports normal PO intake. stoma appeared to reopen and appeared more red. plan for labs, ct scan to evaluate for infection.

## 2020-07-16 NOTE — ED PROVIDER NOTE - NSFOLLOWUPINSTRUCTIONS_ED_ALL_ED_FT
You were seen in the emergency department for concern for stoma infection.    Please follow-up with your primary care doctor in the next 24-48 hours.     Please provide stoma care as per protocol.     If you have any worsening symptoms, severe chest pain, nausea, vomiting, diarrhea or you develop a fever, please return to the emergency department.

## 2020-07-16 NOTE — ED PROVIDER NOTE - PHYSICAL EXAMINATION
General: well appearing male, no acute distress   HEENT: normocephalic, atraumatic   Respiratory: normal work of breathing, lungs clear to auscultation bilaterally   Cardiac: regular rate and rhythm   Abdomen: soft, non-tender, no guarding or rebound, stoma moist, no discharge, no rashes or erythema    MSK: no swelling or tenderness of lower extremities, moving all extremities spontaneously   Skin: warm, dry   Neuro: awake, alert   Psych: appropriate affect

## 2020-07-16 NOTE — ED PROVIDER NOTE - PATIENT PORTAL LINK FT
You can access the FollowMyHealth Patient Portal offered by [facility] by registering at the following website: [email or web address]. By joining [facility]’s FollowMyHealth portal, you will also be able to view your health information using other applications (apps) compatible with our system.

## 2021-03-29 ENCOUNTER — APPOINTMENT (OUTPATIENT)
Dept: SURGERY | Facility: CLINIC | Age: 61
End: 2021-03-29
Payer: MEDICARE

## 2021-03-29 VITALS
HEIGHT: 77 IN | HEART RATE: 61 BPM | SYSTOLIC BLOOD PRESSURE: 101 MMHG | OXYGEN SATURATION: 99 % | TEMPERATURE: 96 F | DIASTOLIC BLOOD PRESSURE: 70 MMHG | WEIGHT: 156 LBS | BODY MASS INDEX: 18.42 KG/M2

## 2021-03-29 DIAGNOSIS — Z86.59 PERSONAL HISTORY OF OTHER MENTAL AND BEHAVIORAL DISORDERS: ICD-10-CM

## 2021-03-29 DIAGNOSIS — Z86.39 PERSONAL HISTORY OF OTHER ENDOCRINE, NUTRITIONAL AND METABOLIC DISEASE: ICD-10-CM

## 2021-03-29 DIAGNOSIS — Z87.19 PERSONAL HISTORY OF OTHER DISEASES OF THE DIGESTIVE SYSTEM: ICD-10-CM

## 2021-03-29 DIAGNOSIS — Z78.9 OTHER SPECIFIED HEALTH STATUS: ICD-10-CM

## 2021-03-29 DIAGNOSIS — E56.9 VITAMIN DEFICIENCY, UNSPECIFIED: ICD-10-CM

## 2021-03-29 DIAGNOSIS — K31.6 FISTULA OF STOMACH AND DUODENUM: ICD-10-CM

## 2021-03-29 DIAGNOSIS — Z87.2 PERSONAL HISTORY OF DISEASES OF THE SKIN AND SUBCUTANEOUS TISSUE: ICD-10-CM

## 2021-03-29 DIAGNOSIS — F02.80 ALZHEIMER'S DISEASE, UNSPECIFIED: ICD-10-CM

## 2021-03-29 DIAGNOSIS — E78.5 HYPERLIPIDEMIA, UNSPECIFIED: ICD-10-CM

## 2021-03-29 DIAGNOSIS — G30.9 ALZHEIMER'S DISEASE, UNSPECIFIED: ICD-10-CM

## 2021-03-29 DIAGNOSIS — Z86.73 PERSONAL HISTORY OF TRANSIENT ISCHEMIC ATTACK (TIA), AND CEREBRAL INFARCTION W/OUT RESIDUAL DEFICITS: ICD-10-CM

## 2021-03-29 DIAGNOSIS — Z87.440 PERSONAL HISTORY OF URINARY (TRACT) INFECTIONS: ICD-10-CM

## 2021-03-29 DIAGNOSIS — F32.9 MAJOR DEPRESSIVE DISORDER, SINGLE EPISODE, UNSPECIFIED: ICD-10-CM

## 2021-03-29 PROCEDURE — 99213 OFFICE O/P EST LOW 20 MIN: CPT

## 2021-04-01 PROBLEM — E56.9 VITAMIN DEFICIENCY: Status: ACTIVE | Noted: 2021-03-29

## 2021-04-01 PROBLEM — Z87.19 HISTORY OF GASTROESOPHAGEAL REFLUX (GERD): Status: RESOLVED | Noted: 2021-03-29 | Resolved: 2021-04-01

## 2021-04-01 PROBLEM — Z86.73 HISTORY OF CEREBRAL INFARCTION: Status: RESOLVED | Noted: 2021-03-29 | Resolved: 2021-04-01

## 2021-04-01 PROBLEM — Z86.59 HISTORY OF BIPOLAR DISORDER: Status: RESOLVED | Noted: 2021-03-29 | Resolved: 2021-04-01

## 2021-04-01 PROBLEM — E78.5 HYPERLIPIDEMIA: Status: ACTIVE | Noted: 2021-03-29

## 2021-04-01 PROBLEM — F32.9 DEPRESSIVE DISORDER: Status: RESOLVED | Noted: 2021-03-29 | Resolved: 2021-04-01

## 2021-04-01 PROBLEM — Z86.39 HISTORY OF VITAMIN D DEFICIENCY: Status: RESOLVED | Noted: 2021-03-29 | Resolved: 2021-04-01

## 2021-04-01 PROBLEM — G30.9 ALZHEIMER DISEASE: Status: RESOLVED | Noted: 2021-03-29 | Resolved: 2021-04-01

## 2021-04-01 PROBLEM — Z86.59 HISTORY OF SCHIZOPHRENIA: Status: RESOLVED | Noted: 2021-03-29 | Resolved: 2021-04-01

## 2021-04-01 PROBLEM — Z86.39 HISTORY OF HYPERLIPIDEMIA: Status: RESOLVED | Noted: 2021-03-29 | Resolved: 2021-04-01

## 2021-04-01 PROBLEM — Z87.2 HISTORY OF PRESSURE INJURY OF SKIN: Status: RESOLVED | Noted: 2021-03-29 | Resolved: 2021-04-01

## 2021-04-01 PROBLEM — Z87.440 HISTORY OF URINARY TRACT INFECTION: Status: RESOLVED | Noted: 2021-03-29 | Resolved: 2021-04-01

## 2021-04-01 PROBLEM — Z87.19 HISTORY OF CHRONIC CONSTIPATION: Status: RESOLVED | Noted: 2021-03-29 | Resolved: 2021-04-01

## 2021-04-01 RX ORDER — HALOPERIDOL LACTATE 5 MG/ML
5 INJECTION, SOLUTION INTRAMUSCULAR
Qty: 1 | Refills: 0 | Status: ACTIVE | COMMUNITY
Start: 2020-12-17

## 2021-04-01 RX ORDER — ESCITALOPRAM OXALATE 5 MG/1
5 TABLET ORAL
Qty: 30 | Refills: 0 | Status: ACTIVE | COMMUNITY
Start: 2020-11-25

## 2021-04-01 RX ORDER — VALPROIC ACID 250 MG/5ML
250 SOLUTION ORAL
Qty: 473 | Refills: 0 | Status: ACTIVE | COMMUNITY
Start: 2021-03-08

## 2021-04-01 RX ORDER — OMEPRAZOLE 40 MG/1
40 CAPSULE, DELAYED RELEASE ORAL
Qty: 30 | Refills: 0 | Status: ACTIVE | COMMUNITY
Start: 2021-03-03

## 2021-04-01 RX ORDER — QUETIAPINE FUMARATE 25 MG/1
25 TABLET ORAL
Qty: 30 | Refills: 0 | Status: ACTIVE | COMMUNITY
Start: 2021-03-19

## 2021-04-01 RX ORDER — ASPIRIN 81 MG
81 TABLET, DELAYED RELEASE (ENTERIC COATED) ORAL
Refills: 0 | Status: ACTIVE | COMMUNITY

## 2021-04-01 RX ORDER — CYANOCOBALAMIN (VITAMIN B-12) 1000 MCG
TABLET ORAL
Refills: 0 | Status: ACTIVE | COMMUNITY

## 2021-04-01 RX ORDER — SENNOSIDES 8.6 MG TABLETS 8.6 MG/1
TABLET ORAL
Refills: 0 | Status: ACTIVE | COMMUNITY

## 2021-04-01 RX ORDER — MIRTAZAPINE 15 MG/1
15 TABLET, FILM COATED ORAL
Qty: 30 | Refills: 0 | Status: ACTIVE | COMMUNITY
Start: 2021-03-14

## 2021-04-01 RX ORDER — ATORVASTATIN CALCIUM 80 MG/1
80 TABLET, FILM COATED ORAL
Qty: 30 | Refills: 0 | Status: ACTIVE | COMMUNITY
Start: 2021-02-28

## 2021-04-01 RX ORDER — RISPERIDONE 0.5 MG/1
0.5 TABLET, FILM COATED ORAL
Qty: 30 | Refills: 0 | Status: ACTIVE | COMMUNITY
Start: 2020-11-29

## 2021-04-06 PROBLEM — Z78.9 NON-SMOKER: Status: ACTIVE | Noted: 2021-04-06

## 2021-04-06 PROBLEM — K31.6 GASTROCUTANEOUS FISTULA: Status: ACTIVE | Noted: 2021-04-06

## 2021-04-06 NOTE — PLAN
[FreeTextEntry1] : I will discuss with Dr Willingham surgical plan. 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 \par  \par \par

## 2021-04-06 NOTE — HISTORY OF PRESENT ILLNESS
[de-identified] : This is a 60 year  old patient who was referred by Dr. Willingham  with the chief complaint of having  open wound on left upper abdomen for several months.  Patient has a gastrostomy tube that is continuously draining.  Patient is a Polish speaking male with history of Alzheimers, schizophrenia and CVA.    Lives in the facility.    I have tried to contact patient's son, nephew and the brother to obtain more  information but was unable to do it because  no one answered the phone.   \par

## 2021-04-06 NOTE — PHYSICAL EXAM
[Abdominal Masses] : No abdominal masses [Abdomen Tenderness] : ~T ~M No abdominal tenderness [Alert] : alert [Oriented to Person] : oriented to person [Oriented to Place] : disoriented to place [Oriented to Time] : disoriented to time [Calm] : calm [de-identified] : He  is alert,  and in NAD [de-identified] : left side upper abdomen  open wound , draining seropurulent drainage

## 2021-04-06 NOTE — CONSULT LETTER
[Dear  ___] : Dear  [unfilled], [Consult Letter:] : I had the pleasure of evaluating your patient, [unfilled]. [Please see my note below.] : Please see my note below. [Consult Closing:] : Thank you very much for allowing me to participate in the care of this patient.  If you have any questions, please do not hesitate to contact me. [Sincerely,] : Sincerely, [FreeTextEntry3] : Corey Mcqueen MD, FACS

## 2021-04-09 NOTE — PHYSICAL THERAPY INITIAL EVALUATION ADULT - PATIENT/FAMILY AGREES WITH PLAN
Diagnostic wire removed. Guidewire tip is intact. pt is not verbal at present; acute rehab recommended. PT will attempt to discuss with spouse

## 2022-03-01 NOTE — DISCHARGE NOTE PROVIDER - NSDCQMSTATINA_GEN_A_CORE
Pediatric Well Child Exam: 5-9 Years of age      Subjective:      History was provided by the mother.    Satish Renee is a 6 year old male who is brought in for his well-child visit.    Patient's medications, allergies, past medical, surgical, social and family histories were reviewed and updated as appropriate.  Past Medical History:   Diagnosis Date   • Esophageal reflux     W ER w/ALTE=>[facility]   • Separation anxiety 10/31/2019   • Traumatic rupture of tympanic membrane, left, initial encounter 2021     Past Surgical History:   Procedure Laterality Date   • Circumcision clamp w reg block penile ring  2015    Circumcision, with clamp,      Family History   Problem Relation Age of Onset   • NEGATIVE FAMILY HX OF Mother    • NEGATIVE FAMILY HX OF Father    • Cancer Maternal Grandmother 60        ovarian        parents are vaccinated for Covid 19. Covid vaccine discussed. Covid vaccine deferred for now.    Current Issues:  Current concerns include none.  Concerns regarding hearing? no  Concerns regarding sleep? has regular bedtime routine most nights, no concerns, good sleeper  Does patient snore? No  Anticipatory Guidance provided:   School age kids need an average of 9 to 11 hours of sleep    Review of Nutrition:  Appetite: picky eater but with a reasonable variety overall, appropriate milk intake and drinks a lot of water  Food:   · Eats fruits/vegetables: [x]  YES     []  NO   · Eats meat: [x]  YES     []  NO   Problems with BOWEL MOVEMENTs: []  YES     [x]  NO   Brushin-2 times a day  Dentist: Yes- last visit less than one year ago  Anticipatory Guidance provided:   Limit sweets and bagged snacks  Recc plenty of water    Brush BID with small amt fluoridated toothpaste; delonte before hs    Social Screening:  Attends school: in First Grade at [employer]  Regular classes - had some behavioral issues initially, was seen at [facility] for psych. Now on guanfacine and doing  better.  Secondhand smoke exposure? no  Playtime (60 min/day): [x]  YES     []  NO   Electronic Screen time 2 hours/day:   Anticipatory Guidance provided:  Referred parent to additional info in AVS    Development:  Any parent concerns re: development? No concerns  [x]  YES    []  NO      []  UNKNOWN    Has success in school?  [x]  YES    []  NO      []  UNKNOWN    Has friends/does well socially?  [x]  YES    []  NO      []  UNKNOWN    Participates in after school/outside activities?  [x]  YES    []  NO      []  UNKNOWN    Gets along with family?  [x]  YES    []  NO      []  UNKNOWN    Does chores when asked?  [x]  YES    []  NO      []  UNKNOWN    Given chances to make own decisions?  [x]  YES    []  NO      []  UNKNOWN    Feels good about self/generally happy?  Reviewed development as above, encouraged reading / books, limit or avoid TV: yes    Screening Questions:  No screening recommended  Vision screening: Normal  Immunization status: Up to date per review of WIR data base except for flu  Eligible for flu vaccine- accepted  History of immunization reactions: None    Review of Systems:  All systems reviewed and negative except as documented in \"Concerns raised\".    Objective:     Visit Vitals  BP 98/66   Pulse 76   Ht 4' 0.75\" (1.238 m)   Wt 26.6 kg (58 lb 9.6 oz)   SpO2 99%   BMI 17.34 kg/m²      GENERAL: Well appearing male child.  Alert and active.  SKIN:  Warm, normal turgor.  No cyanosis.  No rash.  HEAD:  Normocephalic, atraumatic.    EYES:  Conjunctivae appear normal, noninjected, nonicteric.  NOSE:  Appears normal without drainage.  EARS:  Normal external auditory canals. Tympanic membranes are transparent with normal landmarks.  THROAT:  Oropharynx with moist mucous membranes without lesions.  NECK:  Supple, no lymphadenopathy or masses.  HEART:  Regular rate and rhythm.  Normal S1, S2.  No murmurs, rubs, gallops.   LUNGS:  Clear to auscultation.  No wheezes, rales, rhonchi.  Normal work effort with  breathing.  ABDOMEN:  Bowel sounds present. Soft, nontender. No hepatomegaly, splenomegaly or masses.  EXTREMITIES:  Symmetrical muscle mass, strength all extremities.  No effusions.   BACK: Spine straight. No costovertebral angle tenderness.      NEUROLOGIC:  Oriented x4. No gross lateralizing signs or focal deficits.  Normal gait.    Assessment & Plan:     Healthy 6 year old male child.    Encounter for routine child health examination without abnormal findings  - Discussed all of parent's questions and concerns.      Need for vaccination  - INFLUENZA QUADRIVALENT SPLIT PRES FREE 0.5 ML VACC, IM (FLULAVAL,FLUARIX,FLUZONE)          Return in about 1 year (around 3/1/2023) for Well Child Visit.        Cherise Clinton MD        Yes

## 2022-04-06 NOTE — ED PROVIDER NOTE - NS ED ROS FT
General: Denies dizziness, fatigue  Eyes: Denies blurry vision  Respiratory: Denies cough, SOB  Cardiovascular: Denies palpitations, CP  Gastrointestinal: Denies abd pain, N/V/D/C  : Denies dysuria, increased freq  Musculoskeletal: Denies edema, joint pain  Endocrine: Denies increased thirst, increased frequency  Neuro: +weakness
5

## 2022-07-03 ENCOUNTER — INPATIENT (INPATIENT)
Facility: HOSPITAL | Age: 62
LOS: 3 days | Discharge: NURSING, REHAB OR LONG-TERM CARE | DRG: 871 | End: 2022-07-07
Attending: INTERNAL MEDICINE | Admitting: INTERNAL MEDICINE
Payer: MEDICARE

## 2022-07-03 VITALS
HEIGHT: 78 IN | SYSTOLIC BLOOD PRESSURE: 101 MMHG | DIASTOLIC BLOOD PRESSURE: 67 MMHG | RESPIRATION RATE: 17 BRPM | OXYGEN SATURATION: 89 % | WEIGHT: 134.04 LBS | HEART RATE: 124 BPM

## 2022-07-03 DIAGNOSIS — J96.01 ACUTE RESPIRATORY FAILURE WITH HYPOXIA: ICD-10-CM

## 2022-07-03 DIAGNOSIS — R09.02 HYPOXEMIA: ICD-10-CM

## 2022-07-03 DIAGNOSIS — N39.0 URINARY TRACT INFECTION, SITE NOT SPECIFIED: ICD-10-CM

## 2022-07-03 DIAGNOSIS — U07.1 COVID-19: ICD-10-CM

## 2022-07-03 DIAGNOSIS — Z29.9 ENCOUNTER FOR PROPHYLACTIC MEASURES, UNSPECIFIED: ICD-10-CM

## 2022-07-03 DIAGNOSIS — Z93.1 GASTROSTOMY STATUS: Chronic | ICD-10-CM

## 2022-07-03 DIAGNOSIS — F20.9 SCHIZOPHRENIA, UNSPECIFIED: ICD-10-CM

## 2022-07-03 LAB
ALBUMIN SERPL ELPH-MCNC: 3.3 G/DL — LOW (ref 3.5–5)
ALP SERPL-CCNC: 62 U/L — SIGNIFICANT CHANGE UP (ref 40–120)
ALT FLD-CCNC: 102 U/L DA — HIGH (ref 10–60)
ANION GAP SERPL CALC-SCNC: 11 MMOL/L — SIGNIFICANT CHANGE UP (ref 5–17)
APPEARANCE UR: ABNORMAL
AST SERPL-CCNC: 197 U/L — HIGH (ref 10–40)
BACTERIA # UR AUTO: ABNORMAL /HPF
BASOPHILS # BLD AUTO: 0.01 K/UL — SIGNIFICANT CHANGE UP (ref 0–0.2)
BASOPHILS NFR BLD AUTO: 0.2 % — SIGNIFICANT CHANGE UP (ref 0–2)
BILIRUB SERPL-MCNC: 1.2 MG/DL — SIGNIFICANT CHANGE UP (ref 0.2–1.2)
BILIRUB UR-MCNC: NEGATIVE — SIGNIFICANT CHANGE UP
BUN SERPL-MCNC: 25 MG/DL — HIGH (ref 7–18)
CALCIUM SERPL-MCNC: 9.6 MG/DL — SIGNIFICANT CHANGE UP (ref 8.4–10.5)
CHLORIDE SERPL-SCNC: 103 MMOL/L — SIGNIFICANT CHANGE UP (ref 96–108)
CO2 SERPL-SCNC: 25 MMOL/L — SIGNIFICANT CHANGE UP (ref 22–31)
COD CRY URNS QL: ABNORMAL /HPF
COLOR SPEC: YELLOW — SIGNIFICANT CHANGE UP
COMMENT - URINE: SIGNIFICANT CHANGE UP
CREAT SERPL-MCNC: 1.2 MG/DL — SIGNIFICANT CHANGE UP (ref 0.5–1.3)
DIFF PNL FLD: ABNORMAL
EGFR: 68 ML/MIN/1.73M2 — SIGNIFICANT CHANGE UP
EOSINOPHIL # BLD AUTO: 0 K/UL — SIGNIFICANT CHANGE UP (ref 0–0.5)
EOSINOPHIL NFR BLD AUTO: 0 % — SIGNIFICANT CHANGE UP (ref 0–6)
EPI CELLS # UR: SIGNIFICANT CHANGE UP /HPF
FLUAV AG NPH QL: SIGNIFICANT CHANGE UP
FLUBV AG NPH QL: SIGNIFICANT CHANGE UP
GLUCOSE SERPL-MCNC: 201 MG/DL — HIGH (ref 70–99)
GLUCOSE UR QL: NEGATIVE — SIGNIFICANT CHANGE UP
HCT VFR BLD CALC: 42.3 % — SIGNIFICANT CHANGE UP (ref 39–50)
HGB BLD-MCNC: 14.3 G/DL — SIGNIFICANT CHANGE UP (ref 13–17)
HIV 1 & 2 AB SERPL IA.RAPID: SIGNIFICANT CHANGE UP
IMM GRANULOCYTES NFR BLD AUTO: 0.2 % — SIGNIFICANT CHANGE UP (ref 0–1.5)
KETONES UR-MCNC: NEGATIVE — SIGNIFICANT CHANGE UP
LACTATE SERPL-SCNC: 1.7 MMOL/L — SIGNIFICANT CHANGE UP (ref 0.7–2)
LEUKOCYTE ESTERASE UR-ACNC: ABNORMAL
LYMPHOCYTES # BLD AUTO: 0.44 K/UL — LOW (ref 1–3.3)
LYMPHOCYTES # BLD AUTO: 8.2 % — LOW (ref 13–44)
MCHC RBC-ENTMCNC: 30.1 PG — SIGNIFICANT CHANGE UP (ref 27–34)
MCHC RBC-ENTMCNC: 33.8 GM/DL — SIGNIFICANT CHANGE UP (ref 32–36)
MCV RBC AUTO: 89.1 FL — SIGNIFICANT CHANGE UP (ref 80–100)
MONOCYTES # BLD AUTO: 0.37 K/UL — SIGNIFICANT CHANGE UP (ref 0–0.9)
MONOCYTES NFR BLD AUTO: 6.9 % — SIGNIFICANT CHANGE UP (ref 2–14)
NEUTROPHILS # BLD AUTO: 4.51 K/UL — SIGNIFICANT CHANGE UP (ref 1.8–7.4)
NEUTROPHILS NFR BLD AUTO: 84.5 % — HIGH (ref 43–77)
NITRITE UR-MCNC: NEGATIVE — SIGNIFICANT CHANGE UP
NRBC # BLD: 0 /100 WBCS — SIGNIFICANT CHANGE UP (ref 0–0)
PH UR: 7 — SIGNIFICANT CHANGE UP (ref 5–8)
PLATELET # BLD AUTO: 227 K/UL — SIGNIFICANT CHANGE UP (ref 150–400)
POTASSIUM SERPL-MCNC: 3.7 MMOL/L — SIGNIFICANT CHANGE UP (ref 3.5–5.3)
POTASSIUM SERPL-SCNC: 3.7 MMOL/L — SIGNIFICANT CHANGE UP (ref 3.5–5.3)
PROT SERPL-MCNC: 7.6 G/DL — SIGNIFICANT CHANGE UP (ref 6–8.3)
PROT UR-MCNC: 100
RBC # BLD: 4.75 M/UL — SIGNIFICANT CHANGE UP (ref 4.2–5.8)
RBC # FLD: 12.6 % — SIGNIFICANT CHANGE UP (ref 10.3–14.5)
RBC CASTS # UR COMP ASSIST: ABNORMAL /HPF (ref 0–2)
SARS-COV-2 RNA SPEC QL NAA+PROBE: DETECTED
SODIUM SERPL-SCNC: 139 MMOL/L — SIGNIFICANT CHANGE UP (ref 135–145)
SP GR SPEC: 1.01 — SIGNIFICANT CHANGE UP (ref 1.01–1.02)
UROBILINOGEN FLD QL: NEGATIVE — SIGNIFICANT CHANGE UP
WBC # BLD: 5.34 K/UL — SIGNIFICANT CHANGE UP (ref 3.8–10.5)
WBC # FLD AUTO: 5.34 K/UL — SIGNIFICANT CHANGE UP (ref 3.8–10.5)
WBC UR QL: >50 /HPF (ref 0–5)

## 2022-07-03 PROCEDURE — 99285 EMERGENCY DEPT VISIT HI MDM: CPT | Mod: CS

## 2022-07-03 PROCEDURE — 71045 X-RAY EXAM CHEST 1 VIEW: CPT | Mod: 26

## 2022-07-03 RX ORDER — IPRATROPIUM/ALBUTEROL SULFATE 18-103MCG
3 AEROSOL WITH ADAPTER (GRAM) INHALATION ONCE
Refills: 0 | Status: COMPLETED | OUTPATIENT
Start: 2022-07-03 | End: 2022-07-03

## 2022-07-03 RX ORDER — HALOPERIDOL DECANOATE 100 MG/ML
5 INJECTION INTRAMUSCULAR ONCE
Refills: 0 | Status: COMPLETED | OUTPATIENT
Start: 2022-07-03 | End: 2022-07-03

## 2022-07-03 RX ORDER — OLANZAPINE 15 MG/1
10 TABLET, FILM COATED ORAL ONCE
Refills: 0 | Status: COMPLETED | OUTPATIENT
Start: 2022-07-03 | End: 2022-07-03

## 2022-07-03 RX ORDER — CEFTRIAXONE 500 MG/1
1000 INJECTION, POWDER, FOR SOLUTION INTRAMUSCULAR; INTRAVENOUS EVERY 24 HOURS
Refills: 0 | Status: COMPLETED | OUTPATIENT
Start: 2022-07-04 | End: 2022-07-06

## 2022-07-03 RX ORDER — ENOXAPARIN SODIUM 100 MG/ML
40 INJECTION SUBCUTANEOUS EVERY 24 HOURS
Refills: 0 | Status: DISCONTINUED | OUTPATIENT
Start: 2022-07-03 | End: 2022-07-07

## 2022-07-03 RX ORDER — OLANZAPINE 15 MG/1
5 TABLET, FILM COATED ORAL DAILY
Refills: 0 | Status: DISCONTINUED | OUTPATIENT
Start: 2022-07-03 | End: 2022-07-07

## 2022-07-03 RX ORDER — HALOPERIDOL DECANOATE 100 MG/ML
10 INJECTION INTRAMUSCULAR ONCE
Refills: 0 | Status: COMPLETED | OUTPATIENT
Start: 2022-07-03 | End: 2022-07-03

## 2022-07-03 RX ORDER — ATORVASTATIN CALCIUM 80 MG/1
40 TABLET, FILM COATED ORAL AT BEDTIME
Refills: 0 | Status: DISCONTINUED | OUTPATIENT
Start: 2022-07-03 | End: 2022-07-07

## 2022-07-03 RX ORDER — SODIUM CHLORIDE 9 MG/ML
2000 INJECTION INTRAMUSCULAR; INTRAVENOUS; SUBCUTANEOUS ONCE
Refills: 0 | Status: COMPLETED | OUTPATIENT
Start: 2022-07-03 | End: 2022-07-03

## 2022-07-03 RX ORDER — OLANZAPINE 15 MG/1
10 TABLET, FILM COATED ORAL AT BEDTIME
Refills: 0 | Status: DISCONTINUED | OUTPATIENT
Start: 2022-07-03 | End: 2022-07-07

## 2022-07-03 RX ORDER — CEFTRIAXONE 500 MG/1
1000 INJECTION, POWDER, FOR SOLUTION INTRAMUSCULAR; INTRAVENOUS ONCE
Refills: 0 | Status: COMPLETED | OUTPATIENT
Start: 2022-07-03 | End: 2022-07-03

## 2022-07-03 RX ORDER — SENNA PLUS 8.6 MG/1
5 TABLET ORAL AT BEDTIME
Refills: 0 | Status: DISCONTINUED | OUTPATIENT
Start: 2022-07-03 | End: 2022-07-07

## 2022-07-03 RX ORDER — HALOPERIDOL DECANOATE 100 MG/ML
2.5 INJECTION INTRAMUSCULAR ONCE
Refills: 0 | Status: DISCONTINUED | OUTPATIENT
Start: 2022-07-03 | End: 2022-07-03

## 2022-07-03 RX ADMIN — CEFTRIAXONE 100 MILLIGRAM(S): 500 INJECTION, POWDER, FOR SOLUTION INTRAMUSCULAR; INTRAVENOUS at 22:12

## 2022-07-03 RX ADMIN — OLANZAPINE 10 MILLIGRAM(S): 15 TABLET, FILM COATED ORAL at 20:28

## 2022-07-03 RX ADMIN — HALOPERIDOL DECANOATE 5 MILLIGRAM(S): 100 INJECTION INTRAMUSCULAR at 22:18

## 2022-07-03 RX ADMIN — Medication 3 MILLILITER(S): at 23:15

## 2022-07-03 RX ADMIN — Medication 60 MILLIGRAM(S): at 22:12

## 2022-07-03 RX ADMIN — HALOPERIDOL DECANOATE 5 MILLIGRAM(S): 100 INJECTION INTRAMUSCULAR at 18:49

## 2022-07-03 RX ADMIN — SODIUM CHLORIDE 2000 MILLILITER(S): 9 INJECTION INTRAMUSCULAR; INTRAVENOUS; SUBCUTANEOUS at 18:02

## 2022-07-03 RX ADMIN — HALOPERIDOL DECANOATE 5 MILLIGRAM(S): 100 INJECTION INTRAMUSCULAR at 22:19

## 2022-07-03 RX ADMIN — Medication 0.5 MILLIGRAM(S): at 21:07

## 2022-07-03 NOTE — ED ADULT NURSE NOTE - ED STAT RN HANDOFF DETAILS 3
Patient admitted to medicine on isolation assigned to [id number] . Report given to MARBIN Johnson. Patient to be transported to unit stable still sedated in no acute distress safety maintained

## 2022-07-03 NOTE — H&P ADULT - PROBLEM SELECTOR PLAN 4
hx of schizo takes zyprexia  agiated in ED: was given haldol, ativan and zyprexia by ED  continue with home medication   monitor QTC hx of schizo takes Zyprexa 5mg in AM, 10mg at bedtime, Ativan 0.5mg BID  agitated in ED: was given haldol, ativan and zyprexia by ED  continue with home medication   monitor QTC

## 2022-07-03 NOTE — H&P ADULT - NSICDXPASTMEDICALHX_GEN_ALL_CORE_FT
PAST MEDICAL HISTORY:  CVA (cerebral vascular accident)     CVA (cerebral vascular accident)     HLD (hyperlipidemia)

## 2022-07-03 NOTE — H&P ADULT - PROBLEM SELECTOR PLAN 3
UA + WBC  afebrile, no leukocytosis  Will start Rocephin UA + WBC  afebrile, no leukocytosis  Will start Rocephin  f/u urine cx

## 2022-07-03 NOTE — ED PROVIDER NOTE - PHYSICAL EXAMINATION
Duarte:   Vitals normal   moderate distress, aggitated, pulling at his line, screatching nurses  Awake nonverbal   Normocephalic, atraumatic, neck supple   lungs ronchorous   heart s1s rrr,  Abdomen soft, nontender, nondistended  No LE swelling    No rash  Neuro exam grossly intact, no weakness, numbness,

## 2022-07-03 NOTE — H&P ADULT - PROBLEM SELECTOR PLAN 5
IMPROVE VTE Individual Risk Assessment          RISK                                                          Points  [  ] Previous VTE                                                3  [  ] Thrombophilia                                             2  [  ] Lower limb paralysis                                   2        (unable to hold up >15 seconds)    [  ] Current Cancer                                             2         (within 6 months)  [ x ] Immobilization > 24 hrs                              1  [  ] ICU/CCU stay > 24 hours                             1  [ x ] Age > 60                                                         1    IMPROVE VTE Score:         [     2    ]    Will start Lovenox

## 2022-07-03 NOTE — H&P ADULT - HISTORY OF PRESENT ILLNESS
60 year old male from [facility] with PMH of CVA, Alzheimer's, nonverbal at baseline, GERD is here for SOB. Patient is unable to provide any information. Per chart review, pt was diagnosed with COVID on 7/1 and receives monoclonal antibody.  Pt became hypoxic at the nursing home and was brought into the ED.    In the ED:  Afebrile, , /67, 89% on RA  no leukocytosis  UA + for WBC  CXR shows bilateral infiltrates  s/p Rocephin

## 2022-07-03 NOTE — ED ADULT NURSE NOTE - OBJECTIVE STATEMENT
pt is here for shortness of breath.  Observed pt can't speak, aggregative and confuse at this time, unable to get information,

## 2022-07-03 NOTE — H&P ADULT - NSHPLABSRESULTS_GEN_ALL_CORE
LABS:                        14.3   5.34  )-----------( 227      ( 2022 18:11 )             42.3     -    139  |  103  |  25<H>  ----------------------------<  201<H>  3.7   |  25  |  1.20    Ca    9.6      2022 18:11    TPro  7.6  /  Alb  3.3<L>  /  TBili  1.2  /  DBili  x   /  AST  197<H>  /  ALT  102<H>  /  AlkPhos  62        Urinalysis Basic - ( 2022 18:09 )    Color: Yellow / Appearance: Slightly Turbid / S.010 / pH: x  Gluc: x / Ketone: Negative  / Bili: Negative / Urobili: Negative   Blood: x / Protein: 100 / Nitrite: Negative   Leuk Esterase: Moderate / RBC: 5-10 /HPF / WBC >50 /HPF   Sq Epi: x / Non Sq Epi: Few /HPF / Bacteria: TNTC /HPF      LIVER FUNCTIONS - ( 2022 18:11 )  Alb: 3.3 g/dL / Pro: 7.6 g/dL / ALK PHOS: 62 U/L / ALT: 102 U/L DA / AST: 197 U/L / GGT: x           Lactate, Blood: 1.7 mmol/L (22 @ 18:11)

## 2022-07-03 NOTE — H&P ADULT - ASSESSMENT
60 year old male from [facility] with PMH of CVA, Alzheimer's, nonverbal at baseline, GERD is here for SOB, admitted for AHRF 2/2 COVID.

## 2022-07-03 NOTE — H&P ADULT - PROBLEM SELECTOR PLAN 1
p/w SOB, found to be + for COVID on 7/1  s/p MAB at nursing home  Afebrile, no leukocytosis, 88% on RA  CXR shows b/l infiltrates; f/u official read   Will place on 2L NC  no remdesivir or steroids at this time as pt received MAB  supportive treatment p/w SOB, found to be + for COVID on 7/1  s/p MAB at nursing home  Afebrile, no leukocytosis, 88% on RA  CXR shows b/l infiltrates; f/u official read   Will place on 2L NC  no remdesivir or steroids at this time as pt received MAB  supportive treatment  f/u blood cx, urine cx, and procal

## 2022-07-03 NOTE — H&P ADULT - NSHPPHYSICALEXAM_GEN_ALL_CORE
PHYSICAL EXAMINATION:  GENERAL: NAD, opening eyes tracking   HEAD:  Atraumatic, Normocephalic  EYES:  conjunctiva and sclera clear  NECK: Supple, No JVD, Normal thyroid  CHEST/LUNG: Clear to auscultation. Clear to percussion bilaterally; No rales, rhonchi, wheezing, or rubs  HEART: Regular rate and rhythm; No murmurs, rubs, or gallops  ABDOMEN: Soft, Nontender, Nondistended; Bowel sounds present  NERVOUS SYSTEM:  nonverbal, moving all extremities   EXTREMITIES:  2+ Peripheral Pulses, No clubbing, cyanosis, or edema  SKIN: warm dry

## 2022-07-03 NOTE — ED PROVIDER NOTE - OBJECTIVE STATEMENT
63 yo male Chinese nonverbal Nursing home resident with COVID presents with hypoxia 86-89%  Patient is agitated and pulling off his nasal canulla.  He received MAB in the nursing home.

## 2022-07-03 NOTE — ED ADULT NURSE NOTE - NSIMPLEMENTINTERV_GEN_ALL_ED
Implemented All Fall Risk Interventions:  [place] to call system. Call bell, personal items and telephone within reach. Instruct patient to call for assistance. Room bathroom lighting operational. Non-slip footwear when patient is off stretcher. Physically safe environment: no spills, clutter or unnecessary equipment. Stretcher in lowest position, wheels locked, appropriate side rails in place. Provide visual cue, wrist band, yellow gown, etc. Monitor gait and stability. Monitor for mental status changes and reorient to person, place, and time. Review medications for side effects contributing to fall risk. Reinforce activity limits and safety measures with patient and family.

## 2022-07-04 PROBLEM — E78.5 HYPERLIPIDEMIA, UNSPECIFIED: Chronic | Status: ACTIVE | Noted: 2020-07-16

## 2022-07-04 PROBLEM — I63.9 CEREBRAL INFARCTION, UNSPECIFIED: Chronic | Status: ACTIVE | Noted: 2020-07-16

## 2022-07-04 LAB — PROCALCITONIN SERPL-MCNC: 0.64 NG/ML — HIGH (ref 0.02–0.1)

## 2022-07-04 RX ORDER — HALOPERIDOL DECANOATE 100 MG/ML
5 INJECTION INTRAMUSCULAR
Refills: 0 | Status: DISCONTINUED | OUTPATIENT
Start: 2022-07-04 | End: 2022-07-05

## 2022-07-04 RX ORDER — VALPROIC ACID (AS SODIUM SALT) 250 MG/5ML
250 SOLUTION, ORAL ORAL
Refills: 0 | Status: DISCONTINUED | OUTPATIENT
Start: 2022-07-04 | End: 2022-07-07

## 2022-07-04 RX ADMIN — Medication 250 MILLIGRAM(S): at 18:28

## 2022-07-04 RX ADMIN — Medication 0.5 MILLIGRAM(S): at 18:28

## 2022-07-04 RX ADMIN — ATORVASTATIN CALCIUM 40 MILLIGRAM(S): 80 TABLET, FILM COATED ORAL at 22:09

## 2022-07-04 RX ADMIN — Medication 60 MILLIGRAM(S): at 07:25

## 2022-07-04 RX ADMIN — SENNA PLUS 5 MILLILITER(S): 8.6 TABLET ORAL at 22:21

## 2022-07-04 RX ADMIN — CEFTRIAXONE 100 MILLIGRAM(S): 500 INJECTION, POWDER, FOR SOLUTION INTRAMUSCULAR; INTRAVENOUS at 18:29

## 2022-07-04 RX ADMIN — ENOXAPARIN SODIUM 40 MILLIGRAM(S): 100 INJECTION SUBCUTANEOUS at 18:29

## 2022-07-04 RX ADMIN — Medication 60 MILLIGRAM(S): at 13:12

## 2022-07-04 RX ADMIN — HALOPERIDOL DECANOATE 5 MILLIGRAM(S): 100 INJECTION INTRAMUSCULAR at 23:41

## 2022-07-04 RX ADMIN — OLANZAPINE 10 MILLIGRAM(S): 15 TABLET, FILM COATED ORAL at 22:09

## 2022-07-04 NOTE — PATIENT PROFILE ADULT - FALL HARM RISK - FACTORS NURSING JUDGEMENT
Subjective:   Patient ID:  Kelsie Bustamante is a 69 y.o. female who presents for follow up of No chief complaint on file.    3.14.2022   She comes in still having a palpitations.  She states that she is having them all day.  She did not get her Holter monitor she thought that will be an event monitor or a bar the and she does not want to have a sticker on her chest as she states she had bad reaction to it in the past.  Patient homeless after conversation to get her Holter monitor.  On exam she has frequent PACs she has a regular rhythm with PACs.  She is taking Toprol XL 50 mg in the morning and metoprolol tartrate 50 mg in the evening.  She is not taking Cardizem.  She denies any chest pains, dyspnea, lightheadedness, weakness,.  She is compliant with her medications.  She is taking her Eliquis no bleeding.  She had a Holter monitor in the past which showed rare PACs.  And 2017 12.16.2021   1 cup of coffee, no alcohol , no smoking   Palpitations, 2 mins twice a day , mostly during the day between 10-2 pm   No chest pain, no santos,   Never smoker   States get her palpitaitons before it is time to take the next dose of toprol xl   NO CHEST PAIN   Normal ETT 2 years ago   Seen Dr Aguilar and other providers see notes below     5.2021  67 yo female,discharge f/u.  PMH PAF HTN DM HLD  05/2021 admitted [facility] for afib with RVR. Converted to SR after IV cardizem,  troponin negative x2, Cr 0.8 and H&H 11/36.  Echo showed normal biv function.  F/u with Dr. Tim in 2018 for positional dizziness and subsequent tilt table test negative    Today states that   Occasional dizziness and faint at home  Some SOB while doing something  No chest pain   Some coughing and fatigue  No active bleeding         8/2019   Resting HR: 77 bpm    Target HR: 129 bpm   Resting BP: 136/70 mmHg     Max HR: 135 bpm % Target: 109%   Max Systolic: 212 mmHg   Max Diastolic: 72 mmHg   Exercise Duration: 04:51 min:sec    METS: 6.2 METS     The  test was terminated due to the patient requesting to stop;   experiencing fatigue and shortness of breath; .     The patient reported fatigue; shortness of breath; headache; no chest   pain; .     FINDINGS   Baseline ECG: Normal sinus rhythm     Stress ECG: Sinus tachycardia, no ischemic changes     Arrhythmias: No significant arrhythmias, 2 couplets were noted     CONCLUSION   1. Low risk treadmill stress electrocardiogram.   2. Normal blood pressure response.   3. Above Average exercise tolerance for age and gender.       [facility]  Specimen Collected: -- Last Resulted: 19 10:55 AM   Received From: [facility]  Result Received: 21  9:29 AM       Past Medical History:   Diagnosis Date    Achalasia     demetrius    Atrial fibrillation with RVR 2021    Cataract     Colon polyp     Gastroesophageal reflux     Hiatal hernia     Hx of colonic polyps 08/15/2014    per colonoscopy in      Hypertension     Peripheral neuropathy     Postmenopausal HRT (hormone replacement therapy)     failed tapering off    Sepsis 2021    Last Assessment & Plan:  Formatting of this note might be different from the original. History & Physical Patient meets sepsis criteria with a white cell count of 15, and tachypnea.  Source of infection not clear at this time.  No evidence for meningitis.  Cover with broad-spectrum antibiotics especially given invasive dental procedure done recently.  Check blood cultures and monitor for fever.  R    Sinusitis     Thyroid nodule 3/16 subcm; kathleen 2 yrs    Type 2 diabetes mellitus with neurological manifestations     Vasodepressor syncope 2018       Past Surgical History:   Procedure Laterality Date    cataract Left      SECTION      CHOLECYSTECTOMY      CYST REMOVAL Right 2021    [facility] [place]    HYSTERECTOMY      nonca     OOPHORECTOMY      TILT TABLE TEST N/A 10/25/2018    Procedure: TILT TABLE TEST;  Surgeon: Daryl Walker MD;  Location: [facility] CATH LAB;  Service: Cardiology;  Laterality: N/A;  VAS.DEP.SYN,HUT,FAS,3PREP    TONSILLECTOMY         Social History     Tobacco Use    Smoking status: Never Smoker    Smokeless tobacco: Never Used   Substance Use Topics    Alcohol use: No    Drug use: No       Family History   Problem Relation Age of Onset    Aneurysm Sister     Heart disease Mother     Diabetes Father     Coronary artery disease Father     Coronary artery disease Brother     Parkinsonism Brother            Review of Systems   Constitutional: Positive for malaise/fatigue. Negative for decreased appetite, diaphoresis, fever and night sweats.   HENT: Negative for nosebleeds.    Eyes: Negative for blurred vision and double vision.   Respiratory: Negative for cough and shortness of breath.    Cardiovascular: Positive for palpitations. Negative for chest pain, dyspnea on exertion, orthopnea, claudication, leg swelling, syncope, PND and near-syncope.   Gastrointestinal: Negative for abdominal pain, heartburn, nausea and vomiting.   Genitourinary: Negative for dysuria, frequency and hematuria.   Musculoskeletal: Negative for back pain, falls, joint pain, joint swelling and neck pain.   Skin: Negative for rash.   Neurological: Negative for dizziness, focal weakness, seizures, weakness, light-headedness, numbness, headaches and difficulty with concentration.   Endo/Heme/Allergies: Negative for cold intolerance, hives and HIV exposure. Does not bruise/bleed easily.   Psychiatric/Behavioral: Negative for depression, memory loss and substance abuse. The patient does not have insomnia.        Objective:   Physical Exam  HENT:      Head: Normocephalic.   Eyes:      Pupils: Pupils are equal, round, and reactive to light.   Neck:      Thyroid: No thyromegaly.      Vascular: Normal carotid pulses. No carotid bruit or JVD.    Cardiovascular:      Rate and Rhythm: Normal rate and regular rhythm.  No extrasystoles are present.     Chest Wall: PMI is not displaced.      Pulses: Normal pulses.      Heart sounds: Normal heart sounds. No murmur heard.    No gallop. No S3 sounds.   Pulmonary:      Effort: No respiratory distress.      Breath sounds: Normal breath sounds. No stridor.   Abdominal:      General: Bowel sounds are normal.      Palpations: Abdomen is soft.      Tenderness: There is no abdominal tenderness. There is no rebound.   Musculoskeletal:         General: Normal range of motion.   Skin:     Findings: No rash.   Neurological:      Mental Status: She is alert and oriented to person, place, and time.   Psychiatric:         Behavior: Behavior normal.         Lab Results   Component Value Date    CHOL 122 02/10/2021    CHOL 136 01/30/2020    CHOL 149 01/10/2019     Lab Results   Component Value Date    HDL 58 02/10/2021    HDL 53 01/30/2020    HDL 62 01/10/2019     Lab Results   Component Value Date    LDLCALC 37.0 (L) 02/10/2021    LDLCALC 48.8 (L) 01/30/2020    LDLCALC 57.6 (L) 01/10/2019     Lab Results   Component Value Date    TRIG 135 02/10/2021    TRIG 171 (H) 01/30/2020    TRIG 147 01/10/2019     Lab Results   Component Value Date    CHOLHDL 47.5 02/10/2021    CHOLHDL 39.0 01/30/2020    CHOLHDL 41.6 01/10/2019       Chemistry        Component Value Date/Time     08/16/2021 0716    K 4.5 08/16/2021 0716     08/16/2021 0716    CO2 29 08/16/2021 0716    BUN 12 08/16/2021 0716    CREATININE 1.0 08/16/2021 0716     (H) 08/16/2021 0716        Component Value Date/Time    CALCIUM 9.8 08/16/2021 0716    ALKPHOS 59 08/16/2021 0716    AST 17 08/16/2021 0716    ALT 15 08/16/2021 0716    BILITOT 0.2 08/16/2021 0716    ESTGFRAFRICA >60.0 08/16/2021 0716    EGFRNONAA 57.2 (A) 08/16/2021 0716          Lab Results   Component Value Date    HGBA1C 5.8 (H) 08/16/2021     Lab Results   Component Value Date    TSH 3.533  08/16/2021     No results found for: INR, PROTIME  Lab Results   Component Value Date    WBC 10.58 05/25/2021    HGB 11.6 (L) 05/25/2021    HCT 37.7 05/25/2021    MCV 91 05/25/2021     08/16/2021     BMP  Sodium   Date Value Ref Range Status   08/16/2021 139 136 - 145 mmol/L Final     Potassium   Date Value Ref Range Status   08/16/2021 4.5 3.5 - 5.1 mmol/L Final     Chloride   Date Value Ref Range Status   08/16/2021 103 95 - 110 mmol/L Final     CO2   Date Value Ref Range Status   08/16/2021 29 23 - 29 mmol/L Final     BUN   Date Value Ref Range Status   08/16/2021 12 8 - 23 mg/dL Final     Creatinine   Date Value Ref Range Status   08/16/2021 1.0 0.5 - 1.4 mg/dL Final     Calcium   Date Value Ref Range Status   08/16/2021 9.8 8.7 - 10.5 mg/dL Final     Anion Gap   Date Value Ref Range Status   08/16/2021 7 (L) 8 - 16 mmol/L Final     eGFR if    Date Value Ref Range Status   08/16/2021 >60.0 >60 mL/min/1.73 m^2 Final     eGFR if non    Date Value Ref Range Status   08/16/2021 57.2 (A) >60 mL/min/1.73 m^2 Final     Comment:     Calculation used to obtain the estimated glomerular filtration  rate (eGFR) is the CKD-EPI equation.        BNP  @LABRCNTIP(BNP,BNPTRIAGEBLO)@  @LABRCNTIP(troponini)@  CrCl cannot be calculated (Patient's most recent lab result is older than the maximum 7 days allowed.).  No results found in the last 24 hours.  No results found in the last 24 hours.  No results found in the last 24 hours.    Assessment:      1. PAC (premature atrial contraction)    2. PAF (paroxysmal atrial fibrillation)    3. Primary hypertension    4. Essential hypertension    5. Type 2 diabetes with neural complication    6. Peripheral polyneuropathy        Plan:   Will increase her metoprolol succinate from 50 to 100 mg.  Patient advised to stop tartrate in addition to her Toprol which she is doing it.  She had a normal ETT 2 years ago.  We will add flecainide for relief of  symptoms.  Get Holter monitor.  Continue eliquis 5 mg bid and Statin  Reviewed all tests and above medical conditions with patient in detail and formulated treatment plan.  Risk factor modification discussed.   Cardiac low salt diet discussed.  Maintaining healthy weight and weight loss goals were discussed in clinic.    rtc in 6 months          Yes

## 2022-07-04 NOTE — PROGRESS NOTE ADULT - SUBJECTIVE AND OBJECTIVE BOX
Patient is a 62y old  Male who presents with a chief complaint of AHRF 2/2 COVID (03 Jul 2022 22:20)    PATIENT IS SEEN AND EXAMINED IN MEDICAL FLOOR.    RODRIGUET [    ]    KRISTINA [   ]      GT [   ]    ALLERGIES:  No Known Allergies      Daily Height in cm: 167.64 (03 Jul 2022 17:32)    Daily     VITALS:    Vital Signs Last 24 Hrs  T(C): 36.4 (04 Jul 2022 13:18), Max: 36.9 (03 Jul 2022 23:22)  T(F): 97.6 (04 Jul 2022 13:18), Max: 98.4 (03 Jul 2022 23:22)  HR: 73 (04 Jul 2022 13:18) (68 - 124)  BP: 103/70 (04 Jul 2022 13:18) (101/67 - 131/67)  BP(mean): --  RR: 18 (04 Jul 2022 13:18) (17 - 20)  SpO2: 94% (04 Jul 2022 13:18) (89% - 96%)    LABS:    CBC Full  -  ( 03 Jul 2022 18:11 )  WBC Count : 5.34 K/uL  RBC Count : 4.75 M/uL  Hemoglobin : 14.3 g/dL  Hematocrit : 42.3 %  Platelet Count - Automated : 227 K/uL  Mean Cell Volume : 89.1 fl  Mean Cell Hemoglobin : 30.1 pg  Mean Cell Hemoglobin Concentration : 33.8 gm/dL  Auto Neutrophil # : 4.51 K/uL  Auto Lymphocyte # : 0.44 K/uL  Auto Monocyte # : 0.37 K/uL  Auto Eosinophil # : 0.00 K/uL  Auto Basophil # : 0.01 K/uL  Auto Neutrophil % : 84.5 %  Auto Lymphocyte % : 8.2 %  Auto Monocyte % : 6.9 %  Auto Eosinophil % : 0.0 %  Auto Basophil % : 0.2 %      07-03    139  |  103  |  25<H>  ----------------------------<  201<H>  3.7   |  25  |  1.20    Ca    9.6      03 Jul 2022 18:11    TPro  7.6  /  Alb  3.3<L>  /  TBili  1.2  /  DBili  x   /  AST  197<H>  /  ALT  102<H>  /  AlkPhos  62  07-03    CAPILLARY BLOOD GLUCOSE            LIVER FUNCTIONS - ( 03 Jul 2022 18:11 )  Alb: 3.3 g/dL / Pro: 7.6 g/dL / ALK PHOS: 62 U/L / ALT: 102 U/L DA / AST: 197 U/L / GGT: x           Creatinine Trend: 1.20<--  I&O's Summary              MEDICATIONS:    MEDICATIONS  (STANDING):  atorvastatin 40 milliGRAM(s) Oral at bedtime  cefTRIAXone   IVPB 1000 milliGRAM(s) IV Intermittent every 24 hours  enoxaparin Injectable 40 milliGRAM(s) SubCutaneous every 24 hours  LORazepam     Tablet 0.5 milliGRAM(s) Oral two times a day  methylPREDNISolone sodium succinate Injectable 60 milliGRAM(s) IV Push every 8 hours  OLANZapine 10 milliGRAM(s) Oral at bedtime  OLANZapine 5 milliGRAM(s) Oral daily  senna Syrup 5 milliLiter(s) Oral at bedtime  valproic  acid Syrup 250 milliGRAM(s) Oral two times a day      MEDICATIONS  (PRN):  haloperidol    Injectable 5 milliGRAM(s) IV Push two times a day PRN agitation        REVIEW OF SYSTEMS:                           ALL ROS DONE [ X   ]      CONSTITUTIONAL:  LETHARGIC [   ], FEVER [   ], UNRESPONSIVE [   ]  CVS:  CP  [   ], SOB, [   ], PALPITATIONS [   ], DIZZYNESS [   ]  RS: COUGH [   ], SPUTUM [   ]  GI: ABDOMINAL PAIN [   ], NAUSEA [   ], VOMITINGS [   ], DIARRHEA [   ], CONSTIPATION [   ]  :  DYSURIA [   ], NOCTURIA [   ], INCREASED FREQUENCY [   ], DRIBLING [   ],  SKELETAL: PAINFUL JOINTS [   ], SWOLLEN JOINTS [   ], NECK ACHE [   ], LOW BACK ACHE [   ],  SKIN : ULCERS [   ], RASH [   ], ITCHING [   ]  CNS: HEAD ACHE [   ], DOUBLE VISION [   ], BLURRED VISION [   ], AMS / CONFUSION [   ], SEIZURES [   ], WEAKNESS [   ],TINGLING / NUMBNESS [   ]      PHYSICAL EXAMINATION:    GENERAL APPEARANCE: NO DISTRESS  HEENT:  NO PALLOR, NO  JVD,  NO   NODES, NECK SUPPLE  CVS: S1 +, S2 +,   RS: AEEB,  OCCASIONAL  RALES +,   NO RONCHI  ABD: SOFT, NT, NO, BS + GT +  EXT: NO PE  SKIN: WARM,   SKELETAL:  ROM ACCEPTABLE  CNS:  AAO X 1, OLD   DEFICITS        RADIOLOGY :    < from: Xray Chest 1 View-PORTABLE IMMEDIATE (07.03.22 @ 20:40) >    FINDINGS:  Prior study dated 3/13/2020 was available for review.    The lungs demonstrate mild diffuse coarsened interstitial markings. No   focal consolidation is seen. The costophrenic angles are not imaged on   the film limiting evaluation. The heart and mediastinum appear intact.  A   loop recorder device is seen overlying the left midlung field.      IMPRESSION: No gross consolidation is seen.    < end of copied text >        ASSESSMENT :     Hypoxemia    CVA (cerebral vascular accident)    CVA (cerebral vascular accident)    HLD (hyperlipidemia)    S/P percutaneous endoscopic gastrostomy (PEG) tube placement        PLAN:  HPI:  60 year old male from [facility] with PMH of CVA, Alzheimer's, nonverbal at baseline, GERD is here for SOB. Patient is unable to provide any information. Per chart review, pt was diagnosed with COVID on 7/1 and receives monoclonal antibody.  Pt became hypoxic at the nursing home and was brought into the ED.    In the ED:  Afebrile, , /67, 89% on RA  no leukocytosis  UA + for WBC  CXR shows bilateral infiltrates  s/p Rocephin   (03 Jul 2022 22:20)    # COVID 19 INFECTION, COVID PNEUMONIA - HYPOXIC RESPIRATORY FAILURE - RESOLVING - S/P BEBTELOVIMAB ON 07/01/22  # SEPSIS DUE TO UTI ON IV. ROCEPHIN - F/UP URINE CXS    # GI AND DVT PROPHYLAXIS    DR.B. DAVIDSON

## 2022-07-04 NOTE — PATIENT PROFILE ADULT - FALL HARM RISK - HARM RISK INTERVENTIONS

## 2022-07-04 NOTE — ED ADULT NURSE REASSESSMENT NOTE - NS ED NURSE REASSESS COMMENT FT1
Pt is asleep, arousable to tactile stimulation. No sign of respiratory distress noted. Stage 3 pressure ulcer noted to coccyx. Safety precautions maintained.
Pt is awake and combative. Unable to obtain IV. MD made aware. Pt is medicate per order.
Pt is awake. Pt remains combative. Pt  keep removing his oxygen tubing. MD made aware. Pt is medicated per order. Will continue to monitor.
pt is very confused and aggressive behaviors.  pt took out his IV and unable to do EKG other procedures. Notified MD Ramachandran.
Patient received from night RN sleeping sedated due to agitation. Patient on isolation for positive covid no bed assigned as yet continue to monitor patient.

## 2022-07-04 NOTE — PATIENT PROFILE ADULT - FUNCTIONAL SCREEN CURRENT LEVEL: SWALLOWING (IF SCORE 2 OR MORE FOR ANY ITEM, CONSULT REHAB SERVICES), MLM)
I personally performed the service described in the documentation recorded by the scribe in my presence, and it accurately and completely records my words and actions.
2 = difficulty swallowing liquids

## 2022-07-05 LAB
ALBUMIN SERPL ELPH-MCNC: 2.7 G/DL — LOW (ref 3.5–5)
ALP SERPL-CCNC: 64 U/L — SIGNIFICANT CHANGE UP (ref 40–120)
ALT FLD-CCNC: 109 U/L DA — HIGH (ref 10–60)
ANION GAP SERPL CALC-SCNC: 9 MMOL/L — SIGNIFICANT CHANGE UP (ref 5–17)
AST SERPL-CCNC: 113 U/L — HIGH (ref 10–40)
BILIRUB SERPL-MCNC: 0.5 MG/DL — SIGNIFICANT CHANGE UP (ref 0.2–1.2)
BUN SERPL-MCNC: 30 MG/DL — HIGH (ref 7–18)
CALCIUM SERPL-MCNC: 9.5 MG/DL — SIGNIFICANT CHANGE UP (ref 8.4–10.5)
CHLORIDE SERPL-SCNC: 109 MMOL/L — HIGH (ref 96–108)
CO2 SERPL-SCNC: 26 MMOL/L — SIGNIFICANT CHANGE UP (ref 22–31)
CREAT SERPL-MCNC: 0.96 MG/DL — SIGNIFICANT CHANGE UP (ref 0.5–1.3)
CULTURE RESULTS: SIGNIFICANT CHANGE UP
D DIMER BLD IA.RAPID-MCNC: 380 NG/ML DDU — HIGH
EGFR: 89 ML/MIN/1.73M2 — SIGNIFICANT CHANGE UP
GLUCOSE SERPL-MCNC: 179 MG/DL — HIGH (ref 70–99)
HCT VFR BLD CALC: 39.7 % — SIGNIFICANT CHANGE UP (ref 39–50)
HGB BLD-MCNC: 13.2 G/DL — SIGNIFICANT CHANGE UP (ref 13–17)
MCHC RBC-ENTMCNC: 29.3 PG — SIGNIFICANT CHANGE UP (ref 27–34)
MCHC RBC-ENTMCNC: 33.2 GM/DL — SIGNIFICANT CHANGE UP (ref 32–36)
MCV RBC AUTO: 88.2 FL — SIGNIFICANT CHANGE UP (ref 80–100)
NRBC # BLD: 0 /100 WBCS — SIGNIFICANT CHANGE UP (ref 0–0)
PLATELET # BLD AUTO: 261 K/UL — SIGNIFICANT CHANGE UP (ref 150–400)
POTASSIUM SERPL-MCNC: 3.7 MMOL/L — SIGNIFICANT CHANGE UP (ref 3.5–5.3)
POTASSIUM SERPL-SCNC: 3.7 MMOL/L — SIGNIFICANT CHANGE UP (ref 3.5–5.3)
PROT SERPL-MCNC: 7.2 G/DL — SIGNIFICANT CHANGE UP (ref 6–8.3)
RBC # BLD: 4.5 M/UL — SIGNIFICANT CHANGE UP (ref 4.2–5.8)
RBC # FLD: 12.4 % — SIGNIFICANT CHANGE UP (ref 10.3–14.5)
SODIUM SERPL-SCNC: 144 MMOL/L — SIGNIFICANT CHANGE UP (ref 135–145)
SPECIMEN SOURCE: SIGNIFICANT CHANGE UP
WBC # BLD: 11.84 K/UL — HIGH (ref 3.8–10.5)
WBC # FLD AUTO: 11.84 K/UL — HIGH (ref 3.8–10.5)

## 2022-07-05 PROCEDURE — 71045 X-RAY EXAM CHEST 1 VIEW: CPT | Mod: 26

## 2022-07-05 RX ORDER — DEXAMETHASONE 0.5 MG/5ML
6 ELIXIR ORAL DAILY
Refills: 0 | Status: DISCONTINUED | OUTPATIENT
Start: 2022-07-05 | End: 2022-07-07

## 2022-07-05 RX ORDER — OLANZAPINE 15 MG/1
2.5 TABLET, FILM COATED ORAL ONCE
Refills: 0 | Status: COMPLETED | OUTPATIENT
Start: 2022-07-05 | End: 2022-07-05

## 2022-07-05 RX ORDER — POLYETHYLENE GLYCOL 3350 17 G/17G
17 POWDER, FOR SOLUTION ORAL DAILY
Refills: 0 | Status: DISCONTINUED | OUTPATIENT
Start: 2022-07-05 | End: 2022-07-07

## 2022-07-05 RX ORDER — ACETAMINOPHEN 500 MG
650 TABLET ORAL EVERY 6 HOURS
Refills: 0 | Status: DISCONTINUED | OUTPATIENT
Start: 2022-07-05 | End: 2022-07-07

## 2022-07-05 RX ADMIN — OLANZAPINE 5 MILLIGRAM(S): 15 TABLET, FILM COATED ORAL at 11:33

## 2022-07-05 RX ADMIN — OLANZAPINE 2.5 MILLIGRAM(S): 15 TABLET, FILM COATED ORAL at 00:56

## 2022-07-05 RX ADMIN — CEFTRIAXONE 100 MILLIGRAM(S): 500 INJECTION, POWDER, FOR SOLUTION INTRAMUSCULAR; INTRAVENOUS at 19:13

## 2022-07-05 RX ADMIN — Medication 250 MILLIGRAM(S): at 18:13

## 2022-07-05 RX ADMIN — OLANZAPINE 10 MILLIGRAM(S): 15 TABLET, FILM COATED ORAL at 22:03

## 2022-07-05 RX ADMIN — Medication 0.5 MILLIGRAM(S): at 14:51

## 2022-07-05 RX ADMIN — ENOXAPARIN SODIUM 40 MILLIGRAM(S): 100 INJECTION SUBCUTANEOUS at 18:13

## 2022-07-05 RX ADMIN — HALOPERIDOL DECANOATE 5 MILLIGRAM(S): 100 INJECTION INTRAMUSCULAR at 11:02

## 2022-07-05 RX ADMIN — SENNA PLUS 5 MILLILITER(S): 8.6 TABLET ORAL at 22:03

## 2022-07-05 RX ADMIN — ATORVASTATIN CALCIUM 40 MILLIGRAM(S): 80 TABLET, FILM COATED ORAL at 22:08

## 2022-07-05 RX ADMIN — Medication 0.5 MILLIGRAM(S): at 05:48

## 2022-07-05 RX ADMIN — Medication 250 MILLIGRAM(S): at 05:45

## 2022-07-05 NOTE — ADVANCED PRACTICE NURSE CONSULT - ASSESSMENT
This is a 62yr old male patient admitted for Hypoxemia, presenting with a Stage 2 Pressure Injury to the Coccyx with red tissue and scant drainage

## 2022-07-05 NOTE — ADVANCED PRACTICE NURSE CONSULT - RECOMMEDATIONS
-Clean all wounds with normal saline and apply skin prep to the surrounding skin  -Apply a Foam dressing to the Coccyx wound Q 72hrs PRN  -Frequent toileting  -Elevate/float the patients heels using heel protectors and repositio the patient Q 2hrs using wedges or pillows

## 2022-07-05 NOTE — PROGRESS NOTE ADULT - ASSESSMENT
60 year old male from [facility] with PMH of CVA, Alzheimer's, nonverbal at baseline, GERD is here for SOB. Patient is unable to provide any information. Per chart review, pt was diagnosed with COVID on 7/1 and receives monoclonal antibody.  Pt became hypoxic at the nursing home and was brought into the ED.

## 2022-07-05 NOTE — PROGRESS NOTE ADULT - SUBJECTIVE AND OBJECTIVE BOX
Patient is a 62y old  Male who presents with a chief complaint of AHRF 2/2 COVID (04 Jul 2022 16:00)    PATIENT IS SEEN AND EXAMINED IN MEDICAL FLOOR.  RODRIGUET [    ]    KRISTINA [   ]      GT [   ]    ALLERGIES:  No Known Allergies      Daily     Daily     VITALS:    Vital Signs Last 24 Hrs  T(C): 36.6 (05 Jul 2022 09:27), Max: 36.6 (04 Jul 2022 20:53)  T(F): 97.8 (05 Jul 2022 09:27), Max: 97.8 (04 Jul 2022 20:53)  HR: 79 (05 Jul 2022 05:36) (67 - 79)  BP: 100/66 (05 Jul 2022 05:36) (85/61 - 121/71)  BP(mean): --  RR: 17 (05 Jul 2022 05:36) (16 - 18)  SpO2: 92% (05 Jul 2022 09:27) (92% - 94%)    LABS:    CBC Full  -  ( 05 Jul 2022 08:34 )  WBC Count : 11.84 K/uL  RBC Count : 4.50 M/uL  Hemoglobin : 13.2 g/dL  Hematocrit : 39.7 %  Platelet Count - Automated : 261 K/uL  Mean Cell Volume : 88.2 fl  Mean Cell Hemoglobin : 29.3 pg  Mean Cell Hemoglobin Concentration : 33.2 gm/dL  Auto Neutrophil # : x  Auto Lymphocyte # : x  Auto Monocyte # : x  Auto Eosinophil # : x  Auto Basophil # : x  Auto Neutrophil % : x  Auto Lymphocyte % : x  Auto Monocyte % : x  Auto Eosinophil % : x  Auto Basophil % : x      07-05    144  |  109<H>  |  30<H>  ----------------------------<  179<H>  3.7   |  26  |  0.96    Ca    9.5      05 Jul 2022 08:34    TPro  7.2  /  Alb  2.7<L>  /  TBili  0.5  /  DBili  x   /  AST  113<H>  /  ALT  109<H>  /  AlkPhos  64  07-05    CAPILLARY BLOOD GLUCOSE            LIVER FUNCTIONS - ( 05 Jul 2022 08:34 )  Alb: 2.7 g/dL / Pro: 7.2 g/dL / ALK PHOS: 64 U/L / ALT: 109 U/L DA / AST: 113 U/L / GGT: x           Creatinine Trend: 0.96<--, 1.20<--  I&O's Summary    04 Jul 2022 07:01  -  05 Jul 2022 07:00  --------------------------------------------------------  IN: 0 mL / OUT: 1 mL / NET: -1 mL            Clean Catch Clean Catch (Midstream)  07-03 @ 23:20   >=3 organisms. Probable collection contamination.  --  --      .Blood Blood-Peripheral  07-03 @ 23:09   No growth to date.  --  --          MEDICATIONS:    MEDICATIONS  (STANDING):  atorvastatin 40 milliGRAM(s) Oral at bedtime  cefTRIAXone   IVPB 1000 milliGRAM(s) IV Intermittent every 24 hours  enoxaparin Injectable 40 milliGRAM(s) SubCutaneous every 24 hours  LORazepam     Tablet 0.5 milliGRAM(s) Oral two times a day  OLANZapine 10 milliGRAM(s) Oral at bedtime  OLANZapine 5 milliGRAM(s) Oral daily  senna Syrup 5 milliLiter(s) Oral at bedtime  valproic  acid Syrup 250 milliGRAM(s) Oral two times a day      MEDICATIONS  (PRN):  haloperidol    Injectable 5 milliGRAM(s) IV Push two times a day PRN agitation      REVIEW OF SYSTEMS:                           ALL ROS DONE [ X   ]    CONSTITUTIONAL:  LETHARGIC [   ], FEVER [   ], UNRESPONSIVE [   ]  CVS:  CP  [   ], SOB, [   ], PALPITATIONS [   ], DIZZYNESS [   ]  RS: COUGH [   ], SPUTUM [   ]  GI: ABDOMINAL PAIN [   ], NAUSEA [   ], VOMITINGS [   ], DIARRHEA [   ], CONSTIPATION [   ]  :  DYSURIA [   ], NOCTURIA [   ], INCREASED FREQUENCY [   ], DRIBLING [   ],  SKELETAL: PAINFUL JOINTS [   ], SWOLLEN JOINTS [   ], NECK ACHE [   ], LOW BACK ACHE [   ],  SKIN : ULCERS [   ], RASH [   ], ITCHING [   ]  CNS: HEAD ACHE [   ], DOUBLE VISION [   ], BLURRED VISION [   ], AMS / CONFUSION [   ], SEIZURES [   ], WEAKNESS [   ],TINGLING / NUMBNESS [   ]      PHYSICAL EXAMINATION:    GENERAL APPEARANCE: NO DISTRESS  HEENT:  NO PALLOR, NO  JVD,  NO   NODES, NECK SUPPLE  CVS: S1 +, S2 +,   RS: AEEB,  OCCASIONAL  RALES +,   NO RONCHI  ABD: SOFT, NT, NO, BS + GT +  EXT: NO PE  SKIN: WARM,   SKELETAL:  ROM ACCEPTABLE  CNS:  AAO X 1, OLD   DEFICITS        RADIOLOGY :    < from: Xray Chest 1 View-PORTABLE IMMEDIATE (07.03.22 @ 20:40) >    FINDINGS:  Prior study dated 3/13/2020 was available for review.    The lungs demonstrate mild diffuse coarsened interstitial markings. No   focal consolidation is seen. The costophrenic angles are not imaged on   the film limiting evaluation. The heart and mediastinum appear intact.  A   loop recorder device is seen overlying the left midlung field.      IMPRESSION: No gross consolidation is seen.    < end of copied text >        ASSESSMENT :     Hypoxemia    CVA (cerebral vascular accident)    CVA (cerebral vascular accident)    HLD (hyperlipidemia)    S/P percutaneous endoscopic gastrostomy (PEG) tube placement        PLAN:  HPI:  60 year old male from [facility] [place] with PMH of CVA, Alzheimer's, nonverbal at baseline, GERD is here for SOB. Patient is unable to provide any information. Per chart review, pt was diagnosed with COVID on 7/1 and receives monoclonal antibody.  Pt became hypoxic at the nursing home and was brought into the ED.    In the ED:  Afebrile, , /67, 89% on RA  no leukocytosis  UA + for WBC  CXR shows bilateral infiltrates  s/p Rocephin   (03 Jul 2022 22:20)    # COVID 19 INFECTION, COVID PNEUMONIA - HYPOXIC RESPIRATORY FAILURE - RESOLVING - S/P BEBTELOVIMAB ON 07/01/22  # SEPSIS DUE TO UTI ON IV. ROCEPHIN - F/UP URINE CXS    # GI AND DVT PROPHYLAXIS       Patient is a 62y old  Male who presents with a chief complaint of AHRF 2/2 COVID (04 Jul 2022 16:00)    PATIENT IS SEEN AND EXAMINED IN MEDICAL FLOOR.  RODRIGUET [    ]    KRISTINA [   ]      GT [   ]    ALLERGIES:  No Known Allergies      Daily     Daily     VITALS:    Vital Signs Last 24 Hrs  T(C): 36.6 (05 Jul 2022 09:27), Max: 36.6 (04 Jul 2022 20:53)  T(F): 97.8 (05 Jul 2022 09:27), Max: 97.8 (04 Jul 2022 20:53)  HR: 79 (05 Jul 2022 05:36) (67 - 79)  BP: 100/66 (05 Jul 2022 05:36) (85/61 - 121/71)  BP(mean): --  RR: 17 (05 Jul 2022 05:36) (16 - 18)  SpO2: 92% (05 Jul 2022 09:27) (92% - 94%)    LABS:    CBC Full  -  ( 05 Jul 2022 08:34 )  WBC Count : 11.84 K/uL  RBC Count : 4.50 M/uL  Hemoglobin : 13.2 g/dL  Hematocrit : 39.7 %  Platelet Count - Automated : 261 K/uL  Mean Cell Volume : 88.2 fl  Mean Cell Hemoglobin : 29.3 pg  Mean Cell Hemoglobin Concentration : 33.2 gm/dL  Auto Neutrophil # : x  Auto Lymphocyte # : x  Auto Monocyte # : x  Auto Eosinophil # : x  Auto Basophil # : x  Auto Neutrophil % : x  Auto Lymphocyte % : x  Auto Monocyte % : x  Auto Eosinophil % : x  Auto Basophil % : x      07-05    144  |  109<H>  |  30<H>  ----------------------------<  179<H>  3.7   |  26  |  0.96    Ca    9.5      05 Jul 2022 08:34    TPro  7.2  /  Alb  2.7<L>  /  TBili  0.5  /  DBili  x   /  AST  113<H>  /  ALT  109<H>  /  AlkPhos  64  07-05    CAPILLARY BLOOD GLUCOSE            LIVER FUNCTIONS - ( 05 Jul 2022 08:34 )  Alb: 2.7 g/dL / Pro: 7.2 g/dL / ALK PHOS: 64 U/L / ALT: 109 U/L DA / AST: 113 U/L / GGT: x           Creatinine Trend: 0.96<--, 1.20<--  I&O's Summary    04 Jul 2022 07:01  -  05 Jul 2022 07:00  --------------------------------------------------------  IN: 0 mL / OUT: 1 mL / NET: -1 mL            Clean Catch Clean Catch (Midstream)  07-03 @ 23:20   >=3 organisms. Probable collection contamination.  --  --      .Blood Blood-Peripheral  07-03 @ 23:09   No growth to date.  --  --          MEDICATIONS:    MEDICATIONS  (STANDING):  atorvastatin 40 milliGRAM(s) Oral at bedtime  cefTRIAXone   IVPB 1000 milliGRAM(s) IV Intermittent every 24 hours  enoxaparin Injectable 40 milliGRAM(s) SubCutaneous every 24 hours  LORazepam     Tablet 0.5 milliGRAM(s) Oral two times a day  OLANZapine 10 milliGRAM(s) Oral at bedtime  OLANZapine 5 milliGRAM(s) Oral daily  senna Syrup 5 milliLiter(s) Oral at bedtime  valproic  acid Syrup 250 milliGRAM(s) Oral two times a day      MEDICATIONS  (PRN):  haloperidol    Injectable 5 milliGRAM(s) IV Push two times a day PRN agitation      REVIEW OF SYSTEMS:                           ALL ROS DONE [ X   ]    CONSTITUTIONAL:  LETHARGIC [   ], FEVER [   ], UNRESPONSIVE [   ]  CVS:  CP  [   ], SOB, [   ], PALPITATIONS [   ], DIZZYNESS [   ]  RS: COUGH [   ], SPUTUM [   ]  GI: ABDOMINAL PAIN [   ], NAUSEA [   ], VOMITINGS [   ], DIARRHEA [   ], CONSTIPATION [   ]  :  DYSURIA [   ], NOCTURIA [   ], INCREASED FREQUENCY [   ], DRIBLING [   ],  SKELETAL: PAINFUL JOINTS [   ], SWOLLEN JOINTS [   ], NECK ACHE [   ], LOW BACK ACHE [   ],  SKIN : ULCERS [   ], RASH [   ], ITCHING [   ]  CNS: HEAD ACHE [   ], DOUBLE VISION [   ], BLURRED VISION [   ], AMS / CONFUSION [   ], SEIZURES [   ], WEAKNESS [   ],TINGLING / NUMBNESS [   ]      PHYSICAL EXAMINATION:    GENERAL APPEARANCE: NO DISTRESS  HEENT:  NO PALLOR, NO  JVD,  NO   NODES, NECK SUPPLE  CVS: S1 +, S2 +,   RS: AEEB,  OCCASIONAL  RALES +,   NO RONCHI  ABD: SOFT, NT, NO, BS + GT +  EXT: NO PE  SKIN: WARM,   SKELETAL:  ROM ACCEPTABLE  CNS:  AAO X 1, OLD   DEFICITS        RADIOLOGY :    < from: Xray Chest 1 View-PORTABLE IMMEDIATE (07.03.22 @ 20:40) >    FINDINGS:  Prior study dated 3/13/2020 was available for review.    The lungs demonstrate mild diffuse coarsened interstitial markings. No   focal consolidation is seen. The costophrenic angles are not imaged on   the film limiting evaluation. The heart and mediastinum appear intact.  A   loop recorder device is seen overlying the left midlung field.      IMPRESSION: No gross consolidation is seen.    < end of copied text >        ASSESSMENT :     Hypoxemia    CVA (cerebral vascular accident)    CVA (cerebral vascular accident)    HLD (hyperlipidemia)    S/P percutaneous endoscopic gastrostomy (PEG) tube placement        PLAN:  HPI:  60 year old male from [facility] with PMH of CVA, Alzheimer's, nonverbal at baseline, GERD is here for SOB. Patient is unable to provide any information. Per chart review, pt was diagnosed with COVID on 7/1 and receives monoclonal antibody.  Pt became hypoxic at the nursing home and was brought into the ED.    In the ED:  Afebrile, , /67, 89% on RA  no leukocytosis  UA + for WBC  CXR shows bilateral infiltrates  s/p Rocephin   (03 Jul 2022 22:20)    # COVID 19 INFECTION, COVID PNEUMONIA - HYPOXIC RESPIRATORY FAILURE - RESOLVING - S/P BEBTELOVIMAB ON 07/01/22  - F/U REPEAT CXR  - PULMONOLOGY CONSULT    # SEPSIS DUE TO UTI ON IV. ROCEPHIN - F/UP URINE CXS  # AGITATION, UNDERLYING ALZHEIMER'S - PLACED ON ZYPREXA QHS    # GI AND DVT PROPHYLAXIS

## 2022-07-05 NOTE — PROGRESS NOTE ADULT - SUBJECTIVE AND OBJECTIVE BOX
Patient is a 62y old  Male who presents with a chief complaint of AHRF 2/2 COVID (05 Jul 2022 09:31)      INTERVAL HPI/OVERNIGHT EVENTS: overnight agitated    I&O's Summary    04 Jul 2022 07:01  -  05 Jul 2022 07:00  --------------------------------------------------------  IN: 0 mL / OUT: 1 mL / NET: -1 mL      Vital Signs Last 24 Hrs  T(C): 36.6 (05 Jul 2022 13:43), Max: 36.6 (04 Jul 2022 20:53)  T(F): 97.9 (05 Jul 2022 13:43), Max: 97.9 (05 Jul 2022 13:43)  HR: 85 (05 Jul 2022 13:43) (67 - 85)  BP: 101/63 (05 Jul 2022 13:43) (85/61 - 101/63)  BP(mean): --  RR: 17 (05 Jul 2022 13:43) (16 - 17)  SpO2: 96% (05 Jul 2022 13:43) (92% - 97%)  PAST MEDICAL & SURGICAL HISTORY:  CVA (cerebral vascular accident)      CVA (cerebral vascular accident)      HLD (hyperlipidemia)      S/P percutaneous endoscopic gastrostomy (PEG) tube placement          SOCIAL HISTORY  Alcohol:  Tobacco:  Illicit substance use:      FAMILY HISTORY:      LABS:                        13.2   11.84 )-----------( 261      ( 05 Jul 2022 08:34 )             39.7     07-05    144  |  109<H>  |  30<H>  ----------------------------<  179<H>  3.7   |  26  |  0.96    Ca    9.5      05 Jul 2022 08:34    TPro  7.2  /  Alb  2.7<L>  /  TBili  0.5  /  DBili  x   /  AST  113<H>  /  ALT  109<H>  /  AlkPhos  64  07-05        CAPILLARY BLOOD GLUCOSE                MEDICATIONS  (STANDING):  atorvastatin 40 milliGRAM(s) Oral at bedtime  cefTRIAXone   IVPB 1000 milliGRAM(s) IV Intermittent every 24 hours  enoxaparin Injectable 40 milliGRAM(s) SubCutaneous every 24 hours  LORazepam     Tablet 0.5 milliGRAM(s) Oral two times a day  LORazepam     Tablet 1 milliGRAM(s) Oral once  OLANZapine 10 milliGRAM(s) Oral at bedtime  OLANZapine 5 milliGRAM(s) Oral daily  senna Syrup 5 milliLiter(s) Oral at bedtime  valproic  acid Syrup 250 milliGRAM(s) Oral two times a day    MEDICATIONS  (PRN):      REVIEW OF SYSTEMS:  unable to obtain review of systems  RADIOLOGY & ADDITIONAL TESTS:< from: Xray Chest 1 View-PORTABLE IMMEDIATE (07.03.22 @ 20:40) >    ACC: [id number] EXAM:  XR CHEST PORTABLE IMMED 1V                          PROCEDURE DATE:  07/03/2022          INTERPRETATION:  Chest radiograph (one view)     CPT 09019    CLINICAL INFORMATION:  Sepsis.  Short of breath.    TECHNIQUE:  Single frontal view of the chest was obtained.    FINDINGS:  Prior study dated 3/13/2020 was available for review.    The lungs demonstrate mild diffuse coarsened interstitial markings. No   focal consolidation is seen. The costophrenic angles are not imaged on   the film limiting evaluation. The heart and mediastinum appear intact.  A   loop recorder device is seen overlying the left midlung field.      IMPRESSION: No gross consolidation is seen.      < end of copied text >      Imaging Personally Reviewed:  [ x] YES  [ ] NO    Consultant(s) Notes Reviewed:  [ x] YES  [ ] NO    PHYSICAL EXAM:  GENERAL: NAD,  HEAD:  Atraumatic, Normocephalic  EYES:conjunctiva and sclera clear  ENMT:  Moist mucous membranes,  NECK: Supple, No JVD,  NERVOUS SYSTEM:  Alert & Oriented x 1  CHEST/LUNG: Clear to auscultation bilaterally; No rales, rhonchi, wheezing, or rubs  HEART: Regular rate and rhythm; No murmurs, rubs, or gallops  ABDOMEN: Soft, Nontender, Nondistended; Bowel sounds present  EXTREMITIES:  2+ Peripheral Pulses, No clubbing, cyanosis, or edema  SKIN: No rashes     Care Collaborated Discussed with Consultants/Other Providers [x ] YES  [ ] NO

## 2022-07-05 NOTE — CHART NOTE - NSCHARTNOTEFT_GEN_A_CORE
EVENT: Agitation    Chart reviewed.      HPI: 60 year old male from [facility] with PMH of CVA, Alzheimer's, nonverbal at baseline, GERD is here for SOB. Patient is unable to provide any information. Per chart review, pt was diagnosed with COVID on  and receives monoclonal antibody.  Pt became hypoxic at the nursing home and was brought into the ED.  In the ED:  Afebrile, , /67, 89% on RA  no leukocytosis  UA + for WBC  CXR shows bilateral infiltrates  s/p Rocephin   (2022 22:20)    ROS: pt climbing out of bed, agitated unable to follow redirection, pt confuse likely at baseline with alzheimers dx.    MEDICATIONS  (STANDING):  atorvastatin 40 milliGRAM(s) Oral at bedtime  cefTRIAXone   IVPB 1000 milliGRAM(s) IV Intermittent every 24 hours  enoxaparin Injectable 40 milliGRAM(s) SubCutaneous every 24 hours  LORazepam     Tablet 0.5 milliGRAM(s) Oral two times a day  OLANZapine 10 milliGRAM(s) Oral at bedtime  OLANZapine 5 milliGRAM(s) Oral daily  senna Syrup 5 milliLiter(s) Oral at bedtime  valproic  acid Syrup 250 milliGRAM(s) Oral two times a day    MEDICATIONS  (PRN):  haloperidol    Injectable 5 milliGRAM(s) IV Push two times a day PRN agitation      VITALS:  Vital Signs Last 24 Hrs  T(C): 36.6 (2022 20:53), Max: 36.8 (2022 06:30)  T(F): 97.8 (2022 20:53), Max: 98.2 (2022 06:30)  HR: 67 (2022 20:54) (67 - 90)  BP: 85/61 (2022 20:54) (85/61 - 121/77)  BP(mean): --  RR: 16 (2022 20:53) (16 - 20)  SpO2: 93% (2022 20:53) (93% - 95%)               LABS:                       14.3   5.34  )-----------( 227      ( 2022 18:11 )             42.3     07-03    139  |  103  |  25<H>  ----------------------------<  201<H>  3.7   |  25  |  1.20    Ca    9.6      2022 18:11    TPro  7.6  /  Alb  3.3<L>  /  TBili  1.2  /  DBili  x   /  AST  197<H>  /  ALT  102<H>  /  AlkPhos  62  07-03    LIVER FUNCTIONS - ( 2022 18:11 )  Alb: 3.3 g/dL / Pro: 7.6 g/dL / ALK PHOS: 62 U/L / ALT: 102 U/L DA / AST: 197 U/L / GGT: x           Urinalysis Basic - ( 2022 18:09 )    Color: Yellow / Appearance: Slightly Turbid / S.010 / pH: x  Gluc: x / Ketone: Negative  / Bili: Negative / Urobili: Negative   Blood: x / Protein: 100 / Nitrite: Negative   Leuk Esterase: Moderate / RBC: 5-10 /HPF / WBC >50 /HPF   Sq Epi: x / Non Sq Epi: Few /HPF / Bacteria: TNTC /HPF    Culture - Urine (collected 22 @ 23:20)  Source: Clean Catch Clean Catch (Midstream)  Final Report (22 @ 00:21):    >=3 organisms. Probable collection contamination.    A/P  agitation with poor response to standing dosing of zyprexia po   will give zyprexia 2.5mg IM x1 now  monitor

## 2022-07-05 NOTE — PROGRESS NOTE ADULT - PROBLEM SELECTOR PLAN 3
on home Zyprexa 5mg in AM, 10mg at bedtime, Ativan 0.5mg BID  agitated in ED: was given haldol, ativan and zyprexia by ED  continue with home medication   monitor QTC.  - f/u ekg

## 2022-07-05 NOTE — CONSULT NOTE ADULT - SUBJECTIVE AND OBJECTIVE BOX
Time of visit:    CHIEF COMPLAINT: Patient is a 62y old  Male who presents with a chief complaint of AHRF 2/2 COVID (05 Jul 2022 18:21)      HPI:  60 year old male from [facility] with PMH of CVA, Alzheimer's, nonverbal at baseline, GERD is here for SOB. Patient is unable to provide any information. Per chart review, pt was diagnosed with COVID on 7/1 and receives monoclonal antibody.  Pt became hypoxic at the nursing home and was brought into the ED.    In the ED:  Afebrile, , /67, 89% on RA  no leukocytosis  UA + for WBC  CXR shows bilateral infiltrates  s/p Rocephin   (03 Jul 2022 22:20)   Patient seen and examined.     PAST MEDICAL & SURGICAL HISTORY:  CVA (cerebral vascular accident)      CVA (cerebral vascular accident)      HLD (hyperlipidemia)      S/P percutaneous endoscopic gastrostomy (PEG) tube placement          Allergies    No Known Allergies    Intolerances        MEDICATIONS  (STANDING):  atorvastatin 40 milliGRAM(s) Oral at bedtime  cefTRIAXone   IVPB 1000 milliGRAM(s) IV Intermittent every 24 hours  enoxaparin Injectable 40 milliGRAM(s) SubCutaneous every 24 hours  LORazepam     Tablet 0.5 milliGRAM(s) Oral two times a day  LORazepam     Tablet 1 milliGRAM(s) Oral once  OLANZapine 10 milliGRAM(s) Oral at bedtime  OLANZapine 5 milliGRAM(s) Oral daily  polyethylene glycol 3350 17 Gram(s) Oral daily  senna Syrup 5 milliLiter(s) Oral at bedtime  valproic  acid Syrup 250 milliGRAM(s) Oral two times a day      MEDICATIONS  (PRN):  acetaminophen     Tablet .. 650 milliGRAM(s) Oral every 6 hours PRN Temp greater or equal to 38C (100.4F), Mild Pain (1 - 3)   Medications up to date at time of exam.    Medications up to date at time of exam.    FAMILY HISTORY:      SOCIAL HISTORY pat is none verbal   Smoking History: [   ] smoking/smoke exposure, [   ] former smoker  Living Condition: [   ] apartment, [   ] private house  Work History:   Travel History: denies recent travel  Illicit Substance Use: denies  Alcohol Use: denies    REVIEW OF SYSTEMS: pat is none verbal     CONSTITUTIONAL:  denies fevers, chills, sweats, weight loss    HEENT:  denies diplopia or blurred vision, sore throat or runny nose.    CARDIOVASCULAR:  denies pressure, squeezing, tightness, or heaviness about the chest; no palpitations.    RESPIRATORY:  denies SOB, cough, CARPIO, wheezing.    GASTROINTESTINAL:  denies abdominal pain, nausea, vomiting or diarrhea.    GENITOURINARY: denies dysuria, frequency or urgency.    NEUROLOGIC:  denies numbness, tingling, seizures or weakness.    PSYCHIATRIC:  denies disorder of thought or mood.    MSK: denies swelling, redness      PHYSICAL EXAMINATION:    GENERAL: The patient is a well-developed, well-nourished, in no apparent distress.     Vital Signs Last 24 Hrs  T(C): 36.6 (05 Jul 2022 13:43), Max: 36.6 (04 Jul 2022 20:53)  T(F): 97.9 (05 Jul 2022 13:43), Max: 97.9 (05 Jul 2022 13:43)  HR: 85 (05 Jul 2022 13:43) (67 - 85)  BP: 101/63 (05 Jul 2022 13:43) (85/61 - 101/63)  BP(mean): --  RR: 17 (05 Jul 2022 13:43) (16 - 17)  SpO2: 96% (05 Jul 2022 13:43) (92% - 97%)   (if applicable)    Chest Tube (if applicable)    HEENT: Head is normocephalic and atraumatic. Extraocular muscles are intact. Mucous membranes are moist.     NECK: Supple, no palpable adenopathy.    LUNGS: Clear to auscultation, no wheezing, rales, or rhonchi.    HEART: Regular rate and rhythm without murmur.    ABDOMEN: Soft, nontender, and nondistended.  No hepatosplenomegaly is noted.    RENAL: No difficulty voiding, no pelvic pain    EXTREMITIES: Without any cyanosis, clubbing, rash, lesions or edema.    NEUROLOGIC: Awake, none verbal     SKIN: Warm, dry, good turgor.      LABS:                        13.2   11.84 )-----------( 261      ( 05 Jul 2022 08:34 )             39.7     07-05    144  |  109<H>  |  30<H>  ----------------------------<  179<H>  3.7   |  26  |  0.96    Ca    9.5      05 Jul 2022 08:34    TPro  7.2  /  Alb  2.7<L>  /  TBili  0.5  /  DBili  x   /  AST  113<H>  /  ALT  109<H>  /  AlkPhos  64  07-05              D-Dimer Assay, Quantitative: 380 ng/mL DDU (07-05-22 @ 08:34)        Procalcitonin, Serum: 0.64 ng/mL (07-04-22 @ 11:00)      MICROBIOLOGY: (if applicable)    RADIOLOGY & ADDITIONAL STUDIES:  EKG:   CXR:< from: Xray Chest 1 View-PORTABLE IMMEDIATE (07.03.22 @ 20:40) >    ACC: [id number] EXAM:  XR CHEST PORTABLE IMMED 1V                          PROCEDURE DATE:  07/03/2022          INTERPRETATION:  Chest radiograph (one view)     CPT 56411    CLINICAL INFORMATION:  Sepsis.  Short of breath.    TECHNIQUE:  Single frontal view of the chest was obtained.    FINDINGS:  Prior study dated 3/13/2020 was available for review.    The lungs demonstrate mild diffuse coarsened interstitial markings. No   focal consolidation is seen. The costophrenic angles are not imaged on   the film limiting evaluation. The heart and mediastinum appear intact.  A   loop recorder device is seen overlying the left midlung field.      IMPRESSION: No gross consolidation is seen.    --- End of Report ---            ISAÍAS EDMONDSON MD; Attending Radiologist  This document has been electronically signed. Jul 4 2022  9:29AM    < end of copied text >    ECHO:    IMPRESSION: 62y Male PAST MEDICAL & SURGICAL HISTORY:  CVA (cerebral vascular accident)      CVA (cerebral vascular accident)      HLD (hyperlipidemia)      S/P percutaneous endoscopic gastrostomy (PEG) tube placement       p/w         IMP: This is 60 year old   man  from [facility] with  CVA, Alzheimer's, nonverbal at baseline, GERD is here for SOB. Patient is unable to provide any information. Per chart review, pt was diagnosed with COVID on 7/1 and receives monoclonal antibody. Pat is  hypoxic and requiring supp O2 . Will not Rx remdesivir dur to elevated LFT .     [place]ugg    - Isolation : contact and air borne   - O2 supp as needed to maintain sat >90%  - Decadron 6 mg/ day x 10 days then taper   - No Remdesivir dur to elevated LFT   - Asp precaution   - Continue antibx for UTI   - F/U cultures   - DVT GI prophy     case discussed with Dr Willingham

## 2022-07-05 NOTE — PROGRESS NOTE ADULT - PROBLEM SELECTOR PLAN 1
p/w SOB, found to be + for COVID on 7/1  s/p MAB at nursing home  Afebrile, mils leukocytosis, 88% on RA  CXR noted  2L 88%--> 3L 90-92%  f/u blood cx 7/3 prelim neg, urine cx contaminated  0.64 procal.  pulm Dr hameed consulted

## 2022-07-06 LAB
ALBUMIN SERPL ELPH-MCNC: 2.6 G/DL — LOW (ref 3.5–5)
ALP SERPL-CCNC: 67 U/L — SIGNIFICANT CHANGE UP (ref 40–120)
ALT FLD-CCNC: 113 U/L DA — HIGH (ref 10–60)
ANION GAP SERPL CALC-SCNC: 5 MMOL/L — SIGNIFICANT CHANGE UP (ref 5–17)
ANION GAP SERPL CALC-SCNC: 8 MMOL/L — SIGNIFICANT CHANGE UP (ref 5–17)
AST SERPL-CCNC: 89 U/L — HIGH (ref 10–40)
BILIRUB SERPL-MCNC: 0.5 MG/DL — SIGNIFICANT CHANGE UP (ref 0.2–1.2)
BUN SERPL-MCNC: 20 MG/DL — HIGH (ref 7–18)
BUN SERPL-MCNC: 21 MG/DL — HIGH (ref 7–18)
CALCIUM SERPL-MCNC: 8.9 MG/DL — SIGNIFICANT CHANGE UP (ref 8.4–10.5)
CALCIUM SERPL-MCNC: 9.1 MG/DL — SIGNIFICANT CHANGE UP (ref 8.4–10.5)
CHLORIDE SERPL-SCNC: 110 MMOL/L — HIGH (ref 96–108)
CHLORIDE SERPL-SCNC: 111 MMOL/L — HIGH (ref 96–108)
CO2 SERPL-SCNC: 27 MMOL/L — SIGNIFICANT CHANGE UP (ref 22–31)
CO2 SERPL-SCNC: 30 MMOL/L — SIGNIFICANT CHANGE UP (ref 22–31)
CREAT SERPL-MCNC: 0.69 MG/DL — SIGNIFICANT CHANGE UP (ref 0.5–1.3)
CREAT SERPL-MCNC: 0.81 MG/DL — SIGNIFICANT CHANGE UP (ref 0.5–1.3)
D DIMER BLD IA.RAPID-MCNC: 296 NG/ML DDU — HIGH
EGFR: 104 ML/MIN/1.73M2 — SIGNIFICANT CHANGE UP
EGFR: 99 ML/MIN/1.73M2 — SIGNIFICANT CHANGE UP
FERRITIN SERPL-MCNC: 398 NG/ML — SIGNIFICANT CHANGE UP (ref 30–400)
GLUCOSE SERPL-MCNC: 167 MG/DL — HIGH (ref 70–99)
GLUCOSE SERPL-MCNC: 169 MG/DL — HIGH (ref 70–99)
HCT VFR BLD CALC: 39.1 % — SIGNIFICANT CHANGE UP (ref 39–50)
HCT VFR BLD CALC: 40.4 % — SIGNIFICANT CHANGE UP (ref 39–50)
HGB BLD-MCNC: 13 G/DL — SIGNIFICANT CHANGE UP (ref 13–17)
HGB BLD-MCNC: 13.3 G/DL — SIGNIFICANT CHANGE UP (ref 13–17)
LDH SERPL L TO P-CCNC: 361 U/L — HIGH (ref 120–225)
MAGNESIUM SERPL-MCNC: 2.6 MG/DL — SIGNIFICANT CHANGE UP (ref 1.6–2.6)
MCHC RBC-ENTMCNC: 29.6 PG — SIGNIFICANT CHANGE UP (ref 27–34)
MCHC RBC-ENTMCNC: 29.9 PG — SIGNIFICANT CHANGE UP (ref 27–34)
MCHC RBC-ENTMCNC: 32.9 GM/DL — SIGNIFICANT CHANGE UP (ref 32–36)
MCHC RBC-ENTMCNC: 33.2 GM/DL — SIGNIFICANT CHANGE UP (ref 32–36)
MCV RBC AUTO: 89.1 FL — SIGNIFICANT CHANGE UP (ref 80–100)
MCV RBC AUTO: 90.8 FL — SIGNIFICANT CHANGE UP (ref 80–100)
NRBC # BLD: 0 /100 WBCS — SIGNIFICANT CHANGE UP (ref 0–0)
NRBC # BLD: 0 /100 WBCS — SIGNIFICANT CHANGE UP (ref 0–0)
PLATELET # BLD AUTO: 274 K/UL — SIGNIFICANT CHANGE UP (ref 150–400)
PLATELET # BLD AUTO: 275 K/UL — SIGNIFICANT CHANGE UP (ref 150–400)
POTASSIUM SERPL-MCNC: 3.9 MMOL/L — SIGNIFICANT CHANGE UP (ref 3.5–5.3)
POTASSIUM SERPL-MCNC: 4.3 MMOL/L — SIGNIFICANT CHANGE UP (ref 3.5–5.3)
POTASSIUM SERPL-SCNC: 3.9 MMOL/L — SIGNIFICANT CHANGE UP (ref 3.5–5.3)
POTASSIUM SERPL-SCNC: 4.3 MMOL/L — SIGNIFICANT CHANGE UP (ref 3.5–5.3)
PROT SERPL-MCNC: 6.7 G/DL — SIGNIFICANT CHANGE UP (ref 6–8.3)
RBC # BLD: 4.39 M/UL — SIGNIFICANT CHANGE UP (ref 4.2–5.8)
RBC # BLD: 4.45 M/UL — SIGNIFICANT CHANGE UP (ref 4.2–5.8)
RBC # FLD: 12.4 % — SIGNIFICANT CHANGE UP (ref 10.3–14.5)
RBC # FLD: 12.7 % — SIGNIFICANT CHANGE UP (ref 10.3–14.5)
SARS-COV-2 RNA SPEC QL NAA+PROBE: DETECTED
SODIUM SERPL-SCNC: 145 MMOL/L — SIGNIFICANT CHANGE UP (ref 135–145)
SODIUM SERPL-SCNC: 146 MMOL/L — HIGH (ref 135–145)
WBC # BLD: 6.16 K/UL — SIGNIFICANT CHANGE UP (ref 3.8–10.5)
WBC # BLD: 7.84 K/UL — SIGNIFICANT CHANGE UP (ref 3.8–10.5)
WBC # FLD AUTO: 6.16 K/UL — SIGNIFICANT CHANGE UP (ref 3.8–10.5)
WBC # FLD AUTO: 7.84 K/UL — SIGNIFICANT CHANGE UP (ref 3.8–10.5)

## 2022-07-06 RX ADMIN — Medication 0.5 MILLIGRAM(S): at 06:19

## 2022-07-06 RX ADMIN — SENNA PLUS 5 MILLILITER(S): 8.6 TABLET ORAL at 22:14

## 2022-07-06 RX ADMIN — Medication 250 MILLIGRAM(S): at 17:46

## 2022-07-06 RX ADMIN — Medication 6 MILLIGRAM(S): at 05:16

## 2022-07-06 RX ADMIN — CEFTRIAXONE 100 MILLIGRAM(S): 500 INJECTION, POWDER, FOR SOLUTION INTRAMUSCULAR; INTRAVENOUS at 17:48

## 2022-07-06 RX ADMIN — POLYETHYLENE GLYCOL 3350 17 GRAM(S): 17 POWDER, FOR SOLUTION ORAL at 17:46

## 2022-07-06 RX ADMIN — Medication 250 MILLIGRAM(S): at 05:16

## 2022-07-06 RX ADMIN — ENOXAPARIN SODIUM 40 MILLIGRAM(S): 100 INJECTION SUBCUTANEOUS at 17:47

## 2022-07-06 RX ADMIN — Medication 1 MILLIGRAM(S): at 03:53

## 2022-07-06 RX ADMIN — OLANZAPINE 5 MILLIGRAM(S): 15 TABLET, FILM COATED ORAL at 17:47

## 2022-07-06 RX ADMIN — OLANZAPINE 10 MILLIGRAM(S): 15 TABLET, FILM COATED ORAL at 22:13

## 2022-07-06 RX ADMIN — ATORVASTATIN CALCIUM 40 MILLIGRAM(S): 80 TABLET, FILM COATED ORAL at 22:13

## 2022-07-06 NOTE — DIETITIAN INITIAL EVALUATION ADULT - ORAL INTAKE PTA/DIET HISTORY
unable to interview patient face to face as has covid-19 . Will not attempted to contact patient On extension in room as patient confused.   Followed [facility], chopped consistency diet in nursing home    unable to interview patient face to face as has covid-19 . Will not Attempt to contact patient On extension in room as patient confused.   Followed [facility], chopped consistency diet in nursing home

## 2022-07-06 NOTE — PROGRESS NOTE ADULT - ASSESSMENT
60 year old male from [facility] with PMH of CVA, Alzheimer's, nonverbal at baseline, GERD is here for SOB. Patient is unable to provide any information. Per chart review, pt was diagnosed with COVID on 7/1 and receives monoclonal antibody.  Pt became hypoxic at the nursing home and was brought into the ED. pt admitted for AHRF secondary to covid infection and UTI. pulmonary consulted Dr. hameed, started on decardron, f/u cultures and cxr. pt. on rocephin for + UA.

## 2022-07-06 NOTE — DIETITIAN INITIAL EVALUATION ADULT - NSFNSPHYEXAMSKINFT_GEN_A_CORE
Pressure Injury 1: coccyx, Stage II  Pressure Injury 2: none, none  Pressure Injury 3: none, none  Pressure Injury 4: none, none  Pressure Injury 5: none, none  Pressure Injury 6: none, none  Pressure Injury 7: none, none  Pressure Injury 8: none, none  Pressure Injury 9: none, none  Pressure Injury 10: none, none  Pressure Injury 11: none, none

## 2022-07-06 NOTE — DIETITIAN INITIAL EVALUATION ADULT - OTHER INFO
patient admitted for SOB, diagnosed with covid 19 On 7/1/22, history of CVA, alzheimer's, nonverbal at baseline,  tolerating minced and moist diet in-house.

## 2022-07-06 NOTE — DIETITIAN INITIAL EVALUATION ADULT - ADD RECOMMEND
provide minced and moist diet, add multivitamin/minerals 1 tab/d and vitamin C 500mg bid to assist with healing , patient will tolerate diet with improved wound healing

## 2022-07-06 NOTE — PROGRESS NOTE ADULT - SUBJECTIVE AND OBJECTIVE BOX
Patient is a 62y old  Male who presents with a chief complaint of Hypoxemia     (06 Jul 2022 11:10)      INTERVAL HPI/OVERNIGHT EVENTS: no overnight events    I&O's Summary    Vital Signs Last 24 Hrs  T(C): 37.1 (06 Jul 2022 04:58), Max: 37.1 (06 Jul 2022 04:58)  T(F): 98.7 (06 Jul 2022 04:58), Max: 98.7 (06 Jul 2022 04:58)  HR: 87 (06 Jul 2022 04:58) (85 - 89)  BP: 122/81 (06 Jul 2022 04:58) (101/63 - 122/81)  BP(mean): --  RR: 17 (06 Jul 2022 04:58) (16 - 17)  SpO2: 94% (06 Jul 2022 04:58) (92% - 96%)  PAST MEDICAL & SURGICAL HISTORY:  CVA (cerebral vascular accident)      CVA (cerebral vascular accident)      HLD (hyperlipidemia)      S/P percutaneous endoscopic gastrostomy (PEG) tube placement          SOCIAL HISTORY  Alcohol:  Tobacco:  Illicit substance use:      FAMILY HISTORY:      LABS:                        13.3   6.16  )-----------( 275      ( 06 Jul 2022 10:23 )             40.4     07-06    145  |  110<H>  |  20<H>  ----------------------------<  169<H>  4.3   |  30  |  0.69    Ca    8.9      06 Jul 2022 10:23  Mg     2.6     07-06    TPro  6.7  /  Alb  2.6<L>  /  TBili  0.5  /  DBili  x   /  AST  89<H>  /  ALT  113<H>  /  AlkPhos  67  07-06        CAPILLARY BLOOD GLUCOSE                MEDICATIONS  (STANDING):  atorvastatin 40 milliGRAM(s) Oral at bedtime  cefTRIAXone   IVPB 1000 milliGRAM(s) IV Intermittent every 24 hours  dexAMETHasone     Tablet 6 milliGRAM(s) Oral daily  enoxaparin Injectable 40 milliGRAM(s) SubCutaneous every 24 hours  LORazepam     Tablet 0.5 milliGRAM(s) Oral two times a day  OLANZapine 10 milliGRAM(s) Oral at bedtime  OLANZapine 5 milliGRAM(s) Oral daily  polyethylene glycol 3350 17 Gram(s) Oral daily  senna Syrup 5 milliLiter(s) Oral at bedtime  valproic  acid Syrup 250 milliGRAM(s) Oral two times a day    MEDICATIONS  (PRN):  acetaminophen     Tablet .. 650 milliGRAM(s) Oral every 6 hours PRN Temp greater or equal to 38C (100.4F), Mild Pain (1 - 3)      REVIEW OF SYSTEMS:  unable to obtain ROS due to pt. baseline mental status    RADIOLOGY & ADDITIONAL TESTS:< from: Xray Chest 1 View-PORTABLE IMMEDIATE (07.03.22 @ 20:40) >    ACC: [id number] EXAM:  XR CHEST PORTABLE IMMED 1V                          PROCEDURE DATE:  07/03/2022          INTERPRETATION:  Chest radiograph (one view)     CPT 46826    CLINICAL INFORMATION:  Sepsis.  Short of breath.    TECHNIQUE:  Single frontal view of the chest was obtained.    FINDINGS:  Prior study dated 3/13/2020 was available for review.    The lungs demonstrate mild diffuse coarsened interstitial markings. No   focal consolidation is seen. The costophrenic angles are not imaged on   the film limiting evaluation. The heart and mediastinum appear intact.  A   loop recorder device is seen overlying the left midlung field.      IMPRESSION: No gross consolidation is seen.    --- End of Report ---    < end of copied text >      Imaging Personally Reviewed:  [ x] YES  [ ] NO    Consultant(s) Notes Reviewed:  [ x] YES  [ ] NO    PHYSICAL EXAM:  GENERAL: NAD,   HEAD:  Atraumatic, Normocephalic  EYES:  conjunctiva and sclera clear  ENMT:  Moist mucous membrane  NECK: Supple, No JVD,  NERVOUS SYSTEM:  Alert, noverbal  CHEST/LUNG: Clear to auscultation bilaterally  HEART: Regular rate and rhythm; No murmurs, rubs, or gallops  ABDOMEN: Soft, Nontender, Nondistended; Bowel sounds present  EXTREMITIES:  2+ Peripheral Pulses, No clubbing, cyanosis, or edema  SKIN: + pressure ulcer to coccyx, redness    Care Collaborated Discussed with Consultants/Other Providers [x ] YES  [ ] NO

## 2022-07-06 NOTE — PROGRESS NOTE ADULT - PROBLEM SELECTOR PLAN 3
on home Zyprexa 5mg in AM, 10mg at bedtime, Ativan 0.5mg BID  agitated in ED: was given haldol, ativan and zyprexia by ED  continue with home medication   monitor QTC.  - f/u ekg on home Zyprexa 5mg in AM, 10mg at bedtime, Ativan 0.5mg BID  agitated in ED: was given haldol, ativan and zyprexia by ED  continue with home medication   monitor QTC.  -  ekg w/ prolonged

## 2022-07-06 NOTE — DIETITIAN INITIAL EVALUATION ADULT - PERTINENT MEDS FT
MEDICATIONS  (STANDING):  atorvastatin 40 milliGRAM(s) Oral at bedtime  cefTRIAXone   IVPB 1000 milliGRAM(s) IV Intermittent every 24 hours  dexAMETHasone     Tablet 6 milliGRAM(s) Oral daily  enoxaparin Injectable 40 milliGRAM(s) SubCutaneous every 24 hours  LORazepam     Tablet 0.5 milliGRAM(s) Oral two times a day  OLANZapine 10 milliGRAM(s) Oral at bedtime  OLANZapine 5 milliGRAM(s) Oral daily  polyethylene glycol 3350 17 Gram(s) Oral daily  senna Syrup 5 milliLiter(s) Oral at bedtime  valproic  acid Syrup 250 milliGRAM(s) Oral two times a day    MEDICATIONS  (PRN):  acetaminophen     Tablet .. 650 milliGRAM(s) Oral every 6 hours PRN Temp greater or equal to 38C (100.4F), Mild Pain (1 - 3)

## 2022-07-06 NOTE — PROGRESS NOTE ADULT - PROBLEM SELECTOR PLAN 1
p/w SOB, found to be + for COVID on 7/1  s/p MAB at nursing home  Afebrile, no leukocytosis, 95% on 3L  CXR noted  c/w decadron IV x 10 days- pulm recc  f/u blood cx 7/3 prelim neg, urine cx contaminated  f/u repeat cxr from 7/5  0.64 procal.  pulm Dr hameed following

## 2022-07-06 NOTE — PROGRESS NOTE ADULT - SUBJECTIVE AND OBJECTIVE BOX
Patient is a 62y old  Male who presents with a chief complaint of AHRF 2/2 COVID (05 Jul 2022 19:48)    PATIENT IS SEEN AND EXAMINED IN MEDICAL FLOOR.  NGT [    ]    KRISTINA [   ]      GT [   ]    ALLERGIES:  No Known Allergies      Daily     Daily     VITALS:    Vital Signs Last 24 Hrs  T(C): 37.1 (06 Jul 2022 04:58), Max: 37.1 (06 Jul 2022 04:58)  T(F): 98.7 (06 Jul 2022 04:58), Max: 98.7 (06 Jul 2022 04:58)  HR: 87 (06 Jul 2022 04:58) (85 - 89)  BP: 122/81 (06 Jul 2022 04:58) (101/63 - 122/81)  BP(mean): --  RR: 17 (06 Jul 2022 04:58) (16 - 17)  SpO2: 94% (06 Jul 2022 04:58) (92% - 97%)    LABS:    CBC Full  -  ( 06 Jul 2022 10:23 )  WBC Count : 6.16 K/uL  RBC Count : 4.45 M/uL  Hemoglobin : 13.3 g/dL  Hematocrit : 40.4 %  Platelet Count - Automated : 275 K/uL  Mean Cell Volume : 90.8 fl  Mean Cell Hemoglobin : 29.9 pg  Mean Cell Hemoglobin Concentration : 32.9 gm/dL  Auto Neutrophil # : x  Auto Lymphocyte # : x  Auto Monocyte # : x  Auto Eosinophil # : x  Auto Basophil # : x  Auto Neutrophil % : x  Auto Lymphocyte % : x  Auto Monocyte % : x  Auto Eosinophil % : x  Auto Basophil % : x      07-06    146<H>  |  111<H>  |  21<H>  ----------------------------<  167<H>  3.9   |  27  |  0.81    Ca    9.1      06 Jul 2022 07:39    TPro  7.2  /  Alb  2.7<L>  /  TBili  0.5  /  DBili  x   /  AST  113<H>  /  ALT  109<H>  /  AlkPhos  64  07-05    CAPILLARY BLOOD GLUCOSE            LIVER FUNCTIONS - ( 05 Jul 2022 08:34 )  Alb: 2.7 g/dL / Pro: 7.2 g/dL / ALK PHOS: 64 U/L / ALT: 109 U/L DA / AST: 113 U/L / GGT: x           Creatinine Trend: 0.81<--, 0.96<--, 1.20<--  I&O's Summary          Clean Catch Clean Catch (Midstream)  07-03 @ 23:20   >=3 organisms. Probable collection contamination.  --  --      .Blood Blood-Peripheral  07-03 @ 23:09   No growth to date.  --  --          MEDICATIONS:    MEDICATIONS  (STANDING):  atorvastatin 40 milliGRAM(s) Oral at bedtime  cefTRIAXone   IVPB 1000 milliGRAM(s) IV Intermittent every 24 hours  dexAMETHasone     Tablet 6 milliGRAM(s) Oral daily  enoxaparin Injectable 40 milliGRAM(s) SubCutaneous every 24 hours  LORazepam     Tablet 0.5 milliGRAM(s) Oral two times a day  OLANZapine 10 milliGRAM(s) Oral at bedtime  OLANZapine 5 milliGRAM(s) Oral daily  polyethylene glycol 3350 17 Gram(s) Oral daily  senna Syrup 5 milliLiter(s) Oral at bedtime  valproic  acid Syrup 250 milliGRAM(s) Oral two times a day      MEDICATIONS  (PRN):  acetaminophen     Tablet .. 650 milliGRAM(s) Oral every 6 hours PRN Temp greater or equal to 38C (100.4F), Mild Pain (1 - 3)      REVIEW OF SYSTEMS:                           ALL ROS DONE [ X   ]    CONSTITUTIONAL:  LETHARGIC [   ], FEVER [   ], UNRESPONSIVE [   ]  CVS:  CP  [   ], SOB, [   ], PALPITATIONS [   ], DIZZYNESS [   ]  RS: COUGH [   ], SPUTUM [   ]  GI: ABDOMINAL PAIN [   ], NAUSEA [   ], VOMITINGS [   ], DIARRHEA [   ], CONSTIPATION [   ]  :  DYSURIA [   ], NOCTURIA [   ], INCREASED FREQUENCY [   ], DRIBLING [   ],  SKELETAL: PAINFUL JOINTS [   ], SWOLLEN JOINTS [   ], NECK ACHE [   ], LOW BACK ACHE [   ],  SKIN : ULCERS [   ], RASH [   ], ITCHING [   ]  CNS: HEAD ACHE [   ], DOUBLE VISION [   ], BLURRED VISION [   ], AMS / CONFUSION [   ], SEIZURES [   ], WEAKNESS [   ],TINGLING / NUMBNESS [   ]    PHYSICAL EXAMINATION:    GENERAL APPEARANCE: NO DISTRESS  HEENT:  NO PALLOR, NO  JVD,  NO   NODES, NECK SUPPLE  CVS: S1 +, S2 +,   RS: AEEB,  OCCASIONAL  RALES +,   NO RONCHI  ABD: SOFT, NT, NO, BS + GT +  EXT: NO PE  SKIN: WARM,   SKELETAL:  ROM ACCEPTABLE  CNS:  AAO X 1, OLD   DEFICITS        RADIOLOGY :    < from: Xray Chest 1 View-PORTABLE IMMEDIATE (07.03.22 @ 20:40) >    FINDINGS:  Prior study dated 3/13/2020 was available for review.    The lungs demonstrate mild diffuse coarsened interstitial markings. No   focal consolidation is seen. The costophrenic angles are not imaged on   the film limiting evaluation. The heart and mediastinum appear intact.  A   loop recorder device is seen overlying the left midlung field.      IMPRESSION: No gross consolidation is seen.    < end of copied text >        ASSESSMENT :     Hypoxemia    CVA (cerebral vascular accident)    CVA (cerebral vascular accident)    HLD (hyperlipidemia)    S/P percutaneous endoscopic gastrostomy (PEG) tube placement        PLAN:  HPI:  60 year old male from [facility] with PMH of CVA, Alzheimer's, nonverbal at baseline, GERD is here for SOB. Patient is unable to provide any information. Per chart review, pt was diagnosed with COVID on 7/1 and receives monoclonal antibody.  Pt became hypoxic at the nursing home and was brought into the ED.    In the ED:  Afebrile, , /67, 89% on RA  no leukocytosis  UA + for WBC  CXR shows bilateral infiltrates  s/p Rocephin   (03 Jul 2022 22:20)    # COVID 19 INFECTION, COVID PNEUMONIA - HYPOXIC RESPIRATORY FAILURE - RESOLVING - S/P BEBTELOVIMAB ON 07/01/22  - F/U REPEAT CXR  - PULMONOLOGY CONSULT    # SEPSIS DUE TO UTI ON IV. ROCEPHIN - F/UP URINE CXS  # AGITATION, UNDERLYING ALZHEIMER'S - PLACED ON ZYPREXA QHS    # GI AND DVT PROPHYLAXIS     Patient is a 62y old  Male who presents with a chief complaint of AHRF 2/2 COVID (05 Jul 2022 19:48)    PATIENT IS SEEN AND EXAMINED IN MEDICAL FLOOR.  NGT [    ]    KRISTINA [   ]      GT [   ]    ALLERGIES:  No Known Allergies      Daily     Daily     VITALS:    Vital Signs Last 24 Hrs  T(C): 37.1 (06 Jul 2022 04:58), Max: 37.1 (06 Jul 2022 04:58)  T(F): 98.7 (06 Jul 2022 04:58), Max: 98.7 (06 Jul 2022 04:58)  HR: 87 (06 Jul 2022 04:58) (85 - 89)  BP: 122/81 (06 Jul 2022 04:58) (101/63 - 122/81)  BP(mean): --  RR: 17 (06 Jul 2022 04:58) (16 - 17)  SpO2: 94% (06 Jul 2022 04:58) (92% - 97%)    LABS:    CBC Full  -  ( 06 Jul 2022 10:23 )  WBC Count : 6.16 K/uL  RBC Count : 4.45 M/uL  Hemoglobin : 13.3 g/dL  Hematocrit : 40.4 %  Platelet Count - Automated : 275 K/uL  Mean Cell Volume : 90.8 fl  Mean Cell Hemoglobin : 29.9 pg  Mean Cell Hemoglobin Concentration : 32.9 gm/dL  Auto Neutrophil # : x  Auto Lymphocyte # : x  Auto Monocyte # : x  Auto Eosinophil # : x  Auto Basophil # : x  Auto Neutrophil % : x  Auto Lymphocyte % : x  Auto Monocyte % : x  Auto Eosinophil % : x  Auto Basophil % : x      07-06    146<H>  |  111<H>  |  21<H>  ----------------------------<  167<H>  3.9   |  27  |  0.81    Ca    9.1      06 Jul 2022 07:39    TPro  7.2  /  Alb  2.7<L>  /  TBili  0.5  /  DBili  x   /  AST  113<H>  /  ALT  109<H>  /  AlkPhos  64  07-05    CAPILLARY BLOOD GLUCOSE            LIVER FUNCTIONS - ( 05 Jul 2022 08:34 )  Alb: 2.7 g/dL / Pro: 7.2 g/dL / ALK PHOS: 64 U/L / ALT: 109 U/L DA / AST: 113 U/L / GGT: x           Creatinine Trend: 0.81<--, 0.96<--, 1.20<--  I&O's Summary          Clean Catch Clean Catch (Midstream)  07-03 @ 23:20   >=3 organisms. Probable collection contamination.  --  --      .Blood Blood-Peripheral  07-03 @ 23:09   No growth to date.  --  --          MEDICATIONS:    MEDICATIONS  (STANDING):  atorvastatin 40 milliGRAM(s) Oral at bedtime  cefTRIAXone   IVPB 1000 milliGRAM(s) IV Intermittent every 24 hours  dexAMETHasone     Tablet 6 milliGRAM(s) Oral daily  enoxaparin Injectable 40 milliGRAM(s) SubCutaneous every 24 hours  LORazepam     Tablet 0.5 milliGRAM(s) Oral two times a day  OLANZapine 10 milliGRAM(s) Oral at bedtime  OLANZapine 5 milliGRAM(s) Oral daily  polyethylene glycol 3350 17 Gram(s) Oral daily  senna Syrup 5 milliLiter(s) Oral at bedtime  valproic  acid Syrup 250 milliGRAM(s) Oral two times a day      MEDICATIONS  (PRN):  acetaminophen     Tablet .. 650 milliGRAM(s) Oral every 6 hours PRN Temp greater or equal to 38C (100.4F), Mild Pain (1 - 3)      REVIEW OF SYSTEMS:                           ALL ROS DONE [ X   ]    CONSTITUTIONAL:  LETHARGIC [   ], FEVER [   ], UNRESPONSIVE [   ]  CVS:  CP  [   ], SOB, [   ], PALPITATIONS [   ], DIZZYNESS [   ]  RS: COUGH [   ], SPUTUM [   ]  GI: ABDOMINAL PAIN [   ], NAUSEA [   ], VOMITINGS [   ], DIARRHEA [   ], CONSTIPATION [   ]  :  DYSURIA [   ], NOCTURIA [   ], INCREASED FREQUENCY [   ], DRIBLING [   ],  SKELETAL: PAINFUL JOINTS [   ], SWOLLEN JOINTS [   ], NECK ACHE [   ], LOW BACK ACHE [   ],  SKIN : ULCERS [   ], RASH [   ], ITCHING [   ]  CNS: HEAD ACHE [   ], DOUBLE VISION [   ], BLURRED VISION [   ], AMS / CONFUSION [   ], SEIZURES [   ], WEAKNESS [   ],TINGLING / NUMBNESS [   ]    PHYSICAL EXAMINATION:    GENERAL APPEARANCE: NO DISTRESS  HEENT:  NO PALLOR, NO  JVD,  NO   NODES, NECK SUPPLE  CVS: S1 +, S2 +,   RS: AEEB,  OCCASIONAL  RALES +,   NO RONCHI  ABD: SOFT, NT, NO, BS + GT +  EXT: NO PE  SKIN: WARM,   SKELETAL:  ROM ACCEPTABLE  CNS:  AAO X 1, OLD   DEFICITS        RADIOLOGY :    < from: Xray Chest 1 View-PORTABLE IMMEDIATE (07.03.22 @ 20:40) >    FINDINGS:  Prior study dated 3/13/2020 was available for review.    The lungs demonstrate mild diffuse coarsened interstitial markings. No   focal consolidation is seen. The costophrenic angles are not imaged on   the film limiting evaluation. The heart and mediastinum appear intact.  A   loop recorder device is seen overlying the left midlung field.      IMPRESSION: No gross consolidation is seen.    < end of copied text >        ASSESSMENT :     Hypoxemia    CVA (cerebral vascular accident)    CVA (cerebral vascular accident)    HLD (hyperlipidemia)    S/P percutaneous endoscopic gastrostomy (PEG) tube placement        PLAN:  HPI:  60 year old male from [facility] with PMH of CVA, Alzheimer's, nonverbal at baseline, GERD is here for SOB. Patient is unable to provide any information. Per chart review, pt was diagnosed with COVID on 7/1 and receives monoclonal antibody.  Pt became hypoxic at the nursing home and was brought into the ED.    In the ED:  Afebrile, , /67, 89% on RA  no leukocytosis  UA + for WBC  CXR shows bilateral infiltrates  s/p Rocephin   (03 Jul 2022 22:20)    # COVID 19 INFECTION, COVID PNEUMONIA - HYPOXIC RESPIRATORY FAILURE - RESOLVING - S/P BEBTELOVIMAB ON 07/01/22  - F/U REPEAT CXR  - PULMONOLOGY CONSULT    # SEPSIS DUE TO UTI ON IV. ROCEPHIN - NOTED URINE CXS - CONTAMINANT ; WILL TREAT FOR UTI  # AGITATION, UNDERLYING ALZHEIMER'S - PLACED ON ZYPREXA QHS    # GI AND DVT PROPHYLAXIS

## 2022-07-06 NOTE — DIETITIAN INITIAL EVALUATION ADULT - PERTINENT LABORATORY DATA
07-06    145  |  110<H>  |  20<H>  ----------------------------<  169<H>  4.3   |  30  |  0.69    Ca    8.9      06 Jul 2022 10:23  Mg     2.6     07-06    TPro  6.7  /  Alb  2.6<L>  /  TBili  0.5  /  DBili  x   /  AST  89<H>  /  ALT  113<H>  /  AlkPhos  67  07-06

## 2022-07-07 ENCOUNTER — TRANSCRIPTION ENCOUNTER (OUTPATIENT)
Age: 62
End: 2022-07-07

## 2022-07-07 VITALS
SYSTOLIC BLOOD PRESSURE: 107 MMHG | HEART RATE: 79 BPM | TEMPERATURE: 99 F | DIASTOLIC BLOOD PRESSURE: 68 MMHG | RESPIRATION RATE: 20 BRPM | OXYGEN SATURATION: 94 %

## 2022-07-07 LAB — CRP SERPL-MCNC: 62 MG/L — HIGH

## 2022-07-07 PROCEDURE — 99285 EMERGENCY DEPT VISIT HI MDM: CPT

## 2022-07-07 PROCEDURE — 85027 COMPLETE CBC AUTOMATED: CPT

## 2022-07-07 PROCEDURE — 83615 LACTATE (LD) (LDH) ENZYME: CPT

## 2022-07-07 PROCEDURE — 85025 COMPLETE CBC W/AUTO DIFF WBC: CPT

## 2022-07-07 PROCEDURE — 96372 THER/PROPH/DIAG INJ SC/IM: CPT

## 2022-07-07 PROCEDURE — 36415 COLL VENOUS BLD VENIPUNCTURE: CPT

## 2022-07-07 PROCEDURE — 87040 BLOOD CULTURE FOR BACTERIA: CPT

## 2022-07-07 PROCEDURE — 94640 AIRWAY INHALATION TREATMENT: CPT

## 2022-07-07 PROCEDURE — 87637 SARSCOV2&INF A&B&RSV AMP PRB: CPT

## 2022-07-07 PROCEDURE — 83735 ASSAY OF MAGNESIUM: CPT

## 2022-07-07 PROCEDURE — 86140 C-REACTIVE PROTEIN: CPT

## 2022-07-07 PROCEDURE — 80048 BASIC METABOLIC PNL TOTAL CA: CPT

## 2022-07-07 PROCEDURE — 84145 PROCALCITONIN (PCT): CPT

## 2022-07-07 PROCEDURE — 87635 SARS-COV-2 COVID-19 AMP PRB: CPT

## 2022-07-07 PROCEDURE — 81001 URINALYSIS AUTO W/SCOPE: CPT

## 2022-07-07 PROCEDURE — 87086 URINE CULTURE/COLONY COUNT: CPT

## 2022-07-07 PROCEDURE — 83605 ASSAY OF LACTIC ACID: CPT

## 2022-07-07 PROCEDURE — 36000 PLACE NEEDLE IN VEIN: CPT

## 2022-07-07 PROCEDURE — 80053 COMPREHEN METABOLIC PANEL: CPT

## 2022-07-07 PROCEDURE — 93005 ELECTROCARDIOGRAM TRACING: CPT

## 2022-07-07 PROCEDURE — 82728 ASSAY OF FERRITIN: CPT

## 2022-07-07 PROCEDURE — 71045 X-RAY EXAM CHEST 1 VIEW: CPT

## 2022-07-07 PROCEDURE — 85379 FIBRIN DEGRADATION QUANT: CPT

## 2022-07-07 PROCEDURE — 86703 HIV-1/HIV-2 1 RESULT ANTBDY: CPT

## 2022-07-07 RX ORDER — CEFUROXIME AXETIL 250 MG
250 TABLET ORAL EVERY 12 HOURS
Refills: 0 | Status: DISCONTINUED | OUTPATIENT
Start: 2022-07-07 | End: 2022-07-07

## 2022-07-07 RX ORDER — DEXAMETHASONE 0.5 MG/5ML
1 ELIXIR ORAL
Qty: 0 | Refills: 0 | DISCHARGE
Start: 2022-07-07

## 2022-07-07 RX ORDER — MUPIROCIN 20 MG/G
1 OINTMENT TOPICAL
Qty: 0 | Refills: 0 | DISCHARGE

## 2022-07-07 RX ORDER — CEFUROXIME AXETIL 250 MG
1 TABLET ORAL
Qty: 0 | Refills: 0 | DISCHARGE
Start: 2022-07-07

## 2022-07-07 RX ADMIN — POLYETHYLENE GLYCOL 3350 17 GRAM(S): 17 POWDER, FOR SOLUTION ORAL at 12:06

## 2022-07-07 RX ADMIN — OLANZAPINE 5 MILLIGRAM(S): 15 TABLET, FILM COATED ORAL at 14:50

## 2022-07-07 NOTE — PROGRESS NOTE ADULT - NS ATTEND AMEND GEN_ALL_CORE FT
Impression; This is a 61 Y/O Male from [facility] , Non verbal as baseline mental status . Had a positive Covid 19 on 07-01-22 and received Monoclonal antibody . Positive PCR on 07-06-22 for Covid 19. Negative Blood Cx. x2. Has an acute hypoxic respiratory failure due to Covid 19 infection and on Oxygen supplementation.      Suggestion:   O2 saturation 94% with O2 supplement. Continue Oxygen supplementation 2L NC.  Isolation : contact and air borne   Decadron 6 mg/ day x 10 days then taper   No Remdesivir dur to elevated LFT   Aspiration precaution with HOB elevation.    Continue antibx for UTI   DVT GI prophylactic. On Lovenox 40 mg SQ Daily.

## 2022-07-07 NOTE — DISCHARGE NOTE NURSING/CASE MANAGEMENT/SOCIAL WORK - NSDCPEFALRISK_GEN_ALL_CORE
For information on Fall & Injury Prevention, visit: [email or web address] OR  [email or web address] OR  https://www.cdc.gov/steadi/patient.html

## 2022-07-07 NOTE — PROGRESS NOTE ADULT - PROBLEM SELECTOR PLAN 3
on home Zyprexa 5mg in AM, 10mg at bedtime, Ativan 0.5mg BID  agitated in ED: was given haldol, ativan and zyprexia by ED  continue with home medication   monitor QTC.  -  ekg w/ prolonged

## 2022-07-07 NOTE — DISCHARGE NOTE PROVIDER - NSDCCPCAREPLAN_GEN_ALL_CORE_FT
PRINCIPAL DISCHARGE DIAGNOSIS  Diagnosis: Hypoxia  Assessment and Plan of Treatment: You were admitted for hypoxic respiratory failure due to your covid condition.  Continue taking your medication as prescribed even if you continue to feel better.  Report any new symptoms of shortness of breath.      SECONDARY DISCHARGE DIAGNOSES  Diagnosis: 2019 novel coronavirus disease (COVID-19)  Assessment and Plan of Treatment: .For up to date information information on COVID -19, you may contact the Department of Health Hotline at [phone]. Perform frequent good hand hygiene by washing your hands with soap and hot water as can tolerate but not to burn your hands.  Disinfect your house doorknobs, steering wheels, refrigerator door handles.  If your symptoms worsen, fever, chills, chest pain or sob, call your doctor.  Call 911 for severe shortness of breath or get to your nearest Emergency Department and inform the staff or 911 that you were tested positive for COVID-19.  Continue to self quarantine and wear a mask so as to not infect others.  Drink lots of fluids.      Diagnosis: Acute respiratory failure with hypoxia  Assessment and Plan of Treatment: You were admitted for hypoxic respiratory failure due to COVID.  Continue taking your medication as prescribed and report any changes in your condition such as increasing shortness of breath, fever or chills, temperature of 101.0F or higher.    Diagnosis: Acute UTI  Assessment and Plan of Treatment: Continue taking your antibiotics as prescribed and monitor for signs and symptoms of infection, such as fever or chills, burning/pain with urination, urinary frequency/hesitancy, cloudy or bloody urine.      Diagnosis: Schizophrenia  Assessment and Plan of Treatment: Continue taking your medication as prescribed and report any new symptoms of auditory or visual hallucination, thoughts of harming yourself or others.

## 2022-07-07 NOTE — PROGRESS NOTE ADULT - SUBJECTIVE AND OBJECTIVE BOX
NP Note discussed with  Primary Attending    Patient is a 62y old  Male who presents with a chief complaint of AHRF 2/2 COVID (06 Jul 2022 13:15)      INTERVAL HPI/OVERNIGHT EVENTS: no new complaints    MEDICATIONS  (STANDING):  atorvastatin 40 milliGRAM(s) Oral at bedtime  dexAMETHasone     Tablet 6 milliGRAM(s) Oral daily  enoxaparin Injectable 40 milliGRAM(s) SubCutaneous every 24 hours  LORazepam     Tablet 0.5 milliGRAM(s) Oral two times a day  OLANZapine 10 milliGRAM(s) Oral at bedtime  OLANZapine 5 milliGRAM(s) Oral daily  polyethylene glycol 3350 17 Gram(s) Oral daily  senna Syrup 5 milliLiter(s) Oral at bedtime  valproic  acid Syrup 250 milliGRAM(s) Oral two times a day    MEDICATIONS  (PRN):  acetaminophen     Tablet .. 650 milliGRAM(s) Oral every 6 hours PRN Temp greater or equal to 38C (100.4F), Mild Pain (1 - 3)      __________________________________________________  REVIEW OF SYSTEMS:    CONSTITUTIONAL: No fever,   EYES: no acute visual disturbances  NECK: No pain or stiffness  RESPIRATORY: No cough; No shortness of breath  CARDIOVASCULAR: No chest pain, no palpitations  GASTROINTESTINAL: No pain. No nausea or vomiting; No diarrhea   NEUROLOGICAL: No headache or numbness, no tremors  MUSCULOSKELETAL: No joint pain, no muscle pain  GENITOURINARY: no dysuria, no frequency, no hesitancy  PSYCHIATRY: no depression , no anxiety  ALL OTHER  ROS negative        Vital Signs Last 24 Hrs  T(C): 36.7 (07 Jul 2022 05:29), Max: 36.8 (06 Jul 2022 22:04)  T(F): 98 (07 Jul 2022 05:29), Max: 98.3 (06 Jul 2022 22:04)  HR: 64 (07 Jul 2022 05:29) (58 - 64)  BP: 107/70 (07 Jul 2022 05:29) (94/63 - 107/70)  BP(mean): 71 (06 Jul 2022 13:50) (71 - 71)  RR: 20 (07 Jul 2022 05:29) (20 - 22)  SpO2: 94% (07 Jul 2022 05:29) (94% - 97%)    ________________________________________________  PHYSICAL EXAM:  GENERAL: NAD  HEENT: Normocephalic;  conjunctivae and sclerae clear; moist mucous membranes;   NECK : supple  CHEST/LUNG: Clear to auscultation bilaterally with good air entry   HEART: S1 S2  regular; no murmurs, gallops or rubs  ABDOMEN: Soft, Nontender, Nondistended; Bowel sounds present  EXTREMITIES: no cyanosis; no edema; no calf tenderness  SKIN: warm and dry; no rash  NERVOUS SYSTEM:  Awake and alert; Oriented  to place, person and time ; no new deficits    _________________________________________________  LABS:                        13.3   6.16  )-----------( 275      ( 06 Jul 2022 10:23 )             40.4     07-06    145  |  110<H>  |  20<H>  ----------------------------<  169<H>  4.3   |  30  |  0.69    Ca    8.9      06 Jul 2022 10:23  Mg     2.6     07-06    TPro  6.7  /  Alb  2.6<L>  /  TBili  0.5  /  DBili  x   /  AST  89<H>  /  ALT  113<H>  /  AlkPhos  67  07-06        CAPILLARY BLOOD GLUCOSE            RADIOLOGY & ADDITIONAL TESTS:    Imaging  Reviewed:  YES/NO    Consultant(s) Notes Reviewed:   YES/ No      Plan of care was discussed with patient and /or primary care giver; all questions and concerns were addressed  NP Note discussed with  Primary Attending    Patient is a 62y old  Male who presents with a chief complaint of AHRF 2/2 COVID (07 Jul 2022 09:40)      INTERVAL HPI/OVERNIGHT EVENTS: no new complaints    MEDICATIONS  (STANDING):  atorvastatin 40 milliGRAM(s) Oral at bedtime  dexAMETHasone     Tablet 6 milliGRAM(s) Oral daily  enoxaparin Injectable 40 milliGRAM(s) SubCutaneous every 24 hours  LORazepam     Tablet 0.5 milliGRAM(s) Oral two times a day  OLANZapine 10 milliGRAM(s) Oral at bedtime  OLANZapine 5 milliGRAM(s) Oral daily  polyethylene glycol 3350 17 Gram(s) Oral daily  senna Syrup 5 milliLiter(s) Oral at bedtime  valproic  acid Syrup 250 milliGRAM(s) Oral two times a day    MEDICATIONS  (PRN):  acetaminophen     Tablet .. 650 milliGRAM(s) Oral every 6 hours PRN Temp greater or equal to 38C (100.4F), Mild Pain (1 - 3)      __________________________________________________  REVIEW OF SYSTEMS:    CONSTITUTIONAL: No fever,   EYES: no acute visual disturbances  NECK: No pain or stiffness  RESPIRATORY: No cough; No shortness of breath  CARDIOVASCULAR: No chest pain, no palpitations  GASTROINTESTINAL: No pain. No nausea or vomiting; No diarrhea   NEUROLOGICAL: No headache or numbness, no tremors  MUSCULOSKELETAL: No joint pain, no muscle pain  GENITOURINARY: no dysuria, no frequency, no hesitancy  PSYCHIATRY: no depression , no anxiety  ALL OTHER  ROS negative        Vital Signs Last 24 Hrs  T(C): 36.7 (07 Jul 2022 05:29), Max: 36.8 (06 Jul 2022 22:04)  T(F): 98 (07 Jul 2022 05:29), Max: 98.3 (06 Jul 2022 22:04)  HR: 64 (07 Jul 2022 05:29) (58 - 64)  BP: 107/70 (07 Jul 2022 05:29) (94/63 - 107/70)  BP(mean): 71 (06 Jul 2022 13:50) (71 - 71)  RR: 20 (07 Jul 2022 05:29) (20 - 22)  SpO2: 94% (07 Jul 2022 05:29) (94% - 97%)    ________________________________________________  PHYSICAL EXAM:  GENERAL: NAD  HEENT: Normocephalic; conjunctivae and sclerae clear; moist mucous membranes;   NECK : supple  CHEST/LUNG: Clear to auscultation bilaterally with good air entry   HEART: S1 S2  regular; no murmurs, gallops or rubs  ABDOMEN: peg tube site covering w/ dressing, stoma w/ pink tissue, no drainage or early sign of infection, Soft, Nontender, Nondistended; Bowel sounds present  EXTREMITIES: no cyanosis; no edema; no calf tenderness  SKIN: warm and dry; no rash  NERVOUS SYSTEM:  nonverbal at baseline, moving upper extremities equally,     _________________________________________________  LABS:                        13.3   6.16  )-----------( 275      ( 06 Jul 2022 10:23 )             40.4     07-06    145  |  110<H>  |  20<H>  ----------------------------<  169<H>  4.3   |  30  |  0.69    Ca    8.9      06 Jul 2022 10:23  Mg     2.6     07-06    TPro  6.7  /  Alb  2.6<L>  /  TBili  0.5  /  DBili  x   /  AST  89<H>  /  ALT  113<H>  /  AlkPhos  67  07-06        CAPILLARY BLOOD GLUCOSE            RADIOLOGY & ADDITIONAL TESTS:    Imaging  Reviewed:  YES/NO    Consultant(s) Notes Reviewed:   YES/ No      Plan of care was discussed with patient and /or primary care giver; all questions and concerns were addressed

## 2022-07-07 NOTE — PROGRESS NOTE ADULT - PROBLEM SELECTOR PLAN 4
UA +  c/w rocephin  ucx > 3 organisms contaminated

## 2022-07-07 NOTE — DISCHARGE NOTE NURSING/CASE MANAGEMENT/SOCIAL WORK - PATIENT PORTAL LINK FT
You can access the FollowMyHealth Patient Portal offered by [facility] by registering at the following website: [email or web address]. By joining [employer]’s FollowMyHealth portal, you will also be able to view your health information using other applications (apps) compatible with our system.

## 2022-07-07 NOTE — PROGRESS NOTE ADULT - SUBJECTIVE AND OBJECTIVE BOX
Time of Visit:  Patient seen and examined.     MEDICATIONS  (STANDING):  atorvastatin 40 milliGRAM(s) Oral at bedtime  cefuroxime   Tablet 250 milliGRAM(s) Oral every 12 hours  dexAMETHasone     Tablet 6 milliGRAM(s) Oral daily  enoxaparin Injectable 40 milliGRAM(s) SubCutaneous every 24 hours  LORazepam     Tablet 0.5 milliGRAM(s) Oral two times a day  OLANZapine 10 milliGRAM(s) Oral at bedtime  OLANZapine 5 milliGRAM(s) Oral daily  polyethylene glycol 3350 17 Gram(s) Oral daily  senna Syrup 5 milliLiter(s) Oral at bedtime  valproic  acid Syrup 250 milliGRAM(s) Oral two times a day      MEDICATIONS  (PRN):  acetaminophen     Tablet .. 650 milliGRAM(s) Oral every 6 hours PRN Temp greater or equal to 38C (100.4F), Mild Pain (1 - 3)       Medications up to date at time of exam.    ROS; No fever, chills, cough, congestion on exam.   PHYSICAL EXAMINATION:    Vital Signs Last 24 Hrs  T(C): 36.7 (07 Jul 2022 05:29), Max: 36.8 (06 Jul 2022 22:04)  T(F): 98 (07 Jul 2022 05:29), Max: 98.3 (06 Jul 2022 22:04)  HR: 64 (07 Jul 2022 05:29) (58 - 64)  BP: 107/70 (07 Jul 2022 05:29) (94/63 - 107/70)  BP(mean): 71 (06 Jul 2022 13:50) (71 - 71)  RR: 20 (07 Jul 2022 05:29) (20 - 22)  SpO2: 94% (07 Jul 2022 05:29) (94% - 97%)   (if applicable)    General: Non verbal . Poor historian . No acute distress.      HEENT: Head is normocephalic and atraumatic. No nasal tenderness. Mucous membranes are moist.     NECK: Supple, no palpable adenopathy.    LUNGS: Clear to auscultation bilaterally with no wheezing, rales, or rhonchi. No use of accessory muscle.      HEART: S1 S2 Regular rate and no click/ rub.     ABDOMEN: Soft, nontender, and nondistended.  Active bowel sounds.     ; No bladder distention and tenderness.     SKIN: Warm and moist . Non diaphoretic.       LABS:                        13.3   6.16  )-----------( 275      ( 06 Jul 2022 10:23 )             40.4     07-06    145  |  110<H>  |  20<H>  ----------------------------<  169<H>  4.3   |  30  |  0.69    Ca    8.9      06 Jul 2022 10:23  Mg     2.6     07-06    TPro  6.7  /  Alb  2.6<L>  /  TBili  0.5  /  DBili  x   /  AST  89<H>  /  ALT  113<H>  /  AlkPhos  67  07-06    MICROBIOLOGY: (if applicable)    RADIOLOGY & ADDITIONAL STUDIES:  EKG:   CXR:  ECHO:    IMPRESSION: 62y Male PAST MEDICAL & SURGICAL HISTORY:  CVA (cerebral vascular accident)      CVA (cerebral vascular accident)      HLD (hyperlipidemia)      S/P percutaneous endoscopic gastrostomy (PEG) tube placement    Impression; This is a 59 Y/O Male from [facility] , Non verbal as baseline mental status . Had a positive Covid 19 on 07-01-22 and received Monoclonal antibody . Positive PCR on 07-06-22 for Covid 19. Negative Blood Cx. x2. Has an acute hypoxic respiratory failure due to Covid 19 infection and on Oxygen supplementation.      Suggestion:   O2 saturation 94% with O2 supplement. Continue Oxygen supplementation 2L NC.  Isolation : contact and air borne   Decadron 6 mg/ day x 10 days then taper   No Remdesivir dur to elevated LFT   Aspiration precaution with HOB elevation.    Continue antibx for UTI   DVT GI prophylactic. On Lovenox 40 mg SQ Daily.

## 2022-07-07 NOTE — PROGRESS NOTE ADULT - PROBLEM SELECTOR PLAN 2
c/w isolation precautions  f/u repeat cxr  plan as above
c/w isolation precautions  f/u repeat cxr  decadron IV x 10 days- pulm recc  no remdesivir elevated LFTs  f/u inflammatory markers Q 72 hrs  plan as above
c/w isolation precautions  f/u repeat cxr  decadron IV x 10 days- pulm recc  no remdesivir elevated LFTs  f/u inflammatory markers Q 72 hrs  plan as above

## 2022-07-07 NOTE — PROGRESS NOTE ADULT - REASON FOR ADMISSION
AHRF 2/2 COVID

## 2022-07-07 NOTE — DISCHARGE NOTE PROVIDER - HOSPITAL COURSE
Mr. 62 year old male from [facility] with PMH of CVA, Alzheimer's, nonverbal at baseline, GERD is here for SOB. Patient is unable to provide any information. Per chart review, pt was diagnosed with COVID on 7/1 and receives monoclonal antibody.  Pt became hypoxic at the nursing home and was brought into the ED.    In the ED, he was afebrile, , /67, 89% on RA.  His wbc was wnl and his UA was positive w/ his cxr which showed b/l infiltrates.  He was admitted for AHRF 2/2 to COVID and was treated w/ Rocephin for UTI.  Did not require Remdesivir since he had received MAB as outpatient prior to hospitalization.      Pt was followed by pulm and he was treated with Decadron.  His conditions continue to improve and decision was made to d/c pt to complete course of Decadron and to complete 7 days course of Abx for UTI. Pt's emergency contact Dallas Arriola was contacted at [phone] and informed him of plan for D/C back to nursing and agreed.  All questions answer.

## 2022-07-07 NOTE — DISCHARGE NOTE PROVIDER - CARE PROVIDER_API CALL
Chela Willingham)  Internal Medicine  [address] [place] [address]  Phone: [phone]  Fax: [phone]  Follow Up Time:

## 2022-07-07 NOTE — PROGRESS NOTE ADULT - PROVIDER SPECIALTY LIST ADULT
Pulmonology
Internal Medicine

## 2022-07-07 NOTE — PROGRESS NOTE ADULT - PROBLEM SELECTOR PLAN 1
p/w SOB, found to be + for COVID on 7/1  s/p MAB at nursing home  Afebrile, no leukocytosis, 95% on 3L  CXR noted  c/w decadron IV x 10 days- pulm recc  f/u blood cx 7/3 prelim neg, urine cx contaminated  f/u repeat cxr from 7/5  0.64 procal.  pulm Dr hameed following p/w SOB, found to be + for COVID on 7/1  s/p MAB at nursing home  Afebrile, no leukocytosis, 95% on 3L  CXR noted  c/w decadron IV x 10 days- pulm recc  f/u blood cx 7/3 prelim neg, urine cx contaminated  f/u repeat cxr from 7/5 -focal left basilar opacities/infiltrate  No white counts, afebrile  0.64 procal.  pulm Dr hameed following

## 2022-07-07 NOTE — DISCHARGE NOTE PROVIDER - NSDCMRMEDTOKEN_GEN_ALL_CORE_FT
acetaminophen 325 mg oral tablet: 2 tab(s) orally every 6 hours, As needed, Temp greater or equal to 38C (100.4F), Mild Pain (1 - 3)  atorvastatin 40 mg oral tablet: 1 tab(s) orally once a day  cyanocobalamin 1000 mcg oral tablet: 1 tab(s) orally once a day  LORazepam 0.5 mg oral tablet: 1 tab(s) orally 2 times a day  Multiple Vitamins with Minerals oral liquid: 15 milliliter(s) orally once a day  mupirocin 2% topical ointment: Apply topically to affected area 3 times a day  nystatin 100,000 units/mL oral suspension: 1 milliliter(s) orally 3 times a day  OLANZapine 10 mg oral tablet: 1 tab(s) orally once a day (at bedtime)  OLANZapine 5 mg oral tablet: 1 tab(s) orally once a day in AM  polyethylene glycol 3350 oral powder for reconstitution: 17 gram(s) orally once a day, As needed, Constipation  senna 8.8 mg/5 mL oral syrup: 5 milliliter(s) orally once a day (at bedtime)  valproic acid 250 mg/5 mL oral liquid: 5 milliliter(s) orally 2 times a day   acetaminophen 325 mg oral tablet: 2 tab(s) orally every 6 hours, As needed, Temp greater or equal to 38C (100.4F), Mild Pain (1 - 3)  atorvastatin 40 mg oral tablet: 1 tab(s) orally once a day  cefuroxime 250 mg oral tablet: 1 tab(s) orally every 12 hours- to complete on 7/10.  cyanocobalamin 1000 mcg oral tablet: 1 tab(s) orally once a day  dexamethasone 6 mg oral tablet: 1 tab(s) orally once a day- Continue taking until 7/15. Then 4mg daily x 2 days, followed by 2mg daily x 2days, followed 1 mg daily x 2 days.   LORazepam 0.5 mg oral tablet: 1 tab(s) orally 2 times a day  Multiple Vitamins with Minerals oral liquid: 15 milliliter(s) orally once a day  OLANZapine 10 mg oral tablet: 1 tab(s) orally once a day (at bedtime)  OLANZapine 5 mg oral tablet: 1 tab(s) orally once a day in AM  polyethylene glycol 3350 oral powder for reconstitution: 17 gram(s) orally once a day, As needed, Constipation  senna 8.8 mg/5 mL oral syrup: 5 milliliter(s) orally once a day (at bedtime)  valproic acid 250 mg/5 mL oral liquid: 5 milliliter(s) orally 2 times a day

## 2022-07-07 NOTE — PROGRESS NOTE ADULT - ASSESSMENT
62 year old male from [facility] with PMH of CVA, Alzheimer's, nonverbal at baseline, GERD is here for SOB. Patient is unable to provide any information. Per chart review, pt was diagnosed with COVID on 7/1 and receives monoclonal antibody.  Pt became hypoxic at the nursing home and was brought into the ED. pt admitted for AHRF secondary to covid infection and UTI. pulmonary consulted Dr. hameed, started on decardron, f/u cultures and cxr. pt. on rocephin for + UA.       INCOMPLETE::::   62 year old male from [facility] with PMH of CVA, Alzheimer's, nonverbal at baseline, GERD is here for SOB. Patient is unable to provide any information. Per chart review, pt was diagnosed with COVID on 7/1 and receives monoclonal antibody.  Pt became hypoxic at the nursing home and was brought into the ED. pt admitted for AHRF secondary to covid infection and UTI. pulmonary consulted Dr. hameed, started on decardron, f/u cultures and cxr. pt. on rocephin for + UA.     D/c planning back to [facility], Ceftin to continue as outpatient and decadron taper per pulm recs.  Nephew Dallas Arriola [phone] updated of plan for d/c.

## 2022-08-03 ENCOUNTER — EMERGENCY (EMERGENCY)
Facility: HOSPITAL | Age: 62
LOS: 1 days | Discharge: HOME | End: 2022-08-03
Attending: EMERGENCY MEDICINE
Payer: MEDICARE

## 2022-08-03 VITALS
TEMPERATURE: 98 F | RESPIRATION RATE: 16 BRPM | DIASTOLIC BLOOD PRESSURE: 77 MMHG | WEIGHT: 130.07 LBS | OXYGEN SATURATION: 96 % | SYSTOLIC BLOOD PRESSURE: 114 MMHG | HEIGHT: 66 IN | HEART RATE: 68 BPM

## 2022-08-03 DIAGNOSIS — Z93.1 GASTROSTOMY STATUS: Chronic | ICD-10-CM

## 2022-08-03 PROCEDURE — 72125 CT NECK SPINE W/O DYE: CPT | Mod: 26,MA

## 2022-08-03 PROCEDURE — 71045 X-RAY EXAM CHEST 1 VIEW: CPT

## 2022-08-03 PROCEDURE — 12011 RPR F/E/E/N/L/M 2.5 CM/<: CPT

## 2022-08-03 PROCEDURE — 72170 X-RAY EXAM OF PELVIS: CPT | Mod: 26

## 2022-08-03 PROCEDURE — 71045 X-RAY EXAM CHEST 1 VIEW: CPT | Mod: 26

## 2022-08-03 PROCEDURE — 72125 CT NECK SPINE W/O DYE: CPT | Mod: MA

## 2022-08-03 PROCEDURE — 70450 CT HEAD/BRAIN W/O DYE: CPT | Mod: 26,MA

## 2022-08-03 PROCEDURE — 99284 EMERGENCY DEPT VISIT MOD MDM: CPT | Mod: 25

## 2022-08-03 PROCEDURE — 72170 X-RAY EXAM OF PELVIS: CPT

## 2022-08-03 PROCEDURE — 70450 CT HEAD/BRAIN W/O DYE: CPT | Mod: MA

## 2022-08-03 NOTE — ED PROVIDER NOTE - PATIENT PORTAL LINK FT
You can access the FollowMyHealth Patient Portal offered by [facility] by registering at the following website: [email or web address]. By joining [employer]’s FollowMyHealth portal, you will also be able to view your health information using other applications (apps) compatible with our system.

## 2022-08-03 NOTE — ED ADULT NURSE NOTE - OBJECTIVE STATEMENT
Pt BIBA from [facility] c/o laceration to right ear , s/p fall from wheelchair

## 2022-08-03 NOTE — ED PROVIDER NOTE - NSFOLLOWUPINSTRUCTIONS_ED_ALL_ED_FT
IMPORTANT INSTRUCTIONS FROM Dr. ERICKSON:    Return to ER in 7 days for removal of sutures.    Please follow up with your personal medical doctor in 24-48 hours.   Bring results from today to your visit.    If you were advised to take any medications - be sure to review the package insert.    If your symptoms change, get worse or if you have any new symptoms, come to the ER right away.  If you have any questions, call the ER at the phone number on this page.

## 2022-08-03 NOTE — ED PROVIDER NOTE - OBJECTIVE STATEMENT
62 year old male presents to the ED with complaints of slip and fall as per nursing home paperwork and striking the right-side of his ear. Patient is aphasiac and non-verbal. Patient's son is the health care decision  maker and is not answering phone despite multiple calls and voicemail being full.   NKDA.

## 2022-08-03 NOTE — ED PROVIDER NOTE - SKIN, MLM
right sided ear 2cm laceration through and through the helix and partially through and through the anti-helix. No further extension.

## 2022-08-03 NOTE — ED PROVIDER NOTE - CONSTITUTIONAL, MLM
Well appearing, awake, alert, oriented to person, place, time/situation and in no apparent distress. normal... Well appearing, awake, alert,   and in no apparent distress.

## 2022-08-03 NOTE — ED PROVIDER NOTE - CARE PLAN
1 Principal Discharge DX:	Laceration of ear, initial encounter  Assessment and plan of treatment:	right

## 2022-08-03 NOTE — ED PROVIDER NOTE - CPE EDP EYES NORM
Patient stated he already spoke to a Nurse and was already informed.   No further actions.   LXIONG3, MEDICAL ASSISTANT      normal...

## 2022-08-03 NOTE — ED PROVIDER NOTE - CLINICAL SUMMARY MEDICAL DECISION MAKING FREE TEXT BOX
Will get CT head and repair wound. Will also get chest and pelvic x-ray. Patient is on aspirin 81 as per review of documentation.

## 2022-08-03 NOTE — ED PROVIDER NOTE - CRANIAL NERVE AND PUPILLARY EXAM
cranial nerves 2-12 intact/cough reflex intact/corneal reflex intact/central and peripheral vision intact/central vision intact/peripheral vision intact/extra-ocular movements intact/gag reflex intact/tongue is midline

## 2022-08-03 NOTE — ED PROCEDURE NOTE - CPROC ED INFORMED CONSENT1
2 md, me and dr ceron by phone as pt aphasia and son not answering phone/Benefits, risks, and possible complications of procedure explained to patient/caregiver who verbalized understanding and gave verbal consent.

## 2022-08-21 NOTE — PROVIDER CONTACT NOTE (OTHER) - REASON
2 hour Post Rapid Response [Dear  ___] : Dear  [unfilled], [Consult Letter:] : I had the pleasure of evaluating your patient, [unfilled]. [Please see my note below.] : Please see my note below. [Consult Closing:] : Thank you very much for allowing me to participate in the care of this patient.  If you have any questions, please do not hesitate to contact me. [FreeTextEntry3] : Sincerely,\par \par Shahla Paula MD\par Pediatric Gastroenterology \par [facility]\par

## 2022-12-22 NOTE — PROGRESS NOTE ADULT - PROVIDER SPECIALTY LIST ADULT
Pharmacy is questioning if patient needs to have her tizanidine 4mg filled on 12/19 as she was also prescribed baclofen 5mg on 12/16. Please advise on what patient should be taking, thank you.   Rehab Medicine

## 2022-12-30 NOTE — OCCUPATIONAL THERAPY INITIAL EVALUATION ADULT - LEVEL OF INDEPENDENCE: SIT/SUPINE, REHAB EVAL
supervision Imiquimod Counseling:  I discussed with the patient the risks of imiquimod including but not limited to erythema, scaling, itching, weeping, crusting, and pain.  Patient understands that the inflammatory response to imiquimod is variable from person to person and was educated regarded proper titration schedule.  If flu-like symptoms develop, patient knows to discontinue the medication and contact us.

## 2023-09-07 NOTE — ED ADULT NURSE NOTE - ED STAT RN HANDOFF TIME 3
Quality 226: Preventive Care And Screening: Tobacco Use: Screening And Cessation Intervention: Patient screened for tobacco use and is an ex/non-smoker Detail Level: Detailed 16:13

## 2023-09-20 NOTE — PROGRESS NOTE ADULT - SUBJECTIVE AND OBJECTIVE BOX
Please call patient regarding her abnormal labs.   Your triglyceride level was elevated.   You will need to avoid fat and fried foods.   Increase your intake of fiber. Increase your exercise to 30 minutes 3-4 times a week.   Your overall cholesterol has improved and your triglycerides good job.  Your hemoglobin A1c is normal which is a 3-month average of your blood sugar.  Your thyroid level is normal  Your vitamin D is normal.  Your liver function, kidney function, and how much blood you have is normal.  Your platelet count is normal your white blood cell count which looks for infection is normal.  Follow-up in 6 months and as needed 24H hour events:     MEDICATIONS:  clopidogrel Tablet 75 milliGRAM(s) Oral daily  enoxaparin Injectable 40 milliGRAM(s) SubCutaneous daily        aspirin Suppository 300 milliGRAM(s) Rectal daily      atorvastatin 80 milliGRAM(s) Oral at bedtime    cyanocobalamin 1000 MICROGram(s) Oral daily  ergocalciferol 08649 Unit(s) Oral every week  multivitamin/minerals 1 Tablet(s) Oral daily  Nephro-deysi 1 Tablet(s) Oral daily  sodium chloride 0.45%. 1000 milliLiter(s) IV Continuous <Continuous>      REVIEW OF SYSTEMS:  Complete 10point ROS negative.    PHYSICAL EXAM:  T(C): 37.1 (02-13-20 @ 04:31), Max: 37.1 (02-12-20 @ 23:50)  HR: 50 (02-13-20 @ 04:31) (50 - 57)  BP: 134/79 (02-13-20 @ 04:31) (109/70 - 155/85)  RR: 18 (02-13-20 @ 04:31) (18 - 18)  SpO2: 97% (02-13-20 @ 04:31) (96% - 97%)  Wt(kg): --  I&O's Summary    12 Feb 2020 07:01  -  13 Feb 2020 07:00  --------------------------------------------------------  IN: 1440 mL / OUT: 1600 mL / NET: -160 mL        Appearance: Normal	  HEENT:   Normal oral mucosa, PERRL, EOMI	  Lymphatic: No lymphadenopathy  Cardiovascular: Normal S1 S2, No JVD, No murmurs, No edema  Respiratory: Lungs clear to auscultation	  Psychiatry: A & O x 3, Mood & affect appropriate  Gastrointestinal:  Soft, Non-tender, + BS	  Skin: No rashes, No ecchymoses, No cyanosis	  Neurologic: Non-focal  Extremities: Normal range of motion, No clubbing, cyanosis or edema  Vascular: Peripheral pulses palpable 2+ bilaterally        LABS:	 	              proBNP:   Lipid Profile:   HgA1c:   TSH:       CARDIAC MARKERS:          TELEMETRY: 	    ECG:  	  RADIOLOGY:  OTHER: 	    PREVIOUS DIAGNOSTIC TESTING:    [ ] Echocardiogram:    [ ]  Catheterization:  [ ] Stress Test:  	  	  ASSESSMENT/PLAN: 24H hour events:   Seen sitting oob to chair; more alert, nods head, no events overnight; unable to obtain ros as patient has expressive aphasia    MEDICATIONS:  clopidogrel Tablet 75 milliGRAM(s) Oral daily  enoxaparin Injectable 40 milliGRAM(s) SubCutaneous daily  aspirin Suppository 300 milliGRAM(s) Rectal daily  atorvastatin 80 milliGRAM(s) Oral at bedtime  cyanocobalamin 1000 MICROGram(s) Oral daily  ergocalciferol 59161 Unit(s) Oral every week  multivitamin/minerals 1 Tablet(s) Oral daily  Nephro-deysi 1 Tablet(s) Oral daily  sodium chloride 0.45%. 1000 milliLiter(s) IV Continuous <Continuous>      PHYSICAL EXAM:  T(C): 37.1 (02-13-20 @ 04:31), Max: 37.1 (02-12-20 @ 23:50)  HR: 50 (02-13-20 @ 04:31) (50 - 57)  BP: 134/79 (02-13-20 @ 04:31) (109/70 - 155/85)  RR: 18 (02-13-20 @ 04:31) (18 - 18)  SpO2: 97% (02-13-20 @ 04:31) (96% - 97%)  Wt(kg): --  I&O's Summary    12 Feb 2020 07:01  -  13 Feb 2020 07:00  --------------------------------------------------------  IN: 1440 mL / OUT: 1600 mL / NET: -160 mL        Appearance: Normal, in NAD  HEENT: Normocephalic, normal oral mucosa  Cardiovascular: Normal S1 S2, No JVD, No murmurs, No edema  Respiratory: Lungs clear to auscultation	  Psychiatry: awake, alert, expressive aphasia, non verbal  Gastrointestinal:  Soft, Non-tender, + BS	  Skin: No rashes, No ecchymoses, No cyanosis	  Extremities: Normal range of motion, No clubbing, cyanosis or edema  Vascular: Peripheral pulses palpable 2+ bilaterally      TELEMETRY: Sinus Bradycardy/NSR mostly 50-60's, briefly 44 at sleep  	    Study Date: 2/10/2020    Transthoracic echocardiogram with 2-D, M-Mode  and complete spectral andcolor flow Doppler.    Dimensions:    Normal Values:  LA:     3.7    2.0 - 4.0 cm  Ao:     3.5    2.0 - 3.8 cm  SEPTUM: 0.9    0.6 - 1.2 cm  PWT:    1.0    0.6 - 1.1 cm  LVIDd:  5.4    3.0 - 5.6 cm  LVIDs:  3.6    1.8 - 4.0 cm  Derived variables:  LVMI: 113 g/m2  RWT: 0.37  EF (Visual Estimate): 65 %  Doppler Peak Velocity (m/sec): AoV=1.1  ------------------------------------------------------------------------  Conclusions:  Normal left ventricular systolic function. No segmental  wall motion abnormalities.  Agitated saline injection and color flow Doppler  demonstrates no evidence of a patent foramen ovale.  ------------------------------------------------------------------------  Confirmed on  2/10/2020 - 17:24:02 by Anthony Townsend MD, FASE  ------------------------------------------------------------------------

## 2023-09-21 NOTE — PATIENT PROFILE ADULT - NS PRO AD NO ADVANCE DIRECTIVE
Sunscreen Recommendations  I recommended a broad spectrum sunscreen with a SPF of 30 or higher that is water-resistant. SPF 30 sunscreens block approximately 97 percent of the sun's harmful rays.   Sunscreens should be applied at least 15 minutes prior to expected sun exposure and then every 90 minutes after that as long as sun exposure continues.   If swimming or exercising sunscreen should be reapplied every 45 minutes to an hour after getting wet or sweating.  One ounce, or the equivalent of a shot glass full of sunscreen, is adequate to protect the skin not covered by a bathing suit.   I also recommended a lip balm with a sunscreen as well.   Healthy Sun Protective Behaviors  Sun protective clothing can be used in lieu of sunscreen but must be worn the entire time you are exposed to the sun's rays.  Seek shade between 10 a.m. and 2 p.m.  Use extra caution near water, snow, or sand as they reflect sun rays  Avoid tanning beds and consider sunless self-tanning products instead  Perform monthly self skin exams  The ABCDEs of Melanoma  Asymmetry - when one side is unlike the other  Border - irregular  Color - different shades of colors that can be black, brown, tan, white, red, grey, or blue  Diameter - as big as or larger than the size of a pencil eraser (6mm)  Evolving - changing in size, color, or shape or stands out from the rest of your moles    
No

## 2023-09-26 NOTE — DISCHARGE NOTE PROVIDER - NSDCHC_MEDRECSTATUS_GEN_ALL_CORE
----- Message from Lisa Ferguson sent at 9/25/2023  6:39 AM CDT -----  Regarding: Labs at our upcoming visit  Contact: [phone]  That’s will work - but will I have to come back to do the labs or will it be that day? I usually do it on the sand day.   
Called pt LONDON  And answered her question, and notified here that she will get labs done the same day of her appointment.   
Admission Reconciliation is Completed  Discharge Reconciliation is Completed

## 2023-12-28 ENCOUNTER — INPATIENT (INPATIENT)
Facility: HOSPITAL | Age: 63
LOS: 20 days | Discharge: NURSING, REHAB OR LONG-TERM CARE | DRG: 4 | End: 2024-01-18
Attending: INTERNAL MEDICINE | Admitting: INTERNAL MEDICINE
Payer: MEDICARE

## 2023-12-28 VITALS
WEIGHT: 139.99 LBS | RESPIRATION RATE: 16 BRPM | DIASTOLIC BLOOD PRESSURE: 54 MMHG | TEMPERATURE: 98 F | HEART RATE: 87 BPM | SYSTOLIC BLOOD PRESSURE: 89 MMHG | OXYGEN SATURATION: 95 %

## 2023-12-28 DIAGNOSIS — U07.1 COVID-19: ICD-10-CM

## 2023-12-28 DIAGNOSIS — Z93.1 GASTROSTOMY STATUS: Chronic | ICD-10-CM

## 2023-12-28 LAB
ALBUMIN SERPL ELPH-MCNC: 3.5 G/DL — SIGNIFICANT CHANGE UP (ref 3.5–5)
ALBUMIN SERPL ELPH-MCNC: 3.5 G/DL — SIGNIFICANT CHANGE UP (ref 3.5–5)
ALP SERPL-CCNC: 67 U/L — SIGNIFICANT CHANGE UP (ref 40–120)
ALP SERPL-CCNC: 67 U/L — SIGNIFICANT CHANGE UP (ref 40–120)
ALT FLD-CCNC: 40 U/L DA — SIGNIFICANT CHANGE UP (ref 10–60)
ALT FLD-CCNC: 40 U/L DA — SIGNIFICANT CHANGE UP (ref 10–60)
ANION GAP SERPL CALC-SCNC: 6 MMOL/L — SIGNIFICANT CHANGE UP (ref 5–17)
ANION GAP SERPL CALC-SCNC: 6 MMOL/L — SIGNIFICANT CHANGE UP (ref 5–17)
APTT BLD: 38.1 SEC — HIGH (ref 24.5–35.6)
APTT BLD: 38.1 SEC — HIGH (ref 24.5–35.6)
AST SERPL-CCNC: 90 U/L — HIGH (ref 10–40)
AST SERPL-CCNC: 90 U/L — HIGH (ref 10–40)
BASOPHILS # BLD AUTO: 0.04 K/UL — SIGNIFICANT CHANGE UP (ref 0–0.2)
BASOPHILS # BLD AUTO: 0.04 K/UL — SIGNIFICANT CHANGE UP (ref 0–0.2)
BASOPHILS NFR BLD AUTO: 0.3 % — SIGNIFICANT CHANGE UP (ref 0–2)
BASOPHILS NFR BLD AUTO: 0.3 % — SIGNIFICANT CHANGE UP (ref 0–2)
BILIRUB SERPL-MCNC: 0.7 MG/DL — SIGNIFICANT CHANGE UP (ref 0.2–1.2)
BILIRUB SERPL-MCNC: 0.7 MG/DL — SIGNIFICANT CHANGE UP (ref 0.2–1.2)
BUN SERPL-MCNC: 17 MG/DL — SIGNIFICANT CHANGE UP (ref 7–18)
BUN SERPL-MCNC: 17 MG/DL — SIGNIFICANT CHANGE UP (ref 7–18)
CALCIUM SERPL-MCNC: 9.4 MG/DL — SIGNIFICANT CHANGE UP (ref 8.4–10.5)
CALCIUM SERPL-MCNC: 9.4 MG/DL — SIGNIFICANT CHANGE UP (ref 8.4–10.5)
CHLORIDE SERPL-SCNC: 106 MMOL/L — SIGNIFICANT CHANGE UP (ref 96–108)
CHLORIDE SERPL-SCNC: 106 MMOL/L — SIGNIFICANT CHANGE UP (ref 96–108)
CO2 SERPL-SCNC: 28 MMOL/L — SIGNIFICANT CHANGE UP (ref 22–31)
CO2 SERPL-SCNC: 28 MMOL/L — SIGNIFICANT CHANGE UP (ref 22–31)
CREAT SERPL-MCNC: 1.17 MG/DL — SIGNIFICANT CHANGE UP (ref 0.5–1.3)
CREAT SERPL-MCNC: 1.17 MG/DL — SIGNIFICANT CHANGE UP (ref 0.5–1.3)
EGFR: 70 ML/MIN/1.73M2 — SIGNIFICANT CHANGE UP
EGFR: 70 ML/MIN/1.73M2 — SIGNIFICANT CHANGE UP
EOSINOPHIL # BLD AUTO: 0 K/UL — SIGNIFICANT CHANGE UP (ref 0–0.5)
EOSINOPHIL # BLD AUTO: 0 K/UL — SIGNIFICANT CHANGE UP (ref 0–0.5)
EOSINOPHIL NFR BLD AUTO: 0 % — SIGNIFICANT CHANGE UP (ref 0–6)
EOSINOPHIL NFR BLD AUTO: 0 % — SIGNIFICANT CHANGE UP (ref 0–6)
FLUAV AG NPH QL: SIGNIFICANT CHANGE UP
FLUAV AG NPH QL: SIGNIFICANT CHANGE UP
FLUBV AG NPH QL: SIGNIFICANT CHANGE UP
FLUBV AG NPH QL: SIGNIFICANT CHANGE UP
GAS PNL BLDA: SIGNIFICANT CHANGE UP
GAS PNL BLDA: SIGNIFICANT CHANGE UP
GLUCOSE SERPL-MCNC: 122 MG/DL — HIGH (ref 70–99)
GLUCOSE SERPL-MCNC: 122 MG/DL — HIGH (ref 70–99)
HCT VFR BLD CALC: 42.9 % — SIGNIFICANT CHANGE UP (ref 39–50)
HCT VFR BLD CALC: 42.9 % — SIGNIFICANT CHANGE UP (ref 39–50)
HGB BLD-MCNC: 14 G/DL — SIGNIFICANT CHANGE UP (ref 13–17)
HGB BLD-MCNC: 14 G/DL — SIGNIFICANT CHANGE UP (ref 13–17)
IMM GRANULOCYTES NFR BLD AUTO: 0.3 % — SIGNIFICANT CHANGE UP (ref 0–0.9)
IMM GRANULOCYTES NFR BLD AUTO: 0.3 % — SIGNIFICANT CHANGE UP (ref 0–0.9)
INR BLD: 1.16 RATIO — SIGNIFICANT CHANGE UP (ref 0.85–1.18)
INR BLD: 1.16 RATIO — SIGNIFICANT CHANGE UP (ref 0.85–1.18)
LYMPHOCYTES # BLD AUTO: 1.37 K/UL — SIGNIFICANT CHANGE UP (ref 1–3.3)
LYMPHOCYTES # BLD AUTO: 1.37 K/UL — SIGNIFICANT CHANGE UP (ref 1–3.3)
LYMPHOCYTES # BLD AUTO: 9.6 % — LOW (ref 13–44)
LYMPHOCYTES # BLD AUTO: 9.6 % — LOW (ref 13–44)
MCHC RBC-ENTMCNC: 29.1 PG — SIGNIFICANT CHANGE UP (ref 27–34)
MCHC RBC-ENTMCNC: 29.1 PG — SIGNIFICANT CHANGE UP (ref 27–34)
MCHC RBC-ENTMCNC: 32.6 GM/DL — SIGNIFICANT CHANGE UP (ref 32–36)
MCHC RBC-ENTMCNC: 32.6 GM/DL — SIGNIFICANT CHANGE UP (ref 32–36)
MCV RBC AUTO: 89.2 FL — SIGNIFICANT CHANGE UP (ref 80–100)
MCV RBC AUTO: 89.2 FL — SIGNIFICANT CHANGE UP (ref 80–100)
MONOCYTES # BLD AUTO: 0.47 K/UL — SIGNIFICANT CHANGE UP (ref 0–0.9)
MONOCYTES # BLD AUTO: 0.47 K/UL — SIGNIFICANT CHANGE UP (ref 0–0.9)
MONOCYTES NFR BLD AUTO: 3.3 % — SIGNIFICANT CHANGE UP (ref 2–14)
MONOCYTES NFR BLD AUTO: 3.3 % — SIGNIFICANT CHANGE UP (ref 2–14)
NEUTROPHILS # BLD AUTO: 12.38 K/UL — HIGH (ref 1.8–7.4)
NEUTROPHILS # BLD AUTO: 12.38 K/UL — HIGH (ref 1.8–7.4)
NEUTROPHILS NFR BLD AUTO: 86.5 % — HIGH (ref 43–77)
NEUTROPHILS NFR BLD AUTO: 86.5 % — HIGH (ref 43–77)
NRBC # BLD: 0 /100 WBCS — SIGNIFICANT CHANGE UP (ref 0–0)
NRBC # BLD: 0 /100 WBCS — SIGNIFICANT CHANGE UP (ref 0–0)
PLATELET # BLD AUTO: 258 K/UL — SIGNIFICANT CHANGE UP (ref 150–400)
PLATELET # BLD AUTO: 258 K/UL — SIGNIFICANT CHANGE UP (ref 150–400)
POTASSIUM SERPL-MCNC: 3.6 MMOL/L — SIGNIFICANT CHANGE UP (ref 3.5–5.3)
POTASSIUM SERPL-MCNC: 3.6 MMOL/L — SIGNIFICANT CHANGE UP (ref 3.5–5.3)
POTASSIUM SERPL-SCNC: 3.6 MMOL/L — SIGNIFICANT CHANGE UP (ref 3.5–5.3)
POTASSIUM SERPL-SCNC: 3.6 MMOL/L — SIGNIFICANT CHANGE UP (ref 3.5–5.3)
PROT SERPL-MCNC: 7.7 G/DL — SIGNIFICANT CHANGE UP (ref 6–8.3)
PROT SERPL-MCNC: 7.7 G/DL — SIGNIFICANT CHANGE UP (ref 6–8.3)
PROTHROM AB SERPL-ACNC: 13.2 SEC — HIGH (ref 9.5–13)
PROTHROM AB SERPL-ACNC: 13.2 SEC — HIGH (ref 9.5–13)
RBC # BLD: 4.81 M/UL — SIGNIFICANT CHANGE UP (ref 4.2–5.8)
RBC # BLD: 4.81 M/UL — SIGNIFICANT CHANGE UP (ref 4.2–5.8)
RBC # FLD: 13.6 % — SIGNIFICANT CHANGE UP (ref 10.3–14.5)
RBC # FLD: 13.6 % — SIGNIFICANT CHANGE UP (ref 10.3–14.5)
SARS-COV-2 RNA SPEC QL NAA+PROBE: DETECTED
SARS-COV-2 RNA SPEC QL NAA+PROBE: DETECTED
SODIUM SERPL-SCNC: 140 MMOL/L — SIGNIFICANT CHANGE UP (ref 135–145)
SODIUM SERPL-SCNC: 140 MMOL/L — SIGNIFICANT CHANGE UP (ref 135–145)
TROPONIN I, HIGH SENSITIVITY RESULT: 39.7 NG/L — SIGNIFICANT CHANGE UP
TROPONIN I, HIGH SENSITIVITY RESULT: 39.7 NG/L — SIGNIFICANT CHANGE UP
WBC # BLD: 14.31 K/UL — HIGH (ref 3.8–10.5)
WBC # BLD: 14.31 K/UL — HIGH (ref 3.8–10.5)
WBC # FLD AUTO: 14.31 K/UL — HIGH (ref 3.8–10.5)
WBC # FLD AUTO: 14.31 K/UL — HIGH (ref 3.8–10.5)

## 2023-12-28 PROCEDURE — 99222 1ST HOSP IP/OBS MODERATE 55: CPT

## 2023-12-28 PROCEDURE — 99285 EMERGENCY DEPT VISIT HI MDM: CPT

## 2023-12-28 PROCEDURE — 70450 CT HEAD/BRAIN W/O DYE: CPT | Mod: 26,MA

## 2023-12-28 PROCEDURE — 72125 CT NECK SPINE W/O DYE: CPT | Mod: 26,MA

## 2023-12-28 PROCEDURE — 72170 X-RAY EXAM OF PELVIS: CPT | Mod: 26

## 2023-12-28 PROCEDURE — 71045 X-RAY EXAM CHEST 1 VIEW: CPT | Mod: 26

## 2023-12-28 RX ORDER — REMDESIVIR 5 MG/ML
INJECTION INTRAVENOUS
Refills: 0 | Status: COMPLETED | OUTPATIENT
Start: 2023-12-28 | End: 2024-01-02

## 2023-12-28 RX ORDER — AZITHROMYCIN 500 MG/1
500 TABLET, FILM COATED ORAL EVERY 24 HOURS
Refills: 0 | Status: DISCONTINUED | OUTPATIENT
Start: 2023-12-29 | End: 2023-12-30

## 2023-12-28 RX ORDER — CEFTRIAXONE 500 MG/1
1000 INJECTION, POWDER, FOR SOLUTION INTRAMUSCULAR; INTRAVENOUS ONCE
Refills: 0 | Status: COMPLETED | OUTPATIENT
Start: 2023-12-28 | End: 2023-12-28

## 2023-12-28 RX ORDER — DEXAMETHASONE 0.5 MG/5ML
6 ELIXIR ORAL DAILY
Refills: 0 | Status: DISCONTINUED | OUTPATIENT
Start: 2023-12-28 | End: 2024-01-06

## 2023-12-28 RX ORDER — POLYETHYLENE GLYCOL 3350 17 G/17G
17 POWDER, FOR SOLUTION ORAL DAILY
Refills: 0 | Status: DISCONTINUED | OUTPATIENT
Start: 2023-12-28 | End: 2024-01-03

## 2023-12-28 RX ORDER — ASPIRIN/CALCIUM CARB/MAGNESIUM 324 MG
81 TABLET ORAL DAILY
Refills: 0 | Status: DISCONTINUED | OUTPATIENT
Start: 2023-12-29 | End: 2024-01-12

## 2023-12-28 RX ORDER — PREGABALIN 225 MG/1
1000 CAPSULE ORAL DAILY
Refills: 0 | Status: DISCONTINUED | OUTPATIENT
Start: 2023-12-29 | End: 2024-01-05

## 2023-12-28 RX ORDER — REMDESIVIR 5 MG/ML
200 INJECTION INTRAVENOUS EVERY 24 HOURS
Refills: 0 | Status: COMPLETED | OUTPATIENT
Start: 2023-12-28 | End: 2023-12-29

## 2023-12-28 RX ORDER — AZITHROMYCIN 500 MG/1
500 TABLET, FILM COATED ORAL ONCE
Refills: 0 | Status: COMPLETED | OUTPATIENT
Start: 2023-12-28 | End: 2023-12-28

## 2023-12-28 RX ORDER — CHOLECALCIFEROL (VITAMIN D3) 125 MCG
1000 CAPSULE ORAL DAILY
Refills: 0 | Status: DISCONTINUED | OUTPATIENT
Start: 2023-12-29 | End: 2024-01-05

## 2023-12-28 RX ORDER — DIVALPROEX SODIUM 500 MG/1
250 TABLET, DELAYED RELEASE ORAL EVERY 12 HOURS
Refills: 0 | Status: DISCONTINUED | OUTPATIENT
Start: 2023-12-28 | End: 2024-01-01

## 2023-12-28 RX ORDER — HALOPERIDOL DECANOATE 100 MG/ML
5 INJECTION INTRAMUSCULAR ONCE
Refills: 0 | Status: COMPLETED | OUTPATIENT
Start: 2023-12-28 | End: 2023-12-28

## 2023-12-28 RX ORDER — OLANZAPINE 15 MG/1
1 TABLET, FILM COATED ORAL
Qty: 0 | Refills: 0 | DISCHARGE

## 2023-12-28 RX ORDER — SENNA PLUS 8.6 MG/1
2 TABLET ORAL AT BEDTIME
Refills: 0 | Status: DISCONTINUED | OUTPATIENT
Start: 2023-12-28 | End: 2024-01-03

## 2023-12-28 RX ORDER — OLANZAPINE 15 MG/1
5 TABLET, FILM COATED ORAL EVERY 12 HOURS
Refills: 0 | Status: DISCONTINUED | OUTPATIENT
Start: 2023-12-28 | End: 2023-12-30

## 2023-12-28 RX ORDER — SODIUM CHLORIDE 9 MG/ML
1000 INJECTION, SOLUTION INTRAVENOUS ONCE
Refills: 0 | Status: DISCONTINUED | OUTPATIENT
Start: 2023-12-28 | End: 2024-01-01

## 2023-12-28 RX ORDER — MULTIVIT-MIN/FERROUS GLUCONATE 9 MG/15 ML
1 LIQUID (ML) ORAL DAILY
Refills: 0 | Status: DISCONTINUED | OUTPATIENT
Start: 2023-12-29 | End: 2024-01-05

## 2023-12-28 RX ORDER — ACETAMINOPHEN 500 MG
650 TABLET ORAL EVERY 6 HOURS
Refills: 0 | Status: DISCONTINUED | OUTPATIENT
Start: 2023-12-28 | End: 2024-01-12

## 2023-12-28 RX ORDER — VALPROIC ACID (AS SODIUM SALT) 250 MG/5ML
5 SOLUTION, ORAL ORAL
Qty: 0 | Refills: 0 | DISCHARGE

## 2023-12-28 RX ORDER — ATORVASTATIN CALCIUM 80 MG/1
20 TABLET, FILM COATED ORAL AT BEDTIME
Refills: 0 | Status: DISCONTINUED | OUTPATIENT
Start: 2023-12-28 | End: 2024-01-12

## 2023-12-28 RX ORDER — SODIUM CHLORIDE 9 MG/ML
1000 INJECTION, SOLUTION INTRAVENOUS ONCE
Refills: 0 | Status: COMPLETED | OUTPATIENT
Start: 2023-12-28 | End: 2023-12-28

## 2023-12-28 RX ORDER — ACETAMINOPHEN 500 MG
1000 TABLET ORAL ONCE
Refills: 0 | Status: COMPLETED | OUTPATIENT
Start: 2023-12-28 | End: 2023-12-28

## 2023-12-28 RX ORDER — CEFTRIAXONE 500 MG/1
1000 INJECTION, POWDER, FOR SOLUTION INTRAMUSCULAR; INTRAVENOUS EVERY 24 HOURS
Refills: 0 | Status: DISCONTINUED | OUTPATIENT
Start: 2023-12-29 | End: 2023-12-30

## 2023-12-28 RX ADMIN — SODIUM CHLORIDE 1000 MILLILITER(S): 9 INJECTION, SOLUTION INTRAVENOUS at 17:41

## 2023-12-28 RX ADMIN — CEFTRIAXONE 100 MILLIGRAM(S): 500 INJECTION, POWDER, FOR SOLUTION INTRAMUSCULAR; INTRAVENOUS at 17:41

## 2023-12-28 RX ADMIN — Medication 1000 MILLIGRAM(S): at 19:26

## 2023-12-28 RX ADMIN — SODIUM CHLORIDE 1000 MILLILITER(S): 9 INJECTION, SOLUTION INTRAVENOUS at 13:28

## 2023-12-28 RX ADMIN — Medication 400 MILLIGRAM(S): at 17:41

## 2023-12-28 RX ADMIN — HALOPERIDOL DECANOATE 5 MILLIGRAM(S): 100 INJECTION INTRAMUSCULAR at 17:41

## 2023-12-28 RX ADMIN — CEFTRIAXONE 1000 MILLIGRAM(S): 500 INJECTION, POWDER, FOR SOLUTION INTRAMUSCULAR; INTRAVENOUS at 19:26

## 2023-12-28 RX ADMIN — AZITHROMYCIN 255 MILLIGRAM(S): 500 TABLET, FILM COATED ORAL at 19:26

## 2023-12-28 NOTE — H&P ADULT - ATTENDING COMMENTS
Vital Signs Last 24 Hrs  T(C): 38.7 (28 Dec 2023 15:53), Max: 38.7 (28 Dec 2023 15:53)  T(F): 101.7 (28 Dec 2023 15:53), Max: 101.7 (28 Dec 2023 15:53)  HR: 108 (28 Dec 2023 15:53) (87 - 108)  BP: 119/64 (28 Dec 2023 15:53) (89/54 - 119/64)  RR: 17 (28 Dec 2023 15:53) (16 - 17)  SpO2: 95% (28 Dec 2023 15:53) (95% - 95%)  Parameters below as of 28 Dec 2023 15:53  Patient On (Oxygen Delivery Method): nasal cannula    Labs   wbc 14.3    Covid 19 - +ve    CT head/ c-spine   CT HEAD: No acute intracranial hemorrhage, mass effect, or osseous   fracture.  CT CERVICAL SPINE: No acute cervical spine fracture or traumatic   malalignment.    Impression  - Acute respiratory failure with hypoxia  - COVID 19 pneumonia   - Vital Signs Last 24 Hrs  T(C): 38.7 (28 Dec 2023 15:53), Max: 38.7 (28 Dec 2023 15:53)  T(F): 101.7 (28 Dec 2023 15:53), Max: 101.7 (28 Dec 2023 15:53)  HR: 108 (28 Dec 2023 15:53) (87 - 108)  BP: 119/64 (28 Dec 2023 15:53) (89/54 - 119/64)  RR: 17 (28 Dec 2023 15:53) (16 - 17)  SpO2: 95% (28 Dec 2023 15:53) (95% - 95%)  Parameters below as of 28 Dec 2023 15:53  Patient On (Oxygen Delivery Method): nasal cannula    Labs   wbc 14.3    Covid 19 - +ve    CXR report  slight right perihilar infiltrate and there is a left lower perihilar infiltrate. Infiltrates are new    CT head/ c-spine   CT HEAD: No acute intracranial hemorrhage, mass effect, or osseous   fracture.  CT CERVICAL SPINE: No acute cervical spine fracture or traumatic   malalignment.    Impression  - Acute respiratory failure with hypoxia  - COVID 19 pneumonia +/- superimposed bacterial pneumonia  - Sepsis   - Dementia  - remote CVA  - Fall/ generalized weakness    Plan   Admit to Medicine with respiratory isolation  Sepsis work up   Supplemental O2  Dexamethasone 6mg IV q daily   Remdesivir protocol   Fall precaution   Constant re-orientation   Limit lines and tubes   gentle IVF hydration Vital Signs Last 24 Hrs  T(C): 38.7 (28 Dec 2023 15:53), Max: 38.7 (28 Dec 2023 15:53)  T(F): 101.7 (28 Dec 2023 15:53), Max: 101.7 (28 Dec 2023 15:53)  HR: 108 (28 Dec 2023 15:53) (87 - 108)  BP: 119/64 (28 Dec 2023 15:53) (89/54 - 119/64)  RR: 17 (28 Dec 2023 15:53) (16 - 17)  SpO2: 95% (28 Dec 2023 15:53) (95% - 95%)  Parameters below as of 28 Dec 2023 15:53  Patient On (Oxygen Delivery Method): nasal cannula    Labs   wbc 14.3    Covid 19 - +ve    CXR report  slight right perihilar infiltrate and there is a left lower perihilar infiltrate. Infiltrates are new    CT head/ c-spine   CT HEAD: No acute intracranial hemorrhage, mass effect, or osseous   fracture.  CT CERVICAL SPINE: No acute cervical spine fracture or traumatic   malalignment.    Impression  - Acute respiratory failure with hypoxia  - COVID 19 pneumonia +/- superimposed bacterial pneumonia  - Sepsis   - Dementia  - remote CVA  - Fall/ generalized weakness    Plan   Admit to Medicine with respiratory isolation  Sepsis work up   Supplemental O2  Dexamethasone 6mg IV q daily   Remdesivir protocol   Trial of antibiotics for CAP   Fall precaution   Constant re-orientation   Limit lines and tubes   gentle IVF hydration  Other plans as above

## 2023-12-28 NOTE — H&P ADULT - HISTORY OF PRESENT ILLNESS
A 63 year old male, from [facility], with PMH of CVA, Alzheimer's, nonverbal at baseline, GERD, Schizophrenia, and Constipation, was brought into the ED s/p mechanical fall, poor oral intake, and Covid-19 infection on 12/27/23 as per NH papers. Unable to obtain history from patient since he is nonverbal, history obtained from chart review.  A 63 year old male, from [facility], with PMH of CVA, Alzheimer's, nonverbal at baseline, GERD, Schizophrenia, and Constipation, was brought into the ED s/p mechanical fall, poor oral intake, and Covid-19 infection on 12/27/23 as per NH papers. Unable to obtain history from patient since he is nonverbal, history obtained from chart review.

## 2023-12-28 NOTE — ED PROVIDER NOTE - OBJECTIVE STATEMENT
63-year-old male with past medical history Alzheimer's, CVA, nonverbal at baseline, GERD presents with failure to thrive.  Unable to obtain history from patient's, history obtained from chart review and collateral.  Per nursing facility paperwork, patient COVID-positive from 12/27/2023 on contact droplet precautions, status post fall 12/27/2023, presenting with poor p.o. intake.  Unable to obtain further history from patient.

## 2023-12-28 NOTE — H&P ADULT - ASSESSMENT
A 63 year old male, from [facility], with PMH of CVA, Alzheimer's, nonverbal at baseline, GERD, Schizophrenia, and Constipation, was brought into the ED s/p mechanical fall, poor oral intake, and Covid-19 infection on 12/27/23 as per NH papers. Admitted to medicine for sepsis and AHRF likely secondary to Covid-19 infection and/or superimposed PNA.  A 63 year old male, from [facility], with PMH of CVA, Alzheimer's, nonverbal at baseline, GERD, Schizophrenia, and Constipation, was brought into the ED s/p mechanical fall, poor oral intake, and Covid-19 infection on 12/27/23 as per NH papers. Admitted to medicine for sepsis and AHRF likely secondary to Covid-19 infection and/or superimposed PNA.

## 2023-12-28 NOTE — ED PROVIDER NOTE - PROGRESS NOTE DETAILS
Lucks-DO: pt unable to sit still for CT, unable to verbally redirect, IM med ordered for patient safety/imaging. ADDENDUM by Antonella Rossi M.D.: I received sign-off from Dr. AUGUSTUS Hinds. 63-year-old male with history of dementia, CVA, presents with hypoxia and hypotension in the setting of COVID, improved with IV fluids and nasal cannula, had a fall, I was to follow-up CT head and admit. On assessment, patient appearance of chronic illness, no WOB/tachypnea, lungs CTAB in anterior fields, skin warm/dry, RRR. Patient opening eyes and resists movement of his extremities but otherwise not participatory. Pending CTs. CT is unremarkable. Discussed with Dr. Bañuelos who requests admission to Dr. Lott with whom he will communicate

## 2023-12-28 NOTE — ED ADULT TRIAGE NOTE - NURSING HOMES
[facility], [facility], [facility]

## 2023-12-28 NOTE — ED PROVIDER NOTE - HOW PATIENT ADDRESSED, PROFILE
January 10, 2022       Nishant Jefferson MD  [address]  [address]  [facility] [address]  Via In Basket      Patient: Ellie Sullivan   YOB: 1948   Date of Visit: 1/10/2022       Dear Dr. Jefferson:    Thank you for referring Ellie Sullivan to me for evaluation. Below are my notes for this visit with him.    If you have questions, please do not hesitate to call me. I look forward to following your patient along with you.      Sincerely,        Génesis Stevenson MD        CC: No Recipients  Génesis Stevenson MD  1/10/2022 12:09 PM  Signed  CARDIOLOGY FOLLOW-UP NOTE      Patient: Ellie Sullivan  MRN: [id number]    : 1948   PCP: Nishant Jefferson MD     CHIEF COMPLAINT  Chief Complaint   Patient presents with   • Follow-up         HISTORY OF PRESENT ILLNESS    In cardiology clinic for evaluation management of cardiovascular disorder.  Patient is along with his wife in the office  Patient recently underwent successful surgical revascularization for severe three-vessel coronary disease at [facility] for his markedly abnormal stress test.  He has a normal LV ejection fraction.  He has a uncomplicated postoperative course and subsequently was discharged home.  Since has been discharged home excess capacity is slowly gradually improving.  Denies chest pain, palpitations, dyspnea or dyspnea on exertion, syncope or presyncope  Complaint with hismedications,diet and exercise      Past Medical History:   Diagnosis Date   • Diabetes mellitus (CMS/HCC)    • Essential (primary) hypertension          Past Surgical History:   Procedure Laterality Date   • Coronary artery bypass graft  12/22/2021    x 4 with LIMA         History reviewed. No pertinent family history.      Social History     Socioeconomic History   • Marital status: /Civil Union     Spouse name: Not on file   • Number of children: Not on file   • Years of education: Not on file   • Highest education level: Not on file    Occupational History   • Not on file   Tobacco Use   • Smoking status: Never Smoker   • Smokeless tobacco: Never Used   Substance and Sexual Activity   • Alcohol use: Never   • Drug use: Never   • Sexual activity: Not on file   Other Topics Concern   • Not on file   Social History Narrative   • Not on file     Social Determinants of Health     Financial Resource Strain:    • Social Determinants: Financial Resource Strain: Not on file   Food Insecurity:    • Social Determinants: Food Insecurity: Not on file   Transportation Needs:    • Lack of Transportation (Medical): Not on file   • Lack of Transportation (Non-Medical): Not on file   Physical Activity:    • Days of Exercise per Week: Not on file   • Minutes of Exercise per Session: Not on file   Stress:    • Social Determinants: Stress: Not on file   Social Connections:    • Social Determinants: Social Connections: Not on file   Intimate Partner Violence: Not At Risk   • Social Determinants: Intimate Partner Violence Past Fear: No   • Social Determinants: Intimate Partner Violence Current Fear: No        ALLERGIES  ALLERGIES:  No Known Allergies    MEDICATIONS  has a current medication list which includes the following prescription(s): metoprolol succinate, tamsulosin, losartan, aspirin, vitamin d3, cyanocobalamin, janumet, repaglinide, tresiba, and rosuvastatin.    REVIEW OF SYSTEMS   Review of Systems   Constitutional: Negative for chills, fever and unexpected weight change.   HENT: Negative for congestion, facial swelling and sore throat.    Eyes: Negative for photophobia and visual disturbance.   Respiratory: Negative for cough, shortness of breath and wheezing.    Cardiovascular: Negative for chest pain, palpitations and leg swelling.        No claudication   Gastrointestinal: Negative for abdominal pain, blood in stool and vomiting.   Endocrine: Negative for cold intolerance, heat intolerance, polydipsia, polyphagia and polyuria.   Genitourinary: Negative  for dysuria, hematuria and urgency.   Musculoskeletal: Negative for myalgias.   Skin: Negative for pallor and rash.   Neurological: Negative for seizures, syncope, light-headedness and headaches.   Hematological: Does not bruise/bleed easily.   Psychiatric/Behavioral: Negative for sleep disturbance and suicidal ideas.        No depression       PHYSICAL EXAM  Blood pressure 90/48, pulse 64, height 5' 6\" (1.676 m), weight 60.3 kg (133 lb).     Physical Exam  Constitutional:       Appearance: He is well-developed.   HENT:      Head: Normocephalic and atraumatic.   Eyes:      Conjunctiva/sclera: Conjunctivae normal.      Pupils: Pupils are equal, round, and reactive to light.   Neck:      Thyroid: No thyromegaly.      Vascular: No JVD.      Trachea: No tracheal deviation.   Cardiovascular:      Rate and Rhythm: Normal rate and regular rhythm.      Heart sounds: Normal heart sounds. No murmur heard.  No friction rub. No gallop.    Pulmonary:      Effort: Pulmonary effort is normal.      Breath sounds: Normal breath sounds. No stridor. No rales.   Chest:      Comments: Well-healed midline surgical scar yousif from bypass surgery  Abdominal:      General: Bowel sounds are normal.      Palpations: Abdomen is soft. There is no mass.      Tenderness: There is no abdominal tenderness.   Musculoskeletal:         General: Normal range of motion.      Cervical back: Neck supple.   Skin:     General: Skin is warm.      Findings: No erythema or rash.   Neurological:      Mental Status: He is alert and oriented to person, place, and time.      Cranial Nerves: No cranial nerve deficit.      Deep Tendon Reflexes: Reflexes are normal and symmetric.         LABORATORY  I have reviewed the pertinent laboratory tests. These are the pertinent findings:    Sodium (mmol/L)   Date Value   12/28/2021 138     Potassium (mmol/L)   Date Value   12/28/2021 4.6     Chloride (mmol/L)   Date Value   12/28/2021 107     Glucose (mg/dL)   Date Value    12/28/2021 96     Calcium (mg/dL)   Date Value   12/28/2021 8.6     Carbon Dioxide (mmol/L)   Date Value   12/28/2021 27     BUN (mg/dL)   Date Value   12/28/2021 19     Creatinine (mg/dL)   Date Value   12/28/2021 1.11     WBC (K/mcL)   Date Value   12/28/2021 6.3     RBC (mil/mcL)   Date Value   12/28/2021 3.40 (L)     HCT (%)   Date Value   12/28/2021 28.0 (L)     HGB (g/dL)   Date Value   12/28/2021 8.9 (L)     PLT (K/mcL)   Date Value   12/28/2021 273     Cholesterol (mg/dL)   Date Value   12/22/2021 124     HDL (mg/dL)   Date Value   12/22/2021 59     Cholesterol/ HDL Ratio (no units)   Date Value   12/22/2021 2.1     Triglycerides (mg/dL)   Date Value   12/22/2021 76     LDL (mg/dL)   Date Value   12/22/2021 50         IMAGING  I have reviewed the pertinent imaging study reports. These are the pertinent findings:        ASSESSMENT/PLAN & RECOMMENDATIONS  Ellie was seen today for follow-up.    Diagnoses and all orders for this visit:    Atherosclerosis of native coronary artery of native heart without angina pectoris   -Status post coronary artery bypass surgery LIMA to LAD, vein graft to diagonal, and sequential  vein graft to  OM in the right PDA   -Normal LV ejection fraction    Dyslipidemia   -On statins    Essential hypertension    Other orders  -     CBC WITH DIFFERENTIAL; Future  -     SERVICE TO OUTPATIENT CARDIAC REHAB  -     ECHOCARDIOGRAM STRESS TEST W/ W/O IMAGING AGENT; Future  -     metoPROLOL succinate (TOPROL-XL) 25 MG 24 hr tablet; Take 1 tablet by mouth daily.    Diabetes mellitus   -Follow-up with endocrinologist    Recommendations    Continue current medical regimen  Discontinue colchicine.  Change metoprolol to tartrate to metoprolol XL.  We will obtain modified Enrrique protocol stress test and enroll patient to cardiac rehab phase 2  Risk factors and lifestyle modification  Regular exercise and weight reduction  Followup visit 6 months or sooner if needed    Plan of care discussed at  length with the patient and wife in the office    Thanks       Génesis Stevenson MD, BARBARA, FACC  1/10/2022                  Pt AMS; unable to respond

## 2023-12-28 NOTE — H&P ADULT - PROBLEM SELECTOR PLAN 1
Likely secondary to Covid-19 infection and/or superimposed PNA  AHRF  CXR showing mild b/l infiltrates   Will start on Dexamethasone IV for 10 doses  Remdesivir  Isolation precautions   Will start Ceftriaxone 1g IV and Azithromycin 500 mg IV for now   F/U Strep  F/U Mycoplasma  F/U Legionella  Tylenol PRN  Wean of oxygen as tolerated   Incentive spirometer   Aspiration and fall precautions   F/U Blood culture   F/U UA

## 2023-12-28 NOTE — H&P ADULT - NSHPPHYSICALEXAM_GEN_ALL_CORE
PHYSICAL EXAMINATION:  GENERAL: NAD, wearing NC, seems comfortable in bed  HEAD:  Atraumatic, Normocephalic  EYES:  Conjunctiva and sclera clear, pupils are equal, round, and reactive to light and accommodation.  NECK: Supple, No JVD, trachea is midline, no evidence of thyroid enlargement, no lymphadenopathy or tenderness.  CHEST/LUNG: minimal crackles on bases bilaterally, no wheezing, no rubs  HEART: Regular rate and rhythm; No murmurs, rubs, or gallops  ABDOMEN: Soft, Nontender, Nondistended; Bowel sounds present, s/p peg tube wound, open, red, w/o discharge or malodorous, around 1 cm   NERVOUS SYSTEM:  Alert, non verbal; patient staring around, moving all his extremities but does not engage in one or two step command, unable to asses further  EXTREMITIES:  2+ Peripheral Pulses, No clubbing, cyanosis, or edema  SKIN: Warm, dry, and well perfused; Good turgor; No lesions, nodules or rashes are noted.    Vital Signs Last 24 Hrs  T(C): 36.4 (29 Dec 2023 00:22), Max: 38.7 (28 Dec 2023 15:53)  T(F): 97.6 (29 Dec 2023 00:22), Max: 101.7 (28 Dec 2023 15:53)  HR: 58 (29 Dec 2023 00:22) (58 - 108)  BP: 97/63 (29 Dec 2023 00:22) (89/54 - 119/64)  BP(mean): 74 (29 Dec 2023 00:22) (74 - 74)  RR: 17 (29 Dec 2023 00:22) (16 - 17)  SpO2: 95% (29 Dec 2023 00:22) (95% - 95%)    Parameters below as of 29 Dec 2023 00:22  Patient On (Oxygen Delivery Method): nasal cannula  O2 Flow (L/min): 2

## 2023-12-28 NOTE — ED PROVIDER NOTE - PHYSICAL EXAMINATION
CONSTITUTIONAL: non-toxic, well appearing, + airway intact  SKIN: no rash, no petechiae. no ecchymosis, no lacerations  EYES: PERRL, EOMI,  ENT: no hemotympanum, tongue midline, dry mucous membranes  NECK: Supple; no cervical-thoracic-lumbar spine tenderness  CARD: RRR, equal radial pulses bilaterally 2+  RESP: CTAB, no respiratory distress, no crepitus over chest wall  ABD: Soft, non-tender, non-distended  PELVIS: stable  EXT: No bony tenderness, equal strength bilaterally  NEURO: Alert, moving all extremities

## 2023-12-28 NOTE — PHARMACOTHERAPY INTERVENTION NOTE - COMMENTS
Patient is from [facility] and their medical record was used to update the outpatient medication review. Patient is from [facility] and their medical record was used to update the outpatient medication review.

## 2023-12-28 NOTE — ED ADULT TRIAGE NOTE - CHIEF COMPLAINT QUOTE
c/o failure to thrive from nursing home, s/p fall last night, no obvious deformity noted, lethargy, (+) covid

## 2023-12-28 NOTE — H&P ADULT - PROBLEM SELECTOR PLAN 7
CTH negative for acute findings   Noted to ambulate 40ft using RW w/ at least 1 person using a gait belt and to follow w/ W/C BID for a total of 15 mins  Suspect FTT in the setting of recent illness and multiple comorbidities such as CVA  ***MORNING TEAM TO GATHER COLLATERAL FROM NH***  Bedrest for now  Fall precautions   F/U TSH

## 2023-12-28 NOTE — ED PROVIDER NOTE - CLINICAL SUMMARY MEDICAL DECISION MAKING FREE TEXT BOX
Jerardo: 63-year-old male with past medical history Alzheimer's, CVA, nonverbal at baseline, GERD presents with failure to thrive.  Unable to obtain history from patient's, history obtained from chart review and collateral.  Per nursing facility paperwork, patient COVID-positive from 12/27/2023 on contact droplet precautions, status post fall 12/27/2023, presenting with poor p.o. intake.  Unable to obtain further history from patient. Physical exam per above. No injuries noted on exam. Patient hypotensive on arrival, saturating 90% on RA requiring 2L NC. Will obtain labs, imaging, provide supportive treatment with dispo pending workup.

## 2023-12-28 NOTE — ED PROVIDER NOTE - CARE PLAN
Principal Discharge DX:	2019 novel coronavirus disease (COVID-19)  Secondary Diagnosis:	Hypoxia  Secondary Diagnosis:	Fall, initial encounter  Secondary Diagnosis:	Transient hypotension   1 Principal Discharge DX:	2019 novel coronavirus disease (COVID-19)  Secondary Diagnosis:	Hypoxia  Secondary Diagnosis:	Fall, initial encounter  Secondary Diagnosis:	Transient hypotension  Secondary Diagnosis:	Pneumonia

## 2023-12-29 DIAGNOSIS — F20.9 SCHIZOPHRENIA, UNSPECIFIED: ICD-10-CM
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
## 2023-12-29 DIAGNOSIS — Z29.9 ENCOUNTER FOR PROPHYLACTIC MEASURES, UNSPECIFIED: ICD-10-CM

## 2023-12-29 DIAGNOSIS — A41.9 SEPSIS, UNSPECIFIED ORGANISM: ICD-10-CM

## 2023-12-29 DIAGNOSIS — J96.01 ACUTE RESPIRATORY FAILURE WITH HYPOXIA: ICD-10-CM

## 2023-12-29 DIAGNOSIS — K59.00 CONSTIPATION, UNSPECIFIED: ICD-10-CM

## 2023-12-29 DIAGNOSIS — U07.1 COVID-19: ICD-10-CM

## 2023-12-29 DIAGNOSIS — J18.9 PNEUMONIA, UNSPECIFIED ORGANISM: ICD-10-CM

## 2023-12-29 LAB
A1C WITH ESTIMATED AVERAGE GLUCOSE RESULT: 6 % — HIGH (ref 4–5.6)
A1C WITH ESTIMATED AVERAGE GLUCOSE RESULT: 6 % — HIGH (ref 4–5.6)
ALBUMIN SERPL ELPH-MCNC: 2.8 G/DL — LOW (ref 3.5–5)
ALBUMIN SERPL ELPH-MCNC: 2.8 G/DL — LOW (ref 3.5–5)
ALP SERPL-CCNC: 54 U/L — SIGNIFICANT CHANGE UP (ref 40–120)
ALP SERPL-CCNC: 54 U/L — SIGNIFICANT CHANGE UP (ref 40–120)
ALT FLD-CCNC: 39 U/L DA — SIGNIFICANT CHANGE UP (ref 10–60)
ALT FLD-CCNC: 39 U/L DA — SIGNIFICANT CHANGE UP (ref 10–60)
ANION GAP SERPL CALC-SCNC: 6 MMOL/L — SIGNIFICANT CHANGE UP (ref 5–17)
ANION GAP SERPL CALC-SCNC: 6 MMOL/L — SIGNIFICANT CHANGE UP (ref 5–17)
APPEARANCE UR: CLEAR — SIGNIFICANT CHANGE UP
APPEARANCE UR: CLEAR — SIGNIFICANT CHANGE UP
APTT BLD: 35.2 SEC — SIGNIFICANT CHANGE UP (ref 24.5–35.6)
APTT BLD: 35.2 SEC — SIGNIFICANT CHANGE UP (ref 24.5–35.6)
AST SERPL-CCNC: 75 U/L — HIGH (ref 10–40)
AST SERPL-CCNC: 75 U/L — HIGH (ref 10–40)
BASOPHILS # BLD AUTO: 0.03 K/UL — SIGNIFICANT CHANGE UP (ref 0–0.2)
BASOPHILS # BLD AUTO: 0.03 K/UL — SIGNIFICANT CHANGE UP (ref 0–0.2)
BASOPHILS NFR BLD AUTO: 0.6 % — SIGNIFICANT CHANGE UP (ref 0–2)
BASOPHILS NFR BLD AUTO: 0.6 % — SIGNIFICANT CHANGE UP (ref 0–2)
BILIRUB SERPL-MCNC: 0.4 MG/DL — SIGNIFICANT CHANGE UP (ref 0.2–1.2)
BILIRUB SERPL-MCNC: 0.4 MG/DL — SIGNIFICANT CHANGE UP (ref 0.2–1.2)
BILIRUB UR-MCNC: NEGATIVE — SIGNIFICANT CHANGE UP
BILIRUB UR-MCNC: NEGATIVE — SIGNIFICANT CHANGE UP
BUN SERPL-MCNC: 14 MG/DL — SIGNIFICANT CHANGE UP (ref 7–18)
BUN SERPL-MCNC: 14 MG/DL — SIGNIFICANT CHANGE UP (ref 7–18)
CALCIUM SERPL-MCNC: 8.3 MG/DL — LOW (ref 8.4–10.5)
CALCIUM SERPL-MCNC: 8.3 MG/DL — LOW (ref 8.4–10.5)
CHLORIDE SERPL-SCNC: 107 MMOL/L — SIGNIFICANT CHANGE UP (ref 96–108)
CHLORIDE SERPL-SCNC: 107 MMOL/L — SIGNIFICANT CHANGE UP (ref 96–108)
CHOLEST SERPL-MCNC: 93 MG/DL — SIGNIFICANT CHANGE UP
CHOLEST SERPL-MCNC: 93 MG/DL — SIGNIFICANT CHANGE UP
CO2 SERPL-SCNC: 29 MMOL/L — SIGNIFICANT CHANGE UP (ref 22–31)
CO2 SERPL-SCNC: 29 MMOL/L — SIGNIFICANT CHANGE UP (ref 22–31)
COLOR SPEC: YELLOW — SIGNIFICANT CHANGE UP
COLOR SPEC: YELLOW — SIGNIFICANT CHANGE UP
CREAT SERPL-MCNC: 0.98 MG/DL — SIGNIFICANT CHANGE UP (ref 0.5–1.3)
CREAT SERPL-MCNC: 0.98 MG/DL — SIGNIFICANT CHANGE UP (ref 0.5–1.3)
DIFF PNL FLD: NEGATIVE — SIGNIFICANT CHANGE UP
DIFF PNL FLD: NEGATIVE — SIGNIFICANT CHANGE UP
EGFR: 86 ML/MIN/1.73M2 — SIGNIFICANT CHANGE UP
EGFR: 86 ML/MIN/1.73M2 — SIGNIFICANT CHANGE UP
EOSINOPHIL # BLD AUTO: 0 K/UL — SIGNIFICANT CHANGE UP (ref 0–0.5)
EOSINOPHIL # BLD AUTO: 0 K/UL — SIGNIFICANT CHANGE UP (ref 0–0.5)
EOSINOPHIL NFR BLD AUTO: 0 % — SIGNIFICANT CHANGE UP (ref 0–6)
EOSINOPHIL NFR BLD AUTO: 0 % — SIGNIFICANT CHANGE UP (ref 0–6)
ESTIMATED AVERAGE GLUCOSE: 126 MG/DL — HIGH (ref 68–114)
ESTIMATED AVERAGE GLUCOSE: 126 MG/DL — HIGH (ref 68–114)
GLUCOSE SERPL-MCNC: 95 MG/DL — SIGNIFICANT CHANGE UP (ref 70–99)
GLUCOSE SERPL-MCNC: 95 MG/DL — SIGNIFICANT CHANGE UP (ref 70–99)
GLUCOSE UR QL: NEGATIVE MG/DL — SIGNIFICANT CHANGE UP
GLUCOSE UR QL: NEGATIVE MG/DL — SIGNIFICANT CHANGE UP
HCT VFR BLD CALC: 39.3 % — SIGNIFICANT CHANGE UP (ref 39–50)
HCT VFR BLD CALC: 39.3 % — SIGNIFICANT CHANGE UP (ref 39–50)
HDLC SERPL-MCNC: 60 MG/DL — SIGNIFICANT CHANGE UP
HDLC SERPL-MCNC: 60 MG/DL — SIGNIFICANT CHANGE UP
HGB BLD-MCNC: 12.7 G/DL — LOW (ref 13–17)
HGB BLD-MCNC: 12.7 G/DL — LOW (ref 13–17)
IMM GRANULOCYTES NFR BLD AUTO: 0.4 % — SIGNIFICANT CHANGE UP (ref 0–0.9)
IMM GRANULOCYTES NFR BLD AUTO: 0.4 % — SIGNIFICANT CHANGE UP (ref 0–0.9)
INR BLD: 1.13 RATIO — SIGNIFICANT CHANGE UP (ref 0.85–1.18)
INR BLD: 1.13 RATIO — SIGNIFICANT CHANGE UP (ref 0.85–1.18)
KETONES UR-MCNC: ABNORMAL MG/DL
KETONES UR-MCNC: ABNORMAL MG/DL
LACTATE SERPL-SCNC: 0.8 MMOL/L — SIGNIFICANT CHANGE UP (ref 0.7–2)
LACTATE SERPL-SCNC: 0.8 MMOL/L — SIGNIFICANT CHANGE UP (ref 0.7–2)
LDH SERPL L TO P-CCNC: 212 U/L — SIGNIFICANT CHANGE UP (ref 120–225)
LDH SERPL L TO P-CCNC: 212 U/L — SIGNIFICANT CHANGE UP (ref 120–225)
LEUKOCYTE ESTERASE UR-ACNC: NEGATIVE — SIGNIFICANT CHANGE UP
LEUKOCYTE ESTERASE UR-ACNC: NEGATIVE — SIGNIFICANT CHANGE UP
LIPID PNL WITH DIRECT LDL SERPL: 22 MG/DL — SIGNIFICANT CHANGE UP
LIPID PNL WITH DIRECT LDL SERPL: 22 MG/DL — SIGNIFICANT CHANGE UP
LYMPHOCYTES # BLD AUTO: 0.93 K/UL — LOW (ref 1–3.3)
LYMPHOCYTES # BLD AUTO: 0.93 K/UL — LOW (ref 1–3.3)
LYMPHOCYTES # BLD AUTO: 17.4 % — SIGNIFICANT CHANGE UP (ref 13–44)
LYMPHOCYTES # BLD AUTO: 17.4 % — SIGNIFICANT CHANGE UP (ref 13–44)
MAGNESIUM SERPL-MCNC: 2.1 MG/DL — SIGNIFICANT CHANGE UP (ref 1.6–2.6)
MAGNESIUM SERPL-MCNC: 2.1 MG/DL — SIGNIFICANT CHANGE UP (ref 1.6–2.6)
MCHC RBC-ENTMCNC: 29.5 PG — SIGNIFICANT CHANGE UP (ref 27–34)
MCHC RBC-ENTMCNC: 29.5 PG — SIGNIFICANT CHANGE UP (ref 27–34)
MCHC RBC-ENTMCNC: 32.3 GM/DL — SIGNIFICANT CHANGE UP (ref 32–36)
MCHC RBC-ENTMCNC: 32.3 GM/DL — SIGNIFICANT CHANGE UP (ref 32–36)
MCV RBC AUTO: 91.4 FL — SIGNIFICANT CHANGE UP (ref 80–100)
MCV RBC AUTO: 91.4 FL — SIGNIFICANT CHANGE UP (ref 80–100)
MONOCYTES # BLD AUTO: 0.41 K/UL — SIGNIFICANT CHANGE UP (ref 0–0.9)
MONOCYTES # BLD AUTO: 0.41 K/UL — SIGNIFICANT CHANGE UP (ref 0–0.9)
MONOCYTES NFR BLD AUTO: 7.6 % — SIGNIFICANT CHANGE UP (ref 2–14)
MONOCYTES NFR BLD AUTO: 7.6 % — SIGNIFICANT CHANGE UP (ref 2–14)
NEUTROPHILS # BLD AUTO: 3.97 K/UL — SIGNIFICANT CHANGE UP (ref 1.8–7.4)
NEUTROPHILS # BLD AUTO: 3.97 K/UL — SIGNIFICANT CHANGE UP (ref 1.8–7.4)
NEUTROPHILS NFR BLD AUTO: 74 % — SIGNIFICANT CHANGE UP (ref 43–77)
NEUTROPHILS NFR BLD AUTO: 74 % — SIGNIFICANT CHANGE UP (ref 43–77)
NITRITE UR-MCNC: NEGATIVE — SIGNIFICANT CHANGE UP
NITRITE UR-MCNC: NEGATIVE — SIGNIFICANT CHANGE UP
NON HDL CHOLESTEROL: 33 MG/DL — SIGNIFICANT CHANGE UP
NON HDL CHOLESTEROL: 33 MG/DL — SIGNIFICANT CHANGE UP
NRBC # BLD: 0 /100 WBCS — SIGNIFICANT CHANGE UP (ref 0–0)
NRBC # BLD: 0 /100 WBCS — SIGNIFICANT CHANGE UP (ref 0–0)
PH UR: 6 — SIGNIFICANT CHANGE UP (ref 5–8)
PH UR: 6 — SIGNIFICANT CHANGE UP (ref 5–8)
PHOSPHATE SERPL-MCNC: 3.4 MG/DL — SIGNIFICANT CHANGE UP (ref 2.5–4.5)
PHOSPHATE SERPL-MCNC: 3.4 MG/DL — SIGNIFICANT CHANGE UP (ref 2.5–4.5)
PLATELET # BLD AUTO: 216 K/UL — SIGNIFICANT CHANGE UP (ref 150–400)
PLATELET # BLD AUTO: 216 K/UL — SIGNIFICANT CHANGE UP (ref 150–400)
POTASSIUM SERPL-MCNC: 3.6 MMOL/L — SIGNIFICANT CHANGE UP (ref 3.5–5.3)
POTASSIUM SERPL-MCNC: 3.6 MMOL/L — SIGNIFICANT CHANGE UP (ref 3.5–5.3)
POTASSIUM SERPL-SCNC: 3.6 MMOL/L — SIGNIFICANT CHANGE UP (ref 3.5–5.3)
POTASSIUM SERPL-SCNC: 3.6 MMOL/L — SIGNIFICANT CHANGE UP (ref 3.5–5.3)
PROCALCITONIN SERPL-MCNC: 1.01 NG/ML — HIGH (ref 0.02–0.1)
PROCALCITONIN SERPL-MCNC: 1.01 NG/ML — HIGH (ref 0.02–0.1)
PROT SERPL-MCNC: 6.6 G/DL — SIGNIFICANT CHANGE UP (ref 6–8.3)
PROT SERPL-MCNC: 6.6 G/DL — SIGNIFICANT CHANGE UP (ref 6–8.3)
PROT UR-MCNC: NEGATIVE MG/DL — SIGNIFICANT CHANGE UP
PROT UR-MCNC: NEGATIVE MG/DL — SIGNIFICANT CHANGE UP
PROTHROM AB SERPL-ACNC: 12.8 SEC — SIGNIFICANT CHANGE UP (ref 9.5–13)
PROTHROM AB SERPL-ACNC: 12.8 SEC — SIGNIFICANT CHANGE UP (ref 9.5–13)
RBC # BLD: 4.3 M/UL — SIGNIFICANT CHANGE UP (ref 4.2–5.8)
RBC # BLD: 4.3 M/UL — SIGNIFICANT CHANGE UP (ref 4.2–5.8)
RBC # FLD: 13.5 % — SIGNIFICANT CHANGE UP (ref 10.3–14.5)
RBC # FLD: 13.5 % — SIGNIFICANT CHANGE UP (ref 10.3–14.5)
SODIUM SERPL-SCNC: 142 MMOL/L — SIGNIFICANT CHANGE UP (ref 135–145)
SODIUM SERPL-SCNC: 142 MMOL/L — SIGNIFICANT CHANGE UP (ref 135–145)
SP GR SPEC: 1.02 — SIGNIFICANT CHANGE UP (ref 1–1.03)
SP GR SPEC: 1.02 — SIGNIFICANT CHANGE UP (ref 1–1.03)
TRIGL SERPL-MCNC: 55 MG/DL — SIGNIFICANT CHANGE UP
TRIGL SERPL-MCNC: 55 MG/DL — SIGNIFICANT CHANGE UP
TSH SERPL-MCNC: 1.1 UU/ML — SIGNIFICANT CHANGE UP (ref 0.34–4.82)
TSH SERPL-MCNC: 1.1 UU/ML — SIGNIFICANT CHANGE UP (ref 0.34–4.82)
UROBILINOGEN FLD QL: 1 MG/DL — SIGNIFICANT CHANGE UP (ref 0.2–1)
UROBILINOGEN FLD QL: 1 MG/DL — SIGNIFICANT CHANGE UP (ref 0.2–1)
WBC # BLD: 5.36 K/UL — SIGNIFICANT CHANGE UP (ref 3.8–10.5)
WBC # BLD: 5.36 K/UL — SIGNIFICANT CHANGE UP (ref 3.8–10.5)
WBC # FLD AUTO: 5.36 K/UL — SIGNIFICANT CHANGE UP (ref 3.8–10.5)
WBC # FLD AUTO: 5.36 K/UL — SIGNIFICANT CHANGE UP (ref 3.8–10.5)

## 2023-12-29 PROCEDURE — 99233 SBSQ HOSP IP/OBS HIGH 50: CPT

## 2023-12-29 RX ORDER — REMDESIVIR 5 MG/ML
100 INJECTION INTRAVENOUS EVERY 24 HOURS
Refills: 0 | Status: COMPLETED | OUTPATIENT
Start: 2023-12-30 | End: 2024-01-02

## 2023-12-29 RX ORDER — SODIUM CHLORIDE 9 MG/ML
2000 INJECTION INTRAMUSCULAR; INTRAVENOUS; SUBCUTANEOUS ONCE
Refills: 0 | Status: COMPLETED | OUTPATIENT
Start: 2023-12-29 | End: 2023-12-29

## 2023-12-29 RX ADMIN — AZITHROMYCIN 255 MILLIGRAM(S): 500 TABLET, FILM COATED ORAL at 18:20

## 2023-12-29 RX ADMIN — Medication 1 TABLET(S): at 17:08

## 2023-12-29 RX ADMIN — Medication 81 MILLIGRAM(S): at 12:18

## 2023-12-29 RX ADMIN — ATORVASTATIN CALCIUM 20 MILLIGRAM(S): 80 TABLET, FILM COATED ORAL at 21:38

## 2023-12-29 RX ADMIN — DIVALPROEX SODIUM 250 MILLIGRAM(S): 500 TABLET, DELAYED RELEASE ORAL at 17:09

## 2023-12-29 RX ADMIN — Medication 1000 UNIT(S): at 12:18

## 2023-12-29 RX ADMIN — Medication 6 MILLIGRAM(S): at 07:11

## 2023-12-29 RX ADMIN — OLANZAPINE 5 MILLIGRAM(S): 15 TABLET, FILM COATED ORAL at 17:08

## 2023-12-29 RX ADMIN — POLYETHYLENE GLYCOL 3350 17 GRAM(S): 17 POWDER, FOR SOLUTION ORAL at 17:08

## 2023-12-29 RX ADMIN — PREGABALIN 1000 MICROGRAM(S): 225 CAPSULE ORAL at 17:08

## 2023-12-29 RX ADMIN — SODIUM CHLORIDE 2000 MILLILITER(S): 9 INJECTION INTRAMUSCULAR; INTRAVENOUS; SUBCUTANEOUS at 22:59

## 2023-12-29 RX ADMIN — Medication 0.5 MILLIGRAM(S): at 17:10

## 2023-12-29 RX ADMIN — REMDESIVIR 200 MILLIGRAM(S): 5 INJECTION INTRAVENOUS at 02:15

## 2023-12-29 RX ADMIN — SENNA PLUS 2 TABLET(S): 8.6 TABLET ORAL at 21:38

## 2023-12-29 RX ADMIN — CEFTRIAXONE 100 MILLIGRAM(S): 500 INJECTION, POWDER, FOR SOLUTION INTRAMUSCULAR; INTRAVENOUS at 17:08

## 2023-12-29 NOTE — PROGRESS NOTE ADULT - SUBJECTIVE AND OBJECTIVE BOX
NP Note discussed with  primary attending    Patient is a 63y old  Male who presents with a chief complaint of Sepsis and AHRF (28 Dec 2023 20:54)      INTERVAL HPI/OVERNIGHT EVENTS: no new complaints    MEDICATIONS  (STANDING):  aspirin  chewable 81 milliGRAM(s) Oral daily  atorvastatin 20 milliGRAM(s) Oral at bedtime  azithromycin  IVPB 500 milliGRAM(s) IV Intermittent every 24 hours  cefTRIAXone   IVPB 1000 milliGRAM(s) IV Intermittent every 24 hours  cholecalciferol 1000 Unit(s) Oral daily  cyanocobalamin 1000 MICROGram(s) Oral daily  dexAMETHasone  Injectable 6 milliGRAM(s) IV Push daily  divalproex  milliGRAM(s) Oral every 12 hours  lactated ringers Bolus 1000 milliLiter(s) IV Bolus once  LORazepam     Tablet 0.5 milliGRAM(s) Oral two times a day  multivitamin/minerals 1 Tablet(s) Oral daily  OLANZapine 5 milliGRAM(s) Oral every 12 hours  polyethylene glycol 3350 17 Gram(s) Oral daily  remdesivir  IVPB   IV Intermittent   senna 2 Tablet(s) Oral at bedtime    MEDICATIONS  (PRN):  acetaminophen     Tablet .. 650 milliGRAM(s) Oral every 6 hours PRN Temp greater or equal to 38C (100.4F), Mild Pain (1 - 3)      __________________________________________________  REVIEW OF SYSTEMS:    CONSTITUTIONAL: No fever,         Vital Signs Last 24 Hrs  T(C): 36.7 (29 Dec 2023 06:33), Max: 38.7 (28 Dec 2023 15:53)  T(F): 98.1 (29 Dec 2023 06:33), Max: 101.7 (28 Dec 2023 15:53)  HR: 65 (29 Dec 2023 06:33) (55 - 108)  BP: 91/58 (29 Dec 2023 06:33) (91/58 - 119/64)  BP(mean): 67 (29 Dec 2023 06:33) (67 - 74)  RR: 17 (29 Dec 2023 06:33) (16 - 17)  SpO2: 96% (29 Dec 2023 06:33) (95% - 97%)    Parameters below as of 29 Dec 2023 06:33  Patient On (Oxygen Delivery Method): nasal cannula  O2 Flow (L/min): 2      ________________________________________________  PHYSICAL EXAM:  GENERAL: NAD  CHEST/LUNG: + emily rhonchi   HEART: S1 S2  regular;   ABDOMEN: Soft,   EXTREMITIES: no cyanosis; no edema; no calf tenderness  SKIN: warm and dry; no rash  NERVOUS SYSTEM:  Awake and alert; nonverbal does not follow commends  ; no new deficits    _________________________________________________  LABS:                        12.7   5.36  )-----------( 216      ( 29 Dec 2023 07:00 )             39.3     12-29    142  |  107  |  14  ----------------------------<  95  3.6   |  29  |  0.98    Ca    8.3<L>      29 Dec 2023 07:00  Phos  3.4     12-29  Mg     2.1     12-29    TPro  6.6  /  Alb  2.8<L>  /  TBili  0.4  /  DBili  x   /  AST  75<H>  /  ALT  39  /  AlkPhos  54  12-29    PT/INR - ( 29 Dec 2023 07:00 )   PT: 12.8 sec;   INR: 1.13 ratio         PTT - ( 29 Dec 2023 07:00 )  PTT:35.2 sec  Urinalysis Basic - ( 29 Dec 2023 07:00 )    Color: x / Appearance: x / SG: x / pH: x  Gluc: 95 mg/dL / Ketone: x  / Bili: x / Urobili: x   Blood: x / Protein: x / Nitrite: x   Leuk Esterase: x / RBC: x / WBC x   Sq Epi: x / Non Sq Epi: x / Bacteria: x      CAPILLARY BLOOD GLUCOSE            RADIOLOGY & ADDITIONAL TESTS:    Imaging Personally Reviewed:  YES/NO    Consultant(s) Notes Reviewed:   YES/ No    Care Discussed with Consultants :     Plan of care was discussed with patient and /or primary care giver; all questions and concerns were addressed and care was aligned with patient's wishes.

## 2023-12-29 NOTE — PROGRESS NOTE ADULT - NS ATTEND AMEND GEN_ALL_CORE FT
62 yo M, from [facility], with PMH of CVA, Alzheimer's, nonverbal at baseline, GERD, Schizophrenia, and Constipation, was brought into the ED s/p mechanical fall, poor oral intake, and Covid-19 infection on 12/27/23 as per NH papers. Admitted to medicine for sepsis and AHRF likely secondary to Covid-19 infection +/- superimposed PNA.     Labs, vitals and imaging reviewed. CXR - There are mild bilateral infiltrates. Leukocytosis improving, AST decreased to 75. UA negative. On exam: thin male, nonverbal, seen to be picking up spoon with some difficulty with R hand, weakness noted in L arm, mucus membranes dry, RRR, bibasilar crackles, abdomen soft, extremities with no edema.     #AHRF 2/2 COVID pneumonia +/- superimposed bacterial pneumonia   #Sepsis 2/2 pneumonia   #Dementia   #Schizophrenia   #remote CVA   #Fall/ generalized weakness   #Constipation     -c/w azithromycin, cefriaxone   -c/w decadron and remdesivir   -f/u Bcx, Ua/ucx   -wean down O2 as tolerated, goal >92%   guaifenesin prn for cough  -incentive spirometer if able   -f/u legionella, mycoplasma, s.pneumo   -IVF while still having poor po intake   -assist with feeds   -c/w schizophrenia meds, olanzipine, depakote, ativan   -c/w bowel regimen, senna/miralax   -fall precautions   -dvt ppx SCDs 64 yo M, from [facility], with PMH of CVA, Alzheimer's, nonverbal at baseline, GERD, Schizophrenia, and Constipation, was brought into the ED s/p mechanical fall, poor oral intake, and Covid-19 infection on 12/27/23 as per NH papers. Admitted to medicine for sepsis and AHRF likely secondary to Covid-19 infection +/- superimposed PNA.     Labs, vitals and imaging reviewed. CXR - There are mild bilateral infiltrates. Leukocytosis improving, AST decreased to 75. UA negative. On exam: thin male, nonverbal, seen to be picking up spoon with some difficulty with R hand, weakness noted in L arm, mucus membranes dry, RRR, bibasilar crackles, abdomen soft, extremities with no edema.     #AHRF 2/2 COVID pneumonia +/- superimposed bacterial pneumonia   #Sepsis 2/2 pneumonia   #Dementia   #Schizophrenia   #remote CVA   #Fall/ generalized weakness   #Constipation     -c/w azithromycin, cefriaxone   -c/w decadron and remdesivir   -f/u Bcx, Ua/ucx   -wean down O2 as tolerated, goal >92%   guaifenesin prn for cough  -incentive spirometer if able   -f/u legionella, mycoplasma, s.pneumo   -IVF while still having poor po intake   -assist with feeds   -c/w schizophrenia meds, olanzipine, depakote, ativan   -c/w bowel regimen, senna/miralax   -fall precautions   -dvt ppx SCDs

## 2023-12-29 NOTE — PATIENT PROFILE ADULT - NURSING HOMES
[facility], [facility], [facility]

## 2023-12-29 NOTE — ADVANCED PRACTICE NURSE CONSULT - ASSESSMENT
This is a 63yr old male patient admitted for SURAJ-COV-2, presenting with the following:  -There is evidence of healed and an open Full Thickness Tissue Loss Lesion to the Mid Spine (1.5cm x 0.5cm x 0.2cm) with pink tissue, pale tissue, and scant drainage  -There is a Stage 1 Pressure injury to the Bilateral Heels, as evident by non-blanchable erythema

## 2023-12-29 NOTE — CHART NOTE - NSCHARTNOTEFT_GEN_A_CORE
EVENT: Received telephone call from RN that is hypotensive, with SBP 83    HPI: 63 year old male, from [facility], with PMH of CVA, Alzheimer's, nonverbal at baseline, GERD, Schizophrenia, and Constipation, was brought into the ED s/p mechanical fall, poor oral intake, and Covid-19 infection on 12/27/23.  In ED wbc 14.3, Covid 19 - +ve. CXR report slight right perihilar infiltrate and there is a left lower perihilar infiltrate.  CT head No acute intracranial hemorrhage, mass effect, or osseous   fracture. CT cervical spine:  No acute cervical spine fracture or traumatic   malalignment. Admitted for Acute respiratory failure with hypoxia/sepsis, COVID 19 pneumonia with  superimposed bacterial pneumonia    SUBJECTIVE: n/a    OBJECTIVE:  Vital Signs Last 24 Hrs  T(C): 37.2 (29 Dec 2023 20:57), Max: 37.2 (29 Dec 2023 14:10)  T(F): 99 (29 Dec 2023 20:57), Max: 99 (29 Dec 2023 20:57)  HR: 58 (29 Dec 2023 20:57) (55 - 72)  BP: 83/54 (29 Dec 2023 20:57) (81/50 - 101/65)  BP(mean): 68 (29 Dec 2023 14:11) (67 - 74)  RR: 16 (29 Dec 2023 20:57) (15 - 17)  SpO2: 94% (29 Dec 2023 20:57) (94% - 97%)    Parameters below as of 29 Dec 2023 20:57  Patient On (Oxygen Delivery Method): room air      FOCUSED PHYSICAL EXAM:  Neuro: awake, alert, In NAD  Cardiovascular: Pulses +2 B/L in lower and upper extremities, HR regular, BP stable, No edema.  Respiratory: Respirations regular, unlabored, breath sounds clear B/L.   GI: Abdomen soft, non-tender, positive bowel sounds.  : no bladder distention noted. No complaints at this time.  Skin: Dry, intact, no bruising, no diaphoresis.    LABS:                        12.7   5.36  )-----------( 216      ( 29 Dec 2023 07:00 )             39.3     12-29    142  |  107  |  14  ----------------------------<  95  3.6   |  29  |  0.98    Ca    8.3<L>      29 Dec 2023 07:00  Phos  3.4     12-29  Mg     2.1     12-29    TPro  6.6  /  Alb  2.8<L>  /  TBili  0.4  /  DBili  x   /  AST  75<H>  /  ALT  39  /  AlkPhos  54  12-29      EKG:   IMAGING:    ASSESSMENT/PROBLEM: Hypotension most likely 2/2 to COVID infection?      PLAN:   1. NS bolus ordered  2. Monitor response to treatment  3. Cont present care/treatment  4. Supportive care EVENT: Received telephone call from RN that is hypotensive, with SBP 83    HPI: 63 year old male, [facility], with PMH of CVA, Alzheimer's, nonverbal at baseline, GERD, Schizophrenia, and Constipation, was brought into the ED s/p mechanical fall, poor oral intake, and Covid-19 infection on 12/27/23.  In ED wbc 14.3, Covid 19 - +ve. CXR report slight right perihilar infiltrate and there is a left lower perihilar infiltrate.  CT head No acute intracranial hemorrhage, mass effect, or osseous   fracture. CT cervical spine:  No acute cervical spine fracture or traumatic   malalignment. Admitted for Acute respiratory failure with hypoxia/sepsis, COVID 19 pneumonia with  superimposed bacterial pneumonia    SUBJECTIVE: n/a    OBJECTIVE:  Vital Signs Last 24 Hrs  T(C): 37.2 (29 Dec 2023 20:57), Max: 37.2 (29 Dec 2023 14:10)  T(F): 99 (29 Dec 2023 20:57), Max: 99 (29 Dec 2023 20:57)  HR: 58 (29 Dec 2023 20:57) (55 - 72)  BP: 83/54 (29 Dec 2023 20:57) (81/50 - 101/65)  BP(mean): 68 (29 Dec 2023 14:11) (67 - 74)  RR: 16 (29 Dec 2023 20:57) (15 - 17)  SpO2: 94% (29 Dec 2023 20:57) (94% - 97%)    Parameters below as of 29 Dec 2023 20:57  Patient On (Oxygen Delivery Method): room air      FOCUSED PHYSICAL EXAM:  Neuro: awake, alert, In NAD  Cardiovascular: Pulses +2 B/L in lower and upper extremities, HR regular, BP stable, No edema.  Respiratory: Respirations regular, unlabored, breath sounds clear B/L.   GI: Abdomen soft, non-tender, positive bowel sounds.  : no bladder distention noted. No complaints at this time.  Skin: Dry, intact, no bruising, no diaphoresis.    LABS:                        12.7   5.36  )-----------( 216      ( 29 Dec 2023 07:00 )             39.3     12-29    142  |  107  |  14  ----------------------------<  95  3.6   |  29  |  0.98    Ca    8.3<L>      29 Dec 2023 07:00  Phos  3.4     12-29  Mg     2.1     12-29    TPro  6.6  /  Alb  2.8<L>  /  TBili  0.4  /  DBili  x   /  AST  75<H>  /  ALT  39  /  AlkPhos  54  12-29      EKG:   IMAGING:    ASSESSMENT/PROBLEM: Hypotension most likely 2/2 to COVID infection?      PLAN:   1. NS bolus ordered  2. Monitor response to treatment  3. Cont present care/treatment  4. Supportive care

## 2023-12-29 NOTE — ADVANCED PRACTICE NURSE CONSULT - RECOMMEDATIONS
-Clean all wounds with normal saline and apply skin prep to the surrounding skin  -Apply a Foam dressing to the Mid Spine wound Q 72hrs PRN  -Apply a Foam dressing to the Coccyx area Q 72hrs PRN, for protection  -Elevate/float the patients heels using heel protectors and reposition the patient Q 2hrs using wedges or pillows

## 2023-12-29 NOTE — PATIENT PROFILE ADULT - FALL HARM RISK - HARM RISK INTERVENTIONS
Assistance with ambulation/Assistance OOB with selected safe patient handling equipment/Communicate Risk of Fall with Harm to all staff/Discuss with provider need for PT consult/Monitor for mental status changes/Monitor gait and stability/Move patient closer to nurses' station/Reinforce activity limits and safety measures with patient and family/Reorient to person, place and time as needed/Tailored Fall Risk Interventions/Toileting schedule using arm’s reach rule for commode and bathroom/Use of alarms - bed, chair and/or voice tab/Visual Cue: Yellow wristband and red socks/Bed in lowest position, wheels locked, appropriate side rails in place/Call bell, personal items and telephone in reach/Instruct patient to call for assistance before getting out of bed or chair/Non-slip footwear when patient is out of bed/[place] to call system/Physically safe environment - no spills, clutter or unnecessary equipment/Purposeful Proactive Rounding/Room/bathroom lighting operational, light cord in reach/Unable to comprehend Assistance with ambulation/Assistance OOB with selected safe patient handling equipment/Communicate Risk of Fall with Harm to all staff/Discuss with provider need for PT consult/Monitor for mental status changes/Monitor gait and stability/Move patient closer to nurses' station/Reinforce activity limits and safety measures with patient and family/Reorient to person, place and time as needed/Tailored Fall Risk Interventions/Toileting schedule using arm’s reach rule for commode and bathroom/Use of alarms - bed, chair and/or voice tab/Visual Cue: Yellow wristband and red socks/Bed in lowest position, wheels locked, appropriate side rails in place/Call bell, personal items and telephone in reach/Instruct patient to call for assistance before getting out of bed or chair/Non-slip footwear when patient is out of bed/[place] to call system/Physically safe environment - no spills, clutter or unnecessary equipment/Purposeful Proactive Rounding/Room/bathroom lighting operational, light cord in reach/Unable to comprehend

## 2023-12-29 NOTE — ED ADULT NURSE NOTE - NSFALLRISKINTERV_ED_ALL_ED
Communicate fall risk and risk factors to all staff, patient, and family/Provide visual cue: yellow wristband, yellow gown, etc/Reinforce activity limits and safety measures with patient and family/Call bell, personal items and telephone in reach/Instruct patient to call for assistance before getting out of bed/chair/stretcher/Non-slip footwear applied when patient is off stretcher/[place] to call system/Physically safe environment - no spills, clutter or unnecessary equipment/Purposeful Proactive Rounding/Room/bathroom lighting operational, light cord in reach Communicate fall risk and risk factors to all staff, patient, and family/Provide visual cue: yellow wristband, yellow gown, etc/Reinforce activity limits and safety measures with patient and family/Call bell, personal items and telephone in reach/Instruct patient to call for assistance before getting out of bed/chair/stretcher/Non-slip footwear applied when patient is off stretcher/[place] to call system/Physically safe environment - no spills, clutter or unnecessary equipment/Purposeful Proactive Rounding/Room/bathroom lighting operational, light cord in reach

## 2023-12-29 NOTE — ED ADULT NURSE NOTE - NSFALLASSISTNEEDED_ED_ALL_ED
I had sent in 2 antibiotics on 9/17/21 for pneumonia. Seems patient only got one (Azithromycin). Please reach out to his pharmacy to make sure they got the orders and they provide him the script. Thank you . Kimberly Bell MD MPH  Toileting/Moving from bed to stretcher

## 2023-12-29 NOTE — PATIENT PROFILE ADULT - NSPROPOAURINARYCATHETER_GEN_A_NUR
no Quality 431: Preventive Care And Screening: Unhealthy Alcohol Use - Screening: Patient did not receive brief counseling if identified as an unhealthy alcohol user Detail Level: Detailed

## 2023-12-29 NOTE — PROGRESS NOTE ADULT - ASSESSMENT
63 year old male, from [facility], with PMH of CVA, Alzheimer's, nonverbal at baseline, GERD, Schizophrenia, and Constipation, was brought into the ED s/p mechanical fall, poor oral intake, and Covid-19 infection on 12/27/23.   In ED wbc 14.3, Covid 19 - +ve  CXR report slight right perihilar infiltrate and there is a left lower perihilar infiltrate.   CT head No acute intracranial hemorrhage, mass effect, or osseous   fracture.  CT cervical spine:  No acute cervical spine fracture or traumatic   malalignment.    Admitted for Acute respiratory failure with hypoxia/sepsis, COVID 19 pneumonia with  superimposed bacterial pneumonia  Placed o Supplemental O2, Started on Dexamethasone 6mg IV q daily and Remdesivir protocol   Started on Ceftriaxone and Zithromax to cover CAP    63 year old male, from [facility], with PMH of CVA, Alzheimer's, nonverbal at baseline, GERD, Schizophrenia, and Constipation, was brought into the ED s/p mechanical fall, poor oral intake, and Covid-19 infection on 12/27/23.   In ED wbc 14.3, Covid 19 - +ve  CXR report slight right perihilar infiltrate and there is a left lower perihilar infiltrate.   CT head No acute intracranial hemorrhage, mass effect, or osseous   fracture.  CT cervical spine:  No acute cervical spine fracture or traumatic   malalignment.    Admitted for Acute respiratory failure with hypoxia/sepsis, COVID 19 pneumonia with  superimposed bacterial pneumonia  Placed o Supplemental O2, Started on Dexamethasone 6mg IV q daily and Remdesivir protocol   Started on Ceftriaxone and Zithromax to cover CAP

## 2023-12-30 LAB
ANION GAP SERPL CALC-SCNC: 5 MMOL/L — SIGNIFICANT CHANGE UP (ref 5–17)
ANION GAP SERPL CALC-SCNC: 5 MMOL/L — SIGNIFICANT CHANGE UP (ref 5–17)
BUN SERPL-MCNC: 13 MG/DL — SIGNIFICANT CHANGE UP (ref 7–18)
BUN SERPL-MCNC: 13 MG/DL — SIGNIFICANT CHANGE UP (ref 7–18)
CALCIUM SERPL-MCNC: 7.7 MG/DL — LOW (ref 8.4–10.5)
CALCIUM SERPL-MCNC: 7.7 MG/DL — LOW (ref 8.4–10.5)
CHLORIDE SERPL-SCNC: 109 MMOL/L — HIGH (ref 96–108)
CHLORIDE SERPL-SCNC: 109 MMOL/L — HIGH (ref 96–108)
CO2 SERPL-SCNC: 27 MMOL/L — SIGNIFICANT CHANGE UP (ref 22–31)
CO2 SERPL-SCNC: 27 MMOL/L — SIGNIFICANT CHANGE UP (ref 22–31)
CREAT SERPL-MCNC: 0.76 MG/DL — SIGNIFICANT CHANGE UP (ref 0.5–1.3)
CREAT SERPL-MCNC: 0.76 MG/DL — SIGNIFICANT CHANGE UP (ref 0.5–1.3)
CRP SERPL-MCNC: 37 MG/L — HIGH
CRP SERPL-MCNC: 37 MG/L — HIGH
CULTURE RESULTS: NO GROWTH — SIGNIFICANT CHANGE UP
CULTURE RESULTS: NO GROWTH — SIGNIFICANT CHANGE UP
D DIMER BLD IA.RAPID-MCNC: 410 NG/ML DDU — HIGH
D DIMER BLD IA.RAPID-MCNC: 410 NG/ML DDU — HIGH
EGFR: 100 ML/MIN/1.73M2 — SIGNIFICANT CHANGE UP
EGFR: 100 ML/MIN/1.73M2 — SIGNIFICANT CHANGE UP
FERRITIN SERPL-MCNC: 218 NG/ML — SIGNIFICANT CHANGE UP (ref 30–400)
FERRITIN SERPL-MCNC: 218 NG/ML — SIGNIFICANT CHANGE UP (ref 30–400)
GLUCOSE BLDC GLUCOMTR-MCNC: 104 MG/DL — HIGH (ref 70–99)
GLUCOSE BLDC GLUCOMTR-MCNC: 104 MG/DL — HIGH (ref 70–99)
GLUCOSE SERPL-MCNC: 123 MG/DL — HIGH (ref 70–99)
GLUCOSE SERPL-MCNC: 123 MG/DL — HIGH (ref 70–99)
HCT VFR BLD CALC: 34 % — LOW (ref 39–50)
HCT VFR BLD CALC: 34 % — LOW (ref 39–50)
HGB BLD-MCNC: 10.9 G/DL — LOW (ref 13–17)
HGB BLD-MCNC: 10.9 G/DL — LOW (ref 13–17)
LACTATE SERPL-SCNC: 3.2 MMOL/L — HIGH (ref 0.7–2)
LACTATE SERPL-SCNC: 3.2 MMOL/L — HIGH (ref 0.7–2)
MCHC RBC-ENTMCNC: 29 PG — SIGNIFICANT CHANGE UP (ref 27–34)
MCHC RBC-ENTMCNC: 29 PG — SIGNIFICANT CHANGE UP (ref 27–34)
MCHC RBC-ENTMCNC: 32.1 GM/DL — SIGNIFICANT CHANGE UP (ref 32–36)
MCHC RBC-ENTMCNC: 32.1 GM/DL — SIGNIFICANT CHANGE UP (ref 32–36)
MCV RBC AUTO: 90.4 FL — SIGNIFICANT CHANGE UP (ref 80–100)
MCV RBC AUTO: 90.4 FL — SIGNIFICANT CHANGE UP (ref 80–100)
NRBC # BLD: 0 /100 WBCS — SIGNIFICANT CHANGE UP (ref 0–0)
NRBC # BLD: 0 /100 WBCS — SIGNIFICANT CHANGE UP (ref 0–0)
PLATELET # BLD AUTO: 166 K/UL — SIGNIFICANT CHANGE UP (ref 150–400)
PLATELET # BLD AUTO: 166 K/UL — SIGNIFICANT CHANGE UP (ref 150–400)
POTASSIUM SERPL-MCNC: 3.4 MMOL/L — LOW (ref 3.5–5.3)
POTASSIUM SERPL-MCNC: 3.4 MMOL/L — LOW (ref 3.5–5.3)
POTASSIUM SERPL-SCNC: 3.4 MMOL/L — LOW (ref 3.5–5.3)
POTASSIUM SERPL-SCNC: 3.4 MMOL/L — LOW (ref 3.5–5.3)
RBC # BLD: 3.76 M/UL — LOW (ref 4.2–5.8)
RBC # BLD: 3.76 M/UL — LOW (ref 4.2–5.8)
RBC # FLD: 13.5 % — SIGNIFICANT CHANGE UP (ref 10.3–14.5)
RBC # FLD: 13.5 % — SIGNIFICANT CHANGE UP (ref 10.3–14.5)
SODIUM SERPL-SCNC: 141 MMOL/L — SIGNIFICANT CHANGE UP (ref 135–145)
SODIUM SERPL-SCNC: 141 MMOL/L — SIGNIFICANT CHANGE UP (ref 135–145)
SPECIMEN SOURCE: SIGNIFICANT CHANGE UP
SPECIMEN SOURCE: SIGNIFICANT CHANGE UP
WBC # BLD: 4.68 K/UL — SIGNIFICANT CHANGE UP (ref 3.8–10.5)
WBC # BLD: 4.68 K/UL — SIGNIFICANT CHANGE UP (ref 3.8–10.5)
WBC # FLD AUTO: 4.68 K/UL — SIGNIFICANT CHANGE UP (ref 3.8–10.5)
WBC # FLD AUTO: 4.68 K/UL — SIGNIFICANT CHANGE UP (ref 3.8–10.5)

## 2023-12-30 PROCEDURE — 99233 SBSQ HOSP IP/OBS HIGH 50: CPT

## 2023-12-30 PROCEDURE — 71045 X-RAY EXAM CHEST 1 VIEW: CPT | Mod: 26

## 2023-12-30 RX ORDER — CEFEPIME 1 G/1
1000 INJECTION, POWDER, FOR SOLUTION INTRAMUSCULAR; INTRAVENOUS EVERY 12 HOURS
Refills: 0 | Status: COMPLETED | OUTPATIENT
Start: 2023-12-30 | End: 2024-01-03

## 2023-12-30 RX ORDER — SODIUM CHLORIDE 9 MG/ML
1000 INJECTION, SOLUTION INTRAVENOUS
Refills: 0 | Status: DISCONTINUED | OUTPATIENT
Start: 2023-12-30 | End: 2024-01-01

## 2023-12-30 RX ORDER — SODIUM CHLORIDE 9 MG/ML
1000 INJECTION, SOLUTION INTRAVENOUS ONCE
Refills: 0 | Status: COMPLETED | OUTPATIENT
Start: 2023-12-30 | End: 2023-12-30

## 2023-12-30 RX ORDER — OLANZAPINE 15 MG/1
2.5 TABLET, FILM COATED ORAL ONCE
Refills: 0 | Status: COMPLETED | OUTPATIENT
Start: 2023-12-30 | End: 2023-12-30

## 2023-12-30 RX ORDER — POTASSIUM CHLORIDE 20 MEQ
40 PACKET (EA) ORAL ONCE
Refills: 0 | Status: COMPLETED | OUTPATIENT
Start: 2023-12-30 | End: 2023-12-30

## 2023-12-30 RX ORDER — MIDODRINE HYDROCHLORIDE 2.5 MG/1
10 TABLET ORAL ONCE
Refills: 0 | Status: COMPLETED | OUTPATIENT
Start: 2023-12-30 | End: 2023-12-30

## 2023-12-30 RX ORDER — VANCOMYCIN HCL 1 G
1000 VIAL (EA) INTRAVENOUS EVERY 12 HOURS
Refills: 0 | Status: DISCONTINUED | OUTPATIENT
Start: 2023-12-30 | End: 2024-01-01

## 2023-12-30 RX ORDER — ENOXAPARIN SODIUM 100 MG/ML
40 INJECTION SUBCUTANEOUS EVERY 24 HOURS
Refills: 0 | Status: DISCONTINUED | OUTPATIENT
Start: 2023-12-30 | End: 2024-01-11

## 2023-12-30 RX ADMIN — OLANZAPINE 2.5 MILLIGRAM(S): 15 TABLET, FILM COATED ORAL at 18:46

## 2023-12-30 RX ADMIN — SODIUM CHLORIDE 80 MILLILITER(S): 9 INJECTION, SOLUTION INTRAVENOUS at 04:07

## 2023-12-30 RX ADMIN — Medication 1 TABLET(S): at 13:23

## 2023-12-30 RX ADMIN — Medication 250 MILLIGRAM(S): at 18:15

## 2023-12-30 RX ADMIN — ENOXAPARIN SODIUM 40 MILLIGRAM(S): 100 INJECTION SUBCUTANEOUS at 13:26

## 2023-12-30 RX ADMIN — CEFEPIME 100 MILLIGRAM(S): 1 INJECTION, POWDER, FOR SOLUTION INTRAMUSCULAR; INTRAVENOUS at 18:15

## 2023-12-30 RX ADMIN — SODIUM CHLORIDE 2000 MILLILITER(S): 9 INJECTION, SOLUTION INTRAVENOUS at 05:50

## 2023-12-30 RX ADMIN — POLYETHYLENE GLYCOL 3350 17 GRAM(S): 17 POWDER, FOR SOLUTION ORAL at 13:24

## 2023-12-30 RX ADMIN — MIDODRINE HYDROCHLORIDE 10 MILLIGRAM(S): 2.5 TABLET ORAL at 00:41

## 2023-12-30 RX ADMIN — DIVALPROEX SODIUM 250 MILLIGRAM(S): 500 TABLET, DELAYED RELEASE ORAL at 06:13

## 2023-12-30 RX ADMIN — Medication 81 MILLIGRAM(S): at 13:24

## 2023-12-30 RX ADMIN — Medication 200 MILLIGRAM(S): at 18:15

## 2023-12-30 RX ADMIN — Medication 200 MILLIGRAM(S): at 23:56

## 2023-12-30 RX ADMIN — Medication 40 MILLIEQUIVALENT(S): at 13:23

## 2023-12-30 RX ADMIN — ATORVASTATIN CALCIUM 20 MILLIGRAM(S): 80 TABLET, FILM COATED ORAL at 21:32

## 2023-12-30 RX ADMIN — PREGABALIN 1000 MICROGRAM(S): 225 CAPSULE ORAL at 13:24

## 2023-12-30 RX ADMIN — SENNA PLUS 2 TABLET(S): 8.6 TABLET ORAL at 21:31

## 2023-12-30 RX ADMIN — Medication 1000 UNIT(S): at 13:23

## 2023-12-30 RX ADMIN — DIVALPROEX SODIUM 250 MILLIGRAM(S): 500 TABLET, DELAYED RELEASE ORAL at 18:15

## 2023-12-30 RX ADMIN — Medication 6 MILLIGRAM(S): at 06:13

## 2023-12-30 RX ADMIN — REMDESIVIR 200 MILLIGRAM(S): 5 INJECTION INTRAVENOUS at 05:50

## 2023-12-30 NOTE — PROGRESS NOTE ADULT - SUBJECTIVE AND OBJECTIVE BOX
MEDICATIONS  (STANDING):  aspirin  chewable 81 milliGRAM(s) Oral daily  atorvastatin 20 milliGRAM(s) Oral at bedtime  cefepime   IVPB 1000 milliGRAM(s) IV Intermittent every 12 hours  cholecalciferol 1000 Unit(s) Oral daily  cyanocobalamin 1000 MICROGram(s) Oral daily  dexAMETHasone  Injectable 6 milliGRAM(s) IV Push daily  dextrose 5% + sodium chloride 0.45%. 1000 milliLiter(s) (80 mL/Hr) IV Continuous <Continuous>  divalproex  milliGRAM(s) Oral every 12 hours  enoxaparin Injectable 40 milliGRAM(s) SubCutaneous every 24 hours  guaiFENesin Oral Liquid (Sugar-Free) 200 milliGRAM(s) Oral every 6 hours  lactated ringers Bolus 1000 milliLiter(s) IV Bolus once  lactated ringers. 1000 milliLiter(s) (70 mL/Hr) IV Continuous <Continuous>  LORazepam     Tablet 0.5 milliGRAM(s) Oral two times a day  multivitamin/minerals 1 Tablet(s) Oral daily  polyethylene glycol 3350 17 Gram(s) Oral daily  remdesivir  IVPB 100 milliGRAM(s) IV Intermittent every 24 hours  remdesivir  IVPB   IV Intermittent   senna 2 Tablet(s) Oral at bedtime  vancomycin  IVPB 1000 milliGRAM(s) IV Intermittent every 12 hours    MEDICATIONS  (PRN):  acetaminophen     Tablet .. 650 milliGRAM(s) Oral every 6 hours PRN Temp greater or equal to 38C (100.4F), Mild Pain (1 - 3)    Vital Signs Last 24 Hrs  T(C): 36.9 (30 Dec 2023 13:57), Max: 37.2 (29 Dec 2023 20:57)  T(F): 98.4 (30 Dec 2023 13:57), Max: 99 (29 Dec 2023 20:57)  HR: 60 (30 Dec 2023 13:57) (45 - 69)  BP: 84/52 (30 Dec 2023 13:57) (75/45 - 97/51)  BP(mean): 60 (30 Dec 2023 13:57) (53 - 62)  RR: 15 (30 Dec 2023 13:57) (15 - 18)  SpO2: 95% (30 Dec 2023 13:57) (94% - 97%)    Parameters below as of 30 Dec 2023 13:57  Patient On (Oxygen Delivery Method): nasal cannula  O2 Flow (L/min): 2                            10.9   4.68  )-----------( 166      ( 30 Dec 2023 07:04 )             34.0     12-30    141  |  109<H>  |  13  ----------------------------<  123<H>  3.4<L>   |  27  |  0.76    Ca    7.7<L>      30 Dec 2023 07:04  Phos  3.4     12-29  Mg     2.1     12-29    TPro  6.6  /  Alb  2.8<L>  /  TBili  0.4  /  DBili  x   /  AST  75<H>  /  ALT  39  /  AlkPhos  54  12-29

## 2023-12-30 NOTE — CHART NOTE - NSCHARTNOTEFT_GEN_A_CORE
RRT called as pt remains hypotensive. RRT called as pt remains hypotensive & MAP < 60.  Pt's relative Dallas Arriola was notified of the RRT  Notify Nocturnist to inform attending Dr Sebastián Krueger of pt's RRT RRT called as pt remains hypotensive & MAP < 60.  Pt's relative Dallas Arriola was notified of the RRT  Notify Nocturnist to inform attending Dr Sebastáin Krueger of pt's RRT

## 2023-12-30 NOTE — CHART NOTE - NSCHARTNOTEFT_GEN_A_CORE
On monitoring patient since RRT, was briefly fluid responsive but began to have downward trend of SBP to 70s.   Broadened antibiotic coverage to vancomycin and Cefepime + sent for sputum culture, MRSA.  Patient is grossly asymptomatic and at baseline nonverbal.  Will give one more 1L IVF and D/c zyprexa which may be causing hypotension.

## 2023-12-30 NOTE — PROGRESS NOTE ADULT - ASSESSMENT
62 yo M, from [facility], with PMH of CVA, Alzheimer's, nonverbal at baseline, GERD, Schizophrenia, and Constipation, was brought into the ED s/p mechanical fall, poor oral intake, and Covid-19 infection on 12/27/23 as per NH papers. Admitted to medicine for sepsis and AHRF likely secondary to Covid-19 infection +/- superimposed PNA.     Labs, vitals and imaging reviewed. Overnight noted to be hypotensive and had RRT called. Labs done showing elevated lact, abx broadened. also found to have elevated ddimer, age adjusted 89, which could still have a likelihood of VTE. Leukocytosis improving, procal elevated, BP rechecked noted to have SBP in 90s. Patient calm, awake and alert, nonverbal but making eye contact. Pt also pointing to the site of bandage on LUQ region where a peg tube had been removed previously.  On exam: thin male, nonverbal, mucus membranes dry, RRR, bibasilar crackles, abdomen soft, LUQ with bandage in place covering site of peg tube wound, clean and dry, no pain to palpation, extremities with no edema.     #AHRF 2/2 COVID pneumonia +/- superimposed bacterial pneumonia   #Sepsis 2/2 pneumonia   #Dementia   #Schizophrenia   #remote CVA   #Fall/ generalized weakness   #Constipation  #Mild transaminitis  #Hypotension  #Lactic acidosis     -s/p azithromycin, ceftriaxone broadened to vanc, cefepime d/t worsening hypotension overnight   -c/w decadron and remdesivir   -trend cbc, fever curve, procal elevated  -f/u Bcx - ngtd  -repeat lactate  -wean down O2 as tolerated, goal >92%   -guaifenesin for cough  -incentive spirometer if able   -f/u legionella, mycoplasma, s.pneumo   -CTA chest to r/o pe  -IVF while still having poor po intake   -monitor and replete electrolytes  -assist with feeds   -c/w schizophrenia meds, depakote, ativan, olanzipine held d/t hypotension overnight  -c/w bowel regimen, senna/miralax   -fall precautions   -appreciate wound care recs  -dvt ppx    62 yo M, from [facility], with PMH of CVA, Alzheimer's, nonverbal at baseline, GERD, Schizophrenia, and Constipation, was brought into the ED s/p mechanical fall, poor oral intake, and Covid-19 infection on 12/27/23 as per NH papers. Admitted to medicine for sepsis and AHRF likely secondary to Covid-19 infection +/- superimposed PNA.     Labs, vitals and imaging reviewed. Overnight noted to be hypotensive and had RRT called. Labs done showing elevated lact, abx broadened. also found to have elevated ddimer, age adjusted 89, which could still have a likelihood of VTE. Leukocytosis improving, procal elevated, BP rechecked noted to have SBP in 90s. Patient calm, awake and alert, nonverbal but making eye contact. Pt also pointing to the site of bandage on LUQ region where a peg tube had been removed previously.  On exam: thin male, nonverbal, mucus membranes dry, RRR, bibasilar crackles, abdomen soft, LUQ with bandage in place covering site of peg tube wound, clean and dry, no pain to palpation, extremities with no edema.     #AHRF 2/2 COVID pneumonia +/- superimposed bacterial pneumonia   #Sepsis 2/2 pneumonia   #Dementia   #Schizophrenia   #remote CVA   #Fall/ generalized weakness   #Constipation  #Mild transaminitis  #Hypotension  #Lactic acidosis     -s/p azithromycin, ceftriaxone broadened to vanc, cefepime d/t worsening hypotension overnight   -c/w decadron and remdesivir   -trend cbc, fever curve, procal elevated  -f/u Bcx - ngtd  -repeat lactate  -wean down O2 as tolerated, goal >92%   -guaifenesin for cough  -incentive spirometer if able   -f/u legionella, mycoplasma, s.pneumo   -CTA chest to r/o pe  -IVF while still having poor po intake   -monitor and replete electrolytes  -assist with feeds   -c/w schizophrenia meds, depakote, ativan, olanzipine held d/t hypotension overnight  -c/w bowel regimen, senna/miralax   -fall precautions   -appreciate wound care recs  -dvt ppx

## 2023-12-30 NOTE — RAPID RESPONSE TEAM SUMMARY - NSSITUATIONBACKGROUNDRRT_GEN_ALL_CORE
A 63 year old male, from [facility], with PMH of CVA, Alzheimer's, nonverbal at baseline, GERD, Schizophrenia, and Constipation, was brought into the ED s/p mechanical fall, poor oral intake, and Covid-19 infection on 12/27/23 as per NH papers. Unable to obtain history from patient since he is nonverbal, history obtained from chart review.  Pt admitted to the hospital for spesis secondary to covid. RRT called pt was noted to be hypotensive with BP being 81/50, prior to RRT being called pt was given 2L fluids along with midodrine.     A 63 year old male, from [facility], with PMH of CVA, Alzheimer's, nonverbal at baseline, GERD, Schizophrenia, and Constipation, was brought into the ED s/p mechanical fall, poor oral intake, and Covid-19 infection on 12/27/23 as per NH papers. Unable to obtain history from patient since he is nonverbal, history obtained from chart review.  Pt admitted to the hospital for spesis secondary to covid. RRT called pt was noted to be hypotensive with BP being 81/50, prior to RRT being called pt was given 2L fluids along with midodrine.

## 2023-12-30 NOTE — RAPID RESPONSE TEAM SUMMARY - NSADDTLFINDINGSRRT_GEN_ALL_CORE
At the rapid pt was noted to be bradycardic- suspecting secondary to midodrine.   given 2L fluids and repeat chest xray. Pt's baseline BP runs low as he is bedbound.   GOAL MAP >60.  Will continue to monitor on the floor after fluids.

## 2023-12-31 LAB
ALBUMIN SERPL ELPH-MCNC: 2.4 G/DL — LOW (ref 3.5–5)
ALBUMIN SERPL ELPH-MCNC: 2.4 G/DL — LOW (ref 3.5–5)
ALP SERPL-CCNC: 45 U/L — SIGNIFICANT CHANGE UP (ref 40–120)
ALP SERPL-CCNC: 45 U/L — SIGNIFICANT CHANGE UP (ref 40–120)
ALT FLD-CCNC: 41 U/L DA — SIGNIFICANT CHANGE UP (ref 10–60)
ALT FLD-CCNC: 41 U/L DA — SIGNIFICANT CHANGE UP (ref 10–60)
ANION GAP SERPL CALC-SCNC: 2 MMOL/L — LOW (ref 5–17)
ANION GAP SERPL CALC-SCNC: 2 MMOL/L — LOW (ref 5–17)
AST SERPL-CCNC: 55 U/L — HIGH (ref 10–40)
AST SERPL-CCNC: 55 U/L — HIGH (ref 10–40)
BILIRUB SERPL-MCNC: 0.3 MG/DL — SIGNIFICANT CHANGE UP (ref 0.2–1.2)
BILIRUB SERPL-MCNC: 0.3 MG/DL — SIGNIFICANT CHANGE UP (ref 0.2–1.2)
BUN SERPL-MCNC: 9 MG/DL — SIGNIFICANT CHANGE UP (ref 7–18)
BUN SERPL-MCNC: 9 MG/DL — SIGNIFICANT CHANGE UP (ref 7–18)
CALCIUM SERPL-MCNC: 8.6 MG/DL — SIGNIFICANT CHANGE UP (ref 8.4–10.5)
CALCIUM SERPL-MCNC: 8.6 MG/DL — SIGNIFICANT CHANGE UP (ref 8.4–10.5)
CHLORIDE SERPL-SCNC: 110 MMOL/L — HIGH (ref 96–108)
CHLORIDE SERPL-SCNC: 110 MMOL/L — HIGH (ref 96–108)
CO2 SERPL-SCNC: 29 MMOL/L — SIGNIFICANT CHANGE UP (ref 22–31)
CO2 SERPL-SCNC: 29 MMOL/L — SIGNIFICANT CHANGE UP (ref 22–31)
CREAT SERPL-MCNC: 0.76 MG/DL — SIGNIFICANT CHANGE UP (ref 0.5–1.3)
CREAT SERPL-MCNC: 0.76 MG/DL — SIGNIFICANT CHANGE UP (ref 0.5–1.3)
EGFR: 100 ML/MIN/1.73M2 — SIGNIFICANT CHANGE UP
EGFR: 100 ML/MIN/1.73M2 — SIGNIFICANT CHANGE UP
GLUCOSE SERPL-MCNC: 109 MG/DL — HIGH (ref 70–99)
GLUCOSE SERPL-MCNC: 109 MG/DL — HIGH (ref 70–99)
HCT VFR BLD CALC: 36.2 % — LOW (ref 39–50)
HCT VFR BLD CALC: 36.2 % — LOW (ref 39–50)
HGB BLD-MCNC: 12 G/DL — LOW (ref 13–17)
HGB BLD-MCNC: 12 G/DL — LOW (ref 13–17)
LACTATE SERPL-SCNC: 1.7 MMOL/L — SIGNIFICANT CHANGE UP (ref 0.7–2)
LACTATE SERPL-SCNC: 1.7 MMOL/L — SIGNIFICANT CHANGE UP (ref 0.7–2)
MCHC RBC-ENTMCNC: 29.5 PG — SIGNIFICANT CHANGE UP (ref 27–34)
MCHC RBC-ENTMCNC: 29.5 PG — SIGNIFICANT CHANGE UP (ref 27–34)
MCHC RBC-ENTMCNC: 33.1 GM/DL — SIGNIFICANT CHANGE UP (ref 32–36)
MCHC RBC-ENTMCNC: 33.1 GM/DL — SIGNIFICANT CHANGE UP (ref 32–36)
MCV RBC AUTO: 88.9 FL — SIGNIFICANT CHANGE UP (ref 80–100)
MCV RBC AUTO: 88.9 FL — SIGNIFICANT CHANGE UP (ref 80–100)
MRSA PCR RESULT.: SIGNIFICANT CHANGE UP
MRSA PCR RESULT.: SIGNIFICANT CHANGE UP
NRBC # BLD: 0 /100 WBCS — SIGNIFICANT CHANGE UP (ref 0–0)
NRBC # BLD: 0 /100 WBCS — SIGNIFICANT CHANGE UP (ref 0–0)
PLATELET # BLD AUTO: 198 K/UL — SIGNIFICANT CHANGE UP (ref 150–400)
PLATELET # BLD AUTO: 198 K/UL — SIGNIFICANT CHANGE UP (ref 150–400)
POTASSIUM SERPL-MCNC: 3.6 MMOL/L — SIGNIFICANT CHANGE UP (ref 3.5–5.3)
POTASSIUM SERPL-MCNC: 3.6 MMOL/L — SIGNIFICANT CHANGE UP (ref 3.5–5.3)
POTASSIUM SERPL-SCNC: 3.6 MMOL/L — SIGNIFICANT CHANGE UP (ref 3.5–5.3)
POTASSIUM SERPL-SCNC: 3.6 MMOL/L — SIGNIFICANT CHANGE UP (ref 3.5–5.3)
PROT SERPL-MCNC: 5.6 G/DL — LOW (ref 6–8.3)
PROT SERPL-MCNC: 5.6 G/DL — LOW (ref 6–8.3)
RBC # BLD: 4.07 M/UL — LOW (ref 4.2–5.8)
RBC # BLD: 4.07 M/UL — LOW (ref 4.2–5.8)
RBC # FLD: 13.2 % — SIGNIFICANT CHANGE UP (ref 10.3–14.5)
RBC # FLD: 13.2 % — SIGNIFICANT CHANGE UP (ref 10.3–14.5)
S AUREUS DNA NOSE QL NAA+PROBE: SIGNIFICANT CHANGE UP
S AUREUS DNA NOSE QL NAA+PROBE: SIGNIFICANT CHANGE UP
SODIUM SERPL-SCNC: 141 MMOL/L — SIGNIFICANT CHANGE UP (ref 135–145)
SODIUM SERPL-SCNC: 141 MMOL/L — SIGNIFICANT CHANGE UP (ref 135–145)
WBC # BLD: 6.19 K/UL — SIGNIFICANT CHANGE UP (ref 3.8–10.5)
WBC # BLD: 6.19 K/UL — SIGNIFICANT CHANGE UP (ref 3.8–10.5)
WBC # FLD AUTO: 6.19 K/UL — SIGNIFICANT CHANGE UP (ref 3.8–10.5)
WBC # FLD AUTO: 6.19 K/UL — SIGNIFICANT CHANGE UP (ref 3.8–10.5)

## 2023-12-31 PROCEDURE — 99233 SBSQ HOSP IP/OBS HIGH 50: CPT

## 2023-12-31 RX ORDER — OLANZAPINE 15 MG/1
5 TABLET, FILM COATED ORAL
Refills: 0 | Status: DISCONTINUED | OUTPATIENT
Start: 2023-12-31 | End: 2023-12-31

## 2023-12-31 RX ORDER — OLANZAPINE 15 MG/1
2.5 TABLET, FILM COATED ORAL ONCE
Refills: 0 | Status: COMPLETED | OUTPATIENT
Start: 2023-12-31 | End: 2023-12-31

## 2023-12-31 RX ADMIN — ATORVASTATIN CALCIUM 20 MILLIGRAM(S): 80 TABLET, FILM COATED ORAL at 22:50

## 2023-12-31 RX ADMIN — SENNA PLUS 2 TABLET(S): 8.6 TABLET ORAL at 22:50

## 2023-12-31 RX ADMIN — OLANZAPINE 5 MILLIGRAM(S): 15 TABLET, FILM COATED ORAL at 17:43

## 2023-12-31 RX ADMIN — Medication 81 MILLIGRAM(S): at 13:01

## 2023-12-31 RX ADMIN — OLANZAPINE 2.5 MILLIGRAM(S): 15 TABLET, FILM COATED ORAL at 14:09

## 2023-12-31 RX ADMIN — POLYETHYLENE GLYCOL 3350 17 GRAM(S): 17 POWDER, FOR SOLUTION ORAL at 13:01

## 2023-12-31 RX ADMIN — Medication 1 TABLET(S): at 13:01

## 2023-12-31 RX ADMIN — Medication 1000 UNIT(S): at 13:01

## 2023-12-31 RX ADMIN — DIVALPROEX SODIUM 250 MILLIGRAM(S): 500 TABLET, DELAYED RELEASE ORAL at 05:37

## 2023-12-31 RX ADMIN — REMDESIVIR 200 MILLIGRAM(S): 5 INJECTION INTRAVENOUS at 02:00

## 2023-12-31 RX ADMIN — PREGABALIN 1000 MICROGRAM(S): 225 CAPSULE ORAL at 13:01

## 2023-12-31 RX ADMIN — ENOXAPARIN SODIUM 40 MILLIGRAM(S): 100 INJECTION SUBCUTANEOUS at 14:14

## 2023-12-31 RX ADMIN — Medication 200 MILLIGRAM(S): at 05:37

## 2023-12-31 RX ADMIN — DIVALPROEX SODIUM 250 MILLIGRAM(S): 500 TABLET, DELAYED RELEASE ORAL at 17:43

## 2023-12-31 RX ADMIN — Medication 0.5 MILLIGRAM(S): at 05:37

## 2023-12-31 RX ADMIN — Medication 250 MILLIGRAM(S): at 05:36

## 2023-12-31 RX ADMIN — Medication 200 MILLIGRAM(S): at 17:43

## 2023-12-31 RX ADMIN — Medication 6 MILLIGRAM(S): at 05:38

## 2023-12-31 RX ADMIN — CEFEPIME 100 MILLIGRAM(S): 1 INJECTION, POWDER, FOR SOLUTION INTRAMUSCULAR; INTRAVENOUS at 17:43

## 2023-12-31 RX ADMIN — CEFEPIME 100 MILLIGRAM(S): 1 INJECTION, POWDER, FOR SOLUTION INTRAMUSCULAR; INTRAVENOUS at 05:36

## 2023-12-31 RX ADMIN — Medication 250 MILLIGRAM(S): at 17:43

## 2023-12-31 RX ADMIN — Medication 200 MILLIGRAM(S): at 13:01

## 2023-12-31 RX ADMIN — Medication 0.5 MILLIGRAM(S): at 17:42

## 2023-12-31 NOTE — DIETITIAN INITIAL EVALUATION ADULT - PROBLEM SELECTOR PLAN 2
CXR showing mild b/l infiltrates   PSI/PORT Score 84 points Risk Class III  Will start Ceftriaxone 1g IV and Azithromycin 500 mg IV for now   F/U Strep  F/U Mycoplasma  F/U Legionella  Tylenol PRN  Wean of oxygen as tolerated   Incentive spirometer   Aspiration and fall precautions

## 2023-12-31 NOTE — DIETITIAN INITIAL EVALUATION ADULT - ENTER TO (CAL/KG)
Contacted pt and shared that he has refills available at his pharmacy for Amlodipine.  Pt stated understanding and states he will reach out to his pharmacy.     30

## 2023-12-31 NOTE — PROGRESS NOTE ADULT - ASSESSMENT
62 yo M, from [facility], with PMH of CVA, Alzheimer's, nonverbal at baseline, GERD, Schizophrenia, and Constipation, was brought into the ED s/p mechanical fall, poor oral intake, and Covid-19 infection on 12/27/23 as per NH papers. Admitted to medicine for sepsis and AHRF likely secondary to Covid-19 infection +/- superimposed PNA.     Labs, vitals  reviewed. BP noted to be improving  Labs with no leukocytosis, normal K, AST improving, lact normalized. also found to have elevated ddimer, age adjusted 89, known covid pos, which could still have a likelihood of VTE.  Patient initially noted to be agitated, trying to climb out of bed also. Again today, he was pointing to the site of bandage on LUQ region where a peg tube had been removed previously.  On exam: thin male, nonverbal, mucus membranes dry, RRR, bibasilar crackles, abdomen soft, LUQ with bandage in place covering site of peg tube wound, clean and dry, no pain to palpation, extremities with no edema.     #AHRF 2/2 COVID pneumonia +/- superimposed bacterial pneumonia   #Sepsis 2/2 pneumonia   #Dementia   #Schizophrenia   #remote CVA   #Fall/ generalized weakness   #Constipation  #Mild transaminitis  #Hypotension  #Lactic acidosis-resolved     -s/p azithromycin, ceftriaxone broadened to vanc, cefepime on 12/30  -c/w decadron and remdesivir (12/28 - )  -trend cbc, fever curve, procal elevated  -f/u Bcx - ngtd  -wean down O2 as tolerated, goal >92%   -guaifenesin for cough  -incentive spirometer if able   -f/u legionella, mycoplasma, s.pneumo   -CTA chest to r/o pe  -IVF while still having poor po intake   -monitor and replete electrolytes  -assist with feeds   -c/w schizophrenia meds, depakote, ativan, resume olanzapine   -c/w bowel regimen, senna/miralax   -fall precautions   -appreciate wound care recs  -PT eval  -dvt ppx    64 yo M, from [facility], with PMH of CVA, Alzheimer's, nonverbal at baseline, GERD, Schizophrenia, and Constipation, was brought into the ED s/p mechanical fall, poor oral intake, and Covid-19 infection on 12/27/23 as per NH papers. Admitted to medicine for sepsis and AHRF likely secondary to Covid-19 infection +/- superimposed PNA.     Labs, vitals  reviewed. BP noted to be improving  Labs with no leukocytosis, normal K, AST improving, lact normalized. also found to have elevated ddimer, age adjusted 89, known covid pos, which could still have a likelihood of VTE.  Patient initially noted to be agitated, trying to climb out of bed also. Again today, he was pointing to the site of bandage on LUQ region where a peg tube had been removed previously.  On exam: thin male, nonverbal, mucus membranes dry, RRR, bibasilar crackles, abdomen soft, LUQ with bandage in place covering site of peg tube wound, clean and dry, no pain to palpation, extremities with no edema.     #AHRF 2/2 COVID pneumonia +/- superimposed bacterial pneumonia   #Sepsis 2/2 pneumonia   #Dementia   #Schizophrenia   #remote CVA   #Fall/ generalized weakness   #Constipation  #Mild transaminitis  #Hypotension  #Lactic acidosis-resolved     -s/p azithromycin, ceftriaxone broadened to vanc, cefepime on 12/30  -c/w decadron and remdesivir (12/28 - )  -trend cbc, fever curve, procal elevated  -f/u Bcx - ngtd  -wean down O2 as tolerated, goal >92%   -guaifenesin for cough  -incentive spirometer if able   -f/u legionella, mycoplasma, s.pneumo   -CTA chest to r/o pe  -IVF while still having poor po intake   -monitor and replete electrolytes  -assist with feeds   -c/w schizophrenia meds, depakote, ativan, resume olanzapine   -c/w bowel regimen, senna/miralax   -fall precautions   -appreciate wound care recs  -PT eval  -dvt ppx    62 yo M, from [facility], with PMH of CVA, Alzheimer's, nonverbal at baseline, GERD, Schizophrenia, and Constipation, was brought into the ED s/p mechanical fall, poor oral intake, and Covid-19 infection on 12/27/23 as per NH papers. Admitted to medicine for sepsis and AHRF likely secondary to Covid-19 infection +/- superimposed PNA.     Labs, vitals  reviewed. BP noted to be improving, weaned down to room air. Labs with no leukocytosis, normal K, AST improving, lact normalized. also found to have elevated ddimer, age adjusted 89, known covid pos, which could still have a likelihood of VTE, wells score 1.5 low likelihood of pe.  Patient initially noted to be agitated, trying to climb out of bed also. Again today, he was pointing to the site of bandage on LUQ region where a peg tube had been removed previously.  On exam: thin male, nonverbal, mucus membranes dry, RRR, bibasilar crackles, abdomen soft, LUQ with bandage in place covering site of peg tube wound, clean and dry, no pain to palpation, extremities with no edema.     #AHRF 2/2 COVID pneumonia +/- superimposed bacterial pneumonia   #Sepsis 2/2 pneumonia   #Dementia   #Schizophrenia   #remote CVA   #Fall/ generalized weakness   #Constipation  #Mild transaminitis  #Hypotension  #Lactic acidosis-resolved     -s/p azithromycin, ceftriaxone broadened to vanc, cefepime on 12/30  -c/w decadron and remdesivir (12/28 - )  -trend cbc, fever curve, procal elevated  -f/u Bcx - ngtd  -weaned down to room air, goal >92%   -guaifenesin for cough  -incentive spirometer if able   -f/u legionella, mycoplasma, s.pneumo   -CTA chest to r/o pe, although low likelihood  -IVF while still having poor po intake   -monitor and replete electrolytes  -assist with feeds   -c/w schizophrenia meds, depakote, ativan, resume olanzapine   -c/w bowel regimen, senna/miralax   -fall precautions   -appreciate wound care recs  -PT eval  -dvt ppx    64 yo M, from [facility], with PMH of CVA, Alzheimer's, nonverbal at baseline, GERD, Schizophrenia, and Constipation, was brought into the ED s/p mechanical fall, poor oral intake, and Covid-19 infection on 12/27/23 as per NH papers. Admitted to medicine for sepsis and AHRF likely secondary to Covid-19 infection +/- superimposed PNA.     Labs, vitals  reviewed. BP noted to be improving, weaned down to room air. Labs with no leukocytosis, normal K, AST improving, lact normalized. also found to have elevated ddimer, age adjusted 89, known covid pos, which could still have a likelihood of VTE, wells score 1.5 low likelihood of pe.  Patient initially noted to be agitated, trying to climb out of bed also. Again today, he was pointing to the site of bandage on LUQ region where a peg tube had been removed previously.  On exam: thin male, nonverbal, mucus membranes dry, RRR, bibasilar crackles, abdomen soft, LUQ with bandage in place covering site of peg tube wound, clean and dry, no pain to palpation, extremities with no edema.     #AHRF 2/2 COVID pneumonia +/- superimposed bacterial pneumonia   #Sepsis 2/2 pneumonia   #Dementia   #Schizophrenia   #remote CVA   #Fall/ generalized weakness   #Constipation  #Mild transaminitis  #Hypotension  #Lactic acidosis-resolved     -s/p azithromycin, ceftriaxone broadened to vanc, cefepime on 12/30  -c/w decadron and remdesivir (12/28 - )  -trend cbc, fever curve, procal elevated  -f/u Bcx - ngtd  -weaned down to room air, goal >92%   -guaifenesin for cough  -incentive spirometer if able   -f/u legionella, mycoplasma, s.pneumo   -CTA chest to r/o pe, although low likelihood  -IVF while still having poor po intake   -monitor and replete electrolytes  -assist with feeds   -c/w schizophrenia meds, depakote, ativan, resume olanzapine   -c/w bowel regimen, senna/miralax   -fall precautions   -appreciate wound care recs  -PT eval  -dvt ppx

## 2023-12-31 NOTE — DIETITIAN INITIAL EVALUATION ADULT - ENTER FROM (G/KG)
Pt verbalizes understanding of discharge instructions and follow up.  Pt ambulatory out of ED, ASIA montano&LAILA Ramirez RN  02/05/22 8975 1.2

## 2023-12-31 NOTE — DIETITIAN INITIAL EVALUATION ADULT - ADD RECOMMEND
provide minced and moist, DASH/TLC diet.  provide multivitamin/minerals 1 tab/d and add vitamin C 500mg bid to assist with healing , meet >75% of needs

## 2023-12-31 NOTE — PROGRESS NOTE ADULT - SUBJECTIVE AND OBJECTIVE BOX
MEDICATIONS  (STANDING):  aspirin  chewable 81 milliGRAM(s) Oral daily  atorvastatin 20 milliGRAM(s) Oral at bedtime  cefepime   IVPB 1000 milliGRAM(s) IV Intermittent every 12 hours  cholecalciferol 1000 Unit(s) Oral daily  cyanocobalamin 1000 MICROGram(s) Oral daily  dexAMETHasone  Injectable 6 milliGRAM(s) IV Push daily  dextrose 5% + sodium chloride 0.45%. 1000 milliLiter(s) (80 mL/Hr) IV Continuous <Continuous>  divalproex  milliGRAM(s) Oral every 12 hours  enoxaparin Injectable 40 milliGRAM(s) SubCutaneous every 24 hours  guaiFENesin Oral Liquid (Sugar-Free) 200 milliGRAM(s) Oral every 6 hours  lactated ringers Bolus 1000 milliLiter(s) IV Bolus once  lactated ringers. 1000 milliLiter(s) (70 mL/Hr) IV Continuous <Continuous>  LORazepam     Tablet 0.5 milliGRAM(s) Oral two times a day  multivitamin/minerals 1 Tablet(s) Oral daily  OLANZapine 5 milliGRAM(s) Oral two times a day  polyethylene glycol 3350 17 Gram(s) Oral daily  remdesivir  IVPB 100 milliGRAM(s) IV Intermittent every 24 hours  remdesivir  IVPB   IV Intermittent   senna 2 Tablet(s) Oral at bedtime  vancomycin  IVPB 1000 milliGRAM(s) IV Intermittent every 12 hours    MEDICATIONS  (PRN):  acetaminophen     Tablet .. 650 milliGRAM(s) Oral every 6 hours PRN Temp greater or equal to 38C (100.4F), Mild Pain (1 - 3)    Vital Signs Last 24 Hrs  T(C): 36.5 (31 Dec 2023 14:20), Max: 36.6 (31 Dec 2023 04:59)  T(F): 97.7 (31 Dec 2023 14:20), Max: 97.8 (31 Dec 2023 04:59)  HR: 73 (31 Dec 2023 14:20) (55 - 73)  BP: 127/67 (31 Dec 2023 14:20) (101/58 - 127/67)  BP(mean): 76 (31 Dec 2023 04:59) (76 - 76)  RR: 20 (31 Dec 2023 14:20) (17 - 20)  SpO2: 95% (31 Dec 2023 14:20) (95% - 99%)    Parameters below as of 31 Dec 2023 14:20  Patient On (Oxygen Delivery Method): room air                          12.0   6.19  )-----------( 198      ( 31 Dec 2023 07:17 )             36.2     12-31    141  |  110<H>  |  9   ----------------------------<  109<H>  3.6   |  29  |  0.76    Ca    8.6      31 Dec 2023 07:17    TPro  5.6<L>  /  Alb  2.4<L>  /  TBili  0.3  /  DBili  x   /  AST  55<H>  /  ALT  41  /  AlkPhos  45  12-31

## 2023-12-31 NOTE — DIETITIAN INITIAL EVALUATION ADULT - PERTINENT MEDS FT
MEDICATIONS  (STANDING):  aspirin  chewable 81 milliGRAM(s) Oral daily  atorvastatin 20 milliGRAM(s) Oral at bedtime  cefepime   IVPB 1000 milliGRAM(s) IV Intermittent every 12 hours  cholecalciferol 1000 Unit(s) Oral daily  cyanocobalamin 1000 MICROGram(s) Oral daily  dexAMETHasone  Injectable 6 milliGRAM(s) IV Push daily  dextrose 5% + sodium chloride 0.45%. 1000 milliLiter(s) (80 mL/Hr) IV Continuous <Continuous>  divalproex  milliGRAM(s) Oral every 12 hours  enoxaparin Injectable 40 milliGRAM(s) SubCutaneous every 24 hours  guaiFENesin Oral Liquid (Sugar-Free) 200 milliGRAM(s) Oral every 6 hours  lactated ringers Bolus 1000 milliLiter(s) IV Bolus once  lactated ringers. 1000 milliLiter(s) (70 mL/Hr) IV Continuous <Continuous>  LORazepam     Tablet 0.5 milliGRAM(s) Oral two times a day  multivitamin/minerals 1 Tablet(s) Oral daily  polyethylene glycol 3350 17 Gram(s) Oral daily  remdesivir  IVPB 100 milliGRAM(s) IV Intermittent every 24 hours  remdesivir  IVPB   IV Intermittent   senna 2 Tablet(s) Oral at bedtime  vancomycin  IVPB 1000 milliGRAM(s) IV Intermittent every 12 hours    MEDICATIONS  (PRN):  acetaminophen     Tablet .. 650 milliGRAM(s) Oral every 6 hours PRN Temp greater or equal to 38C (100.4F), Mild Pain (1 - 3)

## 2023-12-31 NOTE — DIETITIAN INITIAL EVALUATION ADULT - NSFNSPHYEXAMSKINFT_GEN_A_CORE
Pressure Injury 1: Bilateral:, heel, Stage I  Pressure Injury 2: none, none  Pressure Injury 3: none, none  Pressure Injury 4: none, none  Pressure Injury 5: none, none  Pressure Injury 6: none, none  Pressure Injury 7: none, none  Pressure Injury 8: none, none  Pressure Injury 9: none, none  Pressure Injury 10: none, none  Pressure Injury 11: none, none, Pressure Injury 1: Bilateral:, heel, Stage I  Pressure Injury 2: none, none  Pressure Injury 3: none, none  Pressure Injury 4: none, none  Pressure Injury 5: none, none  Pressure Injury 6: none, none  Pressure Injury 7: none, none  Pressure Injury 8: none, none  Pressure Injury 9: none, none  Pressure Injury 10: none, none  Pressure Injury 11: none, none

## 2023-12-31 NOTE — CHART NOTE - NSCHARTNOTEFT_GEN_A_CORE
EVENT: Called to assess pt for restless behavior attempting to get OOB. s/p Zyprexa    BRIEF HPI: 64 yo M, from [facility], with PMH of CVA, Alzheimer's, nonverbal at baseline, GERD, Schizophrenia, and Constipation, was brought into the ED s/p mechanical fall, poor oral intake, and Covid-19 infection on 12/27/23 as per NH papers. Admitted to medicine for sepsis and AHRF likely secondary to Covid-19 infection +/- superimposed PNA. Agitation continue.    Vital Signs Last 24 Hrs  T(C): 36.3 (31 Dec 2023 21:33), Max: 36.6 (31 Dec 2023 04:59)  T(F): 97.4 (31 Dec 2023 21:33), Max: 97.8 (31 Dec 2023 04:59)  HR: 55 (31 Dec 2023 21:33) (55 - 73)  BP: 101/75 (31 Dec 2023 21:33) (101/58 - 127/67)  BP(mean): 76 (31 Dec 2023 04:59) (76 - 76)  RR: 20 (31 Dec 2023 21:33) (18 - 20)  SpO2: 96% (31 Dec 2023 21:33) (95% - 96%)    Parameters below as of 31 Dec 2023 21:33  Patient On (Oxygen Delivery Method): room air    FOCUSSED PE  NEURO: Awake, agitated, non verbal  CV: DAMIEN, bryan  RESP: Mildly inc with agitation    PROBLEM: Delirium probably due to Alzheimer  PLAN  Continue 1:1 observation for pt safety  Secured mittens until less agitated  Cont Lorazepam Tablet 0.5 luis GRAM(s) Oral two times a day    FOLLOW UP: effectiveness of med EVENT: Called to assess pt for restless behavior attempting to get OOB. s/p Zyprexa    BRIEF HPI: 62 yo M, from [facility], with PMH of CVA, Alzheimer's, nonverbal at baseline, GERD, Schizophrenia, and Constipation, was brought into the ED s/p mechanical fall, poor oral intake, and Covid-19 infection on 12/27/23 as per NH papers. Admitted to medicine for sepsis and AHRF likely secondary to Covid-19 infection +/- superimposed PNA. Agitation continue.    Vital Signs Last 24 Hrs  T(C): 36.3 (31 Dec 2023 21:33), Max: 36.6 (31 Dec 2023 04:59)  T(F): 97.4 (31 Dec 2023 21:33), Max: 97.8 (31 Dec 2023 04:59)  HR: 55 (31 Dec 2023 21:33) (55 - 73)  BP: 101/75 (31 Dec 2023 21:33) (101/58 - 127/67)  BP(mean): 76 (31 Dec 2023 04:59) (76 - 76)  RR: 20 (31 Dec 2023 21:33) (18 - 20)  SpO2: 96% (31 Dec 2023 21:33) (95% - 96%)    Parameters below as of 31 Dec 2023 21:33  Patient On (Oxygen Delivery Method): room air    FOCUSSED PE  NEURO: Awake, agitated, non verbal  CV: DAMIEN, bryan  RESP: Mildly inc with agitation    PROBLEM: Delirium probably due to Alzheimer  PLAN  Continue 1:1 observation for pt safety  Secured mittens until less agitated  Cont Lorazepam Tablet 0.5 luis GRAM(s) Oral two times a day    FOLLOW UP: effectiveness of med EVENT: Called to assess pt for restless behavior attempting to get OOB. s/p Zyprexa    BRIEF HPI: 64 yo M, from [facility], with PMH of CVA, Alzheimer's, nonverbal at baseline, GERD, Schizophrenia, and Constipation, was brought into the ED s/p mechanical fall, poor oral intake, and Covid-19 infection on 12/27/23 as per NH papers. Admitted to medicine for sepsis and AHRF likely secondary to Covid-19 infection +/- superimposed PNA. Agitation continue.    Vital Signs Last 24 Hrs  T(C): 36.3 (31 Dec 2023 21:33), Max: 36.6 (31 Dec 2023 04:59)  T(F): 97.4 (31 Dec 2023 21:33), Max: 97.8 (31 Dec 2023 04:59)  HR: 55 (31 Dec 2023 21:33) (55 - 73)  BP: 101/75 (31 Dec 2023 21:33) (101/58 - 127/67)  BP(mean): 76 (31 Dec 2023 04:59) (76 - 76)  RR: 20 (31 Dec 2023 21:33) (18 - 20)  SpO2: 96% (31 Dec 2023 21:33) (95% - 96%)    Parameters below as of 31 Dec 2023 21:33  Patient On (Oxygen Delivery Method): room air    FOCUSSED PE  NEURO: Awake, agitated, non verbal  CV: DAMIEN, bryan  RESP: Mildly inc with agitation    PROBLEM: Delirium probably due to Alzheimer  PLAN  Continue 1:1 observation for pt safety  Secured mittens until less agitated (UTO), wrist restraint  X 24 hrs  Cont Lorazepam Tablet 0.5 luis GRAM(s) Oral two times a day    FOLLOW UP: effectiveness of med EVENT: Called to assess pt for restless behavior attempting to get OOB. s/p Zyprexa    BRIEF HPI: 64 yo M, from [facility], with PMH of CVA, Alzheimer's, nonverbal at baseline, GERD, Schizophrenia, and Constipation, was brought into the ED s/p mechanical fall, poor oral intake, and Covid-19 infection on 12/27/23 as per NH papers. Admitted to medicine for sepsis and AHRF likely secondary to Covid-19 infection +/- superimposed PNA. Agitation continue.    Vital Signs Last 24 Hrs  T(C): 36.3 (31 Dec 2023 21:33), Max: 36.6 (31 Dec 2023 04:59)  T(F): 97.4 (31 Dec 2023 21:33), Max: 97.8 (31 Dec 2023 04:59)  HR: 55 (31 Dec 2023 21:33) (55 - 73)  BP: 101/75 (31 Dec 2023 21:33) (101/58 - 127/67)  BP(mean): 76 (31 Dec 2023 04:59) (76 - 76)  RR: 20 (31 Dec 2023 21:33) (18 - 20)  SpO2: 96% (31 Dec 2023 21:33) (95% - 96%)    Parameters below as of 31 Dec 2023 21:33  Patient On (Oxygen Delivery Method): room air    FOCUSSED PE  NEURO: Awake, agitated, non verbal  CV: DAMIEN, bryan  RESP: Mildly inc with agitation    PROBLEM: Delirium probably due to Alzheimer  PLAN  Continue 1:1 observation for pt safety  Secured mittens until less agitated (UTO), wrist restraint  X 24 hrs  Cont Lorazepam Tablet 0.5 luis GRAM(s) Oral two times a day    FOLLOW UP: effectiveness of med

## 2023-12-31 NOTE — DIETITIAN INITIAL EVALUATION ADULT - ORAL INTAKE PTA/DIET HISTORY
visited patient in airborne/contact isolation room for covid-19, unable to interview patient as confused .  Follows [facility], chopped consistency, ensure 8 oz/d, dietary snacks, LPS 15-30, 30ml bid at nursing home

## 2023-12-31 NOTE — DIETITIAN INITIAL EVALUATION ADULT - PERTINENT LABORATORY DATA
12-31    141  |  110<H>  |  9   ----------------------------<  109<H>  3.6   |  29  |  0.76    Ca    8.6      31 Dec 2023 07:17    TPro  5.6<L>  /  Alb  2.4<L>  /  TBili  0.3  /  DBili  x   /  AST  55<H>  /  ALT  41  /  AlkPhos  45  12-31  A1C with Estimated Average Glucose Result: 6.0 % (12-29-23 @ 07:00)

## 2024-01-01 LAB
M PNEUMO IGM SER-ACNC: 0.24 INDEX — SIGNIFICANT CHANGE UP (ref 0–0.9)
M PNEUMO IGM SER-ACNC: 0.24 INDEX — SIGNIFICANT CHANGE UP (ref 0–0.9)
MYCOPLASMA AG SPEC QL: NEGATIVE — SIGNIFICANT CHANGE UP
MYCOPLASMA AG SPEC QL: NEGATIVE — SIGNIFICANT CHANGE UP
VANCOMYCIN TROUGH SERPL-MCNC: 13.1 UG/ML — SIGNIFICANT CHANGE UP (ref 10–20)
VANCOMYCIN TROUGH SERPL-MCNC: 13.1 UG/ML — SIGNIFICANT CHANGE UP (ref 10–20)

## 2024-01-01 PROCEDURE — 99233 SBSQ HOSP IP/OBS HIGH 50: CPT

## 2024-01-01 RX ORDER — VANCOMYCIN HCL 1 G
1250 VIAL (EA) INTRAVENOUS ONCE
Refills: 0 | Status: COMPLETED | OUTPATIENT
Start: 2024-01-01 | End: 2024-01-01

## 2024-01-01 RX ORDER — MIDODRINE HYDROCHLORIDE 2.5 MG/1
5 TABLET ORAL THREE TIMES A DAY
Refills: 0 | Status: COMPLETED | OUTPATIENT
Start: 2024-01-01 | End: 2024-01-02

## 2024-01-01 RX ORDER — OLANZAPINE 15 MG/1
5 TABLET, FILM COATED ORAL EVERY 12 HOURS
Refills: 0 | Status: DISCONTINUED | OUTPATIENT
Start: 2024-01-01 | End: 2024-01-03

## 2024-01-01 RX ORDER — SODIUM CHLORIDE 9 MG/ML
1000 INJECTION, SOLUTION INTRAVENOUS
Refills: 0 | Status: DISCONTINUED | OUTPATIENT
Start: 2024-01-01 | End: 2024-01-03

## 2024-01-01 RX ORDER — VANCOMYCIN HCL 1 G
VIAL (EA) INTRAVENOUS
Refills: 0 | Status: DISCONTINUED | OUTPATIENT
Start: 2024-01-01 | End: 2024-01-01

## 2024-01-01 RX ORDER — SODIUM CHLORIDE 9 MG/ML
250 INJECTION, SOLUTION INTRAVENOUS
Refills: 0 | Status: DISCONTINUED | OUTPATIENT
Start: 2024-01-01 | End: 2024-01-01

## 2024-01-01 RX ORDER — MIDODRINE HYDROCHLORIDE 2.5 MG/1
10 TABLET ORAL ONCE
Refills: 0 | Status: COMPLETED | OUTPATIENT
Start: 2024-01-01 | End: 2024-01-01

## 2024-01-01 RX ORDER — SODIUM CHLORIDE 9 MG/ML
1000 INJECTION, SOLUTION INTRAVENOUS ONCE
Refills: 0 | Status: COMPLETED | OUTPATIENT
Start: 2024-01-01 | End: 2024-01-01

## 2024-01-01 RX ORDER — MIDODRINE HYDROCHLORIDE 2.5 MG/1
10 TABLET ORAL ONCE
Refills: 0 | Status: DISCONTINUED | OUTPATIENT
Start: 2024-01-01 | End: 2024-01-01

## 2024-01-01 RX ORDER — VALPROIC ACID (AS SODIUM SALT) 250 MG/5ML
250 SOLUTION, ORAL ORAL EVERY 12 HOURS
Refills: 0 | Status: DISCONTINUED | OUTPATIENT
Start: 2024-01-01 | End: 2024-01-02

## 2024-01-01 RX ADMIN — MIDODRINE HYDROCHLORIDE 10 MILLIGRAM(S): 2.5 TABLET ORAL at 07:17

## 2024-01-01 RX ADMIN — SODIUM CHLORIDE 500 MILLILITER(S): 9 INJECTION, SOLUTION INTRAVENOUS at 21:28

## 2024-01-01 RX ADMIN — ENOXAPARIN SODIUM 40 MILLIGRAM(S): 100 INJECTION SUBCUTANEOUS at 13:43

## 2024-01-01 RX ADMIN — CEFEPIME 100 MILLIGRAM(S): 1 INJECTION, POWDER, FOR SOLUTION INTRAMUSCULAR; INTRAVENOUS at 05:12

## 2024-01-01 RX ADMIN — Medication 200 MILLIGRAM(S): at 11:11

## 2024-01-01 RX ADMIN — Medication 1000 UNIT(S): at 11:11

## 2024-01-01 RX ADMIN — Medication 250 MILLIGRAM(S): at 06:27

## 2024-01-01 RX ADMIN — MIDODRINE HYDROCHLORIDE 5 MILLIGRAM(S): 2.5 TABLET ORAL at 17:25

## 2024-01-01 RX ADMIN — Medication 81 MILLIGRAM(S): at 11:10

## 2024-01-01 RX ADMIN — Medication 250 MILLIGRAM(S): at 05:12

## 2024-01-01 RX ADMIN — OLANZAPINE 5 MILLIGRAM(S): 15 TABLET, FILM COATED ORAL at 11:10

## 2024-01-01 RX ADMIN — SODIUM CHLORIDE 70 MILLILITER(S): 9 INJECTION, SOLUTION INTRAVENOUS at 13:44

## 2024-01-01 RX ADMIN — Medication 200 MILLIGRAM(S): at 00:27

## 2024-01-01 RX ADMIN — POLYETHYLENE GLYCOL 3350 17 GRAM(S): 17 POWDER, FOR SOLUTION ORAL at 11:10

## 2024-01-01 RX ADMIN — SENNA PLUS 2 TABLET(S): 8.6 TABLET ORAL at 21:19

## 2024-01-01 RX ADMIN — OLANZAPINE 5 MILLIGRAM(S): 15 TABLET, FILM COATED ORAL at 17:25

## 2024-01-01 RX ADMIN — Medication 166.67 MILLIGRAM(S): at 17:52

## 2024-01-01 RX ADMIN — Medication 6 MILLIGRAM(S): at 05:12

## 2024-01-01 RX ADMIN — Medication 250 MILLIGRAM(S): at 17:51

## 2024-01-01 RX ADMIN — REMDESIVIR 200 MILLIGRAM(S): 5 INJECTION INTRAVENOUS at 02:47

## 2024-01-01 RX ADMIN — PREGABALIN 1000 MICROGRAM(S): 225 CAPSULE ORAL at 11:10

## 2024-01-01 RX ADMIN — Medication 1 TABLET(S): at 11:10

## 2024-01-01 RX ADMIN — Medication 200 MILLIGRAM(S): at 05:12

## 2024-01-01 RX ADMIN — CEFEPIME 100 MILLIGRAM(S): 1 INJECTION, POWDER, FOR SOLUTION INTRAMUSCULAR; INTRAVENOUS at 17:24

## 2024-01-01 RX ADMIN — ATORVASTATIN CALCIUM 20 MILLIGRAM(S): 80 TABLET, FILM COATED ORAL at 21:20

## 2024-01-01 RX ADMIN — SODIUM CHLORIDE 500 MILLILITER(S): 9 INJECTION, SOLUTION INTRAVENOUS at 10:20

## 2024-01-01 NOTE — PROGRESS NOTE ADULT - ASSESSMENT
64 yo M, from [facility], with PMH of CVA, Alzheimer's, nonverbal at baseline, GERD, Schizophrenia, and Constipation, was brought into the ED s/p mechanical fall, poor oral intake, and Covid-19 infection on 12/27/23 as per NH papers. Admitted to medicine for sepsis and AHRF likely secondary to Covid-19 infection +/- superimposed PNA.     Labs, vitals  reviewed. BP noted low again this morning, breathing well on room air. Pt lying calmly in bed, awake, but points at his gown indicating he needs to be changed since he had a BM. On exam: thin male, nonverbal, mucus membranes dry, RRR, bibasilar crackles, abdomen soft, LUQ with bandage in place covering site of peg tube wound, clean and dry, no pain to palpation, extremities with no edema.     #AHRF 2/2 COVID pneumonia +/- superimposed bacterial pneumonia   #Sepsis 2/2 pneumonia   #Dementia   #Schizophrenia   #remote CVA   #Fall/ generalized weakness   #Constipation  #Mild transaminitis  #Hypotension  #Lactic acidosis-resolved     -s/p azithromycin, ceftriaxone (12/28-12/29) broadened to vanc, cefepime on 12/30  -c/w decadron and remdesivir (12/28 - )  -trend cbc, fever curve, procal elevated  -f/u Bcx - ngtd  -weaned down to room air, goal >92%   -guaifenesin for cough  -IVF bolus, then maintenances as patient appears dry  -add trial of midodrine tid  -f/u legionella, mycoplasma, s.pneumo   -CTA chest to r/o pe, although low likelihood  -IVF while still having poor po intake   -monitor and replete electrolytes  -assist with feeds   -c/w schizophrenia meds, depakote, ativan, resume olanzapine   -c/w bowel regimen, senna/miralax   -fall precautions   -appreciate wound care recs  -PT eval  -dvt ppx      62 yo M, from [facility], with PMH of CVA, Alzheimer's, nonverbal at baseline, GERD, Schizophrenia, and Constipation, was brought into the ED s/p mechanical fall, poor oral intake, and Covid-19 infection on 12/27/23 as per NH papers. Admitted to medicine for sepsis and AHRF likely secondary to Covid-19 infection +/- superimposed PNA.     Labs, vitals  reviewed. BP noted low again this morning, breathing well on room air. Pt lying calmly in bed, awake, but points at his gown indicating he needs to be changed since he had a BM. On exam: thin male, nonverbal, mucus membranes dry, RRR, bibasilar crackles, abdomen soft, LUQ with bandage in place covering site of peg tube wound, clean and dry, no pain to palpation, extremities with no edema.     #AHRF 2/2 COVID pneumonia +/- superimposed bacterial pneumonia   #Sepsis 2/2 pneumonia   #Dementia   #Schizophrenia   #remote CVA   #Fall/ generalized weakness   #Constipation  #Mild transaminitis  #Hypotension  #Lactic acidosis-resolved     -s/p azithromycin, ceftriaxone (12/28-12/29) broadened to vanc, cefepime on 12/30  -c/w decadron and remdesivir (12/28 - )  -trend cbc, fever curve, procal elevated  -f/u Bcx - ngtd  -weaned down to room air, goal >92%   -guaifenesin for cough  -IVF bolus, then maintenances as patient appears dry  -add trial of midodrine tid  -f/u legionella, mycoplasma, s.pneumo   -CTA chest to r/o pe, although low likelihood  -IVF while still having poor po intake   -monitor and replete electrolytes  -assist with feeds   -c/w schizophrenia meds, depakote, ativan, resume olanzapine   -c/w bowel regimen, senna/miralax   -fall precautions   -appreciate wound care recs  -PT eval  -dvt ppx      62 yo M, from [facility], with PMH of CVA, Alzheimer's, nonverbal at baseline, GERD, Schizophrenia, and Constipation, was brought into the ED s/p mechanical fall, poor oral intake, and Covid-19 infection on 12/27/23 as per NH papers. Admitted to medicine for sepsis and AHRF likely secondary to Covid-19 infection +/- superimposed PNA.     Labs, vitals  reviewed. BP noted low again this morning, breathing well on room air. Pt lying calmly in bed, awake, but points at his gown indicating he needs to be changed since he had a BM. On exam: thin male, nonverbal, mucus membranes dry, RRR, bibasilar crackles, abdomen soft, LUQ with bandage in place covering site of peg tube wound, clean and dry, no pain to palpation, extremities with no edema.     #AHRF 2/2 COVID pneumonia +/- superimposed bacterial pneumonia   #Sepsis 2/2 pneumonia   #Dementia   #Schizophrenia   #remote CVA   #Fall/ generalized weakness   #Constipation  #Mild transaminitis  #Hypotension  #Lactic acidosis-resolved     -s/p azithromycin, ceftriaxone (12/28-12/29) broadened to vanc, cefepime on 12/30  -discussed peripherally with ID, Dr Lema, will d/c vanc today and c/w cefepime only  -c/w decadron and remdesivir (12/28 - )  -trend cbc, fever curve, procal elevated  -f/u Bcx - ngtd  -weaned down to room air, goal >92%   -guaifenesin for cough  -IVF bolus, then maintenances as patient appears dry  -add trial of midodrine tid  -f/u legionella, mycoplasma, s.pneumo   -CTA chest to r/o pe, although low likelihood  -IVF while still having poor po intake   -monitor and replete electrolytes  -assist with feeds   -c/w schizophrenia meds, depakote, ativan, resume olanzapine   -c/w bowel regimen, senna/miralax   -fall precautions   -appreciate wound care recs  -PT eval  -dvt ppx      62 yo M, from [facility], with PMH of CVA, Alzheimer's, nonverbal at baseline, GERD, Schizophrenia, and Constipation, was brought into the ED s/p mechanical fall, poor oral intake, and Covid-19 infection on 12/27/23 as per NH papers. Admitted to medicine for sepsis and AHRF likely secondary to Covid-19 infection +/- superimposed PNA.     Labs, vitals  reviewed. BP noted low again this morning, breathing well on room air. Pt lying calmly in bed, awake, but points at his gown indicating he needs to be changed since he had a BM. On exam: thin male, nonverbal, mucus membranes dry, RRR, bibasilar crackles, abdomen soft, LUQ with bandage in place covering site of peg tube wound, clean and dry, no pain to palpation, extremities with no edema.     #AHRF 2/2 COVID pneumonia +/- superimposed bacterial pneumonia   #Sepsis 2/2 pneumonia   #Dementia   #Schizophrenia   #remote CVA   #Fall/ generalized weakness   #Constipation  #Mild transaminitis  #Hypotension  #Lactic acidosis-resolved     -s/p azithromycin, ceftriaxone (12/28-12/29) broadened to vanc, cefepime on 12/30  -discussed peripherally with ID, Dr Lema, will d/c vanc today and c/w cefepime only  -c/w decadron and remdesivir (12/28 - )  -trend cbc, fever curve, procal elevated  -f/u Bcx - ngtd  -weaned down to room air, goal >92%   -guaifenesin for cough  -IVF bolus, then maintenances as patient appears dry  -add trial of midodrine tid  -f/u legionella, mycoplasma, s.pneumo   -CTA chest to r/o pe, although low likelihood  -IVF while still having poor po intake   -monitor and replete electrolytes  -assist with feeds   -c/w schizophrenia meds, depakote, ativan, resume olanzapine   -c/w bowel regimen, senna/miralax   -fall precautions   -appreciate wound care recs  -PT eval  -dvt ppx      62 yo M, from [facility], with PMH of CVA, Alzheimer's, nonverbal at baseline, GERD, Schizophrenia, and Constipation, was brought into the ED s/p mechanical fall, poor oral intake, and Covid-19 infection on 12/27/23 as per NH papers. Admitted to medicine for sepsis and AHRF likely secondary to Covid-19 infection +/- superimposed PNA.     Labs, vitals  reviewed. BP noted low again this morning, breathing well on room air. Pt lying calmly in bed, awake, but points at his gown indicating he needs to be changed since he had a BM. On exam: thin male, nonverbal, mucus membranes dry, RRR, bibasilar crackles, abdomen soft, LUQ with bandage in place covering site of peg tube wound, clean and dry, no pain to palpation, extremities with no edema.     #AHRF 2/2 COVID pneumonia +/- superimposed bacterial pneumonia   #Sepsis 2/2 pneumonia   #Dementia   #Schizophrenia   #remote CVA   #Fall/ generalized weakness   #Constipation  #Mild transaminitis  #Hypotension  #Lactic acidosis-resolved     -s/p azithromycin, ceftriaxone (12/28-12/29) broadened to vanc, cefepime on 12/30  -discussed peripherally with ID, Dr Lema, will d/c vanc today and c/w cefepime only  -c/w decadron and remdesivir (12/28 - )  -trend cbc, fever curve, procal elevated  -f/u Bcx - ngtd  -weaned down to room air, goal >92%   -guaifenesin for cough  -IVF bolus, then maintenances as patient appears dry  -add trial of midodrine tid  -if despite above, pt with refractory hypotension, may consult ICU to eval for need for pressors  -f/u legionella, mycoplasma, s.pneumo   -CTA chest to r/o pe, although low likelihood  -IVF while still having poor po intake   -monitor and replete electrolytes  -assist with feeds   -c/w schizophrenia meds, depakote, ativan, resume olanzapine   -c/w bowel regimen, senna/miralax   -fall precautions   -appreciate wound care recs  -PT eval  -dvt ppx      64 yo M, from [facility], with PMH of CVA, Alzheimer's, nonverbal at baseline, GERD, Schizophrenia, and Constipation, was brought into the ED s/p mechanical fall, poor oral intake, and Covid-19 infection on 12/27/23 as per NH papers. Admitted to medicine for sepsis and AHRF likely secondary to Covid-19 infection +/- superimposed PNA.     Labs, vitals  reviewed. BP noted low again this morning, breathing well on room air. Pt lying calmly in bed, awake, but points at his gown indicating he needs to be changed since he had a BM. On exam: thin male, nonverbal, mucus membranes dry, RRR, bibasilar crackles, abdomen soft, LUQ with bandage in place covering site of peg tube wound, clean and dry, no pain to palpation, extremities with no edema.     #AHRF 2/2 COVID pneumonia +/- superimposed bacterial pneumonia   #Sepsis 2/2 pneumonia   #Dementia   #Schizophrenia   #remote CVA   #Fall/ generalized weakness   #Constipation  #Mild transaminitis  #Hypotension  #Lactic acidosis-resolved     -s/p azithromycin, ceftriaxone (12/28-12/29) broadened to vanc, cefepime on 12/30  -discussed peripherally with ID, Dr Lema, will d/c vanc today and c/w cefepime only  -c/w decadron and remdesivir (12/28 - )  -trend cbc, fever curve, procal elevated  -f/u Bcx - ngtd  -weaned down to room air, goal >92%   -guaifenesin for cough  -IVF bolus, then maintenances as patient appears dry  -add trial of midodrine tid  -if despite above, pt with refractory hypotension, may consult ICU to eval for need for pressors  -f/u legionella, mycoplasma, s.pneumo   -CTA chest to r/o pe, although low likelihood  -IVF while still having poor po intake   -monitor and replete electrolytes  -assist with feeds   -c/w schizophrenia meds, depakote, ativan, resume olanzapine   -c/w bowel regimen, senna/miralax   -fall precautions   -appreciate wound care recs  -PT eval  -dvt ppx

## 2024-01-01 NOTE — PROGRESS NOTE ADULT - SUBJECTIVE AND OBJECTIVE BOX
MEDICATIONS  (STANDING):  aspirin  chewable 81 milliGRAM(s) Oral daily  atorvastatin 20 milliGRAM(s) Oral at bedtime  cefepime   IVPB 1000 milliGRAM(s) IV Intermittent every 12 hours  cholecalciferol 1000 Unit(s) Oral daily  cyanocobalamin 1000 MICROGram(s) Oral daily  dexAMETHasone  Injectable 6 milliGRAM(s) IV Push daily  enoxaparin Injectable 40 milliGRAM(s) SubCutaneous every 24 hours  lactated ringers. 1000 milliLiter(s) (70 mL/Hr) IV Continuous <Continuous>  LORazepam     Tablet 0.5 milliGRAM(s) Oral two times a day  midodrine. 5 milliGRAM(s) Oral three times a day  multivitamin/minerals 1 Tablet(s) Oral daily  OLANZapine 5 milliGRAM(s) Oral every 12 hours  polyethylene glycol 3350 17 Gram(s) Oral daily  remdesivir  IVPB 100 milliGRAM(s) IV Intermittent every 24 hours  remdesivir  IVPB   IV Intermittent   senna 2 Tablet(s) Oral at bedtime  valproic acid 250 milliGRAM(s) Oral every 12 hours  vancomycin  IVPB 1000 milliGRAM(s) IV Intermittent every 12 hours    MEDICATIONS  (PRN):  acetaminophen     Tablet .. 650 milliGRAM(s) Oral every 6 hours PRN Temp greater or equal to 38C (100.4F), Mild Pain (1 - 3)    Vital Signs Last 24 Hrs  T(C): 36.7 (01 Jan 2024 13:44), Max: 36.7 (01 Jan 2024 13:44)  T(F): 98.1 (01 Jan 2024 13:44), Max: 98.1 (01 Jan 2024 13:44)  HR: 57 (01 Jan 2024 13:44) (50 - 57)  BP: 81/41 (01 Jan 2024 13:44) (80/48 - 101/75)  BP(mean): --  RR: 18 (01 Jan 2024 13:44) (18 - 20)  SpO2: 94% (01 Jan 2024 13:44) (94% - 96%)    Parameters below as of 01 Jan 2024 13:44  Patient On (Oxygen Delivery Method): room air    12-31    141  |  110<H>  |  9   ----------------------------<  109<H>  3.6   |  29  |  0.76    Ca    8.6      31 Dec 2023 07:17    TPro  5.6<L>  /  Alb  2.4<L>  /  TBili  0.3  /  DBili  x   /  AST  55<H>  /  ALT  41  /  AlkPhos  45  12-31 12-31    141  |  110<H>  |  9   ----------------------------<  109<H>  3.6   |  29  |  0.76    Ca    8.6      31 Dec 2023 07:17    TPro  5.6<L>  /  Alb  2.4<L>  /  TBili  0.3  /  DBili  x   /  AST  55<H>  /  ALT  41  /  AlkPhos  45  12-31

## 2024-01-02 DIAGNOSIS — I95.9 HYPOTENSION, UNSPECIFIED: ICD-10-CM

## 2024-01-02 LAB
ALBUMIN SERPL ELPH-MCNC: 2.2 G/DL — LOW (ref 3.5–5)
ALBUMIN SERPL ELPH-MCNC: 2.2 G/DL — LOW (ref 3.5–5)
ALP SERPL-CCNC: 42 U/L — SIGNIFICANT CHANGE UP (ref 40–120)
ALP SERPL-CCNC: 42 U/L — SIGNIFICANT CHANGE UP (ref 40–120)
ALT FLD-CCNC: 52 U/L DA — SIGNIFICANT CHANGE UP (ref 10–60)
ALT FLD-CCNC: 52 U/L DA — SIGNIFICANT CHANGE UP (ref 10–60)
ANION GAP SERPL CALC-SCNC: 3 MMOL/L — LOW (ref 5–17)
ANION GAP SERPL CALC-SCNC: 3 MMOL/L — LOW (ref 5–17)
AST SERPL-CCNC: 40 U/L — SIGNIFICANT CHANGE UP (ref 10–40)
AST SERPL-CCNC: 40 U/L — SIGNIFICANT CHANGE UP (ref 10–40)
BILIRUB SERPL-MCNC: 0.3 MG/DL — SIGNIFICANT CHANGE UP (ref 0.2–1.2)
BILIRUB SERPL-MCNC: 0.3 MG/DL — SIGNIFICANT CHANGE UP (ref 0.2–1.2)
BUN SERPL-MCNC: 10 MG/DL — SIGNIFICANT CHANGE UP (ref 7–18)
BUN SERPL-MCNC: 10 MG/DL — SIGNIFICANT CHANGE UP (ref 7–18)
CALCIUM SERPL-MCNC: 8 MG/DL — LOW (ref 8.4–10.5)
CALCIUM SERPL-MCNC: 8 MG/DL — LOW (ref 8.4–10.5)
CHLORIDE SERPL-SCNC: 112 MMOL/L — HIGH (ref 96–108)
CHLORIDE SERPL-SCNC: 112 MMOL/L — HIGH (ref 96–108)
CO2 SERPL-SCNC: 29 MMOL/L — SIGNIFICANT CHANGE UP (ref 22–31)
CO2 SERPL-SCNC: 29 MMOL/L — SIGNIFICANT CHANGE UP (ref 22–31)
CREAT SERPL-MCNC: 0.68 MG/DL — SIGNIFICANT CHANGE UP (ref 0.5–1.3)
CREAT SERPL-MCNC: 0.68 MG/DL — SIGNIFICANT CHANGE UP (ref 0.5–1.3)
EGFR: 104 ML/MIN/1.73M2 — SIGNIFICANT CHANGE UP
EGFR: 104 ML/MIN/1.73M2 — SIGNIFICANT CHANGE UP
GLUCOSE SERPL-MCNC: 90 MG/DL — SIGNIFICANT CHANGE UP (ref 70–99)
GLUCOSE SERPL-MCNC: 90 MG/DL — SIGNIFICANT CHANGE UP (ref 70–99)
HCT VFR BLD CALC: 38.2 % — LOW (ref 39–50)
HCT VFR BLD CALC: 38.2 % — LOW (ref 39–50)
HGB BLD-MCNC: 12.3 G/DL — LOW (ref 13–17)
HGB BLD-MCNC: 12.3 G/DL — LOW (ref 13–17)
LACTATE SERPL-SCNC: 1.3 MMOL/L — SIGNIFICANT CHANGE UP (ref 0.7–2)
LACTATE SERPL-SCNC: 1.3 MMOL/L — SIGNIFICANT CHANGE UP (ref 0.7–2)
MAGNESIUM SERPL-MCNC: 2 MG/DL — SIGNIFICANT CHANGE UP (ref 1.6–2.6)
MAGNESIUM SERPL-MCNC: 2 MG/DL — SIGNIFICANT CHANGE UP (ref 1.6–2.6)
MCHC RBC-ENTMCNC: 29 PG — SIGNIFICANT CHANGE UP (ref 27–34)
MCHC RBC-ENTMCNC: 29 PG — SIGNIFICANT CHANGE UP (ref 27–34)
MCHC RBC-ENTMCNC: 32.2 GM/DL — SIGNIFICANT CHANGE UP (ref 32–36)
MCHC RBC-ENTMCNC: 32.2 GM/DL — SIGNIFICANT CHANGE UP (ref 32–36)
MCV RBC AUTO: 90.1 FL — SIGNIFICANT CHANGE UP (ref 80–100)
MCV RBC AUTO: 90.1 FL — SIGNIFICANT CHANGE UP (ref 80–100)
NRBC # BLD: 0 /100 WBCS — SIGNIFICANT CHANGE UP (ref 0–0)
NRBC # BLD: 0 /100 WBCS — SIGNIFICANT CHANGE UP (ref 0–0)
PHOSPHATE SERPL-MCNC: 2.8 MG/DL — SIGNIFICANT CHANGE UP (ref 2.5–4.5)
PHOSPHATE SERPL-MCNC: 2.8 MG/DL — SIGNIFICANT CHANGE UP (ref 2.5–4.5)
PLATELET # BLD AUTO: 226 K/UL — SIGNIFICANT CHANGE UP (ref 150–400)
PLATELET # BLD AUTO: 226 K/UL — SIGNIFICANT CHANGE UP (ref 150–400)
POTASSIUM SERPL-MCNC: 3.6 MMOL/L — SIGNIFICANT CHANGE UP (ref 3.5–5.3)
POTASSIUM SERPL-MCNC: 3.6 MMOL/L — SIGNIFICANT CHANGE UP (ref 3.5–5.3)
POTASSIUM SERPL-SCNC: 3.6 MMOL/L — SIGNIFICANT CHANGE UP (ref 3.5–5.3)
POTASSIUM SERPL-SCNC: 3.6 MMOL/L — SIGNIFICANT CHANGE UP (ref 3.5–5.3)
PROT SERPL-MCNC: 5.2 G/DL — LOW (ref 6–8.3)
PROT SERPL-MCNC: 5.2 G/DL — LOW (ref 6–8.3)
RBC # BLD: 4.24 M/UL — SIGNIFICANT CHANGE UP (ref 4.2–5.8)
RBC # BLD: 4.24 M/UL — SIGNIFICANT CHANGE UP (ref 4.2–5.8)
RBC # FLD: 13.2 % — SIGNIFICANT CHANGE UP (ref 10.3–14.5)
RBC # FLD: 13.2 % — SIGNIFICANT CHANGE UP (ref 10.3–14.5)
SODIUM SERPL-SCNC: 144 MMOL/L — SIGNIFICANT CHANGE UP (ref 135–145)
SODIUM SERPL-SCNC: 144 MMOL/L — SIGNIFICANT CHANGE UP (ref 135–145)
WBC # BLD: 5.58 K/UL — SIGNIFICANT CHANGE UP (ref 3.8–10.5)
WBC # BLD: 5.58 K/UL — SIGNIFICANT CHANGE UP (ref 3.8–10.5)
WBC # FLD AUTO: 5.58 K/UL — SIGNIFICANT CHANGE UP (ref 3.8–10.5)
WBC # FLD AUTO: 5.58 K/UL — SIGNIFICANT CHANGE UP (ref 3.8–10.5)

## 2024-01-02 PROCEDURE — 99233 SBSQ HOSP IP/OBS HIGH 50: CPT

## 2024-01-02 RX ORDER — SODIUM CHLORIDE 9 MG/ML
1000 INJECTION, SOLUTION INTRAVENOUS ONCE
Refills: 0 | Status: COMPLETED | OUTPATIENT
Start: 2024-01-02 | End: 2024-01-02

## 2024-01-02 RX ORDER — MIDODRINE HYDROCHLORIDE 2.5 MG/1
5 TABLET ORAL THREE TIMES A DAY
Refills: 0 | Status: COMPLETED | OUTPATIENT
Start: 2024-01-02 | End: 2024-01-03

## 2024-01-02 RX ORDER — SODIUM CHLORIDE 9 MG/ML
500 INJECTION INTRAMUSCULAR; INTRAVENOUS; SUBCUTANEOUS ONCE
Refills: 0 | Status: COMPLETED | OUTPATIENT
Start: 2024-01-02 | End: 2024-01-02

## 2024-01-02 RX ORDER — VALPROIC ACID (AS SODIUM SALT) 250 MG/5ML
250 SOLUTION, ORAL ORAL
Refills: 0 | Status: DISCONTINUED | OUTPATIENT
Start: 2024-01-02 | End: 2024-01-12

## 2024-01-02 RX ADMIN — ATORVASTATIN CALCIUM 20 MILLIGRAM(S): 80 TABLET, FILM COATED ORAL at 21:15

## 2024-01-02 RX ADMIN — Medication 1 TABLET(S): at 11:28

## 2024-01-02 RX ADMIN — SODIUM CHLORIDE 500 MILLILITER(S): 9 INJECTION INTRAMUSCULAR; INTRAVENOUS; SUBCUTANEOUS at 16:03

## 2024-01-02 RX ADMIN — SODIUM CHLORIDE 70 MILLILITER(S): 9 INJECTION, SOLUTION INTRAVENOUS at 21:32

## 2024-01-02 RX ADMIN — ENOXAPARIN SODIUM 40 MILLIGRAM(S): 100 INJECTION SUBCUTANEOUS at 14:35

## 2024-01-02 RX ADMIN — MIDODRINE HYDROCHLORIDE 5 MILLIGRAM(S): 2.5 TABLET ORAL at 06:59

## 2024-01-02 RX ADMIN — Medication 1000 UNIT(S): at 11:28

## 2024-01-02 RX ADMIN — Medication 6 MILLIGRAM(S): at 07:00

## 2024-01-02 RX ADMIN — CEFEPIME 100 MILLIGRAM(S): 1 INJECTION, POWDER, FOR SOLUTION INTRAMUSCULAR; INTRAVENOUS at 18:35

## 2024-01-02 RX ADMIN — Medication 81 MILLIGRAM(S): at 11:28

## 2024-01-02 RX ADMIN — POLYETHYLENE GLYCOL 3350 17 GRAM(S): 17 POWDER, FOR SOLUTION ORAL at 11:28

## 2024-01-02 RX ADMIN — PREGABALIN 1000 MICROGRAM(S): 225 CAPSULE ORAL at 11:28

## 2024-01-02 RX ADMIN — SENNA PLUS 2 TABLET(S): 8.6 TABLET ORAL at 21:15

## 2024-01-02 RX ADMIN — Medication 250 MILLIGRAM(S): at 06:59

## 2024-01-02 RX ADMIN — SODIUM CHLORIDE 1000 MILLILITER(S): 9 INJECTION, SOLUTION INTRAVENOUS at 01:13

## 2024-01-02 RX ADMIN — MIDODRINE HYDROCHLORIDE 5 MILLIGRAM(S): 2.5 TABLET ORAL at 18:35

## 2024-01-02 RX ADMIN — REMDESIVIR 200 MILLIGRAM(S): 5 INJECTION INTRAVENOUS at 02:42

## 2024-01-02 RX ADMIN — MIDODRINE HYDROCHLORIDE 5 MILLIGRAM(S): 2.5 TABLET ORAL at 11:28

## 2024-01-02 RX ADMIN — Medication 650 MILLIGRAM(S): at 14:35

## 2024-01-02 RX ADMIN — Medication 650 MILLIGRAM(S): at 15:30

## 2024-01-02 RX ADMIN — CEFEPIME 100 MILLIGRAM(S): 1 INJECTION, POWDER, FOR SOLUTION INTRAMUSCULAR; INTRAVENOUS at 06:59

## 2024-01-02 RX ADMIN — Medication 250 MILLIGRAM(S): at 21:15

## 2024-01-02 NOTE — PHYSICAL THERAPY INITIAL EVALUATION ADULT - IMPAIRMENTS FOUND, PT EVAL
aerobic capacity/endurance/gait, locomotion, and balance aerobic capacity/endurance/gait, locomotion, and balance/poor safety awareness

## 2024-01-02 NOTE — PROGRESS NOTE ADULT - PROBLEM SELECTOR PLAN 8
Lovenox 40 mg SQ q24hr
CTH negative for acute findings   Suspect FTT in the setting of recent illness and multiple comorbidities such as CVA  OOB as tolerated with assist  Fall precautions

## 2024-01-02 NOTE — PHYSICAL THERAPY INITIAL EVALUATION ADULT - CRITERIA FOR SKILLED THERAPEUTIC INTERVENTIONS
impairments found/functional limitations in following categories impairments found/functional limitations in following categories/risk reduction/prevention/rehab potential

## 2024-01-02 NOTE — PROGRESS NOTE ADULT - SUBJECTIVE AND OBJECTIVE BOX
Patient is a 63y old  Male who presents with a chief complaint of Sepsis and AHRF (01 Jan 2024 14:43)      INTERVAL HPI/OVERNIGHT EVENTS: no new complaints    MEDICATIONS  (STANDING):  aspirin  chewable 81 milliGRAM(s) Oral daily  atorvastatin 20 milliGRAM(s) Oral at bedtime  cefepime   IVPB 1000 milliGRAM(s) IV Intermittent every 12 hours  cholecalciferol 1000 Unit(s) Oral daily  cyanocobalamin 1000 MICROGram(s) Oral daily  dexAMETHasone  Injectable 6 milliGRAM(s) IV Push daily  enoxaparin Injectable 40 milliGRAM(s) SubCutaneous every 24 hours  lactated ringers. 1000 milliLiter(s) (70 mL/Hr) IV Continuous <Continuous>  LORazepam     Tablet 0.5 milliGRAM(s) Oral two times a day  multivitamin/minerals 1 Tablet(s) Oral daily  OLANZapine 5 milliGRAM(s) Oral every 12 hours  polyethylene glycol 3350 17 Gram(s) Oral daily  senna 2 Tablet(s) Oral at bedtime  valproic acid 250 milliGRAM(s) Oral every 12 hours    MEDICATIONS  (PRN):  acetaminophen     Tablet .. 650 milliGRAM(s) Oral every 6 hours PRN Temp greater or equal to 38C (100.4F), Mild Pain (1 - 3)      __________________________________________________  REVIEW OF SYSTEMS:    unable to assess due to cognitive status    Vital Signs Last 24 Hrs  T(C): 36.4 (02 Jan 2024 05:36), Max: 36.7 (01 Jan 2024 13:44)  T(F): 97.6 (02 Jan 2024 05:36), Max: 98.1 (01 Jan 2024 13:44)  HR: 50 (02 Jan 2024 12:17) (44 - 64)  BP: 92/45 (02 Jan 2024 12:17) (81/41 - 94/59)  BP(mean): 66 (02 Jan 2024 05:36) (66 - 66)  RR: 19 (02 Jan 2024 05:36) (18 - 20)  SpO2: 93% (02 Jan 2024 10:04) (93% - 97%)    Parameters below as of 02 Jan 2024 10:04  Patient On (Oxygen Delivery Method): room air        ________________________________________________  PHYSICAL EXAM:  GENERAL: NAD  HEENT: Normocephalic;  conjunctivae and sclerae clear; moist mucous membranes;   NECK : supple  CHEST/LUNG: Clear to auscultation bilaterally with good air entry   HEART: S1 S2  regular; no murmurs, gallops or rubs  ABDOMEN: Soft, Nontender, Nondistended; Bowel sounds present  EXTREMITIES: no cyanosis; no edema; no calf tenderness  SKIN: warm and dry; no rash  NERVOUS SYSTEM:  Awake and alert; no new deficits    _________________________________________________  LABS:                        12.3   5.58  )-----------( 226      ( 02 Jan 2024 06:00 )             38.2     01-02    144  |  112<H>  |  10  ----------------------------<  90  3.6   |  29  |  0.68    Ca    8.0<L>      02 Jan 2024 06:00  Phos  2.8     01-02  Mg     2.0     01-02    TPro  5.2<L>  /  Alb  2.2<L>  /  TBili  0.3  /  DBili  x   /  AST  40  /  ALT  52  /  AlkPhos  42  01-02      Urinalysis Basic - ( 02 Jan 2024 06:00 )    Color: x / Appearance: x / SG: x / pH: x  Gluc: 90 mg/dL / Ketone: x  / Bili: x / Urobili: x   Blood: x / Protein: x / Nitrite: x   Leuk Esterase: x / RBC: x / WBC x   Sq Epi: x / Non Sq Epi: x / Bacteria: x      CAPILLARY BLOOD GLUCOSE          RADIOLOGY & ADDITIONAL TESTS:    Imaging Personally Reviewed:  YES/NO    Consultant(s) Notes Reviewed:   YES/ No    Care Discussed with Consultants :     Plan of care was discussed with patient and /or primary care giver; all questions and concerns were addressed and care was aligned with patient's wishes.       Patient is a 63y old  Male who presents with a chief complaint of Sepsis and AHRF (01 Jan 2024 14:43)      INTERVAL HPI/OVERNIGHT EVENTS: pt seen at bedside. pt is confused and appears to be comfortable.    MEDICATIONS  (STANDING):  aspirin  chewable 81 milliGRAM(s) Oral daily  atorvastatin 20 milliGRAM(s) Oral at bedtime  cefepime   IVPB 1000 milliGRAM(s) IV Intermittent every 12 hours  cholecalciferol 1000 Unit(s) Oral daily  cyanocobalamin 1000 MICROGram(s) Oral daily  dexAMETHasone  Injectable 6 milliGRAM(s) IV Push daily  enoxaparin Injectable 40 milliGRAM(s) SubCutaneous every 24 hours  lactated ringers. 1000 milliLiter(s) (70 mL/Hr) IV Continuous <Continuous>  LORazepam     Tablet 0.5 milliGRAM(s) Oral two times a day  multivitamin/minerals 1 Tablet(s) Oral daily  OLANZapine 5 milliGRAM(s) Oral every 12 hours  polyethylene glycol 3350 17 Gram(s) Oral daily  senna 2 Tablet(s) Oral at bedtime  valproic acid 250 milliGRAM(s) Oral every 12 hours    MEDICATIONS  (PRN):  acetaminophen     Tablet .. 650 milliGRAM(s) Oral every 6 hours PRN Temp greater or equal to 38C (100.4F), Mild Pain (1 - 3)    __________________________________________________  REVIEW OF SYSTEMS:    unable to assess due to cognitive status    Vital Signs Last 24 Hrs  T(C): 36.4 (02 Jan 2024 05:36), Max: 36.7 (01 Jan 2024 13:44)  T(F): 97.6 (02 Jan 2024 05:36), Max: 98.1 (01 Jan 2024 13:44)  HR: 50 (02 Jan 2024 12:17) (44 - 64)  BP: 92/45 (02 Jan 2024 12:17) (81/41 - 94/59)  BP(mean): 66 (02 Jan 2024 05:36) (66 - 66)  RR: 19 (02 Jan 2024 05:36) (18 - 20)  SpO2: 93% (02 Jan 2024 10:04) (93% - 97%)    Parameters below as of 02 Jan 2024 10:04  Patient On (Oxygen Delivery Method): room air    ________________________________________________  PHYSICAL EXAM:  GENERAL: NAD  HEENT: Normocephalic;  conjunctivae and sclerae clear; moist mucous membranes;   NECK : supple  CHEST/LUNG: Clear to auscultation bilaterally with good air entry   HEART: S1 S2  regular; no murmurs, gallops or rubs  ABDOMEN: Soft, Nontender, Nondistended; Bowel sounds present  EXTREMITIES: no cyanosis; no edema; no calf tenderness  SKIN: Peg site wound  NERVOUS SYSTEM:  Awake and alert; no new deficits    _________________________________________________  LABS:                        12.3   5.58  )-----------( 226      ( 02 Jan 2024 06:00 )             38.2     01-02    144  |  112<H>  |  10  ----------------------------<  90  3.6   |  29  |  0.68    Ca    8.0<L>      02 Jan 2024 06:00  Phos  2.8     01-02  Mg     2.0     01-02    TPro  5.2<L>  /  Alb  2.2<L>  /  TBili  0.3  /  DBili  x   /  AST  40  /  ALT  52  /  AlkPhos  42  01-02      Urinalysis Basic - ( 02 Jan 2024 06:00 )    Color: x / Appearance: x / SG: x / pH: x  Gluc: 90 mg/dL / Ketone: x  / Bili: x / Urobili: x   Blood: x / Protein: x / Nitrite: x   Leuk Esterase: x / RBC: x / WBC x   Sq Epi: x / Non Sq Epi: x / Bacteria: x      CAPILLARY BLOOD GLUCOSE      RADIOLOGY & ADDITIONAL TESTS:  < from: Xray Chest 1 View-PORTABLE IMMEDIATE (Xray Chest 1 View-PORTABLE IMMEDIATE .) (12.30.23 @ 03:39) >  PROCEDURE DATE:  12/30/2023          INTERPRETATION:  INDICATIONS: 63-year-old male with shortness of breath.    Prior examination for comparison: 12/28/2023, 8/3/2022    Technique: AP view of chest    Findings:  Loop recorder projecting over left chest. Ill-defined bibasilar   opacities, suspicious for pneumonia. There are no pleural effusions. The   cardiomediastinal silhouette is normal. Bones are grossly normal.    IMPRESSION: Ill-defined bibasilar opacities, consistent with multifocal   pneumonia.    --- End of Report ---    < end of copied text >  < from: CT Head No Cont (12.28.23 @ 19:06) >  PROCEDURE DATE:  12/28/2023          INTERPRETATION:  CLINICAL INFORMATION:  fall, nonverbal    TECHNIQUE:  1.Axial CT images were acquired through the head.  2.  Axial CT images were acquired through the cervical spine.  Intravenous contrast: None  Two-dimensional reformats were generated.    COMPARISON STUDY: CT head and cervical spine 8/3/2022    FINDINGS:    CT HEAD:    There is no CT evidence of acute intracranial hemorrhage,  mass effect,   midline shift, or acute, large territorial infarct.  Again seen are   chronic bilateral MCA territory infarcts, including associated ex vacuo   dilatation of the left lateral ventricle. Patchy periventricular and   subcortical white matter hypodensities are nonspecific, although likely   due to chronic microangiopathy. There is wallerian degeneration of the   left anterior brainstem. The ventricles and sulciare prominent   compatible with moderate generalized brain parenchymal volume loss. The   basal cisterns are patent.    The mastoid air cells and middle ear cavities are grossly clear. There is   mild mucosal thickening and scattered mucous retentioncysts versus   polyps in the bilateral maxillary sinuses, frothy secretions in the right   sphenoid sinus, minimal mucosal thickening within bilateral ethmoid air   cells. There is impacted cerumen within both external auditory canals.    The calvarium and skull base are intact.    CT CERVICAL SPINE:    There is  preservation  of the cervical lordosis.  There is no evidence of an acute cervical spine fracture or traumatic   malalignment.  There is no suspicious lytic or blastic lesion.  The paraspinous soft tissues are unremarkable within limits of CT scan.    Degenerative changes:  Mild multilevel degenerative changes, including small disc osteophyte   complexes and bilateral uncovertebral facet hypertrophy with varying   degrees of foraminal stenosis. No high-grade central spinal canal   narrowing by CT technique.    Incidental findings:  Visualized soft tissues of the neck appear unremarkable.  Emphysema and mild scarring in both lung apices.    IMPRESSION:    CT HEAD: No acute intracranial hemorrhage, mass effect, or osseous   fracture.    CT CERVICAL SPINE: No acute cervical spine fracture or traumatic   malalignment.    --- End of Report ---      Imaging Personally Reviewed:  YES    Consultant(s) Notes Reviewed:   YES    Care Discussed with Consultants :     Plan of care was discussed with patient and /or primary care giver; all questions and concerns were addressed and care was aligned with patient's wishes.

## 2024-01-02 NOTE — PROGRESS NOTE ADULT - NS ATTEND AMEND GEN_ALL_CORE FT
A/P# AHRF 2/2 covid vs Bacterial Pneumonia # Bradycardia # CVA w. aphasia  # Non-Verbal at baseline    -patient denies any complaints and looks comfortable and  non toxic. I called and discussed with Dr. Willingham- his PCP , at baseline he has normal BP and HR. patient is not symptomatic at this time. 500 cc bolus was given and midodrine 5 mg tid ordered. EKG done- sinus bryan- ?? increase vagal tone. case d/w Dr. Gallagher-Cardiology- no need for telemetry at this time. TTE. he will evaluate the patient in the morning. blood and urine cultures- NTD  -hold bb and ccb.  -patient saturating 97% on room air, no hypoxic, tachycardia or tachypnea- unlikely PE- will hold off on CTA.  -will c./w iv atbx and steroids for now  -cbc, bmp mg phos lactate reviewed, check cbc, bmp mg phos in the am

## 2024-01-02 NOTE — PHYSICAL THERAPY INITIAL EVALUATION ADULT - PERTINENT HX OF CURRENT PROBLEM, REHAB EVAL
Patient is a 62 yo male admitted from [facility] for mechanical fall. Patient is nonverbal and has PMH of alzheimer's and CVA. Patient was independent with ambulation prior to admission. Imaging was negative for acute changes. Patient is a 62 yo male admitted from [facility] for mechanical fall. Patient is nonverbal and has PMH of alzheimer's and CVA. Patient was independent with ambulation prior to admission. Imaging was negative for acute changes. Patient is a 62 yo male admitted from [facility] for mechanical fall. Patient is nonverbal and has PMH of alzheimer's and CVA. Patient was able to ambulate prior to admission. Imaging was negative for acute changes. Patient is a 64 yo male admitted from [facility] for mechanical fall. Patient is nonverbal and has PMH of alzheimer's and CVA. Patient was able to ambulate prior to admission. Imaging was negative for acute changes.

## 2024-01-02 NOTE — PROGRESS NOTE ADULT - ASSESSMENT
63 year old male, from [facility], with PMH of CVA, Alzheimer's, nonverbal at baseline, GERD, Schizophrenia, and Constipation, was brought into the ED s/p mechanical fall, poor oral intake, and Covid-19 infection on 12/27/23.   CXR report slight right perihilar infiltrate and there is a left lower perihilar infiltrate. CT head No acute intracranial hemorrhage, mass effect, or osseous fracture.  CT cervical spine:  No acute cervical spine fracture or traumatic malalignment. pt admitted for AHRF 2/2 sepsis, COVID 19 with superimposed bacterial pneumonia  Placed Supplemental O2, Started on Dexamethasone 6mg IV q daily and Remdesivir protocol. Started on Ceftriaxone and Zithromax to cover CAP 63 year old male, from [facility] [place], with PMH of CVA, Alzheimer's, nonverbal at baseline, GERD, Schizophrenia, and Constipation, was brought into the ED s/p mechanical fall, poor oral intake, and Covid-19 infection on 12/27/23.   CXR report slight right perihilar infiltrate and there is a left lower perihilar infiltrate. CT head No acute intracranial hemorrhage, mass effect, or osseous fracture.  CT cervical spine:  No acute cervical spine fracture or traumatic malalignment. pt admitted for AHRF 2/2 sepsis, COVID 19 with superimposed bacterial pneumonia  Placed Supplemental O2, Started on Dexamethasone 6mg IV q daily and Remdesivir protocol. Started on Ceftriaxone and Zithromax to cover CAP

## 2024-01-02 NOTE — PHYSICAL THERAPY INITIAL EVALUATION ADULT - ORIENTATION, REHAB EVAL
oriented to person, place, time and situation further as patient is non verbal/person/unable to assess

## 2024-01-03 LAB
ALBUMIN SERPL ELPH-MCNC: 2.2 G/DL — LOW (ref 3.5–5)
ALBUMIN SERPL ELPH-MCNC: 2.2 G/DL — LOW (ref 3.5–5)
ALP SERPL-CCNC: 45 U/L — SIGNIFICANT CHANGE UP (ref 40–120)
ALP SERPL-CCNC: 45 U/L — SIGNIFICANT CHANGE UP (ref 40–120)
ALT FLD-CCNC: 51 U/L DA — SIGNIFICANT CHANGE UP (ref 10–60)
ALT FLD-CCNC: 51 U/L DA — SIGNIFICANT CHANGE UP (ref 10–60)
ANION GAP SERPL CALC-SCNC: 3 MMOL/L — LOW (ref 5–17)
ANION GAP SERPL CALC-SCNC: 3 MMOL/L — LOW (ref 5–17)
AST SERPL-CCNC: 29 U/L — SIGNIFICANT CHANGE UP (ref 10–40)
AST SERPL-CCNC: 29 U/L — SIGNIFICANT CHANGE UP (ref 10–40)
BASE EXCESS BLDV CALC-SCNC: 3.9 MMOL/L — SIGNIFICANT CHANGE UP
BASE EXCESS BLDV CALC-SCNC: 3.9 MMOL/L — SIGNIFICANT CHANGE UP
BILIRUB SERPL-MCNC: 0.4 MG/DL — SIGNIFICANT CHANGE UP (ref 0.2–1.2)
BILIRUB SERPL-MCNC: 0.4 MG/DL — SIGNIFICANT CHANGE UP (ref 0.2–1.2)
BLOOD GAS COMMENTS, VENOUS: SIGNIFICANT CHANGE UP
BLOOD GAS COMMENTS, VENOUS: SIGNIFICANT CHANGE UP
BUN SERPL-MCNC: 11 MG/DL — SIGNIFICANT CHANGE UP (ref 7–18)
BUN SERPL-MCNC: 11 MG/DL — SIGNIFICANT CHANGE UP (ref 7–18)
CALCIUM SERPL-MCNC: 8.4 MG/DL — SIGNIFICANT CHANGE UP (ref 8.4–10.5)
CALCIUM SERPL-MCNC: 8.4 MG/DL — SIGNIFICANT CHANGE UP (ref 8.4–10.5)
CHLORIDE SERPL-SCNC: 110 MMOL/L — HIGH (ref 96–108)
CHLORIDE SERPL-SCNC: 110 MMOL/L — HIGH (ref 96–108)
CK MB BLD-MCNC: 1.8 % — SIGNIFICANT CHANGE UP (ref 0–3.5)
CK MB BLD-MCNC: 1.8 % — SIGNIFICANT CHANGE UP (ref 0–3.5)
CK MB CFR SERPL CALC: 1.4 NG/ML — SIGNIFICANT CHANGE UP (ref 0–3.6)
CK MB CFR SERPL CALC: 1.4 NG/ML — SIGNIFICANT CHANGE UP (ref 0–3.6)
CK SERPL-CCNC: 80 U/L — SIGNIFICANT CHANGE UP (ref 35–232)
CK SERPL-CCNC: 80 U/L — SIGNIFICANT CHANGE UP (ref 35–232)
CO2 SERPL-SCNC: 28 MMOL/L — SIGNIFICANT CHANGE UP (ref 22–31)
CO2 SERPL-SCNC: 28 MMOL/L — SIGNIFICANT CHANGE UP (ref 22–31)
CREAT SERPL-MCNC: 0.73 MG/DL — SIGNIFICANT CHANGE UP (ref 0.5–1.3)
CREAT SERPL-MCNC: 0.73 MG/DL — SIGNIFICANT CHANGE UP (ref 0.5–1.3)
CULTURE RESULTS: SIGNIFICANT CHANGE UP
EGFR: 102 ML/MIN/1.73M2 — SIGNIFICANT CHANGE UP
EGFR: 102 ML/MIN/1.73M2 — SIGNIFICANT CHANGE UP
GAS PNL BLDA: SIGNIFICANT CHANGE UP
GAS PNL BLDA: SIGNIFICANT CHANGE UP
GLUCOSE BLDC GLUCOMTR-MCNC: 148 MG/DL — HIGH (ref 70–99)
GLUCOSE BLDC GLUCOMTR-MCNC: 148 MG/DL — HIGH (ref 70–99)
GLUCOSE SERPL-MCNC: 118 MG/DL — HIGH (ref 70–99)
GLUCOSE SERPL-MCNC: 118 MG/DL — HIGH (ref 70–99)
HCO3 BLDV-SCNC: 30 MMOL/L — HIGH (ref 22–29)
HCO3 BLDV-SCNC: 30 MMOL/L — HIGH (ref 22–29)
HCT VFR BLD CALC: 38.6 % — LOW (ref 39–50)
HCT VFR BLD CALC: 38.6 % — LOW (ref 39–50)
HGB BLD-MCNC: 12.6 G/DL — LOW (ref 13–17)
HGB BLD-MCNC: 12.6 G/DL — LOW (ref 13–17)
HOROWITZ INDEX BLDV+IHG-RTO: 40 — SIGNIFICANT CHANGE UP
HOROWITZ INDEX BLDV+IHG-RTO: 40 — SIGNIFICANT CHANGE UP
LACTATE SERPL-SCNC: 1 MMOL/L — SIGNIFICANT CHANGE UP (ref 0.7–2)
LACTATE SERPL-SCNC: 1 MMOL/L — SIGNIFICANT CHANGE UP (ref 0.7–2)
MAGNESIUM SERPL-MCNC: 2.2 MG/DL — SIGNIFICANT CHANGE UP (ref 1.6–2.6)
MAGNESIUM SERPL-MCNC: 2.2 MG/DL — SIGNIFICANT CHANGE UP (ref 1.6–2.6)
MCHC RBC-ENTMCNC: 28.6 PG — SIGNIFICANT CHANGE UP (ref 27–34)
MCHC RBC-ENTMCNC: 28.6 PG — SIGNIFICANT CHANGE UP (ref 27–34)
MCHC RBC-ENTMCNC: 32.6 GM/DL — SIGNIFICANT CHANGE UP (ref 32–36)
MCHC RBC-ENTMCNC: 32.6 GM/DL — SIGNIFICANT CHANGE UP (ref 32–36)
MCV RBC AUTO: 87.5 FL — SIGNIFICANT CHANGE UP (ref 80–100)
MCV RBC AUTO: 87.5 FL — SIGNIFICANT CHANGE UP (ref 80–100)
NRBC # BLD: 0 /100 WBCS — SIGNIFICANT CHANGE UP (ref 0–0)
NRBC # BLD: 0 /100 WBCS — SIGNIFICANT CHANGE UP (ref 0–0)
PCO2 BLDV: 50 MMHG — SIGNIFICANT CHANGE UP (ref 42–55)
PCO2 BLDV: 50 MMHG — SIGNIFICANT CHANGE UP (ref 42–55)
PH BLDV: 7.39 — SIGNIFICANT CHANGE UP (ref 7.32–7.43)
PH BLDV: 7.39 — SIGNIFICANT CHANGE UP (ref 7.32–7.43)
PHOSPHATE SERPL-MCNC: 2.7 MG/DL — SIGNIFICANT CHANGE UP (ref 2.5–4.5)
PHOSPHATE SERPL-MCNC: 2.7 MG/DL — SIGNIFICANT CHANGE UP (ref 2.5–4.5)
PLATELET # BLD AUTO: 275 K/UL — SIGNIFICANT CHANGE UP (ref 150–400)
PLATELET # BLD AUTO: 275 K/UL — SIGNIFICANT CHANGE UP (ref 150–400)
PO2 BLDV: 16 MMHG — SIGNIFICANT CHANGE UP
PO2 BLDV: 16 MMHG — SIGNIFICANT CHANGE UP
POTASSIUM SERPL-MCNC: 3.5 MMOL/L — SIGNIFICANT CHANGE UP (ref 3.5–5.3)
POTASSIUM SERPL-MCNC: 3.5 MMOL/L — SIGNIFICANT CHANGE UP (ref 3.5–5.3)
POTASSIUM SERPL-SCNC: 3.5 MMOL/L — SIGNIFICANT CHANGE UP (ref 3.5–5.3)
POTASSIUM SERPL-SCNC: 3.5 MMOL/L — SIGNIFICANT CHANGE UP (ref 3.5–5.3)
PROT SERPL-MCNC: 5.3 G/DL — LOW (ref 6–8.3)
PROT SERPL-MCNC: 5.3 G/DL — LOW (ref 6–8.3)
RBC # BLD: 4.41 M/UL — SIGNIFICANT CHANGE UP (ref 4.2–5.8)
RBC # BLD: 4.41 M/UL — SIGNIFICANT CHANGE UP (ref 4.2–5.8)
RBC # FLD: 13.2 % — SIGNIFICANT CHANGE UP (ref 10.3–14.5)
RBC # FLD: 13.2 % — SIGNIFICANT CHANGE UP (ref 10.3–14.5)
SAO2 % BLDV: 16.5 % — SIGNIFICANT CHANGE UP
SAO2 % BLDV: 16.5 % — SIGNIFICANT CHANGE UP
SODIUM SERPL-SCNC: 141 MMOL/L — SIGNIFICANT CHANGE UP (ref 135–145)
SODIUM SERPL-SCNC: 141 MMOL/L — SIGNIFICANT CHANGE UP (ref 135–145)
SPECIMEN SOURCE: SIGNIFICANT CHANGE UP
TROPONIN I, HIGH SENSITIVITY RESULT: 19 NG/L — SIGNIFICANT CHANGE UP
TROPONIN I, HIGH SENSITIVITY RESULT: 19 NG/L — SIGNIFICANT CHANGE UP
WBC # BLD: 8.77 K/UL — SIGNIFICANT CHANGE UP (ref 3.8–10.5)
WBC # BLD: 8.77 K/UL — SIGNIFICANT CHANGE UP (ref 3.8–10.5)
WBC # FLD AUTO: 8.77 K/UL — SIGNIFICANT CHANGE UP (ref 3.8–10.5)
WBC # FLD AUTO: 8.77 K/UL — SIGNIFICANT CHANGE UP (ref 3.8–10.5)

## 2024-01-03 PROCEDURE — 71045 X-RAY EXAM CHEST 1 VIEW: CPT | Mod: 26

## 2024-01-03 RX ORDER — ALBUTEROL 90 UG/1
2 AEROSOL, METERED ORAL EVERY 6 HOURS
Refills: 0 | Status: DISCONTINUED | OUTPATIENT
Start: 2024-01-03 | End: 2024-01-06

## 2024-01-03 RX ORDER — IPRATROPIUM/ALBUTEROL SULFATE 18-103MCG
3 AEROSOL WITH ADAPTER (GRAM) INHALATION EVERY 6 HOURS
Refills: 0 | Status: DISCONTINUED | OUTPATIENT
Start: 2024-01-03 | End: 2024-01-05

## 2024-01-03 RX ORDER — AZITHROMYCIN 500 MG/1
500 TABLET, FILM COATED ORAL EVERY 24 HOURS
Refills: 0 | Status: DISCONTINUED | OUTPATIENT
Start: 2024-01-03 | End: 2024-01-04

## 2024-01-03 RX ORDER — SODIUM CHLORIDE 9 MG/ML
1000 INJECTION INTRAMUSCULAR; INTRAVENOUS; SUBCUTANEOUS ONCE
Refills: 0 | Status: COMPLETED | OUTPATIENT
Start: 2024-01-03 | End: 2024-01-03

## 2024-01-03 RX ORDER — NOREPINEPHRINE BITARTRATE/D5W 8 MG/250ML
0.05 PLASTIC BAG, INJECTION (ML) INTRAVENOUS
Qty: 8 | Refills: 0 | Status: DISCONTINUED | OUTPATIENT
Start: 2024-01-03 | End: 2024-01-09

## 2024-01-03 RX ORDER — HALOPERIDOL DECANOATE 100 MG/ML
5 INJECTION INTRAMUSCULAR ONCE
Refills: 0 | Status: DISCONTINUED | OUTPATIENT
Start: 2024-01-03 | End: 2024-01-03

## 2024-01-03 RX ORDER — OLANZAPINE 15 MG/1
5 TABLET, FILM COATED ORAL EVERY 12 HOURS
Refills: 0 | Status: DISCONTINUED | OUTPATIENT
Start: 2024-01-03 | End: 2024-01-04

## 2024-01-03 RX ORDER — OLANZAPINE 15 MG/1
2.5 TABLET, FILM COATED ORAL ONCE
Refills: 0 | Status: COMPLETED | OUTPATIENT
Start: 2024-01-03 | End: 2024-01-03

## 2024-01-03 RX ORDER — HALOPERIDOL DECANOATE 100 MG/ML
5 INJECTION INTRAMUSCULAR ONCE
Refills: 0 | Status: COMPLETED | OUTPATIENT
Start: 2024-01-03 | End: 2024-01-03

## 2024-01-03 RX ORDER — CEFTRIAXONE 500 MG/1
1000 INJECTION, POWDER, FOR SOLUTION INTRAMUSCULAR; INTRAVENOUS EVERY 24 HOURS
Refills: 0 | Status: DISCONTINUED | OUTPATIENT
Start: 2024-01-03 | End: 2024-01-05

## 2024-01-03 RX ORDER — IPRATROPIUM/ALBUTEROL SULFATE 18-103MCG
3 AEROSOL WITH ADAPTER (GRAM) INHALATION ONCE
Refills: 0 | Status: COMPLETED | OUTPATIENT
Start: 2024-01-03 | End: 2024-01-03

## 2024-01-03 RX ADMIN — Medication 250 MILLIGRAM(S): at 05:35

## 2024-01-03 RX ADMIN — Medication 1 TABLET(S): at 12:30

## 2024-01-03 RX ADMIN — CEFTRIAXONE 100 MILLIGRAM(S): 500 INJECTION, POWDER, FOR SOLUTION INTRAMUSCULAR; INTRAVENOUS at 15:55

## 2024-01-03 RX ADMIN — SODIUM CHLORIDE 1000 MILLILITER(S): 9 INJECTION INTRAMUSCULAR; INTRAVENOUS; SUBCUTANEOUS at 12:21

## 2024-01-03 RX ADMIN — ENOXAPARIN SODIUM 40 MILLIGRAM(S): 100 INJECTION SUBCUTANEOUS at 12:31

## 2024-01-03 RX ADMIN — PREGABALIN 1000 MICROGRAM(S): 225 CAPSULE ORAL at 12:19

## 2024-01-03 RX ADMIN — Medication 6 MILLIGRAM(S): at 05:34

## 2024-01-03 RX ADMIN — Medication 0.5 MILLIGRAM(S): at 17:10

## 2024-01-03 RX ADMIN — Medication 5.95 MICROGRAM(S)/KG/MIN: at 14:00

## 2024-01-03 RX ADMIN — MIDODRINE HYDROCHLORIDE 5 MILLIGRAM(S): 2.5 TABLET ORAL at 12:20

## 2024-01-03 RX ADMIN — Medication 3 MILLILITER(S): at 14:20

## 2024-01-03 RX ADMIN — ALBUTEROL 2 PUFF(S): 90 AEROSOL, METERED ORAL at 15:54

## 2024-01-03 RX ADMIN — Medication 0.5 MILLIGRAM(S): at 05:32

## 2024-01-03 RX ADMIN — CEFEPIME 100 MILLIGRAM(S): 1 INJECTION, POWDER, FOR SOLUTION INTRAMUSCULAR; INTRAVENOUS at 05:32

## 2024-01-03 RX ADMIN — HALOPERIDOL DECANOATE 5 MILLIGRAM(S): 100 INJECTION INTRAMUSCULAR at 21:06

## 2024-01-03 RX ADMIN — OLANZAPINE 5 MILLIGRAM(S): 15 TABLET, FILM COATED ORAL at 05:33

## 2024-01-03 RX ADMIN — Medication 81 MILLIGRAM(S): at 12:19

## 2024-01-03 RX ADMIN — AZITHROMYCIN 255 MILLIGRAM(S): 500 TABLET, FILM COATED ORAL at 15:55

## 2024-01-03 RX ADMIN — OLANZAPINE 5 MILLIGRAM(S): 15 TABLET, FILM COATED ORAL at 18:13

## 2024-01-03 RX ADMIN — Medication 1000 UNIT(S): at 12:19

## 2024-01-03 NOTE — CONSULT NOTE ADULT - ATTENDING COMMENTS
IMP: This is a  63 year old mn , from [facility], with  CVA, Alzheimer's, nonverbal at baseline, GERD, Schizophrenia, and Constipation, was brought into the ED s/p mechanical fall, poor oral intake, and Covid-19 infection on 12/27/23 Pt was admitted for COVID PNA, later found with intermittent hypoxia/ hypopnea to Sats 80% and Hypotension despite multiple fluid boluses, also found with bryan to 40s for the last 2 days, in ICU for further monitoring.       ASSESSMENT   - Acute Hypoxic resp failure   - Covid-19 PNA   - Shock septic   - CVA  - Alzheimer dementia   - Bradycardia       Plan     - Transfer to ICU   - O2 supp as needed .. HFNC trial   - Risk for intubation   - No sedation   - Hemodynamic support   - Titrate vaso-active agents to maintain MAP>65  - Antibx   - F/U cultures   - S/P course of Remdesivir   - Continue decadron  6 mg for total 10 days   - Isolation : contact and airborne   - NPO   - Monitor I/O   - Cards eval   - Hold AV angel luis blocking agent   - Skin care IMP: This is a  63 year old mn , from [facility], with  CVA, Alzheimer's, nonverbal at baseline, GERD, Schizophrenia, and Constipation, was brought into the ED s/p mechanical fall, poor oral intake, and Covid-19 infection on 12/27/23 Pt was admitted for COVID PNA, later found with intermittent hypoxia/ hypopnea to Sats 80% and Hypotension despite multiple fluid boluses, also found with bryan to 40s for the last 2 days, in ICU for further monitoring.       ASSESSMENT   - Acute Hypoxic resp failure   - Covid-19 PNA   - Shock septic   - CVA  - Alzheimer dementia   - Bradycardia       Plan     - Transfer to ICU   - O2 supp as needed .. HFNC trial   - Risk for intubation   - No sedation   - Hemodynamic support   - Titrate vaso-active agents to maintain MAP>65  - Antibx   - F/U cultures   - S/P course of Remdesivir   - Continue decadron  6 mg for total 10 days   - Isolation : contact and airborne   - NPO   - Monitor I/O   - Cards eval   - Hold AV angel luis blocking agent   - Skin care

## 2024-01-03 NOTE — CONSULT NOTE ADULT - ASSESSMENT
Pneumonia - secondary Bacterial infection  COVID - 19 infection  Hypoxia      Plan - Cont Rocephin 1 gm iv q24hrs  Cont Azithromycin 500mgs iv q24 hrs   Cont Decadron 6mgs iv q6hrs  Cont HFNC

## 2024-01-03 NOTE — ED ADULT NURSE NOTE - OBJECTIVE STATEMENT
Is This A New Presentation, Or A Follow-Up?: Skin Lesion What Type Of Note Output Would You Prefer (Optional)?: Standard Output How Severe Is Your Skin Lesion?: moderate Has Your Skin Lesion Been Treated?: not been treated 59 year old Male, brought in from [place] by EMS. PMH: CVA. Patient came in for peg tube dislodgement and fever. As per EMS patient is aphasic due to CVA, understands Turkmen and basic English. PEG placed on left abdomen with tube at site draining brown fluid. Area around site is red with some drainage. Patient has a wet sounding cough, arrived in mask. Abdomen soft, nontender, nondistended. Several areas in patients abdomen feel firm as if something is under the skin, midline healed incision with bruising present and steri strips to right lower quadrant. Patient alert, breathing spontaneous and unlabored, sinus rhythm on cardiac monitor, able to move all extremities, bilateral strength in upper and lower extremities, facial symmetry. Patient arrives in a wet diaper which was changed, patient was cleaned and repositioned with fresh gown and blankets. Red socks currently being worn. IVs placed with labs drawn and sent to lab as ordered by MD. Yellow neuro check sheet in chart. Bed locked and in lowest position with side rails up for safety.

## 2024-01-03 NOTE — CONSULT NOTE ADULT - SUBJECTIVE AND OBJECTIVE BOX
Patient is a 63y old  Male who presents with a chief complaint of Sepsis and AHRF (03 Jan 2024 11:34)      HPI:  A 63 year old male, from [facility], with PMH of CVA, Alzheimer's, nonverbal at baseline, GERD, Schizophrenia, and Constipation, was brought into the ED s/p mechanical fall, poor oral intake, and Covid-19 infection on 12/27/23 as per NH papers. Unable to obtain history from patient since he is nonverbal, history obtained from chart review.  (28 Dec 2023 20:54)    Patient was admitted and had hypotension, despite multiple fluid boluses. Added midodrine  Patient has been increasing more bryan to the 40s, sustaining, ekg sinus bryan that is new.  Today the patient was hypotensive, having hypoxia event after BM (diarrhea)  RRt called, patient placed on levo and NRB and transferred to the ICU for further monitoring.     PAST MEDICAL & SURGICAL HISTORY:  CVA (cerebral vascular accident)      CVA (cerebral vascular accident)      HLD (hyperlipidemia)      S/P percutaneous endoscopic gastrostomy (PEG) tube placement          SOCIAL HX:   Smoking                         ETOH                            Other    FAMILY HISTORY:  :  No known cardiovacular family hisotry     ROS:  See HPI     Allergies    No Known Allergies    Intolerances          PHYSICAL EXAM    ICU Vital Signs Last 24 Hrs  T(C): 36.3 (03 Jan 2024 13:13), Max: 36.6 (03 Jan 2024 05:56)  T(F): 97.4 (03 Jan 2024 13:13), Max: 97.8 (03 Jan 2024 05:56)  HR: 47 (03 Jan 2024 14:45) (40 - 66)  BP: 108/76 (03 Jan 2024 14:45) (73/43 - 122/70)  BP(mean): 87 (03 Jan 2024 14:45) (66 - 87)  ABP: --  ABP(mean): --  RR: 14 (03 Jan 2024 14:45) (14 - 20)  SpO2: 100% (03 Jan 2024 14:34) (75% - 100%)    O2 Parameters below as of 03 Jan 2024 14:45  Patient On (Oxygen Delivery Method): nasal cannula, high flow  O2 Flow (L/min): 50  O2 Concentration (%): 40        General: Not in distress  HEENT:  DORCAS              Lymphatic system: No LN  Lungs: Bilateral BS  Cardiovascular: Regular  Gastrointestinal: Soft, Positive BS  Musculoskeletal: No clubbing.  Moves all extremities.    Skin: Warm.  Intact  Neurological: No motor or sensory deficit         LABS:                          12.6   8.77  )-----------( 275      ( 03 Jan 2024 07:30 )             38.6                                               01-03    141  |  110<H>  |  11  ----------------------------<  118<H>  3.5   |  28  |  0.73    Ca    8.4      03 Jan 2024 07:30  Phos  2.7     01-03  Mg     2.2     01-03    TPro  5.3<L>  /  Alb  2.2<L>  /  TBili  0.4  /  DBili  x   /  AST  29  /  ALT  51  /  AlkPhos  45  01-03                                             Urinalysis Basic - ( 03 Jan 2024 07:30 )    Color: x / Appearance: x / SG: x / pH: x  Gluc: 118 mg/dL / Ketone: x  / Bili: x / Urobili: x   Blood: x / Protein: x / Nitrite: x   Leuk Esterase: x / RBC: x / WBC x   Sq Epi: x / Non Sq Epi: x / Bacteria: x                                                  LIVER FUNCTIONS - ( 03 Jan 2024 07:30 )  Alb: 2.2 g/dL / Pro: 5.3 g/dL / ALK PHOS: 45 U/L / ALT: 51 U/L DA / AST: 29 U/L / GGT: x                                                                                                                                   ABG - ( 03 Jan 2024 13:19 )  pH, Arterial: 7.46  pH, Blood: x     /  pCO2: 37    /  pO2: 223   / HCO3: 26    / Base Excess: 2.6   /  SaO2: 99                  CXR:    ECHO:    MEDICATIONS  (STANDING):  albuterol/ipratropium for Nebulization 3 milliLiter(s) Nebulizer every 6 hours  aspirin  chewable 81 milliGRAM(s) Oral daily  atorvastatin 20 milliGRAM(s) Oral at bedtime  azithromycin  IVPB 500 milliGRAM(s) IV Intermittent every 24 hours  cefTRIAXone   IVPB 1000 milliGRAM(s) IV Intermittent every 24 hours  cholecalciferol 1000 Unit(s) Oral daily  cyanocobalamin 1000 MICROGram(s) Oral daily  dexAMETHasone  Injectable 6 milliGRAM(s) IV Push daily  enoxaparin Injectable 40 milliGRAM(s) SubCutaneous every 24 hours  LORazepam     Tablet 0.5 milliGRAM(s) Oral two times a day  multivitamin/minerals 1 Tablet(s) Oral daily  norepinephrine Infusion 0.05 MICROgram(s)/kG/Min (5.95 mL/Hr) IV Continuous <Continuous>  OLANZapine 5 milliGRAM(s) Oral every 12 hours  valproic  acid Syrup 250 milliGRAM(s) Oral two times a day    MEDICATIONS  (PRN):  acetaminophen     Tablet .. 650 milliGRAM(s) Oral every 6 hours PRN Temp greater or equal to 38C (100.4F), Mild Pain (1 - 3)  albuterol    90 MICROgram(s) HFA Inhaler 2 Puff(s) Inhalation every 6 hours PRN Bronchospasm         Patient is a 63y old  Male who presents with a chief complaint of Sepsis and AHRF (03 Jan 2024 11:34)      HPI:  A 63 year old male, from [facility], with PMH of CVA, Alzheimer's, nonverbal at baseline, GERD, Schizophrenia, and Constipation, was brought into the ED s/p mechanical fall, poor oral intake, and Covid-19 infection on 12/27/23 as per NH papers. Unable to obtain history from patient since he is nonverbal, history obtained from chart review.  (28 Dec 2023 20:54)    Patient was admitted and had hypotension, despite multiple fluid boluses. Added midodrine  Patient has been increasing more bryan to the 40s, sustaining, ekg sinus bryan that is new.  Today the patient was hypotensive, having hypoxia event after BM (diarrhea)  RRt called, patient placed on levo and NRB and transferred to the ICU for further monitoring.     PAST MEDICAL & SURGICAL HISTORY:  CVA (cerebral vascular accident)      CVA (cerebral vascular accident)      HLD (hyperlipidemia)      S/P percutaneous endoscopic gastrostomy (PEG) tube placement          SOCIAL HX:   Smoking                         ETOH                            Other    FAMILY HISTORY:  :  No known cardiovacular family hisotry     ROS:  See HPI     Allergies    No Known Allergies    Intolerances          PHYSICAL EXAM    ICU Vital Signs Last 24 Hrs  T(C): 36.3 (03 Jan 2024 13:13), Max: 36.6 (03 Jan 2024 05:56)  T(F): 97.4 (03 Jan 2024 13:13), Max: 97.8 (03 Jan 2024 05:56)  HR: 47 (03 Jan 2024 14:45) (40 - 66)  BP: 108/76 (03 Jan 2024 14:45) (73/43 - 122/70)  BP(mean): 87 (03 Jan 2024 14:45) (66 - 87)  ABP: --  ABP(mean): --  RR: 14 (03 Jan 2024 14:45) (14 - 20)  SpO2: 100% (03 Jan 2024 14:34) (75% - 100%)    O2 Parameters below as of 03 Jan 2024 14:45  Patient On (Oxygen Delivery Method): nasal cannula, high flow  O2 Flow (L/min): 50  O2 Concentration (%): 40        General: Not in distress  HEENT:  DORCAS              Lymphatic system: No LN  Lungs: Bilateral BS  Cardiovascular: Regular  Gastrointestinal: Soft, Positive BS  Musculoskeletal: No clubbing.  Moves all extremities.    Skin: Warm.  Intact  Neurological: No motor or sensory deficit         LABS:                          12.6   8.77  )-----------( 275      ( 03 Jan 2024 07:30 )             38.6                                               01-03    141  |  110<H>  |  11  ----------------------------<  118<H>  3.5   |  28  |  0.73    Ca    8.4      03 Jan 2024 07:30  Phos  2.7     01-03  Mg     2.2     01-03    TPro  5.3<L>  /  Alb  2.2<L>  /  TBili  0.4  /  DBili  x   /  AST  29  /  ALT  51  /  AlkPhos  45  01-03                                             Urinalysis Basic - ( 03 Jan 2024 07:30 )    Color: x / Appearance: x / SG: x / pH: x  Gluc: 118 mg/dL / Ketone: x  / Bili: x / Urobili: x   Blood: x / Protein: x / Nitrite: x   Leuk Esterase: x / RBC: x / WBC x   Sq Epi: x / Non Sq Epi: x / Bacteria: x                                                  LIVER FUNCTIONS - ( 03 Jan 2024 07:30 )  Alb: 2.2 g/dL / Pro: 5.3 g/dL / ALK PHOS: 45 U/L / ALT: 51 U/L DA / AST: 29 U/L / GGT: x                                                                                                                                   ABG - ( 03 Jan 2024 13:19 )  pH, Arterial: 7.46  pH, Blood: x     /  pCO2: 37    /  pO2: 223   / HCO3: 26    / Base Excess: 2.6   /  SaO2: 99                  CXR:    ECHO:    MEDICATIONS  (STANDING):  albuterol/ipratropium for Nebulization 3 milliLiter(s) Nebulizer every 6 hours  aspirin  chewable 81 milliGRAM(s) Oral daily  atorvastatin 20 milliGRAM(s) Oral at bedtime  azithromycin  IVPB 500 milliGRAM(s) IV Intermittent every 24 hours  cefTRIAXone   IVPB 1000 milliGRAM(s) IV Intermittent every 24 hours  cholecalciferol 1000 Unit(s) Oral daily  cyanocobalamin 1000 MICROGram(s) Oral daily  dexAMETHasone  Injectable 6 milliGRAM(s) IV Push daily  enoxaparin Injectable 40 milliGRAM(s) SubCutaneous every 24 hours  LORazepam     Tablet 0.5 milliGRAM(s) Oral two times a day  multivitamin/minerals 1 Tablet(s) Oral daily  norepinephrine Infusion 0.05 MICROgram(s)/kG/Min (5.95 mL/Hr) IV Continuous <Continuous>  OLANZapine 5 milliGRAM(s) Oral every 12 hours  valproic  acid Syrup 250 milliGRAM(s) Oral two times a day    MEDICATIONS  (PRN):  acetaminophen     Tablet .. 650 milliGRAM(s) Oral every 6 hours PRN Temp greater or equal to 38C (100.4F), Mild Pain (1 - 3)  albuterol    90 MICROgram(s) HFA Inhaler 2 Puff(s) Inhalation every 6 hours PRN Bronchospasm

## 2024-01-03 NOTE — CONSULT NOTE ADULT - SUBJECTIVE AND OBJECTIVE BOX
EP Attending  HISTORY OF PRESENT ILLNESS: HPI:  A 63 year old male, from [facility], with PMH of CVA, Alzheimer's, nonverbal at baseline, GERD, Schizophrenia, and Constipation, was brought into the ED s/p mechanical fall, poor oral intake, and Covid-19 infection on 12/27/23 as per NH papers. Unable to obtain history from patient since he is nonverbal, history obtained from chart review.  (28 Dec 2023 20:54)    Was pleasant and alert this morning, not talkative.  Apparently then had an RRT For hypotension and has gone to MICU for septic shock treatment.  Takes antipsychotic Rx at his nursing home, and was referred to EP re: sinus bradycardia and borderline QT prolongation on intake.  No reported history of seizures or VT/VF in chart.  Not able to participate in HPI/ROS due to psych issues.    PAST MEDICAL & SURGICAL HISTORY:  CVA (cerebral vascular accident)  CVA (cerebral vascular accident)  HLD (hyperlipidemia)  S/P percutaneous endoscopic gastrostomy (PEG) tube placement      MEDICATIONS  (STANDING):  albuterol/ipratropium for Nebulization 3 milliLiter(s) Nebulizer every 6 hours  aspirin  chewable 81 milliGRAM(s) Oral daily  atorvastatin 20 milliGRAM(s) Oral at bedtime  azithromycin  IVPB 500 milliGRAM(s) IV Intermittent every 24 hours  cefTRIAXone   IVPB 1000 milliGRAM(s) IV Intermittent every 24 hours  cholecalciferol 1000 Unit(s) Oral daily  cyanocobalamin 1000 MICROGram(s) Oral daily  dexAMETHasone  Injectable 6 milliGRAM(s) IV Push daily  enoxaparin Injectable 40 milliGRAM(s) SubCutaneous every 24 hours  LORazepam     Tablet 0.5 milliGRAM(s) Oral two times a day  multivitamin/minerals 1 Tablet(s) Oral daily  norepinephrine Infusion 0.05 MICROgram(s)/kG/Min (5.95 mL/Hr) IV Continuous <Continuous>  OLANZapine 5 milliGRAM(s) Oral every 12 hours  valproic  acid Syrup 250 milliGRAM(s) Oral two times a day      Allergies    No Known Allergies    Intolerances    FAMILY HISTORY:    Non-contributary for premature coronary disease or sudden cardiac death    SOCIAL HISTORY:    [ xNon-smoker  [ ] Smoker  [ ] Alcohol    PHYSICAL EXAM:  T(C): 36.3 (01-03-24 @ 13:13), Max: 36.6 (01-03-24 @ 05:56)  HR: 47 (01-03-24 @ 14:45) (40 - 66)  BP: 108/76 (01-03-24 @ 14:45) (73/43 - 122/70)  RR: 14 (01-03-24 @ 14:45) (14 - 20)  SpO2: 100% (01-03-24 @ 14:34) (75% - 100%)  Wt(kg): --    Appearance: elderly  man in no acute distress  HEENT:   Normal oral mucosa, PERRL, EOMI	  Lymphatic: No lymphadenopathy , no edema  Cardiovascular: Normal S1 S2, No JVD, No murmurs , Peripheral pulses palpable 2+ bilaterally  Respiratory: Lungs clear to auscultation, normal effort 	  Gastrointestinal:  Soft, Non-tender, + BS	  Skin: No rashes, No ecchymoses, No cyanosis, warm to touch  Musculoskeletal: Normal range of motion, normal strength  Psychiatry:  Mood & affect appropriate      TELEMETRY: 	none   ECG: NSR, QTc 450-470ms.  biphasic T-waves.  Echo: LVEF normal in 2020 at time of stroke.	  	  LABS:	 	                          12.6   8.77  )-----------( 275      ( 03 Jan 2024 07:30 )             38.6     01-03    141  |  110<H>  |  11  ----------------------------<  118<H>  3.5   |  28  |  0.73    Ca    8.4      03 Jan 2024 07:30  Phos  2.7     01-03  Mg     2.2     01-03    TPro  5.3<L>  /  Alb  2.2<L>  /  TBili  0.4  /  DBili  x   /  AST  29  /  ALT  51  /  AlkPhos  45  01-03      ASSESSMENT/PLAN: Mr Arriola is a pleasant 63y Male at nursing home due to stroke, dementia, and schizophrenia. Brought in for loss of appetite.  Was seen in morning of 1/3 re: abnormal EKG consultation.  Subsequently he decompensated with hypotension and was sent to MICU for guideline based septic shock management.  At baseline, his QTc is prolonged, at 460-470ms.  This by itself is likely stable on multiple psychiatric drugs that he has been taking chronically.    Exercise extreme caution in adding Azithromycin in this patient, as this drug is known to cause Torsades de Pointes cardiac arrest even in patients who dont have polypharmacy issues!  Replace K to 4-4.5, and maintain Mg over 2.  Do not fall behind on electrolyte management.    Maintain telemetry.  If he starts to have nonsustained VT on telemetry, apply Defib pads to chest/back before these episodes become sustained.  Take the time to familiarize yourself with treatment for TdP in the setting of QT prolonging drugs, i.e. Isoproterenol / transvenous overdrive pacing, and high dose Magnesium infusion  (NOT beta blockers and amiodarone)    Ramses Pena M.D.  Cardiac Electrophysiology    office [phone]  pager [phone] EP Attending  HISTORY OF PRESENT ILLNESS: HPI:  A 63 year old male, from [facility], with PMH of CVA, Alzheimer's, nonverbal at baseline, GERD, Schizophrenia, and Constipation, was brought into the ED s/p mechanical fall, poor oral intake, and Covid-19 infection on 12/27/23 as per NH papers. Unable to obtain history from patient since he is nonverbal, history obtained from chart review.  (28 Dec 2023 20:54)    Was pleasant and alert this morning, not talkative.  Apparently then had an RRT For hypotension and has gone to MICU for septic shock treatment.  Takes antipsychotic Rx at his nursing home, and was referred to EP re: sinus bradycardia and borderline QT prolongation on intake.  No reported history of seizures or VT/VF in chart.  Not able to participate in HPI/ROS due to psych issues.    PAST MEDICAL & SURGICAL HISTORY:  CVA (cerebral vascular accident)  CVA (cerebral vascular accident)  HLD (hyperlipidemia)  S/P percutaneous endoscopic gastrostomy (PEG) tube placement      MEDICATIONS  (STANDING):  albuterol/ipratropium for Nebulization 3 milliLiter(s) Nebulizer every 6 hours  aspirin  chewable 81 milliGRAM(s) Oral daily  atorvastatin 20 milliGRAM(s) Oral at bedtime  azithromycin  IVPB 500 milliGRAM(s) IV Intermittent every 24 hours  cefTRIAXone   IVPB 1000 milliGRAM(s) IV Intermittent every 24 hours  cholecalciferol 1000 Unit(s) Oral daily  cyanocobalamin 1000 MICROGram(s) Oral daily  dexAMETHasone  Injectable 6 milliGRAM(s) IV Push daily  enoxaparin Injectable 40 milliGRAM(s) SubCutaneous every 24 hours  LORazepam     Tablet 0.5 milliGRAM(s) Oral two times a day  multivitamin/minerals 1 Tablet(s) Oral daily  norepinephrine Infusion 0.05 MICROgram(s)/kG/Min (5.95 mL/Hr) IV Continuous <Continuous>  OLANZapine 5 milliGRAM(s) Oral every 12 hours  valproic  acid Syrup 250 milliGRAM(s) Oral two times a day      Allergies    No Known Allergies    Intolerances    FAMILY HISTORY:    Non-contributary for premature coronary disease or sudden cardiac death    SOCIAL HISTORY:    [ xNon-smoker  [ ] Smoker  [ ] Alcohol    PHYSICAL EXAM:  T(C): 36.3 (01-03-24 @ 13:13), Max: 36.6 (01-03-24 @ 05:56)  HR: 47 (01-03-24 @ 14:45) (40 - 66)  BP: 108/76 (01-03-24 @ 14:45) (73/43 - 122/70)  RR: 14 (01-03-24 @ 14:45) (14 - 20)  SpO2: 100% (01-03-24 @ 14:34) (75% - 100%)  Wt(kg): --    Appearance: elderly  man in no acute distress  HEENT:   Normal oral mucosa, PERRL, EOMI	  Lymphatic: No lymphadenopathy , no edema  Cardiovascular: Normal S1 S2, No JVD, No murmurs , Peripheral pulses palpable 2+ bilaterally  Respiratory: Lungs clear to auscultation, normal effort 	  Gastrointestinal:  Soft, Non-tender, + BS	  Skin: No rashes, No ecchymoses, No cyanosis, warm to touch  Musculoskeletal: Normal range of motion, normal strength  Psychiatry:  Mood & affect appropriate      TELEMETRY: 	none   ECG: NSR, QTc 450-470ms.  biphasic T-waves.  Echo: LVEF normal in 2020 at time of stroke.	  	  LABS:	 	                          12.6   8.77  )-----------( 275      ( 03 Jan 2024 07:30 )             38.6     01-03    141  |  110<H>  |  11  ----------------------------<  118<H>  3.5   |  28  |  0.73    Ca    8.4      03 Jan 2024 07:30  Phos  2.7     01-03  Mg     2.2     01-03    TPro  5.3<L>  /  Alb  2.2<L>  /  TBili  0.4  /  DBili  x   /  AST  29  /  ALT  51  /  AlkPhos  45  01-03      ASSESSMENT/PLAN: Mr Arriola is a pleasant 63y Male at nursing home due to stroke, dementia, and schizophrenia. Brought in for loss of appetite.  Was seen in morning of 1/3 re: abnormal EKG consultation.  Subsequently he decompensated with hypotension and was sent to MICU for guideline based septic shock management.  At baseline, his QTc is prolonged, at 460-470ms.  This by itself is likely stable on multiple psychiatric drugs that he has been taking chronically.    Exercise extreme caution in adding Azithromycin in this patient, as this drug is known to cause Torsades de Pointes cardiac arrest even in patients who dont have polypharmacy issues!  Replace K to 4-4.5, and maintain Mg over 2.  Do not fall behind on electrolyte management.    Maintain telemetry.  If he starts to have nonsustained VT on telemetry, apply Defib pads to chest/back before these episodes become sustained.  Take the time to familiarize yourself with treatment for TdP in the setting of QT prolonging drugs, i.e. Isoproterenol / transvenous overdrive pacing, and high dose Magnesium infusion  (NOT beta blockers and amiodarone)    Ramses Pena M.D.  Cardiac Electrophysiology    office [phone]  pager [phone]

## 2024-01-03 NOTE — PROGRESS NOTE ADULT - SUBJECTIVE AND OBJECTIVE BOX
Patient is a 63y old  Male who presents with a chief complaint of Sepsis and AHRF (03 Jan 2024 17:53)    PATIENT IS SEEN AND EXAMINED EARLIER ON MEDICAL FLOOR.      ALLERGIES:  No Known Allergies      VITALS:    Vital Signs Last 24 Hrs  T(C): 36.3 (03 Jan 2024 18:30), Max: 36.6 (03 Jan 2024 05:56)  T(F): 97.3 (03 Jan 2024 18:30), Max: 97.9 (03 Jan 2024 14:30)  HR: 60 (03 Jan 2024 20:00) (43 - 115)  BP: 110/70 (03 Jan 2024 20:00) (73/43 - 139/90)  BP(mean): 83 (03 Jan 2024 20:00) (63 - 112)  RR: 9 (03 Jan 2024 20:00) (9 - 24)  SpO2: 100% (03 Jan 2024 20:00) (75% - 100%)    Parameters below as of 03 Jan 2024 19:00  Patient On (Oxygen Delivery Method): nasal cannula, high flow  O2 Flow (L/min): 50  O2 Concentration (%): 40    LABS:    CBC Full  -  ( 03 Jan 2024 07:30 )  WBC Count : 8.77 K/uL  RBC Count : 4.41 M/uL  Hemoglobin : 12.6 g/dL  Hematocrit : 38.6 %  Platelet Count - Automated : 275 K/uL  Mean Cell Volume : 87.5 fl  Mean Cell Hemoglobin : 28.6 pg  Mean Cell Hemoglobin Concentration : 32.6 gm/dL  Auto Neutrophil # : x  Auto Lymphocyte # : x  Auto Monocyte # : x  Auto Eosinophil # : x  Auto Basophil # : x  Auto Neutrophil % : x  Auto Lymphocyte % : x  Auto Monocyte % : x  Auto Eosinophil % : x  Auto Basophil % : x      01-03    141  |  110<H>  |  11  ----------------------------<  118<H>  3.5   |  28  |  0.73    Ca    8.4      03 Jan 2024 07:30  Phos  2.7     01-03  Mg     2.2     01-03    TPro  5.3<L>  /  Alb  2.2<L>  /  TBili  0.4  /  DBili  x   /  AST  29  /  ALT  51  /  AlkPhos  45  01-03    CAPILLARY BLOOD GLUCOSE      POCT Blood Glucose.: 148 mg/dL (03 Jan 2024 13:11)    CARDIAC MARKERS ( 03 Jan 2024 15:00 )  x     / x     / 80 U/L / x     / 1.4 ng/mL      LIVER FUNCTIONS - ( 03 Jan 2024 07:30 )  Alb: 2.2 g/dL / Pro: 5.3 g/dL / ALK PHOS: 45 U/L / ALT: 51 U/L DA / AST: 29 U/L / GGT: x           Creatinine Trend: 0.73<--, 0.68<--, 0.76<--, 0.76<--, 0.98<--, 1.17<--  I&O's Summary    03 Jan 2024 07:01  -  03 Jan 2024 20:19  --------------------------------------------------------  IN: 327.7 mL / OUT: 0 mL / NET: 327.7 mL        ABG - ( 03 Jan 2024 13:19 )  pH, Arterial: 7.46  pH, Blood: x     /  pCO2: 37    /  pO2: 223   / HCO3: 26    / Base Excess: 2.6   /  SaO2: 99                  Catheterized Catheterized  12-29 @ 12:37   No growth  --  --      .Blood Blood  12-29 @ 02:05   No growth at 5 days  --  --      .Blood Blood  12-29 @ 01:55   No growth at 5 days  --  --          MEDICATIONS:    MEDICATIONS  (STANDING):  albuterol/ipratropium for Nebulization 3 milliLiter(s) Nebulizer every 6 hours  aspirin  chewable 81 milliGRAM(s) Oral daily  atorvastatin 20 milliGRAM(s) Oral at bedtime  azithromycin  IVPB 500 milliGRAM(s) IV Intermittent every 24 hours  cefTRIAXone   IVPB 1000 milliGRAM(s) IV Intermittent every 24 hours  cholecalciferol 1000 Unit(s) Oral daily  cyanocobalamin 1000 MICROGram(s) Oral daily  dexAMETHasone  Injectable 6 milliGRAM(s) IV Push daily  enoxaparin Injectable 40 milliGRAM(s) SubCutaneous every 24 hours  multivitamin/minerals 1 Tablet(s) Oral daily  norepinephrine Infusion 0.05 MICROgram(s)/kG/Min (5.95 mL/Hr) IV Continuous <Continuous>  OLANZapine Injectable 5 milliGRAM(s) IntraMuscular every 12 hours  valproic  acid Syrup 250 milliGRAM(s) Oral two times a day      MEDICATIONS  (PRN):  acetaminophen     Tablet .. 650 milliGRAM(s) Oral every 6 hours PRN Temp greater or equal to 38C (100.4F), Mild Pain (1 - 3)  albuterol    90 MICROgram(s) HFA Inhaler 2 Puff(s) Inhalation every 6 hours PRN Bronchospasm  LORazepam   Injectable 0.5 milliGRAM(s) IV Push two times a day PRN Agitation      REVIEW OF SYSTEMS:                           ALL ROS DONE [ X   ]    CONSTITUTIONAL:  LETHARGIC [   ], FEVER [   ], UNRESPONSIVE [   ]  CVS:  CP  [   ], SOB, [   ], PALPITATIONS [   ], DIZZYNESS [   ]  RS: COUGH [   ], SPUTUM [   ]  GI: ABDOMINAL PAIN [   ], NAUSEA [   ], VOMITINGS [   ], DIARRHEA [   ], CONSTIPATION [   ]  :  DYSURIA [   ], NOCTURIA [   ], INCREASED FREQUENCY [   ], DRIBLING [   ],  SKELETAL: PAINFUL JOINTS [   ], SWOLLEN JOINTS [   ], NECK ACHE [   ], LOW BACK ACHE [   ],  SKIN : ULCERS [   ], RASH [   ], ITCHING [   ]  CNS: HEAD ACHE [   ], DOUBLE VISION [   ], BLURRED VISION [   ], AMS / CONFUSION [   ], SEIZURES [   ], WEAKNESS [   ],TINGLING / NUMBNESS [   ]    PHYSICAL EXAMINATION:  GENERAL APPEARANCE: NO DISTRESS  HEENT:  NO PALLOR, NO  JVD,  NO   NODES, NECK SUPPLE  CVS: S1 +, S2 +,   RS: AEEB,  OCCASIONAL  RALES +,   NO RONCHI  ABD: SOFT, NT, NO, BS +  EXT: NO PE  SKIN: WARM,   SKELETAL:  REDUCED ROM OF CERVICAL AND LS SPINE  CNS:  AAO X 1-2    RADIOLOGY :    RADIOLOGY AND READINGS REVIEWED    ASSESSMENT :     Infection due to severe acute respiratory syndrome coronavirus 2 (SARS-CoV-2)    CVA (cerebral vascular accident)    HLD (hyperlipidemia)    S/P percutaneous endoscopic gastrostomy (PEG) tube placement        PLAN:  HPI:  A 63 year old male, from [facility], with PMH of CVA, Alzheimer's, nonverbal at baseline, GERD, Schizophrenia, and Constipation, was brought into the ED s/p mechanical fall, poor oral intake, and Covid-19 infection on 12/27/23 as per NH papers. Unable to obtain history from patient since he is nonverbal, history obtained from chart review.  (28 Dec 2023 20:54)    # [1/3] RAPID RESPONSE FOR HYPOTENSION AND HYPOXIA - S/P IV FLUIDS AND PLACED ON NRB FOR HYPOXIA. CRITICAL CARE EVALUATION REQUESTED.     # SEPTIC SHOCK S/T ? PNA + DIARRHEA  # ACUTE HYPOXIC RESPIRATORY FAILRE S/T ? ASPIRATION EVENT   , COVID19 INFECTION  - ON DECADRON  - ROCEPHIN, AZITHROMYCIN  - F/U BCX AND UCX , F/U STOOL GI PCR  - S/P IVF, VASOPRESSORS  - F/U CXR AND ABD XRAY  - ID CONSULT  - CRITICAL CARE MANAGEMENT IN PROGRESS    # BRADYCARDIA  - MONITOR ON TELEMETRY  - F/U ECHOCARDIOGRAM  - OPTIMIZE ELECTROLYTES  - CARDIOLOGY CONSULT  - EP CONSULT    # HX OF CVA  # HX OF DEMENTIA  # SCHIZOPHRENIA  # GI AND DVT PPX   Patient is a 63y old  Male who presents with a chief complaint of Sepsis and AHRF (03 Jan 2024 17:53)    PATIENT IS SEEN AND EXAMINED EARLIER ON MEDICAL FLOOR.      ALLERGIES:  No Known Allergies      VITALS:    Vital Signs Last 24 Hrs  T(C): 36.3 (03 Jan 2024 18:30), Max: 36.6 (03 Jan 2024 05:56)  T(F): 97.3 (03 Jan 2024 18:30), Max: 97.9 (03 Jan 2024 14:30)  HR: 60 (03 Jan 2024 20:00) (43 - 115)  BP: 110/70 (03 Jan 2024 20:00) (73/43 - 139/90)  BP(mean): 83 (03 Jan 2024 20:00) (63 - 112)  RR: 9 (03 Jan 2024 20:00) (9 - 24)  SpO2: 100% (03 Jan 2024 20:00) (75% - 100%)    Parameters below as of 03 Jan 2024 19:00  Patient On (Oxygen Delivery Method): nasal cannula, high flow  O2 Flow (L/min): 50  O2 Concentration (%): 40    LABS:    CBC Full  -  ( 03 Jan 2024 07:30 )  WBC Count : 8.77 K/uL  RBC Count : 4.41 M/uL  Hemoglobin : 12.6 g/dL  Hematocrit : 38.6 %  Platelet Count - Automated : 275 K/uL  Mean Cell Volume : 87.5 fl  Mean Cell Hemoglobin : 28.6 pg  Mean Cell Hemoglobin Concentration : 32.6 gm/dL  Auto Neutrophil # : x  Auto Lymphocyte # : x  Auto Monocyte # : x  Auto Eosinophil # : x  Auto Basophil # : x  Auto Neutrophil % : x  Auto Lymphocyte % : x  Auto Monocyte % : x  Auto Eosinophil % : x  Auto Basophil % : x      01-03    141  |  110<H>  |  11  ----------------------------<  118<H>  3.5   |  28  |  0.73    Ca    8.4      03 Jan 2024 07:30  Phos  2.7     01-03  Mg     2.2     01-03    TPro  5.3<L>  /  Alb  2.2<L>  /  TBili  0.4  /  DBili  x   /  AST  29  /  ALT  51  /  AlkPhos  45  01-03    CAPILLARY BLOOD GLUCOSE      POCT Blood Glucose.: 148 mg/dL (03 Jan 2024 13:11)    CARDIAC MARKERS ( 03 Jan 2024 15:00 )  x     / x     / 80 U/L / x     / 1.4 ng/mL      LIVER FUNCTIONS - ( 03 Jan 2024 07:30 )  Alb: 2.2 g/dL / Pro: 5.3 g/dL / ALK PHOS: 45 U/L / ALT: 51 U/L DA / AST: 29 U/L / GGT: x           Creatinine Trend: 0.73<--, 0.68<--, 0.76<--, 0.76<--, 0.98<--, 1.17<--  I&O's Summary    03 Jan 2024 07:01  -  03 Jan 2024 20:19  --------------------------------------------------------  IN: 327.7 mL / OUT: 0 mL / NET: 327.7 mL        ABG - ( 03 Jan 2024 13:19 )  pH, Arterial: 7.46  pH, Blood: x     /  pCO2: 37    /  pO2: 223   / HCO3: 26    / Base Excess: 2.6   /  SaO2: 99                  Catheterized Catheterized  12-29 @ 12:37   No growth  --  --      .Blood Blood  12-29 @ 02:05   No growth at 5 days  --  --      .Blood Blood  12-29 @ 01:55   No growth at 5 days  --  --          MEDICATIONS:    MEDICATIONS  (STANDING):  albuterol/ipratropium for Nebulization 3 milliLiter(s) Nebulizer every 6 hours  aspirin  chewable 81 milliGRAM(s) Oral daily  atorvastatin 20 milliGRAM(s) Oral at bedtime  azithromycin  IVPB 500 milliGRAM(s) IV Intermittent every 24 hours  cefTRIAXone   IVPB 1000 milliGRAM(s) IV Intermittent every 24 hours  cholecalciferol 1000 Unit(s) Oral daily  cyanocobalamin 1000 MICROGram(s) Oral daily  dexAMETHasone  Injectable 6 milliGRAM(s) IV Push daily  enoxaparin Injectable 40 milliGRAM(s) SubCutaneous every 24 hours  multivitamin/minerals 1 Tablet(s) Oral daily  norepinephrine Infusion 0.05 MICROgram(s)/kG/Min (5.95 mL/Hr) IV Continuous <Continuous>  OLANZapine Injectable 5 milliGRAM(s) IntraMuscular every 12 hours  valproic  acid Syrup 250 milliGRAM(s) Oral two times a day      MEDICATIONS  (PRN):  acetaminophen     Tablet .. 650 milliGRAM(s) Oral every 6 hours PRN Temp greater or equal to 38C (100.4F), Mild Pain (1 - 3)  albuterol    90 MICROgram(s) HFA Inhaler 2 Puff(s) Inhalation every 6 hours PRN Bronchospasm  LORazepam   Injectable 0.5 milliGRAM(s) IV Push two times a day PRN Agitation      REVIEW OF SYSTEMS:                           ALL ROS DONE [ X   ]    CONSTITUTIONAL:  LETHARGIC [   ], FEVER [   ], UNRESPONSIVE [   ]  CVS:  CP  [   ], SOB, [   ], PALPITATIONS [   ], DIZZYNESS [   ]  RS: COUGH [   ], SPUTUM [   ]  GI: ABDOMINAL PAIN [   ], NAUSEA [   ], VOMITINGS [   ], DIARRHEA [   ], CONSTIPATION [   ]  :  DYSURIA [   ], NOCTURIA [   ], INCREASED FREQUENCY [   ], DRIBLING [   ],  SKELETAL: PAINFUL JOINTS [   ], SWOLLEN JOINTS [   ], NECK ACHE [   ], LOW BACK ACHE [   ],  SKIN : ULCERS [   ], RASH [   ], ITCHING [   ]  CNS: HEAD ACHE [   ], DOUBLE VISION [   ], BLURRED VISION [   ], AMS / CONFUSION [   ], SEIZURES [   ], WEAKNESS [   ],TINGLING / NUMBNESS [   ]    PHYSICAL EXAMINATION:  GENERAL APPEARANCE: NO DISTRESS  HEENT:  NO PALLOR, NO  JVD,  NO   NODES, NECK SUPPLE  CVS: S1 +, S2 +,   RS: AEEB,  OCCASIONAL  RALES +,   NO RONCHI  ABD: SOFT, NT, NO, BS +  EXT: NO PE  SKIN: WARM,   SKELETAL:  REDUCED ROM OF CERVICAL AND LS SPINE  CNS:  AAO X 1-2    RADIOLOGY :    RADIOLOGY AND READINGS REVIEWED    ASSESSMENT :     Infection due to severe acute respiratory syndrome coronavirus 2 (SARS-CoV-2)    CVA (cerebral vascular accident)    HLD (hyperlipidemia)    S/P percutaneous endoscopic gastrostomy (PEG) tube placement        PLAN:  HPI:  A 63 year old male, from [facility], with PMH of CVA, Alzheimer's, nonverbal at baseline, GERD, Schizophrenia, and Constipation, was brought into the ED s/p mechanical fall, poor oral intake, and Covid-19 infection on 12/27/23 as per NH papers. Unable to obtain history from patient since he is nonverbal, history obtained from chart review.  (28 Dec 2023 20:54)    # [1/3] RAPID RESPONSE FOR HYPOTENSION AND HYPOXIA - S/P IV FLUIDS AND PLACED ON NRB FOR HYPOXIA. CRITICAL CARE EVALUATION REQUESTED.     # SEPTIC SHOCK S/T ? PNA + DIARRHEA  # ACUTE HYPOXIC RESPIRATORY FAILRE S/T ? ASPIRATION EVENT   , COVID19 INFECTION  - ON DECADRON  - ROCEPHIN, AZITHROMYCIN  - F/U BCX AND UCX , F/U STOOL GI PCR  - S/P IVF, VASOPRESSORS  - F/U CXR AND ABD XRAY  - ID CONSULT  - CRITICAL CARE MANAGEMENT IN PROGRESS    # BRADYCARDIA  - MONITOR ON TELEMETRY  - F/U ECHOCARDIOGRAM  - OPTIMIZE ELECTROLYTES  - CARDIOLOGY CONSULT  - EP CONSULT    # HX OF CVA  # HX OF DEMENTIA  # SCHIZOPHRENIA  # GI AND DVT PPX

## 2024-01-03 NOTE — CONSULT NOTE ADULT - SUBJECTIVE AND OBJECTIVE BOX
HPI:  A 63 year old male, from [facility], with PMH of CVA, Alzheimer's, nonverbal at baseline, GERD, Schizophrenia, and Constipation, was brought into the ED s/p mechanical fall, poor oral intake, and Covid-19 infection on 12/27/23 as per NH papers. Unable to obtain history from patient since he is nonverbal, history obtained from chart review.  (28 Dec 2023 20:54)      PAST MEDICAL & SURGICAL HISTORY:  CVA (cerebral vascular accident)      CVA (cerebral vascular accident)      HLD (hyperlipidemia)      S/P percutaneous endoscopic gastrostomy (PEG) tube placement          No Known Allergies      Meds:  acetaminophen     Tablet .. 650 milliGRAM(s) Oral every 6 hours PRN  albuterol    90 MICROgram(s) HFA Inhaler 2 Puff(s) Inhalation every 6 hours PRN  albuterol/ipratropium for Nebulization 3 milliLiter(s) Nebulizer every 6 hours  aspirin  chewable 81 milliGRAM(s) Oral daily  atorvastatin 20 milliGRAM(s) Oral at bedtime  azithromycin  IVPB 500 milliGRAM(s) IV Intermittent every 24 hours  cefTRIAXone   IVPB 1000 milliGRAM(s) IV Intermittent every 24 hours  cholecalciferol 1000 Unit(s) Oral daily  cyanocobalamin 1000 MICROGram(s) Oral daily  dexAMETHasone  Injectable 6 milliGRAM(s) IV Push daily  enoxaparin Injectable 40 milliGRAM(s) SubCutaneous every 24 hours  LORazepam   Injectable 0.5 milliGRAM(s) IV Push two times a day PRN  multivitamin/minerals 1 Tablet(s) Oral daily  norepinephrine Infusion 0.05 MICROgram(s)/kG/Min IV Continuous <Continuous>  OLANZapine 5 milliGRAM(s) Oral every 12 hours  valproic  acid Syrup 250 milliGRAM(s) Oral two times a day      SOCIAL HISTORY:  Smoker:  YES / NO        PACK YEARS:                         WHEN QUIT?  ETOH use:  YES / NO               FREQUENCY / QUANTITY:  Ilicit Drug use:  YES / NO  Occupation:  Assisted device use (Cane / Walker):  Live with:    FAMILY HISTORY:      VITALS:  Vital Signs Last 24 Hrs  T(C): 36.4 (03 Jan 2024 17:30), Max: 36.6 (03 Jan 2024 05:56)  T(F): 97.5 (03 Jan 2024 17:30), Max: 97.9 (03 Jan 2024 14:30)  HR: 85 (03 Jan 2024 17:30) (43 - 85)  BP: 113/48 (03 Jan 2024 17:30) (73/43 - 135/71)  BP(mean): 66 (03 Jan 2024 17:30) (63 - 109)  RR: 13 (03 Jan 2024 17:30) (11 - 20)  SpO2: 99% (03 Jan 2024 17:30) (75% - 100%)    Parameters below as of 03 Jan 2024 17:00  Patient On (Oxygen Delivery Method): nasal cannula, high flow  O2 Flow (L/min): 50  O2 Concentration (%): 40    LABS/DIAGNOSTIC TESTS:                          12.6   8.77  )-----------( 275      ( 03 Jan 2024 07:30 )             38.6     WBC Count: 8.77 K/uL (01-03 @ 07:30)  WBC Count: 5.58 K/uL (01-02 @ 06:00)      01-03    141  |  110<H>  |  11  ----------------------------<  118<H>  3.5   |  28  |  0.73    Ca    8.4      03 Jan 2024 07:30  Phos  2.7     01-03  Mg     2.2     01-03    TPro  5.3<L>  /  Alb  2.2<L>  /  TBili  0.4  /  DBili  x   /  AST  29  /  ALT  51  /  AlkPhos  45  01-03      Urinalysis (12.29.23 @ 12:37)   Blood, Urine: Negative  Glucose Qualitative, Urine: Negative mg/dL  pH Urine: 6.0  Color: Yellow  Urine Appearance: Clear  Bilirubin: Negative  Ketone - Urine: Trace mg/dL  Specific Gravity: 1.023  Protein, Urine: Negative mg/dL  Urobilinogen: 1.0 mg/dL  Nitrite: Negative  Leukocyte Esterase Concentration: Negative      LIVER FUNCTIONS - ( 03 Jan 2024 07:30 )  Alb: 2.2 g/dL / Pro: 5.3 g/dL / ALK PHOS: 45 U/L / ALT: 51 U/L DA / AST: 29 U/L / GGT: x                 LACTATE:Lactate, Blood: 1.0 mmol/L (01-03 @ 07:30)      ABG - ABG - ( 03 Jan 2024 13:19 )  pH, Arterial: 7.46  pH, Blood: x     /  pCO2: 37    /  pO2: 223   / HCO3: 26    / Base Excess: 2.6   /  SaO2: 99                  CULTURES:   Catheterized Catheterized  12-29 @ 12:37   No growth  --  --      .Blood Blood  12-29 @ 02:05   No growth at 5 days  --  --      .Blood Blood  12-29 @ 01:55   No growth at 5 days  --  --          RADIOLOGY:< from: Xray Chest 1 View-PORTABLE IMMEDIATE (Xray Chest 1 View-PORTABLE IMMEDIATE .) (12.30.23 @ 03:39) >  ACC: [id number] EXAM:  XR CHEST PORTABLE IMMED 1V   ORDERED BY: SAYDA SCHUMACHER     PROCEDURE DATE:  12/30/2023          INTERPRETATION:  INDICATIONS: 63-year-old male with shortness of breath.    Prior examination for comparison: 12/28/2023, 8/3/2022    Technique: AP view of chest    Findings:  Loop recorder projecting over left chest. Ill-defined bibasilar   opacities, suspicious for pneumonia. There are no pleural effusions. The   cardiomediastinal silhouette is normal. Bones are grossly normal.    IMPRESSION: Ill-defined bibasilar opacities, consistent with multifocal   pneumonia.    --- End of Report ---            AKHIL SALAS MD; Attending Interventional Radiologist  This document has been electronically signed. Dec 30 2023  9:18AM    < end of copied text >        ROS  [  ] UNABLE TO ELICIT               HPI:  A 63 year old male, from [facility], with PMH of CVA, Alzheimer's, nonverbal at baseline, GERD, Schizophrenia, and Constipation, was brought into the ED s/p mechanical fall, poor oral intake, and Covid-19 infection on 12/27/23 as per NH papers. Unable to obtain history from patient since he is nonverbal, history obtained from chart review.  (28 Dec 2023 20:54)      PAST MEDICAL & SURGICAL HISTORY:  CVA (cerebral vascular accident)      CVA (cerebral vascular accident)      HLD (hyperlipidemia)      S/P percutaneous endoscopic gastrostomy (PEG) tube placement          No Known Allergies      Meds:  acetaminophen     Tablet .. 650 milliGRAM(s) Oral every 6 hours PRN  albuterol    90 MICROgram(s) HFA Inhaler 2 Puff(s) Inhalation every 6 hours PRN  albuterol/ipratropium for Nebulization 3 milliLiter(s) Nebulizer every 6 hours  aspirin  chewable 81 milliGRAM(s) Oral daily  atorvastatin 20 milliGRAM(s) Oral at bedtime  azithromycin  IVPB 500 milliGRAM(s) IV Intermittent every 24 hours  cefTRIAXone   IVPB 1000 milliGRAM(s) IV Intermittent every 24 hours  cholecalciferol 1000 Unit(s) Oral daily  cyanocobalamin 1000 MICROGram(s) Oral daily  dexAMETHasone  Injectable 6 milliGRAM(s) IV Push daily  enoxaparin Injectable 40 milliGRAM(s) SubCutaneous every 24 hours  LORazepam   Injectable 0.5 milliGRAM(s) IV Push two times a day PRN  multivitamin/minerals 1 Tablet(s) Oral daily  norepinephrine Infusion 0.05 MICROgram(s)/kG/Min IV Continuous <Continuous>  OLANZapine 5 milliGRAM(s) Oral every 12 hours  valproic  acid Syrup 250 milliGRAM(s) Oral two times a day      SOCIAL HISTORY:  Smoker:  YES / NO        PACK YEARS:                         WHEN QUIT?  ETOH use:  YES / NO               FREQUENCY / QUANTITY:  Ilicit Drug use:  YES / NO  Occupation:  Assisted device use (Cane / Walker):  Live with:    FAMILY HISTORY:      VITALS:  Vital Signs Last 24 Hrs  T(C): 36.4 (03 Jan 2024 17:30), Max: 36.6 (03 Jan 2024 05:56)  T(F): 97.5 (03 Jan 2024 17:30), Max: 97.9 (03 Jan 2024 14:30)  HR: 85 (03 Jan 2024 17:30) (43 - 85)  BP: 113/48 (03 Jan 2024 17:30) (73/43 - 135/71)  BP(mean): 66 (03 Jan 2024 17:30) (63 - 109)  RR: 13 (03 Jan 2024 17:30) (11 - 20)  SpO2: 99% (03 Jan 2024 17:30) (75% - 100%)    Parameters below as of 03 Jan 2024 17:00  Patient On (Oxygen Delivery Method): nasal cannula, high flow  O2 Flow (L/min): 50  O2 Concentration (%): 40    LABS/DIAGNOSTIC TESTS:                          12.6   8.77  )-----------( 275      ( 03 Jan 2024 07:30 )             38.6     WBC Count: 8.77 K/uL (01-03 @ 07:30)  WBC Count: 5.58 K/uL (01-02 @ 06:00)      01-03    141  |  110<H>  |  11  ----------------------------<  118<H>  3.5   |  28  |  0.73    Ca    8.4      03 Jan 2024 07:30  Phos  2.7     01-03  Mg     2.2     01-03    TPro  5.3<L>  /  Alb  2.2<L>  /  TBili  0.4  /  DBili  x   /  AST  29  /  ALT  51  /  AlkPhos  45  01-03      Urinalysis (12.29.23 @ 12:37)   Blood, Urine: Negative  Glucose Qualitative, Urine: Negative mg/dL  pH Urine: 6.0  Color: Yellow  Urine Appearance: Clear  Bilirubin: Negative  Ketone - Urine: Trace mg/dL  Specific Gravity: 1.023  Protein, Urine: Negative mg/dL  Urobilinogen: 1.0 mg/dL  Nitrite: Negative  Leukocyte Esterase Concentration: Negative      LIVER FUNCTIONS - ( 03 Jan 2024 07:30 )  Alb: 2.2 g/dL / Pro: 5.3 g/dL / ALK PHOS: 45 U/L / ALT: 51 U/L DA / AST: 29 U/L / GGT: x                 LACTATE:Lactate, Blood: 1.0 mmol/L (01-03 @ 07:30)      ABG - ABG - ( 03 Jan 2024 13:19 )  pH, Arterial: 7.46  pH, Blood: x     /  pCO2: 37    /  pO2: 223   / HCO3: 26    / Base Excess: 2.6   /  SaO2: 99                  CULTURES:   Catheterized Catheterized  12-29 @ 12:37   No growth  --  --      .Blood Blood  12-29 @ 02:05   No growth at 5 days  --  --      .Blood Blood  12-29 @ 01:55   No growth at 5 days  --  --          RADIOLOGY:< from: Xray Chest 1 View-PORTABLE IMMEDIATE (Xray Chest 1 View-PORTABLE IMMEDIATE .) (12.30.23 @ 03:39) >  ACC: [id number] EXAM:  XR CHEST PORTABLE IMMED 1V   ORDERED BY: SAYDA SCHUMACHER     PROCEDURE DATE:  12/30/2023          INTERPRETATION:  INDICATIONS: 63-year-old male with shortness of breath.    Prior examination for comparison: 12/28/2023, 8/3/2022    Technique: AP view of chest    Findings:  Loop recorder projecting over left chest. Ill-defined bibasilar   opacities, suspicious for pneumonia. There are no pleural effusions. The   cardiomediastinal silhouette is normal. Bones are grossly normal.    IMPRESSION: Ill-defined bibasilar opacities, consistent with multifocal   pneumonia.    --- End of Report ---            AKHIL SALAS MD; Attending Interventional Radiologist  This document has been electronically signed. Dec 30 2023  9:18AM    < end of copied text >        ROS  [  ] UNABLE TO ELICIT               HPI:  A 63 year old male, from [facility], with PMH of CVA, Alzheimer's, nonverbal at baseline, GERD, Schizophrenia, and Constipation, was brought into the ED s/p mechanical fall, poor oral intake, and Covid-19 infection on 12/27/23 as per NH papers. Unable to obtain history from patient since he is nonverbal, history obtained from chart review.  (28 Dec 2023 20:54)        History as above, asked to see this patient who was admitted from a NH several days ago with COVID infection and a mechanical fall along with poor oral intake, he was doing worse as far as his resp status goes and so was brought up to the ICU. He is currently on HFNC and was a little hypotensive earlier. He is non verbal and is not able to answer any of my questions. He has multifocal pneumonia on CXR and has jarett placed on Rocephin / azithro here. He is Currently in the ICU on HFNC and has to be restrained as he is very agitated. He has no fevers or Leukocytosis. He is also being treated with steroids for his COVID infection.        PAST MEDICAL & SURGICAL HISTORY:  CVA (cerebral vascular accident)      CVA (cerebral vascular accident)      HLD (hyperlipidemia)      S/P percutaneous endoscopic gastrostomy (PEG) tube placement          No Known Allergies      Meds:  acetaminophen     Tablet .. 650 milliGRAM(s) Oral every 6 hours PRN  albuterol    90 MICROgram(s) HFA Inhaler 2 Puff(s) Inhalation every 6 hours PRN  albuterol/ipratropium for Nebulization 3 milliLiter(s) Nebulizer every 6 hours  aspirin  chewable 81 milliGRAM(s) Oral daily  atorvastatin 20 milliGRAM(s) Oral at bedtime  azithromycin  IVPB 500 milliGRAM(s) IV Intermittent every 24 hours  cefTRIAXone   IVPB 1000 milliGRAM(s) IV Intermittent every 24 hours  cholecalciferol 1000 Unit(s) Oral daily  cyanocobalamin 1000 MICROGram(s) Oral daily  dexAMETHasone  Injectable 6 milliGRAM(s) IV Push daily  enoxaparin Injectable 40 milliGRAM(s) SubCutaneous every 24 hours  LORazepam   Injectable 0.5 milliGRAM(s) IV Push two times a day PRN  multivitamin/minerals 1 Tablet(s) Oral daily  norepinephrine Infusion 0.05 MICROgram(s)/kG/Min IV Continuous <Continuous>  OLANZapine 5 milliGRAM(s) Oral every 12 hours  valproic  acid Syrup 250 milliGRAM(s) Oral two times a day      SOCIAL HISTORY: unknown    FAMILY HISTORY: unknown      VITALS:  Vital Signs Last 24 Hrs  T(C): 36.4 (03 Jan 2024 17:30), Max: 36.6 (03 Jan 2024 05:56)  T(F): 97.5 (03 Jan 2024 17:30), Max: 97.9 (03 Jan 2024 14:30)  HR: 85 (03 Jan 2024 17:30) (43 - 85)  BP: 113/48 (03 Jan 2024 17:30) (73/43 - 135/71)  BP(mean): 66 (03 Jan 2024 17:30) (63 - 109)  RR: 13 (03 Jan 2024 17:30) (11 - 20)  SpO2: 99% (03 Jan 2024 17:30) (75% - 100%)    Parameters below as of 03 Jan 2024 17:00  Patient On (Oxygen Delivery Method): nasal cannula, high flow  O2 Flow (L/min): 50  O2 Concentration (%): 40    LABS/DIAGNOSTIC TESTS:                          12.6   8.77  )-----------( 275      ( 03 Jan 2024 07:30 )             38.6     WBC Count: 8.77 K/uL (01-03 @ 07:30)  WBC Count: 5.58 K/uL (01-02 @ 06:00)      01-03    141  |  110<H>  |  11  ----------------------------<  118<H>  3.5   |  28  |  0.73    Ca    8.4      03 Jan 2024 07:30  Phos  2.7     01-03  Mg     2.2     01-03    TPro  5.3<L>  /  Alb  2.2<L>  /  TBili  0.4  /  DBili  x   /  AST  29  /  ALT  51  /  AlkPhos  45  01-03      Urinalysis (12.29.23 @ 12:37)   Blood, Urine: Negative  Glucose Qualitative, Urine: Negative mg/dL  pH Urine: 6.0  Color: Yellow  Urine Appearance: Clear  Bilirubin: Negative  Ketone - Urine: Trace mg/dL  Specific Gravity: 1.023  Protein, Urine: Negative mg/dL  Urobilinogen: 1.0 mg/dL  Nitrite: Negative  Leukocyte Esterase Concentration: Negative      LIVER FUNCTIONS - ( 03 Jan 2024 07:30 )  Alb: 2.2 g/dL / Pro: 5.3 g/dL / ALK PHOS: 45 U/L / ALT: 51 U/L DA / AST: 29 U/L / GGT: x                 LACTATE:Lactate, Blood: 1.0 mmol/L (01-03 @ 07:30)      ABG - ABG - ( 03 Jan 2024 13:19 )  pH, Arterial: 7.46  pH, Blood: x     /  pCO2: 37    /  pO2: 223   / HCO3: 26    / Base Excess: 2.6   /  SaO2: 99                  CULTURES:   Catheterized Catheterized  12-29 @ 12:37   No growth  --  --      .Blood Blood  12-29 @ 02:05   No growth at 5 days  --  --      .Blood Blood  12-29 @ 01:55   No growth at 5 days  --  --          RADIOLOGY:< from: Xray Chest 1 View-PORTABLE IMMEDIATE (Xray Chest 1 View-PORTABLE IMMEDIATE .) (12.30.23 @ 03:39) >  ACC: [id number] EXAM:  XR CHEST PORTABLE IMMED 1V   ORDERED BY: SAYDA SCHUMACHER     PROCEDURE DATE:  12/30/2023          INTERPRETATION:  INDICATIONS: 63-year-old male with shortness of breath.    Prior examination for comparison: 12/28/2023, 8/3/2022    Technique: AP view of chest    Findings:  Loop recorder projecting over left chest. Ill-defined bibasilar   opacities, suspicious for pneumonia. There are no pleural effusions. The   cardiomediastinal silhouette is normal. Bones are grossly normal.    IMPRESSION: Ill-defined bibasilar opacities, consistent with multifocal   pneumonia.    --- End of Report ---            AKHIL SALAS MD; Attending Interventional Radiologist  This document has been electronically signed. Dec 30 2023  9:18AM    < end of copied text >        ROS  [ x ] UNABLE TO ELICIT               HPI:  A 63 year old male, from [facility], with PMH of CVA, Alzheimer's, nonverbal at baseline, GERD, Schizophrenia, and Constipation, was brought into the ED s/p mechanical fall, poor oral intake, and Covid-19 infection on 12/27/23 as per NH papers. Unable to obtain history from patient since he is nonverbal, history obtained from chart review.  (28 Dec 2023 20:54)        History as above, asked to see this patient who was admitted from a NH several days ago with COVID infection and a mechanical fall along with poor oral intake, he was doing worse as far as his resp status goes and so was brought up to the ICU. He is currently on HFNC and was a little hypotensive earlier. He is non verbal and is not able to answer any of my questions. He has multifocal pneumonia on CXR and has jarett placed on Rocephin / azithro here. He is Currently in the ICU on HFNC and has to be restrained as he is very agitated. He has no fevers or Leukocytosis. He is also being treated with steroids for his COVID infection.        PAST MEDICAL & SURGICAL HISTORY:  CVA (cerebral vascular accident)      CVA (cerebral vascular accident)      HLD (hyperlipidemia)      S/P percutaneous endoscopic gastrostomy (PEG) tube placement          No Known Allergies      Meds:  acetaminophen     Tablet .. 650 milliGRAM(s) Oral every 6 hours PRN  albuterol    90 MICROgram(s) HFA Inhaler 2 Puff(s) Inhalation every 6 hours PRN  albuterol/ipratropium for Nebulization 3 milliLiter(s) Nebulizer every 6 hours  aspirin  chewable 81 milliGRAM(s) Oral daily  atorvastatin 20 milliGRAM(s) Oral at bedtime  azithromycin  IVPB 500 milliGRAM(s) IV Intermittent every 24 hours  cefTRIAXone   IVPB 1000 milliGRAM(s) IV Intermittent every 24 hours  cholecalciferol 1000 Unit(s) Oral daily  cyanocobalamin 1000 MICROGram(s) Oral daily  dexAMETHasone  Injectable 6 milliGRAM(s) IV Push daily  enoxaparin Injectable 40 milliGRAM(s) SubCutaneous every 24 hours  LORazepam   Injectable 0.5 milliGRAM(s) IV Push two times a day PRN  multivitamin/minerals 1 Tablet(s) Oral daily  norepinephrine Infusion 0.05 MICROgram(s)/kG/Min IV Continuous <Continuous>  OLANZapine 5 milliGRAM(s) Oral every 12 hours  valproic  acid Syrup 250 milliGRAM(s) Oral two times a day      SOCIAL HISTORY: unknown    FAMILY HISTORY: unknown      VITALS:  Vital Signs Last 24 Hrs  T(C): 36.4 (03 Jan 2024 17:30), Max: 36.6 (03 Jan 2024 05:56)  T(F): 97.5 (03 Jan 2024 17:30), Max: 97.9 (03 Jan 2024 14:30)  HR: 85 (03 Jan 2024 17:30) (43 - 85)  BP: 113/48 (03 Jan 2024 17:30) (73/43 - 135/71)  BP(mean): 66 (03 Jan 2024 17:30) (63 - 109)  RR: 13 (03 Jan 2024 17:30) (11 - 20)  SpO2: 99% (03 Jan 2024 17:30) (75% - 100%)    Parameters below as of 03 Jan 2024 17:00  Patient On (Oxygen Delivery Method): nasal cannula, high flow  O2 Flow (L/min): 50  O2 Concentration (%): 40    LABS/DIAGNOSTIC TESTS:                          12.6   8.77  )-----------( 275      ( 03 Jan 2024 07:30 )             38.6     WBC Count: 8.77 K/uL (01-03 @ 07:30)  WBC Count: 5.58 K/uL (01-02 @ 06:00)      01-03    141  |  110<H>  |  11  ----------------------------<  118<H>  3.5   |  28  |  0.73    Ca    8.4      03 Jan 2024 07:30  Phos  2.7     01-03  Mg     2.2     01-03    TPro  5.3<L>  /  Alb  2.2<L>  /  TBili  0.4  /  DBili  x   /  AST  29  /  ALT  51  /  AlkPhos  45  01-03      Urinalysis (12.29.23 @ 12:37)   Blood, Urine: Negative  Glucose Qualitative, Urine: Negative mg/dL  pH Urine: 6.0  Color: Yellow  Urine Appearance: Clear  Bilirubin: Negative  Ketone - Urine: Trace mg/dL  Specific Gravity: 1.023  Protein, Urine: Negative mg/dL  Urobilinogen: 1.0 mg/dL  Nitrite: Negative  Leukocyte Esterase Concentration: Negative      LIVER FUNCTIONS - ( 03 Jan 2024 07:30 )  Alb: 2.2 g/dL / Pro: 5.3 g/dL / ALK PHOS: 45 U/L / ALT: 51 U/L DA / AST: 29 U/L / GGT: x                 LACTATE:Lactate, Blood: 1.0 mmol/L (01-03 @ 07:30)      ABG - ABG - ( 03 Jan 2024 13:19 )  pH, Arterial: 7.46  pH, Blood: x     /  pCO2: 37    /  pO2: 223   / HCO3: 26    / Base Excess: 2.6   /  SaO2: 99                  CULTURES:   Catheterized Catheterized  12-29 @ 12:37   No growth  --  --      .Blood Blood  12-29 @ 02:05   No growth at 5 days  --  --      .Blood Blood  12-29 @ 01:55   No growth at 5 days  --  --          RADIOLOGY:< from: Xray Chest 1 View-PORTABLE IMMEDIATE (Xray Chest 1 View-PORTABLE IMMEDIATE .) (12.30.23 @ 03:39) >  ACC: [id number] EXAM:  XR CHEST PORTABLE IMMED 1V   ORDERED BY: SAYDA SCHUMACHER     PROCEDURE DATE:  12/30/2023          INTERPRETATION:  INDICATIONS: 63-year-old male with shortness of breath.    Prior examination for comparison: 12/28/2023, 8/3/2022    Technique: AP view of chest    Findings:  Loop recorder projecting over left chest. Ill-defined bibasilar   opacities, suspicious for pneumonia. There are no pleural effusions. The   cardiomediastinal silhouette is normal. Bones are grossly normal.    IMPRESSION: Ill-defined bibasilar opacities, consistent with multifocal   pneumonia.    --- End of Report ---            AKHIL SALAS MD; Attending Interventional Radiologist  This document has been electronically signed. Dec 30 2023  9:18AM    < end of copied text >        ROS  [ x ] UNABLE TO ELICIT

## 2024-01-03 NOTE — CONSULT NOTE ADULT - ASSESSMENT
A 63 year old male, from [facility], with PMH of CVA, Alzheimer's, nonverbal at baseline, GERD, Schizophrenia, and Constipation, was brought into the ED s/p mechanical fall, poor oral intake, and Covid-19 infection on 12/27/23 as per NH papers. Unable to obtain history from patient since he is nonverbal, history obtained from chart review.  (28 Dec 2023 20:54)    Patient was admitted and had hypotension, despite multiple fluid boluses. Added midodrine  Patient has been increasing more bryan to the 40s, sustaining, ekg sinus bryan that is new.  Today the patient was hypotensive, having hypoxia event after BM (diarrhea)  RRt called, patient placed on levo and NRB and transferred to the ICU for further monitoring.     DX:  1.   2.   3.   4.  5.  6.  7.  8.  9.    =================== Neuro============================  Alert and oriented x 3 at base line       ================= Cardiovascular==========================    ================- Pulm=================================      ==================ID===================================      ================= Nephro================================      =================GI====================================      ================ Heme==================================      =================Endocrine===============================      ================= Skin/Catheters============================      =================Prophylaxis =============================      ==================GOC==================================  FULL CODE   DNR DNI    A 63 year old male, from [facility], with PMH of CVA, Alzheimer's, nonverbal at baseline, GERD, Schizophrenia, and Constipation, was brought into the ED s/p mechanical fall, poor oral intake, and Covid-19 infection on 12/27/23 as per NH papers. Unable to obtain history from patient since he is nonverbal, history obtained from chart review.  (28 Dec 2023 20:54)    Patient was admitted and had hypotension, despite multiple fluid boluses. Added midodrine  Patient has been increasing more bryan to the 40s, sustaining, ekg sinus bryan that is new.  Today the patient was hypotensive, having hypoxia event after BM (diarrhea)  RRt called, patient placed on levo and NRB and transferred to the ICU for further monitoring.     DX:  1.   2.   3.   4.  5.  6.  7.  8.  9.    =================== Neuro============================  Alert and oriented x 3 at base line       ================= Cardiovascular==========================    ================- Pulm=================================      ==================ID===================================      ================= Nephro================================      =================GI====================================      ================ Heme==================================      =================Endocrine===============================      ================= Skin/Catheters============================      =================Prophylaxis =============================      ==================GOC==================================  FULL CODE   DNR DNI    A 63 year old male, from [facility], with PMH of CVA, Alzheimer's, nonverbal at baseline, GERD, Schizophrenia, and Constipation, was brought into the ED s/p mechanical fall, poor oral intake, and Covid-19 infection on 12/27/23 as per NH papers. Unable to obtain history from patient since he is nonverbal, history obtained from chart review.  (28 Dec 2023 20:54)    Patient was admitted and had hypotension, despite multiple fluid boluses. Added midodrine  Patient has been increasing more bryan to the 40s, sustaining, ekg sinus bryan that is new.  Today the patient was hypotensive, having hypoxia event after BM (diarrhea)  RRt called, patient placed on levo and NRB and transferred to the ICU for further monitoring.     DX:  1. CVA  2. Alzheimer  3.   4.  5.  6.  7.  8.  9.    =================== Neuro============================  Alert and oriented x 3 at base line       ================= Cardiovascular==========================    ================- Pulm=================================      ==================ID===================================      ================= Nephro================================      =================GI====================================      ================ Heme==================================      =================Endocrine===============================      ================= Skin/Catheters============================      =================Prophylaxis =============================      ==================GOC==================================  FULL CODE   DNR DNI    A 63 year old male, from [facility], with PMH of CVA, Alzheimer's, nonverbal at baseline, GERD, Schizophrenia, and Constipation, was brought into the ED s/p mechanical fall, poor oral intake, and Covid-19 infection on 12/27/23 as per NH papers. Unable to obtain history from patient since he is nonverbal, history obtained from chart review.  (28 Dec 2023 20:54)    Patient was admitted and had hypotension, despite multiple fluid boluses. Added midodrine  Patient has been increasing more bryan to the 40s, sustaining, ekg sinus bryan that is new.  Today the patient was hypotensive, having hypoxia event after BM (diarrhea)  RRt called, patient placed on levo and NRB and transferred to the ICU for further monitoring.     DX:  1. CVA  2. Alzheimer  3.   4.  5.  6.  7.  8.  9.    =================== Neuro============================  Alert and oriented x 3 at base line       ================= Cardiovascular==========================    ================- Pulm=================================      ==================ID===================================      ================= Nephro================================      =================GI====================================      ================ Heme==================================      =================Endocrine===============================      ================= Skin/Catheters============================      =================Prophylaxis =============================      ==================GOC==================================  FULL CODE   DNR DNI    A 63 year old male, from [facility], with PMH of CVA, Alzheimer's, nonverbal at baseline, GERD, Schizophrenia, and Constipation, was brought into the ED s/p mechanical fall, poor oral intake, and Covid-19 infection on 12/27/23 Pt was admitted for COVID PNA, later found with intermittent hypoxia/ hypopnea to Sats 80% and Hypotension despite multiple fluid boluses, also found with bryan to 40s for the last 2 days, in ICU for further monitoring.     DX:  1. CVA  2. Alzheimer's  3. Hypoxia  4. Hypotension   5. Bradycardia   5. COVID +Ve         =================== Neuro============================  Alert, orientation difficult to assess  He is at baseline of bedbound nonverbal in the setting of AD, Previous CVA  on valporate and as needed ativan   He is on chronic ativan for agitation   he is also on xyprexa        ================= Cardiovascular==========================  Hypotension   Sinus bradycardia    Likely due to infection- shock  Trop -ve today  TWI on lateral leads are not new  POCUS shows intact LV function, some signs of end systolic effacement   F/u echo   DC midodrine and start levophed for now       ================- Pulm=================================  RRT for desaturation to the 80s   Improved with NRB, Pt has ABG with PO2 in 200s  Will start hf for now , patient appears to be intermittently hypopneic, not in any distress  will down titrate and monitor     of note  He is on chronic ativan for agitation   he is also on xyprexa  taper down ativan       ==================ID===================================    #COVID   #Multifocal PNA   #at risk of aspiration PNA    On decadron, ceftriaxone and azithromycin   Bcx -ve   f/u sputum cultures     ================= Nephro================================    No active issues   =================GI====================================  Diarrhea 2/2 to laxatives vs covid   Dc senna, miralax  GI PCR if persistent  NPO due to mental status and concerns for asp PNA  will    ================ Heme==================================  DVT PPx with lovenox    =================Endocrine===============================  Bryan and Hypotensive   f/u TSH       ================= Skin/Catheters============================  no wounds    =================Prophylaxis =============================  lovenox    ==================GOC==================================  FULL CODE      A 63 year old male, from [facility], with PMH of CVA, Alzheimer's, nonverbal at baseline, GERD, Schizophrenia, and Constipation, was brought into the ED s/p mechanical fall, poor oral intake, and Covid-19 infection on 12/27/23 Pt was admitted for COVID PNA, later found with intermittent hypoxia/ hypopnea to Sats 80% and Hypotension despite multiple fluid boluses, also found with bryan to 40s for the last 2 days, in ICU for further monitoring.     DX:  1. CVA  2. Alzheimer's  3. Hypoxia  4. Hypotension   5. Bradycardia   5. COVID +Ve         =================== Neuro============================  Alert, orientation difficult to assess  He is at baseline of bedbound nonverbal in the setting of AD, Previous CVA  on valporate and as needed ativan   He is on chronic ativan for agitation   he is also on xyprexa        ================= Cardiovascular==========================  Hypotension   Sinus bradycardia    Likely due to infection- shock  Trop -ve today  TWI on lateral leads are not new  POCUS shows intact LV function, some signs of end systolic effacement   F/u echo   DC midodrine and start levophed for now       ================- Pulm=================================  RRT for desaturation to the 80s   Improved with NRB, Pt has ABG with PO2 in 200s  Will start hf for now , patient appears to be intermittently hypopneic, not in any distress  will down titrate and monitor     of note  He is on chronic ativan for agitation   he is also on xyprexa  taper down ativan       ==================ID===================================    #COVID   #Multifocal PNA   #at risk of aspiration PNA    On decadron, ceftriaxone and azithromycin   Bcx -ve   f/u sputum cultures     ================= Nephro================================    No active issues   =================GI====================================  Diarrhea 2/2 to laxatives vs covid   Dc senna, miralax  GI PCR if persistent  NPO due to mental status and concerns for asp PNA  will    ================ Heme==================================  DVT PPx with lovenox    =================Endocrine===============================  Bryan and Hypotensive   f/u TSH       ================= Skin/Catheters============================  no wounds    =================Prophylaxis =============================  lovenox    ==================GOC==================================  FULL CODE

## 2024-01-03 NOTE — CONSULT NOTE ADULT - SUBJECTIVE AND OBJECTIVE BOX
C A R D I O L O G Y  *********************    DATE OF SERVICE: 24    HISTORY OF PRESENT ILLNESS:   A 63 year old male, from [facility], with PMH of CVA, Alzheimer's, nonverbal at baseline, GERD, Schizophrenia, historically preserved LV function and Constipation, was brought into the ED s/p mechanical fall, poor oral intake, and Covid-19 infection on 23 as per NH papers. Unable to obtain history from patient since he is nonverbal, history obtained from chart review.   He is covid (+).  Cardiology is called for bradycardia and episodes of hypotension.      PAST MEDICAL & SURGICAL HISTORY:    CVA (cerebral vascular accident)  CVA (cerebral vascular accident)  HLD (hyperlipidemia)  S/P percutaneous endoscopic gastrostomy (PEG) tube placement        MEDICATIONS:  MEDICATIONS  (STANDING):  aspirin  chewable 81 milliGRAM(s) Oral daily  atorvastatin 20 milliGRAM(s) Oral at bedtime  cholecalciferol 1000 Unit(s) Oral daily  cyanocobalamin 1000 MICROGram(s) Oral daily  dexAMETHasone  Injectable 6 milliGRAM(s) IV Push daily  enoxaparin Injectable 40 milliGRAM(s) SubCutaneous every 24 hours  lactated ringers. 1000 milliLiter(s) (70 mL/Hr) IV Continuous <Continuous>  LORazepam     Tablet 0.5 milliGRAM(s) Oral two times a day  midodrine. 5 milliGRAM(s) Oral three times a day  multivitamin/minerals 1 Tablet(s) Oral daily  OLANZapine 5 milliGRAM(s) Oral every 12 hours  polyethylene glycol 3350 17 Gram(s) Oral daily  senna 2 Tablet(s) Oral at bedtime  valproic  acid Syrup 250 milliGRAM(s) Oral two times a day      Allergies    No Known Allergies    Intolerances        FAMILY HISTORY:    Non-contributary for premature coronary disease or sudden cardiac death    SOCIAL HISTORY:    [ X] Non-smoker  [ ] Smoker  [ ] Alcohol        REVIEW OF SYSTEMS:  [ ]chest pain  [  ]shortness of breath  [  ]palpitations  [  ]syncope  [ ]near syncope [ ]upper extremity weakness   [ ] lower extremity weakness  [  ]diplopia  [  ]altered mental status   [  ]fevers  [ ]chills [ ]nausea  [ ]vomitting  [  ]dysphagia    [ ]abdominal pain  [ ]melena  [ ]BRBPR    [  ]epistaxis  [  ]rash    [ ]lower extremity edema        [X] All others negative	  [ ] Unable to obtain      LABS:	 	    CARDIAC MARKERS:                            12.6   8.77  )-----------( 275      ( 2024 07:30 )             38.6     Hb Trend: 12.6<--    -03    141  |  110<H>  |  11  ----------------------------<  118<H>  3.5   |  28  |  0.73    Ca    8.4      2024 07:30  Phos  2.7       Mg     2.2         TPro  5.3<L>  /  Alb  2.2<L>  /  TBili  0.4  /  DBili  x   /  AST  29  /  ALT  51  /  AlkPhos  45      Creatinine Trend: 0.73<--, 0.68<--, 0.76<--, 0.76<--, 0.98<--, 1.17<--        PHYSICAL EXAM:  T(C): 36.6 (24 @ 05:56), Max: 36.6 (24 @ 05:56)  HR: 66 (24 @ 05:56) (40 - 66)  BP: 122/70 (24 @ 05:56) (83/44 - 122/70)  RR: 18 (24 @ 05:56) (16 - 18)  SpO2: 97% (24 @ 05:56) (95% - 97%)  Wt(kg): --     I&O's Summary      HEENT:  (-)icterus (-)pallor  CV: N S1 S2 1/6 JOVON (+)2 Pulses B/l  Resp:  Clear to ausculatation B/L, normal effort  GI: (+) BS Soft, NT, ND  Lymph:  (-)Edema, (-)obvious lymphadenopathy  Skin: Warm to touch, Normal turgor  Psych: unable to adequately assess mood and affect    	      EC. sinus 72 BPM, IRBBB, nonspecific T wave abnormality              2. sinus Casper y47 BPM, IRBBB nonspecific T wave abnormality    RADIOLOGY:         CXR:   < from: Xray Chest 1 View-PORTABLE IMMEDIATE (Xray Chest 1 View-PORTABLE IMMEDIATE .) (23 @ 03:39) >  Ill-defined bibasilar opacities, consistent with multifocal   pneumonia.    < end of copied text >      ASSESSMENT/PLAN: 	63y Male PMH of CVA, Alzheimer's, nonverbal at baseline, GERD, Schizophrenia, historically preserved LV function and Constipation, was brought into the ED s/p mechanical fall, poor oral intake, and Covid-19 infection on 23 as per NH papers.    # Bradycardia   - He likely has some degree of sick sinus syndrome that may be exacerbated by Midodrine.  Consider D/C midodrine and hydrate  - EP eval Dr. Pena    # Hypotension  - Susoect this is due to dehydration and vasodilatory dtate associated with viral/bacterial infectio  - Abx  - IVF  - wean off midodrine    I once again thank you for allowing me to participate in the care of your patient.  If you have any questions or concerns please do not hesitate to contact me.    Gavino Cadet MD, [facility]  BEEPER [phone]       C A R D I O L O G Y  *********************    DATE OF SERVICE: 24    HISTORY OF PRESENT ILLNESS:   A 63 year old male, from [facility], with PMH of CVA, Alzheimer's, nonverbal at baseline, GERD, Schizophrenia, historically preserved LV function and Constipation, was brought into the ED s/p mechanical fall, poor oral intake, and Covid-19 infection on 23 as per NH papers. Unable to obtain history from patient since he is nonverbal, history obtained from chart review.   He is covid (+).  Cardiology is called for bradycardia and episodes of hypotension.      PAST MEDICAL & SURGICAL HISTORY:    CVA (cerebral vascular accident)  CVA (cerebral vascular accident)  HLD (hyperlipidemia)  S/P percutaneous endoscopic gastrostomy (PEG) tube placement        MEDICATIONS:  MEDICATIONS  (STANDING):  aspirin  chewable 81 milliGRAM(s) Oral daily  atorvastatin 20 milliGRAM(s) Oral at bedtime  cholecalciferol 1000 Unit(s) Oral daily  cyanocobalamin 1000 MICROGram(s) Oral daily  dexAMETHasone  Injectable 6 milliGRAM(s) IV Push daily  enoxaparin Injectable 40 milliGRAM(s) SubCutaneous every 24 hours  lactated ringers. 1000 milliLiter(s) (70 mL/Hr) IV Continuous <Continuous>  LORazepam     Tablet 0.5 milliGRAM(s) Oral two times a day  midodrine. 5 milliGRAM(s) Oral three times a day  multivitamin/minerals 1 Tablet(s) Oral daily  OLANZapine 5 milliGRAM(s) Oral every 12 hours  polyethylene glycol 3350 17 Gram(s) Oral daily  senna 2 Tablet(s) Oral at bedtime  valproic  acid Syrup 250 milliGRAM(s) Oral two times a day      Allergies    No Known Allergies    Intolerances        FAMILY HISTORY:    Non-contributary for premature coronary disease or sudden cardiac death    SOCIAL HISTORY:    [ X] Non-smoker  [ ] Smoker  [ ] Alcohol        REVIEW OF SYSTEMS:  [ ]chest pain  [  ]shortness of breath  [  ]palpitations  [  ]syncope  [ ]near syncope [ ]upper extremity weakness   [ ] lower extremity weakness  [  ]diplopia  [  ]altered mental status   [  ]fevers  [ ]chills [ ]nausea  [ ]vomitting  [  ]dysphagia    [ ]abdominal pain  [ ]melena  [ ]BRBPR    [  ]epistaxis  [  ]rash    [ ]lower extremity edema        [X] All others negative	  [ ] Unable to obtain      LABS:	 	    CARDIAC MARKERS:                            12.6   8.77  )-----------( 275      ( 2024 07:30 )             38.6     Hb Trend: 12.6<--    -03    141  |  110<H>  |  11  ----------------------------<  118<H>  3.5   |  28  |  0.73    Ca    8.4      2024 07:30  Phos  2.7       Mg     2.2         TPro  5.3<L>  /  Alb  2.2<L>  /  TBili  0.4  /  DBili  x   /  AST  29  /  ALT  51  /  AlkPhos  45      Creatinine Trend: 0.73<--, 0.68<--, 0.76<--, 0.76<--, 0.98<--, 1.17<--        PHYSICAL EXAM:  T(C): 36.6 (24 @ 05:56), Max: 36.6 (24 @ 05:56)  HR: 66 (24 @ 05:56) (40 - 66)  BP: 122/70 (24 @ 05:56) (83/44 - 122/70)  RR: 18 (24 @ 05:56) (16 - 18)  SpO2: 97% (24 @ 05:56) (95% - 97%)  Wt(kg): --     I&O's Summary      HEENT:  (-)icterus (-)pallor  CV: N S1 S2 1/6 JOVON (+)2 Pulses B/l  Resp:  Clear to ausculatation B/L, normal effort  GI: (+) BS Soft, NT, ND  Lymph:  (-)Edema, (-)obvious lymphadenopathy  Skin: Warm to touch, Normal turgor  Psych: unable to adequately assess mood and affect    	      EC. sinus 72 BPM, IRBBB, nonspecific T wave abnormality              2. sinus Casper y47 BPM, IRBBB nonspecific T wave abnormality    RADIOLOGY:         CXR:   < from: Xray Chest 1 View-PORTABLE IMMEDIATE (Xray Chest 1 View-PORTABLE IMMEDIATE .) (23 @ 03:39) >  Ill-defined bibasilar opacities, consistent with multifocal   pneumonia.    < end of copied text >      ASSESSMENT/PLAN: 	63y Male PMH of CVA, Alzheimer's, nonverbal at baseline, GERD, Schizophrenia, historically preserved LV function and Constipation, was brought into the ED s/p mechanical fall, poor oral intake, and Covid-19 infection on 23 as per NH papers.    # Bradycardia   - He likely has some degree of sick sinus syndrome that may be exacerbated by Midodrine.  Consider D/C midodrine and hydrate  - EP eval Dr. Pena    # Hypotension  - Susoect this is due to dehydration and vasodilatory dtate associated with viral/bacterial infectio  - Abx  - IVF  - wean off midodrine    I once again thank you for allowing me to participate in the care of your patient.  If you have any questions or concerns please do not hesitate to contact me.    Gavino Cadet MD, [facility]  BEEPER [phone]

## 2024-01-03 NOTE — CHART NOTE - NSCHARTNOTEFT_GEN_A_CORE
Pt was noted for worsening sepsis 2/2 multifocal pna, pt noted for worsening hypotension 70s systolic and hypoxia 77% on room air, started on IV fluids, pt has been getting multiple liters of fluid since 12/28. Pt also had 3 episodes of loose stools, will send stool studies.     Pt was initially placed on 4L-6L nasal cannula only with minor improvement to 81% oxygen saturation. Pt was then placed on 15L NRB and a rapid response was called.     Pt also continues to have sinus bradycardia in the 40s bpm. Awaiting echo.     Due to worsening sepsis, pt was started on levophed and upgraded to ICU for further monitoring. Pt was noted for worsening sepsis 2/2 multifocal pna, pt noted for worsening hypotension 70s systolic and hypoxia 77% on room air, started on IV fluids, pt has been getting multiple liters of fluid since 12/28. Pt also had 3 episodes of loose stools, will send stool studies.     Pt was initially placed on 4L-6L nasal cannula only with minor improvement to 81% oxygen saturation. Pt was then placed on 15L NRB and a rapid response was called.     Pt also continues to have sinus bradycardia in the 40s bpm. Awaiting echo.     Due to worsening sepsis, pt was started on levophed and upgraded to ICU for further monitoring.    Son Drew was updated on patient's current status. Pt was noted for worsening sepsis 2/2 multifocal pna, pt noted for worsening hypotension 70s systolic and hypoxia 77% on room air, started on IV fluids, pt has been getting multiple liters of fluid since 12/28 and midodrine PO. Pt also had 3 episodes of loose stools, will send stool studies.     Pt was initially placed on 4L-6L nasal cannula only with minor improvement to 81% oxygen saturation. Pt was then placed on 15L NRB and a rapid response was called.     Pt also continues to have sinus bradycardia in the 40s bpm. Awaiting echo.     Due to worsening sepsis, pt was started on levophed and upgraded to ICU for further monitoring.    Son Drew was updated on patient's current status. Pt was seen and examined this morning, pt was awake, sitting up in bed, comfortable appearing on room air. Pt is nonverbal at baseline due to previous stroke.     Rapid Reponse was called for for worsening AHRF and Sepsis 2/2 multifocal pna, pt noted for worsening hypotension 70s systolic and hypoxia 77% on room air, started on IV fluids, pt has been getting multiple liters of fluid since 12/28 and midodrine PO. Pt also had 3 episodes of loose stools, will send stool studies.     Pt was initially placed on 4L-6L nasal cannula only with minor improvement to 81% oxygen saturation. Pt was then placed on 15L NRB.    Pt also continues to have sinus bradycardia in the 40s bpm. Awaiting echo.     Due to worsening sepsis, pt was started on levophed and upgraded to ICU for further monitoring.    Anastacio Russell was updated on patient's current status.

## 2024-01-04 LAB
ALBUMIN SERPL ELPH-MCNC: 2.4 G/DL — LOW (ref 3.5–5)
ALBUMIN SERPL ELPH-MCNC: 2.4 G/DL — LOW (ref 3.5–5)
ALP SERPL-CCNC: 46 U/L — SIGNIFICANT CHANGE UP (ref 40–120)
ALP SERPL-CCNC: 46 U/L — SIGNIFICANT CHANGE UP (ref 40–120)
ALT FLD-CCNC: 57 U/L DA — SIGNIFICANT CHANGE UP (ref 10–60)
ALT FLD-CCNC: 57 U/L DA — SIGNIFICANT CHANGE UP (ref 10–60)
ANION GAP SERPL CALC-SCNC: 3 MMOL/L — LOW (ref 5–17)
ANION GAP SERPL CALC-SCNC: 3 MMOL/L — LOW (ref 5–17)
AST SERPL-CCNC: 39 U/L — SIGNIFICANT CHANGE UP (ref 10–40)
AST SERPL-CCNC: 39 U/L — SIGNIFICANT CHANGE UP (ref 10–40)
BILIRUB DIRECT SERPL-MCNC: 0.1 MG/DL — SIGNIFICANT CHANGE UP (ref 0–0.3)
BILIRUB DIRECT SERPL-MCNC: 0.1 MG/DL — SIGNIFICANT CHANGE UP (ref 0–0.3)
BILIRUB INDIRECT FLD-MCNC: 0.2 MG/DL — SIGNIFICANT CHANGE UP (ref 0.2–1)
BILIRUB INDIRECT FLD-MCNC: 0.2 MG/DL — SIGNIFICANT CHANGE UP (ref 0.2–1)
BILIRUB SERPL-MCNC: 0.3 MG/DL — SIGNIFICANT CHANGE UP (ref 0.2–1.2)
BILIRUB SERPL-MCNC: 0.3 MG/DL — SIGNIFICANT CHANGE UP (ref 0.2–1.2)
BUN SERPL-MCNC: 10 MG/DL — SIGNIFICANT CHANGE UP (ref 7–18)
BUN SERPL-MCNC: 10 MG/DL — SIGNIFICANT CHANGE UP (ref 7–18)
CALCIUM SERPL-MCNC: 8.3 MG/DL — LOW (ref 8.4–10.5)
CALCIUM SERPL-MCNC: 8.3 MG/DL — LOW (ref 8.4–10.5)
CHLORIDE SERPL-SCNC: 110 MMOL/L — HIGH (ref 96–108)
CHLORIDE SERPL-SCNC: 110 MMOL/L — HIGH (ref 96–108)
CO2 SERPL-SCNC: 28 MMOL/L — SIGNIFICANT CHANGE UP (ref 22–31)
CO2 SERPL-SCNC: 28 MMOL/L — SIGNIFICANT CHANGE UP (ref 22–31)
CREAT SERPL-MCNC: 0.76 MG/DL — SIGNIFICANT CHANGE UP (ref 0.5–1.3)
CREAT SERPL-MCNC: 0.76 MG/DL — SIGNIFICANT CHANGE UP (ref 0.5–1.3)
EGFR: 100 ML/MIN/1.73M2 — SIGNIFICANT CHANGE UP
EGFR: 100 ML/MIN/1.73M2 — SIGNIFICANT CHANGE UP
GLUCOSE SERPL-MCNC: 87 MG/DL — SIGNIFICANT CHANGE UP (ref 70–99)
GLUCOSE SERPL-MCNC: 87 MG/DL — SIGNIFICANT CHANGE UP (ref 70–99)
HCT VFR BLD CALC: 36.3 % — LOW (ref 39–50)
HCT VFR BLD CALC: 36.3 % — LOW (ref 39–50)
HGB BLD-MCNC: 11.9 G/DL — LOW (ref 13–17)
HGB BLD-MCNC: 11.9 G/DL — LOW (ref 13–17)
MAGNESIUM SERPL-MCNC: 2 MG/DL — SIGNIFICANT CHANGE UP (ref 1.6–2.6)
MAGNESIUM SERPL-MCNC: 2 MG/DL — SIGNIFICANT CHANGE UP (ref 1.6–2.6)
MCHC RBC-ENTMCNC: 29.3 PG — SIGNIFICANT CHANGE UP (ref 27–34)
MCHC RBC-ENTMCNC: 29.3 PG — SIGNIFICANT CHANGE UP (ref 27–34)
MCHC RBC-ENTMCNC: 32.8 GM/DL — SIGNIFICANT CHANGE UP (ref 32–36)
MCHC RBC-ENTMCNC: 32.8 GM/DL — SIGNIFICANT CHANGE UP (ref 32–36)
MCV RBC AUTO: 89.4 FL — SIGNIFICANT CHANGE UP (ref 80–100)
MCV RBC AUTO: 89.4 FL — SIGNIFICANT CHANGE UP (ref 80–100)
NRBC # BLD: 0 /100 WBCS — SIGNIFICANT CHANGE UP (ref 0–0)
NRBC # BLD: 0 /100 WBCS — SIGNIFICANT CHANGE UP (ref 0–0)
PHOSPHATE SERPL-MCNC: 2.6 MG/DL — SIGNIFICANT CHANGE UP (ref 2.5–4.5)
PHOSPHATE SERPL-MCNC: 2.6 MG/DL — SIGNIFICANT CHANGE UP (ref 2.5–4.5)
PLATELET # BLD AUTO: 250 K/UL — SIGNIFICANT CHANGE UP (ref 150–400)
PLATELET # BLD AUTO: 250 K/UL — SIGNIFICANT CHANGE UP (ref 150–400)
POTASSIUM SERPL-MCNC: 3.2 MMOL/L — LOW (ref 3.5–5.3)
POTASSIUM SERPL-MCNC: 3.2 MMOL/L — LOW (ref 3.5–5.3)
POTASSIUM SERPL-SCNC: 3.2 MMOL/L — LOW (ref 3.5–5.3)
POTASSIUM SERPL-SCNC: 3.2 MMOL/L — LOW (ref 3.5–5.3)
PROT SERPL-MCNC: 5.5 G/DL — LOW (ref 6–8.3)
PROT SERPL-MCNC: 5.5 G/DL — LOW (ref 6–8.3)
RBC # BLD: 4.06 M/UL — LOW (ref 4.2–5.8)
RBC # BLD: 4.06 M/UL — LOW (ref 4.2–5.8)
RBC # FLD: 13 % — SIGNIFICANT CHANGE UP (ref 10.3–14.5)
RBC # FLD: 13 % — SIGNIFICANT CHANGE UP (ref 10.3–14.5)
SODIUM SERPL-SCNC: 141 MMOL/L — SIGNIFICANT CHANGE UP (ref 135–145)
SODIUM SERPL-SCNC: 141 MMOL/L — SIGNIFICANT CHANGE UP (ref 135–145)
T4 AB SER-ACNC: 8.5 UG/DL — SIGNIFICANT CHANGE UP (ref 4.6–12)
T4 AB SER-ACNC: 8.5 UG/DL — SIGNIFICANT CHANGE UP (ref 4.6–12)
TSH SERPL-MCNC: 4.28 UU/ML — SIGNIFICANT CHANGE UP (ref 0.34–4.82)
TSH SERPL-MCNC: 4.28 UU/ML — SIGNIFICANT CHANGE UP (ref 0.34–4.82)
WBC # BLD: 8.4 K/UL — SIGNIFICANT CHANGE UP (ref 3.8–10.5)
WBC # BLD: 8.4 K/UL — SIGNIFICANT CHANGE UP (ref 3.8–10.5)
WBC # FLD AUTO: 8.4 K/UL — SIGNIFICANT CHANGE UP (ref 3.8–10.5)
WBC # FLD AUTO: 8.4 K/UL — SIGNIFICANT CHANGE UP (ref 3.8–10.5)

## 2024-01-04 PROCEDURE — 76775 US EXAM ABDO BACK WALL LIM: CPT | Mod: 26

## 2024-01-04 RX ORDER — OLANZAPINE 15 MG/1
5 TABLET, FILM COATED ORAL EVERY 12 HOURS
Refills: 0 | Status: DISCONTINUED | OUTPATIENT
Start: 2024-01-04 | End: 2024-01-05

## 2024-01-04 RX ORDER — ATROPINE SULFATE 0.1 MG/ML
1 SYRINGE (ML) INJECTION ONCE
Refills: 0 | Status: COMPLETED | OUTPATIENT
Start: 2024-01-04 | End: 2024-01-04

## 2024-01-04 RX ORDER — HALOPERIDOL DECANOATE 100 MG/ML
5 INJECTION INTRAMUSCULAR ONCE
Refills: 0 | Status: COMPLETED | OUTPATIENT
Start: 2024-01-04 | End: 2024-01-04

## 2024-01-04 RX ORDER — POTASSIUM CHLORIDE 20 MEQ
10 PACKET (EA) ORAL
Refills: 0 | Status: COMPLETED | OUTPATIENT
Start: 2024-01-04 | End: 2024-01-04

## 2024-01-04 RX ADMIN — Medication 100 MILLIEQUIVALENT(S): at 09:13

## 2024-01-04 RX ADMIN — ATORVASTATIN CALCIUM 20 MILLIGRAM(S): 80 TABLET, FILM COATED ORAL at 21:27

## 2024-01-04 RX ADMIN — HALOPERIDOL DECANOATE 5 MILLIGRAM(S): 100 INJECTION INTRAMUSCULAR at 01:01

## 2024-01-04 RX ADMIN — Medication 250 MILLIGRAM(S): at 17:44

## 2024-01-04 RX ADMIN — Medication 100 MILLIEQUIVALENT(S): at 06:20

## 2024-01-04 RX ADMIN — OLANZAPINE 5 MILLIGRAM(S): 15 TABLET, FILM COATED ORAL at 05:07

## 2024-01-04 RX ADMIN — OLANZAPINE 5 MILLIGRAM(S): 15 TABLET, FILM COATED ORAL at 21:27

## 2024-01-04 RX ADMIN — Medication 6 MILLIGRAM(S): at 05:07

## 2024-01-04 RX ADMIN — Medication 1 MILLIGRAM(S): at 13:00

## 2024-01-04 RX ADMIN — ENOXAPARIN SODIUM 40 MILLIGRAM(S): 100 INJECTION SUBCUTANEOUS at 14:20

## 2024-01-04 RX ADMIN — CEFTRIAXONE 100 MILLIGRAM(S): 500 INJECTION, POWDER, FOR SOLUTION INTRAMUSCULAR; INTRAVENOUS at 16:34

## 2024-01-04 RX ADMIN — Medication 100 MILLIEQUIVALENT(S): at 06:13

## 2024-01-04 RX ADMIN — Medication 5.95 MICROGRAM(S)/KG/MIN: at 12:26

## 2024-01-04 NOTE — SWALLOW BEDSIDE ASSESSMENT ADULT - ASR SWALLOW LINGUAL MOBILITY
unable to assess due to poor participation/comprehension/impaired protrusion/impaired anterior elevation

## 2024-01-04 NOTE — PROGRESS NOTE ADULT - ASSESSMENT
Pneumonia - secondary Bacterial infection  COVID - 19 infection  Hypoxia      Plan - Cont Rocephin 1 gm iv q24hrs  Completed  Azithromycin 500mgs iv q24 hrs x 3 days  Cont Decadron 6mgs iv q6hrs  Time spent - 31 mins

## 2024-01-04 NOTE — PROGRESS NOTE ADULT - SUBJECTIVE AND OBJECTIVE BOX
Patient is a 63y old  Male who presents with a chief complaint of Sepsis and AHRF (03 Jan 2024 20:12)      INTERVAL HPI/OVERNIGHT EVENTS: ***    REVIEW OF SYSTEMS:  CONSTITUTIONAL: No fever, chills  RESPIRATORY: No cough, wheezing, shortness of breath  CARDIOVASCULAR: No chest pain, palpitations,   GASTROINTESTINAL: No abdominal pain, nausea, vomiting, diarrhea or constipation, bloody stool  GENITOURINARY: No dysuria,  hematuria, urinary frequency  NEUROLOGICAL: No headaches, numbness, or tremors  EXTREMITES: No leg swelling  SKIN: No itching, burning, rashes, or lesions     ICU Vital Signs Last 24 Hrs  T(C): 36.2 (04 Jan 2024 07:00), Max: 36.6 (03 Jan 2024 14:30)  T(F): 97.2 (04 Jan 2024 07:00), Max: 97.9 (03 Jan 2024 14:30)  HR: 45 (04 Jan 2024 07:00) (41 - 115)  BP: 81/55 (04 Jan 2024 07:00) (73/43 - 162/96)  BP(mean): 65 (04 Jan 2024 07:00) (63 - 116)  ABP: --  ABP(mean): --  RR: 8 (04 Jan 2024 07:00) (8 - 24)  SpO2: 100% (04 Jan 2024 07:00) (75% - 100%)    O2 Parameters below as of 03 Jan 2024 19:00  Patient On (Oxygen Delivery Method): nasal cannula, high flow  O2 Flow (L/min): 50  O2 Concentration (%): 40      I&O's Summary    03 Jan 2024 07:01  -  04 Jan 2024 07:00  --------------------------------------------------------  IN: 327.7 mL / OUT: 1075 mL / NET: -747.3 mL      Mode: HFNC  FiO2: 40          PRESSORS: [ ] YES [ ] NO    Antimicrobial:  azithromycin  IVPB 500 milliGRAM(s) IV Intermittent every 24 hours  cefTRIAXone   IVPB 1000 milliGRAM(s) IV Intermittent every 24 hours    Cardiovascular:  norepinephrine Infusion 0.05 MICROgram(s)/kG/Min IV Continuous <Continuous>    Pulmonary:  albuterol    90 MICROgram(s) HFA Inhaler 2 Puff(s) Inhalation every 6 hours PRN  albuterol/ipratropium for Nebulization 3 milliLiter(s) Nebulizer every 6 hours    Hematalogic:  aspirin  chewable 81 milliGRAM(s) Oral daily  enoxaparin Injectable 40 milliGRAM(s) SubCutaneous every 24 hours    Other:  acetaminophen     Tablet .. 650 milliGRAM(s) Oral every 6 hours PRN  atorvastatin 20 milliGRAM(s) Oral at bedtime  cholecalciferol 1000 Unit(s) Oral daily  cyanocobalamin 1000 MICROGram(s) Oral daily  dexAMETHasone  Injectable 6 milliGRAM(s) IV Push daily  LORazepam   Injectable 0.5 milliGRAM(s) IV Push two times a day PRN  multivitamin/minerals 1 Tablet(s) Oral daily  OLANZapine Injectable 5 milliGRAM(s) IntraMuscular every 12 hours  potassium chloride  10 mEq/100 mL IVPB 10 milliEquivalent(s) IV Intermittent every 1 hour  valproic  acid Syrup 250 milliGRAM(s) Oral two times a day    acetaminophen     Tablet .. 650 milliGRAM(s) Oral every 6 hours PRN  albuterol    90 MICROgram(s) HFA Inhaler 2 Puff(s) Inhalation every 6 hours PRN  albuterol/ipratropium for Nebulization 3 milliLiter(s) Nebulizer every 6 hours  aspirin  chewable 81 milliGRAM(s) Oral daily  atorvastatin 20 milliGRAM(s) Oral at bedtime  azithromycin  IVPB 500 milliGRAM(s) IV Intermittent every 24 hours  cefTRIAXone   IVPB 1000 milliGRAM(s) IV Intermittent every 24 hours  cholecalciferol 1000 Unit(s) Oral daily  cyanocobalamin 1000 MICROGram(s) Oral daily  dexAMETHasone  Injectable 6 milliGRAM(s) IV Push daily  enoxaparin Injectable 40 milliGRAM(s) SubCutaneous every 24 hours  LORazepam   Injectable 0.5 milliGRAM(s) IV Push two times a day PRN  multivitamin/minerals 1 Tablet(s) Oral daily  norepinephrine Infusion 0.05 MICROgram(s)/kG/Min IV Continuous <Continuous>  OLANZapine Injectable 5 milliGRAM(s) IntraMuscular every 12 hours  potassium chloride  10 mEq/100 mL IVPB 10 milliEquivalent(s) IV Intermittent every 1 hour  valproic  acid Syrup 250 milliGRAM(s) Oral two times a day    Drug Dosing Weight  Height (cm): 167.6 (03 Aug 2022 20:11)  Weight (kg): 57 (03 Jan 2024 14:30)  BMI (kg/m2): 20.3 (03 Jan 2024 14:30)  BSA (m2): 1.64 (03 Jan 2024 14:30)    ACEVEDO: [ ] YES [ ] NO    DATE INSERTED:  REMOVE:  [ ] YES [ ] NO  EXPLAIN:    CENTRAL LINE: [ ] YES [ ] NO  LOCATION:   DATE INSERTED:  REMOVE: [ ] YES [ ] NO  EXPLAIN:    A-LINE:  [ ] YES [ ] NO  LOCATION:   DATE INSERTED:  REMOVE:  [ ] YES [ ] NO  EXPLAIN:    PMH -reviewed admission note, no change since admission  PAST MEDICAL & SURGICAL HISTORY:  CVA (cerebral vascular accident)      CVA (cerebral vascular accident)      HLD (hyperlipidemia)      S/P percutaneous endoscopic gastrostomy (PEG) tube placement              PHYSICAL EXAM:  GENERAL: NAD,   HEAD:  Atraumatic, Normocephalic  EYES: EOMI, PERRLA, conjunctiva and sclera clear  ENMT: Moist mucous membranes, No lesions  NECK: Supple, normal appearance, No JVD   NERVOUS SYSTEM:  Alert & Oriented X3, No Asterixis  CHEST/LUNG: No chest deformity; No rales, rhonchi, wheezing   HEART: Regular rate and rhythm; No murmurs,  ABDOMEN: Soft, Nontender, Nondistended; Bowel sounds present  EXTREMITIES:  2+ Peripheral Pulses, No clubbing, cyanosis, or edema  SKIN: No rashes or lesions;      LABS:  CBC Full  -  ( 04 Jan 2024 04:45 )  WBC Count : 8.40 K/uL  RBC Count : 4.06 M/uL  Hemoglobin : 11.9 g/dL  Hematocrit : 36.3 %  Platelet Count - Automated : 250 K/uL  Mean Cell Volume : 89.4 fl  Mean Cell Hemoglobin : 29.3 pg  Mean Cell Hemoglobin Concentration : 32.8 gm/dL  Auto Neutrophil # : x  Auto Lymphocyte # : x  Auto Monocyte # : x  Auto Eosinophil # : x  Auto Basophil # : x  Auto Neutrophil % : x  Auto Lymphocyte % : x  Auto Monocyte % : x  Auto Eosinophil % : x  Auto Basophil % : x    01-04    141  |  110<H>  |  10  ----------------------------<  87  3.2<L>   |  28  |  0.76    Ca    8.3<L>      04 Jan 2024 04:45  Phos  2.6     01-04  Mg     2.0     01-04    TPro  5.5<L>  /  Alb  2.4<L>  /  TBili  0.3  /  DBili  0.1  /  AST  39  /  ALT  57  /  AlkPhos  46  01-04      Urinalysis Basic - ( 04 Jan 2024 04:45 )    Color: x / Appearance: x / SG: x / pH: x  Gluc: 87 mg/dL / Ketone: x  / Bili: x / Urobili: x   Blood: x / Protein: x / Nitrite: x   Leuk Esterase: x / RBC: x / WBC x   Sq Epi: x / Non Sq Epi: x / Bacteria: x          RADIOLOGY & ADDITIONAL STUDIES REVIEWED:  ***    [ ]GOALS OF CARE DISCUSSION WITH PATIENT/FAMILY/PROXY:    CRITICAL CARE TIME SPENT: 35 minutes Patient is a 63y old  Male who presents with a chief complaint of Sepsis and AHRF (03 Jan 2024 20:12)      INTERVAL HPI/OVERNIGHT EVENTS:   Overnight, Pt was agitated and was given Zyprexa and Haldol    REVIEW OF SYSTEMS: Unable to obtain due to mental status     ICU Vital Signs Last 24 Hrs  T(C): 36.2 (04 Jan 2024 07:00), Max: 36.6 (03 Jan 2024 14:30)  T(F): 97.2 (04 Jan 2024 07:00), Max: 97.9 (03 Jan 2024 14:30)  HR: 45 (04 Jan 2024 07:00) (41 - 115)  BP: 81/55 (04 Jan 2024 07:00) (73/43 - 162/96)  BP(mean): 65 (04 Jan 2024 07:00) (63 - 116)  ABP: --  ABP(mean): --  RR: 8 (04 Jan 2024 07:00) (8 - 24)  SpO2: 100% (04 Jan 2024 07:00) (75% - 100%)    O2 Parameters below as of 03 Jan 2024 19:00  Patient On (Oxygen Delivery Method): nasal cannula, high flow  O2 Flow (L/min): 50  O2 Concentration (%): 40      I&O's Summary    03 Jan 2024 07:01  -  04 Jan 2024 07:00  --------------------------------------------------------  IN: 327.7 mL / OUT: 1075 mL / NET: -747.3 mL      Mode: HFNC  FiO2: 40          PRESSORS: [ ] YES [ x] NO    Antimicrobial:  azithromycin  IVPB 500 milliGRAM(s) IV Intermittent every 24 hours  cefTRIAXone   IVPB 1000 milliGRAM(s) IV Intermittent every 24 hours    Cardiovascular:  norepinephrine Infusion 0.05 MICROgram(s)/kG/Min IV Continuous <Continuous>    Pulmonary:  albuterol    90 MICROgram(s) HFA Inhaler 2 Puff(s) Inhalation every 6 hours PRN  albuterol/ipratropium for Nebulization 3 milliLiter(s) Nebulizer every 6 hours    Hematalogic:  aspirin  chewable 81 milliGRAM(s) Oral daily  enoxaparin Injectable 40 milliGRAM(s) SubCutaneous every 24 hours    Other:  acetaminophen     Tablet .. 650 milliGRAM(s) Oral every 6 hours PRN  atorvastatin 20 milliGRAM(s) Oral at bedtime  cholecalciferol 1000 Unit(s) Oral daily  cyanocobalamin 1000 MICROGram(s) Oral daily  dexAMETHasone  Injectable 6 milliGRAM(s) IV Push daily  LORazepam   Injectable 0.5 milliGRAM(s) IV Push two times a day PRN  multivitamin/minerals 1 Tablet(s) Oral daily  OLANZapine Injectable 5 milliGRAM(s) IntraMuscular every 12 hours  potassium chloride  10 mEq/100 mL IVPB 10 milliEquivalent(s) IV Intermittent every 1 hour  valproic  acid Syrup 250 milliGRAM(s) Oral two times a day    acetaminophen     Tablet .. 650 milliGRAM(s) Oral every 6 hours PRN  albuterol    90 MICROgram(s) HFA Inhaler 2 Puff(s) Inhalation every 6 hours PRN  albuterol/ipratropium for Nebulization 3 milliLiter(s) Nebulizer every 6 hours  aspirin  chewable 81 milliGRAM(s) Oral daily  atorvastatin 20 milliGRAM(s) Oral at bedtime  azithromycin  IVPB 500 milliGRAM(s) IV Intermittent every 24 hours  cefTRIAXone   IVPB 1000 milliGRAM(s) IV Intermittent every 24 hours  cholecalciferol 1000 Unit(s) Oral daily  cyanocobalamin 1000 MICROGram(s) Oral daily  dexAMETHasone  Injectable 6 milliGRAM(s) IV Push daily  enoxaparin Injectable 40 milliGRAM(s) SubCutaneous every 24 hours  LORazepam   Injectable 0.5 milliGRAM(s) IV Push two times a day PRN  multivitamin/minerals 1 Tablet(s) Oral daily  norepinephrine Infusion 0.05 MICROgram(s)/kG/Min IV Continuous <Continuous>  OLANZapine Injectable 5 milliGRAM(s) IntraMuscular every 12 hours  potassium chloride  10 mEq/100 mL IVPB 10 milliEquivalent(s) IV Intermittent every 1 hour  valproic  acid Syrup 250 milliGRAM(s) Oral two times a day    Drug Dosing Weight  Height (cm): 167.6 (03 Aug 2022 20:11)  Weight (kg): 57 (03 Jan 2024 14:30)  BMI (kg/m2): 20.3 (03 Jan 2024 14:30)  BSA (m2): 1.64 (03 Jan 2024 14:30)    ACEVEDO: [x ] YES [ ] NO    DATE INSERTED: 1/4/23  REMOVE:  [ ] YES [ ] NO  EXPLAIN:    CENTRAL LINE: [ ] YES [x ] NO  LOCATION:   DATE INSERTED:  REMOVE: [ ] YES [ ] NO  EXPLAIN:    A-LINE:  [ ] YES [ x] NO  LOCATION:   DATE INSERTED:  REMOVE:  [ ] YES [ ] NO  EXPLAIN:    PMH -reviewed admission note, no change since admission  PAST MEDICAL & SURGICAL HISTORY:  CVA (cerebral vascular accident)      CVA (cerebral vascular accident)      HLD (hyperlipidemia)      S/P percutaneous endoscopic gastrostomy (PEG) tube placement              PHYSICAL EXAM:  GENERAL: NAD, Patient is lying in bed comfortably   HEAD:  Atraumatic, Normocephalic  EYES:  PERRLA, conjunctiva and sclera clear  ENMT: Moist mucous membranes, No lesions  NECK: Supple, normal appearance, No JVD   NERVOUS SYSTEM:  AAOx0  CHEST/LUNG: No chest deformity; No rales, rhonchi, wheezing   HEART: Regular rate and rhythm; No murmurs,  ABDOMEN: Soft, Nontender, Nondistended; Bowel sounds present  EXTREMITIES:  2+ Peripheral Pulses, No clubbing, cyanosis, or edema  SKIN: No rashes or lesions;      LABS:  CBC Full  -  ( 04 Jan 2024 04:45 )  WBC Count : 8.40 K/uL  RBC Count : 4.06 M/uL  Hemoglobin : 11.9 g/dL  Hematocrit : 36.3 %  Platelet Count - Automated : 250 K/uL  Mean Cell Volume : 89.4 fl  Mean Cell Hemoglobin : 29.3 pg  Mean Cell Hemoglobin Concentration : 32.8 gm/dL  Auto Neutrophil # : x  Auto Lymphocyte # : x  Auto Monocyte # : x  Auto Eosinophil # : x  Auto Basophil # : x  Auto Neutrophil % : x  Auto Lymphocyte % : x  Auto Monocyte % : x  Auto Eosinophil % : x  Auto Basophil % : x    01-04    141  |  110<H>  |  10  ----------------------------<  87  3.2<L>   |  28  |  0.76    Ca    8.3<L>      04 Jan 2024 04:45  Phos  2.6     01-04  Mg     2.0     01-04    TPro  5.5<L>  /  Alb  2.4<L>  /  TBili  0.3  /  DBili  0.1  /  AST  39  /  ALT  57  /  AlkPhos  46  01-04      Urinalysis Basic - ( 04 Jan 2024 04:45 )    Color: x / Appearance: x / SG: x / pH: x  Gluc: 87 mg/dL / Ketone: x  / Bili: x / Urobili: x   Blood: x / Protein: x / Nitrite: x   Leuk Esterase: x / RBC: x / WBC x   Sq Epi: x / Non Sq Epi: x / Bacteria: x          RADIOLOGY & ADDITIONAL STUDIES REVIEWED:  ***    [ ]GOALS OF CARE DISCUSSION WITH PATIENT/FAMILY/PROXY:    CRITICAL CARE TIME SPENT: 35 minutes Patient is a 63y old  Male who presents with a chief complaint of Sepsis and AHRF (03 Jan 2024 20:12)      INTERVAL HPI/OVERNIGHT EVENTS:   Overnight, Pt was agitated and was given Zyprexa and Haldol. Pt was examined in the morning at bedside and he is in NAD.     REVIEW OF SYSTEMS: Unable to obtain due to mental status     ICU Vital Signs Last 24 Hrs  T(C): 36.2 (04 Jan 2024 07:00), Max: 36.6 (03 Jan 2024 14:30)  T(F): 97.2 (04 Jan 2024 07:00), Max: 97.9 (03 Jan 2024 14:30)  HR: 45 (04 Jan 2024 07:00) (41 - 115)  BP: 81/55 (04 Jan 2024 07:00) (73/43 - 162/96)  BP(mean): 65 (04 Jan 2024 07:00) (63 - 116)  ABP: --  ABP(mean): --  RR: 8 (04 Jan 2024 07:00) (8 - 24)  SpO2: 100% (04 Jan 2024 07:00) (75% - 100%)    O2 Parameters below as of 03 Jan 2024 19:00  Patient On (Oxygen Delivery Method): nasal cannula, high flow  O2 Flow (L/min): 50  O2 Concentration (%): 40      I&O's Summary    03 Jan 2024 07:01  -  04 Jan 2024 07:00  --------------------------------------------------------  IN: 327.7 mL / OUT: 1075 mL / NET: -747.3 mL      Mode: HFNC  FiO2: 40          PRESSORS: [ x] YES [ ] NO    Antimicrobial:  azithromycin  IVPB 500 milliGRAM(s) IV Intermittent every 24 hours  cefTRIAXone   IVPB 1000 milliGRAM(s) IV Intermittent every 24 hours    Cardiovascular:  norepinephrine Infusion 0.05 MICROgram(s)/kG/Min IV Continuous <Continuous>    Pulmonary:  albuterol    90 MICROgram(s) HFA Inhaler 2 Puff(s) Inhalation every 6 hours PRN  albuterol/ipratropium for Nebulization 3 milliLiter(s) Nebulizer every 6 hours    Hematalogic:  aspirin  chewable 81 milliGRAM(s) Oral daily  enoxaparin Injectable 40 milliGRAM(s) SubCutaneous every 24 hours    Other:  acetaminophen     Tablet .. 650 milliGRAM(s) Oral every 6 hours PRN  atorvastatin 20 milliGRAM(s) Oral at bedtime  cholecalciferol 1000 Unit(s) Oral daily  cyanocobalamin 1000 MICROGram(s) Oral daily  dexAMETHasone  Injectable 6 milliGRAM(s) IV Push daily  LORazepam   Injectable 0.5 milliGRAM(s) IV Push two times a day PRN  multivitamin/minerals 1 Tablet(s) Oral daily  OLANZapine Injectable 5 milliGRAM(s) IntraMuscular every 12 hours  potassium chloride  10 mEq/100 mL IVPB 10 milliEquivalent(s) IV Intermittent every 1 hour  valproic  acid Syrup 250 milliGRAM(s) Oral two times a day    acetaminophen     Tablet .. 650 milliGRAM(s) Oral every 6 hours PRN  albuterol    90 MICROgram(s) HFA Inhaler 2 Puff(s) Inhalation every 6 hours PRN  albuterol/ipratropium for Nebulization 3 milliLiter(s) Nebulizer every 6 hours  aspirin  chewable 81 milliGRAM(s) Oral daily  atorvastatin 20 milliGRAM(s) Oral at bedtime  azithromycin  IVPB 500 milliGRAM(s) IV Intermittent every 24 hours  cefTRIAXone   IVPB 1000 milliGRAM(s) IV Intermittent every 24 hours  cholecalciferol 1000 Unit(s) Oral daily  cyanocobalamin 1000 MICROGram(s) Oral daily  dexAMETHasone  Injectable 6 milliGRAM(s) IV Push daily  enoxaparin Injectable 40 milliGRAM(s) SubCutaneous every 24 hours  LORazepam   Injectable 0.5 milliGRAM(s) IV Push two times a day PRN  multivitamin/minerals 1 Tablet(s) Oral daily  norepinephrine Infusion 0.05 MICROgram(s)/kG/Min IV Continuous <Continuous>  OLANZapine Injectable 5 milliGRAM(s) IntraMuscular every 12 hours  potassium chloride  10 mEq/100 mL IVPB 10 milliEquivalent(s) IV Intermittent every 1 hour  valproic  acid Syrup 250 milliGRAM(s) Oral two times a day    Drug Dosing Weight  Height (cm): 167.6 (03 Aug 2022 20:11)  Weight (kg): 57 (03 Jan 2024 14:30)  BMI (kg/m2): 20.3 (03 Jan 2024 14:30)  BSA (m2): 1.64 (03 Jan 2024 14:30)    ACEVEDO: [x ] YES [ ] NO    DATE INSERTED: 1/4/23  REMOVE:  [ ] YES [ ] NO  EXPLAIN:    CENTRAL LINE: [ ] YES [x ] NO  LOCATION:   DATE INSERTED:  REMOVE: [ ] YES [ ] NO  EXPLAIN:    A-LINE:  [ ] YES [ x] NO  LOCATION:   DATE INSERTED:  REMOVE:  [ ] YES [ ] NO  EXPLAIN:    PMH -reviewed admission note, no change since admission  PAST MEDICAL & SURGICAL HISTORY:  CVA (cerebral vascular accident)      CVA (cerebral vascular accident)      HLD (hyperlipidemia)      S/P percutaneous endoscopic gastrostomy (PEG) tube placement              PHYSICAL EXAM:  GENERAL: NAD, Patient is lying in bed comfortably   HEAD:  Atraumatic, Normocephalic  EYES:  PERRLA, conjunctiva and sclera clear  ENMT: Moist mucous membranes, No lesions  NECK: Supple, normal appearance, No JVD   NERVOUS SYSTEM:  AAOx0  CHEST/LUNG: No chest deformity; No rales, rhonchi, wheezing   HEART: Regular rate and rhythm; No murmurs,  ABDOMEN: Soft, Nontender, Nondistended; Bowel sounds present  EXTREMITIES:  2+ Peripheral Pulses, No clubbing, cyanosis, or edema  SKIN: No rashes or lesions;      LABS:  CBC Full  -  ( 04 Jan 2024 04:45 )  WBC Count : 8.40 K/uL  RBC Count : 4.06 M/uL  Hemoglobin : 11.9 g/dL  Hematocrit : 36.3 %  Platelet Count - Automated : 250 K/uL  Mean Cell Volume : 89.4 fl  Mean Cell Hemoglobin : 29.3 pg  Mean Cell Hemoglobin Concentration : 32.8 gm/dL  Auto Neutrophil # : x  Auto Lymphocyte # : x  Auto Monocyte # : x  Auto Eosinophil # : x  Auto Basophil # : x  Auto Neutrophil % : x  Auto Lymphocyte % : x  Auto Monocyte % : x  Auto Eosinophil % : x  Auto Basophil % : x    01-04    141  |  110<H>  |  10  ----------------------------<  87  3.2<L>   |  28  |  0.76    Ca    8.3<L>      04 Jan 2024 04:45  Phos  2.6     01-04  Mg     2.0     01-04    TPro  5.5<L>  /  Alb  2.4<L>  /  TBili  0.3  /  DBili  0.1  /  AST  39  /  ALT  57  /  AlkPhos  46  01-04      Urinalysis Basic - ( 04 Jan 2024 04:45 )    Color: x / Appearance: x / SG: x / pH: x  Gluc: 87 mg/dL / Ketone: x  / Bili: x / Urobili: x   Blood: x / Protein: x / Nitrite: x   Leuk Esterase: x / RBC: x / WBC x   Sq Epi: x / Non Sq Epi: x / Bacteria: x          RADIOLOGY & ADDITIONAL STUDIES REVIEWED:  ***    [ ]GOALS OF CARE DISCUSSION WITH PATIENT/FAMILY/PROXY:    CRITICAL CARE TIME SPENT: 35 minutes

## 2024-01-04 NOTE — PROGRESS NOTE ADULT - SUBJECTIVE AND OBJECTIVE BOX
ICU VISIT  63y Male    Meds:  cefTRIAXone   IVPB 1000 milliGRAM(s) IV Intermittent every 24 hours    Allergies    No Known Allergies    Intolerances        VITALS:  Vital Signs Last 24 Hrs  T(C): 36.4 (04 Jan 2024 15:45), Max: 36.5 (04 Jan 2024 11:00)  T(F): 97.5 (04 Jan 2024 15:45), Max: 97.7 (04 Jan 2024 11:00)  HR: 51 (04 Jan 2024 15:45) (38 - 115)  BP: 110/64 (04 Jan 2024 15:45) (65/48 - 162/96)  BP(mean): 80 (04 Jan 2024 15:45) (55 - 116)  RR: 11 (04 Jan 2024 15:45) (7 - 24)  SpO2: 100% (04 Jan 2024 15:45) (86% - 100%)    Parameters below as of 03 Jan 2024 19:00  Patient On (Oxygen Delivery Method): nasal cannula, high flow  O2 Flow (L/min): 50  O2 Concentration (%): 40    LABS/DIAGNOSTIC TESTS:                          11.9   8.40  )-----------( 250      ( 04 Jan 2024 04:45 )             36.3         01-04    141  |  110<H>  |  10  ----------------------------<  87  3.2<L>   |  28  |  0.76    Ca    8.3<L>      04 Jan 2024 04:45  Phos  2.6     01-04  Mg     2.0     01-04    TPro  5.5<L>  /  Alb  2.4<L>  /  TBili  0.3  /  DBili  0.1  /  AST  39  /  ALT  57  /  AlkPhos  46  01-04      LIVER FUNCTIONS - ( 04 Jan 2024 04:45 )  Alb: 2.4 g/dL / Pro: 5.5 g/dL / ALK PHOS: 46 U/L / ALT: 57 U/L DA / AST: 39 U/L / GGT: x             CULTURES: Catheterized Catheterized  12-29 @ 12:37   No growth  --  --      .Blood Blood  12-29 @ 02:05   No growth at 5 days  --  --      .Blood Blood  12-29 @ 01:55   No growth at 5 days  --  --            RADIOLOGY:      ROS:  [  ] UNABLE TO ELICIT ICU VISIT  63y Male who remains in the ICU but is much calmer but still requires restraints, he is confused and unable to answer any questions. He is still hypotensive at times and is also bradycardic currently, he has no fevers and his WBC count is WNL. He has no diarrhea and now has a hicks in place with good urine output.     Meds:  cefTRIAXone   IVPB 1000 milliGRAM(s) IV Intermittent every 24 hours    Allergies    No Known Allergies    Intolerances        VITALS:  Vital Signs Last 24 Hrs  T(C): 36.4 (04 Jan 2024 15:45), Max: 36.5 (04 Jan 2024 11:00)  T(F): 97.5 (04 Jan 2024 15:45), Max: 97.7 (04 Jan 2024 11:00)  HR: 51 (04 Jan 2024 15:45) (38 - 115)  BP: 110/64 (04 Jan 2024 15:45) (65/48 - 162/96)  BP(mean): 80 (04 Jan 2024 15:45) (55 - 116)  RR: 11 (04 Jan 2024 15:45) (7 - 24)  SpO2: 100% (04 Jan 2024 15:45) (86% - 100%)    Parameters below as of 03 Jan 2024 19:00  Patient On (Oxygen Delivery Method): nasal cannula, high flow  O2 Flow (L/min): 50  O2 Concentration (%): 40    LABS/DIAGNOSTIC TESTS:                          11.9   8.40  )-----------( 250      ( 04 Jan 2024 04:45 )             36.3         01-04    141  |  110<H>  |  10  ----------------------------<  87  3.2<L>   |  28  |  0.76    Ca    8.3<L>      04 Jan 2024 04:45  Phos  2.6     01-04  Mg     2.0     01-04    TPro  5.5<L>  /  Alb  2.4<L>  /  TBili  0.3  /  DBili  0.1  /  AST  39  /  ALT  57  /  AlkPhos  46  01-04      LIVER FUNCTIONS - ( 04 Jan 2024 04:45 )  Alb: 2.4 g/dL / Pro: 5.5 g/dL / ALK PHOS: 46 U/L / ALT: 57 U/L DA / AST: 39 U/L / GGT: x             CULTURES: Catheterized Catheterized  12-29 @ 12:37   No growth  --  --      .Blood Blood  12-29 @ 02:05   No growth at 5 days  --  --      .Blood Blood  12-29 @ 01:55   No growth at 5 days  --  --            RADIOLOGY:      ROS:  [ x ] UNABLE TO ELICIT

## 2024-01-04 NOTE — SWALLOW BEDSIDE ASSESSMENT ADULT - MODE OF PRESENTATION
spoon x 3; cup x 3/cup/spoon/fed by clinician x 4/spoon/fed by clinician x 1/spoon/fed by clinician spoon/fed by clinician

## 2024-01-04 NOTE — SWALLOW BEDSIDE ASSESSMENT ADULT - ORAL PREPARATORY PHASE
tactile cue to close lips/Reduced oral grading open mouth postures/Reduced oral grading Reduced oral grading

## 2024-01-04 NOTE — SWALLOW BEDSIDE ASSESSMENT ADULT - ASR SWALLOW RECOMMEND DIAG
May need to Objectively assess current swallow function if concerns arise; r/o aspiration/VFSS/MBS/FEES

## 2024-01-04 NOTE — PROGRESS NOTE ADULT - SUBJECTIVE AND OBJECTIVE BOX
C A R D I O L O G Y  **********************************     DATE OF SERVICE: 01-04-24    appears comfortable  	  MEDICATIONS:  MEDICATIONS  (STANDING):  albuterol/ipratropium for Nebulization 3 milliLiter(s) Nebulizer every 6 hours  aspirin  chewable 81 milliGRAM(s) Oral daily  atorvastatin 20 milliGRAM(s) Oral at bedtime  cefTRIAXone   IVPB 1000 milliGRAM(s) IV Intermittent every 24 hours  cholecalciferol 1000 Unit(s) Oral daily  cyanocobalamin 1000 MICROGram(s) Oral daily  dexAMETHasone  Injectable 6 milliGRAM(s) IV Push daily  enoxaparin Injectable 40 milliGRAM(s) SubCutaneous every 24 hours  multivitamin/minerals 1 Tablet(s) Oral daily  norepinephrine Infusion 0.05 MICROgram(s)/kG/Min (5.95 mL/Hr) IV Continuous <Continuous>  OLANZapine Injectable 5 milliGRAM(s) IntraMuscular every 12 hours  valproic  acid Syrup 250 milliGRAM(s) Oral two times a day      LABS:	 	    CARDIAC MARKERS:  CARDIAC MARKERS ( 03 Jan 2024 15:00 )  x     / x     / 80 U/L / x     / 1.4 ng/mL        Troponin I, High Sensitivity Result: 19.0 ng/L (01-03-24 @ 15:00)                              11.9   8.40  )-----------( 250      ( 04 Jan 2024 04:45 )             36.3     Hemoglobin: 11.9 g/dL (01-04 @ 04:45)  Hemoglobin: 12.6 g/dL (01-03 @ 07:30)  Hemoglobin: 12.3 g/dL (01-02 @ 06:00)  Hemoglobin: 12.0 g/dL (12-31 @ 07:17)      01-04    141  |  110<H>  |  10  ----------------------------<  87  3.2<L>   |  28  |  0.76    Ca    8.3<L>      04 Jan 2024 04:45  Phos  2.6     01-04  Mg     2.0     01-04    TPro  5.5<L>  /  Alb  2.4<L>  /  TBili  0.3  /  DBili  0.1  /  AST  39  /  ALT  57  /  AlkPhos  46  01-04    Creatinine Trend: 0.76<--, 0.73<--, 0.68<--, 0.76<--, 0.76<--, 0.98<--    TSH: Thyroid Stimulating Hormone, Serum: 4.28 uU/mL (01-04 @ 04:45)        PHYSICAL EXAM:  T(C): 36.3 (01-04-24 @ 10:00), Max: 36.6 (01-03-24 @ 14:30)  HR: 42 (01-04-24 @ 10:00) (41 - 115)  BP: 88/60 (01-04-24 @ 10:00) (81/55 - 162/96)  RR: 9 (01-04-24 @ 10:00) (8 - 24)  SpO2: 86% (01-04-24 @ 10:00) (75% - 100%)  Wt(kg): --  I&O's Summary    03 Jan 2024 07:01  -  04 Jan 2024 07:00  --------------------------------------------------------  IN: 427.7 mL / OUT: 1075 mL / NET: -647.3 mL    04 Jan 2024 07:01  -  04 Jan 2024 12:32  --------------------------------------------------------  IN: 200 mL / OUT: 510 mL / NET: -310 mL        Weight (kg): 57 (01-03 @ 14:30)    HEENT:  (-)icterus (-)pallor  CV: N S1 S2 1/6 JOVON (+)2 Pulses B/l  Resp:  Clear to ausculatation B/L, normal effort  GI: (+) BS Soft, NT, ND  Lymph:  (-)Edema, (-)obvious lymphadenopathy  Skin: Warm to touch, Normal turgor  Psych: Unable to assess mood and affect      TELEMETRY: 	  sinus Clem        ASSESSMENT/PLAN: 	63y  Male   ASSESSMENT/PLAN: 	63y Male PMH of CVA, Alzheimer's, nonverbal at baseline, GERD, Schizophrenia, historically preserved LV function and Constipation, was brought into the ED s/p mechanical fall, poor oral intake, and Covid-19 infection on 12/27/23 as per NH papers.    # Bradycardia   - He likely has some degree of sick sinus syndrome that may be exacerbated by Midodrine.  now off midodrine  - EP eval appreciated     # Hypotension  - Suspect this is due to dehydration and vasodilatory dtate associated with viral/bacterial infectio  - Abx  - IVF  - pressor support per DELPHINE Cadet MD, [facility]  BEEPER [phone]       C A R D I O L O G Y  **********************************     DATE OF SERVICE: 01-04-24    appears comfortable  	  MEDICATIONS:  MEDICATIONS  (STANDING):  albuterol/ipratropium for Nebulization 3 milliLiter(s) Nebulizer every 6 hours  aspirin  chewable 81 milliGRAM(s) Oral daily  atorvastatin 20 milliGRAM(s) Oral at bedtime  cefTRIAXone   IVPB 1000 milliGRAM(s) IV Intermittent every 24 hours  cholecalciferol 1000 Unit(s) Oral daily  cyanocobalamin 1000 MICROGram(s) Oral daily  dexAMETHasone  Injectable 6 milliGRAM(s) IV Push daily  enoxaparin Injectable 40 milliGRAM(s) SubCutaneous every 24 hours  multivitamin/minerals 1 Tablet(s) Oral daily  norepinephrine Infusion 0.05 MICROgram(s)/kG/Min (5.95 mL/Hr) IV Continuous <Continuous>  OLANZapine Injectable 5 milliGRAM(s) IntraMuscular every 12 hours  valproic  acid Syrup 250 milliGRAM(s) Oral two times a day      LABS:	 	    CARDIAC MARKERS:  CARDIAC MARKERS ( 03 Jan 2024 15:00 )  x     / x     / 80 U/L / x     / 1.4 ng/mL        Troponin I, High Sensitivity Result: 19.0 ng/L (01-03-24 @ 15:00)                              11.9   8.40  )-----------( 250      ( 04 Jan 2024 04:45 )             36.3     Hemoglobin: 11.9 g/dL (01-04 @ 04:45)  Hemoglobin: 12.6 g/dL (01-03 @ 07:30)  Hemoglobin: 12.3 g/dL (01-02 @ 06:00)  Hemoglobin: 12.0 g/dL (12-31 @ 07:17)      01-04    141  |  110<H>  |  10  ----------------------------<  87  3.2<L>   |  28  |  0.76    Ca    8.3<L>      04 Jan 2024 04:45  Phos  2.6     01-04  Mg     2.0     01-04    TPro  5.5<L>  /  Alb  2.4<L>  /  TBili  0.3  /  DBili  0.1  /  AST  39  /  ALT  57  /  AlkPhos  46  01-04    Creatinine Trend: 0.76<--, 0.73<--, 0.68<--, 0.76<--, 0.76<--, 0.98<--    TSH: Thyroid Stimulating Hormone, Serum: 4.28 uU/mL (01-04 @ 04:45)        PHYSICAL EXAM:  T(C): 36.3 (01-04-24 @ 10:00), Max: 36.6 (01-03-24 @ 14:30)  HR: 42 (01-04-24 @ 10:00) (41 - 115)  BP: 88/60 (01-04-24 @ 10:00) (81/55 - 162/96)  RR: 9 (01-04-24 @ 10:00) (8 - 24)  SpO2: 86% (01-04-24 @ 10:00) (75% - 100%)  Wt(kg): --  I&O's Summary    03 Jan 2024 07:01  -  04 Jan 2024 07:00  --------------------------------------------------------  IN: 427.7 mL / OUT: 1075 mL / NET: -647.3 mL    04 Jan 2024 07:01  -  04 Jan 2024 12:32  --------------------------------------------------------  IN: 200 mL / OUT: 510 mL / NET: -310 mL        Weight (kg): 57 (01-03 @ 14:30)    HEENT:  (-)icterus (-)pallor  CV: N S1 S2 1/6 JOVON (+)2 Pulses B/l  Resp:  Clear to ausculatation B/L, normal effort  GI: (+) BS Soft, NT, ND  Lymph:  (-)Edema, (-)obvious lymphadenopathy  Skin: Warm to touch, Normal turgor  Psych: Unable to assess mood and affect      TELEMETRY: 	  sinus Clem        ASSESSMENT/PLAN: 	63y  Male   ASSESSMENT/PLAN: 	63y Male PMH of CVA, Alzheimer's, nonverbal at baseline, GERD, Schizophrenia, historically preserved LV function and Constipation, was brought into the ED s/p mechanical fall, poor oral intake, and Covid-19 infection on 12/27/23 as per NH papers.    # Bradycardia   - He likely has some degree of sick sinus syndrome that may be exacerbated by Midodrine.  now off midodrine  - EP eval appreciated     # Hypotension  - Suspect this is due to dehydration and vasodilatory dtate associated with viral/bacterial infectio  - Abx  - IVF  - pressor support per DELPHINE Cadet MD, [facility]  BEEPER [phone]

## 2024-01-04 NOTE — SWALLOW BEDSIDE ASSESSMENT ADULT - SLP PERTINENT HISTORY OF CURRENT PROBLEM
63 year old male, from [facility], with PMH of CVA, Alzheimer's, nonverbal at baseline, GERD, Schizophrenia, and Constipation, was brought into the ED s/p mechanical fall, poor oral intake, and Covid-19 infection on 12/27/23 Pt was admitted for COVID PNA, later found with intermittent hypoxia/ hypopnea to Sats 80% and Hypotension despite multiple fluid boluses, also found with bryan to 40s for the last 2 days, in ICU for further monitoring. 63 year old male, from [facility], with PMH of CVA, Alzheimer's, nonverbal at baseline, GERD, Schizophrenia, and Constipation, was brought into the ED s/p mechanical fall, poor oral intake, and Covid-19 infection on 12/27/23 Pt was admitted for COVID PNA, later found with intermittent hypoxia/ hypopnea to Sats 80% and Hypotension despite multiple fluid boluses, also found with bryan to 40s for the last 2 days, in ICU for further monitoring.

## 2024-01-04 NOTE — SWALLOW BEDSIDE ASSESSMENT ADULT - PHARYNGEAL PHASE
Delayed pharyngeal swallow/Decreased laryngeal elevation/Delayed cough post oral intake/Multiple swallows Delayed pharyngeal swallow/Decreased laryngeal elevation/Wet vocal quality post oral intake/Cough post oral intake/Throat clear post oral intake Delayed pharyngeal swallow/Decreased laryngeal elevation/Wet vocal quality post oral intake/Cough post oral intake/Multiple swallows Delayed pharyngeal swallow/Decreased laryngeal elevation/Multiple swallows

## 2024-01-04 NOTE — ADVANCED PRACTICE NURSE CONSULT - ASSESSMENT
This is a 63yr old male patient admitted for SURAJ-COV-2, presenting with the following:  -There is evidence of an open Full Thickness Tissue Loss Lesion to the Mid Spine (1.5cm x 0.5cm x 0.2cm) with pink tissue, pale tissue, and scant drainage  -There is a Full Thickness Tissue Loss wound to the L. Upper Abdominal Quadrant (1cm x 1cm x 0.1cm) with granulation tissue and drainage  -There is a Stage 1 Pressure injury to the Bilateral Heels, as evident by non-blanchable erythema This is a 63yr old male patient admitted for SURAJ-COV-2, presenting with the following:  -There is evidence of an open Full Thickness Tissue Loss Lesion to the Mid Spine (1.5cm x 0.5cm x 0.2cm) with pink tissue, pale tissue, and scant drainage  -There is a Surgical wound to the L. Upper Abdominal Quadrant (1cm x 1cm x 0.1cm) with granulation tissue and drainage  -There is a Stage 1 Pressure injury to the Bilateral Heels, as evident by non-blanchable erythema

## 2024-01-04 NOTE — SWALLOW BEDSIDE ASSESSMENT ADULT - COMMENTS
cough s/p cup sip trials only. Weak Cough s/p teaspoon trial. Exam terminated. Pt is non-verbal, HOB elevated to 90°. Head/neck in hyperextension. Mild cachexia. White patches visualized on lingual surface and oral mucosa, could not be removed during oral care/suction prior to exam. Oral thrush cannot be ruled out.

## 2024-01-04 NOTE — PROGRESS NOTE ADULT - SUBJECTIVE AND OBJECTIVE BOX
Patient is a 63y old  Male who presents with a chief complaint of Sepsis and AHRF (04 Jan 2024 09:31)    PATIENT IS SEEN AND EXAMINED IN ICU.    ALLERGIES:  No Known Allergies    VITALS:    Vital Signs Last 24 Hrs  T(C): 36.5 (04 Jan 2024 14:00), Max: 36.5 (04 Jan 2024 11:00)  T(F): 97.7 (04 Jan 2024 14:00), Max: 97.7 (04 Jan 2024 11:00)  HR: 56 (04 Jan 2024 14:00) (38 - 115)  BP: 83/58 (04 Jan 2024 14:00) (65/48 - 162/96)  BP(mean): 68 (04 Jan 2024 14:00) (55 - 116)  RR: 11 (04 Jan 2024 14:00) (7 - 24)  SpO2: 99% (04 Jan 2024 14:00) (86% - 100%)    Parameters below as of 03 Jan 2024 19:00  Patient On (Oxygen Delivery Method): nasal cannula, high flow  O2 Flow (L/min): 50  O2 Concentration (%): 40    LABS:    CBC Full  -  ( 04 Jan 2024 04:45 )  WBC Count : 8.40 K/uL  RBC Count : 4.06 M/uL  Hemoglobin : 11.9 g/dL  Hematocrit : 36.3 %  Platelet Count - Automated : 250 K/uL  Mean Cell Volume : 89.4 fl  Mean Cell Hemoglobin : 29.3 pg  Mean Cell Hemoglobin Concentration : 32.8 gm/dL  Auto Neutrophil # : x  Auto Lymphocyte # : x  Auto Monocyte # : x  Auto Eosinophil # : x  Auto Basophil # : x  Auto Neutrophil % : x  Auto Lymphocyte % : x  Auto Monocyte % : x  Auto Eosinophil % : x  Auto Basophil % : x      01-04    141  |  110<H>  |  10  ----------------------------<  87  3.2<L>   |  28  |  0.76    Ca    8.3<L>      04 Jan 2024 04:45  Phos  2.6     01-04  Mg     2.0     01-04    TPro  5.5<L>  /  Alb  2.4<L>  /  TBili  0.3  /  DBili  0.1  /  AST  39  /  ALT  57  /  AlkPhos  46  01-04    CAPILLARY BLOOD GLUCOSE        CARDIAC MARKERS ( 03 Jan 2024 15:00 )  x     / x     / 80 U/L / x     / 1.4 ng/mL      LIVER FUNCTIONS - ( 04 Jan 2024 04:45 )  Alb: 2.4 g/dL / Pro: 5.5 g/dL / ALK PHOS: 46 U/L / ALT: 57 U/L DA / AST: 39 U/L / GGT: x           Creatinine Trend: 0.76<--, 0.73<--, 0.68<--, 0.76<--, 0.76<--, 0.98<--  I&O's Summary    03 Jan 2024 07:01  -  04 Jan 2024 07:00  --------------------------------------------------------  IN: 427.7 mL / OUT: 1075 mL / NET: -647.3 mL    04 Jan 2024 07:01  -  04 Jan 2024 15:05  --------------------------------------------------------  IN: 200 mL / OUT: 690 mL / NET: -490 mL        ABG - ( 03 Jan 2024 13:19 )  pH, Arterial: 7.46  pH, Blood: x     /  pCO2: 37    /  pO2: 223   / HCO3: 26    / Base Excess: 2.6   /  SaO2: 99                  Catheterized Catheterized  12-29 @ 12:37   No growth  --  --      .Blood Blood  12-29 @ 02:05   No growth at 5 days  --  --      .Blood Blood  12-29 @ 01:55   No growth at 5 days  --  --          MEDICATIONS:    MEDICATIONS  (STANDING):  albuterol/ipratropium for Nebulization 3 milliLiter(s) Nebulizer every 6 hours  aspirin  chewable 81 milliGRAM(s) Oral daily  atorvastatin 20 milliGRAM(s) Oral at bedtime  cefTRIAXone   IVPB 1000 milliGRAM(s) IV Intermittent every 24 hours  cholecalciferol 1000 Unit(s) Oral daily  cyanocobalamin 1000 MICROGram(s) Oral daily  dexAMETHasone  Injectable 6 milliGRAM(s) IV Push daily  enoxaparin Injectable 40 milliGRAM(s) SubCutaneous every 24 hours  multivitamin/minerals 1 Tablet(s) Oral daily  norepinephrine Infusion 0.05 MICROgram(s)/kG/Min (5.95 mL/Hr) IV Continuous <Continuous>  OLANZapine Injectable 5 milliGRAM(s) IntraMuscular every 12 hours  valproic  acid Syrup 250 milliGRAM(s) Oral two times a day      MEDICATIONS  (PRN):  acetaminophen     Tablet .. 650 milliGRAM(s) Oral every 6 hours PRN Temp greater or equal to 38C (100.4F), Mild Pain (1 - 3)  albuterol    90 MICROgram(s) HFA Inhaler 2 Puff(s) Inhalation every 6 hours PRN Bronchospasm  LORazepam   Injectable 0.5 milliGRAM(s) IV Push two times a day PRN Agitation      REVIEW OF SYSTEMS:                           ALL ROS DONE [ X   ]    CONSTITUTIONAL:  LETHARGIC [   ], FEVER [   ], UNRESPONSIVE [   ]  CVS:  CP  [   ], SOB, [   ], PALPITATIONS [   ], DIZZYNESS [   ]  RS: COUGH [   ], SPUTUM [   ]  GI: ABDOMINAL PAIN [   ], NAUSEA [   ], VOMITINGS [   ], DIARRHEA [   ], CONSTIPATION [   ]  :  DYSURIA [   ], NOCTURIA [   ], INCREASED FREQUENCY [   ], DRIBLING [   ],  SKELETAL: PAINFUL JOINTS [   ], SWOLLEN JOINTS [   ], NECK ACHE [   ], LOW BACK ACHE [   ],  SKIN : ULCERS [   ], RASH [   ], ITCHING [   ]  CNS: HEAD ACHE [   ], DOUBLE VISION [   ], BLURRED VISION [   ], AMS / CONFUSION [   ], SEIZURES [   ], WEAKNESS [   ],TINGLING / NUMBNESS [   ]    PHYSICAL EXAMINATION:  GENERAL APPEARANCE: NO DISTRESS  HEENT:  NO PALLOR, NO  JVD,  NO   NODES, NECK SUPPLE  CVS: S1 +, S2 +,   RS: AEEB,  OCCASIONAL  RALES +,   NO RONCHI  ABD: SOFT, NT, NO, BS +  EXT: NO PE  SKIN: WARM,   SKELETAL:  REDUCED ROM OF CERVICAL AND LS SPINE  CNS:  AAO X 1-2    RADIOLOGY :    RADIOLOGY AND READINGS REVIEWED    ASSESSMENT :     Infection due to severe acute respiratory syndrome coronavirus 2 (SARS-CoV-2)    CVA (cerebral vascular accident)    HLD (hyperlipidemia)    S/P percutaneous endoscopic gastrostomy (PEG) tube placement        PLAN:  HPI:  A 63 year old male, from [facility], with PMH of CVA, Alzheimer's, nonverbal at baseline, GERD, Schizophrenia, and Constipation, was brought into the ED s/p mechanical fall, poor oral intake, and Covid-19 infection on 12/27/23 as per NH papers. Unable to obtain history from patient since he is nonverbal, history obtained from chart review.  (28 Dec 2023 20:54)    # [1/3] RAPID RESPONSE FOR HYPOTENSION AND HYPOXIA - S/P IV FLUIDS AND PLACED ON NRB FOR HYPOXIA. CRITICAL CARE EVALUATION REQUESTED.     # SEPTIC SHOCK S/T ? PNA + DIARRHEA  # ACUTE HYPOXIC RESPIRATORY FAILURE S/T ? ASPIRATION EVENT   , COVID19 INFECTION  - ON DECADRON  - ROCEPHIN, AZITHROMYCIN  - F/U BCX AND UCX , F/U STOOL GI PCR  - S/P IVF, VASOPRESSORS  - F/U CXR AND ABD XRAY  - ID CONSULT  - CRITICAL CARE MANAGEMENT IN PROGRESS    # BRADYCARDIA  - MONITOR ON TELEMETRY  - F/U ECHOCARDIOGRAM  - OPTIMIZE ELECTROLYTES  - CARDIOLOGY CONSULT  - EP CONSULT    # HYPOKALEMIA  - REPLETING WITH SUPPLEMENT    # HX OF CVA  # HX OF DEMENTIA  # SCHIZOPHRENIA  # GI AND DVT PPX   Patient is a 63y old  Male who presents with a chief complaint of Sepsis and AHRF (04 Jan 2024 09:31)    PATIENT IS SEEN AND EXAMINED IN ICU.    ALLERGIES:  No Known Allergies    VITALS:    Vital Signs Last 24 Hrs  T(C): 36.5 (04 Jan 2024 14:00), Max: 36.5 (04 Jan 2024 11:00)  T(F): 97.7 (04 Jan 2024 14:00), Max: 97.7 (04 Jan 2024 11:00)  HR: 56 (04 Jan 2024 14:00) (38 - 115)  BP: 83/58 (04 Jan 2024 14:00) (65/48 - 162/96)  BP(mean): 68 (04 Jan 2024 14:00) (55 - 116)  RR: 11 (04 Jan 2024 14:00) (7 - 24)  SpO2: 99% (04 Jan 2024 14:00) (86% - 100%)    Parameters below as of 03 Jan 2024 19:00  Patient On (Oxygen Delivery Method): nasal cannula, high flow  O2 Flow (L/min): 50  O2 Concentration (%): 40    LABS:    CBC Full  -  ( 04 Jan 2024 04:45 )  WBC Count : 8.40 K/uL  RBC Count : 4.06 M/uL  Hemoglobin : 11.9 g/dL  Hematocrit : 36.3 %  Platelet Count - Automated : 250 K/uL  Mean Cell Volume : 89.4 fl  Mean Cell Hemoglobin : 29.3 pg  Mean Cell Hemoglobin Concentration : 32.8 gm/dL  Auto Neutrophil # : x  Auto Lymphocyte # : x  Auto Monocyte # : x  Auto Eosinophil # : x  Auto Basophil # : x  Auto Neutrophil % : x  Auto Lymphocyte % : x  Auto Monocyte % : x  Auto Eosinophil % : x  Auto Basophil % : x      01-04    141  |  110<H>  |  10  ----------------------------<  87  3.2<L>   |  28  |  0.76    Ca    8.3<L>      04 Jan 2024 04:45  Phos  2.6     01-04  Mg     2.0     01-04    TPro  5.5<L>  /  Alb  2.4<L>  /  TBili  0.3  /  DBili  0.1  /  AST  39  /  ALT  57  /  AlkPhos  46  01-04    CAPILLARY BLOOD GLUCOSE        CARDIAC MARKERS ( 03 Jan 2024 15:00 )  x     / x     / 80 U/L / x     / 1.4 ng/mL      LIVER FUNCTIONS - ( 04 Jan 2024 04:45 )  Alb: 2.4 g/dL / Pro: 5.5 g/dL / ALK PHOS: 46 U/L / ALT: 57 U/L DA / AST: 39 U/L / GGT: x           Creatinine Trend: 0.76<--, 0.73<--, 0.68<--, 0.76<--, 0.76<--, 0.98<--  I&O's Summary    03 Jan 2024 07:01  -  04 Jan 2024 07:00  --------------------------------------------------------  IN: 427.7 mL / OUT: 1075 mL / NET: -647.3 mL    04 Jan 2024 07:01  -  04 Jan 2024 15:05  --------------------------------------------------------  IN: 200 mL / OUT: 690 mL / NET: -490 mL        ABG - ( 03 Jan 2024 13:19 )  pH, Arterial: 7.46  pH, Blood: x     /  pCO2: 37    /  pO2: 223   / HCO3: 26    / Base Excess: 2.6   /  SaO2: 99                  Catheterized Catheterized  12-29 @ 12:37   No growth  --  --      .Blood Blood  12-29 @ 02:05   No growth at 5 days  --  --      .Blood Blood  12-29 @ 01:55   No growth at 5 days  --  --          MEDICATIONS:    MEDICATIONS  (STANDING):  albuterol/ipratropium for Nebulization 3 milliLiter(s) Nebulizer every 6 hours  aspirin  chewable 81 milliGRAM(s) Oral daily  atorvastatin 20 milliGRAM(s) Oral at bedtime  cefTRIAXone   IVPB 1000 milliGRAM(s) IV Intermittent every 24 hours  cholecalciferol 1000 Unit(s) Oral daily  cyanocobalamin 1000 MICROGram(s) Oral daily  dexAMETHasone  Injectable 6 milliGRAM(s) IV Push daily  enoxaparin Injectable 40 milliGRAM(s) SubCutaneous every 24 hours  multivitamin/minerals 1 Tablet(s) Oral daily  norepinephrine Infusion 0.05 MICROgram(s)/kG/Min (5.95 mL/Hr) IV Continuous <Continuous>  OLANZapine Injectable 5 milliGRAM(s) IntraMuscular every 12 hours  valproic  acid Syrup 250 milliGRAM(s) Oral two times a day      MEDICATIONS  (PRN):  acetaminophen     Tablet .. 650 milliGRAM(s) Oral every 6 hours PRN Temp greater or equal to 38C (100.4F), Mild Pain (1 - 3)  albuterol    90 MICROgram(s) HFA Inhaler 2 Puff(s) Inhalation every 6 hours PRN Bronchospasm  LORazepam   Injectable 0.5 milliGRAM(s) IV Push two times a day PRN Agitation      REVIEW OF SYSTEMS:                           ALL ROS DONE [ X   ]    CONSTITUTIONAL:  LETHARGIC [   ], FEVER [   ], UNRESPONSIVE [   ]  CVS:  CP  [   ], SOB, [   ], PALPITATIONS [   ], DIZZYNESS [   ]  RS: COUGH [   ], SPUTUM [   ]  GI: ABDOMINAL PAIN [   ], NAUSEA [   ], VOMITINGS [   ], DIARRHEA [   ], CONSTIPATION [   ]  :  DYSURIA [   ], NOCTURIA [   ], INCREASED FREQUENCY [   ], DRIBLING [   ],  SKELETAL: PAINFUL JOINTS [   ], SWOLLEN JOINTS [   ], NECK ACHE [   ], LOW BACK ACHE [   ],  SKIN : ULCERS [   ], RASH [   ], ITCHING [   ]  CNS: HEAD ACHE [   ], DOUBLE VISION [   ], BLURRED VISION [   ], AMS / CONFUSION [   ], SEIZURES [   ], WEAKNESS [   ],TINGLING / NUMBNESS [   ]    PHYSICAL EXAMINATION:  GENERAL APPEARANCE: NO DISTRESS  HEENT:  NO PALLOR, NO  JVD,  NO   NODES, NECK SUPPLE  CVS: S1 +, S2 +,   RS: AEEB,  OCCASIONAL  RALES +,   NO RONCHI  ABD: SOFT, NT, NO, BS +  EXT: NO PE  SKIN: WARM,   SKELETAL:  REDUCED ROM OF CERVICAL AND LS SPINE  CNS:  AAO X 1-2    RADIOLOGY :    RADIOLOGY AND READINGS REVIEWED    ASSESSMENT :     Infection due to severe acute respiratory syndrome coronavirus 2 (SARS-CoV-2)    CVA (cerebral vascular accident)    HLD (hyperlipidemia)    S/P percutaneous endoscopic gastrostomy (PEG) tube placement        PLAN:  HPI:  A 63 year old male, from [facility], with PMH of CVA, Alzheimer's, nonverbal at baseline, GERD, Schizophrenia, and Constipation, was brought into the ED s/p mechanical fall, poor oral intake, and Covid-19 infection on 12/27/23 as per NH papers. Unable to obtain history from patient since he is nonverbal, history obtained from chart review.  (28 Dec 2023 20:54)    # [1/3] RAPID RESPONSE FOR HYPOTENSION AND HYPOXIA - S/P IV FLUIDS AND PLACED ON NRB FOR HYPOXIA. CRITICAL CARE EVALUATION REQUESTED.     # SEPTIC SHOCK S/T ? PNA + DIARRHEA  # ACUTE HYPOXIC RESPIRATORY FAILURE S/T ? ASPIRATION EVENT   , COVID19 INFECTION  - ON DECADRON  - ROCEPHIN, AZITHROMYCIN  - F/U BCX AND UCX , F/U STOOL GI PCR  - S/P IVF, VASOPRESSORS  - F/U CXR AND ABD XRAY  - ID CONSULT  - CRITICAL CARE MANAGEMENT IN PROGRESS    # BRADYCARDIA  - MONITOR ON TELEMETRY  - F/U ECHOCARDIOGRAM  - OPTIMIZE ELECTROLYTES  - CARDIOLOGY CONSULT  - EP CONSULT    # HYPOKALEMIA  - REPLETING WITH SUPPLEMENT    # HX OF CVA  # HX OF DEMENTIA  # SCHIZOPHRENIA  # GI AND DVT PPX

## 2024-01-04 NOTE — PROGRESS NOTE ADULT - ASSESSMENT
63 year old male, from [facility], with PMH of CVA, Alzheimer's, nonverbal at baseline, GERD, Schizophrenia, and Constipation, was brought into the ED s/p mechanical fall, poor oral intake, and Covid-19 infection on 12/27/23 Pt was admitted for COVID PNA, later found with intermittent hypoxia/ hypopnea to Sats 80% and Hypotension despite multiple fluid boluses, also found with bryan to 40s for the last 2 days, in ICU for further monitoring.     DX:  1. CVA  2. Alzheimer's  3. Hypoxia  4. Hypotension   5. Bradycardia   5. COVID +Ve         =================== Neuro============================  - Alert, orientation difficult to assess  - He is at baseline of bedbound nonverbal in the setting of AD, Previous CVA  - on valporate and as needed ativan   - He is on chronic ativan for agitation   - he is also on xyprexa        ================= Cardiovascular==========================  # Hypotension   # Sinus bradycardia  - Likely due to infection- shock  Trop -ve today  TWI on lateral leads are not new  POCUS shows intact LV function, some signs of end systolic effacement   F/u echo   DC midodrine and start levophed for now   D/c'd levophed       ================- Pulm=================================  - RRT for desaturation to the 80s   - Improved with NRB, Pt has ABG with PO2 in 200s  - s/p HF 50/40  - 6L NC    ==================ID===================================    #COVID   #Multifocal PNA   #at risk of aspiration PNA    - On decadron, ceftriaxone   - D/c'd azithromycin after 3 doses   -  Bcx -ve       ================= Nephro================================    No active issues   =================GI====================================  # Diarrhea 2/2 to laxatives vs covid   - Dc senna, miralax  - GI PCR if persistent  - NPO due to mental status and concerns for asp PNA  will    ================ Heme==================================  DVT PPx with lovenox    =================Endocrine===============================  Bryan and Hypotensive   - TSH 4.28 [wnl]      ================= Skin/Catheters============================  no wounds    =================Prophylaxis =============================  lovenox    ==================GOC==================================  FULL CODE    63 year old male, from [facility], with PMH of CVA, Alzheimer's, nonverbal at baseline, GERD, Schizophrenia, and Constipation, was brought into the ED s/p mechanical fall, poor oral intake, and Covid-19 infection on 12/27/23 Pt was admitted for COVID PNA, later found with intermittent hypoxia/ hypopnea to Sats 80% and Hypotension despite multiple fluid boluses, also found with bryan to 40s for the last 2 days, in ICU for further monitoring.     DX:  1. CVA  2. Alzheimer's  3. Hypoxia  4. Hypotension   5. Bradycardia   5. COVID +Ve         =================== Neuro============================  - Alert, orientation difficult to assess  - He is at baseline of bedbound nonverbal in the setting of AD, Previous CVA  - on valporate and as needed ativan   - He is on chronic ativan for agitation   - he is also on xyprexa        ================= Cardiovascular==========================  # Hypotension   # Sinus bradycardia  - Likely due to infection- shock  Trop -ve today  TWI on lateral leads are not new  POCUS shows intact LV function, some signs of end systolic effacement   F/u echo   DC midodrine and start levophed for now   D/c'd levophed       ================- Pulm=================================  - RRT for desaturation to the 80s   - Improved with NRB, Pt has ABG with PO2 in 200s  - s/p HF 50/40  - 6L NC    ==================ID===================================    #COVID   #Multifocal PNA   #at risk of aspiration PNA    - On decadron, ceftriaxone   - D/c'd azithromycin after 3 doses   -  Bcx -ve       ================= Nephro================================    No active issues   =================GI====================================  # Diarrhea 2/2 to laxatives vs covid   - Dc senna, miralax  - GI PCR if persistent  - NPO due to mental status and concerns for asp PNA  will    ================ Heme==================================  DVT PPx with lovenox    =================Endocrine===============================  Bryan and Hypotensive   - TSH 4.28 [wnl]      ================= Skin/Catheters============================  no wounds    =================Prophylaxis =============================  lovenox    ==================GOC==================================  FULL CODE    63 year old male, from [facility], with PMH of CVA, Alzheimer's, nonverbal at baseline, GERD, Schizophrenia, and Constipation, was brought into the ED s/p mechanical fall, poor oral intake, and Covid-19 infection on 12/27/23 Pt was admitted for COVID PNA, later found with intermittent hypoxia/ hypopnea to Sats 80% and Hypotension despite multiple fluid boluses, also found with bryan to 40s for the last 2 days, in ICU for further monitoring.     DX:  1. CVA  2. Alzheimer's  3. Hypoxia  4. Hypotension   5. Bradycardia   5. COVID +Ve         =================== Neuro============================  # CVA  # Alzheimers  - Alert, orientation difficult to assess  - He is at baseline of bedbound nonverbal in the setting of AD, Previous CVA  - on valporate and as needed ativan   - He is on chronic ativan for agitation   - he is also on zyprexa        ================= Cardiovascular==========================  # Hypotension   # Sinus bradycardia  - Likely due to infection- shock  - Trop -ve today  - TWI on lateral leads are not new  - POCUS shows intact LV function, some signs of end systolic effacement   - F/u echo   - DC midodrine and start levophed for now   - c/w levophed 8mg      ================- Pulm=================================  #Hypoxia   - RRT for desaturation to the 80s (1/2/24)  - Improved with NRB, Pt has ABG with PO2 in 200s  - s/p HF 50/40  - 6L NC titrate as needed    ==================ID===================================    #COVID   #Multifocal PNA   #at risk of aspiration PNA  - On decadron, ceftriaxone   - D/c'd azithromycin after 3 doses   -  Bcx -ve       ================= Nephro================================  #No active issues     =================GI====================================  # Diarrhea 2/2 to laxatives vs covid   - Dc senna, miralax  - GI PCR if persistent  - NPO due to mental status and concerns for asp PNA      ================ Heme==================================  DVT PPx with lovenox    =================Endocrine===============================  Bryan and Hypotensive   - TSH 4.28 [wnl]      ================= Skin/Catheters============================  no wounds    =================Prophylaxis =============================  lovenox    ==================GOC==================================  FULL CODE    63 year old male, from [facility], with PMH of CVA, Alzheimer's, nonverbal at baseline, GERD, Schizophrenia, and Constipation, was brought into the ED s/p mechanical fall, poor oral intake, and Covid-19 infection on 12/27/23 Pt was admitted for COVID PNA, later found with intermittent hypoxia/ hypopnea to Sats 80% and Hypotension despite multiple fluid boluses, also found with bryan to 40s for the last 2 days, in ICU for further monitoring.     DX:  1. CVA  2. Alzheimer's  3. Hypoxia  4. Hypotension   5. Bradycardia   5. COVID +Ve         =================== Neuro============================  # CVA  # Alzheimers  - Alert, orientation difficult to assess  - He is at baseline of bedbound nonverbal in the setting of AD, Previous CVA  - on valporate and as needed ativan   - He is on chronic ativan for agitation   - he is also on zyprexa        ================= Cardiovascular==========================  # Hypotension   # Sinus bradycardia  - Likely due to infection- shock  - Trop -ve today  - TWI on lateral leads are not new  - POCUS shows intact LV function, some signs of end systolic effacement   - F/u echo   - DC midodrine and start levophed for now   - c/w levophed 8mg      ================- Pulm=================================  #Hypoxia   - RRT for desaturation to the 80s (1/2/24)  - Improved with NRB, Pt has ABG with PO2 in 200s  - s/p HF 50/40  - 6L NC titrate as needed    ==================ID===================================    #COVID   #Multifocal PNA   #at risk of aspiration PNA  - On decadron, ceftriaxone   - D/c'd azithromycin after 3 doses   -  Bcx -ve       ================= Nephro================================  #No active issues     =================GI====================================  # Diarrhea 2/2 to laxatives vs covid   - Dc senna, miralax  - GI PCR if persistent  - NPO due to mental status and concerns for asp PNA      ================ Heme==================================  DVT PPx with lovenox    =================Endocrine===============================  Bryan and Hypotensive   - TSH 4.28 [wnl]      ================= Skin/Catheters============================  no wounds    =================Prophylaxis =============================  lovenox    ==================GOC==================================  FULL CODE

## 2024-01-04 NOTE — PROGRESS NOTE ADULT - ATTENDING COMMENTS
IMP: This is a  63 year old mn , from [facility], with  CVA, Alzheimer's, nonverbal at baseline, GERD, Schizophrenia, and Constipation, was brought into the ED s/p mechanical fall, poor oral intake, and Covid-19 infection on 12/27/23 Pt was admitted for COVID PNA, later found with intermittent hypoxia/ hypopnea to Sats 80% and Hypotension despite multiple fluid boluses, also found with bryan to 40s for the last 2 days, in ICU for further monitoring.       ASSESSMENT   - Acute Hypoxic resp failure   - Covid-19 PNA   - Shock septic   - CVA  - Alzheimer dementia   - Bradycardia       Plan       - Tolerating O2 via NC   - No sedation   - Hemodynamic support   - Titrate vaso-active agents to maintain MAP>65  - EP cards f/u : no indication for PPM at tis time   - Antibx   - F/U cultures   - S/P course of Remdesivir   - Continue decadron  6 mg for total 10 days   - Isolation : contact and airborne   - Diet   - Monitor I/O   - Cards eval   - Hold AV angel luis blocking agent   - Skin care .  - Wound care eval noted IMP: This is a  63 year old mn , from [facility], with  CVA, Alzheimer's, nonverbal at baseline, GERD, Schizophrenia, and Constipation, was brought into the ED s/p mechanical fall, poor oral intake, and Covid-19 infection on 12/27/23 Pt was admitted for COVID PNA, later found with intermittent hypoxia/ hypopnea to Sats 80% and Hypotension despite multiple fluid boluses, also found with bryan to 40s for the last 2 days, in ICU for further monitoring.       ASSESSMENT   - Acute Hypoxic resp failure   - Covid-19 PNA   - Shock septic   - CVA  - Alzheimer dementia   - Bradycardia       Plan       - Tolerating O2 via NC   - No sedation   - Hemodynamic support   - Titrate vaso-active agents to maintain MAP>65  - EP cards f/u : no indication for PPM at tis time   - Antibx   - F/U cultures   - S/P course of Remdesivir   - Continue decadron  6 mg for total 10 days   - Isolation : contact and airborne   - Diet   - Monitor I/O   - Cards eval   - Hold AV angel luis blocking agent   - Skin care .  - Wound care eval noted

## 2024-01-04 NOTE — ADVANCED PRACTICE NURSE CONSULT - RECOMMEDATIONS
-Clean all wounds with normal saline and apply skin prep to the surrounding skin  -Apply a Foam dressing to the Mid Spine and L. Upper Abdominal Quadrant wounds Q 72hrs PRN  -Apply a Foam dressing to the Coccyx area Q 72hrs PRN, for protection  -Elevate/float the patients heels using heel protectors and reposition the patient Q 2hrs using wedges or pillows

## 2024-01-04 NOTE — SWALLOW BEDSIDE ASSESSMENT ADULT - ORAL PHASE
Decreased anterior-posterior movement of the bolus/Delayed oral transit time/Stasis in lateral sulci/Lingual stasis Decreased anterior-posterior movement of the bolus/Delayed oral transit time/Lingual stasis Decreased anterior-posterior movement of the bolus/Delayed oral transit time/Stasis in lateral sulci

## 2024-01-04 NOTE — PROGRESS NOTE ADULT - SUBJECTIVE AND OBJECTIVE BOX
EP Attending  HISTORY OF PRESENT ILLNESS: HPI:  A 63 year old male, from [facility], with PMH of CVA, Alzheimer's, nonverbal at baseline, GERD, Schizophrenia, and Constipation, was brought into the ED s/p mechanical fall, poor oral intake, and Covid-19 infection on 12/27/23 as per NH papers. Unable to obtain history from patient since he is nonverbal, history obtained from chart review.  (28 Dec 2023 20:54)    Was pleasant and alert this morning, not talkative.  Apparently then had an RRT For hypotension and has gone to MICU for septic shock treatment.  Takes antipsychotic Rx at his nursing home, and was referred to EP re: sinus bradycardia and borderline QT prolongation on intake.  No reported history of seizures or VT/VF in chart.  Not able to participate in HPI/ROS due to psych issues.  Date of service 1/4- in ICU, not intubated, awake but not interactive.    PAST MEDICAL & SURGICAL HISTORY:  CVA (cerebral vascular accident)  CVA (cerebral vascular accident)  HLD (hyperlipidemia)  S/P percutaneous endoscopic gastrostomy (PEG) tube placement    acetaminophen     Tablet .. 650 milliGRAM(s) Oral every 6 hours PRN  albuterol    90 MICROgram(s) HFA Inhaler 2 Puff(s) Inhalation every 6 hours PRN  albuterol/ipratropium for Nebulization 3 milliLiter(s) Nebulizer every 6 hours  aspirin  chewable 81 milliGRAM(s) Oral daily  atorvastatin 20 milliGRAM(s) Oral at bedtime  cefTRIAXone   IVPB 1000 milliGRAM(s) IV Intermittent every 24 hours  cholecalciferol 1000 Unit(s) Oral daily  cyanocobalamin 1000 MICROGram(s) Oral daily  dexAMETHasone  Injectable 6 milliGRAM(s) IV Push daily  enoxaparin Injectable 40 milliGRAM(s) SubCutaneous every 24 hours  LORazepam   Injectable 0.5 milliGRAM(s) IV Push two times a day PRN  multivitamin/minerals 1 Tablet(s) Oral daily  norepinephrine Infusion 0.05 MICROgram(s)/kG/Min IV Continuous <Continuous>  OLANZapine Injectable 5 milliGRAM(s) IntraMuscular every 12 hours  valproic  acid Syrup 250 milliGRAM(s) Oral two times a day                            11.9   8.40  )-----------( 250      ( 04 Jan 2024 04:45 )             36.3       01-04    141  |  110<H>  |  10  ----------------------------<  87  3.2<L>   |  28  |  0.76    Ca    8.3<L>      04 Jan 2024 04:45  Phos  2.6     01-04  Mg     2.0     01-04    TPro  5.5<L>  /  Alb  2.4<L>  /  TBili  0.3  /  DBili  0.1  /  AST  39  /  ALT  57  /  AlkPhos  46  01-04      CARDIAC MARKERS ( 03 Jan 2024 15:00 )  x     / x     / 80 U/L / x     / 1.4 ng/mL      T(C): 36.5 (01-04-24 @ 14:00), Max: 36.5 (01-04-24 @ 11:00)  HR: 56 (01-04-24 @ 14:00) (38 - 115)  BP: 83/58 (01-04-24 @ 14:00) (65/48 - 162/96)  RR: 11 (01-04-24 @ 14:00) (7 - 24)  SpO2: 99% (01-04-24 @ 14:00) (86% - 100%)  Wt(kg): --    I&O's Summary    03 Jan 2024 07:01  -  04 Jan 2024 07:00  --------------------------------------------------------  IN: 427.7 mL / OUT: 1075 mL / NET: -647.3 mL    04 Jan 2024 07:01  -  04 Jan 2024 15:52  --------------------------------------------------------  IN: 200 mL / OUT: 690 mL / NET: -490 mL    Appearance: elderly  man in no acute distress  HEENT:   Normal oral mucosa, PERRL, EOMI	  Lymphatic: No lymphadenopathy , no edema  Cardiovascular: Normal S1 S2, No JVD, No murmurs , Peripheral pulses palpable 2+ bilaterally  Respiratory: Lungs clear to auscultation, normal effort 	  Gastrointestinal:  Soft, Non-tender, + BS	  Skin: No rashes, No ecchymoses, No cyanosis, warm to touch  Musculoskeletal: Normal range of motion, normal strength  Psychiatry:  Mood & affect appropriate      TELEMETRY: 	bradycardic, sinus 40s-50s.  QT <500 corrected in setting of bradycardia.  ECG: NSR, QTc 450-470ms.  biphasic T-waves.  Echo: LVEF normal in 2020 at time of stroke.	  	  ASSESSMENT/PLAN: Mr Arriola is a pleasant 63y Male at nursing home due to stroke, dementia, and schizophrenia. Brought in for loss of appetite.  Was seen in morning of 1/3 re: abnormal EKG consultation.  Subsequently he decompensated with hypotension and was sent to MICU for guideline based septic shock management.  QTc upper limits of normal, chronically.  Replace K to 4-4.5, Mg 2 to 2.  Limit add'l QT prolonging medications.  As he is bedbound, not apparently symptomatic from sinus bradycardia. No long pauses on telemetry, or PVC-initiated arrhythmia.  Maintain telemetry but could probably go out of ICU.  At this time no indication for permanent pacing.        Ramses Pena M.D.  Cardiac Electrophysiology    office [phone]  pager [phone] EP Attending  HISTORY OF PRESENT ILLNESS: HPI:  A 63 year old male, from [facility], with PMH of CVA, Alzheimer's, nonverbal at baseline, GERD, Schizophrenia, and Constipation, was brought into the ED s/p mechanical fall, poor oral intake, and Covid-19 infection on 12/27/23 as per NH papers. Unable to obtain history from patient since he is nonverbal, history obtained from chart review.  (28 Dec 2023 20:54)    Was pleasant and alert this morning, not talkative.  Apparently then had an RRT For hypotension and has gone to MICU for septic shock treatment.  Takes antipsychotic Rx at his nursing home, and was referred to EP re: sinus bradycardia and borderline QT prolongation on intake.  No reported history of seizures or VT/VF in chart.  Not able to participate in HPI/ROS due to psych issues.  Date of service 1/4- in ICU, not intubated, awake but not interactive.    PAST MEDICAL & SURGICAL HISTORY:  CVA (cerebral vascular accident)  CVA (cerebral vascular accident)  HLD (hyperlipidemia)  S/P percutaneous endoscopic gastrostomy (PEG) tube placement    acetaminophen     Tablet .. 650 milliGRAM(s) Oral every 6 hours PRN  albuterol    90 MICROgram(s) HFA Inhaler 2 Puff(s) Inhalation every 6 hours PRN  albuterol/ipratropium for Nebulization 3 milliLiter(s) Nebulizer every 6 hours  aspirin  chewable 81 milliGRAM(s) Oral daily  atorvastatin 20 milliGRAM(s) Oral at bedtime  cefTRIAXone   IVPB 1000 milliGRAM(s) IV Intermittent every 24 hours  cholecalciferol 1000 Unit(s) Oral daily  cyanocobalamin 1000 MICROGram(s) Oral daily  dexAMETHasone  Injectable 6 milliGRAM(s) IV Push daily  enoxaparin Injectable 40 milliGRAM(s) SubCutaneous every 24 hours  LORazepam   Injectable 0.5 milliGRAM(s) IV Push two times a day PRN  multivitamin/minerals 1 Tablet(s) Oral daily  norepinephrine Infusion 0.05 MICROgram(s)/kG/Min IV Continuous <Continuous>  OLANZapine Injectable 5 milliGRAM(s) IntraMuscular every 12 hours  valproic  acid Syrup 250 milliGRAM(s) Oral two times a day                            11.9   8.40  )-----------( 250      ( 04 Jan 2024 04:45 )             36.3       01-04    141  |  110<H>  |  10  ----------------------------<  87  3.2<L>   |  28  |  0.76    Ca    8.3<L>      04 Jan 2024 04:45  Phos  2.6     01-04  Mg     2.0     01-04    TPro  5.5<L>  /  Alb  2.4<L>  /  TBili  0.3  /  DBili  0.1  /  AST  39  /  ALT  57  /  AlkPhos  46  01-04      CARDIAC MARKERS ( 03 Jan 2024 15:00 )  x     / x     / 80 U/L / x     / 1.4 ng/mL      T(C): 36.5 (01-04-24 @ 14:00), Max: 36.5 (01-04-24 @ 11:00)  HR: 56 (01-04-24 @ 14:00) (38 - 115)  BP: 83/58 (01-04-24 @ 14:00) (65/48 - 162/96)  RR: 11 (01-04-24 @ 14:00) (7 - 24)  SpO2: 99% (01-04-24 @ 14:00) (86% - 100%)  Wt(kg): --    I&O's Summary    03 Jan 2024 07:01  -  04 Jan 2024 07:00  --------------------------------------------------------  IN: 427.7 mL / OUT: 1075 mL / NET: -647.3 mL    04 Jan 2024 07:01  -  04 Jan 2024 15:52  --------------------------------------------------------  IN: 200 mL / OUT: 690 mL / NET: -490 mL    Appearance: elderly  man in no acute distress  HEENT:   Normal oral mucosa, PERRL, EOMI	  Lymphatic: No lymphadenopathy , no edema  Cardiovascular: Normal S1 S2, No JVD, No murmurs , Peripheral pulses palpable 2+ bilaterally  Respiratory: Lungs clear to auscultation, normal effort 	  Gastrointestinal:  Soft, Non-tender, + BS	  Skin: No rashes, No ecchymoses, No cyanosis, warm to touch  Musculoskeletal: Normal range of motion, normal strength  Psychiatry:  Mood & affect appropriate      TELEMETRY: 	bradycardic, sinus 40s-50s.  QT <500 corrected in setting of bradycardia.  ECG: NSR, QTc 450-470ms.  biphasic T-waves.  Echo: LVEF normal in 2020 at time of stroke.	  	  ASSESSMENT/PLAN: Mr Arriola is a pleasant 63y Male at nursing home due to stroke, dementia, and schizophrenia. Brought in for loss of appetite.  Was seen in morning of 1/3 re: abnormal EKG consultation.  Subsequently he decompensated with hypotension and was sent to MICU for guideline based septic shock management.  QTc upper limits of normal, chronically.  Replace K to 4-4.5, Mg 2 to 2.  Limit add'l QT prolonging medications.  As he is bedbound, not apparently symptomatic from sinus bradycardia. No long pauses on telemetry, or PVC-initiated arrhythmia.  Maintain telemetry but could probably go out of ICU.  At this time no indication for permanent pacing.        Ramses Pena M.D.  Cardiac Electrophysiology    office [phone]  pager [phone]

## 2024-01-04 NOTE — SWALLOW BEDSIDE ASSESSMENT ADULT - SWALLOW EVAL: DIAGNOSIS
Pt p/w s&s oropharyngeal dysphagia c/b open mouth postures, decreased bolus formation, slow A-P transport w/ base-of-tongue pumping, delayed swallow trigger, & decreased hyolaryngeal elevation. Cough on Mildly thick liquids trials & PO trials of larger presentation size. Malnutrition risk present.

## 2024-01-05 LAB
ALBUMIN SERPL ELPH-MCNC: 2.8 G/DL — LOW (ref 3.5–5)
ALBUMIN SERPL ELPH-MCNC: 2.8 G/DL — LOW (ref 3.5–5)
ALP SERPL-CCNC: 57 U/L — SIGNIFICANT CHANGE UP (ref 40–120)
ALP SERPL-CCNC: 57 U/L — SIGNIFICANT CHANGE UP (ref 40–120)
ALT FLD-CCNC: 73 U/L DA — HIGH (ref 10–60)
ALT FLD-CCNC: 73 U/L DA — HIGH (ref 10–60)
ANION GAP SERPL CALC-SCNC: 5 MMOL/L — SIGNIFICANT CHANGE UP (ref 5–17)
ANION GAP SERPL CALC-SCNC: 5 MMOL/L — SIGNIFICANT CHANGE UP (ref 5–17)
AST SERPL-CCNC: 39 U/L — SIGNIFICANT CHANGE UP (ref 10–40)
AST SERPL-CCNC: 39 U/L — SIGNIFICANT CHANGE UP (ref 10–40)
BASE EXCESS BLDA CALC-SCNC: 2 MMOL/L — SIGNIFICANT CHANGE UP (ref -2–3)
BASE EXCESS BLDA CALC-SCNC: 2 MMOL/L — SIGNIFICANT CHANGE UP (ref -2–3)
BILIRUB SERPL-MCNC: 0.4 MG/DL — SIGNIFICANT CHANGE UP (ref 0.2–1.2)
BILIRUB SERPL-MCNC: 0.4 MG/DL — SIGNIFICANT CHANGE UP (ref 0.2–1.2)
BLOOD GAS COMMENTS ARTERIAL: SIGNIFICANT CHANGE UP
BLOOD GAS COMMENTS ARTERIAL: SIGNIFICANT CHANGE UP
BUN SERPL-MCNC: 10 MG/DL — SIGNIFICANT CHANGE UP (ref 7–18)
BUN SERPL-MCNC: 10 MG/DL — SIGNIFICANT CHANGE UP (ref 7–18)
CALCIUM SERPL-MCNC: 8.7 MG/DL — SIGNIFICANT CHANGE UP (ref 8.4–10.5)
CALCIUM SERPL-MCNC: 8.7 MG/DL — SIGNIFICANT CHANGE UP (ref 8.4–10.5)
CHLORIDE SERPL-SCNC: 109 MMOL/L — HIGH (ref 96–108)
CHLORIDE SERPL-SCNC: 109 MMOL/L — HIGH (ref 96–108)
CO2 SERPL-SCNC: 26 MMOL/L — SIGNIFICANT CHANGE UP (ref 22–31)
CO2 SERPL-SCNC: 26 MMOL/L — SIGNIFICANT CHANGE UP (ref 22–31)
CREAT SERPL-MCNC: 0.8 MG/DL — SIGNIFICANT CHANGE UP (ref 0.5–1.3)
CREAT SERPL-MCNC: 0.8 MG/DL — SIGNIFICANT CHANGE UP (ref 0.5–1.3)
EGFR: 99 ML/MIN/1.73M2 — SIGNIFICANT CHANGE UP
EGFR: 99 ML/MIN/1.73M2 — SIGNIFICANT CHANGE UP
GAS PNL BLDA: SIGNIFICANT CHANGE UP
GAS PNL BLDA: SIGNIFICANT CHANGE UP
GLUCOSE SERPL-MCNC: 110 MG/DL — HIGH (ref 70–99)
GLUCOSE SERPL-MCNC: 110 MG/DL — HIGH (ref 70–99)
HCO3 BLDA-SCNC: 27 MMOL/L — SIGNIFICANT CHANGE UP (ref 21–28)
HCO3 BLDA-SCNC: 27 MMOL/L — SIGNIFICANT CHANGE UP (ref 21–28)
HCT VFR BLD CALC: 44.1 % — SIGNIFICANT CHANGE UP (ref 39–50)
HCT VFR BLD CALC: 44.1 % — SIGNIFICANT CHANGE UP (ref 39–50)
HGB BLD-MCNC: 14.2 G/DL — SIGNIFICANT CHANGE UP (ref 13–17)
HGB BLD-MCNC: 14.2 G/DL — SIGNIFICANT CHANGE UP (ref 13–17)
HOROWITZ INDEX BLDA+IHG-RTO: 100 — SIGNIFICANT CHANGE UP
HOROWITZ INDEX BLDA+IHG-RTO: 100 — SIGNIFICANT CHANGE UP
MAGNESIUM SERPL-MCNC: 2 MG/DL — SIGNIFICANT CHANGE UP (ref 1.6–2.6)
MAGNESIUM SERPL-MCNC: 2 MG/DL — SIGNIFICANT CHANGE UP (ref 1.6–2.6)
MCHC RBC-ENTMCNC: 29.5 PG — SIGNIFICANT CHANGE UP (ref 27–34)
MCHC RBC-ENTMCNC: 29.5 PG — SIGNIFICANT CHANGE UP (ref 27–34)
MCHC RBC-ENTMCNC: 32.2 GM/DL — SIGNIFICANT CHANGE UP (ref 32–36)
MCHC RBC-ENTMCNC: 32.2 GM/DL — SIGNIFICANT CHANGE UP (ref 32–36)
MCV RBC AUTO: 91.5 FL — SIGNIFICANT CHANGE UP (ref 80–100)
MCV RBC AUTO: 91.5 FL — SIGNIFICANT CHANGE UP (ref 80–100)
NRBC # BLD: 0 /100 WBCS — SIGNIFICANT CHANGE UP (ref 0–0)
NRBC # BLD: 0 /100 WBCS — SIGNIFICANT CHANGE UP (ref 0–0)
PCO2 BLDA: 44 MMHG — SIGNIFICANT CHANGE UP (ref 35–48)
PCO2 BLDA: 44 MMHG — SIGNIFICANT CHANGE UP (ref 35–48)
PH BLDA: 7.4 — SIGNIFICANT CHANGE UP (ref 7.35–7.45)
PH BLDA: 7.4 — SIGNIFICANT CHANGE UP (ref 7.35–7.45)
PHOSPHATE SERPL-MCNC: 2.7 MG/DL — SIGNIFICANT CHANGE UP (ref 2.5–4.5)
PHOSPHATE SERPL-MCNC: 2.7 MG/DL — SIGNIFICANT CHANGE UP (ref 2.5–4.5)
PLATELET # BLD AUTO: 326 K/UL — SIGNIFICANT CHANGE UP (ref 150–400)
PLATELET # BLD AUTO: 326 K/UL — SIGNIFICANT CHANGE UP (ref 150–400)
PO2 BLDA: 82 MMHG — LOW (ref 83–108)
PO2 BLDA: 82 MMHG — LOW (ref 83–108)
POTASSIUM SERPL-MCNC: 3.9 MMOL/L — SIGNIFICANT CHANGE UP (ref 3.5–5.3)
POTASSIUM SERPL-MCNC: 3.9 MMOL/L — SIGNIFICANT CHANGE UP (ref 3.5–5.3)
POTASSIUM SERPL-SCNC: 3.9 MMOL/L — SIGNIFICANT CHANGE UP (ref 3.5–5.3)
POTASSIUM SERPL-SCNC: 3.9 MMOL/L — SIGNIFICANT CHANGE UP (ref 3.5–5.3)
PROT SERPL-MCNC: 6.3 G/DL — SIGNIFICANT CHANGE UP (ref 6–8.3)
PROT SERPL-MCNC: 6.3 G/DL — SIGNIFICANT CHANGE UP (ref 6–8.3)
RBC # BLD: 4.82 M/UL — SIGNIFICANT CHANGE UP (ref 4.2–5.8)
RBC # BLD: 4.82 M/UL — SIGNIFICANT CHANGE UP (ref 4.2–5.8)
RBC # FLD: 13.2 % — SIGNIFICANT CHANGE UP (ref 10.3–14.5)
RBC # FLD: 13.2 % — SIGNIFICANT CHANGE UP (ref 10.3–14.5)
SAO2 % BLDA: 98 % — SIGNIFICANT CHANGE UP
SAO2 % BLDA: 98 % — SIGNIFICANT CHANGE UP
SODIUM SERPL-SCNC: 140 MMOL/L — SIGNIFICANT CHANGE UP (ref 135–145)
SODIUM SERPL-SCNC: 140 MMOL/L — SIGNIFICANT CHANGE UP (ref 135–145)
WBC # BLD: 15.31 K/UL — HIGH (ref 3.8–10.5)
WBC # BLD: 15.31 K/UL — HIGH (ref 3.8–10.5)
WBC # FLD AUTO: 15.31 K/UL — HIGH (ref 3.8–10.5)
WBC # FLD AUTO: 15.31 K/UL — HIGH (ref 3.8–10.5)

## 2024-01-05 PROCEDURE — 71045 X-RAY EXAM CHEST 1 VIEW: CPT | Mod: 26

## 2024-01-05 RX ORDER — METRONIDAZOLE 500 MG
500 TABLET ORAL EVERY 8 HOURS
Refills: 0 | Status: DISCONTINUED | OUTPATIENT
Start: 2024-01-05 | End: 2024-01-06

## 2024-01-05 RX ORDER — PROPOFOL 10 MG/ML
10 INJECTION, EMULSION INTRAVENOUS
Qty: 1000 | Refills: 0 | Status: DISCONTINUED | OUTPATIENT
Start: 2024-01-05 | End: 2024-01-08

## 2024-01-05 RX ORDER — PROPOFOL 10 MG/ML
10 INJECTION, EMULSION INTRAVENOUS
Qty: 1000 | Refills: 0 | Status: DISCONTINUED | OUTPATIENT
Start: 2024-01-05 | End: 2024-01-05

## 2024-01-05 RX ORDER — CEFEPIME 1 G/1
2000 INJECTION, POWDER, FOR SOLUTION INTRAMUSCULAR; INTRAVENOUS EVERY 8 HOURS
Refills: 0 | Status: DISCONTINUED | OUTPATIENT
Start: 2024-01-05 | End: 2024-01-06

## 2024-01-05 RX ORDER — ROCURONIUM BROMIDE 10 MG/ML
50 VIAL (ML) INTRAVENOUS ONCE
Refills: 0 | Status: COMPLETED | OUTPATIENT
Start: 2024-01-05 | End: 2024-01-05

## 2024-01-05 RX ORDER — ETOMIDATE 2 MG/ML
20 INJECTION INTRAVENOUS ONCE
Refills: 0 | Status: COMPLETED | OUTPATIENT
Start: 2024-01-05 | End: 2024-01-05

## 2024-01-05 RX ORDER — POTASSIUM CHLORIDE 20 MEQ
10 PACKET (EA) ORAL
Refills: 0 | Status: COMPLETED | OUTPATIENT
Start: 2024-01-05 | End: 2024-01-05

## 2024-01-05 RX ORDER — CHLORHEXIDINE GLUCONATE 213 G/1000ML
15 SOLUTION TOPICAL EVERY 12 HOURS
Refills: 0 | Status: DISCONTINUED | OUTPATIENT
Start: 2024-01-05 | End: 2024-01-08

## 2024-01-05 RX ORDER — FENTANYL CITRATE 50 UG/ML
25 INJECTION INTRAVENOUS EVERY 4 HOURS
Refills: 0 | Status: DISCONTINUED | OUTPATIENT
Start: 2024-01-05 | End: 2024-01-08

## 2024-01-05 RX ORDER — CHLORHEXIDINE GLUCONATE 213 G/1000ML
15 SOLUTION TOPICAL EVERY 12 HOURS
Refills: 0 | Status: DISCONTINUED | OUTPATIENT
Start: 2024-01-05 | End: 2024-01-05

## 2024-01-05 RX ORDER — FENTANYL CITRATE 50 UG/ML
50 INJECTION INTRAVENOUS ONCE
Refills: 0 | Status: DISCONTINUED | OUTPATIENT
Start: 2024-01-05 | End: 2024-01-05

## 2024-01-05 RX ORDER — FENTANYL CITRATE 50 UG/ML
0.5 INJECTION INTRAVENOUS
Qty: 5000 | Refills: 0 | Status: DISCONTINUED | OUTPATIENT
Start: 2024-01-05 | End: 2024-01-05

## 2024-01-05 RX ADMIN — CHLORHEXIDINE GLUCONATE 15 MILLILITER(S): 213 SOLUTION TOPICAL at 17:38

## 2024-01-05 RX ADMIN — CEFEPIME 100 MILLIGRAM(S): 1 INJECTION, POWDER, FOR SOLUTION INTRAMUSCULAR; INTRAVENOUS at 13:21

## 2024-01-05 RX ADMIN — ETOMIDATE 20 MILLIGRAM(S): 2 INJECTION INTRAVENOUS at 05:42

## 2024-01-05 RX ADMIN — Medication 100 MILLIGRAM(S): at 22:53

## 2024-01-05 RX ADMIN — ATORVASTATIN CALCIUM 20 MILLIGRAM(S): 80 TABLET, FILM COATED ORAL at 22:53

## 2024-01-05 RX ADMIN — Medication 6 MILLIGRAM(S): at 05:37

## 2024-01-05 RX ADMIN — FENTANYL CITRATE 50 MICROGRAM(S): 50 INJECTION INTRAVENOUS at 07:00

## 2024-01-05 RX ADMIN — PROPOFOL 3.42 MICROGRAM(S)/KG/MIN: 10 INJECTION, EMULSION INTRAVENOUS at 05:42

## 2024-01-05 RX ADMIN — CHLORHEXIDINE GLUCONATE 15 MILLILITER(S): 213 SOLUTION TOPICAL at 06:01

## 2024-01-05 RX ADMIN — Medication 100 MILLIEQUIVALENT(S): at 08:42

## 2024-01-05 RX ADMIN — ENOXAPARIN SODIUM 40 MILLIGRAM(S): 100 INJECTION SUBCUTANEOUS at 12:36

## 2024-01-05 RX ADMIN — Medication 100 MILLIEQUIVALENT(S): at 05:42

## 2024-01-05 RX ADMIN — Medication 250 MILLIGRAM(S): at 17:38

## 2024-01-05 RX ADMIN — Medication 50 MILLIGRAM(S): at 05:43

## 2024-01-05 RX ADMIN — FENTANYL CITRATE 50 MICROGRAM(S): 50 INJECTION INTRAVENOUS at 06:46

## 2024-01-05 RX ADMIN — Medication 81 MILLIGRAM(S): at 11:49

## 2024-01-05 RX ADMIN — CEFEPIME 100 MILLIGRAM(S): 1 INJECTION, POWDER, FOR SOLUTION INTRAMUSCULAR; INTRAVENOUS at 21:40

## 2024-01-05 RX ADMIN — Medication 100 MILLIEQUIVALENT(S): at 05:00

## 2024-01-05 RX ADMIN — Medication 5.95 MICROGRAM(S)/KG/MIN: at 19:15

## 2024-01-05 NOTE — PROGRESS NOTE ADULT - SUBJECTIVE AND OBJECTIVE BOX
Patient is a 63y old  Male who presents with a chief complaint of Sepsis and AHRF (05 Jan 2024 16:30)    PATIENT IS SEEN AND EXAMINED IN ICU.    ALLERGIES:  No Known Allergies    VITALS:    Vital Signs Last 24 Hrs  T(C): 37 (05 Jan 2024 21:00), Max: 37 (05 Jan 2024 05:00)  T(F): 98.6 (05 Jan 2024 21:00), Max: 98.6 (05 Jan 2024 05:00)  HR: 52 (05 Jan 2024 21:00) (38 - 126)  BP: 106/72 (05 Jan 2024 21:00) (78/60 - 152/85)  BP(mean): 81 (05 Jan 2024 21:00) (68 - 108)  RR: 16 (05 Jan 2024 21:00) (8 - 22)  SpO2: 98% (05 Jan 2024 21:00) (77% - 100%)    Parameters below as of 05 Jan 2024 20:00  Patient On (Oxygen Delivery Method): ventilator        LABS:    CBC Full  -  ( 05 Jan 2024 04:50 )  WBC Count : 15.31 K/uL  RBC Count : 4.82 M/uL  Hemoglobin : 14.2 g/dL  Hematocrit : 44.1 %  Platelet Count - Automated : 326 K/uL  Mean Cell Volume : 91.5 fl  Mean Cell Hemoglobin : 29.5 pg  Mean Cell Hemoglobin Concentration : 32.2 gm/dL  Auto Neutrophil # : x  Auto Lymphocyte # : x  Auto Monocyte # : x  Auto Eosinophil # : x  Auto Basophil # : x  Auto Neutrophil % : x  Auto Lymphocyte % : x  Auto Monocyte % : x  Auto Eosinophil % : x  Auto Basophil % : x      01-05    140  |  109<H>  |  10  ----------------------------<  110<H>  3.9   |  26  |  0.80    Ca    8.7      05 Jan 2024 04:50  Phos  2.7     01-05  Mg     2.0     01-05    TPro  6.3  /  Alb  2.8<L>  /  TBili  0.4  /  DBili  x   /  AST  39  /  ALT  73<H>  /  AlkPhos  57  01-05    CAPILLARY BLOOD GLUCOSE            LIVER FUNCTIONS - ( 05 Jan 2024 04:50 )  Alb: 2.8 g/dL / Pro: 6.3 g/dL / ALK PHOS: 57 U/L / ALT: 73 U/L DA / AST: 39 U/L / GGT: x           Creatinine Trend: 0.80<--, 0.76<--, 0.73<--, 0.68<--, 0.76<--, 0.76<--  I&O's Summary    04 Jan 2024 07:01  -  05 Jan 2024 07:00  --------------------------------------------------------  IN: 307.8 mL / OUT: 1490 mL / NET: -1182.2 mL    05 Jan 2024 07:01  -  05 Jan 2024 21:31  --------------------------------------------------------  IN: 313.6 mL / OUT: 725 mL / NET: -411.4 mL        ABG - ( 05 Jan 2024 06:07 )  pH, Arterial: 7.40  pH, Blood: x     /  pCO2: 44    /  pO2: 82    / HCO3: 27    / Base Excess: 2.0   /  SaO2: 98                  Catheterized Catheterized  12-29 @ 12:37   No growth  --  --      .Blood Blood  12-29 @ 02:05   No growth at 5 days  --  --      .Blood Blood  12-29 @ 01:55   No growth at 5 days  --  --          MEDICATIONS:    MEDICATIONS  (STANDING):  aspirin  chewable 81 milliGRAM(s) Oral daily  atorvastatin 20 milliGRAM(s) Oral at bedtime  cefepime   IVPB 2000 milliGRAM(s) IV Intermittent every 8 hours  chlorhexidine 0.12% Liquid 15 milliLiter(s) Oral Mucosa every 12 hours  dexAMETHasone  Injectable 6 milliGRAM(s) IV Push daily  enoxaparin Injectable 40 milliGRAM(s) SubCutaneous every 24 hours  metroNIDAZOLE  IVPB 500 milliGRAM(s) IV Intermittent every 8 hours  norepinephrine Infusion 0.05 MICROgram(s)/kG/Min (5.95 mL/Hr) IV Continuous <Continuous>  propofol Infusion 10 MICROgram(s)/kG/Min (3.42 mL/Hr) IV Continuous <Continuous>  valproic  acid Syrup 250 milliGRAM(s) Oral two times a day      MEDICATIONS  (PRN):  acetaminophen     Tablet .. 650 milliGRAM(s) Oral every 6 hours PRN Temp greater or equal to 38C (100.4F), Mild Pain (1 - 3)  albuterol    90 MICROgram(s) HFA Inhaler 2 Puff(s) Inhalation every 6 hours PRN Bronchospasm  fentaNYL    Injectable 25 MICROGram(s) IV Push every 4 hours PRN Agitation      REVIEW OF SYSTEMS:                           ALL ROS DONE [ X   ]    CONSTITUTIONAL:  LETHARGIC [   ], FEVER [   ], UNRESPONSIVE [   ]  CVS:  CP  [   ], SOB, [   ], PALPITATIONS [   ], DIZZYNESS [   ]  RS: COUGH [   ], SPUTUM [   ]  GI: ABDOMINAL PAIN [   ], NAUSEA [   ], VOMITINGS [   ], DIARRHEA [   ], CONSTIPATION [   ]  :  DYSURIA [   ], NOCTURIA [   ], INCREASED FREQUENCY [   ], DRIBLING [   ],  SKELETAL: PAINFUL JOINTS [   ], SWOLLEN JOINTS [   ], NECK ACHE [   ], LOW BACK ACHE [   ],  SKIN : ULCERS [   ], RASH [   ], ITCHING [   ]  CNS: HEAD ACHE [   ], DOUBLE VISION [   ], BLURRED VISION [   ], AMS / CONFUSION [   ], SEIZURES [   ], WEAKNESS [   ],TINGLING / NUMBNESS [   ]    PHYSICAL EXAMINATION:  GENERAL APPEARANCE: NO DISTRESS  HEENT:  NO PALLOR, NO  JVD,  NO   NODES, NECK SUPPLE  CVS: S1 +, S2 +,   RS: AEEB,  OCCASIONAL  RALES +,   NO RONCHI    INTUBATED  ABD: SOFT, NT, NO, BS +  EXT: NO PE  SKIN: WARM,   SKELETAL:  REDUCED ROM OF CERVICAL AND LS SPINE  CNS:  AAO X 0    RADIOLOGY :    RADIOLOGY AND READINGS REVIEWED    ASSESSMENT :     Infection due to severe acute respiratory syndrome coronavirus 2 (SARS-CoV-2)    CVA (cerebral vascular accident)    HLD (hyperlipidemia)    S/P percutaneous endoscopic gastrostomy (PEG) tube placement        PLAN:  HPI:  A 63 year old male, from [facility], with PMH of CVA, Alzheimer's, nonverbal at baseline, GERD, Schizophrenia, and Constipation, was brought into the ED s/p mechanical fall, poor oral intake, and Covid-19 infection on 12/27/23 as per NH papers. Unable to obtain history from patient since he is nonverbal, history obtained from chart review.  (28 Dec 2023 20:54)    # PROGNOSIS IS POOR/GUARDED - CRITICAL CARE TEAM DISCUSSING W/ FAMILY REGARDING GOC.     # [1/3] RAPID RESPONSE FOR HYPOTENSION AND HYPOXIA - S/P IV FLUIDS AND PLACED ON NRB FOR HYPOXIA. CRITICAL CARE EVALUATION REQUESTED.     # SEPTIC SHOCK S/T ASPIRATION PNA + DIARRHEA  # ACUTE HYPOXIC RESPIRATORY FAILURE S/T ? ASPIRATION EVENT   , COVID19 INFECTION   S/P INTUBATION   - ON DECADRON  - ROCEPHIN, AZITHROMYCIN  - F/U BCX AND UCX , F/U STOOL GI PCR  - S/P IVF, VASOPRESSORS  - F/U CXR AND ABD XRAY  - ID CONSULT  - CRITICAL CARE MANAGEMENT IN PROGRESS    - [1/4] - PATIENT W/ ? ASPIRATION EVENT - SUBSEQUENTLY REQUIRED INTUBATION W/ MECHANICAL VENTILATION    # BRADYCARDIA  - MONITOR ON TELEMETRY  - F/U ECHOCARDIOGRAM  - OPTIMIZE ELECTROLYTES  - CARDIOLOGY CONSULT  - EP CONSULT    # HYPOKALEMIA  - REPLETING WITH SUPPLEMENT    # HX OF CVA  # HX OF DEMENTIA  # SCHIZOPHRENIA  # GI AND DVT PPX     Patient is a 63y old  Male who presents with a chief complaint of Sepsis and AHRF (05 Jan 2024 16:30)    PATIENT IS SEEN AND EXAMINED IN ICU.    ALLERGIES:  No Known Allergies    VITALS:    Vital Signs Last 24 Hrs  T(C): 37 (05 Jan 2024 21:00), Max: 37 (05 Jan 2024 05:00)  T(F): 98.6 (05 Jan 2024 21:00), Max: 98.6 (05 Jan 2024 05:00)  HR: 52 (05 Jan 2024 21:00) (38 - 126)  BP: 106/72 (05 Jan 2024 21:00) (78/60 - 152/85)  BP(mean): 81 (05 Jan 2024 21:00) (68 - 108)  RR: 16 (05 Jan 2024 21:00) (8 - 22)  SpO2: 98% (05 Jan 2024 21:00) (77% - 100%)    Parameters below as of 05 Jan 2024 20:00  Patient On (Oxygen Delivery Method): ventilator        LABS:    CBC Full  -  ( 05 Jan 2024 04:50 )  WBC Count : 15.31 K/uL  RBC Count : 4.82 M/uL  Hemoglobin : 14.2 g/dL  Hematocrit : 44.1 %  Platelet Count - Automated : 326 K/uL  Mean Cell Volume : 91.5 fl  Mean Cell Hemoglobin : 29.5 pg  Mean Cell Hemoglobin Concentration : 32.2 gm/dL  Auto Neutrophil # : x  Auto Lymphocyte # : x  Auto Monocyte # : x  Auto Eosinophil # : x  Auto Basophil # : x  Auto Neutrophil % : x  Auto Lymphocyte % : x  Auto Monocyte % : x  Auto Eosinophil % : x  Auto Basophil % : x      01-05    140  |  109<H>  |  10  ----------------------------<  110<H>  3.9   |  26  |  0.80    Ca    8.7      05 Jan 2024 04:50  Phos  2.7     01-05  Mg     2.0     01-05    TPro  6.3  /  Alb  2.8<L>  /  TBili  0.4  /  DBili  x   /  AST  39  /  ALT  73<H>  /  AlkPhos  57  01-05    CAPILLARY BLOOD GLUCOSE            LIVER FUNCTIONS - ( 05 Jan 2024 04:50 )  Alb: 2.8 g/dL / Pro: 6.3 g/dL / ALK PHOS: 57 U/L / ALT: 73 U/L DA / AST: 39 U/L / GGT: x           Creatinine Trend: 0.80<--, 0.76<--, 0.73<--, 0.68<--, 0.76<--, 0.76<--  I&O's Summary    04 Jan 2024 07:01  -  05 Jan 2024 07:00  --------------------------------------------------------  IN: 307.8 mL / OUT: 1490 mL / NET: -1182.2 mL    05 Jan 2024 07:01  -  05 Jan 2024 21:31  --------------------------------------------------------  IN: 313.6 mL / OUT: 725 mL / NET: -411.4 mL        ABG - ( 05 Jan 2024 06:07 )  pH, Arterial: 7.40  pH, Blood: x     /  pCO2: 44    /  pO2: 82    / HCO3: 27    / Base Excess: 2.0   /  SaO2: 98                  Catheterized Catheterized  12-29 @ 12:37   No growth  --  --      .Blood Blood  12-29 @ 02:05   No growth at 5 days  --  --      .Blood Blood  12-29 @ 01:55   No growth at 5 days  --  --          MEDICATIONS:    MEDICATIONS  (STANDING):  aspirin  chewable 81 milliGRAM(s) Oral daily  atorvastatin 20 milliGRAM(s) Oral at bedtime  cefepime   IVPB 2000 milliGRAM(s) IV Intermittent every 8 hours  chlorhexidine 0.12% Liquid 15 milliLiter(s) Oral Mucosa every 12 hours  dexAMETHasone  Injectable 6 milliGRAM(s) IV Push daily  enoxaparin Injectable 40 milliGRAM(s) SubCutaneous every 24 hours  metroNIDAZOLE  IVPB 500 milliGRAM(s) IV Intermittent every 8 hours  norepinephrine Infusion 0.05 MICROgram(s)/kG/Min (5.95 mL/Hr) IV Continuous <Continuous>  propofol Infusion 10 MICROgram(s)/kG/Min (3.42 mL/Hr) IV Continuous <Continuous>  valproic  acid Syrup 250 milliGRAM(s) Oral two times a day      MEDICATIONS  (PRN):  acetaminophen     Tablet .. 650 milliGRAM(s) Oral every 6 hours PRN Temp greater or equal to 38C (100.4F), Mild Pain (1 - 3)  albuterol    90 MICROgram(s) HFA Inhaler 2 Puff(s) Inhalation every 6 hours PRN Bronchospasm  fentaNYL    Injectable 25 MICROGram(s) IV Push every 4 hours PRN Agitation      REVIEW OF SYSTEMS:                           ALL ROS DONE [ X   ]    CONSTITUTIONAL:  LETHARGIC [   ], FEVER [   ], UNRESPONSIVE [   ]  CVS:  CP  [   ], SOB, [   ], PALPITATIONS [   ], DIZZYNESS [   ]  RS: COUGH [   ], SPUTUM [   ]  GI: ABDOMINAL PAIN [   ], NAUSEA [   ], VOMITINGS [   ], DIARRHEA [   ], CONSTIPATION [   ]  :  DYSURIA [   ], NOCTURIA [   ], INCREASED FREQUENCY [   ], DRIBLING [   ],  SKELETAL: PAINFUL JOINTS [   ], SWOLLEN JOINTS [   ], NECK ACHE [   ], LOW BACK ACHE [   ],  SKIN : ULCERS [   ], RASH [   ], ITCHING [   ]  CNS: HEAD ACHE [   ], DOUBLE VISION [   ], BLURRED VISION [   ], AMS / CONFUSION [   ], SEIZURES [   ], WEAKNESS [   ],TINGLING / NUMBNESS [   ]    PHYSICAL EXAMINATION:  GENERAL APPEARANCE: NO DISTRESS  HEENT:  NO PALLOR, NO  JVD,  NO   NODES, NECK SUPPLE  CVS: S1 +, S2 +,   RS: AEEB,  OCCASIONAL  RALES +,   NO RONCHI    INTUBATED  ABD: SOFT, NT, NO, BS +  EXT: NO PE  SKIN: WARM,   SKELETAL:  REDUCED ROM OF CERVICAL AND LS SPINE  CNS:  AAO X 0    RADIOLOGY :    RADIOLOGY AND READINGS REVIEWED    ASSESSMENT :     Infection due to severe acute respiratory syndrome coronavirus 2 (SARS-CoV-2)    CVA (cerebral vascular accident)    HLD (hyperlipidemia)    S/P percutaneous endoscopic gastrostomy (PEG) tube placement        PLAN:  HPI:  A 63 year old male, from [facility], with PMH of CVA, Alzheimer's, nonverbal at baseline, GERD, Schizophrenia, and Constipation, was brought into the ED s/p mechanical fall, poor oral intake, and Covid-19 infection on 12/27/23 as per NH papers. Unable to obtain history from patient since he is nonverbal, history obtained from chart review.  (28 Dec 2023 20:54)    # PROGNOSIS IS POOR/GUARDED - CRITICAL CARE TEAM DISCUSSING W/ FAMILY REGARDING GOC.     # [1/3] RAPID RESPONSE FOR HYPOTENSION AND HYPOXIA - S/P IV FLUIDS AND PLACED ON NRB FOR HYPOXIA. CRITICAL CARE EVALUATION REQUESTED.     # SEPTIC SHOCK S/T ASPIRATION PNA + DIARRHEA  # ACUTE HYPOXIC RESPIRATORY FAILURE S/T ? ASPIRATION EVENT   , COVID19 INFECTION   S/P INTUBATION   - ON DECADRON  - ROCEPHIN, AZITHROMYCIN  - F/U BCX AND UCX , F/U STOOL GI PCR  - S/P IVF, VASOPRESSORS  - F/U CXR AND ABD XRAY  - ID CONSULT  - CRITICAL CARE MANAGEMENT IN PROGRESS    - [1/4] - PATIENT W/ ? ASPIRATION EVENT - SUBSEQUENTLY REQUIRED INTUBATION W/ MECHANICAL VENTILATION    # BRADYCARDIA  - MONITOR ON TELEMETRY  - F/U ECHOCARDIOGRAM  - OPTIMIZE ELECTROLYTES  - CARDIOLOGY CONSULT  - EP CONSULT    # HYPOKALEMIA  - REPLETING WITH SUPPLEMENT    # HX OF CVA  # HX OF DEMENTIA  # SCHIZOPHRENIA  # GI AND DVT PPX

## 2024-01-05 NOTE — PROGRESS NOTE ADULT - SUBJECTIVE AND OBJECTIVE BOX
ICU VISIT  63y Male    Meds:  cefepime   IVPB 2000 milliGRAM(s) IV Intermittent every 8 hours    Allergies    No Known Allergies    Intolerances        VITALS:  Vital Signs Last 24 Hrs  T(C): 36.9 (05 Jan 2024 16:15), Max: 37 (05 Jan 2024 05:00)  T(F): 98.4 (05 Jan 2024 16:15), Max: 98.6 (05 Jan 2024 05:00)  HR: 63 (05 Jan 2024 16:15) (38 - 126)  BP: 102/78 (05 Jan 2024 16:15) (78/60 - 152/85)  BP(mean): 87 (05 Jan 2024 16:15) (68 - 108)  RR: 19 (05 Jan 2024 16:15) (8 - 22)  SpO2: 100% (05 Jan 2024 16:15) (77% - 100%)    Parameters below as of 05 Jan 2024 04:00  Patient On (Oxygen Delivery Method): nasal cannula, high flow  O2 Flow (L/min): 50  O2 Concentration (%): 100    LABS/DIAGNOSTIC TESTS:                          14.2   15.31 )-----------( 326      ( 05 Jan 2024 04:50 )             44.1         01-05    140  |  109<H>  |  10  ----------------------------<  110<H>  3.9   |  26  |  0.80    Ca    8.7      05 Jan 2024 04:50  Phos  2.7     01-05  Mg     2.0     01-05    TPro  6.3  /  Alb  2.8<L>  /  TBili  0.4  /  DBili  x   /  AST  39  /  ALT  73<H>  /  AlkPhos  57  01-05      LIVER FUNCTIONS - ( 05 Jan 2024 04:50 )  Alb: 2.8 g/dL / Pro: 6.3 g/dL / ALK PHOS: 57 U/L / ALT: 73 U/L DA / AST: 39 U/L / GGT: x             CULTURES: Catheterized Catheterized  12-29 @ 12:37   No growth  --  --      .Blood Blood  12-29 @ 02:05   No growth at 5 days  --  --      .Blood Blood  12-29 @ 01:55   No growth at 5 days  --  --            RADIOLOGY:< from: Xray Chest 1 View-PORTABLE IMMEDIATE (Xray Chest 1 View-PORTABLE IMMEDIATE .) (01.05.24 @ 13:28) >  ACC: [id number] EXAM:  XR CHEST PORTABLE IMMED 1V   ORDERED BY: TRUNG BECERRA     PROCEDURE DATE:  01/05/2024          INTERPRETATION:  AP semierect chest on January 5, 2024 at 9:32 AM.   Concern is NG tube.    Heart size normal. Left loop recorder an endotracheal tube remain.    NG tube is noted with tip below the diaphragm.    There is an increasing left lower lobe infiltrate.    IMPRESSION: Tip of NG tube enters stomach.    Increasing left lower lobe infiltrate.    --- End of Report ---            JOANNA SON MD; Attending Radiologist  This document has been electronically signed. Jan 5 2024  3:47PM    < end of copied text >        ROS:  [  ] UNABLE TO ELICIT ICU VISIT  63y Male    Meds:  cefepime   IVPB 2000 milliGRAM(s) IV Intermittent every 8 hours    Allergies    No Known Allergies    Intolerances        VITALS:  Vital Signs Last 24 Hrs  T(C): 36.9 (05 Jan 2024 16:15), Max: 37 (05 Jan 2024 05:00)  T(F): 98.4 (05 Jan 2024 16:15), Max: 98.6 (05 Jan 2024 05:00)  HR: 63 (05 Jan 2024 16:15) (38 - 126)  BP: 102/78 (05 Jan 2024 16:15) (78/60 - 152/85)  BP(mean): 87 (05 Jan 2024 16:15) (68 - 108)  RR: 19 (05 Jan 2024 16:15) (8 - 22)  SpO2: 100% (05 Jan 2024 16:15) (77% - 100%)    Parameters below as of 05 Jan 2024 04:00  Patient On (Oxygen Delivery Method): nasal cannula, high flow  O2 Flow (L/min): 50  O2 Concentration (%): 100    LABS/DIAGNOSTIC TESTS:                          14.2   15.31 )-----------( 326      ( 05 Jan 2024 04:50 )             44.1         01-05    140  |  109<H>  |  10  ----------------------------<  110<H>  3.9   |  26  |  0.80    Ca    8.7      05 Jan 2024 04:50  Phos  2.7     01-05  Mg     2.0     01-05    TPro  6.3  /  Alb  2.8<L>  /  TBili  0.4  /  DBili  x   /  AST  39  /  ALT  73<H>  /  AlkPhos  57  01-05      LIVER FUNCTIONS - ( 05 Jan 2024 04:50 )  Alb: 2.8 g/dL / Pro: 6.3 g/dL / ALK PHOS: 57 U/L / ALT: 73 U/L DA / AST: 39 U/L / GGT: x             CULTURES: Catheterized Catheterized  12-29 @ 12:37   No growth  --  --      .Blood Blood  12-29 @ 02:05   No growth at 5 days  --  --      .Blood Blood  12-29 @ 01:55   No growth at 5 days  --  --            RADIOLOGY:< from: Xray Chest 1 View-PORTABLE IMMEDIATE (Xray Chest 1 View-PORTABLE IMMEDIATE .) (01.05.24 @ 13:28) >  ACC: [id number] EXAM:  XR CHEST PORTABLE IMMED 1V   ORDERED BY: TRUNG BECERRA     PROCEDURE DATE:  01/05/2024          INTERPRETATION:  AP semierect chest on January 5, 2024 at 9:32 AM.   Concern is NG tube.    Heart size normal. Left loop recorder an endotracheal tube remain.    NG tube is noted with tip below the diaphragm.    There is an increasing left lower lobe infiltrate.    IMPRESSION: Tip of NG tube enters stomach.    Increasing left lower lobe infiltrate.    --- End of Report ---            JOANNA SON MD; Attending Radiologist  This document has been electronically signed. Jan 5 2024  3:47PM    < end of copied text >        ROS:  [  ] UNABLE TO ELICIT ICU VISIT  63y Male who remains in the ICU but got intubated and is now sedated and vent dependent on a FIO2 of 40% and PEEP of 10, he is also on 1 pressor, he is less bradycardic also today. He remains afebrile but WBC count is a little high. He is suspected to have aspirated and have Pneumonia secondary to that. He was switched from Rocephin to Maxipime. He has thick tracheal secretions.    Meds:  cefepime   IVPB 2000 milliGRAM(s) IV Intermittent every 8 hours    Allergies    No Known Allergies    Intolerances        VITALS:  Vital Signs Last 24 Hrs  T(C): 36.9 (05 Jan 2024 16:15), Max: 37 (05 Jan 2024 05:00)  T(F): 98.4 (05 Jan 2024 16:15), Max: 98.6 (05 Jan 2024 05:00)  HR: 63 (05 Jan 2024 16:15) (38 - 126)  BP: 102/78 (05 Jan 2024 16:15) (78/60 - 152/85)  BP(mean): 87 (05 Jan 2024 16:15) (68 - 108)  RR: 19 (05 Jan 2024 16:15) (8 - 22)  SpO2: 100% (05 Jan 2024 16:15) (77% - 100%)    Parameters below as of 05 Jan 2024 04:00  Patient On (Oxygen Delivery Method): nasal cannula, high flow  O2 Flow (L/min): 50  O2 Concentration (%): 100    LABS/DIAGNOSTIC TESTS:                          14.2   15.31 )-----------( 326      ( 05 Jan 2024 04:50 )             44.1         01-05    140  |  109<H>  |  10  ----------------------------<  110<H>  3.9   |  26  |  0.80    Ca    8.7      05 Jan 2024 04:50  Phos  2.7     01-05  Mg     2.0     01-05    TPro  6.3  /  Alb  2.8<L>  /  TBili  0.4  /  DBili  x   /  AST  39  /  ALT  73<H>  /  AlkPhos  57  01-05      LIVER FUNCTIONS - ( 05 Jan 2024 04:50 )  Alb: 2.8 g/dL / Pro: 6.3 g/dL / ALK PHOS: 57 U/L / ALT: 73 U/L DA / AST: 39 U/L / GGT: x             CULTURES: Catheterized Catheterized  12-29 @ 12:37   No growth  --  --      .Blood Blood  12-29 @ 02:05   No growth at 5 days  --  --      .Blood Blood  12-29 @ 01:55   No growth at 5 days  --  --            RADIOLOGY:< from: Xray Chest 1 View-PORTABLE IMMEDIATE (Xray Chest 1 View-PORTABLE IMMEDIATE .) (01.05.24 @ 13:28) >  ACC: [id number] EXAM:  XR CHEST PORTABLE IMMED 1V   ORDERED BY: TRUNG BECERRA     PROCEDURE DATE:  01/05/2024          INTERPRETATION:  AP semierect chest on January 5, 2024 at 9:32 AM.   Concern is NG tube.    Heart size normal. Left loop recorder an endotracheal tube remain.    NG tube is noted with tip below the diaphragm.    There is an increasing left lower lobe infiltrate.    IMPRESSION: Tip of NG tube enters stomach.    Increasing left lower lobe infiltrate.    --- End of Report ---            JOANNA SON MD; Attending Radiologist  This document has been electronically signed. Jan 5 2024  3:47PM    < end of copied text >        ROS:  [ x ] UNABLE TO ELICIT ICU VISIT  63y Male who remains in the ICU but got intubated and is now sedated and vent dependent on a FIO2 of 40% and PEEP of 10, he is also on 1 pressor, he is less bradycardic also today. He remains afebrile but WBC count is a little high. He is suspected to have aspirated and have Pneumonia secondary to that. He was switched from Rocephin to Maxipime. He has thick tracheal secretions.    Meds:  cefepime   IVPB 2000 milliGRAM(s) IV Intermittent every 8 hours    Allergies    No Known Allergies    Intolerances        VITALS:  Vital Signs Last 24 Hrs  T(C): 36.9 (05 Jan 2024 16:15), Max: 37 (05 Jan 2024 05:00)  T(F): 98.4 (05 Jan 2024 16:15), Max: 98.6 (05 Jan 2024 05:00)  HR: 63 (05 Jan 2024 16:15) (38 - 126)  BP: 102/78 (05 Jan 2024 16:15) (78/60 - 152/85)  BP(mean): 87 (05 Jan 2024 16:15) (68 - 108)  RR: 19 (05 Jan 2024 16:15) (8 - 22)  SpO2: 100% (05 Jan 2024 16:15) (77% - 100%)    Parameters below as of 05 Jan 2024 04:00  Patient On (Oxygen Delivery Method): nasal cannula, high flow  O2 Flow (L/min): 50  O2 Concentration (%): 100    LABS/DIAGNOSTIC TESTS:                          14.2   15.31 )-----------( 326      ( 05 Jan 2024 04:50 )             44.1         01-05    140  |  109<H>  |  10  ----------------------------<  110<H>  3.9   |  26  |  0.80    Ca    8.7      05 Jan 2024 04:50  Phos  2.7     01-05  Mg     2.0     01-05    TPro  6.3  /  Alb  2.8<L>  /  TBili  0.4  /  DBili  x   /  AST  39  /  ALT  73<H>  /  AlkPhos  57  01-05      LIVER FUNCTIONS - ( 05 Jan 2024 04:50 )  Alb: 2.8 g/dL / Pro: 6.3 g/dL / ALK PHOS: 57 U/L / ALT: 73 U/L DA / AST: 39 U/L / GGT: x             CULTURES: Catheterized Catheterized  12-29 @ 12:37   No growth  --  --      .Blood Blood  12-29 @ 02:05   No growth at 5 days  --  --      .Blood Blood  12-29 @ 01:55   No growth at 5 days  --  --            RADIOLOGY:< from: Xray Chest 1 View-PORTABLE IMMEDIATE (Xray Chest 1 View-PORTABLE IMMEDIATE .) (01.05.24 @ 13:28) >  ACC: [id number] EXAM:  XR CHEST PORTABLE IMMED 1V   ORDERED BY: TRUNG BECERRA     PROCEDURE DATE:  01/05/2024          INTERPRETATION:  AP semierect chest on January 5, 2024 at 9:32 AM.   Concern is NG tube.    Heart size normal. Left loop recorder an endotracheal tube remain.    NG tube is noted with tip below the diaphragm.    There is an increasing left lower lobe infiltrate.    IMPRESSION: Tip of NG tube enters stomach.    Increasing left lower lobe infiltrate.    --- End of Report ---            JOANNA SON MD; Attending Radiologist  This document has been electronically signed. Jan 5 2024  3:47PM    < end of copied text >        ROS:  [ x ] UNABLE TO ELICIT

## 2024-01-05 NOTE — GOALS OF CARE CONVERSATION - ADVANCED CARE PLANNING - CONVERSATION DETAILS
________ and admitted to the ICU.   I spoke with Drew Arriola who was identified as the  health care proxy and we had an extensive conversation about Sulema Arriola 's care and current condition.     Discussed overall goals of care including advanced directives with ____ . During this dicussion we reviewed the current diagnosis, treatment plan, and likely prognosis. An explanination of advanced directives and MOLST was provided. ____ identifies ____ as one of the most important goals to ____.  After this conversation decision was made by ___ to continue full code status / change status to DNR/Allow Natural Death. MOLST form completed, signed, and placed in chart.     Above was reviewed with MICU attending physician  ____ .       Spoke to Son Drew Arriola (HCP) and his uncle about  goals of care  including options, risks and benefits of many medical treatments including CPR, intubation, and tube feeding. As per the son Drew Arriola (HCP). he wants the patient to be DNR/DNI with trial of noninvasive procedure.       Witness to the conversation. Dr. Keshav Agarwal. Spoke to son Drew Arriola (HCP) and his uncle about  goals of care  including options, risks and benefits of many medical treatments including CPR, intubation, and tube feeding.  As per Drew, he  states that the patient had peg tube in the past and hated the idea of it by always removing it. After this conversation, decision was made to make the patient to be DNR/DNI with trial of noninvasive procedure.    MOLST form completed, signed, and placed in chart.     Above was reviewed with MICU attending physician Dr. Cain Spoke to son Drew Arriola  and his uncle about  goals of care  including options, risks and benefits of many medical treatments including CPR, intubation, and tube feeding.  As per Drew, he  states that the patient had peg tube in the past and hated the idea of it by always removing it. After this conversation, decision was made to make the patient to be DNR/DNI with trial of noninvasive procedure.    MOLST form completed, signed, and placed in chart.     Above was reviewed with MICU attending physician Dr. Cain Spoke to son Drew Arriola  and his uncle about  goals of care  including options, risks and benefits of many medical treatments including CPR, intubation, and tube feeding.  As per Drew, he  states that the patient had peg tube in the past and hated the idea of it by always removing it. After this conversation, decision was made to make the patient to be DNR/DNI with trial of noninvasive procedure.    MOLST form completed, signed, and placed in chart.     Above was reviewed with MICU attending physician Dr. Cain.

## 2024-01-05 NOTE — PROGRESS NOTE ADULT - ASSESSMENT
63 year old male, from [facility], with PMH of CVA, Alzheimer's, nonverbal at baseline, GERD, Schizophrenia, and Constipation, was brought into the ED s/p mechanical fall, poor oral intake, and Covid-19 infection on 12/27/23 Pt was admitted for COVID PNA, later found with intermittent hypoxia/ hypopnea to Sats 80% and Hypotension despite multiple fluid boluses, also found with bryan to 40s for the last 2 days, in ICU for further monitoring.     DX:  1. CVA  2. Alzheimer's  3. Hypoxia  4. Hypotension   5. Bradycardia   5. COVID +Ve         =================== Neuro============================  # CVA  # Alzheimers  - Alert, orientation difficult to assess  - He is at baseline of bedbound nonverbal in the setting of AD, Previous CVA  - on valporate and as needed ativan   - He is on chronic ativan for agitation   - he is also on zyprexa        ================= Cardiovascular==========================  # Hypotension   # Sinus bradycardia  - Likely due to infection- shock  - Trop -ve today  - TWI on lateral leads are not new  - POCUS shows intact LV function, some signs of end systolic effacement   - F/u echo   - DC midodrine and start levophed for now   - c/w levophed 8mg      ================- Pulm=================================  #Hypoxia   - RRT for desaturation to the 80s (1/2/24)  - Improved with NRB, Pt has ABG with PO2 in 200s  - s/p HF 50/40  - 6L NC titrate as needed    ==================ID===================================    #COVID   #Multifocal PNA   #at risk of aspiration PNA  - On decadron, ceftriaxone   - D/c'd azithromycin after 3 doses   -  Bcx -ve       ================= Nephro================================  #No active issues     =================GI====================================  # Diarrhea 2/2 to laxatives vs covid   - Dc senna, miralax  - GI PCR if persistent  - NPO due to mental status and concerns for asp PNA      ================ Heme==================================  DVT PPx with lovenox    =================Endocrine===============================  Bryan and Hypotensive   - TSH 4.28 [wnl]      ================= Skin/Catheters============================  no wounds    =================Prophylaxis =============================  lovenox    ==================GOC==================================  FULL CODE      63 year old male, from [facility], with PMH of CVA, Alzheimer's, nonverbal at baseline, GERD, Schizophrenia, and Constipation, was brought into the ED s/p mechanical fall, poor oral intake, and Covid-19 infection on 12/27/23 Pt was admitted for COVID PNA, later found with intermittent hypoxia/ hypopnea to Sats 80% and Hypotension despite multiple fluid boluses, also found with bryan to 40s for the last 2 days, in ICU for further monitoring.     DX:  1. CVA  2. Alzheimer's  3. Hypoxia  4. Hypotension   5. Bradycardia   5. COVID +Ve         =================== Neuro============================  # CVA  # Alzheimers  - Alert, orientation difficult to assess  - He is at baseline of bedbound nonverbal in the setting of AD, Previous CVA  - on valporate and as needed ativan   - He is on chronic ativan for agitation   - he is also on zyprexa        ================= Cardiovascular==========================  # Hypotension   # Sinus bradycardia  - Likely due to infection- shock  - Trop -ve today  - TWI on lateral leads are not new  - POCUS shows intact LV function, some signs of end systolic effacement   - F/u echo   - DC midodrine and start levophed for now   - c/w levophed 8mg      ================- Pulm=================================  #Hypoxia   - RRT for desaturation to the 80s (1/2/24)  - Improved with NRB, Pt has ABG with PO2 in 200s  - s/p HF 50/40  - 6L NC titrate as needed    ==================ID===================================    #COVID   #Multifocal PNA   #at risk of aspiration PNA  - On decadron, ceftriaxone   - D/c'd azithromycin after 3 doses   -  Bcx -ve       ================= Nephro================================  #No active issues     =================GI====================================  # Diarrhea 2/2 to laxatives vs covid   - Dc senna, miralax  - GI PCR if persistent  - NPO due to mental status and concerns for asp PNA      ================ Heme==================================  DVT PPx with lovenox    =================Endocrine===============================  Bryan and Hypotensive   - TSH 4.28 [wnl]      ================= Skin/Catheters============================  no wounds    =================Prophylaxis =============================  lovenox    ==================GOC==================================  FULL CODE      63 year old male, from [facility], with PMH of CVA, Alzheimer's, nonverbal at baseline, GERD, Schizophrenia, and Constipation, was brought into the ED s/p mechanical fall, poor oral intake, and Covid-19 infection on 12/27/23 Pt was admitted for COVID PNA, later found with intermittent hypoxia/ hypopnea to Sats 80% and Hypotension despite multiple fluid boluses, also found with bryan to 40s for the last 2 days, in ICU for further monitoring.     DX:  1. CVA  2. Alzheimer's  3. Hypoxia  4. Hypotension   5. Bradycardia   5. COVID +Ve         =================== Neuro============================  # CVA  # Alzheimers    - He is at baseline of bedbound nonverbal in the setting of AD, Previous CVA  - on valporate and as needed ativan   - He is on chronic ativan for agitation   - he is also on zyprexa  - Sedated 1/5/24        ================= Cardiovascular==========================  # Hypotension   # Sinus bradycardia  - Likely due to infection- shock  - Trop -ve today  - TWI on lateral leads are not new  - POCUS shows intact LV function, some signs of end systolic effacement   - F/u echo (pending cardiologist Dr. Cadet approval for the echo)  - DC midodrine and start levophed for now   - c/w levophed 8mg      ================- Pulm=================================  #Acute Hypoxia Respiratory failure  - RRT for desaturation to the 80s (1/2/24)  - Improved with NRB, Pt has ABG with PO2 in 200s  - s/p HF 50/40  - Desaturated and failed HF 1/5/24  - intubated 1/5/24  ==================ID===================================    #COVID   #Multifocal PNA   #at risk of aspiration PNA  - s/p Ceftriaxone, Azithromycin after 3 dose  - Bcx NG  - Started Cefepime 1/5/54  - On decadron, cefepime      ================= Nephro================================  #No active issues     =================GI====================================  # Diarrhea 2/2 to laxatives vs covid   - Dc senna, miralax  - GI PCR if persistent  - NPO with Tube feed due to mental status and concerns for asp PNA      ================ Heme==================================  DVT PPx with lovenox    =================Endocrine===============================  Bryan and Hypotensive   - TSH 4.28 [wnl]      ================= Skin/Catheters============================  no wounds    =================Prophylaxis =============================  lovenox    ==================GOC==================================  FULL CODE      63 year old male, from [facility], with PMH of CVA, Alzheimer's, nonverbal at baseline, GERD, Schizophrenia, and Constipation, was brought into the ED s/p mechanical fall, poor oral intake, and Covid-19 infection on 12/27/23 Pt was admitted for COVID PNA, later found with intermittent hypoxia/ hypopnea to Sats 80% and Hypotension despite multiple fluid boluses, also found with bryan to 40s for the last 2 days, in ICU for further monitoring.     DX:  1. CVA  2. Alzheimer's  3. Hypoxia  4. Hypotension   5. Bradycardia   5. COVID +Ve         =================== Neuro============================  # CVA  # Alzheimers    - He is at baseline of bedbound nonverbal in the setting of AD, Previous CVA  - on valporate and as needed ativan   - He is on chronic ativan for agitation   - he is also on zyprexa  - Sedated 1/5/24        ================= Cardiovascular==========================  # Hypotension   # Sinus bradycardia  - Likely due to infection- shock  - Trop -ve today  - TWI on lateral leads are not new  - POCUS shows intact LV function, some signs of end systolic effacement   - F/u echo (pending cardiologist Dr. Cadet approval for the echo)  - DC midodrine and start levophed for now   - c/w levophed 8mg      ================- Pulm=================================  #Acute Hypoxia Respiratory failure  - RRT for desaturation to the 80s (1/2/24)  - Improved with NRB, Pt has ABG with PO2 in 200s  - s/p HF 50/40  - Desaturated and failed HF 1/5/24  - intubated 1/5/24  ==================ID===================================    #COVID   #Multifocal PNA   #at risk of aspiration PNA  - s/p Ceftriaxone, Azithromycin after 3 dose  - Bcx NG  - Started Cefepime 1/5/54  - On decadron, cefepime      ================= Nephro================================  #No active issues     =================GI====================================  # Diarrhea 2/2 to laxatives vs covid   - Dc senna, miralax  - GI PCR if persistent  - NPO with Tube feed due to mental status and concerns for asp PNA      ================ Heme==================================  DVT PPx with lovenox    =================Endocrine===============================  Bryan and Hypotensive   - TSH 4.28 [wnl]      ================= Skin/Catheters============================  no wounds    =================Prophylaxis =============================  lovenox    ==================GOC==================================  FULL CODE

## 2024-01-05 NOTE — CHART NOTE - NSCHARTNOTEFT_GEN_A_CORE
Spoke to Son Drew Arriola (HCP) and his uncle about  goals of care  including options, risks and benefits of many medical treatments including CPR, intubation, and tube feeding. As per the son Drew Arriola (HCP). he wants the patient to be DNR/DNI with trial of noninvasive procedure.       Witness to the conversation. Dr. Keshav Agarwal.

## 2024-01-05 NOTE — CHART NOTE - NSCHARTNOTEFT_GEN_A_CORE
Assessment:   Patient is a 63y old  Male who presents with a chief complaint of Sepsis and AHRF (05 Jan 2024 14:15). Pt transferred to ICU. Presents w/ sepsis, AHRF. Hx, CVA, Alzheimer's, non-verba, bedridden at baseline, noted.  Intubated. Propofol @ 12 ml/hr (if x24 hrs= 317 kcals fat.      Factors impacting intake: [ ] none [ ] nausea  [ ] vomiting [ ] diarrhea [ ] constipation  [ ]chewing problems [ ] swallowing issues  [ ] other:     Diet Prescription: Diet, NPO with Tube Feed:   Tube Feeding Modality: Nasogastric  Jevity 1.5 Gabriel  Total Volume for 24 Hours (mL): 800  Continuous  Starting Tube Feed Rate {mL per Hour}: 10  Increase Tube Feed Rate by (mL): 10  Until Goal Tube Feed Rate (mL per Hour): 50  Tube Feed Duration (in Hours): 16  Tube Feed Start Time: 08:00  Tube Feed Stop Time: 00:00 (01-05-24 @ 10:37)    Intake: 800 ml Jevity 1.5 provides: 1200 kcals, 51 gm protein    O>I    Pertinent Medications: MEDICATIONS  (STANDING):  aspirin  chewable 81 milliGRAM(s) Oral daily  atorvastatin 20 milliGRAM(s) Oral at bedtime  cefepime   IVPB 2000 milliGRAM(s) IV Intermittent every 8 hours  chlorhexidine 0.12% Liquid 15 milliLiter(s) Oral Mucosa every 12 hours  dexAMETHasone  Injectable 6 milliGRAM(s) IV Push daily  enoxaparin Injectable 40 milliGRAM(s) SubCutaneous every 24 hours  norepinephrine Infusion 0.05 MICROgram(s)/kG/Min (5.95 mL/Hr) IV Continuous <Continuous>  propofol Infusion 10 MICROgram(s)/kG/Min (3.42 mL/Hr) IV Continuous <Continuous>  valproic  acid Syrup 250 milliGRAM(s) Oral two times a day    MEDICATIONS  (PRN):  acetaminophen     Tablet .. 650 milliGRAM(s) Oral every 6 hours PRN Temp greater or equal to 38C (100.4F), Mild Pain (1 - 3)  albuterol    90 MICROgram(s) HFA Inhaler 2 Puff(s) Inhalation every 6 hours PRN Bronchospasm  fentaNYL    Injectable 25 MICROGram(s) IV Push every 4 hours PRN Agitation    Pertinent Labs: 01-05 Na140 mmol/L Glu 110 mg/dL<H> K+ 3.9 mmol/L Cr  0.80 mg/dL BUN 10 mg/dL 01-05 Phos 2.7 mg/dL 01-05 Alb 2.8 g/dL<L> 12-29 Chol 93 mg/dL LDL --    HDL 60 mg/dL Trig 55 mg/dL     CAPILLARY BLOOD GLUCOSE        Skin: Pressure injuries stage 1 noted    Estimated Needs:   [ ] no change since previous assessment  [x ] recalculated: At 57 kg, current TF order above provides 22 kcals/ kg and ~.9 gm protein/ kg. (estimated protein needs 68 gms/ day @ 1.2 gm/kg)      Previous Nutrition Diagnosis:   [ ] Altered GI function  [x ]Inadequate Oral Intake [ ] Swallowing Difficulty   [ ] Altered nutrition related labs [ ] Increased Nutrient Needs [ ] Overweight/Obesity   [ ] Unintended Weight Loss [ ] Food & Nutrition Related Knowledge Deficit [ ] Malnutrition   [ ] Other:     Nutrition Diagnosis is [x ] ongoing  [ ] resolved [ ] not applicable     Interventions:   Recommend  [ ] Change Diet To:  [ ] Nutrition Supplement  [x ] Nutrition Support: With Propofol @ 12 ml/hr: may maintain TF amount w/ addition 1 Pkt Prosource/ day (+ 15 gm protein, 60 kcals) and change of feeding regime to provide 18 hr TF from 12 noon to 6AM (45 ml/hr x18 hrs). MD to monitor. RD available.   [x ] Other: Triglyceride monitoring on Propofol    Monitoring and Evaluation:   [ x ] follow up per protocol  [ ] other:

## 2024-01-05 NOTE — PROGRESS NOTE ADULT - SUBJECTIVE AND OBJECTIVE BOX
EP Attending  HISTORY OF PRESENT ILLNESS: HPI:  A 63 year old male, from [facility], with PMH of CVA, Alzheimer's, nonverbal at baseline, GERD, Schizophrenia, and Constipation, was brought into the ED s/p mechanical fall, poor oral intake, and Covid-19 infection on 12/27/23 as per NH papers. Unable to obtain history from patient since he is nonverbal, history obtained from chart review.  (28 Dec 2023 20:54)    Was pleasant and alert this morning, not talkative.  Apparently then had an RRT For hypotension and has gone to MICU for septic shock treatment.  Takes antipsychotic Rx at his nursing home, and was referred to EP re: sinus bradycardia and borderline QT prolongation on intake.  No reported history of seizures or VT/VF in chart.  Not able to participate in HPI/ROS due to psych issues.  1/4- in ICU, not intubated, awake but not interactive.  Date of service 1/5- intubated overnight.    PAST MEDICAL & SURGICAL HISTORY:  CVA (cerebral vascular accident)  CVA (cerebral vascular accident)  HLD (hyperlipidemia)  S/P percutaneous endoscopic gastrostomy (PEG) tube placement    acetaminophen     Tablet .. 650 milliGRAM(s) Oral every 6 hours PRN  albuterol    90 MICROgram(s) HFA Inhaler 2 Puff(s) Inhalation every 6 hours PRN  aspirin  chewable 81 milliGRAM(s) Oral daily  atorvastatin 20 milliGRAM(s) Oral at bedtime  cefepime   IVPB 2000 milliGRAM(s) IV Intermittent every 8 hours  chlorhexidine 0.12% Liquid 15 milliLiter(s) Oral Mucosa every 12 hours  dexAMETHasone  Injectable 6 milliGRAM(s) IV Push daily  enoxaparin Injectable 40 milliGRAM(s) SubCutaneous every 24 hours  fentaNYL    Injectable 25 MICROGram(s) IV Push every 4 hours PRN  norepinephrine Infusion 0.05 MICROgram(s)/kG/Min IV Continuous <Continuous>  propofol Infusion 10 MICROgram(s)/kG/Min IV Continuous <Continuous>  valproic  acid Syrup 250 milliGRAM(s) Oral two times a day                            14.2   15.31 )-----------( 326      ( 05 Jan 2024 04:50 )             44.1       01-05    140  |  109<H>  |  10  ----------------------------<  110<H>  3.9   |  26  |  0.80    Ca    8.7      05 Jan 2024 04:50  Phos  2.7     01-05  Mg     2.0     01-05    TPro  6.3  /  Alb  2.8<L>  /  TBili  0.4  /  DBili  x   /  AST  39  /  ALT  73<H>  /  AlkPhos  57  01-05      CARDIAC MARKERS ( 03 Jan 2024 15:00 )  x     / x     / 80 U/L / x     / 1.4 ng/mL      T(C): 36.9 (01-05-24 @ 14:15), Max: 37 (01-05-24 @ 05:00)  HR: 60 (01-05-24 @ 14:15) (38 - 126)  BP: 86/65 (01-05-24 @ 14:15) (77/52 - 152/85)  RR: 16 (01-05-24 @ 14:15) (8 - 22)  SpO2: 100% (01-05-24 @ 14:15) (77% - 100%)  Wt(kg): --    I&O's Summary    04 Jan 2024 07:01  -  05 Jan 2024 07:00  --------------------------------------------------------  IN: 307.8 mL / OUT: 1490 mL / NET: -1182.2 mL    05 Jan 2024 07:01  -  05 Jan 2024 14:26  --------------------------------------------------------  IN: 217.1 mL / OUT: 725 mL / NET: -507.9 mL      Appearance: elderly  man intubated and sedated  HEENT:   Normal oral mucosa, PERRL, EOMI	  Lymphatic: No lymphadenopathy , no edema  Cardiovascular: Normal S1 S2, No JVD, No murmurs , Peripheral pulses palpable 2+ bilaterally  Respiratory: Lungs clear to auscultation, normal effort 	  Gastrointestinal:  Soft, Non-tender, + BS	  Skin: No rashes, No ecchymoses, No cyanosis, warm to touch  Musculoskeletal: ROM/strength unable to assess due to sedation  Psychiatry:  Mood & affect unable to assess due to sedation.    TELEMETRY: 	bradycardic, sinus 40s-50s.  QT <500 corrected in setting of bradycardia.  ECG: NSR, QTc 450-470ms.  biphasic T-waves.  Echo: LVEF normal in 2020 at time of stroke.	  	  ASSESSMENT/PLAN: Mr Arriola is a pleasant 63y Male at nursing home due to stroke, dementia, and schizophrenia. Brought in for loss of appetite.  intubated, sedated, on pressors for septic shock mgmt.  QTc upper limits of normal, chronically.  Replace K to 4-4.5, Mg 2 to 2.  Limit add'l QT prolonging medications.  As he is bedbound, not apparently symptomatic from sinus bradycardia. No long pauses on telemetry, or PVC-initiated arrhythmia.  Maintain telemetry but could probably go out of ICU.  At this time no indication for permanent pacing.        Ramses Pena M.D.  Cardiac Electrophysiology    office [phone]  pager [phone] EP Attending  HISTORY OF PRESENT ILLNESS: HPI:  A 63 year old male, from [facility], with PMH of CVA, Alzheimer's, nonverbal at baseline, GERD, Schizophrenia, and Constipation, was brought into the ED s/p mechanical fall, poor oral intake, and Covid-19 infection on 12/27/23 as per NH papers. Unable to obtain history from patient since he is nonverbal, history obtained from chart review.  (28 Dec 2023 20:54)    Was pleasant and alert this morning, not talkative.  Apparently then had an RRT For hypotension and has gone to MICU for septic shock treatment.  Takes antipsychotic Rx at his nursing home, and was referred to EP re: sinus bradycardia and borderline QT prolongation on intake.  No reported history of seizures or VT/VF in chart.  Not able to participate in HPI/ROS due to psych issues.  1/4- in ICU, not intubated, awake but not interactive.  Date of service 1/5- intubated overnight.    PAST MEDICAL & SURGICAL HISTORY:  CVA (cerebral vascular accident)  CVA (cerebral vascular accident)  HLD (hyperlipidemia)  S/P percutaneous endoscopic gastrostomy (PEG) tube placement    acetaminophen     Tablet .. 650 milliGRAM(s) Oral every 6 hours PRN  albuterol    90 MICROgram(s) HFA Inhaler 2 Puff(s) Inhalation every 6 hours PRN  aspirin  chewable 81 milliGRAM(s) Oral daily  atorvastatin 20 milliGRAM(s) Oral at bedtime  cefepime   IVPB 2000 milliGRAM(s) IV Intermittent every 8 hours  chlorhexidine 0.12% Liquid 15 milliLiter(s) Oral Mucosa every 12 hours  dexAMETHasone  Injectable 6 milliGRAM(s) IV Push daily  enoxaparin Injectable 40 milliGRAM(s) SubCutaneous every 24 hours  fentaNYL    Injectable 25 MICROGram(s) IV Push every 4 hours PRN  norepinephrine Infusion 0.05 MICROgram(s)/kG/Min IV Continuous <Continuous>  propofol Infusion 10 MICROgram(s)/kG/Min IV Continuous <Continuous>  valproic  acid Syrup 250 milliGRAM(s) Oral two times a day                            14.2   15.31 )-----------( 326      ( 05 Jan 2024 04:50 )             44.1       01-05    140  |  109<H>  |  10  ----------------------------<  110<H>  3.9   |  26  |  0.80    Ca    8.7      05 Jan 2024 04:50  Phos  2.7     01-05  Mg     2.0     01-05    TPro  6.3  /  Alb  2.8<L>  /  TBili  0.4  /  DBili  x   /  AST  39  /  ALT  73<H>  /  AlkPhos  57  01-05      CARDIAC MARKERS ( 03 Jan 2024 15:00 )  x     / x     / 80 U/L / x     / 1.4 ng/mL      T(C): 36.9 (01-05-24 @ 14:15), Max: 37 (01-05-24 @ 05:00)  HR: 60 (01-05-24 @ 14:15) (38 - 126)  BP: 86/65 (01-05-24 @ 14:15) (77/52 - 152/85)  RR: 16 (01-05-24 @ 14:15) (8 - 22)  SpO2: 100% (01-05-24 @ 14:15) (77% - 100%)  Wt(kg): --    I&O's Summary    04 Jan 2024 07:01  -  05 Jan 2024 07:00  --------------------------------------------------------  IN: 307.8 mL / OUT: 1490 mL / NET: -1182.2 mL    05 Jan 2024 07:01  -  05 Jan 2024 14:26  --------------------------------------------------------  IN: 217.1 mL / OUT: 725 mL / NET: -507.9 mL      Appearance: elderly  man intubated and sedated  HEENT:   Normal oral mucosa, PERRL, EOMI	  Lymphatic: No lymphadenopathy , no edema  Cardiovascular: Normal S1 S2, No JVD, No murmurs , Peripheral pulses palpable 2+ bilaterally  Respiratory: Lungs clear to auscultation, normal effort 	  Gastrointestinal:  Soft, Non-tender, + BS	  Skin: No rashes, No ecchymoses, No cyanosis, warm to touch  Musculoskeletal: ROM/strength unable to assess due to sedation  Psychiatry:  Mood & affect unable to assess due to sedation.    TELEMETRY: 	bradycardic, sinus 40s-50s.  QT <500 corrected in setting of bradycardia.  ECG: NSR, QTc 450-470ms.  biphasic T-waves.  Echo: LVEF normal in 2020 at time of stroke.	  	  ASSESSMENT/PLAN: Mr Arriola is a pleasant 63y Male at nursing home due to stroke, dementia, and schizophrenia. Brought in for loss of appetite.  intubated, sedated, on pressors for septic shock mgmt.  QTc upper limits of normal, chronically.  Replace K to 4-4.5, Mg 2 to 2.  Limit add'l QT prolonging medications.  As he is bedbound, not apparently symptomatic from sinus bradycardia. No long pauses on telemetry, or PVC-initiated arrhythmia.  Maintain telemetry but could probably go out of ICU.  At this time no indication for permanent pacing.        Ramses Pena M.D.  Cardiac Electrophysiology    office [phone]  pager [phone]

## 2024-01-05 NOTE — PROGRESS NOTE ADULT - ASSESSMENT
Septic Shock  Pneumonia - secondary Bacterial infection  COVID - 19 infection  Leukocytosis  Resp failure      Plan - Cont Maxipime 2gms iv q8hrs  Add Flagyl 500mgs iv q8hrs for anaerobic coverage  Cont Decadron 6mgs iv q6hrs  Cont pressor support  Time spent - 33 mins

## 2024-01-05 NOTE — PROGRESS NOTE ADULT - SUBJECTIVE AND OBJECTIVE BOX
C A R D I O L O G Y  **********************************     DATE OF SERVICE: 01-05-24    events noted, now intubated     acetaminophen     Tablet .. 650 milliGRAM(s) Oral every 6 hours PRN  albuterol    90 MICROgram(s) HFA Inhaler 2 Puff(s) Inhalation every 6 hours PRN  aspirin  chewable 81 milliGRAM(s) Oral daily  atorvastatin 20 milliGRAM(s) Oral at bedtime  cefepime   IVPB 2000 milliGRAM(s) IV Intermittent every 8 hours  chlorhexidine 0.12% Liquid 15 milliLiter(s) Oral Mucosa every 12 hours  dexAMETHasone  Injectable 6 milliGRAM(s) IV Push daily  enoxaparin Injectable 40 milliGRAM(s) SubCutaneous every 24 hours  fentaNYL    Injectable 25 MICROGram(s) IV Push every 4 hours PRN  norepinephrine Infusion 0.05 MICROgram(s)/kG/Min IV Continuous <Continuous>  propofol Infusion 10 MICROgram(s)/kG/Min IV Continuous <Continuous>  valproic  acid Syrup 250 milliGRAM(s) Oral two times a day                            14.2   15.31 )-----------( 326      ( 05 Jan 2024 04:50 )             44.1       Hemoglobin: 14.2 g/dL (01-05 @ 04:50)  Hemoglobin: 11.9 g/dL (01-04 @ 04:45)  Hemoglobin: 12.6 g/dL (01-03 @ 07:30)  Hemoglobin: 12.3 g/dL (01-02 @ 06:00)      01-05    140  |  109<H>  |  10  ----------------------------<  110<H>  3.9   |  26  |  0.80    Ca    8.7      05 Jan 2024 04:50  Phos  2.7     01-05  Mg     2.0     01-05    TPro  6.3  /  Alb  2.8<L>  /  TBili  0.4  /  DBili  x   /  AST  39  /  ALT  73<H>  /  AlkPhos  57  01-05    Creatinine Trend: 0.80<--, 0.76<--, 0.73<--, 0.68<--, 0.76<--, 0.76<--    COAGS:     CARDIAC MARKERS ( 03 Jan 2024 15:00 )  x     / x     / 80 U/L / x     / 1.4 ng/mL        T(C): 36.8 (01-05-24 @ 11:45), Max: 37 (01-05-24 @ 05:00)  HR: 56 (01-05-24 @ 11:45) (38 - 126)  BP: 87/68 (01-05-24 @ 11:45) (77/52 - 152/85)  RR: 16 (01-05-24 @ 11:45) (7 - 22)  SpO2: 100% (01-05-24 @ 11:45) (77% - 100%)  Wt(kg): --    I&O's Summary    04 Jan 2024 07:01  -  05 Jan 2024 07:00  --------------------------------------------------------  IN: 307.8 mL / OUT: 1490 mL / NET: -1182.2 mL    05 Jan 2024 07:01  -  05 Jan 2024 12:05  --------------------------------------------------------  IN: 75.4 mL / OUT: 570 mL / NET: -494.6 mL        HEENT:  (-)icterus (-)pallor  CV: N S1 S2 1/6 JOVON (+)2 Pulses B/l  Resp:  Clear to ausculatation B/L, normal effort  GI: (+) BS Soft, NT, ND  Lymph:  (-)Edema, (-)obvious lymphadenopathy  Skin: Warm to touch, Normal turgor  Psych: Unable to assess mood and affect      TELEMETRY: 	  sinus         ASSESSMENT/PLAN: 	63y  Male   ASSESSMENT/PLAN: 	63y Male PMH of CVA, Alzheimer's, nonverbal at baseline, GERD, Schizophrenia, historically preserved LV function and Constipation, was brought into the ED s/p mechanical fall, poor oral intake, and Covid-19 infection on 12/27/23 as per NH papers.    # Bradycardia   - He likely has some degree of sick sinus syndrome that may be exacerbated by Midodrine.  now off midodrine  - EP levi appreciated     # Hypotension  - Suspect this is due to dehydration and vasodilatory dtate associated with viral/bacterial infectio  - Abx  - IVF  - pressor support per DELPHINE Cadet MD, [facility]  BEEPER [phone]       C A R D I O L O G Y  **********************************     DATE OF SERVICE: 01-05-24    events noted, now intubated     acetaminophen     Tablet .. 650 milliGRAM(s) Oral every 6 hours PRN  albuterol    90 MICROgram(s) HFA Inhaler 2 Puff(s) Inhalation every 6 hours PRN  aspirin  chewable 81 milliGRAM(s) Oral daily  atorvastatin 20 milliGRAM(s) Oral at bedtime  cefepime   IVPB 2000 milliGRAM(s) IV Intermittent every 8 hours  chlorhexidine 0.12% Liquid 15 milliLiter(s) Oral Mucosa every 12 hours  dexAMETHasone  Injectable 6 milliGRAM(s) IV Push daily  enoxaparin Injectable 40 milliGRAM(s) SubCutaneous every 24 hours  fentaNYL    Injectable 25 MICROGram(s) IV Push every 4 hours PRN  norepinephrine Infusion 0.05 MICROgram(s)/kG/Min IV Continuous <Continuous>  propofol Infusion 10 MICROgram(s)/kG/Min IV Continuous <Continuous>  valproic  acid Syrup 250 milliGRAM(s) Oral two times a day                            14.2   15.31 )-----------( 326      ( 05 Jan 2024 04:50 )             44.1       Hemoglobin: 14.2 g/dL (01-05 @ 04:50)  Hemoglobin: 11.9 g/dL (01-04 @ 04:45)  Hemoglobin: 12.6 g/dL (01-03 @ 07:30)  Hemoglobin: 12.3 g/dL (01-02 @ 06:00)      01-05    140  |  109<H>  |  10  ----------------------------<  110<H>  3.9   |  26  |  0.80    Ca    8.7      05 Jan 2024 04:50  Phos  2.7     01-05  Mg     2.0     01-05    TPro  6.3  /  Alb  2.8<L>  /  TBili  0.4  /  DBili  x   /  AST  39  /  ALT  73<H>  /  AlkPhos  57  01-05    Creatinine Trend: 0.80<--, 0.76<--, 0.73<--, 0.68<--, 0.76<--, 0.76<--    COAGS:     CARDIAC MARKERS ( 03 Jan 2024 15:00 )  x     / x     / 80 U/L / x     / 1.4 ng/mL        T(C): 36.8 (01-05-24 @ 11:45), Max: 37 (01-05-24 @ 05:00)  HR: 56 (01-05-24 @ 11:45) (38 - 126)  BP: 87/68 (01-05-24 @ 11:45) (77/52 - 152/85)  RR: 16 (01-05-24 @ 11:45) (7 - 22)  SpO2: 100% (01-05-24 @ 11:45) (77% - 100%)  Wt(kg): --    I&O's Summary    04 Jan 2024 07:01  -  05 Jan 2024 07:00  --------------------------------------------------------  IN: 307.8 mL / OUT: 1490 mL / NET: -1182.2 mL    05 Jan 2024 07:01  -  05 Jan 2024 12:05  --------------------------------------------------------  IN: 75.4 mL / OUT: 570 mL / NET: -494.6 mL        HEENT:  (-)icterus (-)pallor  CV: N S1 S2 1/6 JOVON (+)2 Pulses B/l  Resp:  Clear to ausculatation B/L, normal effort  GI: (+) BS Soft, NT, ND  Lymph:  (-)Edema, (-)obvious lymphadenopathy  Skin: Warm to touch, Normal turgor  Psych: Unable to assess mood and affect      TELEMETRY: 	  sinus         ASSESSMENT/PLAN: 	63y  Male   ASSESSMENT/PLAN: 	63y Male PMH of CVA, Alzheimer's, nonverbal at baseline, GERD, Schizophrenia, historically preserved LV function and Constipation, was brought into the ED s/p mechanical fall, poor oral intake, and Covid-19 infection on 12/27/23 as per NH papers.    # Bradycardia   - He likely has some degree of sick sinus syndrome that may be exacerbated by Midodrine.  now off midodrine  - EP levi appreciated     # Hypotension  - Suspect this is due to dehydration and vasodilatory dtate associated with viral/bacterial infectio  - Abx  - IVF  - pressor support per DELPHINE Cadet MD, [facility]  BEEPER [phone]

## 2024-01-05 NOTE — PROGRESS NOTE ADULT - ATTENDING COMMENTS
IMP: This is a  63 year old mn , from [facility], with  CVA, Alzheimer's, nonverbal at baseline, GERD, Schizophrenia, and Constipation, was brought into the ED s/p mechanical fall, poor oral intake, and Covid-19 infection on 12/27/23 Pt was admitted for COVID PNA, later found with intermittent hypoxia/ hypopnea to Sats 80% and Hypotension despite multiple fluid boluses, also found with bryan to 40s for the last 2 days, in ICU for further monitoring.       ASSESSMENT   - Acute Hypoxic resp failure   - Covid-19 PNA   - Shock septic   - CVA  - Alzheimer dementia   - Bradycardia       Plan     - Intubated am 1/4 after failing HFNC   - Adjust vent as per ABG   - Sedation   - Hemodynamic support   - Titrate vaso-active agents to maintain MAP>65  - EP cards f/u : no indication for PPM at tis time   - Antibx   - F/U cultures   - D/C Remdesivir due to ADAN  - Continue decadron  6 mg for total 10 days   - Isolation : contact and airborne   - Monitor I/O   - Cards following   - Hold AV angel luis blocking agent   - Skin care .  - Wound care eval noted . IMP: This is a  63 year old mn , from [facility], with  CVA, Alzheimer's, nonverbal at baseline, GERD, Schizophrenia, and Constipation, was brought into the ED s/p mechanical fall, poor oral intake, and Covid-19 infection on 12/27/23 Pt was admitted for COVID PNA, later found with intermittent hypoxia/ hypopnea to Sats 80% and Hypotension despite multiple fluid boluses, also found with bryan to 40s for the last 2 days, in ICU for further monitoring.       ASSESSMENT   - Acute Hypoxic resp failure   - Covid-19 PNA   - Shock septic   - CVA  - Alzheimer dementia   - Bradycardia       Plan     - Intubated am 1/4 after failing HFNC   - Adjust vent as per ABG   - Sedation   - Hemodynamic support   - Titrate vaso-active agents to maintain MAP>65  - EP cards f/u : no indication for PPM at tis time   - Antibx   - F/U cultures   - D/C Remdesivir due to ADAN  - Continue decadron  6 mg for total 10 days   - Isolation : contact and airborne   - Monitor I/O   - Cards following   - Hold AV angel luis blocking agent   - Skin care .  - Wound care eval noted .

## 2024-01-05 NOTE — PROGRESS NOTE ADULT - SUBJECTIVE AND OBJECTIVE BOX
Patient is a 63y old  Male who presents with a chief complaint of Sepsis and AHRF (04 Jan 2024 16:23)      INTERVAL HPI/OVERNIGHT EVENTS: ***    REVIEW OF SYSTEMS:  CONSTITUTIONAL: No fever, chills  RESPIRATORY: No cough, wheezing, shortness of breath  CARDIOVASCULAR: No chest pain, palpitations,   GASTROINTESTINAL: No abdominal pain, nausea, vomiting, diarrhea or constipation, bloody stool  GENITOURINARY: No dysuria,  hematuria, urinary frequency  NEUROLOGICAL: No headaches, numbness, or tremors  EXTREMITES: No leg swelling  SKIN: No itching, burning, rashes, or lesions     ICU Vital Signs Last 24 Hrs  T(C): 36.6 (05 Jan 2024 07:15), Max: 37 (05 Jan 2024 05:00)  T(F): 97.9 (05 Jan 2024 07:15), Max: 98.6 (05 Jan 2024 05:00)  HR: 77 (05 Jan 2024 07:15) (38 - 126)  BP: 148/86 (05 Jan 2024 06:45) (65/48 - 152/85)  BP(mean): 105 (05 Jan 2024 06:45) (55 - 106)  ABP: --  ABP(mean): --  RR: 17 (05 Jan 2024 07:15) (7 - 22)  SpO2: 97% (05 Jan 2024 07:15) (77% - 100%)    O2 Parameters below as of 05 Jan 2024 04:00  Patient On (Oxygen Delivery Method): nasal cannula, high flow  O2 Flow (L/min): 50  O2 Concentration (%): 100      I&O's Summary    04 Jan 2024 07:01  -  05 Jan 2024 07:00  --------------------------------------------------------  IN: 299.2 mL / OUT: 1390 mL / NET: -1090.8 mL      Mode: AC/ CMV (Assist Control/ Continuous Mandatory Ventilation)  RR (machine): 16  TV (machine): 400  FiO2: 100  PEEP: 10  ITime: 1  MAP: 8  PIP: 17          PRESSORS: [ ] YES [ ] NO    Antimicrobial:  cefTRIAXone   IVPB 1000 milliGRAM(s) IV Intermittent every 24 hours    Cardiovascular:  norepinephrine Infusion 0.05 MICROgram(s)/kG/Min IV Continuous <Continuous>    Pulmonary:  albuterol    90 MICROgram(s) HFA Inhaler 2 Puff(s) Inhalation every 6 hours PRN    Hematalogic:  aspirin  chewable 81 milliGRAM(s) Oral daily  enoxaparin Injectable 40 milliGRAM(s) SubCutaneous every 24 hours    Other:  acetaminophen     Tablet .. 650 milliGRAM(s) Oral every 6 hours PRN  atorvastatin 20 milliGRAM(s) Oral at bedtime  chlorhexidine 0.12% Liquid 15 milliLiter(s) Oral Mucosa every 12 hours  cholecalciferol 1000 Unit(s) Oral daily  cyanocobalamin 1000 MICROGram(s) Oral daily  dexAMETHasone  Injectable 6 milliGRAM(s) IV Push daily  fentaNYL   Infusion... 0.5 MICROgram(s)/kG/Hr IV Continuous <Continuous>  LORazepam   Injectable 0.5 milliGRAM(s) IV Push two times a day PRN  multivitamin/minerals 1 Tablet(s) Oral daily  OLANZapine Injectable 5 milliGRAM(s) IntraMuscular every 12 hours  potassium chloride  10 mEq/100 mL IVPB 10 milliEquivalent(s) IV Intermittent every 1 hour  propofol Infusion 10 MICROgram(s)/kG/Min IV Continuous <Continuous>  valproic  acid Syrup 250 milliGRAM(s) Oral two times a day    acetaminophen     Tablet .. 650 milliGRAM(s) Oral every 6 hours PRN  albuterol    90 MICROgram(s) HFA Inhaler 2 Puff(s) Inhalation every 6 hours PRN  aspirin  chewable 81 milliGRAM(s) Oral daily  atorvastatin 20 milliGRAM(s) Oral at bedtime  cefTRIAXone   IVPB 1000 milliGRAM(s) IV Intermittent every 24 hours  chlorhexidine 0.12% Liquid 15 milliLiter(s) Oral Mucosa every 12 hours  cholecalciferol 1000 Unit(s) Oral daily  cyanocobalamin 1000 MICROGram(s) Oral daily  dexAMETHasone  Injectable 6 milliGRAM(s) IV Push daily  enoxaparin Injectable 40 milliGRAM(s) SubCutaneous every 24 hours  fentaNYL   Infusion... 0.5 MICROgram(s)/kG/Hr IV Continuous <Continuous>  LORazepam   Injectable 0.5 milliGRAM(s) IV Push two times a day PRN  multivitamin/minerals 1 Tablet(s) Oral daily  norepinephrine Infusion 0.05 MICROgram(s)/kG/Min IV Continuous <Continuous>  OLANZapine Injectable 5 milliGRAM(s) IntraMuscular every 12 hours  potassium chloride  10 mEq/100 mL IVPB 10 milliEquivalent(s) IV Intermittent every 1 hour  propofol Infusion 10 MICROgram(s)/kG/Min IV Continuous <Continuous>  valproic  acid Syrup 250 milliGRAM(s) Oral two times a day    Drug Dosing Weight  Height (cm): 167.6 (03 Aug 2022 20:11)  Weight (kg): 57 (03 Jan 2024 14:30)  BMI (kg/m2): 20.3 (03 Jan 2024 14:30)  BSA (m2): 1.64 (03 Jan 2024 14:30)    ACEVEDO: [ ] YES [ ] NO    DATE INSERTED:  REMOVE:  [ ] YES [ ] NO  EXPLAIN:    CENTRAL LINE: [ ] YES [ ] NO  LOCATION:   DATE INSERTED:  REMOVE: [ ] YES [ ] NO  EXPLAIN:    A-LINE:  [ ] YES [ ] NO  LOCATION:   DATE INSERTED:  REMOVE:  [ ] YES [ ] NO  EXPLAIN:    PMH -reviewed admission note, no change since admission  PAST MEDICAL & SURGICAL HISTORY:  CVA (cerebral vascular accident)      CVA (cerebral vascular accident)      HLD (hyperlipidemia)      S/P percutaneous endoscopic gastrostomy (PEG) tube placement              PHYSICAL EXAM:  GENERAL: NAD,   HEAD:  Atraumatic, Normocephalic  EYES: EOMI, PERRLA, conjunctiva and sclera clear  ENMT: Moist mucous membranes, No lesions  NECK: Supple, normal appearance, No JVD   NERVOUS SYSTEM:  Alert & Oriented X3, No Asterixis  CHEST/LUNG: No chest deformity; No rales, rhonchi, wheezing   HEART: Regular rate and rhythm; No murmurs,  ABDOMEN: Soft, Nontender, Nondistended; Bowel sounds present  EXTREMITIES:  2+ Peripheral Pulses, No clubbing, cyanosis, or edema  SKIN: No rashes or lesions;      LABS:  CBC Full  -  ( 05 Jan 2024 04:50 )  WBC Count : 15.31 K/uL  RBC Count : 4.82 M/uL  Hemoglobin : 14.2 g/dL  Hematocrit : 44.1 %  Platelet Count - Automated : 326 K/uL  Mean Cell Volume : 91.5 fl  Mean Cell Hemoglobin : 29.5 pg  Mean Cell Hemoglobin Concentration : 32.2 gm/dL  Auto Neutrophil # : x  Auto Lymphocyte # : x  Auto Monocyte # : x  Auto Eosinophil # : x  Auto Basophil # : x  Auto Neutrophil % : x  Auto Lymphocyte % : x  Auto Monocyte % : x  Auto Eosinophil % : x  Auto Basophil % : x    01-05    140  |  109<H>  |  10  ----------------------------<  110<H>  3.9   |  26  |  0.80    Ca    8.7      05 Jan 2024 04:50  Phos  2.7     01-05  Mg     2.0     01-05    TPro  6.3  /  Alb  2.8<L>  /  TBili  0.4  /  DBili  x   /  AST  39  /  ALT  73<H>  /  AlkPhos  57  01-05      Urinalysis Basic - ( 05 Jan 2024 04:50 )    Color: x / Appearance: x / SG: x / pH: x  Gluc: 110 mg/dL / Ketone: x  / Bili: x / Urobili: x   Blood: x / Protein: x / Nitrite: x   Leuk Esterase: x / RBC: x / WBC x   Sq Epi: x / Non Sq Epi: x / Bacteria: x          RADIOLOGY & ADDITIONAL STUDIES REVIEWED:  ***    [ ]GOALS OF CARE DISCUSSION WITH PATIENT/FAMILY/PROXY:    CRITICAL CARE TIME SPENT: 35 minutes Patient is a 63y old  Male who presents with a chief complaint of Sepsis and AHRF (04 Jan 2024 16:23)      INTERVAL HPI/OVERNIGHT EVENTS: Overnight pt desaturated to the 80s requiring intubation. Pt was examined in the morning at bedside and he is in NAD     REVIEW OF SYSTEMS: Unable to obtain due pt status.       ICU Vital Signs Last 24 Hrs  T(C): 36.6 (05 Jan 2024 07:15), Max: 37 (05 Jan 2024 05:00)  T(F): 97.9 (05 Jan 2024 07:15), Max: 98.6 (05 Jan 2024 05:00)  HR: 77 (05 Jan 2024 07:15) (38 - 126)  BP: 148/86 (05 Jan 2024 06:45) (65/48 - 152/85)  BP(mean): 105 (05 Jan 2024 06:45) (55 - 106)  ABP: --  ABP(mean): --  RR: 17 (05 Jan 2024 07:15) (7 - 22)  SpO2: 97% (05 Jan 2024 07:15) (77% - 100%)    O2 Parameters below as of 05 Jan 2024 04:00  Patient On (Oxygen Delivery Method): nasal cannula, high flow  O2 Flow (L/min): 50  O2 Concentration (%): 100      I&O's Summary    04 Jan 2024 07:01  -  05 Jan 2024 07:00  --------------------------------------------------------  IN: 299.2 mL / OUT: 1390 mL / NET: -1090.8 mL      Mode: AC/ CMV (Assist Control/ Continuous Mandatory Ventilation)  RR (machine): 16  TV (machine): 400  FiO2: 100  PEEP: 10  ITime: 1  MAP: 8  PIP: 17          PRESSORS: [x ] YES [ ] NO    Antimicrobial:  cefTRIAXone   IVPB 1000 milliGRAM(s) IV Intermittent every 24 hours    Cardiovascular:  norepinephrine Infusion 0.05 MICROgram(s)/kG/Min IV Continuous <Continuous>    Pulmonary:  albuterol    90 MICROgram(s) HFA Inhaler 2 Puff(s) Inhalation every 6 hours PRN    Hematalogic:  aspirin  chewable 81 milliGRAM(s) Oral daily  enoxaparin Injectable 40 milliGRAM(s) SubCutaneous every 24 hours    Other:  acetaminophen     Tablet .. 650 milliGRAM(s) Oral every 6 hours PRN  atorvastatin 20 milliGRAM(s) Oral at bedtime  chlorhexidine 0.12% Liquid 15 milliLiter(s) Oral Mucosa every 12 hours  cholecalciferol 1000 Unit(s) Oral daily  cyanocobalamin 1000 MICROGram(s) Oral daily  dexAMETHasone  Injectable 6 milliGRAM(s) IV Push daily  fentaNYL   Infusion... 0.5 MICROgram(s)/kG/Hr IV Continuous <Continuous>  LORazepam   Injectable 0.5 milliGRAM(s) IV Push two times a day PRN  multivitamin/minerals 1 Tablet(s) Oral daily  OLANZapine Injectable 5 milliGRAM(s) IntraMuscular every 12 hours  potassium chloride  10 mEq/100 mL IVPB 10 milliEquivalent(s) IV Intermittent every 1 hour  propofol Infusion 10 MICROgram(s)/kG/Min IV Continuous <Continuous>  valproic  acid Syrup 250 milliGRAM(s) Oral two times a day    acetaminophen     Tablet .. 650 milliGRAM(s) Oral every 6 hours PRN  albuterol    90 MICROgram(s) HFA Inhaler 2 Puff(s) Inhalation every 6 hours PRN  aspirin  chewable 81 milliGRAM(s) Oral daily  atorvastatin 20 milliGRAM(s) Oral at bedtime  cefTRIAXone   IVPB 1000 milliGRAM(s) IV Intermittent every 24 hours  chlorhexidine 0.12% Liquid 15 milliLiter(s) Oral Mucosa every 12 hours  cholecalciferol 1000 Unit(s) Oral daily  cyanocobalamin 1000 MICROGram(s) Oral daily  dexAMETHasone  Injectable 6 milliGRAM(s) IV Push daily  enoxaparin Injectable 40 milliGRAM(s) SubCutaneous every 24 hours  fentaNYL   Infusion... 0.5 MICROgram(s)/kG/Hr IV Continuous <Continuous>  LORazepam   Injectable 0.5 milliGRAM(s) IV Push two times a day PRN  multivitamin/minerals 1 Tablet(s) Oral daily  norepinephrine Infusion 0.05 MICROgram(s)/kG/Min IV Continuous <Continuous>  OLANZapine Injectable 5 milliGRAM(s) IntraMuscular every 12 hours  potassium chloride  10 mEq/100 mL IVPB 10 milliEquivalent(s) IV Intermittent every 1 hour  propofol Infusion 10 MICROgram(s)/kG/Min IV Continuous <Continuous>  valproic  acid Syrup 250 milliGRAM(s) Oral two times a day    Drug Dosing Weight  Height (cm): 167.6 (03 Aug 2022 20:11)  Weight (kg): 57 (03 Jan 2024 14:30)  BMI (kg/m2): 20.3 (03 Jan 2024 14:30)  BSA (m2): 1.64 (03 Jan 2024 14:30)    ACEVEDO: [x ] YES [ ] NO    DATE INSERTED:1/4/24  REMOVE:  [ ] YES [ ] NO  EXPLAIN:    CENTRAL LINE: [ ] YES [x ] NO  LOCATION:   DATE INSERTED:  REMOVE: [ ] YES [ ] NO  EXPLAIN:    A-LINE:  [ ] YES [ x] NO  LOCATION:   DATE INSERTED:  REMOVE:  [ ] YES [ ] NO  EXPLAIN:    PMH -reviewed admission note, no change since admission  PAST MEDICAL & SURGICAL HISTORY:  CVA (cerebral vascular accident)      CVA (cerebral vascular accident)      HLD (hyperlipidemia)      S/P percutaneous endoscopic gastrostomy (PEG) tube placement              PHYSICAL EXAM:  GENERAL: NAD,   HEAD:  Atraumatic, Normocephalic  EYES: PERRLA, conjunctiva and sclera clear  ENMT: Moist mucous membranes, No lesions  NECK: Supple, normal appearance, No JVD   NERVOUS SYSTEM:  AAOx0, sedated   CHEST/LUNG: No chest deformity; No rales, rhonchi, wheezing   HEART: Regular rate and rhythm; No murmurs,  ABDOMEN: Soft, Nontender, Nondistended; Bowel sounds present  EXTREMITIES:  2+ Peripheral Pulses, No clubbing, cyanosis, or edema  SKIN: No rashes or lesions;      LABS:  CBC Full  -  ( 05 Jan 2024 04:50 )  WBC Count : 15.31 K/uL  RBC Count : 4.82 M/uL  Hemoglobin : 14.2 g/dL  Hematocrit : 44.1 %  Platelet Count - Automated : 326 K/uL  Mean Cell Volume : 91.5 fl  Mean Cell Hemoglobin : 29.5 pg  Mean Cell Hemoglobin Concentration : 32.2 gm/dL  Auto Neutrophil # : x  Auto Lymphocyte # : x  Auto Monocyte # : x  Auto Eosinophil # : x  Auto Basophil # : x  Auto Neutrophil % : x  Auto Lymphocyte % : x  Auto Monocyte % : x  Auto Eosinophil % : x  Auto Basophil % : x    01-05    140  |  109<H>  |  10  ----------------------------<  110<H>  3.9   |  26  |  0.80    Ca    8.7      05 Jan 2024 04:50  Phos  2.7     01-05  Mg     2.0     01-05    TPro  6.3  /  Alb  2.8<L>  /  TBili  0.4  /  DBili  x   /  AST  39  /  ALT  73<H>  /  AlkPhos  57  01-05      Urinalysis Basic - ( 05 Jan 2024 04:50 )    Color: x / Appearance: x / SG: x / pH: x  Gluc: 110 mg/dL / Ketone: x  / Bili: x / Urobili: x   Blood: x / Protein: x / Nitrite: x   Leuk Esterase: x / RBC: x / WBC x   Sq Epi: x / Non Sq Epi: x / Bacteria: x          RADIOLOGY & ADDITIONAL STUDIES REVIEWED:  ***    [ ]GOALS OF CARE DISCUSSION WITH PATIENT/FAMILY/PROXY:    CRITICAL CARE TIME SPENT: 35 minutes Patient is a 63y old  Male who presents with a chief complaint of Sepsis and AHRF (04 Jan 2024 16:23)      INTERVAL HPI/OVERNIGHT EVENTS: Overnight pt desaturated to the 80s requiring intubation. Pt was examined in the morning at bedside and he is in NAD. Pt is intubated and sedated.    REVIEW OF SYSTEMS: Unable to obtain because pt is intubated and sedated.    ICU Vital Signs Last 24 Hrs  T(C): 36.6 (05 Jan 2024 07:15), Max: 37 (05 Jan 2024 05:00)  T(F): 97.9 (05 Jan 2024 07:15), Max: 98.6 (05 Jan 2024 05:00)  HR: 77 (05 Jan 2024 07:15) (38 - 126)  BP: 148/86 (05 Jan 2024 06:45) (65/48 - 152/85)  BP(mean): 105 (05 Jan 2024 06:45) (55 - 106)  ABP: --  ABP(mean): --  RR: 17 (05 Jan 2024 07:15) (7 - 22)  SpO2: 97% (05 Jan 2024 07:15) (77% - 100%)    O2 Parameters below as of 05 Jan 2024 04:00  Patient On (Oxygen Delivery Method): nasal cannula, high flow  O2 Flow (L/min): 50  O2 Concentration (%): 100      I&O's Summary    04 Jan 2024 07:01  -  05 Jan 2024 07:00  --------------------------------------------------------  IN: 299.2 mL / OUT: 1390 mL / NET: -1090.8 mL      Mode: AC/ CMV (Assist Control/ Continuous Mandatory Ventilation)  RR (machine): 16  TV (machine): 400  FiO2: 100  PEEP: 10  ITime: 1  MAP: 8  PIP: 17          PRESSORS: [x ] YES [ ] NO    Antimicrobial:  cefTRIAXone   IVPB 1000 milliGRAM(s) IV Intermittent every 24 hours    Cardiovascular:  norepinephrine Infusion 0.05 MICROgram(s)/kG/Min IV Continuous <Continuous>    Pulmonary:  albuterol    90 MICROgram(s) HFA Inhaler 2 Puff(s) Inhalation every 6 hours PRN    Hematalogic:  aspirin  chewable 81 milliGRAM(s) Oral daily  enoxaparin Injectable 40 milliGRAM(s) SubCutaneous every 24 hours    Other:  acetaminophen     Tablet .. 650 milliGRAM(s) Oral every 6 hours PRN  atorvastatin 20 milliGRAM(s) Oral at bedtime  chlorhexidine 0.12% Liquid 15 milliLiter(s) Oral Mucosa every 12 hours  cholecalciferol 1000 Unit(s) Oral daily  cyanocobalamin 1000 MICROGram(s) Oral daily  dexAMETHasone  Injectable 6 milliGRAM(s) IV Push daily  fentaNYL   Infusion... 0.5 MICROgram(s)/kG/Hr IV Continuous <Continuous>  LORazepam   Injectable 0.5 milliGRAM(s) IV Push two times a day PRN  multivitamin/minerals 1 Tablet(s) Oral daily  OLANZapine Injectable 5 milliGRAM(s) IntraMuscular every 12 hours  potassium chloride  10 mEq/100 mL IVPB 10 milliEquivalent(s) IV Intermittent every 1 hour  propofol Infusion 10 MICROgram(s)/kG/Min IV Continuous <Continuous>  valproic  acid Syrup 250 milliGRAM(s) Oral two times a day    acetaminophen     Tablet .. 650 milliGRAM(s) Oral every 6 hours PRN  albuterol    90 MICROgram(s) HFA Inhaler 2 Puff(s) Inhalation every 6 hours PRN  aspirin  chewable 81 milliGRAM(s) Oral daily  atorvastatin 20 milliGRAM(s) Oral at bedtime  cefTRIAXone   IVPB 1000 milliGRAM(s) IV Intermittent every 24 hours  chlorhexidine 0.12% Liquid 15 milliLiter(s) Oral Mucosa every 12 hours  cholecalciferol 1000 Unit(s) Oral daily  cyanocobalamin 1000 MICROGram(s) Oral daily  dexAMETHasone  Injectable 6 milliGRAM(s) IV Push daily  enoxaparin Injectable 40 milliGRAM(s) SubCutaneous every 24 hours  fentaNYL   Infusion... 0.5 MICROgram(s)/kG/Hr IV Continuous <Continuous>  LORazepam   Injectable 0.5 milliGRAM(s) IV Push two times a day PRN  multivitamin/minerals 1 Tablet(s) Oral daily  norepinephrine Infusion 0.05 MICROgram(s)/kG/Min IV Continuous <Continuous>  OLANZapine Injectable 5 milliGRAM(s) IntraMuscular every 12 hours  potassium chloride  10 mEq/100 mL IVPB 10 milliEquivalent(s) IV Intermittent every 1 hour  propofol Infusion 10 MICROgram(s)/kG/Min IV Continuous <Continuous>  valproic  acid Syrup 250 milliGRAM(s) Oral two times a day    Drug Dosing Weight  Height (cm): 167.6 (03 Aug 2022 20:11)  Weight (kg): 57 (03 Jan 2024 14:30)  BMI (kg/m2): 20.3 (03 Jan 2024 14:30)  BSA (m2): 1.64 (03 Jan 2024 14:30)    ACEVEDO: [x ] YES [ ] NO    DATE INSERTED:1/4/24  REMOVE:  [ ] YES [ ] NO  EXPLAIN:    CENTRAL LINE: [ ] YES [x ] NO  LOCATION:   DATE INSERTED:  REMOVE: [ ] YES [ ] NO  EXPLAIN:    A-LINE:  [ ] YES [ x] NO  LOCATION:   DATE INSERTED:  REMOVE:  [ ] YES [ ] NO  EXPLAIN:    PMH -reviewed admission note, no change since admission  PAST MEDICAL & SURGICAL HISTORY:  CVA (cerebral vascular accident)      CVA (cerebral vascular accident)      HLD (hyperlipidemia)      S/P percutaneous endoscopic gastrostomy (PEG) tube placement              PHYSICAL EXAM:  GENERAL: NAD, intubated and sedated  HEAD:  Atraumatic, Normocephalic  EYES: PERRLA, conjunctiva and sclera clear  ENMT: Moist mucous membranes, No lesions  NECK: Supple, normal appearance, No JVD   NERVOUS SYSTEM:  AAOx0, sedated   CHEST/LUNG: No chest deformity; No rales, rhonchi, wheezing   HEART: Regular rate and rhythm; No murmurs,  ABDOMEN: Soft, Nontender, Nondistended; Bowel sounds present  EXTREMITIES:  2+ Peripheral Pulses, No clubbing, cyanosis, or edema  SKIN: No rashes or lesions;      LABS:  CBC Full  -  ( 05 Jan 2024 04:50 )  WBC Count : 15.31 K/uL  RBC Count : 4.82 M/uL  Hemoglobin : 14.2 g/dL  Hematocrit : 44.1 %  Platelet Count - Automated : 326 K/uL  Mean Cell Volume : 91.5 fl  Mean Cell Hemoglobin : 29.5 pg  Mean Cell Hemoglobin Concentration : 32.2 gm/dL  Auto Neutrophil # : x  Auto Lymphocyte # : x  Auto Monocyte # : x  Auto Eosinophil # : x  Auto Basophil # : x  Auto Neutrophil % : x  Auto Lymphocyte % : x  Auto Monocyte % : x  Auto Eosinophil % : x  Auto Basophil % : x    01-05    140  |  109<H>  |  10  ----------------------------<  110<H>  3.9   |  26  |  0.80    Ca    8.7      05 Jan 2024 04:50  Phos  2.7     01-05  Mg     2.0     01-05    TPro  6.3  /  Alb  2.8<L>  /  TBili  0.4  /  DBili  x   /  AST  39  /  ALT  73<H>  /  AlkPhos  57  01-05      Urinalysis Basic - ( 05 Jan 2024 04:50 )    Color: x / Appearance: x / SG: x / pH: x  Gluc: 110 mg/dL / Ketone: x  / Bili: x / Urobili: x   Blood: x / Protein: x / Nitrite: x   Leuk Esterase: x / RBC: x / WBC x   Sq Epi: x / Non Sq Epi: x / Bacteria: x          RADIOLOGY & ADDITIONAL STUDIES REVIEWED:  ***    [ ]GOALS OF CARE DISCUSSION WITH PATIENT/FAMILY/PROXY:    CRITICAL CARE TIME SPENT: 35 minutes

## 2024-01-06 LAB
ALBUMIN SERPL ELPH-MCNC: 2.6 G/DL — LOW (ref 3.5–5)
ALBUMIN SERPL ELPH-MCNC: 2.6 G/DL — LOW (ref 3.5–5)
ALP SERPL-CCNC: 50 U/L — SIGNIFICANT CHANGE UP (ref 40–120)
ALP SERPL-CCNC: 50 U/L — SIGNIFICANT CHANGE UP (ref 40–120)
ALT FLD-CCNC: 55 U/L DA — SIGNIFICANT CHANGE UP (ref 10–60)
ALT FLD-CCNC: 55 U/L DA — SIGNIFICANT CHANGE UP (ref 10–60)
ANION GAP SERPL CALC-SCNC: 2 MMOL/L — LOW (ref 5–17)
ANION GAP SERPL CALC-SCNC: 2 MMOL/L — LOW (ref 5–17)
AST SERPL-CCNC: 19 U/L — SIGNIFICANT CHANGE UP (ref 10–40)
AST SERPL-CCNC: 19 U/L — SIGNIFICANT CHANGE UP (ref 10–40)
BASE EXCESS BLDA CALC-SCNC: 5.5 MMOL/L — HIGH (ref -2–3)
BASE EXCESS BLDA CALC-SCNC: 5.5 MMOL/L — HIGH (ref -2–3)
BASE EXCESS BLDA CALC-SCNC: 6.2 MMOL/L — HIGH (ref -2–3)
BASE EXCESS BLDA CALC-SCNC: 6.2 MMOL/L — HIGH (ref -2–3)
BILIRUB SERPL-MCNC: 0.3 MG/DL — SIGNIFICANT CHANGE UP (ref 0.2–1.2)
BILIRUB SERPL-MCNC: 0.3 MG/DL — SIGNIFICANT CHANGE UP (ref 0.2–1.2)
BLOOD GAS COMMENTS ARTERIAL: SIGNIFICANT CHANGE UP
BUN SERPL-MCNC: 13 MG/DL — SIGNIFICANT CHANGE UP (ref 7–18)
BUN SERPL-MCNC: 13 MG/DL — SIGNIFICANT CHANGE UP (ref 7–18)
CALCIUM SERPL-MCNC: 9.7 MG/DL — SIGNIFICANT CHANGE UP (ref 8.4–10.5)
CALCIUM SERPL-MCNC: 9.7 MG/DL — SIGNIFICANT CHANGE UP (ref 8.4–10.5)
CHLORIDE SERPL-SCNC: 108 MMOL/L — SIGNIFICANT CHANGE UP (ref 96–108)
CHLORIDE SERPL-SCNC: 108 MMOL/L — SIGNIFICANT CHANGE UP (ref 96–108)
CO2 SERPL-SCNC: 29 MMOL/L — SIGNIFICANT CHANGE UP (ref 22–31)
CO2 SERPL-SCNC: 29 MMOL/L — SIGNIFICANT CHANGE UP (ref 22–31)
CREAT SERPL-MCNC: 0.69 MG/DL — SIGNIFICANT CHANGE UP (ref 0.5–1.3)
CREAT SERPL-MCNC: 0.69 MG/DL — SIGNIFICANT CHANGE UP (ref 0.5–1.3)
EGFR: 103 ML/MIN/1.73M2 — SIGNIFICANT CHANGE UP
EGFR: 103 ML/MIN/1.73M2 — SIGNIFICANT CHANGE UP
GLUCOSE BLDC GLUCOMTR-MCNC: 131 MG/DL — HIGH (ref 70–99)
GLUCOSE BLDC GLUCOMTR-MCNC: 131 MG/DL — HIGH (ref 70–99)
GLUCOSE BLDC GLUCOMTR-MCNC: 149 MG/DL — HIGH (ref 70–99)
GLUCOSE BLDC GLUCOMTR-MCNC: 149 MG/DL — HIGH (ref 70–99)
GLUCOSE BLDC GLUCOMTR-MCNC: 156 MG/DL — HIGH (ref 70–99)
GLUCOSE BLDC GLUCOMTR-MCNC: 156 MG/DL — HIGH (ref 70–99)
GLUCOSE SERPL-MCNC: 198 MG/DL — HIGH (ref 70–99)
GLUCOSE SERPL-MCNC: 198 MG/DL — HIGH (ref 70–99)
HCO3 BLDA-SCNC: 29 MMOL/L — HIGH (ref 21–28)
HCO3 BLDA-SCNC: 29 MMOL/L — HIGH (ref 21–28)
HCO3 BLDA-SCNC: 31 MMOL/L — HIGH (ref 21–28)
HCO3 BLDA-SCNC: 31 MMOL/L — HIGH (ref 21–28)
HCT VFR BLD CALC: 38 % — LOW (ref 39–50)
HCT VFR BLD CALC: 38 % — LOW (ref 39–50)
HGB BLD-MCNC: 12.3 G/DL — LOW (ref 13–17)
HGB BLD-MCNC: 12.3 G/DL — LOW (ref 13–17)
HOROWITZ INDEX BLDA+IHG-RTO: 40 — SIGNIFICANT CHANGE UP
MAGNESIUM SERPL-MCNC: 2.1 MG/DL — SIGNIFICANT CHANGE UP (ref 1.6–2.6)
MAGNESIUM SERPL-MCNC: 2.1 MG/DL — SIGNIFICANT CHANGE UP (ref 1.6–2.6)
MCHC RBC-ENTMCNC: 28.9 PG — SIGNIFICANT CHANGE UP (ref 27–34)
MCHC RBC-ENTMCNC: 28.9 PG — SIGNIFICANT CHANGE UP (ref 27–34)
MCHC RBC-ENTMCNC: 32.4 GM/DL — SIGNIFICANT CHANGE UP (ref 32–36)
MCHC RBC-ENTMCNC: 32.4 GM/DL — SIGNIFICANT CHANGE UP (ref 32–36)
MCV RBC AUTO: 89.4 FL — SIGNIFICANT CHANGE UP (ref 80–100)
MCV RBC AUTO: 89.4 FL — SIGNIFICANT CHANGE UP (ref 80–100)
NRBC # BLD: 0 /100 WBCS — SIGNIFICANT CHANGE UP (ref 0–0)
NRBC # BLD: 0 /100 WBCS — SIGNIFICANT CHANGE UP (ref 0–0)
PCO2 BLDA: 37 MMHG — SIGNIFICANT CHANGE UP (ref 35–48)
PCO2 BLDA: 37 MMHG — SIGNIFICANT CHANGE UP (ref 35–48)
PCO2 BLDA: 42 MMHG — SIGNIFICANT CHANGE UP (ref 35–48)
PCO2 BLDA: 42 MMHG — SIGNIFICANT CHANGE UP (ref 35–48)
PH BLDA: 7.47 — HIGH (ref 7.35–7.45)
PH BLDA: 7.47 — HIGH (ref 7.35–7.45)
PH BLDA: 7.5 — HIGH (ref 7.35–7.45)
PH BLDA: 7.5 — HIGH (ref 7.35–7.45)
PHOSPHATE SERPL-MCNC: 2.4 MG/DL — LOW (ref 2.5–4.5)
PHOSPHATE SERPL-MCNC: 2.4 MG/DL — LOW (ref 2.5–4.5)
PLATELET # BLD AUTO: 347 K/UL — SIGNIFICANT CHANGE UP (ref 150–400)
PLATELET # BLD AUTO: 347 K/UL — SIGNIFICANT CHANGE UP (ref 150–400)
PO2 BLDA: 132 MMHG — HIGH (ref 83–108)
PO2 BLDA: 132 MMHG — HIGH (ref 83–108)
PO2 BLDA: 138 MMHG — HIGH (ref 83–108)
PO2 BLDA: 138 MMHG — HIGH (ref 83–108)
POTASSIUM SERPL-MCNC: 3.8 MMOL/L — SIGNIFICANT CHANGE UP (ref 3.5–5.3)
POTASSIUM SERPL-MCNC: 3.8 MMOL/L — SIGNIFICANT CHANGE UP (ref 3.5–5.3)
POTASSIUM SERPL-SCNC: 3.8 MMOL/L — SIGNIFICANT CHANGE UP (ref 3.5–5.3)
POTASSIUM SERPL-SCNC: 3.8 MMOL/L — SIGNIFICANT CHANGE UP (ref 3.5–5.3)
PROT SERPL-MCNC: 5.9 G/DL — LOW (ref 6–8.3)
PROT SERPL-MCNC: 5.9 G/DL — LOW (ref 6–8.3)
RBC # BLD: 4.25 M/UL — SIGNIFICANT CHANGE UP (ref 4.2–5.8)
RBC # BLD: 4.25 M/UL — SIGNIFICANT CHANGE UP (ref 4.2–5.8)
RBC # FLD: 13.4 % — SIGNIFICANT CHANGE UP (ref 10.3–14.5)
RBC # FLD: 13.4 % — SIGNIFICANT CHANGE UP (ref 10.3–14.5)
SAO2 % BLDA: 100 % — SIGNIFICANT CHANGE UP
SODIUM SERPL-SCNC: 139 MMOL/L — SIGNIFICANT CHANGE UP (ref 135–145)
SODIUM SERPL-SCNC: 139 MMOL/L — SIGNIFICANT CHANGE UP (ref 135–145)
WBC # BLD: 14.64 K/UL — HIGH (ref 3.8–10.5)
WBC # BLD: 14.64 K/UL — HIGH (ref 3.8–10.5)
WBC # FLD AUTO: 14.64 K/UL — HIGH (ref 3.8–10.5)
WBC # FLD AUTO: 14.64 K/UL — HIGH (ref 3.8–10.5)

## 2024-01-06 PROCEDURE — 93306 TTE W/DOPPLER COMPLETE: CPT | Mod: 26

## 2024-01-06 RX ORDER — POTASSIUM PHOSPHATE, MONOBASIC POTASSIUM PHOSPHATE, DIBASIC 236; 224 MG/ML; MG/ML
15 INJECTION, SOLUTION INTRAVENOUS ONCE
Refills: 0 | Status: COMPLETED | OUTPATIENT
Start: 2024-01-06 | End: 2024-01-06

## 2024-01-06 RX ORDER — INSULIN LISPRO 100/ML
VIAL (ML) SUBCUTANEOUS EVERY 6 HOURS
Refills: 0 | Status: DISCONTINUED | OUTPATIENT
Start: 2024-01-06 | End: 2024-01-12

## 2024-01-06 RX ORDER — ALBUTEROL 90 UG/1
2 AEROSOL, METERED ORAL EVERY 6 HOURS
Refills: 0 | Status: DISCONTINUED | OUTPATIENT
Start: 2024-01-06 | End: 2024-01-12

## 2024-01-06 RX ORDER — ALBUMIN HUMAN 25 %
100 VIAL (ML) INTRAVENOUS EVERY 8 HOURS
Refills: 0 | Status: COMPLETED | OUTPATIENT
Start: 2024-01-06 | End: 2024-01-07

## 2024-01-06 RX ADMIN — Medication 250 MILLIGRAM(S): at 06:01

## 2024-01-06 RX ADMIN — PROPOFOL 3.42 MICROGRAM(S)/KG/MIN: 10 INJECTION, EMULSION INTRAVENOUS at 03:09

## 2024-01-06 RX ADMIN — CHLORHEXIDINE GLUCONATE 15 MILLILITER(S): 213 SOLUTION TOPICAL at 06:01

## 2024-01-06 RX ADMIN — Medication 50 MILLILITER(S): at 23:22

## 2024-01-06 RX ADMIN — Medication 250 MILLIGRAM(S): at 17:14

## 2024-01-06 RX ADMIN — CEFEPIME 100 MILLIGRAM(S): 1 INJECTION, POWDER, FOR SOLUTION INTRAMUSCULAR; INTRAVENOUS at 16:04

## 2024-01-06 RX ADMIN — Medication 81 MILLIGRAM(S): at 12:35

## 2024-01-06 RX ADMIN — CHLORHEXIDINE GLUCONATE 15 MILLILITER(S): 213 SOLUTION TOPICAL at 17:14

## 2024-01-06 RX ADMIN — POTASSIUM PHOSPHATE, MONOBASIC POTASSIUM PHOSPHATE, DIBASIC 62.5 MILLIMOLE(S): 236; 224 INJECTION, SOLUTION INTRAVENOUS at 23:23

## 2024-01-06 RX ADMIN — ENOXAPARIN SODIUM 40 MILLIGRAM(S): 100 INJECTION SUBCUTANEOUS at 12:35

## 2024-01-06 RX ADMIN — PROPOFOL 3.42 MICROGRAM(S)/KG/MIN: 10 INJECTION, EMULSION INTRAVENOUS at 23:23

## 2024-01-06 RX ADMIN — Medication 50 MILLILITER(S): at 16:07

## 2024-01-06 RX ADMIN — PROPOFOL 3.42 MICROGRAM(S)/KG/MIN: 10 INJECTION, EMULSION INTRAVENOUS at 10:46

## 2024-01-06 RX ADMIN — CEFEPIME 100 MILLIGRAM(S): 1 INJECTION, POWDER, FOR SOLUTION INTRAMUSCULAR; INTRAVENOUS at 06:01

## 2024-01-06 RX ADMIN — ALBUTEROL 2 PUFF(S): 90 AEROSOL, METERED ORAL at 15:58

## 2024-01-06 RX ADMIN — ATORVASTATIN CALCIUM 20 MILLIGRAM(S): 80 TABLET, FILM COATED ORAL at 23:17

## 2024-01-06 RX ADMIN — ALBUTEROL 2 PUFF(S): 90 AEROSOL, METERED ORAL at 20:27

## 2024-01-06 RX ADMIN — Medication 6 MILLIGRAM(S): at 06:01

## 2024-01-06 RX ADMIN — Medication 100 MILLIGRAM(S): at 06:01

## 2024-01-06 NOTE — PROGRESS NOTE ADULT - ASSESSMENT
63 year old male, from [facility], with PMH of CVA, Alzheimer's, nonverbal at baseline, GERD, Schizophrenia, and Constipation, was brought into the ED s/p mechanical fall, poor oral intake, and Covid-19 infection on 12/27/23 Pt was admitted for COVID PNA, later found with intermittent hypoxia/ hypopnea to Sats 80% and Hypotension despite multiple fluid boluses, also found with bryan to 40s for the last 2 days, in ICU for further monitoring.     DX:  1. CVA  2. Alzheimer's  3. Hypoxia  4. Hypotension   5. Bradycardia   5. COVID +Ve         =================== Neuro============================  # CVA  # Alzheimers    - He is at baseline of bedbound nonverbal in the setting of AD, Previous CVA  - on valporate and as needed ativan   - He is on chronic ativan for agitation   - he is also on zyprexa  - Sedated 1/5/24        ================= Cardiovascular==========================  # Hypotension   # Sinus bradycardia  - Likely due to infection- shock  - Trop -ve today  - TWI on lateral leads are not new  - POCUS shows intact LV function, some signs of end systolic effacement   - F/u echo (pending cardiologist Dr. Cadet approval for the echo)  - DC midodrine and start levophed for now   - c/w levophed 8mg      ================- Pulm=================================  #Acute Hypoxia Respiratory failure  - RRT for desaturation to the 80s (1/2/24)  - Improved with NRB, Pt has ABG with PO2 in 200s  - s/p HF 50/40  - Desaturated and failed HF 1/5/24  - intubated 1/5/24  ==================ID===================================    #COVID   #Multifocal PNA   #at risk of aspiration PNA  - s/p Ceftriaxone, Azithromycin after 3 dose  - Bcx NG  - Started Cefepime 1/5/54  - On decadron, cefepime      ================= Nephro================================  #No active issues     =================GI====================================  # Diarrhea 2/2 to laxatives vs covid   - Dc senna, miralax  - GI PCR if persistent  - NPO with Tube feed due to mental status and concerns for asp PNA      ================ Heme==================================  DVT PPx with lovenox    =================Endocrine===============================  Bryan and Hypotensive   - TSH 4.28 [wnl]      ================= Skin/Catheters============================  no wounds    =================Prophylaxis =============================  lovenox    ==================GOC==================================  FULL CODE      63 year old male, from [facility], with PMH of CVA, Alzheimer's, nonverbal at baseline, GERD, Schizophrenia, and Constipation, was brought into the ED s/p mechanical fall, poor oral intake, and Covid-19 infection on 12/27/23 Pt was admitted for COVID PNA, later found with intermittent hypoxia/ hypopnea to Sats 80% and Hypotension despite multiple fluid boluses, also found with bryan to 40s for the last 2 days, in ICU for further monitoring.     DX:  1. CVA  2. Alzheimer's  3. Hypoxia  4. Hypotension   5. Bradycardia   5. COVID +Ve         =================== Neuro============================  # CVA  # Alzheimers    - He is at baseline of bedbound nonverbal in the setting of AD, Previous CVA  - on valporate and as needed ativan   - He is on chronic ativan for agitation   - he is also on zyprexa  - Sedated 1/5/24        ================= Cardiovascular==========================  # Hypotension   # Sinus bradycardia  - Likely due to infection- shock  - Trop -ve today  - TWI on lateral leads are not new  - POCUS shows intact LV function, some signs of end systolic effacement   - F/u echo (pending cardiologist Dr. Cadet approval for the echo)  - DC midodrine and start levophed for now   - c/w levophed 8mg      ================- Pulm=================================  #Acute Hypoxia Respiratory failure  - RRT for desaturation to the 80s (1/2/24)  - Improved with NRB, Pt has ABG with PO2 in 200s  - s/p HF 50/40  - Desaturated and failed HF 1/5/24  - intubated 1/5/24  ==================ID===================================    #COVID   #Multifocal PNA   #at risk of aspiration PNA  - s/p Ceftriaxone, Azithromycin after 3 dose  - Bcx NG  - Started Cefepime 1/5/54  - On decadron, cefepime      ================= Nephro================================  #No active issues     =================GI====================================  # Diarrhea 2/2 to laxatives vs covid   - Dc senna, miralax  - GI PCR if persistent  - NPO with Tube feed due to mental status and concerns for asp PNA      ================ Heme==================================  DVT PPx with lovenox    =================Endocrine===============================  Bryan and Hypotensive   - TSH 4.28 [wnl]      ================= Skin/Catheters============================  no wounds    =================Prophylaxis =============================  lovenox    ==================GOC==================================  FULL CODE      63 year old male, from [facility], with PMH of CVA, Alzheimer's, nonverbal at baseline, GERD, Schizophrenia, and Constipation, was brought into the ED s/p mechanical fall, poor oral intake, and Covid-19 infection on 12/27/23 Pt was admitted for COVID PNA, later found with intermittent hypoxia/ hypopnea to Sats 80% and Hypotension despite multiple fluid boluses, also found with bryan to 40s for the last 2 days, in ICU for further monitoring.     DX:  1. CVA  2. Alzheimer's  3. Hypoxia  4. Hypotension   5. Bradycardia   5. COVID +Ve         =================== Neuro============================  # CVA  # Alzheimers  #sedation/analgesia  - baseline: bedbound, nonverbal in the setting of Alzheimer's dementia, Previous CVA  - continue sedation with propofol   - continue valporate 250mg BID  -  - he is also on zyprexa  - Sedated 1/5/24        ================= Cardiovascular==========================  # Hypotension   # Sinus bradycardia  - Likely due to infection- shock  - Trop -ve today  - TWI on lateral leads are not new  - POCUS shows intact LV function, some signs of end systolic effacement   - F/u echo (pending cardiologist Dr. Cadet approval for the echo)  - DC midodrine and start levophed for now   - c/w levophed 8mg      ================- Pulm=================================  #Acute Hypoxia Respiratory failure  - RRT for desaturation to the 80s (1/2/24)  - Improved with NRB, Pt has ABG with PO2 in 200s  - s/p HF 50/40  - Desaturated and failed HF 1/5/24  - intubated 1/5/24  ==================ID===================================    #COVID   #Multifocal PNA   #at risk of aspiration PNA  - s/p Ceftriaxone, Azithromycin after 3 dose  - Bcx NG  - Started Cefepime 1/5/54  - On decadron, cefepime      ================= Nephro================================  #No active issues     =================GI====================================  # Diarrhea 2/2 to laxatives vs covid   - Dc senna, miralax  - GI PCR if persistent  - NPO with Tube feed due to mental status and concerns for asp PNA      ================ Heme==================================  DVT PPx with lovenox    =================Endocrine===============================  Bryan and Hypotensive   - TSH 4.28 [wnl]      ================= Skin/Catheters============================  no wounds    =================Prophylaxis =============================  lovenox    ==================GOC==================================  FULL CODE      63 year old male, from [facility], with PMH of CVA, Alzheimer's, nonverbal at baseline, GERD, Schizophrenia, and Constipation, was brought into the ED s/p mechanical fall, poor oral intake, and Covid-19 infection on 12/27/23 Pt was admitted for COVID PNA, later found with intermittent hypoxia/ hypopnea to Sats 80% and Hypotension despite multiple fluid boluses, also found with bryan to 40s for the last 2 days, in ICU for further monitoring.     DX:  1. CVA  2. Alzheimer's  3. Hypoxia  4. Hypotension   5. Bradycardia   5. COVID +Ve         =================== Neuro============================  # CVA  # Alzheimers  #sedation/analgesia  - baseline: bedbound, nonverbal in the setting of Alzheimer's dementia, Previous CVA  - continue sedation with propofol   - continue valporate 250mg BID  -  - he is also on zyprexa  - Sedated 1/5/24        ================= Cardiovascular==========================  # Hypotension   # Sinus bradycardia  - Likely due to infection- shock  - Trop -ve today  - TWI on lateral leads are not new  - POCUS shows intact LV function, some signs of end systolic effacement   - F/u echo (pending cardiologist Dr. Cadet approval for the echo)  - DC midodrine and start levophed for now   - c/w levophed 8mg      ================- Pulm=================================  #Acute Hypoxia Respiratory failure  - RRT for desaturation to the 80s (1/2/24)  - Improved with NRB, Pt has ABG with PO2 in 200s  - s/p HF 50/40  - Desaturated and failed HF 1/5/24  - intubated 1/5/24  ==================ID===================================    #COVID   #Multifocal PNA   #at risk of aspiration PNA  - s/p Ceftriaxone, Azithromycin after 3 dose  - Bcx NG  - Started Cefepime 1/5/54  - On decadron, cefepime      ================= Nephro================================  #No active issues     =================GI====================================  # Diarrhea 2/2 to laxatives vs covid   - Dc senna, miralax  - GI PCR if persistent  - NPO with Tube feed due to mental status and concerns for asp PNA      ================ Heme==================================  DVT PPx with lovenox    =================Endocrine===============================  Bryan and Hypotensive   - TSH 4.28 [wnl]      ================= Skin/Catheters============================  no wounds    =================Prophylaxis =============================  lovenox    ==================GOC==================================  FULL CODE      63 year old male, from [facility], with PMH of CVA, Alzheimer's, nonverbal at baseline, GERD, Schizophrenia, and Constipation, was brought into the ED s/p mechanical fall, poor oral intake, and Covid-19 infection on 12/27/23 Pt was admitted for COVID PNA, later found with intermittent hypoxia/ hypopnea to Sats 80% and Hypotension despite multiple fluid boluses, also found with bryan to 40s for the last 2 days, in ICU for further monitoring.     DX:  1. CVA  2. Alzheimer's  3. Hypoxia  4. Hypotension   5. Bradycardia   5. COVID +Ve         =================== Neuro============================  # CVA  # Alzheimers  #sedation/analgesia  - baseline: bedbound, nonverbal in the setting of Alzheimer's dementia, Previous CVA  - continue sedation with propofol   - continue valporate 250mg BID  - lorazepam and Zyprexa held as patient intubated        ================= Cardiovascular==========================  # septic shock in the setting of COVID-19 infection  # Sinus bradycardia  - continue BP support with levophed, wean as tolerated  - Trop negative, TWI on lateral leads are chronic  - POCUS 1/5 shows intact LV function, some signs of end systolic effacement   - F/u echo (pending cardiologist Dr. Cadet approval for the echo)        ================- Pulm=================================  #Acute Hypoxia Respiratory failure  - RRT for desaturation to the 80s (1/2/24)  - Improved with NRB, Pt has ABG with PO2 in 200s  - s/p HF 50/40  - Desaturated and failed HF 1/5/24  - intubated 1/5/24  ==================ID===================================    #COVID   #Multifocal PNA   #at risk of aspiration PNA  - s/p Ceftriaxone, Azithromycin after 3 dose  - Bcx NG  - Started Cefepime 1/5/54  - On decadron, cefepime      ================= Nephro================================  #No active issues     =================GI====================================  # Diarrhea 2/2 to laxatives vs covid   - Dc senna, miralax  - GI PCR if persistent  - NPO with Tube feed due to mental status and concerns for asp PNA      ================ Heme==================================  DVT PPx with lovenox    =================Endocrine===============================  Bryan and Hypotensive   - TSH 4.28 [wnl]      ================= Skin/Catheters============================  no wounds    =================Prophylaxis =============================  lovenox    ==================GOC==================================  FULL CODE      63 year old male, from [facility], with PMH of CVA, Alzheimer's, nonverbal at baseline, GERD, Schizophrenia, and Constipation, was brought into the ED s/p mechanical fall, poor oral intake, and Covid-19 infection on 12/27/23 Pt was admitted for COVID PNA, later found with intermittent hypoxia/ hypopnea to Sats 80% and Hypotension despite multiple fluid boluses, also found with bryan to 40s for the last 2 days, in ICU for further monitoring.     DX:  1. CVA  2. Alzheimer's  3. Hypoxia  4. Hypotension   5. Bradycardia   5. COVID +Ve         =================== Neuro============================  # CVA  # Alzheimers  #sedation/analgesia  - baseline: bedbound, nonverbal in the setting of Alzheimer's dementia, Previous CVA  - continue sedation with propofol   - continue valporate 250mg BID  - lorazepam and Zyprexa held as patient intubated        ================= Cardiovascular==========================  # septic shock in the setting of COVID-19 infection  # Sinus bradycardia  - continue BP support with levophed, wean as tolerated  - Trop negative, TWI on lateral leads are chronic  - POCUS 1/5 shows intact LV function, some signs of end systolic effacement   - F/u echo (pending cardiologist Dr. Cadet approval for the echo)        ================- Pulm=================================  #Acute Hypoxia Respiratory failure  - RRT for desaturation to the 80s (1/2/24)  - Improved with NRB, Pt has ABG with PO2 in 200s  - s/p HF 50/40  - Desaturated and failed HF 1/5/24  - intubated 1/5/24  ==================ID===================================    #COVID   #Multifocal PNA   #at risk of aspiration PNA  - s/p Ceftriaxone, Azithromycin after 3 dose  - Bcx NG  - Started Cefepime 1/5/54  - On decadron, cefepime      ================= Nephro================================  #No active issues     =================GI====================================  # Diarrhea 2/2 to laxatives vs covid   - Dc senna, miralax  - GI PCR if persistent  - NPO with Tube feed due to mental status and concerns for asp PNA      ================ Heme==================================  DVT PPx with lovenox    =================Endocrine===============================  Bryan and Hypotensive   - TSH 4.28 [wnl]      ================= Skin/Catheters============================  no wounds    =================Prophylaxis =============================  lovenox    ==================GOC==================================  FULL CODE      63 year old male, from [facility], with PMH of CVA, Alzheimer's, nonverbal at baseline, GERD, Schizophrenia, and Constipation, was brought into the ED s/p mechanical fall, poor oral intake, and Covid-19 infection on 12/27/23 Pt was admitted for COVID PNA, later found with intermittent hypoxia/ hypopnea to Sats 80% and Hypotension despite multiple fluid boluses, also found with bryan to 40s for the last 2 days, in ICU for further monitoring.     DX:  1. CVA  2. Alzheimer's  3. Hypoxia  4. Hypotension   5. Bradycardia   5. COVID +Ve         =================== Neuro============================  # CVA  # Alzheimers  #sedation/analgesia  - baseline: bedbound, nonverbal in the setting of Alzheimer's dementia, Previous CVA  - continue sedation with propofol   - continue valporate 250mg BID  - lorazepam and Zyprexa held as patient intubated        ================= Cardiovascular==========================  # septic shock in the setting of COVID-19 infection  # Sinus bradycardia  - continue BP support with levophed, wean as tolerated  - Trop negative, TWI on lateral leads are chronic  - POCUS 1/5 shows intact LV function, some signs of end systolic effacement   - volume expansion with albumin 25% 100mL q8h x 3 doses  - F/u Echo (pending cardiologist Dr. Cadet approval for the echo)        ================- Pulm=================================  #Acute Hypoxia Respiratory failure in the setting of COVID-19  #Multifocal PNA   #at risk of aspiration PNA  - RRT for desaturation to the 80%'s (1/2/24)  - Improved with NRB, PO2 in 200s on ABG, s/p HF 50/40 but then failed HFNC 1/5  - now sedated, mechanically ventilated, with metabolic alkalosis noted this AM, rate decreased 16>14/400/8/40%  - blood cx 12/29 NGTD  - continue cefepime 2g daily  - completes decadron 6mg x 10 days today      ==================ID===================================    #COVID   - plan as above      ================= Nephro================================  #No active issues     =================GI====================================  #Nutrition  - NPO with TF, jevity at 10ml/hr over 18hrs    # Diarrhea 2/2 to laxatives vs covid   - Dc senna, miralax  - GI PCR if persistent        ================ Heme==================================  DVT PPx with lovenox    =================Endocrine===============================  Bryan and Hypotensive   - TSH 4.28 [wnl]      ================= Skin/Catheters============================  no wounds    =================Prophylaxis =============================  lovenox    ==================GOC==================================  FULL CODE      63 year old male, from [facility], with PMH of CVA, Alzheimer's, nonverbal at baseline, GERD, Schizophrenia, and Constipation, was brought into the ED s/p mechanical fall, poor oral intake, and Covid-19 infection on 12/27/23 Pt was admitted for COVID PNA, later found with intermittent hypoxia/ hypopnea to Sats 80% and Hypotension despite multiple fluid boluses, also found with bryan to 40s for the last 2 days, in ICU for further monitoring.     DX:  1. CVA  2. Alzheimer's  3. Hypoxia  4. Hypotension   5. Bradycardia   5. COVID +Ve         =================== Neuro============================  # CVA  # Alzheimers  #sedation/analgesia  - baseline: bedbound, nonverbal in the setting of Alzheimer's dementia, Previous CVA  - continue sedation with propofol   - continue valporate 250mg BID  - lorazepam and Zyprexa held as patient intubated        ================= Cardiovascular==========================  # septic shock in the setting of COVID-19 infection  # Sinus bradycardia  - continue BP support with levophed, wean as tolerated  - Trop negative, TWI on lateral leads are chronic  - POCUS 1/5 shows intact LV function, some signs of end systolic effacement   - volume expansion with albumin 25% 100mL q8h x 3 doses  - F/u Echo (pending cardiologist Dr. Cadet approval for the echo)        ================- Pulm=================================  #Acute Hypoxia Respiratory failure in the setting of COVID-19  #Multifocal PNA   #at risk of aspiration PNA  - RRT for desaturation to the 80%'s (1/2/24)  - Improved with NRB, PO2 in 200s on ABG, s/p HF 50/40 but then failed HFNC 1/5  - now sedated, mechanically ventilated, with metabolic alkalosis noted this AM, rate decreased 16>14/400/8/40%  - blood cx 12/29 NGTD  - continue cefepime 2g daily  - completes decadron 6mg x 10 days today      ==================ID===================================    #COVID   - plan as above      ================= Nephro================================  #No active issues     =================GI====================================  #Nutrition  - NPO with TF, jevity at 10ml/hr over 18hrs    # Diarrhea 2/2 to laxatives vs covid   - Dc senna, miralax  - GI PCR if persistent        ================ Heme==================================  DVT PPx with lovenox    =================Endocrine===============================  Bryan and Hypotensive   - TSH 4.28 [wnl]      ================= Skin/Catheters============================  no wounds    =================Prophylaxis =============================  lovenox    ==================GOC==================================  FULL CODE

## 2024-01-06 NOTE — PROGRESS NOTE ADULT - ASSESSMENT
Agree with above assessment and plan as outlined above.    - EP f/u appreciated    Gavino Cadet MD, [facility]  BEEPER [phone]   Agree with above assessment and plan as outlined above.    - EP f/u appreciated    Gavino Cadet MD, [facility]  BEEPER [phone]

## 2024-01-06 NOTE — PROGRESS NOTE ADULT - ATTENDING COMMENTS
IMP: This is a  63 year old mn , from [facility], with  CVA, Alzheimer's, nonverbal at baseline, GERD, Schizophrenia, and Constipation, was brought into the ED s/p mechanical fall, poor oral intake, and Covid-19 infection on 12/27/23 Pt was admitted for COVID PNA, later found with intermittent hypoxia/ hypopnea to Sats 80% and Hypotension despite multiple fluid boluses, also found with bryan to 40s for the last 2 days, in ICU for further monitoring.       ASSESSMENT   - Acute Hypoxic resp failure   - Covid-19 PNA   - Shock septic   - CVA  - Alzheimer dementia   - Bradycardia       Plan     - Intubated am 1/4 after failing HFNC   - Adjust vent as per ABG   - Sedation   - Hemodynamic support   - Titrate vaso-active agents to maintain MAP>65  - EP cards f/u : no indication for PPM at tis time   - Antibx   - F/U cultures   - Off Remdesivir due to ADAN  - Continue decadron  6 mg for total 10 days   - Isolation : contact and airborne   - Monitor I/O   - Cards following   - Hold AV angel luis blocking agent   - Skin care .  - Wound care eval noted . IMP: This is a  63 year old mn , from [facility], with  CVA, Alzheimer's, nonverbal at baseline, GERD, Schizophrenia, and Constipation, was brought into the ED s/p mechanical fall, poor oral intake, and Covid-19 infection on 12/27/23 Pt was admitted for COVID PNA, later found with intermittent hypoxia/ hypopnea to Sats 80% and Hypotension despite multiple fluid boluses, also found with bryan to 40s for the last 2 days, in ICU for further monitoring.       ASSESSMENT   - Acute Hypoxic resp failure   - Covid-19 PNA   - Shock septic   - CVA  - Alzheimer dementia   - Bradycardia       Plan     - Intubated am 1/4 after failing HFNC   - Adjust vent as per ABG   - Sedation   - Hemodynamic support   - Titrate vaso-active agents to maintain MAP>65  - EP cards f/u : no indication for PPM at tis time   - Antibx   - F/U cultures   - Off Remdesivir due to ADAN  - Continue decadron  6 mg for total 10 days   - Isolation : contact and airborne   - Monitor I/O   - Cards following   - Hold AV angel luis blocking agent   - Skin care .  - Wound care eval noted .

## 2024-01-06 NOTE — PROGRESS NOTE ADULT - SUBJECTIVE AND OBJECTIVE BOX
EP Attending  HISTORY OF PRESENT ILLNESS: HPI:  A 63 year old male, from [facility], with PMH of CVA, Alzheimer's, nonverbal at baseline, GERD, Schizophrenia, and Constipation, was brought into the ED s/p mechanical fall, poor oral intake, and Covid-19 infection on 12/27/23 as per NH papers. Unable to obtain history from patient since he is nonverbal, history obtained from chart review.  (28 Dec 2023 20:54)    Was pleasant and alert this morning, not talkative.  Apparently then had an RRT For hypotension and has gone to MICU for septic shock treatment.  Takes antipsychotic Rx at his nursing home, and was referred to EP re: sinus bradycardia and borderline QT prolongation on intake.  No reported history of seizures or VT/VF in chart.  Not able to participate in HPI/ROS due to psych issues.  1/4- in ICU, not intubated, awake but not interactive.  1/5- intubated overnight.  Date of service 1/6- remains intubated/sedated.    PAST MEDICAL & SURGICAL HISTORY:  CVA (cerebral vascular accident)  CVA (cerebral vascular accident)  HLD (hyperlipidemia)  S/P percutaneous endoscopic gastrostomy (PEG) tube placement    acetaminophen     Tablet .. 650 milliGRAM(s) Oral every 6 hours PRN  albuterol    90 MICROgram(s) HFA Inhaler 2 Puff(s) Inhalation every 6 hours PRN  aspirin  chewable 81 milliGRAM(s) Oral daily  atorvastatin 20 milliGRAM(s) Oral at bedtime  cefepime   IVPB 2000 milliGRAM(s) IV Intermittent every 8 hours  chlorhexidine 0.12% Liquid 15 milliLiter(s) Oral Mucosa every 12 hours  dexAMETHasone  Injectable 6 milliGRAM(s) IV Push daily  enoxaparin Injectable 40 milliGRAM(s) SubCutaneous every 24 hours  fentaNYL    Injectable 25 MICROGram(s) IV Push every 4 hours PRN  metroNIDAZOLE  IVPB 500 milliGRAM(s) IV Intermittent every 8 hours  norepinephrine Infusion 0.05 MICROgram(s)/kG/Min IV Continuous <Continuous>  propofol Infusion 10 MICROgram(s)/kG/Min IV Continuous <Continuous>  valproic  acid Syrup 250 milliGRAM(s) Oral two times a day                            12.3   14.64 )-----------( 347      ( 06 Jan 2024 03:40 )             38.0       01-06    139  |  108  |  13  ----------------------------<  198<H>  3.8   |  29  |  0.69    Ca    9.7      06 Jan 2024 03:40  Phos  2.4     01-06  Mg     2.1     01-06    TPro  5.9<L>  /  Alb  2.6<L>  /  TBili  0.3  /  DBili  x   /  AST  19  /  ALT  55  /  AlkPhos  50  01-06      CARDIAC MARKERS ( 03 Jan 2024 15:00 )  x     / x     / 80 U/L / x     / 1.4 ng/mL    T(C): 37.2 (01-06-24 @ 08:45), Max: 37.4 (01-06-24 @ 03:30)  HR: 60 (01-06-24 @ 08:45) (48 - 77)  BP: 92/71 (01-06-24 @ 08:45) (78/60 - 119/78)  RR: 21 (01-06-24 @ 08:45) (8 - 22)  SpO2: 99% (01-06-24 @ 08:45) (97% - 100%)  Wt(kg): --    I&O's Summary    05 Jan 2024 07:01  -  06 Jan 2024 07:00  --------------------------------------------------------  IN: 918.6 mL / OUT: 1225 mL / NET: -306.4 mL    Appearance: elderly  man intubated and sedated  HEENT:   Normal oral mucosa, PERRL, EOMI	  Lymphatic: No lymphadenopathy , no edema  Cardiovascular: Normal S1 S2, No JVD, No murmurs , Peripheral pulses palpable 2+ bilaterally  Respiratory: Lungs clear to auscultation, normal effort 	  Gastrointestinal:  Soft, Non-tender, + BS	  Skin: No rashes, No ecchymoses, No cyanosis, warm to touch  Musculoskeletal: ROM/strength unable to assess due to sedation  Psychiatry:  Mood & affect unable to assess due to sedation.    TELEMETRY: 	bradycardic, sinus 40s-50s.  QT <500 corrected in setting of bradycardia.  ECG: NSR, QTc 450-470ms.  biphasic T-waves.  Echo: LVEF normal in 2020 at time of stroke.	  	  ASSESSMENT/PLAN: Mr Arriola is a pleasant 63y Male at nursing home due to stroke, dementia, and schizophrenia. Brought in for loss of appetite.  intubated, sedated, on pressors for septic shock mgmt.  QTc upper limits of normal, chronically.  Replace K to 4-4.5, Mg 2 to 2.  Limit add'l QT prolonging medications.  As he is bedbound, not apparently symptomatic from sinus bradycardia. No long pauses on telemetry, or PVC-initiated arrhythmia.  Maintain telemetry but could probably go out of ICU.  At this time no indication for permanent pacing.      Ramses Pena M.D.  Cardiac Electrophysiology    office [phone]  pager [phone] EP Attending  HISTORY OF PRESENT ILLNESS: HPI:  A 63 year old male, from [facility], with PMH of CVA, Alzheimer's, nonverbal at baseline, GERD, Schizophrenia, and Constipation, was brought into the ED s/p mechanical fall, poor oral intake, and Covid-19 infection on 12/27/23 as per NH papers. Unable to obtain history from patient since he is nonverbal, history obtained from chart review.  (28 Dec 2023 20:54)    Was pleasant and alert this morning, not talkative.  Apparently then had an RRT For hypotension and has gone to MICU for septic shock treatment.  Takes antipsychotic Rx at his nursing home, and was referred to EP re: sinus bradycardia and borderline QT prolongation on intake.  No reported history of seizures or VT/VF in chart.  Not able to participate in HPI/ROS due to psych issues.  1/4- in ICU, not intubated, awake but not interactive.  1/5- intubated overnight.  Date of service 1/6- remains intubated/sedated.    PAST MEDICAL & SURGICAL HISTORY:  CVA (cerebral vascular accident)  CVA (cerebral vascular accident)  HLD (hyperlipidemia)  S/P percutaneous endoscopic gastrostomy (PEG) tube placement    acetaminophen     Tablet .. 650 milliGRAM(s) Oral every 6 hours PRN  albuterol    90 MICROgram(s) HFA Inhaler 2 Puff(s) Inhalation every 6 hours PRN  aspirin  chewable 81 milliGRAM(s) Oral daily  atorvastatin 20 milliGRAM(s) Oral at bedtime  cefepime   IVPB 2000 milliGRAM(s) IV Intermittent every 8 hours  chlorhexidine 0.12% Liquid 15 milliLiter(s) Oral Mucosa every 12 hours  dexAMETHasone  Injectable 6 milliGRAM(s) IV Push daily  enoxaparin Injectable 40 milliGRAM(s) SubCutaneous every 24 hours  fentaNYL    Injectable 25 MICROGram(s) IV Push every 4 hours PRN  metroNIDAZOLE  IVPB 500 milliGRAM(s) IV Intermittent every 8 hours  norepinephrine Infusion 0.05 MICROgram(s)/kG/Min IV Continuous <Continuous>  propofol Infusion 10 MICROgram(s)/kG/Min IV Continuous <Continuous>  valproic  acid Syrup 250 milliGRAM(s) Oral two times a day                            12.3   14.64 )-----------( 347      ( 06 Jan 2024 03:40 )             38.0       01-06    139  |  108  |  13  ----------------------------<  198<H>  3.8   |  29  |  0.69    Ca    9.7      06 Jan 2024 03:40  Phos  2.4     01-06  Mg     2.1     01-06    TPro  5.9<L>  /  Alb  2.6<L>  /  TBili  0.3  /  DBili  x   /  AST  19  /  ALT  55  /  AlkPhos  50  01-06      CARDIAC MARKERS ( 03 Jan 2024 15:00 )  x     / x     / 80 U/L / x     / 1.4 ng/mL    T(C): 37.2 (01-06-24 @ 08:45), Max: 37.4 (01-06-24 @ 03:30)  HR: 60 (01-06-24 @ 08:45) (48 - 77)  BP: 92/71 (01-06-24 @ 08:45) (78/60 - 119/78)  RR: 21 (01-06-24 @ 08:45) (8 - 22)  SpO2: 99% (01-06-24 @ 08:45) (97% - 100%)  Wt(kg): --    I&O's Summary    05 Jan 2024 07:01  -  06 Jan 2024 07:00  --------------------------------------------------------  IN: 918.6 mL / OUT: 1225 mL / NET: -306.4 mL    Appearance: elderly  man intubated and sedated  HEENT:   Normal oral mucosa, PERRL, EOMI	  Lymphatic: No lymphadenopathy , no edema  Cardiovascular: Normal S1 S2, No JVD, No murmurs , Peripheral pulses palpable 2+ bilaterally  Respiratory: Lungs clear to auscultation, normal effort 	  Gastrointestinal:  Soft, Non-tender, + BS	  Skin: No rashes, No ecchymoses, No cyanosis, warm to touch  Musculoskeletal: ROM/strength unable to assess due to sedation  Psychiatry:  Mood & affect unable to assess due to sedation.    TELEMETRY: 	bradycardic, sinus 40s-50s.  QT <500 corrected in setting of bradycardia.  ECG: NSR, QTc 450-470ms.  biphasic T-waves.  Echo: LVEF normal in 2020 at time of stroke.	  	  ASSESSMENT/PLAN: Mr Arriola is a pleasant 63y Male at nursing home due to stroke, dementia, and schizophrenia. Brought in for loss of appetite.  intubated, sedated, on pressors for septic shock mgmt.  QTc upper limits of normal, chronically.  Replace K to 4-4.5, Mg 2 to 2.  Limit add'l QT prolonging medications.  As he is bedbound, not apparently symptomatic from sinus bradycardia. No long pauses on telemetry, or PVC-initiated arrhythmia.  Maintain telemetry but could probably go out of ICU.  At this time no indication for permanent pacing.      Ramses Pena M.D.  Cardiac Electrophysiology    office [phone]  pager [phone]

## 2024-01-06 NOTE — PROGRESS NOTE ADULT - SUBJECTIVE AND OBJECTIVE BOX
INTERVAL HPI/OVERNIGHT EVENTS:     PRESSORS: [ ] YES [ ] NO  WHICH:    ANTIBIOTICS:                  DATE STARTED:  ANTIBIOTICS:                  DATE STARTED:  ANTIBIOTICS:                  DATE STARTED:    Antimicrobial:  cefepime   IVPB 2000 milliGRAM(s) IV Intermittent every 8 hours  metroNIDAZOLE  IVPB 500 milliGRAM(s) IV Intermittent every 8 hours    Cardiovascular:  norepinephrine Infusion 0.05 MICROgram(s)/kG/Min IV Continuous <Continuous>    Pulmonary:  albuterol    90 MICROgram(s) HFA Inhaler 2 Puff(s) Inhalation every 6 hours PRN    Hematalogic:  aspirin  chewable 81 milliGRAM(s) Oral daily  enoxaparin Injectable 40 milliGRAM(s) SubCutaneous every 24 hours    Other:  acetaminophen     Tablet .. 650 milliGRAM(s) Oral every 6 hours PRN  atorvastatin 20 milliGRAM(s) Oral at bedtime  chlorhexidine 0.12% Liquid 15 milliLiter(s) Oral Mucosa every 12 hours  dexAMETHasone  Injectable 6 milliGRAM(s) IV Push daily  fentaNYL    Injectable 25 MICROGram(s) IV Push every 4 hours PRN  propofol Infusion 10 MICROgram(s)/kG/Min IV Continuous <Continuous>  valproic  acid Syrup 250 milliGRAM(s) Oral two times a day      Drug Dosing Weight  Height (cm): 167.6 (03 Aug 2022 20:11)  Weight (kg): 57 (03 Jan 2024 14:30)  BMI (kg/m2): 20.3 (03 Jan 2024 14:30)  BSA (m2): 1.64 (03 Jan 2024 14:30)    CENTRAL LINE: [ ] YES [ ] NO  LOCATION:   DATE INSERTED:  REMOVE: [ ] YES [ ] NO  EXPLAIN:    ACEVEDO: [ ] YES [ ] NO    DATE INSERTED:  REMOVE:  [ ] YES [ ] NO  EXPLAIN:    A-LINE:  [ ] YES [ ] NO  LOCATION:   DATE INSERTED:  REMOVE:  [ ] YES [ ] NO  EXPLAIN:    PMH/Social Hx/Fam Hx -reviewed admission note, no change since admission  PAST MEDICAL & SURGICAL HISTORY:  CVA (cerebral vascular accident)      CVA (cerebral vascular accident)      HLD (hyperlipidemia)      S/P percutaneous endoscopic gastrostomy (PEG) tube placement        Heart faliure: acute [ ] chronic [ ] acute or chronic [ ] diastolic [ ] systolic [ ] combied systolic and diastolic[ ]  ADAN: ATN[ ] renal medullary necrosis [ ] CKD I [ ]CKDII [ ]CKD III [ ]CKD IV [ ]CKD V [ ]Other pathological lesions [ ]  Abdominal Nutrition Status: malnutrition [ ] cachexia [ ] morbid obesity/BMI=40 [ ] Supplement ordered [___________]     T(C): 37.1 (01-06-24 @ 00:00), Max: 37.1 (01-05-24 @ 22:15)  HR: 62 (01-06-24 @ 00:07)  BP: 108/78 (01-06-24 @ 00:00)  BP(mean): 88 (01-06-24 @ 00:00)  ABP: --  ABP(mean): --  RR: 16 (01-06-24 @ 00:00)  SpO2: 99% (01-06-24 @ 00:07)  Wt(kg): --    ABG - ( 05 Jan 2024 06:07 )  pH, Arterial: 7.40  pH, Blood: x     /  pCO2: 44    /  pO2: 82    / HCO3: 27    / Base Excess: 2.0   /  SaO2: 98                    01-04 @ 07:01  -  01-05 @ 07:00  --------------------------------------------------------  IN: 307.8 mL / OUT: 1490 mL / NET: -1182.2 mL        Mode: AC/ CMV (Assist Control/ Continuous Mandatory Ventilation)  RR (machine): 16  TV (machine): 400  FiO2: 40  PEEP: 8  ITime: 1  MAP: 9  PIP: 14      PHYSICAL EXAM:    GENERAL: [ ]NAD, [ ]well-groomed, [ ]well-developed  HEAD:  [ ]Atraumatic, [ ]Normocephalic  EYES: [ ]EOMI, [ ]PERRLA, [ ]conjunctiva and sclera clear  ENMT: [ ]No tonsillar erythema, exudates, or enlargement; [ ]Moist mucous membranes, [ ]Good dentition, [ ]No lesions  NECK: [ ]Supple, normal appearance, [ ]No JVD; [ ]Normal thyroid; [ ]Trachea midline  NERVOUS SYSTEM:  [ ]Alert & Oriented X3, [ ]Good concentration; [ ]Motor Strength 5/5 B/L upper and lower extremities; [ ]DTRs 2+ intact and symmetric  CHEST/LUNG: [ ]No chest deformity; [ ]Normal percussion bilaterally; [ ]No rales, rhonchi, wheezing; [ ]Crackles at bases  HEART: [ ]Regular rate and rhythm; [ ]No murmurs, rubs, or gallops  ABDOMEN: [ ]Soft, Nontender, Nondistended; [ ]Bowel sounds present  EXTREMITIES:  [ ]2+ Peripheral Pulses, [ ]No clubbing, cyanosis, or edema [ ]Bilat lower extremity edema  LYMPH: [ ]No lymphadenopathy noted  SKIN: [ ]No rashes or lesions; [ ]Good capillary refill      LABS:  CBC Full  -  ( 05 Jan 2024 04:50 )  WBC Count : 15.31 K/uL  RBC Count : 4.82 M/uL  Hemoglobin : 14.2 g/dL  Hematocrit : 44.1 %  Platelet Count - Automated : 326 K/uL  Mean Cell Volume : 91.5 fl  Mean Cell Hemoglobin : 29.5 pg  Mean Cell Hemoglobin Concentration : 32.2 gm/dL  Auto Neutrophil # : x  Auto Lymphocyte # : x  Auto Monocyte # : x  Auto Eosinophil # : x  Auto Basophil # : x  Auto Neutrophil % : x  Auto Lymphocyte % : x  Auto Monocyte % : x  Auto Eosinophil % : x  Auto Basophil % : x    01-05    140  |  109<H>  |  10  ----------------------------<  110<H>  3.9   |  26  |  0.80    Ca    8.7      05 Jan 2024 04:50  Phos  2.7     01-05  Mg     2.0     01-05    TPro  6.3  /  Alb  2.8<L>  /  TBili  0.4  /  DBili  x   /  AST  39  /  ALT  73<H>  /  AlkPhos  57  01-05      Urinalysis Basic - ( 05 Jan 2024 04:50 )    Color: x / Appearance: x / SG: x / pH: x  Gluc: 110 mg/dL / Ketone: x  / Bili: x / Urobili: x   Blood: x / Protein: x / Nitrite: x   Leuk Esterase: x / RBC: x / WBC x   Sq Epi: x / Non Sq Epi: x / Bacteria: x          RADIOLOGY & ADDITIONAL STUDIES REVIEWED:      [ ]GOALS OF CARE DISCUSSION WITH PATIENT/FAMILY/PROXY:    CRITICAL CARE TIME SPENT: 35 minutes INTERVAL HPI/OVERNIGHT EVENTS:     PRESSORS: [ ] YES [ ] NO  WHICH:    ANTIBIOTICS:                  DATE STARTED:  ANTIBIOTICS:                  DATE STARTED:  ANTIBIOTICS:                  DATE STARTED:    Antimicrobial:  cefepime   IVPB 2000 milliGRAM(s) IV Intermittent every 8 hours  metroNIDAZOLE  IVPB 500 milliGRAM(s) IV Intermittent every 8 hours    Cardiovascular:  norepinephrine Infusion 0.05 MICROgram(s)/kG/Min IV Continuous <Continuous>    Pulmonary:  albuterol    90 MICROgram(s) HFA Inhaler 2 Puff(s) Inhalation every 6 hours PRN    Hematalogic:  aspirin  chewable 81 milliGRAM(s) Oral daily  enoxaparin Injectable 40 milliGRAM(s) SubCutaneous every 24 hours    Other:  acetaminophen     Tablet .. 650 milliGRAM(s) Oral every 6 hours PRN  atorvastatin 20 milliGRAM(s) Oral at bedtime  chlorhexidine 0.12% Liquid 15 milliLiter(s) Oral Mucosa every 12 hours  dexAMETHasone  Injectable 6 milliGRAM(s) IV Push daily  fentaNYL    Injectable 25 MICROGram(s) IV Push every 4 hours PRN  propofol Infusion 10 MICROgram(s)/kG/Min IV Continuous <Continuous>  valproic  acid Syrup 250 milliGRAM(s) Oral two times a day      Drug Dosing Weight  Height (cm): 167.6 (03 Aug 2022 20:11)  Weight (kg): 57 (03 Jan 2024 14:30)  BMI (kg/m2): 20.3 (03 Jan 2024 14:30)  BSA (m2): 1.64 (03 Jan 2024 14:30)    CENTRAL LINE: [ ] YES [ ] NO  LOCATION:   DATE INSERTED:  REMOVE: [ ] YES [ ] NO  EXPLAIN:    ACEVEDO: [ ] YES [ ] NO    DATE INSERTED:  REMOVE:  [ ] YES [ ] NO  EXPLAIN:    A-LINE:  [ ] YES [ ] NO  LOCATION:   DATE INSERTED:  REMOVE:  [ ] YES [ ] NO  EXPLAIN:    PMH/Social Hx/Fam Hx -reviewed admission note, no change since admission  PAST MEDICAL & SURGICAL HISTORY:  CVA (cerebral vascular accident)      CVA (cerebral vascular accident)      HLD (hyperlipidemia)      S/P percutaneous endoscopic gastrostomy (PEG) tube placement        Heart faliure: acute [ ] chronic [ ] acute or chronic [ ] diastolic [ ] systolic [ ] combied systolic and diastolic[ ]  ADAN: ATN[ ] renal medullary necrosis [ ] CKD I [ ]CKDII [ ]CKD III [ ]CKD IV [ ]CKD V [ ]Other pathological lesions [ ]  Abdominal Nutrition Status: malnutrition [ ] cachexia [ ] morbid obesity/BMI=40 [ ] Supplement ordered [___________]     T(C): 37.1 (01-06-24 @ 00:00), Max: 37.1 (01-05-24 @ 22:15)  HR: 62 (01-06-24 @ 00:07)  BP: 108/78 (01-06-24 @ 00:00)  BP(mean): 88 (01-06-24 @ 00:00)  ABP: --  ABP(mean): --  RR: 16 (01-06-24 @ 00:00)  SpO2: 99% (01-06-24 @ 00:07)  Wt(kg): --    ABG - ( 05 Jan 2024 06:07 )  pH, Arterial: 7.40  pH, Blood: x     /  pCO2: 44    /  pO2: 82    / HCO3: 27    / Base Excess: 2.0   /  SaO2: 98                    01-04 @ 07:01  -  01-05 @ 07:00  --------------------------------------------------------  IN: 307.8 mL / OUT: 1490 mL / NET: -1182.2 mL        Mode: AC/ CMV (Assist Control/ Continuous Mandatory Ventilation)  RR (machine): 16  TV (machine): 400  FiO2: 40  PEEP: 8  ITime: 1  MAP: 9  PIP: 14      PHYSICAL EXAM:  GENERAL: intubated, sedated   HEAD:  Atraumatic, Normocephalic  EYES: pupils 2mm and sluggish, conjunctiva and sclera clear  NECK: Supple, trachea midline, no JVD  CHEST/LUNG: diminished breath sounds bilaterally   HEART: (+) irregular rate and rhythm, rate controlled   ABDOMEN: Bowel sounds present; Soft, Nontender, Nondistended.  EXTREMITIES:  2+ Peripheral Pulses, capillary refill about 3 secs   NERVOUS SYSTEM:  intubated and sedated, minimally responsive to noxious stimuli  SKIN: warm, dry, No rashes or lesions      LABS:  CBC Full  -  ( 05 Jan 2024 04:50 )  WBC Count : 15.31 K/uL  RBC Count : 4.82 M/uL  Hemoglobin : 14.2 g/dL  Hematocrit : 44.1 %  Platelet Count - Automated : 326 K/uL  Mean Cell Volume : 91.5 fl  Mean Cell Hemoglobin : 29.5 pg  Mean Cell Hemoglobin Concentration : 32.2 gm/dL  Auto Neutrophil # : x  Auto Lymphocyte # : x  Auto Monocyte # : x  Auto Eosinophil # : x  Auto Basophil # : x  Auto Neutrophil % : x  Auto Lymphocyte % : x  Auto Monocyte % : x  Auto Eosinophil % : x  Auto Basophil % : x    01-05    140  |  109<H>  |  10  ----------------------------<  110<H>  3.9   |  26  |  0.80    Ca    8.7      05 Jan 2024 04:50  Phos  2.7     01-05  Mg     2.0     01-05    TPro  6.3  /  Alb  2.8<L>  /  TBili  0.4  /  DBili  x   /  AST  39  /  ALT  73<H>  /  AlkPhos  57  01-05      Urinalysis Basic - ( 05 Jan 2024 04:50 )    Color: x / Appearance: x / SG: x / pH: x  Gluc: 110 mg/dL / Ketone: x  / Bili: x / Urobili: x   Blood: x / Protein: x / Nitrite: x   Leuk Esterase: x / RBC: x / WBC x   Sq Epi: x / Non Sq Epi: x / Bacteria: x          RADIOLOGY & ADDITIONAL STUDIES REVIEWED:      [ ]GOALS OF CARE DISCUSSION WITH PATIENT/FAMILY/PROXY:    CRITICAL CARE TIME SPENT: 35 minutes

## 2024-01-07 LAB
ALBUMIN SERPL ELPH-MCNC: 3 G/DL — LOW (ref 3.5–5)
ALBUMIN SERPL ELPH-MCNC: 3 G/DL — LOW (ref 3.5–5)
ALP SERPL-CCNC: 50 U/L — SIGNIFICANT CHANGE UP (ref 40–120)
ALP SERPL-CCNC: 50 U/L — SIGNIFICANT CHANGE UP (ref 40–120)
ALT FLD-CCNC: 43 U/L DA — SIGNIFICANT CHANGE UP (ref 10–60)
ALT FLD-CCNC: 43 U/L DA — SIGNIFICANT CHANGE UP (ref 10–60)
ANION GAP SERPL CALC-SCNC: 3 MMOL/L — LOW (ref 5–17)
ANION GAP SERPL CALC-SCNC: 3 MMOL/L — LOW (ref 5–17)
AST SERPL-CCNC: 13 U/L — SIGNIFICANT CHANGE UP (ref 10–40)
AST SERPL-CCNC: 13 U/L — SIGNIFICANT CHANGE UP (ref 10–40)
BASE EXCESS BLDA CALC-SCNC: 5.8 MMOL/L — HIGH (ref -2–3)
BASE EXCESS BLDA CALC-SCNC: 5.8 MMOL/L — HIGH (ref -2–3)
BILIRUB SERPL-MCNC: 0.4 MG/DL — SIGNIFICANT CHANGE UP (ref 0.2–1.2)
BILIRUB SERPL-MCNC: 0.4 MG/DL — SIGNIFICANT CHANGE UP (ref 0.2–1.2)
BLOOD GAS COMMENTS ARTERIAL: SIGNIFICANT CHANGE UP
BLOOD GAS COMMENTS ARTERIAL: SIGNIFICANT CHANGE UP
BUN SERPL-MCNC: 12 MG/DL — SIGNIFICANT CHANGE UP (ref 7–18)
BUN SERPL-MCNC: 12 MG/DL — SIGNIFICANT CHANGE UP (ref 7–18)
CALCIUM SERPL-MCNC: 9.4 MG/DL — SIGNIFICANT CHANGE UP (ref 8.4–10.5)
CALCIUM SERPL-MCNC: 9.4 MG/DL — SIGNIFICANT CHANGE UP (ref 8.4–10.5)
CHLORIDE SERPL-SCNC: 109 MMOL/L — HIGH (ref 96–108)
CHLORIDE SERPL-SCNC: 109 MMOL/L — HIGH (ref 96–108)
CO2 SERPL-SCNC: 28 MMOL/L — SIGNIFICANT CHANGE UP (ref 22–31)
CO2 SERPL-SCNC: 28 MMOL/L — SIGNIFICANT CHANGE UP (ref 22–31)
CREAT SERPL-MCNC: 0.64 MG/DL — SIGNIFICANT CHANGE UP (ref 0.5–1.3)
CREAT SERPL-MCNC: 0.64 MG/DL — SIGNIFICANT CHANGE UP (ref 0.5–1.3)
EGFR: 106 ML/MIN/1.73M2 — SIGNIFICANT CHANGE UP
EGFR: 106 ML/MIN/1.73M2 — SIGNIFICANT CHANGE UP
GLUCOSE BLDC GLUCOMTR-MCNC: 118 MG/DL — HIGH (ref 70–99)
GLUCOSE BLDC GLUCOMTR-MCNC: 118 MG/DL — HIGH (ref 70–99)
GLUCOSE BLDC GLUCOMTR-MCNC: 135 MG/DL — HIGH (ref 70–99)
GLUCOSE BLDC GLUCOMTR-MCNC: 135 MG/DL — HIGH (ref 70–99)
GLUCOSE BLDC GLUCOMTR-MCNC: 158 MG/DL — HIGH (ref 70–99)
GLUCOSE BLDC GLUCOMTR-MCNC: 158 MG/DL — HIGH (ref 70–99)
GLUCOSE BLDC GLUCOMTR-MCNC: 187 MG/DL — HIGH (ref 70–99)
GLUCOSE BLDC GLUCOMTR-MCNC: 187 MG/DL — HIGH (ref 70–99)
GLUCOSE SERPL-MCNC: 190 MG/DL — HIGH (ref 70–99)
GLUCOSE SERPL-MCNC: 190 MG/DL — HIGH (ref 70–99)
HCO3 BLDA-SCNC: 31 MMOL/L — HIGH (ref 21–28)
HCO3 BLDA-SCNC: 31 MMOL/L — HIGH (ref 21–28)
HCT VFR BLD CALC: 37.6 % — LOW (ref 39–50)
HCT VFR BLD CALC: 37.6 % — LOW (ref 39–50)
HGB BLD-MCNC: 12 G/DL — LOW (ref 13–17)
HGB BLD-MCNC: 12 G/DL — LOW (ref 13–17)
HOROWITZ INDEX BLDA+IHG-RTO: 40 — SIGNIFICANT CHANGE UP
HOROWITZ INDEX BLDA+IHG-RTO: 40 — SIGNIFICANT CHANGE UP
MAGNESIUM SERPL-MCNC: 2.2 MG/DL — SIGNIFICANT CHANGE UP (ref 1.6–2.6)
MAGNESIUM SERPL-MCNC: 2.2 MG/DL — SIGNIFICANT CHANGE UP (ref 1.6–2.6)
MCHC RBC-ENTMCNC: 29.2 PG — SIGNIFICANT CHANGE UP (ref 27–34)
MCHC RBC-ENTMCNC: 29.2 PG — SIGNIFICANT CHANGE UP (ref 27–34)
MCHC RBC-ENTMCNC: 31.9 GM/DL — LOW (ref 32–36)
MCHC RBC-ENTMCNC: 31.9 GM/DL — LOW (ref 32–36)
MCV RBC AUTO: 91.5 FL — SIGNIFICANT CHANGE UP (ref 80–100)
MCV RBC AUTO: 91.5 FL — SIGNIFICANT CHANGE UP (ref 80–100)
NRBC # BLD: 0 /100 WBCS — SIGNIFICANT CHANGE UP (ref 0–0)
NRBC # BLD: 0 /100 WBCS — SIGNIFICANT CHANGE UP (ref 0–0)
PCO2 BLDA: 44 MMHG — SIGNIFICANT CHANGE UP (ref 35–48)
PCO2 BLDA: 44 MMHG — SIGNIFICANT CHANGE UP (ref 35–48)
PH BLDA: 7.45 — SIGNIFICANT CHANGE UP (ref 7.35–7.45)
PH BLDA: 7.45 — SIGNIFICANT CHANGE UP (ref 7.35–7.45)
PHOSPHATE SERPL-MCNC: 2.8 MG/DL — SIGNIFICANT CHANGE UP (ref 2.5–4.5)
PHOSPHATE SERPL-MCNC: 2.8 MG/DL — SIGNIFICANT CHANGE UP (ref 2.5–4.5)
PLATELET # BLD AUTO: 289 K/UL — SIGNIFICANT CHANGE UP (ref 150–400)
PLATELET # BLD AUTO: 289 K/UL — SIGNIFICANT CHANGE UP (ref 150–400)
PO2 BLDA: 144 MMHG — HIGH (ref 83–108)
PO2 BLDA: 144 MMHG — HIGH (ref 83–108)
POTASSIUM SERPL-MCNC: 3.7 MMOL/L — SIGNIFICANT CHANGE UP (ref 3.5–5.3)
POTASSIUM SERPL-MCNC: 3.7 MMOL/L — SIGNIFICANT CHANGE UP (ref 3.5–5.3)
POTASSIUM SERPL-SCNC: 3.7 MMOL/L — SIGNIFICANT CHANGE UP (ref 3.5–5.3)
POTASSIUM SERPL-SCNC: 3.7 MMOL/L — SIGNIFICANT CHANGE UP (ref 3.5–5.3)
PROT SERPL-MCNC: 6.2 G/DL — SIGNIFICANT CHANGE UP (ref 6–8.3)
PROT SERPL-MCNC: 6.2 G/DL — SIGNIFICANT CHANGE UP (ref 6–8.3)
RBC # BLD: 4.11 M/UL — LOW (ref 4.2–5.8)
RBC # BLD: 4.11 M/UL — LOW (ref 4.2–5.8)
RBC # FLD: 13.7 % — SIGNIFICANT CHANGE UP (ref 10.3–14.5)
RBC # FLD: 13.7 % — SIGNIFICANT CHANGE UP (ref 10.3–14.5)
SAO2 % BLDA: 100 % — SIGNIFICANT CHANGE UP
SAO2 % BLDA: 100 % — SIGNIFICANT CHANGE UP
SODIUM SERPL-SCNC: 140 MMOL/L — SIGNIFICANT CHANGE UP (ref 135–145)
SODIUM SERPL-SCNC: 140 MMOL/L — SIGNIFICANT CHANGE UP (ref 135–145)
WBC # BLD: 14.63 K/UL — HIGH (ref 3.8–10.5)
WBC # BLD: 14.63 K/UL — HIGH (ref 3.8–10.5)
WBC # FLD AUTO: 14.63 K/UL — HIGH (ref 3.8–10.5)
WBC # FLD AUTO: 14.63 K/UL — HIGH (ref 3.8–10.5)

## 2024-01-07 PROCEDURE — 71045 X-RAY EXAM CHEST 1 VIEW: CPT | Mod: 26

## 2024-01-07 RX ADMIN — Medication 1: at 00:08

## 2024-01-07 RX ADMIN — ALBUTEROL 2 PUFF(S): 90 AEROSOL, METERED ORAL at 20:14

## 2024-01-07 RX ADMIN — PROPOFOL 3.42 MICROGRAM(S)/KG/MIN: 10 INJECTION, EMULSION INTRAVENOUS at 15:45

## 2024-01-07 RX ADMIN — Medication 1: at 23:57

## 2024-01-07 RX ADMIN — CHLORHEXIDINE GLUCONATE 15 MILLILITER(S): 213 SOLUTION TOPICAL at 06:23

## 2024-01-07 RX ADMIN — ENOXAPARIN SODIUM 40 MILLIGRAM(S): 100 INJECTION SUBCUTANEOUS at 13:49

## 2024-01-07 RX ADMIN — Medication 250 MILLIGRAM(S): at 06:23

## 2024-01-07 RX ADMIN — Medication 81 MILLIGRAM(S): at 11:43

## 2024-01-07 RX ADMIN — CHLORHEXIDINE GLUCONATE 15 MILLILITER(S): 213 SOLUTION TOPICAL at 17:28

## 2024-01-07 RX ADMIN — PROPOFOL 3.42 MICROGRAM(S)/KG/MIN: 10 INJECTION, EMULSION INTRAVENOUS at 06:50

## 2024-01-07 RX ADMIN — ALBUTEROL 2 PUFF(S): 90 AEROSOL, METERED ORAL at 16:26

## 2024-01-07 RX ADMIN — Medication 250 MILLIGRAM(S): at 17:28

## 2024-01-07 RX ADMIN — ALBUTEROL 2 PUFF(S): 90 AEROSOL, METERED ORAL at 08:27

## 2024-01-07 RX ADMIN — Medication 50 MILLILITER(S): at 06:23

## 2024-01-07 RX ADMIN — ATORVASTATIN CALCIUM 20 MILLIGRAM(S): 80 TABLET, FILM COATED ORAL at 23:56

## 2024-01-07 RX ADMIN — Medication 1: at 06:49

## 2024-01-07 NOTE — PROGRESS NOTE ADULT - SUBJECTIVE AND OBJECTIVE BOX
EP Attending  HISTORY OF PRESENT ILLNESS: HPI:  A 63 year old male, from [facility], with PMH of CVA, Alzheimer's, nonverbal at baseline, GERD, Schizophrenia, and Constipation, was brought into the ED s/p mechanical fall, poor oral intake, and Covid-19 infection on 12/27/23 as per NH papers. Unable to obtain history from patient since he is nonverbal, history obtained from chart review.  (28 Dec 2023 20:54)    Was pleasant and alert this morning, not talkative.  Apparently then had an RRT For hypotension and has gone to MICU for septic shock treatment.  Takes antipsychotic Rx at his nursing home, and was referred to EP re: sinus bradycardia and borderline QT prolongation on intake.  No reported history of seizures or VT/VF in chart.  Not able to participate in HPI/ROS due to psych issues.  1/4- in ICU, not intubated, awake but not interactive.  1/5- intubated overnight.  1/6- remains intubated/sedated.  Date of service 1/7- uneventful overnight.    PAST MEDICAL & SURGICAL HISTORY:  CVA (cerebral vascular accident)  CVA (cerebral vascular accident)  HLD (hyperlipidemia)  S/P percutaneous endoscopic gastrostomy (PEG) tube placement    acetaminophen     Tablet .. 650 milliGRAM(s) Oral every 6 hours PRN  albuterol    90 MICROgram(s) HFA Inhaler 2 Puff(s) Inhalation every 6 hours  aspirin  chewable 81 milliGRAM(s) Oral daily  atorvastatin 20 milliGRAM(s) Oral at bedtime  chlorhexidine 0.12% Liquid 15 milliLiter(s) Oral Mucosa every 12 hours  enoxaparin Injectable 40 milliGRAM(s) SubCutaneous every 24 hours  fentaNYL    Injectable 25 MICROGram(s) IV Push every 4 hours PRN  insulin lispro (ADMELOG) corrective regimen sliding scale   SubCutaneous every 6 hours  norepinephrine Infusion 0.05 MICROgram(s)/kG/Min IV Continuous <Continuous>  propofol Infusion 10 MICROgram(s)/kG/Min IV Continuous <Continuous>  valproic  acid Syrup 250 milliGRAM(s) Oral two times a day                            12.0   14.63 )-----------( 289      ( 07 Jan 2024 03:04 )             37.6       01-07    140  |  109<H>  |  12  ----------------------------<  190<H>  3.7   |  28  |  0.64    Ca    9.4      07 Jan 2024 03:04  Phos  2.8     01-07  Mg     2.2     01-07    TPro  6.2  /  Alb  3.0<L>  /  TBili  0.4  /  DBili  x   /  AST  13  /  ALT  43  /  AlkPhos  50  01-07      T(C): 37 (01-07-24 @ 14:00), Max: 37.1 (01-07-24 @ 10:45)  HR: 54 (01-07-24 @ 14:00) (42 - 72)  BP: 100/67 (01-07-24 @ 14:00) (77/60 - 158/97)  RR: 16 (01-07-24 @ 14:00) (11 - 28)  SpO2: 99% (01-07-24 @ 14:00) (93% - 100%)  Wt(kg): --    I&O's Summary    06 Jan 2024 07:01  -  07 Jan 2024 07:00  --------------------------------------------------------  IN: 882.9 mL / OUT: 1620 mL / NET: -737.1 mL    07 Jan 2024 07:01  -  07 Jan 2024 15:08  --------------------------------------------------------  IN: 116.9 mL / OUT: 295 mL / NET: -178.1 mL    Appearance: elderly  man intubated and sedated  HEENT:   Normal oral mucosa, PERRL, EOMI	  Lymphatic: No lymphadenopathy , no edema  Cardiovascular: Normal S1 S2, No JVD, No murmurs , Peripheral pulses palpable 2+ bilaterally  Respiratory: Lungs clear to auscultation, normal effort 	  Gastrointestinal:  Soft, Non-tender, + BS	  Skin: No rashes, No ecchymoses, No cyanosis, warm to touch  Musculoskeletal: ROM/strength unable to assess due to sedation  Psychiatry:  Mood & affect unable to assess due to sedation.    TELEMETRY: 	bradycardic, sinus 40s-50s.  QT <500 corrected in setting of bradycardia.  ECG: NSR, QTc 450-470ms.  biphasic T-waves.  Echo: LVEF normal in 2020 at time of stroke.	  	  ASSESSMENT/PLAN: Mr Arriola is a pleasant 63y Male at nursing home due to stroke, dementia, and schizophrenia. Brought in for loss of appetite.  intubated, sedated, on pressors for septic shock mgmt.  QTc upper limits of normal, chronically.  Replace K to 4-4.5, Mg 2 to 2.  Limit add'l QT prolonging medications.  As he is bedbound, not apparently symptomatic from sinus bradycardia. No long pauses on telemetry, or PVC-initiated arrhythmia.  Maintain telemetry but could probably go out of ICU.  At this time no indication for permanent pacing.      Ramses Pena M.D.  Cardiac Electrophysiology    office [phone]  pager [phone] EP Attending  HISTORY OF PRESENT ILLNESS: HPI:  A 63 year old male, from [facility], with PMH of CVA, Alzheimer's, nonverbal at baseline, GERD, Schizophrenia, and Constipation, was brought into the ED s/p mechanical fall, poor oral intake, and Covid-19 infection on 12/27/23 as per NH papers. Unable to obtain history from patient since he is nonverbal, history obtained from chart review.  (28 Dec 2023 20:54)    Was pleasant and alert this morning, not talkative.  Apparently then had an RRT For hypotension and has gone to MICU for septic shock treatment.  Takes antipsychotic Rx at his nursing home, and was referred to EP re: sinus bradycardia and borderline QT prolongation on intake.  No reported history of seizures or VT/VF in chart.  Not able to participate in HPI/ROS due to psych issues.  1/4- in ICU, not intubated, awake but not interactive.  1/5- intubated overnight.  1/6- remains intubated/sedated.  Date of service 1/7- uneventful overnight.    PAST MEDICAL & SURGICAL HISTORY:  CVA (cerebral vascular accident)  CVA (cerebral vascular accident)  HLD (hyperlipidemia)  S/P percutaneous endoscopic gastrostomy (PEG) tube placement    acetaminophen     Tablet .. 650 milliGRAM(s) Oral every 6 hours PRN  albuterol    90 MICROgram(s) HFA Inhaler 2 Puff(s) Inhalation every 6 hours  aspirin  chewable 81 milliGRAM(s) Oral daily  atorvastatin 20 milliGRAM(s) Oral at bedtime  chlorhexidine 0.12% Liquid 15 milliLiter(s) Oral Mucosa every 12 hours  enoxaparin Injectable 40 milliGRAM(s) SubCutaneous every 24 hours  fentaNYL    Injectable 25 MICROGram(s) IV Push every 4 hours PRN  insulin lispro (ADMELOG) corrective regimen sliding scale   SubCutaneous every 6 hours  norepinephrine Infusion 0.05 MICROgram(s)/kG/Min IV Continuous <Continuous>  propofol Infusion 10 MICROgram(s)/kG/Min IV Continuous <Continuous>  valproic  acid Syrup 250 milliGRAM(s) Oral two times a day                            12.0   14.63 )-----------( 289      ( 07 Jan 2024 03:04 )             37.6       01-07    140  |  109<H>  |  12  ----------------------------<  190<H>  3.7   |  28  |  0.64    Ca    9.4      07 Jan 2024 03:04  Phos  2.8     01-07  Mg     2.2     01-07    TPro  6.2  /  Alb  3.0<L>  /  TBili  0.4  /  DBili  x   /  AST  13  /  ALT  43  /  AlkPhos  50  01-07      T(C): 37 (01-07-24 @ 14:00), Max: 37.1 (01-07-24 @ 10:45)  HR: 54 (01-07-24 @ 14:00) (42 - 72)  BP: 100/67 (01-07-24 @ 14:00) (77/60 - 158/97)  RR: 16 (01-07-24 @ 14:00) (11 - 28)  SpO2: 99% (01-07-24 @ 14:00) (93% - 100%)  Wt(kg): --    I&O's Summary    06 Jan 2024 07:01  -  07 Jan 2024 07:00  --------------------------------------------------------  IN: 882.9 mL / OUT: 1620 mL / NET: -737.1 mL    07 Jan 2024 07:01  -  07 Jan 2024 15:08  --------------------------------------------------------  IN: 116.9 mL / OUT: 295 mL / NET: -178.1 mL    Appearance: elderly  man intubated and sedated  HEENT:   Normal oral mucosa, PERRL, EOMI	  Lymphatic: No lymphadenopathy , no edema  Cardiovascular: Normal S1 S2, No JVD, No murmurs , Peripheral pulses palpable 2+ bilaterally  Respiratory: Lungs clear to auscultation, normal effort 	  Gastrointestinal:  Soft, Non-tender, + BS	  Skin: No rashes, No ecchymoses, No cyanosis, warm to touch  Musculoskeletal: ROM/strength unable to assess due to sedation  Psychiatry:  Mood & affect unable to assess due to sedation.    TELEMETRY: 	bradycardic, sinus 40s-50s.  QT <500 corrected in setting of bradycardia.  ECG: NSR, QTc 450-470ms.  biphasic T-waves.  Echo: LVEF normal in 2020 at time of stroke.	  	  ASSESSMENT/PLAN: Mr Arriola is a pleasant 63y Male at nursing home due to stroke, dementia, and schizophrenia. Brought in for loss of appetite.  intubated, sedated, on pressors for septic shock mgmt.  QTc upper limits of normal, chronically.  Replace K to 4-4.5, Mg 2 to 2.  Limit add'l QT prolonging medications.  As he is bedbound, not apparently symptomatic from sinus bradycardia. No long pauses on telemetry, or PVC-initiated arrhythmia.  Maintain telemetry but could probably go out of ICU.  At this time no indication for permanent pacing.      Ramses Pena M.D.  Cardiac Electrophysiology    office [phone]  pager [phone]

## 2024-01-07 NOTE — PROGRESS NOTE ADULT - ASSESSMENT
63 year old male, from [facility], with PMH of CVA, Alzheimer's, nonverbal at baseline, GERD, Schizophrenia, and Constipation, was brought into the ED s/p mechanical fall, poor oral intake, and Covid-19 infection on 12/27/23 Pt was admitted for COVID PNA, later found with intermittent hypoxia/ hypopnea to Sats 80% and Hypotension despite multiple fluid boluses, also found with bryan to 40s for the last 2 days, in ICU for further monitoring.     DX:  1. CVA  2. Alzheimer's  3. Hypoxia  4. Hypotension   5. Bradycardia   5. COVID +Ve         =================== Neuro============================  # CVA  # Alzheimers  #sedation/analgesia  - baseline: bedbound, nonverbal in the setting of Alzheimer's dementia, Previous CVA  - continue sedation with propofol   - continue valporate 250mg BID  - lorazepam and Zyprexa held as patient intubated        ================= Cardiovascular==========================  # septic shock in the setting of COVID-19 infection  # Sinus bradycardia  - continue BP support with levophed, wean as tolerated  - Trop negative, TWI on lateral leads are chronic  - POCUS 1/5 shows intact LV function, some signs of end systolic effacement   - volume expansion with albumin 25% 100mL q8h x 3 doses  - F/u Echo (pending cardiologist Dr. Cadet approval for the echo)        ================- Pulm=================================  #Acute Hypoxia Respiratory failure in the setting of COVID-19  #Multifocal PNA   #at risk of aspiration PNA  - RRT for desaturation to the 80%'s (1/2/24)  - Improved with NRB, PO2 in 200s on ABG, s/p HF 50/40 but then failed HFNC 1/5  - now sedated, mechanically ventilated, with metabolic alkalosis noted this AM, rate decreased 16>14/400/8/40%  - blood cx 12/29 NGTD  - continue cefepime 2g daily  - completes decadron 6mg x 10 days today      ==================ID===================================    #COVID   - plan as above      ================= Nephro================================  #No active issues     =================GI====================================  #Nutrition  - NPO with TF, jevity at 10ml/hr over 18hrs    # Diarrhea 2/2 to laxatives vs covid   - Dc senna, miralax  - GI PCR if persistent        ================ Heme==================================  DVT PPx with lovenox    =================Endocrine===============================  Bryan and Hypotensive   - TSH 4.28 [wnl]      ================= Skin/Catheters============================  no wounds    =================Prophylaxis =============================  lovenox    ==================GOC==================================  FULL CODE        63 year old male, from [facility], with PMH of CVA, Alzheimer's, nonverbal at baseline, GERD, Schizophrenia, and Constipation, was brought into the ED s/p mechanical fall, poor oral intake, and Covid-19 infection on 12/27/23 Pt was admitted for COVID PNA, later found with intermittent hypoxia/ hypopnea to Sats 80% and Hypotension despite multiple fluid boluses, also found with bryan to 40s for the last 2 days, in ICU for further monitoring.     DX:  1. CVA  2. Alzheimer's  3. Hypoxia  4. Hypotension   5. Bradycardia   5. COVID +Ve         =================== Neuro============================  # CVA  # Alzheimers  #sedation/analgesia  - baseline: bedbound, nonverbal in the setting of Alzheimer's dementia, Previous CVA  - continue sedation with propofol   - continue valporate 250mg BID  - lorazepam and Zyprexa held as patient intubated        ================= Cardiovascular==========================  # septic shock in the setting of COVID-19 infection  # Sinus bradycardia  - continue BP support with levophed, wean as tolerated  - Trop negative, TWI on lateral leads are chronic  - POCUS 1/5 shows intact LV function, some signs of end systolic effacement   - volume expansion with albumin 25% 100mL q8h x 3 doses  - F/u Echo (pending cardiologist Dr. Cadet approval for the echo)        ================- Pulm=================================  #Acute Hypoxia Respiratory failure in the setting of COVID-19  #Multifocal PNA   #at risk of aspiration PNA  - RRT for desaturation to the 80%'s (1/2/24)  - Improved with NRB, PO2 in 200s on ABG, s/p HF 50/40 but then failed HFNC 1/5  - now sedated, mechanically ventilated, with metabolic alkalosis noted this AM, rate decreased 16>14/400/8/40%  - blood cx 12/29 NGTD  - continue cefepime 2g daily  - completes decadron 6mg x 10 days today      ==================ID===================================    #COVID   - plan as above      ================= Nephro================================  #No active issues     =================GI====================================  #Nutrition  - NPO with TF, jevity at 10ml/hr over 18hrs    # Diarrhea 2/2 to laxatives vs covid   - Dc senna, miralax  - GI PCR if persistent        ================ Heme==================================  DVT PPx with lovenox    =================Endocrine===============================  Bryan and Hypotensive   - TSH 4.28 [wnl]      ================= Skin/Catheters============================  no wounds    =================Prophylaxis =============================  lovenox    ==================GOC==================================  FULL CODE        63 year old male, from [facility], with PMH of CVA, Alzheimer's, nonverbal at baseline, GERD, Schizophrenia, and Constipation, was brought into the ED s/p mechanical fall, poor oral intake, and Covid-19 infection on 12/27/23 Pt was admitted for COVID PNA, later found with intermittent hypoxia/ hypopnea to Sats 80% and Hypotension despite multiple fluid boluses, also found with bryan to 40s for the last 2 days, in ICU for further monitoring.     DX:  1. CVA  2. Alzheimer's  3. Hypoxia  4. Hypotension   5. Bradycardia   5. COVID +Ve     =================== Neuro============================  # CVA  # Alzheimers  #sedation/analgesia  - baseline: bedbound, nonverbal in the setting of Alzheimer's dementia, Previous CVA  - continue sedation with propofol   - continue valporate 250mg BID  - lorazepam and Zyprexa held as patient intubated    ================= Cardiovascular==========================  # septic shock in the setting of COVID-19 infection  # Sinus bradycardia  - continue BP support with levophed, wean as tolerated  - Trop negative, TWI on lateral leads are chronic  - POCUS 1/5 shows intact LV function, some signs of end systolic effacement   - volume expansion with albumin 25% 100mL q8h x 3 doses  - 1/6/23 TTE: Mildly reduced LVEF     ================- Pulm=================================  #Acute Hypoxia Respiratory failure in the setting of COVID-19  #Multifocal PNA   #at risk of aspiration PNA  - RRT for desaturation to the 80%'s (1/2/24)  - Improved with NRB, PO2 in 200s on ABG, s/p HF 50/40 but then failed HFNC 1/5  - now sedated, mechanically ventilated, with metabolic alkalosis with rate decreased 16>14/400/8/40% and alk improved  - blood cx 12/29 NGTD  - continue cefepime 2g daily  - completes decadron 6mg x 10 days today    ==================ID===================================  #COVID   - plan as above    ================= Nephro================================  #No active issues     =================GI====================================  #Nutrition  - NPO with TF, jevity at 10ml/hr over 18hrs    # Diarrhea 2/2 to laxatives vs covid   - Dc senna, miralax  - GI PCR if persistent      ================ Heme==================================  DVT PPx with lovenox    =================Endocrine===============================  Bryan and Hypotensive   - TSH 4.28 [wnl]    ================= Skin/Catheters============================  no wounds    =================Prophylaxis =============================  lovenox    ==================GOC==================================  FULL CODE        63 year old male, from [facility], with PMH of CVA, Alzheimer's, nonverbal at baseline, GERD, Schizophrenia, and Constipation, was brought into the ED s/p mechanical fall, poor oral intake, and Covid-19 infection on 12/27/23 Pt was admitted for COVID PNA, later found with intermittent hypoxia/ hypopnea to Sats 80% and Hypotension despite multiple fluid boluses, also found with bryan to 40s for the last 2 days, in ICU for further monitoring.     DX:  1. CVA  2. Alzheimer's  3. Hypoxia  4. Hypotension   5. Bradycardia   5. COVID +Ve     =================== Neuro============================  # CVA  # Alzheimers  #sedation/analgesia  - baseline: bedbound, nonverbal in the setting of Alzheimer's dementia, Previous CVA  - continue sedation with propofol   - continue valporate 250mg BID  - lorazepam and Zyprexa held as patient intubated    ================= Cardiovascular==========================  # septic shock in the setting of COVID-19 infection  # Sinus bradycardia  - continue BP support with levophed, wean as tolerated  - Trop negative, TWI on lateral leads are chronic  - POCUS 1/5 shows intact LV function, some signs of end systolic effacement   - volume expansion with albumin 25% 100mL q8h x 3 doses  - 1/6/23 TTE: Mildly reduced LVEF     ================- Pulm=================================  #Acute Hypoxia Respiratory failure in the setting of COVID-19  #Multifocal PNA   #at risk of aspiration PNA  - RRT for desaturation to the 80%'s (1/2/24)  - Improved with NRB, PO2 in 200s on ABG, s/p HF 50/40 but then failed HFNC 1/5  - now sedated, mechanically ventilated, with metabolic alkalosis with rate decreased 16>14/400/8/40% and alk improved  - blood cx 12/29 NGTD  - continue cefepime 2g daily  - completes decadron 6mg x 10 days today    ==================ID===================================  #COVID   - plan as above    ================= Nephro================================  #No active issues     =================GI====================================  #Nutrition  - NPO with TF, jevity at 10ml/hr over 18hrs    # Diarrhea 2/2 to laxatives vs covid   - Dc senna, miralax  - GI PCR if persistent      ================ Heme==================================  DVT PPx with lovenox    =================Endocrine===============================  Bryan and Hypotensive   - TSH 4.28 [wnl]    ================= Skin/Catheters============================  no wounds    =================Prophylaxis =============================  lovenox    ==================GOC==================================  FULL CODE

## 2024-01-07 NOTE — PROGRESS NOTE ADULT - SUBJECTIVE AND OBJECTIVE BOX
Patient is a 63y old  Male who presents with a chief complaint of Sepsis and AHRF (06 Jan 2024 16:37)      INTERVAL HPI/OVERNIGHT EVENTS:   No overnight events   Afebrile, hemodynamically stable     Subjective: Patient seen and examined at bedside.    ICU Vital Signs Last 24 Hrs  T(C): 36.3 (07 Jan 2024 00:15), Max: 37.4 (06 Jan 2024 03:30)  T(F): 97.3 (07 Jan 2024 00:15), Max: 99.3 (06 Jan 2024 03:30)  HR: 50 (07 Jan 2024 00:15) (42 - 72)  BP: 100/74 (07 Jan 2024 00:15) (80/63 - 158/97)  BP(mean): 83 (07 Jan 2024 00:15) (67 - 113)  ABP: --  ABP(mean): --  RR: 14 (07 Jan 2024 00:15) (11 - 26)  SpO2: 99% (07 Jan 2024 00:15) (93% - 100%)    O2 Parameters below as of 06 Jan 2024 19:00  Patient On (Oxygen Delivery Method): ventilator    O2 Concentration (%): 40      I&O's Summary    05 Jan 2024 07:01  -  06 Jan 2024 07:00  --------------------------------------------------------  IN: 918.6 mL / OUT: 1225 mL / NET: -306.4 mL    06 Jan 2024 07:01  -  07 Jan 2024 00:56  --------------------------------------------------------  IN: 480.8 mL / OUT: 1275 mL / NET: -794.2 mL      Mode: AC/ CMV (Assist Control/ Continuous Mandatory Ventilation)  RR (machine): 14  TV (machine): 400  FiO2: 40  PEEP: 8  ITime: 1  MAP: 11  PIP: 18    PHYSICAL EXAM:  GENERAL: intubated, sedated   HEAD:  Atraumatic, Normocephalic  EYES: pupils 2mm and sluggish, conjunctiva and sclera clear  NECK: Supple, trachea midline, no JVD  CHEST/LUNG: diminished breath sounds bilaterally   HEART: (+) irregular rate and rhythm, rate controlled   ABDOMEN: Bowel sounds present; Soft, Nontender, Nondistended.  EXTREMITIES:  2+ Peripheral Pulses, capillary refill about 3 secs   NERVOUS SYSTEM:  intubated and sedated, minimally responsive to noxious stimuli  SKIN: warm, dry, No rashes or lesions      LABS:                        12.3   14.64 )-----------( 347      ( 06 Jan 2024 03:40 )             38.0     01-06    139  |  108  |  13  ----------------------------<  198<H>  3.8   |  29  |  0.69    Ca    9.7      06 Jan 2024 03:40  Phos  2.4     01-06  Mg     2.1     01-06    TPro  5.9<L>  /  Alb  2.6<L>  /  TBili  0.3  /  DBili  x   /  AST  19  /  ALT  55  /  AlkPhos  50  01-06      Urinalysis Basic - ( 06 Jan 2024 03:40 )    Color: x / Appearance: x / SG: x / pH: x  Gluc: 198 mg/dL / Ketone: x  / Bili: x / Urobili: x   Blood: x / Protein: x / Nitrite: x   Leuk Esterase: x / RBC: x / WBC x   Sq Epi: x / Non Sq Epi: x / Bacteria: x      CAPILLARY BLOOD GLUCOSE      POCT Blood Glucose.: 156 mg/dL (06 Jan 2024 23:51)  POCT Blood Glucose.: 131 mg/dL (06 Jan 2024 17:51)  POCT Blood Glucose.: 149 mg/dL (06 Jan 2024 12:55)    ABG - ( 06 Jan 2024 09:33 )  pH, Arterial: 7.47  pH, Blood: x     /  pCO2: 42    /  pO2: 132   / HCO3: 31    / Base Excess: 6.2   /  SaO2: 100                 Consultant(s) Notes Reviewed:  [x ] YES  [ ] NO    MEDICATIONS  (STANDING):  albumin human 25% IVPB 100 milliLiter(s) IV Intermittent every 8 hours  albuterol    90 MICROgram(s) HFA Inhaler 2 Puff(s) Inhalation every 6 hours  aspirin  chewable 81 milliGRAM(s) Oral daily  atorvastatin 20 milliGRAM(s) Oral at bedtime  chlorhexidine 0.12% Liquid 15 milliLiter(s) Oral Mucosa every 12 hours  enoxaparin Injectable 40 milliGRAM(s) SubCutaneous every 24 hours  insulin lispro (ADMELOG) corrective regimen sliding scale   SubCutaneous every 6 hours  norepinephrine Infusion 0.05 MICROgram(s)/kG/Min (5.95 mL/Hr) IV Continuous <Continuous>  propofol Infusion 10 MICROgram(s)/kG/Min (3.42 mL/Hr) IV Continuous <Continuous>  valproic  acid Syrup 250 milliGRAM(s) Oral two times a day    MEDICATIONS  (PRN):  acetaminophen     Tablet .. 650 milliGRAM(s) Oral every 6 hours PRN Temp greater or equal to 38C (100.4F), Mild Pain (1 - 3)  fentaNYL    Injectable 25 MICROGram(s) IV Push every 4 hours PRN Agitation      Care Discussed with Consultants/Other Providers [ x] YES  [ ] NO    RADIOLOGY & ADDITIONAL TESTS: Patient is a 63y old  Male who presents with a chief complaint of Sepsis and AHRF (06 Jan 2024 16:37)      INTERVAL HPI/OVERNIGHT EVENTS:   No overnight events   Afebrile, hemodynamically stable     Subjective: Patient seen and examined at bedside.    ICU Vital Signs Last 24 Hrs  T(C): 36.3 (07 Jan 2024 00:15), Max: 37.4 (06 Jan 2024 03:30)  T(F): 97.3 (07 Jan 2024 00:15), Max: 99.3 (06 Jan 2024 03:30)  HR: 50 (07 Jan 2024 00:15) (42 - 72)  BP: 100/74 (07 Jan 2024 00:15) (80/63 - 158/97)  BP(mean): 83 (07 Jan 2024 00:15) (67 - 113)  ABP: --  ABP(mean): --  RR: 14 (07 Jan 2024 00:15) (11 - 26)  SpO2: 99% (07 Jan 2024 00:15) (93% - 100%)    O2 Parameters below as of 06 Jan 2024 19:00  Patient On (Oxygen Delivery Method): ventilator    O2 Concentration (%): 40      I&O's Summary    05 Jan 2024 07:01  -  06 Jan 2024 07:00  --------------------------------------------------------  IN: 918.6 mL / OUT: 1225 mL / NET: -306.4 mL    06 Jan 2024 07:01  -  07 Jan 2024 00:56  --------------------------------------------------------  IN: 480.8 mL / OUT: 1275 mL / NET: -794.2 mL      Mode: AC/ CMV (Assist Control/ Continuous Mandatory Ventilation)  RR (machine): 14  TV (machine): 400  FiO2: 40  PEEP: 8  ITime: 1  MAP: 11  PIP: 18    PHYSICAL EXAM:  GENERAL: intubated, sedated   HEAD:  Atraumatic, Normocephalic  EYES: pupils 2mm and sluggish, conjunctiva and sclera clear  NECK: Supple, trachea midline, no JVD  CHEST/LUNG: diminished breath sounds bilaterally   HEART: (+) irregular rate and rhythm, rate controlled   ABDOMEN: Bowel sounds present; Soft, Nontender, Nondistended.  EXTREMITIES:  2+ Peripheral Pulses, capillary refill about 3 secs   NERVOUS SYSTEM:  intubated and sedated, minimally responsive to noxious stimuli  SKIN: warm, dry, No rashes or lesions      LABS:                        12.3   14.64 )-----------( 347      ( 06 Jan 2024 03:40 )             38.0     01-06    139  |  108  |  13  ----------------------------<  198<H>  3.8   |  29  |  0.69    Ca    9.7      06 Jan 2024 03:40  Phos  2.4     01-06  Mg     2.1     01-06    TPro  5.9<L>  /  Alb  2.6<L>  /  TBili  0.3  /  DBili  x   /  AST  19  /  ALT  55  /  AlkPhos  50  01-06      Urinalysis Basic - ( 06 Jan 2024 03:40 )    Color: x / Appearance: x / SG: x / pH: x  Gluc: 198 mg/dL / Ketone: x  / Bili: x / Urobili: x   Blood: x / Protein: x / Nitrite: x   Leuk Esterase: x / RBC: x / WBC x   Sq Epi: x / Non Sq Epi: x / Bacteria: x      CAPILLARY BLOOD GLUCOSE      POCT Blood Glucose.: 156 mg/dL (06 Jan 2024 23:51)  POCT Blood Glucose.: 131 mg/dL (06 Jan 2024 17:51)  POCT Blood Glucose.: 149 mg/dL (06 Jan 2024 12:55)    ABG - ( 06 Jan 2024 09:33 )  pH, Arterial: 7.47  pH, Blood: x     /  pCO2: 42    /  pO2: 132   / HCO3: 31    / Base Excess: 6.2   /  SaO2: 100         Consultant(s) Notes Reviewed:  [x ] YES  [ ] NO    MEDICATIONS  (STANDING):  albumin human 25% IVPB 100 milliLiter(s) IV Intermittent every 8 hours  albuterol    90 MICROgram(s) HFA Inhaler 2 Puff(s) Inhalation every 6 hours  aspirin  chewable 81 milliGRAM(s) Oral daily  atorvastatin 20 milliGRAM(s) Oral at bedtime  chlorhexidine 0.12% Liquid 15 milliLiter(s) Oral Mucosa every 12 hours  enoxaparin Injectable 40 milliGRAM(s) SubCutaneous every 24 hours  insulin lispro (ADMELOG) corrective regimen sliding scale   SubCutaneous every 6 hours  norepinephrine Infusion 0.05 MICROgram(s)/kG/Min (5.95 mL/Hr) IV Continuous <Continuous>  propofol Infusion 10 MICROgram(s)/kG/Min (3.42 mL/Hr) IV Continuous <Continuous>  valproic  acid Syrup 250 milliGRAM(s) Oral two times a day    MEDICATIONS  (PRN):  acetaminophen     Tablet .. 650 milliGRAM(s) Oral every 6 hours PRN Temp greater or equal to 38C (100.4F), Mild Pain (1 - 3)  fentaNYL    Injectable 25 MICROGram(s) IV Push every 4 hours PRN Agitation      Care Discussed with Consultants/Other Providers [ x] YES  [ ] NO    RADIOLOGY & ADDITIONAL TESTS: Reviewed During Rounds

## 2024-01-07 NOTE — PROGRESS NOTE ADULT - ASSESSMENT
Agree with above assessment and plan as outlined above.    - supportive care per ICU    [facility]ino Cadet [facility], [facility]  B[facility]PER [phone]   Agree with above assessment and plan as outlined above.    - supportive care per ICU    Gavino Cadet MD, [facility]  BEEPER [phone]

## 2024-01-07 NOTE — PROGRESS NOTE ADULT - SUBJECTIVE AND OBJECTIVE BOX
C A R D I O L O G Y  **********************************     DATE OF SERVICE: 01-07     Sedated on vent, requiring low dose pressor   Tele stable     acetaminophen     Tablet .. 650 milliGRAM(s) Oral every 6 hours PRN  albuterol    90 MICROgram(s) HFA Inhaler 2 Puff(s) Inhalation every 6 hours  aspirin  chewable 81 milliGRAM(s) Oral daily  atorvastatin 20 milliGRAM(s) Oral at bedtime  chlorhexidine 0.12% Liquid 15 milliLiter(s) Oral Mucosa every 12 hours  enoxaparin Injectable 40 milliGRAM(s) SubCutaneous every 24 hours  fentaNYL    Injectable 25 MICROGram(s) IV Push every 4 hours PRN  insulin lispro (ADMELOG) corrective regimen sliding scale   SubCutaneous every 6 hours  norepinephrine Infusion 0.05 MICROgram(s)/kG/Min IV Continuous <Continuous>  propofol Infusion 10 MICROgram(s)/kG/Min IV Continuous <Continuous>  valproic  acid Syrup 250 milliGRAM(s) Oral two times a day                            12.0   14.63 )-----------( 289      ( 07 Jan 2024 03:04 )             37.6       Hemoglobin: 12.0 g/dL (01-07 @ 03:04)  Hemoglobin: 12.3 g/dL (01-06 @ 03:40)  Hemoglobin: 14.2 g/dL (01-05 @ 04:50)  Hemoglobin: 11.9 g/dL (01-04 @ 04:45)  Hemoglobin: 12.6 g/dL (01-03 @ 07:30)      01-07    140  |  109<H>  |  12  ----------------------------<  190<H>  3.7   |  28  |  0.64    Ca    9.4      07 Jan 2024 03:04  Phos  2.8     01-07  Mg     2.2     01-07    TPro  6.2  /  Alb  3.0<L>  /  TBili  0.4  /  DBili  x   /  AST  13  /  ALT  43  /  AlkPhos  50  01-07    Creatinine Trend: 0.64<--, 0.69<--, 0.80<--, 0.76<--, 0.73<--, 0.68<--    COAGS:           T(C): 37 (01-07-24 @ 09:15), Max: 37.1 (01-06-24 @ 10:00)  HR: 58 (01-07-24 @ 09:15) (42 - 72)  BP: 120/79 (01-07-24 @ 09:00) (77/60 - 158/97)  RR: 21 (01-07-24 @ 09:15) (11 - 28)  SpO2: 100% (01-07-24 @ 09:15) (93% - 100%)  Wt(kg): --    I&O's Summary    06 Jan 2024 07:01  -  07 Jan 2024 07:00  --------------------------------------------------------  IN: 882.9 mL / OUT: 1590 mL / NET: -707.1 mL      HEENT:  (-)icterus (-)pallor  CV: N S1 S2 1/6 JOVON (+)2 Pulses B/l  Resp:  Clear to ausculatation B/L, normal effort  GI: (+) BS Soft, NT, ND  Lymph:  (-)Edema, (-)obvious lymphadenopathy  Skin: Warm to touch, Normal turgor  Psych: Unable to assess mood and affect      TELEMETRY: 	  sinus         ASSESSMENT/PLAN: 	63y Male PMH of CVA, Alzheimer's, nonverbal at baseline, GERD, Schizophrenia, historically preserved LV function and Constipation, was brought into the ED s/p mechanical fall, poor oral intake, and Covid-19 infection on 12/27/23 as per NH papers.    # Bradycardia   - He likely has some degree of sick sinus syndrome that may be exacerbated by Midodrine.  now off midodrine  - EP following      # Hypotension  - Suspect this is due to dehydration and vasodilatory state associated with viral/bacterial infection  - Abx  - pressor support per MICU   - Vent per MICU

## 2024-01-07 NOTE — PROGRESS NOTE ADULT - SUBJECTIVE AND OBJECTIVE BOX
Patient is a 63y old  Male who presents with a chief complaint of Sepsis and AHRF (2024 15:07)    PATIENT IS SEEN AND EXAMINED IN ICU.    ALLERGIES:  No Known Allergies      Daily     Daily Weight in k.7 (2024 08:00)    VITALS:    Vital Signs Last 24 Hrs  T(C): 37.6 (2024 20:30), Max: 37.6 (2024 20:30)  T(F): 99.7 (2024 20:30), Max: 99.7 (2024 20:30)  HR: 60 (2024 20:30) (42 - 71)  BP: 105/63 (2024 20:30) (77/60 - 158/97)  BP(mean): 77 (2024 20:30) (67 - 113)  RR: 19 (2024 20:30) (11 - 28)  SpO2: 98% (2024 20:30) (94% - 100%)    Parameters below as of 2024 20:00  Patient On (Oxygen Delivery Method): ventilator    O2 Concentration (%): 40    LABS:    CBC Full  -  ( 2024 03:04 )  WBC Count : 14.63 K/uL  RBC Count : 4.11 M/uL  Hemoglobin : 12.0 g/dL  Hematocrit : 37.6 %  Platelet Count - Automated : 289 K/uL  Mean Cell Volume : 91.5 fl  Mean Cell Hemoglobin : 29.2 pg  Mean Cell Hemoglobin Concentration : 31.9 gm/dL  Auto Neutrophil # : x  Auto Lymphocyte # : x  Auto Monocyte # : x  Auto Eosinophil # : x  Auto Basophil # : x  Auto Neutrophil % : x  Auto Lymphocyte % : x  Auto Monocyte % : x  Auto Eosinophil % : x  Auto Basophil % : x      -07    140  |  109<H>  |  12  ----------------------------<  190<H>  3.7   |  28  |  0.64    Ca    9.4      2024 03:04  Phos  2.8     01-07  Mg     2.2     01-07    TPro  6.2  /  Alb  3.0<L>  /  TBili  0.4  /  DBili  x   /  AST  13  /  ALT  43  /  AlkPhos  50  -07    CAPILLARY BLOOD GLUCOSE      POCT Blood Glucose.: 135 mg/dL (2024 17:07)  POCT Blood Glucose.: 118 mg/dL (2024 10:49)  POCT Blood Glucose.: 187 mg/dL (2024 06:34)  POCT Blood Glucose.: 156 mg/dL (2024 23:51)        LIVER FUNCTIONS - ( 2024 03:04 )  Alb: 3.0 g/dL / Pro: 6.2 g/dL / ALK PHOS: 50 U/L / ALT: 43 U/L DA / AST: 13 U/L / GGT: x           Creatinine Trend: 0.64<--, 0.69<--, 0.80<--, 0.76<--, 0.73<--, 0.68<--  I&O's Summary    2024 07:01  -  2024 07:00  --------------------------------------------------------  IN: 882.9 mL / OUT: 1620 mL / NET: -737.1 mL    2024 07:01  -  2024 21:28  --------------------------------------------------------  IN: 618.8 mL / OUT: 575 mL / NET: 43.8 mL        ABG - ( 2024 03:25 )  pH, Arterial: 7.45  pH, Blood: x     /  pCO2: 44    /  pO2: 144   / HCO3: 31    / Base Excess: 5.8   /  SaO2: 100                 Catheterized Catheterized   @ 12:37   No growth  --  --      .Blood Blood   @ 02:05   No growth at 5 days  --  --      .Blood Blood   @ 01:55   No growth at 5 days  --  --          MEDICATIONS:    MEDICATIONS  (STANDING):  albuterol    90 MICROgram(s) HFA Inhaler 2 Puff(s) Inhalation every 6 hours  aspirin  chewable 81 milliGRAM(s) Oral daily  atorvastatin 20 milliGRAM(s) Oral at bedtime  chlorhexidine 0.12% Liquid 15 milliLiter(s) Oral Mucosa every 12 hours  enoxaparin Injectable 40 milliGRAM(s) SubCutaneous every 24 hours  insulin lispro (ADMELOG) corrective regimen sliding scale   SubCutaneous every 6 hours  norepinephrine Infusion 0.05 MICROgram(s)/kG/Min (5.95 mL/Hr) IV Continuous <Continuous>  propofol Infusion 10 MICROgram(s)/kG/Min (3.42 mL/Hr) IV Continuous <Continuous>  valproic  acid Syrup 250 milliGRAM(s) Oral two times a day      MEDICATIONS  (PRN):  acetaminophen     Tablet .. 650 milliGRAM(s) Oral every 6 hours PRN Temp greater or equal to 38C (100.4F), Mild Pain (1 - 3)  fentaNYL    Injectable 25 MICROGram(s) IV Push every 4 hours PRN Agitation      REVIEW OF SYSTEMS:                           ALL ROS DONE [ X   ]    CONSTITUTIONAL:  LETHARGIC [   ], FEVER [   ], UNRESPONSIVE [   ]  CVS:  CP  [   ], SOB, [   ], PALPITATIONS [   ], DIZZYNESS [   ]  RS: COUGH [   ], SPUTUM [   ]  GI: ABDOMINAL PAIN [   ], NAUSEA [   ], VOMITINGS [   ], DIARRHEA [   ], CONSTIPATION [   ]  :  DYSURIA [   ], NOCTURIA [   ], INCREASED FREQUENCY [   ], DRIBLING [   ],  SKELETAL: PAINFUL JOINTS [   ], SWOLLEN JOINTS [   ], NECK ACHE [   ], LOW BACK ACHE [   ],  SKIN : ULCERS [   ], RASH [   ], ITCHING [   ]  CNS: HEAD ACHE [   ], DOUBLE VISION [   ], BLURRED VISION [   ], AMS / CONFUSION [   ], SEIZURES [   ], WEAKNESS [   ],TINGLING / NUMBNESS [   ]    PHYSICAL EXAMINATION:  GENERAL APPEARANCE: NO DISTRESS  HEENT:  NO PALLOR, NO  JVD,  NO   NODES, NECK SUPPLE  CVS: S1 +, S2 +,   RS: AEEB,  OCCASIONAL  RALES +,   NO RONCHI    INTUBATED  ABD: SOFT, NT, NO, BS +  EXT: NO PE  SKIN: WARM,   SKELETAL:  REDUCED ROM OF CERVICAL AND LS SPINE  CNS:  AAO X 0    RADIOLOGY :    RADIOLOGY AND READINGS REVIEWED    ASSESSMENT :     Infection due to severe acute respiratory syndrome coronavirus 2 (SARS-CoV-2)    CVA (cerebral vascular accident)    HLD (hyperlipidemia)    S/P percutaneous endoscopic gastrostomy (PEG) tube placement        PLAN:  HPI:  A 63 year old male, from [facility], with PMH of CVA, Alzheimer's, nonverbal at baseline, GERD, Schizophrenia, and Constipation, was brought into the ED s/p mechanical fall, poor oral intake, and Covid-19 infection on 23 as per NH papers. Unable to obtain history from patient since he is nonverbal, history obtained from chart review.  (28 Dec 2023 20:54)    # PROGNOSIS IS POOR/GUARDED - CRITICAL CARE TEAM DISCUSSING W/ FAMILY REGARDING GOC.     # [1/3] RAPID RESPONSE FOR HYPOTENSION AND HYPOXIA - S/P IV FLUIDS AND PLACED ON NRB FOR HYPOXIA. CRITICAL CARE EVALUATION REQUESTED.     # SEPTIC SHOCK S/T ASPIRATION PNA + DIARRHEA [resolved]  # ACUTE HYPOXIC RESPIRATORY FAILURE S/T ? ASPIRATION EVENT   , COVID19 INFECTION   S/P INTUBATION   - ON DECADRON  - ROCEPHIN, AZITHROMYCIN  - DECADRON  - BCX [NGTD] AND UCX [NGTD]  - S/P IVF, VASOPRESSORS  - ID CONSULT  - CRITICAL CARE MANAGEMENT IN PROGRESS    - [] - PATIENT W/ ? ASPIRATION EVENT - SUBSEQUENTLY REQUIRED INTUBATION W/ MECHANICAL VENTILATION    # BRADYCARDIA  - MONITOR ON TELEMETRY  - F/U ECHOCARDIOGRAM   - OPTIMIZE ELECTROLYTES  - CARDIOLOGY CONSULT  - EP CONSULT    # HYPOKALEMIA  - REPLETING WITH SUPPLEMENT    # HX OF CVA  # HX OF DEMENTIA  # SCHIZOPHRENIA  # GI AND DVT PPX   Patient is a 63y old  Male who presents with a chief complaint of Sepsis and AHRF (2024 15:07)    PATIENT IS SEEN AND EXAMINED IN ICU.    ALLERGIES:  No Known Allergies      Daily     Daily Weight in k.7 (2024 08:00)    VITALS:    Vital Signs Last 24 Hrs  T(C): 37.6 (2024 20:30), Max: 37.6 (2024 20:30)  T(F): 99.7 (2024 20:30), Max: 99.7 (2024 20:30)  HR: 60 (2024 20:30) (42 - 71)  BP: 105/63 (2024 20:30) (77/60 - 158/97)  BP(mean): 77 (2024 20:30) (67 - 113)  RR: 19 (2024 20:30) (11 - 28)  SpO2: 98% (2024 20:30) (94% - 100%)    Parameters below as of 2024 20:00  Patient On (Oxygen Delivery Method): ventilator    O2 Concentration (%): 40    LABS:    CBC Full  -  ( 2024 03:04 )  WBC Count : 14.63 K/uL  RBC Count : 4.11 M/uL  Hemoglobin : 12.0 g/dL  Hematocrit : 37.6 %  Platelet Count - Automated : 289 K/uL  Mean Cell Volume : 91.5 fl  Mean Cell Hemoglobin : 29.2 pg  Mean Cell Hemoglobin Concentration : 31.9 gm/dL  Auto Neutrophil # : x  Auto Lymphocyte # : x  Auto Monocyte # : x  Auto Eosinophil # : x  Auto Basophil # : x  Auto Neutrophil % : x  Auto Lymphocyte % : x  Auto Monocyte % : x  Auto Eosinophil % : x  Auto Basophil % : x      -07    140  |  109<H>  |  12  ----------------------------<  190<H>  3.7   |  28  |  0.64    Ca    9.4      2024 03:04  Phos  2.8     01-07  Mg     2.2     01-07    TPro  6.2  /  Alb  3.0<L>  /  TBili  0.4  /  DBili  x   /  AST  13  /  ALT  43  /  AlkPhos  50  -07    CAPILLARY BLOOD GLUCOSE      POCT Blood Glucose.: 135 mg/dL (2024 17:07)  POCT Blood Glucose.: 118 mg/dL (2024 10:49)  POCT Blood Glucose.: 187 mg/dL (2024 06:34)  POCT Blood Glucose.: 156 mg/dL (2024 23:51)        LIVER FUNCTIONS - ( 2024 03:04 )  Alb: 3.0 g/dL / Pro: 6.2 g/dL / ALK PHOS: 50 U/L / ALT: 43 U/L DA / AST: 13 U/L / GGT: x           Creatinine Trend: 0.64<--, 0.69<--, 0.80<--, 0.76<--, 0.73<--, 0.68<--  I&O's Summary    2024 07:01  -  2024 07:00  --------------------------------------------------------  IN: 882.9 mL / OUT: 1620 mL / NET: -737.1 mL    2024 07:01  -  2024 21:28  --------------------------------------------------------  IN: 618.8 mL / OUT: 575 mL / NET: 43.8 mL        ABG - ( 2024 03:25 )  pH, Arterial: 7.45  pH, Blood: x     /  pCO2: 44    /  pO2: 144   / HCO3: 31    / Base Excess: 5.8   /  SaO2: 100                 Catheterized Catheterized   @ 12:37   No growth  --  --      .Blood Blood   @ 02:05   No growth at 5 days  --  --      .Blood Blood   @ 01:55   No growth at 5 days  --  --          MEDICATIONS:    MEDICATIONS  (STANDING):  albuterol    90 MICROgram(s) HFA Inhaler 2 Puff(s) Inhalation every 6 hours  aspirin  chewable 81 milliGRAM(s) Oral daily  atorvastatin 20 milliGRAM(s) Oral at bedtime  chlorhexidine 0.12% Liquid 15 milliLiter(s) Oral Mucosa every 12 hours  enoxaparin Injectable 40 milliGRAM(s) SubCutaneous every 24 hours  insulin lispro (ADMELOG) corrective regimen sliding scale   SubCutaneous every 6 hours  norepinephrine Infusion 0.05 MICROgram(s)/kG/Min (5.95 mL/Hr) IV Continuous <Continuous>  propofol Infusion 10 MICROgram(s)/kG/Min (3.42 mL/Hr) IV Continuous <Continuous>  valproic  acid Syrup 250 milliGRAM(s) Oral two times a day      MEDICATIONS  (PRN):  acetaminophen     Tablet .. 650 milliGRAM(s) Oral every 6 hours PRN Temp greater or equal to 38C (100.4F), Mild Pain (1 - 3)  fentaNYL    Injectable 25 MICROGram(s) IV Push every 4 hours PRN Agitation      REVIEW OF SYSTEMS:                           ALL ROS DONE [ X   ]    CONSTITUTIONAL:  LETHARGIC [   ], FEVER [   ], UNRESPONSIVE [   ]  CVS:  CP  [   ], SOB, [   ], PALPITATIONS [   ], DIZZYNESS [   ]  RS: COUGH [   ], SPUTUM [   ]  GI: ABDOMINAL PAIN [   ], NAUSEA [   ], VOMITINGS [   ], DIARRHEA [   ], CONSTIPATION [   ]  :  DYSURIA [   ], NOCTURIA [   ], INCREASED FREQUENCY [   ], DRIBLING [   ],  SKELETAL: PAINFUL JOINTS [   ], SWOLLEN JOINTS [   ], NECK ACHE [   ], LOW BACK ACHE [   ],  SKIN : ULCERS [   ], RASH [   ], ITCHING [   ]  CNS: HEAD ACHE [   ], DOUBLE VISION [   ], BLURRED VISION [   ], AMS / CONFUSION [   ], SEIZURES [   ], WEAKNESS [   ],TINGLING / NUMBNESS [   ]    PHYSICAL EXAMINATION:  GENERAL APPEARANCE: NO DISTRESS  HEENT:  NO PALLOR, NO  JVD,  NO   NODES, NECK SUPPLE  CVS: S1 +, S2 +,   RS: AEEB,  OCCASIONAL  RALES +,   NO RONCHI    INTUBATED  ABD: SOFT, NT, NO, BS +  EXT: NO PE  SKIN: WARM,   SKELETAL:  REDUCED ROM OF CERVICAL AND LS SPINE  CNS:  AAO X 0    RADIOLOGY :    RADIOLOGY AND READINGS REVIEWED    ASSESSMENT :     Infection due to severe acute respiratory syndrome coronavirus 2 (SARS-CoV-2)    CVA (cerebral vascular accident)    HLD (hyperlipidemia)    S/P percutaneous endoscopic gastrostomy (PEG) tube placement        PLAN:  HPI:  A 63 year old male, from [facility], with PMH of CVA, Alzheimer's, nonverbal at baseline, GERD, Schizophrenia, and Constipation, was brought into the ED s/p mechanical fall, poor oral intake, and Covid-19 infection on 23 as per NH papers. Unable to obtain history from patient since he is nonverbal, history obtained from chart review.  (28 Dec 2023 20:54)    # PROGNOSIS IS POOR/GUARDED - CRITICAL CARE TEAM DISCUSSING W/ FAMILY REGARDING GOC.     # [1/3] RAPID RESPONSE FOR HYPOTENSION AND HYPOXIA - S/P IV FLUIDS AND PLACED ON NRB FOR HYPOXIA. CRITICAL CARE EVALUATION REQUESTED.     # SEPTIC SHOCK S/T ASPIRATION PNA + DIARRHEA [resolved]  # ACUTE HYPOXIC RESPIRATORY FAILURE S/T ? ASPIRATION EVENT   , COVID19 INFECTION   S/P INTUBATION   - ON DECADRON  - ROCEPHIN, AZITHROMYCIN  - DECADRON  - BCX [NGTD] AND UCX [NGTD]  - S/P IVF, VASOPRESSORS  - ID CONSULT  - CRITICAL CARE MANAGEMENT IN PROGRESS    - [] - PATIENT W/ ? ASPIRATION EVENT - SUBSEQUENTLY REQUIRED INTUBATION W/ MECHANICAL VENTILATION    # BRADYCARDIA  - MONITOR ON TELEMETRY  - F/U ECHOCARDIOGRAM   - OPTIMIZE ELECTROLYTES  - CARDIOLOGY CONSULT  - EP CONSULT    # HYPOKALEMIA  - REPLETING WITH SUPPLEMENT    # HX OF CVA  # HX OF DEMENTIA  # SCHIZOPHRENIA  # GI AND DVT PPX

## 2024-01-07 NOTE — PROGRESS NOTE ADULT - ATTENDING COMMENTS
IMP: This is a  63 year old mn , from [facility], with  CVA, Alzheimer's, nonverbal at baseline, GERD, Schizophrenia, and Constipation, was brought into the ED s/p mechanical fall, poor oral intake, and Covid-19 infection on 12/27/23 Pt was admitted for COVID PNA, later found with intermittent hypoxia/ hypopnea to Sats 80% and Hypotension despite multiple fluid boluses, also found with bryan to 40s for the last 2 days, in ICU for further monitoring.       ASSESSMENT   - Acute Hypoxic resp failure   - Covid-19 PNA   - Shock septic   - CVA  - Alzheimer dementia   - Bradycardia       Plan     - Intubated am 1/4 after failing HFNC   - Adjust vent as per ABG   - Sedation   - Hemodynamic support   - Titrate vaso-active agents to maintain MAP>65  - EP cards f/u : no indication for PPM at tis time   - Antibx   - F/U cultures   - Off Remdesivir due to ADAN  - Continue decadron  6 mg for total 10 days   - Isolation : contact and airborne   - Monitor I/O   - Cards following   - Hold AV angel luis blocking agent   - Skin care .  - Wound care eval noted . IMP: This is a  63 year old mn , from [facility], with  CVA, Alzheimer's, nonverbal at baseline, GERD, Schizophrenia, and Constipation, was brought into the ED s/p mechanical fall, poor oral intake, and Covid-19 infection on 12/27/23 Pt was admitted for COVID PNA, later found with intermittent hypoxia/ hypopnea to Sats 80% and Hypotension despite multiple fluid boluses, also found with bryan to 40s for the last 2 days, in ICU for further monitoring.       ASSESSMENT   - Acute Hypoxic resp failure   - Covid-19 PNA   - Shock septic   - CVA  - Alzheimer dementia   - Bradycardia       Plan     - Intubated am 1/4 after failing HFNC   - Adjust vent as per ABG   - Sedation   - Hemodynamic support   - Titrate vaso-active agents to maintain MAP>65  - EP cards f/u : no indication for PPM at tis time   - Antibx   - F/U cultures   - Off Remdesivir due to ADAN  - Continue decadron  6 mg for total 10 days   - Isolation : contact and airborne   - Monitor I/O   - Cards following   - Hold AV angel luis blocking agent   - Skin care .  - Wound care eval noted .

## 2024-01-08 LAB
ALBUMIN SERPL ELPH-MCNC: 2.8 G/DL — LOW (ref 3.5–5)
ALBUMIN SERPL ELPH-MCNC: 2.8 G/DL — LOW (ref 3.5–5)
ALP SERPL-CCNC: 52 U/L — SIGNIFICANT CHANGE UP (ref 40–120)
ALP SERPL-CCNC: 52 U/L — SIGNIFICANT CHANGE UP (ref 40–120)
ALT FLD-CCNC: 33 U/L DA — SIGNIFICANT CHANGE UP (ref 10–60)
ALT FLD-CCNC: 33 U/L DA — SIGNIFICANT CHANGE UP (ref 10–60)
ANION GAP SERPL CALC-SCNC: 6 MMOL/L — SIGNIFICANT CHANGE UP (ref 5–17)
ANION GAP SERPL CALC-SCNC: 6 MMOL/L — SIGNIFICANT CHANGE UP (ref 5–17)
AST SERPL-CCNC: 9 U/L — LOW (ref 10–40)
AST SERPL-CCNC: 9 U/L — LOW (ref 10–40)
BILIRUB SERPL-MCNC: 0.4 MG/DL — SIGNIFICANT CHANGE UP (ref 0.2–1.2)
BILIRUB SERPL-MCNC: 0.4 MG/DL — SIGNIFICANT CHANGE UP (ref 0.2–1.2)
BUN SERPL-MCNC: 14 MG/DL — SIGNIFICANT CHANGE UP (ref 7–18)
BUN SERPL-MCNC: 14 MG/DL — SIGNIFICANT CHANGE UP (ref 7–18)
CALCIUM SERPL-MCNC: 9.1 MG/DL — SIGNIFICANT CHANGE UP (ref 8.4–10.5)
CALCIUM SERPL-MCNC: 9.1 MG/DL — SIGNIFICANT CHANGE UP (ref 8.4–10.5)
CHLORIDE SERPL-SCNC: 108 MMOL/L — SIGNIFICANT CHANGE UP (ref 96–108)
CHLORIDE SERPL-SCNC: 108 MMOL/L — SIGNIFICANT CHANGE UP (ref 96–108)
CO2 SERPL-SCNC: 29 MMOL/L — SIGNIFICANT CHANGE UP (ref 22–31)
CO2 SERPL-SCNC: 29 MMOL/L — SIGNIFICANT CHANGE UP (ref 22–31)
CREAT SERPL-MCNC: 0.71 MG/DL — SIGNIFICANT CHANGE UP (ref 0.5–1.3)
CREAT SERPL-MCNC: 0.71 MG/DL — SIGNIFICANT CHANGE UP (ref 0.5–1.3)
EGFR: 102 ML/MIN/1.73M2 — SIGNIFICANT CHANGE UP
EGFR: 102 ML/MIN/1.73M2 — SIGNIFICANT CHANGE UP
GAS PNL BLDA: SIGNIFICANT CHANGE UP
GAS PNL BLDA: SIGNIFICANT CHANGE UP
GLUCOSE BLDC GLUCOMTR-MCNC: 117 MG/DL — HIGH (ref 70–99)
GLUCOSE BLDC GLUCOMTR-MCNC: 117 MG/DL — HIGH (ref 70–99)
GLUCOSE BLDC GLUCOMTR-MCNC: 123 MG/DL — HIGH (ref 70–99)
GLUCOSE BLDC GLUCOMTR-MCNC: 123 MG/DL — HIGH (ref 70–99)
GLUCOSE BLDC GLUCOMTR-MCNC: 124 MG/DL — HIGH (ref 70–99)
GLUCOSE BLDC GLUCOMTR-MCNC: 124 MG/DL — HIGH (ref 70–99)
GLUCOSE BLDC GLUCOMTR-MCNC: 144 MG/DL — HIGH (ref 70–99)
GLUCOSE BLDC GLUCOMTR-MCNC: 144 MG/DL — HIGH (ref 70–99)
GLUCOSE SERPL-MCNC: 146 MG/DL — HIGH (ref 70–99)
GLUCOSE SERPL-MCNC: 146 MG/DL — HIGH (ref 70–99)
HCT VFR BLD CALC: 37.3 % — LOW (ref 39–50)
HCT VFR BLD CALC: 37.3 % — LOW (ref 39–50)
HGB BLD-MCNC: 12 G/DL — LOW (ref 13–17)
HGB BLD-MCNC: 12 G/DL — LOW (ref 13–17)
MAGNESIUM SERPL-MCNC: 2.1 MG/DL — SIGNIFICANT CHANGE UP (ref 1.6–2.6)
MAGNESIUM SERPL-MCNC: 2.1 MG/DL — SIGNIFICANT CHANGE UP (ref 1.6–2.6)
MCHC RBC-ENTMCNC: 29.3 PG — SIGNIFICANT CHANGE UP (ref 27–34)
MCHC RBC-ENTMCNC: 29.3 PG — SIGNIFICANT CHANGE UP (ref 27–34)
MCHC RBC-ENTMCNC: 32.2 GM/DL — SIGNIFICANT CHANGE UP (ref 32–36)
MCHC RBC-ENTMCNC: 32.2 GM/DL — SIGNIFICANT CHANGE UP (ref 32–36)
MCV RBC AUTO: 91 FL — SIGNIFICANT CHANGE UP (ref 80–100)
MCV RBC AUTO: 91 FL — SIGNIFICANT CHANGE UP (ref 80–100)
NRBC # BLD: 0 /100 WBCS — SIGNIFICANT CHANGE UP (ref 0–0)
NRBC # BLD: 0 /100 WBCS — SIGNIFICANT CHANGE UP (ref 0–0)
PHOSPHATE SERPL-MCNC: 3.2 MG/DL — SIGNIFICANT CHANGE UP (ref 2.5–4.5)
PHOSPHATE SERPL-MCNC: 3.2 MG/DL — SIGNIFICANT CHANGE UP (ref 2.5–4.5)
PLATELET # BLD AUTO: 317 K/UL — SIGNIFICANT CHANGE UP (ref 150–400)
PLATELET # BLD AUTO: 317 K/UL — SIGNIFICANT CHANGE UP (ref 150–400)
POTASSIUM SERPL-MCNC: 3.5 MMOL/L — SIGNIFICANT CHANGE UP (ref 3.5–5.3)
POTASSIUM SERPL-MCNC: 3.5 MMOL/L — SIGNIFICANT CHANGE UP (ref 3.5–5.3)
POTASSIUM SERPL-SCNC: 3.5 MMOL/L — SIGNIFICANT CHANGE UP (ref 3.5–5.3)
POTASSIUM SERPL-SCNC: 3.5 MMOL/L — SIGNIFICANT CHANGE UP (ref 3.5–5.3)
PROT SERPL-MCNC: 6.1 G/DL — SIGNIFICANT CHANGE UP (ref 6–8.3)
PROT SERPL-MCNC: 6.1 G/DL — SIGNIFICANT CHANGE UP (ref 6–8.3)
RBC # BLD: 4.1 M/UL — LOW (ref 4.2–5.8)
RBC # BLD: 4.1 M/UL — LOW (ref 4.2–5.8)
RBC # FLD: 14.2 % — SIGNIFICANT CHANGE UP (ref 10.3–14.5)
RBC # FLD: 14.2 % — SIGNIFICANT CHANGE UP (ref 10.3–14.5)
SODIUM SERPL-SCNC: 143 MMOL/L — SIGNIFICANT CHANGE UP (ref 135–145)
SODIUM SERPL-SCNC: 143 MMOL/L — SIGNIFICANT CHANGE UP (ref 135–145)
WBC # BLD: 10.58 K/UL — HIGH (ref 3.8–10.5)
WBC # BLD: 10.58 K/UL — HIGH (ref 3.8–10.5)
WBC # FLD AUTO: 10.58 K/UL — HIGH (ref 3.8–10.5)
WBC # FLD AUTO: 10.58 K/UL — HIGH (ref 3.8–10.5)

## 2024-01-08 RX ORDER — PANTOPRAZOLE SODIUM 20 MG/1
40 TABLET, DELAYED RELEASE ORAL DAILY
Refills: 0 | Status: DISCONTINUED | OUTPATIENT
Start: 2024-01-08 | End: 2024-01-12

## 2024-01-08 RX ORDER — PANTOPRAZOLE SODIUM 20 MG/1
40 TABLET, DELAYED RELEASE ORAL
Refills: 0 | Status: DISCONTINUED | OUTPATIENT
Start: 2024-01-08 | End: 2024-01-08

## 2024-01-08 RX ADMIN — ALBUTEROL 2 PUFF(S): 90 AEROSOL, METERED ORAL at 02:33

## 2024-01-08 RX ADMIN — ALBUTEROL 2 PUFF(S): 90 AEROSOL, METERED ORAL at 08:22

## 2024-01-08 RX ADMIN — Medication 81 MILLIGRAM(S): at 12:30

## 2024-01-08 RX ADMIN — CHLORHEXIDINE GLUCONATE 15 MILLILITER(S): 213 SOLUTION TOPICAL at 05:09

## 2024-01-08 RX ADMIN — ATORVASTATIN CALCIUM 20 MILLIGRAM(S): 80 TABLET, FILM COATED ORAL at 22:04

## 2024-01-08 RX ADMIN — ENOXAPARIN SODIUM 40 MILLIGRAM(S): 100 INJECTION SUBCUTANEOUS at 12:30

## 2024-01-08 RX ADMIN — PANTOPRAZOLE SODIUM 40 MILLIGRAM(S): 20 TABLET, DELAYED RELEASE ORAL at 12:30

## 2024-01-08 RX ADMIN — Medication 250 MILLIGRAM(S): at 17:14

## 2024-01-08 RX ADMIN — Medication 5.95 MICROGRAM(S)/KG/MIN: at 14:40

## 2024-01-08 RX ADMIN — Medication 250 MILLIGRAM(S): at 05:10

## 2024-01-08 RX ADMIN — PROPOFOL 3.42 MICROGRAM(S)/KG/MIN: 10 INJECTION, EMULSION INTRAVENOUS at 10:02

## 2024-01-08 NOTE — CONSULT NOTE ADULT - SUBJECTIVE AND OBJECTIVE BOX
Time of visit: seen and examined in icu     CHIEF COMPLAINT: Patient is a 63y old  Male who presents with a chief complaint of Sepsis and AHRF (08 Jan 2024 18:08)      HPI:  A 63 year old male, from [facility], with PMH of CVA, Alzheimer's, nonverbal at baseline, GERD, Schizophrenia, and Constipation, was brought into the ED s/p mechanical fall, poor oral intake, and Covid-19 infection on 12/27/23 as per NH papers. Unable to obtain history from patient since he is nonverbal, history obtained from chart review.  (28 Dec 2023 20:54)   Patient seen and examined.     PAST MEDICAL & SURGICAL HISTORY:  CVA (cerebral vascular accident)      CVA (cerebral vascular accident)      HLD (hyperlipidemia)      S/P percutaneous endoscopic gastrostomy (PEG) tube placement          Allergies    No Known Allergies    Intolerances        MEDICATIONS  (STANDING):  albuterol    90 MICROgram(s) HFA Inhaler 2 Puff(s) Inhalation every 6 hours  aspirin  chewable 81 milliGRAM(s) Oral daily  atorvastatin 20 milliGRAM(s) Oral at bedtime  enoxaparin Injectable 40 milliGRAM(s) SubCutaneous every 24 hours  insulin lispro (ADMELOG) corrective regimen sliding scale   SubCutaneous every 6 hours  norepinephrine Infusion 0.05 MICROgram(s)/kG/Min (5.95 mL/Hr) IV Continuous <Continuous>  pantoprazole   Suspension 40 milliGRAM(s) Enteral Tube daily  valproic  acid Syrup 250 milliGRAM(s) Oral two times a day      MEDICATIONS  (PRN):  acetaminophen     Tablet .. 650 milliGRAM(s) Oral every 6 hours PRN Temp greater or equal to 38C (100.4F), Mild Pain (1 - 3)   Medications up to date at time of exam.    Medications up to date at time of exam.    FAMILY HISTORY:      SOCIAL HISTORY  Smoking History: [   ] smoking/smoke exposure, [   ] former smoker  Living Condition: [   ] apartment, [   ] private house  Work History:   Travel History: denies recent travel  Illicit Substance Use: denies  Alcohol Use: denies    REVIEW OF SYSTEMS:    CONSTITUTIONAL:  denies fevers, chills, sweats, weight loss    HEENT:  denies diplopia or blurred vision, sore throat or runny nose.    CARDIOVASCULAR:  denies pressure, squeezing, tightness, or heaviness about the chest; no palpitations.    RESPIRATORY:  denies SOB, cough, CARPIO, wheezing.    GASTROINTESTINAL:  denies abdominal pain, nausea, vomiting or diarrhea.    GENITOURINARY: denies dysuria, frequency or urgency.    NEUROLOGIC:  denies numbness, tingling, seizures or weakness.    PSYCHIATRIC:  denies disorder of thought or mood.    MSK: denies swelling, redness      PHYSICAL EXAMINATION:    GENERAL: The patient is a well-developed, well-nourished, in no apparent distress.     Vital Signs Last 24 Hrs  T(C): 37.3 (08 Jan 2024 18:30), Max: 37.8 (07 Jan 2024 23:15)  T(F): 99.1 (08 Jan 2024 18:30), Max: 100 (07 Jan 2024 23:15)  HR: 65 (08 Jan 2024 18:30) (49 - 82)  BP: 125/66 (08 Jan 2024 18:30) (81/66 - 134/80)  BP(mean): 85 (08 Jan 2024 18:30) (28 - 97)  RR: 16 (08 Jan 2024 18:30) (8 - 28)  SpO2: 100% (08 Jan 2024 18:30) (92% - 100%)    Parameters below as of 08 Jan 2024 14:10    O2 Flow (L/min): 15  O2 Concentration (%): 100  Mode: CPAP with PS  FiO2: 40  PEEP: 5  PS: 7  ITime: 1  MAP: 7  PIP: 12   (if applicable)    Chest Tube (if applicable)    HEENT: Head is normocephalic and atraumatic. Extraocular muscles are intact. Mucous membranes are moist.  + NRM     NECK: Supple, no palpable adenopathy.    LUNGS: Clear to auscultation, no wheezing, rales, or rhonchi.    HEART: Regular rate and rhythm without murmur.    ABDOMEN: Soft, nontender, and nondistended.  No hepatosplenomegaly is noted.    RENAL: No difficulty voiding, no pelvic pain    EXTREMITIES: Without any cyanosis, clubbing, rash, lesions or edema.    NEUROLOGIC: Awake,     SKIN: Warm, dry, good turgor.      LABS:                        12.0   10.58 )-----------( 317      ( 08 Jan 2024 03:52 )             37.3     01-08    143  |  108  |  14  ----------------------------<  146<H>  3.5   |  29  |  0.71    Ca    9.1      08 Jan 2024 03:52  Phos  3.2     01-08  Mg     2.1     01-08    TPro  6.1  /  Alb  2.8<L>  /  TBili  0.4  /  DBili  x   /  AST  9<L>  /  ALT  33  /  AlkPhos  52  01-08      Urinalysis Basic - ( 08 Jan 2024 03:52 )    Color: x / Appearance: x / SG: x / pH: x  Gluc: 146 mg/dL / Ketone: x  / Bili: x / Urobili: x   Blood: x / Protein: x / Nitrite: x   Leuk Esterase: x / RBC: x / WBC x   Sq Epi: x / Non Sq Epi: x / Bacteria: x      ABG - ( 08 Jan 2024 03:28 )  pH, Arterial: 7.47  pH, Blood: x     /  pCO2: 45    /  pO2: 119   / HCO3: 33    / Base Excess: 8.0   /  SaO2: 100         MICROBIOLOGY: (if applicable)    RADIOLOGY & ADDITIONAL STUDIES:  EKG:   CXR:< from: Xray Chest 1 View- PORTABLE-Routine (Xray Chest 1 View- PORTABLE-Routine in AM.) (01.07.24 @ 10:28) >    ACC: [id number] EXAM:  XR CHEST PORTABLE ROUTINE 1V   ORDERED BY: NANNETTE BURNETT     PROCEDURE DATE:  01/07/2024          INTERPRETATION:  AP chest on January 7, 2024 at 10:01 AM. Patient   requires intubation.    Heart size normal.    Endotracheal tube, nasogastric tube, and left loop recorder again noted.    There is persistent bandlike retrocardiac atelectasis.    Chest is similar to January 5.    IMPRESSION: Persistent bandlike retrocardiac atelectasis. Tubes remain.    --- End of Report ---            JOANNA SON MD; Attending Radiologist  This document has been electronically signed. Jan 7 2024  2:33PM    < end of copied text >    ECHO:    IMPRESSION: 63y Male PAST MEDICAL & SURGICAL HISTORY:  CVA (cerebral vascular accident)      CVA (cerebral vascular accident)      HLD (hyperlipidemia)      S/P percutaneous endoscopic gastrostomy (PEG) tube placement       p/w         IMP: This is a  63 year old mn , from [facility], with  CVA, Alzheimer's, nonverbal at baseline, GERD, Schizophrenia, and Constipation, was brought into the ED s/p mechanical fall, poor oral intake, and Covid-19 infection on 12/27/23 Pt was admitted for COVID PNA, later found with intermittent hypoxia/ hypopnea to Sats 80% and Hypotension despite multiple fluid boluses, also found with bryan to 40s for the last 2 days, in ICU for further monitoring.       ASSESSMENT   - Acute Hypoxic resp failure   - Covid-19 PNA   - Shock septic   - CVA  - Alzheimer dementia   - Bradycardia       Plan     - Extubated this morning   - Aspiration precautions   - Hemodynamic support   - Titrate vaso-active agents to maintain MAP>65  - EP cards f/u : no indication for PPM at tis time   - Antibx   - F/U cultures   - Aspiration precautions  - Off Remdesivir due to ADAN  - Continue decadron  6 mg for total 10 days   - Isolation : contact and airborne     discusssed with ICU attend     time spent 37 min        Time of visit: seen and examined in icu     CHIEF COMPLAINT: Patient is a 63y old  Male who presents with a chief complaint of Sepsis and AHRF (08 Jan 2024 18:08)      HPI:  A 63 year old male, from [facility], with PMH of CVA, Alzheimer's, nonverbal at baseline, GERD, Schizophrenia, and Constipation, was brought into the ED s/p mechanical fall, poor oral intake, and Covid-19 infection on 12/27/23 as per NH papers. Unable to obtain history from patient since he is nonverbal, history obtained from chart review.  (28 Dec 2023 20:54)   Patient seen and examined.     PAST MEDICAL & SURGICAL HISTORY:  CVA (cerebral vascular accident)      CVA (cerebral vascular accident)      HLD (hyperlipidemia)      S/P percutaneous endoscopic gastrostomy (PEG) tube placement          Allergies    No Known Allergies    Intolerances        MEDICATIONS  (STANDING):  albuterol    90 MICROgram(s) HFA Inhaler 2 Puff(s) Inhalation every 6 hours  aspirin  chewable 81 milliGRAM(s) Oral daily  atorvastatin 20 milliGRAM(s) Oral at bedtime  enoxaparin Injectable 40 milliGRAM(s) SubCutaneous every 24 hours  insulin lispro (ADMELOG) corrective regimen sliding scale   SubCutaneous every 6 hours  norepinephrine Infusion 0.05 MICROgram(s)/kG/Min (5.95 mL/Hr) IV Continuous <Continuous>  pantoprazole   Suspension 40 milliGRAM(s) Enteral Tube daily  valproic  acid Syrup 250 milliGRAM(s) Oral two times a day      MEDICATIONS  (PRN):  acetaminophen     Tablet .. 650 milliGRAM(s) Oral every 6 hours PRN Temp greater or equal to 38C (100.4F), Mild Pain (1 - 3)   Medications up to date at time of exam.    Medications up to date at time of exam.    FAMILY HISTORY:      SOCIAL HISTORY  Smoking History: [   ] smoking/smoke exposure, [   ] former smoker  Living Condition: [   ] apartment, [   ] private house  Work History:   Travel History: denies recent travel  Illicit Substance Use: denies  Alcohol Use: denies    REVIEW OF SYSTEMS:    CONSTITUTIONAL:  denies fevers, chills, sweats, weight loss    HEENT:  denies diplopia or blurred vision, sore throat or runny nose.    CARDIOVASCULAR:  denies pressure, squeezing, tightness, or heaviness about the chest; no palpitations.    RESPIRATORY:  denies SOB, cough, CARPIO, wheezing.    GASTROINTESTINAL:  denies abdominal pain, nausea, vomiting or diarrhea.    GENITOURINARY: denies dysuria, frequency or urgency.    NEUROLOGIC:  denies numbness, tingling, seizures or weakness.    PSYCHIATRIC:  denies disorder of thought or mood.    MSK: denies swelling, redness      PHYSICAL EXAMINATION:    GENERAL: The patient is a well-developed, well-nourished, in no apparent distress.     Vital Signs Last 24 Hrs  T(C): 37.3 (08 Jan 2024 18:30), Max: 37.8 (07 Jan 2024 23:15)  T(F): 99.1 (08 Jan 2024 18:30), Max: 100 (07 Jan 2024 23:15)  HR: 65 (08 Jan 2024 18:30) (49 - 82)  BP: 125/66 (08 Jan 2024 18:30) (81/66 - 134/80)  BP(mean): 85 (08 Jan 2024 18:30) (28 - 97)  RR: 16 (08 Jan 2024 18:30) (8 - 28)  SpO2: 100% (08 Jan 2024 18:30) (92% - 100%)    Parameters below as of 08 Jan 2024 14:10    O2 Flow (L/min): 15  O2 Concentration (%): 100  Mode: CPAP with PS  FiO2: 40  PEEP: 5  PS: 7  ITime: 1  MAP: 7  PIP: 12   (if applicable)    Chest Tube (if applicable)    HEENT: Head is normocephalic and atraumatic. Extraocular muscles are intact. Mucous membranes are moist.  + NRM     NECK: Supple, no palpable adenopathy.    LUNGS: Clear to auscultation, no wheezing, rales, or rhonchi.    HEART: Regular rate and rhythm without murmur.    ABDOMEN: Soft, nontender, and nondistended.  No hepatosplenomegaly is noted.    RENAL: No difficulty voiding, no pelvic pain    EXTREMITIES: Without any cyanosis, clubbing, rash, lesions or edema.    NEUROLOGIC: Awake,     SKIN: Warm, dry, good turgor.      LABS:                        12.0   10.58 )-----------( 317      ( 08 Jan 2024 03:52 )             37.3     01-08    143  |  108  |  14  ----------------------------<  146<H>  3.5   |  29  |  0.71    Ca    9.1      08 Jan 2024 03:52  Phos  3.2     01-08  Mg     2.1     01-08    TPro  6.1  /  Alb  2.8<L>  /  TBili  0.4  /  DBili  x   /  AST  9<L>  /  ALT  33  /  AlkPhos  52  01-08      Urinalysis Basic - ( 08 Jan 2024 03:52 )    Color: x / Appearance: x / SG: x / pH: x  Gluc: 146 mg/dL / Ketone: x  / Bili: x / Urobili: x   Blood: x / Protein: x / Nitrite: x   Leuk Esterase: x / RBC: x / WBC x   Sq Epi: x / Non Sq Epi: x / Bacteria: x      ABG - ( 08 Jan 2024 03:28 )  pH, Arterial: 7.47  pH, Blood: x     /  pCO2: 45    /  pO2: 119   / HCO3: 33    / Base Excess: 8.0   /  SaO2: 100         MICROBIOLOGY: (if applicable)    RADIOLOGY & ADDITIONAL STUDIES:  EKG:   CXR:< from: Xray Chest 1 View- PORTABLE-Routine (Xray Chest 1 View- PORTABLE-Routine in AM.) (01.07.24 @ 10:28) >    ACC: [id number] EXAM:  XR CHEST PORTABLE ROUTINE 1V   ORDERED BY: NANNETTE BURNETT     PROCEDURE DATE:  01/07/2024          INTERPRETATION:  AP chest on January 7, 2024 at 10:01 AM. Patient   requires intubation.    Heart size normal.    Endotracheal tube, nasogastric tube, and left loop recorder again noted.    There is persistent bandlike retrocardiac atelectasis.    Chest is similar to January 5.    IMPRESSION: Persistent bandlike retrocardiac atelectasis. Tubes remain.    --- End of Report ---            JOANNA SON MD; Attending Radiologist  This document has been electronically signed. Jan 7 2024  2:33PM    < end of copied text >    ECHO:    IMPRESSION: 63y Male PAST MEDICAL & SURGICAL HISTORY:  CVA (cerebral vascular accident)      CVA (cerebral vascular accident)      HLD (hyperlipidemia)      S/P percutaneous endoscopic gastrostomy (PEG) tube placement       p/w         IMP: This is a  63 year old mn , from [facility], with  CVA, Alzheimer's, nonverbal at baseline, GERD, Schizophrenia, and Constipation, was brought into the ED s/p mechanical fall, poor oral intake, and Covid-19 infection on 12/27/23 Pt was admitted for COVID PNA, later found with intermittent hypoxia/ hypopnea to Sats 80% and Hypotension despite multiple fluid boluses, also found with bryan to 40s for the last 2 days, in ICU for further monitoring.       ASSESSMENT   - Acute Hypoxic resp failure   - Covid-19 PNA   - Shock septic   - CVA  - Alzheimer dementia   - Bradycardia       Plan     - Extubated this morning   - Aspiration precautions   - Hemodynamic support   - Titrate vaso-active agents to maintain MAP>65  - EP cards f/u : no indication for PPM at tis time   - Antibx   - F/U cultures   - Aspiration precautions  - Off Remdesivir due to ADAN  - Continue decadron  6 mg for total 10 days   - Isolation : contact and airborne     discusssed with ICU attend     time spent 37 min

## 2024-01-08 NOTE — PROGRESS NOTE ADULT - SUBJECTIVE AND OBJECTIVE BOX
EP Attending  HISTORY OF PRESENT ILLNESS: HPI:  A 63 year old male, from [facility], with PMH of CVA, Alzheimer's, nonverbal at baseline, GERD, Schizophrenia, and Constipation, was brought into the ED s/p mechanical fall, poor oral intake, and Covid-19 infection on 12/27/23 as per NH papers. Unable to obtain history from patient since he is nonverbal, history obtained from chart review.  (28 Dec 2023 20:54)    Was pleasant and alert this morning, not talkative.  Apparently then had an RRT For hypotension and has gone to MICU for septic shock treatment.  Takes antipsychotic Rx at his nursing home, and was referred to EP re: sinus bradycardia and borderline QT prolongation on intake.  No reported history of seizures or VT/VF in chart.  Not able to participate in HPI/ROS due to psych issues.  1/4- in ICU, not intubated, awake but not interactive.  1/5- intubated overnight.  1/6- remains intubated/sedated.  1/7- uneventful overnight.  Date of service 1/8 intubatd/comfortable this morning.  pending extubation later.    PAST MEDICAL & SURGICAL HISTORY:  CVA (cerebral vascular accident)  CVA (cerebral vascular accident)  HLD (hyperlipidemia)  S/P percutaneous endoscopic gastrostomy (PEG) tube placement    acetaminophen     Tablet .. 650 milliGRAM(s) Oral every 6 hours PRN  albuterol    90 MICROgram(s) HFA Inhaler 2 Puff(s) Inhalation every 6 hours  aspirin  chewable 81 milliGRAM(s) Oral daily  atorvastatin 20 milliGRAM(s) Oral at bedtime  enoxaparin Injectable 40 milliGRAM(s) SubCutaneous every 24 hours  insulin lispro (ADMELOG) corrective regimen sliding scale   SubCutaneous every 6 hours  norepinephrine Infusion 0.05 MICROgram(s)/kG/Min IV Continuous <Continuous>  pantoprazole   Suspension 40 milliGRAM(s) Enteral Tube daily  valproic  acid Syrup 250 milliGRAM(s) Oral two times a day                            12.0   10.58 )-----------( 317      ( 08 Jan 2024 03:52 )             37.3       01-08    143  |  108  |  14  ----------------------------<  146<H>  3.5   |  29  |  0.71    Ca    9.1      08 Jan 2024 03:52  Phos  3.2     01-08  Mg     2.1     01-08    TPro  6.1  /  Alb  2.8<L>  /  TBili  0.4  /  DBili  x   /  AST  9<L>  /  ALT  33  /  AlkPhos  52  01-08    T(C): 37.4 (01-08-24 @ 16:00), Max: 37.8 (01-07-24 @ 23:15)  HR: 53 (01-08-24 @ 16:00) (47 - 82)  BP: 108/67 (01-08-24 @ 16:00) (81/66 - 134/80)  RR: 10 (01-08-24 @ 16:00) (8 - 28)  SpO2: 100% (01-08-24 @ 16:00) (95% - 100%)  Wt(kg): --    I&O's Summary    07 Jan 2024 07:01  -  08 Jan 2024 07:00  --------------------------------------------------------  IN: 1188.1 mL / OUT: 1016 mL / NET: 172.1 mL    08 Jan 2024 07:01  -  08 Jan 2024 16:45  --------------------------------------------------------  IN: 73 mL / OUT: 590 mL / NET: -517 mL    Appearance: elderly  man intubated and sedated  HEENT:   Normal oral mucosa, PERRL, EOMI	  Lymphatic: No lymphadenopathy , no edema  Cardiovascular: Normal S1 S2, No JVD, No murmurs , Peripheral pulses palpable 2+ bilaterally  Respiratory: Lungs clear to auscultation, normal effort 	  Gastrointestinal:  Soft, Non-tender, + BS	  Skin: No rashes, No ecchymoses, No cyanosis, warm to touch  Musculoskeletal: ROM/strength unable to assess due to sedation  Psychiatry:  Mood & affect unable to assess due to sedation.    TELEMETRY: 	bradycardic, sinus 40s-50s.  QT <500 corrected in setting of bradycardia.  ECG: NSR, QTc 450-470ms.  biphasic T-waves.  Echo: LVEF normal in 2020 at time of stroke.	  	  ASSESSMENT/PLAN: Mr Arriola is a pleasant 63y Male at nursing home due to stroke, dementia, and schizophrenia. Brought in for loss of appetite.  intubated, sedated, on pressors for septic shock mgmt.  QTc upper limits of normal, chronically.  Replace K to 4-4.5, Mg 2 to 2.  Limit add'l QT prolonging medications.  As he is bedbound, not apparently symptomatic from sinus bradycardia. No long pauses on telemetry, or PVC-initiated arrhythmia.  Maintain telemetry but could probably go out of ICU.  At this time no indication for permanent pacing.      Ramses Pena M.D.  Cardiac Electrophysiology    office [phone]  pager [phone] EP Attending  HISTORY OF PRESENT ILLNESS: HPI:  A 63 year old male, from [facility], with PMH of CVA, Alzheimer's, nonverbal at baseline, GERD, Schizophrenia, and Constipation, was brought into the ED s/p mechanical fall, poor oral intake, and Covid-19 infection on 12/27/23 as per NH papers. Unable to obtain history from patient since he is nonverbal, history obtained from chart review.  (28 Dec 2023 20:54)    Was pleasant and alert this morning, not talkative.  Apparently then had an RRT For hypotension and has gone to MICU for septic shock treatment.  Takes antipsychotic Rx at his nursing home, and was referred to EP re: sinus bradycardia and borderline QT prolongation on intake.  No reported history of seizures or VT/VF in chart.  Not able to participate in HPI/ROS due to psych issues.  1/4- in ICU, not intubated, awake but not interactive.  1/5- intubated overnight.  1/6- remains intubated/sedated.  1/7- uneventful overnight.  Date of service 1/8 intubatd/comfortable this morning.  pending extubation later.    PAST MEDICAL & SURGICAL HISTORY:  CVA (cerebral vascular accident)  CVA (cerebral vascular accident)  HLD (hyperlipidemia)  S/P percutaneous endoscopic gastrostomy (PEG) tube placement    acetaminophen     Tablet .. 650 milliGRAM(s) Oral every 6 hours PRN  albuterol    90 MICROgram(s) HFA Inhaler 2 Puff(s) Inhalation every 6 hours  aspirin  chewable 81 milliGRAM(s) Oral daily  atorvastatin 20 milliGRAM(s) Oral at bedtime  enoxaparin Injectable 40 milliGRAM(s) SubCutaneous every 24 hours  insulin lispro (ADMELOG) corrective regimen sliding scale   SubCutaneous every 6 hours  norepinephrine Infusion 0.05 MICROgram(s)/kG/Min IV Continuous <Continuous>  pantoprazole   Suspension 40 milliGRAM(s) Enteral Tube daily  valproic  acid Syrup 250 milliGRAM(s) Oral two times a day                            12.0   10.58 )-----------( 317      ( 08 Jan 2024 03:52 )             37.3       01-08    143  |  108  |  14  ----------------------------<  146<H>  3.5   |  29  |  0.71    Ca    9.1      08 Jan 2024 03:52  Phos  3.2     01-08  Mg     2.1     01-08    TPro  6.1  /  Alb  2.8<L>  /  TBili  0.4  /  DBili  x   /  AST  9<L>  /  ALT  33  /  AlkPhos  52  01-08    T(C): 37.4 (01-08-24 @ 16:00), Max: 37.8 (01-07-24 @ 23:15)  HR: 53 (01-08-24 @ 16:00) (47 - 82)  BP: 108/67 (01-08-24 @ 16:00) (81/66 - 134/80)  RR: 10 (01-08-24 @ 16:00) (8 - 28)  SpO2: 100% (01-08-24 @ 16:00) (95% - 100%)  Wt(kg): --    I&O's Summary    07 Jan 2024 07:01  -  08 Jan 2024 07:00  --------------------------------------------------------  IN: 1188.1 mL / OUT: 1016 mL / NET: 172.1 mL    08 Jan 2024 07:01  -  08 Jan 2024 16:45  --------------------------------------------------------  IN: 73 mL / OUT: 590 mL / NET: -517 mL    Appearance: elderly  man intubated and sedated  HEENT:   Normal oral mucosa, PERRL, EOMI	  Lymphatic: No lymphadenopathy , no edema  Cardiovascular: Normal S1 S2, No JVD, No murmurs , Peripheral pulses palpable 2+ bilaterally  Respiratory: Lungs clear to auscultation, normal effort 	  Gastrointestinal:  Soft, Non-tender, + BS	  Skin: No rashes, No ecchymoses, No cyanosis, warm to touch  Musculoskeletal: ROM/strength unable to assess due to sedation  Psychiatry:  Mood & affect unable to assess due to sedation.    TELEMETRY: 	bradycardic, sinus 40s-50s.  QT <500 corrected in setting of bradycardia.  ECG: NSR, QTc 450-470ms.  biphasic T-waves.  Echo: LVEF normal in 2020 at time of stroke.	  	  ASSESSMENT/PLAN: Mr Arriola is a pleasant 63y Male at nursing home due to stroke, dementia, and schizophrenia. Brought in for loss of appetite.  intubated, sedated, on pressors for septic shock mgmt.  QTc upper limits of normal, chronically.  Replace K to 4-4.5, Mg 2 to 2.  Limit add'l QT prolonging medications.  As he is bedbound, not apparently symptomatic from sinus bradycardia. No long pauses on telemetry, or PVC-initiated arrhythmia.  Maintain telemetry but could probably go out of ICU.  At this time no indication for permanent pacing.      Ramses Pena M.D.  Cardiac Electrophysiology    office [phone]  pager [phone]

## 2024-01-08 NOTE — PROGRESS NOTE ADULT - ASSESSMENT
· Assessment	  63 year old male, from [facility], with PMH of CVA, Alzheimer's, nonverbal at baseline, GERD, Schizophrenia, and Constipation, was brought into the ED s/p mechanical fall, poor oral intake, and Covid-19 infection on 12/27/23 Pt was admitted for COVID PNA, later found with intermittent hypoxia/ hypopnea to Sats 80% and Hypotension despite multiple fluid boluses, also found with bryan to 40s for the last 2 days, in ICU for further monitoring.     DX:  1. CVA  2. Alzheimer's  3. Hypoxia  4. Hypotension   5. Bradycardia   5. COVID +Ve     =================== Neuro============================  # CVA  # Alzheimers  #sedation/analgesia  - baseline: bedbound, nonverbal in the setting of Alzheimer's dementia, Previous CVA  - continue sedation with propofol   - continue valporate 250mg BID  - lorazepam and Zyprexa held as patient intubated    ================= Cardiovascular==========================  # septic shock in the setting of COVID-19 infection  # Sinus bradycardia  - continue BP support with levophed, wean as tolerated  - Trop negative, TWI on lateral leads are chronic  - POCUS 1/5 shows intact LV function, some signs of end systolic effacement   - volume expansion with albumin 25% 100mL q8h x 3 doses  - 1/6/23 TTE: Mildly reduced LVEF     ================- Pulm=================================  #Acute Hypoxia Respiratory failure in the setting of COVID-19  #Multifocal PNA   #at risk of aspiration PNA  - RRT for desaturation to the 80%'s (1/2/24)  - Improved with NRB, PO2 in 200s on ABG, s/p HF 50/40 but then failed HFNC 1/5  - now sedated, mechanically ventilated, with metabolic alkalosis with rate decreased 16>14/400/8/40% and alk improved  - blood cx 12/29 NGTD  - continue cefepime 2g daily  - completes decadron 6mg x 10 days today    ==================ID===================================  #COVID   - plan as above    ================= Nephro================================  #No active issues     =================GI====================================  #Nutrition  - NPO with TF, jevity at 10ml/hr over 18hrs    # Diarrhea 2/2 to laxatives vs covid   - Dc senna, miralax  - GI PCR if persistent      ================ Heme==================================  DVT PPx with lovenox    =================Endocrine===============================  Bryan and Hypotensive   - TSH 4.28 [wnl]    ================= Skin/Catheters============================  no wounds    =================Prophylaxis =============================  lovenox    ==================GOC==================================  FULL CODE      · Assessment	  63 year old male, from [facility], with PMH of CVA, Alzheimer's, nonverbal at baseline, GERD, Schizophrenia, and Constipation, was brought into the ED s/p mechanical fall, poor oral intake, and Covid-19 infection on 12/27/23 Pt was admitted for COVID PNA, later found with intermittent hypoxia/ hypopnea to Sats 80% and Hypotension despite multiple fluid boluses, also found with bryan to 40s for the last 2 days, in ICU for further monitoring.     DX:  1. CVA  2. Alzheimer's  3. Hypoxia  4. Hypotension   5. Bradycardia   5. COVID +Ve     =================== Neuro============================  # CVA  # Alzheimers  #sedation/analgesia  - baseline: bedbound, nonverbal in the setting of Alzheimer's dementia, Previous CVA  - continue sedation with propofol   - continue valporate 250mg BID  - lorazepam and Zyprexa held as patient intubated    ================= Cardiovascular==========================  # septic shock in the setting of COVID-19 infection  # Sinus bradycardia  - continue BP support with levophed, wean as tolerated  - Trop negative, TWI on lateral leads are chronic  - POCUS 1/5 shows intact LV function, some signs of end systolic effacement   - volume expansion with albumin 25% 100mL q8h x 3 doses  - 1/6/23 TTE: Mildly reduced LVEF     ================- Pulm=================================  #Acute Hypoxia Respiratory failure in the setting of COVID-19  #Multifocal PNA   #at risk of aspiration PNA  - RRT for desaturation to the 80%'s (1/2/24)  - Improved with NRB, PO2 in 200s on ABG, s/p HF 50/40 but then failed HFNC 1/5  - now sedated, mechanically ventilated, with metabolic alkalosis with rate decreased 16>14/400/8/40% and alk improved  - blood cx 12/29 NGTD  - continue cefepime 2g daily  - completes decadron 6mg x 10 days today    ==================ID===================================  #COVID   - plan as above    ================= Nephro================================  #No active issues     =================GI====================================  #Nutrition  - NPO with TF, jevity at 10ml/hr over 18hrs    # Diarrhea 2/2 to laxatives vs covid   - Dc senna, miralax  - GI PCR if persistent      ================ Heme==================================  DVT PPx with lovenox    =================Endocrine===============================  Bryan and Hypotensive   - TSH 4.28 [wnl]    ================= Skin/Catheters============================  no wounds    =================Prophylaxis =============================  lovenox    ==================GOC==================================  FULL CODE      · Assessment	  63 year old male, from [facility], with PMH of CVA, Alzheimer's, nonverbal at baseline, GERD, Schizophrenia, and Constipation, was brought into the ED s/p mechanical fall, poor oral intake, and Covid-19 infection on 12/27/23 Pt was admitted for COVID PNA, later found with intermittent hypoxia/ hypopnea to Sats 80% and Hypotension despite multiple fluid boluses, also found with bryan to 40s for the last 2 days, in ICU for further monitoring.     DX:  1. CVA  2. Alzheimer's  3. Hypoxia  4. Hypotension   5. Bradycardia   5. COVID +Ve     =================== Neuro============================  # CVA  # Alzheimer's  #sedation/analgesia  - baseline: bedbound, nonverbal in the setting of Alzheimer's dementia, Previous CVA  - continue sedation with propofol   - continue valporate 250mg BID  - lorazepam and Zyprexa held as patient intubated  - SAT/SBT today    ================= Cardiovascular==========================  # septic shock in the setting of COVID-19 infection  # Sinus bradycardia  - Trop negative, TWI on lateral leads are chronic  - POCUS 1/5 shows intact LV function, some signs of end systolic effacement   - status post nvolume expansion with albumin   - 1/6/23 TTE: Mildly reduced LVEF   - continue to wean levophed as tolerated    ================- Pulm=================================  #Acute Hypoxia Respiratory failure in the setting of COVID-19  #Multifocal PNA   #at risk of aspiration PNA  - RRT for desaturation to the 80%'s (1/2/24)  - Improved with NRB, PO2 in 200s on ABG, s/p HF 50/40 but then failed HFNC 1/5  - now sedated, mechanically ventilated, with vent adjusted per ABG, 14/400/8>5/40%  - blood cx 12/29 NGTD  - status post course of dexmethasone  - will d/c Abx in setting of clinical improvement    ==================ID===================================  #COVID   - plan as above    ================= Nephro================================  #No active issues     =================GI====================================  #Nutrition  - NPO with TF, jevity at 10ml/hr over 18hrs    # Diarrhea 2/2 to laxatives vs covid   - Dc senna, miralax  - no further episodes      ================ Heme==================================  DVT PPx with lovenox    =================Endocrine===============================  Bryan and Hypotensive   - TSH 4.28 [wnl]    ================= Skin/Catheters============================  no wounds    =================Prophylaxis =============================  lovenox  PPI    ==================GOC==================================  FULL CODE     ==================DISPO=====================  ICU   · Assessment	  63 year old male, from [facility], with PMH of CVA, Alzheimer's, nonverbal at baseline, GERD, Schizophrenia, and Constipation, was brought into the ED s/p mechanical fall, poor oral intake, and Covid-19 infection on 12/27/23 Pt was admitted for COVID PNA, later found with intermittent hypoxia/ hypopnea to Sats 80% and Hypotension despite multiple fluid boluses, also found with bryan to 40s for the last 2 days, in ICU for further monitoring.     DX:  1. CVA  2. Alzheimer's  3. Hypoxia  4. Hypotension   5. Bradycardia   5. COVID +Ve     =================== Neuro============================  # CVA  # Alzheimer's  #sedation/analgesia  - baseline: bedbound, nonverbal in the setting of Alzheimer's dementia, Previous CVA  - continue sedation with propofol   - continue valporate 250mg BID  - lorazepam and Zyprexa held as patient intubated  - SAT/SBT today    ================= Cardiovascular==========================  # septic shock in the setting of COVID-19 infection  # Sinus bradycardia  - Trop negative, TWI on lateral leads are chronic  - POCUS 1/5 shows intact LV function, some signs of end systolic effacement   - status post nvolume expansion with albumin   - 1/6/23 TTE: Mildly reduced LVEF   - continue to wean levophed as tolerated    ================- Pulm=================================  #Acute Hypoxia Respiratory failure in the setting of COVID-19  #Multifocal PNA   #at risk of aspiration PNA  - RRT for desaturation to the 80%'s (1/2/24)  - Improved with NRB, PO2 in 200s on ABG, s/p HF 50/40 but then failed HFNC 1/5  - now sedated, mechanically ventilated, with vent adjusted per ABG, 14/400/8>5/40%  - blood cx 12/29 NGTD  - status post course of dexmethasone  - will d/c Abx in setting of clinical improvement    ==================ID===================================  #COVID   - plan as above    ================= Nephro================================  #No active issues     =================GI====================================  #Nutrition  - NPO with TF, jevity at 10ml/hr over 18hrs    # Diarrhea 2/2 to laxatives vs covid   - Dc senna, miralax  - no further episodes      ================ Heme==================================  DVT PPx with lovenox    =================Endocrine===============================  Bryan and Hypotensive   - TSH 4.28 [wnl]    ================= Skin/Catheters============================  no wounds    =================Prophylaxis =============================  lovenox  PPI    ==================GOC==================================  FULL CODE     ==================DISPO=====================  ICU

## 2024-01-08 NOTE — PROGRESS NOTE ADULT - ASSESSMENT
Septic Shock - still on 1 pressor  Pneumonia - secondary Bacterial infection, completed abx course  COVID - 19 infection - completed isolation  Leukocytosis - normalized      Plan - completed antibiotic course  Time spent - 31 mins  reconsult prn.

## 2024-01-08 NOTE — PROGRESS NOTE ADULT - SUBJECTIVE AND OBJECTIVE BOX
ICU VISIT  63y Male    Meds:    Allergies    No Known Allergies    Intolerances        VITALS:  Vital Signs Last 24 Hrs  T(C): 37.4 (08 Jan 2024 16:00), Max: 37.8 (07 Jan 2024 23:15)  T(F): 99.3 (08 Jan 2024 16:00), Max: 100 (07 Jan 2024 23:15)  HR: 53 (08 Jan 2024 16:00) (51 - 82)  BP: 108/67 (08 Jan 2024 16:00) (81/66 - 134/80)  BP(mean): 81 (08 Jan 2024 16:00) (67 - 97)  RR: 10 (08 Jan 2024 16:00) (8 - 28)  SpO2: 100% (08 Jan 2024 16:00) (95% - 100%)    Parameters below as of 08 Jan 2024 14:10    O2 Flow (L/min): 15  O2 Concentration (%): 100    LABS/DIAGNOSTIC TESTS:                          12.0   10.58 )-----------( 317      ( 08 Jan 2024 03:52 )             37.3         01-08    143  |  108  |  14  ----------------------------<  146<H>  3.5   |  29  |  0.71    Ca    9.1      08 Jan 2024 03:52  Phos  3.2     01-08  Mg     2.1     01-08    TPro  6.1  /  Alb  2.8<L>  /  TBili  0.4  /  DBili  x   /  AST  9<L>  /  ALT  33  /  AlkPhos  52  01-08      LIVER FUNCTIONS - ( 08 Jan 2024 03:52 )  Alb: 2.8 g/dL / Pro: 6.1 g/dL / ALK PHOS: 52 U/L / ALT: 33 U/L DA / AST: 9 U/L / GGT: x             CULTURES: Catheterized Catheterized  12-29 @ 12:37   No growth  --  --      .Blood Blood  12-29 @ 02:05   No growth at 5 days  --  --      .Blood Blood  12-29 @ 01:55   No growth at 5 days  --  --            RADIOLOGY:      ROS:  [  ] UNABLE TO ELICIT ICU VISIT  63y Male who remains in the ICU , he was extubated today and is currently on a NRB mask but is saturating at 82-83% only, he is non verbal and is unable to offer  any complaints. He is not coughing in front of me but spoke with the ICU NP and states that he has been having a lot of secretions and has to be suctioned frequently. His antibiotics have been stopped by the ICU team. He has a low grade temp of 100 only but no leukocytosis. He is still on 1 pressor.    Meds:    Allergies    No Known Allergies    Intolerances        VITALS:  Vital Signs Last 24 Hrs  T(C): 37.4 (08 Jan 2024 16:00), Max: 37.8 (07 Jan 2024 23:15)  T(F): 99.3 (08 Jan 2024 16:00), Max: 100 (07 Jan 2024 23:15)  HR: 53 (08 Jan 2024 16:00) (51 - 82)  BP: 108/67 (08 Jan 2024 16:00) (81/66 - 134/80)  BP(mean): 81 (08 Jan 2024 16:00) (67 - 97)  RR: 10 (08 Jan 2024 16:00) (8 - 28)  SpO2: 100% (08 Jan 2024 16:00) (95% - 100%)    Parameters below as of 08 Jan 2024 14:10    O2 Flow (L/min): 15  O2 Concentration (%): 100    LABS/DIAGNOSTIC TESTS:                          12.0   10.58 )-----------( 317      ( 08 Jan 2024 03:52 )             37.3         01-08    143  |  108  |  14  ----------------------------<  146<H>  3.5   |  29  |  0.71    Ca    9.1      08 Jan 2024 03:52  Phos  3.2     01-08  Mg     2.1     01-08    TPro  6.1  /  Alb  2.8<L>  /  TBili  0.4  /  DBili  x   /  AST  9<L>  /  ALT  33  /  AlkPhos  52  01-08      LIVER FUNCTIONS - ( 08 Jan 2024 03:52 )  Alb: 2.8 g/dL / Pro: 6.1 g/dL / ALK PHOS: 52 U/L / ALT: 33 U/L DA / AST: 9 U/L / GGT: x             CULTURES: Catheterized Catheterized  12-29 @ 12:37   No growth  --  --      .Blood Blood  12-29 @ 02:05   No growth at 5 days  --  --      .Blood Blood  12-29 @ 01:55   No growth at 5 days  --  --            RADIOLOGY:      ROS:  [ x ] UNABLE TO ELICIT

## 2024-01-08 NOTE — PROGRESS NOTE ADULT - RESPIRATORY
no wheezes/no rales/breath sounds equal/good air movement/rhonchi
clear to auscultation bilaterally/no wheezes/no rales/no rhonchi/breath sounds equal
no wheezes/no rhonchi/airway patent/breath sounds equal/good air movement/rales

## 2024-01-08 NOTE — PROGRESS NOTE ADULT - SUBJECTIVE AND OBJECTIVE BOX
C A R D I O L O G Y  **********************************     DATE OF SERVICE: 01-08-24    Patient denies chest pain or shortness of breath.   Review of symptoms otherwise negative.    acetaminophen     Tablet .. 650 milliGRAM(s) Oral every 6 hours PRN  albuterol    90 MICROgram(s) HFA Inhaler 2 Puff(s) Inhalation every 6 hours  aspirin  chewable 81 milliGRAM(s) Oral daily  atorvastatin 20 milliGRAM(s) Oral at bedtime  enoxaparin Injectable 40 milliGRAM(s) SubCutaneous every 24 hours  insulin lispro (ADMELOG) corrective regimen sliding scale   SubCutaneous every 6 hours  norepinephrine Infusion 0.05 MICROgram(s)/kG/Min IV Continuous <Continuous>  pantoprazole   Suspension 40 milliGRAM(s) Enteral Tube daily  valproic  acid Syrup 250 milliGRAM(s) Oral two times a day                            12.0   10.58 )-----------( 317      ( 08 Jan 2024 03:52 )             37.3       Hemoglobin: 12.0 g/dL (01-08 @ 03:52)  Hemoglobin: 12.0 g/dL (01-07 @ 03:04)  Hemoglobin: 12.3 g/dL (01-06 @ 03:40)  Hemoglobin: 14.2 g/dL (01-05 @ 04:50)  Hemoglobin: 11.9 g/dL (01-04 @ 04:45)      01-08    143  |  108  |  14  ----------------------------<  146<H>  3.5   |  29  |  0.71    Ca    9.1      08 Jan 2024 03:52  Phos  3.2     01-08  Mg     2.1     01-08    TPro  6.1  /  Alb  2.8<L>  /  TBili  0.4  /  DBili  x   /  AST  9<L>  /  ALT  33  /  AlkPhos  52  01-08    Creatinine Trend: 0.71<--, 0.64<--, 0.69<--, 0.80<--, 0.76<--, 0.73<--    COAGS:           T(C): 37.5 (01-08-24 @ 13:30), Max: 37.8 (01-07-24 @ 23:15)  HR: 56 (01-08-24 @ 13:30) (45 - 82)  BP: 116/66 (01-08-24 @ 13:30) (81/66 - 134/80)  RR: 8 (01-08-24 @ 13:30) (8 - 28)  SpO2: 99% (01-08-24 @ 13:30) (96% - 100%)  Wt(kg): --    I&O's Summary    07 Jan 2024 07:01  -  08 Jan 2024 07:00  --------------------------------------------------------  IN: 1188.1 mL / OUT: 1016 mL / NET: 172.1 mL    08 Jan 2024 07:01  -  08 Jan 2024 14:13  --------------------------------------------------------  IN: 62.2 mL / OUT: 590 mL / NET: -527.8 mL          HEENT:  (-)icterus (-)pallor  CV: N S1 S2 1/6 JOVON (+)2 Pulses B/l  Resp:  Clear to ausculatation B/L, normal effort  GI: (+) BS Soft, NT, ND  Lymph:  (-)Edema, (-)obvious lymphadenopathy  Skin: Warm to touch, Normal turgor  Psych: Unable to assess mood and affect      TELEMETRY: 	  sinus         ASSESSMENT/PLAN: 	63y Male PMH of CVA, Alzheimer's, nonverbal at baseline, GERD, Schizophrenia, historically preserved LV function and Constipation, was brought into the ED s/p mechanical fall, poor oral intake, and Covid-19 infection on 12/27/23 as per [facility] papers.    # Bradycardia   - He likely has some degree of sick sinus syndrome that may be exacerbated by Midodrine.  now off midodrine  - EP following      # Hypotension  - Suspect this is due to dehydration and vasodilatory state associated with viral/bacterial infection  - Abx  - pressor support per MICU   - Vent per MICU    Gavino Cadet MD, [facility]  BEEPER [phone]     C A R D I O L O G Y  **********************************     DATE OF SERVICE: 01-08-24    Patient denies chest pain or shortness of breath.   Review of symptoms otherwise negative.    acetaminophen     Tablet .. 650 milliGRAM(s) Oral every 6 hours PRN  albuterol    90 MICROgram(s) HFA Inhaler 2 Puff(s) Inhalation every 6 hours  aspirin  chewable 81 milliGRAM(s) Oral daily  atorvastatin 20 milliGRAM(s) Oral at bedtime  enoxaparin Injectable 40 milliGRAM(s) SubCutaneous every 24 hours  insulin lispro (ADMELOG) corrective regimen sliding scale   SubCutaneous every 6 hours  norepinephrine Infusion 0.05 MICROgram(s)/kG/Min IV Continuous <Continuous>  pantoprazole   Suspension 40 milliGRAM(s) Enteral Tube daily  valproic  acid Syrup 250 milliGRAM(s) Oral two times a day                            12.0   10.58 )-----------( 317      ( 08 Jan 2024 03:52 )             37.3       Hemoglobin: 12.0 g/dL (01-08 @ 03:52)  Hemoglobin: 12.0 g/dL (01-07 @ 03:04)  Hemoglobin: 12.3 g/dL (01-06 @ 03:40)  Hemoglobin: 14.2 g/dL (01-05 @ 04:50)  Hemoglobin: 11.9 g/dL (01-04 @ 04:45)      01-08    143  |  108  |  14  ----------------------------<  146<H>  3.5   |  29  |  0.71    Ca    9.1      08 Jan 2024 03:52  Phos  3.2     01-08  Mg     2.1     01-08    TPro  6.1  /  Alb  2.8<L>  /  TBili  0.4  /  DBili  x   /  AST  9<L>  /  ALT  33  /  AlkPhos  52  01-08    Creatinine Trend: 0.71<--, 0.64<--, 0.69<--, 0.80<--, 0.76<--, 0.73<--    COAGS:           T(C): 37.5 (01-08-24 @ 13:30), Max: 37.8 (01-07-24 @ 23:15)  HR: 56 (01-08-24 @ 13:30) (45 - 82)  BP: 116/66 (01-08-24 @ 13:30) (81/66 - 134/80)  RR: 8 (01-08-24 @ 13:30) (8 - 28)  SpO2: 99% (01-08-24 @ 13:30) (96% - 100%)  Wt(kg): --    I&O's Summary    07 Jan 2024 07:01  -  08 Jan 2024 07:00  --------------------------------------------------------  IN: 1188.1 mL / OUT: 1016 mL / NET: 172.1 mL    08 Jan 2024 07:01  -  08 Jan 2024 14:13  --------------------------------------------------------  IN: 62.2 mL / OUT: 590 mL / NET: -527.8 mL          HEENT:  (-)icterus (-)pallor  CV: N S1 S2 1/6 JOVON (+)2 Pulses B/l  Resp:  Clear to ausculatation B/L, normal effort  GI: (+) BS Soft, NT, ND  Lymph:  (-)Edema, (-)obvious lymphadenopathy  Skin: Warm to touch, Normal turgor  Psych: Unable to assess mood and affect      TELEMETRY: 	  sinus         ASSESSMENT/PLAN: 	63y Male PMH of CVA, Alzheimer's, nonverbal at baseline, GERD, Schizophrenia, historically preserved LV function and Constipation, was brought into the ED s/p mechanical fall, poor oral intake, and Covid-19 infection on 12/27/23 as per [facility] papers.    # Bradycardia   - He likely has some degree of sick sinus syndrome that may be exacerbated by Midodrine.  now off midodrine  - EP following      # Hypotension  - Suspect this is due to dehydration and vasodilatory state associated with viral/bacterial infection  - Abx  - pressor support per MICU   - Vent per MICU    Gavino Cadet MD, [facility]  BEEPER [phone]

## 2024-01-08 NOTE — PROGRESS NOTE ADULT - SUBJECTIVE AND OBJECTIVE BOX
Patient is a 63y old  Male who presents with a chief complaint of Sepsis and AHRF (2024 17:48)    PATIENT IS SEEN AND EXAMINED IN ICU.    ALLERGIES:  No Known Allergies      Daily     Daily Weight in k.5 (2024 08:00)    VITALS:    Vital Signs Last 24 Hrs  T(C): 37.4 (2024 16:00), Max: 37.8 (2024 23:15)  T(F): 99.3 (2024 16:00), Max: 100 (2024 23:15)  HR: 53 (2024 16:00) (51 - 82)  BP: 108/67 (2024 16:00) (81/66 - 134/80)  BP(mean): 81 (2024 16:00) (67 - 97)  RR: 10 (2024 16:00) (8 - 28)  SpO2: 100% (2024 16:00) (95% - 100%)    Parameters below as of 2024 14:10    O2 Flow (L/min): 15  O2 Concentration (%): 100    LABS:    CBC Full  -  ( 2024 03:52 )  WBC Count : 10.58 K/uL  RBC Count : 4.10 M/uL  Hemoglobin : 12.0 g/dL  Hematocrit : 37.3 %  Platelet Count - Automated : 317 K/uL  Mean Cell Volume : 91.0 fl  Mean Cell Hemoglobin : 29.3 pg  Mean Cell Hemoglobin Concentration : 32.2 gm/dL  Auto Neutrophil # : x  Auto Lymphocyte # : x  Auto Monocyte # : x  Auto Eosinophil # : x  Auto Basophil # : x  Auto Neutrophil % : x  Auto Lymphocyte % : x  Auto Monocyte % : x  Auto Eosinophil % : x  Auto Basophil % : x      -08    143  |  108  |  14  ----------------------------<  146<H>  3.5   |  29  |  0.71    Ca    9.1      2024 03:52  Phos  3.2     01-08  Mg     2.1     -08    TPro  6.1  /  Alb  2.8<L>  /  TBili  0.4  /  DBili  x   /  AST  9<L>  /  ALT  33  /  AlkPhos  52  01-08    CAPILLARY BLOOD GLUCOSE      POCT Blood Glucose.: 124 mg/dL (2024 17:24)  POCT Blood Glucose.: 123 mg/dL (2024 11:39)  POCT Blood Glucose.: 144 mg/dL (2024 05:28)  POCT Blood Glucose.: 158 mg/dL (2024 23:39)        LIVER FUNCTIONS - ( 2024 03:52 )  Alb: 2.8 g/dL / Pro: 6.1 g/dL / ALK PHOS: 52 U/L / ALT: 33 U/L DA / AST: 9 U/L / GGT: x           Creatinine Trend: 0.71<--, 0.64<--, 0.69<--, 0.80<--, 0.76<--, 0.73<--  I&O's Summary    2024 07:01  -  2024 07:00  --------------------------------------------------------  IN: 1188.1 mL / OUT: 1016 mL / NET: 172.1 mL    2024 07:01  -  2024 18:09  --------------------------------------------------------  IN: 73 mL / OUT: 590 mL / NET: -517 mL        ABG - ( 2024 03:28 )  pH, Arterial: 7.47  pH, Blood: x     /  pCO2: 45    /  pO2: 119   / HCO3: 33    / Base Excess: 8.0   /  SaO2: 100                 Catheterized Catheterized   @ 12:37   No growth  --  --      .Blood Blood   @ 02:05   No growth at 5 days  --  --      .Blood Blood   @ 01:55   No growth at 5 days  --  --          MEDICATIONS:    MEDICATIONS  (STANDING):  albuterol    90 MICROgram(s) HFA Inhaler 2 Puff(s) Inhalation every 6 hours  aspirin  chewable 81 milliGRAM(s) Oral daily  atorvastatin 20 milliGRAM(s) Oral at bedtime  enoxaparin Injectable 40 milliGRAM(s) SubCutaneous every 24 hours  insulin lispro (ADMELOG) corrective regimen sliding scale   SubCutaneous every 6 hours  norepinephrine Infusion 0.05 MICROgram(s)/kG/Min (5.95 mL/Hr) IV Continuous <Continuous>  pantoprazole   Suspension 40 milliGRAM(s) Enteral Tube daily  valproic  acid Syrup 250 milliGRAM(s) Oral two times a day      MEDICATIONS  (PRN):  acetaminophen     Tablet .. 650 milliGRAM(s) Oral every 6 hours PRN Temp greater or equal to 38C (100.4F), Mild Pain (1 - 3)      REVIEW OF SYSTEMS:                           ALL ROS DONE [ X   ]    CONSTITUTIONAL:  LETHARGIC [   ], FEVER [   ], UNRESPONSIVE [   ]  CVS:  CP  [   ], SOB, [   ], PALPITATIONS [   ], DIZZYNESS [   ]  RS: COUGH [   ], SPUTUM [   ]  GI: ABDOMINAL PAIN [   ], NAUSEA [   ], VOMITINGS [   ], DIARRHEA [   ], CONSTIPATION [   ]  :  DYSURIA [   ], NOCTURIA [   ], INCREASED FREQUENCY [   ], DRIBLING [   ],  SKELETAL: PAINFUL JOINTS [   ], SWOLLEN JOINTS [   ], NECK ACHE [   ], LOW BACK ACHE [   ],  SKIN : ULCERS [   ], RASH [   ], ITCHING [   ]  CNS: HEAD ACHE [   ], DOUBLE VISION [   ], BLURRED VISION [   ], AMS / CONFUSION [   ], SEIZURES [   ], WEAKNESS [   ],TINGLING / NUMBNESS [   ]      PHYSICAL EXAMINATION:  GENERAL APPEARANCE: NO DISTRESS  HEENT:  NO PALLOR, NO  JVD,  NO   NODES, NECK SUPPLE  CVS: S1 +, S2 +,   RS: AEEB,  OCCASIONAL  RALES +,   RHONCHI + ; NRB  ABD: SOFT, NT, NO, BS +  EXT: NO PE  SKIN: WARM,   SKELETAL:  REDUCED ROM OF CERVICAL AND LS SPINE  CNS:  AAO X 1    RADIOLOGY :    RADIOLOGY AND READINGS REVIEWED    ASSESSMENT :     Infection due to severe acute respiratory syndrome coronavirus 2 (SARS-CoV-2)    CVA (cerebral vascular accident)    HLD (hyperlipidemia)    S/P percutaneous endoscopic gastrostomy (PEG) tube placement        PLAN:  HPI:  A 63 year old male, from [facility], with PMH of CVA, Alzheimer's, nonverbal at baseline, GERD, Schizophrenia, and Constipation, was brought into the ED s/p mechanical fall, poor oral intake, and Covid-19 infection on 23 as per NH papers. Unable to obtain history from patient since he is nonverbal, history obtained from chart review.  (28 Dec 2023 20:54)    # PROGNOSIS IS POOR/GUARDED - CRITICAL CARE TEAM DISCUSSING W/ FAMILY REGARDING GOC.     # [1/3] RAPID RESPONSE FOR HYPOTENSION AND HYPOXIA - S/P IV FLUIDS AND PLACED ON NRB FOR HYPOXIA. CRITICAL CARE EVALUATION REQUESTED.     # SEPTIC SHOCK S/T ASPIRATION PNA + DIARRHEA [resolved]  # ACUTE HYPOXIC RESPIRATORY FAILURE S/T ? ASPIRATION EVENT   , COVID19 INFECTION   S/P INTUBATION   - ON DECADRON  - ROCEPHIN, AZITHROMYCIN  - DECADRON  - BCX [NGTD] AND UCX [NGTD]  - S/P IVF, VASOPRESSORS  - ID CONSULT  - CRITICAL CARE MANAGEMENT IN PROGRESS    - [] - PATIENT W/ ? ASPIRATION EVENT - SUBSEQUENTLY REQUIRED INTUBATION W/ MECHANICAL VENTILATION  - [] - EXTUBATED, CHEST PT    # BRADYCARDIA  - MONITOR ON TELEMETRY  - F/U ECHOCARDIOGRAM   - OPTIMIZE ELECTROLYTES  - CARDIOLOGY CONSULT  - EP CONSULT    # HYPOKALEMIA  - REPLETING WITH SUPPLEMENT    # HX OF CVA  # HX OF DEMENTIA  # SCHIZOPHRENIA  # GI AND DVT PPX   Patient is a 63y old  Male who presents with a chief complaint of Sepsis and AHRF (2024 17:48)    PATIENT IS SEEN AND EXAMINED IN ICU.    ALLERGIES:  No Known Allergies      Daily     Daily Weight in k.5 (2024 08:00)    VITALS:    Vital Signs Last 24 Hrs  T(C): 37.4 (2024 16:00), Max: 37.8 (2024 23:15)  T(F): 99.3 (2024 16:00), Max: 100 (2024 23:15)  HR: 53 (2024 16:00) (51 - 82)  BP: 108/67 (2024 16:00) (81/66 - 134/80)  BP(mean): 81 (2024 16:00) (67 - 97)  RR: 10 (2024 16:00) (8 - 28)  SpO2: 100% (2024 16:00) (95% - 100%)    Parameters below as of 2024 14:10    O2 Flow (L/min): 15  O2 Concentration (%): 100    LABS:    CBC Full  -  ( 2024 03:52 )  WBC Count : 10.58 K/uL  RBC Count : 4.10 M/uL  Hemoglobin : 12.0 g/dL  Hematocrit : 37.3 %  Platelet Count - Automated : 317 K/uL  Mean Cell Volume : 91.0 fl  Mean Cell Hemoglobin : 29.3 pg  Mean Cell Hemoglobin Concentration : 32.2 gm/dL  Auto Neutrophil # : x  Auto Lymphocyte # : x  Auto Monocyte # : x  Auto Eosinophil # : x  Auto Basophil # : x  Auto Neutrophil % : x  Auto Lymphocyte % : x  Auto Monocyte % : x  Auto Eosinophil % : x  Auto Basophil % : x      -08    143  |  108  |  14  ----------------------------<  146<H>  3.5   |  29  |  0.71    Ca    9.1      2024 03:52  Phos  3.2     01-08  Mg     2.1     -08    TPro  6.1  /  Alb  2.8<L>  /  TBili  0.4  /  DBili  x   /  AST  9<L>  /  ALT  33  /  AlkPhos  52  01-08    CAPILLARY BLOOD GLUCOSE      POCT Blood Glucose.: 124 mg/dL (2024 17:24)  POCT Blood Glucose.: 123 mg/dL (2024 11:39)  POCT Blood Glucose.: 144 mg/dL (2024 05:28)  POCT Blood Glucose.: 158 mg/dL (2024 23:39)        LIVER FUNCTIONS - ( 2024 03:52 )  Alb: 2.8 g/dL / Pro: 6.1 g/dL / ALK PHOS: 52 U/L / ALT: 33 U/L DA / AST: 9 U/L / GGT: x           Creatinine Trend: 0.71<--, 0.64<--, 0.69<--, 0.80<--, 0.76<--, 0.73<--  I&O's Summary    2024 07:01  -  2024 07:00  --------------------------------------------------------  IN: 1188.1 mL / OUT: 1016 mL / NET: 172.1 mL    2024 07:01  -  2024 18:09  --------------------------------------------------------  IN: 73 mL / OUT: 590 mL / NET: -517 mL        ABG - ( 2024 03:28 )  pH, Arterial: 7.47  pH, Blood: x     /  pCO2: 45    /  pO2: 119   / HCO3: 33    / Base Excess: 8.0   /  SaO2: 100                 Catheterized Catheterized   @ 12:37   No growth  --  --      .Blood Blood   @ 02:05   No growth at 5 days  --  --      .Blood Blood   @ 01:55   No growth at 5 days  --  --          MEDICATIONS:    MEDICATIONS  (STANDING):  albuterol    90 MICROgram(s) HFA Inhaler 2 Puff(s) Inhalation every 6 hours  aspirin  chewable 81 milliGRAM(s) Oral daily  atorvastatin 20 milliGRAM(s) Oral at bedtime  enoxaparin Injectable 40 milliGRAM(s) SubCutaneous every 24 hours  insulin lispro (ADMELOG) corrective regimen sliding scale   SubCutaneous every 6 hours  norepinephrine Infusion 0.05 MICROgram(s)/kG/Min (5.95 mL/Hr) IV Continuous <Continuous>  pantoprazole   Suspension 40 milliGRAM(s) Enteral Tube daily  valproic  acid Syrup 250 milliGRAM(s) Oral two times a day      MEDICATIONS  (PRN):  acetaminophen     Tablet .. 650 milliGRAM(s) Oral every 6 hours PRN Temp greater or equal to 38C (100.4F), Mild Pain (1 - 3)      REVIEW OF SYSTEMS:                           ALL ROS DONE [ X   ]    CONSTITUTIONAL:  LETHARGIC [   ], FEVER [   ], UNRESPONSIVE [   ]  CVS:  CP  [   ], SOB, [   ], PALPITATIONS [   ], DIZZYNESS [   ]  RS: COUGH [   ], SPUTUM [   ]  GI: ABDOMINAL PAIN [   ], NAUSEA [   ], VOMITINGS [   ], DIARRHEA [   ], CONSTIPATION [   ]  :  DYSURIA [   ], NOCTURIA [   ], INCREASED FREQUENCY [   ], DRIBLING [   ],  SKELETAL: PAINFUL JOINTS [   ], SWOLLEN JOINTS [   ], NECK ACHE [   ], LOW BACK ACHE [   ],  SKIN : ULCERS [   ], RASH [   ], ITCHING [   ]  CNS: HEAD ACHE [   ], DOUBLE VISION [   ], BLURRED VISION [   ], AMS / CONFUSION [   ], SEIZURES [   ], WEAKNESS [   ],TINGLING / NUMBNESS [   ]      PHYSICAL EXAMINATION:  GENERAL APPEARANCE: NO DISTRESS  HEENT:  NO PALLOR, NO  JVD,  NO   NODES, NECK SUPPLE  CVS: S1 +, S2 +,   RS: AEEB,  OCCASIONAL  RALES +,   RHONCHI + ; NRB  ABD: SOFT, NT, NO, BS +  EXT: NO PE  SKIN: WARM,   SKELETAL:  REDUCED ROM OF CERVICAL AND LS SPINE  CNS:  AAO X 1    RADIOLOGY :    RADIOLOGY AND READINGS REVIEWED    ASSESSMENT :     Infection due to severe acute respiratory syndrome coronavirus 2 (SARS-CoV-2)    CVA (cerebral vascular accident)    HLD (hyperlipidemia)    S/P percutaneous endoscopic gastrostomy (PEG) tube placement        PLAN:  HPI:  A 63 year old male, from [facility], with PMH of CVA, Alzheimer's, nonverbal at baseline, GERD, Schizophrenia, and Constipation, was brought into the ED s/p mechanical fall, poor oral intake, and Covid-19 infection on 23 as per NH papers. Unable to obtain history from patient since he is nonverbal, history obtained from chart review.  (28 Dec 2023 20:54)    # PROGNOSIS IS POOR/GUARDED - CRITICAL CARE TEAM DISCUSSING W/ FAMILY REGARDING GOC.     # [1/3] RAPID RESPONSE FOR HYPOTENSION AND HYPOXIA - S/P IV FLUIDS AND PLACED ON NRB FOR HYPOXIA. CRITICAL CARE EVALUATION REQUESTED.     # SEPTIC SHOCK S/T ASPIRATION PNA + DIARRHEA [resolved]  # ACUTE HYPOXIC RESPIRATORY FAILURE S/T ? ASPIRATION EVENT   , COVID19 INFECTION   S/P INTUBATION   - ON DECADRON  - ROCEPHIN, AZITHROMYCIN  - DECADRON  - BCX [NGTD] AND UCX [NGTD]  - S/P IVF, VASOPRESSORS  - ID CONSULT  - CRITICAL CARE MANAGEMENT IN PROGRESS    - [] - PATIENT W/ ? ASPIRATION EVENT - SUBSEQUENTLY REQUIRED INTUBATION W/ MECHANICAL VENTILATION  - [] - EXTUBATED, CHEST PT    # BRADYCARDIA  - MONITOR ON TELEMETRY  - F/U ECHOCARDIOGRAM   - OPTIMIZE ELECTROLYTES  - CARDIOLOGY CONSULT  - EP CONSULT    # HYPOKALEMIA  - REPLETING WITH SUPPLEMENT    # HX OF CVA  # HX OF DEMENTIA  # SCHIZOPHRENIA  # GI AND DVT PPX

## 2024-01-08 NOTE — PROGRESS NOTE ADULT - CARDIOVASCULAR
regular rate and rhythm
regular rate and rhythm/no pedal edema
regular rate and rhythm/no pedal edema

## 2024-01-08 NOTE — PROGRESS NOTE ADULT - SUBJECTIVE AND OBJECTIVE BOX
INTERVAL HPI/OVERNIGHT EVENTS:       PRESSORS: [ ] YES [ ] NO  WHICH:    ANTIBIOTICS:                  DATE STARTED:  ANTIBIOTICS:                  DATE STARTED:    Antimicrobial:    Cardiovascular:  norepinephrine Infusion 0.05 MICROgram(s)/kG/Min IV Continuous <Continuous>    Pulmonary:  albuterol    90 MICROgram(s) HFA Inhaler 2 Puff(s) Inhalation every 6 hours    Hematalogic:  aspirin  chewable 81 milliGRAM(s) Oral daily  enoxaparin Injectable 40 milliGRAM(s) SubCutaneous every 24 hours    Other:  acetaminophen     Tablet .. 650 milliGRAM(s) Oral every 6 hours PRN  atorvastatin 20 milliGRAM(s) Oral at bedtime  chlorhexidine 0.12% Liquid 15 milliLiter(s) Oral Mucosa every 12 hours  fentaNYL    Injectable 25 MICROGram(s) IV Push every 4 hours PRN  insulin lispro (ADMELOG) corrective regimen sliding scale   SubCutaneous every 6 hours  propofol Infusion 10 MICROgram(s)/kG/Min IV Continuous <Continuous>  valproic  acid Syrup 250 milliGRAM(s) Oral two times a day    acetaminophen     Tablet .. 650 milliGRAM(s) Oral every 6 hours PRN  albuterol    90 MICROgram(s) HFA Inhaler 2 Puff(s) Inhalation every 6 hours  aspirin  chewable 81 milliGRAM(s) Oral daily  atorvastatin 20 milliGRAM(s) Oral at bedtime  chlorhexidine 0.12% Liquid 15 milliLiter(s) Oral Mucosa every 12 hours  enoxaparin Injectable 40 milliGRAM(s) SubCutaneous every 24 hours  fentaNYL    Injectable 25 MICROGram(s) IV Push every 4 hours PRN  insulin lispro (ADMELOG) corrective regimen sliding scale   SubCutaneous every 6 hours  norepinephrine Infusion 0.05 MICROgram(s)/kG/Min IV Continuous <Continuous>  propofol Infusion 10 MICROgram(s)/kG/Min IV Continuous <Continuous>  valproic  acid Syrup 250 milliGRAM(s) Oral two times a day    Drug Dosing Weight  Height (cm): 167.6 (03 Aug 2022 20:11)  Weight (kg): 57 (03 Jan 2024 14:30)  BMI (kg/m2): 20.3 (03 Jan 2024 14:30)  BSA (m2): 1.64 (03 Jan 2024 14:30)    PHYSICAL EXAM:  GENERAL: NAD  EYES: EOMI, PERRLA  NECK: Supple, No JVD; Trachea midline: No LAD   NERVOUS SYSTEM:  Alert & Oriented X3,  Motor Strength 5/5 B/L upper and lower extremities  CHEST/LUNG:  breath sounds present bilaterally, No rales, rhonchi, wheezing  HEART: Regular rate and rhythm; No murmurs, rubs, or gallops  ABDOMEN: Soft, Nontender, Nondistended; Bowel sounds present, no pain or masses on palpation  : voiding well, Acevedo in place  EXTREMITIES:  2+ Peripheral Pulses, No clubbing, cyanosis, or edema  SKIN: warm, intact, no lesions     LINES/DRAINS/DEVICES  CENTRAL LINE: [ ] YES [ ] NO  LOCATION:     ACEVEDO: [ ] YES [ ] NO     A-LINE:  [ ] YES [ ] NO  LOCATION:       ICU Vital Signs Last 24 Hrs  T(C): 37.7 (08 Jan 2024 06:45), Max: 37.8 (07 Jan 2024 23:15)  T(F): 99.9 (08 Jan 2024 06:45), Max: 100 (07 Jan 2024 23:15)  HR: 67 (08 Jan 2024 06:45) (44 - 73)  BP: 88/66 (08 Jan 2024 06:45) (81/66 - 134/80)  BP(mean): 74 (08 Jan 2024 06:45) (67 - 97)  ABP: --  ABP(mean): --  RR: 21 (08 Jan 2024 06:45) (11 - 28)  SpO2: 97% (08 Jan 2024 06:45) (96% - 100%)    O2 Parameters below as of 08 Jan 2024 04:00  Patient On (Oxygen Delivery Method): ventilator    O2 Concentration (%): 40        ABG - ( 08 Jan 2024 03:28 )  pH, Arterial: 7.47  pH, Blood: x     /  pCO2: 45    /  pO2: 119   / HCO3: 33    / Base Excess: 8.0   /  SaO2: 100                   01-06 @ 07:01  -  01-07 @ 07:00  --------------------------------------------------------  IN: 882.9 mL / OUT: 1620 mL / NET: -737.1 mL        Mode: AC/ CMV (Assist Control/ Continuous Mandatory Ventilation)  RR (machine): 14  TV (machine): 400  FiO2: 40  PEEP: 8  ITime: 1  MAP: 13  PIP: 23        LABS:  CBC Full  -  ( 08 Jan 2024 03:52 )  WBC Count : 10.58 K/uL  RBC Count : 4.10 M/uL  Hemoglobin : 12.0 g/dL  Hematocrit : 37.3 %  Platelet Count - Automated : 317 K/uL  Mean Cell Volume : 91.0 fl  Mean Cell Hemoglobin : 29.3 pg  Mean Cell Hemoglobin Concentration : 32.2 gm/dL  Auto Neutrophil # : x  Auto Lymphocyte # : x  Auto Monocyte # : x  Auto Eosinophil # : x  Auto Basophil # : x  Auto Neutrophil % : x  Auto Lymphocyte % : x  Auto Monocyte % : x  Auto Eosinophil % : x  Auto Basophil % : x    01-08    143  |  108  |  14  ----------------------------<  146<H>  3.5   |  29  |  0.71    Ca    9.1      08 Jan 2024 03:52  Phos  3.2     01-08  Mg     2.1     01-08    TPro  6.1  /  Alb  2.8<L>  /  TBili  0.4  /  DBili  x   /  AST  9<L>  /  ALT  33  /  AlkPhos  52  01-08      Urinalysis Basic - ( 08 Jan 2024 03:52 )    Color: x / Appearance: x / SG: x / pH: x  Gluc: 146 mg/dL / Ketone: x  / Bili: x / Urobili: x   Blood: x / Protein: x / Nitrite: x   Leuk Esterase: x / RBC: x / WBC x   Sq Epi: x / Non Sq Epi: x / Bacteria: x          RADIOLOGY & ADDITIONAL STUDIES REVIEWED DURING TEAM ROUNDS    [ ]GOALS OF CARE DISCUSSION WITH PATIENT/FAMILY/PROXY:    CRITICAL CARE TIME SPENT: 35 minutes   INTERVAL HPI/OVERNIGHT EVENTS:       PRESSORS: [ ] YES [ ] NO  WHICH:    ANTIBIOTICS:                  DATE STARTED:  ANTIBIOTICS:                  DATE STARTED:    Antimicrobial:    Cardiovascular:  norepinephrine Infusion 0.05 MICROgram(s)/kG/Min IV Continuous <Continuous>    Pulmonary:  albuterol    90 MICROgram(s) HFA Inhaler 2 Puff(s) Inhalation every 6 hours    Hematalogic:  aspirin  chewable 81 milliGRAM(s) Oral daily  enoxaparin Injectable 40 milliGRAM(s) SubCutaneous every 24 hours    Other:  acetaminophen     Tablet .. 650 milliGRAM(s) Oral every 6 hours PRN  atorvastatin 20 milliGRAM(s) Oral at bedtime  chlorhexidine 0.12% Liquid 15 milliLiter(s) Oral Mucosa every 12 hours  fentaNYL    Injectable 25 MICROGram(s) IV Push every 4 hours PRN  insulin lispro (ADMELOG) corrective regimen sliding scale   SubCutaneous every 6 hours  propofol Infusion 10 MICROgram(s)/kG/Min IV Continuous <Continuous>  valproic  acid Syrup 250 milliGRAM(s) Oral two times a day    acetaminophen     Tablet .. 650 milliGRAM(s) Oral every 6 hours PRN  albuterol    90 MICROgram(s) HFA Inhaler 2 Puff(s) Inhalation every 6 hours  aspirin  chewable 81 milliGRAM(s) Oral daily  atorvastatin 20 milliGRAM(s) Oral at bedtime  chlorhexidine 0.12% Liquid 15 milliLiter(s) Oral Mucosa every 12 hours  enoxaparin Injectable 40 milliGRAM(s) SubCutaneous every 24 hours  fentaNYL    Injectable 25 MICROGram(s) IV Push every 4 hours PRN  insulin lispro (ADMELOG) corrective regimen sliding scale   SubCutaneous every 6 hours  norepinephrine Infusion 0.05 MICROgram(s)/kG/Min IV Continuous <Continuous>  propofol Infusion 10 MICROgram(s)/kG/Min IV Continuous <Continuous>  valproic  acid Syrup 250 milliGRAM(s) Oral two times a day    Drug Dosing Weight  Height (cm): 167.6 (03 Aug 2022 20:11)  Weight (kg): 57 (03 Jan 2024 14:30)  BMI (kg/m2): 20.3 (03 Jan 2024 14:30)  BSA (m2): 1.64 (03 Jan 2024 14:30)    PHYSICAL EXAM:  GENERAL: intubated, sedated  EYES: pupils 2mm and sluggish  NECK: Supple, No JVD; Trachea midline   NERVOUS SYSTEM:  intubated, sedated, minimally responsive to noxious stimuli  CHEST/LUNG:  breath sounds very decreased bilaterally, No rales, rhonchi, or wheezing  HEART: Regular rate and rhythm; No murmurs, rubs, or gallops  ABDOMEN: Soft, Nontender, Nondistended; Bowel sounds present, no pain or masses on palpation  : voiding well, Acevedo in place  EXTREMITIES:  2+ Peripheral Pulses, No clubbing, cyanosis, or edema  SKIN: warm, intact, no lesions     LINES/DRAINS/DEVICES  CENTRAL LINE: [ ] YES [ ] NO  LOCATION:     ACEVEDO: [ ] YES [ ] NO     A-LINE:  [ ] YES [ ] NO  LOCATION:       ICU Vital Signs Last 24 Hrs  T(C): 37.7 (08 Jan 2024 06:45), Max: 37.8 (07 Jan 2024 23:15)  T(F): 99.9 (08 Jan 2024 06:45), Max: 100 (07 Jan 2024 23:15)  HR: 67 (08 Jan 2024 06:45) (44 - 73)  BP: 88/66 (08 Jan 2024 06:45) (81/66 - 134/80)  BP(mean): 74 (08 Jan 2024 06:45) (67 - 97)  ABP: --  ABP(mean): --  RR: 21 (08 Jan 2024 06:45) (11 - 28)  SpO2: 97% (08 Jan 2024 06:45) (96% - 100%)    O2 Parameters below as of 08 Jan 2024 04:00  Patient On (Oxygen Delivery Method): ventilator    O2 Concentration (%): 40        ABG - ( 08 Jan 2024 03:28 )  pH, Arterial: 7.47  pH, Blood: x     /  pCO2: 45    /  pO2: 119   / HCO3: 33    / Base Excess: 8.0   /  SaO2: 100                   01-06 @ 07:01  -  01-07 @ 07:00  --------------------------------------------------------  IN: 882.9 mL / OUT: 1620 mL / NET: -737.1 mL        Mode: AC/ CMV (Assist Control/ Continuous Mandatory Ventilation)  RR (machine): 14  TV (machine): 400  FiO2: 40  PEEP: 8  ITime: 1  MAP: 13  PIP: 23        LABS:  CBC Full  -  ( 08 Jan 2024 03:52 )  WBC Count : 10.58 K/uL  RBC Count : 4.10 M/uL  Hemoglobin : 12.0 g/dL  Hematocrit : 37.3 %  Platelet Count - Automated : 317 K/uL  Mean Cell Volume : 91.0 fl  Mean Cell Hemoglobin : 29.3 pg  Mean Cell Hemoglobin Concentration : 32.2 gm/dL  Auto Neutrophil # : x  Auto Lymphocyte # : x  Auto Monocyte # : x  Auto Eosinophil # : x  Auto Basophil # : x  Auto Neutrophil % : x  Auto Lymphocyte % : x  Auto Monocyte % : x  Auto Eosinophil % : x  Auto Basophil % : x    01-08    143  |  108  |  14  ----------------------------<  146<H>  3.5   |  29  |  0.71    Ca    9.1      08 Jan 2024 03:52  Phos  3.2     01-08  Mg     2.1     01-08    TPro  6.1  /  Alb  2.8<L>  /  TBili  0.4  /  DBili  x   /  AST  9<L>  /  ALT  33  /  AlkPhos  52  01-08      Urinalysis Basic - ( 08 Jan 2024 03:52 )    Color: x / Appearance: x / SG: x / pH: x  Gluc: 146 mg/dL / Ketone: x  / Bili: x / Urobili: x   Blood: x / Protein: x / Nitrite: x   Leuk Esterase: x / RBC: x / WBC x   Sq Epi: x / Non Sq Epi: x / Bacteria: x          RADIOLOGY & ADDITIONAL STUDIES REVIEWED DURING TEAM ROUNDS    [ ]GOALS OF CARE DISCUSSION WITH PATIENT/FAMILY/PROXY:    CRITICAL CARE TIME SPENT: 35 minutes

## 2024-01-08 NOTE — PROGRESS NOTE ADULT - GASTROINTESTINAL
soft/nontender/nondistended/normal active bowel sounds/no guarding/no rigidity
soft/nondistended/normal active bowel sounds/no guarding/no rigidity
soft/nontender/nondistended/normal active bowel sounds/no guarding/no rigidity

## 2024-01-08 NOTE — PROGRESS NOTE ADULT - CRITICAL CARE ATTENDING COMMENT
IMP: This is a  63 year old mn , from [facility], with  CVA, Alzheimer's, nonverbal at baseline, GERD, Schizophrenia, and Constipation, was brought into the ED s/p mechanical fall, poor oral intake, and Covid-19 infection on 12/27/23 Pt was admitted for COVID PNA, later found with intermittent hypoxia/ hypopnea to Sats 80% and Hypotension despite multiple fluid boluses, also found with bryan to 40s for the last 2 days, in ICU for further monitoring.       ASSESSMENT   - Acute Hypoxic resp failure   - Covid-19 PNA   - Shock septic   - CVA  - Alzheimer dementia   - Bradycardia       Plan   - Extubated this morning   - Aspiration precautions   - Hemodynamic support   - Titrate vaso-active agents to maintain MAP>65  - EP cards f/u : no indication for PPM at tis time   - Antibx   - F/U cultures   - Aspiration precautions  - Off Remdesivir due to ADAN  - Continue decadron  6 mg for total 10 days   - Isolation : contact and airborne   - Monitor I/O   - Cards following   - Hold AV angel luis blocking agent   - Skin care .  - Wound care eval noted  - Prognosis is poor IMP: This is a  63 year old mn , from [facility], with  CVA, Alzheimer's, nonverbal at baseline, GERD, Schizophrenia, and Constipation, was brought into the ED s/p mechanical fall, poor oral intake, and Covid-19 infection on 12/27/23 Pt was admitted for COVID PNA, later found with intermittent hypoxia/ hypopnea to Sats 80% and Hypotension despite multiple fluid boluses, also found with bryna to 40s for the last 2 days, in ICU for further monitoring.       ASSESSMENT   - Acute Hypoxic resp failure   - Covid-19 PNA   - Shock septic   - CVA  - Alzheimer dementia   - Bradycardia       Plan   - Extubated this morning   - Aspiration precautions   - Hemodynamic support   - Titrate vaso-active agents to maintain MAP>65  - EP cards f/u : no indication for PPM at tis time   - Antibx   - F/U cultures   - Aspiration precautions  - Off Remdesivir due to ADAN  - Continue decadron  6 mg for total 10 days   - Isolation : contact and airborne   - Monitor I/O   - Cards following   - Hold AV angel luis blocking agent   - Skin care .  - Wound care eval noted  - Prognosis is poor

## 2024-01-09 LAB
ANION GAP SERPL CALC-SCNC: 1 MMOL/L — LOW (ref 5–17)
ANION GAP SERPL CALC-SCNC: 1 MMOL/L — LOW (ref 5–17)
BASE EXCESS BLDA CALC-SCNC: 4.8 MMOL/L — HIGH (ref -2–3)
BASE EXCESS BLDA CALC-SCNC: 4.8 MMOL/L — HIGH (ref -2–3)
BASOPHILS # BLD AUTO: 0.03 K/UL — SIGNIFICANT CHANGE UP (ref 0–0.2)
BASOPHILS # BLD AUTO: 0.03 K/UL — SIGNIFICANT CHANGE UP (ref 0–0.2)
BASOPHILS NFR BLD AUTO: 0.3 % — SIGNIFICANT CHANGE UP (ref 0–2)
BASOPHILS NFR BLD AUTO: 0.3 % — SIGNIFICANT CHANGE UP (ref 0–2)
BLOOD GAS COMMENTS ARTERIAL: SIGNIFICANT CHANGE UP
BLOOD GAS COMMENTS ARTERIAL: SIGNIFICANT CHANGE UP
BUN SERPL-MCNC: 14 MG/DL — SIGNIFICANT CHANGE UP (ref 7–18)
BUN SERPL-MCNC: 14 MG/DL — SIGNIFICANT CHANGE UP (ref 7–18)
CALCIUM SERPL-MCNC: 8.8 MG/DL — SIGNIFICANT CHANGE UP (ref 8.4–10.5)
CALCIUM SERPL-MCNC: 8.8 MG/DL — SIGNIFICANT CHANGE UP (ref 8.4–10.5)
CHLORIDE SERPL-SCNC: 109 MMOL/L — HIGH (ref 96–108)
CHLORIDE SERPL-SCNC: 109 MMOL/L — HIGH (ref 96–108)
CO2 SERPL-SCNC: 32 MMOL/L — HIGH (ref 22–31)
CO2 SERPL-SCNC: 32 MMOL/L — HIGH (ref 22–31)
CREAT SERPL-MCNC: 0.64 MG/DL — SIGNIFICANT CHANGE UP (ref 0.5–1.3)
CREAT SERPL-MCNC: 0.64 MG/DL — SIGNIFICANT CHANGE UP (ref 0.5–1.3)
EGFR: 106 ML/MIN/1.73M2 — SIGNIFICANT CHANGE UP
EGFR: 106 ML/MIN/1.73M2 — SIGNIFICANT CHANGE UP
EOSINOPHIL # BLD AUTO: 0.07 K/UL — SIGNIFICANT CHANGE UP (ref 0–0.5)
EOSINOPHIL # BLD AUTO: 0.07 K/UL — SIGNIFICANT CHANGE UP (ref 0–0.5)
EOSINOPHIL NFR BLD AUTO: 0.6 % — SIGNIFICANT CHANGE UP (ref 0–6)
EOSINOPHIL NFR BLD AUTO: 0.6 % — SIGNIFICANT CHANGE UP (ref 0–6)
GLUCOSE BLDC GLUCOMTR-MCNC: 116 MG/DL — HIGH (ref 70–99)
GLUCOSE BLDC GLUCOMTR-MCNC: 116 MG/DL — HIGH (ref 70–99)
GLUCOSE BLDC GLUCOMTR-MCNC: 145 MG/DL — HIGH (ref 70–99)
GLUCOSE BLDC GLUCOMTR-MCNC: 145 MG/DL — HIGH (ref 70–99)
GLUCOSE BLDC GLUCOMTR-MCNC: 150 MG/DL — HIGH (ref 70–99)
GLUCOSE BLDC GLUCOMTR-MCNC: 150 MG/DL — HIGH (ref 70–99)
GLUCOSE BLDC GLUCOMTR-MCNC: 178 MG/DL — HIGH (ref 70–99)
GLUCOSE BLDC GLUCOMTR-MCNC: 178 MG/DL — HIGH (ref 70–99)
GLUCOSE SERPL-MCNC: 154 MG/DL — HIGH (ref 70–99)
GLUCOSE SERPL-MCNC: 154 MG/DL — HIGH (ref 70–99)
HCO3 BLDA-SCNC: 30 MMOL/L — HIGH (ref 21–28)
HCO3 BLDA-SCNC: 30 MMOL/L — HIGH (ref 21–28)
HCT VFR BLD CALC: 40.1 % — SIGNIFICANT CHANGE UP (ref 39–50)
HCT VFR BLD CALC: 40.1 % — SIGNIFICANT CHANGE UP (ref 39–50)
HGB BLD-MCNC: 12.9 G/DL — LOW (ref 13–17)
HGB BLD-MCNC: 12.9 G/DL — LOW (ref 13–17)
HOROWITZ INDEX BLDA+IHG-RTO: 100 — SIGNIFICANT CHANGE UP
HOROWITZ INDEX BLDA+IHG-RTO: 100 — SIGNIFICANT CHANGE UP
IMM GRANULOCYTES NFR BLD AUTO: 0.3 % — SIGNIFICANT CHANGE UP (ref 0–0.9)
IMM GRANULOCYTES NFR BLD AUTO: 0.3 % — SIGNIFICANT CHANGE UP (ref 0–0.9)
LYMPHOCYTES # BLD AUTO: 0.94 K/UL — LOW (ref 1–3.3)
LYMPHOCYTES # BLD AUTO: 0.94 K/UL — LOW (ref 1–3.3)
LYMPHOCYTES # BLD AUTO: 8.6 % — LOW (ref 13–44)
LYMPHOCYTES # BLD AUTO: 8.6 % — LOW (ref 13–44)
MAGNESIUM SERPL-MCNC: 2.1 MG/DL — SIGNIFICANT CHANGE UP (ref 1.6–2.6)
MAGNESIUM SERPL-MCNC: 2.1 MG/DL — SIGNIFICANT CHANGE UP (ref 1.6–2.6)
MCHC RBC-ENTMCNC: 29 PG — SIGNIFICANT CHANGE UP (ref 27–34)
MCHC RBC-ENTMCNC: 29 PG — SIGNIFICANT CHANGE UP (ref 27–34)
MCHC RBC-ENTMCNC: 32.2 GM/DL — SIGNIFICANT CHANGE UP (ref 32–36)
MCHC RBC-ENTMCNC: 32.2 GM/DL — SIGNIFICANT CHANGE UP (ref 32–36)
MCV RBC AUTO: 90.1 FL — SIGNIFICANT CHANGE UP (ref 80–100)
MCV RBC AUTO: 90.1 FL — SIGNIFICANT CHANGE UP (ref 80–100)
MONOCYTES # BLD AUTO: 0.63 K/UL — SIGNIFICANT CHANGE UP (ref 0–0.9)
MONOCYTES # BLD AUTO: 0.63 K/UL — SIGNIFICANT CHANGE UP (ref 0–0.9)
MONOCYTES NFR BLD AUTO: 5.8 % — SIGNIFICANT CHANGE UP (ref 2–14)
MONOCYTES NFR BLD AUTO: 5.8 % — SIGNIFICANT CHANGE UP (ref 2–14)
NEUTROPHILS # BLD AUTO: 9.2 K/UL — HIGH (ref 1.8–7.4)
NEUTROPHILS # BLD AUTO: 9.2 K/UL — HIGH (ref 1.8–7.4)
NEUTROPHILS NFR BLD AUTO: 84.4 % — HIGH (ref 43–77)
NEUTROPHILS NFR BLD AUTO: 84.4 % — HIGH (ref 43–77)
NRBC # BLD: 0 /100 WBCS — SIGNIFICANT CHANGE UP (ref 0–0)
NRBC # BLD: 0 /100 WBCS — SIGNIFICANT CHANGE UP (ref 0–0)
PCO2 BLDA: 45 MMHG — SIGNIFICANT CHANGE UP (ref 35–48)
PCO2 BLDA: 45 MMHG — SIGNIFICANT CHANGE UP (ref 35–48)
PH BLDA: 7.43 — SIGNIFICANT CHANGE UP (ref 7.35–7.45)
PH BLDA: 7.43 — SIGNIFICANT CHANGE UP (ref 7.35–7.45)
PHOSPHATE SERPL-MCNC: 3.1 MG/DL — SIGNIFICANT CHANGE UP (ref 2.5–4.5)
PHOSPHATE SERPL-MCNC: 3.1 MG/DL — SIGNIFICANT CHANGE UP (ref 2.5–4.5)
PLATELET # BLD AUTO: 270 K/UL — SIGNIFICANT CHANGE UP (ref 150–400)
PLATELET # BLD AUTO: 270 K/UL — SIGNIFICANT CHANGE UP (ref 150–400)
PO2 BLDA: 102 MMHG — SIGNIFICANT CHANGE UP (ref 83–108)
PO2 BLDA: 102 MMHG — SIGNIFICANT CHANGE UP (ref 83–108)
POTASSIUM SERPL-MCNC: 3.7 MMOL/L — SIGNIFICANT CHANGE UP (ref 3.5–5.3)
POTASSIUM SERPL-MCNC: 3.7 MMOL/L — SIGNIFICANT CHANGE UP (ref 3.5–5.3)
POTASSIUM SERPL-SCNC: 3.7 MMOL/L — SIGNIFICANT CHANGE UP (ref 3.5–5.3)
POTASSIUM SERPL-SCNC: 3.7 MMOL/L — SIGNIFICANT CHANGE UP (ref 3.5–5.3)
RBC # BLD: 4.45 M/UL — SIGNIFICANT CHANGE UP (ref 4.2–5.8)
RBC # BLD: 4.45 M/UL — SIGNIFICANT CHANGE UP (ref 4.2–5.8)
RBC # FLD: 14 % — SIGNIFICANT CHANGE UP (ref 10.3–14.5)
RBC # FLD: 14 % — SIGNIFICANT CHANGE UP (ref 10.3–14.5)
SAO2 % BLDA: 100 % — SIGNIFICANT CHANGE UP
SAO2 % BLDA: 100 % — SIGNIFICANT CHANGE UP
SODIUM SERPL-SCNC: 142 MMOL/L — SIGNIFICANT CHANGE UP (ref 135–145)
SODIUM SERPL-SCNC: 142 MMOL/L — SIGNIFICANT CHANGE UP (ref 135–145)
WBC # BLD: 10.9 K/UL — HIGH (ref 3.8–10.5)
WBC # BLD: 10.9 K/UL — HIGH (ref 3.8–10.5)
WBC # FLD AUTO: 10.9 K/UL — HIGH (ref 3.8–10.5)
WBC # FLD AUTO: 10.9 K/UL — HIGH (ref 3.8–10.5)

## 2024-01-09 PROCEDURE — 99223 1ST HOSP IP/OBS HIGH 75: CPT | Mod: 57

## 2024-01-09 RX ORDER — PROPOFOL 10 MG/ML
30 INJECTION, EMULSION INTRAVENOUS
Qty: 1000 | Refills: 0 | Status: DISCONTINUED | OUTPATIENT
Start: 2024-01-09 | End: 2024-01-11

## 2024-01-09 RX ORDER — CHLORHEXIDINE GLUCONATE 213 G/1000ML
15 SOLUTION TOPICAL EVERY 12 HOURS
Refills: 0 | Status: DISCONTINUED | OUTPATIENT
Start: 2024-01-09 | End: 2024-01-12

## 2024-01-09 RX ORDER — FENTANYL CITRATE 50 UG/ML
100 INJECTION INTRAVENOUS ONCE
Refills: 0 | Status: DISCONTINUED | OUTPATIENT
Start: 2024-01-09 | End: 2024-01-09

## 2024-01-09 RX ORDER — ETOMIDATE 2 MG/ML
20 INJECTION INTRAVENOUS ONCE
Refills: 0 | Status: COMPLETED | OUTPATIENT
Start: 2024-01-09 | End: 2024-01-09

## 2024-01-09 RX ORDER — NOREPINEPHRINE BITARTRATE/D5W 8 MG/250ML
0.1 PLASTIC BAG, INJECTION (ML) INTRAVENOUS
Qty: 8 | Refills: 0 | Status: DISCONTINUED | OUTPATIENT
Start: 2024-01-09 | End: 2024-01-10

## 2024-01-09 RX ORDER — MIDODRINE HYDROCHLORIDE 2.5 MG/1
5 TABLET ORAL EVERY 8 HOURS
Refills: 0 | Status: DISCONTINUED | OUTPATIENT
Start: 2024-01-09 | End: 2024-01-09

## 2024-01-09 RX ADMIN — ATORVASTATIN CALCIUM 20 MILLIGRAM(S): 80 TABLET, FILM COATED ORAL at 21:27

## 2024-01-09 RX ADMIN — Medication 1: at 18:13

## 2024-01-09 RX ADMIN — CHLORHEXIDINE GLUCONATE 15 MILLILITER(S): 213 SOLUTION TOPICAL at 17:05

## 2024-01-09 RX ADMIN — Medication 250 MILLIGRAM(S): at 17:05

## 2024-01-09 RX ADMIN — PROPOFOL 10.3 MICROGRAM(S)/KG/MIN: 10 INJECTION, EMULSION INTRAVENOUS at 13:13

## 2024-01-09 RX ADMIN — FENTANYL CITRATE 100 MICROGRAM(S): 50 INJECTION INTRAVENOUS at 12:51

## 2024-01-09 RX ADMIN — PANTOPRAZOLE SODIUM 40 MILLIGRAM(S): 20 TABLET, DELAYED RELEASE ORAL at 12:56

## 2024-01-09 RX ADMIN — Medication 81 MILLIGRAM(S): at 12:57

## 2024-01-09 RX ADMIN — ETOMIDATE 20 MILLIGRAM(S): 2 INJECTION INTRAVENOUS at 12:49

## 2024-01-09 RX ADMIN — PROPOFOL 10.3 MICROGRAM(S)/KG/MIN: 10 INJECTION, EMULSION INTRAVENOUS at 17:06

## 2024-01-09 RX ADMIN — Medication 10.7 MICROGRAM(S)/KG/MIN: at 13:12

## 2024-01-09 RX ADMIN — ENOXAPARIN SODIUM 40 MILLIGRAM(S): 100 INJECTION SUBCUTANEOUS at 12:56

## 2024-01-09 RX ADMIN — Medication 250 MILLIGRAM(S): at 05:09

## 2024-01-09 RX ADMIN — FENTANYL CITRATE 100 MICROGRAM(S): 50 INJECTION INTRAVENOUS at 12:50

## 2024-01-09 NOTE — CONSULT NOTE ADULT - CONSULT REASON
respiratory failure
Acute Hypoxic resp failure
COVID - 19 infection/ Pneumonia / Shock / sepsis
abnormal ekg
Bradycradia
Hypoxia, hypotension

## 2024-01-09 NOTE — PROGRESS NOTE ADULT - SUBJECTIVE AND OBJECTIVE BOX
INTERVAL HPI/OVERNIGHT EVENTS:       PRESSORS: [ ] YES [ ] NO  WHICH:    ANTIBIOTICS:                  DATE STARTED:  ANTIBIOTICS:                  DATE STARTED:    Antimicrobial:    Cardiovascular:  norepinephrine Infusion 0.05 MICROgram(s)/kG/Min IV Continuous <Continuous>    Pulmonary:  albuterol    90 MICROgram(s) HFA Inhaler 2 Puff(s) Inhalation every 6 hours    Hematalogic:  aspirin  chewable 81 milliGRAM(s) Oral daily  enoxaparin Injectable 40 milliGRAM(s) SubCutaneous every 24 hours    Other:  acetaminophen     Tablet .. 650 milliGRAM(s) Oral every 6 hours PRN  atorvastatin 20 milliGRAM(s) Oral at bedtime  insulin lispro (ADMELOG) corrective regimen sliding scale   SubCutaneous every 6 hours  pantoprazole   Suspension 40 milliGRAM(s) Enteral Tube daily  valproic  acid Syrup 250 milliGRAM(s) Oral two times a day    acetaminophen     Tablet .. 650 milliGRAM(s) Oral every 6 hours PRN  albuterol    90 MICROgram(s) HFA Inhaler 2 Puff(s) Inhalation every 6 hours  aspirin  chewable 81 milliGRAM(s) Oral daily  atorvastatin 20 milliGRAM(s) Oral at bedtime  enoxaparin Injectable 40 milliGRAM(s) SubCutaneous every 24 hours  insulin lispro (ADMELOG) corrective regimen sliding scale   SubCutaneous every 6 hours  norepinephrine Infusion 0.05 MICROgram(s)/kG/Min IV Continuous <Continuous>  pantoprazole   Suspension 40 milliGRAM(s) Enteral Tube daily  valproic  acid Syrup 250 milliGRAM(s) Oral two times a day    Drug Dosing Weight  Height (cm): 167.6 (03 Aug 2022 20:11)  Weight (kg): 57 (03 Jan 2024 14:30)  BMI (kg/m2): 20.3 (03 Jan 2024 14:30)  BSA (m2): 1.64 (03 Jan 2024 14:30)    PHYSICAL EXAM:  GENERAL: NAD  EYES: EOMI, PERRLA  NECK: Supple, No JVD; Trachea midline: No LAD   NERVOUS SYSTEM:  Alert & Oriented X3,  Motor Strength 5/5 B/L upper and lower extremities  CHEST/LUNG:  breath sounds present bilaterally, No rales, rhonchi, wheezing  HEART: Regular rate and rhythm; No murmurs, rubs, or gallops  ABDOMEN: Soft, Nontender, Nondistended; Bowel sounds present, no pain or masses on palpation  : voiding well, Acevedo in place  EXTREMITIES:  2+ Peripheral Pulses, No clubbing, cyanosis, or edema  SKIN: warm, intact, no lesions     LINES/DRAINS/DEVICES  CENTRAL LINE: [ ] YES [ ] NO  LOCATION:     ACEVEDO: [ ] YES [ ] NO     A-LINE:  [ ] YES [ ] NO  LOCATION:       ICU Vital Signs Last 24 Hrs  T(C): 37.5 (09 Jan 2024 07:00), Max: 38 (09 Jan 2024 02:00)  T(F): 99.5 (09 Jan 2024 07:00), Max: 100.4 (09 Jan 2024 02:00)  HR: 52 (09 Jan 2024 07:00) (47 - 82)  BP: 84/57 (09 Jan 2024 07:00) (71/57 - 132/73)  BP(mean): 67 (09 Jan 2024 07:00) (28 - 94)  ABP: --  ABP(mean): --  RR: 10 (09 Jan 2024 07:00) (8 - 23)  SpO2: 100% (09 Jan 2024 07:00) (89% - 100%)    O2 Parameters below as of 08 Jan 2024 14:10    O2 Flow (L/min): 15  O2 Concentration (%): 100        ABG - ( 08 Jan 2024 03:28 )  pH, Arterial: 7.47  pH, Blood: x     /  pCO2: 45    /  pO2: 119   / HCO3: 33    / Base Excess: 8.0   /  SaO2: 100                   01-08 @ 07:01  -  01-09 @ 07:00  --------------------------------------------------------  IN: 595.9 mL / OUT: 1853 mL / NET: -1257.1 mL        Mode: CPAP with PS  FiO2: 40  PEEP: 5  PS: 7  ITime: 1  MAP: 7  PIP: 12        LABS:  CBC Full  -  ( 09 Jan 2024 04:05 )  WBC Count : 10.90 K/uL  RBC Count : 4.45 M/uL  Hemoglobin : 12.9 g/dL  Hematocrit : 40.1 %  Platelet Count - Automated : 270 K/uL  Mean Cell Volume : 90.1 fl  Mean Cell Hemoglobin : 29.0 pg  Mean Cell Hemoglobin Concentration : 32.2 gm/dL  Auto Neutrophil # : 9.20 K/uL  Auto Lymphocyte # : 0.94 K/uL  Auto Monocyte # : 0.63 K/uL  Auto Eosinophil # : 0.07 K/uL  Auto Basophil # : 0.03 K/uL  Auto Neutrophil % : 84.4 %  Auto Lymphocyte % : 8.6 %  Auto Monocyte % : 5.8 %  Auto Eosinophil % : 0.6 %  Auto Basophil % : 0.3 %    01-09    142  |  109<H>  |  14  ----------------------------<  154<H>  3.7   |  32<H>  |  0.64    Ca    8.8      09 Jan 2024 04:05  Phos  3.1     01-09  Mg     2.1     01-09    TPro  6.1  /  Alb  2.8<L>  /  TBili  0.4  /  DBili  x   /  AST  9<L>  /  ALT  33  /  AlkPhos  52  01-08      Urinalysis Basic - ( 09 Jan 2024 04:05 )    Color: x / Appearance: x / SG: x / pH: x  Gluc: 154 mg/dL / Ketone: x  / Bili: x / Urobili: x   Blood: x / Protein: x / Nitrite: x   Leuk Esterase: x / RBC: x / WBC x   Sq Epi: x / Non Sq Epi: x / Bacteria: x          RADIOLOGY & ADDITIONAL STUDIES REVIEWED DURING TEAM ROUNDS    [ ]GOALS OF CARE DISCUSSION WITH PATIENT/FAMILY/PROXY:    CRITICAL CARE TIME SPENT: 35 minutes   INTERVAL HPI/OVERNIGHT EVENTS:       PRESSORS: [ ] YES [ ] NO  WHICH:    ANTIBIOTICS:                  DATE STARTED:  ANTIBIOTICS:                  DATE STARTED:    Antimicrobial:    Cardiovascular:  norepinephrine Infusion 0.05 MICROgram(s)/kG/Min IV Continuous <Continuous>    Pulmonary:  albuterol    90 MICROgram(s) HFA Inhaler 2 Puff(s) Inhalation every 6 hours    Hematalogic:  aspirin  chewable 81 milliGRAM(s) Oral daily  enoxaparin Injectable 40 milliGRAM(s) SubCutaneous every 24 hours    Other:  acetaminophen     Tablet .. 650 milliGRAM(s) Oral every 6 hours PRN  atorvastatin 20 milliGRAM(s) Oral at bedtime  insulin lispro (ADMELOG) corrective regimen sliding scale   SubCutaneous every 6 hours  pantoprazole   Suspension 40 milliGRAM(s) Enteral Tube daily  valproic  acid Syrup 250 milliGRAM(s) Oral two times a day    acetaminophen     Tablet .. 650 milliGRAM(s) Oral every 6 hours PRN  albuterol    90 MICROgram(s) HFA Inhaler 2 Puff(s) Inhalation every 6 hours  aspirin  chewable 81 milliGRAM(s) Oral daily  atorvastatin 20 milliGRAM(s) Oral at bedtime  enoxaparin Injectable 40 milliGRAM(s) SubCutaneous every 24 hours  insulin lispro (ADMELOG) corrective regimen sliding scale   SubCutaneous every 6 hours  norepinephrine Infusion 0.05 MICROgram(s)/kG/Min IV Continuous <Continuous>  pantoprazole   Suspension 40 milliGRAM(s) Enteral Tube daily  valproic  acid Syrup 250 milliGRAM(s) Oral two times a day    Drug Dosing Weight  Height (cm): 167.6 (03 Aug 2022 20:11)  Weight (kg): 57 (03 Jan 2024 14:30)  BMI (kg/m2): 20.3 (03 Jan 2024 14:30)  BSA (m2): 1.64 (03 Jan 2024 14:30)    PHYSICAL EXAM:  GENERAL: lethargic, on NRB  EYES: pupils 3mm and sluggish  NECK: Supple, No JVD; Trachea midline   NERVOUS SYSTEM:  responsive to noxious stimuli, minimal gag reflex  CHEST/LUNG:  breath sounds very decreased bilaterally, No rales, rhonchi, or wheezing, copious saliva suctioned from oropharynx  HEART: Regular rate and rhythm; No murmurs, rubs, or gallops  ABDOMEN: Soft, Nontender, Nondistended; Bowel sounds present, no pain or masses on palpation  : voiding well, Acevedo in place  EXTREMITIES:  2+ Peripheral Pulses, No clubbing, cyanosis, or edema  SKIN: warm, intact, no lesions     LINES/DRAINS/DEVICES  CENTRAL LINE: [ ] YES [ ] NO  LOCATION:     ACEVEDO: [ ] YES [ ] NO     A-LINE:  [ ] YES [ ] NO  LOCATION:       ICU Vital Signs Last 24 Hrs  T(C): 37.5 (09 Jan 2024 07:00), Max: 38 (09 Jan 2024 02:00)  T(F): 99.5 (09 Jan 2024 07:00), Max: 100.4 (09 Jan 2024 02:00)  HR: 52 (09 Jan 2024 07:00) (47 - 82)  BP: 84/57 (09 Jan 2024 07:00) (71/57 - 132/73)  BP(mean): 67 (09 Jan 2024 07:00) (28 - 94)  ABP: --  ABP(mean): --  RR: 10 (09 Jan 2024 07:00) (8 - 23)  SpO2: 100% (09 Jan 2024 07:00) (89% - 100%)    O2 Parameters below as of 08 Jan 2024 14:10    O2 Flow (L/min): 15  O2 Concentration (%): 100        ABG - ( 08 Jan 2024 03:28 )  pH, Arterial: 7.47  pH, Blood: x     /  pCO2: 45    /  pO2: 119   / HCO3: 33    / Base Excess: 8.0   /  SaO2: 100                   01-08 @ 07:01  -  01-09 @ 07:00  --------------------------------------------------------  IN: 595.9 mL / OUT: 1853 mL / NET: -1257.1 mL        Mode: CPAP with PS  FiO2: 40  PEEP: 5  PS: 7  ITime: 1  MAP: 7  PIP: 12        LABS:  CBC Full  -  ( 09 Jan 2024 04:05 )  WBC Count : 10.90 K/uL  RBC Count : 4.45 M/uL  Hemoglobin : 12.9 g/dL  Hematocrit : 40.1 %  Platelet Count - Automated : 270 K/uL  Mean Cell Volume : 90.1 fl  Mean Cell Hemoglobin : 29.0 pg  Mean Cell Hemoglobin Concentration : 32.2 gm/dL  Auto Neutrophil # : 9.20 K/uL  Auto Lymphocyte # : 0.94 K/uL  Auto Monocyte # : 0.63 K/uL  Auto Eosinophil # : 0.07 K/uL  Auto Basophil # : 0.03 K/uL  Auto Neutrophil % : 84.4 %  Auto Lymphocyte % : 8.6 %  Auto Monocyte % : 5.8 %  Auto Eosinophil % : 0.6 %  Auto Basophil % : 0.3 %    01-09    142  |  109<H>  |  14  ----------------------------<  154<H>  3.7   |  32<H>  |  0.64    Ca    8.8      09 Jan 2024 04:05  Phos  3.1     01-09  Mg     2.1     01-09    TPro  6.1  /  Alb  2.8<L>  /  TBili  0.4  /  DBili  x   /  AST  9<L>  /  ALT  33  /  AlkPhos  52  01-08      Urinalysis Basic - ( 09 Jan 2024 04:05 )    Color: x / Appearance: x / SG: x / pH: x  Gluc: 154 mg/dL / Ketone: x  / Bili: x / Urobili: x   Blood: x / Protein: x / Nitrite: x   Leuk Esterase: x / RBC: x / WBC x   Sq Epi: x / Non Sq Epi: x / Bacteria: x          RADIOLOGY & ADDITIONAL STUDIES REVIEWED DURING TEAM ROUNDS    [ ]GOALS OF CARE DISCUSSION WITH PATIENT/FAMILY/PROXY:    CRITICAL CARE TIME SPENT: 35 minutes

## 2024-01-09 NOTE — CONSULT NOTE ADULT - CONSULT REQUESTED BY NAME
Dr Pérez
Dr Willingham
Dr. Cervantes
Dr. Willingham
Dr Cadet
Dr. Willingham
PRINCIPAL DISCHARGE DIAGNOSIS  Diagnosis: Nausea & vomiting  Assessment and Plan of Treatment: You had extensive work up for nausea/vomting that was essentially negative   symptoms have since resolved        SECONDARY DISCHARGE DIAGNOSES  Diagnosis: E-coli UTI  Assessment and Plan of Treatment: complete antibiotics    Diagnosis: SLE (systemic lupus erythematosus)  Assessment and Plan of Treatment: continue hydoxychloroquine    Diagnosis: Hypokalemia  Assessment and Plan of Treatment: repleted    Diagnosis: Hypotension  Assessment and Plan of Treatment: Resolved  stay well hydrated    Diagnosis: T wave inversion in EKG  Assessment and Plan of Treatment: Follow up with PCP for ECHO of the heart

## 2024-01-09 NOTE — AIRWAY PLACEMENT NOTE ADULT - POST AIRWAY PLACEMENT ASSESSMENT:
breath sounds bilateral/breath sounds equal/positive end tidal CO2 noted/CXR pending/chest excursion noted/skin color improved
breath sounds bilateral/breath sounds equal/CXR pending/chest excursion noted

## 2024-01-09 NOTE — PROGRESS NOTE ADULT - SUBJECTIVE AND OBJECTIVE BOX
EP Attending  HISTORY OF PRESENT ILLNESS: HPI:  A 63 year old male, from [facility], with PMH of CVA, Alzheimer's, nonverbal at baseline, GERD, Schizophrenia, and Constipation, was brought into the ED s/p mechanical fall, poor oral intake, and Covid-19 infection on 12/27/23 as per NH papers. Unable to obtain history from patient since he is nonverbal, history obtained from chart review.  (28 Dec 2023 20:54)    Was pleasant and alert this morning, not talkative.  Apparently then had an RRT For hypotension and has gone to MICU for septic shock treatment.  Takes antipsychotic Rx at his nursing home, and was referred to EP re: sinus bradycardia and borderline QT prolongation on intake.  No reported history of seizures or VT/VF in chart.  Not able to participate in HPI/ROS due to psych issues.  1/4- in ICU, not intubated, awake but not interactive.  1/5- intubated overnight.  1/6- remains intubated/sedated.  1/7- uneventful overnight.  1/8 intubatd/comfortable this morning.  pending extubation later.  Date of service 1/9- extubated this morning, and re-intubated today.    PAST MEDICAL & SURGICAL HISTORY:  CVA (cerebral vascular accident)  CVA (cerebral vascular accident)  HLD (hyperlipidemia)  S/P percutaneous endoscopic gastrostomy (PEG) tube placement    acetaminophen     Tablet .. 650 milliGRAM(s) Oral every 6 hours PRN  albuterol    90 MICROgram(s) HFA Inhaler 2 Puff(s) Inhalation every 6 hours  aspirin  chewable 81 milliGRAM(s) Oral daily  atorvastatin 20 milliGRAM(s) Oral at bedtime  chlorhexidine 0.12% Liquid 15 milliLiter(s) Oral Mucosa every 12 hours  enoxaparin Injectable 40 milliGRAM(s) SubCutaneous every 24 hours  insulin lispro (ADMELOG) corrective regimen sliding scale   SubCutaneous every 6 hours  norepinephrine Infusion 0.1 MICROgram(s)/kG/Min IV Continuous <Continuous>  pantoprazole   Suspension 40 milliGRAM(s) Enteral Tube daily  propofol Infusion 30 MICROgram(s)/kG/Min IV Continuous <Continuous>  valproic  acid Syrup 250 milliGRAM(s) Oral two times a day                            12.9   10.90 )-----------( 270      ( 09 Jan 2024 04:05 )             40.1       01-09    142  |  109<H>  |  14  ----------------------------<  154<H>  3.7   |  32<H>  |  0.64    Ca    8.8      09 Jan 2024 04:05  Phos  3.1     01-09  Mg     2.1     01-09    TPro  6.1  /  Alb  2.8<L>  /  TBili  0.4  /  DBili  x   /  AST  9<L>  /  ALT  33  /  AlkPhos  52  01-08    T(C): 37.3 (01-09-24 @ 11:00), Max: 38 (01-09-24 @ 02:00)  HR: 53 (01-09-24 @ 12:16) (47 - 77)  BP: 84/55 (01-09-24 @ 11:00) (71/57 - 132/73)  RR: 11 (01-09-24 @ 11:00) (9 - 23)  SpO2: 100% (01-09-24 @ 12:16) (89% - 100%)  Wt(kg): --    I&O's Summary    08 Jan 2024 07:01  -  09 Jan 2024 07:00  --------------------------------------------------------  IN: 595.9 mL / OUT: 1853 mL / NET: -1257.1 mL    Appearance: elderly  man intubated and sedated  HEENT:   Normal oral mucosa, PERRL, EOMI	  Lymphatic: No lymphadenopathy , no edema  Cardiovascular: Normal S1 S2, No JVD, No murmurs , Peripheral pulses palpable 2+ bilaterally  Respiratory: Lungs clear to auscultation, normal effort 	  Gastrointestinal:  Soft, Non-tender, + BS	  Skin: No rashes, No ecchymoses, No cyanosis, warm to touch  Musculoskeletal: ROM/strength unable to assess due to sedation  Psychiatry:  Mood & affect unable to assess due to sedation.    TELEMETRY: 	bradycardic, sinus 40s-50s.  QT <500 corrected in setting of bradycardia.  ECG: NSR, QTc 450-470ms.  biphasic T-waves.  Echo: LVEF normal in 2020 at time of stroke.	  	  ASSESSMENT/PLAN: Mr Arriola is a pleasant 63y Male at nursing home due to stroke, dementia, and schizophrenia. Brought in for loss of appetite.  intubated, sedated, for airway protection, prior stroke and has not really been alert.  QTc upper limits of normal, chronically.  Replace K to 4-4.5, Mg 2 to 2.  Limit add'l QT prolonging medications.  As he is bedbound, not apparently symptomatic from sinus bradycardia. No long pauses on telemetry, or PVC-initiated arrhythmia.  Maintain telemetry but could probably go out of ICU.  At this time no indication for permanent pacing.      Ramses Pena M.D.  Cardiac Electrophysiology    office [phone]  pager [phone] EP Attending  HISTORY OF PRESENT ILLNESS: HPI:  A 63 year old male, from [facility], with PMH of CVA, Alzheimer's, nonverbal at baseline, GERD, Schizophrenia, and Constipation, was brought into the ED s/p mechanical fall, poor oral intake, and Covid-19 infection on 12/27/23 as per NH papers. Unable to obtain history from patient since he is nonverbal, history obtained from chart review.  (28 Dec 2023 20:54)    Was pleasant and alert this morning, not talkative.  Apparently then had an RRT For hypotension and has gone to MICU for septic shock treatment.  Takes antipsychotic Rx at his nursing home, and was referred to EP re: sinus bradycardia and borderline QT prolongation on intake.  No reported history of seizures or VT/VF in chart.  Not able to participate in HPI/ROS due to psych issues.  1/4- in ICU, not intubated, awake but not interactive.  1/5- intubated overnight.  1/6- remains intubated/sedated.  1/7- uneventful overnight.  1/8 intubatd/comfortable this morning.  pending extubation later.  Date of service 1/9- extubated this morning, and re-intubated today.    PAST MEDICAL & SURGICAL HISTORY:  CVA (cerebral vascular accident)  CVA (cerebral vascular accident)  HLD (hyperlipidemia)  S/P percutaneous endoscopic gastrostomy (PEG) tube placement    acetaminophen     Tablet .. 650 milliGRAM(s) Oral every 6 hours PRN  albuterol    90 MICROgram(s) HFA Inhaler 2 Puff(s) Inhalation every 6 hours  aspirin  chewable 81 milliGRAM(s) Oral daily  atorvastatin 20 milliGRAM(s) Oral at bedtime  chlorhexidine 0.12% Liquid 15 milliLiter(s) Oral Mucosa every 12 hours  enoxaparin Injectable 40 milliGRAM(s) SubCutaneous every 24 hours  insulin lispro (ADMELOG) corrective regimen sliding scale   SubCutaneous every 6 hours  norepinephrine Infusion 0.1 MICROgram(s)/kG/Min IV Continuous <Continuous>  pantoprazole   Suspension 40 milliGRAM(s) Enteral Tube daily  propofol Infusion 30 MICROgram(s)/kG/Min IV Continuous <Continuous>  valproic  acid Syrup 250 milliGRAM(s) Oral two times a day                            12.9   10.90 )-----------( 270      ( 09 Jan 2024 04:05 )             40.1       01-09    142  |  109<H>  |  14  ----------------------------<  154<H>  3.7   |  32<H>  |  0.64    Ca    8.8      09 Jan 2024 04:05  Phos  3.1     01-09  Mg     2.1     01-09    TPro  6.1  /  Alb  2.8<L>  /  TBili  0.4  /  DBili  x   /  AST  9<L>  /  ALT  33  /  AlkPhos  52  01-08    T(C): 37.3 (01-09-24 @ 11:00), Max: 38 (01-09-24 @ 02:00)  HR: 53 (01-09-24 @ 12:16) (47 - 77)  BP: 84/55 (01-09-24 @ 11:00) (71/57 - 132/73)  RR: 11 (01-09-24 @ 11:00) (9 - 23)  SpO2: 100% (01-09-24 @ 12:16) (89% - 100%)  Wt(kg): --    I&O's Summary    08 Jan 2024 07:01  -  09 Jan 2024 07:00  --------------------------------------------------------  IN: 595.9 mL / OUT: 1853 mL / NET: -1257.1 mL    Appearance: elderly  man intubated and sedated  HEENT:   Normal oral mucosa, PERRL, EOMI	  Lymphatic: No lymphadenopathy , no edema  Cardiovascular: Normal S1 S2, No JVD, No murmurs , Peripheral pulses palpable 2+ bilaterally  Respiratory: Lungs clear to auscultation, normal effort 	  Gastrointestinal:  Soft, Non-tender, + BS	  Skin: No rashes, No ecchymoses, No cyanosis, warm to touch  Musculoskeletal: ROM/strength unable to assess due to sedation  Psychiatry:  Mood & affect unable to assess due to sedation.    TELEMETRY: 	bradycardic, sinus 40s-50s.  QT <500 corrected in setting of bradycardia.  ECG: NSR, QTc 450-470ms.  biphasic T-waves.  Echo: LVEF normal in 2020 at time of stroke.	  	  ASSESSMENT/PLAN: Mr Arriola is a pleasant 63y Male at nursing home due to stroke, dementia, and schizophrenia. Brought in for loss of appetite.  intubated, sedated, for airway protection, prior stroke and has not really been alert.  QTc upper limits of normal, chronically.  Replace K to 4-4.5, Mg 2 to 2.  Limit add'l QT prolonging medications.  As he is bedbound, not apparently symptomatic from sinus bradycardia. No long pauses on telemetry, or PVC-initiated arrhythmia.  Maintain telemetry but could probably go out of ICU.  At this time no indication for permanent pacing.      Ramses Pena M.D.  Cardiac Electrophysiology    office [phone]  pager [phone]

## 2024-01-09 NOTE — CONSULT NOTE ADULT - CONSULT REQUESTED DATE/TIME
03-Jan-2024 11:35
09-Jan-2024
03-Jan-2024 17:53
03-Jan-2024 13:33
03-Jan-2024 15:55
08-Jan-2024 19:01

## 2024-01-09 NOTE — PROGRESS NOTE ADULT - ASSESSMENT
Assessment	  63 year old male, from [facility], with PMH of CVA, Alzheimer's, nonverbal at baseline, GERD, Schizophrenia, and Constipation, was brought into the ED s/p mechanical fall, poor oral intake, and Covid-19 infection on 12/27/23 Pt was admitted for COVID PNA, later found with intermittent hypoxia/ hypopnea to Sats 80% and Hypotension despite multiple fluid boluses, also found with bryan to 40s for the last 2 days, in ICU for further monitoring.     DX:  1. CVA  2. Alzheimer's  3. Hypoxia  4. Hypotension   5. Bradycardia   5. COVID +Ve     =================== Neuro============================  # CVA  # Alzheimer's  #sedation/analgesia  - baseline: bedbound, nonverbal in the setting of Alzheimer's dementia, Previous CVA  - continue sedation with propofol   - continue valporate 250mg BID  - lorazepam and Zyprexa held as patient intubated  - SAT/SBT today    ================= Cardiovascular==========================  # septic shock in the setting of COVID-19 infection  # Sinus bradycardia  - Trop negative, TWI on lateral leads are chronic  - POCUS 1/5 shows intact LV function, some signs of end systolic effacement   - status post nvolume expansion with albumin   - 1/6/23 TTE: Mildly reduced LVEF   - continue to wean levophed as tolerated    ================- Pulm=================================  #Acute Hypoxia Respiratory failure in the setting of COVID-19  #Multifocal PNA   #at risk of aspiration PNA  - RRT for desaturation to the 80%'s (1/2/24)  - Improved with NRB, PO2 in 200s on ABG, s/p HF 50/40 but then failed HFNC 1/5  - now sedated, mechanically ventilated, with vent adjusted per ABG, 14/400/8>5/40%  - blood cx 12/29 NGTD  - status post course of dexmethasone  - will d/c Abx in setting of clinical improvement    ==================ID===================================  #COVID   - plan as above    ================= Nephro================================  #No active issues     =================GI====================================  #Nutrition  - NPO with TF, jevity at 10ml/hr over 18hrs    # Diarrhea 2/2 to laxatives vs covid   - Dc senna, miralax  - no further episodes      ================ Heme==================================  DVT PPx with lovenox    =================Endocrine===============================  Bryan and Hypotensive   - TSH 4.28 [wnl]    ================= Skin/Catheters============================  no wounds    =================Prophylaxis =============================  lovenox  PPI    ==================GOC==================================  FULL CODE     ==================DISPO=====================  ICU     Assessment	  63 year old male, from [facility], with PMH of CVA, Alzheimer's, nonverbal at baseline, GERD, Schizophrenia, and Constipation, was brought into the ED s/p mechanical fall, poor oral intake, and Covid-19 infection on 12/27/23 Pt was admitted for COVID PNA, later found with intermittent hypoxia/ hypopnea to Sats 80% and Hypotension despite multiple fluid boluses, also found with bryan to 40s for the last 2 days, in ICU for further monitoring.     DX:  1. CVA  2. Alzheimer's  3. Hypoxia  4. Hypotension   5. Bradycardia   5. COVID +Ve     =================== Neuro============================  # CVA  # Alzheimer's  #sedation/analgesia  - baseline: bedbound, nonverbal in the setting of Alzheimer's dementia, Previous CVA  - continue sedation with propofol   - continue valporate 250mg BID  - lorazepam and Zyprexa held as patient intubated  - SAT/SBT today    ================= Cardiovascular==========================  # septic shock in the setting of COVID-19 infection  # Sinus bradycardia  - Trop negative, TWI on lateral leads are chronic  - POCUS 1/5 shows intact LV function, some signs of end systolic effacement   - status post nvolume expansion with albumin   - 1/6/23 TTE: Mildly reduced LVEF   - continue to wean levophed as tolerated    ================- Pulm=================================  #Acute Hypoxia Respiratory failure in the setting of COVID-19  #Multifocal PNA   #at risk of aspiration PNA  - RRT for desaturation to the 80%'s (1/2/24)  - Improved with NRB, PO2 in 200s on ABG, s/p HF 50/40 but then failed HFNC 1/5  - now sedated, mechanically ventilated, with vent adjusted per ABG, 14/400/8>5/40%  - blood cx 12/29 NGTD  - status post course of dexmethasone  - will d/c Abx in setting of clinical improvement    ==================ID===================================  #COVID   - plan as above    ================= Nephro================================  #No active issues     =================GI====================================  #Nutrition  - NPO with TF, jevity at 10ml/hr over 18hrs    # Diarrhea 2/2 to laxatives vs covid   - Dc senna, miralax  - no further episodes      ================ Heme==================================  DVT PPx with lovenox    =================Endocrine===============================  Bryan and Hypotensive   - TSH 4.28 [wnl]    ================= Skin/Catheters============================  no wounds    =================Prophylaxis =============================  lovenox  PPI    ==================GOC==================================  FULL CODE     ==================DISPO=====================  ICU     Assessment	  63 year old male, from [facility], with PMH of CVA, Alzheimer's, nonverbal at baseline, GERD, Schizophrenia, and Constipation, was brought into the ED s/p mechanical fall, poor oral intake, and Covid-19 infection on 12/27/23 Pt was admitted for COVID PNA, later found with intermittent hypoxia/ hypopnea to Sats 80% and Hypotension despite multiple fluid boluses, also found with bryan to 40s for the last 2 days, in ICU for further monitoring.     DX:  1. CVA  2. Alzheimer's  3. Hypoxia  4. Hypotension   5. Bradycardia   5. COVID +Ve     =================== Neuro============================  # CVA  # Alzheimer's  #sedation/analgesia  - baseline: bedbound, nonverbal in the setting of Alzheimer's dementia, Previous CVA  - now extubated to NRB, difficult to wean due to copious oropharyngeal secretions, very high risk of aspiration/re-intubation  - lorazepam and Zyprexa held due to high risk of reintubation  -continue valporate 250mg BID    ================= Cardiovascular==========================  # septic shock in the setting of COVID-19 infection  # Sinus bradycardia  - Trop negative, TWI on lateral leads are chronic  - POCUS 1/5 shows intact LV function, some signs of end systolic effacement   - status post volume expansion with albumin   - 1/6/23 TTE: Mildly reduced LVEF   - now off levophed, goal MAP > 60    ================- Pulm=================================  #Acute Hypoxia Respiratory failure in the setting of COVID-19  #Multifocal PNA   #at risk of aspiration PNA  - RRT for desaturation to the 80%'s (1/2/24)  - Improved with NRB, PO2 in 200s on ABG, s/p HF 50/40 but then failed HFNC 1/5  - status post intubation 1/5-1/9,  - extubated to NRB, difficult to wean due to copious oropharyngeal secretions, very high risk of aspiration/re-intubation  - blood cx 12/29 NGTD  - status post course of dexamethasone  - monitor off antibiotics    ==================ID===================================  #COVID   - plan as above    ================= Nephro================================  #No active issues     =================GI====================================  #Nutrition  - NPO with TF, but will hold today in setting of possible re-intubation    # Diarrhea 2/2 to laxatives vs Covid   - senna, miralax held in setting of loose stool,   - no further episodes      ================ Heme==================================  DVT PPx with lovenox    =================Endocrine===============================  Bryan and Hypotensive   - TSH 4.28 [wnl]    ================= Skin/Catheters============================  no wounds    =================Prophylaxis =============================  Lovenox  PPI    ==================GOC==================================  FULL CODE, reviewed risk of reintubation today, per son, may reintubate if needed, he will review long term goals with family later this week    ==================DISPO=====================  ICU     Assessment	  63 year old male, from [facility], with PMH of CVA, Alzheimer's, nonverbal at baseline, GERD, Schizophrenia, and Constipation, was brought into the ED s/p mechanical fall, poor oral intake, and Covid-19 infection on 12/27/23 Pt was admitted for COVID PNA, later found with intermittent hypoxia/ hypopnea to Sats 80% and Hypotension despite multiple fluid boluses, also found with bryan to 40s for the last 2 days, in ICU for further monitoring.     DX:  1. CVA  2. Alzheimer's  3. Hypoxia  4. Hypotension   5. Bradycardia   5. COVID +Ve     =================== Neuro============================  # CVA  # Alzheimer's  #sedation/analgesia  - baseline: bedbound, nonverbal in the setting of Alzheimer's dementia, Previous CVA  - now extubated to NRB, difficult to wean due to copious oropharyngeal secretions, very high risk of aspiration/re-intubation  - lorazepam and Zyprexa held due to high risk of reintubation  -continue valporate 250mg BID    ================= Cardiovascular==========================  # septic shock in the setting of COVID-19 infection  # Sinus bradycardia  - Trop negative, TWI on lateral leads are chronic  - POCUS 1/5 shows intact LV function, some signs of end systolic effacement   - status post volume expansion with albumin   - 1/6/23 TTE: Mildly reduced LVEF   - now off levophed, goal MAP > 60    ================- Pulm=================================  #Acute Hypoxia Respiratory failure in the setting of COVID-19  #Multifocal PNA   #at risk of aspiration PNA  - RRT for desaturation to the 80%'s (1/2/24)  - Improved with NRB, PO2 in 200s on ABG, s/p HF 50/40 but then failed HFNC 1/5  - status post intubation 1/5-1/9,  - extubated to NRB, difficult to wean due to copious oropharyngeal secretions, very high risk of aspiration/re-intubation  - blood cx 12/29 NGTD  - status post course of dexamethasone  - monitor off antibiotics    ==================ID===================================  #COVID   - plan as above    ================= Nephro================================  #No active issues     =================GI====================================  #Nutrition  - NPO with TF, but will hold today in setting of possible re-intubation    # Diarrhea 2/2 to laxatives vs Covid   - senna, miralax held in setting of loose stool,   - no further episodes      ================ Heme==================================  DVT PPx with lovenox    =================Endocrine===============================  Bryan and Hypotensive   - TSH 4.28 [wnl]    ================= Skin/Catheters============================  no wounds    =================Prophylaxis =============================  Lovenox  PPI    ==================GOC==================================  FULL CODE, reviewed risk of reintubation today, per son, may reintubate if needed, he will review long term goals with family later this week    ==================DISPO=====================  ICU

## 2024-01-09 NOTE — PROGRESS NOTE ADULT - SUBJECTIVE AND OBJECTIVE BOX
Time of Visit:  Patient seen and examined. pat is intubated     MEDICATIONS  (STANDING):  albuterol    90 MICROgram(s) HFA Inhaler 2 Puff(s) Inhalation every 6 hours  aspirin  chewable 81 milliGRAM(s) Oral daily  atorvastatin 20 milliGRAM(s) Oral at bedtime  chlorhexidine 0.12% Liquid 15 milliLiter(s) Oral Mucosa every 12 hours  enoxaparin Injectable 40 milliGRAM(s) SubCutaneous every 24 hours  insulin lispro (ADMELOG) corrective regimen sliding scale   SubCutaneous every 6 hours  norepinephrine Infusion 0.1 MICROgram(s)/kG/Min (10.7 mL/Hr) IV Continuous <Continuous>  pantoprazole   Suspension 40 milliGRAM(s) Enteral Tube daily  propofol Infusion 30 MICROgram(s)/kG/Min (10.3 mL/Hr) IV Continuous <Continuous>  valproic  acid Syrup 250 milliGRAM(s) Oral two times a day      MEDICATIONS  (PRN):  acetaminophen     Tablet .. 650 milliGRAM(s) Oral every 6 hours PRN Temp greater or equal to 38C (100.4F), Mild Pain (1 - 3)       Medications up to date at time of exam.      PHYSICAL EXAMINATION:  Patient has no new complaints.  GENERAL: The patient is a well-developed, well-nourished, in no apparent distress.     Vital Signs Last 24 Hrs  T(C): 37.2 (09 Jan 2024 18:45), Max: 38 (09 Jan 2024 02:00)  T(F): 99 (09 Jan 2024 18:45), Max: 100.4 (09 Jan 2024 02:00)  HR: 66 (09 Jan 2024 18:45) (47 - 84)  BP: 115/80 (09 Jan 2024 18:45) (71/57 - 142/76)  BP(mean): 92 (09 Jan 2024 18:45) (63 - 104)  RR: 15 (09 Jan 2024 18:45) (9 - 20)  SpO2: 99% (09 Jan 2024 18:45) (71% - 100%)    Parameters below as of 09 Jan 2024 18:00  Patient On (Oxygen Delivery Method): ventilator    O2 Concentration (%): 50  Mode: AC/ CMV (Assist Control/ Continuous Mandatory Ventilation)  RR (machine): 14  TV (machine): 400  FiO2: 80  PEEP: 5  ITime: 1  MAP: 7  PIP: 15   (if applicable)    Chest Tube (if applicable)    HEENT: Head is normocephalic and atraumatic. Extraocular muscles are intact. Mucous membranes are moist.  +ETT    NECK: Supple, no palpable adenopathy.    LUNGS: Clear to auscultation, no wheezing, rales, or rhonchi.    HEART: Regular rate and rhythm without murmur.    ABDOMEN: Soft, nontender, and nondistended.  No hepatosplenomegaly is noted.    : No painful voiding, no pelvic pain    EXTREMITIES: Without any cyanosis, clubbing, rash, lesions or edema.    NEUROLOGIC: sedated     SKIN: Warm, dry, good turgor.      LABS:                        12.9   10.90 )-----------( 270      ( 09 Jan 2024 04:05 )             40.1     01-09    142  |  109<H>  |  14  ----------------------------<  154<H>  3.7   |  32<H>  |  0.64    Ca    8.8      09 Jan 2024 04:05  Phos  3.1     01-09  Mg     2.1     01-09    TPro  6.1  /  Alb  2.8<L>  /  TBili  0.4  /  DBili  x   /  AST  9<L>  /  ALT  33  /  AlkPhos  52  01-08      Urinalysis Basic - ( 09 Jan 2024 04:05 )    Color: x / Appearance: x / SG: x / pH: x  Gluc: 154 mg/dL / Ketone: x  / Bili: x / Urobili: x   Blood: x / Protein: x / Nitrite: x   Leuk Esterase: x / RBC: x / WBC x   Sq Epi: x / Non Sq Epi: x / Bacteria: x      ABG - ( 09 Jan 2024 13:31 )  pH, Arterial: 7.43  pH, Blood: x     /  pCO2: 45    /  pO2: 102   / HCO3: 30    / Base Excess: 4.8   /  SaO2: 100         MICROBIOLOGY: (if applicable)    RADIOLOGY & ADDITIONAL STUDIES:  EKG:   CXR:  ECHO:    IMPRESSION: 63y Male PAST MEDICAL & SURGICAL HISTORY:  CVA (cerebral vascular accident)      CVA (cerebral vascular accident)      HLD (hyperlipidemia)      S/P percutaneous endoscopic gastrostomy (PEG) tube placement       p/w           RECOMMENDATIONS:   Time of Visit:  Patient seen and examined. pat is intubated     MEDICATIONS  (STANDING):  albuterol    90 MICROgram(s) HFA Inhaler 2 Puff(s) Inhalation every 6 hours  aspirin  chewable 81 milliGRAM(s) Oral daily  atorvastatin 20 milliGRAM(s) Oral at bedtime  chlorhexidine 0.12% Liquid 15 milliLiter(s) Oral Mucosa every 12 hours  enoxaparin Injectable 40 milliGRAM(s) SubCutaneous every 24 hours  insulin lispro (ADMELOG) corrective regimen sliding scale   SubCutaneous every 6 hours  norepinephrine Infusion 0.1 MICROgram(s)/kG/Min (10.7 mL/Hr) IV Continuous <Continuous>  pantoprazole   Suspension 40 milliGRAM(s) Enteral Tube daily  propofol Infusion 30 MICROgram(s)/kG/Min (10.3 mL/Hr) IV Continuous <Continuous>  valproic  acid Syrup 250 milliGRAM(s) Oral two times a day      MEDICATIONS  (PRN):  acetaminophen     Tablet .. 650 milliGRAM(s) Oral every 6 hours PRN Temp greater or equal to 38C (100.4F), Mild Pain (1 - 3)       Medications up to date at time of exam.      PHYSICAL EXAMINATION:  Patient has no new complaints.  GENERAL: The patient is a well-developed, well-nourished, in no apparent distress.     Vital Signs Last 24 Hrs  T(C): 37.2 (09 Jan 2024 18:45), Max: 38 (09 Jan 2024 02:00)  T(F): 99 (09 Jan 2024 18:45), Max: 100.4 (09 Jan 2024 02:00)  HR: 66 (09 Jan 2024 18:45) (47 - 84)  BP: 115/80 (09 Jan 2024 18:45) (71/57 - 142/76)  BP(mean): 92 (09 Jan 2024 18:45) (63 - 104)  RR: 15 (09 Jan 2024 18:45) (9 - 20)  SpO2: 99% (09 Jan 2024 18:45) (71% - 100%)    Parameters below as of 09 Jan 2024 18:00  Patient On (Oxygen Delivery Method): ventilator    O2 Concentration (%): 50  Mode: AC/ CMV (Assist Control/ Continuous Mandatory Ventilation)  RR (machine): 14  TV (machine): 400  FiO2: 80  PEEP: 5  ITime: 1  MAP: 7  PIP: 15   (if applicable)    Chest Tube (if applicable)    HEENT: Head is normocephalic and atraumatic. Extraocular muscles are intact. Mucous membranes are moist.  +ETT    NECK: Supple, no palpable adenopathy.    LUNGS: Clear to auscultation, no wheezing, rales, or rhonchi.    HEART: Regular rate and rhythm without murmur.    ABDOMEN: Soft, nontender, and nondistended.  No hepatosplenomegaly is noted.    : No painful voiding, no pelvic pain    EXTREMITIES: Without any cyanosis, clubbing, rash, lesions or edema.    NEUROLOGIC: sedated     SKIN: Warm, dry, good turgor.      LABS:                        12.9   10.90 )-----------( 270      ( 09 Jan 2024 04:05 )             40.1     01-09    142  |  109<H>  |  14  ----------------------------<  154<H>  3.7   |  32<H>  |  0.64    Ca    8.8      09 Jan 2024 04:05  Phos  3.1     01-09  Mg     2.1     01-09    TPro  6.1  /  Alb  2.8<L>  /  TBili  0.4  /  DBili  x   /  AST  9<L>  /  ALT  33  /  AlkPhos  52  01-08      Urinalysis Basic - ( 09 Jan 2024 04:05 )    Color: x / Appearance: x / SG: x / pH: x  Gluc: 154 mg/dL / Ketone: x  / Bili: x / Urobili: x   Blood: x / Protein: x / Nitrite: x   Leuk Esterase: x / RBC: x / WBC x   Sq Epi: x / Non Sq Epi: x / Bacteria: x      ABG - ( 09 Jan 2024 13:31 )  pH, Arterial: 7.43  pH, Blood: x     /  pCO2: 45    /  pO2: 102   / HCO3: 30    / Base Excess: 4.8   /  SaO2: 100         MICROBIOLOGY: (if applicable)    RADIOLOGY & ADDITIONAL STUDIES:  EKG:   CXR:  ECHO:    IMPRESSION: 63y Male PAST MEDICAL & SURGICAL HISTORY:  CVA (cerebral vascular accident)      CVA (cerebral vascular accident)      HLD (hyperlipidemia)      S/P percutaneous endoscopic gastrostomy (PEG) tube placement       p/w       IMP: This is a  63 year old mn , from [facility], with  CVA, Alzheimer's, nonverbal at baseline, GERD, Schizophrenia, and Constipation, was brought into the ED s/p mechanical fall, poor oral intake, and Covid-19 infection on 12/27/23 Pt was admitted for COVID PNA, later found with intermittent hypoxia/ hypopnea to Sats 80% and Hypotension despite multiple fluid boluses, also found with bryan to 40s for the last 2 days, in ICU for further monitoring.       ASSESSMENT   - Acute Hypoxic resp failure   - Covid-19 PNA   - Shock septic   - CVA  - Alzheimer dementia   - Bradycardia       Plan     - Re-intubated   - Pat is unable to control secretion   - Consider trach and PEG   - Aspiration precautions   - Hemodynamic support   - Titrate vaso-active agents to maintain MAP>65  - EP cards f/u : no indication for PPM at tis time   - Antibx   - F/U cultures   - Aspiration precautions  - Off Remdesivir due to ADAN  - Continue decadron  6 mg for total 10 days   - Isolation : contact and airborne     discusssed with ICU attend     time spent 38 min      Time of Visit:  Patient seen and examined. pat is intubated     MEDICATIONS  (STANDING):  albuterol    90 MICROgram(s) HFA Inhaler 2 Puff(s) Inhalation every 6 hours  aspirin  chewable 81 milliGRAM(s) Oral daily  atorvastatin 20 milliGRAM(s) Oral at bedtime  chlorhexidine 0.12% Liquid 15 milliLiter(s) Oral Mucosa every 12 hours  enoxaparin Injectable 40 milliGRAM(s) SubCutaneous every 24 hours  insulin lispro (ADMELOG) corrective regimen sliding scale   SubCutaneous every 6 hours  norepinephrine Infusion 0.1 MICROgram(s)/kG/Min (10.7 mL/Hr) IV Continuous <Continuous>  pantoprazole   Suspension 40 milliGRAM(s) Enteral Tube daily  propofol Infusion 30 MICROgram(s)/kG/Min (10.3 mL/Hr) IV Continuous <Continuous>  valproic  acid Syrup 250 milliGRAM(s) Oral two times a day      MEDICATIONS  (PRN):  acetaminophen     Tablet .. 650 milliGRAM(s) Oral every 6 hours PRN Temp greater or equal to 38C (100.4F), Mild Pain (1 - 3)       Medications up to date at time of exam.      PHYSICAL EXAMINATION:  Patient has no new complaints.  GENERAL: The patient is a well-developed, well-nourished, in no apparent distress.     Vital Signs Last 24 Hrs  T(C): 37.2 (09 Jan 2024 18:45), Max: 38 (09 Jan 2024 02:00)  T(F): 99 (09 Jan 2024 18:45), Max: 100.4 (09 Jan 2024 02:00)  HR: 66 (09 Jan 2024 18:45) (47 - 84)  BP: 115/80 (09 Jan 2024 18:45) (71/57 - 142/76)  BP(mean): 92 (09 Jan 2024 18:45) (63 - 104)  RR: 15 (09 Jan 2024 18:45) (9 - 20)  SpO2: 99% (09 Jan 2024 18:45) (71% - 100%)    Parameters below as of 09 Jan 2024 18:00  Patient On (Oxygen Delivery Method): ventilator    O2 Concentration (%): 50  Mode: AC/ CMV (Assist Control/ Continuous Mandatory Ventilation)  RR (machine): 14  TV (machine): 400  FiO2: 80  PEEP: 5  ITime: 1  MAP: 7  PIP: 15   (if applicable)    Chest Tube (if applicable)    HEENT: Head is normocephalic and atraumatic. Extraocular muscles are intact. Mucous membranes are moist.  +ETT    NECK: Supple, no palpable adenopathy.    LUNGS: Clear to auscultation, no wheezing, rales, or rhonchi.    HEART: Regular rate and rhythm without murmur.    ABDOMEN: Soft, nontender, and nondistended.  No hepatosplenomegaly is noted.    : No painful voiding, no pelvic pain    EXTREMITIES: Without any cyanosis, clubbing, rash, lesions or edema.    NEUROLOGIC: sedated     SKIN: Warm, dry, good turgor.      LABS:                        12.9   10.90 )-----------( 270      ( 09 Jan 2024 04:05 )             40.1     01-09    142  |  109<H>  |  14  ----------------------------<  154<H>  3.7   |  32<H>  |  0.64    Ca    8.8      09 Jan 2024 04:05  Phos  3.1     01-09  Mg     2.1     01-09    TPro  6.1  /  Alb  2.8<L>  /  TBili  0.4  /  DBili  x   /  AST  9<L>  /  ALT  33  /  AlkPhos  52  01-08      Urinalysis Basic - ( 09 Jan 2024 04:05 )    Color: x / Appearance: x / SG: x / pH: x  Gluc: 154 mg/dL / Ketone: x  / Bili: x / Urobili: x   Blood: x / Protein: x / Nitrite: x   Leuk Esterase: x / RBC: x / WBC x   Sq Epi: x / Non Sq Epi: x / Bacteria: x      ABG - ( 09 Jan 2024 13:31 )  pH, Arterial: 7.43  pH, Blood: x     /  pCO2: 45    /  pO2: 102   / HCO3: 30    / Base Excess: 4.8   /  SaO2: 100         MICROBIOLOGY: (if applicable)    RADIOLOGY & ADDITIONAL STUDIES:  EKG:   CXR:  ECHO:    IMPRESSION: 63y Male PAST MEDICAL & SURGICAL HISTORY:  CVA (cerebral vascular accident)      CVA (cerebral vascular accident)      HLD (hyperlipidemia)      S/P percutaneous endoscopic gastrostomy (PEG) tube placement       p/w       IMP: This is a  63 year old mn , from [facility], with  CVA, Alzheimer's, nonverbal at baseline, GERD, Schizophrenia, and Constipation, was brought into the ED s/p mechanical fall, poor oral intake, and Covid-19 infection on 12/27/23 Pt was admitted for COVID PNA, later found with intermittent hypoxia/ hypopnea to Sats 80% and Hypotension despite multiple fluid boluses, also found with bryan to 40s for the last 2 days, in ICU for further monitoring.       ASSESSMENT   - Acute Hypoxic resp failure   - Covid-19 PNA   - Shock septic   - CVA  - Alzheimer dementia   - Bradycardia       Plan     - Re-intubated   - Pat is unable to control secretion   - Consider trach and PEG   - Aspiration precautions   - Hemodynamic support   - Titrate vaso-active agents to maintain MAP>65  - EP cards f/u : no indication for PPM at tis time   - Antibx   - F/U cultures   - Aspiration precautions  - Off Remdesivir due to ADAN  - Continue decadron  6 mg for total 10 days   - Isolation : contact and airborne     discusssed with ICU attend     time spent 38 min

## 2024-01-09 NOTE — CONSULT NOTE ADULT - ASSESSMENT
63 year old male with acute respiratory distress, intubated x2, COVID PNA  settings PEEP 5 and FIO2 50         63 year old male with acute respiratory distress, intubated x2, COVID PNA  settings PEEP 5 and FIO2 50  GOC note documents DNR/DNI with trial of noninvasive procedures only    - continue vent management per ICU   - ween vent settings to PEEP 5 and FIO2 of 40  - review and clarify goals of care  - when vent settings improve and goals of care clarified trach/PEG planning  - continue ICU management  - discussed with Dr. Yi

## 2024-01-09 NOTE — PROGRESS NOTE ADULT - CRITICAL CARE ATTENDING COMMENT
IMP: This is a  63 year old mn , from [facility], with  CVA, Alzheimer's, nonverbal at baseline, GERD, Schizophrenia, and Constipation, was brought into the ED s/p mechanical fall, poor oral intake, and Covid-19 infection on 12/27/23 Pt was admitted for COVID PNA, later found with intermittent hypoxia/ hypopnea to Sats 80% and Hypotension despite multiple fluid boluses, also found with bryan to 40s for the last 2 days, in ICU for further monitoring.       ASSESSMENT   - Acute Hypoxic resp failure   - Covid-19 PNA   - Shock septic   - CVA  - Alzheimer dementia   - Bradycardia       Plan   - Reintubated this morning  - Back on mechancial ventilation  - Thoracic surgery consult for tracheostomy/peg placement   - Difficulty managing secretions  - Aspiration precautions   - Hemodynamic support   - Titrate vaso-active agents to maintain MAP>65  - EP cards f/u : no indication for PPM at tis time   - Antibx completed   - Aspiration precautions  - Off Remdesivir due to ADAN  - Continue decadron  6 mg for total 10 days   - Isolation : contact and airborne   - Monitor I/O   - Cards following   - Hold AV angel luis blocking agent   - Skin care  - Wound care eval noted  - Prognosis is poor IMP: This is a  63 year old mn , from [facility], with  CVA, Alzheimer's, nonverbal at baseline, GERD, Schizophrenia, and Constipation, was brought into the ED s/p mechanical fall, poor oral intake, and Covid-19 infection on 12/27/23 Pt was admitted for COVID PNA, later found with intermittent hypoxia/ hypopnea to Sats 80% and Hypotension despite multiple fluid boluses, also found with bryan to 40s for the last 2 days, in ICU for further monitoring.       ASSESSMENT   - Acute Hypoxic resp failure   - Covid-19 PNA   - Shock septic   - CVA  - Alzheimer dementia   - Bradycardia       Plan   - Reintubated this morning  - Back on mechancial ventilation  - Thoracic surgery consult for tracheostomy/peg placement   - Difficulty managing secretions  - Aspiration precautions   - Hemodynamic support   - Titrate vaso-active agents to maintain MAP>65  - EP cards f/u : no indication for PPM at tis time   - Antibx completed   - Aspiration precautions  - Off Remdesivir due to ADAN  - Continue decadron  6 mg for total 10 days   - Isolation : contact and airborne   - Monitor I/O   - Cards following   - Hold AV angel luis blocking agent   - Skin care  - Wound care eval noted  - Prognosis is poor

## 2024-01-09 NOTE — CONSULT NOTE ADULT - SUBJECTIVE AND OBJECTIVE BOX
HPI:  Patient seen and examined at bedside for thoracic consult due to respiratory failure. Patient intubated, unable to obtain history from patient. Admitted with COVID and suspected COVID PNA. Patient was intubated on 1/5 for respiratory distress. Extubated 1/8 and reintubated on 1/9.     A 63 year old male, from [facility], with PMH of CVA, Alzheimer's, nonverbal at baseline, GERD, Schizophrenia, and Constipation, was brought into the ED s/p mechanical fall, poor oral intake, and Covid-19 infection on 12/27/23 as per NH papers. Unable to obtain history from patient since he is nonverbal, history obtained from chart review.  (28 Dec 2023 20:54)    PAST MEDICAL & SURGICAL HISTORY:  CVA (cerebral vascular accident)  HLD (hyperlipidemia)  S/P percutaneous endoscopic gastrostomy (PEG) tube placement    Review of Systems: Unable to obtain     MEDICATIONS  (STANDING):  albuterol    90 MICROgram(s) HFA Inhaler 2 Puff(s) Inhalation every 6 hours  aspirin  chewable 81 milliGRAM(s) Oral daily  atorvastatin 20 milliGRAM(s) Oral at bedtime  chlorhexidine 0.12% Liquid 15 milliLiter(s) Oral Mucosa every 12 hours  enoxaparin Injectable 40 milliGRAM(s) SubCutaneous every 24 hours  insulin lispro (ADMELOG) corrective regimen sliding scale   SubCutaneous every 6 hours  norepinephrine Infusion 0.1 MICROgram(s)/kG/Min (10.7 mL/Hr) IV Continuous <Continuous>  pantoprazole   Suspension 40 milliGRAM(s) Enteral Tube daily  propofol Infusion 30 MICROgram(s)/kG/Min (10.3 mL/Hr) IV Continuous <Continuous>  valproic  acid Syrup 250 milliGRAM(s) Oral two times a day    MEDICATIONS  (PRN):  acetaminophen     Tablet .. 650 milliGRAM(s) Oral every 6 hours PRN Temp greater or equal to 38C (100.4F), Mild Pain (1 - 3)    Allergies: No Known Allergies    Vital Signs Last 24 Hrs  T(C): 37.3 (09 Jan 2024 16:45), Max: 38 (09 Jan 2024 02:00)  T(F): 99.1 (09 Jan 2024 16:45), Max: 100.4 (09 Jan 2024 02:00)  HR: 64 (09 Jan 2024 16:45) (47 - 84)  BP: 96/70 (09 Jan 2024 16:45) (71/57 - 136/91)  BP(mean): 79 (09 Jan 2024 16:45) (28 - 104)  RR: 16 (09 Jan 2024 16:45) (9 - 23)  SpO2: 98% (09 Jan 2024 16:45) (71% - 100%)  Parameters below as of 09 Jan 2024 16:00  Patient On (Oxygen Delivery Method): ventilator  O2 Concentration (%): 40    Physical Exam:  General: intubated, sedated  HENT:  WNL, no JVD  Chest: intubated, on vent: settings PEEP 5 and FIO2 50  Abdomen: soft, LUQ wound appears to be from prior PEG, clean  Extremities: no edema bilaterally  Skin: warm and dry  Musculoskeletal: no calf tenderness    LABS:                        12.9   10.90 )-----------( 270      ( 09 Jan 2024 04:05 )             40.1     01-09    142  |  109<H>  |  14  ----------------------------<  154<H>  3.7   |  32<H>  |  0.64    Ca    8.8      09 Jan 2024 04:05  Phos  3.1     01-09  Mg     2.1     01-09    TPro  6.1  /  Alb  2.8<L>  /  TBili  0.4  /  DBili  x   /  AST  9<L>  /  ALT  33  /  AlkPhos  52  01-08    Urinalysis Basic - ( 09 Jan 2024 04:05 )    Color: x / Appearance: x / SG: x / pH: x  Gluc: 154 mg/dL / Ketone: x  / Bili: x / Urobili: x   Blood: x / Protein: x / Nitrite: x   Leuk Esterase: x / RBC: x / WBC x   Sq Epi: x / Non Sq Epi: x / Bacteria: x    RADIOLOGY & ADDITIONAL STUDIES:  < from: Xray Chest 1 View- PORTABLE-Routine (Xray Chest 1 View- PORTABLE-Routine in AM.) (01.07.24 @ 10:28) >  ACC: [id number] EXAM:  XR CHEST PORTABLE ROUTINE 1V   ORDERED BY: NANNETTE BURNETT     PROCEDURE DATE:  01/07/2024      INTERPRETATION:  AP chest on January 7, 2024 at 10:01 AM. Patient   requires intubation.    Heart size normal.    Endotracheal tube, nasogastric tube, and left loop recorder again noted.    There is persistent bandlike retrocardiac atelectasis.    Chest is similar to January 5.    IMPRESSION: Persistent bandlike retrocardiac atelectasis. Tubes remain.    --- End of Report ---    JOANNA SON MD; Attending Radiologist  This document has been electronically signed. Jan 7 2024  2:33PM    < end of copied text >    < from: CT Head No Cont (12.28.23 @ 19:06) >  IMPRESSION:    CT HEAD: No acute intracranial hemorrhage, mass effect, or osseous   fracture.    CT CERVICAL SPINE: No acute cervical spine fracture or traumatic   malalignment.    --- End of Report ---    < end of copied text >     HPI:  Patient seen and examined at bedside for thoracic consult due to respiratory failure. Patient intubated, unable to obtain history from patient. Admitted with COVID and suspected COVID PNA. Patient was intubated on 1/5 for respiratory distress. Extubated 1/8 and reintubated on 1/9.     A 63 year old male, from [facility], with PMH of CVA, Alzheimer's, nonverbal at baseline, GERD, Schizophrenia, and Constipation, was brought into the ED s/p mechanical fall, poor oral intake, and Covid-19 infection on 12/27/23 as per NH papers. Unable to obtain history from patient since he is nonverbal, history obtained from chart review.  (28 Dec 2023 20:54)    PAST MEDICAL & SURGICAL HISTORY:  CVA (cerebral vascular accident)  HLD (hyperlipidemia)  S/P percutaneous endoscopic gastrostomy (PEG) tube placement    Review of Systems: Unable to obtain     MEDICATIONS  (STANDING):  albuterol    90 MICROgram(s) HFA Inhaler 2 Puff(s) Inhalation every 6 hours  aspirin  chewable 81 milliGRAM(s) Oral daily  atorvastatin 20 milliGRAM(s) Oral at bedtime  chlorhexidine 0.12% Liquid 15 milliLiter(s) Oral Mucosa every 12 hours  enoxaparin Injectable 40 milliGRAM(s) SubCutaneous every 24 hours  insulin lispro (ADMELOG) corrective regimen sliding scale   SubCutaneous every 6 hours  norepinephrine Infusion 0.1 MICROgram(s)/kG/Min (10.7 mL/Hr) IV Continuous <Continuous>  pantoprazole   Suspension 40 milliGRAM(s) Enteral Tube daily  propofol Infusion 30 MICROgram(s)/kG/Min (10.3 mL/Hr) IV Continuous <Continuous>  valproic  acid Syrup 250 milliGRAM(s) Oral two times a day    MEDICATIONS  (PRN):  acetaminophen     Tablet .. 650 milliGRAM(s) Oral every 6 hours PRN Temp greater or equal to 38C (100.4F), Mild Pain (1 - 3)    Allergies: No Known Allergies    Vital Signs Last 24 Hrs  T(C): 37.3 (09 Jan 2024 16:45), Max: 38 (09 Jan 2024 02:00)  T(F): 99.1 (09 Jan 2024 16:45), Max: 100.4 (09 Jan 2024 02:00)  HR: 64 (09 Jan 2024 16:45) (47 - 84)  BP: 96/70 (09 Jan 2024 16:45) (71/57 - 136/91)  BP(mean): 79 (09 Jan 2024 16:45) (28 - 104)  RR: 16 (09 Jan 2024 16:45) (9 - 23)  SpO2: 98% (09 Jan 2024 16:45) (71% - 100%)  Parameters below as of 09 Jan 2024 16:00  Patient On (Oxygen Delivery Method): ventilator  O2 Concentration (%): 40    Physical Exam:  General: intubated, sedated  HENT:  WNL, no JVD  Chest: intubated, on vent: settings PEEP 5 and FIO2 50  Abdomen: soft, LUQ wound appears to be from prior PEG, clean  Extremities: no edema bilaterally  Skin: warm and dry  Musculoskeletal: no calf tenderness    LABS:                        12.9   10.90 )-----------( 270      ( 09 Jan 2024 04:05 )             40.1     01-09    142  |  109<H>  |  14  ----------------------------<  154<H>  3.7   |  32<H>  |  0.64    Ca    8.8      09 Jan 2024 04:05  Phos  3.1     01-09  Mg     2.1     01-09    TPro  6.1  /  Alb  2.8<L>  /  TBili  0.4  /  DBili  x   /  AST  9<L>  /  ALT  33  /  AlkPhos  52  01-08    Urinalysis Basic - ( 09 Jan 2024 04:05 )    Color: x / Appearance: x / SG: x / pH: x  Gluc: 154 mg/dL / Ketone: x  / Bili: x / Urobili: x   Blood: x / Protein: x / Nitrite: x   Leuk Esterase: x / RBC: x / WBC x   Sq Epi: x / Non Sq Epi: x / Bacteria: x    RADIOLOGY & ADDITIONAL STUDIES:  < from: Xray Chest 1 View- PORTABLE-Routine (Xray Chest 1 View- PORTABLE-Routine in AM.) (01.07.24 @ 10:28) >  ACC: [id number] EXAM:  XR CHEST PORTABLE ROUTINE 1V   ORDERED BY: NANNETTE BURNETT     PROCEDURE DATE:  01/07/2024      INTERPRETATION:  AP chest on January 7, 2024 at 10:01 AM. Patient   requires intubation.    Heart size normal.    Endotracheal tube, nasogastric tube, and left loop recorder again noted.    There is persistent bandlike retrocardiac atelectasis.    Chest is similar to January 5.    IMPRESSION: Persistent bandlike retrocardiac atelectasis. Tubes remain.    --- End of Report ---    JOANNA SON MD; Attending Radiologist  This document has been electronically signed. Jan 7 2024  2:33PM    < end of copied text >    < from: CT Head No Cont (12.28.23 @ 19:06) >  IMPRESSION:    CT HEAD: No acute intracranial hemorrhage, mass effect, or osseous   fracture.    CT CERVICAL SPINE: No acute cervical spine fracture or traumatic   malalignment.    --- End of Report ---    < end of copied text >

## 2024-01-09 NOTE — PROGRESS NOTE ADULT - SUBJECTIVE AND OBJECTIVE BOX
C A R D I O L O G Y  **********************************     DATE OF SERVICE: 01-09-24    events noted, re-intubated     acetaminophen     Tablet .. 650 milliGRAM(s) Oral every 6 hours PRN  albuterol    90 MICROgram(s) HFA Inhaler 2 Puff(s) Inhalation every 6 hours  aspirin  chewable 81 milliGRAM(s) Oral daily  atorvastatin 20 milliGRAM(s) Oral at bedtime  chlorhexidine 0.12% Liquid 15 milliLiter(s) Oral Mucosa every 12 hours  enoxaparin Injectable 40 milliGRAM(s) SubCutaneous every 24 hours  insulin lispro (ADMELOG) corrective regimen sliding scale   SubCutaneous every 6 hours  norepinephrine Infusion 0.1 MICROgram(s)/kG/Min IV Continuous <Continuous>  pantoprazole   Suspension 40 milliGRAM(s) Enteral Tube daily  propofol Infusion 30 MICROgram(s)/kG/Min IV Continuous <Continuous>  valproic  acid Syrup 250 milliGRAM(s) Oral two times a day                            12.9   10.90 )-----------( 270      ( 09 Jan 2024 04:05 )             40.1       Hemoglobin: 12.9 g/dL (01-09 @ 04:05)  Hemoglobin: 12.0 g/dL (01-08 @ 03:52)  Hemoglobin: 12.0 g/dL (01-07 @ 03:04)  Hemoglobin: 12.3 g/dL (01-06 @ 03:40)  Hemoglobin: 14.2 g/dL (01-05 @ 04:50)      01-09    142  |  109<H>  |  14  ----------------------------<  154<H>  3.7   |  32<H>  |  0.64    Ca    8.8      09 Jan 2024 04:05  Phos  3.1     01-09  Mg     2.1     01-09    TPro  6.1  /  Alb  2.8<L>  /  TBili  0.4  /  DBili  x   /  AST  9<L>  /  ALT  33  /  AlkPhos  52  01-08    Creatinine Trend: 0.64<--, 0.71<--, 0.64<--, 0.69<--, 0.80<--, 0.76<--    COAGS:           T(C): 37.3 (01-09-24 @ 11:00), Max: 38 (01-09-24 @ 02:00)  HR: 53 (01-09-24 @ 12:16) (47 - 77)  BP: 84/55 (01-09-24 @ 11:00) (71/57 - 132/73)  RR: 11 (01-09-24 @ 11:00) (9 - 23)  SpO2: 100% (01-09-24 @ 12:16) (89% - 100%)  Wt(kg): --    I&O's Summary    08 Jan 2024 07:01  -  09 Jan 2024 07:00  --------------------------------------------------------  IN: 595.9 mL / OUT: 1853 mL / NET: -1257.1 mL            HEENT:  (-)icterus (-)pallor  CV: N S1 S2 1/6 JOVON (+)2 Pulses B/l  Resp:  Clear to ausculatation B/L, normal effort  GI: (+) BS Soft, NT, ND  Lymph:  (-)Edema, (-)obvious lymphadenopathy  Skin: Warm to touch, Normal turgor  Psych: Unable to assess mood and affect      TELEMETRY: 	  sinus         ASSESSMENT/PLAN: 	63y Male PMH of CVA, Alzheimer's, nonverbal at baseline, GERD, Schizophrenia, historically preserved LV function and Constipation, was brought into the ED s/p mechanical fall, poor oral intake, and Covid-19 infection on 12/27/23 as per NH papers.    # Bradycardia   - He likely has some degree of sick sinus syndrome that may be exacerbated by Midodrine.  now off midodrine  - EP following      # Hypotension  - Suspect this is due to dehydration and vasodilatory state associated with viral/bacterial infection  - Abx  - pressor support per MICU   - Vent per MICU    Gavino Cadet MD, [facility]  BEEPER [phone]     C A R D I O L O G Y  **********************************     DATE OF SERVICE: 01-09-24    events noted, re-intubated     acetaminophen     Tablet .. 650 milliGRAM(s) Oral every 6 hours PRN  albuterol    90 MICROgram(s) HFA Inhaler 2 Puff(s) Inhalation every 6 hours  aspirin  chewable 81 milliGRAM(s) Oral daily  atorvastatin 20 milliGRAM(s) Oral at bedtime  chlorhexidine 0.12% Liquid 15 milliLiter(s) Oral Mucosa every 12 hours  enoxaparin Injectable 40 milliGRAM(s) SubCutaneous every 24 hours  insulin lispro (ADMELOG) corrective regimen sliding scale   SubCutaneous every 6 hours  norepinephrine Infusion 0.1 MICROgram(s)/kG/Min IV Continuous <Continuous>  pantoprazole   Suspension 40 milliGRAM(s) Enteral Tube daily  propofol Infusion 30 MICROgram(s)/kG/Min IV Continuous <Continuous>  valproic  acid Syrup 250 milliGRAM(s) Oral two times a day                            12.9   10.90 )-----------( 270      ( 09 Jan 2024 04:05 )             40.1       Hemoglobin: 12.9 g/dL (01-09 @ 04:05)  Hemoglobin: 12.0 g/dL (01-08 @ 03:52)  Hemoglobin: 12.0 g/dL (01-07 @ 03:04)  Hemoglobin: 12.3 g/dL (01-06 @ 03:40)  Hemoglobin: 14.2 g/dL (01-05 @ 04:50)      01-09    142  |  109<H>  |  14  ----------------------------<  154<H>  3.7   |  32<H>  |  0.64    Ca    8.8      09 Jan 2024 04:05  Phos  3.1     01-09  Mg     2.1     01-09    TPro  6.1  /  Alb  2.8<L>  /  TBili  0.4  /  DBili  x   /  AST  9<L>  /  ALT  33  /  AlkPhos  52  01-08    Creatinine Trend: 0.64<--, 0.71<--, 0.64<--, 0.69<--, 0.80<--, 0.76<--    COAGS:           T(C): 37.3 (01-09-24 @ 11:00), Max: 38 (01-09-24 @ 02:00)  HR: 53 (01-09-24 @ 12:16) (47 - 77)  BP: 84/55 (01-09-24 @ 11:00) (71/57 - 132/73)  RR: 11 (01-09-24 @ 11:00) (9 - 23)  SpO2: 100% (01-09-24 @ 12:16) (89% - 100%)  Wt(kg): --    I&O's Summary    08 Jan 2024 07:01  -  09 Jan 2024 07:00  --------------------------------------------------------  IN: 595.9 mL / OUT: 1853 mL / NET: -1257.1 mL            HEENT:  (-)icterus (-)pallor  CV: N S1 S2 1/6 JOVON (+)2 Pulses B/l  Resp:  Clear to ausculatation B/L, normal effort  GI: (+) BS Soft, NT, ND  Lymph:  (-)Edema, (-)obvious lymphadenopathy  Skin: Warm to touch, Normal turgor  Psych: Unable to assess mood and affect      TELEMETRY: 	  sinus         ASSESSMENT/PLAN: 	63y Male PMH of CVA, Alzheimer's, nonverbal at baseline, GERD, Schizophrenia, historically preserved LV function and Constipation, was brought into the ED s/p mechanical fall, poor oral intake, and Covid-19 infection on 12/27/23 as per NH papers.    # Bradycardia   - He likely has some degree of sick sinus syndrome that may be exacerbated by Midodrine.  now off midodrine  - EP following      # Hypotension  - Suspect this is due to dehydration and vasodilatory state associated with viral/bacterial infection  - Abx  - pressor support per MICU   - Vent per MICU    Gavino Cadet MD, [facility]  BEEPER [phone]

## 2024-01-09 NOTE — PROGRESS NOTE ADULT - SUBJECTIVE AND OBJECTIVE BOX
Patient is a 63y old  Male who presents with a chief complaint of Sepsis and AHRF (2024 13:59)    PATIENT IS SEEN AND EXAMINED EARLIER IN ICU    ALLERGIES:  No Known Allergies      Daily     Daily Weight in k.5 (2024 08:00)    VITALS:    Vital Signs Last 24 Hrs  T(C): 37.3 (2024 16:45), Max: 38 (2024 02:00)  T(F): 99.1 (2024 16:45), Max: 100.4 (2024 02:00)  HR: 64 (2024 16:45) (47 - 84)  BP: 96/70 (2024 16:45) (71/57 - 136/91)  BP(mean): 79 (2024 16:45) (28 - 104)  RR: 16 (2024 16:45) (9 - 23)  SpO2: 98% (2024 16:45) (71% - 100%)    Parameters below as of 2024 16:00  Patient On (Oxygen Delivery Method): ventilator    O2 Concentration (%): 40    LABS:    CBC Full  -  ( 2024 04:05 )  WBC Count : 10.90 K/uL  RBC Count : 4.45 M/uL  Hemoglobin : 12.9 g/dL  Hematocrit : 40.1 %  Platelet Count - Automated : 270 K/uL  Mean Cell Volume : 90.1 fl  Mean Cell Hemoglobin : 29.0 pg  Mean Cell Hemoglobin Concentration : 32.2 gm/dL  Auto Neutrophil # : 9.20 K/uL  Auto Lymphocyte # : 0.94 K/uL  Auto Monocyte # : 0.63 K/uL  Auto Eosinophil # : 0.07 K/uL  Auto Basophil # : 0.03 K/uL  Auto Neutrophil % : 84.4 %  Auto Lymphocyte % : 8.6 %  Auto Monocyte % : 5.8 %  Auto Eosinophil % : 0.6 %  Auto Basophil % : 0.3 %      -    142  |  109<H>  |  14  ----------------------------<  154<H>  3.7   |  32<H>  |  0.64    Ca    8.8      2024 04:05  Phos  3.1     01-09  Mg     2.1     01-09    TPro  6.1  /  Alb  2.8<L>  /  TBili  0.4  /  DBili  x   /  AST  9<L>  /  ALT  33  /  AlkPhos  52      CAPILLARY BLOOD GLUCOSE      POCT Blood Glucose.: 116 mg/dL (2024 11:43)  POCT Blood Glucose.: 150 mg/dL (2024 05:57)  POCT Blood Glucose.: 117 mg/dL (2024 23:37)        LIVER FUNCTIONS - ( 2024 03:52 )  Alb: 2.8 g/dL / Pro: 6.1 g/dL / ALK PHOS: 52 U/L / ALT: 33 U/L DA / AST: 9 U/L / GGT: x           Creatinine Trend: 0.64<--, 0.71<--, 0.64<--, 0.69<--, 0.80<--, 0.76<--  I&O's Summary    2024 07:01  -  2024 07:00  --------------------------------------------------------  IN: 595.9 mL / OUT: 1853 mL / NET: -1257.1 mL    2024 07:01  -  2024 17:30  --------------------------------------------------------  IN: 149.7 mL / OUT: 300 mL / NET: -150.3 mL        ABG - ( 2024 13:31 )  pH, Arterial: 7.43  pH, Blood: x     /  pCO2: 45    /  pO2: 102   / HCO3: 30    / Base Excess: 4.8   /  SaO2: 100                 Catheterized Catheterized   @ 12:37   No growth  --  --      .Blood Blood   @ 02:05   No growth at 5 days  --  --      .Blood Blood   @ 01:55   No growth at 5 days  --  --          MEDICATIONS:    MEDICATIONS  (STANDING):  albuterol    90 MICROgram(s) HFA Inhaler 2 Puff(s) Inhalation every 6 hours  aspirin  chewable 81 milliGRAM(s) Oral daily  atorvastatin 20 milliGRAM(s) Oral at bedtime  chlorhexidine 0.12% Liquid 15 milliLiter(s) Oral Mucosa every 12 hours  enoxaparin Injectable 40 milliGRAM(s) SubCutaneous every 24 hours  insulin lispro (ADMELOG) corrective regimen sliding scale   SubCutaneous every 6 hours  norepinephrine Infusion 0.1 MICROgram(s)/kG/Min (10.7 mL/Hr) IV Continuous <Continuous>  pantoprazole   Suspension 40 milliGRAM(s) Enteral Tube daily  propofol Infusion 30 MICROgram(s)/kG/Min (10.3 mL/Hr) IV Continuous <Continuous>  valproic  acid Syrup 250 milliGRAM(s) Oral two times a day      MEDICATIONS  (PRN):  acetaminophen     Tablet .. 650 milliGRAM(s) Oral every 6 hours PRN Temp greater or equal to 38C (100.4F), Mild Pain (1 - 3)      REVIEW OF SYSTEMS:                           ALL ROS DONE [ X   ]    CONSTITUTIONAL:  LETHARGIC [   ], FEVER [   ], UNRESPONSIVE [   ]  CVS:  CP  [   ], SOB, [   ], PALPITATIONS [   ], DIZZYNESS [   ]  RS: COUGH [   ], SPUTUM [   ]  GI: ABDOMINAL PAIN [   ], NAUSEA [   ], VOMITINGS [   ], DIARRHEA [   ], CONSTIPATION [   ]  :  DYSURIA [   ], NOCTURIA [   ], INCREASED FREQUENCY [   ], DRIBLING [   ],  SKELETAL: PAINFUL JOINTS [   ], SWOLLEN JOINTS [   ], NECK ACHE [   ], LOW BACK ACHE [   ],  SKIN : ULCERS [   ], RASH [   ], ITCHING [   ]  CNS: HEAD ACHE [   ], DOUBLE VISION [   ], BLURRED VISION [   ], AMS / CONFUSION [   ], SEIZURES [   ], WEAKNESS [   ],TINGLING / NUMBNESS [   ]      PHYSICAL EXAMINATION:  GENERAL APPEARANCE: NO DISTRESS  HEENT:  NO PALLOR, NO  JVD,  NO   NODES, NECK SUPPLE  CVS: S1 +, S2 +,   RS: AEEB,  OCCASIONAL  RALES +,   RHONCHI +   INTUBATED  ABD: SOFT, NT, NO, BS +  EXT: NO PE  SKIN: WARM,   SKELETAL:  REDUCED ROM OF CERVICAL AND LS SPINE  CNS:  AAO X 1    RADIOLOGY :    RADIOLOGY AND READINGS REVIEWED    ASSESSMENT :     Infection due to severe acute respiratory syndrome coronavirus 2 (SARS-CoV-2)    CVA (cerebral vascular accident)    HLD (hyperlipidemia)    S/P percutaneous endoscopic gastrostomy (PEG) tube placement        PLAN:  HPI:  A 63 year old male, from [facility], with PMH of CVA, Alzheimer's, nonverbal at baseline, GERD, Schizophrenia, and Constipation, was brought into the ED s/p mechanical fall, poor oral intake, and Covid-19 infection on 23 as per NH papers. Unable to obtain history from patient since he is nonverbal, history obtained from chart review.  (28 Dec 2023 20:54)    # PROGNOSIS IS POOR/GUARDED - CRITICAL CARE TEAM DISCUSSING W/ FAMILY REGARDING GOC.     # [1/3] RAPID RESPONSE FOR HYPOTENSION AND HYPOXIA - S/P IV FLUIDS AND PLACED ON NRB FOR HYPOXIA. CRITICAL CARE EVALUATION REQUESTED.     # SEPTIC SHOCK S/T ASPIRATION PNA + DIARRHEA [resolved]  # ACUTE HYPOXIC RESPIRATORY FAILURE S/T ? ASPIRATION EVENT   , COVID19 INFECTION     - ON DECADRON  - S/P ROCEPHIN, AZITHROMYCIN  - DECADRON  - BCX [NGTD] AND UCX [NGTD]  - S/P IVF, VASOPRESSORS  - ID CONSULT  - CRITICAL CARE MANAGEMENT IN PROGRESS    - [] - PATIENT W/ ? ASPIRATION EVENT - SUBSEQUENTLY REQUIRED INTUBATION W/ MECHANICAL VENTILATION  - [] - EXTUBATED, CHEST PT  - [] - REINTUBATED OVERNIGHT    # BRADYCARDIA  - MONITOR ON TELEMETRY  - F/U ECHOCARDIOGRAM   - OPTIMIZE ELECTROLYTES  - CARDIOLOGY CONSULT  - EP CONSULT    # HYPOKALEMIA  - REPLETING WITH SUPPLEMENT    # HX OF CVA  # HX OF DEMENTIA  # SCHIZOPHRENIA  # GI AND DVT PPX   Patient is a 63y old  Male who presents with a chief complaint of Sepsis and AHRF (2024 13:59)    PATIENT IS SEEN AND EXAMINED EARLIER IN ICU    ALLERGIES:  No Known Allergies      Daily     Daily Weight in k.5 (2024 08:00)    VITALS:    Vital Signs Last 24 Hrs  T(C): 37.3 (2024 16:45), Max: 38 (2024 02:00)  T(F): 99.1 (2024 16:45), Max: 100.4 (2024 02:00)  HR: 64 (2024 16:45) (47 - 84)  BP: 96/70 (2024 16:45) (71/57 - 136/91)  BP(mean): 79 (2024 16:45) (28 - 104)  RR: 16 (2024 16:45) (9 - 23)  SpO2: 98% (2024 16:45) (71% - 100%)    Parameters below as of 2024 16:00  Patient On (Oxygen Delivery Method): ventilator    O2 Concentration (%): 40    LABS:    CBC Full  -  ( 2024 04:05 )  WBC Count : 10.90 K/uL  RBC Count : 4.45 M/uL  Hemoglobin : 12.9 g/dL  Hematocrit : 40.1 %  Platelet Count - Automated : 270 K/uL  Mean Cell Volume : 90.1 fl  Mean Cell Hemoglobin : 29.0 pg  Mean Cell Hemoglobin Concentration : 32.2 gm/dL  Auto Neutrophil # : 9.20 K/uL  Auto Lymphocyte # : 0.94 K/uL  Auto Monocyte # : 0.63 K/uL  Auto Eosinophil # : 0.07 K/uL  Auto Basophil # : 0.03 K/uL  Auto Neutrophil % : 84.4 %  Auto Lymphocyte % : 8.6 %  Auto Monocyte % : 5.8 %  Auto Eosinophil % : 0.6 %  Auto Basophil % : 0.3 %      -    142  |  109<H>  |  14  ----------------------------<  154<H>  3.7   |  32<H>  |  0.64    Ca    8.8      2024 04:05  Phos  3.1     01-09  Mg     2.1     01-09    TPro  6.1  /  Alb  2.8<L>  /  TBili  0.4  /  DBili  x   /  AST  9<L>  /  ALT  33  /  AlkPhos  52      CAPILLARY BLOOD GLUCOSE      POCT Blood Glucose.: 116 mg/dL (2024 11:43)  POCT Blood Glucose.: 150 mg/dL (2024 05:57)  POCT Blood Glucose.: 117 mg/dL (2024 23:37)        LIVER FUNCTIONS - ( 2024 03:52 )  Alb: 2.8 g/dL / Pro: 6.1 g/dL / ALK PHOS: 52 U/L / ALT: 33 U/L DA / AST: 9 U/L / GGT: x           Creatinine Trend: 0.64<--, 0.71<--, 0.64<--, 0.69<--, 0.80<--, 0.76<--  I&O's Summary    2024 07:01  -  2024 07:00  --------------------------------------------------------  IN: 595.9 mL / OUT: 1853 mL / NET: -1257.1 mL    2024 07:01  -  2024 17:30  --------------------------------------------------------  IN: 149.7 mL / OUT: 300 mL / NET: -150.3 mL        ABG - ( 2024 13:31 )  pH, Arterial: 7.43  pH, Blood: x     /  pCO2: 45    /  pO2: 102   / HCO3: 30    / Base Excess: 4.8   /  SaO2: 100                 Catheterized Catheterized   @ 12:37   No growth  --  --      .Blood Blood   @ 02:05   No growth at 5 days  --  --      .Blood Blood   @ 01:55   No growth at 5 days  --  --          MEDICATIONS:    MEDICATIONS  (STANDING):  albuterol    90 MICROgram(s) HFA Inhaler 2 Puff(s) Inhalation every 6 hours  aspirin  chewable 81 milliGRAM(s) Oral daily  atorvastatin 20 milliGRAM(s) Oral at bedtime  chlorhexidine 0.12% Liquid 15 milliLiter(s) Oral Mucosa every 12 hours  enoxaparin Injectable 40 milliGRAM(s) SubCutaneous every 24 hours  insulin lispro (ADMELOG) corrective regimen sliding scale   SubCutaneous every 6 hours  norepinephrine Infusion 0.1 MICROgram(s)/kG/Min (10.7 mL/Hr) IV Continuous <Continuous>  pantoprazole   Suspension 40 milliGRAM(s) Enteral Tube daily  propofol Infusion 30 MICROgram(s)/kG/Min (10.3 mL/Hr) IV Continuous <Continuous>  valproic  acid Syrup 250 milliGRAM(s) Oral two times a day      MEDICATIONS  (PRN):  acetaminophen     Tablet .. 650 milliGRAM(s) Oral every 6 hours PRN Temp greater or equal to 38C (100.4F), Mild Pain (1 - 3)      REVIEW OF SYSTEMS:                           ALL ROS DONE [ X   ]    CONSTITUTIONAL:  LETHARGIC [   ], FEVER [   ], UNRESPONSIVE [   ]  CVS:  CP  [   ], SOB, [   ], PALPITATIONS [   ], DIZZYNESS [   ]  RS: COUGH [   ], SPUTUM [   ]  GI: ABDOMINAL PAIN [   ], NAUSEA [   ], VOMITINGS [   ], DIARRHEA [   ], CONSTIPATION [   ]  :  DYSURIA [   ], NOCTURIA [   ], INCREASED FREQUENCY [   ], DRIBLING [   ],  SKELETAL: PAINFUL JOINTS [   ], SWOLLEN JOINTS [   ], NECK ACHE [   ], LOW BACK ACHE [   ],  SKIN : ULCERS [   ], RASH [   ], ITCHING [   ]  CNS: HEAD ACHE [   ], DOUBLE VISION [   ], BLURRED VISION [   ], AMS / CONFUSION [   ], SEIZURES [   ], WEAKNESS [   ],TINGLING / NUMBNESS [   ]      PHYSICAL EXAMINATION:  GENERAL APPEARANCE: NO DISTRESS  HEENT:  NO PALLOR, NO  JVD,  NO   NODES, NECK SUPPLE  CVS: S1 +, S2 +,   RS: AEEB,  OCCASIONAL  RALES +,   RHONCHI +   INTUBATED  ABD: SOFT, NT, NO, BS +  EXT: NO PE  SKIN: WARM,   SKELETAL:  REDUCED ROM OF CERVICAL AND LS SPINE  CNS:  AAO X 1    RADIOLOGY :    RADIOLOGY AND READINGS REVIEWED    ASSESSMENT :     Infection due to severe acute respiratory syndrome coronavirus 2 (SARS-CoV-2)    CVA (cerebral vascular accident)    HLD (hyperlipidemia)    S/P percutaneous endoscopic gastrostomy (PEG) tube placement        PLAN:  HPI:  A 63 year old male, from [facility], with PMH of CVA, Alzheimer's, nonverbal at baseline, GERD, Schizophrenia, and Constipation, was brought into the ED s/p mechanical fall, poor oral intake, and Covid-19 infection on 23 as per NH papers. Unable to obtain history from patient since he is nonverbal, history obtained from chart review.  (28 Dec 2023 20:54)    # PROGNOSIS IS POOR/GUARDED - CRITICAL CARE TEAM DISCUSSING W/ FAMILY REGARDING GOC.     # [1/3] RAPID RESPONSE FOR HYPOTENSION AND HYPOXIA - S/P IV FLUIDS AND PLACED ON NRB FOR HYPOXIA. CRITICAL CARE EVALUATION REQUESTED.     # SEPTIC SHOCK S/T ASPIRATION PNA + DIARRHEA [resolved]  # ACUTE HYPOXIC RESPIRATORY FAILURE S/T ? ASPIRATION EVENT   , COVID19 INFECTION     - ON DECADRON  - S/P ROCEPHIN, AZITHROMYCIN  - DECADRON  - BCX [NGTD] AND UCX [NGTD]  - S/P IVF, VASOPRESSORS  - ID CONSULT  - CRITICAL CARE MANAGEMENT IN PROGRESS    - [] - PATIENT W/ ? ASPIRATION EVENT - SUBSEQUENTLY REQUIRED INTUBATION W/ MECHANICAL VENTILATION  - [] - EXTUBATED, CHEST PT  - [] - REINTUBATED OVERNIGHT    # BRADYCARDIA  - MONITOR ON TELEMETRY  - F/U ECHOCARDIOGRAM   - OPTIMIZE ELECTROLYTES  - CARDIOLOGY CONSULT  - EP CONSULT    # HYPOKALEMIA  - REPLETING WITH SUPPLEMENT    # HX OF CVA  # HX OF DEMENTIA  # SCHIZOPHRENIA  # GI AND DVT PPX

## 2024-01-10 LAB
ALBUMIN SERPL ELPH-MCNC: 2.9 G/DL — LOW (ref 3.5–5)
ALBUMIN SERPL ELPH-MCNC: 2.9 G/DL — LOW (ref 3.5–5)
ALP SERPL-CCNC: 54 U/L — SIGNIFICANT CHANGE UP (ref 40–120)
ALP SERPL-CCNC: 54 U/L — SIGNIFICANT CHANGE UP (ref 40–120)
ALT FLD-CCNC: 28 U/L DA — SIGNIFICANT CHANGE UP (ref 10–60)
ALT FLD-CCNC: 28 U/L DA — SIGNIFICANT CHANGE UP (ref 10–60)
ANION GAP SERPL CALC-SCNC: 3 MMOL/L — LOW (ref 5–17)
ANION GAP SERPL CALC-SCNC: 3 MMOL/L — LOW (ref 5–17)
AST SERPL-CCNC: 10 U/L — SIGNIFICANT CHANGE UP (ref 10–40)
AST SERPL-CCNC: 10 U/L — SIGNIFICANT CHANGE UP (ref 10–40)
BASE EXCESS BLDA CALC-SCNC: 6.4 MMOL/L — HIGH (ref -2–3)
BASE EXCESS BLDA CALC-SCNC: 6.4 MMOL/L — HIGH (ref -2–3)
BASE EXCESS BLDA CALC-SCNC: 8 MMOL/L — HIGH (ref -2–3)
BASE EXCESS BLDA CALC-SCNC: 8 MMOL/L — HIGH (ref -2–3)
BASOPHILS # BLD AUTO: 0.02 K/UL — SIGNIFICANT CHANGE UP (ref 0–0.2)
BASOPHILS # BLD AUTO: 0.02 K/UL — SIGNIFICANT CHANGE UP (ref 0–0.2)
BASOPHILS NFR BLD AUTO: 0.1 % — SIGNIFICANT CHANGE UP (ref 0–2)
BASOPHILS NFR BLD AUTO: 0.1 % — SIGNIFICANT CHANGE UP (ref 0–2)
BILIRUB SERPL-MCNC: 0.4 MG/DL — SIGNIFICANT CHANGE UP (ref 0.2–1.2)
BILIRUB SERPL-MCNC: 0.4 MG/DL — SIGNIFICANT CHANGE UP (ref 0.2–1.2)
BLOOD GAS COMMENTS ARTERIAL: SIGNIFICANT CHANGE UP
BUN SERPL-MCNC: 18 MG/DL — SIGNIFICANT CHANGE UP (ref 7–18)
BUN SERPL-MCNC: 18 MG/DL — SIGNIFICANT CHANGE UP (ref 7–18)
CALCIUM SERPL-MCNC: 9.3 MG/DL — SIGNIFICANT CHANGE UP (ref 8.4–10.5)
CALCIUM SERPL-MCNC: 9.3 MG/DL — SIGNIFICANT CHANGE UP (ref 8.4–10.5)
CHLORIDE SERPL-SCNC: 107 MMOL/L — SIGNIFICANT CHANGE UP (ref 96–108)
CHLORIDE SERPL-SCNC: 107 MMOL/L — SIGNIFICANT CHANGE UP (ref 96–108)
CO2 SERPL-SCNC: 32 MMOL/L — HIGH (ref 22–31)
CO2 SERPL-SCNC: 32 MMOL/L — HIGH (ref 22–31)
CREAT SERPL-MCNC: 0.76 MG/DL — SIGNIFICANT CHANGE UP (ref 0.5–1.3)
CREAT SERPL-MCNC: 0.76 MG/DL — SIGNIFICANT CHANGE UP (ref 0.5–1.3)
EGFR: 100 ML/MIN/1.73M2 — SIGNIFICANT CHANGE UP
EGFR: 100 ML/MIN/1.73M2 — SIGNIFICANT CHANGE UP
EOSINOPHIL # BLD AUTO: 0.09 K/UL — SIGNIFICANT CHANGE UP (ref 0–0.5)
EOSINOPHIL # BLD AUTO: 0.09 K/UL — SIGNIFICANT CHANGE UP (ref 0–0.5)
EOSINOPHIL NFR BLD AUTO: 0.6 % — SIGNIFICANT CHANGE UP (ref 0–6)
EOSINOPHIL NFR BLD AUTO: 0.6 % — SIGNIFICANT CHANGE UP (ref 0–6)
GLUCOSE BLDC GLUCOMTR-MCNC: 118 MG/DL — HIGH (ref 70–99)
GLUCOSE BLDC GLUCOMTR-MCNC: 118 MG/DL — HIGH (ref 70–99)
GLUCOSE BLDC GLUCOMTR-MCNC: 175 MG/DL — HIGH (ref 70–99)
GLUCOSE BLDC GLUCOMTR-MCNC: 175 MG/DL — HIGH (ref 70–99)
GLUCOSE BLDC GLUCOMTR-MCNC: 176 MG/DL — HIGH (ref 70–99)
GLUCOSE BLDC GLUCOMTR-MCNC: 176 MG/DL — HIGH (ref 70–99)
GLUCOSE BLDC GLUCOMTR-MCNC: 94 MG/DL — SIGNIFICANT CHANGE UP (ref 70–99)
GLUCOSE BLDC GLUCOMTR-MCNC: 94 MG/DL — SIGNIFICANT CHANGE UP (ref 70–99)
GLUCOSE SERPL-MCNC: 211 MG/DL — HIGH (ref 70–99)
GLUCOSE SERPL-MCNC: 211 MG/DL — HIGH (ref 70–99)
HCO3 BLDA-SCNC: 31 MMOL/L — HIGH (ref 21–28)
HCO3 BLDA-SCNC: 31 MMOL/L — HIGH (ref 21–28)
HCO3 BLDA-SCNC: 33 MMOL/L — HIGH (ref 21–28)
HCO3 BLDA-SCNC: 33 MMOL/L — HIGH (ref 21–28)
HCT VFR BLD CALC: 41.5 % — SIGNIFICANT CHANGE UP (ref 39–50)
HCT VFR BLD CALC: 41.5 % — SIGNIFICANT CHANGE UP (ref 39–50)
HGB BLD-MCNC: 13.5 G/DL — SIGNIFICANT CHANGE UP (ref 13–17)
HGB BLD-MCNC: 13.5 G/DL — SIGNIFICANT CHANGE UP (ref 13–17)
HOROWITZ INDEX BLDA+IHG-RTO: 40 — SIGNIFICANT CHANGE UP
HOROWITZ INDEX BLDA+IHG-RTO: 40 — SIGNIFICANT CHANGE UP
HOROWITZ INDEX BLDA+IHG-RTO: 50 — SIGNIFICANT CHANGE UP
HOROWITZ INDEX BLDA+IHG-RTO: 50 — SIGNIFICANT CHANGE UP
IMM GRANULOCYTES NFR BLD AUTO: 0.3 % — SIGNIFICANT CHANGE UP (ref 0–0.9)
IMM GRANULOCYTES NFR BLD AUTO: 0.3 % — SIGNIFICANT CHANGE UP (ref 0–0.9)
LYMPHOCYTES # BLD AUTO: 1.1 K/UL — SIGNIFICANT CHANGE UP (ref 1–3.3)
LYMPHOCYTES # BLD AUTO: 1.1 K/UL — SIGNIFICANT CHANGE UP (ref 1–3.3)
LYMPHOCYTES # BLD AUTO: 7 % — LOW (ref 13–44)
LYMPHOCYTES # BLD AUTO: 7 % — LOW (ref 13–44)
MAGNESIUM SERPL-MCNC: 2.1 MG/DL — SIGNIFICANT CHANGE UP (ref 1.6–2.6)
MAGNESIUM SERPL-MCNC: 2.1 MG/DL — SIGNIFICANT CHANGE UP (ref 1.6–2.6)
MCHC RBC-ENTMCNC: 29.6 PG — SIGNIFICANT CHANGE UP (ref 27–34)
MCHC RBC-ENTMCNC: 29.6 PG — SIGNIFICANT CHANGE UP (ref 27–34)
MCHC RBC-ENTMCNC: 32.5 GM/DL — SIGNIFICANT CHANGE UP (ref 32–36)
MCHC RBC-ENTMCNC: 32.5 GM/DL — SIGNIFICANT CHANGE UP (ref 32–36)
MCV RBC AUTO: 91 FL — SIGNIFICANT CHANGE UP (ref 80–100)
MCV RBC AUTO: 91 FL — SIGNIFICANT CHANGE UP (ref 80–100)
MONOCYTES # BLD AUTO: 0.84 K/UL — SIGNIFICANT CHANGE UP (ref 0–0.9)
MONOCYTES # BLD AUTO: 0.84 K/UL — SIGNIFICANT CHANGE UP (ref 0–0.9)
MONOCYTES NFR BLD AUTO: 5.3 % — SIGNIFICANT CHANGE UP (ref 2–14)
MONOCYTES NFR BLD AUTO: 5.3 % — SIGNIFICANT CHANGE UP (ref 2–14)
NEUTROPHILS # BLD AUTO: 13.68 K/UL — HIGH (ref 1.8–7.4)
NEUTROPHILS # BLD AUTO: 13.68 K/UL — HIGH (ref 1.8–7.4)
NEUTROPHILS NFR BLD AUTO: 86.7 % — HIGH (ref 43–77)
NEUTROPHILS NFR BLD AUTO: 86.7 % — HIGH (ref 43–77)
NRBC # BLD: 0 /100 WBCS — SIGNIFICANT CHANGE UP (ref 0–0)
NRBC # BLD: 0 /100 WBCS — SIGNIFICANT CHANGE UP (ref 0–0)
PCO2 BLDA: 45 MMHG — SIGNIFICANT CHANGE UP (ref 35–48)
PH BLDA: 7.45 — SIGNIFICANT CHANGE UP (ref 7.35–7.45)
PH BLDA: 7.45 — SIGNIFICANT CHANGE UP (ref 7.35–7.45)
PH BLDA: 7.47 — HIGH (ref 7.35–7.45)
PH BLDA: 7.47 — HIGH (ref 7.35–7.45)
PHOSPHATE SERPL-MCNC: 2.6 MG/DL — SIGNIFICANT CHANGE UP (ref 2.5–4.5)
PHOSPHATE SERPL-MCNC: 2.6 MG/DL — SIGNIFICANT CHANGE UP (ref 2.5–4.5)
PLATELET # BLD AUTO: 362 K/UL — SIGNIFICANT CHANGE UP (ref 150–400)
PLATELET # BLD AUTO: 362 K/UL — SIGNIFICANT CHANGE UP (ref 150–400)
PO2 BLDA: 123 MMHG — HIGH (ref 83–108)
PO2 BLDA: 123 MMHG — HIGH (ref 83–108)
PO2 BLDA: 86 MMHG — SIGNIFICANT CHANGE UP (ref 83–108)
PO2 BLDA: 86 MMHG — SIGNIFICANT CHANGE UP (ref 83–108)
POTASSIUM SERPL-MCNC: 4 MMOL/L — SIGNIFICANT CHANGE UP (ref 3.5–5.3)
POTASSIUM SERPL-MCNC: 4 MMOL/L — SIGNIFICANT CHANGE UP (ref 3.5–5.3)
POTASSIUM SERPL-SCNC: 4 MMOL/L — SIGNIFICANT CHANGE UP (ref 3.5–5.3)
POTASSIUM SERPL-SCNC: 4 MMOL/L — SIGNIFICANT CHANGE UP (ref 3.5–5.3)
PROT SERPL-MCNC: 7 G/DL — SIGNIFICANT CHANGE UP (ref 6–8.3)
PROT SERPL-MCNC: 7 G/DL — SIGNIFICANT CHANGE UP (ref 6–8.3)
RBC # BLD: 4.56 M/UL — SIGNIFICANT CHANGE UP (ref 4.2–5.8)
RBC # BLD: 4.56 M/UL — SIGNIFICANT CHANGE UP (ref 4.2–5.8)
RBC # FLD: 13.9 % — SIGNIFICANT CHANGE UP (ref 10.3–14.5)
RBC # FLD: 13.9 % — SIGNIFICANT CHANGE UP (ref 10.3–14.5)
SAO2 % BLDA: 100 % — SIGNIFICANT CHANGE UP
SAO2 % BLDA: 100 % — SIGNIFICANT CHANGE UP
SAO2 % BLDA: 99 % — SIGNIFICANT CHANGE UP
SAO2 % BLDA: 99 % — SIGNIFICANT CHANGE UP
SODIUM SERPL-SCNC: 142 MMOL/L — SIGNIFICANT CHANGE UP (ref 135–145)
SODIUM SERPL-SCNC: 142 MMOL/L — SIGNIFICANT CHANGE UP (ref 135–145)
WBC # BLD: 15.78 K/UL — HIGH (ref 3.8–10.5)
WBC # BLD: 15.78 K/UL — HIGH (ref 3.8–10.5)
WBC # FLD AUTO: 15.78 K/UL — HIGH (ref 3.8–10.5)
WBC # FLD AUTO: 15.78 K/UL — HIGH (ref 3.8–10.5)

## 2024-01-10 RX ORDER — ACETAMINOPHEN 500 MG
650 TABLET ORAL EVERY 6 HOURS
Refills: 0 | Status: DISCONTINUED | OUTPATIENT
Start: 2024-01-10 | End: 2024-01-12

## 2024-01-10 RX ORDER — CEFEPIME 1 G/1
INJECTION, POWDER, FOR SOLUTION INTRAMUSCULAR; INTRAVENOUS
Refills: 0 | Status: DISCONTINUED | OUTPATIENT
Start: 2024-01-10 | End: 2024-01-12

## 2024-01-10 RX ORDER — ACETYLCYSTEINE 200 MG/ML
4 VIAL (ML) MISCELLANEOUS THREE TIMES A DAY
Refills: 0 | Status: DISCONTINUED | OUTPATIENT
Start: 2024-01-10 | End: 2024-01-10

## 2024-01-10 RX ORDER — CEFEPIME 1 G/1
2000 INJECTION, POWDER, FOR SOLUTION INTRAMUSCULAR; INTRAVENOUS ONCE
Refills: 0 | Status: COMPLETED | OUTPATIENT
Start: 2024-01-10 | End: 2024-01-10

## 2024-01-10 RX ORDER — CEFEPIME 1 G/1
2000 INJECTION, POWDER, FOR SOLUTION INTRAMUSCULAR; INTRAVENOUS EVERY 8 HOURS
Refills: 0 | Status: DISCONTINUED | OUTPATIENT
Start: 2024-01-10 | End: 2024-01-12

## 2024-01-10 RX ORDER — CHLORHEXIDINE GLUCONATE 213 G/1000ML
1 SOLUTION TOPICAL
Refills: 0 | Status: DISCONTINUED | OUTPATIENT
Start: 2024-01-10 | End: 2024-01-12

## 2024-01-10 RX ADMIN — ENOXAPARIN SODIUM 40 MILLIGRAM(S): 100 INJECTION SUBCUTANEOUS at 13:08

## 2024-01-10 RX ADMIN — PROPOFOL 10.3 MICROGRAM(S)/KG/MIN: 10 INJECTION, EMULSION INTRAVENOUS at 04:18

## 2024-01-10 RX ADMIN — ALBUTEROL 2 PUFF(S): 90 AEROSOL, METERED ORAL at 20:19

## 2024-01-10 RX ADMIN — CHLORHEXIDINE GLUCONATE 1 APPLICATION(S): 213 SOLUTION TOPICAL at 17:54

## 2024-01-10 RX ADMIN — CHLORHEXIDINE GLUCONATE 15 MILLILITER(S): 213 SOLUTION TOPICAL at 05:25

## 2024-01-10 RX ADMIN — CEFEPIME 100 MILLIGRAM(S): 1 INJECTION, POWDER, FOR SOLUTION INTRAMUSCULAR; INTRAVENOUS at 15:43

## 2024-01-10 RX ADMIN — Medication 1: at 05:30

## 2024-01-10 RX ADMIN — CEFEPIME 100 MILLIGRAM(S): 1 INJECTION, POWDER, FOR SOLUTION INTRAMUSCULAR; INTRAVENOUS at 21:19

## 2024-01-10 RX ADMIN — PROPOFOL 10.3 MICROGRAM(S)/KG/MIN: 10 INJECTION, EMULSION INTRAVENOUS at 21:20

## 2024-01-10 RX ADMIN — Medication 650 MILLIGRAM(S): at 04:39

## 2024-01-10 RX ADMIN — PROPOFOL 10.3 MICROGRAM(S)/KG/MIN: 10 INJECTION, EMULSION INTRAVENOUS at 11:09

## 2024-01-10 RX ADMIN — Medication 650 MILLIGRAM(S): at 16:34

## 2024-01-10 RX ADMIN — ALBUTEROL 2 PUFF(S): 90 AEROSOL, METERED ORAL at 16:25

## 2024-01-10 RX ADMIN — Medication 650 MILLIGRAM(S): at 17:59

## 2024-01-10 RX ADMIN — PROPOFOL 10.3 MICROGRAM(S)/KG/MIN: 10 INJECTION, EMULSION INTRAVENOUS at 16:34

## 2024-01-10 RX ADMIN — Medication 81 MILLIGRAM(S): at 11:09

## 2024-01-10 RX ADMIN — Medication 250 MILLIGRAM(S): at 05:25

## 2024-01-10 RX ADMIN — ATORVASTATIN CALCIUM 20 MILLIGRAM(S): 80 TABLET, FILM COATED ORAL at 21:19

## 2024-01-10 RX ADMIN — Medication 650 MILLIGRAM(S): at 07:03

## 2024-01-10 RX ADMIN — PANTOPRAZOLE SODIUM 40 MILLIGRAM(S): 20 TABLET, DELAYED RELEASE ORAL at 11:09

## 2024-01-10 RX ADMIN — CHLORHEXIDINE GLUCONATE 15 MILLILITER(S): 213 SOLUTION TOPICAL at 17:53

## 2024-01-10 RX ADMIN — Medication 250 MILLIGRAM(S): at 17:53

## 2024-01-10 RX ADMIN — Medication 1: at 23:34

## 2024-01-10 NOTE — PROGRESS NOTE ADULT - NS ATTEND AMEND GEN_ALL_CORE FT
IMP: This is a  63 year old mn , from [facility], with  CVA, Alzheimer's, nonverbal at baseline, GERD, Schizophrenia, and Constipation, was brought into the ED s/p mechanical fall, poor oral intake, and Covid-19 infection on 12/27/23 Pt was admitted for COVID PNA, later found with intermittent hypoxia/ hypopnea to Sats 80% and Hypotension despite multiple fluid boluses, also found with bryan to 40s for the last 2 days, in ICU for further monitoring.       ASSESSMENT   - Acute Hypoxic resp failure   - Covid-19 PNA   - Shock septic   - CVA  - Alzheimer dementia   - Bradycardia       Plan   - Reintubated this morning  - Back on mechancial ventilation  - Thoracic surgery consult for tracheostomy/peg placement   - Difficulty managing secretions  - Aspiration precautions   - Hemodynamic support   - Titrate vaso-active agents to maintain MAP>65  - EP cards f/u : no indication for PPM at tis time   - Antibx completed   - Aspiration precautions  - Off Remdesivir due to ADAN  - Continue decadron  6 mg for total 10 days   - Isolation : contact and airborne   - Monitor I/O   - Cards following   - Hold AV angel luis blocking agent   - Skin care  - Wound care eval noted  - Prognosis is poor. IMP: This is a  63 year old mn , from [facility], with  CVA, Alzheimer's, nonverbal at baseline, GERD, Schizophrenia, and Constipation, was brought into the ED s/p mechanical fall, poor oral intake, and Covid-19 infection on 12/27/23 Pt was admitted for COVID PNA, later found with intermittent hypoxia/ hypopnea to Sats 80% and Hypotension despite multiple fluid boluses, also found with bryan to 40s for the last 2 days, in ICU for further monitoring.       ASSESSMENT   - Acute Hypoxic resp failure   - Covid-19 PNA   - Shock septic   - CVA  - Alzheimer dementia   - Bradycardia       Plan   - Reintubated this morning  - Back on mechancial ventilation  - Thoracic surgery consult for tracheostomy/peg placement   - Difficulty managing secretions  - Aspiration precautions   - Hemodynamic support   - Titrate vaso-active agents to maintain MAP>65  - EP cards f/u : no indication for PPM at tis time   - Antibx completed   - Aspiration precautions  - Off Remdesivir due to ADAN  - Continue decadron  6 mg for total 10 days   - Isolation : contact and airborne   - Monitor I/O   - Cards following   - Hold AV angel luis blocking agent   - Skin care  - Wound care eval noted  - Prognosis is poor.

## 2024-01-10 NOTE — PROGRESS NOTE ADULT - ASSESSMENT
Assessment	  63 year old male, from [facility], with PMH of CVA, Alzheimer's, nonverbal at baseline, GERD, Schizophrenia, and Constipation, was brought into the ED s/p mechanical fall, poor oral intake, and Covid-19 infection on 12/27/23 Pt was admitted for COVID PNA, later found with intermittent hypoxia/ hypopnea to Sats 80% and Hypotension despite multiple fluid boluses, also found with bryan to 40s for the last 2 days, in ICU for further monitoring.     DX:  1. CVA  2. Alzheimer's  3. Hypoxia  4. Hypotension   5. Bradycardia   5. COVID +Ve     =================== Neuro============================  # CVA  # Alzheimer's  #sedation/analgesia  - baseline: bedbound, nonverbal in the setting of Alzheimer's dementia, Previous CVA  - now extubated to NRB, difficult to wean due to copious oropharyngeal secretions, very high risk of aspiration/re-intubation  - lorazepam and Zyprexa held due to high risk of reintubation  -continue valporate 250mg BID    ================= Cardiovascular==========================  # septic shock in the setting of COVID-19 infection  # Sinus bradycardia  - Trop negative, TWI on lateral leads are chronic  - POCUS 1/5 shows intact LV function, some signs of end systolic effacement   - status post volume expansion with albumin   - 1/6/23 TTE: Mildly reduced LVEF   - now off levophed, goal MAP > 60    ================- Pulm=================================  #Acute Hypoxia Respiratory failure in the setting of COVID-19  #Multifocal PNA   #at risk of aspiration PNA  - RRT for desaturation to the 80%'s (1/2/24)  - Improved with NRB, PO2 in 200s on ABG, s/p HF 50/40 but then failed HFNC 1/5  - status post intubation 1/5-1/9,  - extubated to NRB, difficult to wean due to copious oropharyngeal secretions, very high risk of aspiration/re-intubation  - blood cx 12/29 NGTD  - status post course of dexamethasone  - monitor off antibiotics    ==================ID===================================  #COVID   - plan as above    ================= Nephro================================  #No active issues     =================GI====================================  #Nutrition  - NPO with TF, but will hold today in setting of possible re-intubation    # Diarrhea 2/2 to laxatives vs Covid   - senna, miralax held in setting of loose stool,   - no further episodes      ================ Heme==================================  DVT PPx with lovenox    =================Endocrine===============================  Bryan and Hypotensive   - TSH 4.28 [wnl]    ================= Skin/Catheters============================  no wounds    =================Prophylaxis =============================  Lovenox  PPI    ==================GOC==================================  FULL CODE, reviewed risk of reintubation today, per son, may reintubate if needed, he will review long term goals with family later this week    ==================DISPO=====================  ICU     Assessment	  63 year old male, from [facility], with PMH of CVA, Alzheimer's, nonverbal at baseline, GERD, Schizophrenia, and Constipation, was brought into the ED s/p mechanical fall, poor oral intake, and Covid-19 infection on 12/27/23 Pt was admitted for COVID PNA, later found with intermittent hypoxia/ hypopnea to Sats 80% and Hypotension despite multiple fluid boluses, also found with bryan to 40s for the last 2 days, in ICU for further monitoring.     DX:  1. CVA  2. Alzheimer's  3. Hypoxia  4. Hypotension   5. Bradycardia   5. COVID +Ve     =================== Neuro============================  # CVA  # Alzheimer's  #sedation/analgesia  - baseline: bedbound, nonverbal in the setting of Alzheimer's dementia, Previous CVA  - now extubated to NRB, difficult to wean due to copious oropharyngeal secretions, very high risk of aspiration/re-intubation  - lorazepam and Zyprexa held due to high risk of reintubation  -continue valporate 250mg BID    ================= Cardiovascular==========================  # septic shock in the setting of COVID-19 infection  # Sinus bradycardia  - Trop negative, TWI on lateral leads are chronic  - POCUS 1/5 shows intact LV function, some signs of end systolic effacement   - status post volume expansion with albumin   - 1/6/23 TTE: Mildly reduced LVEF   - now off levophed, goal MAP > 60    ================- Pulm=================================  #Acute Hypoxia Respiratory failure in the setting of COVID-19  #Multifocal PNA   #at risk of aspiration PNA  - RRT for desaturation to the 80%'s (1/2/24)  - Improved with NRB, PO2 in 200s on ABG, s/p HF 50/40 but then failed HFNC 1/5  - status post intubation 1/5-1/9,  - extubated to NRB, difficult to wean due to copious oropharyngeal secretions, very high risk of aspiration/re-intubation  - blood cx 12/29 NGTD  - status post course of dexamethasone  - monitor off antibiotics    ==================ID===================================  #COVID   - plan as above    ================= Nephro================================  #No active issues     =================GI====================================  #Nutrition  - NPO with TF, but will hold today in setting of possible re-intubation    # Diarrhea 2/2 to laxatives vs Covid   - senna, miralax held in setting of loose stool,   - no further episodes      ================ Heme==================================  DVT PPx with lovenox    =================Endocrine===============================  Bryan and Hypotensive   - TSH 4.28 [wnl]    ================= Skin/Catheters============================  no wounds    =================Prophylaxis =============================  Lovenox  PPI    ==================GOC==================================  FULL CODE, reviewed risk of reintubation today, per son, may reintubate if needed, he will review long term goals with family later this week    ==================DISPO=====================  ICU     63 year old male, from [facility], with PMH of CVA, Alzheimer's, nonverbal at baseline, GERD, Schizophrenia, and Constipation, was brought into the ED s/p mechanical fall, poor oral intake, and Covid-19 infection on 12/27/23 Pt was admitted for COVID PNA, later found with intermittent hypoxia to Sats 80% and Hypotension despite multiple fluid boluses, also found with bryan to 40s for the last 2 days, in ICU for further monitoring.     DX:  1. CVA  2. Alzheimer's  3. Hypoxia  4. Hypotension   5. Bradycardia   5. COVID +Ve     =================== Neuro============================  # CVA  # Alzheimer's  #sedation/analgesia  - baseline: bedbound, nonverbal in the setting of Alzheimer's dementia, Previous CVA  - required reintubation due to inability to clear secretions   - continue valporate 250mg BID    ================= Cardiovascular==========================  # septic shock in the setting of COVID-19 infection  # Sinus bradycardia  - Trop negative, TWI on lateral leads are chronic  - POCUS 1/5 shows intact LV function, some signs of end systolic effacement   - status post volume expansion with albumin   - 1/6/23 TTE: Mildly reduced LVEF   - now off levophed, goal MAP > 60  - holding AV angel luis blockers iso bradycardia    ================- Pulm=================================  #Acute Hypoxia Respiratory failure in the setting of COVID-19  #Multifocal PNA   #at risk of aspiration PNA  - RRT for desaturation to the 80%'s (1/2/24)  - Improved with NRB, PO2 in 200s on ABG, s/p HF 50/40 but then failed HFNC 1/5  - status post intubation 1/5-1/9, reintubated 1/10  - extubated to NRB, difficult to wean due to copious oropharyngeal secretions, very high risk of aspiration/re-intubation  - blood cx 12/29 NGTD  - status post course of dexamethasone  - Dr. Hernandez, thoracic surgery consulted for Trach and PEG eval    ==================ID===================================  #COVID   #Aspiration PNA  - Cefepime 2g (1/10-1/15)    ================= Nephro================================  #No active issues   - crt .76, good urine output    =================GI====================================  #Nutrition  -Tube feeds resumed    # Diarrhea 2/2 to laxatives vs Covid   - senna, miralax held in setting of loose stool,   - no further episodes    ================ Heme==================================  DVT PPx with lovenox    =================Endocrine===============================  Bryan and Hypotensive   - TSH 4.28 [wnl]    ================= Skin/Catheters============================  no wounds    =================Prophylaxis =============================  Lovenox  PPI    ==================GOC==================================  FULL CODE, pending further GOC discussion regarding Trach and PEG    ==================DISPO=====================  ICU     63 year old male, from [facility], with PMH of CVA, Alzheimer's, nonverbal at baseline, GERD, Schizophrenia, and Constipation, was brought into the ED s/p mechanical fall, poor oral intake, and Covid-19 infection on 12/27/23 Pt was admitted for COVID PNA, later found with intermittent hypoxia to Sats 80% and Hypotension despite multiple fluid boluses, also found with bryan to 40s for the last 2 days, in ICU for further monitoring.     DX:  1. CVA  2. Alzheimer's  3. Hypoxia  4. Hypotension   5. Bradycardia   5. COVID +Ve     =================== Neuro============================  # CVA  # Alzheimer's  #sedation/analgesia  - baseline: bedbound, nonverbal in the setting of Alzheimer's dementia, Previous CVA  - required reintubation due to inability to clear secretions   - continue valporate 250mg BID    ================= Cardiovascular==========================  # septic shock in the setting of COVID-19 infection  # Sinus bradycardia  - Trop negative, TWI on lateral leads are chronic  - POCUS 1/5 shows intact LV function, some signs of end systolic effacement   - status post volume expansion with albumin   - 1/6/23 TTE: Mildly reduced LVEF   - now off levophed, goal MAP > 60  - holding AV angel luis blockers iso bradycardia    ================- Pulm=================================  #Acute Hypoxia Respiratory failure in the setting of COVID-19  #Multifocal PNA   #at risk of aspiration PNA  - RRT for desaturation to the 80%'s (1/2/24)  - Improved with NRB, PO2 in 200s on ABG, s/p HF 50/40 but then failed HFNC 1/5  - status post intubation 1/5-1/9, reintubated 1/10  - extubated to NRB, difficult to wean due to copious oropharyngeal secretions, very high risk of aspiration/re-intubation  - blood cx 12/29 NGTD  - status post course of dexamethasone  - Dr. Hernandez, thoracic surgery consulted for Trach and PEG eval    ==================ID===================================  #COVID   #Aspiration PNA  - Cefepime 2g (1/10-1/15)    ================= Nephro================================  #No active issues   - crt .76, good urine output    =================GI====================================  #Nutrition  -Tube feeds resumed    # Diarrhea 2/2 to laxatives vs Covid   - senna, miralax held in setting of loose stool,   - no further episodes    ================ Heme==================================  DVT PPx with lovenox    =================Endocrine===============================  Bryan and Hypotensive   - TSH 4.28 [wnl]    ================= Skin/Catheters============================  no wounds    =================Prophylaxis =============================  Lovenox  PPI    ==================GOC==================================  FULL CODE, pending further GOC discussion regarding Trach and PEG    ==================DISPO=====================  ICU

## 2024-01-10 NOTE — PROGRESS NOTE ADULT - SUBJECTIVE AND OBJECTIVE BOX
C A R D I O L O G Y  **********************************     DATE OF SERVICE: 01-10-24    Patient is on ventilator support, unable to obtain review of symptoms.      acetaminophen     Tablet .. 650 milliGRAM(s) Oral every 6 hours PRN  acetylcysteine 20%  Inhalation 4 milliLiter(s) Inhalation three times a day  albuterol    90 MICROgram(s) HFA Inhaler 2 Puff(s) Inhalation every 6 hours  aspirin  chewable 81 milliGRAM(s) Oral daily  atorvastatin 20 milliGRAM(s) Oral at bedtime  chlorhexidine 0.12% Liquid 15 milliLiter(s) Oral Mucosa every 12 hours  enoxaparin Injectable 40 milliGRAM(s) SubCutaneous every 24 hours  insulin lispro (ADMELOG) corrective regimen sliding scale   SubCutaneous every 6 hours  norepinephrine Infusion 0.1 MICROgram(s)/kG/Min IV Continuous <Continuous>  pantoprazole   Suspension 40 milliGRAM(s) Enteral Tube daily  propofol Infusion 30 MICROgram(s)/kG/Min IV Continuous <Continuous>  valproic  acid Syrup 250 milliGRAM(s) Oral two times a day                            13.5   15.78 )-----------( 362      ( 10 Darrell 2024 04:11 )             41.5       Hemoglobin: 13.5 g/dL (01-10 @ 04:11)  Hemoglobin: 12.9 g/dL (01-09 @ 04:05)  Hemoglobin: 12.0 g/dL (01-08 @ 03:52)  Hemoglobin: 12.0 g/dL (01-07 @ 03:04)  Hemoglobin: 12.3 g/dL (01-06 @ 03:40)      01-10    142  |  107  |  18  ----------------------------<  211<H>  4.0   |  32<H>  |  0.76    Ca    9.3      10 Darrell 2024 04:11  Phos  2.6     01-10  Mg     2.1     01-10    TPro  7.0  /  Alb  2.9<L>  /  TBili  0.4  /  DBili  x   /  AST  10  /  ALT  28  /  AlkPhos  54  01-10    Creatinine Trend: 0.76<--, 0.64<--, 0.71<--, 0.64<--, 0.69<--, 0.80<--    COAGS:           T(C): 37.6 (01-10-24 @ 11:45), Max: 38.2 (01-10-24 @ 03:45)  HR: 87 (01-10-24 @ 11:45) (54 - 104)  BP: 115/78 (01-10-24 @ 11:45) (79/65 - 142/76)  RR: 14 (01-10-24 @ 11:45) (12 - 20)  SpO2: 95% (01-10-24 @ 11:45) (91% - 100%)  Wt(kg): --    I&O's Summary    09 Jan 2024 07:01  -  10 Darrell 2024 07:00  --------------------------------------------------------  IN: 953.3 mL / OUT: 1100 mL / NET: -146.7 mL    10 Darrell 2024 07:01  -  10 Darrell 2024 12:55  --------------------------------------------------------  IN: 30.9 mL / OUT: 0 mL / NET: 30.9 mL              HEENT:  (-)icterus (-)pallor  CV: N S1 S2 1/6 JOVON (+)2 Pulses B/l  Resp:  Clear to ausculatation B/L, normal effort  GI: (+) BS Soft, NT, ND  Lymph:  (-)Edema, (-)obvious lymphadenopathy  Skin: Warm to touch, Normal turgor  Psych: Unable to assess mood and affect      TELEMETRY: 	  sinus         ASSESSMENT/PLAN: 	63y Male PMH of CVA, Alzheimer's, nonverbal at baseline, GERD, Schizophrenia, historically preserved LV function and Constipation, was brought into the ED s/p mechanical fall, poor oral intake, and Covid-19 infection on 12/27/23 as per [facility] papers.    # Bradycardia   - He likely has some degree of sick sinus syndrome that may be exacerbated by Midodrine.  now off midodrine  - EP following      # Hypotension  - Suspect this is due to dehydration and vasodilatory state associated with viral/bacterial infection  - Abx  - pressor support per MICU   - Vent per MICU    Gavino Cadet MD, [facility]  BEEPER [phone]     C A R D I O L O G Y  **********************************     DATE OF SERVICE: 01-10-24    Patient is on ventilator support, unable to obtain review of symptoms.      acetaminophen     Tablet .. 650 milliGRAM(s) Oral every 6 hours PRN  acetylcysteine 20%  Inhalation 4 milliLiter(s) Inhalation three times a day  albuterol    90 MICROgram(s) HFA Inhaler 2 Puff(s) Inhalation every 6 hours  aspirin  chewable 81 milliGRAM(s) Oral daily  atorvastatin 20 milliGRAM(s) Oral at bedtime  chlorhexidine 0.12% Liquid 15 milliLiter(s) Oral Mucosa every 12 hours  enoxaparin Injectable 40 milliGRAM(s) SubCutaneous every 24 hours  insulin lispro (ADMELOG) corrective regimen sliding scale   SubCutaneous every 6 hours  norepinephrine Infusion 0.1 MICROgram(s)/kG/Min IV Continuous <Continuous>  pantoprazole   Suspension 40 milliGRAM(s) Enteral Tube daily  propofol Infusion 30 MICROgram(s)/kG/Min IV Continuous <Continuous>  valproic  acid Syrup 250 milliGRAM(s) Oral two times a day                            13.5   15.78 )-----------( 362      ( 10 Darrell 2024 04:11 )             41.5       Hemoglobin: 13.5 g/dL (01-10 @ 04:11)  Hemoglobin: 12.9 g/dL (01-09 @ 04:05)  Hemoglobin: 12.0 g/dL (01-08 @ 03:52)  Hemoglobin: 12.0 g/dL (01-07 @ 03:04)  Hemoglobin: 12.3 g/dL (01-06 @ 03:40)      01-10    142  |  107  |  18  ----------------------------<  211<H>  4.0   |  32<H>  |  0.76    Ca    9.3      10 Darrell 2024 04:11  Phos  2.6     01-10  Mg     2.1     01-10    TPro  7.0  /  Alb  2.9<L>  /  TBili  0.4  /  DBili  x   /  AST  10  /  ALT  28  /  AlkPhos  54  01-10    Creatinine Trend: 0.76<--, 0.64<--, 0.71<--, 0.64<--, 0.69<--, 0.80<--    COAGS:           T(C): 37.6 (01-10-24 @ 11:45), Max: 38.2 (01-10-24 @ 03:45)  HR: 87 (01-10-24 @ 11:45) (54 - 104)  BP: 115/78 (01-10-24 @ 11:45) (79/65 - 142/76)  RR: 14 (01-10-24 @ 11:45) (12 - 20)  SpO2: 95% (01-10-24 @ 11:45) (91% - 100%)  Wt(kg): --    I&O's Summary    09 Jan 2024 07:01  -  10 Darrell 2024 07:00  --------------------------------------------------------  IN: 953.3 mL / OUT: 1100 mL / NET: -146.7 mL    10 Darrell 2024 07:01  -  10 Darrell 2024 12:55  --------------------------------------------------------  IN: 30.9 mL / OUT: 0 mL / NET: 30.9 mL              HEENT:  (-)icterus (-)pallor  CV: N S1 S2 1/6 JOVON (+)2 Pulses B/l  Resp:  Clear to ausculatation B/L, normal effort  GI: (+) BS Soft, NT, ND  Lymph:  (-)Edema, (-)obvious lymphadenopathy  Skin: Warm to touch, Normal turgor  Psych: Unable to assess mood and affect      TELEMETRY: 	  sinus         ASSESSMENT/PLAN: 	63y Male PMH of CVA, Alzheimer's, nonverbal at baseline, GERD, Schizophrenia, historically preserved LV function and Constipation, was brought into the ED s/p mechanical fall, poor oral intake, and Covid-19 infection on 12/27/23 as per NH papers.    # Bradycardia   - He likely has some degree of sick sinus syndrome that may be exacerbated by Midodrine.  now off midodrine  - EP following      # Hypotension  - Suspect this is due to dehydration and vasodilatory state associated with viral/bacterial infection  - Abx  - pressor support per MICU   - Vent per MICU    Gavino Cadet MD, [facility]  BEEPER [phone]

## 2024-01-10 NOTE — PROGRESS NOTE ADULT - SUBJECTIVE AND OBJECTIVE BOX
INTERVAL HPI/OVERNIGHT EVENTS:       PRESSORS: [ ] YES [ ] NO  WHICH:    ANTIBIOTICS:                  DATE STARTED:  ANTIBIOTICS:                  DATE STARTED:    Antimicrobial:    Cardiovascular:  norepinephrine Infusion 0.1 MICROgram(s)/kG/Min IV Continuous <Continuous>    Pulmonary:  albuterol    90 MICROgram(s) HFA Inhaler 2 Puff(s) Inhalation every 6 hours    Hematalogic:  aspirin  chewable 81 milliGRAM(s) Oral daily  enoxaparin Injectable 40 milliGRAM(s) SubCutaneous every 24 hours    Other:  acetaminophen     Tablet .. 650 milliGRAM(s) Oral every 6 hours PRN  atorvastatin 20 milliGRAM(s) Oral at bedtime  chlorhexidine 0.12% Liquid 15 milliLiter(s) Oral Mucosa every 12 hours  insulin lispro (ADMELOG) corrective regimen sliding scale   SubCutaneous every 6 hours  pantoprazole   Suspension 40 milliGRAM(s) Enteral Tube daily  propofol Infusion 30 MICROgram(s)/kG/Min IV Continuous <Continuous>  valproic  acid Syrup 250 milliGRAM(s) Oral two times a day    acetaminophen     Tablet .. 650 milliGRAM(s) Oral every 6 hours PRN  albuterol    90 MICROgram(s) HFA Inhaler 2 Puff(s) Inhalation every 6 hours  aspirin  chewable 81 milliGRAM(s) Oral daily  atorvastatin 20 milliGRAM(s) Oral at bedtime  chlorhexidine 0.12% Liquid 15 milliLiter(s) Oral Mucosa every 12 hours  enoxaparin Injectable 40 milliGRAM(s) SubCutaneous every 24 hours  insulin lispro (ADMELOG) corrective regimen sliding scale   SubCutaneous every 6 hours  norepinephrine Infusion 0.1 MICROgram(s)/kG/Min IV Continuous <Continuous>  pantoprazole   Suspension 40 milliGRAM(s) Enteral Tube daily  propofol Infusion 30 MICROgram(s)/kG/Min IV Continuous <Continuous>  valproic  acid Syrup 250 milliGRAM(s) Oral two times a day    Drug Dosing Weight  Height (cm): 167.6 (03 Aug 2022 20:11)  Weight (kg): 57 (03 Jan 2024 14:30)  BMI (kg/m2): 20.3 (03 Jan 2024 14:30)  BSA (m2): 1.64 (03 Jan 2024 14:30)    PHYSICAL EXAM:    LINES/DRAINS/DEVICES  CENTRAL LINE: [ ] YES [ ] NO  LOCATION:     ACEVEDO: [ ] YES [ ] NO     A-LINE:  [ ] YES [ ] NO  LOCATION:       ICU Vital Signs Last 24 Hrs  T(C): 37.7 (10 Darrell 2024 07:00), Max: 38.2 (10 Darrell 2024 03:45)  T(F): 99.9 (10 Darrell 2024 07:00), Max: 100.8 (10 Darrell 2024 03:45)  HR: 90 (10 Darrell 2024 07:00) (48 - 104)  BP: 90/69 (10 Darrell 2024 07:00) (79/65 - 142/76)  BP(mean): 77 (10 Darrell 2024 07:00) (65 - 104)  ABP: --  ABP(mean): --  RR: 15 (10 Darrell 2024 07:00) (9 - 20)  SpO2: 93% (10 Darrell 2024 07:00) (71% - 100%)    O2 Parameters below as of 09 Jan 2024 20:00  Patient On (Oxygen Delivery Method): ventilator    O2 Concentration (%): 50        ABG - ( 10 Darrell 2024 03:35 )  pH, Arterial: 7.47  pH, Blood: x     /  pCO2: 45    /  pO2: 123   / HCO3: 33    / Base Excess: 8.0   /  SaO2: 100                   01-09 @ 07:01  -  01-10 @ 07:00  --------------------------------------------------------  IN: 941.9 mL / OUT: 1100 mL / NET: -158.1 mL        Mode: AC/ CMV (Assist Control/ Continuous Mandatory Ventilation)  RR (machine): 14  TV (machine): 400  FiO2: 50  PEEP: 5  ITime: 1  MAP: 7  PIP: 19        LABS:  CBC Full  -  ( 10 Darrell 2024 04:11 )  WBC Count : 15.78 K/uL  RBC Count : 4.56 M/uL  Hemoglobin : 13.5 g/dL  Hematocrit : 41.5 %  Platelet Count - Automated : 362 K/uL  Mean Cell Volume : 91.0 fl  Mean Cell Hemoglobin : 29.6 pg  Mean Cell Hemoglobin Concentration : 32.5 gm/dL  Auto Neutrophil # : 13.68 K/uL  Auto Lymphocyte # : 1.10 K/uL  Auto Monocyte # : 0.84 K/uL  Auto Eosinophil # : 0.09 K/uL  Auto Basophil # : 0.02 K/uL  Auto Neutrophil % : 86.7 %  Auto Lymphocyte % : 7.0 %  Auto Monocyte % : 5.3 %  Auto Eosinophil % : 0.6 %  Auto Basophil % : 0.1 %    01-10    142  |  107  |  18  ----------------------------<  211<H>  4.0   |  32<H>  |  0.76    Ca    9.3      10 Darrell 2024 04:11  Phos  2.6     01-10  Mg     2.1     01-10    TPro  7.0  /  Alb  2.9<L>  /  TBili  0.4  /  DBili  x   /  AST  10  /  ALT  28  /  AlkPhos  54  01-10      Urinalysis Basic - ( 10 Darrell 2024 04:11 )    Color: x / Appearance: x / SG: x / pH: x  Gluc: 211 mg/dL / Ketone: x  / Bili: x / Urobili: x   Blood: x / Protein: x / Nitrite: x   Leuk Esterase: x / RBC: x / WBC x   Sq Epi: x / Non Sq Epi: x / Bacteria: x           INTERVAL HPI/OVERNIGHT EVENTS:       PRESSORS: [ ] YES [ ] NO  WHICH:    ANTIBIOTICS:                  DATE STARTED:  ANTIBIOTICS:                  DATE STARTED:    Antimicrobial:    Cardiovascular:  norepinephrine Infusion 0.1 MICROgram(s)/kG/Min IV Continuous <Continuous>    Pulmonary:  albuterol    90 MICROgram(s) HFA Inhaler 2 Puff(s) Inhalation every 6 hours    Hematalogic:  aspirin  chewable 81 milliGRAM(s) Oral daily  enoxaparin Injectable 40 milliGRAM(s) SubCutaneous every 24 hours    Other:  acetaminophen     Tablet .. 650 milliGRAM(s) Oral every 6 hours PRN  atorvastatin 20 milliGRAM(s) Oral at bedtime  chlorhexidine 0.12% Liquid 15 milliLiter(s) Oral Mucosa every 12 hours  insulin lispro (ADMELOG) corrective regimen sliding scale   SubCutaneous every 6 hours  pantoprazole   Suspension 40 milliGRAM(s) Enteral Tube daily  propofol Infusion 30 MICROgram(s)/kG/Min IV Continuous <Continuous>  valproic  acid Syrup 250 milliGRAM(s) Oral two times a day    acetaminophen     Tablet .. 650 milliGRAM(s) Oral every 6 hours PRN  albuterol    90 MICROgram(s) HFA Inhaler 2 Puff(s) Inhalation every 6 hours  aspirin  chewable 81 milliGRAM(s) Oral daily  atorvastatin 20 milliGRAM(s) Oral at bedtime  chlorhexidine 0.12% Liquid 15 milliLiter(s) Oral Mucosa every 12 hours  enoxaparin Injectable 40 milliGRAM(s) SubCutaneous every 24 hours  insulin lispro (ADMELOG) corrective regimen sliding scale   SubCutaneous every 6 hours  norepinephrine Infusion 0.1 MICROgram(s)/kG/Min IV Continuous <Continuous>  pantoprazole   Suspension 40 milliGRAM(s) Enteral Tube daily  propofol Infusion 30 MICROgram(s)/kG/Min IV Continuous <Continuous>  valproic  acid Syrup 250 milliGRAM(s) Oral two times a day    Drug Dosing Weight  Height (cm): 167.6 (03 Aug 2022 20:11)  Weight (kg): 57 (03 Jan 2024 14:30)  BMI (kg/m2): 20.3 (03 Jan 2024 14:30)  BSA (m2): 1.64 (03 Jan 2024 14:30)    PHYSICAL EXAM:    LINES/DRAINS/DEVICES  CENTRAL LINE: [ ] YES [ ] NO  LOCATION:     ACEEVDO: [ ] YES [ ] NO     A-LINE:  [ ] YES [ ] NO  LOCATION:       ICU Vital Signs Last 24 Hrs  T(C): 37.7 (10 Darrell 2024 07:00), Max: 38.2 (10 Darrell 2024 03:45)  T(F): 99.9 (10 Darrell 2024 07:00), Max: 100.8 (10 Darrell 2024 03:45)  HR: 90 (10 Darrell 2024 07:00) (48 - 104)  BP: 90/69 (10 Darrell 2024 07:00) (79/65 - 142/76)  BP(mean): 77 (10 Darrell 2024 07:00) (65 - 104)  ABP: --  ABP(mean): --  RR: 15 (10 Darrell 2024 07:00) (9 - 20)  SpO2: 93% (10 Darrell 2024 07:00) (71% - 100%)    O2 Parameters below as of 09 Jan 2024 20:00  Patient On (Oxygen Delivery Method): ventilator    O2 Concentration (%): 50        ABG - ( 10 Darrell 2024 03:35 )  pH, Arterial: 7.47  pH, Blood: x     /  pCO2: 45    /  pO2: 123   / HCO3: 33    / Base Excess: 8.0   /  SaO2: 100                   01-09 @ 07:01  -  01-10 @ 07:00  --------------------------------------------------------  IN: 941.9 mL / OUT: 1100 mL / NET: -158.1 mL        Mode: AC/ CMV (Assist Control/ Continuous Mandatory Ventilation)  RR (machine): 14  TV (machine): 400  FiO2: 50  PEEP: 5  ITime: 1  MAP: 7  PIP: 19        LABS:  CBC Full  -  ( 10 Darrell 2024 04:11 )  WBC Count : 15.78 K/uL  RBC Count : 4.56 M/uL  Hemoglobin : 13.5 g/dL  Hematocrit : 41.5 %  Platelet Count - Automated : 362 K/uL  Mean Cell Volume : 91.0 fl  Mean Cell Hemoglobin : 29.6 pg  Mean Cell Hemoglobin Concentration : 32.5 gm/dL  Auto Neutrophil # : 13.68 K/uL  Auto Lymphocyte # : 1.10 K/uL  Auto Monocyte # : 0.84 K/uL  Auto Eosinophil # : 0.09 K/uL  Auto Basophil # : 0.02 K/uL  Auto Neutrophil % : 86.7 %  Auto Lymphocyte % : 7.0 %  Auto Monocyte % : 5.3 %  Auto Eosinophil % : 0.6 %  Auto Basophil % : 0.1 %    01-10    142  |  107  |  18  ----------------------------<  211<H>  4.0   |  32<H>  |  0.76    Ca    9.3      10 Darrell 2024 04:11  Phos  2.6     01-10  Mg     2.1     01-10    TPro  7.0  /  Alb  2.9<L>  /  TBili  0.4  /  DBili  x   /  AST  10  /  ALT  28  /  AlkPhos  54  01-10      Urinalysis Basic - ( 10 Darrell 2024 04:11 )    Color: x / Appearance: x / SG: x / pH: x  Gluc: 211 mg/dL / Ketone: x  / Bili: x / Urobili: x   Blood: x / Protein: x / Nitrite: x   Leuk Esterase: x / RBC: x / WBC x   Sq Epi: x / Non Sq Epi: x / Bacteria: x           INTERVAL HPI/OVERNIGHT EVENTS:   Pt was extubated and ultimately required reintubation for airway protection and inability to clear oral secretions.     PRESSORS: [ ] YES [X] NO  WHICH:      Antimicrobial:    Cardiovascular:  norepinephrine Infusion 0.1 MICROgram(s)/kG/Min IV Continuous <Continuous>    Pulmonary:  albuterol    90 MICROgram(s) HFA Inhaler 2 Puff(s) Inhalation every 6 hours    Hematalogic:  aspirin  chewable 81 milliGRAM(s) Oral daily  enoxaparin Injectable 40 milliGRAM(s) SubCutaneous every 24 hours    Other:  acetaminophen     Tablet .. 650 milliGRAM(s) Oral every 6 hours PRN  atorvastatin 20 milliGRAM(s) Oral at bedtime  chlorhexidine 0.12% Liquid 15 milliLiter(s) Oral Mucosa every 12 hours  insulin lispro (ADMELOG) corrective regimen sliding scale   SubCutaneous every 6 hours  pantoprazole   Suspension 40 milliGRAM(s) Enteral Tube daily  propofol Infusion 30 MICROgram(s)/kG/Min IV Continuous <Continuous>  valproic  acid Syrup 250 milliGRAM(s) Oral two times a day    acetaminophen     Tablet .. 650 milliGRAM(s) Oral every 6 hours PRN  albuterol    90 MICROgram(s) HFA Inhaler 2 Puff(s) Inhalation every 6 hours  aspirin  chewable 81 milliGRAM(s) Oral daily  atorvastatin 20 milliGRAM(s) Oral at bedtime  chlorhexidine 0.12% Liquid 15 milliLiter(s) Oral Mucosa every 12 hours  enoxaparin Injectable 40 milliGRAM(s) SubCutaneous every 24 hours  insulin lispro (ADMELOG) corrective regimen sliding scale   SubCutaneous every 6 hours  norepinephrine Infusion 0.1 MICROgram(s)/kG/Min IV Continuous <Continuous>  pantoprazole   Suspension 40 milliGRAM(s) Enteral Tube daily  propofol Infusion 30 MICROgram(s)/kG/Min IV Continuous <Continuous>  valproic  acid Syrup 250 milliGRAM(s) Oral two times a day    Drug Dosing Weight  Height (cm): 167.6 (03 Aug 2022 20:11)  Weight (kg): 57 (03 Jan 2024 14:30)  BMI (kg/m2): 20.3 (03 Jan 2024 14:30)  BSA (m2): 1.64 (03 Jan 2024 14:30)    PHYSICAL EXAM:  GENERAL: intubated and sedated  EYES: pupils 3mm and sluggish  NECK: Supple, No JVD; Trachea midline   NERVOUS SYSTEM:  responsive to noxious stimuli, minimal gag reflex  CHEST/LUNG:  breath sounds diminished bilaterally, No rales, rhonchi, or wheezing.  HEART: Regular rate and rhythm; No murmurs, rubs, or gallops  ABDOMEN: Soft, Nontender, Nondistended; Bowel sounds present, no pain or masses on palpation  : voiding well, Acevedo in place  EXTREMITIES:  2+ Peripheral Pulses, No clubbing, cyanosis, or edema  SKIN: warm, intact, no lesions     LINES/DRAINS/DEVICES  CENTRAL LINE: [ ] YES [x] NO  LOCATION:     ACEVEDO: [X] YES [ ] NO     A-LINE:  [ ] YES [X] NO  LOCATION:       ICU Vital Signs Last 24 Hrs  T(C): 37.7 (10 Darrell 2024 07:00), Max: 38.2 (10 Darrell 2024 03:45)  T(F): 99.9 (10 Darrell 2024 07:00), Max: 100.8 (10 Darrell 2024 03:45)  HR: 90 (10 Darrell 2024 07:00) (48 - 104)  BP: 90/69 (10 Darrell 2024 07:00) (79/65 - 142/76)  BP(mean): 77 (10 Darrell 2024 07:00) (65 - 104)  ABP: --  ABP(mean): --  RR: 15 (10 Darrell 2024 07:00) (9 - 20)  SpO2: 93% (10 Darrell 2024 07:00) (71% - 100%)    O2 Parameters below as of 09 Jan 2024 20:00  Patient On (Oxygen Delivery Method): ventilator    O2 Concentration (%): 50      ABG - ( 10 Darrell 2024 03:35 )  pH, Arterial: 7.47  pH, Blood: x     /  pCO2: 45    /  pO2: 123   / HCO3: 33    / Base Excess: 8.0   /  SaO2: 100         01-09 @ 07:01  -  01-10 @ 07:00  --------------------------------------------------------  IN: 941.9 mL / OUT: 1100 mL / NET: -158.1 mL      Mode: AC/ CMV (Assist Control/ Continuous Mandatory Ventilation)  RR (machine): 14  TV (machine): 400  FiO2: 50  PEEP: 5  ITime: 1  MAP: 7  PIP: 19      LABS:  CBC Full  -  ( 10 Adrrell 2024 04:11 )  WBC Count : 15.78 K/uL  RBC Count : 4.56 M/uL  Hemoglobin : 13.5 g/dL  Hematocrit : 41.5 %  Platelet Count - Automated : 362 K/uL  Mean Cell Volume : 91.0 fl  Mean Cell Hemoglobin : 29.6 pg  Mean Cell Hemoglobin Concentration : 32.5 gm/dL  Auto Neutrophil # : 13.68 K/uL  Auto Lymphocyte # : 1.10 K/uL  Auto Monocyte # : 0.84 K/uL  Auto Eosinophil # : 0.09 K/uL  Auto Basophil # : 0.02 K/uL  Auto Neutrophil % : 86.7 %  Auto Lymphocyte % : 7.0 %  Auto Monocyte % : 5.3 %  Auto Eosinophil % : 0.6 %  Auto Basophil % : 0.1 %    01-10    142  |  107  |  18  ----------------------------<  211<H>  4.0   |  32<H>  |  0.76    Ca    9.3      10 Darrell 2024 04:11  Phos  2.6     01-10  Mg     2.1     01-10    TPro  7.0  /  Alb  2.9<L>  /  TBili  0.4  /  DBili  x   /  AST  10  /  ALT  28  /  AlkPhos  54  01-10      Urinalysis Basic - ( 10 Darrell 2024 04:11 )    Color: x / Appearance: x / SG: x / pH: x  Gluc: 211 mg/dL / Ketone: x  / Bili: x / Urobili: x   Blood: x / Protein: x / Nitrite: x   Leuk Esterase: x / RBC: x / WBC x   Sq Epi: x / Non Sq Epi: x / Bacteria: x           INTERVAL HPI/OVERNIGHT EVENTS:   Pt was extubated and ultimately required reintubation for airway protection and inability to clear oral secretions.     PRESSORS: [ ] YES [X] NO  WHICH:      Antimicrobial:    Cardiovascular:  norepinephrine Infusion 0.1 MICROgram(s)/kG/Min IV Continuous <Continuous>    Pulmonary:  albuterol    90 MICROgram(s) HFA Inhaler 2 Puff(s) Inhalation every 6 hours    Hematalogic:  aspirin  chewable 81 milliGRAM(s) Oral daily  enoxaparin Injectable 40 milliGRAM(s) SubCutaneous every 24 hours    Other:  acetaminophen     Tablet .. 650 milliGRAM(s) Oral every 6 hours PRN  atorvastatin 20 milliGRAM(s) Oral at bedtime  chlorhexidine 0.12% Liquid 15 milliLiter(s) Oral Mucosa every 12 hours  insulin lispro (ADMELOG) corrective regimen sliding scale   SubCutaneous every 6 hours  pantoprazole   Suspension 40 milliGRAM(s) Enteral Tube daily  propofol Infusion 30 MICROgram(s)/kG/Min IV Continuous <Continuous>  valproic  acid Syrup 250 milliGRAM(s) Oral two times a day    acetaminophen     Tablet .. 650 milliGRAM(s) Oral every 6 hours PRN  albuterol    90 MICROgram(s) HFA Inhaler 2 Puff(s) Inhalation every 6 hours  aspirin  chewable 81 milliGRAM(s) Oral daily  atorvastatin 20 milliGRAM(s) Oral at bedtime  chlorhexidine 0.12% Liquid 15 milliLiter(s) Oral Mucosa every 12 hours  enoxaparin Injectable 40 milliGRAM(s) SubCutaneous every 24 hours  insulin lispro (ADMELOG) corrective regimen sliding scale   SubCutaneous every 6 hours  norepinephrine Infusion 0.1 MICROgram(s)/kG/Min IV Continuous <Continuous>  pantoprazole   Suspension 40 milliGRAM(s) Enteral Tube daily  propofol Infusion 30 MICROgram(s)/kG/Min IV Continuous <Continuous>  valproic  acid Syrup 250 milliGRAM(s) Oral two times a day    Drug Dosing Weight  Height (cm): 167.6 (03 Aug 2022 20:11)  Weight (kg): 57 (03 Jan 2024 14:30)  BMI (kg/m2): 20.3 (03 Jan 2024 14:30)  BSA (m2): 1.64 (03 Jan 2024 14:30)    PHYSICAL EXAM:  GENERAL: intubated and sedated  EYES: pupils 3mm and sluggish  NECK: Supple, No JVD; Trachea midline   NERVOUS SYSTEM:  responsive to noxious stimuli, minimal gag reflex  CHEST/LUNG:  breath sounds diminished bilaterally, No rales, rhonchi, or wheezing.  HEART: Regular rate and rhythm; No murmurs, rubs, or gallops  ABDOMEN: Soft, Nontender, Nondistended; Bowel sounds present, no pain or masses on palpation  : voiding well, Acevedo in place  EXTREMITIES:  2+ Peripheral Pulses, No clubbing, cyanosis, or edema  SKIN: warm, intact, no lesions     LINES/DRAINS/DEVICES  CENTRAL LINE: [ ] YES [x] NO  LOCATION:     ACEVEDO: [X] YES [ ] NO     A-LINE:  [ ] YES [X] NO  LOCATION:       ICU Vital Signs Last 24 Hrs  T(C): 37.7 (10 Darrell 2024 07:00), Max: 38.2 (10 Darrell 2024 03:45)  T(F): 99.9 (10 Darrell 2024 07:00), Max: 100.8 (10 Darrell 2024 03:45)  HR: 90 (10 Darrell 2024 07:00) (48 - 104)  BP: 90/69 (10 Darrell 2024 07:00) (79/65 - 142/76)  BP(mean): 77 (10 Darrell 2024 07:00) (65 - 104)  ABP: --  ABP(mean): --  RR: 15 (10 Darrell 2024 07:00) (9 - 20)  SpO2: 93% (10 Darrell 2024 07:00) (71% - 100%)    O2 Parameters below as of 09 Jan 2024 20:00  Patient On (Oxygen Delivery Method): ventilator    O2 Concentration (%): 50      ABG - ( 10 Darrell 2024 03:35 )  pH, Arterial: 7.47  pH, Blood: x     /  pCO2: 45    /  pO2: 123   / HCO3: 33    / Base Excess: 8.0   /  SaO2: 100         01-09 @ 07:01  -  01-10 @ 07:00  --------------------------------------------------------  IN: 941.9 mL / OUT: 1100 mL / NET: -158.1 mL      Mode: AC/ CMV (Assist Control/ Continuous Mandatory Ventilation)  RR (machine): 14  TV (machine): 400  FiO2: 50  PEEP: 5  ITime: 1  MAP: 7  PIP: 19      LABS:  CBC Full  -  ( 10 Darrell 2024 04:11 )  WBC Count : 15.78 K/uL  RBC Count : 4.56 M/uL  Hemoglobin : 13.5 g/dL  Hematocrit : 41.5 %  Platelet Count - Automated : 362 K/uL  Mean Cell Volume : 91.0 fl  Mean Cell Hemoglobin : 29.6 pg  Mean Cell Hemoglobin Concentration : 32.5 gm/dL  Auto Neutrophil # : 13.68 K/uL  Auto Lymphocyte # : 1.10 K/uL  Auto Monocyte # : 0.84 K/uL  Auto Eosinophil # : 0.09 K/uL  Auto Basophil # : 0.02 K/uL  Auto Neutrophil % : 86.7 %  Auto Lymphocyte % : 7.0 %  Auto Monocyte % : 5.3 %  Auto Eosinophil % : 0.6 %  Auto Basophil % : 0.1 %    01-10    142  |  107  |  18  ----------------------------<  211<H>  4.0   |  32<H>  |  0.76    Ca    9.3      10 Darrell 2024 04:11  Phos  2.6     01-10  Mg     2.1     01-10    TPro  7.0  /  Alb  2.9<L>  /  TBili  0.4  /  DBili  x   /  AST  10  /  ALT  28  /  AlkPhos  54  01-10      Urinalysis Basic - ( 10 Darrell 2024 04:11 )    Color: x / Appearance: x / SG: x / pH: x  Gluc: 211 mg/dL / Ketone: x  / Bili: x / Urobili: x   Blood: x / Protein: x / Nitrite: x   Leuk Esterase: x / RBC: x / WBC x   Sq Epi: x / Non Sq Epi: x / Bacteria: x

## 2024-01-10 NOTE — PROGRESS NOTE ADULT - SUBJECTIVE AND OBJECTIVE BOX
EP Attending  HISTORY OF PRESENT ILLNESS: HPI:  A 63 year old male, from [facility], with PMH of CVA, Alzheimer's, nonverbal at baseline, GERD, Schizophrenia, and Constipation, was brought into the ED s/p mechanical fall, poor oral intake, and Covid-19 infection on 12/27/23 as per NH papers. Unable to obtain history from patient since he is nonverbal, history obtained from chart review.  (28 Dec 2023 20:54)    Was pleasant and alert this morning, not talkative.  Apparently then had an RRT For hypotension and has gone to MICU for septic shock treatment.  Takes antipsychotic Rx at his nursing home, and was referred to EP re: sinus bradycardia and borderline QT prolongation on intake.  No reported history of seizures or VT/VF in chart.  Not able to participate in HPI/ROS due to psych issues.  1/4- in ICU, not intubated, awake but not interactive.  1/5- intubated overnight.  1/6- remains intubated/sedated.  1/7- uneventful overnight.  1/8 intubatd/comfortable this morning.  pending extubation later.  1/9- extubated this morning, and re-intubated today.  Date of service 1/10- remains intubated.  febrile to 38C.    PAST MEDICAL & SURGICAL HISTORY:  CVA (cerebral vascular accident)  CVA (cerebral vascular accident)  HLD (hyperlipidemia)  S/P percutaneous endoscopic gastrostomy (PEG) tube placement    acetaminophen     Tablet .. 650 milliGRAM(s) Oral every 6 hours PRN  acetylcysteine 20%  Inhalation 4 milliLiter(s) Inhalation three times a day  albuterol    90 MICROgram(s) HFA Inhaler 2 Puff(s) Inhalation every 6 hours  aspirin  chewable 81 milliGRAM(s) Oral daily  atorvastatin 20 milliGRAM(s) Oral at bedtime  cefepime   IVPB 2000 milliGRAM(s) IV Intermittent once  cefepime   IVPB 2000 milliGRAM(s) IV Intermittent every 8 hours  cefepime   IVPB      chlorhexidine 0.12% Liquid 15 milliLiter(s) Oral Mucosa every 12 hours  enoxaparin Injectable 40 milliGRAM(s) SubCutaneous every 24 hours  insulin lispro (ADMELOG) corrective regimen sliding scale   SubCutaneous every 6 hours  pantoprazole   Suspension 40 milliGRAM(s) Enteral Tube daily  propofol Infusion 30 MICROgram(s)/kG/Min IV Continuous <Continuous>  valproic  acid Syrup 250 milliGRAM(s) Oral two times a day                            13.5   15.78 )-----------( 362      ( 10 Darrell 2024 04:11 )             41.5       01-10    142  |  107  |  18  ----------------------------<  211<H>  4.0   |  32<H>  |  0.76    Ca    9.3      10 Darrell 2024 04:11  Phos  2.6     01-10  Mg     2.1     01-10    TPro  7.0  /  Alb  2.9<L>  /  TBili  0.4  /  DBili  x   /  AST  10  /  ALT  28  /  AlkPhos  54  01-10    T(C): 38 (01-10-24 @ 14:30), Max: 38.2 (01-10-24 @ 03:45)  HR: 96 (01-10-24 @ 14:30) (61 - 104)  BP: 104/70 (01-10-24 @ 14:30) (82/70 - 142/76)  RR: 12 (01-10-24 @ 14:30) (12 - 20)  SpO2: 98% (01-10-24 @ 14:30) (91% - 100%)  Wt(kg): --    I&O's Summary    09 Jan 2024 07:01  -  10 Darrell 2024 07:00  --------------------------------------------------------  IN: 953.3 mL / OUT: 1100 mL / NET: -146.7 mL    10 Darrell 2024 07:01  -  10 Darrell 2024 15:13  --------------------------------------------------------  IN: 158.7 mL / OUT: 290 mL / NET: -131.3 mL    Appearance: elderly  man intubated and sedated  HEENT:   Normal oral mucosa, PERRL, EOMI	  Lymphatic: No lymphadenopathy , no edema  Cardiovascular: Normal S1 S2, No JVD, No murmurs , Peripheral pulses palpable 2+ bilaterally  Respiratory: Lungs clear to auscultation, normal effort 	  Gastrointestinal:  Soft, Non-tender, + BS	  Skin: No rashes, No ecchymoses, No cyanosis, warm to touch  Musculoskeletal: ROM/strength unable to assess due to sedation  Psychiatry:  Mood & affect unable to assess due to sedation.    TELEMETRY: 	bradycardic, sinus 40s-50s.  QT <500 corrected in setting of bradycardia.  ECG: NSR, QTc 450-470ms.  biphasic T-waves.  Echo: LVEF normal in 2020 at time of stroke.	  	  ASSESSMENT/PLAN: Mr Arriola is a pleasant 63y Male at nursing home due to stroke, dementia, and schizophrenia. Brought in for loss of appetite.  intubated, sedated, for airway protection, prior stroke and has not really been alert.  QTc upper limits of normal, chronically.  Replace K to 4-4.5, Mg 2 to 2.  Limit add'l QT prolonging medications.  As he is bedbound, not apparently symptomatic from sinus bradycardia. No long pauses on telemetry, or PVC-initiated arrhythmia.  At this time no indication for permanent pacing.  Will follow with you.  Prognosis worsening with repeat intubations / fever.    Ramses Pena M.D.  Cardiac Electrophysiology    office [phone]  pager [phone] EP Attending  HISTORY OF PRESENT ILLNESS: HPI:  A 63 year old male, from [facility], with PMH of CVA, Alzheimer's, nonverbal at baseline, GERD, Schizophrenia, and Constipation, was brought into the ED s/p mechanical fall, poor oral intake, and Covid-19 infection on 12/27/23 as per NH papers. Unable to obtain history from patient since he is nonverbal, history obtained from chart review.  (28 Dec 2023 20:54)    Was pleasant and alert this morning, not talkative.  Apparently then had an RRT For hypotension and has gone to MICU for septic shock treatment.  Takes antipsychotic Rx at his nursing home, and was referred to EP re: sinus bradycardia and borderline QT prolongation on intake.  No reported history of seizures or VT/VF in chart.  Not able to participate in HPI/ROS due to psych issues.  1/4- in ICU, not intubated, awake but not interactive.  1/5- intubated overnight.  1/6- remains intubated/sedated.  1/7- uneventful overnight.  1/8 intubatd/comfortable this morning.  pending extubation later.  1/9- extubated this morning, and re-intubated today.  Date of service 1/10- remains intubated.  febrile to 38C.    PAST MEDICAL & SURGICAL HISTORY:  CVA (cerebral vascular accident)  CVA (cerebral vascular accident)  HLD (hyperlipidemia)  S/P percutaneous endoscopic gastrostomy (PEG) tube placement    acetaminophen     Tablet .. 650 milliGRAM(s) Oral every 6 hours PRN  acetylcysteine 20%  Inhalation 4 milliLiter(s) Inhalation three times a day  albuterol    90 MICROgram(s) HFA Inhaler 2 Puff(s) Inhalation every 6 hours  aspirin  chewable 81 milliGRAM(s) Oral daily  atorvastatin 20 milliGRAM(s) Oral at bedtime  cefepime   IVPB 2000 milliGRAM(s) IV Intermittent once  cefepime   IVPB 2000 milliGRAM(s) IV Intermittent every 8 hours  cefepime   IVPB      chlorhexidine 0.12% Liquid 15 milliLiter(s) Oral Mucosa every 12 hours  enoxaparin Injectable 40 milliGRAM(s) SubCutaneous every 24 hours  insulin lispro (ADMELOG) corrective regimen sliding scale   SubCutaneous every 6 hours  pantoprazole   Suspension 40 milliGRAM(s) Enteral Tube daily  propofol Infusion 30 MICROgram(s)/kG/Min IV Continuous <Continuous>  valproic  acid Syrup 250 milliGRAM(s) Oral two times a day                            13.5   15.78 )-----------( 362      ( 10 Darrell 2024 04:11 )             41.5       01-10    142  |  107  |  18  ----------------------------<  211<H>  4.0   |  32<H>  |  0.76    Ca    9.3      10 Darrell 2024 04:11  Phos  2.6     01-10  Mg     2.1     01-10    TPro  7.0  /  Alb  2.9<L>  /  TBili  0.4  /  DBili  x   /  AST  10  /  ALT  28  /  AlkPhos  54  01-10    T(C): 38 (01-10-24 @ 14:30), Max: 38.2 (01-10-24 @ 03:45)  HR: 96 (01-10-24 @ 14:30) (61 - 104)  BP: 104/70 (01-10-24 @ 14:30) (82/70 - 142/76)  RR: 12 (01-10-24 @ 14:30) (12 - 20)  SpO2: 98% (01-10-24 @ 14:30) (91% - 100%)  Wt(kg): --    I&O's Summary    09 Jan 2024 07:01  -  10 Darrell 2024 07:00  --------------------------------------------------------  IN: 953.3 mL / OUT: 1100 mL / NET: -146.7 mL    10 Darrell 2024 07:01  -  10 Darrell 2024 15:13  --------------------------------------------------------  IN: 158.7 mL / OUT: 290 mL / NET: -131.3 mL    Appearance: elderly  man intubated and sedated  HEENT:   Normal oral mucosa, PERRL, EOMI	  Lymphatic: No lymphadenopathy , no edema  Cardiovascular: Normal S1 S2, No JVD, No murmurs , Peripheral pulses palpable 2+ bilaterally  Respiratory: Lungs clear to auscultation, normal effort 	  Gastrointestinal:  Soft, Non-tender, + BS	  Skin: No rashes, No ecchymoses, No cyanosis, warm to touch  Musculoskeletal: ROM/strength unable to assess due to sedation  Psychiatry:  Mood & affect unable to assess due to sedation.    TELEMETRY: 	bradycardic, sinus 40s-50s.  QT <500 corrected in setting of bradycardia.  ECG: NSR, QTc 450-470ms.  biphasic T-waves.  Echo: LVEF normal in 2020 at time of stroke.	  	  ASSESSMENT/PLAN: Mr Arriola is a pleasant 63y Male at nursing home due to stroke, dementia, and schizophrenia. Brought in for loss of appetite.  intubated, sedated, for airway protection, prior stroke and has not really been alert.  QTc upper limits of normal, chronically.  Replace K to 4-4.5, Mg 2 to 2.  Limit add'l QT prolonging medications.  As he is bedbound, not apparently symptomatic from sinus bradycardia. No long pauses on telemetry, or PVC-initiated arrhythmia.  At this time no indication for permanent pacing.  Will follow with you.  Prognosis worsening with repeat intubations / fever.    Ramses Pena M.D.  Cardiac Electrophysiology    office [phone]  pager [phone]

## 2024-01-10 NOTE — PROGRESS NOTE ADULT - SUBJECTIVE AND OBJECTIVE BOX
Patient is a 63y old  Male who presents with a chief complaint of Sepsis and AHRF (10 Darrell 2024 15:13)    PATIENT IS SEEN AND EXAMINED IN ICU.      ALLERGIES:  No Known Allergies      VITALS:    Vital Signs Last 24 Hrs  T(C): 38 (10 Darrell 2024 18:45), Max: 38.2 (10 Darrell 2024 03:45)  T(F): 100.4 (10 Darrell 2024 18:45), Max: 100.8 (10 Darrell 2024 03:45)  HR: 77 (10 Darrell 2024 18:45) (69 - 104)  BP: 96/75 (10 Darrell 2024 18:45) (86/61 - 128/84)  BP(mean): 83 (10 Darrell 2024 18:45) (71 - 99)  RR: 12 (10 Darrell 2024 18:45) (10 - 19)  SpO2: 100% (10 Darrell 2024 18:45) (86% - 100%)    Parameters below as of 09 Jan 2024 20:00  Patient On (Oxygen Delivery Method): ventilator    O2 Concentration (%): 50    LABS:    CBC Full  -  ( 10 Darrell 2024 04:11 )  WBC Count : 15.78 K/uL  RBC Count : 4.56 M/uL  Hemoglobin : 13.5 g/dL  Hematocrit : 41.5 %  Platelet Count - Automated : 362 K/uL  Mean Cell Volume : 91.0 fl  Mean Cell Hemoglobin : 29.6 pg  Mean Cell Hemoglobin Concentration : 32.5 gm/dL  Auto Neutrophil # : 13.68 K/uL  Auto Lymphocyte # : 1.10 K/uL  Auto Monocyte # : 0.84 K/uL  Auto Eosinophil # : 0.09 K/uL  Auto Basophil # : 0.02 K/uL  Auto Neutrophil % : 86.7 %  Auto Lymphocyte % : 7.0 %  Auto Monocyte % : 5.3 %  Auto Eosinophil % : 0.6 %  Auto Basophil % : 0.1 %      01-10    142  |  107  |  18  ----------------------------<  211<H>  4.0   |  32<H>  |  0.76    Ca    9.3      10 Darrell 2024 04:11  Phos  2.6     01-10  Mg     2.1     01-10    TPro  7.0  /  Alb  2.9<L>  /  TBili  0.4  /  DBili  x   /  AST  10  /  ALT  28  /  AlkPhos  54  01-10    CAPILLARY BLOOD GLUCOSE      POCT Blood Glucose.: 118 mg/dL (10 Darrell 2024 17:20)  POCT Blood Glucose.: 94 mg/dL (10 Darrell 2024 10:44)  POCT Blood Glucose.: 175 mg/dL (10 Darrell 2024 05:28)  POCT Blood Glucose.: 145 mg/dL (09 Jan 2024 23:39)        LIVER FUNCTIONS - ( 10 Darrell 2024 04:11 )  Alb: 2.9 g/dL / Pro: 7.0 g/dL / ALK PHOS: 54 U/L / ALT: 28 U/L DA / AST: 10 U/L / GGT: x           Creatinine Trend: 0.76<--, 0.64<--, 0.71<--, 0.64<--, 0.69<--, 0.80<--  I&O's Summary    09 Jan 2024 07:01  -  10 Darrell 2024 07:00  --------------------------------------------------------  IN: 953.3 mL / OUT: 1100 mL / NET: -146.7 mL    10 Darrell 2024 07:01  -  10 Darrell 2024 19:34  --------------------------------------------------------  IN: 463.2 mL / OUT: 840 mL / NET: -376.8 mL        ABG - ( 10 Darrell 2024 17:10 )  pH, Arterial: 7.45  pH, Blood: x     /  pCO2: 45    /  pO2: 86    / HCO3: 31    / Base Excess: 6.4   /  SaO2: 99                  Catheterized Catheterized  12-29 @ 12:37   No growth  --  --      .Blood Blood  12-29 @ 02:05   No growth at 5 days  --  --      .Blood Blood  12-29 @ 01:55   No growth at 5 days  --  --          MEDICATIONS:    MEDICATIONS  (STANDING):  albuterol    90 MICROgram(s) HFA Inhaler 2 Puff(s) Inhalation every 6 hours  aspirin  chewable 81 milliGRAM(s) Oral daily  atorvastatin 20 milliGRAM(s) Oral at bedtime  cefepime   IVPB 2000 milliGRAM(s) IV Intermittent every 8 hours  cefepime   IVPB      chlorhexidine 0.12% Liquid 15 milliLiter(s) Oral Mucosa every 12 hours  chlorhexidine 2% Cloths 1 Application(s) Topical <User Schedule>  enoxaparin Injectable 40 milliGRAM(s) SubCutaneous every 24 hours  insulin lispro (ADMELOG) corrective regimen sliding scale   SubCutaneous every 6 hours  pantoprazole   Suspension 40 milliGRAM(s) Enteral Tube daily  propofol Infusion 30 MICROgram(s)/kG/Min (10.3 mL/Hr) IV Continuous <Continuous>  valproic  acid Syrup 250 milliGRAM(s) Oral two times a day      MEDICATIONS  (PRN):  acetaminophen     Tablet .. 650 milliGRAM(s) Oral every 6 hours PRN Temp greater or equal to 38C (100.4F), Mild Pain (1 - 3)  acetaminophen   Oral Liquid .. 650 milliGRAM(s) Oral every 6 hours PRN Temp greater or equal to 38C (100.4F)      REVIEW OF SYSTEMS:                           ALL ROS DONE [ X   ]    CONSTITUTIONAL:  LETHARGIC [   ], FEVER [   ], UNRESPONSIVE [   ]  CVS:  CP  [   ], SOB, [   ], PALPITATIONS [   ], DIZZYNESS [   ]  RS: COUGH [   ], SPUTUM [   ]  GI: ABDOMINAL PAIN [   ], NAUSEA [   ], VOMITINGS [   ], DIARRHEA [   ], CONSTIPATION [   ]  :  DYSURIA [   ], NOCTURIA [   ], INCREASED FREQUENCY [   ], DRIBLING [   ],  SKELETAL: PAINFUL JOINTS [   ], SWOLLEN JOINTS [   ], NECK ACHE [   ], LOW BACK ACHE [   ],  SKIN : ULCERS [   ], RASH [   ], ITCHING [   ]  CNS: HEAD ACHE [   ], DOUBLE VISION [   ], BLURRED VISION [   ], AMS / CONFUSION [   ], SEIZURES [   ], WEAKNESS [   ],TINGLING / NUMBNESS [   ]        PHYSICAL EXAMINATION:  GENERAL APPEARANCE: NO DISTRESS  HEENT:  NO PALLOR, NO  JVD,  NO   NODES, NECK SUPPLE  CVS: S1 +, S2 +,   RS: AEEB,  OCCASIONAL  RALES +,   RHONCHI +   INTUBATED  ABD: SOFT, NT, NO, BS +  EXT: NO PE  SKIN: WARM,   SKELETAL:  REDUCED ROM OF CERVICAL AND LS SPINE  CNS:  AAO X 1    RADIOLOGY :    RADIOLOGY AND READINGS REVIEWED    ASSESSMENT :     Infection due to severe acute respiratory syndrome coronavirus 2 (SARS-CoV-2)    CVA (cerebral vascular accident)    HLD (hyperlipidemia)    S/P percutaneous endoscopic gastrostomy (PEG) tube placement        PLAN:  HPI:  A 63 year old male, from [facility], with PMH of CVA, Alzheimer's, nonverbal at baseline, GERD, Schizophrenia, and Constipation, was brought into the ED s/p mechanical fall, poor oral intake, and Covid-19 infection on 12/27/23 as per NH papers. Unable to obtain history from patient since he is nonverbal, history obtained from chart review.  (28 Dec 2023 20:54)    # PROGNOSIS IS POOR/GUARDED - CRITICAL CARE TEAM DISCUSSING W/ FAMILY REGARDING GOC.     # [1/3] RAPID RESPONSE FOR HYPOTENSION AND HYPOXIA - S/P IV FLUIDS AND PLACED ON NRB FOR HYPOXIA. CRITICAL CARE EVALUATION REQUESTED.     # SEPTIC SHOCK S/T ASPIRATION PNA + DIARRHEA [resolved]  # ACUTE HYPOXIC RESPIRATORY FAILURE S/T ? ASPIRATION EVENT   , COVID19 INFECTION     - ON DECADRON  - PLACED ON CEFEPIME, S/P ROCEPHIN, AZITHROMYCIN   - CHEST PT  - S/P DECADRON  - BCX [NGTD] AND UCX [NGTD]  - S/P IVF, VASOPRESSORS  - ID CONSULT  - CRITICAL CARE MANAGEMENT IN PROGRESS    - [1/4] - PATIENT W/ ? ASPIRATION EVENT - SUBSEQUENTLY REQUIRED INTUBATION W/ MECHANICAL VENTILATION  - [1/8] - EXTUBATED, CHEST PT  - [1/9] - REINTUBATED OVERNIGHT    # BRADYCARDIA  - MONITOR ON TELEMETRY  - F/U ECHOCARDIOGRAM   - OPTIMIZE ELECTROLYTES  - CARDIOLOGY CONSULT  - EP CONSULT    # HYPOKALEMIA  - REPLETING WITH SUPPLEMENT    # HX OF CVA  # HX OF DEMENTIA  # SCHIZOPHRENIA  # GI AND DVT PPX     Patient is a 63y old  Male who presents with a chief complaint of Sepsis and AHRF (10 Darrell 2024 15:13)    PATIENT IS SEEN AND EXAMINED IN ICU.      ALLERGIES:  No Known Allergies      VITALS:    Vital Signs Last 24 Hrs  T(C): 38 (10 Darrell 2024 18:45), Max: 38.2 (10 Darrell 2024 03:45)  T(F): 100.4 (10 Darrell 2024 18:45), Max: 100.8 (10 Darrell 2024 03:45)  HR: 77 (10 Darrell 2024 18:45) (69 - 104)  BP: 96/75 (10 Darrell 2024 18:45) (86/61 - 128/84)  BP(mean): 83 (10 Darrell 2024 18:45) (71 - 99)  RR: 12 (10 Darrell 2024 18:45) (10 - 19)  SpO2: 100% (10 Darrell 2024 18:45) (86% - 100%)    Parameters below as of 09 Jan 2024 20:00  Patient On (Oxygen Delivery Method): ventilator    O2 Concentration (%): 50    LABS:    CBC Full  -  ( 10 Darrell 2024 04:11 )  WBC Count : 15.78 K/uL  RBC Count : 4.56 M/uL  Hemoglobin : 13.5 g/dL  Hematocrit : 41.5 %  Platelet Count - Automated : 362 K/uL  Mean Cell Volume : 91.0 fl  Mean Cell Hemoglobin : 29.6 pg  Mean Cell Hemoglobin Concentration : 32.5 gm/dL  Auto Neutrophil # : 13.68 K/uL  Auto Lymphocyte # : 1.10 K/uL  Auto Monocyte # : 0.84 K/uL  Auto Eosinophil # : 0.09 K/uL  Auto Basophil # : 0.02 K/uL  Auto Neutrophil % : 86.7 %  Auto Lymphocyte % : 7.0 %  Auto Monocyte % : 5.3 %  Auto Eosinophil % : 0.6 %  Auto Basophil % : 0.1 %      01-10    142  |  107  |  18  ----------------------------<  211<H>  4.0   |  32<H>  |  0.76    Ca    9.3      10 Darrell 2024 04:11  Phos  2.6     01-10  Mg     2.1     01-10    TPro  7.0  /  Alb  2.9<L>  /  TBili  0.4  /  DBili  x   /  AST  10  /  ALT  28  /  AlkPhos  54  01-10    CAPILLARY BLOOD GLUCOSE      POCT Blood Glucose.: 118 mg/dL (10 Darrell 2024 17:20)  POCT Blood Glucose.: 94 mg/dL (10 Darrell 2024 10:44)  POCT Blood Glucose.: 175 mg/dL (10 Darrell 2024 05:28)  POCT Blood Glucose.: 145 mg/dL (09 Jan 2024 23:39)        LIVER FUNCTIONS - ( 10 Darrell 2024 04:11 )  Alb: 2.9 g/dL / Pro: 7.0 g/dL / ALK PHOS: 54 U/L / ALT: 28 U/L DA / AST: 10 U/L / GGT: x           Creatinine Trend: 0.76<--, 0.64<--, 0.71<--, 0.64<--, 0.69<--, 0.80<--  I&O's Summary    09 Jan 2024 07:01  -  10 Darrell 2024 07:00  --------------------------------------------------------  IN: 953.3 mL / OUT: 1100 mL / NET: -146.7 mL    10 Darrell 2024 07:01  -  10 Darrell 2024 19:34  --------------------------------------------------------  IN: 463.2 mL / OUT: 840 mL / NET: -376.8 mL        ABG - ( 10 Darrell 2024 17:10 )  pH, Arterial: 7.45  pH, Blood: x     /  pCO2: 45    /  pO2: 86    / HCO3: 31    / Base Excess: 6.4   /  SaO2: 99                  Catheterized Catheterized  12-29 @ 12:37   No growth  --  --      .Blood Blood  12-29 @ 02:05   No growth at 5 days  --  --      .Blood Blood  12-29 @ 01:55   No growth at 5 days  --  --          MEDICATIONS:    MEDICATIONS  (STANDING):  albuterol    90 MICROgram(s) HFA Inhaler 2 Puff(s) Inhalation every 6 hours  aspirin  chewable 81 milliGRAM(s) Oral daily  atorvastatin 20 milliGRAM(s) Oral at bedtime  cefepime   IVPB 2000 milliGRAM(s) IV Intermittent every 8 hours  cefepime   IVPB      chlorhexidine 0.12% Liquid 15 milliLiter(s) Oral Mucosa every 12 hours  chlorhexidine 2% Cloths 1 Application(s) Topical <User Schedule>  enoxaparin Injectable 40 milliGRAM(s) SubCutaneous every 24 hours  insulin lispro (ADMELOG) corrective regimen sliding scale   SubCutaneous every 6 hours  pantoprazole   Suspension 40 milliGRAM(s) Enteral Tube daily  propofol Infusion 30 MICROgram(s)/kG/Min (10.3 mL/Hr) IV Continuous <Continuous>  valproic  acid Syrup 250 milliGRAM(s) Oral two times a day      MEDICATIONS  (PRN):  acetaminophen     Tablet .. 650 milliGRAM(s) Oral every 6 hours PRN Temp greater or equal to 38C (100.4F), Mild Pain (1 - 3)  acetaminophen   Oral Liquid .. 650 milliGRAM(s) Oral every 6 hours PRN Temp greater or equal to 38C (100.4F)      REVIEW OF SYSTEMS:                           ALL ROS DONE [ X   ]    CONSTITUTIONAL:  LETHARGIC [   ], FEVER [   ], UNRESPONSIVE [   ]  CVS:  CP  [   ], SOB, [   ], PALPITATIONS [   ], DIZZYNESS [   ]  RS: COUGH [   ], SPUTUM [   ]  GI: ABDOMINAL PAIN [   ], NAUSEA [   ], VOMITINGS [   ], DIARRHEA [   ], CONSTIPATION [   ]  :  DYSURIA [   ], NOCTURIA [   ], INCREASED FREQUENCY [   ], DRIBLING [   ],  SKELETAL: PAINFUL JOINTS [   ], SWOLLEN JOINTS [   ], NECK ACHE [   ], LOW BACK ACHE [   ],  SKIN : ULCERS [   ], RASH [   ], ITCHING [   ]  CNS: HEAD ACHE [   ], DOUBLE VISION [   ], BLURRED VISION [   ], AMS / CONFUSION [   ], SEIZURES [   ], WEAKNESS [   ],TINGLING / NUMBNESS [   ]        PHYSICAL EXAMINATION:  GENERAL APPEARANCE: NO DISTRESS  HEENT:  NO PALLOR, NO  JVD,  NO   NODES, NECK SUPPLE  CVS: S1 +, S2 +,   RS: AEEB,  OCCASIONAL  RALES +,   RHONCHI +   INTUBATED  ABD: SOFT, NT, NO, BS +  EXT: NO PE  SKIN: WARM,   SKELETAL:  REDUCED ROM OF CERVICAL AND LS SPINE  CNS:  AAO X 1    RADIOLOGY :    RADIOLOGY AND READINGS REVIEWED    ASSESSMENT :     Infection due to severe acute respiratory syndrome coronavirus 2 (SARS-CoV-2)    CVA (cerebral vascular accident)    HLD (hyperlipidemia)    S/P percutaneous endoscopic gastrostomy (PEG) tube placement        PLAN:  HPI:  A 63 year old male, from [facility], with PMH of CVA, Alzheimer's, nonverbal at baseline, GERD, Schizophrenia, and Constipation, was brought into the ED s/p mechanical fall, poor oral intake, and Covid-19 infection on 12/27/23 as per NH papers. Unable to obtain history from patient since he is nonverbal, history obtained from chart review.  (28 Dec 2023 20:54)    # PROGNOSIS IS POOR/GUARDED - CRITICAL CARE TEAM DISCUSSING W/ FAMILY REGARDING GOC.     # [1/3] RAPID RESPONSE FOR HYPOTENSION AND HYPOXIA - S/P IV FLUIDS AND PLACED ON NRB FOR HYPOXIA. CRITICAL CARE EVALUATION REQUESTED.     # SEPTIC SHOCK S/T ASPIRATION PNA + DIARRHEA [resolved]  # ACUTE HYPOXIC RESPIRATORY FAILURE S/T ? ASPIRATION EVENT   , COVID19 INFECTION     - ON DECADRON  - PLACED ON CEFEPIME, S/P ROCEPHIN, AZITHROMYCIN   - CHEST PT  - S/P DECADRON  - BCX [NGTD] AND UCX [NGTD]  - S/P IVF, VASOPRESSORS  - ID CONSULT  - CRITICAL CARE MANAGEMENT IN PROGRESS    - [1/4] - PATIENT W/ ? ASPIRATION EVENT - SUBSEQUENTLY REQUIRED INTUBATION W/ MECHANICAL VENTILATION  - [1/8] - EXTUBATED, CHEST PT  - [1/9] - REINTUBATED OVERNIGHT    # BRADYCARDIA  - MONITOR ON TELEMETRY  - F/U ECHOCARDIOGRAM   - OPTIMIZE ELECTROLYTES  - CARDIOLOGY CONSULT  - EP CONSULT    # HYPOKALEMIA  - REPLETING WITH SUPPLEMENT    # HX OF CVA  # HX OF DEMENTIA  # SCHIZOPHRENIA  # GI AND DVT PPX

## 2024-01-11 ENCOUNTER — TRANSCRIPTION ENCOUNTER (OUTPATIENT)
Age: 64
End: 2024-01-11

## 2024-01-11 LAB
ANION GAP SERPL CALC-SCNC: 3 MMOL/L — LOW (ref 5–17)
ANION GAP SERPL CALC-SCNC: 3 MMOL/L — LOW (ref 5–17)
BASOPHILS # BLD AUTO: 0.03 K/UL — SIGNIFICANT CHANGE UP (ref 0–0.2)
BASOPHILS # BLD AUTO: 0.03 K/UL — SIGNIFICANT CHANGE UP (ref 0–0.2)
BASOPHILS NFR BLD AUTO: 0.2 % — SIGNIFICANT CHANGE UP (ref 0–2)
BASOPHILS NFR BLD AUTO: 0.2 % — SIGNIFICANT CHANGE UP (ref 0–2)
BLD GP AB SCN SERPL QL: SIGNIFICANT CHANGE UP
BLD GP AB SCN SERPL QL: SIGNIFICANT CHANGE UP
BUN SERPL-MCNC: 17 MG/DL — SIGNIFICANT CHANGE UP (ref 7–18)
BUN SERPL-MCNC: 17 MG/DL — SIGNIFICANT CHANGE UP (ref 7–18)
CALCIUM SERPL-MCNC: 9.3 MG/DL — SIGNIFICANT CHANGE UP (ref 8.4–10.5)
CALCIUM SERPL-MCNC: 9.3 MG/DL — SIGNIFICANT CHANGE UP (ref 8.4–10.5)
CHLORIDE SERPL-SCNC: 109 MMOL/L — HIGH (ref 96–108)
CHLORIDE SERPL-SCNC: 109 MMOL/L — HIGH (ref 96–108)
CO2 SERPL-SCNC: 31 MMOL/L — SIGNIFICANT CHANGE UP (ref 22–31)
CO2 SERPL-SCNC: 31 MMOL/L — SIGNIFICANT CHANGE UP (ref 22–31)
CREAT SERPL-MCNC: 0.62 MG/DL — SIGNIFICANT CHANGE UP (ref 0.5–1.3)
CREAT SERPL-MCNC: 0.62 MG/DL — SIGNIFICANT CHANGE UP (ref 0.5–1.3)
EGFR: 107 ML/MIN/1.73M2 — SIGNIFICANT CHANGE UP
EGFR: 107 ML/MIN/1.73M2 — SIGNIFICANT CHANGE UP
EOSINOPHIL # BLD AUTO: 0.06 K/UL — SIGNIFICANT CHANGE UP (ref 0–0.5)
EOSINOPHIL # BLD AUTO: 0.06 K/UL — SIGNIFICANT CHANGE UP (ref 0–0.5)
EOSINOPHIL NFR BLD AUTO: 0.5 % — SIGNIFICANT CHANGE UP (ref 0–6)
EOSINOPHIL NFR BLD AUTO: 0.5 % — SIGNIFICANT CHANGE UP (ref 0–6)
GLUCOSE BLDC GLUCOMTR-MCNC: 111 MG/DL — HIGH (ref 70–99)
GLUCOSE BLDC GLUCOMTR-MCNC: 111 MG/DL — HIGH (ref 70–99)
GLUCOSE BLDC GLUCOMTR-MCNC: 151 MG/DL — HIGH (ref 70–99)
GLUCOSE BLDC GLUCOMTR-MCNC: 152 MG/DL — HIGH (ref 70–99)
GLUCOSE BLDC GLUCOMTR-MCNC: 152 MG/DL — HIGH (ref 70–99)
GLUCOSE SERPL-MCNC: 143 MG/DL — HIGH (ref 70–99)
GLUCOSE SERPL-MCNC: 143 MG/DL — HIGH (ref 70–99)
HCT VFR BLD CALC: 40 % — SIGNIFICANT CHANGE UP (ref 39–50)
HCT VFR BLD CALC: 40 % — SIGNIFICANT CHANGE UP (ref 39–50)
HGB BLD-MCNC: 13.1 G/DL — SIGNIFICANT CHANGE UP (ref 13–17)
HGB BLD-MCNC: 13.1 G/DL — SIGNIFICANT CHANGE UP (ref 13–17)
IMM GRANULOCYTES NFR BLD AUTO: 0.5 % — SIGNIFICANT CHANGE UP (ref 0–0.9)
IMM GRANULOCYTES NFR BLD AUTO: 0.5 % — SIGNIFICANT CHANGE UP (ref 0–0.9)
LYMPHOCYTES # BLD AUTO: 0.95 K/UL — LOW (ref 1–3.3)
LYMPHOCYTES # BLD AUTO: 0.95 K/UL — LOW (ref 1–3.3)
LYMPHOCYTES # BLD AUTO: 7.2 % — LOW (ref 13–44)
LYMPHOCYTES # BLD AUTO: 7.2 % — LOW (ref 13–44)
MAGNESIUM SERPL-MCNC: 2.1 MG/DL — SIGNIFICANT CHANGE UP (ref 1.6–2.6)
MAGNESIUM SERPL-MCNC: 2.1 MG/DL — SIGNIFICANT CHANGE UP (ref 1.6–2.6)
MCHC RBC-ENTMCNC: 29.4 PG — SIGNIFICANT CHANGE UP (ref 27–34)
MCHC RBC-ENTMCNC: 29.4 PG — SIGNIFICANT CHANGE UP (ref 27–34)
MCHC RBC-ENTMCNC: 32.8 GM/DL — SIGNIFICANT CHANGE UP (ref 32–36)
MCHC RBC-ENTMCNC: 32.8 GM/DL — SIGNIFICANT CHANGE UP (ref 32–36)
MCV RBC AUTO: 89.9 FL — SIGNIFICANT CHANGE UP (ref 80–100)
MCV RBC AUTO: 89.9 FL — SIGNIFICANT CHANGE UP (ref 80–100)
MONOCYTES # BLD AUTO: 1.02 K/UL — HIGH (ref 0–0.9)
MONOCYTES # BLD AUTO: 1.02 K/UL — HIGH (ref 0–0.9)
MONOCYTES NFR BLD AUTO: 7.8 % — SIGNIFICANT CHANGE UP (ref 2–14)
MONOCYTES NFR BLD AUTO: 7.8 % — SIGNIFICANT CHANGE UP (ref 2–14)
NEUTROPHILS # BLD AUTO: 11.02 K/UL — HIGH (ref 1.8–7.4)
NEUTROPHILS # BLD AUTO: 11.02 K/UL — HIGH (ref 1.8–7.4)
NEUTROPHILS NFR BLD AUTO: 83.8 % — HIGH (ref 43–77)
NEUTROPHILS NFR BLD AUTO: 83.8 % — HIGH (ref 43–77)
NRBC # BLD: 0 /100 WBCS — SIGNIFICANT CHANGE UP (ref 0–0)
NRBC # BLD: 0 /100 WBCS — SIGNIFICANT CHANGE UP (ref 0–0)
PHOSPHATE SERPL-MCNC: 2.9 MG/DL — SIGNIFICANT CHANGE UP (ref 2.5–4.5)
PHOSPHATE SERPL-MCNC: 2.9 MG/DL — SIGNIFICANT CHANGE UP (ref 2.5–4.5)
PLATELET # BLD AUTO: 307 K/UL — SIGNIFICANT CHANGE UP (ref 150–400)
PLATELET # BLD AUTO: 307 K/UL — SIGNIFICANT CHANGE UP (ref 150–400)
POTASSIUM SERPL-MCNC: 3.7 MMOL/L — SIGNIFICANT CHANGE UP (ref 3.5–5.3)
POTASSIUM SERPL-MCNC: 3.7 MMOL/L — SIGNIFICANT CHANGE UP (ref 3.5–5.3)
POTASSIUM SERPL-SCNC: 3.7 MMOL/L — SIGNIFICANT CHANGE UP (ref 3.5–5.3)
POTASSIUM SERPL-SCNC: 3.7 MMOL/L — SIGNIFICANT CHANGE UP (ref 3.5–5.3)
RBC # BLD: 4.45 M/UL — SIGNIFICANT CHANGE UP (ref 4.2–5.8)
RBC # BLD: 4.45 M/UL — SIGNIFICANT CHANGE UP (ref 4.2–5.8)
RBC # FLD: 13.9 % — SIGNIFICANT CHANGE UP (ref 10.3–14.5)
RBC # FLD: 13.9 % — SIGNIFICANT CHANGE UP (ref 10.3–14.5)
SODIUM SERPL-SCNC: 143 MMOL/L — SIGNIFICANT CHANGE UP (ref 135–145)
SODIUM SERPL-SCNC: 143 MMOL/L — SIGNIFICANT CHANGE UP (ref 135–145)
WBC # BLD: 13.14 K/UL — HIGH (ref 3.8–10.5)
WBC # BLD: 13.14 K/UL — HIGH (ref 3.8–10.5)
WBC # FLD AUTO: 13.14 K/UL — HIGH (ref 3.8–10.5)
WBC # FLD AUTO: 13.14 K/UL — HIGH (ref 3.8–10.5)

## 2024-01-11 PROCEDURE — 71045 X-RAY EXAM CHEST 1 VIEW: CPT | Mod: 26

## 2024-01-11 RX ORDER — FENTANYL CITRATE 50 UG/ML
50 INJECTION INTRAVENOUS EVERY 6 HOURS
Refills: 0 | Status: DISCONTINUED | OUTPATIENT
Start: 2024-01-11 | End: 2024-01-12

## 2024-01-11 RX ORDER — PROPOFOL 10 MG/ML
10 INJECTION, EMULSION INTRAVENOUS
Qty: 1000 | Refills: 0 | Status: DISCONTINUED | OUTPATIENT
Start: 2024-01-11 | End: 2024-01-12

## 2024-01-11 RX ORDER — SENNA PLUS 8.6 MG/1
15 TABLET ORAL AT BEDTIME
Refills: 0 | Status: DISCONTINUED | OUTPATIENT
Start: 2024-01-11 | End: 2024-01-12

## 2024-01-11 RX ORDER — POLYETHYLENE GLYCOL 3350 17 G/17G
17 POWDER, FOR SOLUTION ORAL AT BEDTIME
Refills: 0 | Status: DISCONTINUED | OUTPATIENT
Start: 2024-01-11 | End: 2024-01-12

## 2024-01-11 RX ADMIN — PANTOPRAZOLE SODIUM 40 MILLIGRAM(S): 20 TABLET, DELAYED RELEASE ORAL at 11:36

## 2024-01-11 RX ADMIN — SENNA PLUS 15 MILLILITER(S): 8.6 TABLET ORAL at 21:47

## 2024-01-11 RX ADMIN — Medication 250 MILLIGRAM(S): at 17:21

## 2024-01-11 RX ADMIN — FENTANYL CITRATE 50 MICROGRAM(S): 50 INJECTION INTRAVENOUS at 13:09

## 2024-01-11 RX ADMIN — CEFEPIME 100 MILLIGRAM(S): 1 INJECTION, POWDER, FOR SOLUTION INTRAMUSCULAR; INTRAVENOUS at 05:26

## 2024-01-11 RX ADMIN — POLYETHYLENE GLYCOL 3350 17 GRAM(S): 17 POWDER, FOR SOLUTION ORAL at 21:39

## 2024-01-11 RX ADMIN — Medication 650 MILLIGRAM(S): at 11:37

## 2024-01-11 RX ADMIN — ALBUTEROL 2 PUFF(S): 90 AEROSOL, METERED ORAL at 03:08

## 2024-01-11 RX ADMIN — CEFEPIME 100 MILLIGRAM(S): 1 INJECTION, POWDER, FOR SOLUTION INTRAMUSCULAR; INTRAVENOUS at 13:24

## 2024-01-11 RX ADMIN — CHLORHEXIDINE GLUCONATE 15 MILLILITER(S): 213 SOLUTION TOPICAL at 17:22

## 2024-01-11 RX ADMIN — CEFEPIME 100 MILLIGRAM(S): 1 INJECTION, POWDER, FOR SOLUTION INTRAMUSCULAR; INTRAVENOUS at 21:47

## 2024-01-11 RX ADMIN — ATORVASTATIN CALCIUM 20 MILLIGRAM(S): 80 TABLET, FILM COATED ORAL at 21:48

## 2024-01-11 RX ADMIN — CHLORHEXIDINE GLUCONATE 15 MILLILITER(S): 213 SOLUTION TOPICAL at 05:25

## 2024-01-11 RX ADMIN — ALBUTEROL 2 PUFF(S): 90 AEROSOL, METERED ORAL at 15:41

## 2024-01-11 RX ADMIN — CHLORHEXIDINE GLUCONATE 1 APPLICATION(S): 213 SOLUTION TOPICAL at 05:26

## 2024-01-11 RX ADMIN — Medication 250 MILLIGRAM(S): at 05:26

## 2024-01-11 RX ADMIN — ALBUTEROL 2 PUFF(S): 90 AEROSOL, METERED ORAL at 08:52

## 2024-01-11 RX ADMIN — FENTANYL CITRATE 50 MICROGRAM(S): 50 INJECTION INTRAVENOUS at 16:47

## 2024-01-11 RX ADMIN — ENOXAPARIN SODIUM 40 MILLIGRAM(S): 100 INJECTION SUBCUTANEOUS at 11:38

## 2024-01-11 RX ADMIN — Medication 1: at 17:22

## 2024-01-11 RX ADMIN — Medication 81 MILLIGRAM(S): at 11:37

## 2024-01-11 RX ADMIN — Medication 650 MILLIGRAM(S): at 13:23

## 2024-01-11 RX ADMIN — PROPOFOL 10.3 MICROGRAM(S)/KG/MIN: 10 INJECTION, EMULSION INTRAVENOUS at 05:23

## 2024-01-11 RX ADMIN — ALBUTEROL 2 PUFF(S): 90 AEROSOL, METERED ORAL at 21:00

## 2024-01-11 NOTE — PROGRESS NOTE ADULT - SUBJECTIVE AND OBJECTIVE BOX
S: No acute complaints.     O:  Vital Signs Last 24 Hrs  T(C): 37.9 (11 Jan 2024 08:00), Max: 38.2 (10 Darrell 2024 16:15)  T(F): 100.2 (11 Jan 2024 08:00), Max: 100.8 (10 Darrell 2024 16:15)  HR: 82 (11 Jan 2024 08:00) (61 - 96)  BP: 102/78 (11 Jan 2024 08:00) (84/68 - 128/72)  BP(mean): 86 (11 Jan 2024 08:00) (70 - 93)  RR: 14 (11 Jan 2024 08:00) (10 - 19)  SpO2: 98% (11 Jan 2024 08:00) (86% - 100%)    Parameters below as of 11 Jan 2024 04:00  Patient On (Oxygen Delivery Method): ventilator    O2 Concentration (%): 50    Physical Exam:  General: intubated, sedated  Chest: intubated, on vent: settings PEEP 5 and FIO2 50  Extremities: no edema bilaterally     S: No overnight events.     O:  Vital Signs Last 24 Hrs  T(C): 37.9 (11 Jan 2024 08:00), Max: 38.2 (10 Darrell 2024 16:15)  T(F): 100.2 (11 Jan 2024 08:00), Max: 100.8 (10 Darrell 2024 16:15)  HR: 82 (11 Jan 2024 08:00) (61 - 96)  BP: 102/78 (11 Jan 2024 08:00) (84/68 - 128/72)  BP(mean): 86 (11 Jan 2024 08:00) (70 - 93)  RR: 14 (11 Jan 2024 08:00) (10 - 19)  SpO2: 98% (11 Jan 2024 08:00) (86% - 100%)    Parameters below as of 11 Jan 2024 04:00  Patient On (Oxygen Delivery Method): ventilator    O2 Concentration (%): 50    Physical Exam:  General: intubated, sedated  Chest: intubated, on vent: settings PEEP 5 and FIO2 50  Extremities: no edema bilaterally

## 2024-01-11 NOTE — PROGRESS NOTE ADULT - SUBJECTIVE AND OBJECTIVE BOX
Time of Visit:  Patient seen and examined.     MEDICATIONS  (STANDING):  albuterol    90 MICROgram(s) HFA Inhaler 2 Puff(s) Inhalation every 6 hours  aspirin  chewable 81 milliGRAM(s) Oral daily  atorvastatin 20 milliGRAM(s) Oral at bedtime  cefepime   IVPB 2000 milliGRAM(s) IV Intermittent every 8 hours  cefepime   IVPB      chlorhexidine 0.12% Liquid 15 milliLiter(s) Oral Mucosa every 12 hours  chlorhexidine 2% Cloths 1 Application(s) Topical <User Schedule>  insulin lispro (ADMELOG) corrective regimen sliding scale   SubCutaneous every 6 hours  pantoprazole   Suspension 40 milliGRAM(s) Enteral Tube daily  propofol Infusion 10 MICROgram(s)/kG/Min (3.42 mL/Hr) IV Continuous <Continuous>  valproic  acid Syrup 250 milliGRAM(s) Oral two times a day      MEDICATIONS  (PRN):  acetaminophen     Tablet .. 650 milliGRAM(s) Oral every 6 hours PRN Temp greater or equal to 38C (100.4F), Mild Pain (1 - 3)  acetaminophen   Oral Liquid .. 650 milliGRAM(s) Oral every 6 hours PRN Temp greater or equal to 38C (100.4F)  fentaNYL    Injectable 50 MICROGram(s) IV Push every 6 hours PRN Moderate Pain (4 - 6)       Medications up to date at time of exam.      PHYSICAL EXAMINATION:  Patient has no new complaints.  GENERAL: The patient is a well-developed, well-nourished, in no apparent distress.     Vital Signs Last 24 Hrs  T(C): 37.4 (11 Jan 2024 18:15), Max: 38.2 (11 Jan 2024 11:00)  T(F): 99.3 (11 Jan 2024 18:15), Max: 100.8 (11 Jan 2024 11:00)  HR: 72 (11 Jan 2024 18:15) (61 - 88)  BP: 90/61 (11 Jan 2024 18:15) (79/57 - 120/77)  BP(mean): 71 (11 Jan 2024 18:15) (66 - 94)  RR: 14 (11 Jan 2024 18:15) (10 - 17)  SpO2: 98% (11 Jan 2024 18:15) (89% - 100%)    Parameters below as of 11 Jan 2024 04:00  Patient On (Oxygen Delivery Method): ventilator    O2 Concentration (%): 50  Mode: AC/ CMV (Assist Control/ Continuous Mandatory Ventilation)  RR (machine): 12  TV (machine): 350  FiO2: 40  PEEP: 5  ITime: 1  MAP: 8  PIP: 15   (if applicable)    Chest Tube (if applicable)    HEENT: Head is normocephalic and atraumatic. Extraocular muscles are intact. Mucous membranes are moist.     NECK: Supple, no palpable adenopathy.    LUNGS: Clear to auscultation, no wheezing, rales, or rhonchi.    HEART: Regular rate and rhythm without murmur.    ABDOMEN: Soft, nontender, and nondistended.  No hepatosplenomegaly is noted.    : No painful voiding, no pelvic pain    EXTREMITIES: Without any cyanosis, clubbing, rash, lesions or edema.    NEUROLOGIC: Awake, alert, oriented, grossly intact    SKIN: Warm, dry, good turgor.      LABS:                        13.1   13.14 )-----------( 307      ( 11 Jan 2024 04:54 )             40.0     01-11    143  |  109<H>  |  17  ----------------------------<  143<H>  3.7   |  31  |  0.62    Ca    9.3      11 Jan 2024 04:54  Phos  2.9     01-11  Mg     2.1     01-11    TPro  7.0  /  Alb  2.9<L>  /  TBili  0.4  /  DBili  x   /  AST  10  /  ALT  28  /  AlkPhos  54  01-10      Urinalysis Basic - ( 11 Jan 2024 04:54 )    Color: x / Appearance: x / SG: x / pH: x  Gluc: 143 mg/dL / Ketone: x  / Bili: x / Urobili: x   Blood: x / Protein: x / Nitrite: x   Leuk Esterase: x / RBC: x / WBC x   Sq Epi: x / Non Sq Epi: x / Bacteria: x      ABG - ( 10 Darrell 2024 17:10 )  pH, Arterial: 7.45  pH, Blood: x     /  pCO2: 45    /  pO2: 86    / HCO3: 31    / Base Excess: 6.4   /  SaO2: 99                              MICROBIOLOGY: (if applicable)    RADIOLOGY & ADDITIONAL STUDIES:  EKG:   CXR:  ECHO:    IMPRESSION: 63y Male PAST MEDICAL & SURGICAL HISTORY:  CVA (cerebral vascular accident)      CVA (cerebral vascular accident)      HLD (hyperlipidemia)      S/P percutaneous endoscopic gastrostomy (PEG) tube placement       p/w           RECOMMENDATIONS:   Time of Visit:  Patient seen and examined. intubated     MEDICATIONS  (STANDING):  albuterol    90 MICROgram(s) HFA Inhaler 2 Puff(s) Inhalation every 6 hours  aspirin  chewable 81 milliGRAM(s) Oral daily  atorvastatin 20 milliGRAM(s) Oral at bedtime  cefepime   IVPB 2000 milliGRAM(s) IV Intermittent every 8 hours  cefepime   IVPB      chlorhexidine 0.12% Liquid 15 milliLiter(s) Oral Mucosa every 12 hours  chlorhexidine 2% Cloths 1 Application(s) Topical <User Schedule>  insulin lispro (ADMELOG) corrective regimen sliding scale   SubCutaneous every 6 hours  pantoprazole   Suspension 40 milliGRAM(s) Enteral Tube daily  propofol Infusion 10 MICROgram(s)/kG/Min (3.42 mL/Hr) IV Continuous <Continuous>  valproic  acid Syrup 250 milliGRAM(s) Oral two times a day      MEDICATIONS  (PRN):  acetaminophen     Tablet .. 650 milliGRAM(s) Oral every 6 hours PRN Temp greater or equal to 38C (100.4F), Mild Pain (1 - 3)  acetaminophen   Oral Liquid .. 650 milliGRAM(s) Oral every 6 hours PRN Temp greater or equal to 38C (100.4F)  fentaNYL    Injectable 50 MICROGram(s) IV Push every 6 hours PRN Moderate Pain (4 - 6)       Medications up to date at time of exam.      PHYSICAL EXAMINATION:  Patient has no new complaints.  GENERAL: The patient is a well-developed, well-nourished, in no apparent distress.     Vital Signs Last 24 Hrs  T(C): 37.4 (11 Jan 2024 18:15), Max: 38.2 (11 Jan 2024 11:00)  T(F): 99.3 (11 Jan 2024 18:15), Max: 100.8 (11 Jan 2024 11:00)  HR: 72 (11 Jan 2024 18:15) (61 - 88)  BP: 90/61 (11 Jan 2024 18:15) (79/57 - 120/77)  BP(mean): 71 (11 Jan 2024 18:15) (66 - 94)  RR: 14 (11 Jan 2024 18:15) (10 - 17)  SpO2: 98% (11 Jan 2024 18:15) (89% - 100%)    Parameters below as of 11 Jan 2024 04:00  Patient On (Oxygen Delivery Method): ventilator    O2 Concentration (%): 50  Mode: AC/ CMV (Assist Control/ Continuous Mandatory Ventilation)  RR (machine): 12  TV (machine): 350  FiO2: 40  PEEP: 5  ITime: 1  MAP: 8  PIP: 15   (if applicable)    Chest Tube (if applicable)    HEENT: Head is normocephalic and atraumatic. Extraocular muscles are intact. Mucous membranes are moist.     NECK: Supple, no palpable adenopathy.    LUNGS: Clear to auscultation, no wheezing, rales, or rhonchi.    HEART: Regular rate and rhythm without murmur.    ABDOMEN: Soft, nontender, and nondistended.  No hepatosplenomegaly is noted.    : No painful voiding, no pelvic pain    EXTREMITIES: Without any cyanosis, clubbing, rash, lesions or edema.    NEUROLOGIC: sedated     SKIN: Warm, dry, good turgor.      LABS:                        13.1   13.14 )-----------( 307      ( 11 Jan 2024 04:54 )             40.0     01-11    143  |  109<H>  |  17  ----------------------------<  143<H>  3.7   |  31  |  0.62    Ca    9.3      11 Jan 2024 04:54  Phos  2.9     01-11  Mg     2.1     01-11    TPro  7.0  /  Alb  2.9<L>  /  TBili  0.4  /  DBili  x   /  AST  10  /  ALT  28  /  AlkPhos  54  01-10      Urinalysis Basic - ( 11 Jan 2024 04:54 )    Color: x / Appearance: x / SG: x / pH: x  Gluc: 143 mg/dL / Ketone: x  / Bili: x / Urobili: x   Blood: x / Protein: x / Nitrite: x   Leuk Esterase: x / RBC: x / WBC x   Sq Epi: x / Non Sq Epi: x / Bacteria: x      ABG - ( 10 Darrell 2024 17:10 )  pH, Arterial: 7.45  pH, Blood: x     /  pCO2: 45    /  pO2: 86    / HCO3: 31    / Base Excess: 6.4   /  SaO2: 99                              MICROBIOLOGY: (if applicable)    RADIOLOGY & ADDITIONAL STUDIES:  EKG:   CXR:  ECHO:    IMPRESSION: 63y Male PAST MEDICAL & SURGICAL HISTORY:  CVA (cerebral vascular accident)      CVA (cerebral vascular accident)      HLD (hyperlipidemia)      S/P percutaneous endoscopic gastrostomy (PEG) tube placement       p/w         IMP: This is a  63 year old [place] , from [facility], with  CVA, Alzheimer's, nonverbal at baseline, GERD, Schizophrenia, and Constipation, was brought into the ED s/p mechanical fall, poor oral intake, and Covid-19 infection on 12/27/23 Pt was admitted for COVID PNA, later found with intermittent hypoxia/ hypopnea to Sats 80% and Hypotension despite multiple fluid boluses, also found with bryan to 40s for the last 2 days, in ICU for further monitoring.       ASSESSMENT   - Acute Hypoxic resp failure   - Covid-19 PNA   - Shock septic   - CVA  - Alzheimer dementia   - Bradycardia       Plan     - Re-intubated   - Pat is unable to control secretion   - Consider trach and PEG   - Aspiration precautions   - Hemodynamic support   - Titrate vaso-active agents to maintain MAP>65  - EP cards f/u : no indication for PPM at tis time   - Antibx   - F/U cultures   - Aspiration precautions  - Off Remdesivir due to ADAN  - Continue decadron  6 mg for total 10 days   - Isolation : contact and airborne     discussed  with ICU attend     time 36 min      Time of Visit:  Patient seen and examined. intubated     MEDICATIONS  (STANDING):  albuterol    90 MICROgram(s) HFA Inhaler 2 Puff(s) Inhalation every 6 hours  aspirin  chewable 81 milliGRAM(s) Oral daily  atorvastatin 20 milliGRAM(s) Oral at bedtime  cefepime   IVPB 2000 milliGRAM(s) IV Intermittent every 8 hours  cefepime   IVPB      chlorhexidine 0.12% Liquid 15 milliLiter(s) Oral Mucosa every 12 hours  chlorhexidine 2% Cloths 1 Application(s) Topical <User Schedule>  insulin lispro (ADMELOG) corrective regimen sliding scale   SubCutaneous every 6 hours  pantoprazole   Suspension 40 milliGRAM(s) Enteral Tube daily  propofol Infusion 10 MICROgram(s)/kG/Min (3.42 mL/Hr) IV Continuous <Continuous>  valproic  acid Syrup 250 milliGRAM(s) Oral two times a day      MEDICATIONS  (PRN):  acetaminophen     Tablet .. 650 milliGRAM(s) Oral every 6 hours PRN Temp greater or equal to 38C (100.4F), Mild Pain (1 - 3)  acetaminophen   Oral Liquid .. 650 milliGRAM(s) Oral every 6 hours PRN Temp greater or equal to 38C (100.4F)  fentaNYL    Injectable 50 MICROGram(s) IV Push every 6 hours PRN Moderate Pain (4 - 6)       Medications up to date at time of exam.      PHYSICAL EXAMINATION:  Patient has no new complaints.  GENERAL: The patient is a well-developed, well-nourished, in no apparent distress.     Vital Signs Last 24 Hrs  T(C): 37.4 (11 Jan 2024 18:15), Max: 38.2 (11 Jan 2024 11:00)  T(F): 99.3 (11 Jan 2024 18:15), Max: 100.8 (11 Jan 2024 11:00)  HR: 72 (11 Jan 2024 18:15) (61 - 88)  BP: 90/61 (11 Jan 2024 18:15) (79/57 - 120/77)  BP(mean): 71 (11 Jan 2024 18:15) (66 - 94)  RR: 14 (11 Jan 2024 18:15) (10 - 17)  SpO2: 98% (11 Jan 2024 18:15) (89% - 100%)    Parameters below as of 11 Jan 2024 04:00  Patient On (Oxygen Delivery Method): ventilator    O2 Concentration (%): 50  Mode: AC/ CMV (Assist Control/ Continuous Mandatory Ventilation)  RR (machine): 12  TV (machine): 350  FiO2: 40  PEEP: 5  ITime: 1  MAP: 8  PIP: 15   (if applicable)    Chest Tube (if applicable)    HEENT: Head is normocephalic and atraumatic. Extraocular muscles are intact. Mucous membranes are moist.     NECK: Supple, no palpable adenopathy.    LUNGS: Clear to auscultation, no wheezing, rales, or rhonchi.    HEART: Regular rate and rhythm without murmur.    ABDOMEN: Soft, nontender, and nondistended.  No hepatosplenomegaly is noted.    : No painful voiding, no pelvic pain    EXTREMITIES: Without any cyanosis, clubbing, rash, lesions or edema.    NEUROLOGIC: sedated     SKIN: Warm, dry, good turgor.      LABS:                        13.1   13.14 )-----------( 307      ( 11 Jan 2024 04:54 )             40.0     01-11    143  |  109<H>  |  17  ----------------------------<  143<H>  3.7   |  31  |  0.62    Ca    9.3      11 Jan 2024 04:54  Phos  2.9     01-11  Mg     2.1     01-11    TPro  7.0  /  Alb  2.9<L>  /  TBili  0.4  /  DBili  x   /  AST  10  /  ALT  28  /  AlkPhos  54  01-10      Urinalysis Basic - ( 11 Jan 2024 04:54 )    Color: x / Appearance: x / SG: x / pH: x  Gluc: 143 mg/dL / Ketone: x  / Bili: x / Urobili: x   Blood: x / Protein: x / Nitrite: x   Leuk Esterase: x / RBC: x / WBC x   Sq Epi: x / Non Sq Epi: x / Bacteria: x      ABG - ( 10 Darrell 2024 17:10 )  pH, Arterial: 7.45  pH, Blood: x     /  pCO2: 45    /  pO2: 86    / HCO3: 31    / Base Excess: 6.4   /  SaO2: 99                              MICROBIOLOGY: (if applicable)    RADIOLOGY & ADDITIONAL STUDIES:  EKG:   CXR:  ECHO:    IMPRESSION: 63y Male PAST MEDICAL & SURGICAL HISTORY:  CVA (cerebral vascular accident)      CVA (cerebral vascular accident)      HLD (hyperlipidemia)      S/P percutaneous endoscopic gastrostomy (PEG) tube placement       p/w         IMP: This is a  63 year old [place] , from [facility], with  CVA, Alzheimer's, nonverbal at baseline, GERD, Schizophrenia, and Constipation, was brought into the ED s/p mechanical fall, poor oral intake, and Covid-19 infection on 12/27/23 Pt was admitted for COVID PNA, later found with intermittent hypoxia/ hypopnea to Sats 80% and Hypotension despite multiple fluid boluses, also found with bryan to 40s for the last 2 days, in ICU for further monitoring.       ASSESSMENT   - Acute Hypoxic resp failure   - Covid-19 PNA   - Shock septic   - CVA  - Alzheimer dementia   - Bradycardia       Plan     - Re-intubated   - Pat is unable to control secretion   - Consider trach and PEG   - Aspiration precautions   - Hemodynamic support   - Titrate vaso-active agents to maintain MAP>65  - EP cards f/u : no indication for PPM at tis time   - Antibx   - F/U cultures   - Aspiration precautions  - Off Remdesivir due to ADAN  - Continue decadron  6 mg for total 10 days   - Isolation : contact and airborne     discussed  with ICU attend     time 36 min

## 2024-01-11 NOTE — CHART NOTE - NSCHARTNOTEFT_GEN_A_CORE
Patient's son Drew Arriola contacted and given an updated on the patient's overall condition. Drew was made aware that at this point plan was to Patient's son Drew Arriola contacted and given an updated on the patient's overall condition. Drew was made aware that at this patient patient's secretions remain copious and given mental status he should be evaluated for a tracheostomy and PEG. Drew verbalized understanding. I made him aware that thoracic surgery would contact him for consent.

## 2024-01-11 NOTE — PROGRESS NOTE ADULT - SUBJECTIVE AND OBJECTIVE BOX
Patient is a 63y old  Male who presents with a chief complaint of Sepsis and AHRF (2024 19:22)    PATIENT IS SEEN AND EXAMINED IN ICU.    ALLERGIES:  No Known Allergies      Daily     Daily Weight in k (2024 08:00)    VITALS:    Vital Signs Last 24 Hrs  T(C): 37.5 (2024 22:00), Max: 38.2 (2024 11:00)  T(F): 99.5 (2024 22:00), Max: 100.8 (2024 11:00)  HR: 74 (2024 23:14) (61 - 88)  BP: 104/74 (2024 22:00) (79/57 - 113/77)  BP(mean): 85 (2024 22:00) (66 - 94)  RR: 17 (2024 22:00) (12 - 17)  SpO2: 96% (2024 23:14) (89% - 100%)    Parameters below as of 2024 20:00  Patient On (Oxygen Delivery Method): ventilator    O2 Concentration (%): 40    LABS:    CBC Full  -  ( 2024 04:54 )  WBC Count : 13.14 K/uL  RBC Count : 4.45 M/uL  Hemoglobin : 13.1 g/dL  Hematocrit : 40.0 %  Platelet Count - Automated : 307 K/uL  Mean Cell Volume : 89.9 fl  Mean Cell Hemoglobin : 29.4 pg  Mean Cell Hemoglobin Concentration : 32.8 gm/dL  Auto Neutrophil # : 11.02 K/uL  Auto Lymphocyte # : 0.95 K/uL  Auto Monocyte # : 1.02 K/uL  Auto Eosinophil # : 0.06 K/uL  Auto Basophil # : 0.03 K/uL  Auto Neutrophil % : 83.8 %  Auto Lymphocyte % : 7.2 %  Auto Monocyte % : 7.8 %  Auto Eosinophil % : 0.5 %  Auto Basophil % : 0.2 %      11    143  |  109<H>  |  17  ----------------------------<  143<H>  3.7   |  31  |  0.62    Ca    9.3      2024 04:54  Phos  2.9     11  Mg     2.1         TPro  7.0  /  Alb  2.9<L>  /  TBili  0.4  /  DBili  x   /  AST  10  /  ALT  28  /  AlkPhos  54  01-10    CAPILLARY BLOOD GLUCOSE      POCT Blood Glucose.: 151 mg/dL (2024 23:24)  POCT Blood Glucose.: 152 mg/dL (2024 16:58)  POCT Blood Glucose.: 111 mg/dL (2024 10:26)  POCT Blood Glucose.: 151 mg/dL (2024 05:25)        LIVER FUNCTIONS - ( 10 Darrell 2024 04:11 )  Alb: 2.9 g/dL / Pro: 7.0 g/dL / ALK PHOS: 54 U/L / ALT: 28 U/L DA / AST: 10 U/L / GGT: x           Creatinine Trend: 0.62<--, 0.76<--, 0.64<--, 0.71<--, 0.64<--, 0.69<--  I&O's Summary    10 Darrell 2024 07:01  -  2024 07:00  --------------------------------------------------------  IN: 997.1 mL / OUT: 1490 mL / NET: -492.9 mL    2024 07:01  -  2024 00:29  --------------------------------------------------------  IN: 299.5 mL / OUT: 500 mL / NET: -200.5 mL        ABG - ( 10 Darrell 2024 17:10 )  pH, Arterial: 7.45  pH, Blood: x     /  pCO2: 45    /  pO2: 86    / HCO3: 31    / Base Excess: 6.4   /  SaO2: 99                  Catheterized Catheterized   @ 12:37   No growth  --  --      .Blood Blood   @ 02:05   No growth at 5 days  --  --      .Blood Blood   @ 01:55   No growth at 5 days  --  --          MEDICATIONS:    MEDICATIONS  (STANDING):  albuterol    90 MICROgram(s) HFA Inhaler 2 Puff(s) Inhalation every 6 hours  aspirin  chewable 81 milliGRAM(s) Oral daily  atorvastatin 20 milliGRAM(s) Oral at bedtime  cefepime   IVPB      cefepime   IVPB 2000 milliGRAM(s) IV Intermittent every 8 hours  chlorhexidine 0.12% Liquid 15 milliLiter(s) Oral Mucosa every 12 hours  chlorhexidine 2% Cloths 1 Application(s) Topical <User Schedule>  insulin lispro (ADMELOG) corrective regimen sliding scale   SubCutaneous every 6 hours  pantoprazole   Suspension 40 milliGRAM(s) Enteral Tube daily  polyethylene glycol 3350 17 Gram(s) Oral at bedtime  propofol Infusion 10 MICROgram(s)/kG/Min (3.42 mL/Hr) IV Continuous <Continuous>  senna Syrup 15 milliLiter(s) Oral at bedtime  valproic  acid Syrup 250 milliGRAM(s) Oral two times a day      MEDICATIONS  (PRN):  acetaminophen     Tablet .. 650 milliGRAM(s) Oral every 6 hours PRN Temp greater or equal to 38C (100.4F), Mild Pain (1 - 3)  acetaminophen   Oral Liquid .. 650 milliGRAM(s) Oral every 6 hours PRN Temp greater or equal to 38C (100.4F)  fentaNYL    Injectable 50 MICROGram(s) IV Push every 6 hours PRN Moderate Pain (4 - 6)      REVIEW OF SYSTEMS:                           ALL ROS DONE [ X   ]    CONSTITUTIONAL:  LETHARGIC [   ], FEVER [   ], UNRESPONSIVE [   ]  CVS:  CP  [   ], SOB, [   ], PALPITATIONS [   ], DIZZYNESS [   ]  RS: COUGH [   ], SPUTUM [   ]  GI: ABDOMINAL PAIN [   ], NAUSEA [   ], VOMITINGS [   ], DIARRHEA [   ], CONSTIPATION [   ]  :  DYSURIA [   ], NOCTURIA [   ], INCREASED FREQUENCY [   ], DRIBLING [   ],  SKELETAL: PAINFUL JOINTS [   ], SWOLLEN JOINTS [   ], NECK ACHE [   ], LOW BACK ACHE [   ],  SKIN : ULCERS [   ], RASH [   ], ITCHING [   ]  CNS: HEAD ACHE [   ], DOUBLE VISION [   ], BLURRED VISION [   ], AMS / CONFUSION [   ], SEIZURES [   ], WEAKNESS [   ],TINGLING / NUMBNESS [   ]        PHYSICAL EXAMINATION:  GENERAL APPEARANCE: NO DISTRESS  HEENT:  NO PALLOR, NO  JVD,  NO   NODES, NECK SUPPLE  CVS: S1 +, S2 +,   RS: AEEB,  OCCASIONAL  RALES +,   RHONCHI +   INTUBATED  ABD: SOFT, NT, NO, BS +  EXT: NO PE  SKIN: WARM,   SKELETAL:  REDUCED ROM OF CERVICAL AND LS SPINE  CNS:  AAO X 1    RADIOLOGY :    RADIOLOGY AND READINGS REVIEWED    ASSESSMENT :     Infection due to severe acute respiratory syndrome coronavirus 2 (SARS-CoV-2)    CVA (cerebral vascular accident)    HLD (hyperlipidemia)    S/P percutaneous endoscopic gastrostomy (PEG) tube placement        PLAN:  HPI:  A 63 year old male, from [facility], with PMH of CVA, Alzheimer's, nonverbal at baseline, GERD, Schizophrenia, and Constipation, was brought into the ED s/p mechanical fall, poor oral intake, and Covid-19 infection on 23 as per NH papers. Unable to obtain history from patient since he is nonverbal, history obtained from chart review.  (28 Dec 2023 20:54)    # PROGNOSIS IS POOR/GUARDED - CRITICAL CARE TEAM DISCUSSING W/ FAMILY REGARDING GOC. FAMILY WISHES FOR PEG, TRACHEOSTOMY.     # [1/3] RAPID RESPONSE FOR HYPOTENSION AND HYPOXIA - S/P IV FLUIDS AND PLACED ON NRB FOR HYPOXIA. CRITICAL CARE EVALUATION REQUESTED.     # SEPTIC SHOCK S/T ASPIRATION PNA + DIARRHEA [resolved]  # ACUTE HYPOXIC RESPIRATORY FAILURE S/T ? ASPIRATION EVENT   , COVID19 INFECTION     - ON DECADRON  - PLACED ON CEFEPIME, S/P ROCEPHIN, AZITHROMYCIN   - CHEST PT  - S/P DECADRON  - BCX [NGTD] AND UCX [NGTD]  - S/P IVF, VASOPRESSORS  - ID CONSULT  - CRITICAL CARE MANAGEMENT IN PROGRESS    - [] - PATIENT W/ ? ASPIRATION EVENT - SUBSEQUENTLY REQUIRED INTUBATION W/ MECHANICAL VENTILATION  - [] - EXTUBATED, CHEST PT  - [] - REINTUBATED OVERNIGHT    - FAMILY WISHES FOR PEG, TRACHEOSTOMY    # BRADYCARDIA  - MONITOR ON TELEMETRY  - F/U ECHOCARDIOGRAM   - OPTIMIZE ELECTROLYTES  - CARDIOLOGY CONSULT  - EP CONSULT    # HYPOKALEMIA  - REPLETING WITH SUPPLEMENT    # HX OF CVA  # HX OF DEMENTIA  # SCHIZOPHRENIA  # GI AND DVT PPX     Patient is a 63y old  Male who presents with a chief complaint of Sepsis and AHRF (2024 19:22)    PATIENT IS SEEN AND EXAMINED IN ICU.    ALLERGIES:  No Known Allergies      Daily     Daily Weight in k (2024 08:00)    VITALS:    Vital Signs Last 24 Hrs  T(C): 37.5 (2024 22:00), Max: 38.2 (2024 11:00)  T(F): 99.5 (2024 22:00), Max: 100.8 (2024 11:00)  HR: 74 (2024 23:14) (61 - 88)  BP: 104/74 (2024 22:00) (79/57 - 113/77)  BP(mean): 85 (2024 22:00) (66 - 94)  RR: 17 (2024 22:00) (12 - 17)  SpO2: 96% (2024 23:14) (89% - 100%)    Parameters below as of 2024 20:00  Patient On (Oxygen Delivery Method): ventilator    O2 Concentration (%): 40    LABS:    CBC Full  -  ( 2024 04:54 )  WBC Count : 13.14 K/uL  RBC Count : 4.45 M/uL  Hemoglobin : 13.1 g/dL  Hematocrit : 40.0 %  Platelet Count - Automated : 307 K/uL  Mean Cell Volume : 89.9 fl  Mean Cell Hemoglobin : 29.4 pg  Mean Cell Hemoglobin Concentration : 32.8 gm/dL  Auto Neutrophil # : 11.02 K/uL  Auto Lymphocyte # : 0.95 K/uL  Auto Monocyte # : 1.02 K/uL  Auto Eosinophil # : 0.06 K/uL  Auto Basophil # : 0.03 K/uL  Auto Neutrophil % : 83.8 %  Auto Lymphocyte % : 7.2 %  Auto Monocyte % : 7.8 %  Auto Eosinophil % : 0.5 %  Auto Basophil % : 0.2 %      11    143  |  109<H>  |  17  ----------------------------<  143<H>  3.7   |  31  |  0.62    Ca    9.3      2024 04:54  Phos  2.9     11  Mg     2.1         TPro  7.0  /  Alb  2.9<L>  /  TBili  0.4  /  DBili  x   /  AST  10  /  ALT  28  /  AlkPhos  54  01-10    CAPILLARY BLOOD GLUCOSE      POCT Blood Glucose.: 151 mg/dL (2024 23:24)  POCT Blood Glucose.: 152 mg/dL (2024 16:58)  POCT Blood Glucose.: 111 mg/dL (2024 10:26)  POCT Blood Glucose.: 151 mg/dL (2024 05:25)        LIVER FUNCTIONS - ( 10 Darrell 2024 04:11 )  Alb: 2.9 g/dL / Pro: 7.0 g/dL / ALK PHOS: 54 U/L / ALT: 28 U/L DA / AST: 10 U/L / GGT: x           Creatinine Trend: 0.62<--, 0.76<--, 0.64<--, 0.71<--, 0.64<--, 0.69<--  I&O's Summary    10 Darrell 2024 07:01  -  2024 07:00  --------------------------------------------------------  IN: 997.1 mL / OUT: 1490 mL / NET: -492.9 mL    2024 07:01  -  2024 00:29  --------------------------------------------------------  IN: 299.5 mL / OUT: 500 mL / NET: -200.5 mL        ABG - ( 10 Darrell 2024 17:10 )  pH, Arterial: 7.45  pH, Blood: x     /  pCO2: 45    /  pO2: 86    / HCO3: 31    / Base Excess: 6.4   /  SaO2: 99                  Catheterized Catheterized   @ 12:37   No growth  --  --      .Blood Blood   @ 02:05   No growth at 5 days  --  --      .Blood Blood   @ 01:55   No growth at 5 days  --  --          MEDICATIONS:    MEDICATIONS  (STANDING):  albuterol    90 MICROgram(s) HFA Inhaler 2 Puff(s) Inhalation every 6 hours  aspirin  chewable 81 milliGRAM(s) Oral daily  atorvastatin 20 milliGRAM(s) Oral at bedtime  cefepime   IVPB      cefepime   IVPB 2000 milliGRAM(s) IV Intermittent every 8 hours  chlorhexidine 0.12% Liquid 15 milliLiter(s) Oral Mucosa every 12 hours  chlorhexidine 2% Cloths 1 Application(s) Topical <User Schedule>  insulin lispro (ADMELOG) corrective regimen sliding scale   SubCutaneous every 6 hours  pantoprazole   Suspension 40 milliGRAM(s) Enteral Tube daily  polyethylene glycol 3350 17 Gram(s) Oral at bedtime  propofol Infusion 10 MICROgram(s)/kG/Min (3.42 mL/Hr) IV Continuous <Continuous>  senna Syrup 15 milliLiter(s) Oral at bedtime  valproic  acid Syrup 250 milliGRAM(s) Oral two times a day      MEDICATIONS  (PRN):  acetaminophen     Tablet .. 650 milliGRAM(s) Oral every 6 hours PRN Temp greater or equal to 38C (100.4F), Mild Pain (1 - 3)  acetaminophen   Oral Liquid .. 650 milliGRAM(s) Oral every 6 hours PRN Temp greater or equal to 38C (100.4F)  fentaNYL    Injectable 50 MICROGram(s) IV Push every 6 hours PRN Moderate Pain (4 - 6)      REVIEW OF SYSTEMS:                           ALL ROS DONE [ X   ]    CONSTITUTIONAL:  LETHARGIC [   ], FEVER [   ], UNRESPONSIVE [   ]  CVS:  CP  [   ], SOB, [   ], PALPITATIONS [   ], DIZZYNESS [   ]  RS: COUGH [   ], SPUTUM [   ]  GI: ABDOMINAL PAIN [   ], NAUSEA [   ], VOMITINGS [   ], DIARRHEA [   ], CONSTIPATION [   ]  :  DYSURIA [   ], NOCTURIA [   ], INCREASED FREQUENCY [   ], DRIBLING [   ],  SKELETAL: PAINFUL JOINTS [   ], SWOLLEN JOINTS [   ], NECK ACHE [   ], LOW BACK ACHE [   ],  SKIN : ULCERS [   ], RASH [   ], ITCHING [   ]  CNS: HEAD ACHE [   ], DOUBLE VISION [   ], BLURRED VISION [   ], AMS / CONFUSION [   ], SEIZURES [   ], WEAKNESS [   ],TINGLING / NUMBNESS [   ]        PHYSICAL EXAMINATION:  GENERAL APPEARANCE: NO DISTRESS  HEENT:  NO PALLOR, NO  JVD,  NO   NODES, NECK SUPPLE  CVS: S1 +, S2 +,   RS: AEEB,  OCCASIONAL  RALES +,   RHONCHI +   INTUBATED  ABD: SOFT, NT, NO, BS +  EXT: NO PE  SKIN: WARM,   SKELETAL:  REDUCED ROM OF CERVICAL AND LS SPINE  CNS:  AAO X 1    RADIOLOGY :    RADIOLOGY AND READINGS REVIEWED    ASSESSMENT :     Infection due to severe acute respiratory syndrome coronavirus 2 (SARS-CoV-2)    CVA (cerebral vascular accident)    HLD (hyperlipidemia)    S/P percutaneous endoscopic gastrostomy (PEG) tube placement        PLAN:  HPI:  A 63 year old male, from [facility], with PMH of CVA, Alzheimer's, nonverbal at baseline, GERD, Schizophrenia, and Constipation, was brought into the ED s/p mechanical fall, poor oral intake, and Covid-19 infection on 23 as per NH papers. Unable to obtain history from patient since he is nonverbal, history obtained from chart review.  (28 Dec 2023 20:54)    # PROGNOSIS IS POOR/GUARDED - CRITICAL CARE TEAM DISCUSSING W/ FAMILY REGARDING GOC. FAMILY WISHES FOR PEG, TRACHEOSTOMY.     # [1/3] RAPID RESPONSE FOR HYPOTENSION AND HYPOXIA - S/P IV FLUIDS AND PLACED ON NRB FOR HYPOXIA. CRITICAL CARE EVALUATION REQUESTED.     # SEPTIC SHOCK S/T ASPIRATION PNA + DIARRHEA [resolved]  # ACUTE HYPOXIC RESPIRATORY FAILURE S/T ? ASPIRATION EVENT   , COVID19 INFECTION     - ON DECADRON  - PLACED ON CEFEPIME, S/P ROCEPHIN, AZITHROMYCIN   - CHEST PT  - S/P DECADRON  - BCX [NGTD] AND UCX [NGTD]  - S/P IVF, VASOPRESSORS  - ID CONSULT  - CRITICAL CARE MANAGEMENT IN PROGRESS    - [] - PATIENT W/ ? ASPIRATION EVENT - SUBSEQUENTLY REQUIRED INTUBATION W/ MECHANICAL VENTILATION  - [] - EXTUBATED, CHEST PT  - [] - REINTUBATED OVERNIGHT    - FAMILY WISHES FOR PEG, TRACHEOSTOMY    # BRADYCARDIA  - MONITOR ON TELEMETRY  - F/U ECHOCARDIOGRAM   - OPTIMIZE ELECTROLYTES  - CARDIOLOGY CONSULT  - EP CONSULT    # HYPOKALEMIA  - REPLETING WITH SUPPLEMENT    # HX OF CVA  # HX OF DEMENTIA  # SCHIZOPHRENIA  # GI AND DVT PPX

## 2024-01-11 NOTE — PROGRESS NOTE ADULT - SUBJECTIVE AND OBJECTIVE BOX
EP Attending  HISTORY OF PRESENT ILLNESS: HPI:  A 63 year old male, from [facility], with PMH of CVA, Alzheimer's, nonverbal at baseline, GERD, Schizophrenia, and Constipation, was brought into the ED s/p mechanical fall, poor oral intake, and Covid-19 infection on 12/27/23 as per NH papers. Unable to obtain history from patient since he is nonverbal, history obtained from chart review.  (28 Dec 2023 20:54)    Was pleasant and alert this morning, not talkative.  Apparently then had an RRT For hypotension and has gone to MICU for septic shock treatment.  Takes antipsychotic Rx at his nursing home, and was referred to EP re: sinus bradycardia and borderline QT prolongation on intake.  No reported history of seizures or VT/VF in chart.  Not able to participate in HPI/ROS due to psych issues.  1/4- in ICU, not intubated, awake but not interactive.  1/5- intubated overnight.  1/6- remains intubated/sedated.  1/7- uneventful overnight.  1/8 intubatd/comfortable this morning.  pending extubation later.  1/9- extubated this morning, and re-intubated today.  1/10- remains intubated.  febrile to 38C.  Date of service 1/11- resting in bed, intubated and unconscious.    PAST MEDICAL & SURGICAL HISTORY:  CVA (cerebral vascular accident)  CVA (cerebral vascular accident)  HLD (hyperlipidemia)  S/P percutaneous endoscopic gastrostomy (PEG) tube placement    acetaminophen     Tablet .. 650 milliGRAM(s) Oral every 6 hours PRN  acetaminophen   Oral Liquid .. 650 milliGRAM(s) Oral every 6 hours PRN  albuterol    90 MICROgram(s) HFA Inhaler 2 Puff(s) Inhalation every 6 hours  aspirin  chewable 81 milliGRAM(s) Oral daily  atorvastatin 20 milliGRAM(s) Oral at bedtime  cefepime   IVPB 2000 milliGRAM(s) IV Intermittent every 8 hours  cefepime   IVPB      chlorhexidine 0.12% Liquid 15 milliLiter(s) Oral Mucosa every 12 hours  chlorhexidine 2% Cloths 1 Application(s) Topical <User Schedule>  enoxaparin Injectable 40 milliGRAM(s) SubCutaneous every 24 hours  fentaNYL    Injectable 50 MICROGram(s) IV Push every 6 hours PRN  insulin lispro (ADMELOG) corrective regimen sliding scale   SubCutaneous every 6 hours  pantoprazole   Suspension 40 milliGRAM(s) Enteral Tube daily  valproic  acid Syrup 250 milliGRAM(s) Oral two times a day                            13.1   13.14 )-----------( 307      ( 11 Jan 2024 04:54 )             40.0       01-11    143  |  109<H>  |  17  ----------------------------<  143<H>  3.7   |  31  |  0.62    Ca    9.3      11 Jan 2024 04:54  Phos  2.9     01-11  Mg     2.1     01-11    TPro  7.0  /  Alb  2.9<L>  /  TBili  0.4  /  DBili  x   /  AST  10  /  ALT  28  /  AlkPhos  54  01-10    T(C): 38.2 (01-11-24 @ 13:00), Max: 38.2 (01-10-24 @ 16:15)  HR: 88 (01-11-24 @ 13:00) (61 - 96)  BP: 82/69 (01-11-24 @ 13:00) (82/69 - 128/72)  RR: 14 (01-11-24 @ 13:00) (10 - 19)  SpO2: 97% (01-11-24 @ 13:00) (86% - 100%)  Wt(kg): --    I&O's Summary    10 Darrell 2024 07:01  -  11 Jan 2024 07:00  --------------------------------------------------------  IN: 986.8 mL / OUT: 1490 mL / NET: -503.2 mL    Appearance: elderly  man intubated and sedated  HEENT:   Normal oral mucosa, PERRL, EOMI	  Lymphatic: No lymphadenopathy , no edema  Cardiovascular: Normal S1 S2, No JVD, No murmurs , Peripheral pulses palpable 2+ bilaterally  Respiratory: Lungs clear to auscultation, normal effort 	  Gastrointestinal:  Soft, Non-tender, + BS	  Skin: No rashes, No ecchymoses, No cyanosis, warm to touch  Musculoskeletal: ROM/strength unable to assess due to sedation  Psychiatry:  Mood & affect unable to assess due to sedation.    TELEMETRY: 	bradycardic, sinus 40s-50s.  QT <500 corrected in setting of bradycardia.  ECG: NSR, QTc 450-470ms.  biphasic T-waves.  Echo: LVEF normal in 2020 at time of stroke.	  	  ASSESSMENT/PLAN: Mr Arriola is a pleasant 63y Male at nursing home due to stroke, dementia, and schizophrenia. Brought in for loss of appetite.  intubated, sedated, for airway protection, prior stroke and has not really been alert.  QTc upper limits of normal, chronically.  Replace K to 4-4.5, Mg 2 to 2.  Limit add'l QT prolonging medications.  As he is bedbound, not apparently symptomatic from sinus bradycardia. No long pauses on telemetry, or PVC-initiated arrhythmia.  At this time no indication for permanent pacing.  Will follow with you.  Prognosis worsening with repeat intubations / fever.    Awaiting possible trach/PEG.  Will follow with you.    Ramses Pena M.D.  Cardiac Electrophysiology    office [phone]  pager [phone] EP Attending  HISTORY OF PRESENT ILLNESS: HPI:  A 63 year old male, from [facility], with PMH of CVA, Alzheimer's, nonverbal at baseline, GERD, Schizophrenia, and Constipation, was brought into the ED s/p mechanical fall, poor oral intake, and Covid-19 infection on 12/27/23 as per NH papers. Unable to obtain history from patient since he is nonverbal, history obtained from chart review.  (28 Dec 2023 20:54)    Was pleasant and alert this morning, not talkative.  Apparently then had an RRT For hypotension and has gone to MICU for septic shock treatment.  Takes antipsychotic Rx at his nursing home, and was referred to EP re: sinus bradycardia and borderline QT prolongation on intake.  No reported history of seizures or VT/VF in chart.  Not able to participate in HPI/ROS due to psych issues.  1/4- in ICU, not intubated, awake but not interactive.  1/5- intubated overnight.  1/6- remains intubated/sedated.  1/7- uneventful overnight.  1/8 intubatd/comfortable this morning.  pending extubation later.  1/9- extubated this morning, and re-intubated today.  1/10- remains intubated.  febrile to 38C.  Date of service 1/11- resting in bed, intubated and unconscious.    PAST MEDICAL & SURGICAL HISTORY:  CVA (cerebral vascular accident)  CVA (cerebral vascular accident)  HLD (hyperlipidemia)  S/P percutaneous endoscopic gastrostomy (PEG) tube placement    acetaminophen     Tablet .. 650 milliGRAM(s) Oral every 6 hours PRN  acetaminophen   Oral Liquid .. 650 milliGRAM(s) Oral every 6 hours PRN  albuterol    90 MICROgram(s) HFA Inhaler 2 Puff(s) Inhalation every 6 hours  aspirin  chewable 81 milliGRAM(s) Oral daily  atorvastatin 20 milliGRAM(s) Oral at bedtime  cefepime   IVPB 2000 milliGRAM(s) IV Intermittent every 8 hours  cefepime   IVPB      chlorhexidine 0.12% Liquid 15 milliLiter(s) Oral Mucosa every 12 hours  chlorhexidine 2% Cloths 1 Application(s) Topical <User Schedule>  enoxaparin Injectable 40 milliGRAM(s) SubCutaneous every 24 hours  fentaNYL    Injectable 50 MICROGram(s) IV Push every 6 hours PRN  insulin lispro (ADMELOG) corrective regimen sliding scale   SubCutaneous every 6 hours  pantoprazole   Suspension 40 milliGRAM(s) Enteral Tube daily  valproic  acid Syrup 250 milliGRAM(s) Oral two times a day                            13.1   13.14 )-----------( 307      ( 11 Jan 2024 04:54 )             40.0       01-11    143  |  109<H>  |  17  ----------------------------<  143<H>  3.7   |  31  |  0.62    Ca    9.3      11 Jan 2024 04:54  Phos  2.9     01-11  Mg     2.1     01-11    TPro  7.0  /  Alb  2.9<L>  /  TBili  0.4  /  DBili  x   /  AST  10  /  ALT  28  /  AlkPhos  54  01-10    T(C): 38.2 (01-11-24 @ 13:00), Max: 38.2 (01-10-24 @ 16:15)  HR: 88 (01-11-24 @ 13:00) (61 - 96)  BP: 82/69 (01-11-24 @ 13:00) (82/69 - 128/72)  RR: 14 (01-11-24 @ 13:00) (10 - 19)  SpO2: 97% (01-11-24 @ 13:00) (86% - 100%)  Wt(kg): --    I&O's Summary    10 Darrell 2024 07:01  -  11 Jan 2024 07:00  --------------------------------------------------------  IN: 986.8 mL / OUT: 1490 mL / NET: -503.2 mL    Appearance: elderly  man intubated and sedated  HEENT:   Normal oral mucosa, PERRL, EOMI	  Lymphatic: No lymphadenopathy , no edema  Cardiovascular: Normal S1 S2, No JVD, No murmurs , Peripheral pulses palpable 2+ bilaterally  Respiratory: Lungs clear to auscultation, normal effort 	  Gastrointestinal:  Soft, Non-tender, + BS	  Skin: No rashes, No ecchymoses, No cyanosis, warm to touch  Musculoskeletal: ROM/strength unable to assess due to sedation  Psychiatry:  Mood & affect unable to assess due to sedation.    TELEMETRY: 	bradycardic, sinus 40s-50s.  QT <500 corrected in setting of bradycardia.  ECG: NSR, QTc 450-470ms.  biphasic T-waves.  Echo: LVEF normal in 2020 at time of stroke.	  	  ASSESSMENT/PLAN: Mr Arriola is a pleasant 63y Male at nursing home due to stroke, dementia, and schizophrenia. Brought in for loss of appetite.  intubated, sedated, for airway protection, prior stroke and has not really been alert.  QTc upper limits of normal, chronically.  Replace K to 4-4.5, Mg 2 to 2.  Limit add'l QT prolonging medications.  As he is bedbound, not apparently symptomatic from sinus bradycardia. No long pauses on telemetry, or PVC-initiated arrhythmia.  At this time no indication for permanent pacing.  Will follow with you.  Prognosis worsening with repeat intubations / fever.    Awaiting possible trach/PEG.  Will follow with you.    Ramses Pena M.D.  Cardiac Electrophysiology    office [phone]  pager [phone]

## 2024-01-11 NOTE — PROGRESS NOTE ADULT - SUBJECTIVE AND OBJECTIVE BOX
C A R D I O L O G Y  **********************************     DATE OF SERVICE: 01-11-24    Patient is on ventilator support, unable to obtain review of symptoms.      acetaminophen     Tablet .. 650 milliGRAM(s) Oral every 6 hours PRN  acetaminophen   Oral Liquid .. 650 milliGRAM(s) Oral every 6 hours PRN  albuterol    90 MICROgram(s) HFA Inhaler 2 Puff(s) Inhalation every 6 hours  aspirin  chewable 81 milliGRAM(s) Oral daily  atorvastatin 20 milliGRAM(s) Oral at bedtime  cefepime   IVPB 2000 milliGRAM(s) IV Intermittent every 8 hours  cefepime   IVPB      chlorhexidine 0.12% Liquid 15 milliLiter(s) Oral Mucosa every 12 hours  chlorhexidine 2% Cloths 1 Application(s) Topical <User Schedule>  enoxaparin Injectable 40 milliGRAM(s) SubCutaneous every 24 hours  fentaNYL    Injectable 50 MICROGram(s) IV Push every 6 hours PRN  insulin lispro (ADMELOG) corrective regimen sliding scale   SubCutaneous every 6 hours  pantoprazole   Suspension 40 milliGRAM(s) Enteral Tube daily  valproic  acid Syrup 250 milliGRAM(s) Oral two times a day                            13.1   13.14 )-----------( 307      ( 11 Jan 2024 04:54 )             40.0       Hemoglobin: 13.1 g/dL (01-11 @ 04:54)  Hemoglobin: 13.5 g/dL (01-10 @ 04:11)  Hemoglobin: 12.9 g/dL (01-09 @ 04:05)  Hemoglobin: 12.0 g/dL (01-08 @ 03:52)  Hemoglobin: 12.0 g/dL (01-07 @ 03:04)      01-11    143  |  109<H>  |  17  ----------------------------<  143<H>  3.7   |  31  |  0.62    Ca    9.3      11 Jan 2024 04:54  Phos  2.9     01-11  Mg     2.1     01-11    TPro  7.0  /  Alb  2.9<L>  /  TBili  0.4  /  DBili  x   /  AST  10  /  ALT  28  /  AlkPhos  54  01-10    Creatinine Trend: 0.62<--, 0.76<--, 0.64<--, 0.71<--, 0.64<--, 0.69<--    COAGS:           T(C): 38 (01-11-24 @ 09:00), Max: 38.2 (01-10-24 @ 16:15)  HR: 84 (01-11-24 @ 12:00) (61 - 96)  BP: 107/86 (01-11-24 @ 09:00) (84/68 - 128/72)  RR: 15 (01-11-24 @ 09:00) (10 - 19)  SpO2: 97% (01-11-24 @ 12:00) (86% - 100%)  Wt(kg): --    I&O's Summary    10 Darrell 2024 07:01  -  11 Jan 2024 07:00  --------------------------------------------------------  IN: 986.8 mL / OUT: 1490 mL / NET: -503.2 mL        HEENT:  (-)icterus (-)pallor  CV: N S1 S2 1/6 JOVON (+)2 Pulses B/l  Resp:  Clear to ausculatation B/L, normal effort  GI: (+) BS Soft, NT, ND  Lymph:  (-)Edema, (-)obvious lymphadenopathy  Skin: Warm to touch, Normal turgor  Psych: Unable to assess mood and affect      TELEMETRY: 	  sinus         ASSESSMENT/PLAN: 	63y Male PMH of CVA, Alzheimer's, nonverbal at baseline, GERD, Schizophrenia, historically preserved LV function and Constipation, was brought into the ED s/p mechanical fall, poor oral intake, and Covid-19 infection on 12/27/23 as per NH papers.    # Bradycardia   - He likely has some degree of sick sinus syndrome that may be exacerbated by Midodrine.  now off midodrine  - EP following      # Hypotension  - Suspect this is due to dehydration and vasodilatory state associated with viral/bacterial infection  - Abx   - Vent per DELPHINE Cadet MD, [facility]  BEEPER [phone]     C A R D I O L O G Y  **********************************     DATE OF SERVICE: 01-11-24    Patient is on ventilator support, unable to obtain review of symptoms.      acetaminophen     Tablet .. 650 milliGRAM(s) Oral every 6 hours PRN  acetaminophen   Oral Liquid .. 650 milliGRAM(s) Oral every 6 hours PRN  albuterol    90 MICROgram(s) HFA Inhaler 2 Puff(s) Inhalation every 6 hours  aspirin  chewable 81 milliGRAM(s) Oral daily  atorvastatin 20 milliGRAM(s) Oral at bedtime  cefepime   IVPB 2000 milliGRAM(s) IV Intermittent every 8 hours  cefepime   IVPB      chlorhexidine 0.12% Liquid 15 milliLiter(s) Oral Mucosa every 12 hours  chlorhexidine 2% Cloths 1 Application(s) Topical <User Schedule>  enoxaparin Injectable 40 milliGRAM(s) SubCutaneous every 24 hours  fentaNYL    Injectable 50 MICROGram(s) IV Push every 6 hours PRN  insulin lispro (ADMELOG) corrective regimen sliding scale   SubCutaneous every 6 hours  pantoprazole   Suspension 40 milliGRAM(s) Enteral Tube daily  valproic  acid Syrup 250 milliGRAM(s) Oral two times a day                            13.1   13.14 )-----------( 307      ( 11 Jan 2024 04:54 )             40.0       Hemoglobin: 13.1 g/dL (01-11 @ 04:54)  Hemoglobin: 13.5 g/dL (01-10 @ 04:11)  Hemoglobin: 12.9 g/dL (01-09 @ 04:05)  Hemoglobin: 12.0 g/dL (01-08 @ 03:52)  Hemoglobin: 12.0 g/dL (01-07 @ 03:04)      01-11    143  |  109<H>  |  17  ----------------------------<  143<H>  3.7   |  31  |  0.62    Ca    9.3      11 Jan 2024 04:54  Phos  2.9     01-11  Mg     2.1     01-11    TPro  7.0  /  Alb  2.9<L>  /  TBili  0.4  /  DBili  x   /  AST  10  /  ALT  28  /  AlkPhos  54  01-10    Creatinine Trend: 0.62<--, 0.76<--, 0.64<--, 0.71<--, 0.64<--, 0.69<--    COAGS:           T(C): 38 (01-11-24 @ 09:00), Max: 38.2 (01-10-24 @ 16:15)  HR: 84 (01-11-24 @ 12:00) (61 - 96)  BP: 107/86 (01-11-24 @ 09:00) (84/68 - 128/72)  RR: 15 (01-11-24 @ 09:00) (10 - 19)  SpO2: 97% (01-11-24 @ 12:00) (86% - 100%)  Wt(kg): --    I&O's Summary    10 Darrell 2024 07:01  -  11 Jan 2024 07:00  --------------------------------------------------------  IN: 986.8 mL / OUT: 1490 mL / NET: -503.2 mL        HEENT:  (-)icterus (-)pallor  CV: N S1 S2 1/6 JOVON (+)2 Pulses B/l  Resp:  Clear to ausculatation B/L, normal effort  GI: (+) BS Soft, NT, ND  Lymph:  (-)Edema, (-)obvious lymphadenopathy  Skin: Warm to touch, Normal turgor  Psych: Unable to assess mood and affect      TELEMETRY: 	  sinus         ASSESSMENT/PLAN: 	63y Male PMH of CVA, Alzheimer's, nonverbal at baseline, GERD, Schizophrenia, historically preserved LV function and Constipation, was brought into the ED s/p mechanical fall, poor oral intake, and Covid-19 infection on 12/27/23 as per NH papers.    # Bradycardia   - He likely has some degree of sick sinus syndrome that may be exacerbated by Midodrine.  now off midodrine  - EP following      # Hypotension  - Suspect this is due to dehydration and vasodilatory state associated with viral/bacterial infection  - Abx   - Vent per DELPHINE Cadet MD, [facility]  BEEPER [phone]

## 2024-01-11 NOTE — PROGRESS NOTE ADULT - ASSESSMENT
63 year old male, from [facility], with PMH of CVA, Alzheimer's, nonverbal at baseline, GERD, Schizophrenia, and Constipation, was brought into the ED s/p mechanical fall, poor oral intake, and Covid-19 infection on 12/27/23 Pt was admitted for COVID PNA, later found with intermittent hypoxia to Sats 80% and Hypotension despite multiple fluid boluses, also found with bryan to 40s for the last 2 days, in ICU for further monitoring.     DX:  1. CVA  2. Alzheimer's  3. Hypoxia  4. Hypotension   5. Bradycardia   5. COVID +Ve     =================== Neuro============================  # CVA  # Alzheimer's  #sedation/analgesia  - baseline: bedbound, nonverbal in the setting of Alzheimer's dementia, Previous CVA  - required reintubation due to inability to clear secretions   - continue valporate 250mg BID    ================= Cardiovascular==========================  # septic shock in the setting of COVID-19 infection  # Sinus bradycardia  - Trop negative, TWI on lateral leads are chronic  - POCUS 1/5 shows intact LV function, some signs of end systolic effacement   - status post volume expansion with albumin   - 1/6/23 TTE: Mildly reduced LVEF   - now off levophed, goal MAP > 60  - holding AV angel luis blockers iso bradycardia    ================- Pulm=================================  #Acute Hypoxia Respiratory failure in the setting of COVID-19  #Multifocal PNA   #at risk of aspiration PNA  - RRT for desaturation to the 80%'s (1/2/24)  - Improved with NRB, PO2 in 200s on ABG, s/p HF 50/40 but then failed HFNC 1/5  - status post intubation 1/5-1/9, reintubated 1/10  - extubated to NRB, difficult to wean due to copious oropharyngeal secretions, very high risk of aspiration/re-intubation  - blood cx 12/29 NGTD  - status post course of dexamethasone  - Dr. Hernandez, thoracic surgery consulted for Trach and PEG eval    ==================ID===================================  #COVID   #Aspiration PNA  - Cefepime 2g (1/10-1/15)    ================= Nephro================================  #No active issues   - crt .76, good urine output    =================GI====================================  #Nutrition  -Tube feeds resumed    # Diarrhea 2/2 to laxatives vs Covid   - senna, miralax held in setting of loose stool,   - no further episodes    ================ Heme==================================  DVT PPx with lovenox    =================Endocrine===============================  Bryan and Hypotensive   - TSH 4.28 [wnl]    ================= Skin/Catheters============================  no wounds    =================Prophylaxis =============================  Lovenox  PPI    ==================GOC==================================  FULL CODE, pending further GOC discussion regarding Trach and PEG    ==================DISPO=====================  ICU     63 year old male, from [facility], with PMH of CVA, Alzheimer's, nonverbal at baseline, GERD, Schizophrenia, and Constipation, was brought into the ED s/p mechanical fall, poor oral intake, and Covid-19 infection on 12/27/23 Pt was admitted for COVID PNA, later found with intermittent hypoxia to Sats 80% and Hypotension despite multiple fluid boluses, also found with bryan to 40s for the last 2 days, in ICU for further monitoring.     DX:  1. CVA  2. Alzheimer's  3. Hypoxia  4. Hypotension   5. Bradycardia   5. COVID +Ve     =================== Neuro============================  # CVA  # Alzheimer's  #sedation/analgesia  - baseline: bedbound, nonverbal in the setting of Alzheimer's dementia, Previous CVA  - required reintubation due to inability to clear secretions   - continue valporate 250mg BID    ================= Cardiovascular==========================  # septic shock in the setting of COVID-19 infection  # Sinus bradycardia  - Trop negative, TWI on lateral leads are chronic  - POCUS 1/5 shows intact LV function, some signs of end systolic effacement   - status post volume expansion with albumin   - 1/6/23 TTE: Mildly reduced LVEF   - now off levophed, goal MAP > 60  - holding AV angel luis blockers iso bradycardia    ================- Pulm=================================  #Acute Hypoxia Respiratory failure in the setting of COVID-19  #Multifocal PNA   #at risk of aspiration PNA  - RRT for desaturation to the 80%'s (1/2/24)  - Improved with NRB, PO2 in 200s on ABG, s/p HF 50/40 but then failed HFNC 1/5  - status post intubation 1/5-1/9, reintubated 1/10  - extubated to NRB, difficult to wean due to copious oropharyngeal secretions, very high risk of aspiration/re-intubation  - blood cx 12/29 NGTD  - status post course of dexamethasone  - Dr. Hernandez, thoracic surgery consulted for Trach and PEG eval    ==================ID===================================  #COVID   #Aspiration PNA  - Cefepime 2g (1/10-1/15)    ================= Nephro================================  #No active issues   - crt .76, good urine output    =================GI====================================  #Nutrition  -Tube feeds resumed    # Diarrhea 2/2 to laxatives vs Covid   - senna, miralax held in setting of loose stool,   - no further episodes    ================ Heme==================================  DVT PPx with lovenox    =================Endocrine===============================  Bryan and Hypotensive   - TSH 4.28 [wnl]    ================= Skin/Catheters============================  no wounds    =================Prophylaxis =============================  Lovenox  PPI    ==================GOC==================================  FULL CODE, pending further GOC discussion regarding Trach and PEG    ==================DISPO=====================  ICU     63 year old male, from [facility], with PMH of CVA, Alzheimer's, nonverbal at baseline, GERD, Schizophrenia, and Constipation, was brought into the ED s/p mechanical fall, poor oral intake, and Covid-19 infection on 12/27/23 Pt was admitted for COVID PNA, later found with intermittent hypoxia to Sats 80% and Hypotension despite multiple fluid boluses, also found with bryan to 40s for the last 2 days, in ICU for further monitoring.     DX:  1. CVA  2. Alzheimer's  3. Hypoxia  4. Hypotension   5. Bradycardia   5. COVID +Ve     =================== Neuro============================  # CVA  # Alzheimer's  #sedation/analgesia  - baseline: bedbound, nonverbal in the setting of Alzheimer's dementia, Previous CVA  - required reintubation due to inability to clear secretions   - continue valproate 250mg BID  - resumed propofol for sedation as patient attempting to reach for ETT     ================= Cardiovascular==========================  # septic shock in the setting of COVID-19 infection  # Sinus bradycardia  - Trop negative, TWI on lateral leads are chronic  - POCUS 1/5 shows intact LV function, some signs of end systolic effacement   - status post volume expansion with albumin   - 1/6/23 TTE: Mildly reduced LVEF   - now off levophed, goal MAP > 60  - holding AV angel luis blockers iso bradycardia    ================- Pulm=================================  #Acute Hypoxia Respiratory failure in the setting of COVID-19  #Multifocal PNA   #at risk of aspiration PNA  - RRT for desaturation to the 80%'s (1/2/24)  - Improved with NRB, PO2 in 200s on ABG, s/p HF 50/40 but then failed HFNC 1/5  - status post intubation 1/5-1/9, reintubated 1/10  - extubated to NRB, difficult to wean due to copious oropharyngeal secretions, very high risk of aspiration/re-intubation  - blood cx 12/29 NGTD  - status post course of dexamethasone  - Dr. Hernandez, thoracic surgery consulted for Trach and PEG which is scheduled for 9am tmrw morning.    ==================ID===================================  #COVID   #Aspiration PNA  - Cefepime 2g (1/10-1/15)    ================= Nephro================================  #No active issues   - crt .76, good urine output    =================GI====================================  #Nutrition  -Tube feeds resumed  - NPO after midnight    # Diarrhea 2/2 to laxatives vs Covid   - senna, miralax held in setting of loose stool,   - no further episodes    ================ Heme==================================  DVT PPx with lovenox    =================Endocrine===============================  Bryan and Hypotensive   - TSH 4.28 [wnl]    ================= Skin/Catheters============================  no wounds    =================Prophylaxis =============================  Lovenox held for procedure tomorrow  PPI    ==================GOC==================================  FULL CODE    ==================DISPO=====================  ICU     63 year old male, from [facility], with PMH of CVA, Alzheimer's, nonverbal at baseline, GERD, Schizophrenia, and Constipation, was brought into the ED s/p mechanical fall, poor oral intake, and Covid-19 infection on 12/27/23 Pt was admitted for COVID PNA, later found with intermittent hypoxia to Sats 80% and Hypotension despite multiple fluid boluses, also found with bryan to 40s for the last 2 days, in ICU for further monitoring.     DX:  1. CVA  2. Alzheimer's  3. Hypoxia  4. Hypotension   5. Bradycardia   5. COVID +Ve     =================== Neuro============================  # CVA  # Alzheimer's  #sedation/analgesia  - baseline: bedbound, nonverbal in the setting of Alzheimer's dementia, Previous CVA  - required reintubation due to inability to clear secretions   - continue valproate 250mg BID  - resumed propofol for sedation as patient attempting to reach for ETT     ================= Cardiovascular==========================  # septic shock in the setting of COVID-19 infection  # Sinus bradycardia  - Trop negative, TWI on lateral leads are chronic  - POCUS 1/5 shows intact LV function, some signs of end systolic effacement   - status post volume expansion with albumin   - 1/6/23 TTE: Mildly reduced LVEF   - now off levophed, goal MAP > 60  - holding AV angel luis blockers iso bradycardia    ================- Pulm=================================  #Acute Hypoxia Respiratory failure in the setting of COVID-19  #Multifocal PNA   #at risk of aspiration PNA  - RRT for desaturation to the 80%'s (1/2/24)  - Improved with NRB, PO2 in 200s on ABG, s/p HF 50/40 but then failed HFNC 1/5  - status post intubation 1/5-1/9, reintubated 1/10  - extubated to NRB, difficult to wean due to copious oropharyngeal secretions, very high risk of aspiration/re-intubation  - blood cx 12/29 NGTD  - status post course of dexamethasone  - Dr. Hernandez, thoracic surgery consulted for Trach and PEG which is scheduled for 9am tmrw morning.    ==================ID===================================  #COVID   #Aspiration PNA  - Cefepime 2g (1/10-1/15)    ================= Nephro================================  #No active issues   - crt .76, good urine output    =================GI====================================  #Nutrition  -Tube feeds resumed  - NPO after midnight    # Diarrhea 2/2 to laxatives vs Covid   - senna, miralax held in setting of loose stool,   - no further episodes    ================ Heme==================================  DVT PPx with lovenox    =================Endocrine===============================  Bryan and Hypotensive   - TSH 4.28 [wnl]    ================= Skin/Catheters============================  no wounds    =================Prophylaxis =============================  Lovenox held for procedure tomorrow  PPI    ==================GOC==================================  FULL CODE    ==================DISPO=====================  ICU

## 2024-01-11 NOTE — PROGRESS NOTE ADULT - CRITICAL CARE ATTENDING COMMENT
IMP: This is a  63 year old mn , from [facility], with  CVA, Alzheimer's, nonverbal at baseline, GERD, Schizophrenia, and Constipation, was brought into the ED s/p mechanical fall, poor oral intake, and Covid-19 infection on 12/27/23 Pt was admitted for COVID PNA, later found with intermittent hypoxia/ hypopnea to Sats 80% and Hypotension despite multiple fluid boluses, also found with bryan to 40s for the last 2 days, in ICU for further monitoring.       ASSESSMENT   - Acute Hypoxic resp failure   - Covid-19 PNA   - Shock septic   - CVA  - Alzheimer dementia   - Bradycardia       Plan   - Reintubated 1/9  - Back on mechancial ventilation  - Thoracic surgery consult for tracheostomy/peg placement   - Difficulty managing secretions  - Aspiration precautions   - Antibiotics for aspiration   - Hemodynamic support   - EP cards f/u : no indication for PPM at tis time   - Off Remdesivir due to ADAN  - Isolation : contact and airborne   - Monitor I/O   - Cards following   - Hold AV angel luis blocking agent   - Skin care  - Wound care eval noted  - Prognosis is poor IMP: This is a  63 year old [place] , from [facility], with  CVA, Alzheimer's, nonverbal at baseline, GERD, Schizophrenia, and Constipation, was brought into the ED s/p mechanical fall, poor oral intake, and Covid-19 infection on 12/27/23 Pt was admitted for COVID PNA, later found with intermittent hypoxia/ hypopnea to Sats 80% and Hypotension despite multiple fluid boluses, also found with bryan to 40s for the last 2 days, in ICU for further monitoring.       ASSESSMENT   - Acute Hypoxic resp failure   - Covid-19 PNA   - Shock septic   - CVA  - Alzheimer dementia   - Bradycardia       Plan   - Reintubated 1/9  - Back on mechancial ventilation  - Thoracic surgery consult for tracheostomy/peg placement   - Difficulty managing secretions  - Aspiration precautions   - Antibiotics for aspiration   - Hemodynamic support   - EP cards f/u : no indication for PPM at tis time   - Off Remdesivir due to ADAN  - Isolation : contact and airborne   - Monitor I/O   - Cards following   - Hold AV angel luis blocking agent   - Skin care  - Wound care eval noted  - Prognosis is poor

## 2024-01-11 NOTE — PROGRESS NOTE ADULT - SUBJECTIVE AND OBJECTIVE BOX
INTERVAL HPI/OVERNIGHT EVENTS:       PRESSORS: [ ] YES [ ] NO  WHICH:    ANTIBIOTICS:                  DATE STARTED:  ANTIBIOTICS:                  DATE STARTED:    Antimicrobial:  cefepime   IVPB 2000 milliGRAM(s) IV Intermittent every 8 hours  cefepime   IVPB        Cardiovascular:    Pulmonary:  albuterol    90 MICROgram(s) HFA Inhaler 2 Puff(s) Inhalation every 6 hours    Hematalogic:  aspirin  chewable 81 milliGRAM(s) Oral daily  enoxaparin Injectable 40 milliGRAM(s) SubCutaneous every 24 hours    Other:  acetaminophen     Tablet .. 650 milliGRAM(s) Oral every 6 hours PRN  acetaminophen   Oral Liquid .. 650 milliGRAM(s) Oral every 6 hours PRN  atorvastatin 20 milliGRAM(s) Oral at bedtime  chlorhexidine 0.12% Liquid 15 milliLiter(s) Oral Mucosa every 12 hours  chlorhexidine 2% Cloths 1 Application(s) Topical <User Schedule>  insulin lispro (ADMELOG) corrective regimen sliding scale   SubCutaneous every 6 hours  pantoprazole   Suspension 40 milliGRAM(s) Enteral Tube daily  propofol Infusion 30 MICROgram(s)/kG/Min IV Continuous <Continuous>  valproic  acid Syrup 250 milliGRAM(s) Oral two times a day    acetaminophen     Tablet .. 650 milliGRAM(s) Oral every 6 hours PRN  acetaminophen   Oral Liquid .. 650 milliGRAM(s) Oral every 6 hours PRN  albuterol    90 MICROgram(s) HFA Inhaler 2 Puff(s) Inhalation every 6 hours  aspirin  chewable 81 milliGRAM(s) Oral daily  atorvastatin 20 milliGRAM(s) Oral at bedtime  cefepime   IVPB 2000 milliGRAM(s) IV Intermittent every 8 hours  cefepime   IVPB      chlorhexidine 0.12% Liquid 15 milliLiter(s) Oral Mucosa every 12 hours  chlorhexidine 2% Cloths 1 Application(s) Topical <User Schedule>  enoxaparin Injectable 40 milliGRAM(s) SubCutaneous every 24 hours  insulin lispro (ADMELOG) corrective regimen sliding scale   SubCutaneous every 6 hours  pantoprazole   Suspension 40 milliGRAM(s) Enteral Tube daily  propofol Infusion 30 MICROgram(s)/kG/Min IV Continuous <Continuous>  valproic  acid Syrup 250 milliGRAM(s) Oral two times a day    Drug Dosing Weight  Height (cm): 167.6 (03 Aug 2022 20:11)  Weight (kg): 57 (03 Jan 2024 14:30)  BMI (kg/m2): 20.3 (03 Jan 2024 14:30)  BSA (m2): 1.64 (03 Jan 2024 14:30)    PHYSICAL EXAM:      LINES/DRAINS/DEVICES  CENTRAL LINE: [ ] YES [ ] NO  LOCATION:     ACEVEDO: [ ] YES [ ] NO     A-LINE:  [ ] YES [ ] NO  LOCATION:       ICU Vital Signs Last 24 Hrs  T(C): 37.9 (11 Jan 2024 08:00), Max: 38.2 (10 Darrell 2024 16:15)  T(F): 100.2 (11 Jan 2024 08:00), Max: 100.8 (10 Darrell 2024 16:15)  HR: 82 (11 Jan 2024 08:00) (61 - 96)  BP: 102/78 (11 Jan 2024 08:00) (84/68 - 128/72)  BP(mean): 86 (11 Jan 2024 08:00) (70 - 93)  ABP: --  ABP(mean): --  RR: 14 (11 Jan 2024 08:00) (10 - 19)  SpO2: 98% (11 Jan 2024 08:00) (86% - 100%)    O2 Parameters below as of 11 Jan 2024 04:00  Patient On (Oxygen Delivery Method): ventilator    O2 Concentration (%): 50        ABG - ( 10 Darrell 2024 17:10 )  pH, Arterial: 7.45  pH, Blood: x     /  pCO2: 45    /  pO2: 86    / HCO3: 31    / Base Excess: 6.4   /  SaO2: 99          01-10 @ 07:01  -  01-11 @ 07:00  --------------------------------------------------------  IN: 986.8 mL / OUT: 1490 mL / NET: -503.2 mL      Mode: AC/ CMV (Assist Control/ Continuous Mandatory Ventilation)  RR (machine): 12  TV (machine): 350  FiO2: 40  PEEP: 5  ITime: 1  MAP: 8  PIP: 9        LABS:  CBC Full  -  ( 11 Jan 2024 04:54 )  WBC Count : 13.14 K/uL  RBC Count : 4.45 M/uL  Hemoglobin : 13.1 g/dL  Hematocrit : 40.0 %  Platelet Count - Automated : 307 K/uL  Mean Cell Volume : 89.9 fl  Mean Cell Hemoglobin : 29.4 pg  Mean Cell Hemoglobin Concentration : 32.8 gm/dL  Auto Neutrophil # : 11.02 K/uL  Auto Lymphocyte # : 0.95 K/uL  Auto Monocyte # : 1.02 K/uL  Auto Eosinophil # : 0.06 K/uL  Auto Basophil # : 0.03 K/uL  Auto Neutrophil % : 83.8 %  Auto Lymphocyte % : 7.2 %  Auto Monocyte % : 7.8 %  Auto Eosinophil % : 0.5 %  Auto Basophil % : 0.2 %    01-11    143  |  109<H>  |  17  ----------------------------<  143<H>  3.7   |  31  |  0.62    Ca    9.3      11 Jan 2024 04:54  Phos  2.9     01-11  Mg     2.1     01-11    TPro  7.0  /  Alb  2.9<L>  /  TBili  0.4  /  DBili  x   /  AST  10  /  ALT  28  /  AlkPhos  54  01-10      Urinalysis Basic - ( 11 Jan 2024 04:54 )    Color: x / Appearance: x / SG: x / pH: x  Gluc: 143 mg/dL / Ketone: x  / Bili: x / Urobili: x   Blood: x / Protein: x / Nitrite: x   Leuk Esterase: x / RBC: x / WBC x   Sq Epi: x / Non Sq Epi: x / Bacteria: x      RADIOLOGY & ADDITIONAL STUDIES REVIEWED DURING TEAM ROUNDS     INTERVAL HPI/OVERNIGHT EVENTS:   No acute events overnight. Pt afebrile and hemodynamically stable.      Antimicrobial:  cefepime   IVPB 2000 milliGRAM(s) IV Intermittent every 8 hours  cefepime   IVPB        Cardiovascular:    Pulmonary:  albuterol    90 MICROgram(s) HFA Inhaler 2 Puff(s) Inhalation every 6 hours    Hematalogic:  aspirin  chewable 81 milliGRAM(s) Oral daily  enoxaparin Injectable 40 milliGRAM(s) SubCutaneous every 24 hours    Other:  acetaminophen     Tablet .. 650 milliGRAM(s) Oral every 6 hours PRN  acetaminophen   Oral Liquid .. 650 milliGRAM(s) Oral every 6 hours PRN  atorvastatin 20 milliGRAM(s) Oral at bedtime  chlorhexidine 0.12% Liquid 15 milliLiter(s) Oral Mucosa every 12 hours  chlorhexidine 2% Cloths 1 Application(s) Topical <User Schedule>  insulin lispro (ADMELOG) corrective regimen sliding scale   SubCutaneous every 6 hours  pantoprazole   Suspension 40 milliGRAM(s) Enteral Tube daily  propofol Infusion 30 MICROgram(s)/kG/Min IV Continuous <Continuous>  valproic  acid Syrup 250 milliGRAM(s) Oral two times a day    acetaminophen     Tablet .. 650 milliGRAM(s) Oral every 6 hours PRN  acetaminophen   Oral Liquid .. 650 milliGRAM(s) Oral every 6 hours PRN  albuterol    90 MICROgram(s) HFA Inhaler 2 Puff(s) Inhalation every 6 hours  aspirin  chewable 81 milliGRAM(s) Oral daily  atorvastatin 20 milliGRAM(s) Oral at bedtime  cefepime   IVPB 2000 milliGRAM(s) IV Intermittent every 8 hours  cefepime   IVPB      chlorhexidine 0.12% Liquid 15 milliLiter(s) Oral Mucosa every 12 hours  chlorhexidine 2% Cloths 1 Application(s) Topical <User Schedule>  enoxaparin Injectable 40 milliGRAM(s) SubCutaneous every 24 hours  insulin lispro (ADMELOG) corrective regimen sliding scale   SubCutaneous every 6 hours  pantoprazole   Suspension 40 milliGRAM(s) Enteral Tube daily  propofol Infusion 30 MICROgram(s)/kG/Min IV Continuous <Continuous>  valproic  acid Syrup 250 milliGRAM(s) Oral two times a day    Drug Dosing Weight  Height (cm): 167.6 (03 Aug 2022 20:11)  Weight (kg): 57 (03 Jan 2024 14:30)  BMI (kg/m2): 20.3 (03 Jan 2024 14:30)  BSA (m2): 1.64 (03 Jan 2024 14:30)    PHYSICAL EXAM:  GENERAL: intubated and sedated  EYES: pupils 3mm and sluggish  NECK: Supple, No JVD; Trachea midline   NERVOUS SYSTEM:  responsive to noxious stimuli, minimal gag reflex  CHEST/LUNG:  breath sounds diminished bilaterally, No rales, rhonchi, or wheezing.  HEART: Regular rate and rhythm; No murmurs, rubs, or gallops  ABDOMEN: Soft, Nontender, Nondistended; Bowel sounds present, no pain or masses on palpation  : voiding well, Acevedo in place  EXTREMITIES:  2+ Peripheral Pulses, No clubbing, cyanosis, or edema  SKIN: warm, intact, no lesions     LINES/DRAINS/DEVICES  CENTRAL LINE: [ ] YES [X] NO  LOCATION:     ACEVEDO: [X] YES [ ] NO     A-LINE:  [ ] YES [X] NO  LOCATION:       ICU Vital Signs Last 24 Hrs  T(C): 37.9 (11 Jan 2024 08:00), Max: 38.2 (10 Darrell 2024 16:15)  T(F): 100.2 (11 Jan 2024 08:00), Max: 100.8 (10 Darrell 2024 16:15)  HR: 82 (11 Jan 2024 08:00) (61 - 96)  BP: 102/78 (11 Jan 2024 08:00) (84/68 - 128/72)  BP(mean): 86 (11 Jan 2024 08:00) (70 - 93)  ABP: --  ABP(mean): --  RR: 14 (11 Jan 2024 08:00) (10 - 19)  SpO2: 98% (11 Jan 2024 08:00) (86% - 100%)    O2 Parameters below as of 11 Jan 2024 04:00  Patient On (Oxygen Delivery Method): ventilator    O2 Concentration (%): 50      ABG - ( 10 Darrell 2024 17:10 )  pH, Arterial: 7.45  pH, Blood: x     /  pCO2: 45    /  pO2: 86    / HCO3: 31    / Base Excess: 6.4   /  SaO2: 99          01-10 @ 07:01  -  01-11 @ 07:00  --------------------------------------------------------  IN: 986.8 mL / OUT: 1490 mL / NET: -503.2 mL      Mode: AC/ CMV (Assist Control/ Continuous Mandatory Ventilation)  RR (machine): 12  TV (machine): 350  FiO2: 40  PEEP: 5  ITime: 1  MAP: 8  PIP: 9        LABS:  CBC Full  -  ( 11 Jan 2024 04:54 )  WBC Count : 13.14 K/uL  RBC Count : 4.45 M/uL  Hemoglobin : 13.1 g/dL  Hematocrit : 40.0 %  Platelet Count - Automated : 307 K/uL  Mean Cell Volume : 89.9 fl  Mean Cell Hemoglobin : 29.4 pg  Mean Cell Hemoglobin Concentration : 32.8 gm/dL  Auto Neutrophil # : 11.02 K/uL  Auto Lymphocyte # : 0.95 K/uL  Auto Monocyte # : 1.02 K/uL  Auto Eosinophil # : 0.06 K/uL  Auto Basophil # : 0.03 K/uL  Auto Neutrophil % : 83.8 %  Auto Lymphocyte % : 7.2 %  Auto Monocyte % : 7.8 %  Auto Eosinophil % : 0.5 %  Auto Basophil % : 0.2 %    01-11    143  |  109<H>  |  17  ----------------------------<  143<H>  3.7   |  31  |  0.62    Ca    9.3      11 Jan 2024 04:54  Phos  2.9     01-11  Mg     2.1     01-11    TPro  7.0  /  Alb  2.9<L>  /  TBili  0.4  /  DBili  x   /  AST  10  /  ALT  28  /  AlkPhos  54  01-10      Urinalysis Basic - ( 11 Jan 2024 04:54 )    Color: x / Appearance: x / SG: x / pH: x  Gluc: 143 mg/dL / Ketone: x  / Bili: x / Urobili: x   Blood: x / Protein: x / Nitrite: x   Leuk Esterase: x / RBC: x / WBC x   Sq Epi: x / Non Sq Epi: x / Bacteria: x      RADIOLOGY & ADDITIONAL STUDIES REVIEWED DURING TEAM ROUNDS

## 2024-01-11 NOTE — PROGRESS NOTE ADULT - ASSESSMENT
63M with acute respiratory distress 2/2 COVID PNA, intubated x2  settings PEEP 5 and FIO2 50  GOC note documents DNR/DNI with trial of noninvasive procedures only    - continue vent management per ICU   - vent settings to PEEP 5 and FIO2 of 40  - trach/PEG planning pending goals of care clarification  - continue ICU management  - discussed with Dr. Yi    This note and all of its recommendations herein are preliminary until co-signed by an attending physician    63M with acute respiratory distress 2/2 COVID PNA, intubated x2  settings PEEP 5 and FIO2 50  GOC note documents DNR/DNI with trial of noninvasive procedures only    - continue vent management per ICU   - Trach/PEG planning tomorrow morning  - NPO/hold tube feeds after midnight  - Type and screen, coagulation profile, hold AC if safe  - CBC, BMP in AM  - Continue ICU management  - discussed with Dr. Maya    This note and all of its recommendations herein are preliminary until co-signed by an attending physician    63M with acute respiratory distress 2/2 COVID PNA, intubated x2  settings PEEP 5 and FIO2 50      - Trach/PEG planning tomorrow morning  - NPO/hold tube feeds after midnight  - Type and screen, coagulation profile, hold AC if safe  - CBC, BMP in AM  - Continue ICU management  - discussed with Dr. Maya    This note and all of its recommendations herein are preliminary until co-signed by an attending physician

## 2024-01-12 ENCOUNTER — APPOINTMENT (OUTPATIENT)
Dept: THORACIC SURGERY | Facility: HOSPITAL | Age: 64
End: 2024-01-12

## 2024-01-12 ENCOUNTER — TRANSCRIPTION ENCOUNTER (OUTPATIENT)
Age: 64
End: 2024-01-12

## 2024-01-12 LAB
ANION GAP SERPL CALC-SCNC: 4 MMOL/L — LOW (ref 5–17)
ANION GAP SERPL CALC-SCNC: 4 MMOL/L — LOW (ref 5–17)
APTT BLD: 30.4 SEC — SIGNIFICANT CHANGE UP (ref 24.5–35.6)
APTT BLD: 30.4 SEC — SIGNIFICANT CHANGE UP (ref 24.5–35.6)
BASOPHILS # BLD AUTO: 0.06 K/UL — SIGNIFICANT CHANGE UP (ref 0–0.2)
BASOPHILS # BLD AUTO: 0.06 K/UL — SIGNIFICANT CHANGE UP (ref 0–0.2)
BASOPHILS NFR BLD AUTO: 0.4 % — SIGNIFICANT CHANGE UP (ref 0–2)
BASOPHILS NFR BLD AUTO: 0.4 % — SIGNIFICANT CHANGE UP (ref 0–2)
BUN SERPL-MCNC: 22 MG/DL — HIGH (ref 7–18)
BUN SERPL-MCNC: 22 MG/DL — HIGH (ref 7–18)
CALCIUM SERPL-MCNC: 10.8 MG/DL — HIGH (ref 8.4–10.5)
CALCIUM SERPL-MCNC: 10.8 MG/DL — HIGH (ref 8.4–10.5)
CHLORIDE SERPL-SCNC: 108 MMOL/L — SIGNIFICANT CHANGE UP (ref 96–108)
CHLORIDE SERPL-SCNC: 108 MMOL/L — SIGNIFICANT CHANGE UP (ref 96–108)
CO2 SERPL-SCNC: 30 MMOL/L — SIGNIFICANT CHANGE UP (ref 22–31)
CO2 SERPL-SCNC: 30 MMOL/L — SIGNIFICANT CHANGE UP (ref 22–31)
CREAT SERPL-MCNC: 0.59 MG/DL — SIGNIFICANT CHANGE UP (ref 0.5–1.3)
CREAT SERPL-MCNC: 0.59 MG/DL — SIGNIFICANT CHANGE UP (ref 0.5–1.3)
EGFR: 108 ML/MIN/1.73M2 — SIGNIFICANT CHANGE UP
EGFR: 108 ML/MIN/1.73M2 — SIGNIFICANT CHANGE UP
EOSINOPHIL # BLD AUTO: 0.07 K/UL — SIGNIFICANT CHANGE UP (ref 0–0.5)
EOSINOPHIL # BLD AUTO: 0.07 K/UL — SIGNIFICANT CHANGE UP (ref 0–0.5)
EOSINOPHIL NFR BLD AUTO: 0.4 % — SIGNIFICANT CHANGE UP (ref 0–6)
EOSINOPHIL NFR BLD AUTO: 0.4 % — SIGNIFICANT CHANGE UP (ref 0–6)
GLUCOSE BLDC GLUCOMTR-MCNC: 101 MG/DL — HIGH (ref 70–99)
GLUCOSE BLDC GLUCOMTR-MCNC: 101 MG/DL — HIGH (ref 70–99)
GLUCOSE BLDC GLUCOMTR-MCNC: 133 MG/DL — HIGH (ref 70–99)
GLUCOSE BLDC GLUCOMTR-MCNC: 133 MG/DL — HIGH (ref 70–99)
GLUCOSE BLDC GLUCOMTR-MCNC: 159 MG/DL — HIGH (ref 70–99)
GLUCOSE BLDC GLUCOMTR-MCNC: 159 MG/DL — HIGH (ref 70–99)
GLUCOSE SERPL-MCNC: 103 MG/DL — HIGH (ref 70–99)
GLUCOSE SERPL-MCNC: 103 MG/DL — HIGH (ref 70–99)
HCT VFR BLD CALC: 42.4 % — SIGNIFICANT CHANGE UP (ref 39–50)
HCT VFR BLD CALC: 42.4 % — SIGNIFICANT CHANGE UP (ref 39–50)
HGB BLD-MCNC: 13.2 G/DL — SIGNIFICANT CHANGE UP (ref 13–17)
HGB BLD-MCNC: 13.2 G/DL — SIGNIFICANT CHANGE UP (ref 13–17)
IMM GRANULOCYTES NFR BLD AUTO: 0.4 % — SIGNIFICANT CHANGE UP (ref 0–0.9)
IMM GRANULOCYTES NFR BLD AUTO: 0.4 % — SIGNIFICANT CHANGE UP (ref 0–0.9)
INR BLD: 1.05 RATIO — SIGNIFICANT CHANGE UP (ref 0.85–1.18)
INR BLD: 1.05 RATIO — SIGNIFICANT CHANGE UP (ref 0.85–1.18)
LYMPHOCYTES # BLD AUTO: 14.6 % — SIGNIFICANT CHANGE UP (ref 13–44)
LYMPHOCYTES # BLD AUTO: 14.6 % — SIGNIFICANT CHANGE UP (ref 13–44)
LYMPHOCYTES # BLD AUTO: 2.43 K/UL — SIGNIFICANT CHANGE UP (ref 1–3.3)
LYMPHOCYTES # BLD AUTO: 2.43 K/UL — SIGNIFICANT CHANGE UP (ref 1–3.3)
MAGNESIUM SERPL-MCNC: 2.4 MG/DL — SIGNIFICANT CHANGE UP (ref 1.6–2.6)
MAGNESIUM SERPL-MCNC: 2.4 MG/DL — SIGNIFICANT CHANGE UP (ref 1.6–2.6)
MANUAL SMEAR VERIFICATION: SIGNIFICANT CHANGE UP
MANUAL SMEAR VERIFICATION: SIGNIFICANT CHANGE UP
MCHC RBC-ENTMCNC: 29.5 PG — SIGNIFICANT CHANGE UP (ref 27–34)
MCHC RBC-ENTMCNC: 29.5 PG — SIGNIFICANT CHANGE UP (ref 27–34)
MCHC RBC-ENTMCNC: 31.1 GM/DL — LOW (ref 32–36)
MCHC RBC-ENTMCNC: 31.1 GM/DL — LOW (ref 32–36)
MCV RBC AUTO: 94.9 FL — SIGNIFICANT CHANGE UP (ref 80–100)
MCV RBC AUTO: 94.9 FL — SIGNIFICANT CHANGE UP (ref 80–100)
MONOCYTES # BLD AUTO: 2.2 K/UL — HIGH (ref 0–0.9)
MONOCYTES # BLD AUTO: 2.2 K/UL — HIGH (ref 0–0.9)
MONOCYTES NFR BLD AUTO: 13.2 % — SIGNIFICANT CHANGE UP (ref 2–14)
MONOCYTES NFR BLD AUTO: 13.2 % — SIGNIFICANT CHANGE UP (ref 2–14)
NEUTROPHILS # BLD AUTO: 11.83 K/UL — HIGH (ref 1.8–7.4)
NEUTROPHILS # BLD AUTO: 11.83 K/UL — HIGH (ref 1.8–7.4)
NEUTROPHILS NFR BLD AUTO: 71 % — SIGNIFICANT CHANGE UP (ref 43–77)
NEUTROPHILS NFR BLD AUTO: 71 % — SIGNIFICANT CHANGE UP (ref 43–77)
NRBC # BLD: 0 /100 WBCS — SIGNIFICANT CHANGE UP (ref 0–0)
NRBC # BLD: 0 /100 WBCS — SIGNIFICANT CHANGE UP (ref 0–0)
PLAT MORPH BLD: NORMAL — SIGNIFICANT CHANGE UP
PLAT MORPH BLD: NORMAL — SIGNIFICANT CHANGE UP
PLATELET # BLD AUTO: 283 K/UL — SIGNIFICANT CHANGE UP (ref 150–400)
PLATELET # BLD AUTO: 283 K/UL — SIGNIFICANT CHANGE UP (ref 150–400)
PLATELET COUNT - ESTIMATE: NORMAL — SIGNIFICANT CHANGE UP
PLATELET COUNT - ESTIMATE: NORMAL — SIGNIFICANT CHANGE UP
POTASSIUM SERPL-MCNC: 4.1 MMOL/L — SIGNIFICANT CHANGE UP (ref 3.5–5.3)
POTASSIUM SERPL-MCNC: 4.1 MMOL/L — SIGNIFICANT CHANGE UP (ref 3.5–5.3)
POTASSIUM SERPL-SCNC: 4.1 MMOL/L — SIGNIFICANT CHANGE UP (ref 3.5–5.3)
POTASSIUM SERPL-SCNC: 4.1 MMOL/L — SIGNIFICANT CHANGE UP (ref 3.5–5.3)
PROTHROM AB SERPL-ACNC: 11.9 SEC — SIGNIFICANT CHANGE UP (ref 9.5–13)
PROTHROM AB SERPL-ACNC: 11.9 SEC — SIGNIFICANT CHANGE UP (ref 9.5–13)
RBC # BLD: 4.47 M/UL — SIGNIFICANT CHANGE UP (ref 4.2–5.8)
RBC # BLD: 4.47 M/UL — SIGNIFICANT CHANGE UP (ref 4.2–5.8)
RBC # FLD: 13.9 % — SIGNIFICANT CHANGE UP (ref 10.3–14.5)
RBC # FLD: 13.9 % — SIGNIFICANT CHANGE UP (ref 10.3–14.5)
RBC BLD AUTO: NORMAL — SIGNIFICANT CHANGE UP
RBC BLD AUTO: NORMAL — SIGNIFICANT CHANGE UP
SODIUM SERPL-SCNC: 142 MMOL/L — SIGNIFICANT CHANGE UP (ref 135–145)
SODIUM SERPL-SCNC: 142 MMOL/L — SIGNIFICANT CHANGE UP (ref 135–145)
WBC # BLD: 16.66 K/UL — HIGH (ref 3.8–10.5)
WBC # BLD: 16.66 K/UL — HIGH (ref 3.8–10.5)
WBC # FLD AUTO: 16.66 K/UL — HIGH (ref 3.8–10.5)
WBC # FLD AUTO: 16.66 K/UL — HIGH (ref 3.8–10.5)

## 2024-01-12 PROCEDURE — 43246 EGD PLACE GASTROSTOMY TUBE: CPT

## 2024-01-12 PROCEDURE — 31615 TRCHEOBRNCHSC EST TRACHS INC: CPT

## 2024-01-12 PROCEDURE — 31645 BRNCHSC W/THER ASPIR 1ST: CPT | Mod: 59

## 2024-01-12 PROCEDURE — 31600 PLANNED TRACHEOSTOMY: CPT

## 2024-01-12 PROCEDURE — 43246 EGD PLACE GASTROSTOMY TUBE: CPT | Mod: AS

## 2024-01-12 PROCEDURE — 31624 DX BRONCHOSCOPE/LAVAGE: CPT | Mod: 59

## 2024-01-12 PROCEDURE — 99231 SBSQ HOSP IP/OBS SF/LOW 25: CPT

## 2024-01-12 DEVICE — SURGICEL FIBRILLAR 2 X 4": Type: IMPLANTABLE DEVICE | Status: FUNCTIONAL

## 2024-01-12 DEVICE — IMPLANTABLE DEVICE: Type: IMPLANTABLE DEVICE | Status: FUNCTIONAL

## 2024-01-12 DEVICE — PEG KT SAFETY 20FR: Type: IMPLANTABLE DEVICE | Status: FUNCTIONAL

## 2024-01-12 RX ORDER — CEFEPIME 1 G/1
2000 INJECTION, POWDER, FOR SOLUTION INTRAMUSCULAR; INTRAVENOUS EVERY 8 HOURS
Refills: 0 | Status: DISCONTINUED | OUTPATIENT
Start: 2024-01-12 | End: 2024-01-18

## 2024-01-12 RX ORDER — MORPHINE SULFATE 50 MG/1
2 CAPSULE, EXTENDED RELEASE ORAL EVERY 4 HOURS
Refills: 0 | Status: DISCONTINUED | OUTPATIENT
Start: 2024-01-12 | End: 2024-01-16

## 2024-01-12 RX ORDER — CHLORHEXIDINE GLUCONATE 213 G/1000ML
15 SOLUTION TOPICAL EVERY 12 HOURS
Refills: 0 | Status: DISCONTINUED | OUTPATIENT
Start: 2024-01-12 | End: 2024-01-18

## 2024-01-12 RX ORDER — ENOXAPARIN SODIUM 100 MG/ML
40 INJECTION SUBCUTANEOUS EVERY 24 HOURS
Refills: 0 | Status: DISCONTINUED | OUTPATIENT
Start: 2024-01-12 | End: 2024-01-18

## 2024-01-12 RX ADMIN — CEFEPIME 100 MILLIGRAM(S): 1 INJECTION, POWDER, FOR SOLUTION INTRAMUSCULAR; INTRAVENOUS at 21:35

## 2024-01-12 RX ADMIN — CEFEPIME 100 MILLIGRAM(S): 1 INJECTION, POWDER, FOR SOLUTION INTRAMUSCULAR; INTRAVENOUS at 05:34

## 2024-01-12 RX ADMIN — MORPHINE SULFATE 2 MILLIGRAM(S): 50 CAPSULE, EXTENDED RELEASE ORAL at 19:21

## 2024-01-12 RX ADMIN — ENOXAPARIN SODIUM 40 MILLIGRAM(S): 100 INJECTION SUBCUTANEOUS at 14:14

## 2024-01-12 RX ADMIN — MORPHINE SULFATE 2 MILLIGRAM(S): 50 CAPSULE, EXTENDED RELEASE ORAL at 12:05

## 2024-01-12 RX ADMIN — CHLORHEXIDINE GLUCONATE 1 APPLICATION(S): 213 SOLUTION TOPICAL at 05:33

## 2024-01-12 RX ADMIN — Medication 250 MILLIGRAM(S): at 05:34

## 2024-01-12 RX ADMIN — ALBUTEROL 2 PUFF(S): 90 AEROSOL, METERED ORAL at 03:22

## 2024-01-12 RX ADMIN — PROPOFOL 3.42 MICROGRAM(S)/KG/MIN: 10 INJECTION, EMULSION INTRAVENOUS at 02:23

## 2024-01-12 RX ADMIN — CHLORHEXIDINE GLUCONATE 15 MILLILITER(S): 213 SOLUTION TOPICAL at 17:06

## 2024-01-12 RX ADMIN — MORPHINE SULFATE 2 MILLIGRAM(S): 50 CAPSULE, EXTENDED RELEASE ORAL at 19:00

## 2024-01-12 RX ADMIN — CEFEPIME 100 MILLIGRAM(S): 1 INJECTION, POWDER, FOR SOLUTION INTRAMUSCULAR; INTRAVENOUS at 14:04

## 2024-01-12 RX ADMIN — MORPHINE SULFATE 2 MILLIGRAM(S): 50 CAPSULE, EXTENDED RELEASE ORAL at 12:51

## 2024-01-12 RX ADMIN — CHLORHEXIDINE GLUCONATE 15 MILLILITER(S): 213 SOLUTION TOPICAL at 05:33

## 2024-01-12 NOTE — PROGRESS NOTE ADULT - SUBJECTIVE AND OBJECTIVE BOX
C A R D I O L O G Y  **********************************     DATE OF SERVICE: 01-12-24    Patient is on ventilator support, unable to obtain review of symptoms.      cefepime   IVPB 2000 milliGRAM(s) IV Intermittent every 8 hours  chlorhexidine 0.12% Liquid 15 milliLiter(s) Oral Mucosa every 12 hours  enoxaparin Injectable 40 milliGRAM(s) SubCutaneous every 24 hours  morphine  - Injectable 2 milliGRAM(s) IV Push every 4 hours PRN                            13.2   16.66 )-----------( 283      ( 12 Jan 2024 04:35 )             42.4       Hemoglobin: 13.2 g/dL (01-12 @ 04:35)  Hemoglobin: 13.1 g/dL (01-11 @ 04:54)  Hemoglobin: 13.5 g/dL (01-10 @ 04:11)  Hemoglobin: 12.9 g/dL (01-09 @ 04:05)  Hemoglobin: 12.0 g/dL (01-08 @ 03:52)      01-12    142  |  108  |  22<H>  ----------------------------<  103<H>  4.1   |  30  |  0.59    Ca    10.8<H>      12 Jan 2024 04:35  Phos  2.9     01-11  Mg     2.4     01-12      Creatinine Trend: 0.59<--, 0.62<--, 0.76<--, 0.64<--, 0.71<--, 0.64<--    COAGS: PT/INR - ( 12 Jan 2024 04:35 )   PT: 11.9 sec;   INR: 1.05 ratio         PTT - ( 12 Jan 2024 04:35 )  PTT:30.4 sec          T(C): 36.4 (01-12-24 @ 13:00), Max: 38 (01-11-24 @ 14:00)  HR: 62 (01-12-24 @ 13:00) (54 - 79)  BP: 95/76 (01-12-24 @ 13:00) (79/57 - 130/78)  RR: 12 (01-12-24 @ 13:00) (11 - 18)  SpO2: 100% (01-12-24 @ 13:00) (96% - 100%)  Wt(kg): --    I&O's Summary    11 Jan 2024 07:01  -  12 Jan 2024 07:00  --------------------------------------------------------  IN: 666.3 mL / OUT: 1000 mL / NET: -333.7 mL    12 Jan 2024 07:01  -  12 Jan 2024 13:54  --------------------------------------------------------  IN: 8.6 mL / OUT: 0 mL / NET: 8.6 mL          HEENT:  (-)icterus (-)pallor  CV: N S1 S2 1/6 JOVON (+)2 Pulses B/l  Resp:  Clear to ausculatation B/L, normal effort  GI: (+) BS Soft, NT, ND  Lymph:  (-)Edema, (-)obvious lymphadenopathy  Skin: Warm to touch, Normal turgor  Psych: Unable to assess mood and affect      TELEMETRY: 	  sinus         ASSESSMENT/PLAN: 	63y Male PMH of CVA, Alzheimer's, nonverbal at baseline, GERD, Schizophrenia, historically preserved LV function and Constipation, was brought into the ED s/p mechanical fall, poor oral intake, and Covid-19 infection on 12/27/23 as per NH papers.    # Bradycardia   - He likely has some degree of sick sinus syndrome that may be exacerbated by Midodrine.  now off midodrine  - EP following      # Hypotension  - Suspect this is due to dehydration and vasodilatory state associated with viral/bacterial infection  - Abx   - Vent per MICU  - for trach and PEG    Gavino Cadet MD, [facility]  BEEPER [phone]     C A R D I O L O G Y  **********************************     DATE OF SERVICE: 01-12-24    Patient is on ventilator support, unable to obtain review of symptoms.      cefepime   IVPB 2000 milliGRAM(s) IV Intermittent every 8 hours  chlorhexidine 0.12% Liquid 15 milliLiter(s) Oral Mucosa every 12 hours  enoxaparin Injectable 40 milliGRAM(s) SubCutaneous every 24 hours  morphine  - Injectable 2 milliGRAM(s) IV Push every 4 hours PRN                            13.2   16.66 )-----------( 283      ( 12 Jan 2024 04:35 )             42.4       Hemoglobin: 13.2 g/dL (01-12 @ 04:35)  Hemoglobin: 13.1 g/dL (01-11 @ 04:54)  Hemoglobin: 13.5 g/dL (01-10 @ 04:11)  Hemoglobin: 12.9 g/dL (01-09 @ 04:05)  Hemoglobin: 12.0 g/dL (01-08 @ 03:52)      01-12    142  |  108  |  22<H>  ----------------------------<  103<H>  4.1   |  30  |  0.59    Ca    10.8<H>      12 Jan 2024 04:35  Phos  2.9     01-11  Mg     2.4     01-12      Creatinine Trend: 0.59<--, 0.62<--, 0.76<--, 0.64<--, 0.71<--, 0.64<--    COAGS: PT/INR - ( 12 Jan 2024 04:35 )   PT: 11.9 sec;   INR: 1.05 ratio         PTT - ( 12 Jan 2024 04:35 )  PTT:30.4 sec          T(C): 36.4 (01-12-24 @ 13:00), Max: 38 (01-11-24 @ 14:00)  HR: 62 (01-12-24 @ 13:00) (54 - 79)  BP: 95/76 (01-12-24 @ 13:00) (79/57 - 130/78)  RR: 12 (01-12-24 @ 13:00) (11 - 18)  SpO2: 100% (01-12-24 @ 13:00) (96% - 100%)  Wt(kg): --    I&O's Summary    11 Jan 2024 07:01  -  12 Jan 2024 07:00  --------------------------------------------------------  IN: 666.3 mL / OUT: 1000 mL / NET: -333.7 mL    12 Jan 2024 07:01  -  12 Jan 2024 13:54  --------------------------------------------------------  IN: 8.6 mL / OUT: 0 mL / NET: 8.6 mL          HEENT:  (-)icterus (-)pallor  CV: N S1 S2 1/6 JOVON (+)2 Pulses B/l  Resp:  Clear to ausculatation B/L, normal effort  GI: (+) BS Soft, NT, ND  Lymph:  (-)Edema, (-)obvious lymphadenopathy  Skin: Warm to touch, Normal turgor  Psych: Unable to assess mood and affect      TELEMETRY: 	  sinus         ASSESSMENT/PLAN: 	63y Male PMH of CVA, Alzheimer's, nonverbal at baseline, GERD, Schizophrenia, historically preserved LV function and Constipation, was brought into the ED s/p mechanical fall, poor oral intake, and Covid-19 infection on 12/27/23 as per NH papers.    # Bradycardia   - He likely has some degree of sick sinus syndrome that may be exacerbated by Midodrine.  now off midodrine  - EP following      # Hypotension  - Suspect this is due to dehydration and vasodilatory state associated with viral/bacterial infection  - Abx   - Vent per MICU  - for trach and PEG    Gavino Cadet MD, [facility]  BEEPER [phone]

## 2024-01-12 NOTE — PROGRESS NOTE ADULT - SUBJECTIVE AND OBJECTIVE BOX
Time of Visit:  Patient seen and examined. + O2     MEDICATIONS  (STANDING):  cefepime   IVPB 2000 milliGRAM(s) IV Intermittent every 8 hours  chlorhexidine 0.12% Liquid 15 milliLiter(s) Oral Mucosa every 12 hours  enoxaparin Injectable 40 milliGRAM(s) SubCutaneous every 24 hours      MEDICATIONS  (PRN):  morphine  - Injectable 2 milliGRAM(s) IV Push every 4 hours PRN Moderate Pain (4 - 6)       Medications up to date at time of exam.      PHYSICAL EXAMINATION:  Patient has no new complaints.  GENERAL: The patient is a well-developed, well-nourished, in no apparent distress.     Vital Signs Last 24 Hrs  T(C): 36.7 (12 Jan 2024 20:00), Max: 37.6 (12 Jan 2024 01:00)  T(F): 98.1 (12 Jan 2024 20:00), Max: 99.7 (12 Jan 2024 01:00)  HR: 55 (12 Jan 2024 20:00) (54 - 79)  BP: 116/79 (12 Jan 2024 20:00) (88/68 - 130/78)  BP(mean): 91 (12 Jan 2024 20:00) (73 - 94)  RR: 15 (12 Jan 2024 20:00) (11 - 18)  SpO2: 100% (12 Jan 2024 20:00) (96% - 100%)    Parameters below as of 12 Jan 2024 20:00  Patient On (Oxygen Delivery Method): ventilator    O2 Concentration (%): 50  Mode: AC/ CMV (Assist Control/ Continuous Mandatory Ventilation)  RR (machine): 12  TV (machine): 350  FiO2: 40  PEEP: 5  ITime: 1  MAP: 6  PIP: 10   (if applicable)    Chest Tube (if applicable)    HEENT: Head is normocephalic and atraumatic. Extraocular muscles are intact. Mucous membranes are moist.     NECK: Supple, no palpable adenopathy.    LUNGS: Clear to auscultation, no wheezing, rales, or rhonchi.    HEART: Regular rate and rhythm without murmur.    ABDOMEN: Soft, nontender, and nondistended.  No hepatosplenomegaly is noted.    : No painful voiding, no pelvic pain    EXTREMITIES: Without any cyanosis, clubbing, rash, lesions or edema.    NEUROLOGIC: Awake, alert, oriented, grossly intact    SKIN: Warm, dry, good turgor.      LABS:                        13.2   16.66 )-----------( 283      ( 12 Jan 2024 04:35 )             42.4     01-12    142  |  108  |  22<H>  ----------------------------<  103<H>  4.1   |  30  |  0.59    Ca    10.8<H>      12 Jan 2024 04:35  Phos  2.9     01-11  Mg     2.4     01-12      PT/INR - ( 12 Jan 2024 04:35 )   PT: 11.9 sec;   INR: 1.05 ratio         PTT - ( 12 Jan 2024 04:35 )  PTT:30.4 sec  Urinalysis Basic - ( 12 Jan 2024 04:35 )    Color: x / Appearance: x / SG: x / pH: x  Gluc: 103 mg/dL / Ketone: x  / Bili: x / Urobili: x   Blood: x / Protein: x / Nitrite: x   Leuk Esterase: x / RBC: x / WBC x   Sq Epi: x / Non Sq Epi: x / Bacteria: x                      MICROBIOLOGY: (if applicable)    RADIOLOGY & ADDITIONAL STUDIES:  EKG:   CXR:  ECHO:    IMPRESSION: 63y Male PAST MEDICAL & SURGICAL HISTORY:  CVA (cerebral vascular accident)      CVA (cerebral vascular accident)      HLD (hyperlipidemia)      S/P percutaneous endoscopic gastrostomy (PEG) tube placement       p/w           RECOMMENDATIONS:   Time of Visit:  Patient seen and examined. + O2     MEDICATIONS  (STANDING):  cefepime   IVPB 2000 milliGRAM(s) IV Intermittent every 8 hours  chlorhexidine 0.12% Liquid 15 milliLiter(s) Oral Mucosa every 12 hours  enoxaparin Injectable 40 milliGRAM(s) SubCutaneous every 24 hours      MEDICATIONS  (PRN):  morphine  - Injectable 2 milliGRAM(s) IV Push every 4 hours PRN Moderate Pain (4 - 6)       Medications up to date at time of exam.      PHYSICAL EXAMINATION:  Patient has no new complaints.  GENERAL: The patient is a well-developed, well-nourished, in no apparent distress.     Vital Signs Last 24 Hrs  T(C): 36.7 (12 Jan 2024 20:00), Max: 37.6 (12 Jan 2024 01:00)  T(F): 98.1 (12 Jan 2024 20:00), Max: 99.7 (12 Jan 2024 01:00)  HR: 55 (12 Jan 2024 20:00) (54 - 79)  BP: 116/79 (12 Jan 2024 20:00) (88/68 - 130/78)  BP(mean): 91 (12 Jan 2024 20:00) (73 - 94)  RR: 15 (12 Jan 2024 20:00) (11 - 18)  SpO2: 100% (12 Jan 2024 20:00) (96% - 100%)    Parameters below as of 12 Jan 2024 20:00  Patient On (Oxygen Delivery Method): ventilator    O2 Concentration (%): 50  Mode: AC/ CMV (Assist Control/ Continuous Mandatory Ventilation)  RR (machine): 12  TV (machine): 350  FiO2: 40  PEEP: 5  ITime: 1  MAP: 6  PIP: 10   (if applicable)    Chest Tube (if applicable)    HEENT: Head is normocephalic and atraumatic. Extraocular muscles are intact. Mucous membranes are moist.     NECK: Supple, no palpable adenopathy. + Trach     LUNGS: Clear to auscultation, no wheezing, rales, or rhonchi.    HEART: Regular rate and rhythm without murmur.    ABDOMEN: Soft, nontender, and nondistended.  No hepatosplenomegaly is noted.    : No painful voiding, no pelvic pain    EXTREMITIES: Without any cyanosis, clubbing, rash, lesions or edema.    NEUROLOGIC: Awake,    SKIN: Warm, dry, good turgor.      LABS:                        13.2   16.66 )-----------( 283      ( 12 Jan 2024 04:35 )             42.4     01-12    142  |  108  |  22<H>  ----------------------------<  103<H>  4.1   |  30  |  0.59    Ca    10.8<H>      12 Jan 2024 04:35  Phos  2.9     01-11  Mg     2.4     01-12      PT/INR - ( 12 Jan 2024 04:35 )   PT: 11.9 sec;   INR: 1.05 ratio         PTT - ( 12 Jan 2024 04:35 )  PTT:30.4 sec  Urinalysis Basic - ( 12 Jan 2024 04:35 )    Color: x / Appearance: x / SG: x / pH: x  Gluc: 103 mg/dL / Ketone: x  / Bili: x / Urobili: x   Blood: x / Protein: x / Nitrite: x   Leuk Esterase: x / RBC: x / WBC x   Sq Epi: x / Non Sq Epi: x / Bacteria: x                      MICROBIOLOGY: (if applicable)    RADIOLOGY & ADDITIONAL STUDIES:  EKG:   CXR:  ECHO:    IMPRESSION: 63y Male PAST MEDICAL & SURGICAL HISTORY:  CVA (cerebral vascular accident)      CVA (cerebral vascular accident)      HLD (hyperlipidemia)      S/P percutaneous endoscopic gastrostomy (PEG) tube placement       p/w             IMP: This is a  63 year old mn , from [facility], with  CVA, Alzheimer's, nonverbal at baseline, GERD, Schizophrenia, and Constipation, was brought into the ED s/p mechanical fall, poor oral intake, and Covid-19 infection on 12/27/23 Pt was admitted for COVID PNA, later found with intermittent hypoxia/ hypopnea to Sats 80% and Hypotension despite multiple fluid boluses, also found with bryan to 40s for the last 2 days, in ICU for further monitoring.       ASSESSMENT   - Acute Hypoxic resp failure   - Covid-19 PNA   - Shock septic   - CVA  - Alzheimer dementia   - Bradycardia       Plan     - S/P trach    - Adjust vent as per ABG   - PS trail   - Aspiration precautions   - Hemodynamic support   - Titrate vaso-active agents to maintain MAP>65  - EP cards f/u : no indication for PPM at tis time   - Antibx   - F/U cultures   - Aspiration precautions  - Off Remdesivir due to ADAN  - Continue decadron  6 mg for total 10 days   - Isolation : contact and airborne     discussed  with ICU attend     time 38 min      Time of Visit:  Patient seen and examined. + O2     MEDICATIONS  (STANDING):  cefepime   IVPB 2000 milliGRAM(s) IV Intermittent every 8 hours  chlorhexidine 0.12% Liquid 15 milliLiter(s) Oral Mucosa every 12 hours  enoxaparin Injectable 40 milliGRAM(s) SubCutaneous every 24 hours      MEDICATIONS  (PRN):  morphine  - Injectable 2 milliGRAM(s) IV Push every 4 hours PRN Moderate Pain (4 - 6)       Medications up to date at time of exam.      PHYSICAL EXAMINATION:  Patient has no new complaints.  GENERAL: The patient is a well-developed, well-nourished, in no apparent distress.     Vital Signs Last 24 Hrs  T(C): 36.7 (12 Jan 2024 20:00), Max: 37.6 (12 Jan 2024 01:00)  T(F): 98.1 (12 Jan 2024 20:00), Max: 99.7 (12 Jan 2024 01:00)  HR: 55 (12 Jan 2024 20:00) (54 - 79)  BP: 116/79 (12 Jan 2024 20:00) (88/68 - 130/78)  BP(mean): 91 (12 Jan 2024 20:00) (73 - 94)  RR: 15 (12 Jan 2024 20:00) (11 - 18)  SpO2: 100% (12 Jan 2024 20:00) (96% - 100%)    Parameters below as of 12 Jan 2024 20:00  Patient On (Oxygen Delivery Method): ventilator    O2 Concentration (%): 50  Mode: AC/ CMV (Assist Control/ Continuous Mandatory Ventilation)  RR (machine): 12  TV (machine): 350  FiO2: 40  PEEP: 5  ITime: 1  MAP: 6  PIP: 10   (if applicable)    Chest Tube (if applicable)    HEENT: Head is normocephalic and atraumatic. Extraocular muscles are intact. Mucous membranes are moist.     NECK: Supple, no palpable adenopathy. + Trach     LUNGS: Clear to auscultation, no wheezing, rales, or rhonchi.    HEART: Regular rate and rhythm without murmur.    ABDOMEN: Soft, nontender, and nondistended.  No hepatosplenomegaly is noted.    : No painful voiding, no pelvic pain    EXTREMITIES: Without any cyanosis, clubbing, rash, lesions or edema.    NEUROLOGIC: Awake,    SKIN: Warm, dry, good turgor.      LABS:                        13.2   16.66 )-----------( 283      ( 12 Jan 2024 04:35 )             42.4     01-12    142  |  108  |  22<H>  ----------------------------<  103<H>  4.1   |  30  |  0.59    Ca    10.8<H>      12 Jan 2024 04:35  Phos  2.9     01-11  Mg     2.4     01-12      PT/INR - ( 12 Jan 2024 04:35 )   PT: 11.9 sec;   INR: 1.05 ratio         PTT - ( 12 Jan 2024 04:35 )  PTT:30.4 sec  Urinalysis Basic - ( 12 Jan 2024 04:35 )    Color: x / Appearance: x / SG: x / pH: x  Gluc: 103 mg/dL / Ketone: x  / Bili: x / Urobili: x   Blood: x / Protein: x / Nitrite: x   Leuk Esterase: x / RBC: x / WBC x   Sq Epi: x / Non Sq Epi: x / Bacteria: x                      MICROBIOLOGY: (if applicable)    RADIOLOGY & ADDITIONAL STUDIES:  EKG:   CXR:  ECHO:    IMPRESSION: 63y Male PAST MEDICAL & SURGICAL HISTORY:  CVA (cerebral vascular accident)      CVA (cerebral vascular accident)      HLD (hyperlipidemia)      S/P percutaneous endoscopic gastrostomy (PEG) tube placement       p/w             IMP: This is a  63 year old mn , from [facility], with  CVA, Alzheimer's, nonverbal at baseline, GERD, Schizophrenia, and Constipation, was brought into the ED s/p mechanical fall, poor oral intake, and Covid-19 infection on 12/27/23 Pt was admitted for COVID PNA, later found with intermittent hypoxia/ hypopnea to Sats 80% and Hypotension despite multiple fluid boluses, also found with bryan to 40s for the last 2 days, in ICU for further monitoring.       ASSESSMENT   - Acute Hypoxic resp failure   - Covid-19 PNA   - Shock septic   - CVA  - Alzheimer dementia   - Bradycardia       Plan     - S/P trach    - Adjust vent as per ABG   - PS trail   - Aspiration precautions   - Hemodynamic support   - Titrate vaso-active agents to maintain MAP>65  - EP cards f/u : no indication for PPM at tis time   - Antibx   - F/U cultures   - Aspiration precautions  - Off Remdesivir due to ADAN  - Continue decadron  6 mg for total 10 days   - Isolation : contact and airborne     discussed  with ICU attend     time 38 min

## 2024-01-12 NOTE — PROGRESS NOTE ADULT - SUBJECTIVE AND OBJECTIVE BOX
EP Attending  HISTORY OF PRESENT ILLNESS: HPI:  A 63 year old male, from [facility], with PMH of CVA, Alzheimer's, nonverbal at baseline, GERD, Schizophrenia, and Constipation, was brought into the ED s/p mechanical fall, poor oral intake, and Covid-19 infection on 12/27/23 as per NH papers. Unable to obtain history from patient since he is nonverbal, history obtained from chart review.  (28 Dec 2023 20:54)    Was pleasant and alert this morning, not talkative.  Apparently then had an RRT For hypotension and has gone to MICU for septic shock treatment.  Takes antipsychotic Rx at his nursing home, and was referred to EP re: sinus bradycardia and borderline QT prolongation on intake.  No reported history of seizures or VT/VF in chart.  Not able to participate in HPI/ROS due to psych issues.  1/4- in ICU, not intubated, awake but not interactive.  1/5- intubated overnight.  1/6- remains intubated/sedated.  1/7- uneventful overnight.  1/8 intubatd/comfortable this morning.  pending extubation later.  1/9- extubated this morning, and re-intubated today.  1/10- remains intubated.  febrile to 38C.  1/11- resting in bed, intubated and unconscious.  Date of service 1/12- s/p trach/peg    PAST MEDICAL & SURGICAL HISTORY:  CVA (cerebral vascular accident)  CVA (cerebral vascular accident)  HLD (hyperlipidemia)  S/P percutaneous endoscopic gastrostomy (PEG) tube placement    cefepime   IVPB 2000 milliGRAM(s) IV Intermittent every 8 hours  chlorhexidine 0.12% Liquid 15 milliLiter(s) Oral Mucosa every 12 hours  enoxaparin Injectable 40 milliGRAM(s) SubCutaneous every 24 hours  morphine  - Injectable 2 milliGRAM(s) IV Push every 4 hours PRN                            13.2   16.66 )-----------( 283      ( 12 Jan 2024 04:35 )             42.4       01-12    142  |  108  |  22<H>  ----------------------------<  103<H>  4.1   |  30  |  0.59    Ca    10.8<H>      12 Jan 2024 04:35  Phos  2.9     01-11  Mg     2.4     01-12    T(C): 36.4 (01-12-24 @ 13:00), Max: 37.7 (01-11-24 @ 15:00)  HR: 62 (01-12-24 @ 13:00) (54 - 79)  BP: 95/76 (01-12-24 @ 13:00) (79/57 - 130/78)  RR: 12 (01-12-24 @ 13:00) (11 - 18)  SpO2: 100% (01-12-24 @ 13:00) (96% - 100%)  Wt(kg): --    I&O's Summary    11 Jan 2024 07:01  -  12 Jan 2024 07:00  --------------------------------------------------------  IN: 666.3 mL / OUT: 1000 mL / NET: -333.7 mL    12 Jan 2024 07:01  -  12 Jan 2024 14:10  --------------------------------------------------------  IN: 8.6 mL / OUT: 0 mL / NET: 8.6 mL    Appearance: elderly  man intubated and sedated  HEENT:   Normal oral mucosa, PERRL, EOMI	  Lymphatic: No lymphadenopathy , no edema  Cardiovascular: Normal S1 S2, No JVD, No murmurs , Peripheral pulses palpable 2+ bilaterally  Respiratory: Lungs clear to auscultation, normal effort 	  Gastrointestinal:  Soft, Non-tender, + BS	  Skin: No rashes, No ecchymoses, No cyanosis, warm to touch  Musculoskeletal: ROM/strength unable to assess due to sedation  Psychiatry:  Mood & affect unable to assess due to sedation.    TELEMETRY: 	bradycardic, sinus 40s-50s.  QT <500 corrected in setting of bradycardia.  ECG: NSR, QTc 450-470ms.  biphasic T-waves.  Echo: LVEF normal in 2020 at time of stroke.	  	  ASSESSMENT/PLAN: Mr Arriola is a pleasant 63y Male at nursing home due to stroke, dementia, and schizophrenia. Brought in for loss of appetite.  intubated, sedated, for airway protection, prior stroke and has not really been alert.  s/p trach and PEG 1/12.  QTc upper limits of normal, chronically.  Replace K to 4-4.5, Mg 2 to 2.  Limit add'l QT prolonging medications.  As he is bedbound, not apparently symptomatic from sinus bradycardia. No long pauses on telemetry, or PVC-initiated arrhythmia.  At this time no indication for permanent pacing.  Will follow with you.    Ramses Pena M.D.  Cardiac Electrophysiology    office [phone]  pager [phone] EP Attending  HISTORY OF PRESENT ILLNESS: HPI:  A 63 year old male, from [facility], with PMH of CVA, Alzheimer's, nonverbal at baseline, GERD, Schizophrenia, and Constipation, was brought into the ED s/p mechanical fall, poor oral intake, and Covid-19 infection on 12/27/23 as per NH papers. Unable to obtain history from patient since he is nonverbal, history obtained from chart review.  (28 Dec 2023 20:54)    Was pleasant and alert this morning, not talkative.  Apparently then had an RRT For hypotension and has gone to MICU for septic shock treatment.  Takes antipsychotic Rx at his nursing home, and was referred to EP re: sinus bradycardia and borderline QT prolongation on intake.  No reported history of seizures or VT/VF in chart.  Not able to participate in HPI/ROS due to psych issues.  1/4- in ICU, not intubated, awake but not interactive.  1/5- intubated overnight.  1/6- remains intubated/sedated.  1/7- uneventful overnight.  1/8 intubatd/comfortable this morning.  pending extubation later.  1/9- extubated this morning, and re-intubated today.  1/10- remains intubated.  febrile to 38C.  1/11- resting in bed, intubated and unconscious.  Date of service 1/12- s/p trach/peg    PAST MEDICAL & SURGICAL HISTORY:  CVA (cerebral vascular accident)  CVA (cerebral vascular accident)  HLD (hyperlipidemia)  S/P percutaneous endoscopic gastrostomy (PEG) tube placement    cefepime   IVPB 2000 milliGRAM(s) IV Intermittent every 8 hours  chlorhexidine 0.12% Liquid 15 milliLiter(s) Oral Mucosa every 12 hours  enoxaparin Injectable 40 milliGRAM(s) SubCutaneous every 24 hours  morphine  - Injectable 2 milliGRAM(s) IV Push every 4 hours PRN                            13.2   16.66 )-----------( 283      ( 12 Jan 2024 04:35 )             42.4       01-12    142  |  108  |  22<H>  ----------------------------<  103<H>  4.1   |  30  |  0.59    Ca    10.8<H>      12 Jan 2024 04:35  Phos  2.9     01-11  Mg     2.4     01-12    T(C): 36.4 (01-12-24 @ 13:00), Max: 37.7 (01-11-24 @ 15:00)  HR: 62 (01-12-24 @ 13:00) (54 - 79)  BP: 95/76 (01-12-24 @ 13:00) (79/57 - 130/78)  RR: 12 (01-12-24 @ 13:00) (11 - 18)  SpO2: 100% (01-12-24 @ 13:00) (96% - 100%)  Wt(kg): --    I&O's Summary    11 Jan 2024 07:01  -  12 Jan 2024 07:00  --------------------------------------------------------  IN: 666.3 mL / OUT: 1000 mL / NET: -333.7 mL    12 Jan 2024 07:01  -  12 Jan 2024 14:10  --------------------------------------------------------  IN: 8.6 mL / OUT: 0 mL / NET: 8.6 mL    Appearance: elderly  man intubated and sedated  HEENT:   Normal oral mucosa, PERRL, EOMI	  Lymphatic: No lymphadenopathy , no edema  Cardiovascular: Normal S1 S2, No JVD, No murmurs , Peripheral pulses palpable 2+ bilaterally  Respiratory: Lungs clear to auscultation, normal effort 	  Gastrointestinal:  Soft, Non-tender, + BS	  Skin: No rashes, No ecchymoses, No cyanosis, warm to touch  Musculoskeletal: ROM/strength unable to assess due to sedation  Psychiatry:  Mood & affect unable to assess due to sedation.    TELEMETRY: 	bradycardic, sinus 40s-50s.  QT <500 corrected in setting of bradycardia.  ECG: NSR, QTc 450-470ms.  biphasic T-waves.  Echo: LVEF normal in 2020 at time of stroke.	  	  ASSESSMENT/PLAN: Mr Arriola is a pleasant 63y Male at nursing home due to stroke, dementia, and schizophrenia. Brought in for loss of appetite.  intubated, sedated, for airway protection, prior stroke and has not really been alert.  s/p trach and PEG 1/12.  QTc upper limits of normal, chronically.  Replace K to 4-4.5, Mg 2 to 2.  Limit add'l QT prolonging medications.  As he is bedbound, not apparently symptomatic from sinus bradycardia. No long pauses on telemetry, or PVC-initiated arrhythmia.  At this time no indication for permanent pacing.  Will follow with you.    Ramses Pena M.D.  Cardiac Electrophysiology    office [phone]  pager [phone]

## 2024-01-12 NOTE — PROGRESS NOTE ADULT - SUBJECTIVE AND OBJECTIVE BOX
INTERVAL HPI/OVERNIGHT EVENTS:  Pt resting comfortably. No acute events as per RN.    MEDICATIONS  (STANDING):  cefepime   IVPB 2000 milliGRAM(s) IV Intermittent every 8 hours  chlorhexidine 0.12% Liquid 15 milliLiter(s) Oral Mucosa every 12 hours  enoxaparin Injectable 40 milliGRAM(s) SubCutaneous every 24 hours    MEDICATIONS  (PRN):  morphine  - Injectable 2 milliGRAM(s) IV Push every 4 hours PRN Moderate Pain (4 - 6)      Vital Signs Last 24 Hrs  T(C): 36.7 (12 Jan 2024 14:00), Max: 37.7 (11 Jan 2024 15:00)  T(F): 98.1 (12 Jan 2024 14:00), Max: 99.9 (11 Jan 2024 15:00)  HR: 62 (12 Jan 2024 14:00) (54 - 79)  BP: 119/80 (12 Jan 2024 14:00) (79/57 - 130/78)  BP(mean): 93 (12 Jan 2024 14:00) (66 - 94)  RR: 13 (12 Jan 2024 14:00) (11 - 18)  SpO2: 100% (12 Jan 2024 14:00) (96% - 100%)    Parameters below as of 12 Jan 2024 05:00  Patient On (Oxygen Delivery Method): ventilator        Physical:  General: NAD.  Resp: Trach to vent, unlabored respirations.  Neck: Trach in place, sutures intact. No active bleeding/drainage noted.  Abdomen: Soft nondistended, nontender. PEG in place, no drainage or bleeding noted.     I&O's Detail    11 Jan 2024 07:01  -  12 Jan 2024 07:00  --------------------------------------------------------  IN:    IV PiggyBack: 150 mL    Jevity 1.5: 365 mL    Propofol: 30.9 mL    Propofol: 120.4 mL  Total IN: 666.3 mL    OUT:    Incontinent per Condom Catheter (mL): 1000 mL  Total OUT: 1000 mL    Total NET: -333.7 mL      12 Jan 2024 07:01  -  12 Jan 2024 14:52  --------------------------------------------------------  IN:    Propofol: 8.6 mL  Total IN: 8.6 mL    OUT:  Total OUT: 0 mL    Total NET: 8.6 mL          LABS:                        13.2   16.66 )-----------( 283      ( 12 Jan 2024 04:35 )             42.4             01-12    142  |  108  |  22<H>  ----------------------------<  103<H>  4.1   |  30  |  0.59    Ca    10.8<H>      12 Jan 2024 04:35  Phos  2.9     01-11  Mg     2.4     01-12

## 2024-01-12 NOTE — BRIEF OPERATIVE NOTE - NSICDXBRIEFPOSTOP_GEN_ALL_CORE_FT
POST-OP DIAGNOSIS:  Acute on chronic respiratory failure with hypoxia 12-Jan-2024 09:54:28  Jacklyn Clements

## 2024-01-12 NOTE — CHART NOTE - NSCHARTNOTEFT_GEN_A_CORE
Assessment:   Patient is a 63y old  Male who presents with a chief complaint of Sepsis and AHRF (2024 14:52). Pt s/p trach/ PEG earlier today. Pt NPO. Discussed w/CCM. PPt was receiving Jevity 1.5 30 ml/hr (last documented), Propofol @8.6 ml/hr         Diet Prescription: Diet, NPO:   Except Medications     Special Instructions for Nursing:  Except Medications (24 @ 10:49)      Daily     Daily Weight in k (2024 08:00)  Weight in k.5 (2024 08:00)  Weight in k.5 (2024 08:00)  Weight in k.7 (2024 08:00)  Weight in k.5 (2024 08:00)    % Weight Change;1+ generalized edema    Pertinent Medications: MEDICATIONS  (STANDING):  cefepime   IVPB 2000 milliGRAM(s) IV Intermittent every 8 hours  chlorhexidine 0.12% Liquid 15 milliLiter(s) Oral Mucosa every 12 hours  enoxaparin Injectable 40 milliGRAM(s) SubCutaneous every 24 hours    MEDICATIONS  (PRN):  morphine  - Injectable 2 milliGRAM(s) IV Push every 4 hours PRN Moderate Pain (4 - 6)    Pertinent Labs:  Na142 mmol/L Glu 103 mg/dL<H> K+ 4.1 mmol/L Cr  0.59 mg/dL BUN 22 mg/dL<H>  Phos 2.9 mg/dL 01-10 Alb 2.9 g/dL<L>  Chol 93 mg/dL LDL --    HDL 60 mg/dL Trig 55 mg/dL     CAPILLARY BLOOD GLUCOSE      POCT Blood Glucose.: 159 mg/dL (2024 16:18)  POCT Blood Glucose.: 133 mg/dL (2024 11:28)  POCT Blood Glucose.: 101 mg/dL (2024 05:14)  POCT Blood Glucose.: 151 mg/dL (2024 23:24)  POCT Blood Glucose.: 152 mg/dL (2024 16:58)      Estimated Needs:   [x ] no change since previous assessment (see document )/ TF recs      Previous Nutrition Diagnosis:   [ ] Altered GI function  [x ]Inadequate Oral Intake [ ] Swallowing Difficulty   [ ] Altered nutrition related labs [ ] Increased Nutrient Needs [ ] Overweight/Obesity   [ ] Unintended Weight Loss [ ] Food & Nutrition Related Knowledge Deficit [ ] Malnutrition   [ ] Other:     Nutrition Diagnosis is [x ] ongoing (NPO s/p trach/ PEG)    Interventions:   Recommend  [ ] Change Diet To:  [ ] Nutrition Supplement  [x ] Nutrition Support: With Propofol 8.6 ml/hr :Jevity 1.5 45x18 + 1 Pkt Prosource/ day. MD to monitor. RD available.   [ ] Other:     Monitoring and Evaluation:    s[ x ] follow up per protocol  [ ] other: Assessment:   Patient is a 63y old  Male who presents with a chief complaint of Sepsis and AHRF (2024 14:52). Pt s/p trach/ PEG earlier today. Pt NPO. Discussed w/CCM. PPt was receiving Jevity 1.5 30 ml/hr (last documented), Propofol @8.6 ml/hr         Diet Prescription: Diet, NPO:   Except Medications     Special Instructions for Nursing:  Except Medications (24 @ 10:49)      Daily     Daily Weight in k (2024 08:00)  Weight in k.5 (2024 08:00)  Weight in k.5 (2024 08:00)  Weight in k.7 (2024 08:00)  Weight in k.5 (2024 08:00)    % Weight Change;1+ generalized edema    Pertinent Medications: MEDICATIONS  (STANDING):  cefepime   IVPB 2000 milliGRAM(s) IV Intermittent every 8 hours  chlorhexidine 0.12% Liquid 15 milliLiter(s) Oral Mucosa every 12 hours  enoxaparin Injectable 40 milliGRAM(s) SubCutaneous every 24 hours    MEDICATIONS  (PRN):  morphine  - Injectable 2 milliGRAM(s) IV Push every 4 hours PRN Moderate Pain (4 - 6)    Pertinent Labs:  Na142 mmol/L Glu 103 mg/dL<H> K+ 4.1 mmol/L Cr  0.59 mg/dL BUN 22 mg/dL<H>  Phos 2.9 mg/dL 01-10 Alb 2.9 g/dL<L>  Chol 93 mg/dL LDL --    HDL 60 mg/dL Trig 55 mg/dL     CAPILLARY BLOOD GLUCOSE      POCT Blood Glucose.: 159 mg/dL (2024 16:18)  POCT Blood Glucose.: 133 mg/dL (2024 11:28)  POCT Blood Glucose.: 101 mg/dL (2024 05:14)  POCT Blood Glucose.: 151 mg/dL (2024 23:24)  POCT Blood Glucose.: 152 mg/dL (2024 16:58)      Estimated Needs:   [x ] no change since previous assessment (see document )/ TF recs      Previous Nutrition Diagnosis:   [ ] Altered GI function  [x ]Inadequate Oral Intake [ ] Swallowing Difficulty   [ ] Altered nutrition related labs [ ] Increased Nutrient Needs [ ] Overweight/Obesity   [ ] Unintended Weight Loss [ ] Food & Nutrition Related Knowledge Deficit [ ] Malnutrition   [ ] Other:     Nutrition Diagnosis is [x ] ongoing (NPO s/p trach/ PEG)    Interventions:   Recommend  [ ] Change Diet To:  [ ] Nutrition Supplement  [x ] Nutrition Support: With Propofol 8.6 ml/hr :Jevity 1.5 45x18 + 1 Pkt Prosource/ day. MD to monitor. RD available.   [x ] Other: Triglyceride level monitoring on Propofol    Monitoring and Evaluation:    [ x ] follow up per protocol  [ ] other:

## 2024-01-12 NOTE — PROGRESS NOTE ADULT - SUBJECTIVE AND OBJECTIVE BOX
INTERVAL HPI/OVERNIGHT EVENTS:       PRESSORS: [ ] YES [ ] NO  WHICH:    ANTIBIOTICS:                  DATE STARTED:  ANTIBIOTICS:                  DATE STARTED:    Antimicrobial:  cefepime   IVPB      cefepime   IVPB 2000 milliGRAM(s) IV Intermittent every 8 hours    Cardiovascular:    Pulmonary:  albuterol    90 MICROgram(s) HFA Inhaler 2 Puff(s) Inhalation every 6 hours    Hematalogic:  aspirin  chewable 81 milliGRAM(s) Oral daily    Other:  acetaminophen     Tablet .. 650 milliGRAM(s) Oral every 6 hours PRN  acetaminophen   Oral Liquid .. 650 milliGRAM(s) Oral every 6 hours PRN  atorvastatin 20 milliGRAM(s) Oral at bedtime  chlorhexidine 0.12% Liquid 15 milliLiter(s) Oral Mucosa every 12 hours  chlorhexidine 2% Cloths 1 Application(s) Topical <User Schedule>  fentaNYL    Injectable 50 MICROGram(s) IV Push every 6 hours PRN  insulin lispro (ADMELOG) corrective regimen sliding scale   SubCutaneous every 6 hours  pantoprazole   Suspension 40 milliGRAM(s) Enteral Tube daily  polyethylene glycol 3350 17 Gram(s) Oral at bedtime  propofol Infusion 10 MICROgram(s)/kG/Min IV Continuous <Continuous>  senna Syrup 15 milliLiter(s) Oral at bedtime  valproic  acid Syrup 250 milliGRAM(s) Oral two times a day    acetaminophen     Tablet .. 650 milliGRAM(s) Oral every 6 hours PRN  acetaminophen   Oral Liquid .. 650 milliGRAM(s) Oral every 6 hours PRN  albuterol    90 MICROgram(s) HFA Inhaler 2 Puff(s) Inhalation every 6 hours  aspirin  chewable 81 milliGRAM(s) Oral daily  atorvastatin 20 milliGRAM(s) Oral at bedtime  cefepime   IVPB 2000 milliGRAM(s) IV Intermittent every 8 hours  cefepime   IVPB      chlorhexidine 0.12% Liquid 15 milliLiter(s) Oral Mucosa every 12 hours  chlorhexidine 2% Cloths 1 Application(s) Topical <User Schedule>  fentaNYL    Injectable 50 MICROGram(s) IV Push every 6 hours PRN  insulin lispro (ADMELOG) corrective regimen sliding scale   SubCutaneous every 6 hours  pantoprazole   Suspension 40 milliGRAM(s) Enteral Tube daily  polyethylene glycol 3350 17 Gram(s) Oral at bedtime  propofol Infusion 10 MICROgram(s)/kG/Min IV Continuous <Continuous>  senna Syrup 15 milliLiter(s) Oral at bedtime  valproic  acid Syrup 250 milliGRAM(s) Oral two times a day    Drug Dosing Weight  Height (cm): 167.6 (03 Aug 2022 20:11)  Weight (kg): 57 (03 Jan 2024 14:30)  BMI (kg/m2): 20.3 (03 Jan 2024 14:30)  BSA (m2): 1.64 (03 Jan 2024 14:30)    PHYSICAL EXAM:      LINES/DRAINS/DEVICES  CENTRAL LINE: [ ] YES [ ] NO  LOCATION:     ACEVEDO: [ ] YES [ ] NO     A-LINE:  [ ] YES [ ] NO  LOCATION:       ICU Vital Signs Last 24 Hrs  T(C): 37.4 (12 Jan 2024 07:00), Max: 38.2 (11 Jan 2024 11:00)  T(F): 99.3 (12 Jan 2024 07:00), Max: 100.8 (11 Jan 2024 11:00)  HR: 71 (12 Jan 2024 07:00) (69 - 88)  BP: 97/72 (12 Jan 2024 07:00) (79/57 - 111/78)  BP(mean): 81 (12 Jan 2024 07:00) (66 - 94)  ABP: --  ABP(mean): --  RR: 12 (12 Jan 2024 07:00) (11 - 18)  SpO2: 100% (12 Jan 2024 07:00) (96% - 100%)    O2 Parameters below as of 12 Jan 2024 05:00  Patient On (Oxygen Delivery Method): ventilator        ABG - ( 10 Darrell 2024 17:10 )  pH, Arterial: 7.45  pH, Blood: x     /  pCO2: 45    /  pO2: 86    / HCO3: 31    / Base Excess: 6.4   /  SaO2: 99            01-11 @ 07:01  -  01-12 @ 07:00  --------------------------------------------------------  IN: 666.3 mL / OUT: 1000 mL / NET: -333.7 mL        Mode: AC/ CMV (Assist Control/ Continuous Mandatory Ventilation)  RR (machine): 12  TV (machine): 350  FiO2: 40  PEEP: 5  ITime: 1  MAP: 6  PIP: 13        LABS:  CBC Full  -  ( 12 Jan 2024 04:35 )  WBC Count : 16.66 K/uL  RBC Count : 4.47 M/uL  Hemoglobin : 13.2 g/dL  Hematocrit : 42.4 %  Platelet Count - Automated : 283 K/uL  Mean Cell Volume : 94.9 fl  Mean Cell Hemoglobin : 29.5 pg  Mean Cell Hemoglobin Concentration : 31.1 gm/dL  Auto Neutrophil # : 11.83 K/uL  Auto Lymphocyte # : 2.43 K/uL  Auto Monocyte # : 2.20 K/uL  Auto Eosinophil # : 0.07 K/uL  Auto Basophil # : 0.06 K/uL  Auto Neutrophil % : 71.0 %  Auto Lymphocyte % : 14.6 %  Auto Monocyte % : 13.2 %  Auto Eosinophil % : 0.4 %  Auto Basophil % : 0.4 %    01-12    142  |  108  |  22<H>  ----------------------------<  103<H>  4.1   |  30  |  0.59    Ca    10.8<H>      12 Jan 2024 04:35  Phos  2.9     01-11  Mg     2.4     01-12      PT/INR - ( 12 Jan 2024 04:35 )   PT: 11.9 sec;   INR: 1.05 ratio         PTT - ( 12 Jan 2024 04:35 )  PTT:30.4 sec  Urinalysis Basic - ( 12 Jan 2024 04:35 )    Color: x / Appearance: x / SG: x / pH: x  Gluc: 103 mg/dL / Ketone: x  / Bili: x / Urobili: x   Blood: x / Protein: x / Nitrite: x   Leuk Esterase: x / RBC: x / WBC x   Sq Epi: x / Non Sq Epi: x / Bacteria: x        RADIOLOGY & ADDITIONAL STUDIES REVIEWED DURING TEAM ROUNDS       INTERVAL HPI/OVERNIGHT EVENTS:   No acute events overnight. Patient     PRESSORS: [ ] YES [X] NO      Antimicrobial:  cefepime   IVPB      cefepime   IVPB 2000 milliGRAM(s) IV Intermittent every 8 hours    Cardiovascular:    Pulmonary:  albuterol    90 MICROgram(s) HFA Inhaler 2 Puff(s) Inhalation every 6 hours    Hematalogic:  aspirin  chewable 81 milliGRAM(s) Oral daily    Other:  acetaminophen     Tablet .. 650 milliGRAM(s) Oral every 6 hours PRN  acetaminophen   Oral Liquid .. 650 milliGRAM(s) Oral every 6 hours PRN  atorvastatin 20 milliGRAM(s) Oral at bedtime  chlorhexidine 0.12% Liquid 15 milliLiter(s) Oral Mucosa every 12 hours  chlorhexidine 2% Cloths 1 Application(s) Topical <User Schedule>  fentaNYL    Injectable 50 MICROGram(s) IV Push every 6 hours PRN  insulin lispro (ADMELOG) corrective regimen sliding scale   SubCutaneous every 6 hours  pantoprazole   Suspension 40 milliGRAM(s) Enteral Tube daily  polyethylene glycol 3350 17 Gram(s) Oral at bedtime  propofol Infusion 10 MICROgram(s)/kG/Min IV Continuous <Continuous>  senna Syrup 15 milliLiter(s) Oral at bedtime  valproic  acid Syrup 250 milliGRAM(s) Oral two times a day    acetaminophen     Tablet .. 650 milliGRAM(s) Oral every 6 hours PRN  acetaminophen   Oral Liquid .. 650 milliGRAM(s) Oral every 6 hours PRN  albuterol    90 MICROgram(s) HFA Inhaler 2 Puff(s) Inhalation every 6 hours  aspirin  chewable 81 milliGRAM(s) Oral daily  atorvastatin 20 milliGRAM(s) Oral at bedtime  cefepime   IVPB 2000 milliGRAM(s) IV Intermittent every 8 hours  cefepime   IVPB      chlorhexidine 0.12% Liquid 15 milliLiter(s) Oral Mucosa every 12 hours  chlorhexidine 2% Cloths 1 Application(s) Topical <User Schedule>  fentaNYL    Injectable 50 MICROGram(s) IV Push every 6 hours PRN  insulin lispro (ADMELOG) corrective regimen sliding scale   SubCutaneous every 6 hours  pantoprazole   Suspension 40 milliGRAM(s) Enteral Tube daily  polyethylene glycol 3350 17 Gram(s) Oral at bedtime  propofol Infusion 10 MICROgram(s)/kG/Min IV Continuous <Continuous>  senna Syrup 15 milliLiter(s) Oral at bedtime  valproic  acid Syrup 250 milliGRAM(s) Oral two times a day    Drug Dosing Weight  Height (cm): 167.6 (03 Aug 2022 20:11)  Weight (kg): 57 (03 Jan 2024 14:30)  BMI (kg/m2): 20.3 (03 Jan 2024 14:30)  BSA (m2): 1.64 (03 Jan 2024 14:30)    PHYSICAL EXAM:      LINES/DRAINS/DEVICES  CENTRAL LINE: [ ] YES [ ] NO  LOCATION:     ACEVEDO: [ ] YES [ ] NO     A-LINE:  [ ] YES [ ] NO  LOCATION:       ICU Vital Signs Last 24 Hrs  T(C): 37.4 (12 Jan 2024 07:00), Max: 38.2 (11 Jan 2024 11:00)  T(F): 99.3 (12 Jan 2024 07:00), Max: 100.8 (11 Jan 2024 11:00)  HR: 71 (12 Jan 2024 07:00) (69 - 88)  BP: 97/72 (12 Jan 2024 07:00) (79/57 - 111/78)  BP(mean): 81 (12 Jan 2024 07:00) (66 - 94)  ABP: --  ABP(mean): --  RR: 12 (12 Jan 2024 07:00) (11 - 18)  SpO2: 100% (12 Jan 2024 07:00) (96% - 100%)    O2 Parameters below as of 12 Jan 2024 05:00  Patient On (Oxygen Delivery Method): ventilator        ABG - ( 10 Darrell 2024 17:10 )  pH, Arterial: 7.45  pH, Blood: x     /  pCO2: 45    /  pO2: 86    / HCO3: 31    / Base Excess: 6.4   /  SaO2: 99            01-11 @ 07:01  -  01-12 @ 07:00  --------------------------------------------------------  IN: 666.3 mL / OUT: 1000 mL / NET: -333.7 mL        Mode: AC/ CMV (Assist Control/ Continuous Mandatory Ventilation)  RR (machine): 12  TV (machine): 350  FiO2: 40  PEEP: 5  ITime: 1  MAP: 6  PIP: 13        LABS:  CBC Full  -  ( 12 Jan 2024 04:35 )  WBC Count : 16.66 K/uL  RBC Count : 4.47 M/uL  Hemoglobin : 13.2 g/dL  Hematocrit : 42.4 %  Platelet Count - Automated : 283 K/uL  Mean Cell Volume : 94.9 fl  Mean Cell Hemoglobin : 29.5 pg  Mean Cell Hemoglobin Concentration : 31.1 gm/dL  Auto Neutrophil # : 11.83 K/uL  Auto Lymphocyte # : 2.43 K/uL  Auto Monocyte # : 2.20 K/uL  Auto Eosinophil # : 0.07 K/uL  Auto Basophil # : 0.06 K/uL  Auto Neutrophil % : 71.0 %  Auto Lymphocyte % : 14.6 %  Auto Monocyte % : 13.2 %  Auto Eosinophil % : 0.4 %  Auto Basophil % : 0.4 %    01-12    142  |  108  |  22<H>  ----------------------------<  103<H>  4.1   |  30  |  0.59    Ca    10.8<H>      12 Jan 2024 04:35  Phos  2.9     01-11  Mg     2.4     01-12      PT/INR - ( 12 Jan 2024 04:35 )   PT: 11.9 sec;   INR: 1.05 ratio         PTT - ( 12 Jan 2024 04:35 )  PTT:30.4 sec  Urinalysis Basic - ( 12 Jan 2024 04:35 )    Color: x / Appearance: x / SG: x / pH: x  Gluc: 103 mg/dL / Ketone: x  / Bili: x / Urobili: x   Blood: x / Protein: x / Nitrite: x   Leuk Esterase: x / RBC: x / WBC x   Sq Epi: x / Non Sq Epi: x / Bacteria: x        RADIOLOGY & ADDITIONAL STUDIES REVIEWED DURING TEAM ROUNDS       INTERVAL HPI/OVERNIGHT EVENTS:   Overnight TMax 38.32, given tylenol and continues on cefepime. Pt scheduled for trach and PEG today.    PRESSORS: [ ] YES [X] NO      Antimicrobial:  cefepime   IVPB      cefepime   IVPB 2000 milliGRAM(s) IV Intermittent every 8 hours    Cardiovascular:    Pulmonary:  albuterol    90 MICROgram(s) HFA Inhaler 2 Puff(s) Inhalation every 6 hours    Hematalogic:  aspirin  chewable 81 milliGRAM(s) Oral daily    Other:  acetaminophen     Tablet .. 650 milliGRAM(s) Oral every 6 hours PRN  acetaminophen   Oral Liquid .. 650 milliGRAM(s) Oral every 6 hours PRN  atorvastatin 20 milliGRAM(s) Oral at bedtime  chlorhexidine 0.12% Liquid 15 milliLiter(s) Oral Mucosa every 12 hours  chlorhexidine 2% Cloths 1 Application(s) Topical <User Schedule>  fentaNYL    Injectable 50 MICROGram(s) IV Push every 6 hours PRN  insulin lispro (ADMELOG) corrective regimen sliding scale   SubCutaneous every 6 hours  pantoprazole   Suspension 40 milliGRAM(s) Enteral Tube daily  polyethylene glycol 3350 17 Gram(s) Oral at bedtime  propofol Infusion 10 MICROgram(s)/kG/Min IV Continuous <Continuous>  senna Syrup 15 milliLiter(s) Oral at bedtime  valproic  acid Syrup 250 milliGRAM(s) Oral two times a day    acetaminophen     Tablet .. 650 milliGRAM(s) Oral every 6 hours PRN  acetaminophen   Oral Liquid .. 650 milliGRAM(s) Oral every 6 hours PRN  albuterol    90 MICROgram(s) HFA Inhaler 2 Puff(s) Inhalation every 6 hours  aspirin  chewable 81 milliGRAM(s) Oral daily  atorvastatin 20 milliGRAM(s) Oral at bedtime  cefepime   IVPB 2000 milliGRAM(s) IV Intermittent every 8 hours  cefepime   IVPB      chlorhexidine 0.12% Liquid 15 milliLiter(s) Oral Mucosa every 12 hours  chlorhexidine 2% Cloths 1 Application(s) Topical <User Schedule>  fentaNYL    Injectable 50 MICROGram(s) IV Push every 6 hours PRN  insulin lispro (ADMELOG) corrective regimen sliding scale   SubCutaneous every 6 hours  pantoprazole   Suspension 40 milliGRAM(s) Enteral Tube daily  polyethylene glycol 3350 17 Gram(s) Oral at bedtime  propofol Infusion 10 MICROgram(s)/kG/Min IV Continuous <Continuous>  senna Syrup 15 milliLiter(s) Oral at bedtime  valproic  acid Syrup 250 milliGRAM(s) Oral two times a day    Drug Dosing Weight  Height (cm): 167.6 (03 Aug 2022 20:11)  Weight (kg): 57 (03 Jan 2024 14:30)  BMI (kg/m2): 20.3 (03 Jan 2024 14:30)  BSA (m2): 1.64 (03 Jan 2024 14:30)    PHYSICAL EXAM:  GENERAL: intubated and sedated  EYES: pupils 3mm and sluggish  NECK: Supple, No JVD; Trachea midline   NERVOUS SYSTEM:  responsive to noxious stimuli, minimal gag reflex  CHEST/LUNG:  breath sounds diminished bilaterally, No rales, rhonchi, or wheezing.  HEART: Regular rate and rhythm; No murmurs, rubs, or gallops  ABDOMEN: Soft, Nontender, Nondistended; Bowel sounds present, no pain or masses on palpation  : voiding well, Acevedo in place  EXTREMITIES:  2+ Peripheral Pulses, No clubbing, cyanosis, or edema  SKIN: warm, intact, no lesions     LINES/DRAINS/DEVICES  CENTRAL LINE: [ ] YES [X] NO  LOCATION:     ACEVEDO: [ ] YES [X] NO     A-LINE:  [ ] YES [X] NO  LOCATION:       ICU Vital Signs Last 24 Hrs  T(C): 37.4 (12 Jan 2024 07:00), Max: 38.2 (11 Jan 2024 11:00)  T(F): 99.3 (12 Jan 2024 07:00), Max: 100.8 (11 Jan 2024 11:00)  HR: 71 (12 Jan 2024 07:00) (69 - 88)  BP: 97/72 (12 Jan 2024 07:00) (79/57 - 111/78)  BP(mean): 81 (12 Jan 2024 07:00) (66 - 94)  ABP: --  ABP(mean): --  RR: 12 (12 Jan 2024 07:00) (11 - 18)  SpO2: 100% (12 Jan 2024 07:00) (96% - 100%)    O2 Parameters below as of 12 Jan 2024 05:00  Patient On (Oxygen Delivery Method): ventilator        ABG - ( 10 Darrell 2024 17:10 )  pH, Arterial: 7.45  pH, Blood: x     /  pCO2: 45    /  pO2: 86    / HCO3: 31    / Base Excess: 6.4   /  SaO2: 99          01-11 @ 07:01  -  01-12 @ 07:00  --------------------------------------------------------  IN: 666.3 mL / OUT: 1000 mL / NET: -333.7 mL        Mode: AC/ CMV (Assist Control/ Continuous Mandatory Ventilation)  RR (machine): 12  TV (machine): 350  FiO2: 40  PEEP: 5  ITime: 1  MAP: 6  PIP: 13        LABS:  CBC Full  -  ( 12 Jan 2024 04:35 )  WBC Count : 16.66 K/uL  RBC Count : 4.47 M/uL  Hemoglobin : 13.2 g/dL  Hematocrit : 42.4 %  Platelet Count - Automated : 283 K/uL  Mean Cell Volume : 94.9 fl  Mean Cell Hemoglobin : 29.5 pg  Mean Cell Hemoglobin Concentration : 31.1 gm/dL  Auto Neutrophil # : 11.83 K/uL  Auto Lymphocyte # : 2.43 K/uL  Auto Monocyte # : 2.20 K/uL  Auto Eosinophil # : 0.07 K/uL  Auto Basophil # : 0.06 K/uL  Auto Neutrophil % : 71.0 %  Auto Lymphocyte % : 14.6 %  Auto Monocyte % : 13.2 %  Auto Eosinophil % : 0.4 %  Auto Basophil % : 0.4 %    01-12    142  |  108  |  22<H>  ----------------------------<  103<H>  4.1   |  30  |  0.59    Ca    10.8<H>      12 Jan 2024 04:35  Phos  2.9     01-11  Mg     2.4     01-12      PT/INR - ( 12 Jan 2024 04:35 )   PT: 11.9 sec;   INR: 1.05 ratio         PTT - ( 12 Jan 2024 04:35 )  PTT:30.4 sec  Urinalysis Basic - ( 12 Jan 2024 04:35 )    Color: x / Appearance: x / SG: x / pH: x  Gluc: 103 mg/dL / Ketone: x  / Bili: x / Urobili: x   Blood: x / Protein: x / Nitrite: x   Leuk Esterase: x / RBC: x / WBC x   Sq Epi: x / Non Sq Epi: x / Bacteria: x        RADIOLOGY & ADDITIONAL STUDIES REVIEWED DURING TEAM ROUNDS       INTERVAL HPI/OVERNIGHT EVENTS:   Overnight TMax 38.32, given tylenol and continues on cefepime. Pt scheduled for trach and PEG today.    PRESSORS: [ ] YES [X] NO    Antimicrobial:  cefepime   IVPB      cefepime   IVPB 2000 milliGRAM(s) IV Intermittent every 8 hours    Cardiovascular:    Pulmonary:  albuterol    90 MICROgram(s) HFA Inhaler 2 Puff(s) Inhalation every 6 hours    Hematalogic:  aspirin  chewable 81 milliGRAM(s) Oral daily    Other:  acetaminophen     Tablet .. 650 milliGRAM(s) Oral every 6 hours PRN  acetaminophen   Oral Liquid .. 650 milliGRAM(s) Oral every 6 hours PRN  atorvastatin 20 milliGRAM(s) Oral at bedtime  chlorhexidine 0.12% Liquid 15 milliLiter(s) Oral Mucosa every 12 hours  chlorhexidine 2% Cloths 1 Application(s) Topical <User Schedule>  fentaNYL    Injectable 50 MICROGram(s) IV Push every 6 hours PRN  insulin lispro (ADMELOG) corrective regimen sliding scale   SubCutaneous every 6 hours  pantoprazole   Suspension 40 milliGRAM(s) Enteral Tube daily  polyethylene glycol 3350 17 Gram(s) Oral at bedtime  propofol Infusion 10 MICROgram(s)/kG/Min IV Continuous <Continuous>  senna Syrup 15 milliLiter(s) Oral at bedtime  valproic  acid Syrup 250 milliGRAM(s) Oral two times a day    acetaminophen     Tablet .. 650 milliGRAM(s) Oral every 6 hours PRN  acetaminophen   Oral Liquid .. 650 milliGRAM(s) Oral every 6 hours PRN  albuterol    90 MICROgram(s) HFA Inhaler 2 Puff(s) Inhalation every 6 hours  aspirin  chewable 81 milliGRAM(s) Oral daily  atorvastatin 20 milliGRAM(s) Oral at bedtime  cefepime   IVPB 2000 milliGRAM(s) IV Intermittent every 8 hours  cefepime   IVPB      chlorhexidine 0.12% Liquid 15 milliLiter(s) Oral Mucosa every 12 hours  chlorhexidine 2% Cloths 1 Application(s) Topical <User Schedule>  fentaNYL    Injectable 50 MICROGram(s) IV Push every 6 hours PRN  insulin lispro (ADMELOG) corrective regimen sliding scale   SubCutaneous every 6 hours  pantoprazole   Suspension 40 milliGRAM(s) Enteral Tube daily  polyethylene glycol 3350 17 Gram(s) Oral at bedtime  propofol Infusion 10 MICROgram(s)/kG/Min IV Continuous <Continuous>  senna Syrup 15 milliLiter(s) Oral at bedtime  valproic  acid Syrup 250 milliGRAM(s) Oral two times a day    Drug Dosing Weight  Height (cm): 167.6 (03 Aug 2022 20:11)  Weight (kg): 57 (03 Jan 2024 14:30)  BMI (kg/m2): 20.3 (03 Jan 2024 14:30)  BSA (m2): 1.64 (03 Jan 2024 14:30)    PHYSICAL EXAM:  GENERAL: intubated and sedated  EYES: pupils 3mm and sluggish  NECK: Supple, No JVD; Trachea midline   NERVOUS SYSTEM:  responsive to noxious stimuli, minimal gag reflex  CHEST/LUNG:  breath sounds diminished bilaterally, No rales, rhonchi, or wheezing.  HEART: Regular rate and rhythm; No murmurs, rubs, or gallops  ABDOMEN: Soft, Nontender, Nondistended; Bowel sounds present, no pain or masses on palpation  : voiding well, Acevedo in place  EXTREMITIES:  2+ Peripheral Pulses, No clubbing, cyanosis, or edema  SKIN: warm, intact, no lesions     LINES/DRAINS/DEVICES  CENTRAL LINE: [ ] YES [X] NO  LOCATION:     ACEVEDO: [ ] YES [X] NO     A-LINE:  [ ] YES [X] NO  LOCATION:       ICU Vital Signs Last 24 Hrs  T(C): 37.4 (12 Jan 2024 07:00), Max: 38.2 (11 Jan 2024 11:00)  T(F): 99.3 (12 Jan 2024 07:00), Max: 100.8 (11 Jan 2024 11:00)  HR: 71 (12 Jan 2024 07:00) (69 - 88)  BP: 97/72 (12 Jan 2024 07:00) (79/57 - 111/78)  BP(mean): 81 (12 Jan 2024 07:00) (66 - 94)  ABP: --  ABP(mean): --  RR: 12 (12 Jan 2024 07:00) (11 - 18)  SpO2: 100% (12 Jan 2024 07:00) (96% - 100%)    O2 Parameters below as of 12 Jan 2024 05:00  Patient On (Oxygen Delivery Method): ventilator        ABG - ( 10 Darrell 2024 17:10 )  pH, Arterial: 7.45  pH, Blood: x     /  pCO2: 45    /  pO2: 86    / HCO3: 31    / Base Excess: 6.4   /  SaO2: 99          01-11 @ 07:01  -  01-12 @ 07:00  --------------------------------------------------------  IN: 666.3 mL / OUT: 1000 mL / NET: -333.7 mL        Mode: AC/ CMV (Assist Control/ Continuous Mandatory Ventilation)  RR (machine): 12  TV (machine): 350  FiO2: 40  PEEP: 5  ITime: 1  MAP: 6  PIP: 13      LABS:  CBC Full  -  ( 12 Jan 2024 04:35 )  WBC Count : 16.66 K/uL  RBC Count : 4.47 M/uL  Hemoglobin : 13.2 g/dL  Hematocrit : 42.4 %  Platelet Count - Automated : 283 K/uL  Mean Cell Volume : 94.9 fl  Mean Cell Hemoglobin : 29.5 pg  Mean Cell Hemoglobin Concentration : 31.1 gm/dL  Auto Neutrophil # : 11.83 K/uL  Auto Lymphocyte # : 2.43 K/uL  Auto Monocyte # : 2.20 K/uL  Auto Eosinophil # : 0.07 K/uL  Auto Basophil # : 0.06 K/uL  Auto Neutrophil % : 71.0 %  Auto Lymphocyte % : 14.6 %  Auto Monocyte % : 13.2 %  Auto Eosinophil % : 0.4 %  Auto Basophil % : 0.4 %    01-12    142  |  108  |  22<H>  ----------------------------<  103<H>  4.1   |  30  |  0.59    Ca    10.8<H>      12 Jan 2024 04:35  Phos  2.9     01-11  Mg     2.4     01-12      PT/INR - ( 12 Jan 2024 04:35 )   PT: 11.9 sec;   INR: 1.05 ratio         PTT - ( 12 Jan 2024 04:35 )  PTT:30.4 sec  Urinalysis Basic - ( 12 Jan 2024 04:35 )    Color: x / Appearance: x / SG: x / pH: x  Gluc: 103 mg/dL / Ketone: x  / Bili: x / Urobili: x   Blood: x / Protein: x / Nitrite: x   Leuk Esterase: x / RBC: x / WBC x   Sq Epi: x / Non Sq Epi: x / Bacteria: x        RADIOLOGY & ADDITIONAL STUDIES REVIEWED DURING TEAM ROUNDS

## 2024-01-12 NOTE — BRIEF OPERATIVE NOTE - NSICDXBRIEFPROCEDURE_GEN_ALL_CORE_FT
PROCEDURES:  Tracheostomy, with gastrostomy and PEG tube insertion 12-Jan-2024 09:53:54  Jacklyn Clements

## 2024-01-12 NOTE — PROGRESS NOTE ADULT - ASSESSMENT
64 y/o male s/p trach and PEG placement, POD#0  VSS, afebrile    Plan   - continue PEG to gravity for 24 hrs, may start tube feeds following 24 hrs  - trach to vent, suction prn   - remainder of care as per primary team

## 2024-01-12 NOTE — PROGRESS NOTE ADULT - SUBJECTIVE AND OBJECTIVE BOX
Patient is a 63y old  Male who presents with a chief complaint of Sepsis and AHRF (12 Jan 2024 14:52)    PATIENT IS SEEN AND EXAMINED IN ICU.    ALLERGIES:  No Known Allergies      VITALS:    Vital Signs Last 24 Hrs  T(C): 36.8 (12 Jan 2024 17:00), Max: 37.6 (12 Jan 2024 01:00)  T(F): 98.2 (12 Jan 2024 17:00), Max: 99.7 (12 Jan 2024 01:00)  HR: 62 (12 Jan 2024 17:00) (54 - 79)  BP: 115/68 (12 Jan 2024 17:00) (79/57 - 130/78)  BP(mean): 81 (12 Jan 2024 17:00) (66 - 94)  RR: 17 (12 Jan 2024 17:00) (11 - 18)  SpO2: 97% (12 Jan 2024 17:00) (96% - 100%)    Parameters below as of 12 Jan 2024 05:00  Patient On (Oxygen Delivery Method): ventilator        LABS:    CBC Full  -  ( 12 Jan 2024 04:35 )  WBC Count : 16.66 K/uL  RBC Count : 4.47 M/uL  Hemoglobin : 13.2 g/dL  Hematocrit : 42.4 %  Platelet Count - Automated : 283 K/uL  Mean Cell Volume : 94.9 fl  Mean Cell Hemoglobin : 29.5 pg  Mean Cell Hemoglobin Concentration : 31.1 gm/dL  Auto Neutrophil # : 11.83 K/uL  Auto Lymphocyte # : 2.43 K/uL  Auto Monocyte # : 2.20 K/uL  Auto Eosinophil # : 0.07 K/uL  Auto Basophil # : 0.06 K/uL  Auto Neutrophil % : 71.0 %  Auto Lymphocyte % : 14.6 %  Auto Monocyte % : 13.2 %  Auto Eosinophil % : 0.4 %  Auto Basophil % : 0.4 %    PT/INR - ( 12 Jan 2024 04:35 )   PT: 11.9 sec;   INR: 1.05 ratio         PTT - ( 12 Jan 2024 04:35 )  PTT:30.4 sec  01-12    142  |  108  |  22<H>  ----------------------------<  103<H>  4.1   |  30  |  0.59    Ca    10.8<H>      12 Jan 2024 04:35  Phos  2.9     01-11  Mg     2.4     01-12      CAPILLARY BLOOD GLUCOSE      POCT Blood Glucose.: 159 mg/dL (12 Jan 2024 16:18)  POCT Blood Glucose.: 133 mg/dL (12 Jan 2024 11:28)  POCT Blood Glucose.: 101 mg/dL (12 Jan 2024 05:14)  POCT Blood Glucose.: 151 mg/dL (11 Jan 2024 23:24)          Creatinine Trend: 0.59<--, 0.62<--, 0.76<--, 0.64<--, 0.71<--, 0.64<--  I&O's Summary    11 Jan 2024 07:01  -  12 Jan 2024 07:00  --------------------------------------------------------  IN: 666.3 mL / OUT: 1000 mL / NET: -333.7 mL    12 Jan 2024 07:01  -  12 Jan 2024 17:35  --------------------------------------------------------  IN: 58.6 mL / OUT: 600 mL / NET: -541.4 mL            Catheterized Catheterized  12-29 @ 12:37   No growth  --  --      .Blood Blood  12-29 @ 02:05   No growth at 5 days  --  --      .Blood Blood  12-29 @ 01:55   No growth at 5 days  --  --          MEDICATIONS:    MEDICATIONS  (STANDING):  cefepime   IVPB 2000 milliGRAM(s) IV Intermittent every 8 hours  chlorhexidine 0.12% Liquid 15 milliLiter(s) Oral Mucosa every 12 hours  enoxaparin Injectable 40 milliGRAM(s) SubCutaneous every 24 hours      MEDICATIONS  (PRN):  morphine  - Injectable 2 milliGRAM(s) IV Push every 4 hours PRN Moderate Pain (4 - 6)      REVIEW OF SYSTEMS:                           ALL ROS DONE [ X   ]    CONSTITUTIONAL:  LETHARGIC [   ], FEVER [   ], UNRESPONSIVE [   ]  CVS:  CP  [   ], SOB, [   ], PALPITATIONS [   ], DIZZYNESS [   ]  RS: COUGH [   ], SPUTUM [   ]  GI: ABDOMINAL PAIN [   ], NAUSEA [   ], VOMITINGS [   ], DIARRHEA [   ], CONSTIPATION [   ]  :  DYSURIA [   ], NOCTURIA [   ], INCREASED FREQUENCY [   ], DRIBLING [   ],  SKELETAL: PAINFUL JOINTS [   ], SWOLLEN JOINTS [   ], NECK ACHE [   ], LOW BACK ACHE [   ],  SKIN : ULCERS [   ], RASH [   ], ITCHING [   ]  CNS: HEAD ACHE [   ], DOUBLE VISION [   ], BLURRED VISION [   ], AMS / CONFUSION [   ], SEIZURES [   ], WEAKNESS [   ],TINGLING / NUMBNESS [   ]      PHYSICAL EXAMINATION:  GENERAL APPEARANCE: NO DISTRESS  HEENT:  NO PALLOR, NO  JVD,  NO   NODES, NECK SUPPLE  CVS: S1 +, S2 +,   RS: AEEB,  OCCASIONAL  RALES +,   RHONCHI +   TRACHEOSTOMY  ABD: SOFT, NT, NO, BS +    PEG +  EXT: NO PE  SKIN: WARM,   SKELETAL:  REDUCED ROM OF CERVICAL AND LS SPINE  CNS:  AAO X 1    RADIOLOGY :    RADIOLOGY AND READINGS REVIEWED    ASSESSMENT :     Infection due to severe acute respiratory syndrome coronavirus 2 (SARS-CoV-2)    CVA (cerebral vascular accident)    HLD (hyperlipidemia)    S/P percutaneous endoscopic gastrostomy (PEG) tube placement        PLAN:  HPI:  A 63 year old male, from [facility], with PMH of CVA, Alzheimer's, nonverbal at baseline, GERD, Schizophrenia, and Constipation, was brought into the ED s/p mechanical fall, poor oral intake, and Covid-19 infection on 12/27/23 as per NH papers. Unable to obtain history from patient since he is nonverbal, history obtained from chart review.  (28 Dec 2023 20:54)    # PROGNOSIS IS POOR/GUARDED - CRITICAL CARE TEAM DISCUSSING W/ FAMILY REGARDING GOC. FAMILY WISHES FOR PEG, TRACHEOSTOMY.     # [1/3] RAPID RESPONSE FOR HYPOTENSION AND HYPOXIA - S/P IV FLUIDS AND PLACED ON NRB FOR HYPOXIA. CRITICAL CARE EVALUATION REQUESTED.     # SEPTIC SHOCK S/T ASPIRATION PNA + DIARRHEA [resolved]  # ACUTE HYPOXIC RESPIRATORY FAILURE S/T ? ASPIRATION EVENT   , COVID19 INFECTION     S/P TRACHEOSTOMY [1/12]  - ON DECADRON  - PLACED ON CEFEPIME, S/P ROCEPHIN, AZITHROMYCIN   - CHEST PT  - S/P DECADRON  - BCX [NGTD] AND UCX [NGTD]  - S/P IVF, VASOPRESSORS  - ID CONSULT  - CRITICAL CARE MANAGEMENT IN PROGRESS    - [1/4] - PATIENT W/ ? ASPIRATION EVENT - SUBSEQUENTLY REQUIRED INTUBATION W/ MECHANICAL VENTILATION  - [1/8] - EXTUBATED, CHEST PT  - [1/9] - REINTUBATED OVERNIGHT    - S/P TRACHEOSTOMY + PEG PLACEMENT [1/12]    # DYSPHAGIA S/T CVA  - S/P PEG PLACEMENT 1/12    # BRADYCARDIA  - MONITOR ON TELEMETRY  - F/U ECHOCARDIOGRAM   - OPTIMIZE ELECTROLYTES  - CARDIOLOGY CONSULT  - EP CONSULT    # HYPOKALEMIA  - REPLETING WITH SUPPLEMENT    # SEVERE PROTEIN CALORIE MALNUTRITION   - ON SUPPLEMENTAL NUTRITION    # HX OF CVA  # HX OF DEMENTIA  # SCHIZOPHRENIA  # GI AND DVT PPX   Patient is a 63y old  Male who presents with a chief complaint of Sepsis and AHRF (12 Jan 2024 14:52)    PATIENT IS SEEN AND EXAMINED IN ICU.    ALLERGIES:  No Known Allergies      VITALS:    Vital Signs Last 24 Hrs  T(C): 36.8 (12 Jan 2024 17:00), Max: 37.6 (12 Jan 2024 01:00)  T(F): 98.2 (12 Jan 2024 17:00), Max: 99.7 (12 Jan 2024 01:00)  HR: 62 (12 Jan 2024 17:00) (54 - 79)  BP: 115/68 (12 Jan 2024 17:00) (79/57 - 130/78)  BP(mean): 81 (12 Jan 2024 17:00) (66 - 94)  RR: 17 (12 Jan 2024 17:00) (11 - 18)  SpO2: 97% (12 Jan 2024 17:00) (96% - 100%)    Parameters below as of 12 Jan 2024 05:00  Patient On (Oxygen Delivery Method): ventilator        LABS:    CBC Full  -  ( 12 Jan 2024 04:35 )  WBC Count : 16.66 K/uL  RBC Count : 4.47 M/uL  Hemoglobin : 13.2 g/dL  Hematocrit : 42.4 %  Platelet Count - Automated : 283 K/uL  Mean Cell Volume : 94.9 fl  Mean Cell Hemoglobin : 29.5 pg  Mean Cell Hemoglobin Concentration : 31.1 gm/dL  Auto Neutrophil # : 11.83 K/uL  Auto Lymphocyte # : 2.43 K/uL  Auto Monocyte # : 2.20 K/uL  Auto Eosinophil # : 0.07 K/uL  Auto Basophil # : 0.06 K/uL  Auto Neutrophil % : 71.0 %  Auto Lymphocyte % : 14.6 %  Auto Monocyte % : 13.2 %  Auto Eosinophil % : 0.4 %  Auto Basophil % : 0.4 %    PT/INR - ( 12 Jan 2024 04:35 )   PT: 11.9 sec;   INR: 1.05 ratio         PTT - ( 12 Jan 2024 04:35 )  PTT:30.4 sec  01-12    142  |  108  |  22<H>  ----------------------------<  103<H>  4.1   |  30  |  0.59    Ca    10.8<H>      12 Jan 2024 04:35  Phos  2.9     01-11  Mg     2.4     01-12      CAPILLARY BLOOD GLUCOSE      POCT Blood Glucose.: 159 mg/dL (12 Jan 2024 16:18)  POCT Blood Glucose.: 133 mg/dL (12 Jan 2024 11:28)  POCT Blood Glucose.: 101 mg/dL (12 Jan 2024 05:14)  POCT Blood Glucose.: 151 mg/dL (11 Jan 2024 23:24)          Creatinine Trend: 0.59<--, 0.62<--, 0.76<--, 0.64<--, 0.71<--, 0.64<--  I&O's Summary    11 Jan 2024 07:01  -  12 Jan 2024 07:00  --------------------------------------------------------  IN: 666.3 mL / OUT: 1000 mL / NET: -333.7 mL    12 Jan 2024 07:01  -  12 Jan 2024 17:35  --------------------------------------------------------  IN: 58.6 mL / OUT: 600 mL / NET: -541.4 mL            Catheterized Catheterized  12-29 @ 12:37   No growth  --  --      .Blood Blood  12-29 @ 02:05   No growth at 5 days  --  --      .Blood Blood  12-29 @ 01:55   No growth at 5 days  --  --          MEDICATIONS:    MEDICATIONS  (STANDING):  cefepime   IVPB 2000 milliGRAM(s) IV Intermittent every 8 hours  chlorhexidine 0.12% Liquid 15 milliLiter(s) Oral Mucosa every 12 hours  enoxaparin Injectable 40 milliGRAM(s) SubCutaneous every 24 hours      MEDICATIONS  (PRN):  morphine  - Injectable 2 milliGRAM(s) IV Push every 4 hours PRN Moderate Pain (4 - 6)      REVIEW OF SYSTEMS:                           ALL ROS DONE [ X   ]    CONSTITUTIONAL:  LETHARGIC [   ], FEVER [   ], UNRESPONSIVE [   ]  CVS:  CP  [   ], SOB, [   ], PALPITATIONS [   ], DIZZYNESS [   ]  RS: COUGH [   ], SPUTUM [   ]  GI: ABDOMINAL PAIN [   ], NAUSEA [   ], VOMITINGS [   ], DIARRHEA [   ], CONSTIPATION [   ]  :  DYSURIA [   ], NOCTURIA [   ], INCREASED FREQUENCY [   ], DRIBLING [   ],  SKELETAL: PAINFUL JOINTS [   ], SWOLLEN JOINTS [   ], NECK ACHE [   ], LOW BACK ACHE [   ],  SKIN : ULCERS [   ], RASH [   ], ITCHING [   ]  CNS: HEAD ACHE [   ], DOUBLE VISION [   ], BLURRED VISION [   ], AMS / CONFUSION [   ], SEIZURES [   ], WEAKNESS [   ],TINGLING / NUMBNESS [   ]      PHYSICAL EXAMINATION:  GENERAL APPEARANCE: NO DISTRESS  HEENT:  NO PALLOR, NO  JVD,  NO   NODES, NECK SUPPLE  CVS: S1 +, S2 +,   RS: AEEB,  OCCASIONAL  RALES +,   RHONCHI +   TRACHEOSTOMY  ABD: SOFT, NT, NO, BS +    PEG +  EXT: NO PE  SKIN: WARM,   SKELETAL:  REDUCED ROM OF CERVICAL AND LS SPINE  CNS:  AAO X 1    RADIOLOGY :    RADIOLOGY AND READINGS REVIEWED    ASSESSMENT :     Infection due to severe acute respiratory syndrome coronavirus 2 (SARS-CoV-2)    CVA (cerebral vascular accident)    HLD (hyperlipidemia)    S/P percutaneous endoscopic gastrostomy (PEG) tube placement        PLAN:  HPI:  A 63 year old male, from [facility], with PMH of CVA, Alzheimer's, nonverbal at baseline, GERD, Schizophrenia, and Constipation, was brought into the ED s/p mechanical fall, poor oral intake, and Covid-19 infection on 12/27/23 as per NH papers. Unable to obtain history from patient since he is nonverbal, history obtained from chart review.  (28 Dec 2023 20:54)    # PROGNOSIS IS POOR/GUARDED - CRITICAL CARE TEAM DISCUSSING W/ FAMILY REGARDING GOC. FAMILY WISHES FOR PEG, TRACHEOSTOMY.     # [1/3] RAPID RESPONSE FOR HYPOTENSION AND HYPOXIA - S/P IV FLUIDS AND PLACED ON NRB FOR HYPOXIA. CRITICAL CARE EVALUATION REQUESTED.     # SEPTIC SHOCK S/T ASPIRATION PNA + DIARRHEA [resolved]  # ACUTE HYPOXIC RESPIRATORY FAILURE S/T ? ASPIRATION EVENT   , COVID19 INFECTION     S/P TRACHEOSTOMY [1/12]  - ON DECADRON  - PLACED ON CEFEPIME, S/P ROCEPHIN, AZITHROMYCIN   - CHEST PT  - S/P DECADRON  - BCX [NGTD] AND UCX [NGTD]  - S/P IVF, VASOPRESSORS  - ID CONSULT  - CRITICAL CARE MANAGEMENT IN PROGRESS    - [1/4] - PATIENT W/ ? ASPIRATION EVENT - SUBSEQUENTLY REQUIRED INTUBATION W/ MECHANICAL VENTILATION  - [1/8] - EXTUBATED, CHEST PT  - [1/9] - REINTUBATED OVERNIGHT    - S/P TRACHEOSTOMY + PEG PLACEMENT [1/12]    # DYSPHAGIA S/T CVA  - S/P PEG PLACEMENT 1/12    # BRADYCARDIA  - MONITOR ON TELEMETRY  - F/U ECHOCARDIOGRAM   - OPTIMIZE ELECTROLYTES  - CARDIOLOGY CONSULT  - EP CONSULT    # HYPOKALEMIA  - REPLETING WITH SUPPLEMENT    # SEVERE PROTEIN CALORIE MALNUTRITION   - ON SUPPLEMENTAL NUTRITION    # HX OF CVA  # HX OF DEMENTIA  # SCHIZOPHRENIA  # GI AND DVT PPX

## 2024-01-12 NOTE — PROGRESS NOTE ADULT - ASSESSMENT
63 year old male, from [facility], with PMH of CVA, Alzheimer's, nonverbal at baseline, GERD, Schizophrenia, and Constipation, was brought into the ED s/p mechanical fall, poor oral intake, and Covid-19 infection on 12/27/23 Pt was admitted for COVID PNA, later found with intermittent hypoxia to Sats 80% and Hypotension despite multiple fluid boluses, also found with bryan to 40s for the last 2 days, in ICU for further monitoring.     DX:  1. CVA  2. Alzheimer's  3. Hypoxia  4. Hypotension   5. Bradycardia   5. COVID +Ve     =================== Neuro============================  # CVA  # Alzheimer's  #sedation/analgesia  - baseline: bedbound, nonverbal in the setting of Alzheimer's dementia, Previous CVA  - required reintubation due to inability to clear secretions   - continue valproate 250mg BID  - resumed propofol for sedation as patient attempting to reach for ETT     ================= Cardiovascular==========================  # septic shock in the setting of COVID-19 infection  # Sinus bradycardia  - Trop negative, TWI on lateral leads are chronic  - POCUS 1/5 shows intact LV function, some signs of end systolic effacement   - status post volume expansion with albumin   - 1/6/23 TTE: Mildly reduced LVEF   - now off levophed, goal MAP > 60  - holding AV angel luis blockers iso bradycardia    ================- Pulm=================================  #Acute Hypoxia Respiratory failure in the setting of COVID-19  #Multifocal PNA   #at risk of aspiration PNA  - RRT for desaturation to the 80%'s (1/2/24)  - Improved with NRB, PO2 in 200s on ABG, s/p HF 50/40 but then failed HFNC 1/5  - status post intubation 1/5-1/9, reintubated 1/10  - extubated to NRB, difficult to wean due to copious oropharyngeal secretions, very high risk of aspiration/re-intubation  - blood cx 12/29 NGTD  - status post course of dexamethasone  - Dr. Hernandez, thoracic surgery consulted for Trach and PEG which is scheduled for 9am tmrw morning.    ==================ID===================================  #COVID   #Aspiration PNA  - Cefepime 2g (1/10-1/15)    ================= Nephro================================  #No active issues   - crt .76, good urine output    =================GI====================================  #Nutrition  -Tube feeds resumed  - NPO after midnight    # Diarrhea 2/2 to laxatives vs Covid   - senna, miralax held in setting of loose stool,   - no further episodes    ================ Heme==================================  DVT PPx with lovenox    =================Endocrine===============================  Bryan and Hypotensive   - TSH 4.28 [wnl]    ================= Skin/Catheters============================  no wounds    =================Prophylaxis =============================  Lovenox held for procedure tomorrow  PPI    ==================GOC==================================  FULL CODE    ==================DISPO=====================  ICU     63 year old male, from [facility], with PMH of CVA, Alzheimer's, nonverbal at baseline, GERD, Schizophrenia, and Constipation, was brought into the ED s/p mechanical fall, poor oral intake, and Covid-19 infection on 12/27/23 Pt was admitted for COVID PNA, later found with intermittent hypoxia to Sats 80% and Hypotension despite multiple fluid boluses, also found with bryan to 40s for the last 2 days, in ICU for further monitoring.     DX:  1. CVA  2. Alzheimer's  3. Hypoxia  4. Hypotension   5. Bradycardia   5. COVID +Ve     =================== Neuro============================  # CVA  # Alzheimer's  #sedation/analgesia  - baseline: bedbound, nonverbal in the setting of Alzheimer's dementia, Previous CVA  - required reintubation due to inability to clear secretions   - continue valproate 250mg BID  - resumed propofol for sedation as patient attempting to reach for ETT     ================= Cardiovascular==========================  # septic shock in the setting of COVID-19 infection  # Sinus bradycardia  - Trop negative, TWI on lateral leads are chronic  - POCUS 1/5 shows intact LV function, some signs of end systolic effacement   - status post volume expansion with albumin   - 1/6/23 TTE: Mildly reduced LVEF   - now off levophed, goal MAP > 60  - holding AV angel luis blockers iso bradycardia    ================- Pulm=================================  #Acute Hypoxia Respiratory failure in the setting of COVID-19  #Multifocal PNA   #at risk of aspiration PNA  - RRT for desaturation to the 80%'s (1/2/24)  - Improved with NRB, PO2 in 200s on ABG, s/p HF 50/40 but then failed HFNC 1/5  - status post intubation 1/5-1/9, reintubated 1/10  - extubated to NRB, difficult to wean due to copious oropharyngeal secretions, very high risk of aspiration/re-intubation  - blood cx 12/29 NGTD  - status post course of dexamethasone  - Dr. Hernandez, thoracic surgery consulted for Trach and PEG which is scheduled for 9am tmrw morning.    ==================ID===================================  #COVID   #Aspiration PNA  - Cefepime 2g (1/10-1/15)    ================= Nephro================================  #No active issues   - crt .76, good urine output    =================GI====================================  #Nutrition  -Tube feeds resumed  - NPO after midnight    # Diarrhea 2/2 to laxatives vs Covid   - senna, miralax held in setting of loose stool,   - no further episodes    ================ Heme==================================  DVT PPx with lovenox    =================Endocrine===============================  Bryan and Hypotensive   - TSH 4.28 [wnl]    ================= Skin/Catheters============================  no wounds    =================Prophylaxis =============================  Lovenox held for procedure tomorrow  PPI    ==================GOC==================================  FULL CODE    ==================DISPO=====================  ICU     63 year old male, from [facility], with PMH of CVA, Alzheimer's, nonverbal at baseline, GERD, Schizophrenia, and Constipation, was brought into the ED s/p mechanical fall, poor oral intake, and Covid-19 infection on 12/27/23 Pt was admitted for COVID PNA, later found with intermittent hypoxia to Sats 80% and Hypotension despite multiple fluid boluses, also found with bryan to 40s for the last 2 days, in ICU for further monitoring.     DX:  1. CVA  2. Alzheimer's  3. Hypoxia  4. Hypotension   5. Bradycardia   5. COVID +Ve     =================== Neuro============================  # CVA  # Alzheimer's  #sedation/analgesia  - baseline: bedbound, nonverbal in the setting of Alzheimer's dementia, Previous CVA  - required reintubation due to inability to clear secretions   - continue valproate 250mg BID  - resumed propofol for sedation as patient attempting to reach for ETT     ================= Cardiovascular==========================  # septic shock in the setting of COVID-19 infection  # Sinus bradycardia  - Trop negative, TWI on lateral leads are chronic  - POCUS 1/5 shows intact LV function, some signs of end systolic effacement   - status post volume expansion with albumin   - 1/6/23 TTE: Mildly reduced LVEF   - now off levophed, goal MAP > 60  - holding AV angel luis blockers iso bradycardia    ================- Pulm=================================  #Acute Hypoxia Respiratory failure in the setting of COVID-19  #Multifocal PNA   #at risk of aspiration PNA  - RRT for desaturation to the 80%'s (1/2/24)  - Improved with NRB, PO2 in 200s on ABG, s/p HF 50/40 but then failed HFNC 1/5  - status post intubation 1/5-1/9, reintubated 1/10  - extubated to NRB, difficult to wean due to copious oropharyngeal secretions, very high risk of aspiration/re-intubation. Pt now s/p Trach and PEG  - Per Thoracic recs, resume DVT PPx, hold TF for 24 hours, and maintain NGT to gravity.   - blood cx 12/29 NGTD  - Dr. Hernandez, thoracic surgery following    ==================ID===================================  #COVID   #Aspiration PNA  - Cefepime 2g (1/10-1/15)    ================= Nephro================================  #No active issues   - crt .76, good urine output    =================GI====================================  #Nutrition  -Continue NPO    # Diarrhea 2/2 to laxatives vs Covid   - senna, miralax held in setting of loose stool,   - no further episodes    ================ Heme==================================  DVT PPx with lovenox    =================Endocrine===============================  Bryan and Hypotensive   - TSH 4.28 [wnl]    ================= Skin/Catheters============================  no wounds    =================Prophylaxis =============================  Lovenox held for procedure tomorrow  PPI    ==================GOC==================================  FULL CODE    ==================DISPO=====================  ICU     63 year old male, from [facility], with PMH of CVA, Alzheimer's, nonverbal at baseline, GERD, Schizophrenia, and Constipation, was brought into the ED s/p mechanical fall, poor oral intake, and Covid-19 infection on 12/27/23 Pt was admitted for COVID PNA, later found with intermittent hypoxia to Sats 80% and Hypotension despite multiple fluid boluses, also found with bryan to 40s for the last 2 days, in ICU for further monitoring.     DX:  1. CVA  2. Alzheimer's  3. Hypoxia  4. Hypotension   5. Bradycardia   5. COVID +Ve     =================== Neuro============================  # CVA  # Alzheimer's  #sedation/analgesia  - baseline: bedbound, nonverbal in the setting of Alzheimer's dementia, Previous CVA  - required reintubation due to inability to clear secretions   - continue valproate 250mg BID  - resumed propofol for sedation as patient attempting to reach for ETT     ================= Cardiovascular==========================  # septic shock in the setting of COVID-19 infection  # Sinus bradycardia  - Trop negative, TWI on lateral leads are chronic  - POCUS 1/5 shows intact LV function, some signs of end systolic effacement   - status post volume expansion with albumin   - 1/6/23 TTE: Mildly reduced LVEF   - now off levophed, goal MAP > 60  - holding AV angel luis blockers iso bradycardia    ================- Pulm=================================  #Acute Hypoxia Respiratory failure in the setting of COVID-19  #Multifocal PNA   #at risk of aspiration PNA  - RRT for desaturation to the 80%'s (1/2/24)  - Improved with NRB, PO2 in 200s on ABG, s/p HF 50/40 but then failed HFNC 1/5  - status post intubation 1/5-1/9, reintubated 1/10  - extubated to NRB, difficult to wean due to copious oropharyngeal secretions, very high risk of aspiration/re-intubation. Pt now s/p Trach and PEG  - Per Thoracic recs, resume DVT PPx, hold TF for 24 hours, and maintain NGT to gravity.   - blood cx 12/29 NGTD  - Dr. Hernandez, thoracic surgery following    ==================ID===================================  #COVID   #Aspiration PNA  - Cefepime 2g (1/10-1/15)    ================= Nephro================================  #No active issues   - crt .76, good urine output    =================GI====================================  #Nutrition  -Continue NPO    # Diarrhea 2/2 to laxatives vs Covid   - senna, miralax held in setting of loose stool,   - no further episodes    ================ Heme==================================  DVT PPx with lovenox    =================Endocrine===============================  Bryan and Hypotensive   - TSH 4.28 [wnl]    ================= Skin/Catheters============================  no wounds    =================Prophylaxis =============================  Lovenox held for procedure tomorrow  PPI    ==================GOC==================================  FULL CODE    ==================DISPO=====================  ICU

## 2024-01-12 NOTE — PROGRESS NOTE ADULT - SUBJECTIVE AND OBJECTIVE BOX
patient with prolonged respiratory failure - vent dependent now plan for trach/peg .   discussed with son HCP - including risks not limited to bleeding, infection, scarring, injury to surrounding structures. all questions answered and agreeable to proceed.

## 2024-01-12 NOTE — BRIEF OPERATIVE NOTE - NSICDXBRIEFPREOP_GEN_ALL_CORE_FT
PRE-OP DIAGNOSIS:  Acute on chronic respiratory failure with hypoxia 12-Jan-2024 09:54:25  Jacklyn Clements

## 2024-01-12 NOTE — PROGRESS NOTE ADULT - CRITICAL CARE ATTENDING COMMENT
IMP: This is a  63 year old mn , from [facility], with  CVA, Alzheimer's, nonverbal at baseline, GERD, Schizophrenia, and Constipation, was brought into the ED s/p mechanical fall, poor oral intake, and Covid-19 infection on 12/27/23 Pt was admitted for COVID PNA, later found with intermittent hypoxia/ hypopnea to Sats 80% and Hypotension despite multiple fluid boluses, also found with bryan to 40s for the last 2 days, in ICU for further monitoring.       ASSESSMENT   - Acute Hypoxic resp failure   - Covid-19 PNA   - Shock septic   - CVA  - Alzheimer dementia   - Bradycardia       Plan   - S/p trach/peg placement  - Cont. mechanical ventilation   - Aspiration precautions   - Antibiotics for aspiration   - Hemodynamic support   - EP cards f/u : no indication for PPM at tis time   - Off Remdesivir due to ADAN  - Isolation : contact and airborne   - Monitor I/O   - Cards following   - Hold AV angel luis blocking agent   - Skin care  - Wound care eval noted  - Start tube feedings once ok to use PEG per surgery  - Prognosis is poor IMP: This is a  63 year old mn , from [facility], with  CVA, Alzheimer's, nonverbal at baseline, GERD, Schizophrenia, and Constipation, was brought into the ED s/p mechanical fall, poor oral intake, and Covid-19 infection on 12/27/23 Pt was admitted for COVID PNA, later found with intermittent hypoxia/ hypopnea to Sats 80% and Hypotension despite multiple fluid boluses, also found with bryan to 40s for the last 2 days, in ICU for further monitoring.       ASSESSMENT   - Acute Hypoxic resp failure   - Covid-19 PNA   - Shock septic   - CVA  - Alzheimer dementia   - Bradycardia       Plan   - S/p trach/peg placement  - Cont. mechanical ventilation   - Aspiration precautions   - Antibiotics for aspiration   - Hemodynamic support   - EP cards f/u : no indication for PPM at tis time   - Off Remdesivir due to ADAN  - Isolation : contact and airborne   - Monitor I/O   - Cards following   - Hold AV angel luis blocking agent   - Skin care  - Wound care eval noted  - Start tube feedings once ok to use PEG per surgery  - Prognosis is poor

## 2024-01-13 LAB
ALBUMIN SERPL ELPH-MCNC: 2.7 G/DL — LOW (ref 3.5–5)
ALBUMIN SERPL ELPH-MCNC: 2.7 G/DL — LOW (ref 3.5–5)
ALP SERPL-CCNC: 56 U/L — SIGNIFICANT CHANGE UP (ref 40–120)
ALP SERPL-CCNC: 56 U/L — SIGNIFICANT CHANGE UP (ref 40–120)
ALT FLD-CCNC: 21 U/L DA — SIGNIFICANT CHANGE UP (ref 10–60)
ALT FLD-CCNC: 21 U/L DA — SIGNIFICANT CHANGE UP (ref 10–60)
ANION GAP SERPL CALC-SCNC: 5 MMOL/L — SIGNIFICANT CHANGE UP (ref 5–17)
ANION GAP SERPL CALC-SCNC: 5 MMOL/L — SIGNIFICANT CHANGE UP (ref 5–17)
AST SERPL-CCNC: 8 U/L — LOW (ref 10–40)
AST SERPL-CCNC: 8 U/L — LOW (ref 10–40)
BASE EXCESS BLDA CALC-SCNC: 9.4 MMOL/L — HIGH (ref -2–3)
BASE EXCESS BLDA CALC-SCNC: 9.4 MMOL/L — HIGH (ref -2–3)
BASOPHILS # BLD AUTO: 0.04 K/UL — SIGNIFICANT CHANGE UP (ref 0–0.2)
BASOPHILS # BLD AUTO: 0.04 K/UL — SIGNIFICANT CHANGE UP (ref 0–0.2)
BASOPHILS NFR BLD AUTO: 0.3 % — SIGNIFICANT CHANGE UP (ref 0–2)
BASOPHILS NFR BLD AUTO: 0.3 % — SIGNIFICANT CHANGE UP (ref 0–2)
BILIRUB SERPL-MCNC: 0.6 MG/DL — SIGNIFICANT CHANGE UP (ref 0.2–1.2)
BILIRUB SERPL-MCNC: 0.6 MG/DL — SIGNIFICANT CHANGE UP (ref 0.2–1.2)
BLOOD GAS COMMENTS ARTERIAL: SIGNIFICANT CHANGE UP
BLOOD GAS COMMENTS ARTERIAL: SIGNIFICANT CHANGE UP
BUN SERPL-MCNC: 27 MG/DL — HIGH (ref 7–18)
BUN SERPL-MCNC: 27 MG/DL — HIGH (ref 7–18)
CALCIUM SERPL-MCNC: 9.9 MG/DL — SIGNIFICANT CHANGE UP (ref 8.4–10.5)
CALCIUM SERPL-MCNC: 9.9 MG/DL — SIGNIFICANT CHANGE UP (ref 8.4–10.5)
CHLORIDE SERPL-SCNC: 106 MMOL/L — SIGNIFICANT CHANGE UP (ref 96–108)
CHLORIDE SERPL-SCNC: 106 MMOL/L — SIGNIFICANT CHANGE UP (ref 96–108)
CO2 SERPL-SCNC: 32 MMOL/L — HIGH (ref 22–31)
CO2 SERPL-SCNC: 32 MMOL/L — HIGH (ref 22–31)
CREAT SERPL-MCNC: 0.5 MG/DL — SIGNIFICANT CHANGE UP (ref 0.5–1.3)
CREAT SERPL-MCNC: 0.5 MG/DL — SIGNIFICANT CHANGE UP (ref 0.5–1.3)
EGFR: 114 ML/MIN/1.73M2 — SIGNIFICANT CHANGE UP
EGFR: 114 ML/MIN/1.73M2 — SIGNIFICANT CHANGE UP
EOSINOPHIL # BLD AUTO: 0.02 K/UL — SIGNIFICANT CHANGE UP (ref 0–0.5)
EOSINOPHIL # BLD AUTO: 0.02 K/UL — SIGNIFICANT CHANGE UP (ref 0–0.5)
EOSINOPHIL NFR BLD AUTO: 0.2 % — SIGNIFICANT CHANGE UP (ref 0–6)
EOSINOPHIL NFR BLD AUTO: 0.2 % — SIGNIFICANT CHANGE UP (ref 0–6)
GLUCOSE BLDC GLUCOMTR-MCNC: 140 MG/DL — HIGH (ref 70–99)
GLUCOSE BLDC GLUCOMTR-MCNC: 140 MG/DL — HIGH (ref 70–99)
GLUCOSE BLDC GLUCOMTR-MCNC: 144 MG/DL — HIGH (ref 70–99)
GLUCOSE BLDC GLUCOMTR-MCNC: 144 MG/DL — HIGH (ref 70–99)
GLUCOSE SERPL-MCNC: 127 MG/DL — HIGH (ref 70–99)
GLUCOSE SERPL-MCNC: 127 MG/DL — HIGH (ref 70–99)
HCO3 BLDA-SCNC: 35 MMOL/L — HIGH (ref 21–28)
HCO3 BLDA-SCNC: 35 MMOL/L — HIGH (ref 21–28)
HCT VFR BLD CALC: 37.3 % — LOW (ref 39–50)
HCT VFR BLD CALC: 37.3 % — LOW (ref 39–50)
HGB BLD-MCNC: 11.9 G/DL — LOW (ref 13–17)
HGB BLD-MCNC: 11.9 G/DL — LOW (ref 13–17)
HOROWITZ INDEX BLDA+IHG-RTO: 40 — SIGNIFICANT CHANGE UP
HOROWITZ INDEX BLDA+IHG-RTO: 40 — SIGNIFICANT CHANGE UP
IMM GRANULOCYTES NFR BLD AUTO: 0.5 % — SIGNIFICANT CHANGE UP (ref 0–0.9)
IMM GRANULOCYTES NFR BLD AUTO: 0.5 % — SIGNIFICANT CHANGE UP (ref 0–0.9)
LYMPHOCYTES # BLD AUTO: 1.58 K/UL — SIGNIFICANT CHANGE UP (ref 1–3.3)
LYMPHOCYTES # BLD AUTO: 1.58 K/UL — SIGNIFICANT CHANGE UP (ref 1–3.3)
LYMPHOCYTES # BLD AUTO: 12.6 % — LOW (ref 13–44)
LYMPHOCYTES # BLD AUTO: 12.6 % — LOW (ref 13–44)
MAGNESIUM SERPL-MCNC: 2.4 MG/DL — SIGNIFICANT CHANGE UP (ref 1.6–2.6)
MAGNESIUM SERPL-MCNC: 2.4 MG/DL — SIGNIFICANT CHANGE UP (ref 1.6–2.6)
MCHC RBC-ENTMCNC: 29.5 PG — SIGNIFICANT CHANGE UP (ref 27–34)
MCHC RBC-ENTMCNC: 29.5 PG — SIGNIFICANT CHANGE UP (ref 27–34)
MCHC RBC-ENTMCNC: 31.9 GM/DL — LOW (ref 32–36)
MCHC RBC-ENTMCNC: 31.9 GM/DL — LOW (ref 32–36)
MCV RBC AUTO: 92.6 FL — SIGNIFICANT CHANGE UP (ref 80–100)
MCV RBC AUTO: 92.6 FL — SIGNIFICANT CHANGE UP (ref 80–100)
MONOCYTES # BLD AUTO: 1.08 K/UL — HIGH (ref 0–0.9)
MONOCYTES # BLD AUTO: 1.08 K/UL — HIGH (ref 0–0.9)
MONOCYTES NFR BLD AUTO: 8.6 % — SIGNIFICANT CHANGE UP (ref 2–14)
MONOCYTES NFR BLD AUTO: 8.6 % — SIGNIFICANT CHANGE UP (ref 2–14)
NEUTROPHILS # BLD AUTO: 9.73 K/UL — HIGH (ref 1.8–7.4)
NEUTROPHILS # BLD AUTO: 9.73 K/UL — HIGH (ref 1.8–7.4)
NEUTROPHILS NFR BLD AUTO: 77.8 % — HIGH (ref 43–77)
NEUTROPHILS NFR BLD AUTO: 77.8 % — HIGH (ref 43–77)
NRBC # BLD: 0 /100 WBCS — SIGNIFICANT CHANGE UP (ref 0–0)
NRBC # BLD: 0 /100 WBCS — SIGNIFICANT CHANGE UP (ref 0–0)
PCO2 BLDA: 49 MMHG — HIGH (ref 35–48)
PCO2 BLDA: 49 MMHG — HIGH (ref 35–48)
PH BLDA: 7.46 — HIGH (ref 7.35–7.45)
PH BLDA: 7.46 — HIGH (ref 7.35–7.45)
PHOSPHATE SERPL-MCNC: 2.8 MG/DL — SIGNIFICANT CHANGE UP (ref 2.5–4.5)
PHOSPHATE SERPL-MCNC: 2.8 MG/DL — SIGNIFICANT CHANGE UP (ref 2.5–4.5)
PLATELET # BLD AUTO: 320 K/UL — SIGNIFICANT CHANGE UP (ref 150–400)
PLATELET # BLD AUTO: 320 K/UL — SIGNIFICANT CHANGE UP (ref 150–400)
PO2 BLDA: 150 MMHG — HIGH (ref 83–108)
PO2 BLDA: 150 MMHG — HIGH (ref 83–108)
POTASSIUM SERPL-MCNC: 3.8 MMOL/L — SIGNIFICANT CHANGE UP (ref 3.5–5.3)
POTASSIUM SERPL-MCNC: 3.8 MMOL/L — SIGNIFICANT CHANGE UP (ref 3.5–5.3)
POTASSIUM SERPL-SCNC: 3.8 MMOL/L — SIGNIFICANT CHANGE UP (ref 3.5–5.3)
POTASSIUM SERPL-SCNC: 3.8 MMOL/L — SIGNIFICANT CHANGE UP (ref 3.5–5.3)
PROT SERPL-MCNC: 7.2 G/DL — SIGNIFICANT CHANGE UP (ref 6–8.3)
PROT SERPL-MCNC: 7.2 G/DL — SIGNIFICANT CHANGE UP (ref 6–8.3)
RBC # BLD: 4.03 M/UL — LOW (ref 4.2–5.8)
RBC # BLD: 4.03 M/UL — LOW (ref 4.2–5.8)
RBC # FLD: 13.6 % — SIGNIFICANT CHANGE UP (ref 10.3–14.5)
RBC # FLD: 13.6 % — SIGNIFICANT CHANGE UP (ref 10.3–14.5)
SAO2 % BLDA: 100 % — SIGNIFICANT CHANGE UP
SAO2 % BLDA: 100 % — SIGNIFICANT CHANGE UP
SODIUM SERPL-SCNC: 143 MMOL/L — SIGNIFICANT CHANGE UP (ref 135–145)
SODIUM SERPL-SCNC: 143 MMOL/L — SIGNIFICANT CHANGE UP (ref 135–145)
WBC # BLD: 12.51 K/UL — HIGH (ref 3.8–10.5)
WBC # BLD: 12.51 K/UL — HIGH (ref 3.8–10.5)
WBC # FLD AUTO: 12.51 K/UL — HIGH (ref 3.8–10.5)
WBC # FLD AUTO: 12.51 K/UL — HIGH (ref 3.8–10.5)

## 2024-01-13 RX ORDER — POLYETHYLENE GLYCOL 3350 17 G/17G
17 POWDER, FOR SOLUTION ORAL DAILY
Refills: 0 | Status: DISCONTINUED | OUTPATIENT
Start: 2024-01-13 | End: 2024-01-18

## 2024-01-13 RX ORDER — ATORVASTATIN CALCIUM 80 MG/1
20 TABLET, FILM COATED ORAL AT BEDTIME
Refills: 0 | Status: DISCONTINUED | OUTPATIENT
Start: 2024-01-13 | End: 2024-01-18

## 2024-01-13 RX ORDER — VALPROIC ACID (AS SODIUM SALT) 250 MG/5ML
250 SOLUTION, ORAL ORAL
Refills: 0 | Status: DISCONTINUED | OUTPATIENT
Start: 2024-01-13 | End: 2024-01-18

## 2024-01-13 RX ADMIN — Medication 250 MILLIGRAM(S): at 17:08

## 2024-01-13 RX ADMIN — CEFEPIME 100 MILLIGRAM(S): 1 INJECTION, POWDER, FOR SOLUTION INTRAMUSCULAR; INTRAVENOUS at 22:57

## 2024-01-13 RX ADMIN — MORPHINE SULFATE 2 MILLIGRAM(S): 50 CAPSULE, EXTENDED RELEASE ORAL at 15:52

## 2024-01-13 RX ADMIN — ENOXAPARIN SODIUM 40 MILLIGRAM(S): 100 INJECTION SUBCUTANEOUS at 14:30

## 2024-01-13 RX ADMIN — MORPHINE SULFATE 2 MILLIGRAM(S): 50 CAPSULE, EXTENDED RELEASE ORAL at 16:20

## 2024-01-13 RX ADMIN — MORPHINE SULFATE 2 MILLIGRAM(S): 50 CAPSULE, EXTENDED RELEASE ORAL at 05:58

## 2024-01-13 RX ADMIN — POLYETHYLENE GLYCOL 3350 17 GRAM(S): 17 POWDER, FOR SOLUTION ORAL at 11:06

## 2024-01-13 RX ADMIN — ATORVASTATIN CALCIUM 20 MILLIGRAM(S): 80 TABLET, FILM COATED ORAL at 22:10

## 2024-01-13 RX ADMIN — MORPHINE SULFATE 2 MILLIGRAM(S): 50 CAPSULE, EXTENDED RELEASE ORAL at 00:11

## 2024-01-13 RX ADMIN — CEFEPIME 100 MILLIGRAM(S): 1 INJECTION, POWDER, FOR SOLUTION INTRAMUSCULAR; INTRAVENOUS at 05:09

## 2024-01-13 RX ADMIN — CHLORHEXIDINE GLUCONATE 15 MILLILITER(S): 213 SOLUTION TOPICAL at 17:08

## 2024-01-13 RX ADMIN — CEFEPIME 100 MILLIGRAM(S): 1 INJECTION, POWDER, FOR SOLUTION INTRAMUSCULAR; INTRAVENOUS at 14:30

## 2024-01-13 RX ADMIN — MORPHINE SULFATE 2 MILLIGRAM(S): 50 CAPSULE, EXTENDED RELEASE ORAL at 06:15

## 2024-01-13 RX ADMIN — MORPHINE SULFATE 2 MILLIGRAM(S): 50 CAPSULE, EXTENDED RELEASE ORAL at 00:31

## 2024-01-13 RX ADMIN — CHLORHEXIDINE GLUCONATE 15 MILLILITER(S): 213 SOLUTION TOPICAL at 05:09

## 2024-01-13 RX ADMIN — Medication 250 MILLIGRAM(S): at 11:06

## 2024-01-13 NOTE — PROGRESS NOTE ADULT - SUBJECTIVE AND OBJECTIVE BOX
C A R D I O L O G Y  **********************************     DATE OF SERVICE: 01-13-24      pt on Full vent support, tele stable          cefepime   IVPB 2000 milliGRAM(s) IV Intermittent every 8 hours  chlorhexidine 0.12% Liquid 15 milliLiter(s) Oral Mucosa every 12 hours  enoxaparin Injectable 40 milliGRAM(s) SubCutaneous every 24 hours  morphine  - Injectable 2 milliGRAM(s) IV Push every 4 hours PRN                            11.9   12.51 )-----------( 320      ( 13 Jan 2024 04:55 )             37.3       Hemoglobin: 11.9 g/dL (01-13 @ 04:55)  Hemoglobin: 13.2 g/dL (01-12 @ 04:35)  Hemoglobin: 13.1 g/dL (01-11 @ 04:54)  Hemoglobin: 13.5 g/dL (01-10 @ 04:11)  Hemoglobin: 12.9 g/dL (01-09 @ 04:05)      01-13    143  |  106  |  27<H>  ----------------------------<  127<H>  3.8   |  32<H>  |  0.50    Ca    9.9      13 Jan 2024 04:55  Phos  2.8     01-13  Mg     2.4     01-13    TPro  7.2  /  Alb  2.7<L>  /  TBili  0.6  /  DBili  x   /  AST  8<L>  /  ALT  21  /  AlkPhos  56  01-13    Creatinine Trend: 0.50<--, 0.59<--, 0.62<--, 0.76<--, 0.64<--, 0.71<--    COAGS:           T(C): 37.1 (01-13-24 @ 10:00), Max: 37.2 (01-13-24 @ 05:00)  HR: 52 (01-13-24 @ 10:00) (52 - 72)  BP: 119/66 (01-13-24 @ 09:00) (88/68 - 130/78)  RR: 14 (01-13-24 @ 10:00) (11 - 24)  SpO2: 99% (01-13-24 @ 10:00) (97% - 100%)  Wt(kg): --    I&O's Summary    12 Jan 2024 07:01  -  13 Jan 2024 07:00  --------------------------------------------------------  IN: 158.6 mL / OUT: 1665 mL / NET: -1506.4 mL        HEENT:  (-)icterus (-)pallor  CV: N S1 S2 1/6 JOVON (+)2 Pulses B/l  Resp:  Clear to ausculatation B/L, normal effort  GI: (+) BS Soft, NT, ND  Lymph:  (-)Edema, (-)obvious lymphadenopathy  Skin: Warm to touch, Normal turgor  Psych: Unable to assess mood and affect      TELEMETRY: 	  sinus         ASSESSMENT/PLAN: 	63y Male PMH of CVA, Alzheimer's, nonverbal at baseline, GERD, Schizophrenia, historically preserved LV function and Constipation, was brought into the ED s/p mechanical fall, poor oral intake, and Covid-19 infection on 12/27/23 as per NH papers.    # Bradycardia   - He likely has some degree of sick sinus syndrome that may be exacerbated by Midodrine.  now off midodrine  - EP following      # Hypotension  - Suspect this is due to dehydration and vasodilatory state associated with viral/bacterial infection  - Abx   - Vent per MICU  - s/p  trach and PEG       C A R D I O L O G Y  **********************************     DATE OF SERVICE: 01-13-24      pt on Full vent support, tele stable          cefepime   IVPB 2000 milliGRAM(s) IV Intermittent every 8 hours  chlorhexidine 0.12% Liquid 15 milliLiter(s) Oral Mucosa every 12 hours  enoxaparin Injectable 40 milliGRAM(s) SubCutaneous every 24 hours  morphine  - Injectable 2 milliGRAM(s) IV Push every 4 hours PRN                            11.9   12.51 )-----------( 320      ( 13 Jan 2024 04:55 )             37.3       Hemoglobin: 11.9 g/dL (01-13 @ 04:55)  Hemoglobin: 13.2 g/dL (01-12 @ 04:35)  Hemoglobin: 13.1 g/dL (01-11 @ 04:54)  Hemoglobin: 13.5 g/dL (01-10 @ 04:11)  Hemoglobin: 12.9 g/dL (01-09 @ 04:05)      01-13    143  |  106  |  27<H>  ----------------------------<  127<H>  3.8   |  32<H>  |  0.50    Ca    9.9      13 Jan 2024 04:55  Phos  2.8     01-13  Mg     2.4     01-13    TPro  7.2  /  Alb  2.7<L>  /  TBili  0.6  /  DBili  x   /  AST  8<L>  /  ALT  21  /  AlkPhos  56  01-13    Creatinine Trend: 0.50<--, 0.59<--, 0.62<--, 0.76<--, 0.64<--, 0.71<--    COAGS:           T(C): 37.1 (01-13-24 @ 10:00), Max: 37.2 (01-13-24 @ 05:00)  HR: 52 (01-13-24 @ 10:00) (52 - 72)  BP: 119/66 (01-13-24 @ 09:00) (88/68 - 130/78)  RR: 14 (01-13-24 @ 10:00) (11 - 24)  SpO2: 99% (01-13-24 @ 10:00) (97% - 100%)  Wt(kg): --    I&O's Summary    12 Jan 2024 07:01  -  13 Jan 2024 07:00  --------------------------------------------------------  IN: 158.6 mL / OUT: 1665 mL / NET: -1506.4 mL        HEENT:  (-)icterus (-)pallor  CV: N S1 S2 1/6 JOVON (+)2 Pulses B/l  Resp:  Clear to ausculatation B/L, normal effort  GI: (+) BS Soft, NT, ND  Lymph:  (-)Edema, (-)obvious lymphadenopathy  Skin: Warm to touch, Normal turgor  Psych: Unable to assess mood and affect      TELEMETRY: 	  sinus         ASSESSMENT/PLAN: 	63y Male PMH of CVA, Alzheimer's, nonverbal at baseline, GERD, Schizophrenia, historically preserved LV function and Constipation, was brought into the ED s/p mechanical fall, poor oral intake, and Covid-19 infection on 12/27/23 as per NH papers.    # Bradycardia   - He likely has some degree of sick sinus syndrome that may be exacerbated by Midodrine.  now off midodrine  - EP following      # Hypotension  - Suspect this is due to dehydration and vasodilatory state associated with viral/bacterial infection  - Abx  - s/p  trach and PEG

## 2024-01-13 NOTE — PROGRESS NOTE ADULT - SUBJECTIVE AND OBJECTIVE BOX
Patient is a 63y old  Male who presents with a chief complaint of Sepsis and AHRF (13 Jan 2024 09:58)    PATIENT IS SEEN AND EXAMINED IN MEDICAL FLOOR.    RODRIGUET [    ]    KRISTINA [   ]      GT [   ]    ALLERGIES:  No Known Allergies      Daily     Daily     VITALS:    Vital Signs Last 24 Hrs  T(C): 37.2 (13 Jan 2024 16:00), Max: 37.2 (13 Jan 2024 05:00)  T(F): 99 (13 Jan 2024 16:00), Max: 99 (13 Jan 2024 05:00)  HR: 67 (13 Jan 2024 16:00) (52 - 72)  BP: 109/66 (13 Jan 2024 16:00) (97/67 - 127/72)  BP(mean): 81 (13 Jan 2024 16:00) (77 - 97)  RR: 13 (13 Jan 2024 16:00) (11 - 24)  SpO2: 97% (13 Jan 2024 16:00) (95% - 100%)    Parameters below as of 13 Jan 2024 15:00  Patient On (Oxygen Delivery Method): ventilator    O2 Concentration (%): 50    LABS:    CBC Full  -  ( 13 Jan 2024 04:55 )  WBC Count : 12.51 K/uL  RBC Count : 4.03 M/uL  Hemoglobin : 11.9 g/dL  Hematocrit : 37.3 %  Platelet Count - Automated : 320 K/uL  Mean Cell Volume : 92.6 fl  Mean Cell Hemoglobin : 29.5 pg  Mean Cell Hemoglobin Concentration : 31.9 gm/dL  Auto Neutrophil # : 9.73 K/uL  Auto Lymphocyte # : 1.58 K/uL  Auto Monocyte # : 1.08 K/uL  Auto Eosinophil # : 0.02 K/uL  Auto Basophil # : 0.04 K/uL  Auto Neutrophil % : 77.8 %  Auto Lymphocyte % : 12.6 %  Auto Monocyte % : 8.6 %  Auto Eosinophil % : 0.2 %  Auto Basophil % : 0.3 %    PT/INR - ( 12 Jan 2024 04:35 )   PT: 11.9 sec;   INR: 1.05 ratio         PTT - ( 12 Jan 2024 04:35 )  PTT:30.4 sec  01-13    143  |  106  |  27<H>  ----------------------------<  127<H>  3.8   |  32<H>  |  0.50    Ca    9.9      13 Jan 2024 04:55  Phos  2.8     01-13  Mg     2.4     01-13    TPro  7.2  /  Alb  2.7<L>  /  TBili  0.6  /  DBili  x   /  AST  8<L>  /  ALT  21  /  AlkPhos  56  01-13    CAPILLARY BLOOD GLUCOSE      POCT Blood Glucose.: 144 mg/dL (13 Jan 2024 11:11)  POCT Blood Glucose.: 159 mg/dL (12 Jan 2024 16:18)        LIVER FUNCTIONS - ( 13 Jan 2024 04:55 )  Alb: 2.7 g/dL / Pro: 7.2 g/dL / ALK PHOS: 56 U/L / ALT: 21 U/L DA / AST: 8 U/L / GGT: x           Creatinine Trend: 0.50<--, 0.59<--, 0.62<--, 0.76<--, 0.64<--, 0.71<--  I&O's Summary    12 Jan 2024 07:01  -  13 Jan 2024 07:00  --------------------------------------------------------  IN: 158.6 mL / OUT: 1665 mL / NET: -1506.4 mL    13 Jan 2024 07:01  -  13 Jan 2024 16:12  --------------------------------------------------------  IN: 60 mL / OUT: 450 mL / NET: -390 mL        ABG - ( 13 Jan 2024 03:07 )  pH, Arterial: 7.46  pH, Blood: x     /  pCO2: 49    /  pO2: 150   / HCO3: 35    / Base Excess: 9.4   /  SaO2: 100                 Nares/Axilla/Groin Nares/Axilla/Groin  01-11 @ 22:20   Culture in progress  --  --      Catheterized Catheterized  12-29 @ 12:37   No growth  --  --      .Blood Blood  12-29 @ 02:05   No growth at 5 days  --  --      .Blood Blood  12-29 @ 01:55   No growth at 5 days  --  --          MEDICATIONS:    MEDICATIONS  (STANDING):  atorvastatin 20 milliGRAM(s) Oral at bedtime  cefepime   IVPB 2000 milliGRAM(s) IV Intermittent every 8 hours  chlorhexidine 0.12% Liquid 15 milliLiter(s) Oral Mucosa every 12 hours  enoxaparin Injectable 40 milliGRAM(s) SubCutaneous every 24 hours  polyethylene glycol 3350 17 Gram(s) Oral daily  valproic  acid Syrup 250 milliGRAM(s) Oral two times a day      MEDICATIONS  (PRN):  morphine  - Injectable 2 milliGRAM(s) IV Push every 4 hours PRN Moderate Pain (4 - 6)        REVIEW OF SYSTEMS:                           ALL ROS DONE [ X   ]      CONSTITUTIONAL:  LETHARGIC [   ], FEVER [   ], UNRESPONSIVE [   ]  CVS:  CP  [   ], SOB, [   ], PALPITATIONS [   ], DIZZYNESS [   ]  RS: COUGH [   ], SPUTUM [   ]  GI: ABDOMINAL PAIN [   ], NAUSEA [   ], VOMITINGS [   ], DIARRHEA [   ], CONSTIPATION [   ]  :  DYSURIA [   ], NOCTURIA [   ], INCREASED FREQUENCY [   ], DRIBLING [   ],  SKELETAL: PAINFUL JOINTS [   ], SWOLLEN JOINTS [   ], NECK ACHE [   ], LOW BACK ACHE [   ],  SKIN : ULCERS [   ], RASH [   ], ITCHING [   ]  CNS: HEAD ACHE [   ], DOUBLE VISION [   ], BLURRED VISION [   ], AMS / CONFUSION [   ], SEIZURES [   ], WEAKNESS [   ],TINGLING / NUMBNESS [   ]        PHYSICAL EXAMINATION:    GENERAL APPEARANCE: NO DISTRESS  HEENT:  NO PALLOR, NO  JVD,  NO   NODES, NECK SUPPLE  CVS: S1 +, S2 +,   RS: AEEB,  OCCASIONAL  RALES +,   RHONCHI +   TRACHEOSTOMY  ABD: SOFT, NT, NO, BS +    PEG +  EXT: NO PE  SKIN: WARM,   SKELETAL:  REDUCED ROM OF CERVICAL AND LS SPINE  CNS:  AAO X 1        RADIOLOGY :    RADIOLOGY AND READINGS REVIEWED        ASSESSMENT :     Infection due to severe acute respiratory syndrome coronavirus 2 (SARS-CoV-2)    CVA (cerebral vascular accident)    HLD (hyperlipidemia)    S/P percutaneous endoscopic gastrostomy (PEG) tube placement        PLAN:  HPI:  A 63 year old male, from [facility], with PMH of CVA, Alzheimer's, nonverbal at baseline, GERD, Schizophrenia, and Constipation, was brought into the ED s/p mechanical fall, poor oral intake, and Covid-19 infection on 12/27/23 as per NH papers. Unable to obtain history from patient since he is nonverbal, history obtained from chart review.  (28 Dec 2023 20:54)      # PROGNOSIS IS POOR/GUARDED - CRITICAL CARE TEAM DISCUSSING W/ FAMILY REGARDING GOC. FAMILY WISHES FOR PEG, TRACHEOSTOMY.     # [1/3] RAPID RESPONSE FOR HYPOTENSION AND HYPOXIA - S/P IV FLUIDS AND PLACED ON NRB FOR HYPOXIA. CRITICAL CARE EVALUATION REQUESTED.     # SEPTIC SHOCK S/T ASPIRATION PNA + DIARRHEA [resolved]  # ACUTE HYPOXIC RESPIRATORY FAILURE S/T ? ASPIRATION EVENT   , COVID19 INFECTION     S/P TRACHEOSTOMY [1/12]  - ON DECADRON  - PLACED ON CEFEPIME, S/P ROCEPHIN, AZITHROMYCIN   - CHEST PT  - S/P DECADRON  - BCX [NGTD] AND UCX [NGTD]  - S/P IVF, VASOPRESSORS  - ID CONSULT  - CRITICAL CARE MANAGEMENT IN PROGRESS    - [1/4] - PATIENT W/ ? ASPIRATION EVENT - SUBSEQUENTLY REQUIRED INTUBATION W/ MECHANICAL VENTILATION  - [1/8] - EXTUBATED, CHEST PT  - [1/9] - REINTUBATED OVERNIGHT    - S/P TRACHEOSTOMY + PEG PLACEMENT [1/12]    # DYSPHAGIA S/T CVA  - S/P PEG PLACEMENT 1/12    # BRADYCARDIA  - MONITOR ON TELEMETRY  - F/U ECHOCARDIOGRAM   - OPTIMIZE ELECTROLYTES  - CARDIOLOGY CONSULT  - EP CONSULT    # HYPOKALEMIA  - REPLETING WITH SUPPLEMENT    # SEVERE PROTEIN CALORIE MALNUTRITION   - ON SUPPLEMENTAL NUTRITION    # HX OF CVA  # HX OF DEMENTIA  # SCHIZOPHRENIA  # GI AND DVT PPX    DR. WALTER DAVIDSON   Patient is a 63y old  Male who presents with a chief complaint of Sepsis and AHRF (13 Jan 2024 09:58)    PATIENT IS SEEN AND EXAMINED IN MEDICAL FLOOR.    RODRIGUET [    ]    KRISTINA [   ]      GT [   ]    ALLERGIES:  No Known Allergies      Daily     Daily     VITALS:    Vital Signs Last 24 Hrs  T(C): 37.2 (13 Jan 2024 16:00), Max: 37.2 (13 Jan 2024 05:00)  T(F): 99 (13 Jan 2024 16:00), Max: 99 (13 Jan 2024 05:00)  HR: 67 (13 Jan 2024 16:00) (52 - 72)  BP: 109/66 (13 Jan 2024 16:00) (97/67 - 127/72)  BP(mean): 81 (13 Jan 2024 16:00) (77 - 97)  RR: 13 (13 Jan 2024 16:00) (11 - 24)  SpO2: 97% (13 Jan 2024 16:00) (95% - 100%)    Parameters below as of 13 Jan 2024 15:00  Patient On (Oxygen Delivery Method): ventilator    O2 Concentration (%): 50    LABS:    CBC Full  -  ( 13 Jan 2024 04:55 )  WBC Count : 12.51 K/uL  RBC Count : 4.03 M/uL  Hemoglobin : 11.9 g/dL  Hematocrit : 37.3 %  Platelet Count - Automated : 320 K/uL  Mean Cell Volume : 92.6 fl  Mean Cell Hemoglobin : 29.5 pg  Mean Cell Hemoglobin Concentration : 31.9 gm/dL  Auto Neutrophil # : 9.73 K/uL  Auto Lymphocyte # : 1.58 K/uL  Auto Monocyte # : 1.08 K/uL  Auto Eosinophil # : 0.02 K/uL  Auto Basophil # : 0.04 K/uL  Auto Neutrophil % : 77.8 %  Auto Lymphocyte % : 12.6 %  Auto Monocyte % : 8.6 %  Auto Eosinophil % : 0.2 %  Auto Basophil % : 0.3 %    PT/INR - ( 12 Jan 2024 04:35 )   PT: 11.9 sec;   INR: 1.05 ratio         PTT - ( 12 Jan 2024 04:35 )  PTT:30.4 sec  01-13    143  |  106  |  27<H>  ----------------------------<  127<H>  3.8   |  32<H>  |  0.50    Ca    9.9      13 Jan 2024 04:55  Phos  2.8     01-13  Mg     2.4     01-13    TPro  7.2  /  Alb  2.7<L>  /  TBili  0.6  /  DBili  x   /  AST  8<L>  /  ALT  21  /  AlkPhos  56  01-13    CAPILLARY BLOOD GLUCOSE      POCT Blood Glucose.: 144 mg/dL (13 Jan 2024 11:11)  POCT Blood Glucose.: 159 mg/dL (12 Jan 2024 16:18)        LIVER FUNCTIONS - ( 13 Jan 2024 04:55 )  Alb: 2.7 g/dL / Pro: 7.2 g/dL / ALK PHOS: 56 U/L / ALT: 21 U/L DA / AST: 8 U/L / GGT: x           Creatinine Trend: 0.50<--, 0.59<--, 0.62<--, 0.76<--, 0.64<--, 0.71<--  I&O's Summary    12 Jan 2024 07:01  -  13 Jan 2024 07:00  --------------------------------------------------------  IN: 158.6 mL / OUT: 1665 mL / NET: -1506.4 mL    13 Jan 2024 07:01  -  13 Jan 2024 16:12  --------------------------------------------------------  IN: 60 mL / OUT: 450 mL / NET: -390 mL        ABG - ( 13 Jan 2024 03:07 )  pH, Arterial: 7.46  pH, Blood: x     /  pCO2: 49    /  pO2: 150   / HCO3: 35    / Base Excess: 9.4   /  SaO2: 100                 Nares/Axilla/Groin Nares/Axilla/Groin  01-11 @ 22:20   Culture in progress  --  --      Catheterized Catheterized  12-29 @ 12:37   No growth  --  --      .Blood Blood  12-29 @ 02:05   No growth at 5 days  --  --      .Blood Blood  12-29 @ 01:55   No growth at 5 days  --  --          MEDICATIONS:    MEDICATIONS  (STANDING):  atorvastatin 20 milliGRAM(s) Oral at bedtime  cefepime   IVPB 2000 milliGRAM(s) IV Intermittent every 8 hours  chlorhexidine 0.12% Liquid 15 milliLiter(s) Oral Mucosa every 12 hours  enoxaparin Injectable 40 milliGRAM(s) SubCutaneous every 24 hours  polyethylene glycol 3350 17 Gram(s) Oral daily  valproic  acid Syrup 250 milliGRAM(s) Oral two times a day      MEDICATIONS  (PRN):  morphine  - Injectable 2 milliGRAM(s) IV Push every 4 hours PRN Moderate Pain (4 - 6)        REVIEW OF SYSTEMS:                           ALL ROS DONE [ X   ]      CONSTITUTIONAL:  LETHARGIC [   ], FEVER [   ], UNRESPONSIVE [   ]  CVS:  CP  [   ], SOB, [   ], PALPITATIONS [   ], DIZZYNESS [   ]  RS: COUGH [   ], SPUTUM [   ]  GI: ABDOMINAL PAIN [   ], NAUSEA [   ], VOMITINGS [   ], DIARRHEA [   ], CONSTIPATION [   ]  :  DYSURIA [   ], NOCTURIA [   ], INCREASED FREQUENCY [   ], DRIBLING [   ],  SKELETAL: PAINFUL JOINTS [   ], SWOLLEN JOINTS [   ], NECK ACHE [   ], LOW BACK ACHE [   ],  SKIN : ULCERS [   ], RASH [   ], ITCHING [   ]  CNS: HEAD ACHE [   ], DOUBLE VISION [   ], BLURRED VISION [   ], AMS / CONFUSION [   ], SEIZURES [   ], WEAKNESS [   ],TINGLING / NUMBNESS [   ]        PHYSICAL EXAMINATION:    GENERAL APPEARANCE: NO DISTRESS  HEENT:  NO PALLOR, NO  JVD,  NO   NODES, NECK SUPPLE  CVS: S1 +, S2 +,   RS: AEEB,  OCCASIONAL  RALES +,   RHONCHI +   TRACHEOSTOMY  ABD: SOFT, NT, NO, BS +    PEG +  EXT: NO PE  SKIN: WARM,   SKELETAL:  REDUCED ROM OF CERVICAL AND LS SPINE  CNS:  AAO X 1        RADIOLOGY :    RADIOLOGY AND READINGS REVIEWED        ASSESSMENT :     Infection due to severe acute respiratory syndrome coronavirus 2 (SARS-CoV-2)    CVA (cerebral vascular accident)    HLD (hyperlipidemia)    S/P percutaneous endoscopic gastrostomy (PEG) tube placement        PLAN:  HPI:  A 63 year old male, from [facility], with PMH of CVA, Alzheimer's, nonverbal at baseline, GERD, Schizophrenia, and Constipation, was brought into the ED s/p mechanical fall, poor oral intake, and Covid-19 infection on 12/27/23 as per NH papers. Unable to obtain history from patient since he is nonverbal, history obtained from chart review.  (28 Dec 2023 20:54)      # PROGNOSIS IS POOR/GUARDED - CRITICAL CARE TEAM DISCUSSING W/ FAMILY REGARDING GOC. FAMILY WISHES FOR PEG, TRACHEOSTOMY.     # [1/3] RAPID RESPONSE FOR HYPOTENSION AND HYPOXIA - S/P IV FLUIDS AND PLACED ON NRB FOR HYPOXIA. CRITICAL CARE EVALUATION REQUESTED.     # SEPTIC SHOCK S/T ASPIRATION PNA + DIARRHEA [resolved]  # ACUTE HYPOXIC RESPIRATORY FAILURE S/T ? ASPIRATION EVENT   , COVID19 INFECTION     S/P TRACHEOSTOMY [1/12]  - ON DECADRON  - PLACED ON CEFEPIME, S/P ROCEPHIN, AZITHROMYCIN   - CHEST PT  - S/P DECADRON  - BCX [NGTD] AND UCX [NGTD]  - S/P IVF, VASOPRESSORS  - ID CONSULT  - CRITICAL CARE MANAGEMENT IN PROGRESS    - [1/4] - PATIENT W/ ? ASPIRATION EVENT - SUBSEQUENTLY REQUIRED INTUBATION W/ MECHANICAL VENTILATION  - [1/8] - EXTUBATED, CHEST PT  - [1/9] - REINTUBATED OVERNIGHT    - S/P TRACHEOSTOMY + PEG PLACEMENT [1/12]    # DYSPHAGIA S/T CVA  - S/P PEG PLACEMENT 1/12    # BRADYCARDIA  - MONITOR ON TELEMETRY  - F/U ECHOCARDIOGRAM   - OPTIMIZE ELECTROLYTES  - CARDIOLOGY CONSULT  - EP CONSULT    # HYPOKALEMIA  - REPLETING WITH SUPPLEMENT    # SEVERE PROTEIN CALORIE MALNUTRITION   - ON SUPPLEMENTAL NUTRITION    # HX OF CVA  # HX OF DEMENTIA  # SCHIZOPHRENIA  # GI AND DVT PPX    DR. WALTER DAVIDSON

## 2024-01-13 NOTE — PROGRESS NOTE ADULT - CRITICAL CARE ATTENDING COMMENT
63 year old mn , from [facility], with  CVA, Alzheimer's, nonverbal at baseline, GERD, Schizophrenia, and Constipation, was brought into the ED s/p mechanical fall, poor oral intake, and Covid-19 infection on 12/27/23 Pt was admitted for COVID PNA, later found with intermittent hypoxia/ hypopnea to Sats 80% and Hypotension despite multiple fluid boluses, also found with bryan to 40s for the last 2 days, in ICU for further monitoring.       ASSESSMENT   - Acute Hypoxic resp failure   - Covid-19 PNA   - Shock septic   - CVA  - Alzheimer dementia   - Bradycardia       Plan   - S/p trach/peg placement 1/12  - Cont. mechanical ventilation   - Aspiration precautions   - Antibiotics for aspiration   - Hemodynamic support   - EP cards f/u : no indication for PPM at tis time   - Off Remdesivir due to ADAN  - Isolation : contact and airborne   - Cards following   - Hold AV angel luis blocking agent   - Skin care  - Wound care eval noted  - Start tube feedings today  - Void trial  - Prognosis is poor  - Transfer to SCU 63 year old mn , from [facility], with  CVA, Alzheimer's, nonverbal at baseline, GERD, Schizophrenia, and Constipation, was brought into the ED s/p mechanical fall, poor oral intake, and Covid-19 infection on 12/27/23 Pt was admitted for COVID PNA, later found with intermittent hypoxia/ hypopnea to Sats 80% and Hypotension despite multiple fluid boluses, also found with bryan to 40s for the last 2 days, in ICU for further monitoring.       ASSESSMENT   - Acute Hypoxic resp failure   - Covid-19 PNA   - Shock septic   - CVA  - Alzheimer dementia   - Bradycardia       Plan   - S/p trach/peg placement 1/12  - Cont. mechanical ventilation   - Aspiration precautions   - Antibiotics for aspiration   - Hemodynamic support   - EP cards f/u : no indication for PPM at tis time   - Off Remdesivir due to ADAN  - Isolation : contact and airborne   - Cards following   - Hold AV angel luis blocking agent   - Skin care  - Wound care eval noted  - Start tube feedings today  - Void trial  - Prognosis is poor  - Transfer to SCU

## 2024-01-13 NOTE — CHART NOTE - NSCHARTNOTEFT_GEN_A_CORE
63 year old male, from [facility], with PMH of CVA, Alzheimer's, nonverbal at baseline, GERD, Schizophrenia, and Constipation, was brought into the ED s/p mechanical fall, poor oral intake, and Covid-19 infection on 12/27/23. In ED wbc 14.3, Covid 19 positive.   CXR report showed slight right perihilar infiltrate and there is a left lower perihilar infiltrate. Infiltrates are new. CT HEAD/Cervical : No acute intracranial hemorrhage, mass effect, or osseous: No acute cervical spine fracture or traumatic malalignment. Started on Supplemental O2, Dexamethasone 6mg IV q daily, Ceftriaxone, Azithromycin and Remdesivir protocol. Pt Admitted for Acute respiratory failure with hypoxia, COVID 19 pneumonia with  superimposed bacterial pneumonia.    On 1/2/2024, RRT for desaturation to the 80s.Pt was placed on HF and admitted to the ICU. Pt was weaned from HF to 6LNC.  Feeding was resumed on puree diet, on 1/5/2024, patient desaturated to 80s and failed to improve with HFNC, subsequently requiring intubation. Pt was extubated on 1/8 and was reintubated on 1/9 for inability to manage secretions. Pt noted to be febrile and was treated with 2g cefepime (1/10-1/15) for possible aspiration PNA. Following GOC discussion with son, patient was evaluated by Thoracic surgery and is now s/p Trach and PEG.  Tube feeds and VTE prophylaxis have been resumed.     Patient is stable for downgrade to  and was signed out to TOLU Alvarenga.    For medicine team follow-up:  [ ] continue valproate 250mg BID via PEG  [ ] continue cefepime until 1/15/2024  [ ] fu c.auris swab results (sent 1/11) 63 year old male, from [facility], with PMH of CVA, Alzheimer's, nonverbal at baseline, GERD, Schizophrenia, and Constipation, was brought into the ED s/p mechanical fall, poor oral intake, and Covid-19 infection on 12/27/23. In ED wbc 14.3, Covid 19 positive.   CXR report showed slight right perihilar infiltrate and there is a left lower perihilar infiltrate. Infiltrates are new. CT HEAD/Cervical : No acute intracranial hemorrhage, mass effect, or osseous: No acute cervical spine fracture or traumatic malalignment. Started on Supplemental O2, Dexamethasone 6mg IV q daily, Ceftriaxone, Azithromycin and Remdesivir protocol. Pt Admitted for Acute respiratory failure with hypoxia, COVID 19 pneumonia with  superimposed bacterial pneumonia.    On 1/2/2024, RRT for desaturation to the 80s.Pt was placed on HF and admitted to the ICU. Pt was weaned from HF to 6LNC.  Feeding was resumed on puree diet, on 1/5/2024, patient desaturated to 80s and failed to improve with HFNC, subsequently requiring intubation. Pt was extubated on 1/8 and was reintubated on 1/9 for inability to manage secretions. Pt noted to be febrile and was treated with 2g cefepime (1/10-1/15) for possible aspiration PNA. Following GOC discussion with son, patient was evaluated by Thoracic surgery and is now s/p Trach and PEG.  Tube feeds and VTE prophylaxis have been resumed.     Patient is stable for downgrade to  and was signed out to TOLU Alvarenga.    For medicine team follow-up:  [ ] continue valproate 250mg BID via PEG  [ ] continue cefepime until 1/15/2024  [ ] fu c.auris swab results (sent 1/11)

## 2024-01-13 NOTE — PROGRESS NOTE ADULT - SUBJECTIVE AND OBJECTIVE BOX
Time of Visit:  Patient seen and examined. s/p trach on vent     MEDICATIONS  (STANDING):  atorvastatin 20 milliGRAM(s) Oral at bedtime  cefepime   IVPB 2000 milliGRAM(s) IV Intermittent every 8 hours  chlorhexidine 0.12% Liquid 15 milliLiter(s) Oral Mucosa every 12 hours  enoxaparin Injectable 40 milliGRAM(s) SubCutaneous every 24 hours  polyethylene glycol 3350 17 Gram(s) Oral daily  valproic  acid Syrup 250 milliGRAM(s) Oral two times a day      MEDICATIONS  (PRN):  morphine  - Injectable 2 milliGRAM(s) IV Push every 4 hours PRN Moderate Pain (4 - 6)       Medications up to date at time of exam.      PHYSICAL EXAMINATION:  Patient has no new complaints.  GENERAL: The patient is a well-developed, well-nourished, in no apparent distress.     Vital Signs Last 24 Hrs  T(C): 37.2 (13 Jan 2024 16:00), Max: 37.2 (13 Jan 2024 05:00)  T(F): 99 (13 Jan 2024 16:00), Max: 99 (13 Jan 2024 05:00)  HR: 67 (13 Jan 2024 16:00) (52 - 72)  BP: 109/66 (13 Jan 2024 16:00) (97/67 - 127/72)  BP(mean): 81 (13 Jan 2024 16:00) (77 - 97)  RR: 13 (13 Jan 2024 16:00) (11 - 24)  SpO2: 97% (13 Jan 2024 16:00) (95% - 100%)    Parameters below as of 13 Jan 2024 15:00  Patient On (Oxygen Delivery Method): ventilator    O2 Concentration (%): 50  Mode: AC/ CMV (Assist Control/ Continuous Mandatory Ventilation)  RR (machine): 12  TV (machine): 350  FiO2: 40  PEEP: 5  ITime: 1  MAP: 7  PIP: 16   (if applicable)    Chest Tube (if applicable)    HEENT: Bitemporal wasting . Extraocular muscles are intact. Mucous membranes are moist.     NECK: Supple, no palpable adenopathy. + trach     LUNGS: Clear to auscultation, no wheezing, rales, or rhonchi.    HEART: Regular rate and rhythm without murmur.    ABDOMEN: Soft, nontender, and nondistended.  No hepatosplenomegaly is noted. + PEG    : No painful voiding, no pelvic pain    EXTREMITIES: Without any cyanosis, clubbing, rash, lesions or edema.    NEUROLOGIC: eyes open to touch     SKIN: Warm, dry, good turgor.      LABS:                        11.9   12.51 )-----------( 320      ( 13 Jan 2024 04:55 )             37.3     01-13    143  |  106  |  27<H>  ----------------------------<  127<H>  3.8   |  32<H>  |  0.50    Ca    9.9      13 Jan 2024 04:55  Phos  2.8     01-13  Mg     2.4     01-13    TPro  7.2  /  Alb  2.7<L>  /  TBili  0.6  /  DBili  x   /  AST  8<L>  /  ALT  21  /  AlkPhos  56  01-13    PT/INR - ( 12 Jan 2024 04:35 )   PT: 11.9 sec;   INR: 1.05 ratio         PTT - ( 12 Jan 2024 04:35 )  PTT:30.4 sec  Urinalysis Basic - ( 13 Jan 2024 04:55 )    Color: x / Appearance: x / SG: x / pH: x  Gluc: 127 mg/dL / Ketone: x  / Bili: x / Urobili: x   Blood: x / Protein: x / Nitrite: x   Leuk Esterase: x / RBC: x / WBC x   Sq Epi: x / Non Sq Epi: x / Bacteria: x      ABG - ( 13 Jan 2024 03:07 )  pH, Arterial: 7.46  pH, Blood: x     /  pCO2: 49    /  pO2: 150   / HCO3: 35    / Base Excess: 9.4   /  SaO2: 100       MICROBIOLOGY: (if applicable)    RADIOLOGY & ADDITIONAL STUDIES:  EKG:   CXR:  ECHO:    IMPRESSION: 63y Male PAST MEDICAL & SURGICAL HISTORY:  CVA (cerebral vascular accident)      CVA (cerebral vascular accident)      HLD (hyperlipidemia)      S/P percutaneous endoscopic gastrostomy (PEG) tube placement       p/w           IMP: This is a  63 year old mn , from [facility], with  CVA, Alzheimer's, nonverbal at baseline, GERD, Schizophrenia, and Constipation, was brought into the ED s/p mechanical fall, poor oral intake, and Covid-19 infection on 12/27/23 Pt was admitted for COVID PNA, later found with intermittent hypoxia/ hypopnea to Sats 80% and Hypotension despite multiple fluid boluses, also found with bryan to 40s for the last 2 days, in ICU for further monitoring.       ASSESSMENT   - Acute Hypoxic resp failure   - Covid-19 PNA   - Shock septic   - CVA  - Alzheimer dementia   - Bradycardia       Plan     - S/P trach 1/12  - Post trach cxr   - Trach care   - Aspiration precautions   - Hemodynamic support   - Off  vaso-active agents   - EP cards f/u : no indication for PPM at tis time   - Antibx   - F/U cultures neg   - Aspiration precautions  - Off Remdesivir due to ADAN  - Continue decadron  6 mg for total 10 days   - Isolation : contact and airborne     discussed  with ICU attend     time 37 min      Time of Visit:  Patient seen and examined. s/p trach on vent     MEDICATIONS  (STANDING):  atorvastatin 20 milliGRAM(s) Oral at bedtime  cefepime   IVPB 2000 milliGRAM(s) IV Intermittent every 8 hours  chlorhexidine 0.12% Liquid 15 milliLiter(s) Oral Mucosa every 12 hours  enoxaparin Injectable 40 milliGRAM(s) SubCutaneous every 24 hours  polyethylene glycol 3350 17 Gram(s) Oral daily  valproic  acid Syrup 250 milliGRAM(s) Oral two times a day      MEDICATIONS  (PRN):  morphine  - Injectable 2 milliGRAM(s) IV Push every 4 hours PRN Moderate Pain (4 - 6)       Medications up to date at time of exam.      PHYSICAL EXAMINATION:  Patient has no new complaints.  GENERAL: The patient is a well-developed, well-nourished, in no apparent distress.     Vital Signs Last 24 Hrs  T(C): 37.2 (13 Jan 2024 16:00), Max: 37.2 (13 Jan 2024 05:00)  T(F): 99 (13 Jan 2024 16:00), Max: 99 (13 Jan 2024 05:00)  HR: 67 (13 Jan 2024 16:00) (52 - 72)  BP: 109/66 (13 Jan 2024 16:00) (97/67 - 127/72)  BP(mean): 81 (13 Jan 2024 16:00) (77 - 97)  RR: 13 (13 Jan 2024 16:00) (11 - 24)  SpO2: 97% (13 Jan 2024 16:00) (95% - 100%)    Parameters below as of 13 Jan 2024 15:00  Patient On (Oxygen Delivery Method): ventilator    O2 Concentration (%): 50  Mode: AC/ CMV (Assist Control/ Continuous Mandatory Ventilation)  RR (machine): 12  TV (machine): 350  FiO2: 40  PEEP: 5  ITime: 1  MAP: 7  PIP: 16   (if applicable)    Chest Tube (if applicable)    HEENT: Bitemporal wasting . Extraocular muscles are intact. Mucous membranes are moist.     NECK: Supple, no palpable adenopathy. + trach     LUNGS: Clear to auscultation, no wheezing, rales, or rhonchi.    HEART: Regular rate and rhythm without murmur.    ABDOMEN: Soft, nontender, and nondistended.  No hepatosplenomegaly is noted. + PEG    : No painful voiding, no pelvic pain    EXTREMITIES: Without any cyanosis, clubbing, rash, lesions or edema.    NEUROLOGIC: eyes open to touch     SKIN: Warm, dry, good turgor.      LABS:                        11.9   12.51 )-----------( 320      ( 13 Jan 2024 04:55 )             37.3     01-13    143  |  106  |  27<H>  ----------------------------<  127<H>  3.8   |  32<H>  |  0.50    Ca    9.9      13 Jan 2024 04:55  Phos  2.8     01-13  Mg     2.4     01-13    TPro  7.2  /  Alb  2.7<L>  /  TBili  0.6  /  DBili  x   /  AST  8<L>  /  ALT  21  /  AlkPhos  56  01-13    PT/INR - ( 12 Jan 2024 04:35 )   PT: 11.9 sec;   INR: 1.05 ratio         PTT - ( 12 Jan 2024 04:35 )  PTT:30.4 sec  Urinalysis Basic - ( 13 Jan 2024 04:55 )    Color: x / Appearance: x / SG: x / pH: x  Gluc: 127 mg/dL / Ketone: x  / Bili: x / Urobili: x   Blood: x / Protein: x / Nitrite: x   Leuk Esterase: x / RBC: x / WBC x   Sq Epi: x / Non Sq Epi: x / Bacteria: x      ABG - ( 13 Jan 2024 03:07 )  pH, Arterial: 7.46  pH, Blood: x     /  pCO2: 49    /  pO2: 150   / HCO3: 35    / Base Excess: 9.4   /  SaO2: 100       MICROBIOLOGY: (if applicable)    RADIOLOGY & ADDITIONAL STUDIES:  EKG:   CXR:  ECHO:    IMPRESSION: 63y Male PAST MEDICAL & SURGICAL HISTORY:  CVA (cerebral vascular accident)      CVA (cerebral vascular accident)      HLD (hyperlipidemia)      S/P percutaneous endoscopic gastrostomy (PEG) tube placement       p/w           IMP: This is a  63 year old mn , from [facility], with  CVA, Alzheimer's, nonverbal at baseline, GERD, Schizophrenia, and Constipation, was brought into the ED s/p mechanical fall, poor oral intake, and Covid-19 infection on 12/27/23 Pt was admitted for COVID PNA, later found with intermittent hypoxia/ hypopnea to Sats 80% and Hypotension despite multiple fluid boluses, also found with bryan to 40s for the last 2 days, in ICU for further monitoring.       ASSESSMENT   - Acute Hypoxic resp failure   - Covid-19 PNA   - Shock septic   - CVA  - Alzheimer dementia   - Bradycardia       Plan     - S/P trach 1/12  - Post trach cxr   - Trach care   - Aspiration precautions   - Hemodynamic support   - Off  vaso-active agents   - EP cards f/u : no indication for PPM at tis time   - Antibx   - F/U cultures neg   - Aspiration precautions  - Off Remdesivir due to ADAN  - Continue decadron  6 mg for total 10 days   - Isolation : contact and airborne     discussed  with ICU attend     time 37 min

## 2024-01-13 NOTE — PROGRESS NOTE ADULT - ASSESSMENT
Agree with above assessment and plan as outlined above.    - No need for further inpatient cardiac work up.    Gavino Cadet MD, [facility]  BEEPER [phone]   Agree with above assessment and plan as outlined above.    - No need for further inpatient cardiac work up.    Gavino Cadet MD, [facility]  BEEPER [phone]

## 2024-01-13 NOTE — PROGRESS NOTE ADULT - ASSESSMENT
63 year old male, from [facility], with PMH of CVA, Alzheimer's, nonverbal at baseline, GERD, Schizophrenia, and Constipation, was brought into the ED s/p mechanical fall, poor oral intake, and Covid-19 infection on 12/27/23 Pt was admitted for COVID PNA, later found with intermittent hypoxia to Sats 80% and Hypotension despite multiple fluid boluses, also found with bryan to 40s for the last 2 days, in ICU for further monitoring.     DX:  1. CVA  2. Alzheimer's  3. Hypoxia  4. Hypotension   5. Bradycardia   5. COVID +Ve     =================== Neuro============================  # CVA  # Alzheimer's  #sedation/analgesia  - baseline: bedbound, nonverbal in the setting of Alzheimer's dementia, Previous CVA  - required reintubation due to inability to clear secretions   - continue valproate 250mg BID  - resumed propofol for sedation as patient attempting to reach for ETT     ================= Cardiovascular==========================  # septic shock in the setting of COVID-19 infection  # Sinus bradycardia  - Trop negative, TWI on lateral leads are chronic  - POCUS 1/5 shows intact LV function, some signs of end systolic effacement   - status post volume expansion with albumin   - 1/6/23 TTE: Mildly reduced LVEF   - now off levophed, goal MAP > 60  - holding AV angel luis blockers iso bradycardia    ================- Pulm=================================  #Acute Hypoxia Respiratory failure in the setting of COVID-19  #Multifocal PNA   #at risk of aspiration PNA  - RRT for desaturation to the 80%'s (1/2/24)  - Improved with NRB, PO2 in 200s on ABG, s/p HF 50/40 but then failed HFNC 1/5  - status post intubation 1/5-1/9, reintubated 1/10  - extubated to NRB, difficult to wean due to copious oropharyngeal secretions, very high risk of aspiration/re-intubation. Pt now s/p Trach and PEG  - Per Thoracic recs, resume DVT PPx, hold TF for 24 hours, and maintain NGT to gravity.   - blood cx 12/29 NGTD  - Dr. Hernandez, thoracic surgery following    ==================ID===================================  #COVID   #Aspiration PNA  - Cefepime 2g (1/10-1/15)    ================= Nephro================================  #No active issues   - crt .76, good urine output    =================GI====================================  #Nutrition  -Continue NPO    # Diarrhea 2/2 to laxatives vs Covid   - senna, miralax held in setting of loose stool,   - no further episodes    ================ Heme==================================  DVT PPx with lovenox    =================Endocrine===============================  Bryan and Hypotensive   - TSH 4.28 [wnl]    ================= Skin/Catheters============================  no wounds    =================Prophylaxis =============================  Lovenox held for procedure tomorrow  PPI    ==================GOC==================================  FULL CODE    ==================DISPO=====================  ICU     63 year old male, from [facility], with PMH of CVA, Alzheimer's, nonverbal at baseline, GERD, Schizophrenia, and Constipation, was brought into the ED s/p mechanical fall, poor oral intake, and Covid-19 infection on 12/27/23 Pt was admitted for COVID PNA, later found with intermittent hypoxia to Sats 80% and Hypotension despite multiple fluid boluses, also found with bryan to 40s for the last 2 days, in ICU for further monitoring.     DX:  1. CVA  2. Alzheimer's  3. Hypoxia  4. Hypotension   5. Bradycardia   5. COVID +Ve     =================== Neuro============================  # CVA  # Alzheimer's  #sedation/analgesia  - baseline: bedbound, nonverbal in the setting of Alzheimer's dementia, Previous CVA  - required reintubation due to inability to clear secretions   - continue valproate 250mg BID  - resumed propofol for sedation as patient attempting to reach for ETT     ================= Cardiovascular==========================  # septic shock in the setting of COVID-19 infection  # Sinus bradycardia  - Trop negative, TWI on lateral leads are chronic  - POCUS 1/5 shows intact LV function, some signs of end systolic effacement   - status post volume expansion with albumin   - 1/6/23 TTE: Mildly reduced LVEF   - now off levophed, goal MAP > 60  - holding AV angel luis blockers iso bradycardia    ================- Pulm=================================  #Acute Hypoxia Respiratory failure in the setting of COVID-19  #Multifocal PNA   #at risk of aspiration PNA  - RRT for desaturation to the 80%'s (1/2/24)  - Improved with NRB, PO2 in 200s on ABG, s/p HF 50/40 but then failed HFNC 1/5  - status post intubation 1/5-1/9, reintubated 1/10  - extubated to NRB, difficult to wean due to copious oropharyngeal secretions, very high risk of aspiration/re-intubation. Pt now s/p Trach and PEG  - Per Thoracic recs, resume DVT PPx, hold TF for 24 hours, and maintain NGT to gravity.   - blood cx 12/29 NGTD  - Dr. Hernandez, thoracic surgery following    ==================ID===================================  #COVID   #Aspiration PNA  - Cefepime 2g (1/10-1/15)    ================= Nephro================================  #No active issues   - crt .76, good urine output    =================GI====================================  #Nutrition  -Continue NPO    # Diarrhea 2/2 to laxatives vs Covid   - senna, miralax held in setting of loose stool,   - no further episodes    ================ Heme==================================  DVT PPx with lovenox    =================Endocrine===============================  Bryan and Hypotensive   - TSH 4.28 [wnl]    ================= Skin/Catheters============================  no wounds    =================Prophylaxis =============================  Lovenox held for procedure tomorrow  PPI    ==================GOC==================================  FULL CODE    ==================DISPO=====================  ICU     63 year old male, from [facility], with PMH of CVA, Alzheimer's, nonverbal at baseline, GERD, Schizophrenia, and Constipation, was brought into the ED s/p mechanical fall, poor oral intake, and Covid-19 infection on 12/27/23 Pt was admitted for COVID PNA, later found with intermittent hypoxia to Sats 80% and Hypotension despite multiple fluid boluses, also found with bryan to 40s for the last 2 days, in ICU for further monitoring.     DX:  1. CVA  2. Alzheimer's  3. Hypoxia  4. Hypotension   5. Bradycardia   5. COVID +Ve     =================== Neuro============================  # CVA  # Alzheimer's  #analgesia  - baseline: bedbound, nonverbal in the setting of Alzheimer's dementia, Previous CVA  - required reintubation due to inability to clear secretions   - now status post trach/PEG,  - will restart valproate 250mg BID via PEG  - post-op analgesia morphine 2mg q4 prn pain    ================= Cardiovascular==========================  # septic shock in the setting of COVID-19 infection  # Sinus bradycardia  - Trop negative, TWI on lateral leads are chronic  - POCUS 1/5 shows intact LV function, some signs of end systolic effacement   - status post volume expansion with albumin   - 1/6/23 TTE: Mildly reduced LVEF   - successfully weaned off vasopressors, goal MAP > 60  - holding AV angel luis blockers iso bradycardia    ================- Pulm=================================  #Acute Hypoxia Respiratory failure in the setting of COVID-19  #Multifocal PNA   #at risk of aspiration PNA  - RRT for desaturation to the 80%'s (1/2/24)  - Improved with NRB, PO2 in 200s on ABG, s/p HF 50/40 but then failed HFNC 1/5  - status post intubation 1/5-1/9, reintubated 1/10  - extubated to NRB, difficult to wean due to copious oropharyngeal secretions, very high risk of aspiration/re-intubation.   - Pt now s/p Trach and PEG 1/12  - blood cx 12/29 NGTD  - Dr. Hernandez, thoracic surgery following    ==================ID===================================  #COVID   #Aspiration PNA  - Cefepime 2g (1/10- STOP 1/15)    ================= Nephro================================  #No active issues   -voiding via condom cath, good urine output    =================GI====================================  #Nutrition via PEG  - per thoracic recs, d/c PEG to suction and resume TFs on 1/13 at 12p  - TF Nepro @20ml/hr    # Diarrhea 2/2 to laxatives vs Covid   - senna, miralax held in setting of loose stool,   - no further episodes    ================ Heme==================================  DVT PPx resumed with lovenox    =================Endocrine===============================  Bryan and Hypotensive   - TSH 4.28 [wnl]    ================= Skin/Catheters============================  no wounds    =================Prophylaxis =============================  Lovenox   PPI    ==================GOC==================================  FULL CODE    ==================DISPO=====================  Downgrade to AI     63 year old male, from [facility], with PMH of CVA, Alzheimer's, nonverbal at baseline, GERD, Schizophrenia, and Constipation, was brought into the ED s/p mechanical fall, poor oral intake, and Covid-19 infection on 12/27/23 Pt was admitted for COVID PNA, later found with intermittent hypoxia to Sats 80% and Hypotension despite multiple fluid boluses, also found with bryan to 40s for the last 2 days, in ICU for further monitoring.     DX:  1. CVA  2. Alzheimer's  3. Hypoxia  4. Hypotension   5. Bradycardia   5. COVID +Ve     =================== Neuro============================  # CVA  # Alzheimer's  #analgesia  - baseline: bedbound, nonverbal in the setting of Alzheimer's dementia, Previous CVA  - required reintubation due to inability to clear secretions   - now status post trach/PEG,  - will restart valproate 250mg BID via PEG  - post-op analgesia morphine 2mg q4 prn pain    ================= Cardiovascular==========================  # septic shock in the setting of COVID-19 infection  # Sinus bradycardia  - Trop negative, TWI on lateral leads are chronic  - POCUS 1/5 shows intact LV function, some signs of end systolic effacement   - status post volume expansion with albumin   - 1/6/23 TTE: Mildly reduced LVEF   - successfully weaned off vasopressors, goal MAP > 60  - holding AV angel luis blockers iso bradycardia    ================- Pulm=================================  #Acute Hypoxia Respiratory failure in the setting of COVID-19  #Multifocal PNA   #at risk of aspiration PNA  - RRT for desaturation to the 80%'s (1/2/24)  - Improved with NRB, PO2 in 200s on ABG, s/p HF 50/40 but then failed HFNC 1/5  - status post intubation 1/5-1/9, reintubated 1/10  - extubated to NRB, difficult to wean due to copious oropharyngeal secretions, very high risk of aspiration/re-intubation.   - Pt now s/p Trach and PEG 1/12  - blood cx 12/29 NGTD  - Dr. Hernandez, thoracic surgery following    ==================ID===================================  #COVID   #Aspiration PNA  - Cefepime 2g (1/10- STOP 1/15)    ================= Nephro================================  #No active issues   -voiding via condom cath, good urine output    =================GI====================================  #Nutrition via PEG  - per thoracic recs, d/c PEG to suction and resume TFs on 1/13 at 12p  - TF Nepro @20ml/hr    # Diarrhea 2/2 to laxatives vs Covid   - senna, miralax held in setting of loose stool,   - no further episodes    ================ Heme==================================  DVT PPx resumed with lovenox    =================Endocrine===============================  Bryan and Hypotensive   - TSH 4.28 [wnl]    ================= Skin/Catheters============================  no wounds    =================Prophylaxis =============================  Lovenox   PPI    ==================GOC==================================  FULL CODE    ==================DISPO=====================  Downgrade to AI

## 2024-01-13 NOTE — PROGRESS NOTE ADULT - ATTENDING COMMENTS
63 year old mn , from [facility], with  CVA, Alzheimer's, nonverbal at baseline, GERD, Schizophrenia, and Constipation, was brought into the ED s/p mechanical fall, poor oral intake, and Covid-19 infection on 12/27/23 Pt was admitted for COVID PNA, later found with intermittent hypoxia/ hypopnea to Sats 80% and Hypotension despite multiple fluid boluses, also found with bryan to 40s for the last 2 days, in ICU for further monitoring.       ASSESSMENT   - Acute Hypoxic resp failure   - Covid-19 PNA   - Shock septic   - CVA  - Alzheimer dementia   - Bradycardia       Plan   - S/p trach/peg placement 1/12  - Cont. mechanical ventilation   - Aspiration precautions   - Antibiotics for aspiration   - Hemodynamic support   - EP cards f/u : no indication for PPM at tis time   - Off Remdesivir due to ADAN  - Isolation : contact and airborne   - Cards following   - Hold AV ange lluis blocking agent   - Skin care  - Wound care eval noted  - Start tube feedings today  - Void trial  - Prognosis is poor  - Transfer to SCU 63 year old mn , from [facility], with  CVA, Alzheimer's, nonverbal at baseline, GERD, Schizophrenia, and Constipation, was brought into the ED s/p mechanical fall, poor oral intake, and Covid-19 infection on 12/27/23 Pt was admitted for COVID PNA, later found with intermittent hypoxia/ hypopnea to Sats 80% and Hypotension despite multiple fluid boluses, also found with bryan to 40s for the last 2 days, in ICU for further monitoring.       ASSESSMENT   - Acute Hypoxic resp failure   - Covid-19 PNA   - Shock septic   - CVA  - Alzheimer dementia   - Bradycardia       Plan   - S/p trach/peg placement 1/12  - Cont. mechanical ventilation   - Aspiration precautions   - Antibiotics for aspiration   - Hemodynamic support   - EP cards f/u : no indication for PPM at tis time   - Off Remdesivir due to ADAN  - Isolation : contact and airborne   - Cards following   - Hold AV angel luis blocking agent   - Skin care  - Wound care eval noted  - Start tube feedings today  - Void trial  - Prognosis is poor  - Transfer to SCU

## 2024-01-13 NOTE — PROGRESS NOTE ADULT - SUBJECTIVE AND OBJECTIVE BOX
INTERVAL HPI/OVERNIGHT EVENTS: Patient was examined at bedside, s/p trach and PEG. No acute events overnight.    PRESSORS: [ ] YES [ ] NO  WHICH:    ANTIBIOTICS:                      Antimicrobial:  cefepime   IVPB 2000 milliGRAM(s) IV Intermittent every 8 hours    Cardiovascular:    Pulmonary:    Hematalogic:  enoxaparin Injectable 40 milliGRAM(s) SubCutaneous every 24 hours    Other:  chlorhexidine 0.12% Liquid 15 milliLiter(s) Oral Mucosa every 12 hours  morphine  - Injectable 2 milliGRAM(s) IV Push every 4 hours PRN    cefepime   IVPB 2000 milliGRAM(s) IV Intermittent every 8 hours  chlorhexidine 0.12% Liquid 15 milliLiter(s) Oral Mucosa every 12 hours  enoxaparin Injectable 40 milliGRAM(s) SubCutaneous every 24 hours  morphine  - Injectable 2 milliGRAM(s) IV Push every 4 hours PRN    Drug Dosing Weight  Height (cm): 167.6 (03 Aug 2022 20:11)  Weight (kg): 57 (03 Jan 2024 14:30)  BMI (kg/m2): 20.3 (03 Jan 2024 14:30)  BSA (m2): 1.64 (03 Jan 2024 14:30)    CENTRAL LINE: [ ] YES [ ] NO  LOCATION:   DATE INSERTED:  REMOVE: [ ] YES [ ] NO  EXPLAIN:    ACEVEDO: [ ] YES [ ] NO    DATE INSERTED:  REMOVE:  [ ] YES [ ] NO  EXPLAIN:    A-LINE:  [ ] YES [ ] NO  LOCATION:   DATE INSERTED:  REMOVE:  [ ] YES [ ] NO  EXPLAIN:    PMH -reviewed admission note, no change since admission    ICU Vital Signs Last 24 Hrs  T(C): 37 (13 Jan 2024 00:00), Max: 37.5 (12 Jan 2024 02:00)  T(F): 98.6 (13 Jan 2024 00:00), Max: 99.5 (12 Jan 2024 02:00)  HR: 61 (13 Jan 2024 00:00) (54 - 75)  BP: 116/74 (13 Jan 2024 00:00) (88/68 - 130/78)  BP(mean): 84 (13 Jan 2024 00:00) (73 - 97)  ABP: --  ABP(mean): --  RR: 18 (13 Jan 2024 00:00) (11 - 18)  SpO2: 100% (13 Jan 2024 00:00) (96% - 100%)    O2 Parameters below as of 13 Jan 2024 00:00  Patient On (Oxygen Delivery Method): ventilator    O2 Concentration (%): 50              01-11 @ 07:01  -  01-12 @ 07:00  --------------------------------------------------------  IN: 666.3 mL / OUT: 1000 mL / NET: -333.7 mL        Mode: AC/ CMV (Assist Control/ Continuous Mandatory Ventilation)  RR (machine): 12  TV (machine): 350  FiO2: 40  PEEP: 5  ITime: 1  MAP: 9  PIP: 13      PHYSICAL EXAM:    GENERAL: NAD, well-groomed, well-developed  HEAD:  Atraumatic, Normocephalic  EYES: EOMI, PERRLA, conjunctiva and sclera clear  ENMT: No tonsillar erythema, exudates, or enlargement; Moist mucous membranes, Good dentition, No lesions  NECK: Supple, normal appearance, No JVD; Normal thyroid; Trachea midline  NERVOUS SYSTEM:  Alert & Oriented X3, Good concentration; Motor Strength 5/5 B/L upper and lower extremities; DTRs 2+ intact and symmetric  CHEST/LUNG: No chest deformity; Normal percussion bilaterally; No rales, rhonchi, wheezing   HEART: Regular rate and rhythm; No murmurs, rubs, or gallops  ABDOMEN: Soft, Nontender, Nondistended; Bowel sounds present  EXTREMITIES:  2+ Peripheral Pulses, No clubbing, cyanosis, or edema  LYMPH: No lymphadenopathy noted  SKIN: No rashes or lesions; Good capillary refill      LABS:  CBC Full  -  ( 12 Jan 2024 04:35 )  WBC Count : 16.66 K/uL  RBC Count : 4.47 M/uL  Hemoglobin : 13.2 g/dL  Hematocrit : 42.4 %  Platelet Count - Automated : 283 K/uL  Mean Cell Volume : 94.9 fl  Mean Cell Hemoglobin : 29.5 pg  Mean Cell Hemoglobin Concentration : 31.1 gm/dL  Auto Neutrophil # : 11.83 K/uL  Auto Lymphocyte # : 2.43 K/uL  Auto Monocyte # : 2.20 K/uL  Auto Eosinophil # : 0.07 K/uL  Auto Basophil # : 0.06 K/uL  Auto Neutrophil % : 71.0 %  Auto Lymphocyte % : 14.6 %  Auto Monocyte % : 13.2 %  Auto Eosinophil % : 0.4 %  Auto Basophil % : 0.4 %    01-12    142  |  108  |  22<H>  ----------------------------<  103<H>  4.1   |  30  |  0.59    Ca    10.8<H>      12 Jan 2024 04:35  Phos  2.9     01-11  Mg     2.4     01-12      PT/INR - ( 12 Jan 2024 04:35 )   PT: 11.9 sec;   INR: 1.05 ratio         PTT - ( 12 Jan 2024 04:35 )  PTT:30.4 sec  Urinalysis Basic - ( 12 Jan 2024 04:35 )    Color: x / Appearance: x / SG: x / pH: x  Gluc: 103 mg/dL / Ketone: x  / Bili: x / Urobili: x   Blood: x / Protein: x / Nitrite: x   Leuk Esterase: x / RBC: x / WBC x   Sq Epi: x / Non Sq Epi: x / Bacteria: x          RADIOLOGY & ADDITIONAL STUDIES REVIEWED:  ***    GOALS OF CARE DISCUSSION WITH PATIENT/FAMILY/PROXY:    CRITICAL CARE TIME SPENT: 35 minutes INTERVAL HPI/OVERNIGHT EVENTS: Patient was examined at bedside, s/p trach and PEG. No acute events overnight.    PRESSORS: [ ] YES [ ] NO  WHICH:    ANTIBIOTICS:                      Antimicrobial:  cefepime   IVPB 2000 milliGRAM(s) IV Intermittent every 8 hours    Cardiovascular:    Pulmonary:    Hematalogic:  enoxaparin Injectable 40 milliGRAM(s) SubCutaneous every 24 hours    Other:  chlorhexidine 0.12% Liquid 15 milliLiter(s) Oral Mucosa every 12 hours  morphine  - Injectable 2 milliGRAM(s) IV Push every 4 hours PRN    cefepime   IVPB 2000 milliGRAM(s) IV Intermittent every 8 hours  chlorhexidine 0.12% Liquid 15 milliLiter(s) Oral Mucosa every 12 hours  enoxaparin Injectable 40 milliGRAM(s) SubCutaneous every 24 hours  morphine  - Injectable 2 milliGRAM(s) IV Push every 4 hours PRN    Drug Dosing Weight  Height (cm): 167.6 (03 Aug 2022 20:11)  Weight (kg): 57 (03 Jan 2024 14:30)  BMI (kg/m2): 20.3 (03 Jan 2024 14:30)  BSA (m2): 1.64 (03 Jan 2024 14:30)    CENTRAL LINE: [ ] YES [ ] NO  LOCATION:   DATE INSERTED:  REMOVE: [ ] YES [ ] NO  EXPLAIN:    ACEVEDO: [ ] YES [ ] NO    DATE INSERTED:  REMOVE:  [ ] YES [ ] NO  EXPLAIN:    A-LINE:  [ ] YES [ ] NO  LOCATION:   DATE INSERTED:  REMOVE:  [ ] YES [ ] NO  EXPLAIN:    PMH -reviewed admission note, no change since admission    ICU Vital Signs Last 24 Hrs  T(C): 37 (13 Jan 2024 00:00), Max: 37.5 (12 Jan 2024 02:00)  T(F): 98.6 (13 Jan 2024 00:00), Max: 99.5 (12 Jan 2024 02:00)  HR: 61 (13 Jan 2024 00:00) (54 - 75)  BP: 116/74 (13 Jan 2024 00:00) (88/68 - 130/78)  BP(mean): 84 (13 Jan 2024 00:00) (73 - 97)  ABP: --  ABP(mean): --  RR: 18 (13 Jan 2024 00:00) (11 - 18)  SpO2: 100% (13 Jan 2024 00:00) (96% - 100%)    O2 Parameters below as of 13 Jan 2024 00:00  Patient On (Oxygen Delivery Method): ventilator    O2 Concentration (%): 50              01-11 @ 07:01  -  01-12 @ 07:00  --------------------------------------------------------  IN: 666.3 mL / OUT: 1000 mL / NET: -333.7 mL        Mode: AC/ CMV (Assist Control/ Continuous Mandatory Ventilation)  RR (machine): 12  TV (machine): 350  FiO2: 40  PEEP: 5  ITime: 1  MAP: 9  PIP: 13      PHYSICAL EXAM:  GENERAL: thin male, supine in bed, trach to vent, in no apparent distress  EYES: pupils 3mm and sluggish  NECK: Supple, No JVD; Trachea midline   NERVOUS SYSTEM:  opens eyes to touch, responsive to noxious stimuli, minimal gag reflex  CHEST/LUNG:  breath sounds diminished bilaterally, No rales, rhonchi, or wheezing.  HEART: Regular rate and rhythm; No murmurs, rubs, or gallops  ABDOMEN: PEG in place, dressing clean/dry/intact, Soft, Nontender, Nondistended; Bowel sounds present, no pain or masses on palpation  : adequate urinary output, condom cath in place  EXTREMITIES:  2+ Peripheral Pulses, No clubbing, cyanosis, or edema  SKIN: warm, intact, no lesions     LABS:  CBC Full  -  ( 12 Jan 2024 04:35 )  WBC Count : 16.66 K/uL  RBC Count : 4.47 M/uL  Hemoglobin : 13.2 g/dL  Hematocrit : 42.4 %  Platelet Count - Automated : 283 K/uL  Mean Cell Volume : 94.9 fl  Mean Cell Hemoglobin : 29.5 pg  Mean Cell Hemoglobin Concentration : 31.1 gm/dL  Auto Neutrophil # : 11.83 K/uL  Auto Lymphocyte # : 2.43 K/uL  Auto Monocyte # : 2.20 K/uL  Auto Eosinophil # : 0.07 K/uL  Auto Basophil # : 0.06 K/uL  Auto Neutrophil % : 71.0 %  Auto Lymphocyte % : 14.6 %  Auto Monocyte % : 13.2 %  Auto Eosinophil % : 0.4 %  Auto Basophil % : 0.4 %    01-12    142  |  108  |  22<H>  ----------------------------<  103<H>  4.1   |  30  |  0.59    Ca    10.8<H>      12 Jan 2024 04:35  Phos  2.9     01-11  Mg     2.4     01-12      PT/INR - ( 12 Jan 2024 04:35 )   PT: 11.9 sec;   INR: 1.05 ratio         PTT - ( 12 Jan 2024 04:35 )  PTT:30.4 sec  Urinalysis Basic - ( 12 Jan 2024 04:35 )    Color: x / Appearance: x / SG: x / pH: x  Gluc: 103 mg/dL / Ketone: x  / Bili: x / Urobili: x   Blood: x / Protein: x / Nitrite: x   Leuk Esterase: x / RBC: x / WBC x   Sq Epi: x / Non Sq Epi: x / Bacteria: x          RADIOLOGY & ADDITIONAL STUDIES REVIEWED:  ***    GOALS OF CARE DISCUSSION WITH PATIENT/FAMILY/PROXY:    CRITICAL CARE TIME SPENT: 35 minutes INTERVAL HPI/OVERNIGHT EVENTS: Patient was examined at bedside, s/p trach and PEG. No acute events overnight.    PRESSORS: [ ] YES [ ] NO  WHICH:    ANTIBIOTICS:                      Antimicrobial:  cefepime   IVPB 2000 milliGRAM(s) IV Intermittent every 8 hours    Cardiovascular:    Pulmonary:     Hematalogic:  enoxaparin Injectable 40 milliGRAM(s) SubCutaneous every 24 hours    Other:  chlorhexidine 0.12% Liquid 15 milliLiter(s) Oral Mucosa every 12 hours  morphine  - Injectable 2 milliGRAM(s) IV Push every 4 hours PRN    cefepime   IVPB 2000 milliGRAM(s) IV Intermittent every 8 hours  chlorhexidine 0.12% Liquid 15 milliLiter(s) Oral Mucosa every 12 hours  enoxaparin Injectable 40 milliGRAM(s) SubCutaneous every 24 hours  morphine  - Injectable 2 milliGRAM(s) IV Push every 4 hours PRN    Drug Dosing Weight  Height (cm): 167.6 (03 Aug 2022 20:11)  Weight (kg): 57 (03 Jan 2024 14:30)  BMI (kg/m2): 20.3 (03 Jan 2024 14:30)  BSA (m2): 1.64 (03 Jan 2024 14:30)    CENTRAL LINE: [ ] YES [ ] NO  LOCATION:   DATE INSERTED:  REMOVE: [ ] YES [ ] NO  EXPLAIN:    ACEVEDO: [ ] YES [ ] NO    DATE INSERTED:  REMOVE:  [ ] YES [ ] NO  EXPLAIN:    A-LINE:  [ ] YES [ ] NO  LOCATION:   DATE INSERTED:  REMOVE:  [ ] YES [ ] NO  EXPLAIN:    PMH -reviewed admission note, no change since admission    ICU Vital Signs Last 24 Hrs  T(C): 37 (13 Jan 2024 00:00), Max: 37.5 (12 Jan 2024 02:00)  T(F): 98.6 (13 Jan 2024 00:00), Max: 99.5 (12 Jan 2024 02:00)  HR: 61 (13 Jan 2024 00:00) (54 - 75)  BP: 116/74 (13 Jan 2024 00:00) (88/68 - 130/78)  BP(mean): 84 (13 Jan 2024 00:00) (73 - 97)  ABP: --  ABP(mean): --  RR: 18 (13 Jan 2024 00:00) (11 - 18)  SpO2: 100% (13 Jan 2024 00:00) (96% - 100%)    O2 Parameters below as of 13 Jan 2024 00:00  Patient On (Oxygen Delivery Method): ventilator    O2 Concentration (%): 50              01-11 @ 07:01  -  01-12 @ 07:00  --------------------------------------------------------  IN: 666.3 mL / OUT: 1000 mL / NET: -333.7 mL        Mode: AC/ CMV (Assist Control/ Continuous Mandatory Ventilation)  RR (machine): 12  TV (machine): 350  FiO2: 40  PEEP: 5  ITime: 1  MAP: 9  PIP: 13      PHYSICAL EXAM:  GENERAL: thin male, supine in bed, trach to vent, in no apparent distress  EYES: pupils 3mm and sluggish  NECK: Supple, No JVD; Trachea midline   NERVOUS SYSTEM:  opens eyes to touch, responsive to noxious stimuli, minimal gag reflex  CHEST/LUNG:  breath sounds diminished bilaterally, No rales, rhonchi, or wheezing.  HEART: Regular rate and rhythm; No murmurs, rubs, or gallops  ABDOMEN: PEG in place, dressing clean/dry/intact, Soft, Nontender, Nondistended; Bowel sounds present, no pain or masses on palpation  : adequate urinary output, condom cath in place  EXTREMITIES:  2+ Peripheral Pulses, No clubbing, cyanosis, or edema  SKIN: warm, intact, no lesions     LABS:  CBC Full  -  ( 12 Jan 2024 04:35 )  WBC Count : 16.66 K/uL  RBC Count : 4.47 M/uL  Hemoglobin : 13.2 g/dL  Hematocrit : 42.4 %  Platelet Count - Automated : 283 K/uL  Mean Cell Volume : 94.9 fl  Mean Cell Hemoglobin : 29.5 pg  Mean Cell Hemoglobin Concentration : 31.1 gm/dL  Auto Neutrophil # : 11.83 K/uL  Auto Lymphocyte # : 2.43 K/uL  Auto Monocyte # : 2.20 K/uL  Auto Eosinophil # : 0.07 K/uL  Auto Basophil # : 0.06 K/uL  Auto Neutrophil % : 71.0 %  Auto Lymphocyte % : 14.6 %  Auto Monocyte % : 13.2 %  Auto Eosinophil % : 0.4 %  Auto Basophil % : 0.4 %    01-12    142  |  108  |  22<H>  ----------------------------<  103<H>  4.1   |  30  |  0.59    Ca    10.8<H>      12 Jan 2024 04:35  Phos  2.9     01-11  Mg     2.4     01-12      PT/INR - ( 12 Jan 2024 04:35 )   PT: 11.9 sec;   INR: 1.05 ratio         PTT - ( 12 Jan 2024 04:35 )  PTT:30.4 sec  Urinalysis Basic - ( 12 Jan 2024 04:35 )    Color: x / Appearance: x / SG: x / pH: x  Gluc: 103 mg/dL / Ketone: x  / Bili: x / Urobili: x   Blood: x / Protein: x / Nitrite: x   Leuk Esterase: x / RBC: x / WBC x   Sq Epi: x / Non Sq Epi: x / Bacteria: x          RADIOLOGY & ADDITIONAL STUDIES REVIEWED:  ***    GOALS OF CARE DISCUSSION WITH PATIENT/FAMILY/PROXY:    CRITICAL CARE TIME SPENT: 35 minutes

## 2024-01-14 DIAGNOSIS — G93.41 METABOLIC ENCEPHALOPATHY: ICD-10-CM

## 2024-01-14 DIAGNOSIS — Z71.89 OTHER SPECIFIED COUNSELING: ICD-10-CM

## 2024-01-14 DIAGNOSIS — U07.1 COVID-19: ICD-10-CM

## 2024-01-14 DIAGNOSIS — A41.9 SEPSIS, UNSPECIFIED ORGANISM: ICD-10-CM

## 2024-01-14 DIAGNOSIS — E43 UNSPECIFIED SEVERE PROTEIN-CALORIE MALNUTRITION: ICD-10-CM

## 2024-01-14 LAB
ALBUMIN SERPL ELPH-MCNC: 2.6 G/DL — LOW (ref 3.5–5)
ALBUMIN SERPL ELPH-MCNC: 2.6 G/DL — LOW (ref 3.5–5)
ALP SERPL-CCNC: 55 U/L — SIGNIFICANT CHANGE UP (ref 40–120)
ALP SERPL-CCNC: 55 U/L — SIGNIFICANT CHANGE UP (ref 40–120)
ALT FLD-CCNC: 16 U/L DA — SIGNIFICANT CHANGE UP (ref 10–60)
ALT FLD-CCNC: 16 U/L DA — SIGNIFICANT CHANGE UP (ref 10–60)
ANION GAP SERPL CALC-SCNC: 4 MMOL/L — LOW (ref 5–17)
ANION GAP SERPL CALC-SCNC: 4 MMOL/L — LOW (ref 5–17)
AST SERPL-CCNC: 8 U/L — LOW (ref 10–40)
AST SERPL-CCNC: 8 U/L — LOW (ref 10–40)
BASOPHILS # BLD AUTO: 0.03 K/UL — SIGNIFICANT CHANGE UP (ref 0–0.2)
BASOPHILS # BLD AUTO: 0.03 K/UL — SIGNIFICANT CHANGE UP (ref 0–0.2)
BASOPHILS NFR BLD AUTO: 0.3 % — SIGNIFICANT CHANGE UP (ref 0–2)
BASOPHILS NFR BLD AUTO: 0.3 % — SIGNIFICANT CHANGE UP (ref 0–2)
BILIRUB SERPL-MCNC: 0.7 MG/DL — SIGNIFICANT CHANGE UP (ref 0.2–1.2)
BILIRUB SERPL-MCNC: 0.7 MG/DL — SIGNIFICANT CHANGE UP (ref 0.2–1.2)
BUN SERPL-MCNC: 27 MG/DL — HIGH (ref 7–18)
BUN SERPL-MCNC: 27 MG/DL — HIGH (ref 7–18)
CALCIUM SERPL-MCNC: 10.8 MG/DL — HIGH (ref 8.4–10.5)
CALCIUM SERPL-MCNC: 10.8 MG/DL — HIGH (ref 8.4–10.5)
CHLORIDE SERPL-SCNC: 109 MMOL/L — HIGH (ref 96–108)
CHLORIDE SERPL-SCNC: 109 MMOL/L — HIGH (ref 96–108)
CO2 SERPL-SCNC: 30 MMOL/L — SIGNIFICANT CHANGE UP (ref 22–31)
CO2 SERPL-SCNC: 30 MMOL/L — SIGNIFICANT CHANGE UP (ref 22–31)
CREAT SERPL-MCNC: 0.66 MG/DL — SIGNIFICANT CHANGE UP (ref 0.5–1.3)
CREAT SERPL-MCNC: 0.66 MG/DL — SIGNIFICANT CHANGE UP (ref 0.5–1.3)
CULTURE RESULTS: SIGNIFICANT CHANGE UP
CULTURE RESULTS: SIGNIFICANT CHANGE UP
EGFR: 105 ML/MIN/1.73M2 — SIGNIFICANT CHANGE UP
EGFR: 105 ML/MIN/1.73M2 — SIGNIFICANT CHANGE UP
EOSINOPHIL # BLD AUTO: 0.01 K/UL — SIGNIFICANT CHANGE UP (ref 0–0.5)
EOSINOPHIL # BLD AUTO: 0.01 K/UL — SIGNIFICANT CHANGE UP (ref 0–0.5)
EOSINOPHIL NFR BLD AUTO: 0.1 % — SIGNIFICANT CHANGE UP (ref 0–6)
EOSINOPHIL NFR BLD AUTO: 0.1 % — SIGNIFICANT CHANGE UP (ref 0–6)
GLUCOSE BLDC GLUCOMTR-MCNC: 142 MG/DL — HIGH (ref 70–99)
GLUCOSE BLDC GLUCOMTR-MCNC: 142 MG/DL — HIGH (ref 70–99)
GLUCOSE BLDC GLUCOMTR-MCNC: 153 MG/DL — HIGH (ref 70–99)
GLUCOSE BLDC GLUCOMTR-MCNC: 153 MG/DL — HIGH (ref 70–99)
GLUCOSE BLDC GLUCOMTR-MCNC: 159 MG/DL — HIGH (ref 70–99)
GLUCOSE BLDC GLUCOMTR-MCNC: 159 MG/DL — HIGH (ref 70–99)
GLUCOSE SERPL-MCNC: 158 MG/DL — HIGH (ref 70–99)
GLUCOSE SERPL-MCNC: 158 MG/DL — HIGH (ref 70–99)
HCT VFR BLD CALC: 35.6 % — LOW (ref 39–50)
HCT VFR BLD CALC: 35.6 % — LOW (ref 39–50)
HGB BLD-MCNC: 11 G/DL — LOW (ref 13–17)
HGB BLD-MCNC: 11 G/DL — LOW (ref 13–17)
IMM GRANULOCYTES NFR BLD AUTO: 0.3 % — SIGNIFICANT CHANGE UP (ref 0–0.9)
IMM GRANULOCYTES NFR BLD AUTO: 0.3 % — SIGNIFICANT CHANGE UP (ref 0–0.9)
LYMPHOCYTES # BLD AUTO: 1.03 K/UL — SIGNIFICANT CHANGE UP (ref 1–3.3)
LYMPHOCYTES # BLD AUTO: 1.03 K/UL — SIGNIFICANT CHANGE UP (ref 1–3.3)
LYMPHOCYTES # BLD AUTO: 9.9 % — LOW (ref 13–44)
LYMPHOCYTES # BLD AUTO: 9.9 % — LOW (ref 13–44)
MAGNESIUM SERPL-MCNC: 2.5 MG/DL — SIGNIFICANT CHANGE UP (ref 1.6–2.6)
MAGNESIUM SERPL-MCNC: 2.5 MG/DL — SIGNIFICANT CHANGE UP (ref 1.6–2.6)
MCHC RBC-ENTMCNC: 28.7 PG — SIGNIFICANT CHANGE UP (ref 27–34)
MCHC RBC-ENTMCNC: 28.7 PG — SIGNIFICANT CHANGE UP (ref 27–34)
MCHC RBC-ENTMCNC: 30.9 GM/DL — LOW (ref 32–36)
MCHC RBC-ENTMCNC: 30.9 GM/DL — LOW (ref 32–36)
MCV RBC AUTO: 93 FL — SIGNIFICANT CHANGE UP (ref 80–100)
MCV RBC AUTO: 93 FL — SIGNIFICANT CHANGE UP (ref 80–100)
MONOCYTES # BLD AUTO: 0.86 K/UL — SIGNIFICANT CHANGE UP (ref 0–0.9)
MONOCYTES # BLD AUTO: 0.86 K/UL — SIGNIFICANT CHANGE UP (ref 0–0.9)
MONOCYTES NFR BLD AUTO: 8.2 % — SIGNIFICANT CHANGE UP (ref 2–14)
MONOCYTES NFR BLD AUTO: 8.2 % — SIGNIFICANT CHANGE UP (ref 2–14)
NEUTROPHILS # BLD AUTO: 8.47 K/UL — HIGH (ref 1.8–7.4)
NEUTROPHILS # BLD AUTO: 8.47 K/UL — HIGH (ref 1.8–7.4)
NEUTROPHILS NFR BLD AUTO: 81.2 % — HIGH (ref 43–77)
NEUTROPHILS NFR BLD AUTO: 81.2 % — HIGH (ref 43–77)
NRBC # BLD: 0 /100 WBCS — SIGNIFICANT CHANGE UP (ref 0–0)
NRBC # BLD: 0 /100 WBCS — SIGNIFICANT CHANGE UP (ref 0–0)
PHOSPHATE SERPL-MCNC: 2.8 MG/DL — SIGNIFICANT CHANGE UP (ref 2.5–4.5)
PHOSPHATE SERPL-MCNC: 2.8 MG/DL — SIGNIFICANT CHANGE UP (ref 2.5–4.5)
PLATELET # BLD AUTO: 344 K/UL — SIGNIFICANT CHANGE UP (ref 150–400)
PLATELET # BLD AUTO: 344 K/UL — SIGNIFICANT CHANGE UP (ref 150–400)
POTASSIUM SERPL-MCNC: 3.7 MMOL/L — SIGNIFICANT CHANGE UP (ref 3.5–5.3)
POTASSIUM SERPL-MCNC: 3.7 MMOL/L — SIGNIFICANT CHANGE UP (ref 3.5–5.3)
POTASSIUM SERPL-SCNC: 3.7 MMOL/L — SIGNIFICANT CHANGE UP (ref 3.5–5.3)
POTASSIUM SERPL-SCNC: 3.7 MMOL/L — SIGNIFICANT CHANGE UP (ref 3.5–5.3)
PROT SERPL-MCNC: 7.1 G/DL — SIGNIFICANT CHANGE UP (ref 6–8.3)
PROT SERPL-MCNC: 7.1 G/DL — SIGNIFICANT CHANGE UP (ref 6–8.3)
RBC # BLD: 3.83 M/UL — LOW (ref 4.2–5.8)
RBC # BLD: 3.83 M/UL — LOW (ref 4.2–5.8)
RBC # FLD: 13.6 % — SIGNIFICANT CHANGE UP (ref 10.3–14.5)
RBC # FLD: 13.6 % — SIGNIFICANT CHANGE UP (ref 10.3–14.5)
SODIUM SERPL-SCNC: 143 MMOL/L — SIGNIFICANT CHANGE UP (ref 135–145)
SODIUM SERPL-SCNC: 143 MMOL/L — SIGNIFICANT CHANGE UP (ref 135–145)
SPECIMEN SOURCE: SIGNIFICANT CHANGE UP
SPECIMEN SOURCE: SIGNIFICANT CHANGE UP
WBC # BLD: 10.43 K/UL — SIGNIFICANT CHANGE UP (ref 3.8–10.5)
WBC # BLD: 10.43 K/UL — SIGNIFICANT CHANGE UP (ref 3.8–10.5)
WBC # FLD AUTO: 10.43 K/UL — SIGNIFICANT CHANGE UP (ref 3.8–10.5)
WBC # FLD AUTO: 10.43 K/UL — SIGNIFICANT CHANGE UP (ref 3.8–10.5)

## 2024-01-14 PROCEDURE — 71045 X-RAY EXAM CHEST 1 VIEW: CPT | Mod: 26

## 2024-01-14 RX ADMIN — Medication 250 MILLIGRAM(S): at 18:01

## 2024-01-14 RX ADMIN — Medication 250 MILLIGRAM(S): at 05:35

## 2024-01-14 RX ADMIN — CHLORHEXIDINE GLUCONATE 15 MILLILITER(S): 213 SOLUTION TOPICAL at 05:33

## 2024-01-14 RX ADMIN — POLYETHYLENE GLYCOL 3350 17 GRAM(S): 17 POWDER, FOR SOLUTION ORAL at 11:46

## 2024-01-14 RX ADMIN — ENOXAPARIN SODIUM 40 MILLIGRAM(S): 100 INJECTION SUBCUTANEOUS at 14:59

## 2024-01-14 RX ADMIN — CEFEPIME 100 MILLIGRAM(S): 1 INJECTION, POWDER, FOR SOLUTION INTRAMUSCULAR; INTRAVENOUS at 05:33

## 2024-01-14 RX ADMIN — ATORVASTATIN CALCIUM 20 MILLIGRAM(S): 80 TABLET, FILM COATED ORAL at 21:47

## 2024-01-14 RX ADMIN — CEFEPIME 100 MILLIGRAM(S): 1 INJECTION, POWDER, FOR SOLUTION INTRAMUSCULAR; INTRAVENOUS at 14:59

## 2024-01-14 RX ADMIN — CHLORHEXIDINE GLUCONATE 15 MILLILITER(S): 213 SOLUTION TOPICAL at 18:01

## 2024-01-14 RX ADMIN — CEFEPIME 100 MILLIGRAM(S): 1 INJECTION, POWDER, FOR SOLUTION INTRAMUSCULAR; INTRAVENOUS at 21:47

## 2024-01-14 NOTE — PROGRESS NOTE ADULT - SUBJECTIVE AND OBJECTIVE BOX
Time of Visit:  Patient seen and examined. lying inbed awake , on vent support via trach     MEDICATIONS  (STANDING):  atorvastatin 20 milliGRAM(s) Oral at bedtime  cefepime   IVPB 2000 milliGRAM(s) IV Intermittent every 8 hours  chlorhexidine 0.12% Liquid 15 milliLiter(s) Oral Mucosa every 12 hours  enoxaparin Injectable 40 milliGRAM(s) SubCutaneous every 24 hours  polyethylene glycol 3350 17 Gram(s) Oral daily  valproic  acid Syrup 250 milliGRAM(s) Oral two times a day      MEDICATIONS  (PRN):  morphine  - Injectable 2 milliGRAM(s) IV Push every 4 hours PRN Moderate Pain (4 - 6)       Medications up to date at time of exam.      PHYSICAL EXAMINATION:  Patient has no new complaints.  GENERAL: The patient is a well-developed, well-nourished, in no apparent distress.     Vital Signs Last 24 Hrs  T(C): 37.6 (14 Jan 2024 12:29), Max: 37.6 (14 Jan 2024 12:29)  T(F): 99.6 (14 Jan 2024 12:29), Max: 99.6 (14 Jan 2024 12:29)  HR: 79 (14 Jan 2024 12:29) (61 - 82)  BP: 118/72 (14 Jan 2024 12:29) (109/66 - 121/66)  BP(mean): 82 (13 Jan 2024 17:00) (81 - 82)  RR: 12 (14 Jan 2024 12:29) (12 - 19)  SpO2: 98% (14 Jan 2024 12:29) (95% - 100%)    Parameters below as of 14 Jan 2024 12:29  Patient On (Oxygen Delivery Method): Trach-Vent.      Mode: AC/ CMV (Assist Control/ Continuous Mandatory Ventilation)  RR (machine): 12  TV (machine): 350  FiO2: 40  PEEP: 5  ITime: 1  MAP: 5  PIP: 8   (if applicable)    Chest Tube (if applicable)    HEENT: Head is normocephalic and atraumatic. Extraocular muscles are intact. Mucous membranes are moist.     NECK: Supple, no palpable adenopathy.+ trach # 6 cuffed inflated , clean stoma     LUNGS: Clear to auscultation, no wheezing, rales, or rhonchi.    HEART: Regular rate and rhythm without murmur.    ABDOMEN: Soft, nontender, and nondistended.  No hepatosplenomegaly is noted.    : No painful voiding, no pelvic pain    EXTREMITIES: Without any cyanosis, clubbing, rash, lesions or edema.    NEUROLOGIC: Awake,     SKIN: Warm, dry, good turgor.      LABS:                        11.0   10.43 )-----------( 344      ( 14 Jan 2024 05:06 )             35.6     01-14    143  |  109<H>  |  27<H>  ----------------------------<  158<H>  3.7   |  30  |  0.66    Ca    10.8<H>      14 Jan 2024 05:06  Phos  2.8     01-14  Mg     2.5     01-14    TPro  7.1  /  Alb  2.6<L>  /  TBili  0.7  /  DBili  x   /  AST  8<L>  /  ALT  16  /  AlkPhos  55  01-14      Urinalysis Basic - ( 14 Jan 2024 05:06 )    Color: x / Appearance: x / SG: x / pH: x  Gluc: 158 mg/dL / Ketone: x  / Bili: x / Urobili: x   Blood: x / Protein: x / Nitrite: x   Leuk Esterase: x / RBC: x / WBC x   Sq Epi: x / Non Sq Epi: x / Bacteria: x      ABG - ( 13 Jan 2024 03:07 )  pH, Arterial: 7.46  pH, Blood: x     /  pCO2: 49    /  pO2: 150   / HCO3: 35    / Base Excess: 9.4   /  SaO2: 100     MICROBIOLOGY: (if applicable)    RADIOLOGY & ADDITIONAL STUDIES:  EKG:   CXR:  ECHO:    IMPRESSION: 63y Male PAST MEDICAL & SURGICAL HISTORY:  CVA (cerebral vascular accident)      CVA (cerebral vascular accident)      HLD (hyperlipidemia)      S/P percutaneous endoscopic gastrostomy (PEG) tube placement       p/w       IMP: This is a  63 year old mn , from [facility], with  CVA, Alzheimer's, nonverbal at baseline, GERD, Schizophrenia, and Constipation, was brought into the ED s/p mechanical fall, poor oral intake, and Covid-19 infection on 12/27/23 Pt was admitted for COVID PNA, later found with intermittent hypoxia/ hypopnea to Sats 80% and Hypotension despite multiple fluid boluses, also found with bryan to 40s for the last 2 days, in ICU for further monitoring.       ASSESSMENT   - Acute Hypoxic resp failure   - Covid-19 PNA   - Shock septic   - CVA  - Alzheimer dementia   - Bradycardia       Plan     - S/P trach 1/12  - Continue daily SBT   - Post trach cxr   - Trach care   - Aspiration precautions   - Antibx   - F/U cultures neg   - Aspiration precautions  - Off Remdesivir due to ADAN  - Continue decadron  6 mg for total 10 days   - Isolation : contact and airborne  - Will need placement to vent facility     discussed with Pat NP covering           Time of Visit:  Patient seen and examined. lying inbed awake , on vent support via trach     MEDICATIONS  (STANDING):  atorvastatin 20 milliGRAM(s) Oral at bedtime  cefepime   IVPB 2000 milliGRAM(s) IV Intermittent every 8 hours  chlorhexidine 0.12% Liquid 15 milliLiter(s) Oral Mucosa every 12 hours  enoxaparin Injectable 40 milliGRAM(s) SubCutaneous every 24 hours  polyethylene glycol 3350 17 Gram(s) Oral daily  valproic  acid Syrup 250 milliGRAM(s) Oral two times a day      MEDICATIONS  (PRN):  morphine  - Injectable 2 milliGRAM(s) IV Push every 4 hours PRN Moderate Pain (4 - 6)       Medications up to date at time of exam.      PHYSICAL EXAMINATION:  Patient has no new complaints.  GENERAL: The patient is a well-developed, well-nourished, in no apparent distress.     Vital Signs Last 24 Hrs  T(C): 37.6 (14 Jan 2024 12:29), Max: 37.6 (14 Jan 2024 12:29)  T(F): 99.6 (14 Jan 2024 12:29), Max: 99.6 (14 Jan 2024 12:29)  HR: 79 (14 Jan 2024 12:29) (61 - 82)  BP: 118/72 (14 Jan 2024 12:29) (109/66 - 121/66)  BP(mean): 82 (13 Jan 2024 17:00) (81 - 82)  RR: 12 (14 Jan 2024 12:29) (12 - 19)  SpO2: 98% (14 Jan 2024 12:29) (95% - 100%)    Parameters below as of 14 Jan 2024 12:29  Patient On (Oxygen Delivery Method): Trach-Vent.      Mode: AC/ CMV (Assist Control/ Continuous Mandatory Ventilation)  RR (machine): 12  TV (machine): 350  FiO2: 40  PEEP: 5  ITime: 1  MAP: 5  PIP: 8   (if applicable)    Chest Tube (if applicable)    HEENT: Head is normocephalic and atraumatic. Extraocular muscles are intact. Mucous membranes are moist.     NECK: Supple, no palpable adenopathy.+ trach # 6 cuffed inflated , clean stoma     LUNGS: Clear to auscultation, no wheezing, rales, or rhonchi.    HEART: Regular rate and rhythm without murmur.    ABDOMEN: Soft, nontender, and nondistended.  No hepatosplenomegaly is noted.    : No painful voiding, no pelvic pain    EXTREMITIES: Without any cyanosis, clubbing, rash, lesions or edema.    NEUROLOGIC: Awake,     SKIN: Warm, dry, good turgor.      LABS:                        11.0   10.43 )-----------( 344      ( 14 Jan 2024 05:06 )             35.6     01-14    143  |  109<H>  |  27<H>  ----------------------------<  158<H>  3.7   |  30  |  0.66    Ca    10.8<H>      14 Jan 2024 05:06  Phos  2.8     01-14  Mg     2.5     01-14    TPro  7.1  /  Alb  2.6<L>  /  TBili  0.7  /  DBili  x   /  AST  8<L>  /  ALT  16  /  AlkPhos  55  01-14      Urinalysis Basic - ( 14 Jan 2024 05:06 )    Color: x / Appearance: x / SG: x / pH: x  Gluc: 158 mg/dL / Ketone: x  / Bili: x / Urobili: x   Blood: x / Protein: x / Nitrite: x   Leuk Esterase: x / RBC: x / WBC x   Sq Epi: x / Non Sq Epi: x / Bacteria: x      ABG - ( 13 Jan 2024 03:07 )  pH, Arterial: 7.46  pH, Blood: x     /  pCO2: 49    /  pO2: 150   / HCO3: 35    / Base Excess: 9.4   /  SaO2: 100     MICROBIOLOGY: (if applicable)    RADIOLOGY & ADDITIONAL STUDIES:  EKG:   CXR:  ECHO:    IMPRESSION: 63y Male PAST MEDICAL & SURGICAL HISTORY:  CVA (cerebral vascular accident)      CVA (cerebral vascular accident)      HLD (hyperlipidemia)      S/P percutaneous endoscopic gastrostomy (PEG) tube placement       p/w       IMP: This is a  63 year old mn , from [facility], with  CVA, Alzheimer's, nonverbal at baseline, GERD, Schizophrenia, and Constipation, was brought into the ED s/p mechanical fall, poor oral intake, and Covid-19 infection on 12/27/23 Pt was admitted for COVID PNA, later found with intermittent hypoxia/ hypopnea to Sats 80% and Hypotension despite multiple fluid boluses, also found with rbyan to 40s for the last 2 days, in ICU for further monitoring.       ASSESSMENT   - Acute Hypoxic resp failure   - Covid-19 PNA   - Shock septic   - CVA  - Alzheimer dementia   - Bradycardia       Plan     - S/P trach 1/12  - Continue daily SBT   - Post trach cxr   - Trach care   - Aspiration precautions   - Antibx   - F/U cultures neg   - Aspiration precautions  - Off Remdesivir due to ADAN  - Continue decadron  6 mg for total 10 days   - Isolation : contact and airborne  - Will need placement to vent facility     discussed with Pat NP covering

## 2024-01-14 NOTE — PROGRESS NOTE ADULT - SUBJECTIVE AND OBJECTIVE BOX
PAM JIMENEZ    SCU progress note    INTERVAL HPI/OVERNIGHT EVENTS: ***    DNR [ ]   DNI  [  ]    Covid - 19 PCR:     The 4Ms    What Matters Most: see GOC  Age appropriate Medications/Screen for High Risk Medication: Yes  Mentation: see CAM below  Mobility: defer to physical exam    The Confusion Assessment Method (CAM) Diagnostic Algorithm     1: Acute Onset or Fluctuating Course  - Is there evidence of an acute change in mental status from the patient’s baseline? Did the (abnormal) behavior  fluctuate during the day, that is, tend to come and go, or increase and decrease in severity?  [ ] YES [ ] NO     2: Inattention  - Did the patient have difficulty focusing attention, being easily distractible, or having difficulty keeping track of what was being said?  [ ] YES [ ] NO     3: Disorganized thinking  -Was the patient’s thinking disorganized or incoherent, such as rambling or irrelevant conversation, unclear or illogical flow of ideas, or unpredictable switching from subject to subject?  [ ] YES [ ] NO    4: Altered Level of consciousness?  [ ] YES [ ] NO    The diagnosis of delirium by CAM requires the presence of features 1 and 2 and either 3 or 4.    PRESSORS: [ ] YES [ ] NO  cefepime   IVPB 2000 milliGRAM(s) IV Intermittent every 8 hours    Cardiovascular:  Heart Failure  Acute   Acute on Chronic  Chronic         Pulmonary:    Hematalogic:  enoxaparin Injectable 40 milliGRAM(s) SubCutaneous every 24 hours    Other:  atorvastatin 20 milliGRAM(s) Oral at bedtime  chlorhexidine 0.12% Liquid 15 milliLiter(s) Oral Mucosa every 12 hours  morphine  - Injectable 2 milliGRAM(s) IV Push every 4 hours PRN  polyethylene glycol 3350 17 Gram(s) Oral daily  valproic  acid Syrup 250 milliGRAM(s) Oral two times a day    atorvastatin 20 milliGRAM(s) Oral at bedtime  cefepime   IVPB 2000 milliGRAM(s) IV Intermittent every 8 hours  chlorhexidine 0.12% Liquid 15 milliLiter(s) Oral Mucosa every 12 hours  enoxaparin Injectable 40 milliGRAM(s) SubCutaneous every 24 hours  morphine  - Injectable 2 milliGRAM(s) IV Push every 4 hours PRN  polyethylene glycol 3350 17 Gram(s) Oral daily  valproic  acid Syrup 250 milliGRAM(s) Oral two times a day    Drug Dosing Weight  Height (cm): 167.6 (03 Aug 2022 20:11)  Weight (kg): 57 (03 Jan 2024 14:30)  BMI (kg/m2): 20.3 (03 Jan 2024 14:30)  BSA (m2): 1.64 (03 Jan 2024 14:30)    CENTRAL LINE: [ ] YES [ ] NO  LOCATION:   DATE INSERTED:  REMOVE: [ ] YES [ ] NO  EXPLAIN:    ACEVEDO: [ ] YES [ ] NO    DATE INSERTED:  REMOVE:  [ ] YES [ ] NO  EXPLAIN:    PAST MEDICAL & SURGICAL HISTORY:  CVA (cerebral vascular accident)      CVA (cerebral vascular accident)      HLD (hyperlipidemia)      S/P percutaneous endoscopic gastrostomy (PEG) tube placement            ABG - ( 13 Jan 2024 03:07 )  pH, Arterial: 7.46  pH, Blood: x     /  pCO2: 49    /  pO2: 150   / HCO3: 35    / Base Excess: 9.4   /  SaO2: 100                   01-13 @ 07:01  -  01-14 @ 07:00  --------------------------------------------------------  IN: 120 mL / OUT: 1000 mL / NET: -880 mL        Mode: AC/ CMV (Assist Control/ Continuous Mandatory Ventilation)  RR (machine): 12  TV (machine): 350  FiO2: 40  PEEP: 5  ITime: 1  MAP: 5  PIP: 8      PHYSICAL EXAM:    GENERAL: NAD, well-groomed, well-developed  HEAD:  Atraumatic, Normocephalic  EYES: EOMI, PERRLA, conjunctiva and sclera clear  ENMT: No tonsillar erythema, exudates, or enlargement; Moist mucous membranes, Good dentition, No lesions  NECK: Supple, No JVD, Normal thyroid  NERVOUS SYSTEM:  Alert & Oriented X3, Good concentration; Motor Strength 5/5 B/L upper and lower extremities; DTRs 2+ intact and symmetric  CHEST/LUNG: Clear to percussion bilaterally; No rales, rhonchi, wheezing, or rubs  HEART: Regular rate and rhythm; No murmurs, rubs, or gallops  ABDOMEN: Soft, Nontender, Nondistended; Bowel sounds present  EXTREMITIES:  2+ Peripheral Pulses, No clubbing, cyanosis, or edema  LYMPH: No lymphadenopathy noted  SKIN: No rashes or lesions      LABS:  CBC Full  -  ( 14 Jan 2024 05:06 )  WBC Count : 10.43 K/uL  RBC Count : 3.83 M/uL  Hemoglobin : 11.0 g/dL  Hematocrit : 35.6 %  Platelet Count - Automated : 344 K/uL  Mean Cell Volume : 93.0 fl  Mean Cell Hemoglobin : 28.7 pg  Mean Cell Hemoglobin Concentration : 30.9 gm/dL  Auto Neutrophil # : 8.47 K/uL  Auto Lymphocyte # : 1.03 K/uL  Auto Monocyte # : 0.86 K/uL  Auto Eosinophil # : 0.01 K/uL  Auto Basophil # : 0.03 K/uL  Auto Neutrophil % : 81.2 %  Auto Lymphocyte % : 9.9 %  Auto Monocyte % : 8.2 %  Auto Eosinophil % : 0.1 %  Auto Basophil % : 0.3 %    01-14    143  |  109<H>  |  27<H>  ----------------------------<  158<H>  3.7   |  30  |  0.66    Ca    10.8<H>      14 Jan 2024 05:06  Phos  2.8     01-14  Mg     2.5     01-14    TPro  7.1  /  Alb  2.6<L>  /  TBili  0.7  /  DBili  x   /  AST  8<L>  /  ALT  16  /  AlkPhos  55  01-14      Urinalysis Basic - ( 14 Jan 2024 05:06 )    Color: x / Appearance: x / SG: x / pH: x  Gluc: 158 mg/dL / Ketone: x  / Bili: x / Urobili: x   Blood: x / Protein: x / Nitrite: x   Leuk Esterase: x / RBC: x / WBC x   Sq Epi: x / Non Sq Epi: x / Bacteria: x            [  ]  DVT Prophylaxis  [  ]  Nutrition, Brand, Rate         Goal Rate        Abnormal Nutritional Status -  Malnutrition   Cachexia      Morbid Obesity BMI >/=40    RADIOLOGY & ADDITIONAL STUDIES:  ***    Goals of Care Discussion with Family/Proxy/Other   - see note from/family meeting set up for...     [facility]    SCU progress note    INTERVAL HPI/OVERNIGHT EVENTS: ***Transferred from ICU to SCU late yesterday.  HPI: 63 year old male, from [facility], with PMH of CVA, Alzheimer's, GERD, Schizophrenia, and Constipation, was brought into the ED s/p mechanical fall, poor oral intake, and Covid-19 infection on 12/27/23. In ED wbc 14.3, Covid 19 positive.   CXR report showed slight right perihilar infiltrate and there is a left lower perihilar infiltrate. Infiltrates are new. CT HEAD/Cervical : No acute intracranial hemorrhage, mass effect, or osseous: No acute cervical spine fracture or traumatic malalignment. Started on Supplemental O2, Dexamethasone 6mg IV q daily, Ceftriaxone, Azithromycin and Remdesivir protocol. Pt Admitted for Acute respiratory failure with hypoxia, COVID 19 pneumonia with  superimposed bacterial pneumonia.    On 1/2/2024, RRT for desaturation to the 80s.Pt was placed on HF and admitted to the ICU. Pt was weaned from HF to 6LNC.  Feeding was resumed on puree diet, on 1/5/2024, patient desaturated to 80s and failed to improve with HFNC, subsequently requiring intubation. Pt was extubated on 1/8 and was reintubated on 1/9 for inability to manage secretions. Pt noted to be febrile and was treated with 2g cefepime (1/10-1/15) for possible aspiration PNA. Following GOC discussion with son, patient was evaluated by Thoracic surgery and is now s/p Trach and PEG.  Tube feeds and VTE prophylaxis have been resumed.       DNR [ ]   DNI  [  ]   FULL CODE    Covid - 19 PCR:     The 4Ms    What Matters Most: see GOC  Age appropriate Medications/Screen for High Risk Medication: Yes  Mentation: see CAM below  Mobility: defer to physical exam    The Confusion Assessment Method (CAM) Diagnostic Algorithm     1: Acute Onset or Fluctuating Course  - Is there evidence of an acute change in mental status from the patient’s baseline? Did the (abnormal) behavior  fluctuate during the day, that is, tend to come and go, or increase and decrease in severity?  [ ] YES [x ] NO     2: Inattention  - Did the patient have difficulty focusing attention, being easily distractible, or having difficulty keeping track of what was being said?  [ ] YES [x ] NO     3: Disorganized thinking  -Was the patient’s thinking disorganized or incoherent, such as rambling or irrelevant conversation, unclear or illogical flow of ideas, or unpredictable switching from subject to subject?  [ ] YES [ ] NO    4: Altered Level of consciousness?  [ ] YES [ ] NO    The diagnosis of delirium by CAM requires the presence of features 1 and 2 and either 3 or 4.    PRESSORS: [ ] YES [ ] NO  cefepime   IVPB 2000 milliGRAM(s) IV Intermittent every 8 hours    Cardiovascular:  Heart Failure  Acute   Acute on Chronic  Chronic         Pulmonary:    Hematalogic:  enoxaparin Injectable 40 milliGRAM(s) SubCutaneous every 24 hours    Other:  atorvastatin 20 milliGRAM(s) Oral at bedtime  chlorhexidine 0.12% Liquid 15 milliLiter(s) Oral Mucosa every 12 hours  morphine  - Injectable 2 milliGRAM(s) IV Push every 4 hours PRN  polyethylene glycol 3350 17 Gram(s) Oral daily  valproic  acid Syrup 250 milliGRAM(s) Oral two times a day    atorvastatin 20 milliGRAM(s) Oral at bedtime  cefepime   IVPB 2000 milliGRAM(s) IV Intermittent every 8 hours  chlorhexidine 0.12% Liquid 15 milliLiter(s) Oral Mucosa every 12 hours  enoxaparin Injectable 40 milliGRAM(s) SubCutaneous every 24 hours  morphine  - Injectable 2 milliGRAM(s) IV Push every 4 hours PRN  polyethylene glycol 3350 17 Gram(s) Oral daily  valproic  acid Syrup 250 milliGRAM(s) Oral two times a day    Drug Dosing Weight  Height (cm): 167.6 (03 Aug 2022 20:11)  Weight (kg): 57 (03 Jan 2024 14:30)  BMI (kg/m2): 20.3 (03 Jan 2024 14:30)  BSA (m2): 1.64 (03 Jan 2024 14:30)    CENTRAL LINE: [ ] YES [ ] NO  LOCATION:   DATE INSERTED:  REMOVE: [ ] YES [ ] NO  EXPLAIN:    ACEVEDO: [ ] YES [ ] NO    DATE INSERTED:  REMOVE:  [ ] YES [ ] NO  EXPLAIN:    PAST MEDICAL & SURGICAL HISTORY:  CVA (cerebral vascular accident)      CVA (cerebral vascular accident)      HLD (hyperlipidemia)      S/P percutaneous endoscopic gastrostomy (PEG) tube placement            ABG - ( 13 Jan 2024 03:07 )  pH, Arterial: 7.46  pH, Blood: x     /  pCO2: 49    /  pO2: 150   / HCO3: 35    / Base Excess: 9.4   /  SaO2: 100                   01-13 @ 07:01  -  01-14 @ 07:00  --------------------------------------------------------  IN: 120 mL / OUT: 1000 mL / NET: -880 mL        Mode: AC/ CMV (Assist Control/ Continuous Mandatory Ventilation)  RR (machine): 12  TV (machine): 350  FiO2: 40  PEEP: 5  ITime: 1  MAP: 5  PIP: 8      PHYSICAL EXAM:    GENERAL: NAD, well-groomed, well-developed  HEAD:  Atraumatic, Normocephalic  EYES: EOMI, PERRLA, conjunctiva and sclera clear  ENMT: No tonsillar erythema, exudates, or enlargement; Moist mucous membranes, Good dentition, No lesions  NECK: Supple, No JVD, Normal thyroid  NERVOUS SYSTEM:  Alert & Oriented X3, Good concentration; Motor Strength 5/5 B/L upper and lower extremities; DTRs 2+ intact and symmetric  CHEST/LUNG: Clear to percussion bilaterally; No rales, rhonchi, wheezing, or rubs  HEART: Regular rate and rhythm; No murmurs, rubs, or gallops  ABDOMEN: Soft, Nontender, Nondistended; Bowel sounds present  EXTREMITIES:  2+ Peripheral Pulses, No clubbing, cyanosis, or edema  LYMPH: No lymphadenopathy noted  SKIN: No rashes or lesions      LABS:  CBC Full  -  ( 14 Jan 2024 05:06 )  WBC Count : 10.43 K/uL  RBC Count : 3.83 M/uL  Hemoglobin : 11.0 g/dL  Hematocrit : 35.6 %  Platelet Count - Automated : 344 K/uL  Mean Cell Volume : 93.0 fl  Mean Cell Hemoglobin : 28.7 pg  Mean Cell Hemoglobin Concentration : 30.9 gm/dL  Auto Neutrophil # : 8.47 K/uL  Auto Lymphocyte # : 1.03 K/uL  Auto Monocyte # : 0.86 K/uL  Auto Eosinophil # : 0.01 K/uL  Auto Basophil # : 0.03 K/uL  Auto Neutrophil % : 81.2 %  Auto Lymphocyte % : 9.9 %  Auto Monocyte % : 8.2 %  Auto Eosinophil % : 0.1 %  Auto Basophil % : 0.3 %    01-14    143  |  109<H>  |  27<H>  ----------------------------<  158<H>  3.7   |  30  |  0.66    Ca    10.8<H>      14 Jan 2024 05:06  Phos  2.8     01-14  Mg     2.5     01-14    TPro  7.1  /  Alb  2.6<L>  /  TBili  0.7  /  DBili  x   /  AST  8<L>  /  ALT  16  /  AlkPhos  55  01-14      Urinalysis Basic - ( 14 Jan 2024 05:06 )    Color: x / Appearance: x / SG: x / pH: x  Gluc: 158 mg/dL / Ketone: x  / Bili: x / Urobili: x   Blood: x / Protein: x / Nitrite: x   Leuk Esterase: x / RBC: x / WBC x   Sq Epi: x / Non Sq Epi: x / Bacteria: x            [  ]  DVT Prophylaxis  [  ]  Nutrition, Brand, Rate         Goal Rate        Abnormal Nutritional Status -  Malnutrition   Cachexia      Morbid Obesity BMI >/=40    RADIOLOGY & ADDITIONAL STUDIES:  ***    Goals of Care Discussion with Family/Proxy/Other   - see note from/family meeting set up for...     [facility]    SCU progress note    INTERVAL HPI/OVERNIGHT EVENTS: ***Transferred from ICU to SCU late yesterday.  HPI: 63 year old male, from [facility], with PMH of CVA, Alzheimer's, GERD, Schizophrenia, and Constipation, was brought into the ED s/p mechanical fall, poor oral intake, and Covid-19 infection on 12/27/23. In ED wbc 14.3, Covid 19 positive.   CXR report showed slight right perihilar infiltrate and there is a left lower perihilar infiltrate. Infiltrates are new. CT HEAD/Cervical : No acute intracranial hemorrhage, mass effect, or osseous: No acute cervical spine fracture or traumatic malalignment. Started on Supplemental O2, Dexamethasone 6mg IV q daily, Ceftriaxone, Azithromycin and Remdesivir protocol. Pt Admitted for Acute respiratory failure with hypoxia, COVID 19 pneumonia with  superimposed bacterial pneumonia.    On 1/2/2024, RRT for desaturation to the 80s.Pt was placed on HF and admitted to the ICU. Pt was weaned from HF to 6LNC.  Feeding was resumed on puree diet, on 1/5/2024, patient desaturated to 80s and failed to improve with HFNC, subsequently requiring intubation. Pt was extubated on 1/8 and was reintubated on 1/9 for inability to manage secretions. Pt noted to be febrile and was treated with 2g cefepime (1/10-1/15) for possible aspiration PNA. Following GOC discussion with son, patient was evaluated by Thoracic surgery and is now s/p Trach and PEG.  Tube feeds and VTE prophylaxis have been resumed.       DNR [ ]   DNI  [  ]   FULL CODE    Covid - 19 PCR:     The 4Ms    What Matters Most: see GOC  Age appropriate Medications/Screen for High Risk Medication: Yes  Mentation: see CAM below  Mobility: defer to physical exam    The Confusion Assessment Method (CAM) Diagnostic Algorithm     1: Acute Onset or Fluctuating Course  - Is there evidence of an acute change in mental status from the patient’s baseline? Did the (abnormal) behavior  fluctuate during the day, that is, tend to come and go, or increase and decrease in severity?  [ ] YES [x ] NO     2: Inattention  - Did the patient have difficulty focusing attention, being easily distractible, or having difficulty keeping track of what was being said?  [ ] YES [x ] NO     3: Disorganized thinking  -Was the patient’s thinking disorganized or incoherent, such as rambling or irrelevant conversation, unclear or illogical flow of ideas, or unpredictable switching from subject to subject?  [ ] YES [ ] NO    4: Altered Level of consciousness?  [ ] YES [ ] NO    The diagnosis of delirium by CAM requires the presence of features 1 and 2 and either 3 or 4.    PRESSORS: [ ] YES [ ] NO  cefepime   IVPB 2000 milliGRAM(s) IV Intermittent every 8 hours    Cardiovascular:  Heart Failure  Acute   Acute on Chronic  Chronic         Pulmonary:    Hematalogic:  enoxaparin Injectable 40 milliGRAM(s) SubCutaneous every 24 hours    Other:  atorvastatin 20 milliGRAM(s) Oral at bedtime  chlorhexidine 0.12% Liquid 15 milliLiter(s) Oral Mucosa every 12 hours  morphine  - Injectable 2 milliGRAM(s) IV Push every 4 hours PRN  polyethylene glycol 3350 17 Gram(s) Oral daily  valproic  acid Syrup 250 milliGRAM(s) Oral two times a day    atorvastatin 20 milliGRAM(s) Oral at bedtime  cefepime   IVPB 2000 milliGRAM(s) IV Intermittent every 8 hours  chlorhexidine 0.12% Liquid 15 milliLiter(s) Oral Mucosa every 12 hours  enoxaparin Injectable 40 milliGRAM(s) SubCutaneous every 24 hours  morphine  - Injectable 2 milliGRAM(s) IV Push every 4 hours PRN  polyethylene glycol 3350 17 Gram(s) Oral daily  valproic  acid Syrup 250 milliGRAM(s) Oral two times a day    Drug Dosing Weight  Height (cm): 167.6 (03 Aug 2022 20:11)  Weight (kg): 57 (03 Jan 2024 14:30)  BMI (kg/m2): 20.3 (03 Jan 2024 14:30)  BSA (m2): 1.64 (03 Jan 2024 14:30)    CENTRAL LINE: [ ] YES [ ] NO  LOCATION:   DATE INSERTED:  REMOVE: [ ] YES [ ] NO  EXPLAIN:    ACEVEDO: [ ] YES [ ] NO    DATE INSERTED:  REMOVE:  [ ] YES [ ] NO  EXPLAIN:    PAST MEDICAL & SURGICAL HISTORY:  CVA (cerebral vascular accident)      CVA (cerebral vascular accident)      HLD (hyperlipidemia)      S/P percutaneous endoscopic gastrostomy (PEG) tube placement            ABG - ( 13 Jan 2024 03:07 )  pH, Arterial: 7.46  pH, Blood: x     /  pCO2: 49    /  pO2: 150   / HCO3: 35    / Base Excess: 9.4   /  SaO2: 100                   01-13 @ 07:01  -  01-14 @ 07:00  --------------------------------------------------------  IN: 120 mL / OUT: 1000 mL / NET: -880 mL        Mode: AC/ CMV (Assist Control/ Continuous Mandatory Ventilation)  RR (machine): 12  TV (machine): 350  FiO2: 40  PEEP: 5  ITime: 1  MAP: 5  PIP: 8      PHYSICAL EXAM:    GENERAL: NAD, well-groomed, well-developed  HEAD:  Atraumatic, Normocephalic  EYES: EOMI, PERRLA, conjunctiva and sclera clear  ENMT: No tonsillar erythema, exudates, or enlargement; Moist mucous membranes, Good dentition, No lesions  NECK: Supple, No JVD, Normal thyroid  NERVOUS SYSTEM:  Alert & Oriented X3, Good concentration; Motor Strength 5/5 B/L upper and lower extremities; DTRs 2+ intact and symmetric  CHEST/LUNG: Clear to percussion bilaterally; No rales, rhonchi, wheezing, or rubs  HEART: Regular rate and rhythm; No murmurs, rubs, or gallops  ABDOMEN: Soft, Nontender, Nondistended; Bowel sounds present  EXTREMITIES:  2+ Peripheral Pulses, No clubbing, cyanosis, or edema  LYMPH: No lymphadenopathy noted  SKIN: No rashes or lesions      LABS:  CBC Full  -  ( 14 Jan 2024 05:06 )  WBC Count : 10.43 K/uL  RBC Count : 3.83 M/uL  Hemoglobin : 11.0 g/dL  Hematocrit : 35.6 %  Platelet Count - Automated : 344 K/uL  Mean Cell Volume : 93.0 fl  Mean Cell Hemoglobin : 28.7 pg  Mean Cell Hemoglobin Concentration : 30.9 gm/dL  Auto Neutrophil # : 8.47 K/uL  Auto Lymphocyte # : 1.03 K/uL  Auto Monocyte # : 0.86 K/uL  Auto Eosinophil # : 0.01 K/uL  Auto Basophil # : 0.03 K/uL  Auto Neutrophil % : 81.2 %  Auto Lymphocyte % : 9.9 %  Auto Monocyte % : 8.2 %  Auto Eosinophil % : 0.1 %  Auto Basophil % : 0.3 %    01-14    143  |  109<H>  |  27<H>  ----------------------------<  158<H>  3.7   |  30  |  0.66    Ca    10.8<H>      14 Jan 2024 05:06  Phos  2.8     01-14  Mg     2.5     01-14    TPro  7.1  /  Alb  2.6<L>  /  TBili  0.7  /  DBili  x   /  AST  8<L>  /  ALT  16  /  AlkPhos  55  01-14      Urinalysis Basic - ( 14 Jan 2024 05:06 )    Color: x / Appearance: x / SG: x / pH: x  Gluc: 158 mg/dL / Ketone: x  / Bili: x / Urobili: x   Blood: x / Protein: x / Nitrite: x   Leuk Esterase: x / RBC: x / WBC x   Sq Epi: x / Non Sq Epi: x / Bacteria: x            [  ]  DVT Prophylaxis  [  ]  Nutrition, Brand, Rate         Goal Rate        Abnormal Nutritional Status -  Malnutrition   Cachexia      Morbid Obesity BMI >/=40    RADIOLOGY & ADDITIONAL STUDIES:  ***    Goals of Care Discussion with Family/Proxy/Other   - see note from/family meeting set up for...     [facility]    SCU progress note    INTERVAL HPI/OVERNIGHT EVENTS: ***Transferred from ICU to SCU late yesterday.  HPI: 63 year old male, from [facility], with PMH of CVA, Alzheimer's, GERD, Schizophrenia, and Constipation, was brought into the ED s/p mechanical fall, poor oral intake, and Covid-19 infection on 12/27/23. In ED wbc 14.3, Covid 19 positive.   CXR report showed slight right perihilar infiltrate and there is a left lower perihilar infiltrate. Infiltrates are new. CT HEAD/Cervical : No acute intracranial hemorrhage, mass effect, or osseous: No acute cervical spine fracture or traumatic malalignment. Started on Supplemental O2, Dexamethasone 6mg IV q daily, Ceftriaxone, Azithromycin and Remdesivir protocol. Pt Admitted for Acute respiratory failure with hypoxia, COVID 19 pneumonia with  superimposed bacterial pneumonia.    On 1/2/2024, RRT for desaturation to the 80s.Pt was placed on HF and admitted to the ICU. Pt was weaned from HF to 6LNC.  Feeding was resumed on puree diet, on 1/5/2024, patient desaturated to 80s and failed to improve with HFNC, subsequently requiring intubation. Pt was extubated on 1/8 and was reintubated on 1/9 for inability to manage secretions. Pt noted to be febrile and was treated with 2g cefepime (1/10-1/15) for possible aspiration PNA. Following GOC discussion with son, patient was evaluated by Thoracic surgery and is now s/p Trach and PEG.  Tube feeds and VTE prophylaxis have been resumed.       DNR [ ]   DNI  [  ]   FULL CODE    Covid - 19 PCR:     The 4Ms    What Matters Most: see GOC  Age appropriate Medications/Screen for High Risk Medication: Yes  Mentation: see CAM below  Mobility: defer to physical exam    The Confusion Assessment Method (CAM) Diagnostic Algorithm     1: Acute Onset or Fluctuating Course  - Is there evidence of an acute change in mental status from the patient’s baseline? Did the (abnormal) behavior  fluctuate during the day, that is, tend to come and go, or increase and decrease in severity?  [ ] YES [x ] NO     2: Inattention  - Did the patient have difficulty focusing attention, being easily distractible, or having difficulty keeping track of what was being said?  [ ] YES [x ] NO   Unable to access     3: Disorganized thinking  -Was the patient’s thinking disorganized or incoherent, such as rambling or irrelevant conversation, unclear or illogical flow of ideas, or unpredictable switching from subject to subject?  [ ] YES [ ] NO   Unable to access    4: Altered Level of consciousness?  [ ] YES [ ] NO    The diagnosis of delirium by CAM requires the presence of features 1 and 2 and either 3 or 4.    PRESSORS: [ ] YES [ ] NO  cefepime   IVPB 2000 milliGRAM(s) IV Intermittent every 8 hours    Cardiovascular:  Heart Failure  Acute   Acute on Chronic  Chronic         Pulmonary:    Hematalogic:  enoxaparin Injectable 40 milliGRAM(s) SubCutaneous every 24 hours    Other:  atorvastatin 20 milliGRAM(s) Oral at bedtime  chlorhexidine 0.12% Liquid 15 milliLiter(s) Oral Mucosa every 12 hours  morphine  - Injectable 2 milliGRAM(s) IV Push every 4 hours PRN  polyethylene glycol 3350 17 Gram(s) Oral daily  valproic  acid Syrup 250 milliGRAM(s) Oral two times a day    atorvastatin 20 milliGRAM(s) Oral at bedtime  cefepime   IVPB 2000 milliGRAM(s) IV Intermittent every 8 hours  chlorhexidine 0.12% Liquid 15 milliLiter(s) Oral Mucosa every 12 hours  enoxaparin Injectable 40 milliGRAM(s) SubCutaneous every 24 hours  morphine  - Injectable 2 milliGRAM(s) IV Push every 4 hours PRN  polyethylene glycol 3350 17 Gram(s) Oral daily  valproic  acid Syrup 250 milliGRAM(s) Oral two times a day    Drug Dosing Weight  Height (cm): 167.6 (03 Aug 2022 20:11)  Weight (kg): 57 (03 Jan 2024 14:30)  BMI (kg/m2): 20.3 (03 Jan 2024 14:30)  BSA (m2): 1.64 (03 Jan 2024 14:30)    CENTRAL LINE: [ ] YES [x ] NO  LOCATION:   DATE INSERTED:  REMOVE: [ ] YES [ ] NO  EXPLAIN:    ACEVEDO: [ ] YES x] NO    DATE INSERTED:  REMOVE:  [ ] YES [ ] NO  EXPLAIN:    PAST MEDICAL & SURGICAL HISTORY:  CVA (cerebral vascular accident)      CVA (cerebral vascular accident)      HLD (hyperlipidemia)      S/P percutaneous endoscopic gastrostomy (PEG) tube placement            ABG - ( 13 Jan 2024 03:07 )  pH, Arterial: 7.46  pH, Blood: x     /  pCO2: 49    /  pO2: 150   / HCO3: 35    / Base Excess: 9.4   /  SaO2: 100                   01-13 @ 07:01  -  01-14 @ 07:00  --------------------------------------------------------  IN: 120 mL / OUT: 1000 mL / NET: -880 mL        Mode: AC/ CMV (Assist Control/ Continuous Mandatory Ventilation)  RR (machine): 12  TV (machine): 350  FiO2: 40  PEEP: 5  ITime: 1  MAP: 5  PIP: 8      PHYSICAL EXAM:    GENERAL: NAD, cachectic with severe temporal and muscular waisting.  HEAD:  Atraumatic, Normocephalic  EYES: EOMI, PERRLA, conjunctiva and sclera clear  ENMT: No tonsillar erythema, exudates  NECK: Supple, No JVD, tracheostomy intact  NERVOUS SYSTEM:  Awake and alert. Moving all extremities but to to commands.  CHEST/LUNG: Clear to percussion bilaterally; No rales, rhonchi, wheezing, or rubs  HEART: Regular rate and rhythm; No murmurs, rubs, or gallops  ABDOMEN: +Peg Soft, Nontender, Nondistended; Bowel sounds present  EXTREMITIES: +Muscular waisting.  2+ Peripheral Pulses, No clubbing, cyanosis, or edema  LYMPH: No lymphadenopathy noted  SKIN: No rashes or lesions      LABS:  CBC Full  -  ( 14 Jan 2024 05:06 )  WBC Count : 10.43 K/uL  RBC Count : 3.83 M/uL  Hemoglobin : 11.0 g/dL  Hematocrit : 35.6 %  Platelet Count - Automated : 344 K/uL  Mean Cell Volume : 93.0 fl  Mean Cell Hemoglobin : 28.7 pg  Mean Cell Hemoglobin Concentration : 30.9 gm/dL  Auto Neutrophil # : 8.47 K/uL  Auto Lymphocyte # : 1.03 K/uL  Auto Monocyte # : 0.86 K/uL  Auto Eosinophil # : 0.01 K/uL  Auto Basophil # : 0.03 K/uL  Auto Neutrophil % : 81.2 %  Auto Lymphocyte % : 9.9 %  Auto Monocyte % : 8.2 %  Auto Eosinophil % : 0.1 %  Auto Basophil % : 0.3 %    01-14    143  |  109<H>  |  27<H>  ----------------------------<  158<H>  3.7   |  30  |  0.66    Ca    10.8<H>      14 Jan 2024 05:06  Phos  2.8     01-14  Mg     2.5     01-14    TPro  7.1  /  Alb  2.6<L>  /  TBili  0.7  /  DBili  x   /  AST  8<L>  /  ALT  16  /  AlkPhos  55  01-14      Urinalysis Basic - ( 14 Jan 2024 05:06 )    Color: x / Appearance: x / SG: x / pH: x  Gluc: 158 mg/dL / Ketone: x  / Bili: x / Urobili: x   Blood: x / Protein: x / Nitrite: x   Leuk Esterase: x / RBC: x / WBC x   Sq Epi: x / Non Sq Epi: x / Bacteria: x            [  ]  DVT Prophylaxis  [  ]  Nutrition, Brand, Rate         Goal Rate        Abnormal Nutritional Status -  Malnutrition   Cachexia          RADIOLOGY & ADDITIONAL STUDIES:  ***  < from: Xray Chest 1 View- PORTABLE-Urgent (Xray Chest 1 View- PORTABLE-Urgent .) (01.11.24 @ 12:22) >  INTERPRETATION:  AP semierect chest on January 9, 2024 at 3:27 PM.   Patient requires intubation.    Heart size normal. Left loop recorder, endotracheal tube, nasogastric   tube remain.    There is an increasing infiltrate off the left lower hilum compared to   January 7.    IMPRESSION: Increasing infiltrate off left lower hilum.    --- End of Report ---    < end of copied text >  < from: CT Head No Cont (12.28.23 @ 19:06) >  CT HEAD:    There is no CT evidence of acute intracranial hemorrhage,  mass effect,   midline shift, or acute, large territorial infarct.  Again seen are   chronic bilateral MCA territory infarcts, including associated ex vacuo   dilatation of the left lateral ventricle. Patchy periventricular and   subcortical white matter hypodensities are nonspecific, although likely   due to chronic microangiopathy. There is wallerian degeneration of the   left anterior brainstem. The ventricles and sulciare prominent   compatible with moderate generalized brain parenchymal volume loss. The   basal cisterns are patent.    The mastoid air cells and middle ear cavities are grossly clear. There is   mild mucosal thickening and scattered mucous retentioncysts versus   polyps in the bilateral maxillary sinuses, frothy secretions in the right   sphenoid sinus, minimal mucosal thickening within bilateral ethmoid air   cells. There is impacted cerumen within both external auditory canals.    The calvarium and skull base are intact.    CT CERVICAL SPINE:    There is  preservation  of the cervical lordosis.  There is no evidence of an acute cervical spine fracture or traumatic   malalignment.  There is no suspicious lytic or blastic lesion.  The paraspinous soft tissues are unremarkable within limits of CT scan.    Degenerative changes:  Mild multilevel degenerative changes, including small disc osteophyte   complexes and bilateral uncovertebral facet hypertrophy with varying   degrees of foraminal stenosis. No high-grade central spinal canal   narrowing by CT technique.    Incidental findings:  Visualized soft tissues of the neck appear unremarkable.  Emphysema and mild scarring in both lung apices.    IMPRESSION:    CT HEAD: No acute intracranial hemorrhage, mass effect, or osseous   fracture.    CT CERVICAL SPINE: No acute cervical spine fracture or traumatic   malalignment.    --- End of Report ---    < end of copied text >  < from: US Renal (01.04.24 @ 11:21) >  FINDINGS:  Limited portable study  Right kidney: 12.5 cm. No renal mass, hydronephrosis or calculi.   Subcentimeter cyst    Left kidney: 9.1 cm. No renal mass, hydronephrosis or calculi.    Urinary bladder: Collapsed around Acevedo.    Incidental right pleural effusion    IMPRESSION:  No hydronephrosis bilaterally.        --- End of Report ---          < end of copied text >    Goals of Care Discussion with Family/Proxy/Other   - see note from 1/05/24     [facility]    SCU progress note    INTERVAL HPI/OVERNIGHT EVENTS: ***Transferred from ICU to SCU late yesterday.  HPI: 63 year old male, from [facility], with PMH of CVA, Alzheimer's, GERD, Schizophrenia, and Constipation, was brought into the ED s/p mechanical fall, poor oral intake, and Covid-19 infection on 12/27/23. In ED wbc 14.3, Covid 19 positive.   CXR report showed slight right perihilar infiltrate and there is a left lower perihilar infiltrate. Infiltrates are new. CT HEAD/Cervical : No acute intracranial hemorrhage, mass effect, or osseous: No acute cervical spine fracture or traumatic malalignment. Started on Supplemental O2, Dexamethasone 6mg IV q daily, Ceftriaxone, Azithromycin and Remdesivir protocol. Pt Admitted for Acute respiratory failure with hypoxia, COVID 19 pneumonia with  superimposed bacterial pneumonia.    On 1/2/2024, RRT for desaturation to the 80s.Pt was placed on HF and admitted to the ICU. Pt was weaned from HF to 6LNC.  Feeding was resumed on puree diet, on 1/5/2024, patient desaturated to 80s and failed to improve with HFNC, subsequently requiring intubation. Pt was extubated on 1/8 and was reintubated on 1/9 for inability to manage secretions. Pt noted to be febrile and was treated with 2g cefepime (1/10-1/15) for possible aspiration PNA. Following GOC discussion with son, patient was evaluated by Thoracic surgery and is now s/p Trach and PEG.  Tube feeds and VTE prophylaxis have been resumed.       DNR [ ]   DNI  [  ]   FULL CODE    Covid - 19 PCR:     The 4Ms    What Matters Most: see GOC  Age appropriate Medications/Screen for High Risk Medication: Yes  Mentation: see CAM below  Mobility: defer to physical exam    The Confusion Assessment Method (CAM) Diagnostic Algorithm     1: Acute Onset or Fluctuating Course  - Is there evidence of an acute change in mental status from the patient’s baseline? Did the (abnormal) behavior  fluctuate during the day, that is, tend to come and go, or increase and decrease in severity?  [ ] YES [x ] NO     2: Inattention  - Did the patient have difficulty focusing attention, being easily distractible, or having difficulty keeping track of what was being said?  [ ] YES [x ] NO   Unable to access     3: Disorganized thinking  -Was the patient’s thinking disorganized or incoherent, such as rambling or irrelevant conversation, unclear or illogical flow of ideas, or unpredictable switching from subject to subject?  [ ] YES [ ] NO   Unable to access    4: Altered Level of consciousness?  [ ] YES [ ] NO    The diagnosis of delirium by CAM requires the presence of features 1 and 2 and either 3 or 4.    PRESSORS: [ ] YES [ ] NO  cefepime   IVPB 2000 milliGRAM(s) IV Intermittent every 8 hours    Cardiovascular:  Heart Failure  Acute   Acute on Chronic  Chronic         Pulmonary:    Hematalogic:  enoxaparin Injectable 40 milliGRAM(s) SubCutaneous every 24 hours    Other:  atorvastatin 20 milliGRAM(s) Oral at bedtime  chlorhexidine 0.12% Liquid 15 milliLiter(s) Oral Mucosa every 12 hours  morphine  - Injectable 2 milliGRAM(s) IV Push every 4 hours PRN  polyethylene glycol 3350 17 Gram(s) Oral daily  valproic  acid Syrup 250 milliGRAM(s) Oral two times a day    atorvastatin 20 milliGRAM(s) Oral at bedtime  cefepime   IVPB 2000 milliGRAM(s) IV Intermittent every 8 hours  chlorhexidine 0.12% Liquid 15 milliLiter(s) Oral Mucosa every 12 hours  enoxaparin Injectable 40 milliGRAM(s) SubCutaneous every 24 hours  morphine  - Injectable 2 milliGRAM(s) IV Push every 4 hours PRN  polyethylene glycol 3350 17 Gram(s) Oral daily  valproic  acid Syrup 250 milliGRAM(s) Oral two times a day    Drug Dosing Weight  Height (cm): 167.6 (03 Aug 2022 20:11)  Weight (kg): 57 (03 Jan 2024 14:30)  BMI (kg/m2): 20.3 (03 Jan 2024 14:30)  BSA (m2): 1.64 (03 Jan 2024 14:30)    CENTRAL LINE: [ ] YES [x ] NO  LOCATION:   DATE INSERTED:  REMOVE: [ ] YES [ ] NO  EXPLAIN:    ACEVEDO: [ ] YES x] NO    DATE INSERTED:  REMOVE:  [ ] YES [ ] NO  EXPLAIN:    PAST MEDICAL & SURGICAL HISTORY:  CVA (cerebral vascular accident)      CVA (cerebral vascular accident)      HLD (hyperlipidemia)      S/P percutaneous endoscopic gastrostomy (PEG) tube placement            ABG - ( 13 Jan 2024 03:07 )  pH, Arterial: 7.46  pH, Blood: x     /  pCO2: 49    /  pO2: 150   / HCO3: 35    / Base Excess: 9.4   /  SaO2: 100                   01-13 @ 07:01  -  01-14 @ 07:00  --------------------------------------------------------  IN: 120 mL / OUT: 1000 mL / NET: -880 mL        Mode: AC/ CMV (Assist Control/ Continuous Mandatory Ventilation)  RR (machine): 12  TV (machine): 350  FiO2: 40  PEEP: 5  ITime: 1  MAP: 5  PIP: 8      PHYSICAL EXAM:    GENERAL: NAD, cachectic with severe temporal and muscular waisting.  HEAD:  Atraumatic, Normocephalic  EYES: EOMI, PERRLA, conjunctiva and sclera clear  ENMT: No tonsillar erythema, exudates  NECK: Supple, No JVD, tracheostomy intact  NERVOUS SYSTEM:  Awake and alert. Moving all extremities but to to commands.  CHEST/LUNG: Clear to percussion bilaterally; No rales, rhonchi, wheezing, or rubs  HEART: Regular rate and rhythm; No murmurs, rubs, or gallops  ABDOMEN: +Peg Soft, Nontender, Nondistended; Bowel sounds present  EXTREMITIES: +Muscular waisting.  2+ Peripheral Pulses, No clubbing, cyanosis, or edema  LYMPH: No lymphadenopathy noted  SKIN: No rashes or lesions      LABS:  CBC Full  -  ( 14 Jan 2024 05:06 )  WBC Count : 10.43 K/uL  RBC Count : 3.83 M/uL  Hemoglobin : 11.0 g/dL  Hematocrit : 35.6 %  Platelet Count - Automated : 344 K/uL  Mean Cell Volume : 93.0 fl  Mean Cell Hemoglobin : 28.7 pg  Mean Cell Hemoglobin Concentration : 30.9 gm/dL  Auto Neutrophil # : 8.47 K/uL  Auto Lymphocyte # : 1.03 K/uL  Auto Monocyte # : 0.86 K/uL  Auto Eosinophil # : 0.01 K/uL  Auto Basophil # : 0.03 K/uL  Auto Neutrophil % : 81.2 %  Auto Lymphocyte % : 9.9 %  Auto Monocyte % : 8.2 %  Auto Eosinophil % : 0.1 %  Auto Basophil % : 0.3 %    01-14    143  |  109<H>  |  27<H>  ----------------------------<  158<H>  3.7   |  30  |  0.66    Ca    10.8<H>      14 Jan 2024 05:06  Phos  2.8     01-14  Mg     2.5     01-14    TPro  7.1  /  Alb  2.6<L>  /  TBili  0.7  /  DBili  x   /  AST  8<L>  /  ALT  16  /  AlkPhos  55  01-14      Urinalysis Basic - ( 14 Jan 2024 05:06 )    Color: x / Appearance: x / SG: x / pH: x  Gluc: 158 mg/dL / Ketone: x  / Bili: x / Urobili: x   Blood: x / Protein: x / Nitrite: x   Leuk Esterase: x / RBC: x / WBC x   Sq Epi: x / Non Sq Epi: x / Bacteria: x            [  ]  DVT Prophylaxis  [  ]  Nutrition, Brand, Rate         Goal Rate        Abnormal Nutritional Status -  Malnutrition   Cachexia          RADIOLOGY & ADDITIONAL STUDIES:  ***  < from: Xray Chest 1 View- PORTABLE-Urgent (Xray Chest 1 View- PORTABLE-Urgent .) (01.11.24 @ 12:22) >  INTERPRETATION:  AP semierect chest on January 9, 2024 at 3:27 PM.   Patient requires intubation.    Heart size normal. Left loop recorder, endotracheal tube, nasogastric   tube remain.    There is an increasing infiltrate off the left lower hilum compared to   January 7.    IMPRESSION: Increasing infiltrate off left lower hilum.    --- End of Report ---    < end of copied text >  < from: CT Head No Cont (12.28.23 @ 19:06) >  CT HEAD:    There is no CT evidence of acute intracranial hemorrhage,  mass effect,   midline shift, or acute, large territorial infarct.  Again seen are   chronic bilateral MCA territory infarcts, including associated ex vacuo   dilatation of the left lateral ventricle. Patchy periventricular and   subcortical white matter hypodensities are nonspecific, although likely   due to chronic microangiopathy. There is wallerian degeneration of the   left anterior brainstem. The ventricles and sulciare prominent   compatible with moderate generalized brain parenchymal volume loss. The   basal cisterns are patent.    The mastoid air cells and middle ear cavities are grossly clear. There is   mild mucosal thickening and scattered mucous retentioncysts versus   polyps in the bilateral maxillary sinuses, frothy secretions in the right   sphenoid sinus, minimal mucosal thickening within bilateral ethmoid air   cells. There is impacted cerumen within both external auditory canals.    The calvarium and skull base are intact.    CT CERVICAL SPINE:    There is  preservation  of the cervical lordosis.  There is no evidence of an acute cervical spine fracture or traumatic   malalignment.  There is no suspicious lytic or blastic lesion.  The paraspinous soft tissues are unremarkable within limits of CT scan.    Degenerative changes:  Mild multilevel degenerative changes, including small disc osteophyte   complexes and bilateral uncovertebral facet hypertrophy with varying   degrees of foraminal stenosis. No high-grade central spinal canal   narrowing by CT technique.    Incidental findings:  Visualized soft tissues of the neck appear unremarkable.  Emphysema and mild scarring in both lung apices.    IMPRESSION:    CT HEAD: No acute intracranial hemorrhage, mass effect, or osseous   fracture.    CT CERVICAL SPINE: No acute cervical spine fracture or traumatic   malalignment.    --- End of Report ---    < end of copied text >  < from: US Renal (01.04.24 @ 11:21) >  FINDINGS:  Limited portable study  Right kidney: 12.5 cm. No renal mass, hydronephrosis or calculi.   Subcentimeter cyst    Left kidney: 9.1 cm. No renal mass, hydronephrosis or calculi.    Urinary bladder: Collapsed around Acevedo.    Incidental right pleural effusion    IMPRESSION:  No hydronephrosis bilaterally.        --- End of Report ---          < end of copied text >    Goals of Care Discussion with Family/Proxy/Other   - see note from 1/05/24

## 2024-01-14 NOTE — PROGRESS NOTE ADULT - ASSESSMENT
Agree with above assessment and plan as outlined above.    - cont supportive care per medical team    Gavino Cadet MD, [facility]  BEEPER [phone]   Agree with above assessment and plan as outlined above.    - cont supportive care per medical team    Gavino Cadet MD, [facility]  BEEPER [phone]

## 2024-01-14 NOTE — PROGRESS NOTE ADULT - ASSESSMENT
63 year old male, from [facility], with PMH of CVA, Alzheimer's, nonverbal at baseline, GERD, Schizophrenia, and Constipation, was brought into the ED s/p mechanical fall, poor oral intake, and Covid-19 infection on 12/27/23. In ED wbc 14.3, Covid 19 positive.   CXR report showed slight right perihilar infiltrate and there is a left lower perihilar infiltrate. Infiltrates are new. CT HEAD/Cervical : No acute intracranial hemorrhage, mass effect, or osseous: No acute cervical spine fracture or traumatic malalignment. Started on Supplemental O2, Dexamethasone 6mg IV q daily, Ceftriaxone, Azithromycin and Remdesivir protocol. Pt Admitted for Acute respiratory failure with hypoxia, COVID 19 pneumonia with  superimposed bacterial pneumonia.    On 1/2/2024, RRT for desaturation to the 80s.Pt was placed on HF and admitted to the ICU. Pt was weaned from HF to 6LNC.  Feeding was resumed on puree diet, on 1/5/2024, patient desaturated to 80s and failed to improve with HFNC, subsequently requiring intubation. Pt was extubated on 1/8 and was reintubated on 1/9 for inability to manage secretions. Pt noted to be febrile and was treated with 2g cefepime (1/10-1/15) for possible aspiration PNA. Following GOC discussion with son, patient was evaluated by Thoracic surgery and is now s/p Trach and PEG.  Tube feeds and VTE prophylaxis have been resumed.   1/13 Transferred from ICU to SCU. Tolerated 1 hour of SBT with PS 6 63 year old male, from [facility], with PMH of CVA, Alzheimer's, nonverbal at baseline, GERD, Schizophrenia, and Constipation, was brought into the ED s/p mechanical fall, poor oral intake, and Covid-19 infection on 12/27/23. In ED wbc 14.3, Covid 19 positive.   CXR report showed slight right perihilar infiltrate and there is a left lower perihilar infiltrate. Infiltrates are new. CT HEAD/Cervical : No acute intracranial hemorrhage, mass effect, or osseous: No acute cervical spine fracture or traumatic malalignment. Started on Supplemental O2, Dexamethasone 6mg IV q daily, Ceftriaxone, Azithromycin and Remdesivir protocol. Pt Admitted for Acute respiratory failure with hypoxia, COVID 19 pneumonia with  superimposed bacterial pneumonia.    On 1/2/2024, RRT for desaturation to the 80s.Pt was placed on HF and admitted to the ICU. Pt was weaned from HF to 6LNC.  Feeding was resumed on puree diet, on 1/5/2024, patient desaturated to 80s and failed to improve with HFNC, subsequently requiring intubation. Pt was extubated on 1/8 and was reintubated on 1/9 for inability to manage secretions. Pt noted to be febrile and was treated with 2g cefepime (1/10-1/15) for possible aspiration PNA. Following GOC discussion with son, patient was evaluated by Thoracic surgery and is now s/p Trach and PEG.  Tube feeds and VTE prophylaxis have been resumed.   1/13 Transferred from ICU to SCU. Tolerated 1 hour of SBT with PS 6

## 2024-01-14 NOTE — PROGRESS NOTE ADULT - SUBJECTIVE AND OBJECTIVE BOX
C A R D I O L O G Y  **********************************     DATE OF SERVICE: 01-14    [facility] site stable  no events overnight       atorvastatin 20 milliGRAM(s) Oral at bedtime  cefepime   IVPB 2000 milliGRAM(s) IV Intermittent every 8 hours  chlorhexidine 0.12% Liquid 15 milliLiter(s) Oral Mucosa every 12 hours  enoxaparin Injectable 40 milliGRAM(s) SubCutaneous every 24 hours  morphine  - Injectable 2 milliGRAM(s) IV Push every 4 hours PRN  polyethylene glycol 3350 17 Gram(s) Oral daily  valproic  acid Syrup 250 milliGRAM(s) Oral two times a day                            11.0   10.43 )-----------( 344      ( 14 Jan 2024 05:06 )             35.6       Hemoglobin: 11.0 g/dL (01-14 @ 05:06)  Hemoglobin: 11.9 g/dL (01-13 @ 04:55)  Hemoglobin: 13.2 g/dL (01-12 @ 04:35)  Hemoglobin: 13.1 g/dL (01-11 @ 04:54)  Hemoglobin: 13.5 g/dL (01-10 @ 04:11)      01-14    143  |  109<H>  |  27<H>  ----------------------------<  158<H>  3.7   |  30  |  0.66    Ca    10.8<H>      14 Jan 2024 05:06  Phos  2.8     01-14  Mg     2.5     01-14    TPro  7.1  /  Alb  2.6<L>  /  TBili  0.7  /  DBili  x   /  AST  8<L>  /  ALT  16  /  AlkPhos  55  01-14    Creatinine Trend: 0.66<--, 0.50<--, 0.59<--, 0.62<--, 0.76<--, 0.64<--    COAGS:           T(C): 36.7 (01-14-24 @ 04:48), Max: 37.2 (01-13-24 @ 14:00)  HR: 77 (01-14-24 @ 04:48) (52 - 77)  BP: 118/70 (01-14-24 @ 04:48) (98/66 - 127/66)  RR: 16 (01-14-24 @ 04:48) (12 - 24)  SpO2: 95% (01-14-24 @ 04:48) (95% - 100%)  Wt(kg): --    I&O's Summary    13 Jan 2024 07:01  -  14 Jan 2024 07:00  --------------------------------------------------------  IN: 120 mL / OUT: 1000 mL / NET: -880 mL    HEENT:  (-)icterus (-)pallor  CV: N S1 S2 1/6 JOVON (+)2 Pulses B/l  Resp:  Clear to ausculatation B/L, normal effort  GI: (+) BS Soft, NT, ND  Lymph:  (-)Edema, (-)obvious lymphadenopathy  Skin: Warm to touch, Normal turgor  Psych: Unable to assess mood and affect      TELEMETRY: 	  sinus         ASSESSMENT/PLAN: 	63y Male PMH of CVA, Alzheimer's, nonverbal at baseline, GERD, Schizophrenia, historically preserved LV function and Constipation, was brought into the ED s/p mechanical fall, poor oral intake, and Covid-19 infection on 12/27/23 as per NH papers.    # Bradycardia   - He likely has some degree of sick sinus syndrome that may be exacerbated by Midodrine.  now off midodrine  - EP following      # Hypotension  - Suspect this is due to dehydration and vasodilatory state associated with viral/bacterial infection  - Abx   - Vent per  pulm  - s/p  trach and PEG       C A R D I O L O G Y  **********************************     DATE OF SERVICE: 01-14    [facility] site stable  no events overnight       atorvastatin 20 milliGRAM(s) Oral at bedtime  cefepime   IVPB 2000 milliGRAM(s) IV Intermittent every 8 hours  chlorhexidine 0.12% Liquid 15 milliLiter(s) Oral Mucosa every 12 hours  enoxaparin Injectable 40 milliGRAM(s) SubCutaneous every 24 hours  morphine  - Injectable 2 milliGRAM(s) IV Push every 4 hours PRN  polyethylene glycol 3350 17 Gram(s) Oral daily  valproic  acid Syrup 250 milliGRAM(s) Oral two times a day                            11.0   10.43 )-----------( 344      ( 14 Jan 2024 05:06 )             35.6       Hemoglobin: 11.0 g/dL (01-14 @ 05:06)  Hemoglobin: 11.9 g/dL (01-13 @ 04:55)  Hemoglobin: 13.2 g/dL (01-12 @ 04:35)  Hemoglobin: 13.1 g/dL (01-11 @ 04:54)  Hemoglobin: 13.5 g/dL (01-10 @ 04:11)      01-14    143  |  109<H>  |  27<H>  ----------------------------<  158<H>  3.7   |  30  |  0.66    Ca    10.8<H>      14 Jan 2024 05:06  Phos  2.8     01-14  Mg     2.5     01-14    TPro  7.1  /  Alb  2.6<L>  /  TBili  0.7  /  DBili  x   /  AST  8<L>  /  ALT  16  /  AlkPhos  55  01-14    Creatinine Trend: 0.66<--, 0.50<--, 0.59<--, 0.62<--, 0.76<--, 0.64<--    COAGS:           T(C): 36.7 (01-14-24 @ 04:48), Max: 37.2 (01-13-24 @ 14:00)  HR: 77 (01-14-24 @ 04:48) (52 - 77)  BP: 118/70 (01-14-24 @ 04:48) (98/66 - 127/66)  RR: 16 (01-14-24 @ 04:48) (12 - 24)  SpO2: 95% (01-14-24 @ 04:48) (95% - 100%)  Wt(kg): --    I&O's Summary    13 Jan 2024 07:01  -  14 Jan 2024 07:00  --------------------------------------------------------  IN: 120 mL / OUT: 1000 mL / NET: -880 mL    HEENT:  (-)icterus (-)pallor  CV: N S1 S2 1/6 JOVON (+)2 Pulses B/l  Resp:  Clear to ausculatation B/L, normal effort  GI: (+) BS Soft, NT, ND  Lymph:  (-)Edema, (-)obvious lymphadenopathy  Skin: Warm to touch, Normal turgor  Psych: Unable to assess mood and affect      TELEMETRY: 	  sinus         ASSESSMENT/PLAN: 	63y Male PMH of CVA, Alzheimer's, nonverbal at baseline, GERD, Schizophrenia, historically preserved LV function and Constipation, was brought into the ED s/p mechanical fall, poor oral intake, and Covid-19 infection on 12/27/23 as per NH papers.    # Bradycardia   - He likely has some degree of sick sinus syndrome that may be exacerbated by Midodrine.  now off midodrine  - EP following      # Hypotension  - Suspect this is due to dehydration and vasodilatory state associated with viral/bacterial infection  - Abx   - Vent per  pulm  - s/p  trach and PEG       C A R D I O L O G Y  **********************************     DATE OF SERVICE: 01-14    [facility] site stable  no events overnight       atorvastatin 20 milliGRAM(s) Oral at bedtime  cefepime   IVPB 2000 milliGRAM(s) IV Intermittent every 8 hours  chlorhexidine 0.12% Liquid 15 milliLiter(s) Oral Mucosa every 12 hours  enoxaparin Injectable 40 milliGRAM(s) SubCutaneous every 24 hours  morphine  - Injectable 2 milliGRAM(s) IV Push every 4 hours PRN  polyethylene glycol 3350 17 Gram(s) Oral daily  valproic  acid Syrup 250 milliGRAM(s) Oral two times a day                            11.0   10.43 )-----------( 344      ( 14 Jan 2024 05:06 )             35.6       Hemoglobin: 11.0 g/dL (01-14 @ 05:06)  Hemoglobin: 11.9 g/dL (01-13 @ 04:55)  Hemoglobin: 13.2 g/dL (01-12 @ 04:35)  Hemoglobin: 13.1 g/dL (01-11 @ 04:54)  Hemoglobin: 13.5 g/dL (01-10 @ 04:11)      01-14    143  |  109<H>  |  27<H>  ----------------------------<  158<H>  3.7   |  30  |  0.66    Ca    10.8<H>      14 Jan 2024 05:06  Phos  2.8     01-14  Mg     2.5     01-14    TPro  7.1  /  Alb  2.6<L>  /  TBili  0.7  /  DBili  x   /  AST  8<L>  /  ALT  16  /  AlkPhos  55  01-14    Creatinine Trend: 0.66<--, 0.50<--, 0.59<--, 0.62<--, 0.76<--, 0.64<--    COAGS:           T(C): 36.7 (01-14-24 @ 04:48), Max: 37.2 (01-13-24 @ 14:00)  HR: 77 (01-14-24 @ 04:48) (52 - 77)  BP: 118/70 (01-14-24 @ 04:48) (98/66 - 127/66)  RR: 16 (01-14-24 @ 04:48) (12 - 24)  SpO2: 95% (01-14-24 @ 04:48) (95% - 100%)  Wt(kg): --    I&O's Summary    13 Jan 2024 07:01  -  14 Jan 2024 07:00  --------------------------------------------------------  IN: 120 mL / OUT: 1000 mL / NET: -880 mL    HEENT:  (-)icterus (-)pallor  CV: N S1 S2 1/6 JOVON (+)2 Pulses B/l  Resp:  Clear to ausculatation B/L, normal effort  GI: (+) BS Soft, NT, ND  Lymph:  (-)Edema, (-)obvious lymphadenopathy  Skin: Warm to touch, Normal turgor  Psych: Unable to assess mood and affect      TELEMETRY: 	  sinus         ASSESSMENT/PLAN: 	63y Male PMH of CVA, Alzheimer's, nonverbal at baseline, GERD, Schizophrenia, historically preserved LV function and Constipation, was brought into the ED s/p mechanical fall, poor oral intake, and Covid-19 infection on 12/27/23 as per NH papers.    # Bradycardia   - He likely has some degree of sick sinus syndrome that may be exacerbated by Midodrine.  now off midodrine  - EP following      # Hypotension  - Suspect this is due to dehydration and vasodilatory state associated with viral/bacterial infection  - Abx  - s/p  trach and PEG       C A R D I O L O G Y  **********************************     DATE OF SERVICE: 01-14    [facility] site stable  no events overnight       atorvastatin 20 milliGRAM(s) Oral at bedtime  cefepime   IVPB 2000 milliGRAM(s) IV Intermittent every 8 hours  chlorhexidine 0.12% Liquid 15 milliLiter(s) Oral Mucosa every 12 hours  enoxaparin Injectable 40 milliGRAM(s) SubCutaneous every 24 hours  morphine  - Injectable 2 milliGRAM(s) IV Push every 4 hours PRN  polyethylene glycol 3350 17 Gram(s) Oral daily  valproic  acid Syrup 250 milliGRAM(s) Oral two times a day                            11.0   10.43 )-----------( 344      ( 14 Jan 2024 05:06 )             35.6       Hemoglobin: 11.0 g/dL (01-14 @ 05:06)  Hemoglobin: 11.9 g/dL (01-13 @ 04:55)  Hemoglobin: 13.2 g/dL (01-12 @ 04:35)  Hemoglobin: 13.1 g/dL (01-11 @ 04:54)  Hemoglobin: 13.5 g/dL (01-10 @ 04:11)      01-14    143  |  109<H>  |  27<H>  ----------------------------<  158<H>  3.7   |  30  |  0.66    Ca    10.8<H>      14 Jan 2024 05:06  Phos  2.8     01-14  Mg     2.5     01-14    TPro  7.1  /  Alb  2.6<L>  /  TBili  0.7  /  DBili  x   /  AST  8<L>  /  ALT  16  /  AlkPhos  55  01-14    Creatinine Trend: 0.66<--, 0.50<--, 0.59<--, 0.62<--, 0.76<--, 0.64<--    COAGS:           T(C): 36.7 (01-14-24 @ 04:48), Max: 37.2 (01-13-24 @ 14:00)  HR: 77 (01-14-24 @ 04:48) (52 - 77)  BP: 118/70 (01-14-24 @ 04:48) (98/66 - 127/66)  RR: 16 (01-14-24 @ 04:48) (12 - 24)  SpO2: 95% (01-14-24 @ 04:48) (95% - 100%)  Wt(kg): --    I&O's Summary    13 Jan 2024 07:01  -  14 Jan 2024 07:00  --------------------------------------------------------  IN: 120 mL / OUT: 1000 mL / NET: -880 mL    HEENT:  (-)icterus (-)pallor  CV: N S1 S2 1/6 JOVON (+)2 Pulses B/l  Resp:  Clear to ausculatation B/L, normal effort  GI: (+) BS Soft, NT, ND  Lymph:  (-)Edema, (-)obvious lymphadenopathy  Skin: Warm to touch, Normal turgor  Psych: Unable to assess mood and affect      TELEMETRY: 	  sinus         ASSESSMENT/PLAN: 	63y Male PMH of CVA, Alzheimer's, nonverbal at baseline, GERD, Schizophrenia, historically preserved LV function and Constipation, was brought into the ED s/p mechanical fall, poor oral intake, and Covid-19 infection on 12/27/23 as per NH papers.    # Bradycardia   - He likely has some degree of sick sinus syndrome that may be exacerbated by Midodrine.  now off midodrine  - EP following      # Hypotension  - Suspect this is due to dehydration and vasodilatory state associated with viral/bacterial infection  - Abx  - s/p  trach and PEG

## 2024-01-14 NOTE — PROGRESS NOTE ADULT - NS ATTEND AMEND GEN_ALL_CORE FT
63 year old mn , from [facility], with  CVA, Alzheimer's, nonverbal at baseline, GERD, Schizophrenia, and Constipation, was brought into the ED s/p mechanical fall, poor oral intake, and Covid-19 infection on 12/27/23 Pt was admitted for COVID PNA, later found with intermittent hypoxia/ hypopnea to Sats 80% and Hypotension despite multiple fluid boluses, also found with bryan to 40s for the last 2 days, in ICU for further monitoring.       ASSESSMENT   - Acute Hypoxic resp failure   - Covid-19 PNA   - Shock septic   - CVA  - Alzheimer dementia   - Bradycardia       Plan   - S/p trach/peg placement 1/12  - Cont. mechanical ventilation   - Daily PSV trial  - Aspiration precautions   - Antibiotics for aspiration will complete 5 days  - Hemodynamic support   - EP cards f/u : no indication for PPM at tis time   - Off Remdesivir due to ADAN  - Isolation : contact and airborne   - Cards following   - Hold AV angel luis blocking agent   - Skin care  - Wound care eval noted  - Start tube feedings today  - Void trial  - Prognosis is poor  - Discharge planing to vent facility 63 year old mn , from [facility], with  CVA, Alzheimer's, nonverbal at baseline, GERD, Schizophrenia, and Constipation, was brought into the ED s/p mechanical fall, poor oral intake, and Covid-19 infection on 12/27/23 Pt was admitted for COVID PNA, later found with intermittent hypoxia/ hypopnea to Sats 80% and Hypotension despite multiple fluid boluses, also found with bryan to 40s for the last 2 days, in ICU for further monitoring.       ASSESSMENT   - Acute Hypoxic resp failure   - Covid-19 PNA   - Shock septic   - CVA  - Alzheimer dementia   - Bradycardia       Plan   - S/p trach/peg placement 1/12  - Cont. mechanical ventilation   - Daily PSV trial  - Aspiration precautions   - Antibiotics for aspiration will complete 5 days  - Hemodynamic support   - EP cards f/u : no indication for PPM at tis time   - Off Remdesivir due to ADAN  - Isolation : contact and airborne   - Cards following   - Hold AV angel luis blocking agent   - Skin care  - Wound care eval noted  - Start tube feedings today  - Void trial  - Prognosis is poor  - Discharge planing to vent facility

## 2024-01-14 NOTE — PROGRESS NOTE ADULT - SUBJECTIVE AND OBJECTIVE BOX
EP ATTENDING    no tele, but no further bradycardia on vital signs    trached, in bed comfortable, ROS difficult to obtain      DATE OF SERVICE - 01-14-24     Review of Systems:   Constitutional: [ ] fevers, [ ] chills.   Skin: [ ] dry skin. [ ] rashes.  Psychiatric: [ ] depression, [ ] anxiety.   Gastrointestinal: [ ] BRBPR, [ ] melena.   Neurological: [ ] confusion. [ ] seizures. [ ] shuffling gait.   Ears,Nose,Mouth and Throat: [ ] ear pain [ ] sore throat.   Eyes: [ ] diplopia.   Respiratory: [ ] hemoptysis. [ ] shortness of breath  Cardiovascular: See HPI above  Hematologic/Lymphatic: [ ] anemia. [ ] painful nodes. [ ] prolonged bleeding.   Genitourinary: [ ] hematuria. [ ] flank pain.   Endocrine: [ ] significant change in weight. [ ] intolerance to heat and cold.     Review of systems [ x] otherwise negative, [ ] otherwise unable to obtain    FH: no family history of sudden cardiac death in first degree relatives    SH: [ ] tobacco, [ ] alcohol, [ ] drugs    atorvastatin 20 milliGRAM(s) Oral at bedtime  cefepime   IVPB 2000 milliGRAM(s) IV Intermittent every 8 hours  chlorhexidine 0.12% Liquid 15 milliLiter(s) Oral Mucosa every 12 hours  enoxaparin Injectable 40 milliGRAM(s) SubCutaneous every 24 hours  morphine  - Injectable 2 milliGRAM(s) IV Push every 4 hours PRN  polyethylene glycol 3350 17 Gram(s) Oral daily  valproic  acid Syrup 250 milliGRAM(s) Oral two times a day                            11.0   10.43 )-----------( 344      ( 14 Jan 2024 05:06 )             35.6       01-14    143  |  109<H>  |  27<H>  ----------------------------<  158<H>  3.7   |  30  |  0.66    Ca    10.8<H>      14 Jan 2024 05:06  Phos  2.8     01-14  Mg     2.5     01-14    TPro  7.1  /  Alb  2.6<L>  /  TBili  0.7  /  DBili  x   /  AST  8<L>  /  ALT  16  /  AlkPhos  55  01-14            T(C): 37.6 (01-14-24 @ 12:29), Max: 37.6 (01-14-24 @ 12:29)  HR: 79 (01-14-24 @ 12:29) (53 - 82)  BP: 118/72 (01-14-24 @ 12:29) (109/66 - 127/66)  RR: 12 (01-14-24 @ 12:29) (12 - 19)  SpO2: 98% (01-14-24 @ 12:29) (95% - 100%)  Wt(kg): --    I&O's Summary    13 Jan 2024 07:01  -  14 Jan 2024 07:00  --------------------------------------------------------  IN: 120 mL / OUT: 1000 mL / NET: -880 mL      Head: Normocephalic and atraumatic.   Neck: No JVD. No bruits. Supple. Does not appear to be enlarged.   Cardiovascular: + S1,S2 ; RRR Soft systolic murmur at the left lower sternal border. No rubs noted.    Lungs: CTA b/l. No rhonchi, rales or wheezes.   Abdomen: + BS, soft. Non tender. Non distended. No rebound. No guarding.   Extremities: No clubbing/cyanosis/edema.   Skin: Warm and moist. The patient's skin has normal elasticity and good skin turgor.         echo: mild LV dysfunction, otherwise unremarkable    A/P) 62 y/o male who resides at a nursing home due to previous stroke, hyperlipidemia, schizophrenia and Alzheimer's dementia a/w covid. EP called for periods of asymptomatic sinus bradycardia. He is now s/p trach and PEG.    -continue lipitor for hyperlipidemia  -consider low dose beta blockers and ACEi for mild LV dysfunction, will defer to cardiology  -no need for PPM as he remains asymptomatic and hasn't had any malignant sinus pauses      Santana Carlin M.D., [facility]  Cardiac Electrophysiology  [facility]  [address] [place] [address], [address] [place] [address]  [email or web address]    office [phone]  pager [phone]   EP ATTENDING    no tele, but no further bradycardia on vital signs    trached, in bed comfortable, ROS difficult to obtain      DATE OF SERVICE - 01-14-24     Review of Systems:   Constitutional: [ ] fevers, [ ] chills.   Skin: [ ] dry skin. [ ] rashes.  Psychiatric: [ ] depression, [ ] anxiety.   Gastrointestinal: [ ] BRBPR, [ ] melena.   Neurological: [ ] confusion. [ ] seizures. [ ] shuffling gait.   Ears,Nose,Mouth and Throat: [ ] ear pain [ ] sore throat.   Eyes: [ ] diplopia.   Respiratory: [ ] hemoptysis. [ ] shortness of breath  Cardiovascular: See HPI above  Hematologic/Lymphatic: [ ] anemia. [ ] painful nodes. [ ] prolonged bleeding.   Genitourinary: [ ] hematuria. [ ] flank pain.   Endocrine: [ ] significant change in weight. [ ] intolerance to heat and cold.     Review of systems [ x] otherwise negative, [ ] otherwise unable to obtain    FH: no family history of sudden cardiac death in first degree relatives    SH: [ ] tobacco, [ ] alcohol, [ ] drugs    atorvastatin 20 milliGRAM(s) Oral at bedtime  cefepime   IVPB 2000 milliGRAM(s) IV Intermittent every 8 hours  chlorhexidine 0.12% Liquid 15 milliLiter(s) Oral Mucosa every 12 hours  enoxaparin Injectable 40 milliGRAM(s) SubCutaneous every 24 hours  morphine  - Injectable 2 milliGRAM(s) IV Push every 4 hours PRN  polyethylene glycol 3350 17 Gram(s) Oral daily  valproic  acid Syrup 250 milliGRAM(s) Oral two times a day                            11.0   10.43 )-----------( 344      ( 14 Jan 2024 05:06 )             35.6       01-14    143  |  109<H>  |  27<H>  ----------------------------<  158<H>  3.7   |  30  |  0.66    Ca    10.8<H>      14 Jan 2024 05:06  Phos  2.8     01-14  Mg     2.5     01-14    TPro  7.1  /  Alb  2.6<L>  /  TBili  0.7  /  DBili  x   /  AST  8<L>  /  ALT  16  /  AlkPhos  55  01-14            T(C): 37.6 (01-14-24 @ 12:29), Max: 37.6 (01-14-24 @ 12:29)  HR: 79 (01-14-24 @ 12:29) (53 - 82)  BP: 118/72 (01-14-24 @ 12:29) (109/66 - 127/66)  RR: 12 (01-14-24 @ 12:29) (12 - 19)  SpO2: 98% (01-14-24 @ 12:29) (95% - 100%)  Wt(kg): --    I&O's Summary    13 Jan 2024 07:01  -  14 Jan 2024 07:00  --------------------------------------------------------  IN: 120 mL / OUT: 1000 mL / NET: -880 mL      Head: Normocephalic and atraumatic.   Neck: No JVD. No bruits. Supple. Does not appear to be enlarged.   Cardiovascular: + S1,S2 ; RRR Soft systolic murmur at the left lower sternal border. No rubs noted.    Lungs: CTA b/l. No rhonchi, rales or wheezes.   Abdomen: + BS, soft. Non tender. Non distended. No rebound. No guarding.   Extremities: No clubbing/cyanosis/edema.   Skin: Warm and moist. The patient's skin has normal elasticity and good skin turgor.         echo: mild LV dysfunction, otherwise unremarkable    A/P) 62 y/o male who resides at a nursing home due to previous stroke, hyperlipidemia, schizophrenia and Alzheimer's dementia a/w covid. EP called for periods of asymptomatic sinus bradycardia. He is now s/p trach and PEG.    -continue lipitor for hyperlipidemia  -consider low dose beta blockers and ACEi for mild LV dysfunction, will defer to cardiology  -no need for PPM as he remains asymptomatic and hasn't had any malignant sinus pauses      Santana Carlin M.D., [facility]  Cardiac Electrophysiology  [facility]  [address] [place] [address], [address] [place] [address]  [email or web address]    office [phone]  pager [phone]

## 2024-01-14 NOTE — PROGRESS NOTE ADULT - SUBJECTIVE AND OBJECTIVE BOX
Patient is a 63y old  Male who presents with a chief complaint of Sepsis and AHRF (14 Jan 2024 07:48)    PATIENT IS SEEN AND EXAMINED IN MEDICAL FLOOR.    RODRIGUET [    ]    KRISTINA [   ]      GT [   ]    ALLERGIES:  No Known Allergies      Daily     Daily     VITALS:    Vital Signs Last 24 Hrs  T(C): 36.7 (14 Jan 2024 04:48), Max: 37.2 (13 Jan 2024 14:00)  T(F): 98 (14 Jan 2024 04:48), Max: 99 (13 Jan 2024 14:00)  HR: 82 (14 Jan 2024 08:45) (53 - 82)  BP: 118/70 (14 Jan 2024 04:48) (109/66 - 127/66)  BP(mean): 82 (13 Jan 2024 17:00) (81 - 85)  RR: 16 (14 Jan 2024 04:48) (12 - 19)  SpO2: 98% (14 Jan 2024 08:45) (95% - 100%)    Parameters below as of 14 Jan 2024 04:48  Patient On (Oxygen Delivery Method): trach vent        LABS:    CBC Full  -  ( 14 Jan 2024 05:06 )  WBC Count : 10.43 K/uL  RBC Count : 3.83 M/uL  Hemoglobin : 11.0 g/dL  Hematocrit : 35.6 %  Platelet Count - Automated : 344 K/uL  Mean Cell Volume : 93.0 fl  Mean Cell Hemoglobin : 28.7 pg  Mean Cell Hemoglobin Concentration : 30.9 gm/dL  Auto Neutrophil # : 8.47 K/uL  Auto Lymphocyte # : 1.03 K/uL  Auto Monocyte # : 0.86 K/uL  Auto Eosinophil # : 0.01 K/uL  Auto Basophil # : 0.03 K/uL  Auto Neutrophil % : 81.2 %  Auto Lymphocyte % : 9.9 %  Auto Monocyte % : 8.2 %  Auto Eosinophil % : 0.1 %  Auto Basophil % : 0.3 %      01-14    143  |  109<H>  |  27<H>  ----------------------------<  158<H>  3.7   |  30  |  0.66    Ca    10.8<H>      14 Jan 2024 05:06  Phos  2.8     01-14  Mg     2.5     01-14    TPro  7.1  /  Alb  2.6<L>  /  TBili  0.7  /  DBili  x   /  AST  8<L>  /  ALT  16  /  AlkPhos  55  01-14    CAPILLARY BLOOD GLUCOSE      POCT Blood Glucose.: 140 mg/dL (13 Jan 2024 16:32)        LIVER FUNCTIONS - ( 14 Jan 2024 05:06 )  Alb: 2.6 g/dL / Pro: 7.1 g/dL / ALK PHOS: 55 U/L / ALT: 16 U/L DA / AST: 8 U/L / GGT: x           Creatinine Trend: 0.66<--, 0.50<--, 0.59<--, 0.62<--, 0.76<--, 0.64<--  I&O's Summary    13 Jan 2024 07:01  -  14 Jan 2024 07:00  --------------------------------------------------------  IN: 120 mL / OUT: 1000 mL / NET: -880 mL        ABG - ( 13 Jan 2024 03:07 )  pH, Arterial: 7.46  pH, Blood: x     /  pCO2: 49    /  pO2: 150   / HCO3: 35    / Base Excess: 9.4   /  SaO2: 100                 Nares/Axilla/Groin Nares/Axilla/Groin  01-11 @ 22:20   Culture NEGATIVE for Candida auris.  This surveillance culture is intended for Infection Control purposes only.  A negative result does not preclude the carriage of other fungal  organisms.  --  --      Catheterized Catheterized  12-29 @ 12:37   No growth  --  --      .Blood Blood  12-29 @ 02:05   No growth at 5 days  --  --      .Blood Blood  12-29 @ 01:55   No growth at 5 days  --  --          MEDICATIONS:    MEDICATIONS  (STANDING):  atorvastatin 20 milliGRAM(s) Oral at bedtime  cefepime   IVPB 2000 milliGRAM(s) IV Intermittent every 8 hours  chlorhexidine 0.12% Liquid 15 milliLiter(s) Oral Mucosa every 12 hours  enoxaparin Injectable 40 milliGRAM(s) SubCutaneous every 24 hours  polyethylene glycol 3350 17 Gram(s) Oral daily  valproic  acid Syrup 250 milliGRAM(s) Oral two times a day      MEDICATIONS  (PRN):  morphine  - Injectable 2 milliGRAM(s) IV Push every 4 hours PRN Moderate Pain (4 - 6)        REVIEW OF SYSTEMS:                           ALL ROS DONE [ X   ]      CONSTITUTIONAL:  LETHARGIC [   ], FEVER [   ], UNRESPONSIVE [   ]  CVS:  CP  [   ], SOB, [   ], PALPITATIONS [   ], DIZZYNESS [   ]  RS: COUGH [   ], SPUTUM [   ]  GI: ABDOMINAL PAIN [   ], NAUSEA [   ], VOMITINGS [   ], DIARRHEA [   ], CONSTIPATION [   ]  :  DYSURIA [   ], NOCTURIA [   ], INCREASED FREQUENCY [   ], DRIBLING [   ],  SKELETAL: PAINFUL JOINTS [   ], SWOLLEN JOINTS [   ], NECK ACHE [   ], LOW BACK ACHE [   ],  SKIN : ULCERS [   ], RASH [   ], ITCHING [   ]  CNS: HEAD ACHE [   ], DOUBLE VISION [   ], BLURRED VISION [   ], AMS / CONFUSION [   ], SEIZURES [   ], WEAKNESS [   ],TINGLING / NUMBNESS [   ]      PHYSICAL EXAMINATION:    GENERAL APPEARANCE: NO DISTRESS  HEENT:  NO PALLOR, NO  JVD,  NO   NODES, NECK SUPPLE  CVS: S1 +, S2 +,   RS: AEEB,  OCCASIONAL  RALES +,   RHONCHI +   TRACHEOSTOMY  ABD: SOFT, NT, NO, BS +    PEG +  EXT: NO PE  SKIN: WARM,   SKELETAL:  REDUCED ROM OF CERVICAL AND LS SPINE  CNS:  AAO X 1        RADIOLOGY :    RADIOLOGY AND READINGS REVIEWED        ASSESSMENT :     Infection due to severe acute respiratory syndrome coronavirus 2 (SARS-CoV-2)    CVA (cerebral vascular accident)    HLD (hyperlipidemia)    S/P percutaneous endoscopic gastrostomy (PEG) tube placement        PLAN:  HPI:  A 63 year old male, from [facility], with PMH of CVA, Alzheimer's, nonverbal at baseline, GERD, Schizophrenia, and Constipation, was brought into the ED s/p mechanical fall, poor oral intake, and Covid-19 infection on 12/27/23 as per NH papers. Unable to obtain history from patient since he is nonverbal, history obtained from chart review.  (28 Dec 2023 20:54)      # PATIENT IS DOWN GRADED TO FLOOR FROM ICU - WILL COMPLETED IV. DECADRON 6 MG Q DAILY FOR TOTAL 10 DAYS (REMDESIVIR IS DISCONTINUED DUE TO ADAN), CONTINUE DROPLET PRECAUTIONS - WILL CONTINUE TO MONITOR IN AI UNIT  - NO NEED FOR PPM AT PRESENT - CARDIOLOGY F/UP IS IN PROGRESS     # PROGNOSIS IS POOR/GUARDED - CRITICAL CARE TEAM DISCUSSING W/ FAMILY REGARDING GOC. FAMILY WISHES FOR PEG, TRACHEOSTOMY.     # [1/3] S/P RAPID RESPONSE FOR HYPOTENSION AND HYPOXIA - S/P IV FLUIDS AND PLACED ON NRB FOR HYPOXIA. CRITICAL CARE EVALUATION REQUESTED.     # RESOLVING SEPTIC SHOCK S/T ASPIRATION PNA + DIARRHEA [resolved]  # RESOLVING ACUTE HYPOXIC RESPIRATORY FAILURE S/T ? ASPIRATION EVENT   , COVID19 INFECTION     S/P TRACHEOSTOMY [1/12]    - CONTINUE CEFEPIME, COMPLETED DECADRON  - CHEST PT    - BCX [NGTD] AND UCX [NGTD]  - S/P IVF, VASOPRESSORS  - ID CONSULT  - CRITICAL CARE MANAGEMENT IN PROGRESS    - [1/4] - PATIENT W/ ? ASPIRATION EVENT - SUBSEQUENTLY REQUIRED INTUBATION W/ MECHANICAL VENTILATION  - [1/8] - EXTUBATED, CHEST PT  - [1/9] - REINTUBATED OVERNIGHT    - S/P TRACHEOSTOMY + PEG PLACEMENT [1/12]    # DYSPHAGIA S/T CVA  - S/P PEG PLACEMENT 1/12    # BRADYCARDIA  - MONITOR ON TELEMETRY  - F/U ECHOCARDIOGRAM   - OPTIMIZE ELECTROLYTES  - CARDIOLOGY CONSULT  - EP CONSULT    # HYPOKALEMIA  - REPLETING WITH SUPPLEMENT    # SEVERE PROTEIN CALORIE MALNUTRITION   - ON SUPPLEMENTAL NUTRITION    # HX OF CVA  # HX OF DEMENTIA  # SCHIZOPHRENIA  # GI AND DVT PPX    DR. WALTER DAVIDSON         Patient is a 63y old  Male who presents with a chief complaint of Sepsis and AHRF (14 Jan 2024 07:48)    PATIENT IS SEEN AND EXAMINED IN MEDICAL FLOOR.    RODRIGUET [    ]    KRISTINA [   ]      GT [   ]    ALLERGIES:  No Known Allergies      Daily     Daily     VITALS:    Vital Signs Last 24 Hrs  T(C): 36.7 (14 Jan 2024 04:48), Max: 37.2 (13 Jan 2024 14:00)  T(F): 98 (14 Jan 2024 04:48), Max: 99 (13 Jan 2024 14:00)  HR: 82 (14 Jan 2024 08:45) (53 - 82)  BP: 118/70 (14 Jan 2024 04:48) (109/66 - 127/66)  BP(mean): 82 (13 Jan 2024 17:00) (81 - 85)  RR: 16 (14 Jan 2024 04:48) (12 - 19)  SpO2: 98% (14 Jan 2024 08:45) (95% - 100%)    Parameters below as of 14 Jan 2024 04:48  Patient On (Oxygen Delivery Method): trach vent        LABS:    CBC Full  -  ( 14 Jan 2024 05:06 )  WBC Count : 10.43 K/uL  RBC Count : 3.83 M/uL  Hemoglobin : 11.0 g/dL  Hematocrit : 35.6 %  Platelet Count - Automated : 344 K/uL  Mean Cell Volume : 93.0 fl  Mean Cell Hemoglobin : 28.7 pg  Mean Cell Hemoglobin Concentration : 30.9 gm/dL  Auto Neutrophil # : 8.47 K/uL  Auto Lymphocyte # : 1.03 K/uL  Auto Monocyte # : 0.86 K/uL  Auto Eosinophil # : 0.01 K/uL  Auto Basophil # : 0.03 K/uL  Auto Neutrophil % : 81.2 %  Auto Lymphocyte % : 9.9 %  Auto Monocyte % : 8.2 %  Auto Eosinophil % : 0.1 %  Auto Basophil % : 0.3 %      01-14    143  |  109<H>  |  27<H>  ----------------------------<  158<H>  3.7   |  30  |  0.66    Ca    10.8<H>      14 Jan 2024 05:06  Phos  2.8     01-14  Mg     2.5     01-14    TPro  7.1  /  Alb  2.6<L>  /  TBili  0.7  /  DBili  x   /  AST  8<L>  /  ALT  16  /  AlkPhos  55  01-14    CAPILLARY BLOOD GLUCOSE      POCT Blood Glucose.: 140 mg/dL (13 Jan 2024 16:32)        LIVER FUNCTIONS - ( 14 Jan 2024 05:06 )  Alb: 2.6 g/dL / Pro: 7.1 g/dL / ALK PHOS: 55 U/L / ALT: 16 U/L DA / AST: 8 U/L / GGT: x           Creatinine Trend: 0.66<--, 0.50<--, 0.59<--, 0.62<--, 0.76<--, 0.64<--  I&O's Summary    13 Jan 2024 07:01  -  14 Jan 2024 07:00  --------------------------------------------------------  IN: 120 mL / OUT: 1000 mL / NET: -880 mL        ABG - ( 13 Jan 2024 03:07 )  pH, Arterial: 7.46  pH, Blood: x     /  pCO2: 49    /  pO2: 150   / HCO3: 35    / Base Excess: 9.4   /  SaO2: 100                 Nares/Axilla/Groin Nares/Axilla/Groin  01-11 @ 22:20   Culture NEGATIVE for Candida auris.  This surveillance culture is intended for Infection Control purposes only.  A negative result does not preclude the carriage of other fungal  organisms.  --  --      Catheterized Catheterized  12-29 @ 12:37   No growth  --  --      .Blood Blood  12-29 @ 02:05   No growth at 5 days  --  --      .Blood Blood  12-29 @ 01:55   No growth at 5 days  --  --          MEDICATIONS:    MEDICATIONS  (STANDING):  atorvastatin 20 milliGRAM(s) Oral at bedtime  cefepime   IVPB 2000 milliGRAM(s) IV Intermittent every 8 hours  chlorhexidine 0.12% Liquid 15 milliLiter(s) Oral Mucosa every 12 hours  enoxaparin Injectable 40 milliGRAM(s) SubCutaneous every 24 hours  polyethylene glycol 3350 17 Gram(s) Oral daily  valproic  acid Syrup 250 milliGRAM(s) Oral two times a day      MEDICATIONS  (PRN):  morphine  - Injectable 2 milliGRAM(s) IV Push every 4 hours PRN Moderate Pain (4 - 6)        REVIEW OF SYSTEMS:                           ALL ROS DONE [ X   ]      CONSTITUTIONAL:  LETHARGIC [   ], FEVER [   ], UNRESPONSIVE [   ]  CVS:  CP  [   ], SOB, [   ], PALPITATIONS [   ], DIZZYNESS [   ]  RS: COUGH [   ], SPUTUM [   ]  GI: ABDOMINAL PAIN [   ], NAUSEA [   ], VOMITINGS [   ], DIARRHEA [   ], CONSTIPATION [   ]  :  DYSURIA [   ], NOCTURIA [   ], INCREASED FREQUENCY [   ], DRIBLING [   ],  SKELETAL: PAINFUL JOINTS [   ], SWOLLEN JOINTS [   ], NECK ACHE [   ], LOW BACK ACHE [   ],  SKIN : ULCERS [   ], RASH [   ], ITCHING [   ]  CNS: HEAD ACHE [   ], DOUBLE VISION [   ], BLURRED VISION [   ], AMS / CONFUSION [   ], SEIZURES [   ], WEAKNESS [   ],TINGLING / NUMBNESS [   ]      PHYSICAL EXAMINATION:    GENERAL APPEARANCE: NO DISTRESS  HEENT:  NO PALLOR, NO  JVD,  NO   NODES, NECK SUPPLE  CVS: S1 +, S2 +,   RS: AEEB,  OCCASIONAL  RALES +,   RHONCHI +   TRACHEOSTOMY  ABD: SOFT, NT, NO, BS +    PEG +  EXT: NO PE  SKIN: WARM,   SKELETAL:  REDUCED ROM OF CERVICAL AND LS SPINE  CNS:  AAO X 1        RADIOLOGY :    RADIOLOGY AND READINGS REVIEWED        ASSESSMENT :     Infection due to severe acute respiratory syndrome coronavirus 2 (SARS-CoV-2)    CVA (cerebral vascular accident)    HLD (hyperlipidemia)    S/P percutaneous endoscopic gastrostomy (PEG) tube placement        PLAN:  HPI:  A 63 year old male, from [facility], with PMH of CVA, Alzheimer's, nonverbal at baseline, GERD, Schizophrenia, and Constipation, was brought into the ED s/p mechanical fall, poor oral intake, and Covid-19 infection on 12/27/23 as per NH papers. Unable to obtain history from patient since he is nonverbal, history obtained from chart review.  (28 Dec 2023 20:54)      # PATIENT IS DOWN GRADED TO FLOOR FROM ICU - WILL COMPLETED IV. DECADRON 6 MG Q DAILY FOR TOTAL 10 DAYS (REMDESIVIR IS DISCONTINUED DUE TO ADAN), CONTINUE DROPLET PRECAUTIONS - WILL CONTINUE TO MONITOR IN AI UNIT  - NO NEED FOR PPM AT PRESENT - CARDIOLOGY F/UP IS IN PROGRESS     # PROGNOSIS IS POOR/GUARDED - CRITICAL CARE TEAM DISCUSSING W/ FAMILY REGARDING GOC. FAMILY WISHES FOR PEG, TRACHEOSTOMY.     # [1/3] S/P RAPID RESPONSE FOR HYPOTENSION AND HYPOXIA - S/P IV FLUIDS AND PLACED ON NRB FOR HYPOXIA. CRITICAL CARE EVALUATION REQUESTED.     # RESOLVING SEPTIC SHOCK S/T ASPIRATION PNA + DIARRHEA [resolved]  # RESOLVING ACUTE HYPOXIC RESPIRATORY FAILURE S/T ? ASPIRATION EVENT   , COVID19 INFECTION     S/P TRACHEOSTOMY [1/12]    - CONTINUE CEFEPIME, COMPLETED DECADRON  - CHEST PT    - BCX [NGTD] AND UCX [NGTD]  - S/P IVF, VASOPRESSORS  - ID CONSULT  - CRITICAL CARE MANAGEMENT IN PROGRESS    - [1/4] - PATIENT W/ ? ASPIRATION EVENT - SUBSEQUENTLY REQUIRED INTUBATION W/ MECHANICAL VENTILATION  - [1/8] - EXTUBATED, CHEST PT  - [1/9] - REINTUBATED OVERNIGHT    - S/P TRACHEOSTOMY + PEG PLACEMENT [1/12]    # DYSPHAGIA S/T CVA  - S/P PEG PLACEMENT 1/12    # BRADYCARDIA  - MONITOR ON TELEMETRY  - F/U ECHOCARDIOGRAM   - OPTIMIZE ELECTROLYTES  - CARDIOLOGY CONSULT  - EP CONSULT    # HYPOKALEMIA  - REPLETING WITH SUPPLEMENT    # SEVERE PROTEIN CALORIE MALNUTRITION   - ON SUPPLEMENTAL NUTRITION    # HX OF CVA  # HX OF DEMENTIA  # SCHIZOPHRENIA  # GI AND DVT PPX    DR. WALTER DAVIDSON

## 2024-01-15 LAB
ALBUMIN SERPL ELPH-MCNC: 2.6 G/DL — LOW (ref 3.5–5)
ALBUMIN SERPL ELPH-MCNC: 2.6 G/DL — LOW (ref 3.5–5)
ALP SERPL-CCNC: 55 U/L — SIGNIFICANT CHANGE UP (ref 40–120)
ALP SERPL-CCNC: 55 U/L — SIGNIFICANT CHANGE UP (ref 40–120)
ALT FLD-CCNC: 16 U/L DA — SIGNIFICANT CHANGE UP (ref 10–60)
ALT FLD-CCNC: 16 U/L DA — SIGNIFICANT CHANGE UP (ref 10–60)
ANION GAP SERPL CALC-SCNC: 5 MMOL/L — SIGNIFICANT CHANGE UP (ref 5–17)
ANION GAP SERPL CALC-SCNC: 5 MMOL/L — SIGNIFICANT CHANGE UP (ref 5–17)
AST SERPL-CCNC: 5 U/L — LOW (ref 10–40)
AST SERPL-CCNC: 5 U/L — LOW (ref 10–40)
BASOPHILS # BLD AUTO: 0.03 K/UL — SIGNIFICANT CHANGE UP (ref 0–0.2)
BASOPHILS # BLD AUTO: 0.03 K/UL — SIGNIFICANT CHANGE UP (ref 0–0.2)
BASOPHILS NFR BLD AUTO: 0.3 % — SIGNIFICANT CHANGE UP (ref 0–2)
BASOPHILS NFR BLD AUTO: 0.3 % — SIGNIFICANT CHANGE UP (ref 0–2)
BILIRUB SERPL-MCNC: 0.8 MG/DL — SIGNIFICANT CHANGE UP (ref 0.2–1.2)
BILIRUB SERPL-MCNC: 0.8 MG/DL — SIGNIFICANT CHANGE UP (ref 0.2–1.2)
BUN SERPL-MCNC: 24 MG/DL — HIGH (ref 7–18)
BUN SERPL-MCNC: 24 MG/DL — HIGH (ref 7–18)
CALCIUM SERPL-MCNC: 9.6 MG/DL — SIGNIFICANT CHANGE UP (ref 8.4–10.5)
CALCIUM SERPL-MCNC: 9.6 MG/DL — SIGNIFICANT CHANGE UP (ref 8.4–10.5)
CHLORIDE SERPL-SCNC: 112 MMOL/L — HIGH (ref 96–108)
CHLORIDE SERPL-SCNC: 112 MMOL/L — HIGH (ref 96–108)
CO2 SERPL-SCNC: 31 MMOL/L — SIGNIFICANT CHANGE UP (ref 22–31)
CO2 SERPL-SCNC: 31 MMOL/L — SIGNIFICANT CHANGE UP (ref 22–31)
CREAT SERPL-MCNC: 0.69 MG/DL — SIGNIFICANT CHANGE UP (ref 0.5–1.3)
CREAT SERPL-MCNC: 0.69 MG/DL — SIGNIFICANT CHANGE UP (ref 0.5–1.3)
EGFR: 103 ML/MIN/1.73M2 — SIGNIFICANT CHANGE UP
EGFR: 103 ML/MIN/1.73M2 — SIGNIFICANT CHANGE UP
EOSINOPHIL # BLD AUTO: 0.01 K/UL — SIGNIFICANT CHANGE UP (ref 0–0.5)
EOSINOPHIL # BLD AUTO: 0.01 K/UL — SIGNIFICANT CHANGE UP (ref 0–0.5)
EOSINOPHIL NFR BLD AUTO: 0.1 % — SIGNIFICANT CHANGE UP (ref 0–6)
EOSINOPHIL NFR BLD AUTO: 0.1 % — SIGNIFICANT CHANGE UP (ref 0–6)
GLUCOSE BLDC GLUCOMTR-MCNC: 121 MG/DL — HIGH (ref 70–99)
GLUCOSE BLDC GLUCOMTR-MCNC: 121 MG/DL — HIGH (ref 70–99)
GLUCOSE BLDC GLUCOMTR-MCNC: 144 MG/DL — HIGH (ref 70–99)
GLUCOSE BLDC GLUCOMTR-MCNC: 166 MG/DL — HIGH (ref 70–99)
GLUCOSE BLDC GLUCOMTR-MCNC: 166 MG/DL — HIGH (ref 70–99)
GLUCOSE SERPL-MCNC: 183 MG/DL — HIGH (ref 70–99)
GLUCOSE SERPL-MCNC: 183 MG/DL — HIGH (ref 70–99)
HCT VFR BLD CALC: 35.8 % — LOW (ref 39–50)
HCT VFR BLD CALC: 35.8 % — LOW (ref 39–50)
HGB BLD-MCNC: 10.9 G/DL — LOW (ref 13–17)
HGB BLD-MCNC: 10.9 G/DL — LOW (ref 13–17)
IMM GRANULOCYTES NFR BLD AUTO: 0.4 % — SIGNIFICANT CHANGE UP (ref 0–0.9)
IMM GRANULOCYTES NFR BLD AUTO: 0.4 % — SIGNIFICANT CHANGE UP (ref 0–0.9)
LYMPHOCYTES # BLD AUTO: 1.02 K/UL — SIGNIFICANT CHANGE UP (ref 1–3.3)
LYMPHOCYTES # BLD AUTO: 1.02 K/UL — SIGNIFICANT CHANGE UP (ref 1–3.3)
LYMPHOCYTES # BLD AUTO: 10.3 % — LOW (ref 13–44)
LYMPHOCYTES # BLD AUTO: 10.3 % — LOW (ref 13–44)
MAGNESIUM SERPL-MCNC: 2.5 MG/DL — SIGNIFICANT CHANGE UP (ref 1.6–2.6)
MAGNESIUM SERPL-MCNC: 2.5 MG/DL — SIGNIFICANT CHANGE UP (ref 1.6–2.6)
MCHC RBC-ENTMCNC: 28.8 PG — SIGNIFICANT CHANGE UP (ref 27–34)
MCHC RBC-ENTMCNC: 28.8 PG — SIGNIFICANT CHANGE UP (ref 27–34)
MCHC RBC-ENTMCNC: 30.4 GM/DL — LOW (ref 32–36)
MCHC RBC-ENTMCNC: 30.4 GM/DL — LOW (ref 32–36)
MCV RBC AUTO: 94.7 FL — SIGNIFICANT CHANGE UP (ref 80–100)
MCV RBC AUTO: 94.7 FL — SIGNIFICANT CHANGE UP (ref 80–100)
MONOCYTES # BLD AUTO: 0.65 K/UL — SIGNIFICANT CHANGE UP (ref 0–0.9)
MONOCYTES # BLD AUTO: 0.65 K/UL — SIGNIFICANT CHANGE UP (ref 0–0.9)
MONOCYTES NFR BLD AUTO: 6.6 % — SIGNIFICANT CHANGE UP (ref 2–14)
MONOCYTES NFR BLD AUTO: 6.6 % — SIGNIFICANT CHANGE UP (ref 2–14)
NEUTROPHILS # BLD AUTO: 8.11 K/UL — HIGH (ref 1.8–7.4)
NEUTROPHILS # BLD AUTO: 8.11 K/UL — HIGH (ref 1.8–7.4)
NEUTROPHILS NFR BLD AUTO: 82.3 % — HIGH (ref 43–77)
NEUTROPHILS NFR BLD AUTO: 82.3 % — HIGH (ref 43–77)
NRBC # BLD: 0 /100 WBCS — SIGNIFICANT CHANGE UP (ref 0–0)
NRBC # BLD: 0 /100 WBCS — SIGNIFICANT CHANGE UP (ref 0–0)
PHOSPHATE SERPL-MCNC: 2.5 MG/DL — SIGNIFICANT CHANGE UP (ref 2.5–4.5)
PHOSPHATE SERPL-MCNC: 2.5 MG/DL — SIGNIFICANT CHANGE UP (ref 2.5–4.5)
PLATELET # BLD AUTO: 349 K/UL — SIGNIFICANT CHANGE UP (ref 150–400)
PLATELET # BLD AUTO: 349 K/UL — SIGNIFICANT CHANGE UP (ref 150–400)
POTASSIUM SERPL-MCNC: 3.3 MMOL/L — LOW (ref 3.5–5.3)
POTASSIUM SERPL-MCNC: 3.3 MMOL/L — LOW (ref 3.5–5.3)
POTASSIUM SERPL-SCNC: 3.3 MMOL/L — LOW (ref 3.5–5.3)
POTASSIUM SERPL-SCNC: 3.3 MMOL/L — LOW (ref 3.5–5.3)
PROT SERPL-MCNC: 7.1 G/DL — SIGNIFICANT CHANGE UP (ref 6–8.3)
PROT SERPL-MCNC: 7.1 G/DL — SIGNIFICANT CHANGE UP (ref 6–8.3)
RBC # BLD: 3.78 M/UL — LOW (ref 4.2–5.8)
RBC # BLD: 3.78 M/UL — LOW (ref 4.2–5.8)
RBC # FLD: 13.5 % — SIGNIFICANT CHANGE UP (ref 10.3–14.5)
RBC # FLD: 13.5 % — SIGNIFICANT CHANGE UP (ref 10.3–14.5)
SODIUM SERPL-SCNC: 148 MMOL/L — HIGH (ref 135–145)
SODIUM SERPL-SCNC: 148 MMOL/L — HIGH (ref 135–145)
WBC # BLD: 9.86 K/UL — SIGNIFICANT CHANGE UP (ref 3.8–10.5)
WBC # BLD: 9.86 K/UL — SIGNIFICANT CHANGE UP (ref 3.8–10.5)
WBC # FLD AUTO: 9.86 K/UL — SIGNIFICANT CHANGE UP (ref 3.8–10.5)
WBC # FLD AUTO: 9.86 K/UL — SIGNIFICANT CHANGE UP (ref 3.8–10.5)

## 2024-01-15 RX ORDER — POTASSIUM CHLORIDE 20 MEQ
40 PACKET (EA) ORAL ONCE
Refills: 0 | Status: COMPLETED | OUTPATIENT
Start: 2024-01-15 | End: 2024-01-15

## 2024-01-15 RX ORDER — QUETIAPINE FUMARATE 200 MG/1
12.5 TABLET, FILM COATED ORAL EVERY 12 HOURS
Refills: 0 | Status: DISCONTINUED | OUTPATIENT
Start: 2024-01-15 | End: 2024-01-16

## 2024-01-15 RX ADMIN — POLYETHYLENE GLYCOL 3350 17 GRAM(S): 17 POWDER, FOR SOLUTION ORAL at 12:48

## 2024-01-15 RX ADMIN — Medication 250 MILLIGRAM(S): at 05:36

## 2024-01-15 RX ADMIN — CEFEPIME 100 MILLIGRAM(S): 1 INJECTION, POWDER, FOR SOLUTION INTRAMUSCULAR; INTRAVENOUS at 05:36

## 2024-01-15 RX ADMIN — QUETIAPINE FUMARATE 12.5 MILLIGRAM(S): 200 TABLET, FILM COATED ORAL at 19:52

## 2024-01-15 RX ADMIN — CEFEPIME 100 MILLIGRAM(S): 1 INJECTION, POWDER, FOR SOLUTION INTRAMUSCULAR; INTRAVENOUS at 14:12

## 2024-01-15 RX ADMIN — CEFEPIME 100 MILLIGRAM(S): 1 INJECTION, POWDER, FOR SOLUTION INTRAMUSCULAR; INTRAVENOUS at 22:55

## 2024-01-15 RX ADMIN — Medication 250 MILLIGRAM(S): at 17:29

## 2024-01-15 RX ADMIN — ATORVASTATIN CALCIUM 20 MILLIGRAM(S): 80 TABLET, FILM COATED ORAL at 22:55

## 2024-01-15 RX ADMIN — Medication 40 MILLIEQUIVALENT(S): at 12:48

## 2024-01-15 RX ADMIN — ENOXAPARIN SODIUM 40 MILLIGRAM(S): 100 INJECTION SUBCUTANEOUS at 14:12

## 2024-01-15 RX ADMIN — CHLORHEXIDINE GLUCONATE 15 MILLILITER(S): 213 SOLUTION TOPICAL at 17:31

## 2024-01-15 RX ADMIN — CHLORHEXIDINE GLUCONATE 15 MILLILITER(S): 213 SOLUTION TOPICAL at 05:36

## 2024-01-15 NOTE — PROGRESS NOTE ADULT - NS ATTEND AMEND GEN_ALL_CORE FT
63 year old mn , from [facility], with  CVA, Alzheimer's, nonverbal at baseline, GERD, Schizophrenia, and Constipation, was brought into the ED s/p mechanical fall, poor oral intake, and Covid-19 infection on 12/27/23 Pt was admitted for COVID PNA, later found with intermittent hypoxia/ hypopnea to Sats 80% and Hypotension despite multiple fluid boluses, also found with bryan to 40s for the last 2 days, in ICU for further monitoring.       ASSESSMENT   - Acute Hypoxic resp failure   - Covid-19 PNA   - Shock septic   - CVA  - Alzheimer dementia   - Bradycardia       Plan   - S/p trach/peg placement 1/12  - Tolerating PSV, trial of trach collar during day time   - Aspiration precautions   - Antibiotics for aspiration will complete 5 days  - Hemodynamic support   - EP cards f/u : no indication for PPM at tis time   - Off Remdesivir due to ADAN  - Isolation : contact and airborne   - Cards following   - Hold AV angel luis blocking agent   - Skin care  - Wound care eval noted  - Start tube feedings today  - Void trial  - Prognosis is poor  - Discharge planing to vent facility 63 year old mn , from [facility], with  CVA, Alzheimer's, nonverbal at baseline, GERD, Schizophrenia, and Constipation, was brought into the ED s/p mechanical fall, poor oral intake, and Covid-19 infection on 12/27/23 Pt was admitted for COVID PNA, later found with intermittent hypoxia/ hypopnea to Sats 80% and Hypotension despite multiple fluid boluses, also found with bryan to 40s for the last 2 days, in ICU for further monitoring.       ASSESSMENT   - Acute Hypoxic resp failure   - Covid-19 PNA   - Shock septic   - CVA  - Alzheimer dementia   - Bradycardia       Plan   - S/p trach/peg placement 1/12  - Tolerating PSV, trial of trach collar during day time   - Aspiration precautions   - Antibiotics for aspiration will complete 5 days  - Hemodynamic support   - EP cards f/u : no indication for PPM at tis time   - Off Remdesivir due to ADAN  - Isolation : contact and airborne   - Cards following   - Hold AV angel luis blocking agent   - Skin care  - Wound care eval noted  - Start tube feedings today  - Void trial  - Prognosis is poor  - Discharge planing to vent facility 63 year old mn , from [facility], with  CVA, Alzheimer's, nonverbal at baseline, GERD, Schizophrenia, and Constipation, was brought into the ED s/p mechanical fall, poor oral intake, and Covid-19 infection on 12/27/23 Pt was admitted for COVID PNA, later found with intermittent hypoxia/ hypopnea to Sats 80% and Hypotension despite multiple fluid boluses, also found with bryan to 40s for the last 2 days, in ICU for further monitoring.       ASSESSMENT   - Acute Hypoxic resp failure   - Covid-19 PNA   - Shock septic   - CVA  - Alzheimer dementia   - Bradycardia       Plan   - S/p trach/peg placement 1/12  - Tolerating PSV, trial of trach collar during day time   - Aspiration precautions   - Antibiotics for aspiration will complete 5 days  - Hemodynamic support   - EP cards f/u : no indication for PPM at tis time   - Off Remdesivir due to ADAN  - Isolation : contact and airborne   - Cards following   - Hold AV angel luis blocking agent   - Skin care  - Wound care eval noted  - Start tube feedings today  - Void trial  - Prognosis is poor  - Discharge planing to vent facility

## 2024-01-15 NOTE — PROGRESS NOTE ADULT - SUBJECTIVE AND OBJECTIVE BOX
C A R D I O L O G Y  **********************************     DATE OF SERVICE: 01-15-24    trached on vent    atorvastatin 20 milliGRAM(s) Oral at bedtime  cefepime   IVPB 2000 milliGRAM(s) IV Intermittent every 8 hours  chlorhexidine 0.12% Liquid 15 milliLiter(s) Oral Mucosa every 12 hours  enoxaparin Injectable 40 milliGRAM(s) SubCutaneous every 24 hours  morphine  - Injectable 2 milliGRAM(s) IV Push every 4 hours PRN  polyethylene glycol 3350 17 Gram(s) Oral daily  valproic  acid Syrup 250 milliGRAM(s) Oral two times a day                            10.9   9.86  )-----------( 349      ( 15 Darrell 2024 06:00 )             35.8       Hemoglobin: 10.9 g/dL (01-15 @ 06:00)  Hemoglobin: 11.0 g/dL (01-14 @ 05:06)  Hemoglobin: 11.9 g/dL (01-13 @ 04:55)  Hemoglobin: 13.2 g/dL (01-12 @ 04:35)  Hemoglobin: 13.1 g/dL (01-11 @ 04:54)      01-15    148<H>  |  112<H>  |  24<H>  ----------------------------<  183<H>  3.3<L>   |  31  |  0.69    Ca    9.6      15 Darrell 2024 06:00  Phos  2.5     01-15  Mg     2.5     01-15    TPro  7.1  /  Alb  2.6<L>  /  TBili  0.8  /  DBili  x   /  AST  5<L>  /  ALT  16  /  AlkPhos  55  01-15    Creatinine Trend: 0.69<--, 0.66<--, 0.50<--, 0.59<--, 0.62<--, 0.76<--    COAGS:           T(C): 36.9 (01-15-24 @ 11:46), Max: 37.8 (01-15-24 @ 08:00)  HR: 82 (01-15-24 @ 11:46) (75 - 82)  BP: 124/77 (01-15-24 @ 11:46) (111/70 - 124/77)  RR: 14 (01-15-24 @ 11:46) (14 - 20)  SpO2: 98% (01-15-24 @ 11:46) (97% - 100%)  Wt(kg): --    I&O's Summary    15 Darrell 2024 07:01  -  15 Darrell 2024 12:56  --------------------------------------------------------  IN: 0 mL / OUT: 400 mL / NET: -400 mL        HEENT:  (-)icterus (-)pallor  CV: N S1 S2 1/6 JOVON (+)2 Pulses B/l  Resp:  Clear to ausculatation B/L, normal effort  GI: (+) BS Soft, NT, ND  Lymph:  (-)Edema, (-)obvious lymphadenopathy  Skin: Warm to touch, Normal turgor  Psych: Unable to assess mood and affect      TELEMETRY: 	 off        ASSESSMENT/PLAN: 	63y Male PMH of CVA, Alzheimer's, nonverbal at baseline, GERD, Schizophrenia, historically preserved LV function and Constipation, was brought into the ED s/p mechanical fall, poor oral intake, and Covid-19 infection on 12/27/23 as per NH papers.    # Bradycardia   - He likely has some degree of sick sinus syndrome that may be exacerbated by Midodrine.  now off midodrine  - EP following      # Hypotension  - Suspect this is due to dehydration and vasodilatory state associated with viral/bacterial infection  - Abx  - s/p  trach and PEG      Gavino Cadet MD, [facility]  BEEPER [phone]   C A R D I O L O G Y  **********************************     DATE OF SERVICE: 01-15-24    trached on vent    atorvastatin 20 milliGRAM(s) Oral at bedtime  cefepime   IVPB 2000 milliGRAM(s) IV Intermittent every 8 hours  chlorhexidine 0.12% Liquid 15 milliLiter(s) Oral Mucosa every 12 hours  enoxaparin Injectable 40 milliGRAM(s) SubCutaneous every 24 hours  morphine  - Injectable 2 milliGRAM(s) IV Push every 4 hours PRN  polyethylene glycol 3350 17 Gram(s) Oral daily  valproic  acid Syrup 250 milliGRAM(s) Oral two times a day                            10.9   9.86  )-----------( 349      ( 15 Darrell 2024 06:00 )             35.8       Hemoglobin: 10.9 g/dL (01-15 @ 06:00)  Hemoglobin: 11.0 g/dL (01-14 @ 05:06)  Hemoglobin: 11.9 g/dL (01-13 @ 04:55)  Hemoglobin: 13.2 g/dL (01-12 @ 04:35)  Hemoglobin: 13.1 g/dL (01-11 @ 04:54)      01-15    148<H>  |  112<H>  |  24<H>  ----------------------------<  183<H>  3.3<L>   |  31  |  0.69    Ca    9.6      15 Darrell 2024 06:00  Phos  2.5     01-15  Mg     2.5     01-15    TPro  7.1  /  Alb  2.6<L>  /  TBili  0.8  /  DBili  x   /  AST  5<L>  /  ALT  16  /  AlkPhos  55  01-15    Creatinine Trend: 0.69<--, 0.66<--, 0.50<--, 0.59<--, 0.62<--, 0.76<--    COAGS:           T(C): 36.9 (01-15-24 @ 11:46), Max: 37.8 (01-15-24 @ 08:00)  HR: 82 (01-15-24 @ 11:46) (75 - 82)  BP: 124/77 (01-15-24 @ 11:46) (111/70 - 124/77)  RR: 14 (01-15-24 @ 11:46) (14 - 20)  SpO2: 98% (01-15-24 @ 11:46) (97% - 100%)  Wt(kg): --    I&O's Summary    15 Darrell 2024 07:01  -  15 Darrell 2024 12:56  --------------------------------------------------------  IN: 0 mL / OUT: 400 mL / NET: -400 mL        HEENT:  (-)icterus (-)pallor  CV: N S1 S2 1/6 JOVON (+)2 Pulses B/l  Resp:  Clear to ausculatation B/L, normal effort  GI: (+) BS Soft, NT, ND  Lymph:  (-)Edema, (-)obvious lymphadenopathy  Skin: Warm to touch, Normal turgor  Psych: Unable to assess mood and affect      TELEMETRY: 	 off        ASSESSMENT/PLAN: 	63y Male PMH of CVA, Alzheimer's, nonverbal at baseline, GERD, Schizophrenia, historically preserved LV function and Constipation, was brought into the ED s/p mechanical fall, poor oral intake, and Covid-19 infection on 12/27/23 as per NH papers.    # Bradycardia   - He likely has some degree of sick sinus syndrome that may be exacerbated by Midodrine.  now off midodrine  - EP following      # Hypotension  - Suspect this is due to dehydration and vasodilatory state associated with viral/bacterial infection  - Abx  - s/p  trach and PEG      Gavino Cadet MD, [facility]  BEEPER [phone]   C A R D I O L O G Y  **********************************     DATE OF SERVICE: 01-15-24    trached on vent    atorvastatin 20 milliGRAM(s) Oral at bedtime  cefepime   IVPB 2000 milliGRAM(s) IV Intermittent every 8 hours  chlorhexidine 0.12% Liquid 15 milliLiter(s) Oral Mucosa every 12 hours  enoxaparin Injectable 40 milliGRAM(s) SubCutaneous every 24 hours  morphine  - Injectable 2 milliGRAM(s) IV Push every 4 hours PRN  polyethylene glycol 3350 17 Gram(s) Oral daily  valproic  acid Syrup 250 milliGRAM(s) Oral two times a day                            10.9   9.86  )-----------( 349      ( 15 Darrell 2024 06:00 )             35.8       Hemoglobin: 10.9 g/dL (01-15 @ 06:00)  Hemoglobin: 11.0 g/dL (01-14 @ 05:06)  Hemoglobin: 11.9 g/dL (01-13 @ 04:55)  Hemoglobin: 13.2 g/dL (01-12 @ 04:35)  Hemoglobin: 13.1 g/dL (01-11 @ 04:54)      01-15    148<H>  |  112<H>  |  24<H>  ----------------------------<  183<H>  3.3<L>   |  31  |  0.69    Ca    9.6      15 Darrell 2024 06:00  Phos  2.5     01-15  Mg     2.5     01-15    TPro  7.1  /  Alb  2.6<L>  /  TBili  0.8  /  DBili  x   /  AST  5<L>  /  ALT  16  /  AlkPhos  55  01-15    Creatinine Trend: 0.69<--, 0.66<--, 0.50<--, 0.59<--, 0.62<--, 0.76<--    COAGS:           T(C): 36.9 (01-15-24 @ 11:46), Max: 37.8 (01-15-24 @ 08:00)  HR: 82 (01-15-24 @ 11:46) (75 - 82)  BP: 124/77 (01-15-24 @ 11:46) (111/70 - 124/77)  RR: 14 (01-15-24 @ 11:46) (14 - 20)  SpO2: 98% (01-15-24 @ 11:46) (97% - 100%)  Wt(kg): --    I&O's Summary    15 Darrell 2024 07:01  -  15 Darrell 2024 12:56  --------------------------------------------------------  IN: 0 mL / OUT: 400 mL / NET: -400 mL        HEENT:  (-)icterus (-)pallor  CV: N S1 S2 1/6 JOVON (+)2 Pulses B/l  Resp:  Clear to ausculatation B/L, normal effort  GI: (+) BS Soft, NT, ND  Lymph:  (-)Edema, (-)obvious lymphadenopathy  Skin: Warm to touch, Normal turgor  Psych: Unable to assess mood and affect      TELEMETRY: 	 off        ASSESSMENT/PLAN: 	63y Male PMH of CVA, Alzheimer's, nonverbal at baseline, GERD, Schizophrenia, historically preserved LV function and Constipation, was brought into the ED s/p mechanical fall, poor oral intake, and Covid-19 infection on 12/27/23 as per [facility] papers.    # Bradycardia   - He likely has some degree of sick sinus syndrome that may be exacerbated by Midodrine.  now off midodrine  - EP following      # Hypotension  - Suspect this is due to dehydration and vasodilatory state associated with viral/bacterial infection  - Abx  - s/p  trach and PEG      Gavino Cadet MD, [facility]  BEEPER [phone]

## 2024-01-15 NOTE — PROGRESS NOTE ADULT - ASSESSMENT
63 year old male, from [facility], with PMH of CVA, Alzheimer's, nonverbal at baseline, GERD, Schizophrenia, and Constipation, was brought into the ED s/p mechanical fall, poor oral intake, and Covid-19 infection on 12/27/23. In ED wbc 14.3, Covid 19 positive.   CXR report showed slight right perihilar infiltrate and there is a left lower perihilar infiltrate. Infiltrates are new. CT HEAD/Cervical : No acute intracranial hemorrhage, mass effect, or osseous: No acute cervical spine fracture or traumatic malalignment. Started on Supplemental O2, Dexamethasone 6mg IV q daily, Ceftriaxone, Azithromycin and Remdesivir protocol. Pt Admitted for Acute respiratory failure with hypoxia, COVID 19 pneumonia with  superimposed bacterial pneumonia.    On 1/2/2024, RRT for desaturation to the 80s.Pt was placed on HF and admitted to the ICU. Pt was weaned from HF to 6LNC.  Feeding was resumed on puree diet, on 1/5/2024, patient desaturated to 80s and failed to improve with HFNC, subsequently requiring intubation. Pt was extubated on 1/8 and was reintubated on 1/9 for inability to manage secretions. Pt noted to be febrile and was treated with 2g cefepime (1/10-1/15) for possible aspiration PNA. Following GOC discussion with son, patient was evaluated by Thoracic surgery and is now s/p Trach and PEG.  Tube feeds and VTE prophylaxis have been resumed.   1/13 Transferred from ICU to SCU. Tolerated 1 hour of SBT with PS 6     63 year old male, from [facility], with PMH of CVA, Alzheimer's, nonverbal at baseline, GERD, Schizophrenia, and Constipation, was brought into the ED s/p mechanical fall, poor oral intake, and Covid-19 infection on 12/27/23. In ED wbc 14.3, Covid 19 positive.   CXR report showed slight right perihilar infiltrate and there is a left lower perihilar infiltrate. Infiltrates are new. CT HEAD/Cervical : No acute intracranial hemorrhage, mass effect, or osseous: No acute cervical spine fracture or traumatic malalignment. Started on Supplemental O2, Dexamethasone 6mg IV q daily, Ceftriaxone, Azithromycin and Remdesivir protocol. Pt Admitted for Acute respiratory failure with hypoxia, COVID 19 pneumonia with  superimposed bacterial pneumonia.    On 1/2/2024, RRT for desaturation to the 80s.Pt was placed on HF and admitted to the ICU. Pt was weaned from HF to 6LNC.  Feeding was resumed on puree diet, on 1/5/2024, patient desaturated to 80s and failed to improve with HFNC, subsequently requiring intubation. Pt was extubated on 1/8 and was reintubated on 1/9 for inability to manage secretions. Pt noted to be febrile and was treated with 2g cefepime (1/10-1/15) for possible aspiration PNA. Following GOC discussion with son, patient was evaluated by Thoracic surgery and is now s/p Trach and PEG.  Tube feeds and VTE prophylaxis have been resumed.   1/13 Transferred from ICU to SCU. Tolerated 1 hour of SBT with PS 6     63 year old male, from [facility], with PMH of CVA, Alzheimer's, nonverbal at baseline, GERD, Schizophrenia, and Constipation, was brought into the ED s/p mechanical fall, poor oral intake, and Covid-19 infection on 12/27/23. In ED wbc 14.3, Covid 19 positive.   CXR report showed slight right perihilar infiltrate and there is a left lower perihilar infiltrate. Infiltrates are new. CT HEAD/Cervical : No acute intracranial hemorrhage, mass effect, or osseous: No acute cervical spine fracture or traumatic malalignment. Started on Supplemental O2, Dexamethasone 6mg IV q daily, Ceftriaxone, Azithromycin and Remdesivir protocol. Pt Admitted for Acute respiratory failure with hypoxia, COVID 19 pneumonia with  superimposed bacterial pneumonia.    On 1/2/2024, RRT for desaturation to the 80s.Pt was placed on HF and admitted to the ICU. Pt was weaned from HF to 6LNC.  Feeding was resumed on puree diet, on 1/5/2024, patient desaturated to 80s and failed to improve with HFNC, subsequently requiring intubation. Pt was extubated on 1/8 and was reintubated on 1/9 for inability to manage secretions. Pt noted to be febrile and was treated with 2g cefepime (1/10-1/15) for possible aspiration PNA. Following GOC discussion with son, patient was evaluated by Thoracic surgery and is now s/p Trach and PEG.  Tube feeds and VTE prophylaxis have been resumed.   1/13 Transferred from ICU to SCU. Tolerated 1 hour of SBT with PS 6

## 2024-01-15 NOTE — PROGRESS NOTE ADULT - SUBJECTIVE AND OBJECTIVE BOX
Time of Visit:  Patient seen and examined.     MEDICATIONS  (STANDING):  atorvastatin 20 milliGRAM(s) Oral at bedtime  cefepime   IVPB 2000 milliGRAM(s) IV Intermittent every 8 hours  chlorhexidine 0.12% Liquid 15 milliLiter(s) Oral Mucosa every 12 hours  enoxaparin Injectable 40 milliGRAM(s) SubCutaneous every 24 hours  polyethylene glycol 3350 17 Gram(s) Oral daily  valproic  acid Syrup 250 milliGRAM(s) Oral two times a day      MEDICATIONS  (PRN):  morphine  - Injectable 2 milliGRAM(s) IV Push every 4 hours PRN Moderate Pain (4 - 6)       Medications up to date at time of exam.      PHYSICAL EXAMINATION:    Vital Signs Last 24 Hrs  T(C): 36.9 (15 Darrell 2024 11:46), Max: 37.8 (15 Darrell 2024 08:00)  T(F): 98.4 (15 Darrell 2024 11:46), Max: 100 (15 Darrell 2024 08:00)  HR: 82 (15 Darrell 2024 11:46) (75 - 82)  BP: 124/77 (15 Darrell 2024 11:46) (111/70 - 124/77)  BP(mean): --  RR: 15 (15 Darrell 2024 14:15) (14 - 20)  SpO2: 97% (15 Darrell 2024 14:15) (97% - 100%)    Parameters below as of 15 Darrell 2024 14:15  Patient On (Oxygen Delivery Method): tracheostomy collar  O2 Flow (L/min): 8  O2 Concentration (%): 35  Mode: AC/ CMV (Assist Control/ Continuous Mandatory Ventilation)  RR (machine): 12  TV (machine): 350  FiO2: 35  PEEP: 5  ITime: 1  MAP: 6  PIP: 10   (if applicable)    General: No acute distress.     HEENT: Bi temporal wasting. No nasal tenderness. Mucous membranes are moist.     NECK: Has tracheostomy , non infected.     LUNGS: Decreased breath sounds. Non labored. No use of accessory muscle.     HEART: S1 S2 Regular rate and rhythm without murmur.    ABDOMEN: Soft, nontender, and nondistended. +ve Peg. Active bowel sounds.     EXTREMITIES: Total care . Non ambulatory.     NEUROLOGIC: Non verbal.      SKIN: Warm, dry, good turgor.      LABS:                        10.9   9.86  )-----------( 349      ( 15 Darrell 2024 06:00 )             35.8     01-15    148<H>  |  112<H>  |  24<H>  ----------------------------<  183<H>  3.3<L>   |  31  |  0.69    Ca    9.6      15 Darrell 2024 06:00  Phos  2.5     01-15  Mg     2.5     01-15    TPro  7.1  /  Alb  2.6<L>  /  TBili  0.8  /  DBili  x   /  AST  5<L>  /  ALT  16  /  AlkPhos  55  01-15      Urinalysis Basic - ( 15 Darrell 2024 06:00 )    Color: x / Appearance: x / SG: x / pH: x  Gluc: 183 mg/dL / Ketone: x  / Bili: x / Urobili: x   Blood: x / Protein: x / Nitrite: x   Leuk Esterase: x / RBC: x / WBC x   Sq Epi: x / Non Sq Epi: x / Bacteria: x          MICROBIOLOGY: (if applicable)    RADIOLOGY & ADDITIONAL STUDIES:  EKG:   CXR:  ECHO:    IMPRESSION: 63y Male PAST MEDICAL & SURGICAL HISTORY:  CVA (cerebral vascular accident)      CVA (cerebral vascular accident)      HLD (hyperlipidemia)      S/P percutaneous endoscopic gastrostomy (PEG) tube placement    Impression; This is a 64 Y/O Male from [facility] was   brought into the ED s/p mechanical fall, poor oral intake, and Covid-19 infection on 12/27/23 Pt was admitted for COVID PNA, later found with intermittent hypoxia/ hypopnea to Sats 80% and Hypotension despite multiple fluid boluses, also found with bryan to 40s x last 2 days, in ICU got intubated and extubated on 01-08-24 and reintubated on 01-09-24  and now in SCU  with Acute hypoxic respiratory failure had Covid Pneumonia . s/p Trach on 01-12-24.    Suggestion:   Continue mechanical ventilation with Daily SBT as tolerated .  Oral, trach care, suction.  Not a candidate for decannulation at this time.   Has completed multiple rounds of antibiotics. On Cefepime 2 Gm IVPB Q 8 Hours.   Aspiration precautions with HOB elevation.    Completed remdesivir and steroids.  Will need placement to vent facility .     Time of Visit:  Patient seen and examined.     MEDICATIONS  (STANDING):  atorvastatin 20 milliGRAM(s) Oral at bedtime  cefepime   IVPB 2000 milliGRAM(s) IV Intermittent every 8 hours  chlorhexidine 0.12% Liquid 15 milliLiter(s) Oral Mucosa every 12 hours  enoxaparin Injectable 40 milliGRAM(s) SubCutaneous every 24 hours  polyethylene glycol 3350 17 Gram(s) Oral daily  valproic  acid Syrup 250 milliGRAM(s) Oral two times a day      MEDICATIONS  (PRN):  morphine  - Injectable 2 milliGRAM(s) IV Push every 4 hours PRN Moderate Pain (4 - 6)       Medications up to date at time of exam.      PHYSICAL EXAMINATION:    Vital Signs Last 24 Hrs  T(C): 36.9 (15 Darrell 2024 11:46), Max: 37.8 (15 Darrell 2024 08:00)  T(F): 98.4 (15 Darrell 2024 11:46), Max: 100 (15 Darrell 2024 08:00)  HR: 82 (15 Darrell 2024 11:46) (75 - 82)  BP: 124/77 (15 Darrell 2024 11:46) (111/70 - 124/77)  BP(mean): --  RR: 15 (15 Darrell 2024 14:15) (14 - 20)  SpO2: 97% (15 Darrell 2024 14:15) (97% - 100%)    Parameters below as of 15 Darrell 2024 14:15  Patient On (Oxygen Delivery Method): tracheostomy collar  O2 Flow (L/min): 8  O2 Concentration (%): 35  Mode: AC/ CMV (Assist Control/ Continuous Mandatory Ventilation)  RR (machine): 12  TV (machine): 350  FiO2: 35  PEEP: 5  ITime: 1  MAP: 6  PIP: 10   (if applicable)    General: No acute distress.     HEENT: Bi temporal wasting. No nasal tenderness. Mucous membranes are moist.     NECK: Has tracheostomy , non infected.     LUNGS: Decreased breath sounds. Non labored. No use of accessory muscle.     HEART: S1 S2 Regular rate and rhythm without murmur.    ABDOMEN: Soft, nontender, and nondistended. +ve Peg. Active bowel sounds.     EXTREMITIES: Total care . Non ambulatory.     NEUROLOGIC: Non verbal.      SKIN: Warm, dry, good turgor.      LABS:                        10.9   9.86  )-----------( 349      ( 15 Darrell 2024 06:00 )             35.8     01-15    148<H>  |  112<H>  |  24<H>  ----------------------------<  183<H>  3.3<L>   |  31  |  0.69    Ca    9.6      15 Darrell 2024 06:00  Phos  2.5     01-15  Mg     2.5     01-15    TPro  7.1  /  Alb  2.6<L>  /  TBili  0.8  /  DBili  x   /  AST  5<L>  /  ALT  16  /  AlkPhos  55  01-15      Urinalysis Basic - ( 15 Darrell 2024 06:00 )    Color: x / Appearance: x / SG: x / pH: x  Gluc: 183 mg/dL / Ketone: x  / Bili: x / Urobili: x   Blood: x / Protein: x / Nitrite: x   Leuk Esterase: x / RBC: x / WBC x   Sq Epi: x / Non Sq Epi: x / Bacteria: x          MICROBIOLOGY: (if applicable)    RADIOLOGY & ADDITIONAL STUDIES:  EKG:   CXR:  ECHO:    IMPRESSION: 63y Male PAST MEDICAL & SURGICAL HISTORY:  CVA (cerebral vascular accident)      CVA (cerebral vascular accident)      HLD (hyperlipidemia)      S/P percutaneous endoscopic gastrostomy (PEG) tube placement    Impression; This is a 64 Y/O Male from [facility] was   brought into the ED s/p mechanical fall, poor oral intake, and Covid-19 infection on 12/27/23 Pt was admitted for COVID PNA, later found with intermittent hypoxia/ hypopnea to Sats 80% and Hypotension despite multiple fluid boluses, also found with bryan to 40s x last 2 days, in ICU got intubated and extubated on 01-08-24 and reintubated on 01-09-24  and now in SCU  with Acute hypoxic respiratory failure had Covid Pneumonia . s/p Trach on 01-12-24.    Suggestion:   Continue mechanical ventilation with Daily SBT as tolerated .  Oral, trach care, suction.  Not a candidate for decannulation at this time.   Has completed multiple rounds of antibiotics. On Cefepime 2 Gm IVPB Q 8 Hours.   Aspiration precautions with HOB elevation.    Completed remdesivir and steroids.  Will need placement to vent facility .

## 2024-01-15 NOTE — PROGRESS NOTE ADULT - SUBJECTIVE AND OBJECTIVE BOX
EP ATTENDING    no tele, but no further bradycardia on vital signs  trached, in bed comfortable, ROS difficult to obtain      DATE OF SERVICE - 01-15-24     Review of Systems:   Constitutional: [ ] fevers, [ ] chills.   Skin: [ ] dry skin. [ ] rashes.  Psychiatric: [ ] depression, [ ] anxiety.   Gastrointestinal: [ ] BRBPR, [ ] melena.   Neurological: [ ] confusion. [ ] seizures. [ ] shuffling gait.   Ears,Nose,Mouth and Throat: [ ] ear pain [ ] sore throat.   Eyes: [ ] diplopia.   Respiratory: [ ] hemoptysis. [ ] shortness of breath  Cardiovascular: See HPI above  Hematologic/Lymphatic: [ ] anemia. [ ] painful nodes. [ ] prolonged bleeding.   Genitourinary: [ ] hematuria. [ ] flank pain.   Endocrine: [ ] significant change in weight. [ ] intolerance to heat and cold.     Review of systems [ x] otherwise negative, [ ] otherwise unable to obtain    FH: no family history of sudden cardiac death in first degree relatives    SH: [ ] tobacco, [ ] alcohol, [ ] drugs    atorvastatin 20 milliGRAM(s) Oral at bedtime  cefepime   IVPB 2000 milliGRAM(s) IV Intermittent every 8 hours  chlorhexidine 0.12% Liquid 15 milliLiter(s) Oral Mucosa every 12 hours  enoxaparin Injectable 40 milliGRAM(s) SubCutaneous every 24 hours  morphine  - Injectable 2 milliGRAM(s) IV Push every 4 hours PRN  polyethylene glycol 3350 17 Gram(s) Oral daily  valproic  acid Syrup 250 milliGRAM(s) Oral two times a day                            10.9   9.86  )-----------( 349      ( 15 Darrell 2024 06:00 )             35.8       01-15    148<H>  |  112<H>  |  24<H>  ----------------------------<  183<H>  3.3<L>   |  31  |  0.69    Ca    9.6      15 Darrell 2024 06:00  Phos  2.5     01-15  Mg     2.5     01-15    TPro  7.1  /  Alb  2.6<L>  /  TBili  0.8  /  DBili  x   /  AST  5<L>  /  ALT  16  /  AlkPhos  55  01-15      T(C): 36.9 (01-15-24 @ 11:46), Max: 37.8 (01-15-24 @ 08:00)  HR: 82 (01-15-24 @ 11:46) (75 - 82)  BP: 124/77 (01-15-24 @ 11:46) (111/70 - 124/77)  RR: 14 (01-15-24 @ 11:46) (14 - 20)  SpO2: 98% (01-15-24 @ 11:46) (97% - 100%)  Wt(kg): --    I&O's Summary    15 Darrell 2024 07:01  -  15 Darrell 2024 14:03  --------------------------------------------------------  IN: 0 mL / OUT: 400 mL / NET: -400 mL      Head: Normocephalic and atraumatic.   Neck: No JVD. No bruits. Supple. Does not appear to be enlarged.   Cardiovascular: + S1,S2 ; RRR Soft systolic murmur at the left lower sternal border. No rubs noted.    Lungs: CTA b/l. No rhonchi, rales or wheezes.   Abdomen: + BS, soft. Non tender. Non distended. No rebound. No guarding.   Extremities: No clubbing/cyanosis/edema.   Skin: Warm and moist. The patient's skin has normal elasticity and good skin turgor.       echo: mild LV dysfunction, otherwise unremarkable    A/P) 64 y/o male who resides at a nursing home due to previous stroke, hyperlipidemia, schizophrenia and Alzheimer's dementia a/w covid. EP called for periods of asymptomatic sinus bradycardia. He is now s/p trach and PEG.    -continue lipitor for hyperlipidemia  -consider low dose beta blockers and ACEi for mild LV dysfunction, will defer to cardiology  -no need for PPM as he remains asymptomatic and hasn't had any malignant sinus pauses    Ramses Pena M.D.  Cardiac Electrophysiology  [phone] EP ATTENDING    no tele, but no further bradycardia on vital signs  trached, in bed comfortable, ROS difficult to obtain      DATE OF SERVICE - 01-15-24     Review of Systems:   Constitutional: [ ] fevers, [ ] chills.   Skin: [ ] dry skin. [ ] rashes.  Psychiatric: [ ] depression, [ ] anxiety.   Gastrointestinal: [ ] BRBPR, [ ] melena.   Neurological: [ ] confusion. [ ] seizures. [ ] shuffling gait.   Ears,Nose,Mouth and Throat: [ ] ear pain [ ] sore throat.   Eyes: [ ] diplopia.   Respiratory: [ ] hemoptysis. [ ] shortness of breath  Cardiovascular: See HPI above  Hematologic/Lymphatic: [ ] anemia. [ ] painful nodes. [ ] prolonged bleeding.   Genitourinary: [ ] hematuria. [ ] flank pain.   Endocrine: [ ] significant change in weight. [ ] intolerance to heat and cold.     Review of systems [ x] otherwise negative, [ ] otherwise unable to obtain    FH: no family history of sudden cardiac death in first degree relatives    SH: [ ] tobacco, [ ] alcohol, [ ] drugs    atorvastatin 20 milliGRAM(s) Oral at bedtime  cefepime   IVPB 2000 milliGRAM(s) IV Intermittent every 8 hours  chlorhexidine 0.12% Liquid 15 milliLiter(s) Oral Mucosa every 12 hours  enoxaparin Injectable 40 milliGRAM(s) SubCutaneous every 24 hours  morphine  - Injectable 2 milliGRAM(s) IV Push every 4 hours PRN  polyethylene glycol 3350 17 Gram(s) Oral daily  valproic  acid Syrup 250 milliGRAM(s) Oral two times a day                            10.9   9.86  )-----------( 349      ( 15 Darrell 2024 06:00 )             35.8       01-15    148<H>  |  112<H>  |  24<H>  ----------------------------<  183<H>  3.3<L>   |  31  |  0.69    Ca    9.6      15 Darrell 2024 06:00  Phos  2.5     01-15  Mg     2.5     01-15    TPro  7.1  /  Alb  2.6<L>  /  TBili  0.8  /  DBili  x   /  AST  5<L>  /  ALT  16  /  AlkPhos  55  01-15      T(C): 36.9 (01-15-24 @ 11:46), Max: 37.8 (01-15-24 @ 08:00)  HR: 82 (01-15-24 @ 11:46) (75 - 82)  BP: 124/77 (01-15-24 @ 11:46) (111/70 - 124/77)  RR: 14 (01-15-24 @ 11:46) (14 - 20)  SpO2: 98% (01-15-24 @ 11:46) (97% - 100%)  Wt(kg): --    I&O's Summary    15 Darrell 2024 07:01  -  15 Darrell 2024 14:03  --------------------------------------------------------  IN: 0 mL / OUT: 400 mL / NET: -400 mL      Head: Normocephalic and atraumatic.   Neck: No JVD. No bruits. Supple. Does not appear to be enlarged.   Cardiovascular: + S1,S2 ; RRR Soft systolic murmur at the left lower sternal border. No rubs noted.    Lungs: CTA b/l. No rhonchi, rales or wheezes.   Abdomen: + BS, soft. Non tender. Non distended. No rebound. No guarding.   Extremities: No clubbing/cyanosis/edema.   Skin: Warm and moist. The patient's skin has normal elasticity and good skin turgor.       echo: mild LV dysfunction, otherwise unremarkable    A/P) 64 y/o male who resides at a nursing home due to previous stroke, hyperlipidemia, schizophrenia and Alzheimer's dementia a/w covid. EP called for periods of asymptomatic sinus bradycardia. He is now s/p trach and PEG.    -continue lipitor for hyperlipidemia  -consider low dose beta blockers and ACEi for mild LV dysfunction, will defer to cardiology  -no need for PPM as he remains asymptomatic and hasn't had any malignant sinus pauses    Ramses Pena M.D.  Cardiac Electrophysiology  [phone] EP ATTENDING    no tele, but no further bradycardia on vital signs  trached, in bed comfortable, ROS difficult to obtain      DATE OF SERVICE - 01-15-24     Review of Systems:   Constitutional: [ ] fevers, [ ] chills.   Skin: [ ] dry skin. [ ] rashes.  Psychiatric: [ ] depression, [ ] anxiety.   Gastrointestinal: [ ] BRBPR, [ ] melena.   Neurological: [ ] confusion. [ ] seizures. [ ] shuffling gait.   Ears,Nose,Mouth and Throat: [ ] ear pain [ ] sore throat.   Eyes: [ ] diplopia.   Respiratory: [ ] hemoptysis. [ ] shortness of breath  Cardiovascular: See HPI above  Hematologic/Lymphatic: [ ] anemia. [ ] painful nodes. [ ] prolonged bleeding.   Genitourinary: [ ] hematuria. [ ] flank pain.   Endocrine: [ ] significant change in weight. [ ] intolerance to heat and cold.     Review of systems [ x] otherwise negative, [ ] otherwise unable to obtain    FH: no family history of sudden cardiac death in first degree relatives    SH: [ ] tobacco, [ ] alcohol, [ ] drugs    atorvastatin 20 milliGRAM(s) Oral at bedtime  cefepime   IVPB 2000 milliGRAM(s) IV Intermittent every 8 hours  chlorhexidine 0.12% Liquid 15 milliLiter(s) Oral Mucosa every 12 hours  enoxaparin Injectable 40 milliGRAM(s) SubCutaneous every 24 hours  morphine  - Injectable 2 milliGRAM(s) IV Push every 4 hours PRN  polyethylene glycol 3350 17 Gram(s) Oral daily  valproic  acid Syrup 250 milliGRAM(s) Oral two times a day                            10.9   9.86  )-----------( 349      ( 15 Darrell 2024 06:00 )             35.8       01-15    148<H>  |  112<H>  |  24<H>  ----------------------------<  183<H>  3.3<L>   |  31  |  0.69    Ca    9.6      15 Darrell 2024 06:00  Phos  2.5     01-15  Mg     2.5     01-15    TPro  7.1  /  Alb  2.6<L>  /  TBili  0.8  /  DBili  x   /  AST  5<L>  /  ALT  16  /  AlkPhos  55  01-15      T(C): 36.9 (01-15-24 @ 11:46), Max: 37.8 (01-15-24 @ 08:00)  HR: 82 (01-15-24 @ 11:46) (75 - 82)  BP: 124/77 (01-15-24 @ 11:46) (111/70 - 124/77)  RR: 14 (01-15-24 @ 11:46) (14 - 20)  SpO2: 98% (01-15-24 @ 11:46) (97% - 100%)  Wt(kg): --    I&O's Summary    15 Darrell 2024 07:01  -  15 Darrell 2024 14:03  --------------------------------------------------------  IN: 0 mL / OUT: 400 mL / NET: -400 mL      Head: Normocephalic and atraumatic.   Neck: No JVD. No bruits. Supple. Does not appear to be enlarged.   Cardiovascular: + S1,S2 ; RRR Soft systolic murmur at the left lower sternal border. No rubs noted.    Lungs: CTA b/l. No rhonchi, rales or wheezes.   Abdomen: + BS, soft. Non tender. Non distended. No rebound. No guarding.   Extremities: No clubbing/cyanosis/edema.   Skin: Warm and moist. The patient's skin has normal elasticity and good skin turgor.       echo: mild LV dysfunction, otherwise unremarkable    A/P) 64 y/o male who resides at a nursing home due to previous stroke, hyperlipidemia, schizophrenia and Alzheimer's dementia a/w covid. EP called for periods of asymptomatic sinus bradycardia. He is now s/p trach and PEG.    -continue lipitor for hyperlipidemia  -consider low dose beta blockers and ACEi for mild LV dysfunction, will defer to cardiology  -no need for PPM as he remains asymptomatic and hasn't had any malignant sinus pauses    Ramses Pena M.D.  Cardiac Electrophysiology  [phone]

## 2024-01-15 NOTE — PROGRESS NOTE ADULT - SUBJECTIVE AND OBJECTIVE BOX
Patient is a 63y old  Male who presents with a chief complaint of Sepsis and AHRF (15 Darrell 2024 14:02)    PATIENT IS SEEN AND EXAMINED IN SCU    ALLERGIES:  No Known Allergies      VITALS:    Vital Signs Last 24 Hrs  T(C): 36.4 (15 Darrell 2024 22:04), Max: 37.8 (15 Darrell 2024 08:00)  T(F): 97.6 (15 Darrell 2024 22:04), Max: 100 (15 Darrell 2024 08:00)  HR: 86 (15 Darrell 2024 22:04) (79 - 86)  BP: 112/68 (15 Darrell 2024 22:04) (112/68 - 124/77)  BP(mean): --  RR: 15 (15 Darrell 2024 22:04) (14 - 18)  SpO2: 98% (15 Darrell 2024 22:04) (97% - 100%)    Parameters below as of 15 Darrell 2024 22:04  Patient On (Oxygen Delivery Method): ventilator        LABS:    CBC Full  -  ( 15 Darrell 2024 06:00 )  WBC Count : 9.86 K/uL  RBC Count : 3.78 M/uL  Hemoglobin : 10.9 g/dL  Hematocrit : 35.8 %  Platelet Count - Automated : 349 K/uL  Mean Cell Volume : 94.7 fl  Mean Cell Hemoglobin : 28.8 pg  Mean Cell Hemoglobin Concentration : 30.4 gm/dL  Auto Neutrophil # : 8.11 K/uL  Auto Lymphocyte # : 1.02 K/uL  Auto Monocyte # : 0.65 K/uL  Auto Eosinophil # : 0.01 K/uL  Auto Basophil # : 0.03 K/uL  Auto Neutrophil % : 82.3 %  Auto Lymphocyte % : 10.3 %  Auto Monocyte % : 6.6 %  Auto Eosinophil % : 0.1 %  Auto Basophil % : 0.3 %      01-15    148<H>  |  112<H>  |  24<H>  ----------------------------<  183<H>  3.3<L>   |  31  |  0.69    Ca    9.6      15 Darrell 2024 06:00  Phos  2.5     01-15  Mg     2.5     01-15    TPro  7.1  /  Alb  2.6<L>  /  TBili  0.8  /  DBili  x   /  AST  5<L>  /  ALT  16  /  AlkPhos  55  01-15    CAPILLARY BLOOD GLUCOSE      POCT Blood Glucose.: 144 mg/dL (15 Darrell 2024 16:58)  POCT Blood Glucose.: 121 mg/dL (15 Darrell 2024 11:34)  POCT Blood Glucose.: 166 mg/dL (15 Darrell 2024 06:14)  POCT Blood Glucose.: 142 mg/dL (14 Jan 2024 23:32)        LIVER FUNCTIONS - ( 15 Darrell 2024 06:00 )  Alb: 2.6 g/dL / Pro: 7.1 g/dL / ALK PHOS: 55 U/L / ALT: 16 U/L DA / AST: 5 U/L / GGT: x           Creatinine Trend: 0.69<--, 0.66<--, 0.50<--, 0.59<--, 0.62<--, 0.76<--  I&O's Summary    15 Darrell 2024 07:01  -  15 Darrell 2024 22:38  --------------------------------------------------------  IN: 0 mL / OUT: 800 mL / NET: -800 mL            Nares/Axilla/Groin Nares/Axilla/Groin  01-11 @ 22:20   Culture NEGATIVE for Candida auris.  This surveillance culture is intended for Infection Control purposes only.  A negative result does not preclude the carriage of other fungal  organisms.  --  --      Catheterized Catheterized  12-29 @ 12:37   No growth  --  --      .Blood Blood  12-29 @ 02:05   No growth at 5 days  --  --      .Blood Blood  12-29 @ 01:55   No growth at 5 days  --  --          MEDICATIONS:    MEDICATIONS  (STANDING):  atorvastatin 20 milliGRAM(s) Oral at bedtime  cefepime   IVPB 2000 milliGRAM(s) IV Intermittent every 8 hours  chlorhexidine 0.12% Liquid 15 milliLiter(s) Oral Mucosa every 12 hours  enoxaparin Injectable 40 milliGRAM(s) SubCutaneous every 24 hours  polyethylene glycol 3350 17 Gram(s) Oral daily  QUEtiapine 12.5 milliGRAM(s) Oral every 12 hours  valproic  acid Syrup 250 milliGRAM(s) Oral two times a day      MEDICATIONS  (PRN):  morphine  - Injectable 2 milliGRAM(s) IV Push every 4 hours PRN Moderate Pain (4 - 6)      REVIEW OF SYSTEMS:                           ALL ROS DONE [ X   ]    CONSTITUTIONAL:  LETHARGIC [   ], FEVER [   ], UNRESPONSIVE [   ]  CVS:  CP  [   ], SOB, [   ], PALPITATIONS [   ], DIZZYNESS [   ]  RS: COUGH [   ], SPUTUM [   ]  GI: ABDOMINAL PAIN [   ], NAUSEA [   ], VOMITINGS [   ], DIARRHEA [   ], CONSTIPATION [   ]  :  DYSURIA [   ], NOCTURIA [   ], INCREASED FREQUENCY [   ], DRIBLING [   ],  SKELETAL: PAINFUL JOINTS [   ], SWOLLEN JOINTS [   ], NECK ACHE [   ], LOW BACK ACHE [   ],  SKIN : ULCERS [   ], RASH [   ], ITCHING [   ]  CNS: HEAD ACHE [   ], DOUBLE VISION [   ], BLURRED VISION [   ], AMS / CONFUSION [   ], SEIZURES [   ], WEAKNESS [   ],TINGLING / NUMBNESS [   ]      PHYSICAL EXAMINATION:    GENERAL APPEARANCE: NO DISTRESS  HEENT:  NO PALLOR, NO  JVD,  NO   NODES, NECK SUPPLE  CVS: S1 +, S2 +,   RS: AEEB,  OCCASIONAL  RALES +,   RHONCHI +   TRACHEOSTOMY  ABD: SOFT, NT, NO, BS +    PEG +  EXT: NO PE  SKIN: WARM,   SKELETAL:  REDUCED ROM OF CERVICAL AND LS SPINE  CNS:  AAO X 1        RADIOLOGY :    RADIOLOGY AND READINGS REVIEWED        ASSESSMENT :     Infection due to severe acute respiratory syndrome coronavirus 2 (SARS-CoV-2)    CVA (cerebral vascular accident)    HLD (hyperlipidemia)    S/P percutaneous endoscopic gastrostomy (PEG) tube placement        PLAN:  HPI:  A 63 year old male, from [facility], with PMH of CVA, Alzheimer's, nonverbal at baseline, GERD, Schizophrenia, and Constipation, was brought into the ED s/p mechanical fall, poor oral intake, and Covid-19 infection on 12/27/23 as per NH papers. Unable to obtain history from patient since he is nonverbal, history obtained from chart review.  (28 Dec 2023 20:54)      # PATIENT IS DOWN GRADED TO FLOOR FROM ICU     # PROGNOSIS IS POOR/GUARDED - CRITICAL CARE TEAM DISCUSSING W/ FAMILY REGARDING GOC. FAMILY WISHES FOR PEG, TRACHEOSTOMY.     # [1/3] S/P RAPID RESPONSE FOR HYPOTENSION AND HYPOXIA - S/P IV FLUIDS AND PLACED ON NRB FOR HYPOXIA. CRITICAL CARE EVALUATION REQUESTED.     # RESOLVING SEPTIC SHOCK S/T ASPIRATION PNA + DIARRHEA [resolved]  # RESOLVING ACUTE HYPOXIC RESPIRATORY FAILURE S/T ? ASPIRATION EVENT   , COVID19 INFECTION     S/P TRACHEOSTOMY [1/12]    - CONTINUE CEFEPIME, COMPLETED DECADRON  - CHEST PT    - BCX [NGTD] AND UCX [NGTD]  - S/P IVF, VASOPRESSORS  - ID CONSULT  - CRITICAL CARE MANAGEMENT IN PROGRESS    - [1/4] - PATIENT W/ ? ASPIRATION EVENT - SUBSEQUENTLY REQUIRED INTUBATION W/ MECHANICAL VENTILATION  - [1/8] - EXTUBATED, CHEST PT  - [1/9] - REINTUBATED OVERNIGHT    - S/P TRACHEOSTOMY + PEG PLACEMENT [1/12]    # DYSPHAGIA S/T CVA  - S/P PEG PLACEMENT 1/12    # BRADYCARDIA  - MONITOR ON TELEMETRY  - ECHOCARDIOGRAM - LV SYSTOLIC FUNCTION MILDLY REDUCED  - OPTIMIZE ELECTROLYTES  - CARDIOLOGY CONSULT  - EP CONSULT - NO RECC FOR PPM AT PRESENT    # HYPOKALEMIA  - REPLETING WITH SUPPLEMENT    # SEVERE PROTEIN CALORIE MALNUTRITION   - ON SUPPLEMENTAL NUTRITION    # HX OF CVA  # HX OF DEMENTIA  # SCHIZOPHRENIA  # GI AND DVT PPX

## 2024-01-15 NOTE — PROGRESS NOTE ADULT - NS ATTEND AMEND GEN_ALL_CORE FT
Impression; This is a 62 Y/O Male from [facility] was   brought into the ED s/p mechanical fall, poor oral intake, and Covid-19 infection on 12/27/23 Pt was admitted for COVID PNA, later found with intermittent hypoxia/ hypopnea to Sats 80% and Hypotension despite multiple fluid boluses, also found with bryan to 40s x last 2 days, in ICU got intubated and extubated on 01-08-24 and reintubated on 01-09-24  and now in SCU  with Acute hypoxic respiratory failure had Covid Pneumonia . s/p Trach on 01-12-24.    Suggestion:   Continue mechanical ventilation with Daily SBT as tolerated .  Oral, trach care, suction.  Not a candidate for decannulation at this time.   Has completed multiple rounds of antibiotics. On Cefepime 2 Gm IVPB Q 8 Hours.   Aspiration precautions with HOB elevation.    Completed remdesivir and steroids.  Will need placement to vent facility . Impression; This is a 64 Y/O Male from [facility] was   brought into the ED s/p mechanical fall, poor oral intake, and Covid-19 infection on 12/27/23 Pt was admitted for COVID PNA, later found with intermittent hypoxia/ hypopnea to Sats 80% and Hypotension despite multiple fluid boluses, also found with bryan to 40s x last 2 days, in ICU got intubated and extubated on 01-08-24 and reintubated on 01-09-24  and now in SCU  with Acute hypoxic respiratory failure had Covid Pneumonia . s/p Trach on 01-12-24.    Suggestion:   Continue mechanical ventilation with Daily SBT as tolerated .  Oral, trach care, suction.  Not a candidate for decannulation at this time.   Has completed multiple rounds of antibiotics. On Cefepime 2 Gm IVPB Q 8 Hours.   Aspiration precautions with HOB elevation.    Completed remdesivir and steroids.  Will need placement to vent facility .

## 2024-01-15 NOTE — PROGRESS NOTE ADULT - SUBJECTIVE AND OBJECTIVE BOX
PAM JIMENEZ    SCU progress note    INTERVAL HPI/OVERNIGHT EVENTS: ***Tolerated 1 hour of SBT yesterday with PS 6.    DNR [ ]   DNI  [  ]   FULL CODE    Covid - 19 PCR:     The 4Ms    What Matters Most: see GOC  Age appropriate Medications/Screen for High Risk Medication: Yes  Mentation: see CAM below  Mobility: defer to physical exam    The Confusion Assessment Method (CAM) Diagnostic Algorithm     1: Acute Onset or Fluctuating Course  - Is there evidence of an acute change in mental status from the patient’s baseline? Did the (abnormal) behavior  fluctuate during the day, that is, tend to come and go, or increase and decrease in severity?  [ ] YES [x ] NO     2: Inattention  - Did the patient have difficulty focusing attention, being easily distractible, or having difficulty keeping track of what was being said?  [ ] YES [ x] NO     3: Disorganized thinking  -Was the patient’s thinking disorganized or incoherent, such as rambling or irrelevant conversation, unclear or illogical flow of ideas, or unpredictable switching from subject to subject?  [ ] YES [ ] NO   Unable to access    4: Altered Level of consciousness?  [ ] YES [x ] NO    The diagnosis of delirium by CAM requires the presence of features 1 and 2 and either 3 or 4.    PRESSORS: [ ] YES [x ] NO  cefepime   IVPB 2000 milliGRAM(s) IV Intermittent every 8 hours    Cardiovascular:  Heart Failure  Acute   Acute on Chronic  Chronic         Pulmonary:    Hematalogic:  enoxaparin Injectable 40 milliGRAM(s) SubCutaneous every 24 hours    Other:  atorvastatin 20 milliGRAM(s) Oral at bedtime  chlorhexidine 0.12% Liquid 15 milliLiter(s) Oral Mucosa every 12 hours  morphine  - Injectable 2 milliGRAM(s) IV Push every 4 hours PRN  polyethylene glycol 3350 17 Gram(s) Oral daily  valproic  acid Syrup 250 milliGRAM(s) Oral two times a day    atorvastatin 20 milliGRAM(s) Oral at bedtime  cefepime   IVPB 2000 milliGRAM(s) IV Intermittent every 8 hours  chlorhexidine 0.12% Liquid 15 milliLiter(s) Oral Mucosa every 12 hours  enoxaparin Injectable 40 milliGRAM(s) SubCutaneous every 24 hours  morphine  - Injectable 2 milliGRAM(s) IV Push every 4 hours PRN  polyethylene glycol 3350 17 Gram(s) Oral daily  valproic  acid Syrup 250 milliGRAM(s) Oral two times a day    Drug Dosing Weight  Height (cm): 167.6 (03 Aug 2022 20:11)  Weight (kg): 57 (03 Jan 2024 14:30)  BMI (kg/m2): 20.3 (03 Jan 2024 14:30)  BSA (m2): 1.64 (03 Jan 2024 14:30)    CENTRAL LINE: [ ] YES [x ] NO  LOCATION:   DATE INSERTED:  REMOVE: [ ] YES [ ] NO  EXPLAIN:    ACEVEDO: [ ] YES [x ] NO    DATE INSERTED:  REMOVE:  [ ] YES [ ] NO  EXPLAIN:    PAST MEDICAL & SURGICAL HISTORY:  CVA (cerebral vascular accident)      CVA (cerebral vascular accident)      HLD (hyperlipidemia)      S/P percutaneous endoscopic gastrostomy (PEG) tube placement                      Mode: AC/ CMV (Assist Control/ Continuous Mandatory Ventilation)  RR (machine): 12  TV (machine): 350  FiO2: 40  PEEP: 5  ITime: 1  MAP: 6  PIP: 10      PHYSICAL EXAM:    GENERAL: NAD, cachectic with severe temporal and muscular waisting.  HEAD:  Atraumatic, Normocephalic  EYES: EOMI, PERRLA, conjunctiva and sclera clear  ENMT: No tonsillar erythema, exudates, or enlargement; Moist mucous membranes, Good dentition, No lesions  NECK: Supple, No JVD, Normal thyroid  NERVOUS SYSTEM:  Alert & Oriented X3, Good concentration; Motor Strength 5/5 B/L upper and lower extremities; DTRs 2+ intact and symmetric  CHEST/LUNG: Diminished breath sounds bilateral bases     HEART: Regular rate and rhythm; No murmurs, rubs, or gallops  ABDOMEN: +Peg intact  Soft, Nontender, Nondistended; Bowel sounds present  EXTREMITIES:  2+ Peripheral Pulses, No clubbing, cyanosis, or edema  LYMPH: No lymphadenopathy noted  SKIN: No rashes or lesions      LABS:  CBC Full  -  ( 15 Darrell 2024 06:00 )  WBC Count : 9.86 K/uL  RBC Count : 3.78 M/uL  Hemoglobin : 10.9 g/dL  Hematocrit : 35.8 %  Platelet Count - Automated : 349 K/uL  Mean Cell Volume : 94.7 fl  Mean Cell Hemoglobin : 28.8 pg  Mean Cell Hemoglobin Concentration : 30.4 gm/dL  Auto Neutrophil # : 8.11 K/uL  Auto Lymphocyte # : 1.02 K/uL  Auto Monocyte # : 0.65 K/uL  Auto Eosinophil # : 0.01 K/uL  Auto Basophil # : 0.03 K/uL  Auto Neutrophil % : 82.3 %  Auto Lymphocyte % : 10.3 %  Auto Monocyte % : 6.6 %  Auto Eosinophil % : 0.1 %  Auto Basophil % : 0.3 %    01-15    148<H>  |  112<H>  |  24<H>  ----------------------------<  183<H>  3.3<L>   |  31  |  0.69    Ca    9.6      15 Darrell 2024 06:00  Phos  2.5     01-15  Mg     2.5     01-15    TPro  7.1  /  Alb  2.6<L>  /  TBili  0.8  /  DBili  x   /  AST  5<L>  /  ALT  16  /  AlkPhos  55  01-15      Urinalysis Basic - ( 15 Darrell 2024 06:00 )    Color: x / Appearance: x / SG: x / pH: x  Gluc: 183 mg/dL / Ketone: x  / Bili: x / Urobili: x   Blood: x / Protein: x / Nitrite: x   Leuk Esterase: x / RBC: x / WBC x   Sq Epi: x / Non Sq Epi: x / Bacteria: x            [  ]  DVT Prophylaxis  [  ]  Nutrition, Brand, Rate         Goal Rate        Abnormal Nutritional Status -  Malnutrition   Cachexia          RADIOLOGY & ADDITIONAL STUDIES:  ***  < from: Xray Chest 1 View- PORTABLE-Urgent (Xray Chest 1 View- PORTABLE-Urgent .) (01.11.24 @ 12:22) >  Heart size normal. Left loop recorder, endotracheal tube, nasogastric   tube remain.    There is an increasing infiltrate off the left lower hilum compared to   January 7.    IMPRESSION: Increasing infiltrate off left lower hilum.    --- End of Report ---    < end of copied text >  < from: CT Head No Cont (12.28.23 @ 19:06) >    TECHNIQUE:  1.Axial CT images were acquired through the head.  2.  Axial CT images were acquired through the cervical spine.  Intravenous contrast: None  Two-dimensional reformats were generated.    COMPARISON STUDY: CT head and cervical spine 8/3/2022    FINDINGS:    CT HEAD:    There is no CT evidence of acute intracranial hemorrhage,  mass effect,   midline shift, or acute, large territorial infarct.  Again seen are   chronic bilateral MCA territory infarcts, including associated ex vacuo   dilatation of the left lateral ventricle. Patchy periventricular and   subcortical white matter hypodensities are nonspecific, although likely   due to chronic microangiopathy. There is wallerian degeneration of the   left anterior brainstem. The ventricles and sulciare prominent   compatible with moderate generalized brain parenchymal volume loss. The   basal cisterns are patent.    The mastoid air cells and middle ear cavities are grossly clear. There is   mild mucosal thickening and scattered mucous retentioncysts versus   polyps in the bilateral maxillary sinuses, frothy secretions in the right   sphenoid sinus, minimal mucosal thickening within bilateral ethmoid air   cells. There is impacted cerumen within both external auditory canals.    The calvarium and skull base are intact.    CT CERVICAL SPINE:    There is  preservation  of the cervical lordosis.  There is no evidence of an acute cervical spine fracture or traumatic   malalignment.  There is no suspicious lytic or blastic lesion.  The paraspinous soft tissues are unremarkable within limits of CT scan.    Degenerative changes:  Mild multilevel degenerative changes, including small disc osteophyte   complexes and bilateral uncovertebral facet hypertrophy with varying   degrees of foraminal stenosis. No high-grade central spinal canal   narrowing by CT technique.    Incidental findings:  Visualized soft tissues of the neck appear unremarkable.  Emphysema and mild scarring in both lung apices.    IMPRESSION:    CT HEAD: No acute intracranial hemorrhage, mass effect, or osseous   fracture.    CT CERVICAL SPINE: No acute cervical spine fracture or traumatic  malignment.    < end of copied text >    Goals of Care Discussion with Family/Proxy/Other   - see note from 1/05/24

## 2024-01-16 LAB
ALBUMIN SERPL ELPH-MCNC: 2.7 G/DL — LOW (ref 3.5–5)
ALP SERPL-CCNC: 62 U/L — SIGNIFICANT CHANGE UP (ref 40–120)
ALT FLD-CCNC: 23 U/L DA — SIGNIFICANT CHANGE UP (ref 10–60)
ANION GAP SERPL CALC-SCNC: 2 MMOL/L — LOW (ref 5–17)
AST SERPL-CCNC: 11 U/L — SIGNIFICANT CHANGE UP (ref 10–40)
BILIRUB SERPL-MCNC: 0.7 MG/DL — SIGNIFICANT CHANGE UP (ref 0.2–1.2)
BUN SERPL-MCNC: 26 MG/DL — HIGH (ref 7–18)
CALCIUM SERPL-MCNC: 9.7 MG/DL — SIGNIFICANT CHANGE UP (ref 8.4–10.5)
CHLORIDE SERPL-SCNC: 117 MMOL/L — HIGH (ref 96–108)
CO2 SERPL-SCNC: 31 MMOL/L — SIGNIFICANT CHANGE UP (ref 22–31)
CREAT SERPL-MCNC: 0.74 MG/DL — SIGNIFICANT CHANGE UP (ref 0.5–1.3)
EGFR: 101 ML/MIN/1.73M2 — SIGNIFICANT CHANGE UP
GLUCOSE BLDC GLUCOMTR-MCNC: 124 MG/DL — HIGH (ref 70–99)
GLUCOSE BLDC GLUCOMTR-MCNC: 130 MG/DL — HIGH (ref 70–99)
GLUCOSE BLDC GLUCOMTR-MCNC: 141 MG/DL — HIGH (ref 70–99)
GLUCOSE SERPL-MCNC: 178 MG/DL — HIGH (ref 70–99)
HCT VFR BLD CALC: 38 % — LOW (ref 39–50)
HGB BLD-MCNC: 11.7 G/DL — LOW (ref 13–17)
MAGNESIUM SERPL-MCNC: 2.7 MG/DL — HIGH (ref 1.6–2.6)
MCHC RBC-ENTMCNC: 29.3 PG — SIGNIFICANT CHANGE UP (ref 27–34)
MCHC RBC-ENTMCNC: 30.8 GM/DL — LOW (ref 32–36)
MCV RBC AUTO: 95 FL — SIGNIFICANT CHANGE UP (ref 80–100)
NRBC # BLD: 0 /100 WBCS — SIGNIFICANT CHANGE UP (ref 0–0)
PHOSPHATE SERPL-MCNC: 2.6 MG/DL — SIGNIFICANT CHANGE UP (ref 2.5–4.5)
PLATELET # BLD AUTO: 384 K/UL — SIGNIFICANT CHANGE UP (ref 150–400)
POTASSIUM SERPL-MCNC: 3.6 MMOL/L — SIGNIFICANT CHANGE UP (ref 3.5–5.3)
POTASSIUM SERPL-SCNC: 3.6 MMOL/L — SIGNIFICANT CHANGE UP (ref 3.5–5.3)
PROT SERPL-MCNC: 7.4 G/DL — SIGNIFICANT CHANGE UP (ref 6–8.3)
RBC # BLD: 4 M/UL — LOW (ref 4.2–5.8)
RBC # FLD: 13.5 % — SIGNIFICANT CHANGE UP (ref 10.3–14.5)
SODIUM SERPL-SCNC: 150 MMOL/L — HIGH (ref 135–145)
WBC # BLD: 8.37 K/UL — SIGNIFICANT CHANGE UP (ref 3.8–10.5)
WBC # FLD AUTO: 8.37 K/UL — SIGNIFICANT CHANGE UP (ref 3.8–10.5)

## 2024-01-16 RX ORDER — QUETIAPINE FUMARATE 200 MG/1
12.5 TABLET, FILM COATED ORAL AT BEDTIME
Refills: 0 | Status: DISCONTINUED | OUTPATIENT
Start: 2024-01-16 | End: 2024-01-18

## 2024-01-16 RX ADMIN — Medication 250 MILLIGRAM(S): at 05:48

## 2024-01-16 RX ADMIN — QUETIAPINE FUMARATE 12.5 MILLIGRAM(S): 200 TABLET, FILM COATED ORAL at 21:47

## 2024-01-16 RX ADMIN — ATORVASTATIN CALCIUM 20 MILLIGRAM(S): 80 TABLET, FILM COATED ORAL at 21:47

## 2024-01-16 RX ADMIN — Medication 250 MILLIGRAM(S): at 17:32

## 2024-01-16 RX ADMIN — CEFEPIME 100 MILLIGRAM(S): 1 INJECTION, POWDER, FOR SOLUTION INTRAMUSCULAR; INTRAVENOUS at 21:46

## 2024-01-16 RX ADMIN — ENOXAPARIN SODIUM 40 MILLIGRAM(S): 100 INJECTION SUBCUTANEOUS at 13:58

## 2024-01-16 RX ADMIN — CEFEPIME 100 MILLIGRAM(S): 1 INJECTION, POWDER, FOR SOLUTION INTRAMUSCULAR; INTRAVENOUS at 05:48

## 2024-01-16 RX ADMIN — CHLORHEXIDINE GLUCONATE 15 MILLILITER(S): 213 SOLUTION TOPICAL at 17:31

## 2024-01-16 RX ADMIN — POLYETHYLENE GLYCOL 3350 17 GRAM(S): 17 POWDER, FOR SOLUTION ORAL at 11:54

## 2024-01-16 RX ADMIN — CEFEPIME 100 MILLIGRAM(S): 1 INJECTION, POWDER, FOR SOLUTION INTRAMUSCULAR; INTRAVENOUS at 13:58

## 2024-01-16 RX ADMIN — CHLORHEXIDINE GLUCONATE 15 MILLILITER(S): 213 SOLUTION TOPICAL at 05:48

## 2024-01-16 RX ADMIN — QUETIAPINE FUMARATE 12.5 MILLIGRAM(S): 200 TABLET, FILM COATED ORAL at 05:48

## 2024-01-16 NOTE — CHART NOTE - NSCHARTNOTEFT_GEN_A_CORE
Assessment:   63yMalePatient is a 63y old  Male who presents with a chief complaint of Sepsis and AHRF (16 Jan 2024 14:40) Pt Viisted. Pt is On C PAP viaTRACH. Pt is  Alert , Mittens on Both Hands. Pt with BLUE Boots.  Pt is  On TF Jevity 1.5 @ 30 ml/hr x 18 hr Providimg 540 ml, 810 calories, Protein 34 gms, free water 410 ml /day.+Prosource 1 PKT Once a day to Provide additional 15 Gms of protein, 60 Calories.  D/W RN Tf tolerated. D/W NP .Connor increase TF gradually to Goal Rate. S/P PEG 1/12/2024. Labs noted Na 150 D/W NP on Free water Flush. 40 ml /hr via peg. A1c 6.0      Factors impacting intake: [ ] none [ ] nausea  [ ] vomiting [ ] diarrhea [ ] constipation  [ ]chewing problems [ ] swallowing issues  [ ] other:     Diet Presciption: Diet, NPO with Tube Feed:   Tube Feeding Modality: Gastrostomy  Jevity 1.5 Gabriel  Total Volume for 24 Hours (mL): 540  Continuous  Starting Tube Feed Rate {mL per Hour}: 10  Increase Tube Feed Rate by (mL): 10  Until Goal Tube Feed Rate (mL per Hour): 30  Tube Feed Duration (in Hours): 18  Tube Feed Start Time: 12:00  Tube Feed Stop Time: 06:00  No Carb Prosource (1pkg = 15gms Protein)     Qty per Day:  1 (01-13-24 @ 09:13)    Intake: NPO W TF     Current Weight:  Bed  Scale 110 Lb with oit BLUE BOOTS   % Weight Change    Pertinent Medications: MEDICATIONS  (STANDING):  atorvastatin 20 milliGRAM(s) Oral at bedtime  cefepime   IVPB 2000 milliGRAM(s) IV Intermittent every 8 hours  chlorhexidine 0.12% Liquid 15 milliLiter(s) Oral Mucosa every 12 hours  enoxaparin Injectable 40 milliGRAM(s) SubCutaneous every 24 hours  polyethylene glycol 3350 17 Gram(s) Oral daily  QUEtiapine 12.5 milliGRAM(s) Oral at bedtime  valproic  acid Syrup 250 milliGRAM(s) Oral two times a day    MEDICATIONS  (PRN):    Pertinent Labs: 01-16 Na150 mmol/L<H> Glu 178 mg/dL<H> K+ 3.6 mmol/L Cr  0.74 mg/dL BUN 26 mg/dL<H> 01-16 Phos 2.6 mg/dL 01-16 Alb 2.7 g/dL<L> 12-29 Chol 93 mg/dL LDL --    HDL 60 mg/dL Trig 55 mg/dL     CAPILLARY BLOOD GLUCOSE      POCT Blood Glucose.: 141 mg/dL (16 Jan 2024 11:42)  POCT Blood Glucose.: 124 mg/dL (16 Jan 2024 05:33)  POCT Blood Glucose.: 130 mg/dL (16 Jan 2024 00:03)    Skin: Stage 1 @ Bilateal heels     Estimated Needs:   [ ] no change since previous assessment  [ ] recalculated:     Previous Nutrition Diagnosis:   [ ] Inadequate Energy Intake [ x]Inadequate Oral Intake [ ] Excessive Energy Intake   [ ] Underweight [ ] Increased Nutrient Needs [ ] Overweight/Obesity   [ ] Altered GI Function [ ] Unintended Weight Loss [ ] Food & Nutrition Related Knowledge Deficit [ ] Malnutrition     Nutrition Diagnosis is [x ] ongoing  [ ] resolved [ ] not applicable     New Nutrition Diagnosis: [ ] not applicable       Interventions:   Recommend  [ ] Change Diet To:  [ ] Nutrition Supplement  [x ] Nutrition Support Jevity 1.5 Initial Goalrate @ 55 ml /hr x 18 hr as tolerated to Provide  990 ml, 1485 calories, Protein 63 gms, free water 752 ml /day.  [x ] Other: D/W NP.    Monitoring and Evaluation:   [ ] PO intake [ x ] Tolerance to diet prescription [ x ] weights [ x ] labs[ x ] follow up per protocol  [ ] other:

## 2024-01-16 NOTE — PROGRESS NOTE ADULT - SUBJECTIVE AND OBJECTIVE BOX
C A R D I O L O G Y  **********************************     DATE OF SERVICE: 01-16-24    Patient is on ventilator support, unable to obtain review of symptoms.    atorvastatin 20 milliGRAM(s) Oral at bedtime  cefepime   IVPB 2000 milliGRAM(s) IV Intermittent every 8 hours  chlorhexidine 0.12% Liquid 15 milliLiter(s) Oral Mucosa every 12 hours  enoxaparin Injectable 40 milliGRAM(s) SubCutaneous every 24 hours  morphine  - Injectable 2 milliGRAM(s) IV Push every 4 hours PRN  polyethylene glycol 3350 17 Gram(s) Oral daily  QUEtiapine 12.5 milliGRAM(s) Oral every 12 hours  valproic  acid Syrup 250 milliGRAM(s) Oral two times a day                            11.7   8.37  )-----------( 384      ( 16 Jan 2024 06:42 )             38.0       Hemoglobin: 11.7 g/dL (01-16 @ 06:42)  Hemoglobin: 10.9 g/dL (01-15 @ 06:00)  Hemoglobin: 11.0 g/dL (01-14 @ 05:06)  Hemoglobin: 11.9 g/dL (01-13 @ 04:55)  Hemoglobin: 13.2 g/dL (01-12 @ 04:35)      01-16    150<H>  |  117<H>  |  26<H>  ----------------------------<  178<H>  3.6   |  31  |  0.74    Ca    9.7      16 Jan 2024 06:42  Phos  2.6     01-16  Mg     2.7     01-16    TPro  7.4  /  Alb  2.7<L>  /  TBili  0.7  /  DBili  x   /  AST  11  /  ALT  23  /  AlkPhos  62  01-16    Creatinine Trend: 0.74<--, 0.69<--, 0.66<--, 0.50<--, 0.59<--, 0.62<--    COAGS:           T(C): 36.9 (01-16-24 @ 11:57), Max: 36.9 (01-16-24 @ 11:57)  HR: 90 (01-16-24 @ 11:57) (86 - 90)  BP: 129/79 (01-16-24 @ 11:57) (112/68 - 129/79)  RR: 18 (01-16-24 @ 11:57) (15 - 19)  SpO2: 100% (01-16-24 @ 11:57) (98% - 100%)  Wt(kg): --    I&O's Summary    15 Darrell 2024 07:01  -  16 Jan 2024 07:00  --------------------------------------------------------  IN: 0 mL / OUT: 800 mL / NET: -800 mL    16 Jan 2024 07:01  -  16 Jan 2024 14:40  --------------------------------------------------------  IN: 0 mL / OUT: 500 mL / NET: -500 mL            HEENT:  (-)icterus (-)pallor  CV: N S1 S2 1/6 JOVON (+)2 Pulses B/l  Resp:  Clear to ausculatation B/L, normal effort  GI: (+) BS Soft, NT, ND  Lymph:  (-)Edema, (-)obvious lymphadenopathy  Skin: Warm to touch, Normal turgor  Psych: Unable to assess mood and affect      TELEMETRY: 	 off        ASSESSMENT/PLAN: 	63y Male PMH of CVA, Alzheimer's, nonverbal at baseline, GERD, Schizophrenia, historically preserved LV function and Constipation, was brought into the ED s/p mechanical fall, poor oral intake, and Covid-19 infection on 12/27/23 as per NH papers.    # Bradycardia   - He likely has some degree of sick sinus syndrome that may be exacerbated by Midodrine.  now off midodrine  - EP following      # Hypotension  - Suspect this is due to dehydration and vasodilatory state associated with viral/bacterial infection  - Abx  - BP improved  - s/p  trach and PEG      Gavino Cadet MD, [facility]  BEEPER [phone]   C A R D I O L O G Y  **********************************     DATE OF SERVICE: 01-16-24    Patient is on ventilator support, unable to obtain review of symptoms.    atorvastatin 20 milliGRAM(s) Oral at bedtime  cefepime   IVPB 2000 milliGRAM(s) IV Intermittent every 8 hours  chlorhexidine 0.12% Liquid 15 milliLiter(s) Oral Mucosa every 12 hours  enoxaparin Injectable 40 milliGRAM(s) SubCutaneous every 24 hours  morphine  - Injectable 2 milliGRAM(s) IV Push every 4 hours PRN  polyethylene glycol 3350 17 Gram(s) Oral daily  QUEtiapine 12.5 milliGRAM(s) Oral every 12 hours  valproic  acid Syrup 250 milliGRAM(s) Oral two times a day                            11.7   8.37  )-----------( 384      ( 16 Jan 2024 06:42 )             38.0       Hemoglobin: 11.7 g/dL (01-16 @ 06:42)  Hemoglobin: 10.9 g/dL (01-15 @ 06:00)  Hemoglobin: 11.0 g/dL (01-14 @ 05:06)  Hemoglobin: 11.9 g/dL (01-13 @ 04:55)  Hemoglobin: 13.2 g/dL (01-12 @ 04:35)      01-16    150<H>  |  117<H>  |  26<H>  ----------------------------<  178<H>  3.6   |  31  |  0.74    Ca    9.7      16 Jan 2024 06:42  Phos  2.6     01-16  Mg     2.7     01-16    TPro  7.4  /  Alb  2.7<L>  /  TBili  0.7  /  DBili  x   /  AST  11  /  ALT  23  /  AlkPhos  62  01-16    Creatinine Trend: 0.74<--, 0.69<--, 0.66<--, 0.50<--, 0.59<--, 0.62<--    COAGS:           T(C): 36.9 (01-16-24 @ 11:57), Max: 36.9 (01-16-24 @ 11:57)  HR: 90 (01-16-24 @ 11:57) (86 - 90)  BP: 129/79 (01-16-24 @ 11:57) (112/68 - 129/79)  RR: 18 (01-16-24 @ 11:57) (15 - 19)  SpO2: 100% (01-16-24 @ 11:57) (98% - 100%)  Wt(kg): --    I&O's Summary    15 Darrell 2024 07:01  -  16 Jan 2024 07:00  --------------------------------------------------------  IN: 0 mL / OUT: 800 mL / NET: -800 mL    16 Jan 2024 07:01  -  16 Jan 2024 14:40  --------------------------------------------------------  IN: 0 mL / OUT: 500 mL / NET: -500 mL            HEENT:  (-)icterus (-)pallor  CV: N S1 S2 1/6 JOVON (+)2 Pulses B/l  Resp:  Clear to ausculatation B/L, normal effort  GI: (+) BS Soft, NT, ND  Lymph:  (-)Edema, (-)obvious lymphadenopathy  Skin: Warm to touch, Normal turgor  Psych: Unable to assess mood and affect      TELEMETRY: 	 off        ASSESSMENT/PLAN: 	63y Male PMH of CVA, Alzheimer's, nonverbal at baseline, GERD, Schizophrenia, historically preserved LV function and Constipation, was brought into the ED s/p mechanical fall, poor oral intake, and Covid-19 infection on 12/27/23 as per NH papers.    # Bradycardia   - He likely has some degree of sick sinus syndrome that may be exacerbated by Midodrine.  now off midodrine  - EP following      # Hypotension  - Suspect this is due to dehydration and vasodilatory state associated with viral/bacterial infection  - Abx  - BP improved  - s/p  trach and PEG      Gavino Cadet MD, [facility]  BEEPER [phone]

## 2024-01-16 NOTE — PROGRESS NOTE ADULT - NS ATTEND AMEND GEN_ALL_CORE FT
Impression; This is a 62 Y/O Male from [facility] was   brought into the ED s/p mechanical fall, poor oral intake, and Covid-19 infection on 12/27/23 Pt was admitted for COVID PNA, later found with intermittent hypoxia/ hypopnea to Sats 80% and Hypotension despite multiple fluid boluses, also found with bryan to 40s x last 2 days, in ICU got intubated and extubated on 01-08-24 and reintubated on 01-09-24  and now in SCU  with Acute hypoxic respiratory failure had Covid Pneumonia . s/p Trach on 01-12-24.    Suggestion:   Continue mechanical ventilation with Daily SBT as tolerated . Trach collar as tolerated .    Oral, trach care, suction.  Not a candidate for decannulation at this time.   Has completed multiple rounds of antibiotics. On Cefepime 2 Gm IVPB Q 8 Hours.   Aspiration precautions with HOB elevation.    Completed remdesivir and steroids.

## 2024-01-16 NOTE — PROGRESS NOTE ADULT - NS ATTEND AMEND GEN_ALL_CORE FT
63 year old mn , from [facility], with  CVA, Alzheimer's, nonverbal at baseline, GERD, Schizophrenia, and Constipation, was brought into the ED s/p mechanical fall, poor oral intake, and Covid-19 infection on 12/27/23 Pt was admitted for COVID PNA, later found with intermittent hypoxia/ hypopnea to Sats 80% and Hypotension despite multiple fluid boluses, also found with bryan to 40s for the last 2 days, in ICU for further monitoring.       ASSESSMENT   - Acute Hypoxic resp failure   - Covid-19 PNA   - Shock septic   - CVA  - Alzheimer dementia   - Bradycardia       Plan   - S/p trach/peg placement 1/12  - Tolerating PSV, trial of trach collar during day time   - Aspiration precautions   - Antibiotics for aspiration will complete 5 days  - Hemodynamic support   - EP cards f/u : no indication for PPM at tis time   - Off Remdesivir due to ADAN  - Isolation : contact and airborne   - Cards following   - Hold AV angel luis blocking agent   - Skin care  - Wound care eval noted  - Cont. tube feedings  - Void trial  - Prognosis is poor  - Discharge planing to vent facility 63 year old mn , from [facility], with  CVA, Alzheimer's, nonverbal at baseline, GERD, Schizophrenia, and Constipation, was brought into the ED s/p mechanical fall, poor oral intake, and Covid-19 infection on 12/27/23 Pt was admitted for COVID PNA, later found with intermittent hypoxia/ hypopnea to Sats 80% and Hypotension despite multiple fluid boluses, also found with bryan to 40s for the last 2 days, in ICU for further monitoring.       ASSESSMENT   - Acute Hypoxic resp failure   - Covid-19 PNA   - Shock septic   - CVA  - Alzheimer dementia   - Bradycardia       Plan   - S/p trach/peg placement 1/12  - Tolerating PSV, trial of trach collar during day time   - Aspiration precautions   - Antibiotics for aspiration will complete 5 days  - Hemodynamic support   - EP cards f/u : no indication for PPM at tis time   - Off Remdesivir due to ADAN  - Isolation : contact and airborne   - Cards following   - Hold AV angel luis blocking agent   - Skin care  - Wound care eval noted  - Cont. tube feedings  - Void trial  - Prognosis is poor  - Discharge planing to vent facility

## 2024-01-16 NOTE — PROGRESS NOTE ADULT - SUBJECTIVE AND OBJECTIVE BOX
PAM JIMENEZ    SCU progress note    INTERVAL HPI/OVERNIGHT EVENTS: ***Continues to try and pull out trach.    DNR [ ]   DNI  [  ]  FULL CODE    Covid - 19 PCR:     The 4Ms    What Matters Most: see GOC  Age appropriate Medications/Screen for High Risk Medication: Yes  Mentation: see CAM below  Mobility: defer to physical exam    The Confusion Assessment Method (CAM) Diagnostic Algorithm     1: Acute Onset or Fluctuating Course  - Is there evidence of an acute change in mental status from the patient’s baseline? Did the (abnormal) behavior  fluctuate during the day, that is, tend to come and go, or increase and decrease in severity?  [ ] YES [x ] NO     2: Inattention  - Did the patient have difficulty focusing attention, being easily distractible, or having difficulty keeping track of what was being said?  [x ] YES [ ] NO     3: Disorganized thinking  -Was the patient’s thinking disorganized or incoherent, such as rambling or irrelevant conversation, unclear or illogical flow of ideas, or unpredictable switching from subject to subject?  [ ] YES [ ] NO  Unable to access    4: Altered Level of consciousness?  [ ] YES [ ] NO    The diagnosis of delirium by CAM requires the presence of features 1 and 2 and either 3 or 4.    PRESSORS: [ ] YES [x ] NO  cefepime   IVPB 2000 milliGRAM(s) IV Intermittent every 8 hours    Cardiovascular:  Heart Failure  Acute   Acute on Chronic  Chronic         Pulmonary:    Hematalogic:  enoxaparin Injectable 40 milliGRAM(s) SubCutaneous every 24 hours    Other:  atorvastatin 20 milliGRAM(s) Oral at bedtime  chlorhexidine 0.12% Liquid 15 milliLiter(s) Oral Mucosa every 12 hours  morphine  - Injectable 2 milliGRAM(s) IV Push every 4 hours PRN  polyethylene glycol 3350 17 Gram(s) Oral daily  QUEtiapine 12.5 milliGRAM(s) Oral every 12 hours  valproic  acid Syrup 250 milliGRAM(s) Oral two times a day    atorvastatin 20 milliGRAM(s) Oral at bedtime  cefepime   IVPB 2000 milliGRAM(s) IV Intermittent every 8 hours  chlorhexidine 0.12% Liquid 15 milliLiter(s) Oral Mucosa every 12 hours  enoxaparin Injectable 40 milliGRAM(s) SubCutaneous every 24 hours  morphine  - Injectable 2 milliGRAM(s) IV Push every 4 hours PRN  polyethylene glycol 3350 17 Gram(s) Oral daily  QUEtiapine 12.5 milliGRAM(s) Oral every 12 hours  valproic  acid Syrup 250 milliGRAM(s) Oral two times a day    Drug Dosing Weight  Height (cm): 167.6 (03 Aug 2022 20:11)  Weight (kg): 57 (03 Jan 2024 14:30)  BMI (kg/m2): 20.3 (03 Jan 2024 14:30)  BSA (m2): 1.64 (03 Jan 2024 14:30)    CENTRAL LINE: [ ] YES [x ] NO  LOCATION:   DATE INSERTED:  REMOVE: [ ] YES [ ] NO  EXPLAIN:    ACEVEDO: [ ] YES [x ] NO    DATE INSERTED:  REMOVE:  [ ] YES [ ] NO  EXPLAIN:    PAST MEDICAL & SURGICAL HISTORY:  CVA (cerebral vascular accident)      CVA (cerebral vascular accident)      HLD (hyperlipidemia)      S/P percutaneous endoscopic gastrostomy (PEG) tube placement                  01-15 @ 07:01  -  01-16 @ 07:00  --------------------------------------------------------  IN: 0 mL / OUT: 800 mL / NET: -800 mL        Mode: AC/ CMV (Assist Control/ Continuous Mandatory Ventilation)  RR (machine): 12  TV (machine): 350  FiO2: 35  PEEP: 5  ITime: 1  MAP: 6  PIP: 9      PHYSICAL EXAM:    GENERAL: NAD, cachectic with temporal and muscular waisting  HEAD:  Atraumatic, Normocephalic  EYES: EOMI, PERRLA, conjunctiva and sclera clear  ENMT: No tonsillar erythema, exudates  NECK: Supple, No JVD, tracheostomy intact  NERVOUS SYSTEM:  Awake and alert. moving all extremities. Not following commands.  CHEST/LUNG: Diminished breath sounds bilateral bases. Few scattered rhonchi.  HEART: Regular rate and rhythm; No murmurs, rubs, or gallops  ABDOMEN: +Peg. Soft, Nontender, Nondistended; Bowel sounds present  EXTREMITIES:  +Muscular waisting. 2+ Peripheral Pulses, No clubbing, cyanosis, or edema  LYMPH: No lymphadenopathy noted  SKIN: No rashes or lesions      LABS:  CBC Full  -  ( 16 Jan 2024 06:42 )  WBC Count : 8.37 K/uL  RBC Count : 4.00 M/uL  Hemoglobin : 11.7 g/dL  Hematocrit : 38.0 %  Platelet Count - Automated : 384 K/uL  Mean Cell Volume : 95.0 fl  Mean Cell Hemoglobin : 29.3 pg  Mean Cell Hemoglobin Concentration : 30.8 gm/dL  Auto Neutrophil # : x  Auto Lymphocyte # : x  Auto Monocyte # : x  Auto Eosinophil # : x  Auto Basophil # : x  Auto Neutrophil % : x  Auto Lymphocyte % : x  Auto Monocyte % : x  Auto Eosinophil % : x  Auto Basophil % : x    01-16    150<H>  |  117<H>  |  26<H>  ----------------------------<  178<H>  3.6   |  31  |  0.74    Ca    9.7      16 Jan 2024 06:42  Phos  2.6     01-16  Mg     2.7     01-16    TPro  7.4  /  Alb  2.7<L>  /  TBili  0.7  /  DBili  x   /  AST  11  /  ALT  23  /  AlkPhos  62  01-16      Urinalysis Basic - ( 16 Jan 2024 06:42 )    Color: x / Appearance: x / SG: x / pH: x  Gluc: 178 mg/dL / Ketone: x  / Bili: x / Urobili: x   Blood: x / Protein: x / Nitrite: x   Leuk Esterase: x / RBC: x / WBC x   Sq Epi: x / Non Sq Epi: x / Bacteria: x            [  ]  DVT Prophylaxis  [  ]  Nutrition, Brand, Rate         Goal Rate        Abnormal Nutritional Status -  Malnutrition   Cachexia          RADIOLOGY & ADDITIONAL STUDIES:  ***  < from: Xray Chest 1 View- PORTABLE-Urgent (Xray Chest 1 View- PORTABLE-Urgent .) (01.14.24 @ 17:53) >  INTERPRETATION:  Exam:XR CHEST URGENT    clinical history:S/P trach insertion    Tracheostomy tube overlies the trachea. No acute lung infiltrates or   pneumothorax.    IMPRESSION: Tracheostomy tube placement as above    --- End of Report ---    < end of copied text >  < from: CT Head No Cont (12.28.23 @ 19:06) >  CT HEAD:    There is no CT evidence of acute intracranial hemorrhage,  mass effect,   midline shift, or acute, large territorial infarct.  Again seen are   chronic bilateral MCA territory infarcts, including associated ex vacuo   dilatation of the left lateral ventricle. Patchy periventricular and   subcortical white matter hypodensities are nonspecific, although likely   due to chronic microangiopathy. There is wallerian degeneration of the   left anterior brainstem. The ventricles and sulciare prominent   compatible with moderate generalized brain parenchymal volume loss. The   basal cisterns are patent.    The mastoid air cells and middle ear cavities are grossly clear. There is   mild mucosal thickening and scattered mucous retentioncysts versus   polyps in the bilateral maxillary sinuses, frothy secretions in the right   sphenoid sinus, minimal mucosal thickening within bilateral ethmoid air   cells. There is impacted cerumen within both external auditory canals.    The calvarium and skull base are intact.    CT CERVICAL SPINE:    There is  preservation  of the cervical lordosis.  There is no evidence of an acute cervical spine fracture or traumatic   malalignment.  There is no suspicious lytic or blastic lesion.  The paraspinous soft tissues are unremarkable within limits of CT scan.    Degenerative changes:  Mild multilevel degenerative changes, including small disc osteophyte   complexes and bilateral uncovertebral facet hypertrophy with varying   degrees of foraminal stenosis. No high-grade central spinal canal   narrowing by CT technique.    Incidental findings:  Visualized soft tissues of the neck appear unremarkable.  Emphysema and mild scarring in both lung apices.    IMPRESSION:    CT HEAD: No acute intracranial hemorrhage, mass effect, or osseous   fracture.    CT CERVICAL SPINE: No acute cervical spine fracture or traumatic   malalignment.    --- End of Report ---    < end of copied text >  < from: US Renal (01.04.24 @ 11:21) >  FINDINGS:  Limited portable study  Right kidney: 12.5 cm. No renal mass, hydronephrosis or calculi.   Subcentimeter cyst    Left kidney: 9.1 cm. No renal mass, hydronephrosis or calculi.    Urinary bladder: Collapsed around Acevedo.    Incidental right pleural effusion    IMPRESSION:  No hydronephrosis bilaterally.      < end of copied text >    Goals of Care Discussion with Family/Proxy/Other   - see note from 1/05/24

## 2024-01-16 NOTE — PROGRESS NOTE ADULT - SUBJECTIVE AND OBJECTIVE BOX
EP ATTENDING    no tele, but no further bradycardia on vital signs  trached, in bed comfortable, ROS difficult to obtain      DATE OF SERVICE - 01-16-24     Review of Systems:   Constitutional: [ ] fevers, [ ] chills.   Skin: [ ] dry skin. [ ] rashes.  Psychiatric: [ ] depression, [ ] anxiety.   Gastrointestinal: [ ] BRBPR, [ ] melena.   Neurological: [ ] confusion. [ ] seizures. [ ] shuffling gait.   Ears,Nose,Mouth and Throat: [ ] ear pain [ ] sore throat.   Eyes: [ ] diplopia.   Respiratory: [ ] hemoptysis. [ ] shortness of breath  Cardiovascular: See HPI above  Hematologic/Lymphatic: [ ] anemia. [ ] painful nodes. [ ] prolonged bleeding.   Genitourinary: [ ] hematuria. [ ] flank pain.   Endocrine: [ ] significant change in weight. [ ] intolerance to heat and cold.     Review of systems [ x] otherwise negative, [ ] otherwise unable to obtain    FH: no family history of sudden cardiac death in first degree relatives    SH: [ ] tobacco, [ ] alcohol, [ ] drugs    atorvastatin 20 milliGRAM(s) Oral at bedtime  cefepime   IVPB 2000 milliGRAM(s) IV Intermittent every 8 hours  chlorhexidine 0.12% Liquid 15 milliLiter(s) Oral Mucosa every 12 hours  enoxaparin Injectable 40 milliGRAM(s) SubCutaneous every 24 hours  morphine  - Injectable 2 milliGRAM(s) IV Push every 4 hours PRN  polyethylene glycol 3350 17 Gram(s) Oral daily  QUEtiapine 12.5 milliGRAM(s) Oral every 12 hours  valproic  acid Syrup 250 milliGRAM(s) Oral two times a day                            11.7   8.37  )-----------( 384      ( 16 Jan 2024 06:42 )             38.0       01-16    150<H>  |  117<H>  |  26<H>  ----------------------------<  178<H>  3.6   |  31  |  0.74    Ca    9.7      16 Jan 2024 06:42  Phos  2.6     01-16  Mg     2.7     01-16    TPro  7.4  /  Alb  2.7<L>  /  TBili  0.7  /  DBili  x   /  AST  11  /  ALT  23  /  AlkPhos  62  01-16        T(C): 36.8 (01-16-24 @ 04:23), Max: 36.8 (01-16-24 @ 04:23)  HR: 90 (01-16-24 @ 08:05) (86 - 90)  BP: 123/72 (01-16-24 @ 04:23) (112/68 - 123/72)  RR: 17 (01-16-24 @ 08:05) (15 - 19)  SpO2: 100% (01-16-24 @ 08:05) (97% - 100%)  Wt(kg): --    I&O's Summary    15 Darrell 2024 07:01  -  16 Jan 2024 07:00  --------------------------------------------------------  IN: 0 mL / OUT: 800 mL / NET: -800 mL        Head: Normocephalic and atraumatic.   Neck: No JVD. No bruits. Supple. Does not appear to be enlarged.   Cardiovascular: + S1,S2 ; RRR Soft systolic murmur at the left lower sternal border. No rubs noted.    Lungs: CTA b/l. No rhonchi, rales or wheezes.   Abdomen: + BS, soft. Non tender. Non distended. No rebound. No guarding.   Extremities: No clubbing/cyanosis/edema.   Skin: Warm and moist. The patient's skin has normal elasticity and good skin turgor.       echo: mild LV dysfunction, otherwise unremarkable    A/P) 64 y/o male who resides at a nursing home due to previous stroke, hyperlipidemia, schizophrenia and Alzheimer's dementia a/w covid. EP called for periods of asymptomatic sinus bradycardia. He is now s/p trach and PEG.    -continue lipitor for hyperlipidemia  -consider low dose beta blockers and ACEi for mild LV dysfunction, will defer to cardiology  -no need for PPM as he remains asymptomatic and hasn't had any malignant sinus pauses    Ramses Pena M.D.  Cardiac Electrophysiology  [phone] EP ATTENDING    no tele, but no further bradycardia on vital signs  trached, in bed comfortable, ROS difficult to obtain      DATE OF SERVICE - 01-16-24     Review of Systems:   Constitutional: [ ] fevers, [ ] chills.   Skin: [ ] dry skin. [ ] rashes.  Psychiatric: [ ] depression, [ ] anxiety.   Gastrointestinal: [ ] BRBPR, [ ] melena.   Neurological: [ ] confusion. [ ] seizures. [ ] shuffling gait.   Ears,Nose,Mouth and Throat: [ ] ear pain [ ] sore throat.   Eyes: [ ] diplopia.   Respiratory: [ ] hemoptysis. [ ] shortness of breath  Cardiovascular: See HPI above  Hematologic/Lymphatic: [ ] anemia. [ ] painful nodes. [ ] prolonged bleeding.   Genitourinary: [ ] hematuria. [ ] flank pain.   Endocrine: [ ] significant change in weight. [ ] intolerance to heat and cold.     Review of systems [ x] otherwise negative, [ ] otherwise unable to obtain    FH: no family history of sudden cardiac death in first degree relatives    SH: [ ] tobacco, [ ] alcohol, [ ] drugs    atorvastatin 20 milliGRAM(s) Oral at bedtime  cefepime   IVPB 2000 milliGRAM(s) IV Intermittent every 8 hours  chlorhexidine 0.12% Liquid 15 milliLiter(s) Oral Mucosa every 12 hours  enoxaparin Injectable 40 milliGRAM(s) SubCutaneous every 24 hours  morphine  - Injectable 2 milliGRAM(s) IV Push every 4 hours PRN  polyethylene glycol 3350 17 Gram(s) Oral daily  QUEtiapine 12.5 milliGRAM(s) Oral every 12 hours  valproic  acid Syrup 250 milliGRAM(s) Oral two times a day                            11.7   8.37  )-----------( 384      ( 16 Jan 2024 06:42 )             38.0       01-16    150<H>  |  117<H>  |  26<H>  ----------------------------<  178<H>  3.6   |  31  |  0.74    Ca    9.7      16 Jan 2024 06:42  Phos  2.6     01-16  Mg     2.7     01-16    TPro  7.4  /  Alb  2.7<L>  /  TBili  0.7  /  DBili  x   /  AST  11  /  ALT  23  /  AlkPhos  62  01-16        T(C): 36.8 (01-16-24 @ 04:23), Max: 36.8 (01-16-24 @ 04:23)  HR: 90 (01-16-24 @ 08:05) (86 - 90)  BP: 123/72 (01-16-24 @ 04:23) (112/68 - 123/72)  RR: 17 (01-16-24 @ 08:05) (15 - 19)  SpO2: 100% (01-16-24 @ 08:05) (97% - 100%)  Wt(kg): --    I&O's Summary    15 Darrell 2024 07:01  -  16 Jan 2024 07:00  --------------------------------------------------------  IN: 0 mL / OUT: 800 mL / NET: -800 mL        Head: Normocephalic and atraumatic.   Neck: No JVD. No bruits. Supple. Does not appear to be enlarged.   Cardiovascular: + S1,S2 ; RRR Soft systolic murmur at the left lower sternal border. No rubs noted.    Lungs: CTA b/l. No rhonchi, rales or wheezes.   Abdomen: + BS, soft. Non tender. Non distended. No rebound. No guarding.   Extremities: No clubbing/cyanosis/edema.   Skin: Warm and moist. The patient's skin has normal elasticity and good skin turgor.       echo: mild LV dysfunction, otherwise unremarkable    A/P) 64 y/o male who resides at a nursing home due to previous stroke, hyperlipidemia, schizophrenia and Alzheimer's dementia a/w covid. EP called for periods of asymptomatic sinus bradycardia. He is now s/p trach and PEG.    -continue lipitor for hyperlipidemia  -consider low dose beta blockers and ACEi for mild LV dysfunction, will defer to cardiology  -no need for PPM as he remains asymptomatic and hasn't had any malignant sinus pauses    Ramses Pena M.D.  Cardiac Electrophysiology  [phone]

## 2024-01-16 NOTE — PROGRESS NOTE ADULT - ASSESSMENT
63 year old male, from [facility], with PMH of CVA, Alzheimer's, nonverbal at baseline, GERD, Schizophrenia, and Constipation, was brought into the ED s/p mechanical fall, poor oral intake, and Covid-19 infection on 12/27/23. In ED wbc 14.3, Covid 19 positive.   CXR report showed slight right perihilar infiltrate and there is a left lower perihilar infiltrate. Infiltrates are new. CT HEAD/Cervical : No acute intracranial hemorrhage, mass effect, or osseous: No acute cervical spine fracture or traumatic malalignment. Started on Supplemental O2, Dexamethasone 6mg IV q daily, Ceftriaxone, Azithromycin and Remdesivir protocol. Pt Admitted for Acute respiratory failure with hypoxia, COVID 19 pneumonia with  superimposed bacterial pneumonia.    On 1/2/2024, RRT for desaturation to the 80s.Pt was placed on HF and admitted to the ICU. Pt was weaned from HF to 6LNC.  Feeding was resumed on puree diet, on 1/5/2024, patient desaturated to 80s and failed to improve with HFNC, subsequently requiring intubation. Pt was extubated on 1/8 and was reintubated on 1/9 for inability to manage secretions. Pt noted to be febrile and was treated with 2g cefepime (1/10-1/15) for possible aspiration PNA. Following GOC discussion with son, patient was evaluated by Thoracic surgery and is now s/p Trach and PEG.  Tube feeds and VTE prophylaxis have been resumed.   1/13 Transferred from ICU to SCU. Tolerated 1 hour of SBT with PS 6  1/14 Tolerated SBT and Trach collar for several hours.   63 year old male, from [facility], with PMH of CVA, Alzheimer's, nonverbal at baseline, GERD, Schizophrenia, and Constipation, was brought into the ED s/p mechanical fall, poor oral intake, and Covid-19 infection on 12/27/23. In ED wbc 14.3, Covid 19 positive.   CXR report showed slight right perihilar infiltrate and there is a left lower perihilar infiltrate. Infiltrates are new. CT HEAD/Cervical : No acute intracranial hemorrhage, mass effect, or osseous: No acute cervical spine fracture or traumatic malalignment. Started on Supplemental O2, Dexamethasone 6mg IV q daily, Ceftriaxone, Azithromycin and Remdesivir protocol. Pt Admitted for Acute respiratory failure with hypoxia, COVID 19 pneumonia with  superimposed bacterial pneumonia.    On 1/2/2024, RRT for desaturation to the 80s.Pt was placed on HF and admitted to the ICU. Pt was weaned from HF to 6LNC.  Feeding was resumed on puree diet, on 1/5/2024, patient desaturated to 80s and failed to improve with HFNC, subsequently requiring intubation. Pt was extubated on 1/8 and was reintubated on 1/9 for inability to manage secretions. Pt noted to be febrile and was treated with 2g cefepime (1/10-1/15) for possible aspiration PNA. Following GOC discussion with son, patient was evaluated by Thoracic surgery and is now s/p Trach and PEG.  Tube feeds and VTE prophylaxis have been resumed.   1/13 Transferred from ICU to SCU. Tolerated 1 hour of SBT with PS 6  1/14 Tolerated SBT and Trach collar for several hours.

## 2024-01-16 NOTE — PROGRESS NOTE ADULT - SUBJECTIVE AND OBJECTIVE BOX
Patient is a 63y old  Male who presents with a chief complaint of Sepsis and AHRF (16 Jan 2024 08:29)    PATIENT IS SEEN AND EXAMINED IN MEDICAL FLOOR.  RODRIGUET [    ]    KRISTINA [   ]      GT [   ]    ALLERGIES:  No Known Allergies      Daily     Daily     VITALS:    Vital Signs Last 24 Hrs  T(C): 36.8 (16 Jan 2024 04:23), Max: 36.9 (15 Darrell 2024 11:46)  T(F): 98.2 (16 Jan 2024 04:23), Max: 98.4 (15 Darrell 2024 11:46)  HR: 90 (16 Jan 2024 08:05) (82 - 90)  BP: 123/72 (16 Jan 2024 04:23) (112/68 - 124/77)  BP(mean): 89 (16 Jan 2024 04:23) (89 - 89)  RR: 17 (16 Jan 2024 08:05) (14 - 19)  SpO2: 100% (16 Jan 2024 08:05) (97% - 100%)    Parameters below as of 16 Jan 2024 08:05  Patient On (Oxygen Delivery Method): Hydrotrach  O2 Flow (L/min): 4  O2 Concentration (%): 36    LABS:    CBC Full  -  ( 16 Jan 2024 06:42 )  WBC Count : 8.37 K/uL  RBC Count : 4.00 M/uL  Hemoglobin : 11.7 g/dL  Hematocrit : 38.0 %  Platelet Count - Automated : 384 K/uL  Mean Cell Volume : 95.0 fl  Mean Cell Hemoglobin : 29.3 pg  Mean Cell Hemoglobin Concentration : 30.8 gm/dL  Auto Neutrophil # : x  Auto Lymphocyte # : x  Auto Monocyte # : x  Auto Eosinophil # : x  Auto Basophil # : x  Auto Neutrophil % : x  Auto Lymphocyte % : x  Auto Monocyte % : x  Auto Eosinophil % : x  Auto Basophil % : x      01-16    150<H>  |  117<H>  |  26<H>  ----------------------------<  178<H>  3.6   |  31  |  0.74    Ca    9.7      16 Jan 2024 06:42  Phos  2.6     01-16  Mg     2.7     01-16    TPro  7.4  /  Alb  2.7<L>  /  TBili  0.7  /  DBili  x   /  AST  11  /  ALT  23  /  AlkPhos  62  01-16    CAPILLARY BLOOD GLUCOSE      POCT Blood Glucose.: 124 mg/dL (16 Jan 2024 05:33)  POCT Blood Glucose.: 130 mg/dL (16 Jan 2024 00:03)  POCT Blood Glucose.: 144 mg/dL (15 Darrell 2024 16:58)  POCT Blood Glucose.: 121 mg/dL (15 Darrell 2024 11:34)        LIVER FUNCTIONS - ( 16 Jan 2024 06:42 )  Alb: 2.7 g/dL / Pro: 7.4 g/dL / ALK PHOS: 62 U/L / ALT: 23 U/L DA / AST: 11 U/L / GGT: x           Creatinine Trend: 0.74<--, 0.69<--, 0.66<--, 0.50<--, 0.59<--, 0.62<--  I&O's Summary    15 Darrell 2024 07:01  -  16 Jan 2024 07:00  --------------------------------------------------------  IN: 0 mL / OUT: 800 mL / NET: -800 mL            Nares/Axilla/Groin Nares/Axilla/Groin  01-11 @ 22:20   Culture NEGATIVE for Candida auris.  This surveillance culture is intended for Infection Control purposes only.  A negative result does not preclude the carriage of other fungal  organisms.  --  --      Catheterized Catheterized  12-29 @ 12:37   No growth  --  --      .Blood Blood  12-29 @ 02:05   No growth at 5 days  --  --      .Blood Blood  12-29 @ 01:55   No growth at 5 days  --  --          MEDICATIONS:    MEDICATIONS  (STANDING):  atorvastatin 20 milliGRAM(s) Oral at bedtime  cefepime   IVPB 2000 milliGRAM(s) IV Intermittent every 8 hours  chlorhexidine 0.12% Liquid 15 milliLiter(s) Oral Mucosa every 12 hours  enoxaparin Injectable 40 milliGRAM(s) SubCutaneous every 24 hours  polyethylene glycol 3350 17 Gram(s) Oral daily  QUEtiapine 12.5 milliGRAM(s) Oral every 12 hours  valproic  acid Syrup 250 milliGRAM(s) Oral two times a day      MEDICATIONS  (PRN):  morphine  - Injectable 2 milliGRAM(s) IV Push every 4 hours PRN Moderate Pain (4 - 6)      REVIEW OF SYSTEMS:                           ALL ROS DONE [ X   ]    CONSTITUTIONAL:  LETHARGIC [   ], FEVER [   ], UNRESPONSIVE [   ]  CVS:  CP  [   ], SOB, [   ], PALPITATIONS [   ], DIZZYNESS [   ]  RS: COUGH [   ], SPUTUM [   ]  GI: ABDOMINAL PAIN [   ], NAUSEA [   ], VOMITINGS [   ], DIARRHEA [   ], CONSTIPATION [   ]  :  DYSURIA [   ], NOCTURIA [   ], INCREASED FREQUENCY [   ], DRIBLING [   ],  SKELETAL: PAINFUL JOINTS [   ], SWOLLEN JOINTS [   ], NECK ACHE [   ], LOW BACK ACHE [   ],  SKIN : ULCERS [   ], RASH [   ], ITCHING [   ]  CNS: HEAD ACHE [   ], DOUBLE VISION [   ], BLURRED VISION [   ], AMS / CONFUSION [   ], SEIZURES [   ], WEAKNESS [   ],TINGLING / NUMBNESS [   ]      PHYSICAL EXAMINATION:    GENERAL APPEARANCE: NO DISTRESS  HEENT:  NO PALLOR, NO  JVD,  NO   NODES, NECK SUPPLE  CVS: S1 +, S2 +,   RS: AEEB,  OCCASIONAL  RALES +,   RHONCHI +   TRACHEOSTOMY  ABD: SOFT, NT, NO, BS +    PEG +  EXT: NO PE  SKIN: WARM,   SKELETAL:  REDUCED ROM OF CERVICAL AND LS SPINE  CNS:  AAO X 1        RADIOLOGY :    RADIOLOGY AND READINGS REVIEWED        ASSESSMENT :     Infection due to severe acute respiratory syndrome coronavirus 2 (SARS-CoV-2)    CVA (cerebral vascular accident)    HLD (hyperlipidemia)    S/P percutaneous endoscopic gastrostomy (PEG) tube placement        PLAN:  HPI:  A 63 year old male, from [facility], with PMH of CVA, Alzheimer's, nonverbal at baseline, GERD, Schizophrenia, and Constipation, was brought into the ED s/p mechanical fall, poor oral intake, and Covid-19 infection on 12/27/23 as per NH papers. Unable to obtain history from patient since he is nonverbal, history obtained from chart review.  (28 Dec 2023 20:54)      # PATIENT IS DOWN GRADED TO FLOOR FROM ICU     # PROGNOSIS IS POOR/GUARDED - CRITICAL CARE TEAM DISCUSSING W/ FAMILY REGARDING GOC. FAMILY WISHES FOR PEG, TRACHEOSTOMY.     # [1/3] S/P RAPID RESPONSE FOR HYPOTENSION AND HYPOXIA - S/P IV FLUIDS AND PLACED ON NRB FOR HYPOXIA. CRITICAL CARE EVALUATION REQUESTED.     # RESOLVING SEPTIC SHOCK S/T ASPIRATION PNA + DIARRHEA [resolved]  # RESOLVING ACUTE HYPOXIC RESPIRATORY FAILURE S/T ? ASPIRATION EVENT   , COVID19 INFECTION     S/P TRACHEOSTOMY [1/12]    - CONTINUE CEFEPIME, COMPLETED DECADRON  - CHEST PT    - BCX [NGTD] AND UCX [NGTD]  - S/P IVF, VASOPRESSORS  - ID CONSULT  - CRITICAL CARE MANAGEMENT IN PROGRESS    - [1/4] - PATIENT W/ ? ASPIRATION EVENT - SUBSEQUENTLY REQUIRED INTUBATION W/ MECHANICAL VENTILATION  - [1/8] - EXTUBATED, CHEST PT  - [1/9] - REINTUBATED OVERNIGHT    - S/P TRACHEOSTOMY + PEG PLACEMENT [1/12]    # DYSPHAGIA S/T CVA  - S/P PEG PLACEMENT 1/12    # BRADYCARDIA  - MONITOR ON TELEMETRY  - ECHOCARDIOGRAM - LV SYSTOLIC FUNCTION MILDLY REDUCED  - OPTIMIZE ELECTROLYTES  - CARDIOLOGY CONSULT  - EP CONSULT - NO RECC FOR PPM AT PRESENT    # HYPOKALEMIA  - REPLETING WITH SUPPLEMENT    # SEVERE PROTEIN CALORIE MALNUTRITION   - ON SUPPLEMENTAL NUTRITION    # HX OF CVA  # HX OF DEMENTIA  # SCHIZOPHRENIA  # GI AND DVT PPX   Patient is a 63y old  Male who presents with a chief complaint of Sepsis and AHRF (16 Jan 2024 08:29)    PATIENT IS SEEN AND EXAMINED IN MEDICAL FLOOR.  RODRIGUET [    ]    KRISTINA [   ]      GT [   ]    ALLERGIES:  No Known Allergies      Daily     Daily     VITALS:    Vital Signs Last 24 Hrs  T(C): 36.8 (16 Jan 2024 04:23), Max: 36.9 (15 Darrell 2024 11:46)  T(F): 98.2 (16 Jan 2024 04:23), Max: 98.4 (15 Darrell 2024 11:46)  HR: 90 (16 Jan 2024 08:05) (82 - 90)  BP: 123/72 (16 Jan 2024 04:23) (112/68 - 124/77)  BP(mean): 89 (16 Jan 2024 04:23) (89 - 89)  RR: 17 (16 Jan 2024 08:05) (14 - 19)  SpO2: 100% (16 Jan 2024 08:05) (97% - 100%)    Parameters below as of 16 Jan 2024 08:05  Patient On (Oxygen Delivery Method): Hydrotrach  O2 Flow (L/min): 4  O2 Concentration (%): 36    LABS:    CBC Full  -  ( 16 Jan 2024 06:42 )  WBC Count : 8.37 K/uL  RBC Count : 4.00 M/uL  Hemoglobin : 11.7 g/dL  Hematocrit : 38.0 %  Platelet Count - Automated : 384 K/uL  Mean Cell Volume : 95.0 fl  Mean Cell Hemoglobin : 29.3 pg  Mean Cell Hemoglobin Concentration : 30.8 gm/dL  Auto Neutrophil # : x  Auto Lymphocyte # : x  Auto Monocyte # : x  Auto Eosinophil # : x  Auto Basophil # : x  Auto Neutrophil % : x  Auto Lymphocyte % : x  Auto Monocyte % : x  Auto Eosinophil % : x  Auto Basophil % : x      01-16    150<H>  |  117<H>  |  26<H>  ----------------------------<  178<H>  3.6   |  31  |  0.74    Ca    9.7      16 Jan 2024 06:42  Phos  2.6     01-16  Mg     2.7     01-16    TPro  7.4  /  Alb  2.7<L>  /  TBili  0.7  /  DBili  x   /  AST  11  /  ALT  23  /  AlkPhos  62  01-16    CAPILLARY BLOOD GLUCOSE      POCT Blood Glucose.: 124 mg/dL (16 Jan 2024 05:33)  POCT Blood Glucose.: 130 mg/dL (16 Jan 2024 00:03)  POCT Blood Glucose.: 144 mg/dL (15 Darrell 2024 16:58)  POCT Blood Glucose.: 121 mg/dL (15 Darrell 2024 11:34)        LIVER FUNCTIONS - ( 16 Jan 2024 06:42 )  Alb: 2.7 g/dL / Pro: 7.4 g/dL / ALK PHOS: 62 U/L / ALT: 23 U/L DA / AST: 11 U/L / GGT: x           Creatinine Trend: 0.74<--, 0.69<--, 0.66<--, 0.50<--, 0.59<--, 0.62<--  I&O's Summary    15 Darrell 2024 07:01  -  16 Jan 2024 07:00  --------------------------------------------------------  IN: 0 mL / OUT: 800 mL / NET: -800 mL            Nares/Axilla/Groin Nares/Axilla/Groin  01-11 @ 22:20   Culture NEGATIVE for Candida auris.  This surveillance culture is intended for Infection Control purposes only.  A negative result does not preclude the carriage of other fungal  organisms.  --  --      Catheterized Catheterized  12-29 @ 12:37   No growth  --  --      .Blood Blood  12-29 @ 02:05   No growth at 5 days  --  --      .Blood Blood  12-29 @ 01:55   No growth at 5 days  --  --          MEDICATIONS:    MEDICATIONS  (STANDING):  atorvastatin 20 milliGRAM(s) Oral at bedtime  cefepime   IVPB 2000 milliGRAM(s) IV Intermittent every 8 hours  chlorhexidine 0.12% Liquid 15 milliLiter(s) Oral Mucosa every 12 hours  enoxaparin Injectable 40 milliGRAM(s) SubCutaneous every 24 hours  polyethylene glycol 3350 17 Gram(s) Oral daily  QUEtiapine 12.5 milliGRAM(s) Oral every 12 hours  valproic  acid Syrup 250 milliGRAM(s) Oral two times a day      MEDICATIONS  (PRN):  morphine  - Injectable 2 milliGRAM(s) IV Push every 4 hours PRN Moderate Pain (4 - 6)      REVIEW OF SYSTEMS:                           ALL ROS DONE [ X   ]    CONSTITUTIONAL:  LETHARGIC [   ], FEVER [   ], UNRESPONSIVE [   ]  CVS:  CP  [   ], SOB, [   ], PALPITATIONS [   ], DIZZYNESS [   ]  RS: COUGH [   ], SPUTUM [   ]  GI: ABDOMINAL PAIN [   ], NAUSEA [   ], VOMITINGS [   ], DIARRHEA [   ], CONSTIPATION [   ]  :  DYSURIA [   ], NOCTURIA [   ], INCREASED FREQUENCY [   ], DRIBLING [   ],  SKELETAL: PAINFUL JOINTS [   ], SWOLLEN JOINTS [   ], NECK ACHE [   ], LOW BACK ACHE [   ],  SKIN : ULCERS [   ], RASH [   ], ITCHING [   ]  CNS: HEAD ACHE [   ], DOUBLE VISION [   ], BLURRED VISION [   ], AMS / CONFUSION [   ], SEIZURES [   ], WEAKNESS [   ],TINGLING / NUMBNESS [   ]      PHYSICAL EXAMINATION:    GENERAL APPEARANCE: NO DISTRESS  HEENT:  NO PALLOR, NO  JVD,  NO   NODES, NECK SUPPLE  CVS: S1 +, S2 +,   RS: AEEB,  OCCASIONAL  RALES +,   RHONCHI +   TRACHEOSTOMY  ABD: SOFT, NT, NO, BS +    PEG +  EXT: NO PE  SKIN: WARM,   SKELETAL:  REDUCED ROM OF CERVICAL AND LS SPINE  CNS:  AAO X 1        RADIOLOGY :    RADIOLOGY AND READINGS REVIEWED        ASSESSMENT :     Infection due to severe acute respiratory syndrome coronavirus 2 (SARS-CoV-2)    CVA (cerebral vascular accident)    HLD (hyperlipidemia)    S/P percutaneous endoscopic gastrostomy (PEG) tube placement        PLAN:  HPI:  A 63 year old male, from [facility], with PMH of CVA, Alzheimer's, nonverbal at baseline, GERD, Schizophrenia, and Constipation, was brought into the ED s/p mechanical fall, poor oral intake, and Covid-19 infection on 12/27/23 as per NH papers. Unable to obtain history from patient since he is nonverbal, history obtained from chart review.  (28 Dec 2023 20:54)      # PATIENT IS DOWN GRADED TO FLOOR FROM ICU     # PROGNOSIS IS POOR/GUARDED - CRITICAL CARE TEAM DISCUSSING W/ FAMILY REGARDING GOC. FAMILY WISHES FOR PEG, TRACHEOSTOMY.     # [1/3] S/P RAPID RESPONSE FOR HYPOTENSION AND HYPOXIA - S/P IV FLUIDS AND PLACED ON NRB FOR HYPOXIA. CRITICAL CARE EVALUATION REQUESTED.     # RESOLVING SEPTIC SHOCK S/T ASPIRATION PNA + DIARRHEA [resolved]  # RESOLVING ACUTE HYPOXIC RESPIRATORY FAILURE S/T ? ASPIRATION EVENT   , COVID19 INFECTION     S/P TRACHEOSTOMY [1/12]    - CONTINUE CEFEPIME, COMPLETED DECADRON  - CHEST PT    - BCX [NGTD] AND UCX [NGTD]  - S/P IVF, VASOPRESSORS  - ID CONSULT  - CRITICAL CARE MANAGEMENT IN PROGRESS    - [1/4] - PATIENT W/ ? ASPIRATION EVENT - SUBSEQUENTLY REQUIRED INTUBATION W/ MECHANICAL VENTILATION  - [1/8] - EXTUBATED, CHEST PT  - [1/9] - REINTUBATED OVERNIGHT    - S/P TRACHEOSTOMY + PEG PLACEMENT [1/12]    # DYSPHAGIA S/T CVA  - S/P PEG PLACEMENT 1/12    # BRADYCARDIA  - MONITOR ON TELEMETRY  - ECHOCARDIOGRAM - LV SYSTOLIC FUNCTION MILDLY REDUCED  - OPTIMIZE ELECTROLYTES  - CARDIOLOGY CONSULT  - EP CONSULT - NO RECC FOR PPM AT PRESENT    # HYPOKALEMIA  - REPLETING WITH SUPPLEMENT    # SEVERE PROTEIN CALORIE MALNUTRITION   - ON SUPPLEMENTAL NUTRITION    # HX OF CVA  # HX OF DEMENTIA  # SCHIZOPHRENIA  # GI AND DVT PPX   Patient is a 63y old  Male who presents with a chief complaint of Sepsis and AHRF (16 Jan 2024 08:29)    PATIENT IS SEEN AND EXAMINED IN SCU.      ALLERGIES:  No Known Allergies        VITALS:    Vital Signs Last 24 Hrs  T(C): 36.8 (16 Jan 2024 04:23), Max: 36.9 (15 Darrell 2024 11:46)  T(F): 98.2 (16 Jan 2024 04:23), Max: 98.4 (15 Darrell 2024 11:46)  HR: 90 (16 Jan 2024 08:05) (82 - 90)  BP: 123/72 (16 Jan 2024 04:23) (112/68 - 124/77)  BP(mean): 89 (16 Jan 2024 04:23) (89 - 89)  RR: 17 (16 Jan 2024 08:05) (14 - 19)  SpO2: 100% (16 Jan 2024 08:05) (97% - 100%)    Parameters below as of 16 Jan 2024 08:05  Patient On (Oxygen Delivery Method): Hydrotrach  O2 Flow (L/min): 4  O2 Concentration (%): 36    LABS:    CBC Full  -  ( 16 Jan 2024 06:42 )  WBC Count : 8.37 K/uL  RBC Count : 4.00 M/uL  Hemoglobin : 11.7 g/dL  Hematocrit : 38.0 %  Platelet Count - Automated : 384 K/uL  Mean Cell Volume : 95.0 fl  Mean Cell Hemoglobin : 29.3 pg  Mean Cell Hemoglobin Concentration : 30.8 gm/dL  Auto Neutrophil # : x  Auto Lymphocyte # : x  Auto Monocyte # : x  Auto Eosinophil # : x  Auto Basophil # : x  Auto Neutrophil % : x  Auto Lymphocyte % : x  Auto Monocyte % : x  Auto Eosinophil % : x  Auto Basophil % : x      01-16    150<H>  |  117<H>  |  26<H>  ----------------------------<  178<H>  3.6   |  31  |  0.74    Ca    9.7      16 Jan 2024 06:42  Phos  2.6     01-16  Mg     2.7     01-16    TPro  7.4  /  Alb  2.7<L>  /  TBili  0.7  /  DBili  x   /  AST  11  /  ALT  23  /  AlkPhos  62  01-16    CAPILLARY BLOOD GLUCOSE      POCT Blood Glucose.: 124 mg/dL (16 Jan 2024 05:33)  POCT Blood Glucose.: 130 mg/dL (16 Jan 2024 00:03)  POCT Blood Glucose.: 144 mg/dL (15 Darrell 2024 16:58)  POCT Blood Glucose.: 121 mg/dL (15 Darrell 2024 11:34)        LIVER FUNCTIONS - ( 16 Jan 2024 06:42 )  Alb: 2.7 g/dL / Pro: 7.4 g/dL / ALK PHOS: 62 U/L / ALT: 23 U/L DA / AST: 11 U/L / GGT: x           Creatinine Trend: 0.74<--, 0.69<--, 0.66<--, 0.50<--, 0.59<--, 0.62<--  I&O's Summary    15 Darrell 2024 07:01  -  16 Jan 2024 07:00  --------------------------------------------------------  IN: 0 mL / OUT: 800 mL / NET: -800 mL            Nares/Axilla/Groin Nares/Axilla/Groin  01-11 @ 22:20   Culture NEGATIVE for Candida auris.  This surveillance culture is intended for Infection Control purposes only.  A negative result does not preclude the carriage of other fungal  organisms.  --  --      Catheterized Catheterized  12-29 @ 12:37   No growth  --  --      .Blood Blood  12-29 @ 02:05   No growth at 5 days  --  --      .Blood Blood  12-29 @ 01:55   No growth at 5 days  --  --          MEDICATIONS:    MEDICATIONS  (STANDING):  atorvastatin 20 milliGRAM(s) Oral at bedtime  cefepime   IVPB 2000 milliGRAM(s) IV Intermittent every 8 hours  chlorhexidine 0.12% Liquid 15 milliLiter(s) Oral Mucosa every 12 hours  enoxaparin Injectable 40 milliGRAM(s) SubCutaneous every 24 hours  polyethylene glycol 3350 17 Gram(s) Oral daily  QUEtiapine 12.5 milliGRAM(s) Oral every 12 hours  valproic  acid Syrup 250 milliGRAM(s) Oral two times a day      MEDICATIONS  (PRN):  morphine  - Injectable 2 milliGRAM(s) IV Push every 4 hours PRN Moderate Pain (4 - 6)      REVIEW OF SYSTEMS:                           ALL ROS DONE [ X   ]    CONSTITUTIONAL:  LETHARGIC [   ], FEVER [   ], UNRESPONSIVE [   ]  CVS:  CP  [   ], SOB, [   ], PALPITATIONS [   ], DIZZYNESS [   ]  RS: COUGH [   ], SPUTUM [   ]  GI: ABDOMINAL PAIN [   ], NAUSEA [   ], VOMITINGS [   ], DIARRHEA [   ], CONSTIPATION [   ]  :  DYSURIA [   ], NOCTURIA [   ], INCREASED FREQUENCY [   ], DRIBLING [   ],  SKELETAL: PAINFUL JOINTS [   ], SWOLLEN JOINTS [   ], NECK ACHE [   ], LOW BACK ACHE [   ],  SKIN : ULCERS [   ], RASH [   ], ITCHING [   ]  CNS: HEAD ACHE [   ], DOUBLE VISION [   ], BLURRED VISION [   ], AMS / CONFUSION [   ], SEIZURES [   ], WEAKNESS [   ],TINGLING / NUMBNESS [   ]      PHYSICAL EXAMINATION:    GENERAL APPEARANCE: NO DISTRESS  HEENT:  NO PALLOR, NO  JVD,  NO   NODES, NECK SUPPLE  CVS: S1 +, S2 +,   RS: AEEB,  OCCASIONAL  RALES +,   RHONCHI +   TRACHEOSTOMY  ABD: SOFT, NT, NO, BS +    PEG +  EXT: NO PE  SKIN: WARM,   SKELETAL:  REDUCED ROM OF CERVICAL AND LS SPINE  CNS:  AAO X 1        RADIOLOGY :    RADIOLOGY AND READINGS REVIEWED        ASSESSMENT :     Infection due to severe acute respiratory syndrome coronavirus 2 (SARS-CoV-2)    CVA (cerebral vascular accident)    HLD (hyperlipidemia)    S/P percutaneous endoscopic gastrostomy (PEG) tube placement        PLAN:  HPI:  A 63 year old male, from [facility], with PMH of CVA, Alzheimer's, nonverbal at baseline, GERD, Schizophrenia, and Constipation, was brought into the ED s/p mechanical fall, poor oral intake, and Covid-19 infection on 12/27/23 as per NH papers. Unable to obtain history from patient since he is nonverbal, history obtained from chart review.  (28 Dec 2023 20:54)      # PATIENT IS DOWN GRADED TO FLOOR FROM ICU     # PROGNOSIS IS POOR/GUARDED - CRITICAL CARE TEAM DISCUSSING W/ FAMILY REGARDING GOC. FAMILY WISHES FOR PEG, TRACHEOSTOMY.     # [1/3] S/P RAPID RESPONSE FOR HYPOTENSION AND HYPOXIA - S/P IV FLUIDS AND PLACED ON NRB FOR HYPOXIA. CRITICAL CARE EVALUATION REQUESTED.     # RESOLVING SEPTIC SHOCK S/T ASPIRATION PNA + DIARRHEA [resolved]  # RESOLVING ACUTE HYPOXIC RESPIRATORY FAILURE S/T ? ASPIRATION EVENT   , COVID19 INFECTION     S/P TRACHEOSTOMY [1/12]    - CONTINUE CEFEPIME, COMPLETED DECADRON, S/P REMDESEVIR  - CHEST PT    - BCX [NGTD] AND UCX [NGTD]  - S/P IVF, VASOPRESSORS  - ID CONSULT  - CRITICAL CARE MANAGEMENT IN PROGRESS    - [1/4] - PATIENT W/ ? ASPIRATION EVENT - SUBSEQUENTLY REQUIRED INTUBATION W/ MECHANICAL VENTILATION  - [1/8] - EXTUBATED, CHEST PT  - [1/9] - REINTUBATED OVERNIGHT    - S/P TRACHEOSTOMY + PEG PLACEMENT [1/12]    # DYSPHAGIA S/T CVA  - S/P PEG PLACEMENT 1/12    # BRADYCARDIA  - MONITOR ON TELEMETRY  - ECHOCARDIOGRAM - LV SYSTOLIC FUNCTION MILDLY REDUCED  - OPTIMIZE ELECTROLYTES  - CARDIOLOGY CONSULT  - EP CONSULT - NO RECC FOR PPM AT PRESENT    # HYPOKALEMIA  - REPLETING WITH SUPPLEMENT    # SEVERE PROTEIN CALORIE MALNUTRITION   - ON SUPPLEMENTAL NUTRITION    # HX OF CVA  # HX OF DEMENTIA  # SCHIZOPHRENIA  # GI AND DVT PPX   Patient is a 63y old  Male who presents with a chief complaint of Sepsis and AHRF (16 Jan 2024 08:29)    PATIENT IS SEEN AND EXAMINED IN SCU.      ALLERGIES:  No Known Allergies        VITALS:    Vital Signs Last 24 Hrs  T(C): 36.8 (16 Jan 2024 04:23), Max: 36.9 (15 Darrell 2024 11:46)  T(F): 98.2 (16 Jan 2024 04:23), Max: 98.4 (15 Darrell 2024 11:46)  HR: 90 (16 Jan 2024 08:05) (82 - 90)  BP: 123/72 (16 Jan 2024 04:23) (112/68 - 124/77)  BP(mean): 89 (16 Jan 2024 04:23) (89 - 89)  RR: 17 (16 Jan 2024 08:05) (14 - 19)  SpO2: 100% (16 Jan 2024 08:05) (97% - 100%)    Parameters below as of 16 Jan 2024 08:05  Patient On (Oxygen Delivery Method): Hydrotrach  O2 Flow (L/min): 4  O2 Concentration (%): 36    LABS:    CBC Full  -  ( 16 Jan 2024 06:42 )  WBC Count : 8.37 K/uL  RBC Count : 4.00 M/uL  Hemoglobin : 11.7 g/dL  Hematocrit : 38.0 %  Platelet Count - Automated : 384 K/uL  Mean Cell Volume : 95.0 fl  Mean Cell Hemoglobin : 29.3 pg  Mean Cell Hemoglobin Concentration : 30.8 gm/dL  Auto Neutrophil # : x  Auto Lymphocyte # : x  Auto Monocyte # : x  Auto Eosinophil # : x  Auto Basophil # : x  Auto Neutrophil % : x  Auto Lymphocyte % : x  Auto Monocyte % : x  Auto Eosinophil % : x  Auto Basophil % : x      01-16    150<H>  |  117<H>  |  26<H>  ----------------------------<  178<H>  3.6   |  31  |  0.74    Ca    9.7      16 Jan 2024 06:42  Phos  2.6     01-16  Mg     2.7     01-16    TPro  7.4  /  Alb  2.7<L>  /  TBili  0.7  /  DBili  x   /  AST  11  /  ALT  23  /  AlkPhos  62  01-16    CAPILLARY BLOOD GLUCOSE      POCT Blood Glucose.: 124 mg/dL (16 Jan 2024 05:33)  POCT Blood Glucose.: 130 mg/dL (16 Jan 2024 00:03)  POCT Blood Glucose.: 144 mg/dL (15 Darrell 2024 16:58)  POCT Blood Glucose.: 121 mg/dL (15 Darrell 2024 11:34)        LIVER FUNCTIONS - ( 16 Jan 2024 06:42 )  Alb: 2.7 g/dL / Pro: 7.4 g/dL / ALK PHOS: 62 U/L / ALT: 23 U/L DA / AST: 11 U/L / GGT: x           Creatinine Trend: 0.74<--, 0.69<--, 0.66<--, 0.50<--, 0.59<--, 0.62<--  I&O's Summary    15 Darrell 2024 07:01  -  16 Jan 2024 07:00  --------------------------------------------------------  IN: 0 mL / OUT: 800 mL / NET: -800 mL            Nares/Axilla/Groin Nares/Axilla/Groin  01-11 @ 22:20   Culture NEGATIVE for Candida auris.  This surveillance culture is intended for Infection Control purposes only.  A negative result does not preclude the carriage of other fungal  organisms.  --  --      Catheterized Catheterized  12-29 @ 12:37   No growth  --  --      .Blood Blood  12-29 @ 02:05   No growth at 5 days  --  --      .Blood Blood  12-29 @ 01:55   No growth at 5 days  --  --          MEDICATIONS:    MEDICATIONS  (STANDING):  atorvastatin 20 milliGRAM(s) Oral at bedtime  cefepime   IVPB 2000 milliGRAM(s) IV Intermittent every 8 hours  chlorhexidine 0.12% Liquid 15 milliLiter(s) Oral Mucosa every 12 hours  enoxaparin Injectable 40 milliGRAM(s) SubCutaneous every 24 hours  polyethylene glycol 3350 17 Gram(s) Oral daily  QUEtiapine 12.5 milliGRAM(s) Oral every 12 hours  valproic  acid Syrup 250 milliGRAM(s) Oral two times a day      MEDICATIONS  (PRN):  morphine  - Injectable 2 milliGRAM(s) IV Push every 4 hours PRN Moderate Pain (4 - 6)      REVIEW OF SYSTEMS:                           ALL ROS DONE [ X   ]    CONSTITUTIONAL:  LETHARGIC [   ], FEVER [   ], UNRESPONSIVE [   ]  CVS:  CP  [   ], SOB, [   ], PALPITATIONS [   ], DIZZYNESS [   ]  RS: COUGH [   ], SPUTUM [   ]  GI: ABDOMINAL PAIN [   ], NAUSEA [   ], VOMITINGS [   ], DIARRHEA [   ], CONSTIPATION [   ]  :  DYSURIA [   ], NOCTURIA [   ], INCREASED FREQUENCY [   ], DRIBLING [   ],  SKELETAL: PAINFUL JOINTS [   ], SWOLLEN JOINTS [   ], NECK ACHE [   ], LOW BACK ACHE [   ],  SKIN : ULCERS [   ], RASH [   ], ITCHING [   ]  CNS: HEAD ACHE [   ], DOUBLE VISION [   ], BLURRED VISION [   ], AMS / CONFUSION [   ], SEIZURES [   ], WEAKNESS [   ],TINGLING / NUMBNESS [   ]      PHYSICAL EXAMINATION:    GENERAL APPEARANCE: NO DISTRESS  HEENT:  NO PALLOR, NO  JVD,  NO   NODES, NECK SUPPLE  CVS: S1 +, S2 +,   RS: AEEB,  OCCASIONAL  RALES +,   RHONCHI +   TRACHEOSTOMY  ABD: SOFT, NT, NO, BS +    PEG +  EXT: NO PE  SKIN: WARM,   SKELETAL:  REDUCED ROM OF CERVICAL AND LS SPINE  CNS:  AAO X 1        RADIOLOGY :    RADIOLOGY AND READINGS REVIEWED        ASSESSMENT :     Infection due to severe acute respiratory syndrome coronavirus 2 (SARS-CoV-2)    CVA (cerebral vascular accident)    HLD (hyperlipidemia)    S/P percutaneous endoscopic gastrostomy (PEG) tube placement        PLAN:  HPI:  A 63 year old male, from [facility], with PMH of CVA, Alzheimer's, nonverbal at baseline, GERD, Schizophrenia, and Constipation, was brought into the ED s/p mechanical fall, poor oral intake, and Covid-19 infection on 12/27/23 as per NH papers. Unable to obtain history from patient since he is nonverbal, history obtained from chart review.  (28 Dec 2023 20:54)      # PATIENT IS DOWN GRADED TO FLOOR FROM ICU     # PROGNOSIS IS POOR/GUARDED - CRITICAL CARE TEAM DISCUSSING W/ FAMILY REGARDING GOC. FAMILY WISHES FOR PEG, TRACHEOSTOMY.     # [1/3] S/P RAPID RESPONSE FOR HYPOTENSION AND HYPOXIA - S/P IV FLUIDS AND PLACED ON NRB FOR HYPOXIA. CRITICAL CARE EVALUATION REQUESTED.     # RESOLVING SEPTIC SHOCK S/T ASPIRATION PNA + DIARRHEA [resolved]  # RESOLVING ACUTE HYPOXIC RESPIRATORY FAILURE S/T ? ASPIRATION EVENT   , COVID19 INFECTION     S/P TRACHEOSTOMY [1/12]    - CONTINUE CEFEPIME, COMPLETED DECADRON, S/P REMDESEVIR  - CHEST PT    - BCX [NGTD] AND UCX [NGTD]  - S/P IVF, VASOPRESSORS  - ID CONSULT  - CRITICAL CARE MANAGEMENT IN PROGRESS    - [1/4] - PATIENT W/ ? ASPIRATION EVENT - SUBSEQUENTLY REQUIRED INTUBATION W/ MECHANICAL VENTILATION  - [1/8] - EXTUBATED, CHEST PT  - [1/9] - REINTUBATED OVERNIGHT    - S/P TRACHEOSTOMY + PEG PLACEMENT [1/12]    # DYSPHAGIA S/T CVA  - S/P PEG PLACEMENT 1/12    # BRADYCARDIA  - MONITOR ON TELEMETRY  - ECHOCARDIOGRAM - LV SYSTOLIC FUNCTION MILDLY REDUCED  - OPTIMIZE ELECTROLYTES  - CARDIOLOGY CONSULT  - EP CONSULT - NO RECC FOR PPM AT PRESENT    # HYPOKALEMIA  - REPLETING WITH SUPPLEMENT    # SEVERE PROTEIN CALORIE MALNUTRITION   - ON SUPPLEMENTAL NUTRITION    # HX OF CVA  # HX OF DEMENTIA  # SCHIZOPHRENIA  # GI AND DVT PPX

## 2024-01-16 NOTE — PATIENT PROFILE ADULT - DIETITIAN.
[facility] 
dietitian/nutrition services

## 2024-01-16 NOTE — PROGRESS NOTE ADULT - SUBJECTIVE AND OBJECTIVE BOX
Time of Visit:  Patient seen and examined.     MEDICATIONS  (STANDING):  atorvastatin 20 milliGRAM(s) Oral at bedtime  cefepime   IVPB 2000 milliGRAM(s) IV Intermittent every 8 hours  chlorhexidine 0.12% Liquid 15 milliLiter(s) Oral Mucosa every 12 hours  enoxaparin Injectable 40 milliGRAM(s) SubCutaneous every 24 hours  polyethylene glycol 3350 17 Gram(s) Oral daily  QUEtiapine 12.5 milliGRAM(s) Oral at bedtime  valproic  acid Syrup 250 milliGRAM(s) Oral two times a day      MEDICATIONS  (PRN):       Medications up to date at time of exam.    ROS; No fever, chills, cough, nasal congestion .    PHYSICAL EXAMINATION:    Vital Signs Last 24 Hrs  T(C): 36.9 (16 Jan 2024 11:57), Max: 36.9 (16 Jan 2024 11:57)  T(F): 98.4 (16 Jan 2024 11:57), Max: 98.4 (16 Jan 2024 11:57)  HR: 90 (16 Jan 2024 11:57) (86 - 90)  BP: 129/79 (16 Jan 2024 11:57) (112/68 - 129/79)  BP(mean): 89 (16 Jan 2024 04:23) (89 - 89)  RR: 18 (16 Jan 2024 11:57) (15 - 19)  SpO2: 100% (16 Jan 2024 11:57) (98% - 100%)    Parameters below as of 16 Jan 2024 11:57  Patient On (Oxygen Delivery Method): ventilator      Mode: PS (Pressure Support)/ Spontaneous  FiO2: 35  PEEP: 5  ITime: 1  MAP: 7  PIP: 13   General: No acute distress.     HEENT: Bi temporal wasting. No nasal tenderness. Mucous membranes are moist.     NECK: Has tracheostomy , non infected.     LUNGS: Decreased breath sounds. Non labored. No use of accessory muscle.     HEART: S1 S2 Regular rate and rhythm without murmur.    ABDOMEN: Soft, nontender, and nondistended. +ve Peg. Active bowel sounds.     EXTREMITIES: Total care . Non ambulatory.     NEUROLOGIC: Non verbal.      SKIN: Warm, dry, good turgor.    LABS:                        11.7   8.37  )-----------( 384      ( 16 Jan 2024 06:42 )             38.0     01-16    150<H>  |  117<H>  |  26<H>  ----------------------------<  178<H>  3.6   |  31  |  0.74    Ca    9.7      16 Jan 2024 06:42  Phos  2.6     01-16  Mg     2.7     01-16    TPro  7.4  /  Alb  2.7<L>  /  TBili  0.7  /  DBili  x   /  AST  11  /  ALT  23  /  AlkPhos  62  01-16      Urinalysis Basic - ( 16 Jan 2024 06:42 )    Color: x / Appearance: x / SG: x / pH: x  Gluc: 178 mg/dL / Ketone: x  / Bili: x / Urobili: x   Blood: x / Protein: x / Nitrite: x   Leuk Esterase: x / RBC: x / WBC x   Sq Epi: x / Non Sq Epi: x / Bacteria: x    MICROBIOLOGY: (if applicable)    RADIOLOGY & ADDITIONAL STUDIES:  EKG:   CXR:  ECHO:    IMPRESSION: 63y Male PAST MEDICAL & SURGICAL HISTORY:  CVA (cerebral vascular accident)      CVA (cerebral vascular accident)      HLD (hyperlipidemia)      S/P percutaneous endoscopic gastrostomy (PEG) tube placement       Impression; This is a 64 Y/O Male from [facility] was   brought into the ED s/p mechanical fall, poor oral intake, and Covid-19 infection on 12/27/23 Pt was admitted for COVID PNA, later found with intermittent hypoxia/ hypopnea to Sats 80% and Hypotension despite multiple fluid boluses, also found with bryan to 40s x last 2 days, in ICU got intubated and extubated on 01-08-24 and reintubated on 01-09-24  and now in SCU  with Acute hypoxic respiratory failure had Covid Pneumonia . s/p Trach on 01-12-24.    Suggestion:   Continue mechanical ventilation with Daily SBT as tolerated . Trach collar as tolerated .    Oral, trach care, suction.  Not a candidate for decannulation at this time.   Has completed multiple rounds of antibiotics. On Cefepime 2 Gm IVPB Q 8 Hours.   Aspiration precautions with HOB elevation.    Completed remdesivir and steroids.

## 2024-01-17 LAB
ALBUMIN SERPL ELPH-MCNC: 2.7 G/DL — LOW (ref 3.5–5)
ALP SERPL-CCNC: 63 U/L — SIGNIFICANT CHANGE UP (ref 40–120)
ALT FLD-CCNC: 35 U/L DA — SIGNIFICANT CHANGE UP (ref 10–60)
ANION GAP SERPL CALC-SCNC: 3 MMOL/L — LOW (ref 5–17)
AST SERPL-CCNC: 19 U/L — SIGNIFICANT CHANGE UP (ref 10–40)
BILIRUB SERPL-MCNC: 0.6 MG/DL — SIGNIFICANT CHANGE UP (ref 0.2–1.2)
BUN SERPL-MCNC: 21 MG/DL — HIGH (ref 7–18)
CALCIUM SERPL-MCNC: 9.9 MG/DL — SIGNIFICANT CHANGE UP (ref 8.4–10.5)
CHLORIDE SERPL-SCNC: 118 MMOL/L — HIGH (ref 96–108)
CO2 SERPL-SCNC: 31 MMOL/L — SIGNIFICANT CHANGE UP (ref 22–31)
CREAT SERPL-MCNC: 0.73 MG/DL — SIGNIFICANT CHANGE UP (ref 0.5–1.3)
EGFR: 102 ML/MIN/1.73M2 — SIGNIFICANT CHANGE UP
GLUCOSE BLDC GLUCOMTR-MCNC: 118 MG/DL — HIGH (ref 70–99)
GLUCOSE BLDC GLUCOMTR-MCNC: 129 MG/DL — HIGH (ref 70–99)
GLUCOSE SERPL-MCNC: 141 MG/DL — HIGH (ref 70–99)
HCT VFR BLD CALC: 38.8 % — LOW (ref 39–50)
HGB BLD-MCNC: 12.3 G/DL — LOW (ref 13–17)
MAGNESIUM SERPL-MCNC: 2.7 MG/DL — HIGH (ref 1.6–2.6)
MCHC RBC-ENTMCNC: 29.4 PG — SIGNIFICANT CHANGE UP (ref 27–34)
MCHC RBC-ENTMCNC: 31.7 GM/DL — LOW (ref 32–36)
MCV RBC AUTO: 92.6 FL — SIGNIFICANT CHANGE UP (ref 80–100)
NRBC # BLD: 0 /100 WBCS — SIGNIFICANT CHANGE UP (ref 0–0)
PHOSPHATE SERPL-MCNC: 2.9 MG/DL — SIGNIFICANT CHANGE UP (ref 2.5–4.5)
PLATELET # BLD AUTO: 394 K/UL — SIGNIFICANT CHANGE UP (ref 150–400)
POTASSIUM SERPL-MCNC: 3.5 MMOL/L — SIGNIFICANT CHANGE UP (ref 3.5–5.3)
POTASSIUM SERPL-SCNC: 3.5 MMOL/L — SIGNIFICANT CHANGE UP (ref 3.5–5.3)
PROT SERPL-MCNC: 7.4 G/DL — SIGNIFICANT CHANGE UP (ref 6–8.3)
RBC # BLD: 4.19 M/UL — LOW (ref 4.2–5.8)
RBC # FLD: 13.6 % — SIGNIFICANT CHANGE UP (ref 10.3–14.5)
SODIUM SERPL-SCNC: 152 MMOL/L — HIGH (ref 135–145)
WBC # BLD: 9.42 K/UL — SIGNIFICANT CHANGE UP (ref 3.8–10.5)
WBC # FLD AUTO: 9.42 K/UL — SIGNIFICANT CHANGE UP (ref 3.8–10.5)

## 2024-01-17 PROCEDURE — 99231 SBSQ HOSP IP/OBS SF/LOW 25: CPT

## 2024-01-17 RX ADMIN — QUETIAPINE FUMARATE 12.5 MILLIGRAM(S): 200 TABLET, FILM COATED ORAL at 21:52

## 2024-01-17 RX ADMIN — ENOXAPARIN SODIUM 40 MILLIGRAM(S): 100 INJECTION SUBCUTANEOUS at 14:00

## 2024-01-17 RX ADMIN — CHLORHEXIDINE GLUCONATE 15 MILLILITER(S): 213 SOLUTION TOPICAL at 05:28

## 2024-01-17 RX ADMIN — Medication 250 MILLIGRAM(S): at 17:30

## 2024-01-17 RX ADMIN — CEFEPIME 100 MILLIGRAM(S): 1 INJECTION, POWDER, FOR SOLUTION INTRAMUSCULAR; INTRAVENOUS at 05:29

## 2024-01-17 RX ADMIN — CEFEPIME 100 MILLIGRAM(S): 1 INJECTION, POWDER, FOR SOLUTION INTRAMUSCULAR; INTRAVENOUS at 14:00

## 2024-01-17 RX ADMIN — CHLORHEXIDINE GLUCONATE 15 MILLILITER(S): 213 SOLUTION TOPICAL at 17:30

## 2024-01-17 RX ADMIN — POLYETHYLENE GLYCOL 3350 17 GRAM(S): 17 POWDER, FOR SOLUTION ORAL at 12:12

## 2024-01-17 RX ADMIN — CEFEPIME 100 MILLIGRAM(S): 1 INJECTION, POWDER, FOR SOLUTION INTRAMUSCULAR; INTRAVENOUS at 21:53

## 2024-01-17 RX ADMIN — ATORVASTATIN CALCIUM 20 MILLIGRAM(S): 80 TABLET, FILM COATED ORAL at 21:52

## 2024-01-17 RX ADMIN — Medication 250 MILLIGRAM(S): at 05:29

## 2024-01-17 NOTE — PROGRESS NOTE ADULT - SUBJECTIVE AND OBJECTIVE BOX
C A R D I O L O G Y  **********************************  DATE OF SERVICE: 01-17-24    trached on vent support.  in mittens    atorvastatin 20 milliGRAM(s) Oral at bedtime  cefepime   IVPB 2000 milliGRAM(s) IV Intermittent every 8 hours  chlorhexidine 0.12% Liquid 15 milliLiter(s) Oral Mucosa every 12 hours  enoxaparin Injectable 40 milliGRAM(s) SubCutaneous every 24 hours  polyethylene glycol 3350 17 Gram(s) Oral daily  QUEtiapine 12.5 milliGRAM(s) Oral at bedtime  valproic  acid Syrup 250 milliGRAM(s) Oral two times a day                            12.3   9.42  )-----------( 394      ( 17 Jan 2024 08:10 )             38.8       Hemoglobin: 12.3 g/dL (01-17 @ 08:10)  Hemoglobin: 11.7 g/dL (01-16 @ 06:42)  Hemoglobin: 10.9 g/dL (01-15 @ 06:00)  Hemoglobin: 11.0 g/dL (01-14 @ 05:06)  Hemoglobin: 11.9 g/dL (01-13 @ 04:55)      01-17    152<H>  |  118<H>  |  21<H>  ----------------------------<  141<H>  3.5   |  31  |  0.73    Ca    9.9      17 Jan 2024 08:10  Phos  2.9     01-17  Mg     2.7     01-17    TPro  7.4  /  Alb  2.7<L>  /  TBili  0.6  /  DBili  x   /  AST  19  /  ALT  35  /  AlkPhos  63  01-17    Creatinine Trend: 0.73<--, 0.74<--, 0.69<--, 0.66<--, 0.50<--, 0.59<--    COAGS:           T(C): 36.3 (01-17-24 @ 04:47), Max: 36.9 (01-16-24 @ 11:57)  HR: 116 (01-17-24 @ 09:27) (79 - 116)  BP: 111/73 (01-17-24 @ 09:27) (111/73 - 156/67)  RR: 19 (01-17-24 @ 04:47) (18 - 20)  SpO2: 96% (01-17-24 @ 09:27) (96% - 100%)  Wt(kg): --    I&O's Summary    16 Jan 2024 07:01  -  17 Jan 2024 07:00  --------------------------------------------------------  IN: 0 mL / OUT: 900 mL / NET: -900 mL            HEENT:  (-)icterus (-)pallor  CV: N S1 S2 1/6 JOVON (+)2 Pulses B/l  Resp:  Clear to ausculatation B/L, normal effort  GI: (+) BS Soft, NT, ND  Lymph:  (-)Edema, (-)obvious lymphadenopathy  Skin: Warm to touch, Normal turgor  Psych: Unable to assess mood and affect      TELEMETRY: 	 off        ASSESSMENT/PLAN: 	63y Male PMH of CVA, Alzheimer's, nonverbal at baseline, GERD, Schizophrenia, historically preserved LV function and Constipation, was brought into the ED s/p mechanical fall, poor oral intake, and Covid-19 infection on 12/27/23 as per NH papers.    # Bradycardia   - He likely has some degree of sick sinus syndrome that may be exacerbated by Midodrine.  now off midodrine  - EP following      # Hypotension  - Suspect this is due to dehydration and vasodilatory state associated with viral/bacterial infection  - Abx  - BP improved  - s/p  trach and PEG      Gavino Cadet MD, [facility]  BEEPER [phone]

## 2024-01-17 NOTE — CHART NOTE - NSCHARTNOTEFT_GEN_A_CORE
Spoke with Drew Arriola, son, today via phone, updated on clinical status, treatment plan/options and discharge plan/options.  All questions answered, support provided.

## 2024-01-17 NOTE — PROGRESS NOTE ADULT - SUBJECTIVE AND OBJECTIVE BOX
EP ATTENDING    no tele, but no further bradycardia on vital signs  trached, in bed comfortable, at baseline was nonverbal.  no acute events overnight.  DATE OF SERVICE - 01-17-24     Review of Systems:   Constitutional: [ ] fevers, [ ] chills.   Skin: [ ] dry skin. [ ] rashes.  Psychiatric: [ ] depression, [ ] anxiety.   Gastrointestinal: [ ] BRBPR, [ ] melena.   Neurological: [ ] confusion. [ ] seizures. [ ] shuffling gait.   Ears,Nose,Mouth and Throat: [ ] ear pain [ ] sore throat.   Eyes: [ ] diplopia.   Respiratory: [ ] hemoptysis. [ ] shortness of breath  Cardiovascular: See HPI above  Hematologic/Lymphatic: [ ] anemia. [ ] painful nodes. [ ] prolonged bleeding.   Genitourinary: [ ] hematuria. [ ] flank pain.   Endocrine: [ ] significant change in weight. [ ] intolerance to heat and cold.     Review of systems [ x] otherwise negative, [ ] otherwise unable to obtain    FH: no family history of sudden cardiac death in first degree relatives    SH: [ ] tobacco, [ ] alcohol, [ ] drugs    atorvastatin 20 milliGRAM(s) Oral at bedtime  cefepime   IVPB 2000 milliGRAM(s) IV Intermittent every 8 hours  chlorhexidine 0.12% Liquid 15 milliLiter(s) Oral Mucosa every 12 hours  enoxaparin Injectable 40 milliGRAM(s) SubCutaneous every 24 hours  polyethylene glycol 3350 17 Gram(s) Oral daily  QUEtiapine 12.5 milliGRAM(s) Oral at bedtime  valproic  acid Syrup 250 milliGRAM(s) Oral two times a day                            12.3   9.42  )-----------( 394      ( 17 Jan 2024 08:10 )             38.8       01-17    152<H>  |  118<H>  |  21<H>  ----------------------------<  141<H>  3.5   |  31  |  0.73    Ca    9.9      17 Jan 2024 08:10  Phos  2.9     01-17  Mg     2.7     01-17    TPro  7.4  /  Alb  2.7<L>  /  TBili  0.6  /  DBili  x   /  AST  19  /  ALT  35  /  AlkPhos  63  01-17    T(C): 36.3 (01-17-24 @ 04:47), Max: 36.7 (01-16-24 @ 21:16)  HR: 116 (01-17-24 @ 09:27) (79 - 116)  BP: 111/73 (01-17-24 @ 09:27) (111/73 - 156/67)  RR: 19 (01-17-24 @ 04:47) (19 - 20)  SpO2: 96% (01-17-24 @ 09:27) (96% - 100%)  Wt(kg): --    I&O's Summary    16 Jan 2024 07:01  -  17 Jan 2024 07:00  --------------------------------------------------------  IN: 0 mL / OUT: 900 mL / NET: -900 mL    Head: Normocephalic and atraumatic.   Neck: No JVD. No bruits. Supple. Does not appear to be enlarged.   Cardiovascular: + S1,S2 ; RRR Soft systolic murmur at the left lower sternal border. No rubs noted.    Lungs: CTA b/l. No rhonchi, rales or wheezes.   Abdomen: + BS, soft. Non tender. Non distended. No rebound. No guarding.   Extremities: No clubbing/cyanosis/edema.   Skin: Warm and moist. The patient's skin has normal elasticity and good skin turgor.     echo: mild LV dysfunction, otherwise unremarkable    A/P) 62 y/o male who resides at a nursing home due to previous stroke, hyperlipidemia, schizophrenia and Alzheimer's dementia a/w covid. EP called for periods of asymptomatic sinus bradycardia. He is now s/p trach and PEG.    -continue lipitor for hyperlipidemia  -consider low dose beta blockers and ACEi for mild LV dysfunction, will defer to cardiology  -no need for PPM as he has been asymptomatic and had not experienced any malignant sinus pauses on telemetry for many days.  OK to continue to monitor off of telemetry.    Ramses Pena M.D.  Cardiac Electrophysiology  [phone]

## 2024-01-17 NOTE — PROGRESS NOTE ADULT - ASSESSMENT
63M POD#5 s/p trach/PEG     PLAN   - Titrate TF to goal per RD  - Trach to vent  - Suction prn   - suture removal on pod #14

## 2024-01-17 NOTE — PHYSICAL THERAPY INITIAL EVALUATION ADULT - TRANSFER SAFETY CONCERNS NOTED: SIT/STAND, REHAB EVAL
decreased balance during turns/decreased safety awareness/losing balance
decreased balance during turns/decreased safety awareness/losing balance/decreased weight-shifting ability

## 2024-01-17 NOTE — PROGRESS NOTE ADULT - ASSESSMENT
63 year old male, from [facility], with PMH of CVA, Alzheimer's, nonverbal at baseline, GERD, Schizophrenia, and Constipation, was brought into the ED s/p mechanical fall, poor oral intake, and Covid-19 infection on 12/27/23. In ED wbc 14.3, Covid 19 positive.   CXR report showed slight right perihilar infiltrate and there is a left lower perihilar infiltrate. Infiltrates are new. CT HEAD/Cervical : No acute intracranial hemorrhage, mass effect, or osseous: No acute cervical spine fracture or traumatic malalignment. Started on Supplemental O2, Dexamethasone 6mg IV q daily, Ceftriaxone, Azithromycin and Remdesivir protocol. Pt Admitted for Acute respiratory failure with hypoxia, COVID 19 pneumonia with  superimposed bacterial pneumonia.    On 1/2/2024, RRT for desaturation to the 80s.Pt was placed on HF and admitted to the ICU. Pt was weaned from HF to 6LNC.  Feeding was resumed on puree diet, on 1/5/2024, patient desaturated to 80s and failed to improve with HFNC, subsequently requiring intubation. Pt was extubated on 1/8 and was reintubated on 1/9 for inability to manage secretions. Pt noted to be febrile and was treated with 2g cefepime (1/10-1/15) for possible aspiration PNA. Following GOC discussion with son, patient was evaluated by Thoracic surgery and is now s/p Trach and PEG.  Tube feeds and VTE prophylaxis have been resumed.   1/13 Transferred from ICU to SCU. Tolerated 1 hour of SBT with PS 6  1/14 Tolerated SBT and Trach collar for several hours.

## 2024-01-17 NOTE — PROGRESS NOTE ADULT - SUBJECTIVE AND OBJECTIVE BOX
INTERVAL HPI/OVERNIGHT EVENTS: No acute events overnight, jill 1 hr SBT per primary team and tolerating TFs   On PEEP 5, O2 35%    Vital Signs Last 24 Hrs  T(C): 36.3 (17 Jan 2024 04:47), Max: 36.7 (16 Jan 2024 21:16)  T(F): 97.4 (17 Jan 2024 04:47), Max: 98.1 (16 Jan 2024 21:16)  HR: 116 (17 Jan 2024 09:27) (79 - 116)  BP: 111/73 (17 Jan 2024 09:27) (111/73 - 156/67)  BP(mean): --  RR: 19 (17 Jan 2024 04:47) (19 - 20)  SpO2: 96% (17 Jan 2024 09:27) (96% - 100%)    Parameters below as of 17 Jan 2024 09:27  Patient On (Oxygen Delivery Method): ventilator      I&O's Detail    16 Jan 2024 07:01  -  17 Jan 2024 07:00  --------------------------------------------------------  IN:  Total IN: 0 mL    OUT:    Incontinent per Condom Catheter (mL): 400 mL    Indwelling Catheter - Urethral (mL): 500 mL  Total OUT: 900 mL    Total NET: -900 mL        cefepime   IVPB 2000 milliGRAM(s) IV Intermittent every 8 hours  polyethylene glycol 3350 17 Gram(s) Oral daily      Physical Exam:  General: AAOx3, No acute distress  HEENT: NC/AT, tracheostomy with sutures intact  Respiratory: Nonlabored breathing, equal chest rise b/l   Skin: No jaundice, no icterus  Abdomen: soft,  nondistended, nontender  : Marsh w clear yellow urine  Extremities: non edematous, no calf pain bilaterally  Lines/Drains/Tubes:     Drains/Tubes:     01-16-24 @ 07:01  -  01-17-24 @ 07:00  --------------------------------------------------------  IN: 0 mL / OUT: 900 mL / NET: -900 mL        Labs:                        12.3   9.42  )-----------( 394      ( 17 Jan 2024 08:10 )             38.8     01-17    152<H>  |  118<H>  |  21<H>  ----------------------------<  141<H>  3.5   |  31  |  0.73    Ca    9.9      17 Jan 2024 08:10  Phos  2.9     01-17  Mg     2.7     01-17    TPro  7.4  /  Alb  2.7<L>  /  TBili  0.6  /  DBili  x   /  AST  19  /  ALT  35  /  AlkPhos  63  01-17        RADIOLOGY & ADDITIONAL STUDIES:

## 2024-01-17 NOTE — PROGRESS NOTE ADULT - NS ATTEND AMEND GEN_ALL_CORE FT
Problem/Plan - 1:  ·  Problem: Acute respiratory failure with hypoxia.   ·  Plan: Likely secondary to COVID and pneumonia.  Intubated 1/5 extubated 1/8 reintubated 1/9  S/P trach and Peg on 1/12  Continue mechanical ventilation with daily SBT  Monitor oxygen saturation.  Tolerating Trach collar well.     Problem/Plan - 2:  ·  Problem: Septic shock.   ·  Plan: Has completed multiple rounds of antibiotics.  Continue cefepime.  Keep head of bed elevated at all times.  Completed remdesivir and steroids.     Problem/Plan - 3:  ·  Problem: Pneumonia due to COVID-19 virus.   ·  Plan: Has completed multiple rounds of antibiotics.  Continue cefepime.  Keep head of bed elevated at all times.  Completed remdesivir and steroids.     Problem/Plan - 4:  ·  Problem: Metabolic encephalopathy.   ·  Plan: Secondary to prior CVA and progressive dementia.  Maintain safety measures.  Continue valproic acid.     Problem/Plan - 5:  ·  Problem: Severe protein-calorie malnutrition.   ·  Plan: Continue tube feeds and supplementation.  Keep head of bed elevated at all times.  Strict aspiration precautions.     Problem/Plan - 6:  ·  Problem: Advance care planning.   ·  Plan: DVT and GI prophylaxis.  Continue mechanical ventilation with daily SBT.  Tolerated 1 hour of SBT  Continue to monitor oxygen saturation.  Continue cefepime.  Will need ventilator facility.  SW consult.  Remains a FULL CODE  Overall prognosis is poor.

## 2024-01-17 NOTE — PROGRESS NOTE ADULT - SUBJECTIVE AND OBJECTIVE BOX
Patient is a 63y old  Male who presents with a chief complaint of Sepsis and AHRF (17 Jan 2024 12:36)    PATIENT IS SEEN AND EXAMINED IN MEDICAL FLOOR.      ALLERGIES:  No Known Allergies      VITALS:    Vital Signs Last 24 Hrs  T(C): 36.3 (17 Jan 2024 04:47), Max: 36.7 (16 Jan 2024 21:16)  T(F): 97.4 (17 Jan 2024 04:47), Max: 98.1 (16 Jan 2024 21:16)  HR: 116 (17 Jan 2024 09:27) (79 - 116)  BP: 111/73 (17 Jan 2024 09:27) (111/73 - 156/67)  BP(mean): --  RR: 19 (17 Jan 2024 04:47) (19 - 20)  SpO2: 96% (17 Jan 2024 09:27) (96% - 100%)    Parameters below as of 17 Jan 2024 09:27  Patient On (Oxygen Delivery Method): ventilator        LABS:    CBC Full  -  ( 17 Jan 2024 08:10 )  WBC Count : 9.42 K/uL  RBC Count : 4.19 M/uL  Hemoglobin : 12.3 g/dL  Hematocrit : 38.8 %  Platelet Count - Automated : 394 K/uL  Mean Cell Volume : 92.6 fl  Mean Cell Hemoglobin : 29.4 pg  Mean Cell Hemoglobin Concentration : 31.7 gm/dL  Auto Neutrophil # : x  Auto Lymphocyte # : x  Auto Monocyte # : x  Auto Eosinophil # : x  Auto Basophil # : x  Auto Neutrophil % : x  Auto Lymphocyte % : x  Auto Monocyte % : x  Auto Eosinophil % : x  Auto Basophil % : x      01-17    152<H>  |  118<H>  |  21<H>  ----------------------------<  141<H>  3.5   |  31  |  0.73    Ca    9.9      17 Jan 2024 08:10  Phos  2.9     01-17  Mg     2.7     01-17    TPro  7.4  /  Alb  2.7<L>  /  TBili  0.6  /  DBili  x   /  AST  19  /  ALT  35  /  AlkPhos  63  01-17    CAPILLARY BLOOD GLUCOSE      POCT Blood Glucose.: 118 mg/dL (17 Jan 2024 11:33)        LIVER FUNCTIONS - ( 17 Jan 2024 08:10 )  Alb: 2.7 g/dL / Pro: 7.4 g/dL / ALK PHOS: 63 U/L / ALT: 35 U/L DA / AST: 19 U/L / GGT: x           Creatinine Trend: 0.73<--, 0.74<--, 0.69<--, 0.66<--, 0.50<--, 0.59<--  I&O's Summary    16 Jan 2024 07:01  -  17 Jan 2024 07:00  --------------------------------------------------------  IN: 0 mL / OUT: 900 mL / NET: -900 mL            Nares/Axilla/Groin Nares/Axilla/Groin  01-11 @ 22:20   Culture NEGATIVE for Candida auris.  This surveillance culture is intended for Infection Control purposes only.  A negative result does not preclude the carriage of other fungal  organisms.  --  --      Catheterized Catheterized  12-29 @ 12:37   No growth  --  --      .Blood Blood  12-29 @ 02:05   No growth at 5 days  --  --      .Blood Blood  12-29 @ 01:55   No growth at 5 days  --  --          MEDICATIONS:    MEDICATIONS  (STANDING):  atorvastatin 20 milliGRAM(s) Oral at bedtime  cefepime   IVPB 2000 milliGRAM(s) IV Intermittent every 8 hours  chlorhexidine 0.12% Liquid 15 milliLiter(s) Oral Mucosa every 12 hours  enoxaparin Injectable 40 milliGRAM(s) SubCutaneous every 24 hours  polyethylene glycol 3350 17 Gram(s) Oral daily  QUEtiapine 12.5 milliGRAM(s) Oral at bedtime  valproic  acid Syrup 250 milliGRAM(s) Oral two times a day      MEDICATIONS  (PRN):      REVIEW OF SYSTEMS:                           ALL ROS DONE [ X   ]    CONSTITUTIONAL:  LETHARGIC [   ], FEVER [   ], UNRESPONSIVE [   ]  CVS:  CP  [   ], SOB, [   ], PALPITATIONS [   ], DIZZYNESS [   ]  RS: COUGH [   ], SPUTUM [   ]  GI: ABDOMINAL PAIN [   ], NAUSEA [   ], VOMITINGS [   ], DIARRHEA [   ], CONSTIPATION [   ]  :  DYSURIA [   ], NOCTURIA [   ], INCREASED FREQUENCY [   ], DRIBLING [   ],  SKELETAL: PAINFUL JOINTS [   ], SWOLLEN JOINTS [   ], NECK ACHE [   ], LOW BACK ACHE [   ],  SKIN : ULCERS [   ], RASH [   ], ITCHING [   ]  CNS: HEAD ACHE [   ], DOUBLE VISION [   ], BLURRED VISION [   ], AMS / CONFUSION [   ], SEIZURES [   ], WEAKNESS [   ],TINGLING / NUMBNESS [   ]      PHYSICAL EXAMINATION:    GENERAL APPEARANCE: NO DISTRESS  HEENT:  NO PALLOR, NO  JVD,  NO   NODES, NECK SUPPLE  CVS: S1 +, S2 +,   RS: AEEB,  OCCASIONAL  RALES +,   RHONCHI +   TRACHEOSTOMY  ABD: SOFT, NT, NO, BS +    PEG +  EXT: NO PE  SKIN: WARM,   SKELETAL:  REDUCED ROM OF CERVICAL AND LS SPINE  CNS:  AAO X 1        RADIOLOGY :    RADIOLOGY AND READINGS REVIEWED        ASSESSMENT :     Infection due to severe acute respiratory syndrome coronavirus 2 (SARS-CoV-2)    CVA (cerebral vascular accident)    HLD (hyperlipidemia)    S/P percutaneous endoscopic gastrostomy (PEG) tube placement        PLAN:  HPI:  A 63 year old male, from [facility], with PMH of CVA, Alzheimer's, nonverbal at baseline, GERD, Schizophrenia, and Constipation, was brought into the ED s/p mechanical fall, poor oral intake, and Covid-19 infection on 12/27/23 as per NH papers. Unable to obtain history from patient since he is nonverbal, history obtained from chart review.  (28 Dec 2023 20:54)      # PATIENT IS DOWN GRADED TO FLOOR/SCU FROM ICU     # PROGNOSIS IS POOR/GUARDED - CRITICAL CARE TEAM DISCUSSING W/ FAMILY REGARDING GOC. FAMILY WISHES FOR PEG, TRACHEOSTOMY.     # [1/3] S/P RAPID RESPONSE FOR HYPOTENSION AND HYPOXIA - S/P IV FLUIDS AND PLACED ON NRB FOR HYPOXIA. CRITICAL CARE EVALUATION REQUESTED.     # RESOLVING SEPTIC SHOCK S/T ASPIRATION PNA + DIARRHEA [resolved]  # RESOLVING ACUTE HYPOXIC RESPIRATORY FAILURE S/T ? ASPIRATION EVENT   , COVID19 INFECTION     S/P TRACHEOSTOMY [1/12]    - CONTINUE CEFEPIME, COMPLETED DECADRON, S/P REMDESEVIR  - CHEST PT    - BCX [NGTD] AND UCX [NGTD]  - S/P IVF, VASOPRESSORS  - ID CONSULT  - CRITICAL CARE MANAGEMENT IN PROGRESS    - [1/4] - PATIENT W/ ? ASPIRATION EVENT - SUBSEQUENTLY REQUIRED INTUBATION W/ MECHANICAL VENTILATION  - [1/8] - EXTUBATED, CHEST PT  - [1/9] - REINTUBATED OVERNIGHT    - S/P TRACHEOSTOMY + PEG PLACEMENT [1/12]    # HYPERNATREMIA  - IVF TITRATED  - FREE WATER  - TREND NA    # DYSPHAGIA S/T CVA  - S/P PEG PLACEMENT 1/12    # BRADYCARDIA  - MONITOR ON TELEMETRY  - ECHOCARDIOGRAM - LV SYSTOLIC FUNCTION MILDLY REDUCED  - OPTIMIZE ELECTROLYTES  - CARDIOLOGY CONSULT  - EP CONSULT - NO RECC FOR PPM AT PRESENT    # HYPOKALEMIA  - REPLETING WITH SUPPLEMENT    # SEVERE PROTEIN CALORIE MALNUTRITION   - ON SUPPLEMENTAL NUTRITION    # HX OF CVA  # HX OF DEMENTIA  # SCHIZOPHRENIA  # GI AND DVT PPX

## 2024-01-17 NOTE — PHYSICAL THERAPY INITIAL EVALUATION ADULT - TRANSFER SAFETY CONCERNS NOTED: BED/CHAIR, REHAB EVAL
decreased balance during turns/decreased safety awareness/losing balance/decreased weight-shifting ability
decreased balance during turns/decreased safety awareness/losing balance

## 2024-01-17 NOTE — PROGRESS NOTE ADULT - SUBJECTIVE AND OBJECTIVE BOX
PAM JIMENEZ    SCU progress note    INTERVAL HPI/OVERNIGHT EVENTS: No new overnight events.  Seen and examined at bedside.     DNR [  ]   DNI  [  ]  FULL CODE [ x ]    The 4Ms    What Matters Most: see GOC  Age appropriate Medications/Screen for High Risk Medication: Yes  Mentation: see CAM below  Mobility: defer to physical exam    The Confusion Assessment Method (CAM) Diagnostic Algorithm     1: Acute Onset or Fluctuating Course  - Is there evidence of an acute change in mental status from the patient’s baseline? Did the (abnormal) behavior  fluctuate during the day, that is, tend to come and go, or increase and decrease in severity?  [  ] YES [ x ] NO     2: Inattention  - Did the patient have difficulty focusing attention, being easily distractible, or having difficulty keeping track of what was being said?  [  ] YES [ x ] NO     3: Disorganized thinking  -Was the patient’s thinking disorganized or incoherent, such as rambling or irrelevant conversation, unclear or illogical flow of ideas, or unpredictable switching from subject to subject?  [  ] YES [ x ] NO    4: Altered Level of consciousness?  [  ] YES [ x ] NO    The diagnosis of delirium by CAM requires the presence of features 1 and 2 and either 3 or 4.    cefepime   IVPB 2000 milliGRAM(s) IV Intermittent every 8 hours    Cardiovascular:  Heart Failure  Acute   Acute on Chronic  Chronic         Pulmonary:    Hematalogic:  enoxaparin Injectable 40 milliGRAM(s) SubCutaneous every 24 hours    Other:  atorvastatin 20 milliGRAM(s) Oral at bedtime  chlorhexidine 0.12% Liquid 15 milliLiter(s) Oral Mucosa every 12 hours  polyethylene glycol 3350 17 Gram(s) Oral daily  QUEtiapine 12.5 milliGRAM(s) Oral at bedtime  valproic  acid Syrup 250 milliGRAM(s) Oral two times a day    atorvastatin 20 milliGRAM(s) Oral at bedtime  cefepime   IVPB 2000 milliGRAM(s) IV Intermittent every 8 hours  chlorhexidine 0.12% Liquid 15 milliLiter(s) Oral Mucosa every 12 hours  enoxaparin Injectable 40 milliGRAM(s) SubCutaneous every 24 hours  polyethylene glycol 3350 17 Gram(s) Oral daily  QUEtiapine 12.5 milliGRAM(s) Oral at bedtime  valproic  acid Syrup 250 milliGRAM(s) Oral two times a day    Drug Dosing Weight  Height (cm): 167.6 (03 Aug 2022 20:11)  Weight (kg): 57 (03 Jan 2024 14:30)  BMI (kg/m2): 20.3 (03 Jan 2024 14:30)  BSA (m2): 1.64 (03 Jan 2024 14:30)    CENTRAL LINE: [  ] YES [ x ] NO  LOCATION:   DATE INSERTED:  REMOVE: [  ] YES [  ] NO  EXPLAIN:    ACEVEDO: [ x ] YES [  ] NO  DATE INSERTED:  REMOVE:  [  ] YES [ x ] NO  EXPLAIN:    PAST MEDICAL & SURGICAL HISTORY:  CVA (cerebral vascular accident)      CVA (cerebral vascular accident)      HLD (hyperlipidemia)      S/P percutaneous endoscopic gastrostomy (PEG) tube placement                  01-16 @ 07:01  -  01-17 @ 07:00  --------------------------------------------------------  IN: 0 mL / OUT: 900 mL / NET: -900 mL        Mode: PS (Pressure Support)/ Spontaneous  FiO2: 34  PEEP: 5  ITime: 1  MAP: 7  PIP: 13      PHYSICAL EXAM:    GENERAL: NAD, temporal and muscle wasting  HEAD:  Atraumatic, Normocephalic  EYES: PERRLA, conjunctiva and sclera clear  ENMT: No tonsillar erythema, or exudates  NECK: Supple, No JVD, trach intact  NERVOUS SYSTEM:  Alert; ONEIL  CHEST/LUNG: Clear to auscultation bilaterally; diminished bases  HEART: Regular rate and rhythm; No murmurs, rubs, or gallops  ABDOMEN: Soft, Nontender, Nondistended; Bowel sounds present; PEG intact  EXTREMITIES:  + Peripheral Pulses, No clubbing, cyanosis, or edema; muscle wasting  LYMPH: No lymphadenopathy noted  SKIN: Warm and dry; No rashes or lesions      LABS:  CBC Full  -  ( 17 Jan 2024 08:10 )  WBC Count : 9.42 K/uL  RBC Count : 4.19 M/uL  Hemoglobin : 12.3 g/dL  Hematocrit : 38.8 %  Platelet Count - Automated : 394 K/uL  Mean Cell Volume : 92.6 fl  Mean Cell Hemoglobin : 29.4 pg  Mean Cell Hemoglobin Concentration : 31.7 gm/dL  Auto Neutrophil # : x  Auto Lymphocyte # : x  Auto Monocyte # : x  Auto Eosinophil # : x  Auto Basophil # : x  Auto Neutrophil % : x  Auto Lymphocyte % : x  Auto Monocyte % : x  Auto Eosinophil % : x  Auto Basophil % : x    01-17    152<H>  |  118<H>  |  21<H>  ----------------------------<  141<H>  3.5   |  31  |  0.73    Ca    9.9      17 Jan 2024 08:10  Phos  2.9     01-17  Mg     2.7     01-17    TPro  7.4  /  Alb  2.7<L>  /  TBili  0.6  /  DBili  x   /  AST  19  /  ALT  35  /  AlkPhos  63  01-17      Urinalysis Basic - ( 17 Jan 2024 08:10 )    Color: x / Appearance: x / SG: x / pH: x  Gluc: 141 mg/dL / Ketone: x  / Bili: x / Urobili: x   Blood: x / Protein: x / Nitrite: x   Leuk Esterase: x / RBC: x / WBC x   Sq Epi: x / Non Sq Epi: x / Bacteria: x            [  ]  DVT Prophylaxis  [  ]  Nutrition,  Brand, Rate  (  Goal Rate)          Abnormal Nutritional Status -  Malnutrition   Cachexia      Morbid Obesity BMI >/=40    RADIOLOGY & ADDITIONAL STUDIES:       < from: Xray Chest 1 View- PORTABLE-Urgent (Xray Chest 1 View- PORTABLE-Urgent .) (01.14.24 @ 17:53) >  Tracheostomy tube overlies the trachea. No acute lung infiltrates or   pneumothorax.    IMPRESSION: Tracheostomy tube placement as above    < end of copied text >      Goals of Care Discussion with Family/Proxy/Other

## 2024-01-17 NOTE — PHYSICAL THERAPY INITIAL EVALUATION ADULT - DIAGNOSIS, PT EVAL
Patient presented with generalized weakness, impaired balance and was unsteady with transfers and ambulation
Patient presents with impaired gait due to decreased balance; RW recommended

## 2024-01-18 ENCOUNTER — TRANSCRIPTION ENCOUNTER (OUTPATIENT)
Age: 64
End: 2024-01-18

## 2024-01-18 VITALS
RESPIRATION RATE: 18 BRPM | DIASTOLIC BLOOD PRESSURE: 80 MMHG | HEART RATE: 70 BPM | OXYGEN SATURATION: 100 % | SYSTOLIC BLOOD PRESSURE: 116 MMHG | TEMPERATURE: 97 F

## 2024-01-18 LAB
ALBUMIN SERPL ELPH-MCNC: 2.8 G/DL — LOW (ref 3.5–5)
ALP SERPL-CCNC: 66 U/L — SIGNIFICANT CHANGE UP (ref 40–120)
ALT FLD-CCNC: 41 U/L DA — SIGNIFICANT CHANGE UP (ref 10–60)
ANION GAP SERPL CALC-SCNC: 3 MMOL/L — LOW (ref 5–17)
AST SERPL-CCNC: 18 U/L — SIGNIFICANT CHANGE UP (ref 10–40)
BILIRUB SERPL-MCNC: 0.6 MG/DL — SIGNIFICANT CHANGE UP (ref 0.2–1.2)
BUN SERPL-MCNC: 22 MG/DL — HIGH (ref 7–18)
CALCIUM SERPL-MCNC: 10 MG/DL — SIGNIFICANT CHANGE UP (ref 8.4–10.5)
CHLORIDE SERPL-SCNC: 119 MMOL/L — HIGH (ref 96–108)
CO2 SERPL-SCNC: 30 MMOL/L — SIGNIFICANT CHANGE UP (ref 22–31)
CREAT SERPL-MCNC: 0.76 MG/DL — SIGNIFICANT CHANGE UP (ref 0.5–1.3)
EGFR: 100 ML/MIN/1.73M2 — SIGNIFICANT CHANGE UP
GLUCOSE BLDC GLUCOMTR-MCNC: 159 MG/DL — HIGH (ref 70–99)
GLUCOSE BLDC GLUCOMTR-MCNC: 183 MG/DL — HIGH (ref 70–99)
GLUCOSE BLDC GLUCOMTR-MCNC: 183 MG/DL — HIGH (ref 70–99)
GLUCOSE SERPL-MCNC: 187 MG/DL — HIGH (ref 70–99)
HCT VFR BLD CALC: 41.9 % — SIGNIFICANT CHANGE UP (ref 39–50)
HGB BLD-MCNC: 12.9 G/DL — LOW (ref 13–17)
MAGNESIUM SERPL-MCNC: 2.7 MG/DL — HIGH (ref 1.6–2.6)
MCHC RBC-ENTMCNC: 28.9 PG — SIGNIFICANT CHANGE UP (ref 27–34)
MCHC RBC-ENTMCNC: 30.8 GM/DL — LOW (ref 32–36)
MCV RBC AUTO: 93.7 FL — SIGNIFICANT CHANGE UP (ref 80–100)
NRBC # BLD: 0 /100 WBCS — SIGNIFICANT CHANGE UP (ref 0–0)
PHOSPHATE SERPL-MCNC: 3.3 MG/DL — SIGNIFICANT CHANGE UP (ref 2.5–4.5)
PLATELET # BLD AUTO: 402 K/UL — HIGH (ref 150–400)
POTASSIUM SERPL-MCNC: 3.5 MMOL/L — SIGNIFICANT CHANGE UP (ref 3.5–5.3)
POTASSIUM SERPL-SCNC: 3.5 MMOL/L — SIGNIFICANT CHANGE UP (ref 3.5–5.3)
PROT SERPL-MCNC: 7.6 G/DL — SIGNIFICANT CHANGE UP (ref 6–8.3)
RBC # BLD: 4.47 M/UL — SIGNIFICANT CHANGE UP (ref 4.2–5.8)
RBC # FLD: 13.7 % — SIGNIFICANT CHANGE UP (ref 10.3–14.5)
SODIUM SERPL-SCNC: 152 MMOL/L — HIGH (ref 135–145)
WBC # BLD: 9.6 K/UL — SIGNIFICANT CHANGE UP (ref 3.8–10.5)
WBC # FLD AUTO: 9.6 K/UL — SIGNIFICANT CHANGE UP (ref 3.8–10.5)

## 2024-01-18 PROCEDURE — 82553 CREATINE MB FRACTION: CPT

## 2024-01-18 PROCEDURE — 84132 ASSAY OF SERUM POTASSIUM: CPT

## 2024-01-18 PROCEDURE — 72125 CT NECK SPINE W/O DYE: CPT | Mod: MA

## 2024-01-18 PROCEDURE — 83605 ASSAY OF LACTIC ACID: CPT

## 2024-01-18 PROCEDURE — 86900 BLOOD TYPING SEROLOGIC ABO: CPT

## 2024-01-18 PROCEDURE — 85730 THROMBOPLASTIN TIME PARTIAL: CPT

## 2024-01-18 PROCEDURE — 87086 URINE CULTURE/COLONY COUNT: CPT

## 2024-01-18 PROCEDURE — 83036 HEMOGLOBIN GLYCOSYLATED A1C: CPT

## 2024-01-18 PROCEDURE — 96374 THER/PROPH/DIAG INJ IV PUSH: CPT

## 2024-01-18 PROCEDURE — 82803 BLOOD GASES ANY COMBINATION: CPT

## 2024-01-18 PROCEDURE — 96372 THER/PROPH/DIAG INJ SC/IM: CPT

## 2024-01-18 PROCEDURE — 86140 C-REACTIVE PROTEIN: CPT

## 2024-01-18 PROCEDURE — 86738 MYCOPLASMA ANTIBODY: CPT

## 2024-01-18 PROCEDURE — 83615 LACTATE (LD) (LDH) ENZYME: CPT

## 2024-01-18 PROCEDURE — 86850 RBC ANTIBODY SCREEN: CPT

## 2024-01-18 PROCEDURE — C1889: CPT

## 2024-01-18 PROCEDURE — 72170 X-RAY EXAM OF PELVIS: CPT

## 2024-01-18 PROCEDURE — 94003 VENT MGMT INPAT SUBQ DAY: CPT

## 2024-01-18 PROCEDURE — 86901 BLOOD TYPING SEROLOGIC RH(D): CPT

## 2024-01-18 PROCEDURE — 96361 HYDRATE IV INFUSION ADD-ON: CPT

## 2024-01-18 PROCEDURE — 96375 TX/PRO/DX INJ NEW DRUG ADDON: CPT

## 2024-01-18 PROCEDURE — 80202 ASSAY OF VANCOMYCIN: CPT

## 2024-01-18 PROCEDURE — 85379 FIBRIN DEGRADATION QUANT: CPT

## 2024-01-18 PROCEDURE — 94002 VENT MGMT INPAT INIT DAY: CPT

## 2024-01-18 PROCEDURE — 80076 HEPATIC FUNCTION PANEL: CPT

## 2024-01-18 PROCEDURE — 87640 STAPH A DNA AMP PROBE: CPT

## 2024-01-18 PROCEDURE — 82728 ASSAY OF FERRITIN: CPT

## 2024-01-18 PROCEDURE — 84145 PROCALCITONIN (PCT): CPT

## 2024-01-18 PROCEDURE — 82962 GLUCOSE BLOOD TEST: CPT

## 2024-01-18 PROCEDURE — 84100 ASSAY OF PHOSPHORUS: CPT

## 2024-01-18 PROCEDURE — P9047: CPT

## 2024-01-18 PROCEDURE — 82550 ASSAY OF CK (CPK): CPT

## 2024-01-18 PROCEDURE — 92523 SPEECH SOUND LANG COMPREHEN: CPT

## 2024-01-18 PROCEDURE — 70450 CT HEAD/BRAIN W/O DYE: CPT | Mod: MA

## 2024-01-18 PROCEDURE — 85027 COMPLETE CBC AUTOMATED: CPT

## 2024-01-18 PROCEDURE — 87081 CULTURE SCREEN ONLY: CPT

## 2024-01-18 PROCEDURE — 87637 SARSCOV2&INF A&B&RSV AMP PRB: CPT

## 2024-01-18 PROCEDURE — 81003 URINALYSIS AUTO W/O SCOPE: CPT

## 2024-01-18 PROCEDURE — 71045 X-RAY EXAM CHEST 1 VIEW: CPT

## 2024-01-18 PROCEDURE — 84443 ASSAY THYROID STIM HORMONE: CPT

## 2024-01-18 PROCEDURE — 87077 CULTURE AEROBIC IDENTIFY: CPT

## 2024-01-18 PROCEDURE — 87641 MR-STAPH DNA AMP PROBE: CPT

## 2024-01-18 PROCEDURE — 85610 PROTHROMBIN TIME: CPT

## 2024-01-18 PROCEDURE — 99285 EMERGENCY DEPT VISIT HI MDM: CPT

## 2024-01-18 PROCEDURE — 84295 ASSAY OF SERUM SODIUM: CPT

## 2024-01-18 PROCEDURE — 80053 COMPREHEN METABOLIC PANEL: CPT

## 2024-01-18 PROCEDURE — 93005 ELECTROCARDIOGRAM TRACING: CPT

## 2024-01-18 PROCEDURE — 83735 ASSAY OF MAGNESIUM: CPT

## 2024-01-18 PROCEDURE — 87040 BLOOD CULTURE FOR BACTERIA: CPT

## 2024-01-18 PROCEDURE — 84436 ASSAY OF TOTAL THYROXINE: CPT

## 2024-01-18 PROCEDURE — 36415 COLL VENOUS BLD VENIPUNCTURE: CPT

## 2024-01-18 PROCEDURE — 84484 ASSAY OF TROPONIN QUANT: CPT

## 2024-01-18 PROCEDURE — 76775 US EXAM ABDO BACK WALL LIM: CPT

## 2024-01-18 PROCEDURE — L8699: CPT

## 2024-01-18 PROCEDURE — 82330 ASSAY OF CALCIUM: CPT

## 2024-01-18 PROCEDURE — 94640 AIRWAY INHALATION TREATMENT: CPT

## 2024-01-18 PROCEDURE — 85025 COMPLETE CBC W/AUTO DIFF WBC: CPT

## 2024-01-18 PROCEDURE — 80048 BASIC METABOLIC PNL TOTAL CA: CPT

## 2024-01-18 PROCEDURE — 80061 LIPID PANEL: CPT

## 2024-01-18 PROCEDURE — 93306 TTE W/DOPPLER COMPLETE: CPT

## 2024-01-18 RX ORDER — ATORVASTATIN CALCIUM 80 MG/1
1 TABLET, FILM COATED ORAL
Refills: 0 | DISCHARGE

## 2024-01-18 RX ORDER — MUPIROCIN 20 MG/G
1 OINTMENT TOPICAL
Refills: 0 | DISCHARGE

## 2024-01-18 RX ORDER — PANTOPRAZOLE SODIUM 20 MG/1
40 TABLET, DELAYED RELEASE ORAL DAILY
Refills: 0 | Status: DISCONTINUED | OUTPATIENT
Start: 2024-01-18 | End: 2024-01-18

## 2024-01-18 RX ORDER — CHOLECALCIFEROL (VITAMIN D3) 125 MCG
1 CAPSULE ORAL
Refills: 0 | DISCHARGE

## 2024-01-18 RX ORDER — OLANZAPINE 15 MG/1
5 TABLET, FILM COATED ORAL
Refills: 0 | Status: DISCONTINUED | OUTPATIENT
Start: 2024-01-18 | End: 2024-01-18

## 2024-01-18 RX ORDER — CHLORHEXIDINE GLUCONATE 213 G/1000ML
15 SOLUTION TOPICAL
Refills: 0 | DISCHARGE

## 2024-01-18 RX ORDER — VALPROIC ACID (AS SODIUM SALT) 250 MG/5ML
500 SOLUTION, ORAL ORAL
Refills: 0 | Status: DISCONTINUED | OUTPATIENT
Start: 2024-01-18 | End: 2024-01-18

## 2024-01-18 RX ORDER — ACETAMINOPHEN 500 MG
2 TABLET ORAL
Refills: 0 | DISCHARGE

## 2024-01-18 RX ORDER — OLANZAPINE 15 MG/1
1 TABLET, FILM COATED ORAL
Qty: 0 | Refills: 0 | DISCHARGE
Start: 2024-01-18

## 2024-01-18 RX ORDER — VALPROIC ACID (AS SODIUM SALT) 250 MG/5ML
250 SOLUTION, ORAL ORAL
Qty: 0 | Refills: 0 | DISCHARGE
Start: 2024-01-18

## 2024-01-18 RX ORDER — ASPIRIN/CALCIUM CARB/MAGNESIUM 324 MG
1 TABLET ORAL
Refills: 0 | DISCHARGE

## 2024-01-18 RX ORDER — SENNA PLUS 8.6 MG/1
2 TABLET ORAL
Refills: 0 | DISCHARGE

## 2024-01-18 RX ORDER — OLANZAPINE 15 MG/1
1 TABLET, FILM COATED ORAL
Refills: 0 | DISCHARGE

## 2024-01-18 RX ORDER — ATORVASTATIN CALCIUM 80 MG/1
1 TABLET, FILM COATED ORAL
Qty: 0 | Refills: 0 | DISCHARGE
Start: 2024-01-18

## 2024-01-18 RX ORDER — MULTIVIT-MIN/FERROUS GLUCONATE 9 MG/15 ML
1 LIQUID (ML) ORAL
Refills: 0 | DISCHARGE

## 2024-01-18 RX ORDER — DIVALPROEX SODIUM 500 MG/1
1 TABLET, DELAYED RELEASE ORAL
Refills: 0 | DISCHARGE

## 2024-01-18 RX ORDER — VALPROIC ACID (AS SODIUM SALT) 250 MG/5ML
250 SOLUTION, ORAL ORAL
Refills: 0 | Status: DISCONTINUED | OUTPATIENT
Start: 2024-01-18 | End: 2024-01-18

## 2024-01-18 RX ORDER — POLYETHYLENE GLYCOL 3350 17 G/17G
17 POWDER, FOR SOLUTION ORAL
Qty: 0 | Refills: 0 | DISCHARGE
Start: 2024-01-18

## 2024-01-18 RX ADMIN — CEFEPIME 100 MILLIGRAM(S): 1 INJECTION, POWDER, FOR SOLUTION INTRAMUSCULAR; INTRAVENOUS at 06:01

## 2024-01-18 RX ADMIN — Medication 250 MILLIGRAM(S): at 06:01

## 2024-01-18 RX ADMIN — PANTOPRAZOLE SODIUM 40 MILLIGRAM(S): 20 TABLET, DELAYED RELEASE ORAL at 12:06

## 2024-01-18 RX ADMIN — POLYETHYLENE GLYCOL 3350 17 GRAM(S): 17 POWDER, FOR SOLUTION ORAL at 12:06

## 2024-01-18 RX ADMIN — OLANZAPINE 5 MILLIGRAM(S): 15 TABLET, FILM COATED ORAL at 17:12

## 2024-01-18 RX ADMIN — Medication 250 MILLIGRAM(S): at 17:12

## 2024-01-18 RX ADMIN — CHLORHEXIDINE GLUCONATE 15 MILLILITER(S): 213 SOLUTION TOPICAL at 06:02

## 2024-01-18 RX ADMIN — OLANZAPINE 5 MILLIGRAM(S): 15 TABLET, FILM COATED ORAL at 09:57

## 2024-01-18 RX ADMIN — CHLORHEXIDINE GLUCONATE 15 MILLILITER(S): 213 SOLUTION TOPICAL at 17:12

## 2024-01-18 RX ADMIN — ENOXAPARIN SODIUM 40 MILLIGRAM(S): 100 INJECTION SUBCUTANEOUS at 15:18

## 2024-01-18 NOTE — DISCHARGE NOTE NURSING/CASE MANAGEMENT/SOCIAL WORK - MODE OF TRANSPORTATION
Hey there, your lab shows that you are anemic which is not a surprise given recent surgery. Your WBC count is high, have you been sick or running a fever? This can be a sign of infection. This can also be high if you have been on steroid recently. Your sugar is great. No changes in medication for this. Your thyroid is still really slow, any missed pills?  [facility]

## 2024-01-18 NOTE — PROGRESS NOTE ADULT - ASSESSMENT
63 year old male, from [facility], with PMH of CVA, Alzheimer's, nonverbal at baseline, GERD, Schizophrenia, and Constipation, was brought into the ED s/p mechanical fall, poor oral intake, and Covid-19 infection on 12/27/23. In ED wbc 14.3, Covid 19 positive.   CXR report showed slight right perihilar infiltrate and there is a left lower perihilar infiltrate. Infiltrates are new. CT HEAD/Cervical : No acute intracranial hemorrhage, mass effect, or osseous: No acute cervical spine fracture or traumatic malalignment. Started on Supplemental O2, Dexamethasone 6mg IV q daily, Ceftriaxone, Azithromycin and Remdesivir protocol. Pt Admitted for Acute respiratory failure with hypoxia, COVID 19 pneumonia with  superimposed bacterial pneumonia.    On 1/2/2024, RRT for desaturation to the 80s.Pt was placed on HF and admitted to the ICU. Pt was weaned from HF to 6LNC.  Feeding was resumed on puree diet, on 1/5/2024, patient desaturated to 80s and failed to improve with HFNC, subsequently requiring intubation. Pt was extubated on 1/8 and was reintubated on 1/9 for inability to manage secretions. Pt noted to be febrile and was treated with 2g cefepime (1/10-1/15) for possible aspiration PNA. Following GOC discussion with son, patient was evaluated by Thoracic surgery and is now s/p Trach and PEG.  Tube feeds and VTE prophylaxis have been resumed.   1/13 Transferred from ICU to SCU. Tolerated 1 hour of SBT with PS 6  1/14 Tolerated SBT and Trach collar for several hours.

## 2024-01-18 NOTE — DISCHARGE NOTE PROVIDER - NSDCCPCAREPLAN_GEN_ALL_CORE_FT
PRINCIPAL DISCHARGE DIAGNOSIS  Diagnosis: Acute respiratory failure with hypoxia  Assessment and Plan of Treatment: You were treated for COVID and superimposed bactremial pneumonia.  You have a Tracheostomy Shiley #7 and PEG tube, placed on 1/12/24.  Please remove sutures 1/20.   You completed antibiotics.  Please follow up with Pulmonologist and Primary Care Physician in 1 week for further medical management.      SECONDARY DISCHARGE DIAGNOSES  Diagnosis: Pneumonia  Assessment and Plan of Treatment: You were treated for COVID and superimposed bactremial pneumonia.  You have a Tracheostomy Shiley #7 and PEG tube, placed on 1/12/24.  Please remove sutures 1/20.   You completed antibiotics.  Please follow up with Pulmonologist and Primary Care Physician in 1 week for further medical management.    Diagnosis: Severe protein-calorie malnutrition  Assessment and Plan of Treatment: Diet Presciption: Diet, NPO with Tube Feed:   Tube Feeding Modality: Gastrostomy  Jevity 1.5 Gabriel  Total Volume for 24 Hours (mL): 540  Continuous  Starting Tube Feed Rate {mL per Hour}: 10  Increase Tube Feed Rate by (mL): 10  Until Goal Tube Feed Rate (mL per Hour): 30  Tube Feed Duration (in Hours): 18  Tube Feed Start Time: 12:00  Tube Feed Stop Time: 06:00  No Carb Prosource (1pkg = 15gms Protein)    Diagnosis: Schizophrenia  Assessment and Plan of Treatment: Please continue Olanzapine and Valproic Acid.   Please follow up with Psychiatrist for further medical management.

## 2024-01-18 NOTE — PROGRESS NOTE ADULT - PROBLEM SELECTOR PLAN 7
DVT and GI prophylaxis.  Continue mechanical ventilation with daily SBT.  Continue to monitor oxygen saturation.  discharge to [facility]  SW consult.  Remains a FULL CODE  Overall prognosis is poor.
C/W Miralax and Senna
CTH negative for acute findings   Noted to ambulate 40ft using RW w/ at least 1 person using a gait belt and to follow w/ W/C BID for a total of 15 mins  Suspect FTT in the setting of recent illness and multiple comorbidities such as CVA  Bedrest for now  Fall precautions

## 2024-01-18 NOTE — PROGRESS NOTE ADULT - NS ATTEND OPT1 GEN_ALL_CORE

## 2024-01-18 NOTE — PROGRESS NOTE ADULT - PROBLEM SELECTOR PROBLEM 6
Advance care planning
Advance care planning
Severe protein-calorie malnutrition
Advance care planning
Advance care planning
Constipation
Schizophrenia

## 2024-01-18 NOTE — PROGRESS NOTE ADULT - PROBLEM SELECTOR PROBLEM 4
Metabolic encephalopathy
Acute respiratory failure with hypoxia
Metabolic encephalopathy
Acute respiratory failure with hypoxia

## 2024-01-18 NOTE — PROGRESS NOTE ADULT - PROBLEM SELECTOR PLAN 5
Continue tube feeds and supplementation.  Keep head of bed elevated at all times.  Strict aspiration precautions.
Continue tube feeds and supplementation.  Keep head of bed elevated at all times.  Strict aspiration precautions.
switched seroquel to olanzapine   continue valproic acid
Will continue Depakote and Zyprexa
Continue tube feeds and supplementation.  Keep head of bed elevated at all times.  Strict aspiration precautions.
Continue tube feeds and supplementation.  Keep head of bed elevated at all times.  Strict aspiration precautions.
B/P labile SBP 70s-100 (baseline per PCP 120s)  asysmptomatic  S/P 500cc Bolus  C/W Midodrine 5mg TID  EKG- Sinus Bradycardia with prolonged QT interval  F/U Echo  Cards Dr. Gallagher consulted

## 2024-01-18 NOTE — DISCHARGE NOTE NURSING/CASE MANAGEMENT/SOCIAL WORK - NURSING SECTION COMPLETE
HOSPITALIST DISCHARGE SUMMARY     Patient Name: Elba Salas    MRN: [id number]    Admit date: 4/12/2019    Discharge date: 4/15/2019   Admitting Physician: Kike David MD   Discharge Physician: Kike David MD     Admission Diagnoses:   Influenza B [J10.1]  Hypoxia [R09.02]  Bronchospasm [J98.01]    Discharge Diagnoses:   1. Acute hypoxic respiratory failure : resolved  2. Bronchospasm  3. Influenza B infection  4. Hyperkalemia: corretced  5. Type 2 diabetes  6. Hypertension  7. Rheumatoid arthritis  8. Decreased mobility/wheelchair bound    Admission Condition: fair Discharged Condition: good    Disposition: [place]    Consults: IP CONSULT TO WOUND CARE MEDICAL PROVIDER    Hospital Course: Elba Salas is a 89 [facility] type 2 diabetes, CKD, hypertension and impaired mobility from [facility] presented with weakness and chills. Patient was feeling chills with low-grade fever since 3 days pta. She was generally feeling weak. She has occasional dry cough. She reported that she haven't ate for couple of days with decreased appetite. She has chronic constipation. She reported that everybody at the facility is coughing. Patient's wheelchair bound. At the ER, patient was found to be hypoxic at 84 % and tachycardic. Has elevated creatinine and potassium of 5.6. Found to be influenza B+, chest x-ray not remarkable.    Patient was admitted and started with DuoNeb, prednisone and Tamiflu. She had persistent wheezing during her stay which has gradually improved. Initially she required oxygen, and with her stay oxygen was weaned and she was saturating well. She'll continue short course steroid and her inhalers. Completed a 5 day course of Tamiflu. She had initial hyperkalemia which was corrected medically by insulin and dextrose. Creatinine is 1.02 prior to discharge. Patient has diabetic foot ulcer on the right heel for which she was seen by wound care and will be  followed as outpatient. She is on chronic suppressive Rx for recurrent UTI. Patient was seen by PT OT and recommended ADRIAN. Plan discussed with her son/Bakari and she was discharged to facility in stable condition.    Discharge exam :   General : Awake, Alert, Oriented x 2-3. No acute distress.   HEENT : Head is normocephalic, atraumatic. MADISON (pupils equal, round and reactive to light and accomodation). Oral mucosa moist.   Neck : Supple, No JVD (jugular venous distension). No LAD (lymphadenopathy).  Lungs : Has minimal wheezing on both lungs  Heart : Regular rate and rhythm. No murmurs, gallops, or rubs.   Abdomen : Soft, positive bowel sounds, Nontender, Nondistended.   Musculoskeletal :  Has deformity on the hand from rheumatoid arthritis   Neurological : Grossly nonfocal.    Code status: DNR, MMS    Laboratory data :    Recent Labs   Lab 04/13/19  0534 04/12/19  0642   WBC 7.7 9.5   HCT 38.4 41.1   HGB 12.1 13.1    201   INR  --  1.0   SODIUM 137 136   POTASSIUM 5.0 5.6*   CHLORIDE 102 99   CO2 29 31   CALCIUM 8.7 9.2   GLUCOSE 100* 145*   BUN 34* 44*   CREATININE 1.02* 1.42*   AST  --  25   GPT  --  23   ALKPT  --  62   BILIRUBIN  --  0.4   ALBUMIN  --  3.5*   GFRNA 48 32       Diagnostic Studies:   XR CHEST AP OR PA   Final Result   IMPRESSION:  AP chest demonstrates no active disease.         XR Chest AP or PA   Final Result   IMPRESSION:   1. No acute cardiopulmonary disease.               Summary of your Discharge Medications      Take these Medications      Details   acetaminophen 325 MG tablet  Commonly known as:  TYLENOL   Take 2 tablets by mouth every 4 hours as needed for Pain.     albuterol 108 (90 Base) MCG/ACT inhaler   Inhale 2 puffs into the lungs every 4 hours as needed for Shortness of Breath or Wheezing.     atorvastatin 20 MG tablet  Commonly known as:  LIPITOR   Take 1 tablet by mouth daily.     calcium carbonate-vitamin D 600-400 MG-UNIT per tablet  Commonly known as:  CALTRATE+D    Take 1 tablet by mouth 2 times daily.     CAPZASIN-HP 0.1 % cream   Generic drug:  capsaicin  Apply topically 2 times daily. Indications: Pain Apply topically to both feet twice daily     cephalexin 250 MG capsule  Commonly known as:  KEFLEX   Take 1 capsule by mouth daily.     dextromethorphan-guaiFENesin  MG/5ML syrup  Commonly known as:  ROBAFEN DM   10 cc every 4 hours for cough  Comment:  3-21-19 THIS NEEDS TO BE SUGAR FREE THANK YOU     dimethicone-zinc oxide cream   Apply topically 2 times daily. Apply topically to perineum 1 to 2 times daily to prevent skin breakdown from wetness     docusate sodium 100 MG capsule  Commonly known as:  COLACE   Take 1 capsule by mouth 2 times daily as needed for Constipation.     ENSURE Liquid   Take 1 Can by mouth 2 times daily.     escitalopram 10 MG tablet  Commonly known as:  LEXAPRO   Take 1 tablet by mouth daily.     folic acid 1 MG tablet  Commonly known as:  FOLATE   Take 1 tablet by mouth daily.     gabapentin 300 MG capsule  Commonly known as:  NEURONTIN   Take 1 capsule by mouth at bedtime.     GuaiFENesin 1200 MG 12 hr tablet   Take 1 tablet by mouth every 12 hours.     HYDROcodone-acetaminophen 5-325 MG per tablet  Commonly known as:  NORCO   Take 2 tabs PO every 12 hrs and 1 tab PO every 6 hrs prn indications: moderate to moderately severe pain.  Comment:  LTCF PATIENT     hydrocortisone 10 MG tablet  Commonly known as:  CORTEF   Take 20 mg by mouth 2 times daily. Dose: 2 tablets (=20 mg)     insulin detemir 100 UNIT/ML pen-injector  Commonly known as:  LEVEMIR FLEXPEN   Inject 40 Units into the skin nightly.     lisinopril 5 MG tablet  Commonly known as:  ZESTRIL   Take 1 tablet by mouth daily.     methotrexate 2.5 MG tablet  Commonly known as:  RHEUMATREX   Take 6 tablets by mouth 1 day a week. On Tuesday     metoPROLOL tartrate 25 MG tablet  Commonly known as:  LOPRESSOR   Take 0.5 tablets by mouth every 12 hours.     pantoprazole 40 MG tablet  Commonly  known as:  PROTONIX   Take 1 tablet by mouth 2 times daily.     polyethylene glycol powder  Commonly known as:  MIRALAX   Take 17 g by mouth daily.     predniSONE 20 MG tablet  Commonly known as:  DELTASONE   Take 1.5 tablets by mouth daily.     sitaGLIPtin 100 MG tablet  Commonly known as:  JANUVIA   Take 1 tablet by mouth daily.     THEREMS-M Tab   Take 1 tablet by mouth nightly.     traZODone 50 MG tablet  Commonly known as:  DESYREL   Take 0.5 tablets by mouth nightly.     vitamin C 500 MG tablet   Take 1 tablet by mouth daily.            Patient Instructions:   Activity: activity as tolerated  Diet: diabetic diet    Discharge instructions :  Patient is advised to follow up with PCP in 1 week after discharge.    On the day of discharge I have made a face to face interaction with the patient and total time taken on this discharge is 40 minutes.    Signed:  Kike David MD   4/15/2019  2:27 PM   Patient/Caregiver provided printed discharge information.

## 2024-01-18 NOTE — PROGRESS NOTE ADULT - PROBLEM SELECTOR PROBLEM 5
Severe protein-calorie malnutrition
Severe protein-calorie malnutrition
Schizophrenia
Schizophrenia
Severe protein-calorie malnutrition
Severe protein-calorie malnutrition
Hypotension

## 2024-01-18 NOTE — PROGRESS NOTE ADULT - SUBJECTIVE AND OBJECTIVE BOX
C A R D I O L O G Y  **********************************  DATE OF SERVICE: 01-18-24    trached on vent.    atorvastatin 20 milliGRAM(s) Oral at bedtime  chlorhexidine 0.12% Liquid 15 milliLiter(s) Oral Mucosa every 12 hours  enoxaparin Injectable 40 milliGRAM(s) SubCutaneous every 24 hours  OLANZapine 5 milliGRAM(s) Oral two times a day  pantoprazole  Injectable 40 milliGRAM(s) IV Push daily  polyethylene glycol 3350 17 Gram(s) Oral daily  valproic  acid Syrup 250 milliGRAM(s) Oral two times a day                            12.9   9.60  )-----------( 402      ( 18 Jan 2024 06:07 )             41.9       Hemoglobin: 12.9 g/dL (01-18 @ 06:07)  Hemoglobin: 12.3 g/dL (01-17 @ 08:10)  Hemoglobin: 11.7 g/dL (01-16 @ 06:42)  Hemoglobin: 10.9 g/dL (01-15 @ 06:00)  Hemoglobin: 11.0 g/dL (01-14 @ 05:06)      01-18    152<H>  |  119<H>  |  22<H>  ----------------------------<  187<H>  3.5   |  30  |  0.76    Ca    10.0      18 Jan 2024 06:07  Phos  3.3     01-18  Mg     2.7     01-18    TPro  7.6  /  Alb  2.8<L>  /  TBili  0.6  /  DBili  x   /  AST  18  /  ALT  41  /  AlkPhos  66  01-18    Creatinine Trend: 0.76<--, 0.73<--, 0.74<--, 0.69<--, 0.66<--, 0.50<--    COAGS:           T(C): 36.3 (01-18-24 @ 14:06), Max: 37.3 (01-17-24 @ 20:46)  HR: 70 (01-18-24 @ 14:06) (70 - 85)  BP: 116/80 (01-18-24 @ 14:06) (116/80 - 122/73)  RR: 18 (01-18-24 @ 14:06) (18 - 20)  SpO2: 100% (01-18-24 @ 14:06) (98% - 100%)  Wt(kg): --    I&O's Summary            HEENT:  (-)icterus (-)pallor  CV: N S1 S2 1/6 JOVON (+)2 Pulses B/l  Resp:  Clear to ausculatation B/L, normal effort  GI: (+) BS Soft, NT, ND  Lymph:  (-)Edema, (-)obvious lymphadenopathy  Skin: Warm to touch, Normal turgor  Psych: Unable to assess mood and affect      TELEMETRY: 	 off        ASSESSMENT/PLAN: 	63y Male PMH of CVA, Alzheimer's, nonverbal at baseline, GERD, Schizophrenia, historically preserved LV function and Constipation, was brought into the ED s/p mechanical fall, poor oral intake, and Covid-19 infection on 12/27/23 as per NH papers.    # Bradycardia   - He likely has some degree of sick sinus syndrome that may be exacerbated by Midodrine.  now off midodrine  - EP following      # Hypotension  - Suspect this is due to dehydration and vasodilatory state associated with viral/bacterial infection  - s/p  Abx  - BP improved  - s/p  trach and PEG      Gavino Cadet MD, [facility]  BEEPER [phone]

## 2024-01-18 NOTE — PROGRESS NOTE ADULT - PROBLEM SELECTOR PLAN 3
Has completed multiple rounds of antibiotics.  Continue cefepime.  Keep head of bed elevated at all times.  Completed remdesivir and steroids.
Has completed multiple rounds of antibiotics.  completed cefepime.  Keep head of bed elevated at all times.  Completed remdesivir and steroids.
As above
Has completed multiple rounds of antibiotics.  Continue cefepime.  Keep head of bed elevated at all times.  Completed remdesivir and steroids.
As above
Has completed multiple rounds of antibiotics.  Continue cefepime.  Keep head of bed elevated at all times.  Completed remdesivir and steroids.
Has completed multiple rounds of antibiotics.  Continue cefepime.  Keep head of bed elevated at all times.  Completed remdesivir and steroids.

## 2024-01-18 NOTE — PROGRESS NOTE ADULT - REASON FOR ADMISSION
Sepsis and AHRF

## 2024-01-18 NOTE — PROGRESS NOTE ADULT - PROBLEM SELECTOR PLAN 2
Has completed multiple rounds of antibiotics.  Continue cefepime.  Keep head of bed elevated at all times.  Completed remdesivir and steroids.
CXR showing mild b/l infiltrates   Ceftriaxone 1g IV and Azithromycin 500 mg IV for now   follow up Strep, Mycoplasma, Legionella  Tylenol PRN  Wean of oxygen as tolerated   Incentive spirometer   Aspiration and fall precautions
Has completed multiple rounds of antibiotics.  Continue cefepime.  Keep head of bed elevated at all times.  Completed remdesivir and steroids.
Secondary to viral and aspiration pneumonia.  Has had multiple rounds of antibiotics  Continue cefepime.
Has completed multiple rounds of antibiotics.  completed cefepime.  Keep head of bed elevated at all times.  Completed remdesivir and steroids.
Has completed multiple rounds of antibiotics.  Continue cefepime.  Keep head of bed elevated at all times.  Completed remdesivir and steroids.
See plan as above

## 2024-01-18 NOTE — PROGRESS NOTE ADULT - PROVIDER SPECIALTY LIST ADULT
Cardiology
Critical Care
Electrophysiology
Infectious Disease
Internal Medicine
Pulmonology
Surgery
Cardiology
Cardiology
Electrophysiology
Internal Medicine
Thoracic Surgery
Cardiology
Cardiology
Critical Care
Electrophysiology
Infectious Disease
Internal Medicine
Pulmonology
Thoracic Surgery
Internal Medicine
Cardiology
Cardiology
Critical Care
Electrophysiology
Electrophysiology
Infectious Disease
Internal Medicine
Pulmonology
Thoracic Surgery
Cardiology
Critical Care
Internal Medicine
Internal Medicine
Critical Care
Critical Care
Internal Medicine

## 2024-01-18 NOTE — PROGRESS NOTE ADULT - NUTRITIONAL ASSESSMENT
Severe Protein Calorie Malnutrition  +Severe temporal and muscular waisting.

## 2024-01-18 NOTE — DISCHARGE NOTE PROVIDER - HOSPITAL COURSE
63 year old male, from [facility], with PMH of CVA, Alzheimer's, nonverbal at baseline, GERD, Schizophrenia, and Constipation, was brought into the ED s/p mechanical fall, poor oral intake, and Covid-19 infection on 12/27/23. In ED wbc 14.3, Covid 19 positive.   CXR report showed slight right perihilar infiltrate and there is a left lower perihilar infiltrate. Infiltrates are new. CT HEAD/Cervical : No acute intracranial hemorrhage, mass effect, or osseous: No acute cervical spine fracture or traumatic malalignment. Started on Supplemental O2, Dexamethasone 6mg IV q daily, Ceftriaxone, Azithromycin and Remdesivir protocol. Pt Admitted for Acute respiratory failure with hypoxia, COVID 19 pneumonia with  superimposed bacterial pneumonia.    On 1/2/2024, RRT for desaturation to the 80s.Pt was placed on HF and admitted to the ICU. Pt was weaned from HF to 6LNC.  Feeding was resumed on puree diet, on 1/5/2024, patient desaturated to 80s and failed to improve with HFNC, subsequently requiring intubation. Pt was extubated on 1/8 and was reintubated on 1/9 for inability to manage secretions. Pt noted to be febrile and was treated with 2g cefepime (1/10-1/15) for possible aspiration PNA. Following GOC discussion with son, patient was evaluated by Thoracic surgery and is now s/p Trach and PEG.  Tube feeds and VTE prophylaxis have been resumed.   1/13 Transferred from ICU to SCU. Tolerated 1 hour of SBT with PS 6  1/14 Tolerated SBT and Trach collar for several hours.    Please note that this a brief summary of hospital course please refer to daily progress notes and consult notes for full course and events. Patient seen and examined at bedside, discussed with ICU attending. Patient medically cleared for discharge to [facility].   63 year old male, from [facility], with PMH of CVA, Alzheimer's, nonverbal at baseline, GERD, Schizophrenia, and Constipation, was brought into the ED s/p mechanical fall, poor oral intake, and Covid-19 infection on 12/27/23. In ED wbc 14.3, Covid 19 positive.   CXR report showed slight right perihilar infiltrate and there is a left lower perihilar infiltrate. Infiltrates are new. CT HEAD/Cervical : No acute intracranial hemorrhage, mass effect, or osseous: No acute cervical spine fracture or traumatic malalignment. Started on Supplemental O2, Dexamethasone 6mg IV q daily, Ceftriaxone, Azithromycin and Remdesivir protocol. Pt Admitted for Acute respiratory failure with hypoxia, COVID 19 pneumonia with  superimposed bacterial pneumonia.    On 1/2/2024, RRT for desaturation to the 80s.Pt was placed on HF and admitted to the ICU. Pt was weaned from HF to 6LNC.  Feeding was resumed on puree diet, on 1/5/2024, patient desaturated to 80s and failed to improve with HFNC, subsequently requiring intubation. Pt was extubated on 1/8 and was reintubated on 1/9 for inability to manage secretions. Pt noted to be febrile and was treated with 2g cefepime (1/10-1/15) for possible aspiration PNA. Following GOC discussion with son, patient was evaluated by Thoracic surgery and is now s/p Trach and PEG.  Tube feeds and VTE prophylaxis have been resumed.   1/13 Transferred from ICU to SCU. Tolerated 1 hour of SBT with PS 6  1/14 Tolerated SBT and Trach collar for several hours.    Please note that this a brief summary of hospital course please refer to daily progress notes and consult notes for full course and events. Patient seen and examined at bedside, discussed with ICU attending. Patient medically cleared for discharge to [facility].    for a full account of hospital course please refer to actual medical records for this is a brief summery

## 2024-01-18 NOTE — PROGRESS NOTE ADULT - PROBLEM SELECTOR PROBLEM 3
Pneumonia due to COVID-19 virus
2019 novel coronavirus disease (COVID-19)
Pneumonia due to COVID-19 virus
Pneumonia due to COVID-19 virus
2019 novel coronavirus disease (COVID-19)

## 2024-01-18 NOTE — DISCHARGE NOTE NURSING/CASE MANAGEMENT/SOCIAL WORK - PATIENT PORTAL LINK FT
You can access the FollowMyHealth Patient Portal offered by [facility] by registering at the following website: [email or web address]. By joining [facility]’s FollowMyHealth portal, you will also be able to view your health information using other applications (apps) compatible with our system.

## 2024-01-18 NOTE — PROGRESS NOTE ADULT - PROBLEM SELECTOR PLAN 1
Likely secondary to COVID and pneumonia.  Intubated 1/5 extubated 1/8 reintubated 1/9  S/P trach and Peg on 1/12  Continue mechanical ventilation with daily SBT  Monitor oxygen saturation.  Tolerating Trach collar well.
Likely secondary to COVID and pneumonia.  Intubated 1/5 extubated 1/8 reintubated 1/9  S/P trach and Peg on 1/12  Continue mechanical ventilation with daily SBT  Monitor oxygen saturation.
Likely secondary to COVID and pneumonia.  Intubated 1/5 extubated 1/8 reintubated 1/9  S/P trach and Peg on 1/12  Continue mechanical ventilation with daily SBT  Monitor oxygen saturation.
Likely secondary to Covid-19 infection and/or superimposed PNA  CXR showing mild b/l infiltrates   c/w Dexamethasone IV for 10 doses  Remdesivir protocol   Isolation precautions   Ceftriaxone 1g IV and Azithromycin 500 mg IV   follow up Strep, Mycoplasma, Legionella  Tylenol PRN  Wean of oxygen as tolerated   Incentive spirometer   Aspiration and fall precautions   follow up urine culture and Blood culture
Likely secondary to Covid-19 infection and/or superimposed PNA  CXR showing mild b/l infiltrates   c/w Dexamethasone IV for 10 doses  S/P Remdesivir   S/P Ceftriaxone and Azithromycin    C/W Cefepime   ID Dr. Lema following

## 2024-01-18 NOTE — PROGRESS NOTE ADULT - SUBJECTIVE AND OBJECTIVE BOX
EP ATTENDING    no tele, but no further bradycardia on vital signs  trached, in bed comfortable, at baseline was nonverbal.  no acute events overnight.  awaiting DC to NH.  DATE OF SERVICE - 01-18-24     Review of Systems:   Constitutional: [ ] fevers, [ ] chills.   Skin: [ ] dry skin. [ ] rashes.  Psychiatric: [ ] depression, [ ] anxiety.   Gastrointestinal: [ ] BRBPR, [ ] melena.   Neurological: [ ] confusion. [ ] seizures. [ ] shuffling gait.   Ears,Nose,Mouth and Throat: [ ] ear pain [ ] sore throat.   Eyes: [ ] diplopia.   Respiratory: [ ] hemoptysis. [ ] shortness of breath  Cardiovascular: See HPI above  Hematologic/Lymphatic: [ ] anemia. [ ] painful nodes. [ ] prolonged bleeding.   Genitourinary: [ ] hematuria. [ ] flank pain.   Endocrine: [ ] significant change in weight. [ ] intolerance to heat and cold.     Review of systems [ x] otherwise negative, [ ] otherwise unable to obtain    FH: no family history of sudden cardiac death in first degree relatives    SH: [ ] tobacco, [ ] alcohol, [ ] drugs    atorvastatin 20 milliGRAM(s) Oral at bedtime  chlorhexidine 0.12% Liquid 15 milliLiter(s) Oral Mucosa every 12 hours  enoxaparin Injectable 40 milliGRAM(s) SubCutaneous every 24 hours  OLANZapine 5 milliGRAM(s) Oral two times a day  pantoprazole  Injectable 40 milliGRAM(s) IV Push daily  polyethylene glycol 3350 17 Gram(s) Oral daily  valproic  acid Syrup 250 milliGRAM(s) Oral two times a day                            12.9   9.60  )-----------( 402      ( 18 Jan 2024 06:07 )             41.9       01-18    152<H>  |  119<H>  |  22<H>  ----------------------------<  187<H>  3.5   |  30  |  0.76    Ca    10.0      18 Jan 2024 06:07  Phos  3.3     01-18  Mg     2.7     01-18    TPro  7.6  /  Alb  2.8<L>  /  TBili  0.6  /  DBili  x   /  AST  18  /  ALT  41  /  AlkPhos  66  01-18    T(C): 36.3 (01-18-24 @ 14:06), Max: 37.3 (01-17-24 @ 20:46)  HR: 70 (01-18-24 @ 14:06) (70 - 85)  BP: 116/80 (01-18-24 @ 14:06) (116/80 - 122/73)  RR: 18 (01-18-24 @ 14:06) (18 - 20)  SpO2: 100% (01-18-24 @ 14:06) (98% - 100%)  Wt(kg): --    I&O's Summary    Head: Normocephalic and atraumatic.   Neck: No JVD. No bruits. Supple. Does not appear to be enlarged.   Cardiovascular: + S1,S2 ; RRR Soft systolic murmur at the left lower sternal border. No rubs noted.    Lungs: CTA b/l. No rhonchi, rales or wheezes.   Abdomen: + BS, soft. Non tender. Non distended. No rebound. No guarding.   Extremities: No clubbing/cyanosis/edema.   Skin: Warm and moist. The patient's skin has normal elasticity and good skin turgor.     echo: mild LV dysfunction, otherwise unremarkable    A/P) 64 y/o male who resides at a nursing home due to previous stroke, hyperlipidemia, schizophrenia and Alzheimer's dementia a/w covid. EP called for periods of asymptomatic sinus bradycardia. He is now s/p trach and PEG.    -continue lipitor for hyperlipidemia  -consider low dose beta blockers and ACEi for mild LV dysfunction, will defer to cardiology  -no need for PPM as he has been asymptomatic and had not experienced any malignant sinus pauses on telemetry for many days.  OK to continue to monitor off of telemetry.    Ramses Pena M.D.  Cardiac Electrophysiology  [phone]

## 2024-01-18 NOTE — PROGRESS NOTE ADULT - PROBLEM SELECTOR PLAN 4
Secondary to prior CVA and progressive dementia.  Maintain safety measures.  Continue valproic acid.
Secondary to prior CVA and progressive dementia.  Maintain safety measures.  Continue valproic acid.
Resolved
Secondary to prior CVA and progressive dementia.  Maintain safety measures.  Continue valproic acid.
Secondary to prior CVA and progressive dementia.  Maintain safety measures.  Continue valproic acid.
As above
Secondary to prior CVA and progressive dementia.  Maintain safety measures.  Continue valproic acid

## 2024-01-18 NOTE — PROGRESS NOTE ADULT - PROBLEM SELECTOR PROBLEM 1
Acute respiratory failure with hypoxia
Acute respiratory failure with hypoxia
Sepsis
Acute respiratory failure with hypoxia
Sepsis

## 2024-01-18 NOTE — DISCHARGE NOTE PROVIDER - NSDCMRMEDTOKEN_GEN_ALL_CORE_FT
atorvastatin 20 mg oral tablet: 1 tab(s) orally once a day (at bedtime)  OLANZapine 5 mg oral tablet: 1 tab(s) orally 2 times a day  polyethylene glycol 3350 oral powder for reconstitution: 17 gram(s) orally once a day  valproic acid 250 mg/5 mL oral liquid: 250 milligram(s) orally 2 times a day

## 2024-01-18 NOTE — PROGRESS NOTE ADULT - SUBJECTIVE AND OBJECTIVE BOX
Patient is a 63y old  Male who presents with a chief complaint of Sepsis and AHRF (2024 14:27)    PATIENT IS SEEN AND EXAMINED IN MEDICAL FLOOR.  RODRIGUET [    ]    KRISTINA [   ]      GT [   ]    ALLERGIES:  No Known Allergies      Daily     Daily Weight in k.1 (2024 05:09)    VITALS:    Vital Signs Last 24 Hrs  T(C): 36.6 (2024 05:09), Max: 37.3 (2024 20:46)  T(F): 97.9 (2024 05:09), Max: 99.2 (2024 20:46)  HR: 83 (2024 05:09) (77 - 116)  BP: 117/69 (2024 05:09) (111/73 - 133/82)  BP(mean): --  RR: 18 (2024 05:09) (18 - 20)  SpO2: 98% (2024 05:09) (96% - 100%)    Parameters below as of 2024 05:09  Patient On (Oxygen Delivery Method): trach-ventilator        LABS:    CBC Full  -  ( 2024 06:07 )  WBC Count : 9.60 K/uL  RBC Count : 4.47 M/uL  Hemoglobin : 12.9 g/dL  Hematocrit : 41.9 %  Platelet Count - Automated : 402 K/uL  Mean Cell Volume : 93.7 fl  Mean Cell Hemoglobin : 28.9 pg  Mean Cell Hemoglobin Concentration : 30.8 gm/dL  Auto Neutrophil # : x  Auto Lymphocyte # : x  Auto Monocyte # : x  Auto Eosinophil # : x  Auto Basophil # : x  Auto Neutrophil % : x  Auto Lymphocyte % : x  Auto Monocyte % : x  Auto Eosinophil % : x  Auto Basophil % : x      18    152<H>  |  119<H>  |  22<H>  ----------------------------<  187<H>  3.5   |  30  |  0.76    Ca    10.0      2024 06:07  Phos  3.3     -18  Mg     2.7         TPro  7.6  /  Alb  2.8<L>  /  TBili  0.6  /  DBili  x   /  AST  18  /  ALT  41  /  AlkPhos  66  01-18    CAPILLARY BLOOD GLUCOSE      POCT Blood Glucose.: 183 mg/dL (2024 06:18)  POCT Blood Glucose.: 183 mg/dL (2024 00:16)  POCT Blood Glucose.: 129 mg/dL (2024 17:51)  POCT Blood Glucose.: 118 mg/dL (2024 11:33)        LIVER FUNCTIONS - ( 2024 06:07 )  Alb: 2.8 g/dL / Pro: 7.6 g/dL / ALK PHOS: 66 U/L / ALT: 41 U/L DA / AST: 18 U/L / GGT: x           Creatinine Trend: 0.76<--, 0.73<--, 0.74<--, 0.69<--, 0.66<--, 0.50<--  I&O's Summary          Nares/Axilla/Groin Nares/Axilla/Groin   @ 22:20   Culture NEGATIVE for Candida auris.  This surveillance culture is intended for Infection Control purposes only.  A negative result does not preclude the carriage of other fungal  organisms.  --  --      Catheterized Catheterized   @ 12:37   No growth  --  --      .Blood Blood   @ 02:05   No growth at 5 days  --  --      .Blood Blood   @ 01:55   No growth at 5 days  --  --          MEDICATIONS:    MEDICATIONS  (STANDING):  atorvastatin 20 milliGRAM(s) Oral at bedtime  cefepime   IVPB 2000 milliGRAM(s) IV Intermittent every 8 hours  chlorhexidine 0.12% Liquid 15 milliLiter(s) Oral Mucosa every 12 hours  enoxaparin Injectable 40 milliGRAM(s) SubCutaneous every 24 hours  OLANZapine 5 milliGRAM(s) Oral two times a day  pantoprazole  Injectable 40 milliGRAM(s) IV Push daily  polyethylene glycol 3350 17 Gram(s) Oral daily  valproic  acid Syrup 250 milliGRAM(s) Oral two times a day      MEDICATIONS  (PRN):      REVIEW OF SYSTEMS:                           ALL ROS DONE [ X   ]    CONSTITUTIONAL:  LETHARGIC [   ], FEVER [   ], UNRESPONSIVE [   ]  CVS:  CP  [   ], SOB, [   ], PALPITATIONS [   ], DIZZYNESS [   ]  RS: COUGH [   ], SPUTUM [   ]  GI: ABDOMINAL PAIN [   ], NAUSEA [   ], VOMITINGS [   ], DIARRHEA [   ], CONSTIPATION [   ]  :  DYSURIA [   ], NOCTURIA [   ], INCREASED FREQUENCY [   ], DRIBLING [   ],  SKELETAL: PAINFUL JOINTS [   ], SWOLLEN JOINTS [   ], NECK ACHE [   ], LOW BACK ACHE [   ],  SKIN : ULCERS [   ], RASH [   ], ITCHING [   ]  CNS: HEAD ACHE [   ], DOUBLE VISION [   ], BLURRED VISION [   ], AMS / CONFUSION [   ], SEIZURES [   ], WEAKNESS [   ],TINGLING / NUMBNESS [   ]      PHYSICAL EXAMINATION:    GENERAL APPEARANCE: NO DISTRESS  HEENT:  NO PALLOR, NO  JVD,  NO   NODES, NECK SUPPLE  CVS: S1 +, S2 +,   RS: AEEB,  OCCASIONAL  RALES +,   RHONCHI +   TRACHEOSTOMY  ABD: SOFT, NT, NO, BS +    PEG +  EXT: NO PE  SKIN: WARM,   SKELETAL:  REDUCED ROM OF CERVICAL AND LS SPINE  CNS:  AAO X 1        RADIOLOGY :    RADIOLOGY AND READINGS REVIEWED        ASSESSMENT :     Infection due to severe acute respiratory syndrome coronavirus 2 (SARS-CoV-2)    CVA (cerebral vascular accident)    HLD (hyperlipidemia)    S/P percutaneous endoscopic gastrostomy (PEG) tube placement        PLAN:  HPI:  A 63 year old male, from [facility], with PMH of CVA, Alzheimer's, nonverbal at baseline, GERD, Schizophrenia, and Constipation, was brought into the ED s/p mechanical fall, poor oral intake, and Covid-19 infection on 23 as per NH papers. Unable to obtain history from patient since he is nonverbal, history obtained from chart review.  (28 Dec 2023 20:54)      # PATIENT IS DOWN GRADED TO FLOOR/SCU FROM ICU     # PROGNOSIS IS POOR/GUARDED - CRITICAL CARE TEAM DISCUSSING W/ FAMILY REGARDING GOC. FAMILY WISHES FOR PEG, TRACHEOSTOMY.     # [1/3] S/P RAPID RESPONSE FOR HYPOTENSION AND HYPOXIA - S/P IV FLUIDS AND PLACED ON NRB FOR HYPOXIA. CRITICAL CARE EVALUATION REQUESTED.     # RESOLVING SEPTIC SHOCK S/T ASPIRATION PNA + DIARRHEA [resolved]  # RESOLVING ACUTE HYPOXIC RESPIRATORY FAILURE S/T ? ASPIRATION EVENT   , COVID19 INFECTION     S/P TRACHEOSTOMY []    - CONTINUE CEFEPIME, COMPLETED DECADRON, S/P REMDESEVIR  - CHEST PT    - BCX [NGTD] AND UCX [NGTD]  - S/P IVF, VASOPRESSORS  - ID CONSULT  - CRITICAL CARE MANAGEMENT IN PROGRESS    - [] - PATIENT W/ ? ASPIRATION EVENT - SUBSEQUENTLY REQUIRED INTUBATION W/ MECHANICAL VENTILATION  - [] - EXTUBATED, CHEST PT  - [] - REINTUBATED OVERNIGHT    - S/P TRACHEOSTOMY + PEG PLACEMENT []    # HYPERNATREMIA  - IVF TITRATED  - FREE WATER  - TREND NA    # DYSPHAGIA S/T CVA  - S/P PEG PLACEMENT     # BRADYCARDIA  - MONITOR ON TELEMETRY  - ECHOCARDIOGRAM - LV SYSTOLIC FUNCTION MILDLY REDUCED  - OPTIMIZE ELECTROLYTES  - CARDIOLOGY CONSULT  - EP CONSULT - NO RECC FOR PPM AT PRESENT    # HYPOKALEMIA  - REPLETING WITH SUPPLEMENT    # SEVERE PROTEIN CALORIE MALNUTRITION   - ON SUPPLEMENTAL NUTRITION    # HX OF CVA  # HX OF DEMENTIA  # SCHIZOPHRENIA  # GI AND DVT PPX   Patient is a 63y old  Male who presents with a chief complaint of Sepsis and AHRF (2024 14:27)    PATIENT IS SEEN AND EXAMINED IN MEDICAL FLOOR.      ALLERGIES:  No Known Allergies      Daily     Daily Weight in k.1 (2024 05:09)    VITALS:    Vital Signs Last 24 Hrs  T(C): 36.6 (2024 05:09), Max: 37.3 (2024 20:46)  T(F): 97.9 (2024 05:09), Max: 99.2 (2024 20:46)  HR: 83 (2024 05:09) (77 - 116)  BP: 117/69 (2024 05:09) (111/73 - 133/82)  BP(mean): --  RR: 18 (2024 05:09) (18 - 20)  SpO2: 98% (2024 05:09) (96% - 100%)    Parameters below as of 2024 05:09  Patient On (Oxygen Delivery Method): trach-ventilator        LABS:    CBC Full  -  ( 2024 06:07 )  WBC Count : 9.60 K/uL  RBC Count : 4.47 M/uL  Hemoglobin : 12.9 g/dL  Hematocrit : 41.9 %  Platelet Count - Automated : 402 K/uL  Mean Cell Volume : 93.7 fl  Mean Cell Hemoglobin : 28.9 pg  Mean Cell Hemoglobin Concentration : 30.8 gm/dL  Auto Neutrophil # : x  Auto Lymphocyte # : x  Auto Monocyte # : x  Auto Eosinophil # : x  Auto Basophil # : x  Auto Neutrophil % : x  Auto Lymphocyte % : x  Auto Monocyte % : x  Auto Eosinophil % : x  Auto Basophil % : x      18    152<H>  |  119<H>  |  22<H>  ----------------------------<  187<H>  3.5   |  30  |  0.76    Ca    10.0      2024 06:07  Phos  3.3     -18  Mg     2.7     18    TPro  7.6  /  Alb  2.8<L>  /  TBili  0.6  /  DBili  x   /  AST  18  /  ALT  41  /  AlkPhos  66  -18    CAPILLARY BLOOD GLUCOSE      POCT Blood Glucose.: 183 mg/dL (2024 06:18)  POCT Blood Glucose.: 183 mg/dL (2024 00:16)  POCT Blood Glucose.: 129 mg/dL (2024 17:51)  POCT Blood Glucose.: 118 mg/dL (2024 11:33)        LIVER FUNCTIONS - ( 2024 06:07 )  Alb: 2.8 g/dL / Pro: 7.6 g/dL / ALK PHOS: 66 U/L / ALT: 41 U/L DA / AST: 18 U/L / GGT: x           Creatinine Trend: 0.76<--, 0.73<--, 0.74<--, 0.69<--, 0.66<--, 0.50<--  I&O's Summary          Nares/Axilla/Groin Nares/Axilla/Groin   @ 22:20   Culture NEGATIVE for Candida auris.  This surveillance culture is intended for Infection Control purposes only.  A negative result does not preclude the carriage of other fungal  organisms.  --  --      Catheterized Catheterized   @ 12:37   No growth  --  --      .Blood Blood   @ 02:05   No growth at 5 days  --  --      .Blood Blood   @ 01:55   No growth at 5 days  --  --          MEDICATIONS:    MEDICATIONS  (STANDING):  atorvastatin 20 milliGRAM(s) Oral at bedtime  cefepime   IVPB 2000 milliGRAM(s) IV Intermittent every 8 hours  chlorhexidine 0.12% Liquid 15 milliLiter(s) Oral Mucosa every 12 hours  enoxaparin Injectable 40 milliGRAM(s) SubCutaneous every 24 hours  OLANZapine 5 milliGRAM(s) Oral two times a day  pantoprazole  Injectable 40 milliGRAM(s) IV Push daily  polyethylene glycol 3350 17 Gram(s) Oral daily  valproic  acid Syrup 250 milliGRAM(s) Oral two times a day      MEDICATIONS  (PRN):      REVIEW OF SYSTEMS:                           ALL ROS DONE [ X   ]    CONSTITUTIONAL:  LETHARGIC [   ], FEVER [   ], UNRESPONSIVE [   ]  CVS:  CP  [   ], SOB, [   ], PALPITATIONS [   ], DIZZYNESS [   ]  RS: COUGH [   ], SPUTUM [   ]  GI: ABDOMINAL PAIN [   ], NAUSEA [   ], VOMITINGS [   ], DIARRHEA [   ], CONSTIPATION [   ]  :  DYSURIA [   ], NOCTURIA [   ], INCREASED FREQUENCY [   ], DRIBLING [   ],  SKELETAL: PAINFUL JOINTS [   ], SWOLLEN JOINTS [   ], NECK ACHE [   ], LOW BACK ACHE [   ],  SKIN : ULCERS [   ], RASH [   ], ITCHING [   ]  CNS: HEAD ACHE [   ], DOUBLE VISION [   ], BLURRED VISION [   ], AMS / CONFUSION [   ], SEIZURES [   ], WEAKNESS [   ],TINGLING / NUMBNESS [   ]      PHYSICAL EXAMINATION:    GENERAL APPEARANCE: NO DISTRESS  HEENT:  NO PALLOR, NO  JVD,  NO   NODES, NECK SUPPLE  CVS: S1 +, S2 +,   RS: AEEB,  OCCASIONAL  RALES +,   RHONCHI +   TRACHEOSTOMY  ABD: SOFT, NT, NO, BS +    PEG +  EXT: NO PE  SKIN: WARM,   SKELETAL:  REDUCED ROM OF CERVICAL AND LS SPINE  CNS:  AAO X 1        RADIOLOGY :    RADIOLOGY AND READINGS REVIEWED        ASSESSMENT :     Infection due to severe acute respiratory syndrome coronavirus 2 (SARS-CoV-2)    CVA (cerebral vascular accident)    HLD (hyperlipidemia)    S/P percutaneous endoscopic gastrostomy (PEG) tube placement        PLAN:  HPI:  A 63 year old male, from [facility], with PMH of CVA, Alzheimer's, nonverbal at baseline, GERD, Schizophrenia, and Constipation, was brought into the ED s/p mechanical fall, poor oral intake, and Covid-19 infection on 23 as per NH papers. Unable to obtain history from patient since he is nonverbal, history obtained from chart review.  (28 Dec 2023 20:54)    # D/C PLANNING IN PROGRESS    # PATIENT IS DOWN GRADED TO FLOOR/SCU FROM ICU     # PROGNOSIS IS POOR/GUARDED - CRITICAL CARE TEAM DISCUSSING W/ FAMILY REGARDING GOC. FAMILY WISHES FOR PEG, TRACHEOSTOMY.     # [1/3] S/P RAPID RESPONSE FOR HYPOTENSION AND HYPOXIA - S/P IV FLUIDS AND PLACED ON NRB FOR HYPOXIA. CRITICAL CARE EVALUATION REQUESTED.     # RESOLVING SEPTIC SHOCK S/T ASPIRATION PNA + DIARRHEA [resolved]  # RESOLVING ACUTE HYPOXIC RESPIRATORY FAILURE S/T ? ASPIRATION EVENT   , COVID19 INFECTION     S/P TRACHEOSTOMY []    - CONTINUE CEFEPIME, COMPLETED DECADRON, S/P REMDESEVIR  - CHEST PT    - BCX [NGTD] AND UCX [NGTD]  - S/P IVF, VASOPRESSORS  - ID CONSULT  - CRITICAL CARE MANAGEMENT IN PROGRESS    - [] - PATIENT W/ ? ASPIRATION EVENT - SUBSEQUENTLY REQUIRED INTUBATION W/ MECHANICAL VENTILATION  - [] - EXTUBATED, CHEST PT  - [] - REINTUBATED OVERNIGHT    - S/P TRACHEOSTOMY + PEG PLACEMENT []    # HYPERNATREMIA  - IVF TITRATED  - FREE WATER  - TREND NA    # DYSPHAGIA S/T CVA  - S/P PEG PLACEMENT     # BRADYCARDIA  - MONITOR ON TELEMETRY  - ECHOCARDIOGRAM - LV SYSTOLIC FUNCTION MILDLY REDUCED  - OPTIMIZE ELECTROLYTES  - CARDIOLOGY CONSULT  - EP CONSULT - NO RECC FOR PPM AT PRESENT    # HYPOKALEMIA - IMPROVED  - REPLETED WITH SUPPLEMENT    # SEVERE PROTEIN CALORIE MALNUTRITION   - ON SUPPLEMENTAL NUTRITION    # HX OF CVA  # HX OF DEMENTIA  # SCHIZOPHRENIA  # GI AND DVT PPX

## 2024-01-18 NOTE — PROGRESS NOTE ADULT - SUBJECTIVE AND OBJECTIVE BOX
PAM JIMENEZ    SCU progress note    INTERVAL HPI/OVERNIGHT EVENTS: ***    DNR [ ]   DNI  [  ]    Covid - 19 PCR:     The 4Ms    What Matters Most: see GOC  Age appropriate Medications/Screen for High Risk Medication: Yes  Mentation: see CAM below  Mobility: defer to physical exam    The Confusion Assessment Method (CAM) Diagnostic Algorithm     1: Acute Onset or Fluctuating Course  - Is there evidence of an acute change in mental status from the patient’s baseline? Did the (abnormal) behavior  fluctuate during the day, that is, tend to come and go, or increase and decrease in severity?  [ ] YES [ ] NO     2: Inattention  - Did the patient have difficulty focusing attention, being easily distractible, or having difficulty keeping track of what was being said?  [ ] YES [ ] NO     3: Disorganized thinking  -Was the patient’s thinking disorganized or incoherent, such as rambling or irrelevant conversation, unclear or illogical flow of ideas, or unpredictable switching from subject to subject?  [ ] YES [ ] NO    4: Altered Level of consciousness?  [ ] YES [ ] NO    The diagnosis of delirium by CAM requires the presence of features 1 and 2 and either 3 or 4.    PRESSORS: [ ] YES [ ] NO  cefepime   IVPB 2000 milliGRAM(s) IV Intermittent every 8 hours    Cardiovascular:  Heart Failure  Acute   Acute on Chronic  Chronic         Pulmonary:    Hematalogic:  enoxaparin Injectable 40 milliGRAM(s) SubCutaneous every 24 hours    Other:  atorvastatin 20 milliGRAM(s) Oral at bedtime  chlorhexidine 0.12% Liquid 15 milliLiter(s) Oral Mucosa every 12 hours  OLANZapine 5 milliGRAM(s) Oral two times a day  pantoprazole  Injectable 40 milliGRAM(s) IV Push daily  polyethylene glycol 3350 17 Gram(s) Oral daily  valproic  acid Syrup 250 milliGRAM(s) Oral two times a day    atorvastatin 20 milliGRAM(s) Oral at bedtime  cefepime   IVPB 2000 milliGRAM(s) IV Intermittent every 8 hours  chlorhexidine 0.12% Liquid 15 milliLiter(s) Oral Mucosa every 12 hours  enoxaparin Injectable 40 milliGRAM(s) SubCutaneous every 24 hours  OLANZapine 5 milliGRAM(s) Oral two times a day  pantoprazole  Injectable 40 milliGRAM(s) IV Push daily  polyethylene glycol 3350 17 Gram(s) Oral daily  valproic  acid Syrup 250 milliGRAM(s) Oral two times a day    Drug Dosing Weight  Height (cm): 167.6 (03 Aug 2022 20:11)  Weight (kg): 57 (03 Jan 2024 14:30)  BMI (kg/m2): 20.3 (03 Jan 2024 14:30)  BSA (m2): 1.64 (03 Jan 2024 14:30)    CENTRAL LINE: [ ] YES [ ] NO  LOCATION:   DATE INSERTED:  REMOVE: [ ] YES [ ] NO  EXPLAIN:    ACEVEDO: [ ] YES [ ] NO    DATE INSERTED:  REMOVE:  [ ] YES [ ] NO  EXPLAIN:    PAST MEDICAL & SURGICAL HISTORY:  CVA (cerebral vascular accident)      CVA (cerebral vascular accident)      HLD (hyperlipidemia)      S/P percutaneous endoscopic gastrostomy (PEG) tube placement                    Mode: PS (Pressure Support)/ Spontaneous  FiO2: 35  PEEP: 5  ITime: 1  MAP: 7  PIP: 13      PHYSICAL EXAM:    GENERAL: NAD, well-groomed, well-developed  HEAD:  Atraumatic, Normocephalic  EYES: EOMI, PERRLA, conjunctiva and sclera clear  ENMT: No tonsillar erythema, exudates, or enlargement; Moist mucous membranes, Good dentition, No lesions  NECK: Supple, No JVD, Normal thyroid  NERVOUS SYSTEM:  Alert & Oriented X3, Good concentration; Motor Strength 5/5 B/L upper and lower extremities; DTRs 2+ intact and symmetric  CHEST/LUNG: Clear to percussion bilaterally; No rales, rhonchi, wheezing, or rubs  HEART: Regular rate and rhythm; No murmurs, rubs, or gallops  ABDOMEN: Soft, Nontender, Nondistended; Bowel sounds present  EXTREMITIES:  2+ Peripheral Pulses, No clubbing, cyanosis, or edema  LYMPH: No lymphadenopathy noted  SKIN: No rashes or lesions      LABS:  CBC Full  -  ( 18 Jan 2024 06:07 )  WBC Count : 9.60 K/uL  RBC Count : 4.47 M/uL  Hemoglobin : 12.9 g/dL  Hematocrit : 41.9 %  Platelet Count - Automated : 402 K/uL  Mean Cell Volume : 93.7 fl  Mean Cell Hemoglobin : 28.9 pg  Mean Cell Hemoglobin Concentration : 30.8 gm/dL  Auto Neutrophil # : x  Auto Lymphocyte # : x  Auto Monocyte # : x  Auto Eosinophil # : x  Auto Basophil # : x  Auto Neutrophil % : x  Auto Lymphocyte % : x  Auto Monocyte % : x  Auto Eosinophil % : x  Auto Basophil % : x    01-18    152<H>  |  119<H>  |  22<H>  ----------------------------<  187<H>  3.5   |  30  |  0.76    Ca    10.0      18 Jan 2024 06:07  Phos  3.3     01-18  Mg     2.7     01-18    TPro  7.6  /  Alb  2.8<L>  /  TBili  0.6  /  DBili  x   /  AST  18  /  ALT  41  /  AlkPhos  66  01-18      Urinalysis Basic - ( 18 Jan 2024 06:07 )    Color: x / Appearance: x / SG: x / pH: x  Gluc: 187 mg/dL / Ketone: x  / Bili: x / Urobili: x   Blood: x / Protein: x / Nitrite: x   Leuk Esterase: x / RBC: x / WBC x   Sq Epi: x / Non Sq Epi: x / Bacteria: x            [  ]  DVT Prophylaxis  [  ]  Nutrition, Brand, Rate         Goal Rate        Abnormal Nutritional Status -  Malnutrition   Cachexia      Morbid Obesity BMI >/=40    RADIOLOGY & ADDITIONAL STUDIES:  ***    Goals of Care Discussion with Family/Proxy/Other   - see note from/family meeting set up for...     PAM JIMENEZ    SCU progress note    INTERVAL HPI/OVERNIGHT EVENTS: *** no overnight events    DNR [ ]   DNI  [  ] Full code    Covid - 19 PCR: 12/28/23 positive    The 4Ms    What Matters Most: see GOC  Age appropriate Medications/Screen for High Risk Medication: Yes  Mentation: see CAM below  Mobility: defer to physical exam    The Confusion Assessment Method (CAM) Diagnostic Algorithm     1: Acute Onset or Fluctuating Course  - Is there evidence of an acute change in mental status from the patient’s baseline? Did the (abnormal) behavior  fluctuate during the day, that is, tend to come and go, or increase and decrease in severity?  [ ] YES [ ] NO unable to assess     2: Inattention  - Did the patient have difficulty focusing attention, being easily distractible, or having difficulty keeping track of what was being said?  [ ] YES [ ] NO unable to assess     3: Disorganized thinking  -Was the patient’s thinking disorganized or incoherent, such as rambling or irrelevant conversation, unclear or illogical flow of ideas, or unpredictable switching from subject to subject?  [ ] YES [ ] NO unable to assess    4: Altered Level of consciousness?  [ ] YES [ ] NO unable to assess    The diagnosis of delirium by CAM requires the presence of features 1 and 2 and either 3 or 4.    PRESSORS: [ ] YES [ x] NO  cefepime   IVPB 2000 milliGRAM(s) IV Intermittent every 8 hours    Cardiovascular:  Heart Failure  Acute   Acute on Chronic  Chronic         Pulmonary:    Hematalogic:  enoxaparin Injectable 40 milliGRAM(s) SubCutaneous every 24 hours    Other:  atorvastatin 20 milliGRAM(s) Oral at bedtime  chlorhexidine 0.12% Liquid 15 milliLiter(s) Oral Mucosa every 12 hours  OLANZapine 5 milliGRAM(s) Oral two times a day  pantoprazole  Injectable 40 milliGRAM(s) IV Push daily  polyethylene glycol 3350 17 Gram(s) Oral daily  valproic  acid Syrup 250 milliGRAM(s) Oral two times a day    atorvastatin 20 milliGRAM(s) Oral at bedtime  cefepime   IVPB 2000 milliGRAM(s) IV Intermittent every 8 hours  chlorhexidine 0.12% Liquid 15 milliLiter(s) Oral Mucosa every 12 hours  enoxaparin Injectable 40 milliGRAM(s) SubCutaneous every 24 hours  OLANZapine 5 milliGRAM(s) Oral two times a day  pantoprazole  Injectable 40 milliGRAM(s) IV Push daily  polyethylene glycol 3350 17 Gram(s) Oral daily  valproic  acid Syrup 250 milliGRAM(s) Oral two times a day    Drug Dosing Weight  Height (cm): 167.6 (03 Aug 2022 20:11)  Weight (kg): 57 (03 Jan 2024 14:30)  BMI (kg/m2): 20.3 (03 Jan 2024 14:30)  BSA (m2): 1.64 (03 Jan 2024 14:30)    CENTRAL LINE: [ ] YES [x ] NO  LOCATION:   DATE INSERTED:  REMOVE: [ ] YES [ ] NO  EXPLAIN:    ACEVEDO: [ ] YES [ x] NO    DATE INSERTED:  REMOVE:  [ ] YES [ ] NO  EXPLAIN:    PAST MEDICAL & SURGICAL HISTORY:  CVA (cerebral vascular accident)      CVA (cerebral vascular accident)      HLD (hyperlipidemia)      S/P percutaneous endoscopic gastrostomy (PEG) tube placement      Mode: PS (Pressure Support)/ Spontaneous  FiO2: 35  PEEP: 5  ITime: 1  MAP: 7  PIP: 13      PHYSICAL EXAM:    GENERAL: NAD, well-groomed, well-developed  HEAD:  Atraumatic, Normocephalic  EYES: EOMI, PERRLA, conjunctiva and sclera clear  ENMT: No tonsillar erythema, exudates, or enlargement; Moist mucous membranes, Good dentition, No lesions  NECK:  Tracheostomy Shiley #7  NERVOUS SYSTEM:  Alert, non-verbal; Motor Strength 5/5 B/L upper and lower extremities; DTRs 2+ intact and symmetric  CHEST/LUNG: mild bibasilar crackles; deep suctioned yellow whitish sputum, No rales, rhonchi, wheezing, or rubs  HEART: Regular rate and rhythm; No murmurs, rubs, or gallops  ABDOMEN: PEG tube, Soft, Nontender, Nondistended; Bowel sounds present  EXTREMITIES:  2+ Peripheral Pulses, No clubbing, cyanosis, or edema  LYMPH: No lymphadenopathy noted  SKIN: Pressure injury stage 1 bilateral heels, LUQ abdominal surgical wound      LABS:  CBC Full  -  ( 18 Jan 2024 06:07 )  WBC Count : 9.60 K/uL  RBC Count : 4.47 M/uL  Hemoglobin : 12.9 g/dL  Hematocrit : 41.9 %  Platelet Count - Automated : 402 K/uL  Mean Cell Volume : 93.7 fl  Mean Cell Hemoglobin : 28.9 pg  Mean Cell Hemoglobin Concentration : 30.8 gm/dL  Auto Neutrophil # : x  Auto Lymphocyte # : x  Auto Monocyte # : x  Auto Eosinophil # : x  Auto Basophil # : x  Auto Neutrophil % : x  Auto Lymphocyte % : x  Auto Monocyte % : x  Auto Eosinophil % : x  Auto Basophil % : x    01-18    152<H>  |  119<H>  |  22<H>  ----------------------------<  187<H>  3.5   |  30  |  0.76    Ca    10.0      18 Jan 2024 06:07  Phos  3.3     01-18  Mg     2.7     01-18    TPro  7.6  /  Alb  2.8<L>  /  TBili  0.6  /  DBili  x   /  AST  18  /  ALT  41  /  AlkPhos  66  01-18      Urinalysis Basic - ( 18 Jan 2024 06:07 )    Color: x / Appearance: x / SG: x / pH: x  Gluc: 187 mg/dL / Ketone: x  / Bili: x / Urobili: x   Blood: x / Protein: x / Nitrite: x   Leuk Esterase: x / RBC: x / WBC x   Sq Epi: x / Non Sq Epi: x / Bacteria: x    [  ]  DVT Prophylaxis  [  ]  Nutrition, Brand, Rate         Goal Rate        Abnormal Nutritional Status -  Malnutrition   Cachexia      Morbid Obesity BMI >/=40    RADIOLOGY & ADDITIONAL STUDIES:  ***  < from: Xray Chest 1 View- PORTABLE-Urgent (Xray Chest 1 View- PORTABLE-Urgent .) (01.14.24 @ 17:53) >    ACC: [id number] EXAM:  XR CHEST PORTABLE URGENT 1V   ORDERED BY: TIM GAYTAN     PROCEDURE DATE:  01/14/2024          INTERPRETATION:  Exam:XR CHEST URGENT    clinical history:S/P trach insertion    Tracheostomy tube overlies the trachea. No acute lung infiltrates or   pneumothorax.    IMPRESSION: Tracheostomy tube placement as above    --- End of Report ---    < end of copied text >  < from: US Renal (01.04.24 @ 11:21) >  ACC: [id number] EXAM:  US KIDNEY(S)   ORDERED BY: MAYELIN FLORES     PROCEDURE DATE:  01/04/2024          INTERPRETATION:  CLINICAL INFORMATION: Renal failure    COMPARISON: None available.    TECHNIQUE: Sonography of the kidneys and bladder.    FINDINGS:  Limited portable study  Right kidney: 12.5 cm. No renal mass, hydronephrosis or calculi.   Subcentimeter cyst    Left kidney: 9.1 cm. No renal mass, hydronephrosis or calculi.    Urinary bladder: Collapsed around Acevedo.    Incidental right pleural effusion    IMPRESSION:  No hydronephrosis bilaterally.        --- End of Report ---      < end of copied text >  < from: CT Head No Cont (12.28.23 @ 19:06) >  ACC: [id number] EXAM:  CT CERVICAL SPINE   ORDERED BY: NNAMDI RAMEY     ACC: [id number] EXAM:  CT BRAIN   ORDERED BY: NNAMDI RAMEY     PROCEDURE DATE:  12/28/2023          INTERPRETATION:  CLINICAL INFORMATION:  fall, nonverbal    TECHNIQUE:  1.Axial CT images were acquired through the head.  2.  Axial CT images were acquired through the cervical spine.  Intravenous contrast: None  Two-dimensional reformats were generated.    COMPARISON STUDY: CT head and cervical spine 8/3/2022    FINDINGS:    CT HEAD:    There is no CT evidence of acute intracranial hemorrhage,  mass effect,   midline shift, or acute, large territorial infarct.  Again seen are   chronic bilateral MCA territory infarcts, including associated ex vacuo   dilatation of the left lateral ventricle. Patchy periventricular and   subcortical white matter hypodensities are nonspecific, although likely   due to chronic microangiopathy. There is wallerian degeneration of the   left anterior brainstem. The ventricles and sulciare prominent   compatible with moderate generalized brain parenchymal volume loss. The   basal cisterns are patent.    The mastoid air cells and middle ear cavities are grossly clear. There is   mild mucosal thickening and scattered mucous retentioncysts versus   polyps in the bilateral maxillary sinuses, frothy secretions in the right   sphenoid sinus, minimal mucosal thickening within bilateral ethmoid air   cells. There is impacted cerumen within both external auditory canals.    The calvarium and skull base are intact.    CT CERVICAL SPINE:    There is  preservation  of the cervical lordosis.  There is no evidence of an acute cervical spine fracture or traumatic   malalignment.  There is no suspicious lytic or blastic lesion.  The paraspinous soft tissues are unremarkable within limits of CT scan.    Degenerative changes:  Mild multilevel degenerative changes, including small disc osteophyte   complexes and bilateral uncovertebral facet hypertrophy with varying   degrees of foraminal stenosis. No high-grade central spinal canal   narrowing by CT technique.    Incidental findings:  Visualized soft tissues of the neck appear unremarkable.  Emphysema and mild scarring in both lung apices.    IMPRESSION:    CT HEAD: No acute intracranial hemorrhage, mass effect, or osseous   fracture.    CT CERVICAL SPINE: No acute cervical spine fracture or traumatic   malalignment.    --- End of Report ---    < end of copied text >  < from: TTE W or WO Ultrasound Enhancing Agent (01.06.24 @ 09:55) >     CONCLUSIONS:      1. Left ventricular endocardium is not well visualized; however, the left ventricular systolic function appears mildly reduced.   2. The right ventricle is not well visualized. Normal RV systolic function. Tricuspid annular plane systolic excursion (TAPSE) is 2.4 cm (normal >=1.7 cm).   3. Trace pericardial effusion.   4. Technically difficult image quality.    ________________________________________________________________________________________  FINDINGS:     Left Ventricle:  Left ventricular endocardium is not well visualized; however, the left ventricular systolic function appears mildly reduced.     Right Ventricle:  The right ventricle is not well visualized. Normal RV systolic function. Tricuspid annular plane systolic excursion (TAPSE) is 2.4 cm (normal >=1.7 cm).     Aortic Valve:  The aortic valve was not well visualized. There is no aortic valve stenosis.     Pericardium:  There is a trace pericardial effusion.  ____________________________________________________________________  QUANTITATIVE DATA:  Left Ventricle Measurements: (Indexed to BSA)     MV E Vmax: 0.64 m/s  MV A Vmax: 0.42 m/s  MV E/A:    1.51  MV DT:     202 msec       Right Ventricle Measurements:     TAPSE: 2.4 cm       LVOT / RVOT/ Qp/Qs Data: (Indexed to BSA)  LVOT Vmax: 0.85 m/s  LVOT VTI:  19.36 cm    Aortic Valve Measurements:  AV Vmax:                1.3 m/s  AV Peak Gradient:       6.9 mmHg  AV Mean Gradient:       3.7 mmHg  AV VTI:                 30.7 cm  AV VTI Ratio:           0.63  AoV Dimensionless Index 0.63    Mitral Valve Measurements:     MV E Vmax: 0.6 m/s  MV A Vmax: 0.4 m/s  MV E/A:    1.5       Tricuspid Valve Measurements:     TR Vmax:          1.7 m/s  TR Peak Gradient: 11.6 mmHg    ________________________________________________________________________________________  Electronically signed on 1/6/2024 at 9:56:15 PM by Morgan Gallagher         *** Final ***    < end of copied text >    Goals of Care Discussion with Family/Proxy/Other   - see note from/family meeting set up for...

## 2024-01-18 NOTE — PROGRESS NOTE ADULT - NS ATTEND AMEND GEN_ALL_CORE FT
Problem/Plan - 1:  ·  Problem: Acute respiratory failure with hypoxia.   ·  Plan: Likely secondary to COVID and pneumonia.  Intubated 1/5 extubated 1/8 reintubated 1/9  S/P trach and Peg on 1/12  Continue mechanical ventilation with daily SBT  Monitor oxygen saturation.  Tolerating Trach collar well.     Problem/Plan - 2:  ·  Problem: Septic shock.   ·  Plan: Has completed multiple rounds of antibiotics.  completed cefepime.  Keep head of bed elevated at all times.  Completed remdesivir and steroids.     Problem/Plan - 3:  ·  Problem: Pneumonia due to COVID-19 virus.   ·  Plan: Has completed multiple rounds of antibiotics.  completed cefepime.  Keep head of bed elevated at all times.  Completed remdesivir and steroids.     Problem/Plan - 4:  ·  Problem: Metabolic encephalopathy.   ·  Plan: Secondary to prior CVA and progressive dementia.  Maintain safety measures.  Continue valproic acid.     Problem/Plan - 5:  ·  Problem: Schizophrenia.   ·  Plan: switched seroquel to olanzapine   continue valproic acid.

## 2024-01-18 NOTE — DISCHARGE NOTE PROVIDER - CARE PROVIDER_API CALL
Radha Cain  Internal Medicine  [address], [address]  [place] [address]  Phone: [phone]  Fax: [phone]  Follow Up Time:     Raza Willingham  Internal Medicine  [address] [facility], [address] [place] [address]  Phone: [phone]  Fax: [phone]  Follow Up Time:

## 2024-01-18 NOTE — DISCHARGE NOTE PROVIDER - INSTRUCTIONS
Diet Presciption: Diet, NPO with Tube Feed:   Tube Feeding Modality: Gastrostomy  Jevity 1.5 Gabriel  Total Volume for 24 Hours (mL): 540  Continuous  Starting Tube Feed Rate {mL per Hour}: 10  Increase Tube Feed Rate by (mL): 10  Until Goal Tube Feed Rate (mL per Hour): 30  Tube Feed Duration (in Hours): 18  Tube Feed Start Time: 12:00  Tube Feed Stop Time: 06:00  No Carb Prosource (1pkg = 15gms Protein)

## 2024-03-11 NOTE — ED PROVIDER NOTE - NS ED MD TWO NIGHTS YN
67 Y.O. male with a PMHx of HTN, S/P MV repair , NICM , AV node ablation S/P [employer] CRT/D , VT S/P shock , Afib CHADS-VASc 1,S/P GIANFRANCO closure, recurrent AFib S/P DCCV 10/2016 , S/P CRT-D generator change 8/15/2018.     Patient was hospitalized in June 2023 for appropriate shock for VF episode. Cardiac catheterization showed no new coronary artery disease.   Episode is consistent with long-short initiation of ventricular fibrillation. Patient has approximately 9 PVCs in 1 hour. However, they do occur at a specific coupling interval.     Patient had an episode of VF on January 3, 2024 successfully terminated by 40J. Patient     1/5/2024 patient was asked to come to the office d/t 40 j shocked on January 3 for VF.     Pt presents for elective PVC ablation Yes

## 2024-07-03 NOTE — DIETITIAN INITIAL EVALUATION ADULT - OTHER INFO
Height obtained from chart: 65 inches . ordered for minced and moist, DASH/TLC and tolerating it per PCA.  No

## 2025-04-15 NOTE — OCCUPATIONAL THERAPY INITIAL EVALUATION ADULT - SITTING BALANCE: STATIC
ENT Progress Note    Interval: Pt seen and examined by chief resident. Pt is doing well, resting comfortably on bed. Pain controlled. Diet tolerated. Patient would like to go home today, feels comfortable with ALEXIS drain management at home.    PAST MEDICAL & SURGICAL HISTORY:  Lump on neck R  HTN (hypertension)  H/O hernia repair inguinal    Allergies  No Known Drug Allergies  shellfish (Swelling)    MEDICATIONS  (STANDING):  lactated ringers. 1000 milliLiter(s) (75 mL/Hr) IV Continuous <Continuous>  losartan 50 milliGRAM(s) Oral daily    MEDICATIONS  (PRN):  acetaminophen     Tablet .. 650 milliGRAM(s) Oral every 6 hours PRN Mild Pain (1 - 3)  benzocaine/menthol Lozenge 1 Lozenge Oral every 4 hours PRN Sore Throat  HYDROmorphone  Injectable 1 milliGRAM(s) IV Push every 4 hours PRN breakthrough  ondansetron Injectable 4 milliGRAM(s) IV Push every 6 hours PRN Nausea  oxyCODONE    IR 2.5 milliGRAM(s) Oral every 6 hours PRN Moderate Pain (4 - 6)  oxyCODONE    IR 5 milliGRAM(s) Oral every 6 hours PRN Severe Pain (7 - 10)  zolpidem 5 milliGRAM(s) Oral at bedtime PRN Insomnia    Vital Signs Last 24 Hrs  T(C): 36.6 (15 Apr 2025 05:06), Max: 36.9 (14 Apr 2025 23:41)  T(F): 97.9 (15 Apr 2025 05:06), Max: 98.4 (14 Apr 2025 23:41)  HR: 68 (15 Apr 2025 05:06) (64 - 77)  BP: 111/62 (15 Apr 2025 05:06) (103/64 - 136/74)  BP(mean): 90 (14 Apr 2025 16:27) (86 - 99)  RR: 17 (15 Apr 2025 05:06) (14 - 19)  SpO2: 94% (15 Apr 2025 05:06) (91% - 96%)    Parameters below as of 15 Apr 2025 05:06  Patient On (Oxygen Delivery Method): room air    Physical Exam:  General: well-developed, NAD  OU: EOMI; PERRL; no drainage or redness  AU: external ears normal  Nose: nares patent  Neck: neck soft, flat, incision c/d/i, alexis ss  Respiratory: unlabored respirations  Cardiovascular: regular rate  Extremities: No edema, warm and well perfused  Skin: No lesions; no rash    04-14-25 @ 07:01  -  04-15-25 @ 07:00  --------------------------------------------------------  IN: 300 mL / OUT: 2900 mL / NET: -2600 mL   fair balance

## 2025-05-14 NOTE — CONSULT NOTE ADULT - ASSESSMENT
[FreeTextEntry1] : obesity/pre- diabetes  - Patient to continue start Mounjaro 12.5 mg once weekly for four weeks. All side effects reviewed. Questions and concerns addressed.  - A detailed discussion took place about the importance of CV risk reduction - The patient understands the importance of incorporating moderate aerobic exercise, 4 times/week for 40 minutes to reduce risk of CV disease. - A detailed discussion of lifestyle modification was done today. Patient understands the importance of a heart healthy diet and incorporating veggies/legumes/fruits/whole grains and limiting processed foods, sugars and carbs in their diet. - patient understands that stress reduction along with good sleep hygiene will help aid in weight loss - Follow up in 8 weeks  - The patient has a good understanding of the diagnosis, treatment plan and lifestyle modification. she will contact me at the office for any questions with their care or any changes in their health status.  Case discussed with Dr Arie Coe. 59Y Serbian speaking M with prior CVA 8y ago (no reported deficits) brought in by brother for concerns of generalized weakness and inability to speak for the past 3 weeks. On exam found to have decreased BTT on the L, and no verbal output. He follows commands intermittently and is easily agitated.  Exam limited due to patient aggression.     Impression:  Expressive aphasia vs selective mutism of unclear etiology. He has L MCA territory encephalomalacia and gliosis, which if this is his dominant hemisphere can explain his expressive aphasia but there is no attempt on patients part to speak. Decreased BTT on the L, with ?L CN 6 palsy.     Recommend:   - follow up CT Head results (currently pending CT in ED)  - MRI Brain w/ and w/o   - obtain collateral info from family regarding onset of symptoms and patient's baseline  - treatment of vesicular rash  - care per medicine 59Y Mongolian speaking M with prior CVA 8y ago (no reported deficits) brought in by brother for concerns of generalized weakness and inability to speak for the past 3 weeks. On exam found to have decreased BTT on the L, and no verbal output. He follows commands intermittently and is easily agitated.  Exam limited due to patient aggression. CTH demonstrates chronic L MCA infarct with severely stenotic vs occluded distal L M1. There is also subacute/chronic R corona radiata hypodensity    Impression:  Expressive aphasia possibly secondary to chronic L MCA ischemic stroke 2/2 to severely stenotic vs occlusion of L M1, with possible recrudescence in setting of infection.  He has L MCA territory encephalomalacia and gliosis, which if this is his dominant hemisphere can explain his expressive aphasia but there is no attempt on patients part to speak. There is decreased BTT on the L, with ?L CN 6 palsy, however no PCA territory involvement seen on CTH.     Recommend:   - normotension given subacute nature of symptoms   - MRI Brain w/ and w/o   - MRA Head w/ and MRA neck w/o to better evaluate intracranial vasculature  - start ASA 81mg QD if no contraindications  - start Plavix 75mg QD x3 months for symptomatic intracranial atherosclerosis per SAMPRIS trial   - TTE with bubble study  - pending MRI results, may be candidate for ILR   - send HgA1C, LDL  - start Atorvastatin 80mg QD and titrate according to LDL  - obtain collateral info from family regarding onset of symptoms and patient's baseline  - treatment of vesicular rash  - care per medicine

## 2025-07-30 NOTE — PROGRESS NOTE ADULT - PROBLEM/PLAN-5
BEFORE THE PROCEDURE:    REPORT ANY CHANGE IN YOUR PHYSICAL CONDITION TO YOUR DOCTOR IMMEDIATELY.  SELF ISOLATE AND CHECK TEMPERATURE DAILY, IF TEMP OVER 100, CALL PHYSICIAN IMMEDIATELY.    TRY TO REFRAIN FROM SMOKING AND ALCOHOL 72 HOURS BEFORE YOUR PROCEDURE.    YOU MAY HAVE WATER ONLY 4 HOURS PRIOR TO ARRIVAL.     SOMEONE WILL CALL YOU THE DAY BEFORE YOUR PROCEDURE WITH A CHECK-IN TIME FOR YOUR PROCEDURE.    CHECK IN AT FIRST FLOOR REGISTRATION DESK.     DAY OF YOUR PROCEDURE:    NO MAKE UP, NAIL POLISH OR JEWELRY.  TAKE BLOOD PRESSURE MEDICATIONS THE MORNING OF YOUR PROCEDURE, WITH SMALL SIPS WATER, AS DIRECTED BY YOUR PHYSICIAN.   DO NOT TAKE ANY DIABETIC MEDICATIONS UNLESS DIRECTED TO DO SO BY YOUR PHYSICIAN.   CONTACT LENSES AND DENTURES MUST BE REMOVED.  A RESPONSIBLE ADULT MUST ACCOMPANY YOU HOME UPON DISCHARGE.   ONLY 1 VISITOR ALLOWED PER ROOM.     YOUR THOUGHTS AND OPINIONS HELP US TO BETTER SERVE YOU.     PLEASE PARTICIPATE IN SURVEYS ABOUT YOUR CARE.    THANK YOU FOR CHOOSING [facility].   
DISPLAY PLAN FREE TEXT

## (undated) DEVICE — CATH IV SAFE BC 22G X 1" (BLUE)

## (undated) DEVICE — SUT SOFSILK 2-0 30" V-20

## (undated) DEVICE — DRAPE 1/2 SHEET 40X57"

## (undated) DEVICE — ELCTR GROUNDING PAD ADULT COVIDIEN

## (undated) DEVICE — NDL HYPO SAFE 22G X 1" (BLACK)

## (undated) DEVICE — ADAPTER FIBEROPTIC BRONCHOSCOPE DUAL AXIS SWIVEL

## (undated) DEVICE — POSITIONER STRAP ARMBOARD VELCRO TS-30

## (undated) DEVICE — PACK MINOR NO DRAPE

## (undated) DEVICE — DRAPE LAPAROTOMY TRANSVERSE

## (undated) DEVICE — STERIS DEFENDO 3-PIECE KIT (AIR/WATER, SUCTION & BIOPSY VALVES)

## (undated) DEVICE — SOL INJ LR 1000ML

## (undated) DEVICE — SUCTION YANKAUER NO CONTROL VENT

## (undated) DEVICE — HOLDER TRACH ADULT VELCRO 1IN

## (undated) DEVICE — CATH IV SAFE BC 20G X 1.16" (PINK)

## (undated) DEVICE — TUBING SUCTION 20FT

## (undated) DEVICE — SOL INJ NS 0.9% 500ML 2 PORT

## (undated) DEVICE — DRSG CURITY GAUZE SPONGE 4 X 4" 12-PLY

## (undated) DEVICE — BALLOON US ENDO

## (undated) DEVICE — SOL IRR POUR NS 0.9% 500ML

## (undated) DEVICE — SUT POLYSORB 3-0 30" V-20 UNDYED

## (undated) DEVICE — SYR ALLIANCE II INFLATION 60ML

## (undated) DEVICE — POSITIONER FOAM MATTRESS PAD CONVOLUTED (PINK)

## (undated) DEVICE — VALVE SUCTION EVIS 160/200/240

## (undated) DEVICE — SYR LUER LOK 20CC

## (undated) DEVICE — ELCTR BOVIE TIP BLADE INSULATED 2.75" EDGE

## (undated) DEVICE — TUBING SUCTION NONCONDUCTIVE 6MM X 12FT

## (undated) DEVICE — TUBING IV SET GRAVITY 3Y 100" MACRO

## (undated) DEVICE — FOLEY HOLDER STATLOCK 2 WAY ADULT

## (undated) DEVICE — VENODYNE/SCD SLEEVE CALF MEDIUM

## (undated) DEVICE — SYR LUER LOK 10CC

## (undated) DEVICE — TUBING SUCTION CONN 6FT STERILE

## (undated) DEVICE — VENODYNE/SCD SLEEVE CALF LARGE

## (undated) DEVICE — SYR SLIP TIP 20CC

## (undated) DEVICE — TRAP SPECIMEN SPUTUM 40CC

## (undated) DEVICE — SUT POLYSORB 3-0 30" CV-23 UNDYED

## (undated) DEVICE — DRAPE TOWEL BLUE 17" X 24"

## (undated) DEVICE — FOR-ESU VALLEYLAB T7E15009DX: Type: DURABLE MEDICAL EQUIPMENT

## (undated) DEVICE — BRUSH CYTO DISP

## (undated) DEVICE — SOL IRR POUR H2O 250ML

## (undated) DEVICE — PACK IV START WITH CHG

## (undated) DEVICE — BITE BLOCK ADULT 20 X 27MM (GREEN)

## (undated) DEVICE — SUT SURGIPRO 2-0 30" GS-22

## (undated) DEVICE — GLV 5.5 PROTEXIS (WHITE)

## (undated) DEVICE — WARMING BLANKET LOWER ADULT

## (undated) DEVICE — FORCEP BIOPSY 1.8MM JAW X 100CM DISP